# Patient Record
Sex: FEMALE | Race: WHITE | NOT HISPANIC OR LATINO | Employment: OTHER | URBAN - METROPOLITAN AREA
[De-identification: names, ages, dates, MRNs, and addresses within clinical notes are randomized per-mention and may not be internally consistent; named-entity substitution may affect disease eponyms.]

---

## 2017-01-05 ENCOUNTER — HOSPITAL ENCOUNTER (OUTPATIENT)
Dept: MRI IMAGING | Facility: HOSPITAL | Age: 57
Discharge: HOME | End: 2017-01-05
Attending: RADIOLOGY
Payer: COMMERCIAL

## 2017-01-05 DIAGNOSIS — C79.51 SECONDARY MALIGNANT NEOPLASM OF BONE (HCC): ICD-10-CM

## 2017-01-05 PROCEDURE — 72146 MRI CHEST SPINE W/O DYE: CPT

## 2017-01-05 PROCEDURE — 72148 MRI LUMBAR SPINE W/O DYE: CPT

## 2017-01-10 ENCOUNTER — ALLSCRIPTS OFFICE VISIT (OUTPATIENT)
Dept: OTHER | Facility: OTHER | Age: 57
End: 2017-01-10

## 2017-01-19 RX ORDER — SODIUM CHLORIDE 9 MG/ML
20 INJECTION, SOLUTION INTRAVENOUS CONTINUOUS
Status: DISCONTINUED | OUTPATIENT
Start: 2017-01-20 | End: 2017-01-23 | Stop reason: HOSPADM

## 2017-01-20 ENCOUNTER — HOSPITAL ENCOUNTER (OUTPATIENT)
Dept: INFUSION CENTER | Facility: CLINIC | Age: 57
Discharge: HOME | End: 2017-01-20
Payer: COMMERCIAL

## 2017-01-20 VITALS
RESPIRATION RATE: 20 BRPM | HEART RATE: 80 BPM | SYSTOLIC BLOOD PRESSURE: 144 MMHG | BODY MASS INDEX: 24.83 KG/M2 | TEMPERATURE: 98 F | DIASTOLIC BLOOD PRESSURE: 78 MMHG | WEIGHT: 149.2 LBS

## 2017-01-20 PROCEDURE — 96413 CHEMO IV INFUSION 1 HR: CPT

## 2017-01-20 PROCEDURE — 96417 CHEMO IV INFUS EACH ADDL SEQ: CPT

## 2017-01-20 RX ADMIN — PERTUZUMAB 420 MG: 30 INJECTION, SOLUTION, CONCENTRATE INTRAVENOUS at 14:24

## 2017-01-20 RX ADMIN — Medication 417 MG: at 14:57

## 2017-01-20 RX ADMIN — SODIUM CHLORIDE 20 ML/HR: 900 INJECTION, SOLUTION INTRAVENOUS at 13:41

## 2017-01-20 NOTE — PLAN OF CARE

## 2017-01-20 NOTE — PLAN OF CARE
Problem: Potential for Falls  Goal: Patient will remain free of falls  INTERVENTIONS:  - Assess patient frequently for physical needs  - Identify cognitive and physical deficits and behaviors that affect risk of falls    - [place] fall precautions as indicated by assessment   - Educate patient/family on patient safety including physical limitations  - Instruct patient to call for assistance with activity based on assessment  - Modify environment to reduce risk of injury  - Consider OT/PT consult to assist with strengthening/mobility   Outcome: Progressing

## 2017-01-25 ENCOUNTER — APPOINTMENT (OUTPATIENT)
Dept: LAB | Facility: MEDICAL CENTER | Age: 57
End: 2017-01-25
Payer: COMMERCIAL

## 2017-01-25 DIAGNOSIS — C50.919 MALIGNANT NEOPLASM OF FEMALE BREAST (HCC): ICD-10-CM

## 2017-01-25 LAB
ALBUMIN SERPL BCP-MCNC: 3.7 G/DL (ref 3.5–5)
ALP SERPL-CCNC: 105 U/L (ref 46–116)
ALT SERPL W P-5'-P-CCNC: 50 U/L (ref 12–78)
ANION GAP SERPL CALCULATED.3IONS-SCNC: 8 MMOL/L (ref 4–13)
AST SERPL W P-5'-P-CCNC: 24 U/L (ref 5–45)
BASOPHILS # BLD AUTO: 0.01 THOUSANDS/ΜL (ref 0–0.1)
BASOPHILS NFR BLD AUTO: 0 % (ref 0–1)
BILIRUB SERPL-MCNC: 0.32 MG/DL (ref 0.2–1)
BUN SERPL-MCNC: 14 MG/DL (ref 5–25)
CALCIUM SERPL-MCNC: 9.1 MG/DL (ref 8.3–10.1)
CHLORIDE SERPL-SCNC: 106 MMOL/L (ref 100–108)
CO2 SERPL-SCNC: 28 MMOL/L (ref 21–32)
CREAT SERPL-MCNC: 0.61 MG/DL (ref 0.6–1.3)
EOSINOPHIL # BLD AUTO: 0.01 THOUSAND/ΜL (ref 0–0.61)
EOSINOPHIL NFR BLD AUTO: 0 % (ref 0–6)
ERYTHROCYTE [DISTWIDTH] IN BLOOD BY AUTOMATED COUNT: 14.2 % (ref 11.6–15.1)
GFR SERPL CREATININE-BSD FRML MDRD: >60 ML/MIN/1.73SQ M
GLUCOSE SERPL-MCNC: 108 MG/DL (ref 65–140)
HCT VFR BLD AUTO: 38.1 % (ref 34.8–46.1)
HGB BLD-MCNC: 12.4 G/DL (ref 11.5–15.4)
LYMPHOCYTES # BLD AUTO: 1.22 THOUSANDS/ΜL (ref 0.6–4.47)
LYMPHOCYTES NFR BLD AUTO: 16 % (ref 14–44)
MCH RBC QN AUTO: 32.7 PG (ref 26.8–34.3)
MCHC RBC AUTO-ENTMCNC: 32.5 G/DL (ref 31.4–37.4)
MCV RBC AUTO: 101 FL (ref 82–98)
MONOCYTES # BLD AUTO: 0.74 THOUSAND/ΜL (ref 0.17–1.22)
MONOCYTES NFR BLD AUTO: 9 % (ref 4–12)
NEUTROPHILS # BLD AUTO: 5.82 THOUSANDS/ΜL (ref 1.85–7.62)
NEUTS SEG NFR BLD AUTO: 75 % (ref 43–75)
NRBC BLD AUTO-RTO: 0 /100 WBCS
PLATELET # BLD AUTO: 347 THOUSANDS/UL (ref 149–390)
PMV BLD AUTO: 9.7 FL (ref 8.9–12.7)
POTASSIUM SERPL-SCNC: 4 MMOL/L (ref 3.5–5.3)
PROT SERPL-MCNC: 7.6 G/DL (ref 6.4–8.2)
RBC # BLD AUTO: 3.79 MILLION/UL (ref 3.81–5.12)
SODIUM SERPL-SCNC: 142 MMOL/L (ref 136–145)
WBC # BLD AUTO: 7.84 THOUSAND/UL (ref 4.31–10.16)

## 2017-01-25 PROCEDURE — 80053 COMPREHEN METABOLIC PANEL: CPT

## 2017-01-25 PROCEDURE — 86300 IMMUNOASSAY TUMOR CA 15-3: CPT

## 2017-01-25 PROCEDURE — 85025 COMPLETE CBC W/AUTO DIFF WBC: CPT

## 2017-01-25 PROCEDURE — 36415 COLL VENOUS BLD VENIPUNCTURE: CPT

## 2017-01-26 LAB — CANCER AG27-29 SERPL-ACNC: 250 U/ML (ref 0–42.3)

## 2017-02-01 ENCOUNTER — ALLSCRIPTS OFFICE VISIT (OUTPATIENT)
Dept: OTHER | Facility: OTHER | Age: 57
End: 2017-02-01

## 2017-02-01 ENCOUNTER — TRANSCRIBE ORDERS (OUTPATIENT)
Dept: ADMINISTRATIVE | Facility: HOSPITAL | Age: 57
End: 2017-02-01

## 2017-02-01 DIAGNOSIS — C50.019 MALIGNANT NEOPLASM OF NIPPLE OF BREAST IN FEMALE, UNSPECIFIED LATERALITY: ICD-10-CM

## 2017-02-01 DIAGNOSIS — Z51.81 ENCOUNTER FOR THERAPEUTIC DRUG MONITORING: Primary | ICD-10-CM

## 2017-02-07 ENCOUNTER — HOSPITAL ENCOUNTER (OUTPATIENT)
Dept: NUCLEAR MEDICINE | Facility: HOSPITAL | Age: 57
Discharge: HOME | End: 2017-02-07
Payer: COMMERCIAL

## 2017-02-07 DIAGNOSIS — Z51.81 ENCOUNTER FOR THERAPEUTIC DRUG MONITORING: ICD-10-CM

## 2017-02-07 DIAGNOSIS — C50.019 MALIGNANT NEOPLASM OF NIPPLE OF BREAST IN FEMALE, UNSPECIFIED LATERALITY: ICD-10-CM

## 2017-02-07 PROCEDURE — 78472 GATED HEART PLANAR SINGLE: CPT

## 2017-02-07 PROCEDURE — A9560 TC99M LABELED RBC: HCPCS

## 2017-02-08 ENCOUNTER — APPOINTMENT (OUTPATIENT)
Dept: LAB | Facility: MEDICAL CENTER | Age: 57
End: 2017-02-08
Payer: COMMERCIAL

## 2017-02-08 DIAGNOSIS — G89.3 NEOPLASM RELATED PAIN: ICD-10-CM

## 2017-02-08 LAB
ALBUMIN SERPL BCP-MCNC: 3.3 G/DL (ref 3.5–5)
ALP SERPL-CCNC: 105 U/L (ref 46–116)
ALT SERPL W P-5'-P-CCNC: 31 U/L (ref 12–78)
ANION GAP SERPL CALCULATED.3IONS-SCNC: 7 MMOL/L (ref 4–13)
AST SERPL W P-5'-P-CCNC: 15 U/L (ref 5–45)
BASOPHILS # BLD AUTO: 0.02 THOUSANDS/ΜL (ref 0–0.1)
BASOPHILS NFR BLD AUTO: 0 % (ref 0–1)
BILIRUB SERPL-MCNC: 0.21 MG/DL (ref 0.2–1)
BUN SERPL-MCNC: 8 MG/DL (ref 5–25)
CALCIUM SERPL-MCNC: 9.4 MG/DL (ref 8.3–10.1)
CANCER AG27-29 SERPL-ACNC: 282.2 U/ML (ref 0–42.3)
CHLORIDE SERPL-SCNC: 103 MMOL/L (ref 100–108)
CO2 SERPL-SCNC: 29 MMOL/L (ref 21–32)
CREAT SERPL-MCNC: 0.56 MG/DL (ref 0.6–1.3)
EOSINOPHIL # BLD AUTO: 0.03 THOUSAND/ΜL (ref 0–0.61)
EOSINOPHIL NFR BLD AUTO: 0 % (ref 0–6)
ERYTHROCYTE [DISTWIDTH] IN BLOOD BY AUTOMATED COUNT: 13.9 % (ref 11.6–15.1)
GFR SERPL CREATININE-BSD FRML MDRD: >60 ML/MIN/1.73SQ M
GLUCOSE SERPL-MCNC: 97 MG/DL (ref 65–140)
HCT VFR BLD AUTO: 39.3 % (ref 34.8–46.1)
HGB BLD-MCNC: 12.8 G/DL (ref 11.5–15.4)
LYMPHOCYTES # BLD AUTO: 1.43 THOUSANDS/ΜL (ref 0.6–4.47)
LYMPHOCYTES NFR BLD AUTO: 20 % (ref 14–44)
MCH RBC QN AUTO: 32.1 PG (ref 26.8–34.3)
MCHC RBC AUTO-ENTMCNC: 32.6 G/DL (ref 31.4–37.4)
MCV RBC AUTO: 99 FL (ref 82–98)
MONOCYTES # BLD AUTO: 0.53 THOUSAND/ΜL (ref 0.17–1.22)
MONOCYTES NFR BLD AUTO: 7 % (ref 4–12)
NEUTROPHILS # BLD AUTO: 5.04 THOUSANDS/ΜL (ref 1.85–7.62)
NEUTS SEG NFR BLD AUTO: 73 % (ref 43–75)
NRBC BLD AUTO-RTO: 0 /100 WBCS
PLATELET # BLD AUTO: 462 THOUSANDS/UL (ref 149–390)
PMV BLD AUTO: 9.2 FL (ref 8.9–12.7)
POTASSIUM SERPL-SCNC: 4.1 MMOL/L (ref 3.5–5.3)
PROT SERPL-MCNC: 7.8 G/DL (ref 6.4–8.2)
RBC # BLD AUTO: 3.99 MILLION/UL (ref 3.81–5.12)
SODIUM SERPL-SCNC: 139 MMOL/L (ref 136–145)
WBC # BLD AUTO: 7.12 THOUSAND/UL (ref 4.31–10.16)

## 2017-02-08 PROCEDURE — 85025 COMPLETE CBC W/AUTO DIFF WBC: CPT

## 2017-02-08 PROCEDURE — 80053 COMPREHEN METABOLIC PANEL: CPT

## 2017-02-08 PROCEDURE — 36415 COLL VENOUS BLD VENIPUNCTURE: CPT

## 2017-02-08 PROCEDURE — 86300 IMMUNOASSAY TUMOR CA 15-3: CPT

## 2017-02-10 ENCOUNTER — HOSPITAL ENCOUNTER (OUTPATIENT)
Dept: INFUSION CENTER | Facility: CLINIC | Age: 57
Discharge: HOME | End: 2017-02-10
Payer: COMMERCIAL

## 2017-02-10 VITALS
TEMPERATURE: 98.2 F | HEIGHT: 65 IN | BODY MASS INDEX: 24.62 KG/M2 | SYSTOLIC BLOOD PRESSURE: 120 MMHG | RESPIRATION RATE: 20 BRPM | DIASTOLIC BLOOD PRESSURE: 70 MMHG | WEIGHT: 147.8 LBS | HEART RATE: 80 BPM

## 2017-02-10 PROCEDURE — 96367 TX/PROPH/DG ADDL SEQ IV INF: CPT

## 2017-02-10 PROCEDURE — 96377 APPLICATON ON-BODY INJECTOR: CPT

## 2017-02-10 PROCEDURE — 96413 CHEMO IV INFUSION 1 HR: CPT

## 2017-02-10 PROCEDURE — 96417 CHEMO IV INFUS EACH ADDL SEQ: CPT

## 2017-02-10 RX ORDER — HYDROMORPHONE HYDROCHLORIDE 8 MG/1
8 TABLET ORAL EVERY 8 HOURS PRN
COMMUNITY
End: 2018-02-06 | Stop reason: SDUPTHER

## 2017-02-10 RX ADMIN — PERTUZUMAB 420 MG: 30 INJECTION, SOLUTION, CONCENTRATE INTRAVENOUS at 15:16

## 2017-02-10 RX ADMIN — Medication 416 MG: at 15:51

## 2017-02-10 RX ADMIN — PEGFILGRASTIM 6 MG: KIT SUBCUTANEOUS at 16:25

## 2017-02-10 RX ADMIN — ONDANSETRON 16 MG: 2 INJECTION INTRAMUSCULAR; INTRAVENOUS at 13:51

## 2017-02-10 RX ADMIN — DOCETAXEL 132 MG: 20 INJECTION, SOLUTION, CONCENTRATE INTRAVENOUS at 14:11

## 2017-02-10 NOTE — PROGRESS NOTES
Tolerate treament today  Applied neulasta on pro to left arm, pt independent with monitoring and removing device   Has appointments set up, declines and AVS

## 2017-02-14 ENCOUNTER — ALLSCRIPTS OFFICE VISIT (OUTPATIENT)
Dept: OTHER | Facility: OTHER | Age: 57
End: 2017-02-14

## 2017-02-28 ENCOUNTER — ALLSCRIPTS OFFICE VISIT (OUTPATIENT)
Dept: OTHER | Facility: OTHER | Age: 57
End: 2017-02-28

## 2017-03-01 ENCOUNTER — ALLSCRIPTS OFFICE VISIT (OUTPATIENT)
Dept: OTHER | Facility: OTHER | Age: 57
End: 2017-03-01

## 2017-03-01 ENCOUNTER — TRANSCRIBE ORDERS (OUTPATIENT)
Dept: LAB | Facility: MEDICAL CENTER | Age: 57
End: 2017-03-01

## 2017-03-01 ENCOUNTER — APPOINTMENT (OUTPATIENT)
Dept: LAB | Facility: MEDICAL CENTER | Age: 57
End: 2017-03-01
Payer: COMMERCIAL

## 2017-03-01 ENCOUNTER — TRANSCRIBE ORDERS (OUTPATIENT)
Dept: ADMINISTRATIVE | Facility: HOSPITAL | Age: 57
End: 2017-03-01

## 2017-03-01 DIAGNOSIS — C50.011 MALIGNANT NEOPLASM INVOLVING BOTH NIPPLE AND AREOLA OF RIGHT BREAST IN FEMALE (HCC): Primary | ICD-10-CM

## 2017-03-01 DIAGNOSIS — C50.919 MALIGNANT NEOPLASM OF FEMALE BREAST (HCC): ICD-10-CM

## 2017-03-01 LAB
ALBUMIN SERPL BCP-MCNC: 3.3 G/DL (ref 3.5–5)
ALP SERPL-CCNC: 105 U/L (ref 46–116)
ALT SERPL W P-5'-P-CCNC: 48 U/L (ref 12–78)
ANION GAP SERPL CALCULATED.3IONS-SCNC: 7 MMOL/L (ref 4–13)
AST SERPL W P-5'-P-CCNC: 18 U/L (ref 5–45)
BASOPHILS # BLD AUTO: 0.01 THOUSANDS/ΜL (ref 0–0.1)
BASOPHILS NFR BLD AUTO: 0 % (ref 0–1)
BILIRUB SERPL-MCNC: 0.18 MG/DL (ref 0.2–1)
BUN SERPL-MCNC: 11 MG/DL (ref 5–25)
CALCIUM SERPL-MCNC: 8.7 MG/DL (ref 8.3–10.1)
CHLORIDE SERPL-SCNC: 105 MMOL/L (ref 100–108)
CO2 SERPL-SCNC: 29 MMOL/L (ref 21–32)
CREAT SERPL-MCNC: 0.58 MG/DL (ref 0.6–1.3)
EOSINOPHIL # BLD AUTO: 0.01 THOUSAND/ΜL (ref 0–0.61)
EOSINOPHIL NFR BLD AUTO: 0 % (ref 0–6)
ERYTHROCYTE [DISTWIDTH] IN BLOOD BY AUTOMATED COUNT: 16 % (ref 11.6–15.1)
GFR SERPL CREATININE-BSD FRML MDRD: >60 ML/MIN/1.73SQ M
GLUCOSE SERPL-MCNC: 115 MG/DL (ref 65–140)
HCT VFR BLD AUTO: 37.3 % (ref 34.8–46.1)
HGB BLD-MCNC: 12 G/DL (ref 11.5–15.4)
LYMPHOCYTES # BLD AUTO: 1.55 THOUSANDS/ΜL (ref 0.6–4.47)
LYMPHOCYTES NFR BLD AUTO: 14 % (ref 14–44)
MCH RBC QN AUTO: 32 PG (ref 26.8–34.3)
MCHC RBC AUTO-ENTMCNC: 32.2 G/DL (ref 31.4–37.4)
MCV RBC AUTO: 100 FL (ref 82–98)
MONOCYTES # BLD AUTO: 1.06 THOUSAND/ΜL (ref 0.17–1.22)
MONOCYTES NFR BLD AUTO: 10 % (ref 4–12)
NEUTROPHILS # BLD AUTO: 8.19 THOUSANDS/ΜL (ref 1.85–7.62)
NEUTS SEG NFR BLD AUTO: 76 % (ref 43–75)
NRBC BLD AUTO-RTO: 0 /100 WBCS
PLATELET # BLD AUTO: 483 THOUSANDS/UL (ref 149–390)
PMV BLD AUTO: 9.3 FL (ref 8.9–12.7)
POTASSIUM SERPL-SCNC: 3.9 MMOL/L (ref 3.5–5.3)
PROT SERPL-MCNC: 7.4 G/DL (ref 6.4–8.2)
RBC # BLD AUTO: 3.75 MILLION/UL (ref 3.81–5.12)
SODIUM SERPL-SCNC: 141 MMOL/L (ref 136–145)
WBC # BLD AUTO: 10.92 THOUSAND/UL (ref 4.31–10.16)

## 2017-03-01 PROCEDURE — 80053 COMPREHEN METABOLIC PANEL: CPT

## 2017-03-01 PROCEDURE — 85025 COMPLETE CBC W/AUTO DIFF WBC: CPT

## 2017-03-01 PROCEDURE — 86300 IMMUNOASSAY TUMOR CA 15-3: CPT

## 2017-03-01 PROCEDURE — 36415 COLL VENOUS BLD VENIPUNCTURE: CPT

## 2017-03-02 ENCOUNTER — GENERIC CONVERSION - ENCOUNTER (OUTPATIENT)
Dept: OTHER | Facility: OTHER | Age: 57
End: 2017-03-02

## 2017-03-02 LAB — CANCER AG27-29 SERPL-ACNC: 278.8 U/ML (ref 0–42.3)

## 2017-03-02 RX ORDER — SODIUM CHLORIDE 9 MG/ML
20 INJECTION, SOLUTION INTRAVENOUS CONTINUOUS
Status: DISCONTINUED | OUTPATIENT
Start: 2017-03-03 | End: 2017-03-06 | Stop reason: HOSPADM

## 2017-03-03 ENCOUNTER — HOSPITAL ENCOUNTER (OUTPATIENT)
Dept: INFUSION CENTER | Facility: CLINIC | Age: 57
Discharge: HOME | End: 2017-03-03
Payer: COMMERCIAL

## 2017-03-03 VITALS
TEMPERATURE: 98.6 F | HEART RATE: 106 BPM | RESPIRATION RATE: 18 BRPM | DIASTOLIC BLOOD PRESSURE: 76 MMHG | SYSTOLIC BLOOD PRESSURE: 140 MMHG | BODY MASS INDEX: 25.26 KG/M2 | WEIGHT: 151.6 LBS | HEIGHT: 65 IN

## 2017-03-03 PROCEDURE — 96377 APPLICATON ON-BODY INJECTOR: CPT

## 2017-03-03 PROCEDURE — 96367 TX/PROPH/DG ADDL SEQ IV INF: CPT

## 2017-03-03 PROCEDURE — 96401 CHEMO ANTI-NEOPL SQ/IM: CPT

## 2017-03-03 PROCEDURE — 96413 CHEMO IV INFUSION 1 HR: CPT

## 2017-03-03 PROCEDURE — 96417 CHEMO IV INFUS EACH ADDL SEQ: CPT

## 2017-03-03 RX ADMIN — PEGFILGRASTIM 6 MG: KIT SUBCUTANEOUS at 14:55

## 2017-03-03 RX ADMIN — DENOSUMAB 120 MG: 120 INJECTION SUBCUTANEOUS at 14:49

## 2017-03-03 RX ADMIN — ONDANSETRON 16 MG: 2 INJECTION INTRAMUSCULAR; INTRAVENOUS at 12:00

## 2017-03-03 RX ADMIN — TRASTUZUMAB 416 MG: KIT at 13:03

## 2017-03-03 RX ADMIN — PERTUZUMAB 420 MG: 30 INJECTION, SOLUTION, CONCENTRATE INTRAVENOUS at 12:26

## 2017-03-03 RX ADMIN — DOCETAXEL 132 MG: 20 INJECTION, SOLUTION, CONCENTRATE INTRAVENOUS at 13:44

## 2017-03-03 RX ADMIN — SODIUM CHLORIDE 20 ML/HR: 0.9 INJECTION, SOLUTION INTRAVENOUS at 11:45

## 2017-03-03 NOTE — PROGRESS NOTES
Treatment completed without adverse event, xgeva tolerated in left arm, neulasta on pro applied to left arm, instructions reviewed    Pt to return as scheduled

## 2017-03-17 ENCOUNTER — HOSPITAL ENCOUNTER (OUTPATIENT)
Dept: RADIOLOGY | Facility: MEDICAL CENTER | Age: 57
Discharge: HOME | End: 2017-03-17
Payer: COMMERCIAL

## 2017-03-17 DIAGNOSIS — C50.919 MALIGNANT NEOPLASM OF FEMALE BREAST (HCC): ICD-10-CM

## 2017-03-17 DIAGNOSIS — C79.51 SECONDARY MALIGNANT NEOPLASM OF BONE (HCC): ICD-10-CM

## 2017-03-17 PROCEDURE — 71260 CT THORAX DX C+: CPT

## 2017-03-17 PROCEDURE — 74177 CT ABD & PELVIS W/CONTRAST: CPT

## 2017-03-17 RX ADMIN — IOHEXOL 100 ML: 350 INJECTION, SOLUTION INTRAVENOUS at 11:28

## 2017-03-21 ENCOUNTER — APPOINTMENT (OUTPATIENT)
Dept: LAB | Facility: MEDICAL CENTER | Age: 57
End: 2017-03-21
Payer: COMMERCIAL

## 2017-03-21 ENCOUNTER — TRANSCRIBE ORDERS (OUTPATIENT)
Dept: ADMINISTRATIVE | Facility: HOSPITAL | Age: 57
End: 2017-03-21

## 2017-03-21 DIAGNOSIS — G89.3 NEOPLASM RELATED PAIN: Primary | ICD-10-CM

## 2017-03-21 DIAGNOSIS — G89.3 NEOPLASM RELATED PAIN: ICD-10-CM

## 2017-03-21 LAB
ALBUMIN SERPL BCP-MCNC: 3.1 G/DL (ref 3.5–5)
ALP SERPL-CCNC: 117 U/L (ref 46–116)
ALT SERPL W P-5'-P-CCNC: 23 U/L (ref 12–78)
ANION GAP SERPL CALCULATED.3IONS-SCNC: 7 MMOL/L (ref 4–13)
AST SERPL W P-5'-P-CCNC: 13 U/L (ref 5–45)
BASOPHILS # BLD AUTO: 0.02 THOUSANDS/ΜL (ref 0–0.1)
BASOPHILS NFR BLD AUTO: 0 % (ref 0–1)
BILIRUB SERPL-MCNC: 0.2 MG/DL (ref 0.2–1)
BUN SERPL-MCNC: 8 MG/DL (ref 5–25)
CALCIUM SERPL-MCNC: 9 MG/DL (ref 8.3–10.1)
CANCER AG27-29 SERPL-ACNC: 184.8 U/ML (ref 0–42.3)
CHLORIDE SERPL-SCNC: 108 MMOL/L (ref 100–108)
CO2 SERPL-SCNC: 29 MMOL/L (ref 21–32)
CREAT SERPL-MCNC: 0.55 MG/DL (ref 0.6–1.3)
EOSINOPHIL # BLD AUTO: 0 THOUSAND/ΜL (ref 0–0.61)
EOSINOPHIL NFR BLD AUTO: 0 % (ref 0–6)
ERYTHROCYTE [DISTWIDTH] IN BLOOD BY AUTOMATED COUNT: 17 % (ref 11.6–15.1)
GFR SERPL CREATININE-BSD FRML MDRD: >60 ML/MIN/1.73SQ M
GLUCOSE SERPL-MCNC: 99 MG/DL (ref 65–140)
HCT VFR BLD AUTO: 33.8 % (ref 34.8–46.1)
HGB BLD-MCNC: 10.6 G/DL (ref 11.5–15.4)
LYMPHOCYTES # BLD AUTO: 1.56 THOUSANDS/ΜL (ref 0.6–4.47)
LYMPHOCYTES NFR BLD AUTO: 16 % (ref 14–44)
MCH RBC QN AUTO: 31.5 PG (ref 26.8–34.3)
MCHC RBC AUTO-ENTMCNC: 31.4 G/DL (ref 31.4–37.4)
MCV RBC AUTO: 100 FL (ref 82–98)
MONOCYTES # BLD AUTO: 0.74 THOUSAND/ΜL (ref 0.17–1.22)
MONOCYTES NFR BLD AUTO: 8 % (ref 4–12)
NEUTROPHILS # BLD AUTO: 7.38 THOUSANDS/ΜL (ref 1.85–7.62)
NEUTS SEG NFR BLD AUTO: 76 % (ref 43–75)
NRBC BLD AUTO-RTO: 0 /100 WBCS
PLATELET # BLD AUTO: 575 THOUSANDS/UL (ref 149–390)
PMV BLD AUTO: 9.3 FL (ref 8.9–12.7)
POTASSIUM SERPL-SCNC: 4.1 MMOL/L (ref 3.5–5.3)
PROT SERPL-MCNC: 7.3 G/DL (ref 6.4–8.2)
RBC # BLD AUTO: 3.37 MILLION/UL (ref 3.81–5.12)
SODIUM SERPL-SCNC: 144 MMOL/L (ref 136–145)
WBC # BLD AUTO: 9.78 THOUSAND/UL (ref 4.31–10.16)

## 2017-03-21 PROCEDURE — 80053 COMPREHEN METABOLIC PANEL: CPT

## 2017-03-21 PROCEDURE — 36415 COLL VENOUS BLD VENIPUNCTURE: CPT

## 2017-03-21 PROCEDURE — 85025 COMPLETE CBC W/AUTO DIFF WBC: CPT

## 2017-03-21 PROCEDURE — 86300 IMMUNOASSAY TUMOR CA 15-3: CPT

## 2017-03-22 ENCOUNTER — ALLSCRIPTS OFFICE VISIT (OUTPATIENT)
Dept: OTHER | Facility: OTHER | Age: 57
End: 2017-03-22

## 2017-03-23 RX ORDER — SODIUM CHLORIDE 9 MG/ML
20 INJECTION, SOLUTION INTRAVENOUS CONTINUOUS
Status: DISCONTINUED | OUTPATIENT
Start: 2017-03-24 | End: 2017-03-27 | Stop reason: HOSPADM

## 2017-03-24 ENCOUNTER — HOSPITAL ENCOUNTER (OUTPATIENT)
Dept: INFUSION CENTER | Facility: CLINIC | Age: 57
Discharge: HOME | End: 2017-03-24
Payer: COMMERCIAL

## 2017-03-24 VITALS
SYSTOLIC BLOOD PRESSURE: 120 MMHG | BODY MASS INDEX: 24.69 KG/M2 | TEMPERATURE: 97.4 F | OXYGEN SATURATION: 97 % | HEIGHT: 65 IN | RESPIRATION RATE: 20 BRPM | HEART RATE: 80 BPM | WEIGHT: 148.2 LBS | DIASTOLIC BLOOD PRESSURE: 64 MMHG

## 2017-03-24 PROCEDURE — 96377 APPLICATON ON-BODY INJECTOR: CPT

## 2017-03-24 PROCEDURE — 96417 CHEMO IV INFUS EACH ADDL SEQ: CPT

## 2017-03-24 PROCEDURE — 96413 CHEMO IV INFUSION 1 HR: CPT

## 2017-03-24 PROCEDURE — 96367 TX/PROPH/DG ADDL SEQ IV INF: CPT

## 2017-03-24 RX ADMIN — DOCETAXEL 132 MG: 20 INJECTION, SOLUTION, CONCENTRATE INTRAVENOUS at 13:35

## 2017-03-24 RX ADMIN — SODIUM CHLORIDE 20 ML/HR: 0.9 INJECTION, SOLUTION INTRAVENOUS at 11:40

## 2017-03-24 RX ADMIN — ONDANSETRON 16 MG: 2 INJECTION INTRAMUSCULAR; INTRAVENOUS at 11:53

## 2017-03-24 RX ADMIN — PEGFILGRASTIM 6 MG: KIT SUBCUTANEOUS at 14:41

## 2017-03-24 RX ADMIN — PERTUZUMAB 420 MG: 30 INJECTION, SOLUTION, CONCENTRATE INTRAVENOUS at 12:24

## 2017-03-24 RX ADMIN — TRASTUZUMAB 414 MG: KIT at 13:00

## 2017-03-28 ENCOUNTER — HOSPITAL ENCOUNTER (OUTPATIENT)
Dept: MRI IMAGING | Facility: HOSPITAL | Age: 57
Discharge: HOME | End: 2017-03-28
Attending: RADIOLOGY
Payer: COMMERCIAL

## 2017-03-28 DIAGNOSIS — C79.51 SECONDARY MALIGNANT NEOPLASM OF BONE (HCC): ICD-10-CM

## 2017-03-28 PROCEDURE — 72148 MRI LUMBAR SPINE W/O DYE: CPT

## 2017-03-28 PROCEDURE — 72146 MRI CHEST SPINE W/O DYE: CPT

## 2017-04-03 ENCOUNTER — ALLSCRIPTS OFFICE VISIT (OUTPATIENT)
Dept: OTHER | Facility: OTHER | Age: 57
End: 2017-04-03

## 2017-04-04 ENCOUNTER — APPOINTMENT (OUTPATIENT)
Dept: RADIATION ONCOLOGY | Facility: HOSPITAL | Age: 57
End: 2017-04-04
Attending: RADIOLOGY
Payer: COMMERCIAL

## 2017-04-04 ENCOUNTER — GENERIC CONVERSION - ENCOUNTER (OUTPATIENT)
Dept: OTHER | Facility: OTHER | Age: 57
End: 2017-04-04

## 2017-04-04 ENCOUNTER — TRANSCRIBE ORDERS (OUTPATIENT)
Dept: ADMINISTRATIVE | Facility: HOSPITAL | Age: 57
End: 2017-04-04

## 2017-04-04 DIAGNOSIS — C79.52 SECONDARY MALIGNANT NEOPLASM OF BONE AND BONE MARROW (HCC): Primary | ICD-10-CM

## 2017-04-04 DIAGNOSIS — C79.51 SECONDARY MALIGNANT NEOPLASM OF BONE AND BONE MARROW (HCC): Primary | ICD-10-CM

## 2017-04-04 PROCEDURE — 99214 OFFICE O/P EST MOD 30 MIN: CPT | Performed by: RADIOLOGY

## 2017-04-11 ENCOUNTER — APPOINTMENT (OUTPATIENT)
Dept: LAB | Facility: MEDICAL CENTER | Age: 57
End: 2017-04-11
Payer: COMMERCIAL

## 2017-04-11 ENCOUNTER — TRANSCRIBE ORDERS (OUTPATIENT)
Dept: ADMINISTRATIVE | Facility: HOSPITAL | Age: 57
End: 2017-04-11

## 2017-04-11 DIAGNOSIS — G89.3 NEOPLASM RELATED PAIN: Primary | ICD-10-CM

## 2017-04-11 LAB
ALBUMIN SERPL BCP-MCNC: 3.1 G/DL (ref 3.5–5)
ALP SERPL-CCNC: 111 U/L (ref 46–116)
ALT SERPL W P-5'-P-CCNC: 33 U/L (ref 12–78)
ANION GAP SERPL CALCULATED.3IONS-SCNC: 7 MMOL/L (ref 4–13)
AST SERPL W P-5'-P-CCNC: 19 U/L (ref 5–45)
BASOPHILS # BLD AUTO: 0.04 THOUSANDS/ΜL (ref 0–0.1)
BASOPHILS NFR BLD AUTO: 1 % (ref 0–1)
BILIRUB SERPL-MCNC: 0.23 MG/DL (ref 0.2–1)
BUN SERPL-MCNC: 13 MG/DL (ref 5–25)
CALCIUM SERPL-MCNC: 8.7 MG/DL (ref 8.3–10.1)
CANCER AG27-29 SERPL-ACNC: 142 U/ML (ref 0–42.3)
CHLORIDE SERPL-SCNC: 106 MMOL/L (ref 100–108)
CO2 SERPL-SCNC: 28 MMOL/L (ref 21–32)
CREAT SERPL-MCNC: 0.64 MG/DL (ref 0.6–1.3)
EOSINOPHIL # BLD AUTO: 0.06 THOUSAND/ΜL (ref 0–0.61)
EOSINOPHIL NFR BLD AUTO: 1 % (ref 0–6)
ERYTHROCYTE [DISTWIDTH] IN BLOOD BY AUTOMATED COUNT: 18.5 % (ref 11.6–15.1)
GFR SERPL CREATININE-BSD FRML MDRD: >60 ML/MIN/1.73SQ M
GLUCOSE SERPL-MCNC: 83 MG/DL (ref 65–140)
HCT VFR BLD AUTO: 33.8 % (ref 34.8–46.1)
HGB BLD-MCNC: 10.8 G/DL (ref 11.5–15.4)
LYMPHOCYTES # BLD AUTO: 1.86 THOUSANDS/ΜL (ref 0.6–4.47)
LYMPHOCYTES NFR BLD AUTO: 22 % (ref 14–44)
MCH RBC QN AUTO: 32.3 PG (ref 26.8–34.3)
MCHC RBC AUTO-ENTMCNC: 32 G/DL (ref 31.4–37.4)
MCV RBC AUTO: 101 FL (ref 82–98)
MONOCYTES # BLD AUTO: 0.79 THOUSAND/ΜL (ref 0.17–1.22)
MONOCYTES NFR BLD AUTO: 9 % (ref 4–12)
NEUTROPHILS # BLD AUTO: 5.69 THOUSANDS/ΜL (ref 1.85–7.62)
NEUTS SEG NFR BLD AUTO: 67 % (ref 43–75)
NRBC BLD AUTO-RTO: 0 /100 WBCS
PLATELET # BLD AUTO: 482 THOUSANDS/UL (ref 149–390)
PMV BLD AUTO: 9.4 FL (ref 8.9–12.7)
POTASSIUM SERPL-SCNC: 3.9 MMOL/L (ref 3.5–5.3)
PROT SERPL-MCNC: 7 G/DL (ref 6.4–8.2)
RBC # BLD AUTO: 3.34 MILLION/UL (ref 3.81–5.12)
SODIUM SERPL-SCNC: 141 MMOL/L (ref 136–145)
WBC # BLD AUTO: 8.59 THOUSAND/UL (ref 4.31–10.16)

## 2017-04-11 PROCEDURE — 36415 COLL VENOUS BLD VENIPUNCTURE: CPT | Performed by: PHYSICIAN ASSISTANT

## 2017-04-11 PROCEDURE — 80053 COMPREHEN METABOLIC PANEL: CPT | Performed by: PHYSICIAN ASSISTANT

## 2017-04-11 PROCEDURE — 86300 IMMUNOASSAY TUMOR CA 15-3: CPT | Performed by: PHYSICIAN ASSISTANT

## 2017-04-11 PROCEDURE — 85025 COMPLETE CBC W/AUTO DIFF WBC: CPT | Performed by: PHYSICIAN ASSISTANT

## 2017-04-13 ENCOUNTER — GENERIC CONVERSION - ENCOUNTER (OUTPATIENT)
Dept: OTHER | Facility: OTHER | Age: 57
End: 2017-04-13

## 2017-04-14 ENCOUNTER — HOSPITAL ENCOUNTER (OUTPATIENT)
Dept: INFUSION CENTER | Facility: CLINIC | Age: 57
Discharge: HOME | End: 2017-04-14
Payer: COMMERCIAL

## 2017-04-14 VITALS
RESPIRATION RATE: 20 BRPM | HEART RATE: 93 BPM | BODY MASS INDEX: 24.26 KG/M2 | HEIGHT: 65 IN | DIASTOLIC BLOOD PRESSURE: 68 MMHG | OXYGEN SATURATION: 96 % | SYSTOLIC BLOOD PRESSURE: 112 MMHG | WEIGHT: 145.6 LBS | TEMPERATURE: 98.8 F

## 2017-04-14 PROCEDURE — 96401 CHEMO ANTI-NEOPL SQ/IM: CPT

## 2017-04-14 PROCEDURE — 96367 TX/PROPH/DG ADDL SEQ IV INF: CPT

## 2017-04-14 PROCEDURE — 96413 CHEMO IV INFUSION 1 HR: CPT

## 2017-04-14 PROCEDURE — 96417 CHEMO IV INFUS EACH ADDL SEQ: CPT

## 2017-04-14 PROCEDURE — 96377 APPLICATON ON-BODY INJECTOR: CPT

## 2017-04-14 RX ORDER — SODIUM CHLORIDE 9 MG/ML
20 INJECTION, SOLUTION INTRAVENOUS CONTINUOUS
Status: DISCONTINUED | OUTPATIENT
Start: 2017-04-14 | End: 2017-04-17 | Stop reason: HOSPADM

## 2017-04-14 RX ADMIN — SODIUM CHLORIDE 20 ML/HR: 0.9 INJECTION, SOLUTION INTRAVENOUS at 11:50

## 2017-04-14 RX ADMIN — ONDANSETRON 16 MG: 2 INJECTION INTRAMUSCULAR; INTRAVENOUS at 12:12

## 2017-04-14 RX ADMIN — TRASTUZUMAB 414 MG: KIT at 13:12

## 2017-04-14 RX ADMIN — PEGFILGRASTIM 6 MG: KIT SUBCUTANEOUS at 14:59

## 2017-04-14 RX ADMIN — DENOSUMAB 120 MG: 120 INJECTION SUBCUTANEOUS at 14:58

## 2017-04-14 RX ADMIN — DOCETAXEL 132 MG: 20 INJECTION, SOLUTION, CONCENTRATE INTRAVENOUS at 13:45

## 2017-04-14 RX ADMIN — PERTUZUMAB 420 MG: 30 INJECTION, SOLUTION, CONCENTRATE INTRAVENOUS at 12:35

## 2017-04-14 NOTE — PROGRESS NOTES
Pt offers no complaints, labs within parameters for Perjeta, Herceptin, Taxotere, and xgeva today  Pt missed MD visit last wk rescheduled to next wk per pt    Last MUGA 2/207 LVEF=71%

## 2017-04-14 NOTE — PROGRESS NOTES
Pt tolerated treatment without adverse event,xgeva tolerated in leftarm and neulasta on pro applied to left arm, insrtructions reviewed  pt to return as scheduled

## 2017-04-18 ENCOUNTER — ALLSCRIPTS OFFICE VISIT (OUTPATIENT)
Dept: OTHER | Facility: OTHER | Age: 57
End: 2017-04-18

## 2017-04-18 ENCOUNTER — HOSPITAL ENCOUNTER (OUTPATIENT)
Dept: MRI IMAGING | Facility: HOSPITAL | Age: 57
Discharge: HOME | End: 2017-04-18
Payer: COMMERCIAL

## 2017-04-18 ENCOUNTER — TRANSCRIBE ORDERS (OUTPATIENT)
Dept: ADMINISTRATIVE | Facility: HOSPITAL | Age: 57
End: 2017-04-18

## 2017-04-18 DIAGNOSIS — R51.9 NONINTRACTABLE HEADACHE, UNSPECIFIED CHRONICITY PATTERN, UNSPECIFIED HEADACHE TYPE: ICD-10-CM

## 2017-04-18 DIAGNOSIS — R42 DIZZINESS AND GIDDINESS: ICD-10-CM

## 2017-04-18 DIAGNOSIS — R51 HEADACHE(784.0): ICD-10-CM
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
## 2017-04-18 DIAGNOSIS — R42 DIZZINESS AND GIDDINESS: Primary | ICD-10-CM
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
## 2017-04-18 DIAGNOSIS — C50.919 MALIGNANT NEOPLASM OF FEMALE BREAST, UNSPECIFIED LATERALITY, UNSPECIFIED SITE OF BREAST: ICD-10-CM

## 2017-04-18 DIAGNOSIS — C79.31 SECONDARY MALIGNANT NEOPLASM OF BRAIN (HCC): ICD-10-CM

## 2017-04-18 DIAGNOSIS — C50.919 MALIGNANT NEOPLASM OF FEMALE BREAST (HCC): ICD-10-CM

## 2017-04-18 DIAGNOSIS — R14.0 ABDOMINAL DISTENSION (GASEOUS): ICD-10-CM

## 2017-04-18 PROCEDURE — 70553 MRI BRAIN STEM W/O & W/DYE: CPT

## 2017-04-18 PROCEDURE — A9585 GADOBUTROL INJECTION: HCPCS | Performed by: PHYSICIAN ASSISTANT

## 2017-04-18 RX ADMIN — GADOBUTROL 6 ML: 604.72 INJECTION INTRAVENOUS at 11:38

## 2017-04-19 ENCOUNTER — GENERIC CONVERSION - ENCOUNTER (OUTPATIENT)
Dept: OTHER | Facility: OTHER | Age: 57
End: 2017-04-19

## 2017-04-26 ENCOUNTER — APPOINTMENT (OUTPATIENT)
Dept: RADIATION ONCOLOGY | Facility: HOSPITAL | Age: 57
End: 2017-04-26
Attending: RADIOLOGY
Payer: COMMERCIAL

## 2017-04-26 ENCOUNTER — GENERIC CONVERSION - ENCOUNTER (OUTPATIENT)
Dept: OTHER | Facility: OTHER | Age: 57
End: 2017-04-26

## 2017-04-26 PROCEDURE — 99215 OFFICE O/P EST HI 40 MIN: CPT | Performed by: RADIOLOGY

## 2017-04-27 ENCOUNTER — APPOINTMENT (OUTPATIENT)
Dept: RADIATION ONCOLOGY | Facility: HOSPITAL | Age: 57
End: 2017-04-27
Attending: RADIOLOGY
Payer: COMMERCIAL

## 2017-04-27 PROCEDURE — 77334 RADIATION TREATMENT AID(S): CPT | Performed by: RADIOLOGY

## 2017-04-27 PROCEDURE — 77470 SPECIAL RADIATION TREATMENT: CPT | Performed by: RADIOLOGY

## 2017-04-27 PROCEDURE — 77370 RADIATION PHYSICS CONSULT: CPT | Performed by: RADIOLOGY

## 2017-05-01 ENCOUNTER — ALLSCRIPTS OFFICE VISIT (OUTPATIENT)
Dept: OTHER | Facility: OTHER | Age: 57
End: 2017-05-01

## 2017-05-01 ENCOUNTER — APPOINTMENT (OUTPATIENT)
Dept: RADIATION ONCOLOGY | Facility: HOSPITAL | Age: 57
End: 2017-05-01
Attending: RADIOLOGY
Payer: COMMERCIAL

## 2017-05-02 PROCEDURE — 77334 RADIATION TREATMENT AID(S): CPT | Performed by: RADIOLOGY

## 2017-05-02 PROCEDURE — 77370 RADIATION PHYSICS CONSULT: CPT | Performed by: RADIOLOGY

## 2017-05-02 PROCEDURE — 77295 3-D RADIOTHERAPY PLAN: CPT | Performed by: RADIOLOGY

## 2017-05-02 PROCEDURE — 77300 RADIATION THERAPY DOSE PLAN: CPT | Performed by: RADIOLOGY

## 2017-05-03 ENCOUNTER — TREATMENT (OUTPATIENT)
Dept: OTHER | Facility: HOSPITAL | Age: 57
End: 2017-05-03

## 2017-05-03 ENCOUNTER — GENERIC CONVERSION - ENCOUNTER (OUTPATIENT)
Dept: OTHER | Facility: OTHER | Age: 57
End: 2017-05-03

## 2017-05-03 PROCEDURE — 77336 RADIATION PHYSICS CONSULT: CPT | Performed by: RADIOLOGY

## 2017-05-03 PROCEDURE — 77372 SRS LINEAR BASED: CPT | Performed by: RADIOLOGY

## 2017-05-04 ENCOUNTER — HOSPITAL ENCOUNTER (OUTPATIENT)
Dept: ULTRASOUND IMAGING | Facility: HOSPITAL | Age: 57
Discharge: HOME | End: 2017-05-04
Attending: INTERNAL MEDICINE
Payer: COMMERCIAL

## 2017-05-04 DIAGNOSIS — R14.0 ABDOMINAL DISTENSION (GASEOUS): ICD-10-CM

## 2017-05-04 PROCEDURE — 76700 US EXAM ABDOM COMPLETE: CPT

## 2017-05-08 ENCOUNTER — LAB (OUTPATIENT)
Dept: LAB | Facility: MEDICAL CENTER | Age: 57
End: 2017-05-08
Payer: COMMERCIAL

## 2017-05-08 ENCOUNTER — TRANSCRIBE ORDERS (OUTPATIENT)
Dept: ADMINISTRATIVE | Facility: HOSPITAL | Age: 57
End: 2017-05-08

## 2017-05-08 DIAGNOSIS — G89.3 NEOPLASM RELATED PAIN: Primary | ICD-10-CM

## 2017-05-08 LAB
ALBUMIN SERPL BCP-MCNC: 3.2 G/DL (ref 3.5–5)
ALP SERPL-CCNC: 90 U/L (ref 46–116)
ALT SERPL W P-5'-P-CCNC: 31 U/L (ref 12–78)
ANION GAP SERPL CALCULATED.3IONS-SCNC: 7 MMOL/L (ref 4–13)
AST SERPL W P-5'-P-CCNC: 20 U/L (ref 5–45)
BASOPHILS # BLD AUTO: 0.03 THOUSANDS/ΜL (ref 0–0.1)
BASOPHILS NFR BLD AUTO: 0 % (ref 0–1)
BILIRUB SERPL-MCNC: 0.19 MG/DL (ref 0.2–1)
BUN SERPL-MCNC: 10 MG/DL (ref 5–25)
CALCIUM SERPL-MCNC: 8.9 MG/DL (ref 8.3–10.1)
CANCER AG27-29 SERPL-ACNC: 87.7 U/ML (ref 0–42.3)
CHLORIDE SERPL-SCNC: 108 MMOL/L (ref 100–108)
CO2 SERPL-SCNC: 27 MMOL/L (ref 21–32)
CREAT SERPL-MCNC: 0.46 MG/DL (ref 0.6–1.3)
EOSINOPHIL # BLD AUTO: 0.05 THOUSAND/ΜL (ref 0–0.61)
EOSINOPHIL NFR BLD AUTO: 0 % (ref 0–6)
ERYTHROCYTE [DISTWIDTH] IN BLOOD BY AUTOMATED COUNT: 18.3 % (ref 11.6–15.1)
GFR SERPL CREATININE-BSD FRML MDRD: >60 ML/MIN/1.73SQ M
GLUCOSE P FAST SERPL-MCNC: 99 MG/DL (ref 65–99)
HCT VFR BLD AUTO: 35 % (ref 34.8–46.1)
HGB BLD-MCNC: 10.7 G/DL (ref 11.5–15.4)
LYMPHOCYTES # BLD AUTO: 1.11 THOUSANDS/ΜL (ref 0.6–4.47)
LYMPHOCYTES NFR BLD AUTO: 9 % (ref 14–44)
MCH RBC QN AUTO: 31.8 PG (ref 26.8–34.3)
MCHC RBC AUTO-ENTMCNC: 30.6 G/DL (ref 31.4–37.4)
MCV RBC AUTO: 104 FL (ref 82–98)
MONOCYTES # BLD AUTO: 0.65 THOUSAND/ΜL (ref 0.17–1.22)
MONOCYTES NFR BLD AUTO: 5 % (ref 4–12)
NEUTROPHILS # BLD AUTO: 10.35 THOUSANDS/ΜL (ref 1.85–7.62)
NEUTS SEG NFR BLD AUTO: 86 % (ref 43–75)
NRBC BLD AUTO-RTO: 0 /100 WBCS
PLATELET # BLD AUTO: 520 THOUSANDS/UL (ref 149–390)
PMV BLD AUTO: 9.3 FL (ref 8.9–12.7)
POTASSIUM SERPL-SCNC: 4.2 MMOL/L (ref 3.5–5.3)
PROT SERPL-MCNC: 7.1 G/DL (ref 6.4–8.2)
RBC # BLD AUTO: 3.37 MILLION/UL (ref 3.81–5.12)
SODIUM SERPL-SCNC: 142 MMOL/L (ref 136–145)
WBC # BLD AUTO: 12.32 THOUSAND/UL (ref 4.31–10.16)

## 2017-05-08 PROCEDURE — 86300 IMMUNOASSAY TUMOR CA 15-3: CPT | Performed by: PHYSICIAN ASSISTANT

## 2017-05-08 PROCEDURE — 80053 COMPREHEN METABOLIC PANEL: CPT | Performed by: PHYSICIAN ASSISTANT

## 2017-05-08 PROCEDURE — 36415 COLL VENOUS BLD VENIPUNCTURE: CPT | Performed by: PHYSICIAN ASSISTANT

## 2017-05-08 PROCEDURE — 85025 COMPLETE CBC W/AUTO DIFF WBC: CPT | Performed by: PHYSICIAN ASSISTANT

## 2017-05-10 ENCOUNTER — ALLSCRIPTS OFFICE VISIT (OUTPATIENT)
Dept: OTHER | Facility: OTHER | Age: 57
End: 2017-05-10

## 2017-05-10 RX ORDER — SODIUM CHLORIDE 9 MG/ML
20 INJECTION, SOLUTION INTRAVENOUS CONTINUOUS
Status: DISCONTINUED | OUTPATIENT
Start: 2017-05-11 | End: 2017-05-14 | Stop reason: HOSPADM

## 2017-05-11 ENCOUNTER — HOSPITAL ENCOUNTER (OUTPATIENT)
Dept: INFUSION CENTER | Facility: CLINIC | Age: 57
Discharge: HOME | End: 2017-05-11
Payer: COMMERCIAL

## 2017-05-11 VITALS
RESPIRATION RATE: 20 BRPM | SYSTOLIC BLOOD PRESSURE: 128 MMHG | HEIGHT: 66 IN | DIASTOLIC BLOOD PRESSURE: 70 MMHG | HEART RATE: 100 BPM | BODY MASS INDEX: 24.61 KG/M2 | OXYGEN SATURATION: 98 % | TEMPERATURE: 98.2 F | WEIGHT: 153.13 LBS

## 2017-05-11 PROCEDURE — 96367 TX/PROPH/DG ADDL SEQ IV INF: CPT

## 2017-05-11 PROCEDURE — 96377 APPLICATON ON-BODY INJECTOR: CPT

## 2017-05-11 PROCEDURE — 96417 CHEMO IV INFUS EACH ADDL SEQ: CPT

## 2017-05-11 PROCEDURE — 96413 CHEMO IV INFUSION 1 HR: CPT

## 2017-05-11 RX ORDER — CEPHALEXIN 500 MG/1
500 CAPSULE ORAL ONCE
Status: COMPLETED | OUTPATIENT
Start: 2017-05-11 | End: 2017-05-11

## 2017-05-11 RX ADMIN — TRASTUZUMAB 414 MG: KIT at 10:42

## 2017-05-11 RX ADMIN — SODIUM CHLORIDE 20 ML/HR: 0.9 INJECTION, SOLUTION INTRAVENOUS at 08:55

## 2017-05-11 RX ADMIN — DOCETAXEL 132 MG: 20 INJECTION, SOLUTION, CONCENTRATE INTRAVENOUS at 11:23

## 2017-05-11 RX ADMIN — CEPHALEXIN 500 MG: 500 CAPSULE ORAL at 10:03

## 2017-05-11 RX ADMIN — ONDANSETRON 16 MG: 2 INJECTION INTRAMUSCULAR; INTRAVENOUS at 09:16

## 2017-05-11 RX ADMIN — PERTUZUMAB 420 MG: 30 INJECTION, SOLUTION, CONCENTRATE INTRAVENOUS at 10:00

## 2017-05-11 RX ADMIN — PEGFILGRASTIM 6 MG: KIT SUBCUTANEOUS at 12:40

## 2017-05-11 NOTE — PROGRESS NOTES
Received ok to treat per Dr Janeth Alberto RN  Pt ordered to receive 1 dose of PO keflex and then script is called in for further doses  Pt given dose in Infusion and she is aware to  script at her pharmacy

## 2017-05-11 NOTE — PROGRESS NOTES
Pt to clinic for herceptin perjeta taxotere  Seen and assessed yesterday per Dr Katiuska Mitchell  His note mentions redness of the right arm  Today, arm remains erythematous with +2 edema noted from wrist to above antecubital area  It is slightly warm to touch and the skin is noted to be taught and shiny  Pt reports history of lymphedema on that side post lymph node removal       Spoke with MINNA Byrne--she will discuss with Dr Katiuska Mitchell and let us know the plan moving forward

## 2017-05-15 ENCOUNTER — TRANSCRIBE ORDERS (OUTPATIENT)
Dept: ADMINISTRATIVE | Facility: HOSPITAL | Age: 57
End: 2017-05-15

## 2017-05-15 DIAGNOSIS — R07.9 CHEST PAIN, UNSPECIFIED: Primary | ICD-10-CM

## 2017-05-19 ENCOUNTER — HOSPITAL ENCOUNTER (OUTPATIENT)
Dept: RADIOLOGY | Facility: MEDICAL CENTER | Age: 57
Discharge: HOME | End: 2017-05-19
Payer: COMMERCIAL

## 2017-05-19 DIAGNOSIS — R07.9 CHEST PAIN, UNSPECIFIED: ICD-10-CM

## 2017-05-19 PROCEDURE — 74177 CT ABD & PELVIS W/CONTRAST: CPT

## 2017-05-19 RX ADMIN — IOHEXOL 100 ML: 350 INJECTION, SOLUTION INTRAVENOUS at 10:54

## 2017-05-30 ENCOUNTER — APPOINTMENT (OUTPATIENT)
Dept: LAB | Facility: MEDICAL CENTER | Age: 57
End: 2017-05-30
Payer: COMMERCIAL

## 2017-05-30 ENCOUNTER — TRANSCRIBE ORDERS (OUTPATIENT)
Dept: ADMINISTRATIVE | Facility: HOSPITAL | Age: 57
End: 2017-05-30

## 2017-05-30 DIAGNOSIS — G89.3 NEOPLASM RELATED PAIN: ICD-10-CM

## 2017-05-30 DIAGNOSIS — G89.3 NEOPLASM RELATED PAIN: Primary | ICD-10-CM

## 2017-05-30 LAB
ALBUMIN SERPL BCP-MCNC: 3.4 G/DL (ref 3.5–5)
ALP SERPL-CCNC: 95 U/L (ref 46–116)
ALT SERPL W P-5'-P-CCNC: 47 U/L (ref 12–78)
ANION GAP SERPL CALCULATED.3IONS-SCNC: 7 MMOL/L (ref 4–13)
ANISOCYTOSIS BLD QL SMEAR: PRESENT
AST SERPL W P-5'-P-CCNC: 13 U/L (ref 5–45)
BASOPHILS # BLD MANUAL: 0 THOUSAND/UL (ref 0–0.1)
BASOPHILS NFR MAR MANUAL: 0 % (ref 0–1)
BILIRUB SERPL-MCNC: 0.21 MG/DL (ref 0.2–1)
BUN SERPL-MCNC: 7 MG/DL (ref 5–25)
CALCIUM SERPL-MCNC: 9 MG/DL (ref 8.3–10.1)
CHLORIDE SERPL-SCNC: 110 MMOL/L (ref 100–108)
CO2 SERPL-SCNC: 26 MMOL/L (ref 21–32)
CREAT SERPL-MCNC: 0.64 MG/DL (ref 0.6–1.3)
EOSINOPHIL # BLD MANUAL: 0 THOUSAND/UL (ref 0–0.4)
EOSINOPHIL NFR BLD MANUAL: 0 % (ref 0–6)
ERYTHROCYTE [DISTWIDTH] IN BLOOD BY AUTOMATED COUNT: 17.8 % (ref 11.6–15.1)
GFR SERPL CREATININE-BSD FRML MDRD: >60 ML/MIN/1.73SQ M
GLUCOSE SERPL-MCNC: 164 MG/DL (ref 65–140)
HCT VFR BLD AUTO: 36.3 % (ref 34.8–46.1)
HGB BLD-MCNC: 11.2 G/DL (ref 11.5–15.4)
LYMPHOCYTES # BLD AUTO: 0.5 THOUSAND/UL (ref 0.6–4.47)
LYMPHOCYTES # BLD AUTO: 4 % (ref 14–44)
MACROCYTES BLD QL AUTO: PRESENT
MCH RBC QN AUTO: 31.9 PG (ref 26.8–34.3)
MCHC RBC AUTO-ENTMCNC: 30.9 G/DL (ref 31.4–37.4)
MCV RBC AUTO: 103 FL (ref 82–98)
METAMYELOCYTES NFR BLD MANUAL: 1 % (ref 0–1)
MONOCYTES # BLD AUTO: 0.62 THOUSAND/UL (ref 0–1.22)
MONOCYTES NFR BLD: 5 % (ref 4–12)
NEUTROPHILS # BLD MANUAL: 11.08 THOUSAND/UL (ref 1.85–7.62)
NEUTS BAND NFR BLD MANUAL: 1 % (ref 0–8)
NEUTS SEG NFR BLD AUTO: 88 % (ref 43–75)
NRBC BLD AUTO-RTO: 0 /100 WBCS
PLATELET # BLD AUTO: 461 THOUSANDS/UL (ref 149–390)
PLATELET BLD QL SMEAR: ABNORMAL
PMV BLD AUTO: 9.6 FL (ref 8.9–12.7)
POTASSIUM SERPL-SCNC: 4.1 MMOL/L (ref 3.5–5.3)
PROT SERPL-MCNC: 7.1 G/DL (ref 6.4–8.2)
RBC # BLD AUTO: 3.51 MILLION/UL (ref 3.81–5.12)
RBC MORPH BLD: PRESENT
SODIUM SERPL-SCNC: 143 MMOL/L (ref 136–145)
VARIANT LYMPHS # BLD AUTO: 1 %
WBC # BLD AUTO: 12.45 THOUSAND/UL (ref 4.31–10.16)

## 2017-05-30 PROCEDURE — 85007 BL SMEAR W/DIFF WBC COUNT: CPT

## 2017-05-30 PROCEDURE — 80053 COMPREHEN METABOLIC PANEL: CPT

## 2017-05-30 PROCEDURE — 36415 COLL VENOUS BLD VENIPUNCTURE: CPT

## 2017-05-30 PROCEDURE — 86300 IMMUNOASSAY TUMOR CA 15-3: CPT

## 2017-05-30 PROCEDURE — 85027 COMPLETE CBC AUTOMATED: CPT

## 2017-05-31 LAB — CANCER AG27-29 SERPL-ACNC: 81.9 U/ML (ref 0–42.3)

## 2017-06-01 ENCOUNTER — ALLSCRIPTS OFFICE VISIT (OUTPATIENT)
Dept: OTHER | Facility: OTHER | Age: 57
End: 2017-06-01

## 2017-06-01 RX ORDER — SODIUM CHLORIDE 9 MG/ML
20 INJECTION, SOLUTION INTRAVENOUS CONTINUOUS
Status: DISCONTINUED | OUTPATIENT
Start: 2017-06-02 | End: 2017-06-05 | Stop reason: HOSPADM

## 2017-06-02 ENCOUNTER — HOSPITAL ENCOUNTER (OUTPATIENT)
Dept: INFUSION CENTER | Facility: CLINIC | Age: 57
Discharge: HOME | End: 2017-06-02
Payer: COMMERCIAL

## 2017-06-02 VITALS
WEIGHT: 152 LBS | TEMPERATURE: 98.2 F | BODY MASS INDEX: 24.43 KG/M2 | OXYGEN SATURATION: 97 % | SYSTOLIC BLOOD PRESSURE: 138 MMHG | HEART RATE: 106 BPM | HEIGHT: 66 IN | DIASTOLIC BLOOD PRESSURE: 80 MMHG | RESPIRATION RATE: 20 BRPM

## 2017-06-02 PROCEDURE — 96367 TX/PROPH/DG ADDL SEQ IV INF: CPT

## 2017-06-02 PROCEDURE — 96401 CHEMO ANTI-NEOPL SQ/IM: CPT

## 2017-06-02 PROCEDURE — 96377 APPLICATON ON-BODY INJECTOR: CPT

## 2017-06-02 PROCEDURE — 96413 CHEMO IV INFUSION 1 HR: CPT

## 2017-06-02 PROCEDURE — 96417 CHEMO IV INFUS EACH ADDL SEQ: CPT

## 2017-06-02 RX ADMIN — TRASTUZUMAB 420 MG: KIT at 09:58

## 2017-06-02 RX ADMIN — DENOSUMAB 120 MG: 120 INJECTION SUBCUTANEOUS at 11:10

## 2017-06-02 RX ADMIN — ONDANSETRON 16 MG: 2 INJECTION INTRAMUSCULAR; INTRAVENOUS at 08:47

## 2017-06-02 RX ADMIN — DOCETAXEL 133 MG: 20 INJECTION, SOLUTION, CONCENTRATE INTRAVENOUS at 10:37

## 2017-06-02 RX ADMIN — SODIUM CHLORIDE 20 ML/HR: 0.9 INJECTION, SOLUTION INTRAVENOUS at 08:30

## 2017-06-02 RX ADMIN — PEGFILGRASTIM 6 MG: KIT SUBCUTANEOUS at 11:37

## 2017-06-02 RX ADMIN — PERTUZUMAB 420 MG: 30 INJECTION, SOLUTION, CONCENTRATE INTRAVENOUS at 09:21

## 2017-06-02 NOTE — PROGRESS NOTES
Pt  Tolerated chemotherapy without adverse event  Xgeva 120mg given in LEANDRO as ordered  Ca+ 9 0  Neulasta OnPro placed on LENADRO  Pt  Understands medication will be injected on 6/3 between the hours of 1340 and 1440  Pt  Understands how to monitor and remove device independently

## 2017-06-02 NOTE — PROGRESS NOTES
Pt  Denies new symptoms or concerns at this time  Labs reviewed and within normal parameters for chemotherapy treatment ordered today

## 2017-06-02 NOTE — PLAN OF CARE
Problem: PAIN - ADULT  Goal: Verbalizes/displays adequate comfort level or baseline comfort level  Interventions:  - Encourage patient to monitor pain and request assistance  - Assess pain using appropriate pain scale  - Administer analgesics based on type and severity of pain and evaluate response  - Implement non-pharmacological measures as appropriate and evaluate response  - Consider cultural and social influences on pain and pain management  - Notify physician/advanced practitioner if interventions unsuccessful or patient reports new pain  Outcome: Progressing    Problem: INFECTION - ADULT  Goal: Absence or prevention of progression during hospitalization  INTERVENTIONS:  - Assess and monitor for signs and symptoms of infection  - Monitor lab/diagnostic results  - Monitor all insertion sites, i e  indwelling lines, tubes, and drains  - Monitor endotracheal (as able) and nasal secretions for changes in amount and color  - [place] appropriate cooling/warming therapies per order  - Administer medications as ordered  - Instruct and encourage patient and family to use good hand hygiene technique  - Identify and instruct in appropriate isolation precautions for identified infection/condition  Outcome: Progressing  Goal: Absence of fever/infection during neutropenic period  INTERVENTIONS:  - Monitor WBC  - Implement neutropenic guidelines  Outcome: Progressing    Problem: Knowledge Deficit  Goal: Patient/family/caregiver demonstrates understanding of disease process, treatment plan, medications, and discharge instructions  Complete learning assessment and assess knowledge base    Interventions:  - Provide teaching at level of understanding  - Provide teaching via preferred learning methods  Outcome: Progressing

## 2017-06-07 ENCOUNTER — ALLSCRIPTS OFFICE VISIT (OUTPATIENT)
Dept: OTHER | Facility: OTHER | Age: 57
End: 2017-06-07

## 2017-06-14 ENCOUNTER — TRANSCRIBE ORDERS (OUTPATIENT)
Dept: ADMINISTRATIVE | Facility: HOSPITAL | Age: 57
End: 2017-06-14

## 2017-06-14 ENCOUNTER — APPOINTMENT (OUTPATIENT)
Dept: LAB | Facility: MEDICAL CENTER | Age: 57
End: 2017-06-14
Payer: COMMERCIAL

## 2017-06-14 DIAGNOSIS — G89.3 NEOPLASM RELATED PAIN: Primary | ICD-10-CM

## 2017-06-14 DIAGNOSIS — G89.3 NEOPLASM RELATED PAIN: ICD-10-CM

## 2017-06-14 LAB
ALBUMIN SERPL BCP-MCNC: 3.2 G/DL (ref 3.5–5)
ALP SERPL-CCNC: 235 U/L (ref 46–116)
ALT SERPL W P-5'-P-CCNC: 70 U/L (ref 12–78)
ANION GAP SERPL CALCULATED.3IONS-SCNC: 9 MMOL/L (ref 4–13)
AST SERPL W P-5'-P-CCNC: 37 U/L (ref 5–45)
BASOPHILS # BLD MANUAL: 0 THOUSAND/UL (ref 0–0.1)
BASOPHILS NFR MAR MANUAL: 0 % (ref 0–1)
BILIRUB SERPL-MCNC: 0.23 MG/DL (ref 0.2–1)
BUN SERPL-MCNC: 9 MG/DL (ref 5–25)
CALCIUM SERPL-MCNC: 8.6 MG/DL (ref 8.3–10.1)
CANCER AG27-29 SERPL-ACNC: 83.3 U/ML (ref 0–42.3)
CHLORIDE SERPL-SCNC: 111 MMOL/L (ref 100–108)
CO2 SERPL-SCNC: 23 MMOL/L (ref 21–32)
CREAT SERPL-MCNC: 0.65 MG/DL (ref 0.6–1.3)
EOSINOPHIL # BLD MANUAL: 0 THOUSAND/UL (ref 0–0.4)
EOSINOPHIL NFR BLD MANUAL: 0 % (ref 0–6)
ERYTHROCYTE [DISTWIDTH] IN BLOOD BY AUTOMATED COUNT: 17.7 % (ref 11.6–15.1)
GFR SERPL CREATININE-BSD FRML MDRD: >60 ML/MIN/1.73SQ M
GLUCOSE SERPL-MCNC: 146 MG/DL (ref 65–140)
HCT VFR BLD AUTO: 33.5 % (ref 34.8–46.1)
HGB BLD-MCNC: 10.7 G/DL (ref 11.5–15.4)
LYMPHOCYTES # BLD AUTO: 0.8 THOUSAND/UL (ref 0.6–4.47)
LYMPHOCYTES # BLD AUTO: 3 % (ref 14–44)
MACROCYTES BLD QL AUTO: PRESENT
MCH RBC QN AUTO: 32 PG (ref 26.8–34.3)
MCHC RBC AUTO-ENTMCNC: 31.9 G/DL (ref 31.4–37.4)
MCV RBC AUTO: 100 FL (ref 82–98)
MONOCYTES # BLD AUTO: 0 THOUSAND/UL (ref 0–1.22)
MONOCYTES NFR BLD: 0 % (ref 4–12)
NEUTROPHILS # BLD MANUAL: 25.77 THOUSAND/UL (ref 1.85–7.62)
NEUTS SEG NFR BLD AUTO: 97 % (ref 43–75)
NRBC BLD AUTO-RTO: 0 /100 WBCS
PLATELET # BLD AUTO: 288 THOUSANDS/UL (ref 149–390)
PLATELET BLD QL SMEAR: ADEQUATE
PMV BLD AUTO: 10.1 FL (ref 8.9–12.7)
POIKILOCYTOSIS BLD QL SMEAR: PRESENT
POTASSIUM SERPL-SCNC: 3.8 MMOL/L (ref 3.5–5.3)
PROT SERPL-MCNC: 7.4 G/DL (ref 6.4–8.2)
RBC # BLD AUTO: 3.34 MILLION/UL (ref 3.81–5.12)
RBC MORPH BLD: PRESENT
SODIUM SERPL-SCNC: 143 MMOL/L (ref 136–145)
WBC # BLD AUTO: 26.57 THOUSAND/UL (ref 4.31–10.16)

## 2017-06-14 PROCEDURE — 85007 BL SMEAR W/DIFF WBC COUNT: CPT

## 2017-06-14 PROCEDURE — 80053 COMPREHEN METABOLIC PANEL: CPT

## 2017-06-14 PROCEDURE — 36415 COLL VENOUS BLD VENIPUNCTURE: CPT

## 2017-06-14 PROCEDURE — 86300 IMMUNOASSAY TUMOR CA 15-3: CPT

## 2017-06-14 PROCEDURE — 85027 COMPLETE CBC AUTOMATED: CPT

## 2017-06-21 ENCOUNTER — APPOINTMENT (OUTPATIENT)
Dept: LAB | Facility: MEDICAL CENTER | Age: 57
End: 2017-06-21
Payer: COMMERCIAL

## 2017-06-21 ENCOUNTER — TRANSCRIBE ORDERS (OUTPATIENT)
Dept: ADMINISTRATIVE | Facility: HOSPITAL | Age: 57
End: 2017-06-21

## 2017-06-21 DIAGNOSIS — G89.3 NEOPLASM RELATED PAIN: Primary | ICD-10-CM

## 2017-06-21 LAB
ALBUMIN SERPL BCP-MCNC: 2.7 G/DL (ref 3.5–5)
ALP SERPL-CCNC: 144 U/L (ref 46–116)
ALT SERPL W P-5'-P-CCNC: 23 U/L (ref 12–78)
ANION GAP SERPL CALCULATED.3IONS-SCNC: 8 MMOL/L (ref 4–13)
ANISOCYTOSIS BLD QL SMEAR: PRESENT
AST SERPL W P-5'-P-CCNC: 11 U/L (ref 5–45)
BASOPHILS # BLD MANUAL: 0 THOUSAND/UL (ref 0–0.1)
BASOPHILS NFR MAR MANUAL: 0 % (ref 0–1)
BILIRUB SERPL-MCNC: 0.31 MG/DL (ref 0.2–1)
BUN SERPL-MCNC: 7 MG/DL (ref 5–25)
CALCIUM SERPL-MCNC: 8.9 MG/DL (ref 8.3–10.1)
CANCER AG27-29 SERPL-ACNC: 93.5 U/ML (ref 0–42.3)
CHLORIDE SERPL-SCNC: 105 MMOL/L (ref 100–108)
CO2 SERPL-SCNC: 25 MMOL/L (ref 21–32)
CREAT SERPL-MCNC: 0.56 MG/DL (ref 0.6–1.3)
EOSINOPHIL # BLD MANUAL: 0 THOUSAND/UL (ref 0–0.4)
EOSINOPHIL NFR BLD MANUAL: 0 % (ref 0–6)
ERYTHROCYTE [DISTWIDTH] IN BLOOD BY AUTOMATED COUNT: 16.9 % (ref 11.6–15.1)
GFR SERPL CREATININE-BSD FRML MDRD: >60 ML/MIN/1.73SQ M
GLUCOSE SERPL-MCNC: 169 MG/DL (ref 65–140)
HCT VFR BLD AUTO: 34.7 % (ref 34.8–46.1)
HGB BLD-MCNC: 10.9 G/DL (ref 11.5–15.4)
LYMPHOCYTES # BLD AUTO: 0.53 THOUSAND/UL (ref 0.6–4.47)
LYMPHOCYTES # BLD AUTO: 4 % (ref 14–44)
MACROCYTES BLD QL AUTO: PRESENT
MCH RBC QN AUTO: 31.4 PG (ref 26.8–34.3)
MCHC RBC AUTO-ENTMCNC: 31.4 G/DL (ref 31.4–37.4)
MCV RBC AUTO: 100 FL (ref 82–98)
MONOCYTES # BLD AUTO: 0.93 THOUSAND/UL (ref 0–1.22)
MONOCYTES NFR BLD: 7 % (ref 4–12)
NEUTROPHILS # BLD MANUAL: 11.79 THOUSAND/UL (ref 1.85–7.62)
NEUTS BAND NFR BLD MANUAL: 2 % (ref 0–8)
NEUTS SEG NFR BLD AUTO: 87 % (ref 43–75)
NRBC BLD AUTO-RTO: 0 /100 WBCS
PLATELET # BLD AUTO: 504 THOUSANDS/UL (ref 149–390)
PLATELET BLD QL SMEAR: ABNORMAL
PMV BLD AUTO: 9.7 FL (ref 8.9–12.7)
POIKILOCYTOSIS BLD QL SMEAR: PRESENT
POLYCHROMASIA BLD QL SMEAR: PRESENT
POTASSIUM SERPL-SCNC: 4.4 MMOL/L (ref 3.5–5.3)
PROT SERPL-MCNC: 7.7 G/DL (ref 6.4–8.2)
RBC # BLD AUTO: 3.47 MILLION/UL (ref 3.81–5.12)
RBC MORPH BLD: PRESENT
SODIUM SERPL-SCNC: 138 MMOL/L (ref 136–145)
WBC # BLD AUTO: 13.25 THOUSAND/UL (ref 4.31–10.16)

## 2017-06-21 PROCEDURE — 86300 IMMUNOASSAY TUMOR CA 15-3: CPT | Performed by: PHYSICIAN ASSISTANT

## 2017-06-21 PROCEDURE — 80053 COMPREHEN METABOLIC PANEL: CPT | Performed by: PHYSICIAN ASSISTANT

## 2017-06-21 PROCEDURE — 36415 COLL VENOUS BLD VENIPUNCTURE: CPT | Performed by: PHYSICIAN ASSISTANT

## 2017-06-21 PROCEDURE — 85007 BL SMEAR W/DIFF WBC COUNT: CPT | Performed by: PHYSICIAN ASSISTANT

## 2017-06-21 PROCEDURE — 85027 COMPLETE CBC AUTOMATED: CPT | Performed by: PHYSICIAN ASSISTANT

## 2017-06-22 RX ORDER — SODIUM CHLORIDE 9 MG/ML
20 INJECTION, SOLUTION INTRAVENOUS CONTINUOUS
Status: DISCONTINUED | OUTPATIENT
Start: 2017-06-23 | End: 2017-06-26 | Stop reason: HOSPADM

## 2017-06-23 ENCOUNTER — HOSPITAL ENCOUNTER (OUTPATIENT)
Dept: INFUSION CENTER | Facility: CLINIC | Age: 57
Discharge: HOME | End: 2017-06-23
Payer: COMMERCIAL

## 2017-06-23 VITALS
HEIGHT: 66 IN | HEART RATE: 84 BPM | WEIGHT: 149 LBS | SYSTOLIC BLOOD PRESSURE: 118 MMHG | OXYGEN SATURATION: 94 % | TEMPERATURE: 97.6 F | BODY MASS INDEX: 23.95 KG/M2 | DIASTOLIC BLOOD PRESSURE: 68 MMHG | RESPIRATION RATE: 20 BRPM

## 2017-06-23 PROCEDURE — 96367 TX/PROPH/DG ADDL SEQ IV INF: CPT

## 2017-06-23 PROCEDURE — 96377 APPLICATON ON-BODY INJECTOR: CPT

## 2017-06-23 PROCEDURE — 96417 CHEMO IV INFUS EACH ADDL SEQ: CPT

## 2017-06-23 PROCEDURE — 96413 CHEMO IV INFUSION 1 HR: CPT

## 2017-06-23 RX ORDER — DOXYCYCLINE HYCLATE 100 MG/1
100 TABLET, DELAYED RELEASE ORAL 2 TIMES DAILY
COMMUNITY
End: 2018-02-06

## 2017-06-23 RX ADMIN — ONDANSETRON 16 MG: 2 INJECTION INTRAMUSCULAR; INTRAVENOUS at 11:30

## 2017-06-23 RX ADMIN — TRASTUZUMAB 420 MG: KIT at 12:27

## 2017-06-23 RX ADMIN — SODIUM CHLORIDE 20 ML/HR: 900 INJECTION, SOLUTION INTRAVENOUS at 11:12

## 2017-06-23 RX ADMIN — PEGFILGRASTIM 6 MG: KIT SUBCUTANEOUS at 14:30

## 2017-06-23 RX ADMIN — DOCETAXEL 133 MG: 20 INJECTION, SOLUTION, CONCENTRATE INTRAVENOUS at 13:04

## 2017-06-23 RX ADMIN — PERTUZUMAB 420 MG: 30 INJECTION, SOLUTION, CONCENTRATE INTRAVENOUS at 11:53

## 2017-06-23 NOTE — PROGRESS NOTES
Patient tolerated all treatment without incident and discharged with neulasta onpro to [facility] as ordered for injection tomorrow 2914  Patient familiar with injection and removal of onpro  She offers no c/o at time of discharge

## 2017-06-23 NOTE — PLAN OF CARE
Problem: Potential for Falls  Goal: Patient will remain free of falls  INTERVENTIONS:  - Assess patient frequently for physical needs  -  Identify cognitive and physical deficits and behaviors that affect risk of falls    -  [place] fall precautions as indicated by assessment   - Educate patient/family on patient safety including physical limitations  - Instruct patient to call for assistance with activity based on assessment  - Modify environment to reduce risk of injury  - Consider OT/PT consult to assist with strengthening/mobility   Outcome: Progressing      Problem: PAIN - ADULT  Goal: Verbalizes/displays adequate comfort level or baseline comfort level  Interventions:  - Encourage patient to monitor pain and request assistance  - Assess pain using appropriate pain scale  - Administer analgesics based on type and severity of pain and evaluate response  - Implement non-pharmacological measures as appropriate and evaluate response  - Consider cultural and social influences on pain and pain management  - Notify physician/advanced practitioner if interventions unsuccessful or patient reports new pain   Outcome: Progressing      Problem: INFECTION - ADULT  Goal: Absence or prevention of progression during hospitalization  INTERVENTIONS:  - Assess and monitor for signs and symptoms of infection  - Monitor lab/diagnostic results  - Monitor all insertion sites, i e  indwelling lines, tubes, and drains  - Monitor endotracheal (as able) and nasal secretions for changes in amount and color  - [place] appropriate cooling/warming therapies per order  - Administer medications as ordered  - Instruct and encourage patient and family to use good hand hygiene technique  - Identify and instruct in appropriate isolation precautions for identified infection/condition   Outcome: Progressing    Goal: Absence of fever/infection during neutropenic period  INTERVENTIONS:  - Monitor WBC  - Implement neutropenic guidelines   Outcome: Progressing

## 2017-06-23 NOTE — PLAN OF CARE
Problem: Potential for Falls  Goal: Patient will remain free of falls  INTERVENTIONS:  - Assess patient frequently for physical needs  -  Identify cognitive and physical deficits and behaviors that affect risk of falls    -  [place] fall precautions as indicated by assessment   - Educate patient/family on patient safety including physical limitations  - Instruct patient to call for assistance with activity based on assessment  - Modify environment to reduce risk of injury  - Consider OT/PT consult to assist with strengthening/mobility   Outcome: Progressing      Problem: PAIN - ADULT  Goal: Verbalizes/displays adequate comfort level or baseline comfort level  Interventions:  - Encourage patient to monitor pain and request assistance  - Assess pain using appropriate pain scale  - Administer analgesics based on type and severity of pain and evaluate response  - Implement non-pharmacological measures as appropriate and evaluate response  - Consider cultural and social influences on pain and pain management  - Notify physician/advanced practitioner if interventions unsuccessful or patient reports new pain   Outcome: Progressing      Problem: INFECTION - ADULT  Goal: Absence or prevention of progression during hospitalization  INTERVENTIONS:  - Assess and monitor for signs and symptoms of infection  - Monitor lab/diagnostic results  - Monitor all insertion sites, i e  indwelling lines, tubes, and drains  - Monitor endotracheal (as able) and nasal secretions for changes in amount and color  - [place] appropriate cooling/warming therapies per order  - Administer medications as ordered  - Instruct and encourage patient and family to use good hand hygiene technique  - Identify and instruct in appropriate isolation precautions for identified infection/condition   Outcome: Progressing    Goal: Absence of fever/infection during neutropenic period  INTERVENTIONS:  - Monitor WBC  - Implement neutropenic guidelines   Outcome: Progressing

## 2017-06-28 ENCOUNTER — TRANSCRIBE ORDERS (OUTPATIENT)
Dept: ADMINISTRATIVE | Facility: HOSPITAL | Age: 57
End: 2017-06-28

## 2017-06-28 ENCOUNTER — ALLSCRIPTS OFFICE VISIT (OUTPATIENT)
Dept: OTHER | Facility: OTHER | Age: 57
End: 2017-06-28

## 2017-06-28 DIAGNOSIS — C79.49 SECONDARY MALIGNANT NEOPLASM OF BRAIN AND SPINAL CORD (HCC): Primary | ICD-10-CM

## 2017-06-28 DIAGNOSIS — Z79.899 ENCOUNTER FOR LONG-TERM (CURRENT) USE OF OTHER MEDICATIONS: ICD-10-CM

## 2017-06-28 DIAGNOSIS — Z51.81 ENCOUNTER FOR THERAPEUTIC DRUG MONITORING: ICD-10-CM

## 2017-06-28 DIAGNOSIS — C79.31 SECONDARY MALIGNANT NEOPLASM OF BRAIN AND SPINAL CORD (HCC): Primary | ICD-10-CM

## 2017-06-29 ENCOUNTER — ALLSCRIPTS OFFICE VISIT (OUTPATIENT)
Dept: OTHER | Facility: OTHER | Age: 57
End: 2017-06-29

## 2017-06-29 DIAGNOSIS — R29.898 OTHER SYMPTOMS AND SIGNS INVOLVING THE MUSCULOSKELETAL SYSTEM: ICD-10-CM

## 2017-06-29 DIAGNOSIS — C79.31 SECONDARY MALIGNANT NEOPLASM OF BRAIN (HCC): ICD-10-CM

## 2017-06-30 ENCOUNTER — HOSPITAL ENCOUNTER (OUTPATIENT)
Dept: MRI IMAGING | Facility: HOSPITAL | Age: 57
Discharge: HOME | End: 2017-06-30
Attending: RADIOLOGY
Payer: COMMERCIAL

## 2017-06-30 DIAGNOSIS — C79.31 SECONDARY MALIGNANT NEOPLASM OF BRAIN (HCC): ICD-10-CM

## 2017-06-30 PROCEDURE — 70553 MRI BRAIN STEM W/O & W/DYE: CPT

## 2017-06-30 PROCEDURE — A9585 GADOBUTROL INJECTION: HCPCS | Performed by: RADIOLOGY

## 2017-06-30 RX ADMIN — GADOBUTROL 6 ML: 604.72 INJECTION INTRAVENOUS at 11:03

## 2017-07-05 ENCOUNTER — ALLSCRIPTS OFFICE VISIT (OUTPATIENT)
Dept: OTHER | Facility: OTHER | Age: 57
End: 2017-07-05

## 2017-07-05 ENCOUNTER — TRANSCRIBE ORDERS (OUTPATIENT)
Dept: ADMINISTRATIVE | Facility: HOSPITAL | Age: 57
End: 2017-07-05

## 2017-07-05 ENCOUNTER — GENERIC CONVERSION - ENCOUNTER (OUTPATIENT)
Dept: OTHER | Facility: OTHER | Age: 57
End: 2017-07-05

## 2017-07-05 ENCOUNTER — APPOINTMENT (OUTPATIENT)
Dept: RADIATION ONCOLOGY | Facility: HOSPITAL | Age: 57
End: 2017-07-05
Attending: RADIOLOGY
Payer: COMMERCIAL

## 2017-07-05 DIAGNOSIS — C79.49 SECONDARY MALIGNANT NEOPLASM OF BRAIN AND SPINAL CORD (HCC): Primary | ICD-10-CM

## 2017-07-05 DIAGNOSIS — C79.31 SECONDARY MALIGNANT NEOPLASM OF BRAIN AND SPINAL CORD (HCC): Primary | ICD-10-CM

## 2017-07-05 PROCEDURE — 99214 OFFICE O/P EST MOD 30 MIN: CPT | Performed by: RADIOLOGY

## 2017-07-06 ENCOUNTER — GENERIC CONVERSION - ENCOUNTER (OUTPATIENT)
Dept: OTHER | Facility: OTHER | Age: 57
End: 2017-07-06

## 2017-07-06 ENCOUNTER — APPOINTMENT (OUTPATIENT)
Dept: PHYSICAL THERAPY | Facility: MEDICAL CENTER | Age: 57
End: 2017-07-06
Payer: COMMERCIAL

## 2017-07-06 DIAGNOSIS — R29.898 OTHER SYMPTOMS AND SIGNS INVOLVING THE MUSCULOSKELETAL SYSTEM: ICD-10-CM

## 2017-07-06 PROCEDURE — 97162 PT EVAL MOD COMPLEX 30 MIN: CPT

## 2017-07-06 PROCEDURE — 97110 THERAPEUTIC EXERCISES: CPT

## 2017-07-10 ENCOUNTER — APPOINTMENT (OUTPATIENT)
Dept: PHYSICAL THERAPY | Facility: MEDICAL CENTER | Age: 57
End: 2017-07-10
Payer: COMMERCIAL

## 2017-07-10 PROCEDURE — 97110 THERAPEUTIC EXERCISES: CPT

## 2017-07-10 PROCEDURE — 97112 NEUROMUSCULAR REEDUCATION: CPT

## 2017-07-11 ENCOUNTER — HOSPITAL ENCOUNTER (OUTPATIENT)
Dept: MRI IMAGING | Facility: HOSPITAL | Age: 57
Discharge: HOME | End: 2017-07-11
Attending: RADIOLOGY
Payer: COMMERCIAL

## 2017-07-11 ENCOUNTER — APPOINTMENT (OUTPATIENT)
Dept: LAB | Facility: CLINIC | Age: 57
End: 2017-07-11
Payer: COMMERCIAL

## 2017-07-11 DIAGNOSIS — G89.3 NEOPLASM RELATED PAIN: ICD-10-CM

## 2017-07-11 DIAGNOSIS — C79.51 SECONDARY MALIGNANT NEOPLASM OF BONE (HCC): ICD-10-CM

## 2017-07-11 LAB
ALBUMIN SERPL BCP-MCNC: 3.3 G/DL (ref 3.5–5)
ALP SERPL-CCNC: 100 U/L (ref 46–116)
ALT SERPL W P-5'-P-CCNC: 39 U/L (ref 12–78)
ANION GAP SERPL CALCULATED.3IONS-SCNC: 8 MMOL/L (ref 4–13)
AST SERPL W P-5'-P-CCNC: 10 U/L (ref 5–45)
BASOPHILS # BLD AUTO: 0.05 THOUSANDS/ΜL (ref 0–0.1)
BASOPHILS NFR BLD AUTO: 0 % (ref 0–1)
BILIRUB SERPL-MCNC: 0.2 MG/DL (ref 0.2–1)
BUN SERPL-MCNC: 10 MG/DL (ref 5–25)
CALCIUM SERPL-MCNC: 8.7 MG/DL (ref 8.3–10.1)
CANCER AG27-29 SERPL-ACNC: 69.1 U/ML (ref 0–42.3)
CHLORIDE SERPL-SCNC: 109 MMOL/L (ref 100–108)
CO2 SERPL-SCNC: 28 MMOL/L (ref 21–32)
CREAT SERPL-MCNC: 0.59 MG/DL (ref 0.6–1.3)
EOSINOPHIL # BLD AUTO: 0.01 THOUSAND/ΜL (ref 0–0.61)
EOSINOPHIL NFR BLD AUTO: 0 % (ref 0–6)
ERYTHROCYTE [DISTWIDTH] IN BLOOD BY AUTOMATED COUNT: 19.1 % (ref 11.6–15.1)
GFR SERPL CREATININE-BSD FRML MDRD: >60 ML/MIN/1.73SQ M
GLUCOSE P FAST SERPL-MCNC: 111 MG/DL (ref 65–99)
HCT VFR BLD AUTO: 33 % (ref 34.8–46.1)
HGB BLD-MCNC: 10.1 G/DL (ref 11.5–15.4)
LYMPHOCYTES # BLD AUTO: 1.15 THOUSANDS/ΜL (ref 0.6–4.47)
LYMPHOCYTES NFR BLD AUTO: 7 % (ref 14–44)
MCH RBC QN AUTO: 31.3 PG (ref 26.8–34.3)
MCHC RBC AUTO-ENTMCNC: 30.6 G/DL (ref 31.4–37.4)
MCV RBC AUTO: 102 FL (ref 82–98)
MONOCYTES # BLD AUTO: 1.38 THOUSAND/ΜL (ref 0.17–1.22)
MONOCYTES NFR BLD AUTO: 9 % (ref 4–12)
NEUTROPHILS # BLD AUTO: 12.92 THOUSANDS/ΜL (ref 1.85–7.62)
NEUTS SEG NFR BLD AUTO: 84 % (ref 43–75)
PLATELET # BLD AUTO: 399 THOUSANDS/UL (ref 149–390)
PMV BLD AUTO: 9.3 FL (ref 8.9–12.7)
POTASSIUM SERPL-SCNC: 4 MMOL/L (ref 3.5–5.3)
PROT SERPL-MCNC: 7.1 G/DL (ref 6.4–8.2)
RBC # BLD AUTO: 3.23 MILLION/UL (ref 3.81–5.12)
SODIUM SERPL-SCNC: 145 MMOL/L (ref 136–145)
WBC # BLD AUTO: 15.51 THOUSAND/UL (ref 4.31–10.16)

## 2017-07-11 PROCEDURE — 36415 COLL VENOUS BLD VENIPUNCTURE: CPT

## 2017-07-11 PROCEDURE — 85025 COMPLETE CBC W/AUTO DIFF WBC: CPT

## 2017-07-11 PROCEDURE — 72146 MRI CHEST SPINE W/O DYE: CPT

## 2017-07-11 PROCEDURE — 72148 MRI LUMBAR SPINE W/O DYE: CPT

## 2017-07-11 PROCEDURE — 86300 IMMUNOASSAY TUMOR CA 15-3: CPT

## 2017-07-11 PROCEDURE — 80053 COMPREHEN METABOLIC PANEL: CPT

## 2017-07-13 RX ORDER — SODIUM CHLORIDE 9 MG/ML
20 INJECTION, SOLUTION INTRAVENOUS CONTINUOUS
Status: DISCONTINUED | OUTPATIENT
Start: 2017-07-14 | End: 2017-07-17 | Stop reason: HOSPADM

## 2017-07-14 ENCOUNTER — HOSPITAL ENCOUNTER (OUTPATIENT)
Dept: INFUSION CENTER | Facility: CLINIC | Age: 57
Discharge: HOME | End: 2017-07-14
Payer: COMMERCIAL

## 2017-07-14 VITALS
HEIGHT: 65 IN | DIASTOLIC BLOOD PRESSURE: 70 MMHG | BODY MASS INDEX: 26.99 KG/M2 | WEIGHT: 162 LBS | TEMPERATURE: 98.8 F | RESPIRATION RATE: 18 BRPM | SYSTOLIC BLOOD PRESSURE: 124 MMHG | HEART RATE: 88 BPM

## 2017-07-14 PROCEDURE — 96367 TX/PROPH/DG ADDL SEQ IV INF: CPT

## 2017-07-14 PROCEDURE — 96401 CHEMO ANTI-NEOPL SQ/IM: CPT

## 2017-07-14 PROCEDURE — 96417 CHEMO IV INFUS EACH ADDL SEQ: CPT

## 2017-07-14 PROCEDURE — 96413 CHEMO IV INFUSION 1 HR: CPT

## 2017-07-14 PROCEDURE — 96377 APPLICATON ON-BODY INJECTOR: CPT

## 2017-07-14 RX ADMIN — DENOSUMAB 120 MG: 120 INJECTION SUBCUTANEOUS at 13:59

## 2017-07-14 RX ADMIN — TRASTUZUMAB 420 MG: KIT at 12:01

## 2017-07-14 RX ADMIN — DOCETAXEL 133 MG: 20 INJECTION, SOLUTION, CONCENTRATE INTRAVENOUS at 12:44

## 2017-07-14 RX ADMIN — SODIUM CHLORIDE 20 ML/HR: 0.9 INJECTION, SOLUTION INTRAVENOUS at 10:24

## 2017-07-14 RX ADMIN — PERTUZUMAB 420 MG: 30 INJECTION, SOLUTION, CONCENTRATE INTRAVENOUS at 11:22

## 2017-07-14 RX ADMIN — ONDANSETRON 16 MG: 2 INJECTION INTRAMUSCULAR; INTRAVENOUS at 10:53

## 2017-07-14 RX ADMIN — PEGFILGRASTIM 6 MG: KIT SUBCUTANEOUS at 14:10

## 2017-07-14 NOTE — PROGRESS NOTES
Spoke with Vicki RN regarding patient's 13b weight gain since last visit  Pt weighted 149lbs on 6/23 and today weighs 162 lbs  Pt has abdominal ascites--this is not a new symptom  She denies any new shortness of breath, cough, or feeling of fullness/fluid overload (in the setting of known abdominal ascites)  Caty aware, and will let Dr Will Hilliard know of the findings  Also of note is elevated WBC/ANC  Pt denies any s/s of infection  She has been receiving neulasta OBI  Discussed lab findings with Caty   She will review with Dr Will Hilliard to determine if neulasta OBI is indicated with today's treament

## 2017-07-14 NOTE — PROGRESS NOTES
Pt to clinic for taxotere, herceptin, prejeta, xgeva, and neulasta on body injection   Offers no complaints at this time, printed out labs at pt request, aware of next appointment, declines avs

## 2017-07-17 ENCOUNTER — APPOINTMENT (OUTPATIENT)
Dept: PHYSICAL THERAPY | Facility: MEDICAL CENTER | Age: 57
End: 2017-07-17
Payer: COMMERCIAL

## 2017-07-17 PROCEDURE — 97112 NEUROMUSCULAR REEDUCATION: CPT

## 2017-07-17 PROCEDURE — 97110 THERAPEUTIC EXERCISES: CPT

## 2017-07-19 ENCOUNTER — HOSPITAL ENCOUNTER (OUTPATIENT)
Dept: RADIOLOGY | Facility: MEDICAL CENTER | Age: 57
Discharge: HOME | End: 2017-07-19
Payer: COMMERCIAL

## 2017-07-19 ENCOUNTER — APPOINTMENT (OUTPATIENT)
Dept: PHYSICAL THERAPY | Facility: MEDICAL CENTER | Age: 57
End: 2017-07-19
Payer: COMMERCIAL

## 2017-07-19 DIAGNOSIS — C79.31 SECONDARY MALIGNANT NEOPLASM OF BRAIN (HCC): ICD-10-CM

## 2017-07-19 PROCEDURE — 97110 THERAPEUTIC EXERCISES: CPT

## 2017-07-19 PROCEDURE — 97112 NEUROMUSCULAR REEDUCATION: CPT

## 2017-07-19 PROCEDURE — 71260 CT THORAX DX C+: CPT

## 2017-07-19 PROCEDURE — 74177 CT ABD & PELVIS W/CONTRAST: CPT

## 2017-07-19 RX ADMIN — IOHEXOL 100 ML: 350 INJECTION, SOLUTION INTRAVENOUS at 10:41

## 2017-07-24 ENCOUNTER — APPOINTMENT (OUTPATIENT)
Dept: PHYSICAL THERAPY | Facility: MEDICAL CENTER | Age: 57
End: 2017-07-24
Payer: COMMERCIAL

## 2017-07-24 PROCEDURE — 97110 THERAPEUTIC EXERCISES: CPT

## 2017-07-26 ENCOUNTER — ALLSCRIPTS OFFICE VISIT (OUTPATIENT)
Dept: OTHER | Facility: OTHER | Age: 57
End: 2017-07-26

## 2017-07-31 ENCOUNTER — APPOINTMENT (OUTPATIENT)
Dept: PHYSICAL THERAPY | Facility: MEDICAL CENTER | Age: 57
End: 2017-07-31
Payer: COMMERCIAL

## 2017-08-02 ENCOUNTER — APPOINTMENT (OUTPATIENT)
Dept: LAB | Facility: MEDICAL CENTER | Age: 57
End: 2017-08-02
Payer: COMMERCIAL

## 2017-08-02 ENCOUNTER — TRANSCRIBE ORDERS (OUTPATIENT)
Dept: ADMINISTRATIVE | Facility: HOSPITAL | Age: 57
End: 2017-08-02

## 2017-08-02 ENCOUNTER — APPOINTMENT (OUTPATIENT)
Dept: PHYSICAL THERAPY | Facility: MEDICAL CENTER | Age: 57
End: 2017-08-02
Payer: COMMERCIAL

## 2017-08-02 DIAGNOSIS — R19.7 DIARRHEA: ICD-10-CM

## 2017-08-02 DIAGNOSIS — G89.3 NEOPLASM RELATED PAIN: Primary | ICD-10-CM

## 2017-08-02 DIAGNOSIS — Z51.81 ENCOUNTER FOR THERAPEUTIC DRUG LEVEL MONITORING: ICD-10-CM

## 2017-08-02 LAB
ALBUMIN SERPL BCP-MCNC: 3.5 G/DL (ref 3.5–5)
ALP SERPL-CCNC: 103 U/L (ref 46–116)
ALT SERPL W P-5'-P-CCNC: 36 U/L (ref 12–78)
ANION GAP SERPL CALCULATED.3IONS-SCNC: 8 MMOL/L (ref 4–13)
AST SERPL W P-5'-P-CCNC: 27 U/L (ref 5–45)
BASOPHILS # BLD AUTO: 0.02 THOUSANDS/ΜL (ref 0–0.1)
BASOPHILS NFR BLD AUTO: 0 % (ref 0–1)
BILIRUB SERPL-MCNC: 0.31 MG/DL (ref 0.2–1)
BUN SERPL-MCNC: 11 MG/DL (ref 5–25)
CALCIUM SERPL-MCNC: 9.1 MG/DL (ref 8.3–10.1)
CANCER AG27-29 SERPL-ACNC: 78.8 U/ML (ref 0–42.3)
CHLORIDE SERPL-SCNC: 108 MMOL/L (ref 100–108)
CO2 SERPL-SCNC: 24 MMOL/L (ref 21–32)
CREAT SERPL-MCNC: 0.64 MG/DL (ref 0.6–1.3)
EOSINOPHIL # BLD AUTO: 0.02 THOUSAND/ΜL (ref 0–0.61)
EOSINOPHIL NFR BLD AUTO: 0 % (ref 0–6)
ERYTHROCYTE [DISTWIDTH] IN BLOOD BY AUTOMATED COUNT: 18.9 % (ref 11.6–15.1)
GFR SERPL CREATININE-BSD FRML MDRD: 101 ML/MIN/1.73SQ M
GLUCOSE P FAST SERPL-MCNC: 91 MG/DL (ref 65–99)
HCT VFR BLD AUTO: 37 % (ref 34.8–46.1)
HGB BLD-MCNC: 11.5 G/DL (ref 11.5–15.4)
LYMPHOCYTES # BLD AUTO: 1.66 THOUSANDS/ΜL (ref 0.6–4.47)
LYMPHOCYTES NFR BLD AUTO: 19 % (ref 14–44)
MCH RBC QN AUTO: 31 PG (ref 26.8–34.3)
MCHC RBC AUTO-ENTMCNC: 31.1 G/DL (ref 31.4–37.4)
MCV RBC AUTO: 100 FL (ref 82–98)
MONOCYTES # BLD AUTO: 0.87 THOUSAND/ΜL (ref 0.17–1.22)
MONOCYTES NFR BLD AUTO: 10 % (ref 4–12)
NEUTROPHILS # BLD AUTO: 5.99 THOUSANDS/ΜL (ref 1.85–7.62)
NEUTS SEG NFR BLD AUTO: 71 % (ref 43–75)
NRBC BLD AUTO-RTO: 0 /100 WBCS
PLATELET # BLD AUTO: 491 THOUSANDS/UL (ref 149–390)
PMV BLD AUTO: 9.2 FL (ref 8.9–12.7)
POTASSIUM SERPL-SCNC: 3.9 MMOL/L (ref 3.5–5.3)
PROT SERPL-MCNC: 7.5 G/DL (ref 6.4–8.2)
RBC # BLD AUTO: 3.71 MILLION/UL (ref 3.81–5.12)
SODIUM SERPL-SCNC: 140 MMOL/L (ref 136–145)
WBC # BLD AUTO: 8.66 THOUSAND/UL (ref 4.31–10.16)

## 2017-08-02 PROCEDURE — 80053 COMPREHEN METABOLIC PANEL: CPT | Performed by: PHYSICIAN ASSISTANT

## 2017-08-02 PROCEDURE — 97112 NEUROMUSCULAR REEDUCATION: CPT

## 2017-08-02 PROCEDURE — 86300 IMMUNOASSAY TUMOR CA 15-3: CPT | Performed by: PHYSICIAN ASSISTANT

## 2017-08-02 PROCEDURE — 85025 COMPLETE CBC W/AUTO DIFF WBC: CPT | Performed by: PHYSICIAN ASSISTANT

## 2017-08-02 PROCEDURE — 36415 COLL VENOUS BLD VENIPUNCTURE: CPT | Performed by: PHYSICIAN ASSISTANT

## 2017-08-02 PROCEDURE — 97110 THERAPEUTIC EXERCISES: CPT

## 2017-08-03 ENCOUNTER — ALLSCRIPTS OFFICE VISIT (OUTPATIENT)
Dept: OTHER | Facility: OTHER | Age: 57
End: 2017-08-03

## 2017-08-03 RX ORDER — SODIUM CHLORIDE 9 MG/ML
20 INJECTION, SOLUTION INTRAVENOUS CONTINUOUS
Status: DISCONTINUED | OUTPATIENT
Start: 2017-08-04 | End: 2017-08-07 | Stop reason: HOSPADM

## 2017-08-04 ENCOUNTER — HOSPITAL ENCOUNTER (OUTPATIENT)
Dept: INFUSION CENTER | Facility: CLINIC | Age: 57
Discharge: HOME | End: 2017-08-04
Payer: COMMERCIAL

## 2017-08-04 VITALS
SYSTOLIC BLOOD PRESSURE: 132 MMHG | OXYGEN SATURATION: 97 % | RESPIRATION RATE: 16 BRPM | DIASTOLIC BLOOD PRESSURE: 72 MMHG | BODY MASS INDEX: 24.59 KG/M2 | WEIGHT: 153 LBS | HEIGHT: 66 IN | HEART RATE: 105 BPM

## 2017-08-04 PROCEDURE — 96417 CHEMO IV INFUS EACH ADDL SEQ: CPT

## 2017-08-04 PROCEDURE — 96367 TX/PROPH/DG ADDL SEQ IV INF: CPT

## 2017-08-04 PROCEDURE — 96377 APPLICATON ON-BODY INJECTOR: CPT

## 2017-08-04 PROCEDURE — 96413 CHEMO IV INFUSION 1 HR: CPT

## 2017-08-04 RX ADMIN — PEGFILGRASTIM 6 MG: KIT SUBCUTANEOUS at 11:50

## 2017-08-04 RX ADMIN — SODIUM CHLORIDE 20 ML/HR: 0.9 INJECTION, SOLUTION INTRAVENOUS at 08:30

## 2017-08-04 RX ADMIN — TRASTUZUMAB 437 MG: 150 INJECTION, POWDER, LYOPHILIZED, FOR SOLUTION INTRAVENOUS at 10:01

## 2017-08-04 RX ADMIN — DOCETAXEL 135 MG: 20 INJECTION, SOLUTION, CONCENTRATE INTRAVENOUS at 10:41

## 2017-08-04 RX ADMIN — ONDANSETRON 16 MG: 2 INJECTION INTRAMUSCULAR; INTRAVENOUS at 08:39

## 2017-08-04 RX ADMIN — PERTUZUMAB 420 MG: 30 INJECTION, SOLUTION, CONCENTRATE INTRAVENOUS at 09:15

## 2017-08-04 NOTE — PLAN OF CARE
Problem: Potential for Falls  Goal: Patient will remain free of falls  INTERVENTIONS:  - Assess patient frequently for physical needs  -  Identify cognitive and physical deficits and behaviors that affect risk of falls    -  [place] fall precautions as indicated by assessment   - Educate patient/family on patient safety including physical limitations  - Instruct patient to call for assistance with activity based on assessment  - Modify environment to reduce risk of injury  - Consider OT/PT consult to assist with strengthening/mobility   Outcome: Progressing

## 2017-08-04 NOTE — PROGRESS NOTES
Patient tolerated treatment well without any complications    Patient aware of next appt and declined AVS

## 2017-08-07 ENCOUNTER — APPOINTMENT (OUTPATIENT)
Dept: PHYSICAL THERAPY | Facility: MEDICAL CENTER | Age: 57
End: 2017-08-07
Payer: COMMERCIAL

## 2017-08-07 PROCEDURE — 97110 THERAPEUTIC EXERCISES: CPT

## 2017-08-09 ENCOUNTER — APPOINTMENT (OUTPATIENT)
Dept: PHYSICAL THERAPY | Facility: MEDICAL CENTER | Age: 57
End: 2017-08-09
Payer: COMMERCIAL

## 2017-08-10 ENCOUNTER — GENERIC CONVERSION - ENCOUNTER (OUTPATIENT)
Dept: OTHER | Facility: OTHER | Age: 57
End: 2017-08-10

## 2017-08-14 ENCOUNTER — APPOINTMENT (OUTPATIENT)
Dept: PHYSICAL THERAPY | Facility: MEDICAL CENTER | Age: 57
End: 2017-08-14
Payer: COMMERCIAL

## 2017-08-14 ENCOUNTER — HOSPITAL ENCOUNTER (OUTPATIENT)
Dept: NUCLEAR MEDICINE | Facility: HOSPITAL | Age: 57
Discharge: HOME | End: 2017-08-14
Attending: INTERNAL MEDICINE
Payer: COMMERCIAL

## 2017-08-14 DIAGNOSIS — Z51.81 ENCOUNTER FOR THERAPEUTIC DRUG LEVEL MONITORING: ICD-10-CM

## 2017-08-14 PROCEDURE — 78472 GATED HEART PLANAR SINGLE: CPT

## 2017-08-14 PROCEDURE — A9503 TC99M MEDRONATE: HCPCS

## 2017-08-16 ENCOUNTER — APPOINTMENT (OUTPATIENT)
Dept: PHYSICAL THERAPY | Facility: MEDICAL CENTER | Age: 57
End: 2017-08-16
Payer: COMMERCIAL

## 2017-08-18 ENCOUNTER — GENERIC CONVERSION - ENCOUNTER (OUTPATIENT)
Dept: OTHER | Facility: OTHER | Age: 57
End: 2017-08-18

## 2017-08-21 ENCOUNTER — LAB CONVERSION - ENCOUNTER (OUTPATIENT)
Dept: OTHER | Facility: OTHER | Age: 57
End: 2017-08-21

## 2017-08-21 ENCOUNTER — APPOINTMENT (OUTPATIENT)
Dept: LAB | Facility: MEDICAL CENTER | Age: 57
End: 2017-08-21
Payer: COMMERCIAL

## 2017-08-21 ENCOUNTER — TRANSCRIBE ORDERS (OUTPATIENT)
Dept: ADMINISTRATIVE | Facility: HOSPITAL | Age: 57
End: 2017-08-21

## 2017-08-21 ENCOUNTER — APPOINTMENT (OUTPATIENT)
Dept: PHYSICAL THERAPY | Facility: MEDICAL CENTER | Age: 57
End: 2017-08-21
Payer: COMMERCIAL

## 2017-08-21 DIAGNOSIS — G89.3 NEOPLASM RELATED PAIN: Primary | ICD-10-CM

## 2017-08-21 LAB
ALBUMIN SERPL BCP-MCNC: 2.6 G/DL (ref 3.5–5)
ALP SERPL-CCNC: 192 U/L (ref 46–116)
ALT SERPL W P-5'-P-CCNC: 26 U/L (ref 12–78)
ANION GAP SERPL CALCULATED.3IONS-SCNC: 8 MMOL/L (ref 4–13)
ANISOCYTOSIS BLD QL SMEAR: PRESENT
AST SERPL W P-5'-P-CCNC: 21 U/L (ref 5–45)
BASOPHILS # BLD MANUAL: 0 THOUSAND/UL (ref 0–0.1)
BASOPHILS NFR MAR MANUAL: 0 % (ref 0–1)
BILIRUB SERPL-MCNC: 0.25 MG/DL (ref 0.2–1)
BUN SERPL-MCNC: 5 MG/DL (ref 5–25)
CALCIUM SERPL-MCNC: 8.7 MG/DL (ref 8.3–10.1)
CANCER AG27-29 SERPL-ACNC: 54.5 U/ML (ref 0–42.3)
CHLORIDE SERPL-SCNC: 108 MMOL/L (ref 100–108)
CO2 SERPL-SCNC: 24 MMOL/L (ref 21–32)
CREAT SERPL-MCNC: 0.52 MG/DL (ref 0.6–1.3)
EOSINOPHIL # BLD MANUAL: 0 THOUSAND/UL (ref 0–0.4)
EOSINOPHIL NFR BLD MANUAL: 0 % (ref 0–6)
ERYTHROCYTE [DISTWIDTH] IN BLOOD BY AUTOMATED COUNT: 17.1 % (ref 11.6–15.1)
GFR SERPL CREATININE-BSD FRML MDRD: 108 ML/MIN/1.73SQ M
GLUCOSE P FAST SERPL-MCNC: 119 MG/DL (ref 65–99)
HCT VFR BLD AUTO: 30.3 % (ref 34.8–46.1)
HGB BLD-MCNC: 9.3 G/DL (ref 11.5–15.4)
LYMPHOCYTES # BLD AUTO: 0.51 THOUSAND/UL (ref 0.6–4.47)
LYMPHOCYTES # BLD AUTO: 4 % (ref 14–44)
MACROCYTES BLD QL AUTO: PRESENT
MCH RBC QN AUTO: 30.3 PG (ref 26.8–34.3)
MCHC RBC AUTO-ENTMCNC: 30.7 G/DL (ref 31.4–37.4)
MCV RBC AUTO: 99 FL (ref 82–98)
MONOCYTES # BLD AUTO: 0.25 THOUSAND/UL (ref 0–1.22)
MONOCYTES NFR BLD: 2 % (ref 4–12)
NEUTROPHILS # BLD MANUAL: 11.94 THOUSAND/UL (ref 1.85–7.62)
NEUTS SEG NFR BLD AUTO: 94 % (ref 43–75)
NRBC BLD AUTO-RTO: 0 /100 WBCS
PLATELET # BLD AUTO: 661 THOUSANDS/UL (ref 149–390)
PLATELET BLD QL SMEAR: ABNORMAL
PMV BLD AUTO: 9.6 FL (ref 8.9–12.7)
POTASSIUM SERPL-SCNC: 4.1 MMOL/L (ref 3.5–5.3)
PROT SERPL-MCNC: 7.8 G/DL (ref 6.4–8.2)
RBC # BLD AUTO: 3.07 MILLION/UL (ref 3.81–5.12)
RBC MORPH BLD: PRESENT
SODIUM SERPL-SCNC: 140 MMOL/L (ref 136–145)
WBC # BLD AUTO: 12.7 THOUSAND/UL (ref 4.31–10.16)

## 2017-08-21 PROCEDURE — 86300 IMMUNOASSAY TUMOR CA 15-3: CPT | Performed by: PHYSICIAN ASSISTANT

## 2017-08-21 PROCEDURE — 85027 COMPLETE CBC AUTOMATED: CPT | Performed by: PHYSICIAN ASSISTANT

## 2017-08-21 PROCEDURE — 36415 COLL VENOUS BLD VENIPUNCTURE: CPT | Performed by: PHYSICIAN ASSISTANT

## 2017-08-21 PROCEDURE — 85007 BL SMEAR W/DIFF WBC COUNT: CPT | Performed by: PHYSICIAN ASSISTANT

## 2017-08-21 PROCEDURE — 97110 THERAPEUTIC EXERCISES: CPT

## 2017-08-21 PROCEDURE — 80053 COMPREHEN METABOLIC PANEL: CPT | Performed by: PHYSICIAN ASSISTANT

## 2017-08-23 ENCOUNTER — APPOINTMENT (OUTPATIENT)
Dept: PHYSICAL THERAPY | Facility: MEDICAL CENTER | Age: 57
End: 2017-08-23
Payer: COMMERCIAL

## 2017-08-23 PROCEDURE — 97112 NEUROMUSCULAR REEDUCATION: CPT

## 2017-08-23 PROCEDURE — 97110 THERAPEUTIC EXERCISES: CPT

## 2017-08-24 RX ORDER — SODIUM CHLORIDE 9 MG/ML
20 INJECTION, SOLUTION INTRAVENOUS CONTINUOUS
Status: DISCONTINUED | OUTPATIENT
Start: 2017-08-25 | End: 2017-08-28 | Stop reason: HOSPADM

## 2017-08-25 ENCOUNTER — HOSPITAL ENCOUNTER (OUTPATIENT)
Dept: INFUSION CENTER | Facility: CLINIC | Age: 57
Discharge: HOME | End: 2017-08-25
Payer: COMMERCIAL

## 2017-08-25 VITALS
TEMPERATURE: 98.6 F | HEIGHT: 65 IN | BODY MASS INDEX: 25.41 KG/M2 | OXYGEN SATURATION: 96 % | DIASTOLIC BLOOD PRESSURE: 66 MMHG | HEART RATE: 96 BPM | RESPIRATION RATE: 20 BRPM | SYSTOLIC BLOOD PRESSURE: 112 MMHG | WEIGHT: 152.5 LBS

## 2017-08-25 PROCEDURE — 96377 APPLICATON ON-BODY INJECTOR: CPT

## 2017-08-25 PROCEDURE — 96367 TX/PROPH/DG ADDL SEQ IV INF: CPT

## 2017-08-25 PROCEDURE — 96417 CHEMO IV INFUS EACH ADDL SEQ: CPT

## 2017-08-25 PROCEDURE — 96401 CHEMO ANTI-NEOPL SQ/IM: CPT

## 2017-08-25 PROCEDURE — 96413 CHEMO IV INFUSION 1 HR: CPT

## 2017-08-25 RX ADMIN — DOCETAXEL 135 MG: 20 INJECTION, SOLUTION, CONCENTRATE INTRAVENOUS at 10:55

## 2017-08-25 RX ADMIN — PERTUZUMAB 420 MG: 30 INJECTION, SOLUTION, CONCENTRATE INTRAVENOUS at 09:32

## 2017-08-25 RX ADMIN — TRASTUZUMAB 437 MG: 150 INJECTION, POWDER, LYOPHILIZED, FOR SOLUTION INTRAVENOUS at 10:15

## 2017-08-25 RX ADMIN — PEGFILGRASTIM 6 MG: KIT SUBCUTANEOUS at 12:11

## 2017-08-25 RX ADMIN — SODIUM CHLORIDE 20 ML/HR: 0.9 INJECTION, SOLUTION INTRAVENOUS at 08:43

## 2017-08-25 RX ADMIN — DENOSUMAB 120 MG: 120 INJECTION SUBCUTANEOUS at 12:02

## 2017-08-25 RX ADMIN — ONDANSETRON 16 MG: 2 INJECTION INTRAMUSCULAR; INTRAVENOUS at 08:45

## 2017-08-25 NOTE — PROGRESS NOTES
Pt tolerated chemotherapy infusions well without any issues  neulasta onpro applied to left arm and patent  xgeva injection given in left arm  Pt denies any complaints on discharge

## 2017-08-28 ENCOUNTER — APPOINTMENT (OUTPATIENT)
Dept: PHYSICAL THERAPY | Facility: MEDICAL CENTER | Age: 57
End: 2017-08-28
Payer: COMMERCIAL

## 2017-08-30 ENCOUNTER — APPOINTMENT (OUTPATIENT)
Dept: PHYSICAL THERAPY | Facility: MEDICAL CENTER | Age: 57
End: 2017-08-30
Payer: COMMERCIAL

## 2017-08-31 ENCOUNTER — ALLSCRIPTS OFFICE VISIT (OUTPATIENT)
Dept: OTHER | Facility: OTHER | Age: 57
End: 2017-08-31

## 2017-09-12 ENCOUNTER — TRANSCRIBE ORDERS (OUTPATIENT)
Dept: ADMINISTRATIVE | Facility: HOSPITAL | Age: 57
End: 2017-09-12

## 2017-09-12 ENCOUNTER — APPOINTMENT (OUTPATIENT)
Dept: LAB | Facility: MEDICAL CENTER | Age: 57
End: 2017-09-12
Payer: COMMERCIAL

## 2017-09-12 DIAGNOSIS — G89.3 NEOPLASM RELATED PAIN: Primary | ICD-10-CM

## 2017-09-12 LAB
ALBUMIN SERPL BCP-MCNC: 2.8 G/DL (ref 3.5–5)
ALP SERPL-CCNC: 119 U/L (ref 46–116)
ALT SERPL W P-5'-P-CCNC: 15 U/L (ref 12–78)
ANION GAP SERPL CALCULATED.3IONS-SCNC: 6 MMOL/L (ref 4–13)
AST SERPL W P-5'-P-CCNC: 21 U/L (ref 5–45)
BASOPHILS # BLD AUTO: 0.03 THOUSANDS/ΜL (ref 0–0.1)
BASOPHILS NFR BLD AUTO: 0 % (ref 0–1)
BILIRUB SERPL-MCNC: 0.25 MG/DL (ref 0.2–1)
BUN SERPL-MCNC: 5 MG/DL (ref 5–25)
CALCIUM SERPL-MCNC: 9 MG/DL (ref 8.3–10.1)
CANCER AG27-29 SERPL-ACNC: 48.1 U/ML (ref 0–42.3)
CHLORIDE SERPL-SCNC: 107 MMOL/L (ref 100–108)
CO2 SERPL-SCNC: 25 MMOL/L (ref 21–32)
CREAT SERPL-MCNC: 0.5 MG/DL (ref 0.6–1.3)
EOSINOPHIL # BLD AUTO: 0.03 THOUSAND/ΜL (ref 0–0.61)
EOSINOPHIL NFR BLD AUTO: 0 % (ref 0–6)
ERYTHROCYTE [DISTWIDTH] IN BLOOD BY AUTOMATED COUNT: 17.6 % (ref 11.6–15.1)
GFR SERPL CREATININE-BSD FRML MDRD: 109 ML/MIN/1.73SQ M
GLUCOSE SERPL-MCNC: 138 MG/DL (ref 65–140)
HCT VFR BLD AUTO: 31.9 % (ref 34.8–46.1)
HGB BLD-MCNC: 9.7 G/DL (ref 11.5–15.4)
LYMPHOCYTES # BLD AUTO: 1.1 THOUSANDS/ΜL (ref 0.6–4.47)
LYMPHOCYTES NFR BLD AUTO: 15 % (ref 14–44)
MCH RBC QN AUTO: 29.9 PG (ref 26.8–34.3)
MCHC RBC AUTO-ENTMCNC: 30.4 G/DL (ref 31.4–37.4)
MCV RBC AUTO: 99 FL (ref 82–98)
MONOCYTES # BLD AUTO: 1.15 THOUSAND/ΜL (ref 0.17–1.22)
MONOCYTES NFR BLD AUTO: 15 % (ref 4–12)
NEUTROPHILS # BLD AUTO: 5.2 THOUSANDS/ΜL (ref 1.85–7.62)
NEUTS SEG NFR BLD AUTO: 70 % (ref 43–75)
NRBC BLD AUTO-RTO: 0 /100 WBCS
PLATELET # BLD AUTO: 443 THOUSANDS/UL (ref 149–390)
PMV BLD AUTO: 9.7 FL (ref 8.9–12.7)
POTASSIUM SERPL-SCNC: 4.1 MMOL/L (ref 3.5–5.3)
PROT SERPL-MCNC: 6.7 G/DL (ref 6.4–8.2)
RBC # BLD AUTO: 3.24 MILLION/UL (ref 3.81–5.12)
SODIUM SERPL-SCNC: 138 MMOL/L (ref 136–145)
WBC # BLD AUTO: 7.53 THOUSAND/UL (ref 4.31–10.16)

## 2017-09-12 PROCEDURE — 80053 COMPREHEN METABOLIC PANEL: CPT | Performed by: PHYSICIAN ASSISTANT

## 2017-09-12 PROCEDURE — 85025 COMPLETE CBC W/AUTO DIFF WBC: CPT | Performed by: PHYSICIAN ASSISTANT

## 2017-09-12 PROCEDURE — 36415 COLL VENOUS BLD VENIPUNCTURE: CPT | Performed by: PHYSICIAN ASSISTANT

## 2017-09-12 PROCEDURE — 86300 IMMUNOASSAY TUMOR CA 15-3: CPT | Performed by: PHYSICIAN ASSISTANT

## 2017-09-13 ENCOUNTER — ALLSCRIPTS OFFICE VISIT (OUTPATIENT)
Dept: OTHER | Facility: OTHER | Age: 57
End: 2017-09-13

## 2017-09-13 ENCOUNTER — TRANSCRIBE ORDERS (OUTPATIENT)
Dept: ADMINISTRATIVE | Facility: HOSPITAL | Age: 57
End: 2017-09-13

## 2017-09-13 DIAGNOSIS — C79.31 SECONDARY MALIGNANT NEOPLASM OF BRAIN AND SPINAL CORD (HCC): Primary | ICD-10-CM

## 2017-09-13 DIAGNOSIS — C79.49 SECONDARY MALIGNANT NEOPLASM OF BRAIN AND SPINAL CORD (HCC): Primary | ICD-10-CM

## 2017-09-14 RX ORDER — SODIUM CHLORIDE 9 MG/ML
20 INJECTION, SOLUTION INTRAVENOUS CONTINUOUS
Status: DISCONTINUED | OUTPATIENT
Start: 2017-09-15 | End: 2017-09-18 | Stop reason: HOSPADM

## 2017-09-15 ENCOUNTER — HOSPITAL ENCOUNTER (OUTPATIENT)
Dept: INFUSION CENTER | Facility: CLINIC | Age: 57
Discharge: HOME | End: 2017-09-15
Payer: COMMERCIAL

## 2017-09-15 VITALS
TEMPERATURE: 98.1 F | DIASTOLIC BLOOD PRESSURE: 78 MMHG | BODY MASS INDEX: 26.24 KG/M2 | OXYGEN SATURATION: 95 % | RESPIRATION RATE: 18 BRPM | WEIGHT: 157.5 LBS | HEART RATE: 115 BPM | HEIGHT: 65 IN | SYSTOLIC BLOOD PRESSURE: 122 MMHG

## 2017-09-15 PROCEDURE — 96413 CHEMO IV INFUSION 1 HR: CPT

## 2017-09-15 PROCEDURE — 96367 TX/PROPH/DG ADDL SEQ IV INF: CPT

## 2017-09-15 PROCEDURE — 96377 APPLICATON ON-BODY INJECTOR: CPT

## 2017-09-15 PROCEDURE — 96417 CHEMO IV INFUS EACH ADDL SEQ: CPT

## 2017-09-15 RX ADMIN — DOCETAXEL 134 MG: 20 INJECTION, SOLUTION, CONCENTRATE INTRAVENOUS at 10:53

## 2017-09-15 RX ADMIN — TRASTUZUMAB 426 MG: 150 INJECTION, POWDER, LYOPHILIZED, FOR SOLUTION INTRAVENOUS at 10:19

## 2017-09-15 RX ADMIN — PEGFILGRASTIM 6 MG: KIT SUBCUTANEOUS at 12:07

## 2017-09-15 RX ADMIN — PERTUZUMAB 420 MG: 30 INJECTION, SOLUTION, CONCENTRATE INTRAVENOUS at 09:40

## 2017-09-15 RX ADMIN — SODIUM CHLORIDE 20 ML/HR: 0.9 INJECTION, SOLUTION INTRAVENOUS at 08:45

## 2017-09-15 RX ADMIN — ONDANSETRON 16 MG: 2 INJECTION INTRAMUSCULAR; INTRAVENOUS at 09:05

## 2017-09-15 NOTE — PROGRESS NOTES
Pt offers no complaints, resting comfortably  Vitals stable  Labs reviewed, within parameters  MUGA scan completed-Ef: 57%  Call bell in reach, will continue to monitor

## 2017-09-15 NOTE — PLAN OF CARE
Problem: Potential for Falls  Goal: Patient will remain free of falls  INTERVENTIONS:  - Assess patient frequently for physical needs  -  Identify cognitive and physical deficits and behaviors that affect risk of falls    -  [place] fall precautions as indicated by assessment   - Educate patient/family on patient safety including physical limitations  - Instruct patient to call for assistance with activity based on assessment  - Modify environment to reduce risk of injury  - Consider OT/PT consult to assist with strengthening/mobility   Outcome: Progressing

## 2017-09-20 ENCOUNTER — GENERIC CONVERSION - ENCOUNTER (OUTPATIENT)
Dept: OTHER | Facility: OTHER | Age: 57
End: 2017-09-20

## 2017-09-28 ENCOUNTER — GENERIC CONVERSION - ENCOUNTER (OUTPATIENT)
Dept: OTHER | Facility: OTHER | Age: 57
End: 2017-09-28

## 2017-09-29 ENCOUNTER — HOSPITAL ENCOUNTER (OUTPATIENT)
Dept: MRI IMAGING | Facility: HOSPITAL | Age: 57
Discharge: HOME | End: 2017-09-29
Attending: RADIOLOGY
Payer: COMMERCIAL

## 2017-09-29 DIAGNOSIS — C79.31 SECONDARY MALIGNANT NEOPLASM OF BRAIN (HCC): ICD-10-CM

## 2017-09-29 PROCEDURE — A9585 GADOBUTROL INJECTION: HCPCS | Performed by: RADIOLOGY

## 2017-09-29 PROCEDURE — 70553 MRI BRAIN STEM W/O & W/DYE: CPT

## 2017-09-29 RX ADMIN — GADOBUTROL 6 ML: 604.72 INJECTION INTRAVENOUS at 07:48

## 2017-10-04 ENCOUNTER — GENERIC CONVERSION - ENCOUNTER (OUTPATIENT)
Dept: OTHER | Facility: OTHER | Age: 57
End: 2017-10-04

## 2017-10-04 ENCOUNTER — APPOINTMENT (OUTPATIENT)
Dept: LAB | Facility: CLINIC | Age: 57
End: 2017-10-04
Payer: COMMERCIAL

## 2017-10-04 DIAGNOSIS — I89.0 LYMPHEDEMA, NOT ELSEWHERE CLASSIFIED: ICD-10-CM

## 2017-10-04 DIAGNOSIS — C79.31 SECONDARY MALIGNANT NEOPLASM OF BRAIN (HCC): ICD-10-CM

## 2017-10-04 DIAGNOSIS — C50.919 MALIGNANT NEOPLASM OF FEMALE BREAST (HCC): ICD-10-CM

## 2017-10-04 DIAGNOSIS — C79.51 SECONDARY MALIGNANT NEOPLASM OF BONE (HCC): ICD-10-CM

## 2017-10-04 LAB
ALBUMIN SERPL BCP-MCNC: 2.9 G/DL (ref 3.5–5)
ALP SERPL-CCNC: 124 U/L (ref 46–116)
ALT SERPL W P-5'-P-CCNC: 34 U/L (ref 12–78)
ANION GAP SERPL CALCULATED.3IONS-SCNC: 5 MMOL/L (ref 4–13)
AST SERPL W P-5'-P-CCNC: 25 U/L (ref 5–45)
BASOPHILS # BLD AUTO: 0.03 THOUSANDS/ΜL (ref 0–0.1)
BASOPHILS NFR BLD AUTO: 0 % (ref 0–1)
BILIRUB SERPL-MCNC: 0.2 MG/DL (ref 0.2–1)
BUN SERPL-MCNC: 8 MG/DL (ref 5–25)
CALCIUM SERPL-MCNC: 8.5 MG/DL (ref 8.3–10.1)
CANCER AG27-29 SERPL-ACNC: 49 U/ML (ref 0–42.3)
CHLORIDE SERPL-SCNC: 106 MMOL/L (ref 100–108)
CO2 SERPL-SCNC: 29 MMOL/L (ref 21–32)
CREAT SERPL-MCNC: 0.56 MG/DL (ref 0.6–1.3)
EOSINOPHIL # BLD AUTO: 0.02 THOUSAND/ΜL (ref 0–0.61)
EOSINOPHIL NFR BLD AUTO: 0 % (ref 0–6)
ERYTHROCYTE [DISTWIDTH] IN BLOOD BY AUTOMATED COUNT: 18 % (ref 11.6–15.1)
GFR SERPL CREATININE-BSD FRML MDRD: 105 ML/MIN/1.73SQ M
GLUCOSE SERPL-MCNC: 129 MG/DL (ref 65–140)
HCT VFR BLD AUTO: 33.7 % (ref 34.8–46.1)
HGB BLD-MCNC: 10.2 G/DL (ref 11.5–15.4)
LYMPHOCYTES # BLD AUTO: 1.17 THOUSANDS/ΜL (ref 0.6–4.47)
LYMPHOCYTES NFR BLD AUTO: 13 % (ref 14–44)
MCH RBC QN AUTO: 29.5 PG (ref 26.8–34.3)
MCHC RBC AUTO-ENTMCNC: 30.3 G/DL (ref 31.4–37.4)
MCV RBC AUTO: 97 FL (ref 82–98)
MONOCYTES # BLD AUTO: 1.28 THOUSAND/ΜL (ref 0.17–1.22)
MONOCYTES NFR BLD AUTO: 14 % (ref 4–12)
NEUTROPHILS # BLD AUTO: 6.46 THOUSANDS/ΜL (ref 1.85–7.62)
NEUTS SEG NFR BLD AUTO: 73 % (ref 43–75)
PLATELET # BLD AUTO: 498 THOUSANDS/UL (ref 149–390)
PMV BLD AUTO: 9.4 FL (ref 8.9–12.7)
POTASSIUM SERPL-SCNC: 3.5 MMOL/L (ref 3.5–5.3)
PROT SERPL-MCNC: 6.7 G/DL (ref 6.4–8.2)
RBC # BLD AUTO: 3.46 MILLION/UL (ref 3.81–5.12)
SODIUM SERPL-SCNC: 140 MMOL/L (ref 136–145)
WBC # BLD AUTO: 8.96 THOUSAND/UL (ref 4.31–10.16)

## 2017-10-04 PROCEDURE — 85025 COMPLETE CBC W/AUTO DIFF WBC: CPT

## 2017-10-04 PROCEDURE — 80053 COMPREHEN METABOLIC PANEL: CPT

## 2017-10-04 PROCEDURE — 86300 IMMUNOASSAY TUMOR CA 15-3: CPT

## 2017-10-04 PROCEDURE — 36415 COLL VENOUS BLD VENIPUNCTURE: CPT

## 2017-10-05 RX ORDER — SODIUM CHLORIDE 9 MG/ML
20 INJECTION, SOLUTION INTRAVENOUS CONTINUOUS
Status: DISCONTINUED | OUTPATIENT
Start: 2017-10-06 | End: 2017-10-09 | Stop reason: HOSPADM

## 2017-10-06 ENCOUNTER — HOSPITAL ENCOUNTER (OUTPATIENT)
Dept: INFUSION CENTER | Facility: CLINIC | Age: 57
Discharge: HOME | End: 2017-10-06
Payer: COMMERCIAL

## 2017-10-06 VITALS
WEIGHT: 151.24 LBS | TEMPERATURE: 97.5 F | SYSTOLIC BLOOD PRESSURE: 132 MMHG | HEIGHT: 66 IN | RESPIRATION RATE: 18 BRPM | HEART RATE: 91 BPM | DIASTOLIC BLOOD PRESSURE: 67 MMHG | BODY MASS INDEX: 24.31 KG/M2

## 2017-10-06 PROCEDURE — 96413 CHEMO IV INFUSION 1 HR: CPT

## 2017-10-06 PROCEDURE — 96417 CHEMO IV INFUS EACH ADDL SEQ: CPT

## 2017-10-06 PROCEDURE — 96367 TX/PROPH/DG ADDL SEQ IV INF: CPT

## 2017-10-06 PROCEDURE — 96401 CHEMO ANTI-NEOPL SQ/IM: CPT

## 2017-10-06 PROCEDURE — 96377 APPLICATON ON-BODY INJECTOR: CPT

## 2017-10-06 RX ADMIN — PERTUZUMAB 420 MG: 30 INJECTION, SOLUTION, CONCENTRATE INTRAVENOUS at 09:47

## 2017-10-06 RX ADMIN — SODIUM CHLORIDE 20 ML/HR: 0.9 INJECTION, SOLUTION INTRAVENOUS at 09:17

## 2017-10-06 RX ADMIN — DENOSUMAB 120 MG: 120 INJECTION SUBCUTANEOUS at 09:33

## 2017-10-06 RX ADMIN — DOCETAXEL 134 MG: 20 INJECTION, SOLUTION, CONCENTRATE INTRAVENOUS at 11:12

## 2017-10-06 RX ADMIN — ONDANSETRON 16 MG: 2 INJECTION INTRAMUSCULAR; INTRAVENOUS at 09:19

## 2017-10-06 RX ADMIN — TRASTUZUMAB 426 MG: 150 INJECTION, POWDER, LYOPHILIZED, FOR SOLUTION INTRAVENOUS at 10:33

## 2017-10-06 RX ADMIN — PEGFILGRASTIM 6 MG: KIT SUBCUTANEOUS at 12:15

## 2017-10-06 NOTE — PROGRESS NOTES
Pt received treatment without complications  Xgeva administered in left arm  Neulasta onpro applied prior to discharge - cartridge full and blinking green  Pt stable upon discharge  Aware to remove neulasta onpro device when infusion is complete  Aware of next appointment   Refused AVS

## 2017-10-06 NOTE — PLAN OF CARE
Problem: Potential for Falls  Goal: Patient will remain free of falls  INTERVENTIONS:  - Assess patient frequently for physical needs  -  Identify cognitive and physical deficits and behaviors that affect risk of falls    -  [place] fall precautions as indicated by assessment   - Educate patient/family on patient safety including physical limitations  - Instruct patient to call for assistance with activity based on assessment  - Modify environment to reduce risk of injury  - Consider OT/PT consult to assist with strengthening/mobility   Outcome: Progressing

## 2017-10-09 ENCOUNTER — HOSPITAL ENCOUNTER (OUTPATIENT)
Dept: MRI IMAGING | Facility: HOSPITAL | Age: 57
Discharge: HOME | End: 2017-10-09
Attending: RADIOLOGY
Payer: COMMERCIAL

## 2017-10-24 ENCOUNTER — APPOINTMENT (OUTPATIENT)
Dept: LAB | Facility: MEDICAL CENTER | Age: 57
End: 2017-10-24
Payer: COMMERCIAL

## 2017-10-24 DIAGNOSIS — C79.51 SECONDARY MALIGNANT NEOPLASM OF BONE (HCC): ICD-10-CM

## 2017-10-24 DIAGNOSIS — C79.31 SECONDARY MALIGNANT NEOPLASM OF BRAIN (HCC): ICD-10-CM

## 2017-10-24 DIAGNOSIS — C50.919 MALIGNANT NEOPLASM OF FEMALE BREAST (HCC): ICD-10-CM

## 2017-10-24 LAB
BASOPHILS # BLD AUTO: 0.03 THOUSANDS/ΜL (ref 0–0.1)
BASOPHILS NFR BLD AUTO: 0 % (ref 0–1)
EOSINOPHIL # BLD AUTO: 0.01 THOUSAND/ΜL (ref 0–0.61)
EOSINOPHIL NFR BLD AUTO: 0 % (ref 0–6)
ERYTHROCYTE [DISTWIDTH] IN BLOOD BY AUTOMATED COUNT: 18.2 % (ref 11.6–15.1)
HCT VFR BLD AUTO: 31.9 % (ref 34.8–46.1)
HGB BLD-MCNC: 10 G/DL (ref 11.5–15.4)
LYMPHOCYTES # BLD AUTO: 0.66 THOUSANDS/ΜL (ref 0.6–4.47)
LYMPHOCYTES NFR BLD AUTO: 7 % (ref 14–44)
MCH RBC QN AUTO: 29.7 PG (ref 26.8–34.3)
MCHC RBC AUTO-ENTMCNC: 31.3 G/DL (ref 31.4–37.4)
MCV RBC AUTO: 95 FL (ref 82–98)
MONOCYTES # BLD AUTO: 0.46 THOUSAND/ΜL (ref 0.17–1.22)
MONOCYTES NFR BLD AUTO: 5 % (ref 4–12)
NEUTROPHILS # BLD AUTO: 8.38 THOUSANDS/ΜL (ref 1.85–7.62)
NEUTS SEG NFR BLD AUTO: 88 % (ref 43–75)
NRBC BLD AUTO-RTO: 0 /100 WBCS
PLATELET # BLD AUTO: 556 THOUSANDS/UL (ref 149–390)
PMV BLD AUTO: 9.5 FL (ref 8.9–12.7)
RBC # BLD AUTO: 3.37 MILLION/UL (ref 3.81–5.12)
WBC # BLD AUTO: 9.57 THOUSAND/UL (ref 4.31–10.16)

## 2017-10-24 PROCEDURE — 36415 COLL VENOUS BLD VENIPUNCTURE: CPT

## 2017-10-24 PROCEDURE — 80053 COMPREHEN METABOLIC PANEL: CPT

## 2017-10-24 PROCEDURE — 86300 IMMUNOASSAY TUMOR CA 15-3: CPT

## 2017-10-24 PROCEDURE — 85025 COMPLETE CBC W/AUTO DIFF WBC: CPT

## 2017-10-25 LAB
ALBUMIN SERPL BCP-MCNC: 2.9 G/DL (ref 3.5–5)
ALP SERPL-CCNC: 107 U/L (ref 46–116)
ALT SERPL W P-5'-P-CCNC: 20 U/L (ref 12–78)
ANION GAP SERPL CALCULATED.3IONS-SCNC: 4 MMOL/L (ref 4–13)
AST SERPL W P-5'-P-CCNC: 13 U/L (ref 5–45)
BILIRUB SERPL-MCNC: 0.18 MG/DL (ref 0.2–1)
BUN SERPL-MCNC: 5 MG/DL (ref 5–25)
CALCIUM SERPL-MCNC: 8.4 MG/DL (ref 8.3–10.1)
CANCER AG27-29 SERPL-ACNC: 49.5 U/ML (ref 0–42.3)
CHLORIDE SERPL-SCNC: 111 MMOL/L (ref 100–108)
CO2 SERPL-SCNC: 26 MMOL/L (ref 21–32)
CREAT SERPL-MCNC: 0.54 MG/DL (ref 0.6–1.3)
GFR SERPL CREATININE-BSD FRML MDRD: 107 ML/MIN/1.73SQ M
GLUCOSE SERPL-MCNC: 104 MG/DL (ref 65–140)
POTASSIUM SERPL-SCNC: 3.9 MMOL/L (ref 3.5–5.3)
PROT SERPL-MCNC: 6.9 G/DL (ref 6.4–8.2)
SODIUM SERPL-SCNC: 141 MMOL/L (ref 136–145)

## 2017-10-26 ENCOUNTER — GENERIC CONVERSION - ENCOUNTER (OUTPATIENT)
Dept: OTHER | Facility: OTHER | Age: 57
End: 2017-10-26

## 2017-10-26 RX ORDER — SODIUM CHLORIDE 9 MG/ML
20 INJECTION, SOLUTION INTRAVENOUS CONTINUOUS
Status: DISCONTINUED | OUTPATIENT
Start: 2017-10-27 | End: 2017-10-30 | Stop reason: HOSPADM

## 2017-10-27 ENCOUNTER — HOSPITAL ENCOUNTER (OUTPATIENT)
Dept: INFUSION CENTER | Facility: CLINIC | Age: 57
Discharge: HOME | End: 2017-10-27
Payer: COMMERCIAL

## 2017-10-27 VITALS
DIASTOLIC BLOOD PRESSURE: 66 MMHG | HEIGHT: 65 IN | RESPIRATION RATE: 18 BRPM | BODY MASS INDEX: 24.83 KG/M2 | HEART RATE: 92 BPM | TEMPERATURE: 98.2 F | SYSTOLIC BLOOD PRESSURE: 123 MMHG | WEIGHT: 149 LBS

## 2017-10-27 PROCEDURE — 96367 TX/PROPH/DG ADDL SEQ IV INF: CPT

## 2017-10-27 PROCEDURE — 96413 CHEMO IV INFUSION 1 HR: CPT

## 2017-10-27 PROCEDURE — 96417 CHEMO IV INFUS EACH ADDL SEQ: CPT

## 2017-10-27 PROCEDURE — 96377 APPLICATON ON-BODY INJECTOR: CPT

## 2017-10-27 RX ADMIN — TRASTUZUMAB 426 MG: 150 INJECTION, POWDER, LYOPHILIZED, FOR SOLUTION INTRAVENOUS at 10:00

## 2017-10-27 RX ADMIN — SODIUM CHLORIDE 20 ML/HR: 0.9 INJECTION, SOLUTION INTRAVENOUS at 08:25

## 2017-10-27 RX ADMIN — PERTUZUMAB 420 MG: 30 INJECTION, SOLUTION, CONCENTRATE INTRAVENOUS at 09:17

## 2017-10-27 RX ADMIN — DOCETAXEL 134 MG: 20 INJECTION INTRAVENOUS at 10:34

## 2017-10-27 RX ADMIN — PEGFILGRASTIM 6 MG: KIT SUBCUTANEOUS at 11:38

## 2017-10-27 RX ADMIN — ONDANSETRON 16 MG: 2 INJECTION INTRAMUSCULAR; INTRAVENOUS at 08:46

## 2017-10-27 NOTE — PROGRESS NOTES
Assessment  1  Brain metastases (198 3) (C79 31)   2  Bone metastases (198 5) (C79 51)   3  Breast cancer (174 9) (C50 919)   4  Nausea with vomiting (787 01) (R11 2)    Plan  Brain metastases, Nausea with vomiting    · * MRI BRAIN W WO CONTRAST; Status:Need Information - Financial Authorization; Requested HIP:[id number] 07:00AM;    Perform:[facility] [address] [place] [address] Radiology; Order Comments:[facility] 9/29 at 7am; Due:25Rss9492; Last Updated By:Jakub Cornell; 9/13/2017 2:52:19 PM;Ordered; For:Brain metastases, Nausea with vomiting; Ordered By:Alysa Madrid;  Nausea with vomiting    · Ondansetron HCl - 4 MG Oral Tablet; One tab PO q 4-6 hours PRN nausea   Rx By: Lynn Ward; Dispense: 0 Days ; #:30 Tablet; Refill: 2;For: Nausea with vomiting; MARKOS = N; Verified Transmission to [facility][id number]; Last Updated By: System, [employer]; 9/13/2017 2:29:57 PM   · Follow-up visit in 3 weeks Evaluation and Treatment  Follow-up  Status: Complete  Done:  08RUZ9681 02:00PM   Ordered; For: Nausea with vomiting; Ordered By: Lynn Ward Performed:  Due: 92NOP4864; Last Updated By: Ciera Gallegos; 9/13/2017 2:52:19 PM    Discussion/Summary  Discussion Summary:   In summary, this is a 56-year-old female history of advanced breast cancer as outlined  marker continues to decrease slightly, presently 48  1  scan was performed in Aug 2017 showed EF 57%  Prior study in February was 71%  previously has had fluid retention fluid retention, which now seems to be resolving  continues to have persistent nausea and dry heaves  She is currently taking promethazine and lorazepam  This is not aiding benefit  Advised to discontinue promethazine and take Zofran, Script sent  Call if this does not improve symptoms  due to continued daily nausea, increasing frequency of headaches, and feelings of unsteadiness, which all continue to worsen over the past few weeks, MRI brain to be performed   and daughter voiced understanding and agreement in Date & Time: 5/7/2018, 10:21 AM  Patient: Shankar Mcclellan  Encounter Provider(s):    Nelly Jha DO       To Whom It May Concern:    Padmini Rey was seen and treated in our department on 5/7/2018.  She can return to work Tuesday, May 8, discussion  They are aware to contact us with questions/symptoms in the interim  Counseling Documentation With Imm: The patient, patient's family was counseled regarding diagnostic results,-- instructions for management,-- patient and family education,-- impressions,-- importance of compliance with treatment  total time of encounter was 30 minutes-- and-- 20 minutes was spent counseling  Goals and Barriers: The patient has the current Goals: Prolongation of survival  The patent has the current Barriers: None  Patient's Capacity to Self-Care: Patient is able to Self-Care  Medication SE Review and Pt Understands Tx: Possible side effects of new medications were reviewed with the patient/guardian today  The treatment plan was reviewed with the patient/guardian  The patient/guardian understands and agrees with the treatment plan   Self Referrals:   Self Referrals: No      History of Present Illness  HPI: 2010- The patient had a left sided T1 B , N0 ER positive, FL and HER-2 negative invasive ductal carcinoma, right-sided DCIS, ER/FL positive, HER-2 negative  Bilateral mastectomy  2010- April 2011  Treated with tamoxifen 20 mg by mouth daily  Last menstrual cycle was March 2010  She was then on Arimidex 1 mg daily  Lost to followup after March 2012  presented to Dr Sandra Peck in December 2015 with Right arm swelling  She underwent venous Doppler R upper extremities 12/2/2015 which did not identify any acute or chronic DVT however there was a nonvascular structure in the right axilla measuring 2 2 x 1 3 cm CT Scan of the chest on 12/9/15, showed multiple bilateral pulmonary nodules measuring 3-7 mm  PET scan on 2/4/16, which revealed hypermetabolic hilar and mediastinal nodes  There is a pre carinal node with a SUV of 5 4 measuring 1 8 x 1 7 cm, and a subcarinal node measuring 1 9 x 1 3 cm with a SUV of 5 3  There is right hilar activity of 3 5 and left hilar activity of 2 8   The subcentimeter nodules are too small for PET evaluation  She also has uptake in her L3 and L5 vertebral bodies, both concerning for bony metastases  Dr Prateek Sigala performed flexible bronchoscopy and lymph node biopsy 2/16/16  Preliminary pathology identified Metastatic non-small cell carcinoma, favor adenocarcinoma  ER 90%, NH zero  Her 2 +2, positive by FISH1, 2016- started Taxotere/Perjeta/Herceptin   2016 patient had good response  Taxotere was discontinued and anastrozole 1 mg by mouth daily initiated  Herceptin and Perjeta continued  2017? progressive disease noted  Anastrozole discontinued  Taxotere restarted at 75 mg/m? every 3 weeks  Neulasta support  Herceptin and Perjeta continued  2017?brain MRI showed a 5 mm right parietal lesion, 3 mm right frontal lesion, 3 mm left parahippocampal lesion  2017- SRS to brain lesions  Current Therapy: February 2017? progressive disease noted  Anastrozole discontinued  Taxotere restarted at 75 mg/m? every 3 weeks  Neulasta support  Herceptin and Perjeta continued  Interval History: nausea every day  vomit/dry heave 1-2 times daily  is better the day after chemotherapy in abdomen is less is improving; has been happening once a day now; brown color, no mucus  every other day, has noticed for the past 1 month months increase in dizziness/unbalance  feels that imbalance/dizziness worse over the past few weeks  not getting better with therapy      Review of Systems  Complete-Female:   Constitutional: feeling tired, but-- not feeling poorly  Eyes: No complaints of eye pain, no red eyes, no eyesight problems, no discharge, no dry eyes, no itching of eyes  ENT: no complaints of earache, no loss of hearing, no nose bleeds, no nasal discharge, no sore throat, no hoarseness  Cardiovascular: No complaints of slow heart rate, no fast heart rate, no chest pain, no palpitations, no leg claudication, no lower extremity edema     Respiratory: shortness of breath during exertion, but-- no shortness of breath,-- no cough,-- no orthopnea,-- no wheezing-- and-- no PND  Gastrointestinal: as noted in HPI  Genitourinary: No complaints of dysuria, no incontinence, no pelvic pain, no dysmenorrhea, no vaginal discharge or bleeding  Musculoskeletal: chronic arm, back, leg pain  Integumentary: No complaints of skin rash or lesions, no itching, no skin wounds, no breast pain or lump  Neurological: as noted in HPI  Psychiatric: no anxiety-- and-- no depression  Endocrine: no muscle weakness  no feelings of weakness   Hematologic/Lymphatic: No complaints of swollen glands, no swollen glands in the neck, does not bleed easily, does not bruise easily  ROS Reviewed:   ROS reviewed  Active Problems  1  Abdominal distension (787 3) (R14 0)   2  Axillary adenopathy (785 6) (R59 0)   3  Bone metastases (198 5) (C79 51)   4  Brain metastases (198 3) (C79 31)   5  Breast cancer (174 9) (C50 919)   6  Cancer related pain (338 3) (G89 3)   7  Chemotherapy-induced nausea (787 02,E933 1) (R11 0,T45  1X5A)   8  Constipation (564 00) (K59 00)   9  Decreased appetite (783 0) (R63 0)   10  Diarrhea (787 91) (R19 7)   11  Difficulty sleeping (780 50) (G47 9)   12  Dizziness (780 4) (R42)   13  Dyspareunia, female (625 0) (N94 10)   14  Edema of upper extremity (782 3) (R60 0)   15  Encounter for monitoring cardiotoxic drug therapy (V58 83,V58 69) (Z51 81,Z79 899)   16  Headache (784 0) (R51)   17  Herniated lumbar intervertebral disc (722 10) (M51 26)   18  Leg weakness (729 89) (R29 898)   19  Lung nodules (793 19) (R91 8)   20  Lymphadenopathy (785 6) (R59 1)   21  Lymphedema (457 1) (I89 0)   22  Mediastinal lymphadenopathy (785 6) (R59 0)   23  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)   24  Vaginal pain (625 9) (R10 2)    Past Medical History  1  History of Dysuria (788 1) (R30 0)   2  History of candidiasis of mouth (V12 09) (Z86 19)   3  History of diarrhea (V12 79) (Z87 898)   4  History of fall (V15 88) (Z91 81)   5   History of fever (V13 89) (E12 841)   6  History of radiation therapy (V15 3) (Z92 3)   7  History of urinary tract infection (V13 02) (Z87 440)   8  History of Neoplasm of breast (239 3) (D49 3)   9  History of Urinary urgency (788 63) (R39 15)    Surgical History  1  History of Mastectomy Bilateral   2  History of Oophorectomy  Surgical History Reviewed: The surgical history was reviewed and updated today  Family History  Father    1  Family history of Diabetes (250 00) (E11 9)  Daughter    2  Family history of Diabetes (250 00) (E11 9)  Sister    3  Family history of Cancer (199 1) (C80 1)  Family History Reviewed: The family history was reviewed and updated today  Social History   · Alcohol use (V49 89) (Z78 9)   · Denied: Drug use (305 90) (F19 90)   · Occupation   · Smoker (305 1) (F17 200)  Social History Reviewed: The social history was reviewed and updated today  The social history was reviewed and is unchanged  Current Meds   1  Gabapentin 300 MG Oral Capsule; TAKE 1 CAPSULE 3 TIMES DAILY; Therapy: 06DVR3081 to (Evaluate:94Clp2308)  Requested for: 20PRX3336; Last   Rx:01Jun2017 Ordered   2  HYDROmorphone HCl - 8 MG Oral Tablet; TAKE 1 TABLET EVERY 4 HOURS AS   NEEDED FOR BREAKTHROUGH PAIN;   Therapy: 43LBK8912 to (Evaluate:63Nis0745); Last Rx:03Jzu2010 Ordered   3  Lactulose 20 GM/30ML Oral Solution; 30 mL by mouth three times daily; Therapy: 61HNS6933 to (Xuan Gomez)  Requested for: 60HSL0245; Last   Rx:58Hri6982 Ordered   4  LORazepam 0 5 MG Oral Tablet; TAKE 1 TABLET EVERY 6 HOURS AS NEEDED FOR   NAUSA or anxiety; Therapy: 55Ecu8639 to (Evaluate:44Swm4378); Last Rx:58Pva7415 Ordered   5  Nortriptyline HCl - 50 MG Oral Capsule; TAKE 1 CAPSULE AT BEDTIME; Therapy: 15Xph4853 to (Evaluate:63Fom5543)  Requested for: 62Cnw9390; Last   Rx:17Xcs0350 Ordered   6  OLANZapine 10 MG Oral Tablet; TAKE 1 TABLET AT BEDTIME;    Therapy: 02JXW3169 to (Evaluate:04Jle6996)  Requested for: 27VOP8415; Last Rx: 82DDY4808 Ordered   7  Promethazine HCl - 25 MG Oral Tablet; TAKE 1 TABLET EVERY 6 HOURS AS NEEDED   FOR NAUSEA; Therapy: 23YAM2372 to (Ragini Cortes)  Requested for: 33Ovt4209; Last   Rx:89Jkn8212 Ordered   8  Senna 8 6 MG Oral Tablet; 2 tab PO bid to prevent constipation; Therapy: 83Emi2725 to (Evaluate:82Occ9454)  Requested for: 05Vjq9569; Last   Rx:18Ocm2086 Ordered   9  Vagifem 10 MCG Vaginal Tablet; INSERT 1 TABLET INTRAVAGINALLY WEEKLY as   needed to prevent excessive vaginal dryness; Therapy: 90ZEC1366 to (Evaluate:17Jan2017)  Requested for: 44UXE8629; Last   Rx:72Mnp0425 Ordered  Medication List Reviewed: The medication list was reviewed and updated today  Allergies  1  No Known Drug Allergies    Vitals  Vital Signs    Recorded: 13Sep2017 01:43PM   Temperature 98 F   Heart Rate 119   Respiration 18   Systolic 041   Diastolic 60   Height 5 ft 6 in   Weight 157 lb 8 0 oz   BMI Calculated 25 42   BSA Calculated 1 81   O2 Saturation 95   Pain Scale 0     Physical Exam    Constitutional   General appearance: No acute distress, well appearing and well nourished  Eyes   Conjunctiva and lids: No swelling, erythema or discharge  Ears, Nose, Mouth, and Throat   External inspection of ears and nose: Normal     Oropharynx: Normal with no erythema, edema, exudate or lesions  Pulmonary   Respiratory effort: No increased work of breathing or signs of respiratory distress  Auscultation of lungs: Clear to auscultation  Cardiovascular   Auscultation of heart: Normal rate and rhythm, normal S1 and S2, without murmurs  Examination of extremities for edema and/or varicosities: Abnormal  -- lymphedema of the right upper extremity  Abdomen   Abdomen: Non-tender, no masses  Liver and spleen: No hepatomegaly or splenomegaly  Lymphatic   Palpation of lymph nodes in neck: No lymphadenopathy      Musculoskeletal   Gait and station: Normal     Skin   Skin and subcutaneous tissue: Normal without rashes or lesions  Neurologic   Cranial nerves: Cranial nerves 2-12 intact  Psychiatric   Orientation to person, place, and time: Normal     Mood and affect: Normal         ECOG 2       Results/Data  (1) CBC/PLT/DIFF [id number] 03:06PM Himanshu Herrera     Test Name Result Flag Reference   WBC COUNT 7 53 Thousand/uL  4 31-10 16   RBC COUNT 3 24 Million/uL L 3 81-5 12   HEMOGLOBIN 9 7 g/dL L 11 5-15 4   HEMATOCRIT 31 9 % L 34 8-46  1   MCV 99 fL H 82-98   MCH 29 9 pg  26 8-34 3   MCHC 30 4 g/dL L 31 4-37 4   RDW 17 6 % H 11 6-15 1   MPV 9 7 fL  8 9-12 7   PLATELET COUNT 069 Thousands/uL H 149-390   nRBC AUTOMATED 0 /100 WBCs     NEUTROPHILS RELATIVE PERCENT 70 %  43-75   LYMPHOCYTES RELATIVE PERCENT 15 %  14-44   MONOCYTES RELATIVE PERCENT 15 % H 4-12   EOSINOPHILS RELATIVE PERCENT 0 %  0-6   BASOPHILS RELATIVE PERCENT 0 %  0-1   NEUTROPHILS ABSOLUTE COUNT 5 20 Thousands/? ??L  1 85-7 62   LYMPHOCYTES ABSOLUTE COUNT 1 10 Thousands/? ??L  0 60-4 47   MONOCYTES ABSOLUTE COUNT 1 15 Thousand/? ??L  0 17-1 22   EOSINOPHILS ABSOLUTE COUNT 0 03 Thousand/? ??L  0 00-0 61   BASOPHILS ABSOLUTE COUNT 0 03 Thousands/? ??L  0 00-0 10   This is a patient instruction: This test is non-fasting  Please drink two glasses of water morning of bloodwork  (1) COMPREHENSIVE METABOLIC PANEL 90VJV2995 32:53IH Himanshu Herrera     Test Name Result Flag Reference   GLUCOSE,RANDM 138 mg/dL     If the patient is fasting, the ADA then defines impaired fasting glucose as > 100 mg/dL and diabetes as > or equal to 123 mg/dL  Specimen collection should occur prior to Sulfasalazine administration due to the potential for falsely depressed results  Specimen collection should occur prior to Sulfapyridine administration due to the potential for falsely elevated results     SODIUM 138 mmol/L  136-145   POTASSIUM 4 1 mmol/L  3 5-5 3   CHLORIDE 107 mmol/L  100-108   CARBON DIOXIDE 25 mmol/L  21-32   ANION GAP (CALC) 6 mmol/L  4-13 BLOOD UREA NITROGEN 5 mg/dL  5-25   CREATININE 0 50 mg/dL L 0 60-1 30   Standardized to IDMS reference method   CALCIUM 9 0 mg/dL  8 3-10 1   BILI, TOTAL 0 25 mg/dL  0 20-1 00   ALK PHOSPHATAS 119 U/L H    ALT (SGPT) 15 U/L  12-78   Specimen collection should occur prior to Sulfasalazine and/or Sulfapyridine administration due to the potential for falsely depressed results  AST(SGOT) 21 U/L  5-45   Specimen collection should occur prior to Sulfasalazine administration due to the potential for falsely depressed results  ALBUMIN 2 8 g/dL L 3 5-5 0   TOTAL PROTEIN 6 7 g/dL  6 4-8 2   eGFR 109 ml/min/1 73sq [facility] Disease Education Program recommendations are as follows:  GFR calculation is accurate only with a steady state creatinine  Chronic Kidney disease less than 60 ml/min/1 73 sq  meters  Kidney failure less than 15 ml/min/1 73 sq  meters  (1) CA 27 29 46Eao1463 03:06PM Fritz Hendersont     Test Name Result Flag Reference   CA 27 29(NEW) 48 1 U/mL H 0 0-42 3     Future Appointments    Date/Time Provider Specialty Site   09/28/2017 09:40 AM SILVIA Marcano  Palliative Care [facility]   10/04/2017 02:00 PM Bj Pritchett, [facility] Hematology Oncology [facility]     Signatures   Electronically signed by : Cosmo Bronson [facility]; Sep 24 2017 11:07PM EST                       (Author)    Electronically signed by : SILVIA Chaney  Sep 25 2017  4:20PM EST

## 2017-10-27 NOTE — PROGRESS NOTES
Pt to infusion center for taxotere, perjeta,herceptin,and neulasta on pro  Pt offers no complaints generalized pain continues pt on routine pain meds  PIV inserted without issue Pt tolerated well  Labs checked within parameters to treat

## 2017-10-31 ENCOUNTER — GENERIC CONVERSION - ENCOUNTER (OUTPATIENT)
Dept: OTHER | Facility: OTHER | Age: 57
End: 2017-10-31

## 2017-11-09 ENCOUNTER — GENERIC CONVERSION - ENCOUNTER (OUTPATIENT)
Dept: OTHER | Facility: OTHER | Age: 57
End: 2017-11-09

## 2017-11-09 ENCOUNTER — APPOINTMENT (OUTPATIENT)
Dept: PHYSICAL THERAPY | Facility: MEDICAL CENTER | Age: 57
End: 2017-11-09
Payer: COMMERCIAL

## 2017-11-09 DIAGNOSIS — C50.919 MALIGNANT NEOPLASM OF FEMALE BREAST (HCC): ICD-10-CM

## 2017-11-09 PROCEDURE — 97162 PT EVAL MOD COMPLEX 30 MIN: CPT

## 2017-11-09 PROCEDURE — G8990 OTHER PT/OT CURRENT STATUS: HCPCS

## 2017-11-09 PROCEDURE — G8991 OTHER PT/OT GOAL STATUS: HCPCS

## 2017-11-15 ENCOUNTER — APPOINTMENT (OUTPATIENT)
Dept: LAB | Facility: MEDICAL CENTER | Age: 57
End: 2017-11-15
Payer: COMMERCIAL

## 2017-11-15 DIAGNOSIS — C79.31 SECONDARY MALIGNANT NEOPLASM OF BRAIN (HCC): ICD-10-CM

## 2017-11-15 DIAGNOSIS — C50.919 MALIGNANT NEOPLASM OF FEMALE BREAST (HCC): ICD-10-CM

## 2017-11-15 DIAGNOSIS — C79.51 SECONDARY MALIGNANT NEOPLASM OF BONE (HCC): ICD-10-CM

## 2017-11-15 LAB
ALBUMIN SERPL BCP-MCNC: 2.6 G/DL (ref 3.5–5)
ALP SERPL-CCNC: 111 U/L (ref 46–116)
ALT SERPL W P-5'-P-CCNC: 9 U/L (ref 12–78)
ANION GAP SERPL CALCULATED.3IONS-SCNC: 7 MMOL/L (ref 4–13)
AST SERPL W P-5'-P-CCNC: 9 U/L (ref 5–45)
BASOPHILS # BLD AUTO: 0.02 THOUSANDS/ΜL (ref 0–0.1)
BASOPHILS NFR BLD AUTO: 0 % (ref 0–1)
BILIRUB SERPL-MCNC: 0.26 MG/DL (ref 0.2–1)
BUN SERPL-MCNC: 4 MG/DL (ref 5–25)
CALCIUM SERPL-MCNC: 8.9 MG/DL (ref 8.3–10.1)
CANCER AG27-29 SERPL-ACNC: 46.5 U/ML (ref 0–42.3)
CHLORIDE SERPL-SCNC: 106 MMOL/L (ref 100–108)
CO2 SERPL-SCNC: 26 MMOL/L (ref 21–32)
CREAT SERPL-MCNC: 0.44 MG/DL (ref 0.6–1.3)
EOSINOPHIL # BLD AUTO: 0.01 THOUSAND/ΜL (ref 0–0.61)
EOSINOPHIL NFR BLD AUTO: 0 % (ref 0–6)
ERYTHROCYTE [DISTWIDTH] IN BLOOD BY AUTOMATED COUNT: 18.4 % (ref 11.6–15.1)
GFR SERPL CREATININE-BSD FRML MDRD: 114 ML/MIN/1.73SQ M
GLUCOSE P FAST SERPL-MCNC: 116 MG/DL (ref 65–99)
HCT VFR BLD AUTO: 33.6 % (ref 34.8–46.1)
HGB BLD-MCNC: 10.7 G/DL (ref 11.5–15.4)
LYMPHOCYTES # BLD AUTO: 1.55 THOUSANDS/ΜL (ref 0.6–4.47)
LYMPHOCYTES NFR BLD AUTO: 18 % (ref 14–44)
MCH RBC QN AUTO: 28.8 PG (ref 26.8–34.3)
MCHC RBC AUTO-ENTMCNC: 31.8 G/DL (ref 31.4–37.4)
MCV RBC AUTO: 91 FL (ref 82–98)
MONOCYTES # BLD AUTO: 1.17 THOUSAND/ΜL (ref 0.17–1.22)
MONOCYTES NFR BLD AUTO: 13 % (ref 4–12)
NEUTROPHILS # BLD AUTO: 6.11 THOUSANDS/ΜL (ref 1.85–7.62)
NEUTS SEG NFR BLD AUTO: 69 % (ref 43–75)
NRBC BLD AUTO-RTO: 0 /100 WBCS
PLATELET # BLD AUTO: 605 THOUSANDS/UL (ref 149–390)
PMV BLD AUTO: 9.6 FL (ref 8.9–12.7)
POTASSIUM SERPL-SCNC: 3.8 MMOL/L (ref 3.5–5.3)
PROT SERPL-MCNC: 7.1 G/DL (ref 6.4–8.2)
RBC # BLD AUTO: 3.71 MILLION/UL (ref 3.81–5.12)
SODIUM SERPL-SCNC: 139 MMOL/L (ref 136–145)
WBC # BLD AUTO: 8.88 THOUSAND/UL (ref 4.31–10.16)

## 2017-11-15 PROCEDURE — 86300 IMMUNOASSAY TUMOR CA 15-3: CPT

## 2017-11-15 PROCEDURE — 36415 COLL VENOUS BLD VENIPUNCTURE: CPT

## 2017-11-15 PROCEDURE — 80053 COMPREHEN METABOLIC PANEL: CPT

## 2017-11-15 PROCEDURE — 85025 COMPLETE CBC W/AUTO DIFF WBC: CPT

## 2017-11-16 ENCOUNTER — APPOINTMENT (OUTPATIENT)
Dept: PHYSICAL THERAPY | Facility: MEDICAL CENTER | Age: 57
End: 2017-11-16
Payer: COMMERCIAL

## 2017-11-16 RX ORDER — SODIUM CHLORIDE 9 MG/ML
20 INJECTION, SOLUTION INTRAVENOUS CONTINUOUS
Status: DISCONTINUED | OUTPATIENT
Start: 2017-11-17 | End: 2017-11-20 | Stop reason: HOSPADM

## 2017-11-17 ENCOUNTER — HOSPITAL ENCOUNTER (OUTPATIENT)
Dept: INFUSION CENTER | Facility: CLINIC | Age: 57
Discharge: HOME | End: 2017-11-17
Payer: COMMERCIAL

## 2017-11-17 VITALS
DIASTOLIC BLOOD PRESSURE: 62 MMHG | HEIGHT: 66 IN | RESPIRATION RATE: 16 BRPM | TEMPERATURE: 97.9 F | BODY MASS INDEX: 22.53 KG/M2 | HEART RATE: 92 BPM | SYSTOLIC BLOOD PRESSURE: 102 MMHG | WEIGHT: 140.21 LBS

## 2017-11-17 PROCEDURE — 96367 TX/PROPH/DG ADDL SEQ IV INF: CPT

## 2017-11-17 PROCEDURE — 96413 CHEMO IV INFUSION 1 HR: CPT

## 2017-11-17 PROCEDURE — 96377 APPLICATON ON-BODY INJECTOR: CPT

## 2017-11-17 PROCEDURE — 96401 CHEMO ANTI-NEOPL SQ/IM: CPT

## 2017-11-17 PROCEDURE — 96417 CHEMO IV INFUS EACH ADDL SEQ: CPT

## 2017-11-17 RX ADMIN — SODIUM CHLORIDE 20 ML/HR: 0.9 INJECTION, SOLUTION INTRAVENOUS at 08:54

## 2017-11-17 RX ADMIN — DENOSUMAB 120 MG: 120 INJECTION SUBCUTANEOUS at 08:58

## 2017-11-17 RX ADMIN — PERTUZUMAB 420 MG: 30 INJECTION, SOLUTION, CONCENTRATE INTRAVENOUS at 09:53

## 2017-11-17 RX ADMIN — DOCETAXEL 130 MG: 20 INJECTION, SOLUTION, CONCENTRATE INTRAVENOUS at 11:12

## 2017-11-17 RX ADMIN — PEGFILGRASTIM 6 MG: KIT SUBCUTANEOUS at 11:46

## 2017-11-17 RX ADMIN — ONDANSETRON 16 MG: 2 INJECTION INTRAMUSCULAR; INTRAVENOUS at 08:54

## 2017-11-17 RX ADMIN — TRASTUZUMAB 400 MG: 150 INJECTION, POWDER, LYOPHILIZED, FOR SOLUTION INTRAVENOUS at 10:31

## 2017-11-21 ENCOUNTER — GENERIC CONVERSION - ENCOUNTER (OUTPATIENT)
Dept: OTHER | Facility: OTHER | Age: 57
End: 2017-11-21

## 2017-12-06 ENCOUNTER — APPOINTMENT (OUTPATIENT)
Dept: LAB | Facility: MEDICAL CENTER | Age: 57
End: 2017-12-06
Payer: COMMERCIAL

## 2017-12-06 DIAGNOSIS — C79.51 SECONDARY MALIGNANT NEOPLASM OF BONE (HCC): ICD-10-CM

## 2017-12-06 DIAGNOSIS — C79.31 SECONDARY MALIGNANT NEOPLASM OF BRAIN (HCC): ICD-10-CM

## 2017-12-06 DIAGNOSIS — C50.919 MALIGNANT NEOPLASM OF FEMALE BREAST (HCC): ICD-10-CM

## 2017-12-06 LAB
ALBUMIN SERPL BCP-MCNC: 3.2 G/DL (ref 3.5–5)
ALP SERPL-CCNC: 93 U/L (ref 46–116)
ALT SERPL W P-5'-P-CCNC: 19 U/L (ref 12–78)
ANION GAP SERPL CALCULATED.3IONS-SCNC: 7 MMOL/L (ref 4–13)
ANISOCYTOSIS BLD QL SMEAR: PRESENT
AST SERPL W P-5'-P-CCNC: 10 U/L (ref 5–45)
BASOPHILS # BLD MANUAL: 0.22 THOUSAND/UL (ref 0–0.1)
BASOPHILS NFR MAR MANUAL: 2 % (ref 0–1)
BILIRUB SERPL-MCNC: 0.26 MG/DL (ref 0.2–1)
BUN SERPL-MCNC: 8 MG/DL (ref 5–25)
CALCIUM SERPL-MCNC: 9.2 MG/DL (ref 8.3–10.1)
CANCER AG27-29 SERPL-ACNC: 45 U/ML (ref 0–42.3)
CHLORIDE SERPL-SCNC: 104 MMOL/L (ref 100–108)
CO2 SERPL-SCNC: 27 MMOL/L (ref 21–32)
CREAT SERPL-MCNC: 0.53 MG/DL (ref 0.6–1.3)
EOSINOPHIL # BLD MANUAL: 0 THOUSAND/UL (ref 0–0.4)
EOSINOPHIL NFR BLD MANUAL: 0 % (ref 0–6)
ERYTHROCYTE [DISTWIDTH] IN BLOOD BY AUTOMATED COUNT: 19.8 % (ref 11.6–15.1)
GFR SERPL CREATININE-BSD FRML MDRD: 107 ML/MIN/1.73SQ M
GLUCOSE SERPL-MCNC: 122 MG/DL (ref 65–140)
HCT VFR BLD AUTO: 35 % (ref 34.8–46.1)
HGB BLD-MCNC: 11 G/DL (ref 11.5–15.4)
LYMPHOCYTES # BLD AUTO: 0.33 THOUSAND/UL (ref 0.6–4.47)
LYMPHOCYTES # BLD AUTO: 3 % (ref 14–44)
MCH RBC QN AUTO: 28.7 PG (ref 26.8–34.3)
MCHC RBC AUTO-ENTMCNC: 31.4 G/DL (ref 31.4–37.4)
MCV RBC AUTO: 91 FL (ref 82–98)
MONOCYTES # BLD AUTO: 0.33 THOUSAND/UL (ref 0–1.22)
MONOCYTES NFR BLD: 3 % (ref 4–12)
NEUTROPHILS # BLD MANUAL: 10.02 THOUSAND/UL (ref 1.85–7.62)
NEUTS SEG NFR BLD AUTO: 92 % (ref 43–75)
NRBC BLD AUTO-RTO: 0 /100 WBCS
PLATELET # BLD AUTO: 598 THOUSANDS/UL (ref 149–390)
PLATELET BLD QL SMEAR: ABNORMAL
PMV BLD AUTO: 9.3 FL (ref 8.9–12.7)
POTASSIUM SERPL-SCNC: 4.6 MMOL/L (ref 3.5–5.3)
PROT SERPL-MCNC: 7.2 G/DL (ref 6.4–8.2)
RBC # BLD AUTO: 3.83 MILLION/UL (ref 3.81–5.12)
RBC MORPH BLD: PRESENT
SODIUM SERPL-SCNC: 138 MMOL/L (ref 136–145)
WBC # BLD AUTO: 10.89 THOUSAND/UL (ref 4.31–10.16)

## 2017-12-06 PROCEDURE — 80053 COMPREHEN METABOLIC PANEL: CPT

## 2017-12-06 PROCEDURE — 36415 COLL VENOUS BLD VENIPUNCTURE: CPT

## 2017-12-06 PROCEDURE — 85027 COMPLETE CBC AUTOMATED: CPT

## 2017-12-06 PROCEDURE — 85007 BL SMEAR W/DIFF WBC COUNT: CPT

## 2017-12-06 PROCEDURE — 86300 IMMUNOASSAY TUMOR CA 15-3: CPT

## 2017-12-07 RX ORDER — SODIUM CHLORIDE 9 MG/ML
20 INJECTION, SOLUTION INTRAVENOUS CONTINUOUS
Status: DISCONTINUED | OUTPATIENT
Start: 2017-12-08 | End: 2017-12-11 | Stop reason: HOSPADM

## 2017-12-08 ENCOUNTER — HOSPITAL ENCOUNTER (OUTPATIENT)
Dept: INFUSION CENTER | Facility: CLINIC | Age: 57
Discharge: HOME | End: 2017-12-08
Payer: COMMERCIAL

## 2017-12-08 VITALS
TEMPERATURE: 98.3 F | DIASTOLIC BLOOD PRESSURE: 82 MMHG | WEIGHT: 143 LBS | SYSTOLIC BLOOD PRESSURE: 122 MMHG | RESPIRATION RATE: 20 BRPM | BODY MASS INDEX: 23.82 KG/M2 | HEIGHT: 65 IN | HEART RATE: 72 BPM | OXYGEN SATURATION: 98 %

## 2017-12-08 PROCEDURE — 96413 CHEMO IV INFUSION 1 HR: CPT

## 2017-12-08 PROCEDURE — 96372 THER/PROPH/DIAG INJ SC/IM: CPT

## 2017-12-08 PROCEDURE — 96367 TX/PROPH/DG ADDL SEQ IV INF: CPT

## 2017-12-08 PROCEDURE — 96417 CHEMO IV INFUS EACH ADDL SEQ: CPT

## 2017-12-08 RX ADMIN — PERTUZUMAB 420 MG: 30 INJECTION, SOLUTION, CONCENTRATE INTRAVENOUS at 10:09

## 2017-12-08 RX ADMIN — ONDANSETRON 16 MG: 2 INJECTION INTRAMUSCULAR; INTRAVENOUS at 08:57

## 2017-12-08 RX ADMIN — SODIUM CHLORIDE 20 ML/HR: 0.9 INJECTION, SOLUTION INTRAVENOUS at 08:55

## 2017-12-08 RX ADMIN — SODIUM CHLORIDE 150 MG: 9 INJECTION, SOLUTION INTRAVENOUS at 09:21

## 2017-12-08 RX ADMIN — DOCETAXEL 130 MG: 20 INJECTION, SOLUTION, CONCENTRATE INTRAVENOUS at 11:34

## 2017-12-08 RX ADMIN — TRASTUZUMAB 400 MG: 150 INJECTION, POWDER, LYOPHILIZED, FOR SOLUTION INTRAVENOUS at 10:54

## 2017-12-08 RX ADMIN — PEGFILGRASTIM 6 MG: KIT SUBCUTANEOUS at 12:52

## 2017-12-08 NOTE — PROGRESS NOTES
Spoke to [employer] and okay to use MUGA from 8/14/17  Pt will be seen 12/20/17 at the office

## 2017-12-08 NOTE — PROGRESS NOTES
Pt tolerated infusion well  Offers no complaints  IV discontinued  No bleeding noted to site  Aware of future appointments    Declined AVS

## 2017-12-08 NOTE — PROGRESS NOTES
Pt here for infusion of perjeta, herceptin, taxotere and neulasta  Offers no complaints  Labs reviewed within parameters  IV access to LFA without any difficulty  MUGA scan noted from 8/14/17= 57 %  Call out to Candance Ghee to make aware that it's been over 3 months  Awaiting call back, spoke to unit clerk/KACEY Lopez and Caty not yet present in the office

## 2017-12-19 ENCOUNTER — GENERIC CONVERSION - ENCOUNTER (OUTPATIENT)
Dept: OTHER | Facility: OTHER | Age: 57
End: 2017-12-19

## 2017-12-27 ENCOUNTER — APPOINTMENT (OUTPATIENT)
Dept: LAB | Facility: MEDICAL CENTER | Age: 57
End: 2017-12-27
Payer: COMMERCIAL

## 2017-12-27 DIAGNOSIS — C50.919 MALIGNANT NEOPLASM OF FEMALE BREAST (HCC): ICD-10-CM

## 2017-12-27 DIAGNOSIS — C79.31 SECONDARY MALIGNANT NEOPLASM OF BRAIN (HCC): ICD-10-CM

## 2017-12-27 DIAGNOSIS — C79.51 SECONDARY MALIGNANT NEOPLASM OF BONE (HCC): ICD-10-CM

## 2017-12-27 LAB
ALBUMIN SERPL BCP-MCNC: 3.3 G/DL (ref 3.5–5)
ALP SERPL-CCNC: 101 U/L (ref 46–116)
ALT SERPL W P-5'-P-CCNC: 20 U/L (ref 12–78)
ANION GAP SERPL CALCULATED.3IONS-SCNC: 8 MMOL/L (ref 4–13)
ANISOCYTOSIS BLD QL SMEAR: PRESENT
AST SERPL W P-5'-P-CCNC: 13 U/L (ref 5–45)
BASOPHILS # BLD MANUAL: 0 THOUSAND/UL (ref 0–0.1)
BASOPHILS NFR MAR MANUAL: 0 % (ref 0–1)
BILIRUB SERPL-MCNC: 0.27 MG/DL (ref 0.2–1)
BUN SERPL-MCNC: 11 MG/DL (ref 5–25)
CALCIUM SERPL-MCNC: 8.8 MG/DL (ref 8.3–10.1)
CANCER AG27-29 SERPL-ACNC: 53.6 U/ML (ref 0–42.3)
CHLORIDE SERPL-SCNC: 107 MMOL/L (ref 100–108)
CO2 SERPL-SCNC: 27 MMOL/L (ref 21–32)
CREAT SERPL-MCNC: 0.62 MG/DL (ref 0.6–1.3)
EOSINOPHIL # BLD MANUAL: 0 THOUSAND/UL (ref 0–0.4)
EOSINOPHIL NFR BLD MANUAL: 0 % (ref 0–6)
ERYTHROCYTE [DISTWIDTH] IN BLOOD BY AUTOMATED COUNT: 20.4 % (ref 11.6–15.1)
GFR SERPL CREATININE-BSD FRML MDRD: 102 ML/MIN/1.73SQ M
GLUCOSE SERPL-MCNC: 175 MG/DL (ref 65–140)
HCT VFR BLD AUTO: 36.3 % (ref 34.8–46.1)
HGB BLD-MCNC: 11.5 G/DL (ref 11.5–15.4)
LYMPHOCYTES # BLD AUTO: 0.52 THOUSAND/UL (ref 0.6–4.47)
LYMPHOCYTES # BLD AUTO: 4 % (ref 14–44)
MCH RBC QN AUTO: 29.2 PG (ref 26.8–34.3)
MCHC RBC AUTO-ENTMCNC: 31.7 G/DL (ref 31.4–37.4)
MCV RBC AUTO: 92 FL (ref 82–98)
MONOCYTES # BLD AUTO: 0 THOUSAND/UL (ref 0–1.22)
MONOCYTES NFR BLD: 0 % (ref 4–12)
NEUTROPHILS # BLD MANUAL: 12.3 THOUSAND/UL (ref 1.85–7.62)
NEUTS SEG NFR BLD AUTO: 95 % (ref 43–75)
NRBC BLD AUTO-RTO: 0 /100 WBCS
PLATELET # BLD AUTO: 575 THOUSANDS/UL (ref 149–390)
PLATELET BLD QL SMEAR: ABNORMAL
PMV BLD AUTO: 9.1 FL (ref 8.9–12.7)
POTASSIUM SERPL-SCNC: 3.9 MMOL/L (ref 3.5–5.3)
PROT SERPL-MCNC: 7.3 G/DL (ref 6.4–8.2)
RBC # BLD AUTO: 3.94 MILLION/UL (ref 3.81–5.12)
RBC MORPH BLD: PRESENT
SODIUM SERPL-SCNC: 142 MMOL/L (ref 136–145)
VARIANT LYMPHS # BLD AUTO: 1 %
WBC # BLD AUTO: 12.95 THOUSAND/UL (ref 4.31–10.16)

## 2017-12-27 PROCEDURE — 80053 COMPREHEN METABOLIC PANEL: CPT

## 2017-12-27 PROCEDURE — 85007 BL SMEAR W/DIFF WBC COUNT: CPT

## 2017-12-27 PROCEDURE — 85027 COMPLETE CBC AUTOMATED: CPT

## 2017-12-27 PROCEDURE — 36415 COLL VENOUS BLD VENIPUNCTURE: CPT

## 2017-12-27 PROCEDURE — 86300 IMMUNOASSAY TUMOR CA 15-3: CPT

## 2017-12-28 RX ORDER — SODIUM CHLORIDE 9 MG/ML
20 INJECTION, SOLUTION INTRAVENOUS CONTINUOUS
Status: DISCONTINUED | OUTPATIENT
Start: 2017-12-29 | End: 2018-01-01 | Stop reason: HOSPADM

## 2017-12-29 ENCOUNTER — HOSPITAL ENCOUNTER (OUTPATIENT)
Dept: INFUSION CENTER | Facility: CLINIC | Age: 57
Discharge: HOME | End: 2017-12-31
Payer: COMMERCIAL

## 2017-12-29 VITALS
HEART RATE: 97 BPM | RESPIRATION RATE: 20 BRPM | BODY MASS INDEX: 23.38 KG/M2 | DIASTOLIC BLOOD PRESSURE: 62 MMHG | TEMPERATURE: 98.2 F | HEIGHT: 66 IN | OXYGEN SATURATION: 100 % | SYSTOLIC BLOOD PRESSURE: 110 MMHG | WEIGHT: 145.5 LBS

## 2017-12-29 PROCEDURE — 96367 TX/PROPH/DG ADDL SEQ IV INF: CPT

## 2017-12-29 PROCEDURE — 96401 CHEMO ANTI-NEOPL SQ/IM: CPT

## 2017-12-29 PROCEDURE — 96413 CHEMO IV INFUSION 1 HR: CPT

## 2017-12-29 PROCEDURE — 96417 CHEMO IV INFUS EACH ADDL SEQ: CPT

## 2017-12-29 PROCEDURE — 96377 APPLICATON ON-BODY INJECTOR: CPT

## 2017-12-29 RX ADMIN — PEGFILGRASTIM 6 MG: KIT SUBCUTANEOUS at 12:13

## 2017-12-29 RX ADMIN — DENOSUMAB 120 MG: 120 INJECTION SUBCUTANEOUS at 12:08

## 2017-12-29 RX ADMIN — SODIUM CHLORIDE 20 ML/HR: 0.9 INJECTION, SOLUTION INTRAVENOUS at 08:31

## 2017-12-29 RX ADMIN — TRASTUZUMAB 384 MG: 150 INJECTION, POWDER, LYOPHILIZED, FOR SOLUTION INTRAVENOUS at 10:14

## 2017-12-29 RX ADMIN — PERTUZUMAB 420 MG: 30 INJECTION, SOLUTION, CONCENTRATE INTRAVENOUS at 09:36

## 2017-12-29 RX ADMIN — SODIUM CHLORIDE 150 MG: 900 INJECTION, SOLUTION INTRAVENOUS at 08:58

## 2017-12-29 RX ADMIN — ONDANSETRON 16 MG: 2 INJECTION INTRAMUSCULAR; INTRAVENOUS at 08:32

## 2017-12-29 RX ADMIN — DOCETAXEL 128 MG: 20 INJECTION INTRAVENOUS at 10:54

## 2017-12-29 NOTE — PROGRESS NOTES
neulasta injection applied to left arm and patent, pt aware of disconnection time  verbalizes understanding and all questions answered

## 2018-01-02 ENCOUNTER — GENERIC CONVERSION - ENCOUNTER (OUTPATIENT)
Dept: OTHER | Facility: OTHER | Age: 58
End: 2018-01-02

## 2018-01-02 ENCOUNTER — TRANSCRIBE ORDERS (OUTPATIENT)
Dept: ADMINISTRATIVE | Facility: HOSPITAL | Age: 58
End: 2018-01-02

## 2018-01-02 DIAGNOSIS — C50.919 MALIGNANT NEOPLASM OF FEMALE BREAST, UNSPECIFIED ESTROGEN RECEPTOR STATUS, UNSPECIFIED LATERALITY, UNSPECIFIED SITE OF BREAST (HCC): Primary | ICD-10-CM

## 2018-01-02 DIAGNOSIS — C79.31 SECONDARY MALIGNANT NEOPLASM OF BRAIN AND SPINAL CORD (HCC): ICD-10-CM

## 2018-01-02 DIAGNOSIS — C79.49 SECONDARY MALIGNANT NEOPLASM OF BRAIN AND SPINAL CORD (HCC): ICD-10-CM

## 2018-01-09 NOTE — MISCELLANEOUS
Message   Recorded as Task   Date: 04/19/2017 03:54 PM, Created By: Bethany Maza   Task Name: Care Coordination   Assigned To: Veronica Vance   Regarding Patient: David Palacios, Status: Complete   Comment:    Veronica Vance - 19 Apr 2017 3:54 PM     TASK CREATED  Received call from Robert CHRISTINE from Dr Faiza Brennan office  Pt had been c/o dizziness, headaches and falls  Pt has met breast cancer to the spine  She ordered MRI of the Brain which was found to have 3 small mets  Discussed MRI results with Romana Webb and Cali Rodriguez  Agreed to [address] for all 3 lesions  No need for decadron since no edema and the lesions are small  Does not explain pt's symptoms  Capo discussed results and plan with the pt  Pt called by Steven Paige in RT- appt given for 4/26 at 9:30 to see Dr Alicia Verdin to discuss SRS  tx on 5/3  Veronica Vance - 19 Apr 2017 3:54 PM     TASK COMPLETED        Current Meds   1  Anastrozole 1 MG Oral Tablet; TAKE 1 TABLET DAILY; Therapy: 64RBQ2289 to (Evaluate:78Jcx4644)  Requested for: 15Fkk2094; Last   Rx:80Bfm2124 Ordered   2  Dexamethasone 2 MG Oral Tablet; TAKE 1 TABLET every morning with food; Therapy: 16AHO9758 to (Sandee Vera)  Requested for: 03Apr2017; Last   Rx:03Apr2017 Ordered   3  Fluconazole 100 MG Oral Tablet (Diflucan); TAKE 2 TABLETS ON DAY 1, THEN 1   TABLET DAILY until finished; Therapy: 28TWU7599 to (Evaluate:62Rob8576)  Requested for: 03Apr2017; Last   Rx:58Neu5593 Ordered   4  HYDROmorphone HCl - 8 MG Oral Tablet (Dilaudid); TAKE 1 TABLET EVERY 4 HOURS   AS NEEDED FOR BREAKTHROUGH PAIN;   Therapy: 13ELE6667 to (Evaluate:53Fyl1853); Last Rx:98Zxr1656 Ordered   5  Lactulose 20 GM/30ML Oral Solution; 30 mL by mouth three times daily; Therapy: 82WZQ1327 to (Bustamante Stiven)  Requested for: 90BVN2081; Last   Rx:46Aek1636 Ordered   6  LORazepam 0 5 MG Oral Tablet; TAKE 1 TABLET EVERY 6 HOURS AS NEEDED FOR   NAUSEA; Therapy: 18Apr2017 to (Evaluate:17Jun2017);  Last Rx:18Apr2017 Ordered   7  Methadone HCl - 5 MG Oral Tablet; TAKE 1 TABLET 3 times daily for pain; Therapy: 98XOQ6221 to (Evaluate:03May2017); Last Rx:03Apr2017 Ordered   8  Nortriptyline HCl - 50 MG Oral Capsule; TAKE 1 CAPSULE AT BEDTIME; Therapy: 64KCL5400 to (26 681878)  Requested for: 25PGC4811; Last   Rx:10Jan2017 Ordered   9  OLANZapine 5 MG Oral Tablet; TAKE 1 TABLET AT BEDTIME; Therapy: 13FVP4429 to (Evaluate:21May2017)  Requested for: 66KQQ1010; Last   Rx:22Mar2017 Ordered   10  Omeprazole 20 MG Oral Capsule Delayed Release Recorded   11  Promethazine HCl - 25 MG Oral Tablet; TAKE 1 TABLET EVERY 6 HOURS AS NEEDED    FOR NAUSEA; Therapy: 86POL5866 to (Evaluate:19Apr2017)  Requested for: 03Apr2017; Last    Rx:03Apr2017 Ordered   12  Vagifem 10 MCG Vaginal Tablet; INSERT 1 TABLET INTRAVAGINALLY WEEKLY as    needed to prevent excessive vaginal dryness; Therapy: 59EAA7468 to (Evaluate:17Jan2017)  Requested for: 30DYR3409; Last    Rx:19Oct2016 Ordered    Allergies    1   No Known Drug Allergies    Signatures   Electronically signed by : Silvia Torres, ; Apr 19 2017  3:54PM EST                       (Author)

## 2018-01-10 NOTE — MISCELLANEOUS
Message  Spoke with patient - she explained she is feeling good - ok for treatment tomorrow  She rescheduled her follow up appt for next week      Active Problems    1  Axillary adenopathy (785 6) (R59 0)   2  Bone metastases (198 5) (C79 51)   3  Breast cancer (174 9) (C50 919)   4  Cancer related pain (338 3) (G89 3)   5  Chemotherapy-induced nausea (787 02,E933 1) (R11 0,T45  1X5A)   6  Constipation (564 00) (K59 00)   7  Decreased appetite (783 0) (R63 0)   8  Diarrhea (787 91) (R19 7)   9  Difficulty sleeping (780 50) (G47 9)   10  Dyspareunia, female (625 0) (N94 10)   11  Edema of upper extremity (782 3) (R60 0)   12  Encounter for monitoring cardiotoxic drug therapy (V58 83,V58 69) (Z51 81,Z79 899)   13  Fever (780 60) (R50 9)   14  Herniated lumbar intervertebral disc (722 10) (M51 26)   15  Lung nodules (793 19) (R91 8)   16  Lymphadenopathy (785 6) (R59 1)   17  Lymphedema (457 1) (I89 0)   18  Mediastinal lymphadenopathy (785 6) (R59 0)   19  Oral thrush (112 0) (B37 0)   20  Vaginal pain (625 9) (R10 2)    Current Meds   1  Anastrozole 1 MG Oral Tablet; TAKE 1 TABLET DAILY; Therapy: 41LCL4602 to (Evaluate:44Fam2814)  Requested for: 04Nxy0797; Last   Rx:39Dou1844 Ordered   2  Dexamethasone 2 MG Oral Tablet; TAKE 1 TABLET every morning with food; Therapy: 56PKF4849 to ([place])  Requested for: 03Apr2017; Last   Rx:03Apr2017 Ordered   3  Fluconazole 100 MG Oral Tablet (Diflucan); TAKE 2 TABLETS ON DAY 1, THEN 1   TABLET DAILY until finished; Therapy: 58VAA2178 to (Evaluate:18Apr2017)  Requested for: 03Apr2017; Last   Rx:03Apr2017 Ordered   4  HYDROmorphone HCl - 8 MG Oral Tablet (Dilaudid); TAKE 1 TABLET EVERY 4 HOURS   AS NEEDED FOR BREAKTHROUGH PAIN;   Therapy: 34WJO7728 to (Evaluate:91Ubb9750); Last Rx:03Apr2017 Ordered   5  Lactulose 20 GM/30ML Oral Solution; 30 mL by mouth three times daily;    Therapy: 98HEQ1839 to (Karo Redd)  Requested for: 88THW5766; Last   Rx:71Hrz8965 Ordered   6  Methadone HCl - 5 MG Oral Tablet; TAKE 1 TABLET 3 times daily for pain; Therapy: 76YQI2202 to (Evaluate:03May2017); Last Rx:03Apr2017 Ordered   7  Nortriptyline HCl - 50 MG Oral Capsule; TAKE 1 CAPSULE AT BEDTIME; Therapy: 70CKW7477 to (Dennis Mitzi)  Requested for: 24OUQ6977; Last   Rx:10Jan2017 Ordered   8  OLANZapine 5 MG Oral Tablet; TAKE 1 TABLET AT BEDTIME; Therapy: 99JCZ7408 to (Evaluate:21May2017)  Requested for: 00IJV0877; Last   Rx:22Mar2017 Ordered   9  Omeprazole 20 MG Oral Capsule Delayed Release Recorded   10  Promethazine HCl - 25 MG Oral Tablet; TAKE 1 TABLET EVERY 6 HOURS AS NEEDED    FOR NAUSEA; Therapy: 40BOS9843 to (Evaluate:19Apr2017)  Requested for: 03Apr2017; Last    Rx:03Apr2017 Ordered   11  Vagifem 10 MCG Vaginal Tablet; INSERT 1 TABLET INTRAVAGINALLY WEEKLY as    needed to prevent excessive vaginal dryness; Therapy: 38XXQ6764 to (Evaluate:17Jan2017)  Requested for: 56TWI4929; Last    Rx:19Oct2016 Ordered    Allergies    1   No Known Drug Allergies    Signatures   Electronically signed by : Lucía Woodard, ; Apr 13 2017 11:31AM EST                       (Author)

## 2018-01-10 NOTE — MISCELLANEOUS
Future Appointments    Signatures   Electronically signed by : SILVIA Obregon DO; Jul 6 2016  3:40PM EST                       (Author)    Electronically signed by : SILVIA Obregon ,; Jul 6 2016  4:10PM EST                       (Author)

## 2018-01-10 NOTE — PROGRESS NOTES
Assessment    1  Breast cancer (174 9) (C50 919)   2  Bone metastases (198 5) (C79 51)   3  Urinary pain (788 1) (R30 9)    Plan  Breast cancer    · (1) CA 27 29; Status:Hold For - Exact Date,Retrospective By Protocol Authorization; Requested for:Approx [id number];    Perform:[facility]; Last Updated By:Sandra Rojas; 9/20/2016 3:01:46 PM;Ordered; For:Breast cancer; Ordered By:Emilie Vance;   · (1) CBC/PLT/DIFF; Status:Active - Retrospective By Protocol Authorization; Requested  BBJ:[id number];    Perform:[facility]; KWS:[id number]; Last Updated By:Sandra Rojas; 9/20/2016 3:02:48 PM;Ordered; For:Breast cancer; Ordered By:Emilie Vance;   · (1) COMPREHENSIVE METABOLIC PANEL; Status:Hold For - Exact Date,Retrospective  By Protocol Authorization; Requested for:Approx [id number];    Perform:[facility]; Last Updated By:Sandra Rojas; 9/20/2016 3:02:48 PM;Ordered; For:Breast cancer; Ordered By:Emilie Vance;   · * CT CHEST ABDOMEN PELVIS W CONTRAST; Status:Unauthorized - Requires  Signature; Requested for:Approx [id number];    Perform:[facility]; Last Updated By:Sandra Rojas; 9/20/2016 3:18:57 PM;Ordered; For:Breast cancer; Ordered By:Emilie Vance;  Urinary pain    · 1 - Shine MORALES, Venkatesh Fox  (Urology) Physician Referral  Consult  Status: Active -  Retrospective By Protocol Authorization  Requested for: [id number]   Ordered; For: Urinary pain; Ordered By: Vickie Motta Performed:  Due: 04YUI2437; Last Updated By: Rosalind Arzola; 9/20/2016 3:02:48 PM  Care Summary provided  : Yes    Discussion/Summary  Discussion Summary:   In summary, this is a 80-year-old female history of HER-2 positive, ER positive recurrent breast cancer as outlined  She has completed 6 cycles of Taxotere, Perjeta and Herceptin  Perjeta and herceptin continue  She has been on arimidex since end of July 2016 as well  She tolerates treatment well      Her CT imaging studies demonstrate stable to improved disease  A RUE doppler was performed for worsening RUE edema  The results were negative for thrombus  She was encouraged to wear her compression sleeve  SInce we last saw the patient (9/8/2016) she was also treated for a UTI with a 3 day course of antibiotics  However, she is still reporting hematuria and dysuria  We will refer her to a Urologist for further evaluation, she may require possible cystoscopy to fully evaluate the bladder  CT imaging studies did not reveal any renal or bladder abnormalities, however, this is a limited study as the inside of the bladder is not visualized  We will plan to continue the current treatment regimen every 3 weeks  We will see her back in 3 months with CT imaging studies  She will call in the interim if her nausea does not improve with antiemetics or if she had additional questions or concerns  Dr Nick Booth also saw the patient face-to-face today and discussed the above plan  Gabapentin is prescribed at low dose without much benefit  There is potential for dose increase  She is having increased edema and pain  This is likely attributable to her return to work, however, will obtain RUE venous doppler to evaluate  Radiation therapy is completed for the L-spine mid August  This did help with some of her pain but she still has residual discomfort  She was referred to palliative care  Her CA 27 29 has decreased to a normal value of 41 5 9/6/16 from 296 7 March 2016  Her CT scan in May showed a favorable response to treatment  With her new findings of abdominal pain associated with nausea, vomiiting, will evaluate with CT scan  If unrevealing as to potential cause of abdominal pain, GI evaluation to be considered  Will also check U/A  Follow up in 2 weeks  Medication SE Review and Pt Understands Tx: The treatment plan was reviewed with the patient/guardian   The patient/guardian understands and agrees with the treatment plan   Understands and agrees with treatment plan: The treatment plan was reviewed with the patient/guardian  The patient/guardian understands and agrees with the treatment plan   Counseling Documentation With Imm: The patient was counseled regarding diagnostic results, instructions for management, patient and family education, impressions  total time of encounter was 35 minutes  History of Present Illness  HPI: In 2010- The patient had a left sided T1 B , N0 ER positive, AL and HER-2 negative invasive ductal carcinoma, right-sided DCIS, ER/AL positive, HER-2 negative  Bilateral mastectomy  October 2010- April 2011  Treated with tamoxifen 20 mg by mouth daily  Last menstrual cycle was March 2010  She was then on Arimidex 1 mg daily  Lost to followup after March 2012  She presented to Dr Sharyle Amos in December 2015 with Right arm swelling  She underwent venous Doppler R upper extremities 12/2/2015 which did not identify any acute or chronic DVT however there was a nonvascular structure in the righ axilla measuring 2 2 x 1 3 cm CT Scan of the chest on 12/9/15, showed multiple bilateral pulmonary nodules measuring 3-7 mm  PET scan on 2/4/16, which revealed hypermetabolic hilar and mediastinal nodes  There is a pre carinal node with a SUV of 5 4 measuring 1 8 x 1 7 cm, and a subcarinal node measuring 1 9 x 1 3 cm with a SUV of 5 3  There is right hilar activity of 3 5 and left hilar activity of 2 8  The subcentimeter nodules are too small for PET evaluation  She also has uptake in her L3 and L5 vertebral bodies, both concerning for bony metastases  Dr Laurel Asif performed flexible bronchoscopy and lymph node biopsy 2/16/16  Preliminary pathology identified Metastatic non-small cell carcinoma, favor adenocarcinoma  ER 90%, AL zero  Her 2 +2, positive by [facility]  March 1, 2016- started Taxotere/Perjeta/Herceptin       Current Therapy: March 1, 2016- started Taxotere/Perjeta/Herceptin     July 2016- Taxotere discontinued  Arimidex initiated  Perjeta and Herceptin continued  Finished RT to [facility] 8/24/16  Anastrazole 7/28/16   Interval History: She continues with nausea and intermittent emesis  She was previously given PPI without much benefit  Back pain improved after RT but still present but controlled with oxycodone 10 mg which she takes twice daily  She is still experiencing hematuria and burning with urination  Denies fever, but had chills on several occasions  Appetite is fair, she remains fatigued  She continues to work full time since mid August  Rhe remainder of a 12 point ROS is negative  Review of Systems  Complete-Female:   Constitutional: chills and feeling tired, but as noted in HPI  Eyes: as noted in HPI    ENT: no sore throat  Cardiovascular: as noted in HPI, no chest pain, no palpitations and no lower extremity edema  Respiratory: as noted in HPI, no shortness of breath, no cough and no shortness of breath during exertion  Gastrointestinal: abdominal pain, nausea and vomiting, but as noted in HPI, no diarrhea and no blood in stools  Genitourinary: dysuria and hematuria, but as noted in HPI  Musculoskeletal: limb swelling and RUE, but as noted in HPI and no joint stiffness  Integumentary: as noted in HPI, no rashes, no breast pain, no skin lesions and no breast lump  Neurological: tingling, but No complaints of headache, no confusion, no convulsions, no numbness, no dizziness or fainting, no tingling, no limb weakness, no difficulty walking and as noted in HPI  Psychiatric: as noted in HPI  Endocrine: as noted in HPI  Hematologic/Lymphatic: as noted in HPI, no swollen glands, no tendency for easy bleeding, no tendency for easy bruising and no swollen glands in the neck  Other Symptoms: RUE edema  ROS Reviewed:   ROS reviewed  Active Problems    1  Axillary adenopathy (785 6) (R59 0)   2  Bone metastases (198 5) (C79 51)   3  Breast cancer (174 9) (C50 919)   4   Cancer related pain (338  3) (G89 3)   5  Chemotherapy-induced nausea (787 02,E933 1) (R11 0,T45  1X5A)   6  Diarrhea (787 91) (R19 7)   7  Edema of upper extremity (782 3) (R60 0)   8  Lung nodules (793 19) (R91 8)   9  Lymphadenopathy (785 6) (R59 1)   10  Lymphedema (457 1) (I89 0)   11  Mediastinal lymphadenopathy (785 6) (R59 0)   12  Urinary pain (788 1) (R30 9)   13  UTI (urinary tract infection) (599 0) (N39 0)    Past Medical History    1  History of Neoplasm of breast (239 3) (D49 3)    Surgical History    1  History of Mastectomy Bilateral   2  History of Oophorectomy  Surgical History Reviewed: The surgical history was reviewed and updated today  Family History  Father    1  Family history of Diabetes (250 00) (E11 9)  Daughter    2  Family history of Diabetes (250 00) (E11 9)  Sister    3  Family history of Cancer (199 1) (C80 1)  Family History Reviewed: The family history was reviewed and updated today  Social History    · Alcohol use (V49 89) (Z78 9)   · Denied: Drug use (305 90) (F19 90)   · Smoker (305 1) (F17 200)  Social History Reviewed: The social history was reviewed and updated today  Current Meds   1  Anastrozole 1 MG Oral Tablet; TAKE 1 TABLET DAILY; Therapy: 16GDO5906 to (Evaluate:23Jul2017)  Requested for: 06Ino9511; Last   Rx:86Fvj5119 Ordered   2  Compazine 10 MG Oral Tablet; TAKE 1 TABLET EVERY 6 HOURS AS NEEDED FOR   NAUSEA; Therapy: (Recorded:15Jun2016) to Recorded   3  Endocet 5-325 MG Oral Tablet; TAKE 1 TABLET EVERY 4 TO 6 HOURS AS NEEDED FOR   PAIN for ongoing pain control; Therapy: 44HJN1819 to (Last Rx:99Xxf2187) Ordered   4  Flexeril 5 MG TABS; TAKE 1 TABLET Every 8 hours; Therapy: (Recorded:15Jun2016) to Recorded   5  Gabapentin 100 MG Oral Capsule; TAKE 1 CAPSULE 3 TIMES DAILY; Therapy: 45FWM9620 to (Evaluate:24Jan2017)  Requested for: 82Ckd6822; Last   Rx:41Rtr9500 Ordered   6  Ondansetron HCl - 4 MG Oral Tablet; TAKE 1 TABLET Every 6 hours PRN;    Therapy: 07WHT2102 to (Last Rx:15Gci7870) Ordered   7  Sulfamethoxazole-Trimethoprim 800-160 MG Oral Tablet; 1 po twice daily x 3 days; Therapy: 09VST4173 to (Last Rx:81Zah0624)  Requested for: 68Aom7276; Status:   ACTIVE - Transmit to Pharmacy - Awaiting Verification Ordered  Medication List Reviewed: The medication list was reviewed and updated today  Allergies    1  No Known Drug Allergies    Vitals  Vital Signs    Recorded: 28XGW2248 17:11RR   Systolic 303   Diastolic 72   Heart Rate 87   Respiration 16   Temperature 98 2 F   Pain Scale 0   O2 Saturation 98   Height 5 ft 6 in   Weight 133 lb    BMI Calculated 21 47   BSA Calculated 1 68     Physical Exam    Constitutional   General appearance: No acute distress, well appearing and well nourished  Eyes   Conjunctiva and lids: No swelling, erythema or discharge  Ears, Nose, Mouth, and Throat   External inspection of ears and nose: Normal     Nasal mucosa, septum, and turbinates: Normal without edema or erythema  Oropharynx: Normal with no erythema, edema, exudate or lesions  Pulmonary   Respiratory effort: No increased work of breathing or signs of respiratory distress  Auscultation of lungs: Clear to auscultation  Cardiovascular   Palpation of heart: Normal PMI, no thrills  Auscultation of heart: Normal rate and rhythm, normal S1 and S2, without murmurs  Examination of extremities for edema and/or varicosities: Abnormal   right arm edema, shoulder to wrist    Abdomen RLQ abdominal ttp, no rebound or guarding  Liver and spleen: No hepatomegaly or splenomegaly  Lymphatic   Palpation of lymph nodes in neck: No lymphadenopathy  Musculoskeletal   Gait and station: Normal     Digits and nails: Normal without clubbing or cyanosis  Inspection/palpation of joints, bones, and muscles: Normal     Skin   Skin and subcutaneous tissue: Normal without rashes or lesions  Neurologic   Cranial nerves: Cranial nerves 2-12 intact    Cranial Nerves III, IV, and VI: the extraocular motions were intact  Cranial Nerve V: sensation to the face and masseter strength were intact  Cranial Nerve VII: facial strength was intact bilaterally  Cranial Nerve VIII: hearing was intact  Cranial Nerves IX and X: there was normal movement of the soft palate and normal gag  normal palate elevation, uvula was not deviated to the right and uvula was not deviated to the left  Cranial Nerve XI: shoulder shrug was intact bilaterally  Cranial Nerve XII: there was no tongue deviation with protrusion  Sensation: No sensory loss  Psychiatric   Orientation to person, place, and time: Normal         ECOG 1       Results/Data  * CT CHEST ABDOMEN PELVIS W CONTRAST 62Gaq2903 06:04PM Emilee Ahr     Test Name Result Flag Reference   CT CHEST ABDOMEN PELVIS W CONTRAST (Report)     CT CHEST, ABDOMEN AND PELVIS WITH IV CONTRAST     INDICATION: History of breast cancer, evaluate for metastasis     COMPARISON: CTs from 5/23/2016 and 12/9/2015 and PET/CT from 2/4/2016     TECHNIQUE: CT examination of the chest, abdomen and pelvis was performed  This examination, like all CT scans performed in the [facility], was performed utilizing techniques to minimize radiation dose exposure, including the use of    iterative reconstruction and automated exposure control  Axial, sagittal, and coronal reformatted images were submitted for interpretation  100 cc of intravenous Omnipaque 350 was administered for this examination  Enteric contrast was administered  FINDINGS:     CHEST     LUNGS: There is a 5 mm right upper lobe nodule on series 4, image 24, previously 4 mm  This is stable from 12/9/2015  There is a stable 2 mm nodule in the right upper lobe on image 22  There is a stable subpleural nodule in the left upper lobe on    image 33 measuring 5 mm  Anterior to this on the same image is a stable subpleural nodule measuring 3 mm    There is no tracheal or endobronchial lesion  PLEURA: Unremarkable  HEART/GREAT VESSELS: Unremarkable for patient's age  MEDIASTINUM AND CHEO: There is an enlarged low density subcarinal lymph node measuring 1 1 x 1 7 cm, previously 0 9 x 1 8 cm  Additional lymph nodes in the mediastinum, particularly the left paratracheal region, have decreased in size and are not    pathologically enlarged  There are multiple right hilar and mediastinal calcified lymph nodes consistent with prior granulomatous disease  CHEST WALL AND LOWER NECK: The patient is status post bilateral mastectomy with breast implants  There is soft tissue in the right axillary region, similar to the prior study  This corresponds to metastatic disease seen on a PET/CT from 2/4/2016 with    stable mild nodularity  This is improved compared to the prior chest CT from 12/9/2015  ABDOMEN     LIVER/BILIARY TREE: Unremarkable  GALLBLADDER: No calcified gallstones  No pericholecystic inflammatory change  SPLEEN: There are calcified granulomata in the spleen  PANCREAS: Unremarkable  ADRENAL GLANDS: Unremarkable  KIDNEYS/URETERS: Unremarkable  No hydronephrosis  STOMACH AND BOWEL: Unremarkable  APPENDIX: No findings to suggest appendicitis  ABDOMINOPELVIC CAVITY: No ascites or free intraperitoneal air  No lymphadenopathy  VESSELS: Unremarkable for patient's age  PELVIS     REPRODUCTIVE ORGANS: Unremarkable for patient's age  URINARY BLADDER: Unremarkable  ABDOMINAL WALL/INGUINAL REGIONS: Unremarkable  OSSEOUS STRUCTURES: Metastatic lesions are again seen at L3 and L5  Right inferior pubic ramus lytic lesion consistent with metastasis is also stable  IMPRESSION:     1  Stable small lung nodules, possibly metastasis  2  Slight interval enlargement of a low-density lymph node in the subcarinal region  This node was hypermetabolic on the previous PET/CT and consistent with metastasis   Additional lymph nodes in the mediastinum, particularly the left paratracheal    region, are decreased in size  3  Stable mild soft tissue in the right axillary region with slight nodularity corresponding to abnormal uptake on prior PET/CT and consistent with metastasis  Note that this is improved compared to the prior CT from 2015  4  No evidence of metastatic disease in the abdomen or pelvis  5  Stable osseous metastasis  Workstation performed: [id number]     Signed by:   Everton Miranda MD   9/16/16     VAS UPPER LIMB VENOUS DUPLEX SCAN, UNILATERAL/LIMITED 90Djq0825 06:03PM [place] Barrows Order Number: [id number]   Performing Comments: STAT   known RUE lymphedema  Increased swelling/ pain  - Patient Instructions: To schedule this appointment, please contact Central Scheduling at [phone]   Order Number: [id number]   Performing Comments: STAT   known RUE lymphedema  Increased swelling/ pain  - Patient Instructions: To schedule this appointment, please contact Central Scheduling at [phone]  Test Name Result Flag Reference   VAS UPPER LIMB VENOUS DUPLEX SCAN, UNILATERAL/LIMITED (Report)     [facility] REPORT   CLINICAL:   Indications: Right Localized edema [R60 0]  Patient with h/o RUE lymphedema s/p mastectomy complains if increasing pain,   swelling and firmness of the right forearm  Operative History:   Mastectomy   Risk Factors: The patient has history of malignancy  She has no history of   DVT  Clinical: Right Upper Limb   There is edema  CONCLUSION:      Impression   RIGHT UPPER LIMB:   No evidence of acute or chronic deep vein thrombosis  No evidence of superficial thrombophlebitis noted  Doppler evaluation shows a normal response to augmentation maneuvers  LEFT UPPER LIMB LIMITED:   Evaluation shows no evidence of thrombus in the internal jugular vein,   subclavian vein, and the axillary vein        SIGNATURE:   Electronically Signed by: SHANE COTTRELL MD,RVT on 2016-09-16 02:03:12 PM     (1) URINALYSIS w URINE C/S REFLEX (will reflex a microscopy if leukocytes, occult blood, or nitrites are not within normal limits) 15Sep2016 05:55PM Mari Moore Order Number: [id number]     Test Name Result Flag Reference   COLOR Yellow     CLARITY Clear     PH UA 6 5  4 5-8 0   LEUKOCYTE ESTERASE UA Moderate A Negative   NITRITE UA Negative  Negative   PROTEIN UA Negative mg/dl  Negative   GLUCOSE UA Negative mg/dl  Negative   KETONES UA Negative mg/dl  Negative   UROBILINOGEN UA 0 2 E U /dl  0 2, 1 0 E U /dl   BILIRUBIN UA Negative  Negative   BLOOD UA Trace-Intact A Negative   SPECIFIC GRAVITY UA <=1 005  1 003-1 030   BACTERIA Occasional /hpf  None Seen, Occasional   EPITHELIAL CELLS Occasional /hpf  None Seen, Occasional   OTHER OBSERVATIONS Glitter Cells     RBC UA 1-2 /hpf A None Seen   WBC UA 4-10 /hpf A None Seen     (1) URINALYSIS w URINE C/S REFLEX (will reflex a microscopy if leukocytes, occult blood, or nitrites are not within normal limits) 15Sep2016 05:55PM Mari Callahan Order Number: [id number]     Test Name Result Flag Reference   COLOR Yellow     CLARITY Clear     PH UA 6 5  4 5-8 0   LEUKOCYTE ESTERASE UA Moderate A Negative   NITRITE UA Negative  Negative   PROTEIN UA Negative mg/dl  Negative   GLUCOSE UA Negative mg/dl  Negative   KETONES UA Negative mg/dl  Negative   UROBILINOGEN UA 0 2 E U /dl  0 2, 1 0 E U /dl   BILIRUBIN UA Negative  Negative   BLOOD UA Trace-Intact A Negative   SPECIFIC GRAVITY UA <=1 005  1 003-1 030   BACTERIA Occasional /hpf  None Seen, Occasional   EPITHELIAL CELLS Occasional /hpf  None Seen, Occasional   OTHER OBSERVATIONS Glitter Cells     RBC UA 1-2 /hpf A None Seen   WBC UA 4-10 /hpf A None Seen   CLINICAL REPORT (Report)     Test:        Urine culture  Specimen Type:   Urine  Specimen Date:   9/15/2016 5:55 PM  Result Date:    9/16/2016 4:39 PM  Result Status:   Final result  Resulting Lab:    [address]            Tel: [phone]      CULTURE                                       ------------------                                   10,000-19,000 cfu/ml Mixed Contaminants X3     Future Appointments    Date/Time Provider Specialty Site   12/21/2016 03:45 PM SILVIA Renner , DO, [facility] Hematology Oncology [facility] [place]   09/29/2016 02:30 PM SILVIA Molina  Urology [facility] FOR UROLOGY [facility]shea     Signatures   Electronically signed by :  Zora Collins, [facility]; Sep 20 2016  5:17PM EST                       (Co-author)    Electronically signed by : SILVIA Xavier DOM D ,DO; Sep 28 2016  5:16PM EST

## 2018-01-11 NOTE — CONSULTS
Chief Complaint  follow up - breast cancer      History of Present Illness  57yoF with metastatic breast cancer  Today she complains of diarrhea  It is a nasty green color  She has the diarrhea for a few days and then does not go at all for a couple days  She is also getting headaches in the mornings  She gets nauseous and throws up in the morning  SHe was recently on antibiotics for an infection in her arm from bug bites  She thinks she has about 3 days left of the antibiotics  On the weekends she has been going camping  She is drinking bottled water  She is taking about 3 dilaudid tablets a day for pain and no longer takes methadone due to side effects, in particular confusion  medicaions were brought today for review  Review of Systems    Constitutional: feeling poorly and feeling tired  Eyes: No complaints of eye pain, no red eyes, no eyesight problems, no discharge, no dry eyes, no itching of eyes  Cardiovascular: No complaints of slow heart rate, no fast heart rate, no chest pain, no palpitations, no leg claudication, no lower extremity edema  Respiratory: No complaints of shortness of breath, no wheezing, no cough, no SOB on exertion, no orthopnea, no PND  Gastrointestinal: nausea and diarrhea  Genitourinary: No complaints of dysuria, no incontinence, no pelvic pain, no dysmenorrhea, no vaginal discharge or bleeding  Musculoskeletal: arthralgias  Neurological: headache, limb weakness and difficulty walking, but No complaints of headache, no confusion, no convulsions, no numbness, no dizziness or fainting, no tingling, no limb weakness, no difficulty walking  Psychiatric: Not suicidal, no sleep disturbance, no anxiety or depression, no change in personality, no emotional problems  Endocrine: No complaints of proptosis, no hot flashes, no muscle weakness, no deepening of the voice, no feelings of weakness     Hematologic/Lymphatic: No complaints of swollen glands, no swollen glands in the neck, does not bleed easily, does not bruise easily  Active Problems    1  Abdominal distension (787 3) (R14 0)   2  Axillary adenopathy (785 6) (R59 0)   3  Bone metastases (198 5) (C79 51)   4  Brain metastases (198 3) (C79 31)   5  Breast cancer (174 9) (C50 919)   6  Cancer related pain (338 3) (G89 3)   7  Chemotherapy-induced nausea (787 02,E933 1) (R11 0,T45  1X5A)   8  Constipation (564 00) (K59 00)   9  Decreased appetite (783 0) (R63 0)   10  Difficulty sleeping (780 50) (G47 9)   11  Dizziness (780 4) (R42)   12  Dyspareunia, female (625 0) (N94 10)   13  Edema of upper extremity (782 3) (R60 0)   14  Encounter for monitoring cardiotoxic drug therapy (V58 83,V58 69) (Z51 81,Z79 899)   15  Headache (784 0) (R51)   16  Herniated lumbar intervertebral disc (722 10) (M51 26)   17  Leg weakness (729 89) (R29 898)   18  Lung nodules (793 19) (R91 8)   19  Lymphadenopathy (785 6) (R59 1)   20  Lymphedema (457 1) (I89 0)   21  Mediastinal lymphadenopathy (785 6) (R59 0)   22  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)   23  Vaginal pain (625 9) (R10 2)    Past Medical History    · History of Dysuria (788 1) (R30 0)   · History of candidiasis of mouth (V12 09) (Z86 19)   · History of diarrhea (V12 79) (R45 887)   · History of fall (V15 88) (Z91 81)   · History of fever (V13 89) (V11 900)   · History of radiation therapy (V15 3) (Z92 3)   · History of urinary tract infection (V13 02) (Z87 440)   · History of Neoplasm of breast (239 3) (D49 3)   · History of Urinary urgency (788 63) (R39 15)    Surgical History    · History of Mastectomy Bilateral   · History of Oophorectomy    Family History    · Family history of Diabetes (250 00) (E11 9)    · Family history of Diabetes (250 00) (E11 9)    · Family history of Cancer (199 1) (C80 1)    Social History    · Alcohol use (V49 89) (Z78 9)   · Denied: Drug use (305 90) (F19 90)   · Occupation   · Smoker (305 1) (F17 200)  The social history was reviewed and updated today  Current Meds   1  Gabapentin 300 MG Oral Capsule; TAKE 1 CAPSULE 3 TIMES DAILY; Therapy: 05FTI9486 to (Evaluate:93Dej5178)  Requested for: 50KQM4904; Last   Rx:01Jun2017 Ordered   2  HYDROmorphone HCl - 8 MG Oral Tablet; TAKE 1 TABLET EVERY 4 HOURS AS   NEEDED FOR BREAKTHROUGH PAIN;   Therapy: 96QAY0679 to (Evaluate:51Zyy1171); Last Rx:75Ylb7626 Ordered   3  Lactulose 20 GM/30ML Oral Solution; 30 mL by mouth three times daily; Therapy: 80YAO8706 to (Madeline Kelley)  Requested for: 88BXO9420; Last   Rx:14Hrm9155 Ordered   4  LORazepam 0 5 MG Oral Tablet; TAKE 1 TABLET EVERY 6 HOURS AS NEEDED FOR   NAUSEA; Therapy: 85Nxh9046 to (Evaluate:17Jun2017); Last Rx:52Zch1268 Ordered   5  Methadone HCl - 10 MG Oral Tablet; 1 tab PO TID for chronic cancer related pain; Therapy: 82JER4047 to (Evaluate:53Tsq3582); Last Rx:03Aug2017 Ordered   6  MiraLax Oral Packet; 1 to 2 packets per day; Therapy: 82Yxz6254 to Recorded   7  OLANZapine 10 MG Oral Tablet; TAKE 1 TABLET AT BEDTIME; Therapy: 89IUC4741 to (Evaluate:02Yos1412)  Requested for: 69MKW5258; Last   Rx:29Jun2017 Ordered   8  Promethazine HCl - 25 MG Oral Tablet; TAKE 1 TABLET EVERY 6 HOURS AS NEEDED   FOR NAUSEA; Therapy: 39LTI5694 to (Evaluate:20Hmp7458)  Requested for: 43ARL1492; Last   LX:79XEN5898 Ordered   9  Relistor 12 MG/0 6ML Subcutaneous Solution; 12mg SC every other day; Therapy: 86Jzr2653 to ([facility])  Requested for: 84Wqb8836; Last   Rx:03Aug2017 Ordered   10  Senna 8 6 MG Oral Tablet; 2 tab PO bid to prevent constipation; Therapy: 26Kkw2109 to (Evaluate:74Opm3539)  Requested for: 11Usl8603; Last    Rx:41Cpm2980 Ordered   11  Vagifem 10 MCG Vaginal Tablet; INSERT 1 TABLET INTRAVAGINALLY WEEKLY as    needed to prevent excessive vaginal dryness; Therapy: 67OTI8759 to (Evaluate:17Jan2017)  Requested for: 20Oct2016; Last    Rx:19Oct2016 Ordered    The medication list was reviewed and updated today  Allergies    1   No Known Drug Allergies    Vitals   Recorded: 13Jso3227 08:57AM   Temperature 98 4 F   Heart Rate 116   Respiration 18   Systolic 661   Diastolic 68   Height 5 ft 5 5 in   Weight 149 lb    BMI Calculated 24 42   BSA Calculated 1 75   O2 Saturation 97   Pain Scale 6     Physical Exam    Constitutional   General appearance: Abnormal   grayish coloring  Eyes   Conjunctiva and lids: No swelling, erythema or discharge  Pupils and irises: Equal, round and reactive to light  Ears, Nose, Mouth, and Throat   External inspection of ears and nose: Normal     Oropharynx: Normal with no erythema, edema, exudate or lesions  Pulmonary   Respiratory effort: No increased work of breathing or signs of respiratory distress  Cardiovascular   Examination of extremities for edema and/or varicosities: Abnormal   left arm lymphedema with 3 pustules  Abdomen   Abdomen: Non-tender, no masses  Liver and spleen: No hepatomegaly or splenomegaly  Musculoskeletal   Gait and station: Abnormal   slow to rise  Digits and nails: Normal without clubbing or cyanosis  Neurologic   Cranial nerves: Cranial nerves 2-12 intact  Psychiatric   Orientation to person, place, and time: Normal     Mood and affect: Normal          Assessment    1  Diarrhea (787 91) (R19 7)   2  Breast cancer (174 9) (C50 919)   3  Brain metastases (198 3) (C79 31)   4  Headache (784 0) (R51)   5  Chemotherapy-induced nausea (787 02,E933 1) (R11 0,T45  1X5A)    Plan  Bone metastases, Breast cancer, Cancer related pain    · Methadone HCl - 10 MG Oral Tablet   Rx By: Yesica Matters; Dispense: 30 Days ; #:90 Tablet; Refill: 0; For: Bone metastases, Breast cancer, Cancer related pain; MARKOS = N; Print Rx  Bone metastases, Breast cancer, Cancer related pain, Lymphedema    · HYDROmorphone HCl - 8 MG Oral Tablet (Dilaudid); TAKE 1 TABLET EVERY 4  HOURS AS NEEDED FOR BREAKTHROUGH PAIN   Rx By: Yesica Matters; Dispense: 30 Days ; #:90 Tablet;  Refill: 0; For: Bone metastases, Breast cancer, Cancer related pain, Lymphedema; MARKOS = N; Print Rx  Cancer related pain, Difficulty sleeping    · Nortriptyline HCl - 50 MG Oral Capsule; TAKE 1 CAPSULE AT BEDTIME   Rx By: Yesica Matters; Dispense: 30 Days ; #:30 Capsule; Refill: 5; For: Cancer related pain, Difficulty sleeping; MARKOS = N; Verified Transmission to [facility] #164; Last Updated By: System, [employer]; 8/31/2017 9:22:42 AM  Chemotherapy-induced nausea    · LORazepam 0 5 MG Oral Tablet; TAKE 1 TABLET EVERY 6 HOURS AS NEEDED  FOR NAUSA or anxiety   Rx By: Yesica Matters; Dispense: 15 Days ; #:60 Tablet; Refill: 1; For: Chemotherapy-induced nausea; MARKOS = N; Print Rx   · Promethazine HCl - 25 MG Oral Tablet; TAKE 1 TABLET EVERY 6 HOURS AS  NEEDED FOR NAUSEA   Rx By: Yesica Matters; Dispense: 30 Days ; #:90 Tablet; Refill: 2; For: Chemotherapy-induced nausea; MARKOS = N; Verified Transmission to [facility] #164; Last Updated By: System, [employer]; 8/31/2017 9:22:42 AM  Constipation    · Relistor 12 MG/0 6ML Subcutaneous Solution   Rx By: Yesica Matters; Dispense: 30 Days ; #:15 ML; Refill: 2; For: Constipation; MARKOS = N; Sent To: [facility]; Msg to Pharmacy: Please dispense with needles  Diarrhea    · (1) STOOL CULTURE; Source:Rectum; Status:Active; Requested for:30Vfo0187;    Perform:[facility]; Due:75Vbu7371; Ordered; For:Diarrhea; Ordered By:Carla Shine;   · (Q) C DIFFICILE TOXIN, EIA AND CULTURE WITH REFLEX C DIFFICILE TOXIN A,B,EIA;  Status:Active; Requested for:07Qec8346;    Perform:[facility]; Due:47Ztt5696; Ordered; For:Diarrhea; Ordered By:Carla Shine;   · (Q) OVA AND PARASITES WITH GIARDIA ANTIGEN; Status:Active; Requested  for:81Vey8297;    Perform:[facility]; Due:37Gct6417; Ordered; Lisa Marrero; Ordered By:Carla Shine; Unlinked    · MiraLax Oral Packet (Polyethylene Glycol 3350)   Rx By: Yescia Matters; Dispense: 0 Days ; #: Sufficient Packet;  Refill: 0; MARKOS = N; Record    Discussion/Summary    Diarrhea - check stool culture, O&P, and C  Diff  Stop miralax and hold all other laxatives until stools are firmer  Nausea - on zyprexa  Has PRN promethazine and lorazepam    Hx of AM headaches and nausea is concerning with hx of brain mets  If persistent may need reimaging  However could be a side effect of antibiotics or a component of dehydration  Pain - PRN dilaudid  The patient has the current Goals: Feel better  The patent has the current Barriers: Cancer  Patient is able to Self-Care  Possible side effects of new medications were reviewed with the patient/guardian today  The treatment plan was reviewed with the patient/guardian  The patient/guardian understands and agrees with the treatment plan         Self Referrals: No      Signatures   Electronically signed by :  SILVIA Joy ; Aug 31 2017  3:55PM EST                       (Author)

## 2018-01-11 NOTE — PROCEDURES
Procedures by Randall Young MD at 5/3/2017  12:28 PM      Author:  Randall Young MD Service:  Neurosurgery Author Type:  Physician     Filed:  5/3/2017  1:07 PM Encounter Date:  5/3/2017 Status:  Signed     :  Randall Young MD (Physician)         Pre-procedure Diagnoses:       1  Brain metastases [C79 31]                Post-procedure Diagnoses:       1  Brain metastases [C79 31]                Procedures:       1  MN STEREOTACTIC RADIOSURGERY, CRANIAL,SIMPLE,SINGLE [87742 (CPT®)]       2  MN STEREOTACTIC RADIOSURGERY, CRANIAL,SIMPLE,EA ADD [26685 (CPT®)]       3  MN STEREOTACTIC RADIOSURGERY, CRANIAL,SIMPLE,EA ADD [37459 (CPT®)]                   PATIENT NAME: Lilia Franco  : 1960  MRN: [id number]  PROCEDURE DATE: 5 3 17    Stereotactic Radiosurgery [facility]) Operative Note    Preop Diagnosis: brain metastases, left temporal, right occipital, right frontal    Postop Diagnosis: same    Procedure Details: Frameless Stereotactic Radiosurgery for 3 simple brain metastases    Surgeon: Randall Young MD, PhD     Assistants: none    No qualified resident was available to assist with this case  Radiation Oncologist(s): Nathanael Ricardo    Medical Physicist(s): Cindy Peng    Estimated Blood Loss:  None           Specimens: None    Drains: None           Total IV Fluids: None              Findings: As above  Complications:  None    Anesthesia: None      INDICATIONS    Ms Jadine Riedel is a 49-year-old female with a history of metastatic breast cancer, with relatively stable systemic disease on Taxotere, Herceptin, and Perjeta, recently diagnosed with  3 small brain metastases found on MRI  Various options were discussed including whole brain radiation versus stereotactic radiosurgery, and the patient would like to proceed with single fraction radiosurgery  Her systemic therapy would be held for one  week   The associated risks and toxicities of radiosurgery were discussed with the patient including cerebral edema and radionecrosis  DETAILS OF PROCEDURE    The patient presented to the outpatient area of the Department of Radiation Oncology  where an open faced immobilization mask was created  The patient then underwent a stereotactic head CT while immobilized in the mask  The patient was then released from the Department  The patients stereotactic CT and previous stereotactic MRI scans were fused in the [address], which was used to develop  the radiosurgical plan  The radiosurgical prescription for the right frontal  lesion called for a dose of 22 Gray to be delivered to the PTV  The PTV was created by expanding the GTV, as contoured by myself and the Radiation  Oncologist, by 1 mm  The GTV measured 0 1 cc, and the PTV measured 0 3 cc  The radiosurgical plan utilized 3 dynamic arcs, with the mini-multileaf columnator on the TrueBeam machine at the [email or web address]  The radiosurgical prescription for the left temporal lesion called for a dose of 22 Gray to be delivered to the PTV  The PTV was created by expanding the GTV, as contoured by myself and the Radiation  Oncologist, by 1 mm  The GTV measured <0 1 cc, and the PTV measured 0 1 cc  The radiosurgical plan utilized 3 dynamic arcs  The radiosurgical prescription for the right occipital lesion called for a dose of 22 Gray to be delivered to the PTV  The PTV was created by expanding the GTV, as contoured by myself and the Radiation Oncologist, by 1 mm  The GTV measured 0 1 cc, and  the PTV measured 0 4 cc  The radiosurgical plan utilized 3 dynamic arcs  Both lesions were treated with the mini-multileaf columnator on the True[employer] machine at the [email or web address]  When the final treatment plan had been developed and approved by myself, the radiation oncologist and physicist, the patient returned to the Department for their frameless SR S treatment  The patient was positioned on the treatment couch   The Optic Surface Monitoring  System (OSMS) was used initially to align the patient  The patient was then immobilized in their open faced mask  kV and then cone beam CT imaging was used to further align the patient  Once the radiation oncologist, physicist and I agreed the patient was in correct position, the fields were treated sequentially without complications  The OSMS was used during the treatment to assure  continued correct positioning of the patient, and if it detected patient motion, interrupted the treatment beam until the patient was back in alignment  par  When the [facility] treatment had been delivered, the patient was recovered from the treatment room, and discharged from the department  There was no blood loss and no specimen     par  SIGNATURE: Ilya Alamo MD, PhD  DATE: 5/3/2017   TIME: 12:29 PM                                  Received for:Tarik Max MD PhD  May  3 2017  1:07PM Eastern Standard Time

## 2018-01-11 NOTE — MISCELLANEOUS
Message   Recorded as Task   Date: 03/01/2017 02:01 PM, Created By: Anu Vaughn   Task Name: Call Back   Assigned To: Caty Braden   Regarding Patient: Evelyn Elena, Status: In Progress   Comment:    Anum Horne - 01 Mar 2017 2:01 PM     TASK CREATED  Brittany Jay from [address] [place] called stating she needs some information on the arm sleeve that Dr Alvino Rae ordered for pt  She needs the length of time needed for the sleeve as well as a "face to face date " Brittany Jay stated a verbal is ok and you can leave a message on her answering machine with the information if she does not  [id number]  Caty Braden - 02 Mar 2017 8:49 AM     TASK IN PROGRESS   mesg left for Tati - length of need is lifetime - Dr Alvino Rae did see patient this week and she still need sleeve      Active Problems    1  Axillary adenopathy (785 6) (R59 0)   2  Bone metastases (198 5) (C79 51)   3  Breast cancer (174 9) (C50 919)   4  Cancer related pain (338 3) (G89 3)   5  Chemotherapy-induced nausea (787 02,E933 1) (R11 0,T45  1X5A)   6  Constipation (564 00) (K59 00)   7  Decreased appetite (783 0) (R63 0)   8  Diarrhea (787 91) (R19 7)   9  Difficulty sleeping (780 50) (G47 9)   10  Dyspareunia, female (625 0) (N94 10)   11  Edema of upper extremity (782 3) (R60 0)   12  Encounter for monitoring cardiotoxic drug therapy (V58 83,V58 69) (Z51 81,Z79 899)   13  Herniated lumbar intervertebral disc (722 10) (M51 26)   14  History of radiation therapy (V15 3) (Z92 3)   15  Lung nodules (793 19) (R91 8)   16  Lymphadenopathy (785 6) (R59 1)   17  Lymphedema (457 1) (I89 0)   18  Mediastinal lymphadenopathy (785 6) (R59 0)   19  Vaginal pain (625 9) (R10 2)    Current Meds   1  Anastrozole 1 MG Oral Tablet; TAKE 1 TABLET DAILY; Therapy: 45JJP0169 to (Evaluate:23Jul2017)  Requested for: 28Jul2016; Last   Rx:28Jul2016 Ordered   2  Dexamethasone 2 MG Oral Tablet; TAKE 1 TABLET TWICE DAILY;    Therapy: 86NAF4955 to (Evaluate:30Mar2017) Requested for: 36Aki4055; Last   Rx:88Ird6848 Ordered   3  Doxycycline Hyclate 100 MG Oral Tablet Recorded   4  HYDROmorphone HCl - 8 MG Oral Tablet (Dilaudid); Take 1 tablet every 4-6 hours as   needed; Therapy: 04TRB0630 to (Evaluate:30Mar2017); Last Rx:27Yjg5892 Ordered   5  Lactulose 20 GM/30ML Oral Solution; 30 mL by mouth three times daily; Therapy: 63AXZ3147 to (Pixie Jesse)  Requested for: 52ECE8323; Last   Rx:28Feb2017 Ordered   6  Methadone HCl - 5 MG Oral Tablet; TAKE 1 TABLET 3 TIMES DAILY; Therapy: 41DFG1672 to (Evaluate:30Mar2017); Last Rx:28Feb2017 Ordered   7  Nortriptyline HCl - 50 MG Oral Capsule; TAKE 1 CAPSULE AT BEDTIME; Therapy: 82NBA6391 to ([phone])  Requested for: 03FKS1406; Last   Rx:10Jan2017 Ordered   8  OLANZapine 10 MG Oral Tablet; TAKE 1 TABLET AT BEDTIME; Therapy: 72TKO8198 to (Evaluate:30Mar2017); Last Rx:53Ext2688 Ordered   9  Omeprazole 20 MG Oral Capsule Delayed Release Recorded   10  Vagifem 10 MCG Vaginal Tablet; INSERT 1 TABLET INTRAVAGINALLY WEEKLY as    needed to prevent excessive vaginal dryness; Therapy: 89STE0041 to (Evaluate:17Jan2017)  Requested for: 73LRB1978; Last    Rx:20Oxq5742 Ordered    Allergies    1   No Known Drug Allergies    Signatures   Electronically signed by : Mitchell Cooks, ; Mar  2 2017  8:52AM EST                       (Author)

## 2018-01-12 VITALS
BODY MASS INDEX: 22.72 KG/M2 | DIASTOLIC BLOOD PRESSURE: 62 MMHG | HEIGHT: 66 IN | TEMPERATURE: 98.2 F | RESPIRATION RATE: 20 BRPM | WEIGHT: 141.38 LBS | OXYGEN SATURATION: 97 % | SYSTOLIC BLOOD PRESSURE: 118 MMHG | HEART RATE: 111 BPM

## 2018-01-12 VITALS
HEIGHT: 66 IN | BODY MASS INDEX: 25.31 KG/M2 | HEART RATE: 119 BPM | DIASTOLIC BLOOD PRESSURE: 60 MMHG | RESPIRATION RATE: 18 BRPM | SYSTOLIC BLOOD PRESSURE: 102 MMHG | OXYGEN SATURATION: 95 % | TEMPERATURE: 98 F | WEIGHT: 157.5 LBS

## 2018-01-12 VITALS
WEIGHT: 155.38 LBS | BODY MASS INDEX: 24.97 KG/M2 | HEART RATE: 105 BPM | TEMPERATURE: 98.2 F | DIASTOLIC BLOOD PRESSURE: 74 MMHG | HEIGHT: 66 IN | SYSTOLIC BLOOD PRESSURE: 124 MMHG | RESPIRATION RATE: 18 BRPM | OXYGEN SATURATION: 94 %

## 2018-01-12 VITALS
WEIGHT: 150.13 LBS | DIASTOLIC BLOOD PRESSURE: 72 MMHG | HEIGHT: 66 IN | BODY MASS INDEX: 24.13 KG/M2 | SYSTOLIC BLOOD PRESSURE: 112 MMHG | RESPIRATION RATE: 18 BRPM | OXYGEN SATURATION: 96 % | HEART RATE: 110 BPM | TEMPERATURE: 98 F

## 2018-01-12 VITALS
BODY MASS INDEX: 23.14 KG/M2 | SYSTOLIC BLOOD PRESSURE: 112 MMHG | RESPIRATION RATE: 18 BRPM | WEIGHT: 144 LBS | TEMPERATURE: 98.3 F | HEIGHT: 66 IN | DIASTOLIC BLOOD PRESSURE: 68 MMHG | OXYGEN SATURATION: 97 % | HEART RATE: 99 BPM

## 2018-01-12 VITALS
HEART RATE: 87 BPM | WEIGHT: 148 LBS | SYSTOLIC BLOOD PRESSURE: 125 MMHG | RESPIRATION RATE: 18 BRPM | DIASTOLIC BLOOD PRESSURE: 82 MMHG | BODY MASS INDEX: 24.63 KG/M2 | OXYGEN SATURATION: 96 %

## 2018-01-12 NOTE — PROGRESS NOTES
Assessment    1  Breast cancer (174 9) (C50 919)   2  Mediastinal lymphadenopathy (785 6) (R59 0)    Plan  Breast cancer, Cancer related pain    · Endocet 5-325 MG Oral Tablet; TAKE 1 TABLET EVERY 4 TO 6 HOURS AS  NEEDED FOR PAIN for ongoing pain control   Rx By: Divine Ayon; Dispense: 0 Days ; #:60 Tablet; Refill: 0; For: Breast cancer, Cancer related pain; MARKOS = N; Print Rx  Mediastinal lymphadenopathy    · Drink plenty of fluids ; Status:Complete;   Done: 82QBX7932   Ordered; For:Mediastinal lymphadenopathy; Ordered By:Charley Vance;   · Follow-up visit in 3 weeks Evaluation and Treatment  Follow-up  [place] office  Status: Hold For - Scheduling  Requested for: 69SUN4742   Ordered; For: Mediastinal lymphadenopathy; Ordered By: Divine Ayon Performed:  Due: 63KXY0292    Discussion/Summary  Discussion Summary:   In summary, this is a 27-year-old female history of ER positive, HER-2 positive, GA negative, infiltrating ductal carcinoma with bilateral pulmonary metastases and bone lesions in the lumbar spine  Recent CAT scan shows stable to slight improvement in pulmonary nodules  Minimal changes are noted in the lumbar spine  No new disease is noted elsewhere  Recent CA-27-29 has decreased slightly  I reviewed the above findings with the patient and her daughter  I would plan on proceeding forward with Taxotere, Perjeta, Herceptin for this her fifth cycle and another one thereafter  I would recheck a tumor marker at that point  If this has decreased further, I would transition by omitting Taxotere and substituting an aromatase inhibitor  Perjeta and Herceptin will be continued on a maintenance basis  Further imaging could be carried out periodically (every 2-4 months  Serologic monitoring would continue every 1-2 months  Most of her toxicity at present is probably related to Taxotere, hopefully resolving after that is discontinued    The patient and her daughter voiced understanding of the above discussion and we will be proceeding as outlined  Understands and agrees with treatment plan: The treatment plan was reviewed with the patient/guardian  The patient/guardian understands and agrees with the treatment plan   Counseling Documentation With Imm: The patient was counseled regarding diagnostic results, instructions for management, patient and family education, impressions  total time of encounter was 40 minutes  History of Present Illness  HPI: In 2010- The patient had a left sided T1 B , N0 ER positive, MA and HER-2 negative invasive ductal carcinoma, right-sided DCIS, ER/MA positive, HER-2 negative  Bilateral mastectomy  October 2010- April 2011  Treated with tamoxifen 20 mg by mouth daily  Last menstrual cycle was March 2010  She was then on Arimidex 1 mg daily  Lost to followup after March 2012  She presented to Dr Lisandro Hummel in December 2015 with Right arm swelling  She underwent venous Doppler R upper extremities 12/2/2015 which did not identify any acute or chronic DVT however there was a nonvascular structure in the righ axilla measuring 2 2 x 1 3 cm CT Scan of the chest on 12/9/15, showed multiple bilateral pulmonary nodules measuring 3-7 mm  PET scan on 2/4/16, which revealed hypermetabolic hilar and mediastinal nodes  There is a pre carinal node with a SUV of 5 4 measuring 1 8 x 1 7 cm, and a subcarinal node measuring 1 9 x 1 3 cm with a SUV of 5 3  There is right hilar activity of 3 5 and left hilar activity of 2 8  The subcentimeter nodules are too small for PET evaluation  She also has uptake in her L3 and L5 vertebral bodies, both concerning for bony metastases  Dr Nano Blum performed flexible bronchoscopy and lymph node biopsy 2/16/16  Preliminary pathology identified Metastatic non-small cell carcinoma, favor adenocarcinoma  ER 90%, MA zero  Her 2 +2, positive by [facility]  March 1, 2016- started Taxotere/Perjeta/Herceptin       Interval History: Right axillary pain is somewhat better but still present  Has nausea after chemo and took Zofran without benefit  Lasts for 1 week then intermittent  +CHAVIS  Review of Systems  Complete-Female:   Constitutional: No fever, no chills, feels well, no tiredness, no recent weight gain or weight loss  Eyes: No complaints of eye pain, no red eyes, no eyesight problems, no discharge, no dry eyes, no itching of eyes  ENT: no complaints of earache, no loss of hearing, no nose bleeds, no nasal discharge, no sore throat, no hoarseness  Cardiovascular: No complaints of slow heart rate, no fast heart rate, no chest pain, no palpitations, no leg claudication, no lower extremity edema  Respiratory: No complaints of shortness of breath, no wheezing, no cough, no SOB on exertion, no orthopnea, no PND  Gastrointestinal: No complaints of abdominal pain, no constipation, no nausea or vomiting, no diarrhea, no bloody stools  Genitourinary: No complaints of dysuria, no incontinence, no pelvic pain, no dysmenorrhea, no vaginal discharge or bleeding  Musculoskeletal: No complaints of arthralgias, no myalgias, no joint swelling or stiffness, no limb pain or swelling  Integumentary: No complaints of skin rash or lesions, no itching, no skin wounds, no breast pain or lump  Neurological: No complaints of headache, no confusion, no convulsions, no numbness, no dizziness or fainting, no tingling, no limb weakness, no difficulty walking  Psychiatric: Not suicidal, no sleep disturbance, no anxiety or depression, no change in personality, no emotional problems  Endocrine: No complaints of proptosis, no hot flashes, no muscle weakness, no deepening of the voice, no feelings of weakness  Hematologic/Lymphatic: No complaints of swollen glands, no swollen glands in the neck, does not bleed easily, does not bruise easily  Other Symptoms: RUE edema  Active Problems    1  Axillary adenopathy (785 6) (R59 0)   2  Breast cancer (174 9) (C50 919)   3   Cancer related pain (338 3) (G89 3)   4  Chemotherapy-induced nausea (787 02,E933 1) (R11 0,T45  1X5A)   5  Diarrhea (787 91) (R19 7)   6  Lung nodules (793 19) (R91 8)   7  Lymphadenopathy (785 6) (R59 1)   8  Lymphedema (457 1) (I89 0)   9  Mediastinal lymphadenopathy (785 6) (R59 0)    Past Medical History    1  History of Neoplasm of breast (239 3) (D49 3)    Surgical History    1  History of Mastectomy Bilateral   2  History of Oophorectomy    Family History  Father    1  Family history of Diabetes (250 00) (E11 9)  Daughter    2  Family history of Diabetes (250 00) (E11 9)  Sister    3  Family history of Cancer (199 1) (C80 1)    Social History    · Alcohol use (V49 89) (Z78 9)   · Denied: Drug use (305 90) (F19 90)   · Smoker (305 1) (F17 200)    Current Meds   1  Dexamethasone 4 MG Oral Tablet; Take 2 tablets po bid x 5 doses  Start the day before   chemotherapy  Take with food; Therapy: 52GFA6209 to (Last Rx:33Lmr8510) Ordered   2  Endocet 5-325 MG Oral Tablet; TAKE 1 TABLET EVERY 4 TO 6 HOURS AS NEEDED FOR   PAIN for ongoing pain control; Therapy: 37EIN7732 to (Last Rx:51Xfk3420) Ordered   3  Ondansetron HCl - 4 MG Oral Tablet; TAKE 1 TABLET Every 6 hours PRN; Therapy: 53SZF1853 to (Last Rx:89Rvs8744) Ordered  Medication List Reviewed: The medication list was reviewed and updated today  Allergies    1  No Known Drug Allergies    Vitals  Vital Signs [Data Includes: Current Encounter]    Recorded: 63QYL0064 01:59PM   Temperature 98 6 F   Heart Rate 87   Respiration 16   Systolic 928   Diastolic 66   Height 5 ft 5 in   Weight 136 lb 4 oz   BMI Calculated 22 67   BSA Calculated 1 68   O2 Saturation 96   Pain Scale 7     Physical Exam    Constitutional   General appearance: No acute distress, well appearing and well nourished  Eyes   Conjunctiva and lids: No swelling, erythema or discharge      Ears, Nose, Mouth, and Throat   External inspection of ears and nose: Normal     Oropharynx: Normal with no erythema, edema, exudate or lesions  Pulmonary   Auscultation of lungs: Clear to auscultation  Cardiovascular   Auscultation of heart: Normal rate and rhythm, normal S1 and S2, without murmurs  Examination of extremities for edema and/or varicosities: Abnormal   right arm edema, shoulder to wrist    Abdomen   Abdomen: Non-tender, no masses  Liver and spleen: No hepatomegaly or splenomegaly  Lymphatic   Palpation of lymph nodes in neck: No lymphadenopathy  Musculoskeletal   Gait and station: Normal     Skin   Skin and subcutaneous tissue: Normal without rashes or lesions  Neurologic   Cranial nerves: Cranial nerves 2-12 intact  Psychiatric   Orientation to person, place, and time: Normal          Results/Data  Results   (1) CBC/PLT/DIFF 23MOX9870 09:37AM Bryan [facility]     Test Name Result Flag Reference   WBC COUNT 7 39 Thousand/uL  4 31-10 16   RBC COUNT 3 32 Million/uL L 3 81-5 12   HEMOGLOBIN 10 5 g/dL L 11 5-15 4   HEMATOCRIT 33 2 % L 34 8-46  1    fL H 82-98   MCH 31 6 pg  26 8-34 3   MCHC 31 6 g/dL  31 4-37 4   RDW 16 0 % H 11 6-15 1   MPV 9 9 fL  8 9-12 7   PLATELET COUNT 857 Thousands/uL H 149-390   nRBC AUTOMATED 0 /100 WBCs     NEUTROPHILS RELATIVE PERCENT 63 %  43-75   LYMPHOCYTES RELATIVE PERCENT 23 %  14-44   MONOCYTES RELATIVE PERCENT 13 % H 4-12   EOSINOPHILS RELATIVE PERCENT 0 %  0-6   BASOPHILS RELATIVE PERCENT 1 %  0-1   NEUTROPHILS ABSOLUTE COUNT 4 64 Thousands/?L  1 85-7 62   LYMPHOCYTES ABSOLUTE COUNT 1 71 Thousands/?L  0 60-4 47   MONOCYTES ABSOLUTE COUNT 0 94 Thousand/?L  0 17-1 22   EOSINOPHILS ABSOLUTE COUNT 0 03 Thousand/?L  0 00-0 61   BASOPHILS ABSOLUTE COUNT 0 05 Thousands/?L  0 00-0 10     (1) COMPREHENSIVE METABOLIC PANEL [id number] 03:31WT Rayetta Stabs     Test Name Result Flag Reference   GLUCOSE,RANDM 88 mg/dL     If the patient is fasting, the ADA then defines impaired fasting glucose as > 100 mg/dL and diabetes as > or equal to 123 mg/dL     SODIUM 139 mmol/L  136-145   POTASSIUM 4 5 mmol/L  3 5-5 3   CHLORIDE 108 mmol/L  100-108   CARBON DIOXIDE 28 mmol/L  21-32   ANION GAP (CALC) 3 mmol/L L 4-13   BLOOD UREA NITROGEN 8 mg/dL  5-25   CREATININE 0 48 mg/dL L 0 60-1 30   Standardized to IDMS reference method   CALCIUM 8 4 mg/dL  8 3-10 1   BILI, TOTAL 0 23 mg/dL  0 20-1 00   ALK PHOSPHATAS 107 U/L     ALT (SGPT) 34 U/L  12-78   AST(SGOT) 21 U/L  5-45   ALBUMIN 3 3 g/dL L 3 5-5 0   TOTAL PROTEIN 6 1 g/dL L 6 4-8 2   eGFR Non-African American      >60 0 ml/min/1 73sq m   [facility] Disease Education Program recommendations are as follows:  GFR calculation is accurate only with a steady state creatinine  Chronic Kidney disease less than 60 ml/min/1 73 sq  meters  Kidney failure less than 15 ml/min/1 73 sq  meters  * CT CHEST ABDOMEN PELVIS W CONTRAST [id number] 08:47AM Lina Peal     Test Name Result Flag Reference   CT CHEST ABDOMEN PELVIS W CONTRAST (Report)     CT CHEST, ABDOMEN AND PELVIS WITH IV CONTRAST     INDICATION: Breast carcinoma with lymph node and bony metastases  On chemotherapy currently  COMPARISON: 12/9/2015  TECHNIQUE: CT examination of the chest, abdomen and pelvis was performed  This examination, like all CT scans performed in the [facility], was performed utilizing techniques to minimize radiation dose exposure, including the use of    iterative reconstruction and automated exposure control  Axial, sagittal, and coronal reformatted images were submitted for interpretation  100 cc of intravenous Omnipaque 350 was administered for this examination  Enteric contrast was administered  FINDINGS:     CHEST     LUNGS: Right upper lobe 4 mm nodule on 3/22, previously measuring 5 mm suggesting positive chemotherapeutic response  Ill-defined lingular segment left upper lobe subpleural nodules on 3/32 were previously more pronounced best seen on prior study    series 3 image 36   Findings also raise possibility of positive chemotherapeutic response  5 mm nodule previously seen on 3/40 now faintly demonstrated on 3/36 consistent with positive chemotherapeutic response  PLEURA: Unremarkable  HEART/GREAT VESSELS: Small pericardial effusion noted  MEDIASTINUM AND CHEO: Small mediastinal lymph nodes redemonstrated, grossly stable in size  Small axillary lymph nodes after bilateral axillary lymph node dissection also noted  CHEST WALL AND LOWER NECK: Breast implants noted  ABDOMEN     LIVER/BILIARY TREE: Unremarkable  GALLBLADDER: No calcified gallstones  No pericholecystic inflammatory change  SPLEEN: Unremarkable  PANCREAS: Unremarkable  ADRENAL GLANDS: Unremarkable  KIDNEYS/URETERS: Unremarkable  No hydronephrosis  STOMACH AND BOWEL: Unremarkable  APPENDIX: Normal appendix  ABDOMINOPELVIC CAVITY: No ascites or free intraperitoneal air  No lymphadenopathy  VESSELS: Unremarkable for patient's age  PELVIS     REPRODUCTIVE ORGANS: Unremarkable for patient's age  URINARY BLADDER: Unremarkable  ABDOMINAL WALL/INGUINAL REGIONS: Unremarkable  OSSEOUS STRUCTURES: Lucent lesion right inferior pubic ramus measuring 8mm on 2/124 consistent with metastatic lesion  This was not clearly depicted on previous PET CT dated 2/4/2016  Lytic lesions identified involving the L5 and L3 vertebral bodies    consistent with previously characterized metastatic disease  IMPRESSION:   1  Suggestive findings of positive chemotherapeutic response related to pulmonary nodules as above  2  Small pericardial effusion, new from prior study  3  Grossly stable L3 and L5 vertebral body lytic metastatic lesions with a newly demonstrated lucent lesion right inferior pubic ramus measuring up to 8mm  Attention on follow-up  4  Small bilateral axillary and mediastinal lymph nodes previously characterized as suspicious for metastases and appearing size stable  ##sigslh##sigslh       Workstation performed: [id number]     Signed by:   Pearl Lu MD   5/24/16   100     Signatures   Electronically signed by : SILVIA Lopez ,DO; May 26 2016  2:34PM EST                       (Author)

## 2018-01-12 NOTE — PROGRESS NOTES
Assessment    1  Breast cancer (174 9) (C50 919)   2  Lymphadenopathy (785 6) (R59 1)   3  Bone metastases (198 5) (C79 51)    Plan  Bone metastases    · Drink plenty of fluids ; Status:Complete;   Done: 64PCN4823   Ordered; For:Bone metastases; Ordered By:Katelyn Vance;   · Follow-up visit in 3 weeks Evaluation and Treatment  Follow-up  Status: Hold For -  Scheduling  Requested for: 78GKD8785   Ordered; For: Bone metastases; Ordered By: Bryan Gomez Performed:  Due: 09BNE6481   · 2 - Rosalind Crandall MD, Lesa Mathur  Radiation Oncology Physician Referral  Consult  Breast cancer, bone mets  Lumbar  Status: Active  Requested for: 02TEO1565   Ordered; For: Bone metastases; Ordered By: Bryan Gomez Performed:  Due: 25UOT9795  Care Summary provided  : Yes   · * MRI CERVICAL SPINE W WO CONTRAST; Status:Need Information - Financial  Authorization; Requested for:[id number];    Perform:[facility]; TMB:59ATR5641;NMEADDJ; For:Bone metastases; Ordered By:Katelyn Vance;   · * MRI THORACIC SPINE W WO CONTRAST; Status:Need Information - Financial  Authorization; Requested for:[id number];    Perform:[facility]; JIN:95VDC6022;FIZRHVS; For:Bone metastases; Ordered By:Katelyn Vance;    Chief Complaint  Chief Complaint Free Text Note Form: Follow-up regarding breast cancer  History of Present Illness  HPI: In 2010- The patient had a left sided T1 B , N0 ER positive, AK and HER-2 negative invasive ductal carcinoma, right-sided DCIS, ER/AK positive, HER-2 negative  Bilateral mastectomy  October 2010- April 2011  Treated with tamoxifen 20 mg by mouth daily  Last menstrual cycle was March 2010  She was then on Arimidex 1 mg daily  Lost to followup after March 2012  She presented to Dr Genna Sullivan in December 2015 with Right arm swelling   She underwent venous Doppler R upper extremities 12/2/2015 which did not identify any acute or chronic DVT however there was a nonvascular structure in the righ axilla measuring 2 2 x 1 3 cm CT Scan of the chest on 12/9/15, showed multiple bilateral pulmonary nodules measuring 3-7 mm  PET scan on 2/4/16, which revealed hypermetabolic hilar and mediastinal nodes  There is a pre carinal node with a SUV of 5 4 measuring 1 8 x 1 7 cm, and a subcarinal node measuring 1 9 x 1 3 cm with a SUV of 5 3  There is right hilar activity of 3 5 and left hilar activity of 2 8  The subcentimeter nodules are too small for PET evaluation  She also has uptake in her L3 and L5 vertebral bodies, both concerning for bony metastases  Dr Abhishek Ji performed flexible bronchoscopy and lymph node biopsy 2/16/16  Preliminary pathology identified Metastatic non-small cell carcinoma, favor adenocarcinoma  ER 90%, NV zero  Her 2 +2, positive by [facility]  March 1, 2016- started Taxotere/Perjeta/Herceptin       Interval History: Having some pain in the lower back (few days), Right humerus (months), and both thighs  Uses Endocet 0-2 times per day with benefit  Nausea for few days after chemo  Review of Systems  Complete-Female:   Constitutional: No fever, no chills, feels well, no tiredness, no recent weight gain or weight loss  Eyes: No complaints of eye pain, no red eyes, no eyesight problems, no discharge, no dry eyes, no itching of eyes  ENT: no complaints of earache, no loss of hearing, no nose bleeds, no nasal discharge, no sore throat, no hoarseness  Cardiovascular: No complaints of slow heart rate, no fast heart rate, no chest pain, no palpitations, no leg claudication, no lower extremity edema  Respiratory: No complaints of shortness of breath, no wheezing, no cough, no SOB on exertion, no orthopnea, no PND  Gastrointestinal: No complaints of abdominal pain, no constipation, no nausea or vomiting, no diarrhea, no bloody stools  Genitourinary: No complaints of dysuria, no incontinence, no pelvic pain, no dysmenorrhea, no vaginal discharge or bleeding     Musculoskeletal: No complaints of arthralgias, no myalgias, no joint swelling or stiffness, no limb pain or swelling  Integumentary: No complaints of skin rash or lesions, no itching, no skin wounds, no breast pain or lump  Neurological: No complaints of headache, no confusion, no convulsions, no numbness, no dizziness or fainting, no tingling, no limb weakness, no difficulty walking  Psychiatric: Not suicidal, no sleep disturbance, no anxiety or depression, no change in personality, no emotional problems  Endocrine: No complaints of proptosis, no hot flashes, no muscle weakness, no deepening of the voice, no feelings of weakness  Hematologic/Lymphatic: No complaints of swollen glands, no swollen glands in the neck, does not bleed easily, does not bruise easily  Other Symptoms: RUE edema  Active Problems    1  Axillary adenopathy (785 6) (R59 0)   2  Breast cancer (174 9) (C50 919)   3  Cancer related pain (338 3) (G89 3)   4  Chemotherapy-induced nausea (787 02,E933 1) (R11 0,T45  1X5A)   5  Diarrhea (787 91) (R19 7)   6  Lung nodules (793 19) (R91 8)   7  Lymphadenopathy (785 6) (R59 1)   8  Lymphedema (457 1) (I89 0)   9  Mediastinal lymphadenopathy (785 6) (R59 0)    Past Medical History    1  History of Neoplasm of breast (239 3) (D49 3)    Surgical History    1  History of Mastectomy Bilateral   2  History of Oophorectomy    Family History  Father    1  Family history of Diabetes (250 00) (E11 9)  Daughter    2  Family history of Diabetes (250 00) (E11 9)  Sister    3  Family history of Cancer (199 1) (C80 1)    Social History    · Alcohol use (V49 89) (Z78 9)   · Denied: Drug use (305 90) (F19 90)   · Smoker (305 1) (F17 200)    Current Meds   1  Compazine 10 MG Oral Tablet; TAKE 1 TABLET EVERY 6 HOURS AS NEEDED FOR   NAUSEA; Therapy: (Recorded:15Jun2016) to Recorded   2  Dexamethasone 4 MG Oral Tablet; Take 2 tablets po bid x 5 doses  Start the day before   chemotherapy  Take with food; Therapy: 09TOV2254 to (Last Rx:10Rnj6410) Ordered   3   Endocet 5-325 MG Oral Tablet; TAKE 1 TABLET EVERY 4 TO 6 HOURS AS NEEDED FOR   PAIN for ongoing pain control; Therapy: 04LOQ2964 to (Last Rx:41Jlt2865) Ordered   4  Flexeril 5 MG TABS; TAKE 1 TABLET Every 8 hours; Therapy: (Recorded:12Xxp5641) to Recorded   5  Ondansetron HCl - 4 MG Oral Tablet; TAKE 1 TABLET Every 6 hours PRN; Therapy: 72XMR9087 to (Last Rx:53Pjp8337) Ordered  Medication List Reviewed: The medication list was reviewed and updated today  Allergies    1  No Known Drug Allergies    Vitals  Vital Signs [Data Includes: Current Encounter]    Recorded: 23NOL0895 02:42PM   Temperature 99 F   Heart Rate 107   Respiration 16   Systolic 514   Diastolic 78   Height 5 ft 6 in   Weight 137 lb    BMI Calculated 22 11   BSA Calculated 1 71   O2 Saturation 97   Pain Scale 6     Physical Exam    Constitutional   General appearance: No acute distress, well appearing and well nourished  Eyes   Conjunctiva and lids: No swelling, erythema or discharge  Ears, Nose, Mouth, and Throat   External inspection of ears and nose: Normal     Oropharynx: Normal with no erythema, edema, exudate or lesions  Pulmonary   Auscultation of lungs: Clear to auscultation  Cardiovascular   Auscultation of heart: Normal rate and rhythm, normal S1 and S2, without murmurs  Examination of extremities for edema and/or varicosities: Abnormal   right arm edema, shoulder to wrist    Abdomen   Abdomen: Non-tender, no masses  Liver and spleen: No hepatomegaly or splenomegaly  Lymphatic   Palpation of lymph nodes in neck: No lymphadenopathy  Musculoskeletal   Gait and station: Normal     Skin   Skin and subcutaneous tissue: Normal without rashes or lesions  Neurologic   Cranial nerves: Cranial nerves 2-12 intact      Psychiatric   Orientation to person, place, and time: Normal          Future Appointments    Date/Time Provider Specialty Site   07/25/2016 01:15 PM SILVIA Brown , DO, [facility]     Signatures   Electronically signed by : SILVIA Fay ,; Jul 6 2016  3:40PM EST                       (Author)    Electronically signed by : SILVIA Fay ,; Jul 6 2016  4:10PM EST                       (Author)

## 2018-01-13 VITALS
TEMPERATURE: 98 F | BODY MASS INDEX: 25.5 KG/M2 | SYSTOLIC BLOOD PRESSURE: 136 MMHG | HEART RATE: 95 BPM | DIASTOLIC BLOOD PRESSURE: 79 MMHG | OXYGEN SATURATION: 95 % | RESPIRATION RATE: 18 BRPM | WEIGHT: 153.25 LBS

## 2018-01-13 VITALS
HEIGHT: 66 IN | SYSTOLIC BLOOD PRESSURE: 128 MMHG | BODY MASS INDEX: 24.8 KG/M2 | WEIGHT: 154.31 LBS | HEART RATE: 100 BPM | DIASTOLIC BLOOD PRESSURE: 72 MMHG | RESPIRATION RATE: 18 BRPM | OXYGEN SATURATION: 97 % | TEMPERATURE: 98.2 F

## 2018-01-13 VITALS
HEART RATE: 101 BPM | TEMPERATURE: 97.9 F | DIASTOLIC BLOOD PRESSURE: 72 MMHG | WEIGHT: 154.25 LBS | SYSTOLIC BLOOD PRESSURE: 128 MMHG | HEIGHT: 66 IN | RESPIRATION RATE: 18 BRPM | OXYGEN SATURATION: 97 % | BODY MASS INDEX: 24.79 KG/M2

## 2018-01-13 VITALS
HEIGHT: 66 IN | DIASTOLIC BLOOD PRESSURE: 68 MMHG | HEART RATE: 112 BPM | BODY MASS INDEX: 24.59 KG/M2 | OXYGEN SATURATION: 88 % | WEIGHT: 153 LBS | TEMPERATURE: 101.4 F | SYSTOLIC BLOOD PRESSURE: 120 MMHG | RESPIRATION RATE: 18 BRPM

## 2018-01-13 VITALS
WEIGHT: 149 LBS | DIASTOLIC BLOOD PRESSURE: 68 MMHG | TEMPERATURE: 97.7 F | OXYGEN SATURATION: 97 % | HEIGHT: 66 IN | BODY MASS INDEX: 24.59 KG/M2 | HEART RATE: 93 BPM | SYSTOLIC BLOOD PRESSURE: 128 MMHG | BODY MASS INDEX: 23.95 KG/M2 | TEMPERATURE: 99 F | WEIGHT: 153 LBS | HEART RATE: 100 BPM | DIASTOLIC BLOOD PRESSURE: 74 MMHG | HEIGHT: 66 IN | RESPIRATION RATE: 18 BRPM | RESPIRATION RATE: 18 BRPM | SYSTOLIC BLOOD PRESSURE: 138 MMHG | OXYGEN SATURATION: 93 %

## 2018-01-13 VITALS
DIASTOLIC BLOOD PRESSURE: 80 MMHG | RESPIRATION RATE: 16 BRPM | HEIGHT: 66 IN | WEIGHT: 153 LBS | TEMPERATURE: 98.3 F | SYSTOLIC BLOOD PRESSURE: 132 MMHG | BODY MASS INDEX: 24.59 KG/M2 | HEART RATE: 84 BPM

## 2018-01-13 VITALS
HEART RATE: 110 BPM | TEMPERATURE: 99 F | BODY MASS INDEX: 24.11 KG/M2 | RESPIRATION RATE: 18 BRPM | HEIGHT: 66 IN | WEIGHT: 150 LBS | OXYGEN SATURATION: 95 % | DIASTOLIC BLOOD PRESSURE: 78 MMHG | SYSTOLIC BLOOD PRESSURE: 140 MMHG

## 2018-01-13 VITALS
DIASTOLIC BLOOD PRESSURE: 78 MMHG | TEMPERATURE: 96.3 F | SYSTOLIC BLOOD PRESSURE: 142 MMHG | HEART RATE: 97 BPM | BODY MASS INDEX: 24.43 KG/M2 | HEIGHT: 66 IN | OXYGEN SATURATION: 95 % | WEIGHT: 152 LBS | RESPIRATION RATE: 17 BRPM

## 2018-01-13 VITALS
SYSTOLIC BLOOD PRESSURE: 140 MMHG | DIASTOLIC BLOOD PRESSURE: 84 MMHG | OXYGEN SATURATION: 98 % | HEIGHT: 66 IN | HEART RATE: 92 BPM | BODY MASS INDEX: 25.79 KG/M2 | WEIGHT: 160.5 LBS | TEMPERATURE: 98 F | RESPIRATION RATE: 20 BRPM

## 2018-01-13 NOTE — MISCELLANEOUS
Message  Return to work or school:   Antonietta Craig is under my professional care  She was seen in my office on 07/06/2016   She is able to return to work on  08/15/2016      Patient may return to work under light duty  Kourtney Christensen        Signatures   Electronically signed by : SILVIA Lopez ,DO; Jul 8 2016  5:43PM EST

## 2018-01-14 VITALS
SYSTOLIC BLOOD PRESSURE: 110 MMHG | HEART RATE: 116 BPM | OXYGEN SATURATION: 97 % | RESPIRATION RATE: 18 BRPM | BODY MASS INDEX: 23.95 KG/M2 | TEMPERATURE: 98.4 F | WEIGHT: 149 LBS | HEIGHT: 66 IN | DIASTOLIC BLOOD PRESSURE: 68 MMHG

## 2018-01-14 VITALS
SYSTOLIC BLOOD PRESSURE: 116 MMHG | TEMPERATURE: 98.1 F | HEIGHT: 66 IN | HEART RATE: 76 BPM | OXYGEN SATURATION: 95 % | RESPIRATION RATE: 20 BRPM | WEIGHT: 153.13 LBS | DIASTOLIC BLOOD PRESSURE: 78 MMHG | BODY MASS INDEX: 24.61 KG/M2

## 2018-01-14 VITALS
SYSTOLIC BLOOD PRESSURE: 150 MMHG | DIASTOLIC BLOOD PRESSURE: 90 MMHG | WEIGHT: 152 LBS | HEART RATE: 88 BPM | TEMPERATURE: 98.3 F | BODY MASS INDEX: 24.43 KG/M2 | OXYGEN SATURATION: 97 % | HEIGHT: 66 IN | RESPIRATION RATE: 16 BRPM

## 2018-01-14 VITALS
OXYGEN SATURATION: 94 % | BODY MASS INDEX: 24 KG/M2 | TEMPERATURE: 98.2 F | WEIGHT: 149.31 LBS | HEART RATE: 69 BPM | SYSTOLIC BLOOD PRESSURE: 124 MMHG | DIASTOLIC BLOOD PRESSURE: 82 MMHG | HEIGHT: 66 IN | RESPIRATION RATE: 18 BRPM

## 2018-01-14 NOTE — MISCELLANEOUS
Message  Pts RT will not be finishing until 8/23/16 - moved chemo appt scheduled on 8/19/16 to 8/26/16 - left msg for patient      Active Problems    1  Axillary adenopathy (785 6) (R59 0)   2  Bone metastases (198 5) (C79 51)   3  Breast cancer (174 9) (C50 919)   4  Cancer related pain (338 3) (G89 3)   5  Chemotherapy-induced nausea (787 02,E933 1) (R11 0,T45  1X5A)   6  Diarrhea (787 91) (R19 7)   7  Lung nodules (793 19) (R91 8)   8  Lymphadenopathy (785 6) (R59 1)   9  Lymphedema (457 1) (I89 0)   10  Mediastinal lymphadenopathy (785 6) (R59 0)    Current Meds   1  Anastrozole 1 MG Oral Tablet; TAKE 1 TABLET DAILY; Therapy: 45KAV2091 to (Evaluate:23Jul2017)  Requested for: 28Jul2016; Last   Rx:28Jul2016 Ordered   2  Compazine 10 MG Oral Tablet (Prochlorperazine Maleate); TAKE 1 TABLET EVERY 6   HOURS AS NEEDED FOR NAUSEA; Therapy: (Recorded:15Jun2016) to Recorded   3  Endocet 5-325 MG Oral Tablet; TAKE 1 TABLET EVERY 4 TO 6 HOURS AS NEEDED   FOR PAIN for ongoing pain control; Therapy: 53FRK4376 to (Last Rx:[id number]) Ordered   4  Flexeril 5 MG TABS (Cyclobenzaprine HCl); TAKE 1 TABLET Every 8 hours; Therapy: (Recorded:15Jun2016) to Recorded   5  Gabapentin 100 MG Oral Capsule; TAKE 1 CAPSULE 3 TIMES DAILY; Therapy: 53HDA9859 to (Evaluate:24Jan2017)  Requested for: 84Kxb6132; Last   Rx:[id number] Ordered   6  Ondansetron HCl - 4 MG Oral Tablet (Zofran); TAKE 1 TABLET Every 6 hours PRN; Therapy: 06PRE9749 to (Last Rx:[id number]) Ordered    Allergies    1   No Known Drug Allergies    Signatures   Electronically signed by : Nahum Oliva, ; Aug  8 2016 12:21PM EST                       (Author)

## 2018-01-14 NOTE — MISCELLANEOUS
Message   Recorded as Task   Date: 09/16/2016 11:18 AM, Created By: Marga Estevez   Task Name: Follow Up   Assigned To: Tenisha Brambila   Regarding Patient: Riri Warner, Status: In Progress   Comment:    ShandavanAnabella - 16 Sep 2016 11:18 AM     TASK CREATED  Please call patient  U/A consistent with UTI  I sent bactrim RX to pharmacy  please let her know   Tenisha Brambila - 16 Sep 2016 11:46 AM     TASK EDITED  left message for pt to advise of above  Active Problems    1  Axillary adenopathy (785 6) (R59 0)   2  Bone metastases (198 5) (C79 51)   3  Breast cancer (174 9) (C50 919)   4  Cancer related pain (338 3) (G89 3)   5  Chemotherapy-induced nausea (787 02,E933 1) (R11 0,T45  1X5A)   6  Diarrhea (787 91) (R19 7)   7  Edema of upper extremity (782 3) (R60 0)   8  Lung nodules (793 19) (R91 8)   9  Lymphadenopathy (785 6) (R59 1)   10  Lymphedema (457 1) (I89 0)   11  Mediastinal lymphadenopathy (785 6) (R59 0)   12  Urinary pain (788 1) (R30 9)   13  UTI (urinary tract infection) (599 0) (N39 0)    Current Meds   1  Anastrozole 1 MG Oral Tablet; TAKE 1 TABLET DAILY; Therapy: 43FCU2068 to (Evaluate:39Kob8498)  Requested for: 93Oyz7560; Last   Rx:19Gpk7613 Ordered   2  Compazine 10 MG Oral Tablet (Prochlorperazine Maleate); TAKE 1 TABLET EVERY 6   HOURS AS NEEDED FOR NAUSEA; Therapy: (Recorded:15Jun2016) to Recorded   3  Endocet 5-325 MG Oral Tablet; TAKE 1 TABLET EVERY 4 TO 6 HOURS AS NEEDED   FOR PAIN for ongoing pain control; Therapy: 61NQT9009 to (Last Rx:21Vlf0708) Ordered   4  Flexeril 5 MG TABS (Cyclobenzaprine HCl); TAKE 1 TABLET Every 8 hours; Therapy: (Recorded:15Jun2016) to Recorded   5  Gabapentin 100 MG Oral Capsule; TAKE 1 CAPSULE 3 TIMES DAILY; Therapy: 69MKU8401 to (Evaluate:24Jan2017)  Requested for: 23Hzn5829; Last   Rx:46Vnt0039 Ordered   6  Ondansetron HCl - 4 MG Oral Tablet (Zofran); TAKE 1 TABLET Every 6 hours PRN;    Therapy: 41ANG8726 to (Last Rx: 92GFE8836) Ordered   7  Sulfamethoxazole-Trimethoprim 800-160 MG Oral Tablet; 1 po twice daily x 3 days; Therapy: 20MDU6810 to (Last Rx:03Asl9267)  Requested for: 09Aku4883; Status:   ACTIVE - Transmit to Pharmacy - Awaiting Verification Ordered    Allergies    1   No Known Drug Allergies    Signatures   Electronically signed by : Dennie Bart, RN; Sep 16 2016  2:26PM EST                       (Author)

## 2018-01-14 NOTE — MISCELLANEOUS
Message  Patient, spouse and daughter were in on friday 2/26/16 for a teach on treatment plan including taxotere, herceptin and perjenta with neulasta  We discussed schedule of infusions, labwork and office appts  Side effects of chemo to include low blood counts and the increase risk for infection if WBC are low, risk for bleeding if PLTs are low and that we will closely monitor these levels  n/v and diarrhea, fatigue/weakness, hair loss, fluid retention and to report any weight gain or puffiness in ankles/feet  We discussed starting dexamethasone the day prior to chemo, the day of and day after chemo  They also met with Emeterio Vargas to go over PET scan results  Consent was signed  Active Problems    1  Axillary adenopathy (785 6) (R59 0)   2  Breast cancer (174 9) (C50 919)   3  Cancer related pain (338 3) (G89 3)   4  Chemotherapy-induced nausea (787 02,E933 1) (R11 0,T45  1X5A)   5  Lung nodules (793 19) (R91 8)   6  Lymphadenopathy (785 6) (R59 1)   7  Lymphedema (457 1) (I89 0)   8  Mediastinal lymphadenopathy (785 6) (R59 0)    Current Meds   1  Dexamethasone 4 MG Oral Tablet; Take 2 tablets po bid x 5 doses  Start the day before   chemotherapy  Take with food; Therapy: 69UVZ4943 to (Last Rx:40Xqf7563) Ordered   2  Diclofenac Sodium 75 MG Oral Tablet Delayed Release Recorded   3  Endocet 5-325 MG Oral Tablet; TAKE 1 TABLET EVERY 4 TO 6 HOURS AS NEEDED   FOR PAIN for ongoing pain control; Therapy: 84YLM7633 to (Last Rx:04Zlp4627) Ordered   4  Ondansetron HCl - 4 MG Oral Tablet (Zofran); TAKE 1 TABLET Every 6 hours PRN; Therapy: 07MGW2284 to (Last Rx:16Hlp8238) Ordered    Allergies    1   No Known Drug Allergies    Signatures   Electronically signed by : Jerry Brooke, ; Mar  4 2016  1:37PM EST                       (Author)

## 2018-01-15 VITALS
HEIGHT: 66 IN | SYSTOLIC BLOOD PRESSURE: 120 MMHG | RESPIRATION RATE: 18 BRPM | TEMPERATURE: 98.2 F | BODY MASS INDEX: 23.46 KG/M2 | OXYGEN SATURATION: 97 % | DIASTOLIC BLOOD PRESSURE: 68 MMHG | HEART RATE: 88 BPM | WEIGHT: 146 LBS

## 2018-01-15 NOTE — MISCELLANEOUS
Message   Recorded as Task   Date: 04/26/2017 12:10 PM, Created By: Cathy Nicole   Task Name: Care Coordination   Assigned To: Veronica Vance   Regarding Patient: Mark Garcias, Status: Complete   Comment:    Veronica Vance - 26 Apr 2017 12:10 PM     TASK CREATED  Pt was seen with Dr Omero Fall today to discuss SRS to her brain mets  Reviewed procedure, Dr Harry Vasquez role in the procedure and potential complications of tx  Pt was agreeable to tx- signed consent for Dr Chantell Luo to do the planning  Pt scheduled for tx on 5/3  She was to get chemo on 5/5  Discussed with Dr Ada Parada- he wants tx moved to the next week  Discussed with Caty BUITRAGO who will reschedule her chemo and f/u appt with him (was for 5/8)  I sent her a task as well  Pt is currently on decadron 2mg daily for appetite  She did have a small open area on her left wrist and cut her knuckle on her right index finger  Dr Ada Parada assessed the areas- no need for antibiotics at this time  She was instructed to call the office with any s/s of infection  Her daughter was with her and heard the above instructions  Her last chemo cycle was 4/14: Trenda Room, Herceptin, Projeta and Taxotere  Veronica Vance - 26 Apr 2017 12:10 PM     TASK COMPLETED        Current Meds   1  Anastrozole 1 MG Oral Tablet; TAKE 1 TABLET DAILY; Therapy: 21DZM5270 to (Evaluate:80Iuh1984)  Requested for: 76Qii9442; Last   Rx:70Phk8522 Ordered   2  Dexamethasone 2 MG Oral Tablet; TAKE 1 TABLET every morning with food; Therapy: 28LAR2251 to (Adriana Hidalgo)  Requested for: 03Apr2017; Last   Rx:03Apr2017 Ordered   3  HYDROmorphone HCl - 8 MG Oral Tablet (Dilaudid); TAKE 1 TABLET EVERY 4 HOURS   AS NEEDED FOR BREAKTHROUGH PAIN;   Therapy: 22TKC6790 to (Evaluate:82Tlw4356); Last Rx:03Apr2017 Ordered   4  Lactulose 20 GM/30ML Oral Solution; 30 mL by mouth three times daily; Therapy: 33PGI0662 to (Brenda Franklin)  Requested for: 00XUN6627; Last   Rx:03Suf1973 Ordered   5   LORazepam 0 5 MG Oral Tablet; TAKE 1 TABLET EVERY 6 HOURS AS NEEDED FOR   NAUSEA; Therapy: 40Yvk0821 to (Evaluate:17Jun2017); Last Rx:18Apr2017 Ordered   6  Methadone HCl - 5 MG Oral Tablet; TAKE 1 TABLET 3 times daily for pain; Therapy: 93QXO2896 to (Evaluate:03May2017); Last Rx:03Apr2017 Ordered   7  Nortriptyline HCl - 50 MG Oral Capsule; TAKE 1 CAPSULE AT BEDTIME; Therapy: 95SOJ5598 to ([phone])  Requested for: 72IIU8234; Last   Rx:10Jan2017 Ordered   8  OLANZapine 5 MG Oral Tablet; TAKE 1 TABLET AT BEDTIME; Therapy: 33FOO4007 to (Evaluate:21May2017)  Requested for: 36SMP4375; Last   Rx:22Mar2017 Ordered   9  Omeprazole 20 MG Oral Capsule Delayed Release; TAKE 1 CAPSULE EVERY DAY; Therapy: (Recorded:26Apr2017) to Recorded   10  Promethazine HCl - 25 MG Oral Tablet; TAKE 1 TABLET EVERY 6 HOURS AS NEEDED    FOR NAUSEA; Therapy: 40UNW1905 to (Evaluate:19Apr2017)  Requested for: 03Apr2017; Last    Rx:03Apr2017 Ordered   11  Vagifem 10 MCG Vaginal Tablet; INSERT 1 TABLET INTRAVAGINALLY WEEKLY as    needed to prevent excessive vaginal dryness;     Therapy: 32TTJ5591 to (Evaluate:17Jan2017)  Requested for: 06KML5786; Last    Rx:19Oct2016 Ordered    Signatures   Electronically signed by : Nicanor De Oliveira, ; Apr 26 2017 12:10PM EST                       (Author)

## 2018-01-16 NOTE — MISCELLANEOUS
Message   Recorded as Task   Date: 08/18/2017 11:13 AM, Created By: Bethanie Guzman   Task Name: Call Back   Assigned To: Caty Braden   Regarding Patient: Amish Khanna, Status: Active   Comment:    Sofia Aquino - 18 Aug 2017 11:13 AM     TASK CREATED  Caller: Self; Results Inquiry; [phone] (Home)  PT HAD A MUGA ON MONDAY AT [address] [place] [address], [place]   Discussed results with Dr Xiomara Cain - no change in treatment needed at this time - patient aware      Active Problems    1  Abdominal distension (787 3) (R14 0)   2  Axillary adenopathy (785 6) (R59 0)   3  Bone metastases (198 5) (C79 51)   4  Brain metastases (198 3) (C79 31)   5  Breast cancer (174 9) (C50 919)   6  Cancer related pain (338 3) (G89 3)   7  Chemotherapy-induced nausea (787 02,E933 1) (R11 0,T45  1X5A)   8  Constipation (564 00) (K59 00)   9  Decreased appetite (783 0) (R63 0)   10  Diarrhea (787 91) (R19 7)   11  Difficulty sleeping (780 50) (G47 9)   12  Dizziness (780 4) (R42)   13  Dyspareunia, female (625 0) (N94 10)   14  Edema of upper extremity (782 3) (R60 0)   15  Encounter for monitoring cardiotoxic drug therapy (V58 83,V58 69) (Z51 81,Z79 899)   16  Headache (784 0) (R51)   17  Herniated lumbar intervertebral disc (722 10) (M51 26)   18  Leg weakness (729 89) (R29 898)   19  Lung nodules (793 19) (R91 8)   20  Lymphadenopathy (785 6) (R59 1)   21  Lymphedema (457 1) (I89 0)   22  Mediastinal lymphadenopathy (785 6) (R59 0)   23  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)   24  Vaginal pain (625 9) (R10 2)    Current Meds   1  Anastrozole 1 MG Oral Tablet; TAKE 1 TABLET DAILY; Therapy: 65ZOZ1673 to (Evaluate:43Hgm1845)  Requested for: 81Ulw9075; Last   Rx:94Uzm3393 Ordered   2  Gabapentin 300 MG Oral Capsule; TAKE 1 CAPSULE 3 TIMES DAILY; Therapy: 68MII7159 to (Evaluate:94Gws3748)  Requested for: 39NFK4504; Last   Rx:01Jun2017 Ordered   3   HYDROmorphone HCl - 8 MG Oral Tablet (Dilaudid); TAKE 1 TABLET EVERY 4 HOURS   AS NEEDED FOR BREAKTHROUGH PAIN;   Therapy: 85BTN6394 to (Evaluate:63Tws0189); Last Rx:40Dmp5944 Ordered   4  Lactulose 20 GM/30ML Oral Solution; 30 mL by mouth three times daily; Therapy: 06LRP8887 to (Elizabet Lorenzo)  Requested for: 10ZGN1749; Last   Rx:75Ksm8530 Ordered   5  LORazepam 0 5 MG Oral Tablet; TAKE 1 TABLET EVERY 6 HOURS AS NEEDED FOR   NAUSEA; Therapy: 84Jpw7505 to (Evaluate:17Jun2017); Last Rx:16Hwx1743 Ordered   6  Methadone HCl - 10 MG Oral Tablet; 1 tab PO TID for chronic cancer related pain; Therapy: 99QPI4350 to (Evaluate:87Hxf4089); Last Rx:27Uzq2770 Ordered   7  MiraLax Oral Packet (Polyethylene Glycol 3350); 1 to 2 packets per day; Therapy: 90Srz3361 to Recorded   8  OLANZapine 10 MG Oral Tablet; TAKE 1 TABLET AT BEDTIME; Therapy: 99SIE8875 to (Evaluate:81Dho6889)  Requested for: 42RCM8870; Last   Rx:29Jun2017 Ordered   9  Promethazine HCl - 25 MG Oral Tablet; TAKE 1 TABLET EVERY 6 HOURS AS NEEDED   FOR NAUSEA; Therapy: 46AVK1582 to (Evaluate:68Dev8060)  Requested for: 94ENP3879; Last   WD:56ERC8617 Ordered   10  Relistor 12 MG/0 6ML Subcutaneous Solution; 12mg SC every other day; Therapy: 66Dzv3624 to (Winnie Santiago)  Requested for: 00Zxd0551; Last    Rx:02Twn1202 Ordered   11  Senna 8 6 MG Oral Tablet; 2 tab PO bid to prevent constipation; Therapy: 13Rcn5481 to (Evaluate:28Lbp5926)  Requested for: 57Nln3978; Last    Rx:30Wuk4821 Ordered   12  Vagifem 10 MCG Vaginal Tablet (Estradiol); INSERT 1 TABLET INTRAVAGINALLY    WEEKLY as needed to prevent excessive vaginal dryness; Therapy: 87NME5705 to (Evaluate:17Jan2017)  Requested for: 44TDE6276; Last    Rx:87Osi2766 Ordered    Allergies    1   No Known Drug Allergies    Signatures   Electronically signed by : Dorie Schmitt, ; Aug 18 2017  1:55PM EST                       (Author)

## 2018-01-17 ENCOUNTER — APPOINTMENT (OUTPATIENT)
Dept: LAB | Facility: MEDICAL CENTER | Age: 58
End: 2018-01-17
Payer: COMMERCIAL

## 2018-01-17 DIAGNOSIS — C79.51 SECONDARY MALIGNANT NEOPLASM OF BONE (HCC): ICD-10-CM

## 2018-01-17 DIAGNOSIS — C79.31 SECONDARY MALIGNANT NEOPLASM OF BRAIN (HCC): ICD-10-CM

## 2018-01-17 DIAGNOSIS — C50.919 MALIGNANT NEOPLASM OF FEMALE BREAST (HCC): ICD-10-CM

## 2018-01-17 LAB
ALBUMIN SERPL BCP-MCNC: 3.4 G/DL (ref 3.5–5)
ALP SERPL-CCNC: 97 U/L (ref 46–116)
ALT SERPL W P-5'-P-CCNC: 20 U/L (ref 12–78)
ANION GAP SERPL CALCULATED.3IONS-SCNC: 7 MMOL/L (ref 4–13)
AST SERPL W P-5'-P-CCNC: 12 U/L (ref 5–45)
BASOPHILS # BLD AUTO: 0.01 THOUSANDS/ΜL (ref 0–0.1)
BASOPHILS NFR BLD AUTO: 0 % (ref 0–1)
BILIRUB SERPL-MCNC: 0.25 MG/DL (ref 0.2–1)
BUN SERPL-MCNC: 8 MG/DL (ref 5–25)
CALCIUM SERPL-MCNC: 9.2 MG/DL (ref 8.3–10.1)
CANCER AG27-29 SERPL-ACNC: 55.1 U/ML (ref 0–42.3)
CHLORIDE SERPL-SCNC: 108 MMOL/L (ref 100–108)
CO2 SERPL-SCNC: 24 MMOL/L (ref 21–32)
CREAT SERPL-MCNC: 0.54 MG/DL (ref 0.6–1.3)
EOSINOPHIL # BLD AUTO: 0 THOUSAND/ΜL (ref 0–0.61)
EOSINOPHIL NFR BLD AUTO: 0 % (ref 0–6)
ERYTHROCYTE [DISTWIDTH] IN BLOOD BY AUTOMATED COUNT: 20.1 % (ref 11.6–15.1)
GFR SERPL CREATININE-BSD FRML MDRD: 107 ML/MIN/1.73SQ M
GLUCOSE P FAST SERPL-MCNC: 108 MG/DL (ref 65–99)
HCT VFR BLD AUTO: 35.8 % (ref 34.8–46.1)
HGB BLD-MCNC: 11.3 G/DL (ref 11.5–15.4)
LYMPHOCYTES # BLD AUTO: 0.68 THOUSANDS/ΜL (ref 0.6–4.47)
LYMPHOCYTES NFR BLD AUTO: 11 % (ref 14–44)
MCH RBC QN AUTO: 29 PG (ref 26.8–34.3)
MCHC RBC AUTO-ENTMCNC: 31.6 G/DL (ref 31.4–37.4)
MCV RBC AUTO: 92 FL (ref 82–98)
MONOCYTES # BLD AUTO: 0.64 THOUSAND/ΜL (ref 0.17–1.22)
MONOCYTES NFR BLD AUTO: 11 % (ref 4–12)
NEUTROPHILS # BLD AUTO: 4.76 THOUSANDS/ΜL (ref 1.85–7.62)
NEUTS SEG NFR BLD AUTO: 78 % (ref 43–75)
NRBC BLD AUTO-RTO: 0 /100 WBCS
PLATELET # BLD AUTO: 728 THOUSANDS/UL (ref 149–390)
PMV BLD AUTO: 9 FL (ref 8.9–12.7)
POTASSIUM SERPL-SCNC: 4.4 MMOL/L (ref 3.5–5.3)
PROT SERPL-MCNC: 7.5 G/DL (ref 6.4–8.2)
RBC # BLD AUTO: 3.89 MILLION/UL (ref 3.81–5.12)
SODIUM SERPL-SCNC: 139 MMOL/L (ref 136–145)
WBC # BLD AUTO: 6.12 THOUSAND/UL (ref 4.31–10.16)

## 2018-01-17 PROCEDURE — 80053 COMPREHEN METABOLIC PANEL: CPT

## 2018-01-17 PROCEDURE — 86300 IMMUNOASSAY TUMOR CA 15-3: CPT

## 2018-01-17 PROCEDURE — 36415 COLL VENOUS BLD VENIPUNCTURE: CPT

## 2018-01-17 PROCEDURE — 85025 COMPLETE CBC W/AUTO DIFF WBC: CPT

## 2018-01-17 NOTE — MISCELLANEOUS
Message   Recorded as Task   Date: 08/09/2017 01:10 PM, Created By: Allen Oquendo   Task Name: Med Renewal Request   Assigned To: [facility]   Regarding Patient: Jeannine Murphy, Status: Active   CommentNorris Mccormick - 09 Aug 2017 1:10 PM     TASK CREATED  Caller: Jodi Alonso, Adult Child; [phone]  Pt's daughter states pt is having side effects to Hydromorphone, though it was helping with pain  Side effects include dizziness and confusion  Daughter advised pt to discontinue use until she speaks with you  Requesting Fentanyl patch rx  Carla Shine - 10 Aug 2017 8:15 AM     TASK REPLIED TO: Previously Assigned To Carla Shine  Called - no answer   No answer  Left message that fentanyl patches are long acting so can not be a substitute for dilaudid pills  Suggested cutting dilaudid pills in half to see if she could maintain symptom control but minimize side effects  Signatures   Electronically signed by :  SILVIA Mitchell ; Aug 10 2017  1:32PM EST                       (Author)

## 2018-01-17 NOTE — RESULT NOTES
Verified Results  * MRI LUMBAR SPINE WO CONTRAST 85Ssj6856 05:25PM Alyssa Pointer Order Number: [id number]   Performing Comments: Please include sacrum   - Patient Instructions: SCHEDULED AT [place] [address] ON 8/3/16 AT 5:30PM   To cancel or reschedule this appointment, please contact Central Scheduling at [phone]  Test Name Result Flag Reference   MRI LUMBAR SPINE [address] (Report)     MRI LUMBAR SPINE WITHOUT CONTRAST     INDICATION: 43-year-old female, breast cancer, back pain     COMPARISON: 2/4/2016 PET/CT; 5/23/2016 chest, abdomen and pelvis CT     TECHNIQUE: Sagittal T1, sagittal T2, sagittal inversion recovery, axial T1 and axial T2, coronal T2       IMAGE QUALITY: Diagnostic     FINDINGS:     ALIGNMENT: Normal alignment of the lumbar spine  No compression fracture  No spondylolysis or spondylolisthesis  No scoliosis  MARROW SIGNAL:    A faint region of diminished T1 weighted signal intensity and increased STIR signal intensity involves the left side of the L3 vertebral body measuring approximately 1 8 cm  An additional similar focus measuring approximately 1 6 cm involves the left    side of the L5 vertebral body at the base of the pedicle  These findings are suspicious for metastases,, consistent with prior PET/CT  Direct comparison of lesion size is difficult due to differing modalities  No additional metastatic foci are    identified within the lumbar segments  DISTAL CORD AND CONUS: Normal size and signal within the distal cord and conus  The conus ends at the L1 level  PARASPINAL SOFT TISSUES: Paraspinal soft tissues are unremarkable  SACRUM: Normal signal within the sacrum  No evidence of insufficiency or stress fracture  LOWER THORACIC DISC SPACES: Normal disc height and signal  No disc herniation, canal stenosis or foraminal narrowing       LUMBAR DISC SPACES:        L1-L2: Normal      L2-L3: Normal      L3-L4: Normal      L4-L5: Normal disc, mild degenerative facet hypertrophy, no stenosis     L5-S1: Mild degenerative disc disease, mild degenerative facet hypertrophy, small right central disc protrusion, possible right S1 nerve root encroachment         IMPRESSION:   Persistent evidence of osseous metastases involving the L3 and L5 vertebral bodies, corresponding to foci identified on prior PET/CT     Small right central disc protrusion L5-S1, possible right S1 nerve root encroachment       ##imslh##imslh       Workstation performed: [id number]     Signed by:   Radha Lagos MD   8/4/16

## 2018-01-18 RX ORDER — SODIUM CHLORIDE 9 MG/ML
20 INJECTION, SOLUTION INTRAVENOUS CONTINUOUS
Status: DISCONTINUED | OUTPATIENT
Start: 2018-01-19 | End: 2018-01-22 | Stop reason: HOSPADM

## 2018-01-18 NOTE — PROGRESS NOTES
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)    Plan  Brain metastases    · * CT CHEST ABDOMEN PELVIS W CONTRAST; Status:Active; Requested for:66Umm5066  10:30AM;    Perform:[facility]; IXI:[id number]; Last Updated By:Nasreen Pike; 6/30/2017 10:42:00 AM;Ordered; For:Brain metastases; Ordered By:Raissa Vance;    Discussion/Summary    49year old woman with metastatic brain lesions from her breast CT  Underwent SRS to right frontal, left temporal, right occipital lesions on 5/3/17  Patient seen in conjunction with Dr Freida Mar  Imaging shows complete or near complete response of these lesions  Patient does have persistent back pain  She has lesions at T11 (small), L3, L5, and underwent cEBRT L2-S1 with Dr Freida Mar 8/2016  This did not improve her back pain at that time  Dr Freida Mar had already ordered MRI studies of her spine, for which she is scheduled next week  Dr Freida Mar and I agreed the patient should have a follow-up MRI brain in 3 months, and he has ordered that study  Metrics: ECOG 2, KPS 70, EQ5D5L 16887 or 0 687, VAS 40  Chief Complaint  Patient presents s/p SRS with new brain MRI      Post-Op  Post-Op Crani-Brain:   Lev Rojas is status post stereotactic radiosurgery (Frameless Stereotactic Radiosurgery for 3 simple brain metastases)  performed on 5/3/17  History of Present Illness: The patient reports vertigo and upper extremity weakness, but no speech disturbances, no visual complaints, no neck stiffness, no fever, no facial weakness, no cognitive difficulties, no wound drainage, no ataxia, no lower extremity weakness and no photophobia    The patient presents with complaints of occasional episodes of dizziness, described as vertigo and loss of balance  Episodes started about 4 months ago  Symptoms are unchanged     The patient presents with complaints of headache (twice a week)   Physical Examination: Test Results:   Assessment:   Plan:     Brain Metastasis: The patient is being seen for a routine clinic follow-up of brain metastasis  Tumor is located in the right frontal lobe, right parietal lobe and left temporal lobe  The primary malignancy is presumed to be breast cancer  Past treatment has included chemotherapy and stereotactic radiosurgery  Tumors of the Spinal Column: The patient is being seen for a routine clinic follow-up of a tumor of the spinal column  The tumor is located at T 11, at L 3, 5  The tumor is presumed to be a metastasis from breast CA  Past treatment has included radiation therapy to the spinal site  Symptoms:  back pain  Review of Systems    Constitutional: recent 3 lb weight gain, but no fever, not feeling poorly and no chills    The patient presents with complaints of constant episodes of feeling tired  Eyes: No complaints of eye pain, no red eyes, no eyesight problems, no discharge, no dry eyes, no itching of eyes  ENT: hoarseness and difficulty swallowing -, but no earache, no nosebleeds, no sore throat and no nasal discharge    The patient presents with complaints of mild hearing loss  Cardiovascular: No complaints of slow heart rate, no fast heart rate, no chest pain, no palpitations, no leg claudication, no lower extremity edema  Respiratory: shortness of breath and wheezing, but no cough, no orthopnea, no shortness of breath during exertion and no PND  Gastrointestinal: abdominal pain    The patient presents with complaints of intermittent episodes of nausea  Episodes started about 1 month ago  The patient presents with complaints of occasional episodes of vomiting  Episodes started about 1 month ago  The patient presents with complaints of constipation (last time was Friday 6/30/17)   Genitourinary: No complaints of dysuria, no incontinence, no pelvic pain, no dysmenorrhea, no vaginal discharge or bleeding     Musculoskeletal: limb pain (right arm), pain in the upper arm (right) and lower back pain  Integumentary: possible bug bites in posterior right arm, but no breast pain and no breast lump  Neurological: numbness (both feet) and tingling (both feet), but no memory loss    The patient presents with complaints of occasional episodes of mild right frontal and right temporal headache (twice a week)  The patient presents with complaints of occasional episodes of dizziness, described as vertigo and loss of balance  Episodes started about 4 months ago  Symptoms are unchanged  feelings of weakness   Hematologic/Lymphatic: a tendency for easy bleeding and a tendency for easy bruising, but no swollen glands and no swollen glands in the neck  Active Problems    1  Abdominal distension (787 3) (R14 0)   2  Axillary adenopathy (785 6) (R59 0)   3  Bone metastases (198 5) (C79 51)   4  Brain metastases (198 3) (C79 31)   5  Breast cancer (174 9) (C50 919)   6  Cancer related pain (338 3) (G89 3)   7  Chemotherapy-induced nausea (787 02,E933 1) (R11 0,T45  1X5A)   8  Constipation (564 00) (K59 00)   9  Decreased appetite (783 0) (R63 0)   10  Diarrhea (787 91) (R19 7)   11  Difficulty sleeping (780 50) (G47 9)   12  Dizziness (780 4) (R42)   13  Dyspareunia, female (625 0) (N94 10)   14  Edema of upper extremity (782 3) (R60 0)   15  Encounter for monitoring cardiotoxic drug therapy (V58 83,V58 69) (Z51 81,Z79 899)   16  Headache (784 0) (R51)   17  Herniated lumbar intervertebral disc (722 10) (M51 26)   18  Leg weakness (729 89) (R29 898)   19  Lung nodules (793 19) (R91 8)   20  Lymphadenopathy (785 6) (R59 1)   21  Lymphedema (457 1) (I89 0)   22  Mediastinal lymphadenopathy (785 6) (R59 0)   23  Vaginal pain (625 9) (R10 2)    Social History    · Alcohol use (V49 89) (Z78 9)   · Weekends   · Denied: Drug use (305 90) (F19 90)   · Occupation   · [employer]   · Smoker (305 1) (F17 200)    Current Meds   1   Anastrozole 1 MG Oral Tablet; TAKE 1 TABLET DAILY; Therapy: 79BAX7465 to (Evaluate:77Kfx0287)  Requested for: 24Nrk0799; Last   Rx:28Jul2016 Ordered   2  Dexamethasone 1 MG Oral Tablet; TAKE 1 TABLET DAILY for 2 weeks then 1 every other   day until gone; Therapy: 59NFH7181 to ([phone])  Requested for: 30MWY7551; Last   Rx:29Jun2017 Ordered   3  Doxycycline Hyclate 100 MG Oral Capsule; TAKE 1 CAPSULE EVERY 12 HOURS DAILY; Therapy: (Ryder Hodge) to Recorded   4  Gabapentin 300 MG Oral Capsule; TAKE 1 CAPSULE 3 TIMES DAILY; Therapy: 68PEW8425 to (Evaluate:00Ryb8362)  Requested for: 34VUO9907; Last   Rx:01Jun2017 Ordered   5  HYDROmorphone HCl - 8 MG Oral Tablet; TAKE 1 TABLET EVERY 4 HOURS AS   NEEDED FOR BREAKTHROUGH PAIN;   Therapy: 71JVV0993 to (Evaluate:53Gca4140); Last Rx:29Jun2017 Ordered   6  Lactulose 20 GM/30ML Oral Solution; 30 mL by mouth three times daily; Therapy: 35DPV1770 to (Reyes Lace)  Requested for: 41CXA7732; Last   Rx:74Bsx0241 Ordered   7  LORazepam 0 5 MG Oral Tablet; TAKE 1 TABLET EVERY 6 HOURS AS NEEDED FOR   NAUSEA; Therapy: 07Tvz4703 to (Evaluate:17Jun2017); Last Rx:91Wmt2075 Ordered   8  Methadone HCl - 5 MG Oral Tablet; TAKE 1 TABLET 3 times daily for pain; Therapy: 62QHT2702 to (Evaluate:49Fla9622); Last Rx:29Jun2017 Ordered   9  OLANZapine 10 MG Oral Tablet; TAKE 1 TABLET AT BEDTIME; Therapy: 37RAD7874 to (Evaluate:75Wdv0901)  Requested for: 58UUS2574; Last   Rx:29Jun2017 Ordered   10  Promethazine HCl - 25 MG Oral Tablet; TAKE 1 TABLET EVERY 6 HOURS AS NEEDED    FOR NAUSEA; Therapy: 74STF0532 to (Evaluate:19Plj1184)  Requested for: 35STE0570; Last    NO:56NCV6332 Ordered   11  Relistor 150 MG Oral Tablet; Take 3 tabs by mouth each morning on empty stomach; Therapy: 99ZVE2385 to (Evaluate:84Cnf2701)  Requested for: 28TCQ6142; Last    Rx:07Jun2017 Ordered   12   Vagifem 10 MCG Vaginal Tablet; INSERT 1 TABLET INTRAVAGINALLY WEEKLY as    needed to prevent excessive vaginal dryness; Therapy: 22TKA6451 to (Evaluate:17Jan2017)  Requested for: 56SSH4422; Last    Rx:19Oct2016 Ordered    Allergies    1  No Known Drug Allergies    Vitals   Recorded: 08UZT1482 01:00PM   Temperature 98 1 F   Heart Rate 76   Respiration 20   Systolic 500   Diastolic 78   Height 5 ft 5 5 in   Weight 153 lb 2 oz   BMI Calculated 25 09   BSA Calculated 1 78   Pain Scale 6     Future Appointments    Date/Time Provider Specialty Site   07/26/2017 10:30 AM [facility] [place] [facility] [address] [place] [address] ONCOLOGY [place]   07/27/2017 09:40 AM SILVIA [place]   [address]     Signatures   Electronically signed by : Bret Oliva MD PhD; Jul 5 2017  1:47PM EST                       (Author)

## 2018-01-18 NOTE — MISCELLANEOUS
Message   Recorded as Task   Date: 04/26/2017 12:02 PM, Created By: Francesca Cho   Task Name: Care Coordination   Assigned To: Caty Braden   Regarding Patient: Baron Dang, Status: Active   Comment:    PinkyVeronica - 26 Apr 2017 12:02 PM     TASK CREATED  Pt will be getting SRS on 5/3- was to get chemo 5/5  Dr Tacho Silva wants chemo moved to the following week  Please call pt to give her the new schedule and she will need her f/u appt moved as well (was scheduled for 5/8)  Thanks!!!   Chemotherapy rescheduled to 5/11/17 - follow up in office moved to 5/17/17      Active Problems    1  Axillary adenopathy (785 6) (R59 0)   2  Bone metastases (198 5) (C79 51)   3  Brain metastases (198 3) (C79 31)   4  Breast cancer (174 9) (C50 919)   5  Cancer related pain (338 3) (G89 3)   6  Chemotherapy-induced nausea (787 02,E933 1) (R11 0,T45  1X5A)   7  Constipation (564 00) (K59 00)   8  Decreased appetite (783 0) (R63 0)   9  Diarrhea (787 91) (R19 7)   10  Difficulty sleeping (780 50) (G47 9)   11  Dizziness (780 4) (R42)   12  Dyspareunia, female (625 0) (N94 10)   13  Edema of upper extremity (782 3) (R60 0)   14  Encounter for monitoring cardiotoxic drug therapy (V58 83,V58 69) (Z51 81,Z79 899)   15  Fall (E888 9) (W19 XXXA)   16  Fever (780 60) (R50 9)   17  Headache (784 0) (R51)   18  Herniated lumbar intervertebral disc (722 10) (M51 26)   19  Lung nodules (793 19) (R91 8)   20  Lymphadenopathy (785 6) (R59 1)   21  Lymphedema (457 1) (I89 0)   22  Mediastinal lymphadenopathy (785 6) (R59 0)   23  Oral thrush (112 0) (B37 0)   24  Vaginal pain (625 9) (R10 2)    Current Meds   1  Anastrozole 1 MG Oral Tablet; TAKE 1 TABLET DAILY; Therapy: 37BEN8048 to (Evaluate:31Xcp7545)  Requested for: 28Jul2016; Last   Rx:28Jul2016 Ordered   2  Dexamethasone 2 MG Oral Tablet; TAKE 1 TABLET every morning with food; Therapy: 23KNZ6585 to (Dmitri Cordon)  Requested for: 03Apr2017; Last   Rx:03Apr2017 Ordered   3  HYDROmorphone HCl - 8 MG Oral Tablet (Dilaudid); TAKE 1 TABLET EVERY 4 HOURS   AS NEEDED FOR BREAKTHROUGH PAIN;   Therapy: 19ROI5289 to (Evaluate:03May2017); Last Rx:03Apr2017 Ordered   4  Lactulose 20 GM/30ML Oral Solution; 30 mL by mouth three times daily; Therapy: 71TPE3091 to (Gisella Valencia)  Requested for: 23EEA4689; Last   Rx:93Iyu2173 Ordered   5  LORazepam 0 5 MG Oral Tablet; TAKE 1 TABLET EVERY 6 HOURS AS NEEDED FOR   NAUSEA; Therapy: 47Svb8004 to (Evaluate:17Jun2017); Last Rx:18Apr2017 Ordered   6  Methadone HCl - 5 MG Oral Tablet; TAKE 1 TABLET 3 times daily for pain; Therapy: 56BEK1320 to (Evaluate:03May2017); Last Rx:03Apr2017 Ordered   7  Nortriptyline HCl - 50 MG Oral Capsule; TAKE 1 CAPSULE AT BEDTIME; Therapy: 65MQS8033 to ([id number])  Requested for: 35GHB2900; Last   Rx:10Jan2017 Ordered   8  OLANZapine 5 MG Oral Tablet; TAKE 1 TABLET AT BEDTIME; Therapy: 57SPX9945 to (Evaluate:21May2017)  Requested for: 13JLZ2769; Last   Rx:22Mar2017 Ordered   9  Omeprazole 20 MG Oral Capsule Delayed Release; TAKE 1 CAPSULE EVERY DAY; Therapy: (Recorded:26Apr2017) to Recorded   10  Promethazine HCl - 25 MG Oral Tablet; TAKE 1 TABLET EVERY 6 HOURS AS NEEDED    FOR NAUSEA; Therapy: 45ATW1625 to (Evaluate:19Apr2017)  Requested for: 03Apr2017; Last    Rx:03Apr2017 Ordered   11  Vagifem 10 MCG Vaginal Tablet; INSERT 1 TABLET INTRAVAGINALLY WEEKLY as    needed to prevent excessive vaginal dryness; Therapy: 00SFM8616 to (Evaluate:17Jan2017)  Requested for: 81TDS8050; Last    Rx:19Oct2016 Ordered    Allergies    1   No Known Drug Allergies    Signatures   Electronically signed by : Osorio Kwon, ; Apr 26 2017  1:53PM EST                       (Author)

## 2018-01-19 ENCOUNTER — HOSPITAL ENCOUNTER (OUTPATIENT)
Dept: INFUSION CENTER | Facility: CLINIC | Age: 58
Discharge: HOME | End: 2018-01-19
Payer: COMMERCIAL

## 2018-01-19 VITALS
HEIGHT: 65 IN | TEMPERATURE: 98.8 F | HEART RATE: 94 BPM | WEIGHT: 138 LBS | RESPIRATION RATE: 20 BRPM | BODY MASS INDEX: 22.99 KG/M2 | OXYGEN SATURATION: 98 % | SYSTOLIC BLOOD PRESSURE: 110 MMHG | DIASTOLIC BLOOD PRESSURE: 70 MMHG

## 2018-01-19 PROCEDURE — 96413 CHEMO IV INFUSION 1 HR: CPT

## 2018-01-19 PROCEDURE — 96377 APPLICATON ON-BODY INJECTOR: CPT

## 2018-01-19 PROCEDURE — 96417 CHEMO IV INFUS EACH ADDL SEQ: CPT

## 2018-01-19 PROCEDURE — 96415 CHEMO IV INFUSION ADDL HR: CPT

## 2018-01-19 PROCEDURE — 96368 THER/DIAG CONCURRENT INF: CPT

## 2018-01-19 RX ADMIN — ONDANSETRON 16 MG: 2 INJECTION INTRAMUSCULAR; INTRAVENOUS at 08:43

## 2018-01-19 RX ADMIN — SODIUM CHLORIDE 20 ML/HR: 0.9 INJECTION, SOLUTION INTRAVENOUS at 08:40

## 2018-01-19 RX ADMIN — DOCETAXEL 128 MG: 20 INJECTION, SOLUTION, CONCENTRATE INTRAVENOUS at 10:54

## 2018-01-19 RX ADMIN — SODIUM CHLORIDE 150 MG: 0.9 INJECTION, SOLUTION INTRAVENOUS at 09:10

## 2018-01-19 RX ADMIN — PERTUZUMAB 420 MG: 30 INJECTION, SOLUTION, CONCENTRATE INTRAVENOUS at 09:45

## 2018-01-19 RX ADMIN — TRASTUZUMAB 384 MG: 150 INJECTION, POWDER, LYOPHILIZED, FOR SOLUTION INTRAVENOUS at 10:22

## 2018-01-19 RX ADMIN — PEGFILGRASTIM 6 MG: KIT SUBCUTANEOUS at 12:07

## 2018-01-19 NOTE — PROGRESS NOTES
Pt to clinic for cycle 33 of prejeta, herceptin, taxotere, neulasta onbody, pt offers no complaints at this time, aware of next appointment, declines avs

## 2018-01-22 VITALS
TEMPERATURE: 98.1 F | BODY MASS INDEX: 24.61 KG/M2 | SYSTOLIC BLOOD PRESSURE: 116 MMHG | RESPIRATION RATE: 20 BRPM | WEIGHT: 153.13 LBS | DIASTOLIC BLOOD PRESSURE: 78 MMHG | HEART RATE: 76 BPM | HEIGHT: 66 IN

## 2018-01-22 VITALS
HEIGHT: 66 IN | HEART RATE: 103 BPM | OXYGEN SATURATION: 97 % | SYSTOLIC BLOOD PRESSURE: 142 MMHG | TEMPERATURE: 99.7 F | DIASTOLIC BLOOD PRESSURE: 80 MMHG | BODY MASS INDEX: 24.16 KG/M2 | WEIGHT: 150.31 LBS | RESPIRATION RATE: 16 BRPM

## 2018-01-22 VITALS
HEIGHT: 66 IN | RESPIRATION RATE: 18 BRPM | DIASTOLIC BLOOD PRESSURE: 70 MMHG | BODY MASS INDEX: 22.02 KG/M2 | SYSTOLIC BLOOD PRESSURE: 110 MMHG | HEART RATE: 116 BPM | TEMPERATURE: 98.7 F | WEIGHT: 137 LBS

## 2018-01-22 VITALS
TEMPERATURE: 99 F | BODY MASS INDEX: 23.63 KG/M2 | HEIGHT: 66 IN | DIASTOLIC BLOOD PRESSURE: 82 MMHG | RESPIRATION RATE: 18 BRPM | WEIGHT: 147 LBS | OXYGEN SATURATION: 97 % | SYSTOLIC BLOOD PRESSURE: 124 MMHG | HEART RATE: 107 BPM

## 2018-01-22 VITALS
HEIGHT: 66 IN | TEMPERATURE: 98 F | SYSTOLIC BLOOD PRESSURE: 104 MMHG | WEIGHT: 147.31 LBS | BODY MASS INDEX: 23.67 KG/M2 | RESPIRATION RATE: 18 BRPM | OXYGEN SATURATION: 97 % | HEART RATE: 117 BPM | DIASTOLIC BLOOD PRESSURE: 70 MMHG

## 2018-01-22 VITALS
WEIGHT: 152 LBS | BODY MASS INDEX: 24.43 KG/M2 | SYSTOLIC BLOOD PRESSURE: 120 MMHG | DIASTOLIC BLOOD PRESSURE: 76 MMHG | OXYGEN SATURATION: 95 % | HEIGHT: 66 IN | RESPIRATION RATE: 16 BRPM | HEART RATE: 110 BPM | TEMPERATURE: 99.3 F

## 2018-01-24 VITALS
HEIGHT: 66 IN | BODY MASS INDEX: 23 KG/M2 | HEART RATE: 117 BPM | TEMPERATURE: 99.2 F | DIASTOLIC BLOOD PRESSURE: 60 MMHG | RESPIRATION RATE: 16 BRPM | OXYGEN SATURATION: 96 % | SYSTOLIC BLOOD PRESSURE: 102 MMHG | WEIGHT: 143.13 LBS

## 2018-01-24 VITALS
OXYGEN SATURATION: 95 % | WEIGHT: 141 LBS | DIASTOLIC BLOOD PRESSURE: 60 MMHG | SYSTOLIC BLOOD PRESSURE: 90 MMHG | BODY MASS INDEX: 22.66 KG/M2 | HEIGHT: 66 IN | RESPIRATION RATE: 18 BRPM | HEART RATE: 110 BPM | TEMPERATURE: 99 F

## 2018-02-06 ENCOUNTER — APPOINTMENT (OUTPATIENT)
Dept: LAB | Facility: MEDICAL CENTER | Age: 58
End: 2018-02-06
Payer: COMMERCIAL

## 2018-02-06 ENCOUNTER — TRANSCRIBE ORDERS (OUTPATIENT)
Dept: ADMINISTRATIVE | Facility: HOSPITAL | Age: 58
End: 2018-02-06

## 2018-02-06 ENCOUNTER — OFFICE VISIT (OUTPATIENT)
Dept: PALLIATIVE MEDICINE | Facility: CLINIC | Age: 58
End: 2018-02-06
Payer: COMMERCIAL

## 2018-02-06 VITALS
WEIGHT: 155 LBS | RESPIRATION RATE: 20 BRPM | HEART RATE: 108 BPM | BODY MASS INDEX: 25.79 KG/M2 | TEMPERATURE: 98.7 F | SYSTOLIC BLOOD PRESSURE: 114 MMHG | DIASTOLIC BLOOD PRESSURE: 70 MMHG

## 2018-02-06 DIAGNOSIS — G89.3 CANCER RELATED PAIN: Primary | ICD-10-CM

## 2018-02-06 DIAGNOSIS — C79.52 SECONDARY MALIGNANT NEOPLASM OF BONE AND BONE MARROW (HCC): ICD-10-CM

## 2018-02-06 DIAGNOSIS — T45.1X5A CHEMOTHERAPY-INDUCED NAUSEA: ICD-10-CM

## 2018-02-06 DIAGNOSIS — C79.31 SECONDARY MALIGNANT NEOPLASM OF BRAIN AND SPINAL CORD (HCC): ICD-10-CM

## 2018-02-06 DIAGNOSIS — Z17.0 BILATERAL MALIGNANT NEOPLASM OF BREAST IN FEMALE, ESTROGEN RECEPTOR POSITIVE, UNSPECIFIED SITE OF BREAST (HCC): ICD-10-CM

## 2018-02-06 DIAGNOSIS — C50.919 MALIGNANT NEOPLASM OF FEMALE BREAST, UNSPECIFIED ESTROGEN RECEPTOR STATUS, UNSPECIFIED LATERALITY, UNSPECIFIED SITE OF BREAST (HCC): Primary | ICD-10-CM

## 2018-02-06 DIAGNOSIS — I89.0 LYMPHEDEMA OF RIGHT UPPER EXTREMITY: ICD-10-CM

## 2018-02-06 DIAGNOSIS — R42 DIZZINESS: ICD-10-CM

## 2018-02-06 DIAGNOSIS — C50.911 BILATERAL MALIGNANT NEOPLASM OF BREAST IN FEMALE, ESTROGEN RECEPTOR POSITIVE, UNSPECIFIED SITE OF BREAST (HCC): ICD-10-CM

## 2018-02-06 DIAGNOSIS — R53.83 OTHER FATIGUE: ICD-10-CM

## 2018-02-06 DIAGNOSIS — C79.49 SECONDARY MALIGNANT NEOPLASM OF BRAIN AND SPINAL CORD (HCC): ICD-10-CM

## 2018-02-06 DIAGNOSIS — C79.51 SECONDARY MALIGNANT NEOPLASM OF BONE AND BONE MARROW (HCC): ICD-10-CM

## 2018-02-06 DIAGNOSIS — L85.3 DRY SKIN: ICD-10-CM

## 2018-02-06 DIAGNOSIS — R73.9 HYPERGLYCEMIA: ICD-10-CM

## 2018-02-06 DIAGNOSIS — R11.0 CHEMOTHERAPY-INDUCED NAUSEA: ICD-10-CM

## 2018-02-06 DIAGNOSIS — G47.9 DIFFICULTY SLEEPING: ICD-10-CM

## 2018-02-06 DIAGNOSIS — C50.912 BILATERAL MALIGNANT NEOPLASM OF BREAST IN FEMALE, ESTROGEN RECEPTOR POSITIVE, UNSPECIFIED SITE OF BREAST (HCC): ICD-10-CM

## 2018-02-06 LAB
ALBUMIN SERPL BCP-MCNC: 3.1 G/DL (ref 3.5–5)
ALP SERPL-CCNC: 125 U/L (ref 46–116)
ALT SERPL W P-5'-P-CCNC: 43 U/L (ref 12–78)
ANION GAP SERPL CALCULATED.3IONS-SCNC: 7 MMOL/L (ref 4–13)
AST SERPL W P-5'-P-CCNC: 19 U/L (ref 5–45)
BASOPHILS # BLD AUTO: 0.03 THOUSANDS/ΜL (ref 0–0.1)
BASOPHILS NFR BLD AUTO: 0 % (ref 0–1)
BILIRUB SERPL-MCNC: 0.21 MG/DL (ref 0.2–1)
BUN SERPL-MCNC: 7 MG/DL (ref 5–25)
CALCIUM SERPL-MCNC: 8.4 MG/DL (ref 8.3–10.1)
CHLORIDE SERPL-SCNC: 108 MMOL/L (ref 100–108)
CO2 SERPL-SCNC: 27 MMOL/L (ref 21–32)
CREAT SERPL-MCNC: 0.5 MG/DL (ref 0.6–1.3)
EOSINOPHIL # BLD AUTO: 0.01 THOUSAND/ΜL (ref 0–0.61)
EOSINOPHIL NFR BLD AUTO: 0 % (ref 0–6)
ERYTHROCYTE [DISTWIDTH] IN BLOOD BY AUTOMATED COUNT: 21.5 % (ref 11.6–15.1)
GFR SERPL CREATININE-BSD FRML MDRD: 109 ML/MIN/1.73SQ M
GLUCOSE SERPL-MCNC: 115 MG/DL (ref 65–140)
HCT VFR BLD AUTO: 37 % (ref 34.8–46.1)
HGB BLD-MCNC: 11.6 G/DL (ref 11.5–15.4)
LYMPHOCYTES # BLD AUTO: 1.05 THOUSANDS/ΜL (ref 0.6–4.47)
LYMPHOCYTES NFR BLD AUTO: 9 % (ref 14–44)
MCH RBC QN AUTO: 30.3 PG (ref 26.8–34.3)
MCHC RBC AUTO-ENTMCNC: 31.4 G/DL (ref 31.4–37.4)
MCV RBC AUTO: 97 FL (ref 82–98)
MONOCYTES # BLD AUTO: 1.32 THOUSAND/ΜL (ref 0.17–1.22)
MONOCYTES NFR BLD AUTO: 12 % (ref 4–12)
NEUTROPHILS # BLD AUTO: 8.95 THOUSANDS/ΜL (ref 1.85–7.62)
NEUTS SEG NFR BLD AUTO: 79 % (ref 43–75)
NRBC BLD AUTO-RTO: 0 /100 WBCS
PLATELET # BLD AUTO: 587 THOUSANDS/UL (ref 149–390)
PMV BLD AUTO: 9.2 FL (ref 8.9–12.7)
POTASSIUM SERPL-SCNC: 4 MMOL/L (ref 3.5–5.3)
PROT SERPL-MCNC: 7 G/DL (ref 6.4–8.2)
RBC # BLD AUTO: 3.83 MILLION/UL (ref 3.81–5.12)
SODIUM SERPL-SCNC: 142 MMOL/L (ref 136–145)
WBC # BLD AUTO: 11.42 THOUSAND/UL (ref 4.31–10.16)

## 2018-02-06 PROCEDURE — 36415 COLL VENOUS BLD VENIPUNCTURE: CPT | Performed by: INTERNAL MEDICINE

## 2018-02-06 PROCEDURE — 86300 IMMUNOASSAY TUMOR CA 15-3: CPT | Performed by: INTERNAL MEDICINE

## 2018-02-06 PROCEDURE — 85025 COMPLETE CBC W/AUTO DIFF WBC: CPT | Performed by: INTERNAL MEDICINE

## 2018-02-06 PROCEDURE — 99214 OFFICE O/P EST MOD 30 MIN: CPT | Performed by: FAMILY MEDICINE

## 2018-02-06 PROCEDURE — 80053 COMPREHEN METABOLIC PANEL: CPT | Performed by: INTERNAL MEDICINE

## 2018-02-06 RX ORDER — DEXAMETHASONE 2 MG/1
2 TABLET ORAL
Qty: 30 TABLET | Refills: 2 | Status: SHIPPED | OUTPATIENT
Start: 2018-02-06 | End: 2018-03-26 | Stop reason: SDUPTHER

## 2018-02-06 RX ORDER — OLANZAPINE 10 MG/1
1 TABLET ORAL DAILY
COMMUNITY
Start: 2017-02-14 | End: 2018-02-06 | Stop reason: DRUGHIGH

## 2018-02-06 RX ORDER — HYDROMORPHONE HYDROCHLORIDE 8 MG/1
8 TABLET ORAL EVERY 8 HOURS PRN
Qty: 75 TABLET | Refills: 0 | Status: SHIPPED | OUTPATIENT
Start: 2018-02-06 | End: 2018-03-26 | Stop reason: SDUPTHER

## 2018-02-06 RX ORDER — NORTRIPTYLINE HYDROCHLORIDE 25 MG/1
25 CAPSULE ORAL
Qty: 30 CAPSULE | Refills: 2 | Status: SHIPPED | OUTPATIENT
Start: 2018-02-06 | End: 2018-03-26

## 2018-02-06 RX ORDER — OLANZAPINE 5 MG/1
5 TABLET ORAL
Qty: 30 TABLET | Refills: 2 | Status: SHIPPED | OUTPATIENT
Start: 2018-02-06 | End: 2018-06-18

## 2018-02-06 RX ORDER — PROMETHAZINE HYDROCHLORIDE 25 MG/1
25 TABLET ORAL EVERY 6 HOURS PRN
Qty: 60 TABLET | Refills: 2 | Status: SHIPPED | OUTPATIENT
Start: 2018-02-06 | End: 2018-08-23 | Stop reason: SDUPTHER

## 2018-02-06 RX ORDER — GABAPENTIN 300 MG/1
1 CAPSULE ORAL 3 TIMES DAILY
COMMUNITY
Start: 2017-05-01 | End: 2018-03-26 | Stop reason: SDUPTHER

## 2018-02-06 RX ORDER — PROMETHAZINE HYDROCHLORIDE 25 MG/1
1 TABLET ORAL EVERY 6 HOURS
COMMUNITY
Start: 2017-04-03 | End: 2018-02-06 | Stop reason: SDUPTHER

## 2018-02-06 RX ORDER — NORTRIPTYLINE HYDROCHLORIDE 50 MG/1
1 CAPSULE ORAL
COMMUNITY
Start: 2017-08-31 | End: 2018-02-06 | Stop reason: DRUGHIGH

## 2018-02-06 RX ORDER — AMMONIUM LACTATE 12 G/100G
CREAM TOPICAL AS NEEDED
Qty: 385 G | Refills: 2 | Status: SHIPPED | OUTPATIENT
Start: 2018-02-06 | End: 2018-03-26 | Stop reason: SDUPTHER

## 2018-02-06 RX ORDER — SENNOSIDES 8.6 MG
2 TABLET ORAL 2 TIMES DAILY PRN
COMMUNITY
Start: 2017-08-03 | End: 2020-06-15 | Stop reason: SDUPTHER

## 2018-02-06 RX ORDER — DIAZEPAM 5 MG/1
5 TABLET ORAL
Qty: 30 TABLET | Refills: 2 | Status: SHIPPED | OUTPATIENT
Start: 2018-02-06 | End: 2018-05-02 | Stop reason: SDUPTHER

## 2018-02-06 NOTE — PROGRESS NOTES
Palliative and Supportive Care   Evelina Gray 56 y o  female [phone]    Assessment/Plan:  1  Cancer related pain    2  Bilateral malignant neoplasm of breast in female, estrogen receptor positive, unspecified site of breast ([address] )    3  Hyperglycemia    4  Lymphedema of right upper extremity    5  Other fatigue    6  Difficulty sleeping    7  Chemotherapy-induced nausea    8  Dry skin    9  Dizziness         Problem List Items Addressed This Visit        Digestive    Chemotherapy-induced nausea    Relevant Medications    promethazine (PHENERGAN) 25 mg tablet    OLANZapine (ZyPREXA) 5 mg tablet    nortriptyline (PAMELOR) 25 mg capsule       Other    Lymphedema of right upper extremity    Relevant Orders    Ambulatory referral to PT/OT lymphedema therapy    Bilateral malignant neoplasm of breast in female, estrogen receptor positive (HCC)    Cancer related pain - Primary    Relevant Medications    dexamethasone (DECADRON) 2 mg tablet    HYDROmorphone (DILAUDID) 8 MG tablet    diazepam (VALIUM) 5 mg tablet    nortriptyline (PAMELOR) 25 mg capsule    Difficulty sleeping    Relevant Medications    diazepam (VALIUM) 5 mg tablet    nortriptyline (PAMELOR) 25 mg capsule    Hyperglycemia   - lower dose of dexamethasone to 2mg and    - decrease zyprexa to 5mg      - Follow up with PCP    Other fatigue    Relevant Medications    dexamethasone (DECADRON) 2 mg tablet    Dry skin    Relevant Medications    ammonium lactate (LAC-HYDRIN) 12 % cream    Dizziness   - lower doses of zyprexa and nortriptyline            I have reviewed the patient's controlled substance dispensing history in the Prescription Drug Monitoring Program in compliance with the [facility] regulations before prescribing any controlled substances       40 minutes were spent face to face with Evelina Gray and her daughter with greater than 50% of the time spent in counseling or coordination of care including discussions of treatment instructions and follow up requirements   All of the patient's questions were answered during this discussion  No Follow-up on file  Subjective:   Chief Complaint  Follow up visit for:  symptom management  HPI     Jc London is a 56 y o  female with metastatic breast cancer  She reports that her pain is still present  She has not been able to follow through on recommendations due to an insurance gap  It is now re-instated and she will be rescheduling several things including a trip to lymphedema clinic  She struggles with dry skin on her arms  She remains very tired and has a hard time sleeping  Nausea continues to be present  She also reports frequent dizziness and some falls  The following portions of the medical history were reviewed: past medical history, problem list, medication list, and social history  Review of Systems   Constitutional: Positive for fatigue  HENT: Negative  Eyes: Negative  Respiratory: Negative  Cardiovascular: Negative  Gastrointestinal: Positive for constipation and diarrhea  Endocrine: Negative  Genitourinary: Negative  Musculoskeletal: Negative  Skin: Positive for color change  Allergic/Immunologic: Negative  Neurological: Positive for dizziness  Hematological: Bruises/bleeds easily  Psychiatric/Behavioral: Positive for sleep disturbance  Objective:  Vital Signs  /70   Pulse (!) 108   Temp 98 7 °F (37 1 °C)   Resp 20   Wt 70 3 kg (155 lb)   LMP  (LMP Unknown)   BMI 25 79 kg/m²    Physical Exam   Constitutional: She is oriented to person, place, and time  No distress  Grayish skin tone   HENT:   Head: Normocephalic and atraumatic  Eyes: Conjunctivae are normal  Right eye exhibits no discharge  Left eye exhibits no discharge  No scleral icterus  Pulmonary/Chest: Effort normal  No stridor  Abdominal: Soft  Bowel sounds are normal  She exhibits distension  Musculoskeletal: She exhibits edema  Lymphedema of her right arm    Some bruising noted at the underneath upper part of her arm   Neurological: She is alert and oriented to person, place, and time  Skin: Skin is warm and dry  She is not diaphoretic  Psychiatric: She has a normal mood and affect

## 2018-02-07 LAB — CANCER AG27-29 SERPL-ACNC: 39.9 U/ML (ref 0–42.3)

## 2018-02-08 RX ORDER — SODIUM CHLORIDE 9 MG/ML
20 INJECTION, SOLUTION INTRAVENOUS CONTINUOUS
Status: DISCONTINUED | OUTPATIENT
Start: 2018-02-09 | End: 2018-02-12 | Stop reason: HOSPADM

## 2018-02-09 ENCOUNTER — HOSPITAL ENCOUNTER (OUTPATIENT)
Dept: INFUSION CENTER | Facility: CLINIC | Age: 58
Discharge: HOME | End: 2018-02-09
Payer: COMMERCIAL

## 2018-02-09 ENCOUNTER — TELEPHONE (OUTPATIENT)
Dept: HEMATOLOGY ONCOLOGY | Facility: CLINIC | Age: 58
End: 2018-02-09

## 2018-02-09 VITALS
OXYGEN SATURATION: 96 % | SYSTOLIC BLOOD PRESSURE: 102 MMHG | WEIGHT: 149 LBS | HEIGHT: 65 IN | RESPIRATION RATE: 18 BRPM | HEART RATE: 104 BPM | TEMPERATURE: 98.8 F | BODY MASS INDEX: 24.83 KG/M2 | DIASTOLIC BLOOD PRESSURE: 64 MMHG

## 2018-02-09 PROCEDURE — 96401 CHEMO ANTI-NEOPL SQ/IM: CPT

## 2018-02-09 PROCEDURE — 96417 CHEMO IV INFUS EACH ADDL SEQ: CPT

## 2018-02-09 PROCEDURE — 96377 APPLICATON ON-BODY INJECTOR: CPT

## 2018-02-09 PROCEDURE — 96413 CHEMO IV INFUSION 1 HR: CPT

## 2018-02-09 PROCEDURE — 96367 TX/PROPH/DG ADDL SEQ IV INF: CPT

## 2018-02-09 RX ADMIN — SODIUM CHLORIDE 20 ML/HR: 0.9 INJECTION, SOLUTION INTRAVENOUS at 08:21

## 2018-02-09 RX ADMIN — ONDANSETRON 16 MG: 2 INJECTION INTRAMUSCULAR; INTRAVENOUS at 08:45

## 2018-02-09 RX ADMIN — TRASTUZUMAB 422 MG: 150 INJECTION, POWDER, LYOPHILIZED, FOR SOLUTION INTRAVENOUS at 10:37

## 2018-02-09 RX ADMIN — DOCETAXEL 133 MG: 20 INJECTION, SOLUTION, CONCENTRATE INTRAVENOUS at 11:13

## 2018-02-09 RX ADMIN — SODIUM CHLORIDE 150 MG: 9 INJECTION, SOLUTION INTRAVENOUS at 09:04

## 2018-02-09 RX ADMIN — DENOSUMAB 120 MG: 120 INJECTION SUBCUTANEOUS at 12:28

## 2018-02-09 RX ADMIN — PERTUZUMAB 420 MG: 30 INJECTION, SOLUTION, CONCENTRATE INTRAVENOUS at 09:41

## 2018-02-09 RX ADMIN — PEGFILGRASTIM 6 MG: KIT SUBCUTANEOUS at 12:25

## 2018-02-09 NOTE — PROGRESS NOTES
Spoke with Elizabeth Alexander regarding patient not having MUGA since August, 2017  She states she will discuss with Dr Paulie Higgins  No orders received at this time

## 2018-02-09 NOTE — PROGRESS NOTES
Patient tolerated treatment well and discharged with neulasta onpro placed to [facility]AN[facility]O for injection tomorrow 1525  Patient familiar with injection and removal of onpro  She will RTO in 3 weeks for next cycle

## 2018-02-09 NOTE — TELEPHONE ENCOUNTER
Patient has a CT chast and MRI of Brain scheduled for 2/23 @ [address] [place] [address] [address] you please try and move this appointment up  Patient would like to get the test at [facility] - just needs to be done sooner per Dr Owen Jean-Baptiste    Please call patient with appointment    Thank you

## 2018-02-09 NOTE — TELEPHONE ENCOUNTER
Scheduled pt for 2 16 18 at 8pm at \[facility]\""  Pt is aware  I will call next week to see if there is anything at a different by for now she will take it  Nothing available sooner in R

## 2018-02-09 NOTE — PLAN OF CARE
Problem: Potential for Falls  Goal: Patient will remain free of falls  INTERVENTIONS:  - Assess patient frequently for physical needs  -  Identify cognitive and physical deficits and behaviors that affect risk of falls    -  [place] fall precautions as indicated by assessment   - Educate patient/family on patient safety including physical limitations  - Instruct patient to call for assistance with activity based on assessment  - Modify environment to reduce risk of injury  - Consider OT/PT consult to assist with strengthening/mobility   Outcome: Progressing      Problem: PAIN - ADULT  Goal: Verbalizes/displays adequate comfort level or baseline comfort level  Interventions:  - Encourage patient to monitor pain and request assistance  - Assess pain using appropriate pain scale  - Administer analgesics based on type and severity of pain and evaluate response  - Implement non-pharmacological measures as appropriate and evaluate response  - Consider cultural and social influences on pain and pain management  - Notify physician/advanced practitioner if interventions unsuccessful or patient reports new pain  Outcome: Progressing      Problem: INFECTION - ADULT  Goal: Absence or prevention of progression during hospitalization  INTERVENTIONS:  - Assess and monitor for signs and symptoms of infection  - Monitor lab/diagnostic results  - Monitor all insertion sites, i e  indwelling lines, tubes, and drains  - Monitor endotracheal (as able) and nasal secretions for changes in amount and color  - [place] appropriate cooling/warming therapies per order  - Administer medications as ordered  - Instruct and encourage patient and family to use good hand hygiene technique  - Identify and instruct in appropriate isolation precautions for identified infection/condition  Outcome: Progressing

## 2018-02-16 ENCOUNTER — HOSPITAL ENCOUNTER (OUTPATIENT)
Dept: RADIOLOGY | Facility: HOSPITAL | Age: 58
Discharge: HOME | End: 2018-02-16
Attending: INTERNAL MEDICINE
Payer: COMMERCIAL

## 2018-02-16 DIAGNOSIS — C79.49 SECONDARY MALIGNANT NEOPLASM OF BRAIN AND SPINAL CORD (HCC): ICD-10-CM

## 2018-02-16 DIAGNOSIS — C50.919 MALIGNANT NEOPLASM OF FEMALE BREAST, UNSPECIFIED ESTROGEN RECEPTOR STATUS, UNSPECIFIED LATERALITY, UNSPECIFIED SITE OF BREAST (HCC): ICD-10-CM

## 2018-02-16 DIAGNOSIS — C79.31 SECONDARY MALIGNANT NEOPLASM OF BRAIN AND SPINAL CORD (HCC): ICD-10-CM

## 2018-02-16 PROCEDURE — 71260 CT THORAX DX C+: CPT

## 2018-02-16 PROCEDURE — 70553 MRI BRAIN STEM W/O & W/DYE: CPT

## 2018-02-16 PROCEDURE — A9585 GADOBUTROL INJECTION: HCPCS | Performed by: RADIOLOGY

## 2018-02-16 PROCEDURE — 74177 CT ABD & PELVIS W/CONTRAST: CPT

## 2018-02-16 RX ADMIN — GADOBUTROL 6 ML: 604.72 INJECTION INTRAVENOUS at 21:10

## 2018-02-16 RX ADMIN — IOHEXOL 100 ML: 350 INJECTION, SOLUTION INTRAVENOUS at 20:03

## 2018-02-21 ENCOUNTER — EVALUATION (OUTPATIENT)
Dept: PHYSICAL THERAPY | Facility: CLINIC | Age: 58
End: 2018-02-21
Payer: COMMERCIAL

## 2018-02-21 DIAGNOSIS — I89.0 LYMPHEDEMA OF RIGHT UPPER EXTREMITY: ICD-10-CM

## 2018-02-21 PROCEDURE — G8985 CARRY GOAL STATUS: HCPCS | Performed by: PHYSICAL THERAPIST

## 2018-02-21 PROCEDURE — 97162 PT EVAL MOD COMPLEX 30 MIN: CPT | Performed by: PHYSICAL THERAPIST

## 2018-02-21 PROCEDURE — G8984 CARRY CURRENT STATUS: HCPCS | Performed by: PHYSICAL THERAPIST

## 2018-02-21 NOTE — PROGRESS NOTES
PT Evaluation     Today's date: 2018  Patient name: Marichuy Borrero  : 1960  MRN: [id number]  Referring provider: Todd Mayberry MD  Dx:   Encounter Diagnosis     ICD-10-CM    1  Lymphedema of right upper extremity I89 0 Ambulatory referral to PT/OT lymphedema therapy                  Assessment  Impairments: abnormal or restricted ROM, abnormal movement, impaired balance, impaired physical strength, pain with function and scapular dyskinesis  Other impairment: swelling    Assessment details: Marichuy Borrero is a 56 y o  female with diagnosis of lymphedema  She presents with edema, decreased ROM, strength and function  She will benefit from skilled physical therapy for Manual lymph drainage and complete decongestive therapy consisting of compression sleeve of 20-30 mmHG as well as possible use of compression pump, myofascial release, PROM and strengthening exercises for the lymphatic system  She has been given an home exercise program and is in agreement with the plan of care    Thank you for your referral     Barriers to therapy: metastases of cancer to the axillary lymph nodes which may be impeding flow of fluid out of UE  Understanding of Dx/Px/POC: excellent   Prognosis: fair    Goals  ST-4 weeks    Patient will be independent with donning and doffing compression garments  Patient will increase ROM by 25%  Patient will increase Strength by 25%  Patient will reduce pain by 25%  Patient will improve FOTO score by 25%  Patient will be able to sleep through the night  Patient is able to lift a bag of groceries  Patient able to perform all IADLS independently  Patient will reduce lymphedema by 2 cm    LT-8 weeks    Patient will abolish pain  Patient will reduce edema to wfl  Patient will have full ROM  Patient will have full strength  Patient will obtain full FOTO score  Patient will be able to reach to an overhead shelf    Plan  Planned modality interventions: biofeedback        Subjective Evaluation    History of Present Illness  Date of onset: 2010  Mechanism of injury: Patient was initially diagnosed with breast cancer on the R  She had bilateral mastectomy at that time with axillary dissection  Two years ago she had a recurrence in a lymph node on the R  Since the recurrence she has been having chemotherapy but also radiation therapy on her brain  She also notes that cancer has metastasized to the hip, brain and low back  She is currently in chemotherapy  Cc:  She complains of pain in her back, arm and headaches  She also has moderate edema in the back of her R UE and the R axilla  Function:  Patient has limitations in reaching to a top shelf in a cabinet, writing her name, bending over to pick something up  She does not drive because she gets dizzy  She reports that her UE hurts  She is able to do laundry and grocery shop but she takes her time and cannot lift anything  She had a ct scan and an MRI last week and is awaiting results  She has had treatment for lymphedema in the last year  She also obtained a new compression sleeve from her medical oncologist   She reports that she initially developed swelling in the UE most likely as a result of enlarged lymph nodes present in the axilla  She indicates that treatment was not effective        Recurrent probem  Quality of life: good    Pain  Current pain ratin  At best pain ratin  At worst pain ratin  Quality: dull ache  Relieving factors: change in position and medications  Aggravating factors: walking    Social Support  Steps to enter house: yes  Stairs in house: no   Lives in: [facility]  Lives with: significant other and adult children    Hand dominance: right      Diagnostic Tests  X-ray: abnormal  MRI studies: abnormal  CT scan: abnormal  Bone scan: abnormal        Objective     Active Range of Motion   Left Shoulder   Flexion: 138 degrees   Abduction: 145 degrees     Right Shoulder   Flexion: 112 degrees   Abduction: 103 degrees     Additional Active Range of Motion Details   Strength:  L 22 kg     R  10 kg    Swelling   Left shoulder swelling details: Lymphedema MMTs RIGHT LEFT                                                     Right                Left  palm                                         22                     19 7   wrist                               18 5                15 7  Wrist + 10 cm                               27                   18     Wrist + 16 cm                     32                   23 5  Elbow                                 32 5               24 5  Wrist + 32                                32 5               26 5   Wrist + 40                                       31 5              28 2            Flowsheet Rows    Flowsheet Row Most Recent Value   PT/OT G-Codes   Current Score  44   Assessment Type  Evaluation   G code set  Carrying, Moving & Handling Objects   Carrying, Moving and Handling Objects Current Status ()  CK   Carrying, Moving and Handling Objects Goal Status ()  CJ          Precautions: Breast CA metastasized to brain, hip and back       Daily Treatment Diary     Manual  2/21            MLD             PROM                                                        Exercise Diary  2/21            Beata             Wall Climb                                                                                                                                                                                                                                                           Modalities  2/21            Compression pump

## 2018-02-27 ENCOUNTER — APPOINTMENT (OUTPATIENT)
Dept: LAB | Facility: CLINIC | Age: 58
End: 2018-02-27
Payer: COMMERCIAL

## 2018-02-27 ENCOUNTER — OFFICE VISIT (OUTPATIENT)
Dept: PHYSICAL THERAPY | Facility: CLINIC | Age: 58
End: 2018-02-27
Payer: COMMERCIAL

## 2018-02-27 DIAGNOSIS — C79.51 SECONDARY MALIGNANT NEOPLASM OF BONE AND BONE MARROW (HCC): ICD-10-CM

## 2018-02-27 DIAGNOSIS — I89.0 LYMPHEDEMA: Primary | ICD-10-CM

## 2018-02-27 DIAGNOSIS — C79.52 SECONDARY MALIGNANT NEOPLASM OF BONE AND BONE MARROW (HCC): ICD-10-CM

## 2018-02-27 DIAGNOSIS — C79.31 SECONDARY MALIGNANT NEOPLASM OF BRAIN AND SPINAL CORD (HCC): ICD-10-CM

## 2018-02-27 DIAGNOSIS — C79.49 SECONDARY MALIGNANT NEOPLASM OF BRAIN AND SPINAL CORD (HCC): ICD-10-CM

## 2018-02-27 DIAGNOSIS — C50.919 MALIGNANT NEOPLASM OF FEMALE BREAST, UNSPECIFIED ESTROGEN RECEPTOR STATUS, UNSPECIFIED LATERALITY, UNSPECIFIED SITE OF BREAST (HCC): ICD-10-CM

## 2018-02-27 LAB
ALBUMIN SERPL BCP-MCNC: 2.7 G/DL (ref 3.5–5)
ALP SERPL-CCNC: 100 U/L (ref 46–116)
ALT SERPL W P-5'-P-CCNC: 19 U/L (ref 12–78)
ANION GAP SERPL CALCULATED.3IONS-SCNC: 7 MMOL/L (ref 4–13)
AST SERPL W P-5'-P-CCNC: 10 U/L (ref 5–45)
BASOPHILS # BLD AUTO: 0.03 THOUSANDS/ΜL (ref 0–0.1)
BASOPHILS NFR BLD AUTO: 0 % (ref 0–1)
BILIRUB SERPL-MCNC: 0.1 MG/DL (ref 0.2–1)
BUN SERPL-MCNC: 10 MG/DL (ref 5–25)
CALCIUM SERPL-MCNC: 8.5 MG/DL (ref 8.3–10.1)
CANCER AG27-29 SERPL-ACNC: 33.7 U/ML (ref 0–42.3)
CHLORIDE SERPL-SCNC: 108 MMOL/L (ref 100–108)
CO2 SERPL-SCNC: 28 MMOL/L (ref 21–32)
CREAT SERPL-MCNC: 0.45 MG/DL (ref 0.6–1.3)
EOSINOPHIL # BLD AUTO: 0.01 THOUSAND/ΜL (ref 0–0.61)
EOSINOPHIL NFR BLD AUTO: 0 % (ref 0–6)
ERYTHROCYTE [DISTWIDTH] IN BLOOD BY AUTOMATED COUNT: 19.6 % (ref 11.6–15.1)
GFR SERPL CREATININE-BSD FRML MDRD: 113 ML/MIN/1.73SQ M
GLUCOSE SERPL-MCNC: 84 MG/DL (ref 65–140)
HCT VFR BLD AUTO: 33.2 % (ref 34.8–46.1)
HGB BLD-MCNC: 10 G/DL (ref 11.5–15.4)
LYMPHOCYTES # BLD AUTO: 0.99 THOUSANDS/ΜL (ref 0.6–4.47)
LYMPHOCYTES NFR BLD AUTO: 8 % (ref 14–44)
MCH RBC QN AUTO: 29.1 PG (ref 26.8–34.3)
MCHC RBC AUTO-ENTMCNC: 30.1 G/DL (ref 31.4–37.4)
MCV RBC AUTO: 97 FL (ref 82–98)
MONOCYTES # BLD AUTO: 1.23 THOUSAND/ΜL (ref 0.17–1.22)
MONOCYTES NFR BLD AUTO: 9 % (ref 4–12)
NEUTROPHILS # BLD AUTO: 10.8 THOUSANDS/ΜL (ref 1.85–7.62)
NEUTS SEG NFR BLD AUTO: 83 % (ref 43–75)
PLATELET # BLD AUTO: 604 THOUSANDS/UL (ref 149–390)
PMV BLD AUTO: 9 FL (ref 8.9–12.7)
POTASSIUM SERPL-SCNC: 3.8 MMOL/L (ref 3.5–5.3)
PROT SERPL-MCNC: 6.5 G/DL (ref 6.4–8.2)
RBC # BLD AUTO: 3.44 MILLION/UL (ref 3.81–5.12)
SODIUM SERPL-SCNC: 143 MMOL/L (ref 136–145)
WBC # BLD AUTO: 13.06 THOUSAND/UL (ref 4.31–10.16)

## 2018-02-27 PROCEDURE — 97110 THERAPEUTIC EXERCISES: CPT

## 2018-02-27 PROCEDURE — 80053 COMPREHEN METABOLIC PANEL: CPT

## 2018-02-27 PROCEDURE — 85025 COMPLETE CBC W/AUTO DIFF WBC: CPT

## 2018-02-27 PROCEDURE — 36415 COLL VENOUS BLD VENIPUNCTURE: CPT

## 2018-02-27 PROCEDURE — 86300 IMMUNOASSAY TUMOR CA 15-3: CPT

## 2018-02-27 PROCEDURE — 97016 VASOPNEUMATIC DEVICE THERAPY: CPT | Performed by: PHYSICAL THERAPIST

## 2018-02-27 NOTE — PROGRESS NOTES
Daily Note     Today's date: 2018  Patient name: Luis Miguel Sendlencho  : 1960  MRN: [id number]  Referring provider: Donita Bauer MD  Dx:   Encounter Diagnosis     ICD-10-CM    1  Lymphedema I89 0                   Subjective: Patient reports that she has been wearing her compression garment daily  Objective: See treatment diary below  Manual             MLD             PROM                                                        Exercise Diary  27           [place]  5'           Wall Climb  5 x :10                                                                                                                                                                                                                                                         Modalities             Compression pump  30'                                                                                                                                                                                               Right                Left  palm                                         22                     19 7        21 5   20 8  wrist                               18 5                15 7                     18 5   18 2  Wrist + 10 cm                               27                   18                        30      22 5   Wrist + 16 cm                     32                   23 5                     34      32 3  Elbow                                 32 5               24 5                       31      32 4  Wrist + 32                                32 5               26 5                      35       34   Wrist + 40                                       31 5              28 2                      32       33 2        Assessment: Tolerated treatment well  Patient would benefit from continued PT  Patient watched instructional video on manual self massage    She appeared to understand the instructions reviewed with her  Plan: Progress note during next visit

## 2018-02-28 ENCOUNTER — OFFICE VISIT (OUTPATIENT)
Dept: HEMATOLOGY ONCOLOGY | Facility: CLINIC | Age: 58
End: 2018-02-28
Payer: COMMERCIAL

## 2018-02-28 VITALS
SYSTOLIC BLOOD PRESSURE: 118 MMHG | HEART RATE: 97 BPM | DIASTOLIC BLOOD PRESSURE: 68 MMHG | WEIGHT: 156 LBS | TEMPERATURE: 98 F | HEIGHT: 65 IN | RESPIRATION RATE: 16 BRPM | BODY MASS INDEX: 25.99 KG/M2

## 2018-02-28 DIAGNOSIS — C79.31 BRAIN METASTASES (HCC): Primary | ICD-10-CM

## 2018-02-28 DIAGNOSIS — C50.911 BILATERAL MALIGNANT NEOPLASM OF BREAST IN FEMALE, ESTROGEN RECEPTOR POSITIVE, UNSPECIFIED SITE OF BREAST (HCC): ICD-10-CM

## 2018-02-28 DIAGNOSIS — C50.912 BILATERAL MALIGNANT NEOPLASM OF BREAST IN FEMALE, ESTROGEN RECEPTOR POSITIVE, UNSPECIFIED SITE OF BREAST (HCC): ICD-10-CM

## 2018-02-28 DIAGNOSIS — Z17.0 BILATERAL MALIGNANT NEOPLASM OF BREAST IN FEMALE, ESTROGEN RECEPTOR POSITIVE, UNSPECIFIED SITE OF BREAST (HCC): ICD-10-CM

## 2018-02-28 DIAGNOSIS — C79.51 BONE METASTASES (HCC): ICD-10-CM

## 2018-02-28 PROCEDURE — 99215 OFFICE O/P EST HI 40 MIN: CPT | Performed by: INTERNAL MEDICINE

## 2018-02-28 RX ORDER — SODIUM CHLORIDE 9 MG/ML
20 INJECTION, SOLUTION INTRAVENOUS CONTINUOUS
Status: DISCONTINUED | OUTPATIENT
Start: 2018-03-01 | End: 2018-03-04 | Stop reason: HOSPADM

## 2018-02-28 NOTE — PROGRESS NOTES
Angy Garcia  1960  [id number] [facility]  [facility]  [address] [address]    Chief Complaint   Patient presents with    Follow-up          No history exists  History of Present Illness:  -Bob Araiza MD:  -Previous Therapy (if not listed in Oncology History):  -Current Therapy (if not listed in Oncology History):  -Disease Status:  -Interval History:  -Tumor Markers:    Review of Systems:  Review of Systems   Constitutional: Negative for appetite change, diaphoresis, fatigue and fever  HENT: Negative for sinus pain  Eyes: Negative for discharge  Respiratory: Negative for cough and shortness of breath  Cardiovascular: Negative for chest pain  Gastrointestinal: Negative for abdominal pain, constipation and diarrhea  Endocrine: Negative for cold intolerance  Genitourinary: Negative for difficulty urinating and hematuria  Musculoskeletal: Negative for joint swelling  Skin: Negative for rash  Allergic/Immunologic: Negative for environmental allergies  Neurological: Negative for dizziness and headaches  Hematological: Negative for adenopathy  Psychiatric/Behavioral: Negative for agitation         Patient Active Problem List   Diagnosis    H/O bilateral mastectomy    Lymphedema of right upper extremity    Pulmonary nodule    Bilateral malignant neoplasm of breast in female, estrogen receptor positive ([address] )    Bone metastases ([address] )    Brain metastases ([address] )    Cancer related pain    Chemotherapy-induced nausea    Constipation    Decreased appetite    Difficulty sleeping    Dyspareunia, female    Herniated lumbar intervertebral disc    Mediastinal lymphadenopathy    Hyperglycemia    Other fatigue    Dry skin    Dizziness    Secondary lymphedema     Past Medical History:   Diagnosis Date    H/O bilateral mastectomy 2/15/2016    Hypertension 2/15/2016    Lymphedema 2/15/2016    Malignant ne[facility] of right breast [facility] [address] ) 2/15/2016     Past Surgical History:   Procedure Laterality Date    ENDOBRONCHIAL ULTRASOUND (EBUS) N/A 2/16/2016    Procedure: EBUS;  Surgeon: Ruth Kapadia MD;  [facility];  Service:     MASTECTOMY Bilateral     MASTECTOMY Bilateral     right arm edema    OOPHORECTOMY      NC BRONCHOSCOPY NEEDLE BX TRACHEA MAIN STEM&/BRON N/A 2/16/2016    Procedure: Terranceelodia Leopold;  Surgeon: Ruth Kapadia MD;  [facility];  Service: Thoracic    NC STEREOTACTIC RADIOSURGERY, CRANIAL,SIMPLE,EA ADD  5/3/2017         NC STEREOTACTIC RADIOSURGERY, CRANIAL,SIMPLE,EA ADD  5/3/2017         NC STEREOTACTIC RADIOSURGERY, CRANIAL,SIMPLE,SINGLE  5/3/2017          No family history on file  Social History     Social History    Marital status: /Civil Union     Spouse name: N/A    Number of children: N/A    Years of education: N/A     Occupational History    Not on file       Social History Main Topics    Smoking status: Current Some Day Smoker     Packs/day: 0 50     Years: 20 00    Smokeless tobacco: Not on file      Comment: 18 pack years    Alcohol use Yes      Comment: social    Drug use: No    Sexual activity: Not on file     Other Topics Concern    Not on file     Social History Narrative    No narrative on file       Current Outpatient Prescriptions:     dexamethasone (DECADRON) 2 mg tablet, Take 1 tablet (2 mg total) by mouth daily with breakfast, Disp: 30 tablet, Rfl: 2    diazepam (VALIUM) 5 mg tablet, Take 1 tablet (5 mg total) by mouth daily at bedtime, Disp: 30 tablet, Rfl: 2    gabapentin (NEURONTIN) 300 mg capsule, Take 1 capsule by mouth 3 (three) times a day, Disp: , Rfl:     HYDROmorphone (DILAUDID) 8 MG tablet, Take 1 tablet (8 mg total) by mouth every 8 (eight) hours as needed for moderate pain Max Daily Amount: 24 mg, Disp: 75 tablet, Rfl: 0    nortriptyline (PAMELOR) 25 mg capsule, Take 1 capsule (25 mg total) by mouth daily at bedtime, Disp: 30 capsule, Rfl: 2    OLANZapine (ZyPREXA) 5 mg tablet, Take 1 tablet (5 mg total) by mouth daily at bedtime, Disp: 30 tablet, Rfl: 2    ondansetron (ZOFRAN) 4 mg tablet, Take 4 mg by mouth every 6 (six) hours as needed for nausea or vomiting , Disp: , Rfl:     promethazine (PHENERGAN) 25 mg tablet, Take 1 tablet (25 mg total) by mouth every 6 (six) hours as needed for nausea or vomiting, Disp: 60 tablet, Rfl: 2    senna (SENOKOT) 8 6 mg, Take 2 tablets by mouth 2 (two) times a day, Disp: , Rfl:     ammonium lactate (LAC-HYDRIN) 12 % cream, Apply topically as needed for dry skin, Disp: 385 g, Rfl: 2  No Known Allergies  Vitals:    02/28/18 0912   BP: 118/68   Pulse: 97   Resp: 16   Temp: 98 °F (36 7 °C)         Physical Exam   Constitutional: She is oriented to person, place, and time  She appears well-developed  HENT:   Head: Normocephalic  Eyes: Pupils are equal, round, and reactive to light  Neck: Neck supple  Cardiovascular: Normal rate  No murmur heard  Pulmonary/Chest: No respiratory distress  She has no wheezes  She has no rales  Abdominal: Soft  She exhibits no distension  There is no tenderness  There is no rebound  Musculoskeletal: She exhibits no edema  Lymphadenopathy:     She has no cervical adenopathy  Neurological: She is alert and oriented to person, place, and time  She displays normal reflexes  Skin: Skin is warm  No rash noted  Psychiatric: She has a normal mood and affect  Thought content normal            Performance Status: ECOG/Zubrod/WHO: 2 - Symptomatic, <50% confined to bed    Labs:  CBC, Coags, BMP, Mg, Phos     Imaging  Mri Brain W Wo Contrast    Result Date: 2/19/2018  Narrative: MRI BRAIN WITH AND WITHOUT CONTRAST INDICATION:  Metastatic breast cancer, lymphedema upper extremity COMPARISON:  MR 9/29/2017 TECHNIQUE: Sagittal T1, axial T2, axial FLAIR, axial T1, axial Gradient, axial diffusion   Sagittal, axial and coronal T1 postcontrast   Axial BRAVO post contrast   IV Contrast:  6 mL of gadobutrol injection (MULTI-DOSE)  IMAGE QUALITY:   Diagnostic  FINDINGS: BRAIN PARENCHYMA:  No acute disease  There is no acute ischemia, intracranial mass or mass effect  No pathologic hemosiderin deposition or pathologic enhancement  White matter changes in the supratentorial parenchyma have increased slightly, with a tiny new linear focus of high signal adjacent to the lateral margin of the right occipital horn, series 5 image 13  No enhancement to suggest this is related to neoplasm  VENTRICLES:  Normal  SELLA AND PITUITARY GLAND:  Normal  ORBITS:  Normal  PARANASAL SINUSES:  Scattered trace mucosal disease with small bilateral mastoid fluid collection VASCULATURE:  Evaluation of the major intracranial vasculature demonstrates appropriate flow voids  CALVARIUM AND SKULL BASE:  Normal  EXTRACRANIAL SOFT TISSUES:  Normal      Impression: No acute disease  No indication of metastatic disease  Minor increase white changes within the supratentorial parenchyma  Workstation performed: [id number]     Ct Chest Abdomen Pelvis W Contrast    Result Date: 2/21/2018  Narrative: CT CHEST, ABDOMEN AND PELVIS WITH IV CONTRAST INDICATION:  Breast neoplasm  COMPARISON: 7/19/2017 TECHNIQUE: CT examination of the chest, abdomen and pelvis was performed  Axial, sagittal, and coronal 2D reformatted images were created from the source data and submitted for interpretation  Radiation dose length product (DLP) for this visit:  594 38 mGy-cm   This examination, like all CT scans performed in the [facility], was performed utilizing techniques to minimize radiation dose exposure, including the use of iterative  reconstruction and automated exposure control  IV Contrast:  100 mL of iohexol (OMNIPAQUE) Enteric Contrast: Enteric contrast was administered   FINDINGS: CHEST LUNGS:  Left greater than right reticular changes involving the upper lobes with resolved underlying groundglass density suspicious for scarring/sequelae of prior infectious or inflammatory process  PLEURA:  Trace effusions identified  HEART/GREAT VESSELS:  Unremarkable for patient's age  MEDIASTINUM AND CHEO:  Right hilar calcified granulomas noted  Status post bilateral axillary lymph node dissection  CHEST WALL AND LOWER NECK:   Bilateral breast implants noted  ABDOMEN LIVER/BILIARY TREE:  Fatty liver noted  GALLBLADDER:  No calcified gallstones  No pericholecystic inflammatory change  SPLEEN:  Calcified splenic granulomas noted  PANCREAS:  Unremarkable  ADRENAL GLANDS:  Unremarkable  KIDNEYS/URETERS:  Unremarkable  No hydronephrosis  STOMACH AND BOWEL:  Unremarkable  APPENDIX:  No findings to suggest appendicitis  ABDOMINOPELVIC CAVITY:  No ascites or free intraperitoneal air  No lymphadenopathy  VESSELS:  Unremarkable for patient's age  PELVIS REPRODUCTIVE ORGANS:  Unremarkable for patient's age  URINARY BLADDER:  Unremarkable  ABDOMINAL WALL/INGUINAL REGIONS:  Unremarkable  OSSEOUS STRUCTURES:  T11 sclerotic lesion at the superior endplate appears stable  There is no T10 lesion identified nor was there on prior study  Impression: 1  Reticular pulmonary changes predominantly at the upper lung zones suspicious for sequelae of previous infectious or inflammatory process as above  Attention on interval follow-up  2   Stable osseous appearance without suspected metastatic disease  3   No suspected metastatic disease throughout the chest, abdomen or pelvis  Workstation performed: [id number]     I reviewed the above laboratory and imaging data  Discussion/Summary:  In summary, this is a 77-year-old female history of advanced breast cancer with bone metastases  Tumor marker is fluctuating about the upper end of the reference range  Recent CT shows previously established bone metastases without progressive disease in the bones or soft tissue    She has some cutaneous nodules measuring about 1 cm in the posterior aspect of the right upper arm  These are somewhat firm, skin intact, mild erythema  They have been present for few weeks  Suspicion for cutaneous metastases  She states that they are occasionally tender but not terribly bothersome  She is proceeding with lymphedema therapy which started only a week or so ago  I recommended continuation of current treatment and introduce the idea of a chemotherapy holiday for [place] to allow her to feel somewhat better, even briefly  She continues working with palliative Care regarding her arm pain and other related symptoms  I would continue her treatment moving forward  MUGA scan just prior to her next visit  The patient and her daughter voiced understanding above discussion

## 2018-03-01 ENCOUNTER — HOSPITAL ENCOUNTER (OUTPATIENT)
Dept: INFUSION CENTER | Facility: CLINIC | Age: 58
Discharge: HOME | End: 2018-03-01
Payer: COMMERCIAL

## 2018-03-01 VITALS
WEIGHT: 156.5 LBS | RESPIRATION RATE: 18 BRPM | SYSTOLIC BLOOD PRESSURE: 106 MMHG | HEART RATE: 96 BPM | DIASTOLIC BLOOD PRESSURE: 66 MMHG | BODY MASS INDEX: 26.08 KG/M2 | HEIGHT: 65 IN | TEMPERATURE: 98.1 F

## 2018-03-01 PROCEDURE — 96413 CHEMO IV INFUSION 1 HR: CPT

## 2018-03-01 PROCEDURE — 96367 TX/PROPH/DG ADDL SEQ IV INF: CPT

## 2018-03-01 PROCEDURE — 96377 APPLICATON ON-BODY INJECTOR: CPT

## 2018-03-01 PROCEDURE — 96417 CHEMO IV INFUS EACH ADDL SEQ: CPT

## 2018-03-01 RX ADMIN — PEGFILGRASTIM 6 MG: KIT SUBCUTANEOUS at 12:12

## 2018-03-01 RX ADMIN — SODIUM CHLORIDE 20 ML/HR: 9 INJECTION, SOLUTION INTRAVENOUS at 08:33

## 2018-03-01 RX ADMIN — ONDANSETRON 16 MG: 2 INJECTION INTRAMUSCULAR; INTRAVENOUS at 08:45

## 2018-03-01 RX ADMIN — PERTUZUMAB 420 MG: 30 INJECTION, SOLUTION, CONCENTRATE INTRAVENOUS at 09:54

## 2018-03-01 RX ADMIN — DOCETAXEL 133 MG: 20 INJECTION, SOLUTION, CONCENTRATE INTRAVENOUS at 11:08

## 2018-03-01 RX ADMIN — SODIUM CHLORIDE 150 MG: 9 INJECTION, SOLUTION INTRAVENOUS at 09:07

## 2018-03-01 RX ADMIN — TRASTUZUMAB 422 MG: 150 INJECTION, POWDER, LYOPHILIZED, FOR SOLUTION INTRAVENOUS at 10:31

## 2018-03-01 NOTE — PROGRESS NOTES
Pt tolerated treatment well without any adverse reactions  Neulasta OnPro placed on left arm, flashing green upon discharge  Pt aware of when to remove device  Aware of next appointments   Declines AVS

## 2018-03-06 ENCOUNTER — OFFICE VISIT (OUTPATIENT)
Dept: PHYSICAL THERAPY | Facility: CLINIC | Age: 58
End: 2018-03-06
Payer: COMMERCIAL

## 2018-03-06 DIAGNOSIS — I89.0 LYMPHEDEMA: Primary | ICD-10-CM

## 2018-03-06 PROCEDURE — 97110 THERAPEUTIC EXERCISES: CPT

## 2018-03-06 PROCEDURE — 97140 MANUAL THERAPY 1/> REGIONS: CPT

## 2018-03-06 PROCEDURE — 97016 VASOPNEUMATIC DEVICE THERAPY: CPT | Performed by: PHYSICAL THERAPIST

## 2018-03-06 NOTE — PROGRESS NOTES
Pt's daughter came in to cancel her mom's appt  She is not feeling well and will not be coming in for her visit  They will reschedule but she needs her dilaudid refills  They have been having issues with the pharmacy [facility]  [phone]  Both numbers are not working or are busy x multiple attempts  Will print a refill and they will try the pharmacy again

## 2018-03-06 NOTE — PROGRESS NOTES
Daily Note     Today's date: 3/6/2018  Patient name: Italo Henry  : 1960  MRN: [id number]  Referring provider: Agustin Richmond MD  Dx:   Encounter Diagnosis     ICD-10-CM    1  Lymphedema I89 0                   Subjective: Patient reports that she felt the swelling in the UE remained decreased post -rx for about a day    She notes soreness persists        Objective: See treatment diary below  Manual  2/21 2/27 3/6          MLD   20'          PROM                                                        Exercise Diary  2/21 27 3/6          Pulley  5' 5'          Wall Climb  5 x :10 5x:10                                                                                                                                                                                                                                                        Modalities  2/21 2/27 3/6          Compression pump  30' 30'                                                                                                                                                                                              Right                Left                              2/27             3/6  palm                                         22                     19 7        21 5   20 8            21     21   wrist                               18 5                15 7                     18 5   18 2           18     18  Wrist + 10 cm                               27                   18                        30     22 5           23     26   Wrist + 16 cm                     32                   23 5                     34     32 3         33 5    30  Elbow                                 32 5               24 5                       31     32 4        32        32  Wrist + 32                                32 5               26 5                      35      34           35 5     35   Wrist + 40                                       31 5 28 2                      32       33 2        33       33                     Assessment: Tolerated treatment well  Patient would benefit from continued PT      Plan: Progress treatment as tolerated

## 2018-03-08 ENCOUNTER — TELEPHONE (OUTPATIENT)
Dept: PALLIATIVE MEDICINE | Facility: CLINIC | Age: 58
End: 2018-03-08

## 2018-03-08 ENCOUNTER — APPOINTMENT (OUTPATIENT)
Dept: PHYSICAL THERAPY | Facility: CLINIC | Age: 58
End: 2018-03-08
Payer: COMMERCIAL

## 2018-03-12 ENCOUNTER — OFFICE VISIT (OUTPATIENT)
Dept: PHYSICAL THERAPY | Facility: CLINIC | Age: 58
End: 2018-03-12
Payer: COMMERCIAL

## 2018-03-12 DIAGNOSIS — I89.0 LYMPHEDEMA: Primary | ICD-10-CM

## 2018-03-12 PROCEDURE — 97110 THERAPEUTIC EXERCISES: CPT

## 2018-03-12 NOTE — PROGRESS NOTES
Daily Note     Today's date: 3/12/2018  Patient name: Luisana Toribio  : 1960  MRN: [id number]  Referring provider: Ion Calvert MD  Dx:   Encounter Diagnosis     ICD-10-CM    1  Lymphedema I89 0                   Subjective: Patient reports that she is not feeling well today due to chemo related nausea  She wishes to not have treatment other than receiving compression garment  Objective: See treatment diary below  Manual  2/21 2/27 3/6          MLD   20'          PROM                                                        Exercise Diary  2/21 27 3/6 3/12         Pulley  5' 5' 5'         Wall Climb  5 x :10 5x:10                                                                                                                                                                                                                                                        Modalities  2/21 2/27 3/6 3/12         Compression pump  30' 30'                                                                          Right                Left                   2/27             3/6            3/12  palm                                         22                     19 7      21 5   20 8            21     21   wrist                               18 5                15 7       18 5   18 2           18     18  Wrist + 10 cm                               27                   18          30     22 5           23     26   Wrist + 16 cm                     32                   23 5      34     32 3         33 5    30  Elbow                                 32 5               24 5        31     32 4        32        32  Wrist + 32                                32 5               26 5        35      34           35 5     35   Wrist + 40                                       31 5              28 2        32       33 2        33       33      Patient fitted for compression garment by rep from [facility]  The tension was set at 1 today  Assessment: Tolerated treatment well  Patient would benefit from continued PT      Plan: Progress treatment as tolerated

## 2018-03-13 ENCOUNTER — TELEPHONE (OUTPATIENT)
Dept: PALLIATIVE MEDICINE | Facility: HOSPITAL | Age: 58
End: 2018-03-13

## 2018-03-13 ENCOUNTER — OFFICE VISIT (OUTPATIENT)
Dept: PHYSICAL THERAPY | Facility: CLINIC | Age: 58
End: 2018-03-13
Payer: COMMERCIAL

## 2018-03-15 ENCOUNTER — OFFICE VISIT (OUTPATIENT)
Dept: PHYSICAL THERAPY | Facility: CLINIC | Age: 58
End: 2018-03-15
Payer: COMMERCIAL

## 2018-03-15 DIAGNOSIS — I89.0 LYMPHEDEMA: Primary | ICD-10-CM

## 2018-03-15 PROCEDURE — 97110 THERAPEUTIC EXERCISES: CPT

## 2018-03-15 PROCEDURE — 97140 MANUAL THERAPY 1/> REGIONS: CPT

## 2018-03-15 NOTE — PROGRESS NOTES
Daily Note     Today's date: 3/15/2018  Patient name: Kelley Pacheco  : 1960  MRN: [id number]  Referring provider: Artis Lowe MD  Dx:   Encounter Diagnosis     ICD-10-CM    1  Lymphedema I89 0                   Subjective: Patient reports tht she hasn't had any difficulty with using the compression garment  She also reports that she has pain in the tricep region          Objective: See treatment diary below  Manual  2/21 2/27 3/6 3/15         MLD   20'          PROM                                                        Exercise Diary  2/21 27 3/6 3/15         Pulley  5' 5' 5'         Wall Climb  5 x :10 5x:10 5x:10                                                                                                                                                                                                                                                       Modalities  2/21 2/27 3/6 3/15         Compression pump  30' 30' 30'                                                                                                                                                                                             Right                Left                              2/27             3/6                3/15  palm                                         22                     19 7        21 5   20 8            21     21         21  wrist                               18 5                15 7                     18 5   18 2           18     18         18 5  Wrist + 10 cm                               27                   18                        30     22 5           23     26          27 5   Wrist + 16 cm                     32                   23 5                     34     32 3         33 5    30         33  Elbow                                 32 5               24 5                       31     32 4        32        32         29 5  Wrist + 32                                32 5               26 5 35      34           35 5     35         33 6   Wrist + 40                                       31 5              28 2                      32       33 2        33       33         34              Assessment: Tolerated treatment well  Patient would benefit from continued PT  Patient with visible erythema and increased temp in 2 locations of the Ue  Patient recommended to contact primary care physician due to possible cellulitis  Patient's treatment was stopped when therapist recognized sx  Plan: Progress treatment as tolerated  If patient is cleared by MD and treated with antibiotics

## 2018-03-19 ENCOUNTER — TRANSCRIBE ORDERS (OUTPATIENT)
Dept: ADMINISTRATIVE | Facility: HOSPITAL | Age: 58
End: 2018-03-19

## 2018-03-19 ENCOUNTER — APPOINTMENT (OUTPATIENT)
Dept: LAB | Facility: MEDICAL CENTER | Age: 58
End: 2018-03-19
Payer: COMMERCIAL

## 2018-03-19 DIAGNOSIS — C79.51 SECONDARY MALIGNANT NEOPLASM OF BONE AND BONE MARROW (HCC): Primary | ICD-10-CM

## 2018-03-19 DIAGNOSIS — C79.52 SECONDARY MALIGNANT NEOPLASM OF BONE AND BONE MARROW (HCC): Primary | ICD-10-CM

## 2018-03-19 DIAGNOSIS — C79.49 SECONDARY MALIGNANT NEOPLASM OF BRAIN AND SPINAL CORD (HCC): ICD-10-CM

## 2018-03-19 DIAGNOSIS — C50.919 MALIGNANT NEOPLASM OF FEMALE BREAST, UNSPECIFIED ESTROGEN RECEPTOR STATUS, UNSPECIFIED LATERALITY, UNSPECIFIED SITE OF BREAST (HCC): ICD-10-CM

## 2018-03-19 DIAGNOSIS — C79.31 SECONDARY MALIGNANT NEOPLASM OF BRAIN AND SPINAL CORD (HCC): ICD-10-CM

## 2018-03-19 LAB
ALBUMIN SERPL BCP-MCNC: 2.9 G/DL (ref 3.5–5)
ALP SERPL-CCNC: 114 U/L (ref 46–116)
ALT SERPL W P-5'-P-CCNC: 18 U/L (ref 12–78)
ANION GAP SERPL CALCULATED.3IONS-SCNC: 8 MMOL/L (ref 4–13)
AST SERPL W P-5'-P-CCNC: 16 U/L (ref 5–45)
BASOPHILS # BLD AUTO: 0.02 THOUSANDS/ΜL (ref 0–0.1)
BASOPHILS NFR BLD AUTO: 0 % (ref 0–1)
BILIRUB SERPL-MCNC: 0.32 MG/DL (ref 0.2–1)
BUN SERPL-MCNC: 9 MG/DL (ref 5–25)
CALCIUM SERPL-MCNC: 8.6 MG/DL (ref 8.3–10.1)
CHLORIDE SERPL-SCNC: 108 MMOL/L (ref 100–108)
CO2 SERPL-SCNC: 24 MMOL/L (ref 21–32)
CREAT SERPL-MCNC: 0.54 MG/DL (ref 0.6–1.3)
EOSINOPHIL # BLD AUTO: 0 THOUSAND/ΜL (ref 0–0.61)
EOSINOPHIL NFR BLD AUTO: 0 % (ref 0–6)
ERYTHROCYTE [DISTWIDTH] IN BLOOD BY AUTOMATED COUNT: 18.7 % (ref 11.6–15.1)
GFR SERPL CREATININE-BSD FRML MDRD: 107 ML/MIN/1.73SQ M
GLUCOSE SERPL-MCNC: 138 MG/DL (ref 65–140)
HCT VFR BLD AUTO: 33.1 % (ref 34.8–46.1)
HGB BLD-MCNC: 10.5 G/DL (ref 11.5–15.4)
LYMPHOCYTES # BLD AUTO: 0.62 THOUSANDS/ΜL (ref 0.6–4.47)
LYMPHOCYTES NFR BLD AUTO: 6 % (ref 14–44)
MCH RBC QN AUTO: 30.1 PG (ref 26.8–34.3)
MCHC RBC AUTO-ENTMCNC: 31.7 G/DL (ref 31.4–37.4)
MCV RBC AUTO: 95 FL (ref 82–98)
MONOCYTES # BLD AUTO: 0.59 THOUSAND/ΜL (ref 0.17–1.22)
MONOCYTES NFR BLD AUTO: 5 % (ref 4–12)
NEUTROPHILS # BLD AUTO: 9.95 THOUSANDS/ΜL (ref 1.85–7.62)
NEUTS SEG NFR BLD AUTO: 89 % (ref 43–75)
NRBC BLD AUTO-RTO: 0 /100 WBCS
PLATELET # BLD AUTO: 532 THOUSANDS/UL (ref 149–390)
PMV BLD AUTO: 9.8 FL (ref 8.9–12.7)
POTASSIUM SERPL-SCNC: 4.1 MMOL/L (ref 3.5–5.3)
PROT SERPL-MCNC: 6.8 G/DL (ref 6.4–8.2)
RBC # BLD AUTO: 3.49 MILLION/UL (ref 3.81–5.12)
SODIUM SERPL-SCNC: 140 MMOL/L (ref 136–145)
WBC # BLD AUTO: 11.21 THOUSAND/UL (ref 4.31–10.16)

## 2018-03-19 PROCEDURE — 36415 COLL VENOUS BLD VENIPUNCTURE: CPT | Performed by: INTERNAL MEDICINE

## 2018-03-19 PROCEDURE — 85025 COMPLETE CBC W/AUTO DIFF WBC: CPT | Performed by: INTERNAL MEDICINE

## 2018-03-19 PROCEDURE — 80053 COMPREHEN METABOLIC PANEL: CPT | Performed by: INTERNAL MEDICINE

## 2018-03-20 ENCOUNTER — TELEPHONE (OUTPATIENT)
Dept: PALLIATIVE MEDICINE | Facility: HOSPITAL | Age: 58
End: 2018-03-20

## 2018-03-21 ENCOUNTER — APPOINTMENT (OUTPATIENT)
Dept: PHYSICAL THERAPY | Facility: CLINIC | Age: 58
End: 2018-03-21
Payer: COMMERCIAL

## 2018-03-21 RX ORDER — SODIUM CHLORIDE 9 MG/ML
20 INJECTION, SOLUTION INTRAVENOUS CONTINUOUS
Status: DISCONTINUED | OUTPATIENT
Start: 2018-03-22 | End: 2018-03-25 | Stop reason: HOSPADM

## 2018-03-22 ENCOUNTER — HOSPITAL ENCOUNTER (OUTPATIENT)
Dept: INFUSION CENTER | Facility: CLINIC | Age: 58
Discharge: HOME | End: 2018-03-22
Payer: COMMERCIAL

## 2018-03-22 VITALS
BODY MASS INDEX: 26.24 KG/M2 | TEMPERATURE: 98.2 F | RESPIRATION RATE: 18 BRPM | HEIGHT: 65 IN | SYSTOLIC BLOOD PRESSURE: 102 MMHG | OXYGEN SATURATION: 96 % | HEART RATE: 99 BPM | DIASTOLIC BLOOD PRESSURE: 58 MMHG | WEIGHT: 157.5 LBS

## 2018-03-22 PROCEDURE — 96417 CHEMO IV INFUS EACH ADDL SEQ: CPT

## 2018-03-22 PROCEDURE — 96402 CHEMO HORMON ANTINEOPL SQ/IM: CPT

## 2018-03-22 PROCEDURE — 96367 TX/PROPH/DG ADDL SEQ IV INF: CPT

## 2018-03-22 PROCEDURE — 96413 CHEMO IV INFUSION 1 HR: CPT

## 2018-03-22 PROCEDURE — 96377 APPLICATON ON-BODY INJECTOR: CPT

## 2018-03-22 RX ADMIN — PEGFILGRASTIM 6 MG: KIT SUBCUTANEOUS at 13:42

## 2018-03-22 RX ADMIN — DOCETAXEL 134 MG: 20 INJECTION, SOLUTION, CONCENTRATE INTRAVENOUS at 12:35

## 2018-03-22 RX ADMIN — SODIUM CHLORIDE 20 ML/HR: 0.9 INJECTION, SOLUTION INTRAVENOUS at 10:05

## 2018-03-22 RX ADMIN — TRASTUZUMAB 426 MG: 150 INJECTION, POWDER, LYOPHILIZED, FOR SOLUTION INTRAVENOUS at 11:59

## 2018-03-22 RX ADMIN — DENOSUMAB 120 MG: 120 INJECTION SUBCUTANEOUS at 10:38

## 2018-03-22 RX ADMIN — PERTUZUMAB 420 MG: 30 INJECTION, SOLUTION, CONCENTRATE INTRAVENOUS at 11:22

## 2018-03-22 RX ADMIN — SODIUM CHLORIDE 150 MG: 9 INJECTION, SOLUTION INTRAVENOUS at 10:38

## 2018-03-22 RX ADMIN — ONDANSETRON 16 MG: 2 INJECTION INTRAMUSCULAR; INTRAVENOUS at 10:13

## 2018-03-22 NOTE — PROGRESS NOTES
Pt  Tolerated treatment w/out adverse reaction  Pt  Tolerated Xgeva SC injection in LEANDRO w/out adverse reaction  Nurse applied neulasta onpro to pts  LEANDRO & pt  Instructed on what time to remove  Pt  Aware &  verbalized understanding  Confirmed pts  Next appt   Pt  Declined AVS

## 2018-03-22 NOTE — PLAN OF CARE
Problem: Potential for Falls  Goal: Patient will remain free of falls  INTERVENTIONS:  - Assess patient frequently for physical needs  -  Identify cognitive and physical deficits and behaviors that affect risk of falls    -  [place] fall precautions as indicated by assessment   - Educate patient/family on patient safety including physical limitations  - Instruct patient to call for assistance with activity based on assessment  - Modify environment to reduce risk of injury  - Consider OT/PT consult to assist with strengthening/mobility   Outcome: Progressing

## 2018-03-26 ENCOUNTER — OFFICE VISIT (OUTPATIENT)
Dept: PALLIATIVE MEDICINE | Facility: CLINIC | Age: 58
End: 2018-03-26
Payer: COMMERCIAL

## 2018-03-26 VITALS
OXYGEN SATURATION: 95 % | DIASTOLIC BLOOD PRESSURE: 50 MMHG | TEMPERATURE: 100 F | BODY MASS INDEX: 26.33 KG/M2 | HEIGHT: 65 IN | WEIGHT: 158 LBS | HEART RATE: 97 BPM | RESPIRATION RATE: 16 BRPM | SYSTOLIC BLOOD PRESSURE: 100 MMHG

## 2018-03-26 DIAGNOSIS — Z17.0 BILATERAL MALIGNANT NEOPLASM OF BREAST IN FEMALE, ESTROGEN RECEPTOR POSITIVE, UNSPECIFIED SITE OF BREAST (HCC): Primary | ICD-10-CM

## 2018-03-26 DIAGNOSIS — C50.911 BILATERAL MALIGNANT NEOPLASM OF BREAST IN FEMALE, ESTROGEN RECEPTOR POSITIVE, UNSPECIFIED SITE OF BREAST (HCC): Primary | ICD-10-CM

## 2018-03-26 DIAGNOSIS — R53.83 OTHER FATIGUE: ICD-10-CM

## 2018-03-26 DIAGNOSIS — L85.3 DRY SKIN: ICD-10-CM

## 2018-03-26 DIAGNOSIS — R42 DIZZINESS: ICD-10-CM

## 2018-03-26 DIAGNOSIS — Z71.89 COUNSELING AND COORDINATION OF CARE: ICD-10-CM

## 2018-03-26 DIAGNOSIS — C50.912 BILATERAL MALIGNANT NEOPLASM OF BREAST IN FEMALE, ESTROGEN RECEPTOR POSITIVE, UNSPECIFIED SITE OF BREAST (HCC): Primary | ICD-10-CM

## 2018-03-26 DIAGNOSIS — G89.3 CANCER RELATED PAIN: ICD-10-CM

## 2018-03-26 PROCEDURE — 99214 OFFICE O/P EST MOD 30 MIN: CPT | Performed by: FAMILY MEDICINE

## 2018-03-26 RX ORDER — GABAPENTIN 300 MG/1
CAPSULE ORAL
Refills: 0
Start: 2018-03-26 | End: 2018-04-26 | Stop reason: SDUPTHER

## 2018-03-26 RX ORDER — HYDROMORPHONE HYDROCHLORIDE 8 MG/1
8 TABLET ORAL EVERY 8 HOURS PRN
Qty: 75 TABLET | Refills: 0 | Status: SHIPPED | OUTPATIENT
Start: 2018-03-26 | End: 2018-05-14

## 2018-03-26 RX ORDER — AMMONIUM LACTATE 12 G/100G
CREAM TOPICAL AS NEEDED
Qty: 385 G | Refills: 0 | Status: SHIPPED | OUTPATIENT
Start: 2018-03-26 | End: 2018-05-16 | Stop reason: ALTCHOICE

## 2018-03-26 RX ORDER — DEXAMETHASONE 2 MG/1
TABLET ORAL
Qty: 7 TABLET | Refills: 0 | Status: SHIPPED | OUTPATIENT
Start: 2018-03-26 | End: 2018-05-14

## 2018-03-26 NOTE — PROGRESS NOTES
[facility] introduced self to pt  And her daughter  Pt  Is established with Dr Hakeem Larios  Angy and her  live in [place]  He apparently had a heart attack last week, but is home recovering  Their daughter is their only child and she stated she enjoys helping her parents out  She has a  and 12year old who are very supportive  Pt  Recently started lymphedema therapy at [facility]  Meds reviewed with Dr Hakeem Larios  Pt  Expressed excitement in being able to go to her camper and cabin when the weather is warmer  Provided contact information and offered support as needed  Pt  Thankful

## 2018-03-28 PROCEDURE — G8985 CARRY GOAL STATUS: HCPCS | Performed by: PHYSICAL THERAPIST

## 2018-03-28 PROCEDURE — G8984 CARRY CURRENT STATUS: HCPCS | Performed by: PHYSICAL THERAPIST

## 2018-04-09 ENCOUNTER — APPOINTMENT (OUTPATIENT)
Dept: LAB | Facility: MEDICAL CENTER | Age: 58
End: 2018-04-09
Payer: COMMERCIAL

## 2018-04-09 ENCOUNTER — TRANSCRIBE ORDERS (OUTPATIENT)
Dept: ADMINISTRATIVE | Facility: HOSPITAL | Age: 58
End: 2018-04-09

## 2018-04-09 DIAGNOSIS — C79.52 SECONDARY MALIGNANT NEOPLASM OF BONE AND BONE MARROW (HCC): Primary | ICD-10-CM

## 2018-04-09 DIAGNOSIS — C50.119 MALIGNANT NEOPLASM OF CENTRAL PORTION OF FEMALE BREAST, UNSPECIFIED ESTROGEN RECEPTOR STATUS, UNSPECIFIED LATERALITY (HCC): ICD-10-CM

## 2018-04-09 DIAGNOSIS — C79.49 SECONDARY MALIGNANT NEOPLASM OF BRAIN AND SPINAL CORD (HCC): ICD-10-CM

## 2018-04-09 DIAGNOSIS — C79.31 SECONDARY MALIGNANT NEOPLASM OF BRAIN AND SPINAL CORD (HCC): ICD-10-CM

## 2018-04-09 DIAGNOSIS — C79.51 SECONDARY MALIGNANT NEOPLASM OF BONE AND BONE MARROW (HCC): Primary | ICD-10-CM

## 2018-04-09 PROCEDURE — 85025 COMPLETE CBC W/AUTO DIFF WBC: CPT | Performed by: INTERNAL MEDICINE

## 2018-04-09 PROCEDURE — 86300 IMMUNOASSAY TUMOR CA 15-3: CPT | Performed by: INTERNAL MEDICINE

## 2018-04-09 PROCEDURE — 36415 COLL VENOUS BLD VENIPUNCTURE: CPT | Performed by: INTERNAL MEDICINE

## 2018-04-09 PROCEDURE — 80053 COMPREHEN METABOLIC PANEL: CPT | Performed by: INTERNAL MEDICINE

## 2018-04-10 LAB
ALBUMIN SERPL BCP-MCNC: 2.9 G/DL (ref 3.5–5)
ALP SERPL-CCNC: 124 U/L (ref 46–116)
ALT SERPL W P-5'-P-CCNC: 18 U/L (ref 12–78)
ANION GAP SERPL CALCULATED.3IONS-SCNC: 4 MMOL/L (ref 4–13)
AST SERPL W P-5'-P-CCNC: 20 U/L (ref 5–45)
BASOPHILS # BLD AUTO: 0.03 THOUSANDS/ΜL (ref 0–0.1)
BASOPHILS NFR BLD AUTO: 0 % (ref 0–1)
BILIRUB SERPL-MCNC: 0.31 MG/DL (ref 0.2–1)
BUN SERPL-MCNC: 8 MG/DL (ref 5–25)
CALCIUM SERPL-MCNC: 8.7 MG/DL
CANCER AG27-29 SERPL-ACNC: 24.9 U/ML (ref 0–42.3)
CHLORIDE SERPL-SCNC: 109 MMOL/L (ref 100–108)
CO2 SERPL-SCNC: 27 MMOL/L (ref 21–32)
CREAT SERPL-MCNC: 0.5 MG/DL (ref 0.6–1.3)
EOSINOPHIL # BLD AUTO: 0.01 THOUSAND/ΜL (ref 0–0.61)
EOSINOPHIL NFR BLD AUTO: 0 % (ref 0–6)
ERYTHROCYTE [DISTWIDTH] IN BLOOD BY AUTOMATED COUNT: 18.1 % (ref 11.6–15.1)
GFR SERPL CREATININE-BSD FRML MDRD: 109 ML/MIN/1.73SQ M
GLUCOSE SERPL-MCNC: 105 MG/DL (ref 65–140)
HCT VFR BLD AUTO: 33.4 % (ref 34.8–46.1)
HGB BLD-MCNC: 10.3 G/DL (ref 11.5–15.4)
LYMPHOCYTES # BLD AUTO: 1.01 THOUSANDS/ΜL (ref 0.6–4.47)
LYMPHOCYTES NFR BLD AUTO: 11 % (ref 14–44)
MCH RBC QN AUTO: 29.3 PG (ref 26.8–34.3)
MCHC RBC AUTO-ENTMCNC: 30.8 G/DL (ref 31.4–37.4)
MCV RBC AUTO: 95 FL (ref 82–98)
MONOCYTES # BLD AUTO: 1.02 THOUSAND/ΜL (ref 0.17–1.22)
MONOCYTES NFR BLD AUTO: 12 % (ref 4–12)
NEUTROPHILS # BLD AUTO: 6.79 THOUSANDS/ΜL (ref 1.85–7.62)
NEUTS SEG NFR BLD AUTO: 77 % (ref 43–75)
NRBC BLD AUTO-RTO: 0 /100 WBCS
PLATELET # BLD AUTO: 529 THOUSANDS/UL (ref 149–390)
PMV BLD AUTO: 9.5 FL (ref 8.9–12.7)
POTASSIUM SERPL-SCNC: 4.5 MMOL/L (ref 3.5–5.3)
PROT SERPL-MCNC: 6.8 G/DL (ref 6.4–8.2)
RBC # BLD AUTO: 3.52 MILLION/UL (ref 3.81–5.12)
SODIUM SERPL-SCNC: 140 MMOL/L (ref 136–145)
WBC # BLD AUTO: 8.87 THOUSAND/UL (ref 4.31–10.16)

## 2018-04-11 ENCOUNTER — APPOINTMENT (OUTPATIENT)
Dept: PHYSICAL THERAPY | Facility: CLINIC | Age: 58
End: 2018-04-11
Payer: COMMERCIAL

## 2018-04-11 ENCOUNTER — OFFICE VISIT (OUTPATIENT)
Dept: PHYSICAL THERAPY | Facility: CLINIC | Age: 58
End: 2018-04-11
Payer: COMMERCIAL

## 2018-04-11 DIAGNOSIS — I89.0 LYMPHEDEMA: Primary | ICD-10-CM

## 2018-04-11 PROCEDURE — 97016 VASOPNEUMATIC DEVICE THERAPY: CPT | Performed by: PHYSICAL THERAPIST

## 2018-04-11 RX ORDER — SODIUM CHLORIDE 9 MG/ML
20 INJECTION, SOLUTION INTRAVENOUS CONTINUOUS
Status: DISCONTINUED | OUTPATIENT
Start: 2018-04-12 | End: 2018-04-15 | Stop reason: HOSPADM

## 2018-04-11 NOTE — PROGRESS NOTES
Daily Note     Today's date: 2018  Patient name: Iraida Jamison  : 1960  MRN: [id number]  Referring provider: Monse Mcneill MD  Dx: No diagnosis found  Subjective: Patient reports that she has been having pain in the tricep region  She did have antibiotics for cellulitis also  Objective: See treatment diary below      Assessment: Tolerated treatment well  Patient would benefit from continued PT  Patient arrived at the wrong time for her appointment therefore the patient was treated only with compression pump  Plan: Progress treatment as tolerated

## 2018-04-12 ENCOUNTER — HOSPITAL ENCOUNTER (OUTPATIENT)
Dept: INFUSION CENTER | Facility: CLINIC | Age: 58
Discharge: HOME | End: 2018-04-12
Payer: COMMERCIAL

## 2018-04-12 VITALS
SYSTOLIC BLOOD PRESSURE: 104 MMHG | BODY MASS INDEX: 26.98 KG/M2 | RESPIRATION RATE: 18 BRPM | TEMPERATURE: 97.7 F | HEIGHT: 64 IN | HEART RATE: 99 BPM | OXYGEN SATURATION: 96 % | WEIGHT: 158 LBS | DIASTOLIC BLOOD PRESSURE: 60 MMHG

## 2018-04-12 PROCEDURE — 96372 THER/PROPH/DIAG INJ SC/IM: CPT

## 2018-04-12 PROCEDURE — 96367 TX/PROPH/DG ADDL SEQ IV INF: CPT

## 2018-04-12 PROCEDURE — 96417 CHEMO IV INFUS EACH ADDL SEQ: CPT

## 2018-04-12 PROCEDURE — 96413 CHEMO IV INFUSION 1 HR: CPT

## 2018-04-12 RX ADMIN — PERTUZUMAB 420 MG: 30 INJECTION, SOLUTION, CONCENTRATE INTRAVENOUS at 10:01

## 2018-04-12 RX ADMIN — ONDANSETRON 16 MG: 2 INJECTION INTRAMUSCULAR; INTRAVENOUS at 08:41

## 2018-04-12 RX ADMIN — DOCETAXEL 134 MG: 20 INJECTION, SOLUTION, CONCENTRATE INTRAVENOUS at 11:17

## 2018-04-12 RX ADMIN — SODIUM CHLORIDE 20 ML/HR: 0.9 INJECTION, SOLUTION INTRAVENOUS at 08:20

## 2018-04-12 RX ADMIN — PEGFILGRASTIM 6 MG: KIT SUBCUTANEOUS at 12:29

## 2018-04-12 RX ADMIN — SODIUM CHLORIDE 150 MG: 9 INJECTION, SOLUTION INTRAVENOUS at 08:59

## 2018-04-12 RX ADMIN — Medication 300 UNITS: at 12:25

## 2018-04-12 RX ADMIN — TRASTUZUMAB 426 MG: 150 INJECTION, POWDER, LYOPHILIZED, FOR SOLUTION INTRAVENOUS at 10:40

## 2018-04-12 NOTE — PROGRESS NOTES
Tolerated treatment today  Printed AVS and reviewed  Neulasta 6mg on pro device applied to left arm without issue  Patient understands how to monitor and remove device

## 2018-04-17 ENCOUNTER — APPOINTMENT (OUTPATIENT)
Dept: PHYSICAL THERAPY | Facility: CLINIC | Age: 58
End: 2018-04-17
Payer: COMMERCIAL

## 2018-04-19 ENCOUNTER — OFFICE VISIT (OUTPATIENT)
Dept: PHYSICAL THERAPY | Facility: CLINIC | Age: 58
End: 2018-04-19
Payer: COMMERCIAL

## 2018-04-19 DIAGNOSIS — I89.0 LYMPHEDEMA: Primary | ICD-10-CM

## 2018-04-19 PROCEDURE — 97140 MANUAL THERAPY 1/> REGIONS: CPT | Performed by: PHYSICAL THERAPIST

## 2018-04-19 PROCEDURE — 97016 VASOPNEUMATIC DEVICE THERAPY: CPT | Performed by: PHYSICAL THERAPIST

## 2018-04-19 PROCEDURE — G8985 CARRY GOAL STATUS: HCPCS | Performed by: PHYSICAL THERAPIST

## 2018-04-19 PROCEDURE — G8984 CARRY CURRENT STATUS: HCPCS | Performed by: PHYSICAL THERAPIST

## 2018-04-19 NOTE — PROGRESS NOTES
Daily Note     Today's date: 2018  Patient name: Oskar Carlson  : 1960  MRN: [id number]  Referring provider: Houston Castillo MD  Dx:   Encounter Diagnosis     ICD-10-CM    1  Lymphedema I89 0                   Subjective:  Patient reports that the tricep pain persists       Objective: See treatment diary below  Manual  2/21 2/27 3/6 3/15  4/19       MLD   20'   20'       PROM      5'                                                  Exercise Diary  2/21 27 3/6 3/15  4/19       Pulley  5' 5' 5'  5'       Wall Climb  5 x :10 5x:10 5x:10                                                                                                                                                                                                                                                       Modalities  2/21 2/27 3/6 3/15  4/19       Compression pump  30' 30' 30' 30' 30'                                                                                                                                                                                           Right                Left            2/27             3/6               3/15       4/19  palm                                         22                     19 5      21 5   20 8        21     21      21         21 5  wrist                               18 5                15 7       18 5   18 2       18     18       18 5      18  Wrist + 10 cm                               27                   18          30     22 5       23     26       27 5      25 8   Wrist + 16 cm                     32                   23 5       34    32 3       33 5    30      33        31  Elbow                                 32 5               24 5         31    32 4      32        32      29 5    29 5  Wrist + 32                                32 5               26 5         35    34         35 5     35      33 6    35 5   Wrist + 40                                       31 5 28 2         32     33 2     33       33       34       34      +42                34  +44                33 5                         Assessment: Tolerated treatment well  Patient would benefit from continued PT  Patient with good reduction in edema at the forearm and elbow  Plan: Continue per plan of care

## 2018-04-24 ENCOUNTER — APPOINTMENT (OUTPATIENT)
Dept: PHYSICAL THERAPY | Facility: CLINIC | Age: 58
End: 2018-04-24
Payer: COMMERCIAL

## 2018-04-26 ENCOUNTER — TELEPHONE (OUTPATIENT)
Dept: PALLIATIVE MEDICINE | Facility: HOSPITAL | Age: 58
End: 2018-04-26

## 2018-04-26 ENCOUNTER — APPOINTMENT (OUTPATIENT)
Dept: PHYSICAL THERAPY | Facility: CLINIC | Age: 58
End: 2018-04-26
Payer: COMMERCIAL

## 2018-04-26 DIAGNOSIS — G89.3 CANCER RELATED PAIN: ICD-10-CM

## 2018-04-26 DIAGNOSIS — M51.26 HERNIATED LUMBAR INTERVERTEBRAL DISC: Primary | ICD-10-CM

## 2018-04-26 RX ORDER — GABAPENTIN 300 MG/1
CAPSULE ORAL
Qty: 60 CAPSULE | Refills: 5 | Status: SHIPPED | OUTPATIENT
Start: 2018-04-26 | End: 2018-08-23 | Stop reason: SDUPTHER

## 2018-04-30 ENCOUNTER — HOSPITAL ENCOUNTER (OUTPATIENT)
Dept: NON INVASIVE DIAGNOSTICS | Facility: CLINIC | Age: 58
Discharge: HOME | End: 2018-04-30
Payer: COMMERCIAL

## 2018-04-30 ENCOUNTER — APPOINTMENT (OUTPATIENT)
Dept: LAB | Facility: CLINIC | Age: 58
End: 2018-04-30
Payer: COMMERCIAL

## 2018-04-30 DIAGNOSIS — C79.31 SECONDARY MALIGNANT NEOPLASM OF BRAIN AND SPINAL CORD (HCC): ICD-10-CM

## 2018-04-30 DIAGNOSIS — C79.51 SECONDARY MALIGNANT NEOPLASM OF BONE AND BONE MARROW (HCC): ICD-10-CM

## 2018-04-30 DIAGNOSIS — C79.52 SECONDARY MALIGNANT NEOPLASM OF BONE AND BONE MARROW (HCC): ICD-10-CM

## 2018-04-30 DIAGNOSIS — C50.911 BILATERAL MALIGNANT NEOPLASM OF BREAST IN FEMALE, ESTROGEN RECEPTOR POSITIVE, UNSPECIFIED SITE OF BREAST (HCC): ICD-10-CM

## 2018-04-30 DIAGNOSIS — C50.912 BILATERAL MALIGNANT NEOPLASM OF BREAST IN FEMALE, ESTROGEN RECEPTOR POSITIVE, UNSPECIFIED SITE OF BREAST (HCC): ICD-10-CM

## 2018-04-30 DIAGNOSIS — Z17.0 BILATERAL MALIGNANT NEOPLASM OF BREAST IN FEMALE, ESTROGEN RECEPTOR POSITIVE, UNSPECIFIED SITE OF BREAST (HCC): ICD-10-CM

## 2018-04-30 DIAGNOSIS — C50.119 MALIGNANT NEOPLASM OF CENTRAL PORTION OF FEMALE BREAST, UNSPECIFIED ESTROGEN RECEPTOR STATUS, UNSPECIFIED LATERALITY (HCC): ICD-10-CM

## 2018-04-30 DIAGNOSIS — C79.49 SECONDARY MALIGNANT NEOPLASM OF BRAIN AND SPINAL CORD (HCC): ICD-10-CM

## 2018-04-30 LAB
ALBUMIN SERPL BCP-MCNC: 3.2 G/DL (ref 3.5–5)
ALP SERPL-CCNC: 108 U/L (ref 46–116)
ALT SERPL W P-5'-P-CCNC: 23 U/L (ref 12–78)
ANION GAP SERPL CALCULATED.3IONS-SCNC: 4 MMOL/L (ref 4–13)
AST SERPL W P-5'-P-CCNC: 19 U/L (ref 5–45)
BASOPHILS # BLD AUTO: 0.02 THOUSANDS/ΜL (ref 0–0.1)
BASOPHILS NFR BLD AUTO: 0 % (ref 0–1)
BILIRUB SERPL-MCNC: 0.2 MG/DL (ref 0.2–1)
BUN SERPL-MCNC: 12 MG/DL (ref 5–25)
CALCIUM SERPL-MCNC: 9.2 MG/DL (ref 8.3–10.1)
CHLORIDE SERPL-SCNC: 106 MMOL/L (ref 100–108)
CO2 SERPL-SCNC: 30 MMOL/L (ref 21–32)
CREAT SERPL-MCNC: 0.52 MG/DL (ref 0.6–1.3)
EOSINOPHIL # BLD AUTO: 0 THOUSAND/ΜL (ref 0–0.61)
EOSINOPHIL NFR BLD AUTO: 0 % (ref 0–6)
ERYTHROCYTE [DISTWIDTH] IN BLOOD BY AUTOMATED COUNT: 17.9 % (ref 11.6–15.1)
GFR SERPL CREATININE-BSD FRML MDRD: 108 ML/MIN/1.73SQ M
GLUCOSE SERPL-MCNC: 96 MG/DL (ref 65–140)
HCT VFR BLD AUTO: 34.7 % (ref 34.8–46.1)
HGB BLD-MCNC: 10.7 G/DL (ref 11.5–15.4)
LYMPHOCYTES # BLD AUTO: 0.68 THOUSANDS/ΜL (ref 0.6–4.47)
LYMPHOCYTES NFR BLD AUTO: 5 % (ref 14–44)
MCH RBC QN AUTO: 28.7 PG (ref 26.8–34.3)
MCHC RBC AUTO-ENTMCNC: 30.8 G/DL (ref 31.4–37.4)
MCV RBC AUTO: 93 FL (ref 82–98)
MONOCYTES # BLD AUTO: 1.12 THOUSAND/ΜL (ref 0.17–1.22)
MONOCYTES NFR BLD AUTO: 9 % (ref 4–12)
NEUTROPHILS # BLD AUTO: 10.98 THOUSANDS/ΜL (ref 1.85–7.62)
NEUTS SEG NFR BLD AUTO: 86 % (ref 43–75)
PLATELET # BLD AUTO: 562 THOUSANDS/UL (ref 149–390)
PMV BLD AUTO: 9.1 FL (ref 8.9–12.7)
POTASSIUM SERPL-SCNC: 5.1 MMOL/L (ref 3.5–5.3)
PROT SERPL-MCNC: 6.2 G/DL (ref 6.4–8.2)
RBC # BLD AUTO: 3.73 MILLION/UL (ref 3.81–5.12)
SODIUM SERPL-SCNC: 140 MMOL/L (ref 136–145)
WBC # BLD AUTO: 12.8 THOUSAND/UL (ref 4.31–10.16)

## 2018-04-30 PROCEDURE — 80053 COMPREHEN METABOLIC PANEL: CPT

## 2018-04-30 PROCEDURE — 85025 COMPLETE CBC W/AUTO DIFF WBC: CPT

## 2018-04-30 PROCEDURE — A9560 TC99M LABELED RBC: HCPCS

## 2018-04-30 PROCEDURE — 36415 COLL VENOUS BLD VENIPUNCTURE: CPT

## 2018-04-30 PROCEDURE — 78472 GATED HEART PLANAR SINGLE: CPT

## 2018-05-02 ENCOUNTER — OFFICE VISIT (OUTPATIENT)
Dept: HEMATOLOGY ONCOLOGY | Facility: CLINIC | Age: 58
End: 2018-05-02
Payer: COMMERCIAL

## 2018-05-02 VITALS
SYSTOLIC BLOOD PRESSURE: 128 MMHG | BODY MASS INDEX: 25.86 KG/M2 | HEIGHT: 64 IN | DIASTOLIC BLOOD PRESSURE: 72 MMHG | TEMPERATURE: 99.1 F | RESPIRATION RATE: 16 BRPM | OXYGEN SATURATION: 97 % | WEIGHT: 151.5 LBS | HEART RATE: 84 BPM

## 2018-05-02 DIAGNOSIS — C50.912 BILATERAL MALIGNANT NEOPLASM OF BREAST IN FEMALE, ESTROGEN RECEPTOR POSITIVE, UNSPECIFIED SITE OF BREAST (HCC): Primary | ICD-10-CM

## 2018-05-02 DIAGNOSIS — C79.51 BONE METASTASES (HCC): ICD-10-CM

## 2018-05-02 DIAGNOSIS — C50.911 BILATERAL MALIGNANT NEOPLASM OF BREAST IN FEMALE, ESTROGEN RECEPTOR POSITIVE, UNSPECIFIED SITE OF BREAST (HCC): Primary | ICD-10-CM

## 2018-05-02 DIAGNOSIS — Z17.0 BILATERAL MALIGNANT NEOPLASM OF BREAST IN FEMALE, ESTROGEN RECEPTOR POSITIVE, UNSPECIFIED SITE OF BREAST (HCC): Primary | ICD-10-CM

## 2018-05-02 DIAGNOSIS — G89.3 CANCER RELATED PAIN: ICD-10-CM

## 2018-05-02 DIAGNOSIS — G47.9 DIFFICULTY SLEEPING: ICD-10-CM

## 2018-05-02 PROCEDURE — 99214 OFFICE O/P EST MOD 30 MIN: CPT | Performed by: INTERNAL MEDICINE

## 2018-05-02 RX ORDER — DIAZEPAM 5 MG/1
5 TABLET ORAL
Qty: 30 TABLET | Refills: 3 | Status: SHIPPED | OUTPATIENT
Start: 2018-05-02 | End: 2018-08-23 | Stop reason: SDUPTHER

## 2018-05-02 RX ORDER — SODIUM CHLORIDE 9 MG/ML
20 INJECTION, SOLUTION INTRAVENOUS CONTINUOUS
Status: DISCONTINUED | OUTPATIENT
Start: 2018-05-03 | End: 2018-05-06 | Stop reason: HOSPADM

## 2018-05-02 NOTE — PROGRESS NOTES
Elly Blocker  1960  [id number] [facility]  [facility] Manzanares [address] [place] [id number]    Chief Complaint   Patient presents with    Follow-up           Oncology History    2010- The patient had a left sided T1 B , N0 ER positive, IL and HER-2 negative invasive ductal carcinoma, right-sided DCIS, ER/IL positive, HER-2 negative  Bilateral mastectomy  2010- April 2011  Treated with tamoxifen 20 mg by mouth daily  Last menstrual cycle was March 2010  She was then on Arimidex 1 mg daily  Lost to followup after March 2012 December 2015 Patient presented with Right arm swelling  Doppler R upper extremities  which did not identify any acute or chronic DVT however there was a nonvascular structure in the right axilla measuring 2 2 x 1 3 cm CT Scan of the chest on 12/9/15, showed multiple bilateral pulmonary nodules measuring 3-7 mm  PET scan on 2/4/16, which revealed hypermetabolic hilar and mediastinal nodes  There is a pre carinal node with a SUV of 5 4 measuring 1 8 x 1 7 cm, and a subcarinal node measuring 1 9 x 1 3 cm with a SUV of 5 3  There is right hilar activity of 3 5 and left hilar activity of 2 8  The subcentimeter nodules are too small for PET evaluation  She also has uptake in her L3 and L5 vertebral bodies, both concerning for bony metastases  Dr Tony Diana performed flexible bronchoscopy and lymph node biopsy 2/16/16  Preliminary pathology identified Metastatic non-small cell carcinoma, favor adenocarcinoma  ER 90%, IL zero  Her 2 +2, positive by FISH   1, 2016- started Taxotere/Perjeta/Herceptin    2016 patient had good response  Taxotere was discontinued and anastrozole 1 mg by mouth daily initiated  Herceptin and Perjeta continued  2017 progressive disease noted  Anastrozole discontinued  Taxotere restarted at 75 mg/m² every 3 weeks  Neulasta support  Herceptin and Perjeta continued     2017 brain MRI showed a 5 mm right parietal lesion, 3 mm right frontal lesion, 3 mm left parahippocampal lesion  2017- SRS to brain lesions  Current Therapy:   February 2017 progressive disease noted  Anastrozole discontinued  Taxotere restarted at 75 mg/m² every 3 weeks  Neulasta support  Herceptin and Perjeta continued  Bilateral malignant neoplasm of breast in female, estrogen receptor positive ([address] )    2/15/2016 Initial Diagnosis     Bilateral malignant neoplasm of breast in female, estrogen receptor positive ([facility] [address] )          History of Present Illness:  -No ref  provider found:    -Interval History:  Stable bilateral foot neuropathy  Pain is under good control with gabapentin  Patient has dyspnea exertion but no shortness of breath at rest     Review of Systems:  Review of Systems   Constitutional: Negative for appetite change, diaphoresis, fatigue and fever  HENT: Negative for sinus pain  Eyes: Negative for discharge  Respiratory: Negative for cough and shortness of breath  Cardiovascular: Negative for chest pain  Gastrointestinal: Negative for abdominal pain, constipation and diarrhea  Endocrine: Negative for cold intolerance  Genitourinary: Negative for difficulty urinating and hematuria  Musculoskeletal: Negative for joint swelling  Skin: Negative for rash  Allergic/Immunologic: Negative for environmental allergies  Neurological: Negative for dizziness and headaches  Hematological: Negative for adenopathy  Psychiatric/Behavioral: Negative for agitation         Patient Active Problem List   Diagnosis    H/O bilateral mastectomy    Lymphedema of right upper extremity    Pulmonary nodule    Bilateral malignant neoplasm of breast in female, estrogen receptor positive ([address] )    Bone metastases ([facility] )    Brain metastases ([address] )    Cancer related pain    Chemotherapy-induced nausea    Constipation    Decreased appetite    Difficulty sleeping    Dyspareunia, female    Herniated lumbar intervertebral disc    Mediastinal lymphadenopathy    Hyperglycemia    Other fatigue    Dry skin    Dizziness     Past Medical History:   Diagnosis Date    H/O bilateral mastectomy 2/15/2016    Hypertension 2/15/2016    Lymphedema 2/15/2016    Malignant neoplasm of right breast ([facility] [address] ) 2/15/2016     Past Surgical History:   Procedure Laterality Date    ENDOBRONCHIAL ULTRASOUND (EBUS) N/A 2/16/2016    Procedure: EBUS;  Surgeon: Adriana Barrow MD;  [facility];  Service:     MASTECTOMY Bilateral     MASTECTOMY Bilateral     right arm edema    OOPHORECTOMY      [place] BRONCHOSCOPY NEEDLE BX TRACHEA MAIN STEM&/BRON N/A 2/16/2016    Procedure: Sangeeta Baker;  Surgeon: Adriana Barrow MD;  [facility];  Service: Thoracic    [place] STEREOTACTIC RADIOSURGERY, CRANIAL,SIMPLE,EA ADD  5/3/2017         [place] STEREOTACTIC RADIOSURGERY, CRANIAL,SIMPLE,EA ADD  5/3/2017         [place] STEREOTACTIC RADIOSURGERY, CRANIAL,SIMPLE,SINGLE  5/3/2017          No family history on file  Social History     Social History    Marital status: /Civil Union     Spouse name: N/A    Number of children: N/A    Years of education: N/A     Occupational History    Not on file       Social History Main Topics    Smoking status: Current Some Day Smoker     Packs/day: 0 50     Years: 20 00    Smokeless tobacco: Not on file      Comment: 18 pack years    Alcohol use Yes      Comment: social    Drug use: No    Sexual activity: Not on file     Other Topics Concern    Not on file     Social History Narrative    No narrative on file       Current Outpatient Prescriptions:     ammonium lactate (LAC-HYDRIN) 12 % cream, Apply topically as needed for dry skin, Disp: 385 g, Rfl: 0    dexamethasone (DECADRON) 2 mg tablet, 1/2 tab PO daily in the morning with breakfast for 2 weeks then stop, Disp: 7 tablet, Rfl: 0    diazepam (VALIUM) 5 mg tablet, Take 1 tablet (5 mg total) by mouth daily at bedtime, Disp: 30 tablet, Rfl: 3    gabapentin (NEURONTIN) 300 mg capsule, 2 capsules PO at bedtime, Disp: 60 capsule, Rfl: 5    HYDROmorphone (DILAUDID) 8 MG tablet, Take 1 tablet (8 mg total) by mouth every 8 (eight) hours as needed for moderate pain Max Daily Amount: 24 mg, Disp: 75 tablet, Rfl: 0    OLANZapine (ZyPREXA) 5 mg tablet, Take 1 tablet (5 mg total) by mouth daily at bedtime, Disp: 30 tablet, Rfl: 2    ondansetron (ZOFRAN) 4 mg tablet, Take 4 mg by mouth every 6 (six) hours as needed for nausea or vomiting , Disp: , Rfl:     promethazine (PHENERGAN) 25 mg tablet, Take 1 tablet (25 mg total) by mouth every 6 (six) hours as needed for nausea or vomiting, Disp: 60 tablet, Rfl: 2    senna (SENOKOT) 8 6 mg, Take 2 tablets by mouth 2 (two) times a day, Disp: , Rfl:   No current facility-administered medications for this visit       Facility-Administered Medications Ordered in Other Visits:     [START ON 5/3/2018] alteplase (CATHFLO) injection 2 mg, 2 mg, Intracatheter, PRN, Devon Joseph DO    [START ON 5/3/2018] denosumab (XGEVA) subcutaneous injection 120 mg, 120 mg, Subcutaneous, Once, Devon Joseph DO    [START ON 5/3/2018] DOCEtaxel (TAXOTERE) 134 mg in sodium chloride 0 9 % 250 mL chemo infusion, 134 mg, Intravenous, Once, Devon Joseph DO    [START ON 5/3/2018] fosaprepitant (EMEND) 150 mg in sodium chloride 0 9 % 250 mL IVPB, 150 mg, Intravenous, Once, Devon Joseph DO    [START ON 5/3/2018] heparin lock flush 100 units/mL injection 300 Units, 300 Units, Intracatheter, PRN, Devon Joseph DO    [START ON 5/3/2018] ondansetron (ZOFRAN) 16 mg in sodium chloride 0 9 % 50 mL IVPB, 16 mg, Intravenous, Once, Devon Joseph DO    [START ON 5/3/2018] pegfilgrastim (NEULASTA ONPRO) subcutaneous injection kit 6 mg, 6 mg, Subcutaneous, Once, Devon Joseph DO    [START ON 5/3/2018] pertuzumab (PERJETA) 420 mg in sodium chloride 0 9 % 250 mL IVPB, 420 mg, Intravenous, Once, Devon Joseph DO    [START ON 5/3/2018] sodium chloride 0 9 % infusion, 20 mL/hr, Intravenous, Continuous, Derick Mendieta DO    [START ON 5/3/2018] trastuzumab (HERCEPTIN) 450 mg in sodium chloride 0 9 % 250 mL chemo infusion, 450 mg, Intravenous, Once, Derick Mendieta DO  No Known Allergies  Vitals:    05/02/18 1117   BP: 128/72   Pulse: 84   Resp: 16   Temp: 99 1 °F (37 3 °C)   SpO2: 97%         Physical Exam   Constitutional: She is oriented to person, place, and time  She appears well-developed  HENT:   Head: Normocephalic  Eyes: Pupils are equal, round, and reactive to light  Neck: Neck supple  Cardiovascular: Normal rate  No murmur heard  Pulmonary/Chest: No respiratory distress  She has no wheezes  She has no rales  Abdominal: Soft  She exhibits no distension  There is no tenderness  There is no rebound  Musculoskeletal: She exhibits no edema  Lymphadenopathy:     She has no cervical adenopathy  Neurological: She is alert and oriented to person, place, and time  She displays normal reflexes  Skin: Skin is warm  No rash noted  Psychiatric: She has a normal mood and affect  Thought content normal            Performance Status: ECOG/Zubrod/WHO: 1 - Symptomatic but completely ambulatory    Labs:  CBC, Coags, BMP, Mg, Phos     Imaging  Nm Cardiac Blood Pool Muga (at Rest)    Result Date: 4/30/2018  Narrative: MUGA SCAN INDICATION: C50 911: Malignant neoplasm of unspecified site of right female breast Z17 0: Estrogen receptor positive status (ER+) C50 912: Malignant neoplasm of unspecified site of left female breast COMPARISON: MUGA scan from 8/14/2017 TECHNIQUE:   The study was performed following in vivo labeling utilizing 23 8  mCi Tc-99m pertechnetate IV  Gated images of the heart were acquired in the anterior, [place] and steep [place] projections  FINDINGS: Right ventricular motion is unremarkable  There is normal symmetrical contractility of the left ventricle  The overall resting left ventricular ejection fraction is 77% %, based on manual calculation  Previously the LVEF was 57%  Impression: The resting left ventricular ejection fraction is 77%  Workstation performed: [id number]     I reviewed the above laboratory and imaging data  Discussion/Summary:  In summary, this is a 29-year-old female history of advanced breast cancer  Repeat imaging is requested just prior to her next visit in 2 months  Herceptin and Perjeta continue  Tye Field is continued  Taxotere is on hold  Neulasta is on hold  Recent tumor markers in the normal range  Recent CBC shows mild anemia  Leukocytosis res reflective of previous Neulasta therapy  Recent MUGA scan shows an ejection fraction of 77%  I reviewed the above considerations at length with the patient and her daughter  They voiced understanding and agreement

## 2018-05-02 NOTE — LETTER
May 2, 2018     Chata Levy MD  [address] [facility]  [address]  [address] [place] [address] [facility]    Patient: Nadja Ochoa   YOB: 1960   Date of Visit: 5/2/2018       Dear Dr Latonya Dang: Thank you for referring Brian Sousa to me for evaluation  Below are my notes for this consultation  If you have questions, please do not hesitate to call me  I look forward to following your patient along with you  Sincerely,        Ellen Tompkins DO        CC: No Recipients  Ellen Tompkins DO  5/2/2018 11:35 AM  Sign at close encounter  Nadja Breath  1960  [address] [address]    Chief Complaint   Patient presents with    Follow-up            No history exists  History of Present Illness:  -No ref  provider found:  -Previous Therapy (if not listed in Oncology History):  -Current Therapy (if not listed in Oncology History):  -Disease Status:  -Interval History:  -Tumor Markers:    Review of Systems:  Review of Systems   Constitutional: Negative for appetite change, diaphoresis, fatigue and fever  HENT: Negative for sinus pain  Eyes: Negative for discharge  Respiratory: Negative for cough and shortness of breath  Cardiovascular: Negative for chest pain  Gastrointestinal: Negative for abdominal pain, constipation and diarrhea  Endocrine: Negative for cold intolerance  Genitourinary: Negative for difficulty urinating and hematuria  Musculoskeletal: Negative for joint swelling  Skin: Negative for rash  Allergic/Immunologic: Negative for environmental allergies  Neurological: Negative for dizziness and headaches  Hematological: Negative for adenopathy  Psychiatric/Behavioral: Negative for agitation         Patient Active Problem List   Diagnosis    H/O bilateral mastectomy    Lymphedema of right upper extremity    Pulmonary nodule    Bilateral malignant neoplasm of breast in female, estrogen receptor positive ([facility] )    Bone metastases ([facility] )    Brain metastases ([facility] )    Cancer related pain    Chemotherapy-induced nausea    Constipation    Decreased appetite    Difficulty sleeping    Dyspareunia, female    Herniated lumbar intervertebral disc    Mediastinal lymphadenopathy    Hyperglycemia    Other fatigue    Dry skin    Dizziness     Past Medical History:   Diagnosis Date    H/O bilateral mastectomy 2/15/2016    Hypertension 2/15/2016    Lymphedema 2/15/2016    Malignant neoplasm of right breast ([facility] [facility] ) 2/15/2016     Past Surgical History:   Procedure Laterality Date    ENDOBRONCHIAL ULTRASOUND (EBUS) N/A 2/16/2016    Procedure: EBUS;  Surgeon: Laury Cantor MD;  [facility];  Service:     MASTECTOMY Bilateral     MASTECTOMY Bilateral     right arm edema    OOPHORECTOMY      [place] BRONCHOSCOPY NEEDLE BX TRACHEA MAIN STEM&/BRON N/A 2/16/2016    Procedure: Bettie Beaulieu;  Surgeon: Laury Cantor MD;  [facility];  Service: Thoracic    [place] STEREOTACTIC RADIOSURGERY, CRANIAL,SIMPLE,EA ADD  5/3/2017         [place] STEREOTACTIC RADIOSURGERY, CRANIAL,SIMPLE,EA ADD  5/3/2017         GA STEREOTACTIC RADIOSURGERY, CRANIAL,SIMPLE,SINGLE  5/3/2017          No family history on file  Social History     Social History    Marital status: /Civil Union     Spouse name: N/A    Number of children: N/A    Years of education: N/A     Occupational History    Not on file       Social History Main Topics    Smoking status: Current Some Day Smoker     Packs/day: 0 50     Years: 20 00    Smokeless tobacco: Not on file      Comment: 18 pack years    Alcohol use Yes      Comment: social    Drug use: No    Sexual activity: Not on file     Other Topics Concern    Not on file     Social History Narrative    No narrative on file       Current Outpatient Prescriptions:     ammonium lactate (LAC-HYDRIN) 12 % cream, Apply topically as needed for dry skin, Disp: 385 g, Rfl: 0    dexamethasone (DECADRON) 2 mg tablet, 1/2 tab PO daily in the morning with breakfast for 2 weeks then stop, Disp: 7 tablet, Rfl: 0    diazepam (VALIUM) 5 mg tablet, Take 1 tablet (5 mg total) by mouth daily at bedtime, Disp: 30 tablet, Rfl: 2    gabapentin (NEURONTIN) 300 mg capsule, 2 capsules PO at bedtime, Disp: 60 capsule, Rfl: 5    HYDROmorphone (DILAUDID) 8 MG tablet, Take 1 tablet (8 mg total) by mouth every 8 (eight) hours as needed for moderate pain Max Daily Amount: 24 mg, Disp: 75 tablet, Rfl: 0    OLANZapine (ZyPREXA) 5 mg tablet, Take 1 tablet (5 mg total) by mouth daily at bedtime, Disp: 30 tablet, Rfl: 2    ondansetron (ZOFRAN) 4 mg tablet, Take 4 mg by mouth every 6 (six) hours as needed for nausea or vomiting , Disp: , Rfl:     promethazine (PHENERGAN) 25 mg tablet, Take 1 tablet (25 mg total) by mouth every 6 (six) hours as needed for nausea or vomiting, Disp: 60 tablet, Rfl: 2    senna (SENOKOT) 8 6 mg, Take 2 tablets by mouth 2 (two) times a day, Disp: , Rfl:   No current facility-administered medications for this visit       Facility-Administered Medications Ordered in Other Visits:     [START ON 5/3/2018] alteplase (CATHFLO) injection 2 mg, 2 mg, Intracatheter, PRN, Sharlon Bile, DO    [START ON 5/3/2018] DOCEtaxel (TAXOTERE) 134 mg in sodium chloride 0 9 % 250 mL chemo infusion, 134 mg, Intravenous, Once, Sharlon Bile, DO    [START ON 5/3/2018] fosaprepitant (EMEND) 150 mg in sodium chloride 0 9 % 250 mL IVPB, 150 mg, Intravenous, Once, Sharlon Bile, DO    [START ON 5/3/2018] heparin lock flush 100 units/mL injection 300 Units, 300 Units, Intracatheter, PRN, Sharlon Bile, DO    [START ON 5/3/2018] ondansetron (ZOFRAN) 16 mg in sodium chloride 0 9 % 50 mL IVPB, 16 mg, Intravenous, Once, Sharlon Bile, DO    [START ON 5/3/2018] pegfilgrastim (NEULASTA ONPRO) subcutaneous injection kit 6 mg, 6 mg, Subcutaneous, Once, Anup Ford DO    [START ON 5/3/2018] pertuzumab (PERJETA) 420 mg in sodium chloride 0 9 % 250 mL IVPB, 420 mg, Intravenous, Once, Arnoldo Cones, DO    [START ON 5/3/2018] sodium chloride 0 9 % infusion, 20 mL/hr, Intravenous, Continuous, Arnoldo Cones, DO    [START ON 5/3/2018] trastuzumab (HERCEPTIN) 450 mg in sodium chloride 0 9 % 250 mL chemo infusion, 450 mg, Intravenous, Once, Arnoldo Cones, DO  No Known Allergies  Vitals:    05/02/18 1117   BP: 128/72   Pulse: 84   Resp: 16   Temp: 99 1 °F (37 3 °C)   SpO2: 97%         Physical Exam   Constitutional: She is oriented to person, place, and time  She appears well-developed  HENT:   Head: Normocephalic  Eyes: Pupils are equal, round, and reactive to light  Neck: Neck supple  Cardiovascular: Normal rate  No murmur heard  Pulmonary/Chest: No respiratory distress  She has no wheezes  She has no rales  Abdominal: Soft  She exhibits no distension  There is no tenderness  There is no rebound  Musculoskeletal: She exhibits no edema  Lymphadenopathy:     She has no cervical adenopathy  Neurological: She is alert and oriented to person, place, and time  She displays normal reflexes  Skin: Skin is warm  No rash noted  Psychiatric: She has a normal mood and affect  Thought content normal            Performance Status: ECOG/Zubrod/WHO: 1 - Symptomatic but completely ambulatory    Labs:  CBC, Coags, BMP, Mg, Phos     Imaging  Nm Cardiac Blood Pool Muga (at Rest)    Result Date: 4/30/2018  Narrative: MUGA SCAN INDICATION: C50 911: Malignant neoplasm of unspecified site of right female breast Z17 0: Estrogen receptor positive status (ER+) C50 912: Malignant neoplasm of unspecified site of left female breast COMPARISON: MUGA scan from 8/14/2017 TECHNIQUE:   The study was performed following in vivo labeling utilizing 23 8  mCi Tc-99m pertechnetate IV  Gated images of the heart were acquired in the anterior, [place] and steep [place] projections  FINDINGS: Right ventricular motion is unremarkable   There is normal symmetrical contractility of the left ventricle  The overall resting left ventricular ejection fraction is 77% %, based on manual calculation  Previously the LVEF was 57%  Impression: The resting left ventricular ejection fraction is 77%  Workstation performed: [id number]     I reviewed the above laboratory and imaging data        Discussion/Summary:

## 2018-05-03 ENCOUNTER — HOSPITAL ENCOUNTER (OUTPATIENT)
Dept: INFUSION CENTER | Facility: CLINIC | Age: 58
Discharge: HOME | End: 2018-05-03
Payer: COMMERCIAL

## 2018-05-03 VITALS
TEMPERATURE: 98.5 F | DIASTOLIC BLOOD PRESSURE: 58 MMHG | HEIGHT: 67 IN | OXYGEN SATURATION: 93 % | BODY MASS INDEX: 23.62 KG/M2 | HEART RATE: 83 BPM | WEIGHT: 150.5 LBS | SYSTOLIC BLOOD PRESSURE: 110 MMHG | RESPIRATION RATE: 20 BRPM

## 2018-05-03 PROCEDURE — 96401 CHEMO ANTI-NEOPL SQ/IM: CPT

## 2018-05-03 PROCEDURE — 96413 CHEMO IV INFUSION 1 HR: CPT

## 2018-05-03 PROCEDURE — 96417 CHEMO IV INFUS EACH ADDL SEQ: CPT

## 2018-05-03 RX ADMIN — DENOSUMAB 120 MG: 120 INJECTION SUBCUTANEOUS at 10:08

## 2018-05-03 RX ADMIN — PERTUZUMAB 420 MG: 30 INJECTION, SOLUTION, CONCENTRATE INTRAVENOUS at 09:24

## 2018-05-03 RX ADMIN — SODIUM CHLORIDE 20 ML/HR: 0.9 INJECTION, SOLUTION INTRAVENOUS at 08:24

## 2018-05-03 RX ADMIN — TRASTUZUMAB 450 MG: 150 INJECTION, POWDER, LYOPHILIZED, FOR SOLUTION INTRAVENOUS at 10:03

## 2018-05-03 NOTE — PROGRESS NOTES
LATE NOTE:  Pt to clinic for herceptin/perjeta therapy and xgeva injection  Seen and assessed in clinic yesterday per Dr Toan Gregory and the decision was made to discontinue taxotere and continue with the Her-2 MABs only  Confirmed this with Caty BUITRAGO as well  Therapy given as per orders  Pt denies that she is taking PO calcium supplement  Encouraged her to get an OTC calcium/Vitamin D supplement while on xgeva therapy  She and her daughter state understanding and agreement

## 2018-05-07 ENCOUNTER — OFFICE VISIT (OUTPATIENT)
Dept: PHYSICAL THERAPY | Facility: CLINIC | Age: 58
End: 2018-05-07
Payer: COMMERCIAL

## 2018-05-07 DIAGNOSIS — I89.0 LYMPHEDEMA: Primary | ICD-10-CM

## 2018-05-07 PROCEDURE — 97016 VASOPNEUMATIC DEVICE THERAPY: CPT | Performed by: PHYSICAL THERAPIST

## 2018-05-07 PROCEDURE — 97140 MANUAL THERAPY 1/> REGIONS: CPT | Performed by: PHYSICAL THERAPIST

## 2018-05-07 NOTE — PROGRESS NOTES
Daily Note     Today's date: 2018  Patient name: Silvestre Encarnacion  : 1960  MRN: [id number]  Referring provider: Sharona Lowery MD  Dx:   Encounter Diagnosis     ICD-10-CM    1  Lymphedema I89 0                   Subjective:  Patient reports that she has bilateral numbness in the UEs due to unknown          Objective: See treatment diary below  Manual  2/21 2/27 3/6 3/15  4/19 5/7      MLD   20'   20' 20      PROM      5'                                                  Exercise Diary  2/21 27 3/6 3/15  4/19 5/7      Pulley  5' 5' 5'  5' 5      Wall Climb  5 x :10 5x:10 5x:10                                                                                                                                                                                                                                                       Modalities  2/21 2/27 3/6 3/15  4/19 5/7      Compression pump  30' 30' 30' 30' 30' 30'                                                                                                                                                                                          Right                Left            2/27             3/6               3/15       4/19  palm                                         22                     19 5      21 5   20 8        21     21      21         21 5  wrist                               18 5                15 7       18 5   18 2       18     18       18 5      18  Wrist + 10 cm                               27                   18          30     22 5       23     26       27 5      25 8   Wrist + 16 cm                     32                   23 5       34    32 3       33 5    30      33        31  Elbow                                 32 5               24 5         31    32 4      32        32      29 5    29 5  Wrist + 32                                32 5               26 5         35    34         35 5     35      33 6    35 5   Wrist + 40 31 5              28 2         32     33 2     33       33       34       34      +42                34  +44                33 5                             Assessment: Tolerated treatment well  Patient would benefit from continued PT      Plan: Continue per plan of care

## 2018-05-10 ENCOUNTER — HOSPITAL ENCOUNTER (OUTPATIENT)
Dept: RADIOLOGY | Facility: MEDICAL CENTER | Age: 58
Discharge: HOME | End: 2018-05-10
Payer: COMMERCIAL

## 2018-05-10 DIAGNOSIS — C50.911 BILATERAL MALIGNANT NEOPLASM OF BREAST IN FEMALE, ESTROGEN RECEPTOR POSITIVE, UNSPECIFIED SITE OF BREAST (HCC): ICD-10-CM

## 2018-05-10 DIAGNOSIS — Z17.0 BILATERAL MALIGNANT NEOPLASM OF BREAST IN FEMALE, ESTROGEN RECEPTOR POSITIVE, UNSPECIFIED SITE OF BREAST (HCC): ICD-10-CM

## 2018-05-10 DIAGNOSIS — C50.912 BILATERAL MALIGNANT NEOPLASM OF BREAST IN FEMALE, ESTROGEN RECEPTOR POSITIVE, UNSPECIFIED SITE OF BREAST (HCC): ICD-10-CM

## 2018-05-10 DIAGNOSIS — C79.51 BONE METASTASES (HCC): ICD-10-CM

## 2018-05-10 PROCEDURE — 74177 CT ABD & PELVIS W/CONTRAST: CPT

## 2018-05-10 PROCEDURE — 71260 CT THORAX DX C+: CPT

## 2018-05-10 RX ADMIN — IOHEXOL 100 ML: 350 INJECTION, SOLUTION INTRAVENOUS at 13:27

## 2018-05-14 ENCOUNTER — TELEPHONE (OUTPATIENT)
Dept: PALLIATIVE MEDICINE | Facility: CLINIC | Age: 58
End: 2018-05-14

## 2018-05-14 ENCOUNTER — OFFICE VISIT (OUTPATIENT)
Dept: PALLIATIVE MEDICINE | Facility: CLINIC | Age: 58
End: 2018-05-14
Payer: COMMERCIAL

## 2018-05-14 VITALS
WEIGHT: 143 LBS | SYSTOLIC BLOOD PRESSURE: 112 MMHG | HEIGHT: 65 IN | BODY MASS INDEX: 23.82 KG/M2 | RESPIRATION RATE: 20 BRPM | TEMPERATURE: 97.9 F | OXYGEN SATURATION: 97 % | HEART RATE: 90 BPM | DIASTOLIC BLOOD PRESSURE: 72 MMHG

## 2018-05-14 DIAGNOSIS — G89.3 CANCER RELATED PAIN: ICD-10-CM

## 2018-05-14 DIAGNOSIS — Z17.0 BILATERAL MALIGNANT NEOPLASM OF BREAST IN FEMALE, ESTROGEN RECEPTOR POSITIVE, UNSPECIFIED SITE OF BREAST (HCC): Primary | ICD-10-CM

## 2018-05-14 DIAGNOSIS — C79.51 BONE METASTASES (HCC): ICD-10-CM

## 2018-05-14 DIAGNOSIS — R63.0 DECREASED APPETITE: ICD-10-CM

## 2018-05-14 DIAGNOSIS — R11.0 CHEMOTHERAPY-INDUCED NAUSEA: ICD-10-CM

## 2018-05-14 DIAGNOSIS — T45.1X5A CHEMOTHERAPY-INDUCED NAUSEA: ICD-10-CM

## 2018-05-14 DIAGNOSIS — C50.912 BILATERAL MALIGNANT NEOPLASM OF BREAST IN FEMALE, ESTROGEN RECEPTOR POSITIVE, UNSPECIFIED SITE OF BREAST (HCC): Primary | ICD-10-CM

## 2018-05-14 DIAGNOSIS — C50.911 BILATERAL MALIGNANT NEOPLASM OF BREAST IN FEMALE, ESTROGEN RECEPTOR POSITIVE, UNSPECIFIED SITE OF BREAST (HCC): Primary | ICD-10-CM

## 2018-05-14 PROCEDURE — 99213 OFFICE O/P EST LOW 20 MIN: CPT | Performed by: FAMILY MEDICINE

## 2018-05-14 RX ORDER — DRONABINOL 5 MG/1
5 CAPSULE ORAL
Qty: 60 CAPSULE | Refills: 0 | Status: SHIPPED | OUTPATIENT
Start: 2018-05-14 | End: 2018-06-18

## 2018-05-14 RX ORDER — OXYCODONE HYDROCHLORIDE 20 MG/1
20 TABLET ORAL EVERY 6 HOURS PRN
Qty: 90 TABLET | Refills: 0 | Status: SHIPPED | OUTPATIENT
Start: 2018-05-14 | End: 2018-06-15 | Stop reason: SDUPTHER

## 2018-05-14 NOTE — TELEPHONE ENCOUNTER
Prior auth completed online at [employer] for Marinol and submitted  Awaiting determination Key [id number]  Also received prior auth request for Oxycodone    Started process and received message I would be receiving request for additional information on Oxycodone request

## 2018-05-14 NOTE — PROGRESS NOTES
Palliative and Supportive Care   Jana Nicole 56 y o  female [id number]    Assessment/Plan:  1  Bilateral malignant neoplasm of breast in female, estrogen receptor positive, unspecified site of breast ([facility] [address] )    2  Bone metastases ([facility] [address] )    3  Cancer related pain    4  Chemotherapy-induced nausea    5  Decreased appetite      Signed Prescriptions Disp Refills    oxyCODONE (ROXICODONE) 20 MG TABS 90 tablet 0     Sig: Take 1 tablet (20 mg total) by mouth every 6 (six) hours as needed for moderate pain Max Daily Amount: 80 mg    dronabinol (MARINOL) 5 MG capsule 60 capsule 0     Sig: Take 1 capsule (5 mg total) by mouth 2 (two) times a day before meals     Medications Discontinued During This Encounter   Medication Reason    dexamethasone (DECADRON) 2 mg tablet     HYDROmorphone (DILAUDID) 8 MG tablet      Will attempt to get marinol for patient as MMJ is cost prohibitive  There is no known cancer related reason for her lower abdominal pain per her last CT  Follow up with Dr Omaira Castillo next week  Representatives have queried the patient's controlled substance dispensing history in the Prescription Drug Monitoring Program in compliance with regulations before I have prescribed any controlled substances  The prescription history is consistent with prescribed therapy and our practice policies  20 minutes were spent face to face with Jana Nicole and her daughter with greater than 50% of the time spent in counseling or coordination of care including discussions of risks and benefits of treatment, treatment instructions and follow up requirements   All of the patient's questions were answered during this discussion  Return in about 1 month (around 6/14/2018)  Subjective:   Chief Complaint  Follow up visit for:  symptom management  HPI     Jana Nicole is a 56 y o  female with metastatic breast cancer  She presents today with her daughter  She continues to struggle with several symptoms    Since our last visit she has stopped taking her dexamethasone as planned  However she notes nausea and weight loss  She also continues to complain of right arm pain which causes her to have a difficult time finding a comfortable sleeping position  She notes pain in her lower abdomen but her bowels are moving well  Dilaudid is no longer helpful  The following portions of the medical history were reviewed: past medical history, problem list, medication list, and social history  Current Outpatient Prescriptions:     ammonium lactate (LAC-HYDRIN) 12 % cream, Apply topically as needed for dry skin, Disp: 385 g, Rfl: 0    diazepam (VALIUM) 5 mg tablet, Take 1 tablet (5 mg total) by mouth daily at bedtime, Disp: 30 tablet, Rfl: 3    dronabinol (MARINOL) 5 MG capsule, Take 1 capsule (5 mg total) by mouth 2 (two) times a day before meals, Disp: 60 capsule, Rfl: 0    gabapentin (NEURONTIN) 300 mg capsule, 2 capsules PO at bedtime, Disp: 60 capsule, Rfl: 5    OLANZapine (ZyPREXA) 5 mg tablet, Take 1 tablet (5 mg total) by mouth daily at bedtime, Disp: 30 tablet, Rfl: 2    ondansetron (ZOFRAN) 4 mg tablet, Take 4 mg by mouth every 6 (six) hours as needed for nausea or vomiting , Disp: , Rfl:     oxyCODONE (ROXICODONE) 20 MG TABS, Take 1 tablet (20 mg total) by mouth every 6 (six) hours as needed for moderate pain Max Daily Amount: 80 mg, Disp: 90 tablet, Rfl: 0    promethazine (PHENERGAN) 25 mg tablet, Take 1 tablet (25 mg total) by mouth every 6 (six) hours as needed for nausea or vomiting, Disp: 60 tablet, Rfl: 2    senna (SENOKOT) 8 6 mg, Take 2 tablets by mouth 2 (two) times a day, Disp: , Rfl:   Review of Systems   Constitutional: Positive for activity change, appetite change, fatigue and unexpected weight change  Gastrointestinal: Positive for nausea  Musculoskeletal: Positive for arthralgias  Neurological: Positive for weakness  Psychiatric/Behavioral: Positive for sleep disturbance  Objective:  Vital Signs  /72 (BP Location: Left arm, Patient Position: Sitting, Cuff Size: Standard)   Pulse 90   Temp 97 9 °F (36 6 °C) (Tympanic)   Resp 20   Ht 5' 5" (1 651 m)   Wt 64 9 kg (143 lb)   LMP  (LMP Unknown)   SpO2 97%   BMI 23 80 kg/m²    Physical Exam    Constitutional: Ill appearing but in No distress  Head: Normocephalic and atraumatic  Eyes: EOM are normal  Right eye exhibits no discharge  Left eye exhibits no discharge  No scleral icterus  Pulmonary/Chest: Effort normal  No stridor  No respiratory distress  Abdominal: No distension  Musculoskeletal: right arm lymphedema  Neurological: Alert, oriented and appropriately conversant  Skin: Skin is dry, not diaphoretic  Sallow coloring  Psychiatric: Displays a normal mood and affect  Behavior, judgement and thought content appear normal    Vitals reviewed

## 2018-05-16 ENCOUNTER — APPOINTMENT (OUTPATIENT)
Dept: PHYSICAL THERAPY | Facility: CLINIC | Age: 58
End: 2018-05-16
Payer: COMMERCIAL

## 2018-05-16 ENCOUNTER — OFFICE VISIT (OUTPATIENT)
Dept: HEMATOLOGY ONCOLOGY | Facility: CLINIC | Age: 58
End: 2018-05-16
Payer: COMMERCIAL

## 2018-05-16 VITALS
OXYGEN SATURATION: 97 % | BODY MASS INDEX: 23.57 KG/M2 | HEART RATE: 102 BPM | TEMPERATURE: 98.2 F | DIASTOLIC BLOOD PRESSURE: 64 MMHG | HEIGHT: 65 IN | RESPIRATION RATE: 17 BRPM | WEIGHT: 141.5 LBS | SYSTOLIC BLOOD PRESSURE: 118 MMHG

## 2018-05-16 DIAGNOSIS — C50.912 BILATERAL MALIGNANT NEOPLASM OF BREAST IN FEMALE, ESTROGEN RECEPTOR POSITIVE, UNSPECIFIED SITE OF BREAST (HCC): Primary | ICD-10-CM

## 2018-05-16 DIAGNOSIS — Z17.0 BILATERAL MALIGNANT NEOPLASM OF BREAST IN FEMALE, ESTROGEN RECEPTOR POSITIVE, UNSPECIFIED SITE OF BREAST (HCC): Primary | ICD-10-CM

## 2018-05-16 DIAGNOSIS — C50.911 BILATERAL MALIGNANT NEOPLASM OF BREAST IN FEMALE, ESTROGEN RECEPTOR POSITIVE, UNSPECIFIED SITE OF BREAST (HCC): Primary | ICD-10-CM

## 2018-05-16 PROCEDURE — 99215 OFFICE O/P EST HI 40 MIN: CPT | Performed by: INTERNAL MEDICINE

## 2018-05-16 NOTE — PROGRESS NOTES
Jesus Banda  1960  [address] [facility]  [facility] [address] [place] [address]    Chief Complaint   Patient presents with    Follow-up         Oncology History    2010- The patient had a left sided T1 B , N0 ER positive, TN and HER-2 negative invasive ductal carcinoma, right-sided DCIS, ER/TN positive, HER-2 negative  Bilateral mastectomy  2010- April 2011  Treated with tamoxifen 20 mg by mouth daily  Last menstrual cycle was March 2010  She was then on Arimidex 1 mg daily  Lost to followup after March 2012 December 2015 Patient presented with Right arm swelling  Doppler R upper extremities  which did not identify any acute or chronic DVT however there was a nonvascular structure in the right axilla measuring 2 2 x 1 3 cm CT Scan of the chest on 12/9/15, showed multiple bilateral pulmonary nodules measuring 3-7 mm  PET scan on 2/4/16, which revealed hypermetabolic hilar and mediastinal nodes  There is a pre carinal node with a SUV of 5 4 measuring 1 8 x 1 7 cm, and a subcarinal node measuring 1 9 x 1 3 cm with a SUV of 5 3  There is right hilar activity of 3 5 and left hilar activity of 2 8  The subcentimeter nodules are too small for PET evaluation  She also has uptake in her L3 and L5 vertebral bodies, both concerning for bony metastases  February 16, 2016 bronchoscopic biopsy showed Metastatic non-small cell carcinoma, favor adenocarcinoma  ER 90%, TN zero  Her 2 +2, positive by [facility]   March 1, 2016- started Taxotere/Perjeta/Herceptin    2016 patient had good response  Taxotere was discontinued and anastrozole 1 mg by mouth daily initiated  Herceptin and Perjeta continued  2017 progressive disease noted  Anastrozole discontinued  Taxotere restarted at 75 mg/m² every 3 weeks  Neulasta support  Herceptin and Perjeta continued     April 18, 2017 brain MRI showed a 5 mm right parietal lesion, 3 mm right frontal lesion, 3 mm left parahippocampal lesion  May 2017- SRS to brain lesions  Current Therapy:   February 2017 progressive disease noted  Anastrozole discontinued  Taxotere restarted at 75 mg/m² every 3 weeks  Neulasta support  Herceptin and Perjeta continued  Bilateral malignant neoplasm of breast in female, estrogen receptor positive ([facility] [address] )    2/15/2016 Initial Diagnosis     Bilateral malignant neoplasm of breast in female, estrogen receptor positive ([address] )          History of Present Illness:  -No ref  provider found:    -Interval History: For 1 week, patient has had diffuse crampy abdominal pain, nausea, decreased appetite  She has no fever  She has no diarrhea  She has occasional vomiting  Review of Systems:  Review of Systems   Constitutional: Negative for appetite change, diaphoresis, fatigue and fever  HENT: Negative for sinus pain  Eyes: Negative for discharge  Respiratory: Negative for cough and shortness of breath  Cardiovascular: Negative for chest pain  Gastrointestinal: Negative for abdominal pain, constipation and diarrhea  Endocrine: Negative for cold intolerance  Genitourinary: Negative for difficulty urinating and hematuria  Musculoskeletal: Negative for joint swelling  Skin: Negative for rash  Allergic/Immunologic: Negative for environmental allergies  Neurological: Negative for dizziness and headaches  Hematological: Negative for adenopathy  Psychiatric/Behavioral: Negative for agitation         Patient Active Problem List   Diagnosis    H/O bilateral mastectomy    Lymphedema of right upper extremity    Pulmonary nodule    Bilateral malignant neoplasm of breast in female, estrogen receptor positive ([address] )    Bone metastases ([facility] [address] )    Brain metastases ([facility] [address] )    Cancer related pain    Chemotherapy-induced nausea    Constipation    Decreased appetite    Difficulty sleeping    Dyspareunia, female    Herniated lumbar intervertebral disc    Mediastinal lymphadenopathy    Hyperglycemia    Other fatigue    Dry skin    Dizziness     Past Medical History:   Diagnosis Date    H/O bilateral mastectomy 2/15/2016    Hypertension 2/15/2016    Lymphedema 2/15/2016    Malignant neoplasm of right breast ([address] ) 2/15/2016     Past Surgical History:   Procedure Laterality Date    ENDOBRONCHIAL ULTRASOUND (EBUS) N/A 2/16/2016    Procedure: EBUS;  Surgeon: Rosalind Jauregui MD;  [facility]: [facility];  Service:     MASTECTOMY Bilateral     MASTECTOMY Bilateral     right arm edema    OOPHORECTOMY      [facility] BRONCHOSCOPY NEEDLE BX TRACHEA MAIN STEM&/BRON N/A 2/16/2016    Procedure: Crissie Bent;  Surgeon: Rosalind Juaregui MD;  [facility]: [facility];  Service: Thoracic    [place] STEREOTACTIC RADIOSURGERY, CRANIAL,SIMPLE,EA ADD  5/3/2017         [place] STEREOTACTIC RADIOSURGERY, CRANIAL,SIMPLE,EA ADD  5/3/2017         [place] STEREOTACTIC RADIOSURGERY, CRANIAL,SIMPLE,SINGLE  5/3/2017          No family history on file  Social History     Social History    Marital status: /Civil Union     Spouse name: N/A    Number of children: N/A    Years of education: N/A     Occupational History    Not on file       Social History Main Topics    Smoking status: Current Every Day Smoker     Packs/day: 0 50     Years: 20 00    Smokeless tobacco: Never Used      Comment: 18 pack years    Alcohol use Yes      Comment: social    Drug use: No    Sexual activity: Not on file     Other Topics Concern    Not on file     Social History Narrative    No narrative on file       Current Outpatient Prescriptions:     diazepam (VALIUM) 5 mg tablet, Take 1 tablet (5 mg total) by mouth daily at bedtime, Disp: 30 tablet, Rfl: 3    gabapentin (NEURONTIN) 300 mg capsule, 2 capsules PO at bedtime, Disp: 60 capsule, Rfl: 5    OLANZapine (ZyPREXA) 5 mg tablet, Take 1 tablet (5 mg total) by mouth daily at bedtime, Disp: 30 tablet, Rfl: 2    oxyCODONE (ROXICODONE) 20 MG TABS, Take 1 tablet (20 mg total) by mouth every 6 (six) hours as needed for moderate pain Max Daily Amount: 80 mg, Disp: 90 tablet, Rfl: 0    senna (SENOKOT) 8 6 mg, Take 2 tablets by mouth 2 (two) times a day, Disp: , Rfl:     dronabinol (MARINOL) 5 MG capsule, Take 1 capsule (5 mg total) by mouth 2 (two) times a day before meals, Disp: 60 capsule, Rfl: 0    ondansetron (ZOFRAN) 4 mg tablet, Take 4 mg by mouth every 6 (six) hours as needed for nausea or vomiting , Disp: , Rfl:     promethazine (PHENERGAN) 25 mg tablet, Take 1 tablet (25 mg total) by mouth every 6 (six) hours as needed for nausea or vomiting, Disp: 60 tablet, Rfl: 2  No Known Allergies  Vitals:    05/16/18 1347   BP: 118/64   Pulse: 102   Resp: 17   Temp: 98 2 °F (36 8 °C)   SpO2: 97%         Physical Exam   Constitutional: She is oriented to person, place, and time  She appears well-developed  HENT:   Head: Normocephalic  Eyes: Pupils are equal, round, and reactive to light  Neck: Neck supple  Cardiovascular: Normal rate  No murmur heard  Pulmonary/Chest: No respiratory distress  She has no wheezes  She has no rales  Abdominal: Soft  She exhibits no distension  There is no tenderness  There is no rebound  Musculoskeletal: She exhibits no edema  Lymphadenopathy:     She has no cervical adenopathy  Neurological: She is alert and oriented to person, place, and time  She displays normal reflexes  Skin: Skin is warm  No rash noted  Psychiatric: She has a normal mood and affect   Thought content normal            Performance Status: ECOG/Zubrod/WHO: 0 - Asymptomatic    Labs:  CBC, Coags, BMP, Mg, Phos     Imaging  Nm Cardiac Blood Pool M[place] (at Rest)    Result Date: 4/30/2018  Narrative: MUGA SCAN INDICATION: C50 911: Malignant neoplasm of unspecified site of right female breast Z17 0: Estrogen receptor positive status (ER+) C50 912: Malignant neoplasm of unspecified site of left female breast COMPARISON: MUGA scan from 8/14/2017 TECHNIQUE:   The study was performed following in vivo labeling utilizing 23 8  mCi Tc-99m pertechnetate IV  Gated images of the heart were acquired in the anterior, Irish and steep Irish projections  FINDINGS: Right ventricular motion is unremarkable  There is normal symmetrical contractility of the left ventricle  The overall resting left ventricular ejection fraction is 77% %, based on manual calculation  Previously the LVEF was 57%  Impression: The resting left ventricular ejection fraction is 77%  Workstation performed: [id number]     Ct Chest Abdomen Pelvis W Contrast    Result Date: 5/10/2018  Narrative: CT CHEST, ABDOMEN AND PELVIS WITH IV CONTRAST INDICATION:   C50 911: Malignant neoplasm of unspecified site of right female breast Z17 0: Estrogen receptor positive status (ER+) C50 912: Malignant neoplasm of unspecified site of left female breast C79 51: Secondary malignant neoplasm of bone  COMPARISON: CT exam from 2/16/2018 TECHNIQUE: CT examination of the chest, abdomen and pelvis was performed  Axial, sagittal, and coronal 2D reformatted images were created from the source data and submitted for interpretation  Radiation dose length product (DLP) for this visit:  399 mGy-cm   This examination, like all CT scans performed in the [facility], was performed utilizing techniques to minimize radiation dose exposure, including the use of iterative reconstruction and automated exposure control  IV Contrast:  100 mL of iohexol (OMNIPAQUE) Enteric Contrast: Enteric contrast was administered  FINDINGS: CHEST LUNGS:  Increased reticular interstitial changes again noted bilaterally prominently in an upper lobe distribution  Associated scattered patchy ground glass opacities  This appears to have progressed from the prior exam   Compressive atelectasis in bilateral lower lobes adjacent to the pleural effusions   PLEURA:  Moderate size bilateral pleural effusions, increased from the prior exam  HEART/GREAT VESSELS:  Thoracic aorta is normal caliber  Scattered coronary calcifications noted  Heart is normal in size  No significant pericardial effusion  MEDIASTINUM AND CHEO:  Calcified mediastinal and right perihilar lymph nodes noted  CHEST WALL AND LOWER NECK:   Bilateral breast prostheses identified  No significant axillary lymphadenopathy  Surgical clips noted in both axilla  ABDOMEN LIVER/BILIARY TREE:  Unremarkable  GALLBLADDER:  No calcified gallstones  No pericholecystic inflammatory change  SPLEEN:  Calcified granulomata are noted in the spleen  No suspicious splenic mass  PANCREAS:  Unremarkable  ADRENAL GLANDS:  Unremarkable  KIDNEYS/URETERS:  One or more sharply circumscribed subcentimeter renal hypodensities are noted  These lesions are too small to accurately characterize, but are statistically most likely to represent benign cortical renal cyst(s)  According to the guidelines published in the [facility] Paper of the ACR Incidental Findings Committee (Radiology 2010), no further workup of these lesions is recommended  STOMACH AND BOWEL:  There is colonic diverticulosis without acute diverticulitis  APPENDIX:  A normal appendix was visualized  ABDOMINOPELVIC CAVITY:  No ascites or free intraperitoneal air  No lymphadenopathy  VESSELS:  Unremarkable for patient's age  PELVIS REPRODUCTIVE ORGANS:  Patient is status post hysterectomy  URINARY BLADDER:  Underdistended with questionable wall thickening anteriorly but this appears unchanged  ABDOMINAL WALL/INGUINAL REGIONS:  Unremarkable  OSSEOUS STRUCTURES:  Stable small sclerotic lesion at the T11 superior endplate  Minimal sclerosis at the T12 level inferior endplate anteriorly, stable may be degenerative rather than an osseous lesion  No new suspicious osseous lesions  Impression: 1  Increased reticular interstitial changes again noted in the bilateral lung fields with associated scattered patchy groundglass opacities  The findings are nonspecific    While this could represent an infectious or inflammatory process, this has been progressing over a long period of time however  The possibility of lymphangitic spread of disease would be a consideration  This could also represent sequela of chemotherapeutic use  Correlate clinically  2  Moderate sized bilateral pleural effusions, increased from the prior exam  3  Stable sclerotic lesion at the T11 vertebral body  No new suspicious osseous lesions  4  No suspicious lymphadenopathy in the chest, abdomen or pelvis  Workstation performed: [id number]     I reviewed the above laboratory and imaging data  Discussion/Summary:  In summary, this is a 51-year-old female history of advanced breast cancer  Patient has recently been experiencing abdominal discomfort, nausea, vomiting  CT of the chest abdomen and pelvis shows no abdominal disease process to explain these symptoms  Her lungs show biapical interstitial infiltrates in moderate bilateral pleural effusions  Both of these have increased slightly and gradually over months  Pulmonary consult is requested to evaluate these findings  If malignant, a change in treatment could be considered  If infectious or inflammatory, other treatment modalities would be considered  It is noteworthy that she had not had radiation that would have affected the bilateral pulmonary apices  The patient had been taking Dilaudid fairly regularly  I believe she has not been taking this recently on oxycodone has been recommended  She will be picking up the prescription today  If her abdominal symptoms, nausea, etc improve it is possible that this represented narcotic withdrawal, in retrospect  I reviewed her case with Dr Aby Onofre today  I reviewed the above considerations with the patient and her daughter  We will continue Herceptin and Perjeta  Follow-up in 3 weeks

## 2018-05-16 NOTE — LETTER
May 16, 2018     Brooke Hairston MD  [address] [facility]  [address]  [address] [place] [address] [facility]    Patient: Lynn Bertrand   YOB: 1960   Date of Visit: 5/16/2018       Dear Dr Melissa Means: Thank you for referring Logan Craig to me for evaluation  Below are my notes for this consultation  If you have questions, please do not hesitate to call me  I look forward to following your patient along with you  Sincerely,        Sourav Pederson DO        CC: MD Sourav Mitchell DO  5/16/2018  2:14 PM  Sign at close encounter  Lynn Bertrand  1960  [address] [facility]  [address] [place] [address]    Chief Complaint   Patient presents with    Follow-up         Oncology History    2010- The patient had a left sided T1 B , N0 ER positive, [place] and HER-2 negative invasive ductal carcinoma, right-sided DCIS, ER/[place] positive, HER-2 negative  Bilateral mastectomy  2010- April 2011  Treated with tamoxifen 20 mg by mouth daily  Last menstrual cycle was March 2010  She was then on Arimidex 1 mg daily  Lost to followup after March 2012 December 2015 Patient presented with Right arm swelling  Doppler R upper extremities  which did not identify any acute or chronic DVT however there was a nonvascular structure in the right axilla measuring 2 2 x 1 3 cm CT Scan of the chest on 12/9/15, showed multiple bilateral pulmonary nodules measuring 3-7 mm  PET scan on 2/4/16, which revealed hypermetabolic hilar and mediastinal nodes  There is a pre carinal node with a SUV of 5 4 measuring 1 8 x 1 7 cm, and a subcarinal node measuring 1 9 x 1 3 cm with a SUV of 5 3  There is right hilar activity of 3 5 and left hilar activity of 2 8  The subcentimeter nodules are too small for PET evaluation  She also has uptake in her L3 and L5 vertebral bodies, both concerning for bony metastases     February 16, 2016 bronchoscopic biopsy showed Metastatic non-small cell carcinoma, favor adenocarcinoma  ER 90%, DC zero  Her 2 +2, positive by [facility]   March 1, 2016- started Taxotere/Perjeta/Herceptin    2016 patient had good response  Taxotere was discontinued and anastrozole 1 mg by mouth daily initiated  Herceptin and Perjeta continued  2017 progressive disease noted  Anastrozole discontinued  Taxotere restarted at 75 mg/m² every 3 weeks  Neulasta support  Herceptin and Perjeta continued  April 18, 2017 brain MRI showed a 5 mm right parietal lesion, 3 mm right frontal lesion, 3 mm left parahippocampal lesion  May 2017- SRS to brain lesions  Current Therapy:   February 2017 progressive disease noted  Anastrozole discontinued  Taxotere restarted at 75 mg/m² every 3 weeks  Neulasta support  Herceptin and Perjeta continued  Bilateral malignant neoplasm of breast in female, estrogen receptor positive ([facility] [address] )    2/15/2016 Initial Diagnosis     Bilateral malignant neoplasm of breast in female, estrogen receptor positive ([facility] [address] )          History of Present Illness:  -No ref  provider found:    -Interval History:      Review of Systems:  Review of Systems   Constitutional: Negative for appetite change, diaphoresis, fatigue and fever  HENT: Negative for sinus pain  Eyes: Negative for discharge  Respiratory: Negative for cough and shortness of breath  Cardiovascular: Negative for chest pain  Gastrointestinal: Negative for abdominal pain, constipation and diarrhea  Endocrine: Negative for cold intolerance  Genitourinary: Negative for difficulty urinating and hematuria  Musculoskeletal: Negative for joint swelling  Skin: Negative for rash  Allergic/Immunologic: Negative for environmental allergies  Neurological: Negative for dizziness and headaches  Hematological: Negative for adenopathy  Psychiatric/Behavioral: Negative for agitation         Patient Active Problem List   Diagnosis    H/O bilateral mastectomy    Lymphedema of right upper extremity    Pulmonary nodule    Bilateral malignant neoplasm of breast in female, estrogen receptor positive (HCC)    Bone metastases ([facility] [address] )    Brain metastases ([facility] [address] )    Cancer related pain    Chemotherapy-induced nausea    Constipation    Decreased appetite    Difficulty sleeping    Dyspareunia, female    Herniated lumbar intervertebral disc    Mediastinal lymphadenopathy    Hyperglycemia    Other fatigue    Dry skin    Dizziness     Past Medical History:   Diagnosis Date    H/O bilateral mastectomy 2/15/2016    Hypertension 2/15/2016    Lymphedema 2/15/2016    Malignant neoplasm of right breast ([facility] [address] ) 2/15/2016     Past Surgical History:   Procedure Laterality Date    ENDOBRONCHIAL ULTRASOUND (EBUS) N/A 2/16/2016    Procedure: EBUS;  Surgeon: Myrick Kawasaki, MD;  [facility];  Service:     MASTECTOMY Bilateral     MASTECTOMY Bilateral     right arm edema    OOPHORECTOMY      ME BRONCHOSCOPY NEEDLE BX TRACHEA MAIN STEM&/BRON N/A 2/16/2016    Procedure: Opal Mccormack;  Surgeon: Myrick Kawasaki, MD;  [facility];  Service: Thoracic    ME STEREOTACTIC RADIOSURGERY, CRANIAL,SIMPLE,EA ADD  5/3/2017         ME STEREOTACTIC RADIOSURGERY, CRANIAL,SIMPLE,EA ADD  5/3/2017         ME STEREOTACTIC RADIOSURGERY, CRANIAL,SIMPLE,SINGLE  5/3/2017          No family history on file  Social History     Social History    Marital status: /Civil Union     Spouse name: N/A    Number of children: N/A    Years of education: N/A     Occupational History    Not on file       Social History Main Topics    Smoking status: Current Every Day Smoker     Packs/day: 0 50     Years: 20 00    Smokeless tobacco: Never Used      Comment: 18 pack years    Alcohol use Yes      Comment: social    Drug use: No    Sexual activity: Not on file     Other Topics Concern    Not on file     Social History Narrative    No narrative on file       Current Outpatient Prescriptions:    diazepam (VALIUM) 5 mg tablet, Take 1 tablet (5 mg total) by mouth daily at bedtime, Disp: 30 tablet, Rfl: 3    gabapentin (NEURONTIN) 300 mg capsule, 2 capsules PO at bedtime, Disp: 60 capsule, Rfl: 5    OLANZapine (ZyPREXA) 5 mg tablet, Take 1 tablet (5 mg total) by mouth daily at bedtime, Disp: 30 tablet, Rfl: 2    oxyCODONE (ROXICODONE) 20 MG TABS, Take 1 tablet (20 mg total) by mouth every 6 (six) hours as needed for moderate pain Max Daily Amount: 80 mg, Disp: 90 tablet, Rfl: 0    senna (SENOKOT) 8 6 mg, Take 2 tablets by mouth 2 (two) times a day, Disp: , Rfl:     dronabinol (MARINOL) 5 MG capsule, Take 1 capsule (5 mg total) by mouth 2 (two) times a day before meals, Disp: 60 capsule, Rfl: 0    ondansetron (ZOFRAN) 4 mg tablet, Take 4 mg by mouth every 6 (six) hours as needed for nausea or vomiting , Disp: , Rfl:     promethazine (PHENERGAN) 25 mg tablet, Take 1 tablet (25 mg total) by mouth every 6 (six) hours as needed for nausea or vomiting, Disp: 60 tablet, Rfl: 2  No Known Allergies  Vitals:    05/16/18 1347   BP: 118/64   Pulse: 102   Resp: 17   Temp: 98 2 °F (36 8 °C)   SpO2: 97%         Physical Exam   Constitutional: She is oriented to person, place, and time  She appears well-developed  HENT:   Head: Normocephalic  Eyes: Pupils are equal, round, and reactive to light  Neck: Neck supple  Cardiovascular: Normal rate  No murmur heard  Pulmonary/Chest: No respiratory distress  She has no wheezes  She has no rales  Abdominal: Soft  She exhibits no distension  There is no tenderness  There is no rebound  Musculoskeletal: She exhibits no edema  Lymphadenopathy:     She has no cervical adenopathy  Neurological: She is alert and oriented to person, place, and time  She displays normal reflexes  Skin: Skin is warm  No rash noted  Psychiatric: She has a normal mood and affect   Thought content normal            Performance Status: ECOG/Zubrod/WHO: 0 - Asymptomatic    Labs:  CBC, Maximilian, MIR, Mg, Phos     Imaging  Nm Cardiac Blood Pool Muga (at Rest)    Result Date: 4/30/2018  Narrative: MUGA SCAN INDICATION: C50 911: Malignant neoplasm of unspecified site of right female breast Z17 0: Estrogen receptor positive status (ER+) C50 912: Malignant neoplasm of unspecified site of left female breast COMPARISON: MUGA scan from 8/14/2017 TECHNIQUE:   The study was performed following in vivo labeling utilizing 23 8  mCi Tc-99m pertechnetate IV  Gated images of the heart were acquired in the anterior, R[place]an and steep [place]an projections  FINDINGS: Right ventricular motion is unremarkable  There is normal symmetrical contractility of the left ventricle  The overall resting left ventricular ejection fraction is 77% %, based on manual calculation  Previously the LVEF was 57%  Impression: The resting left ventricular ejection fraction is 77%  Workstation performed: [id number]     Ct Chest Abdomen Pelvis W Contrast    Result Date: 5/10/2018  Narrative: CT CHEST, ABDOMEN AND PELVIS WITH IV CONTRAST INDICATION:   C50 911: Malignant neoplasm of unspecified site of right female breast Z17 0: Estrogen receptor positive status (ER+) C50 912: Malignant neoplasm of unspecified site of left female breast C79 51: Secondary malignant neoplasm of bone  COMPARISON: CT exam from 2/16/2018 TECHNIQUE: CT examination of the chest, abdomen and pelvis was performed  Axial, sagittal, and coronal 2D reformatted images were created from the source data and submitted for interpretation  Radiation dose length product (DLP) for this visit:  399 mGy-cm   This examination, like all CT scans performed in the [facility], was performed utilizing techniques to minimize radiation dose exposure, including the use of iterative reconstruction and automated exposure control  IV Contrast:  100 mL of iohexol (OMNIPAQUE) Enteric Contrast: Enteric contrast was administered   FINDINGS: CHEST LUNGS:  Increased reticular interstitial changes again noted bilaterally prominently in an upper lobe distribution  Associated scattered patchy ground glass opacities  This appears to have progressed from the prior exam   Compressive atelectasis in bilateral lower lobes adjacent to the pleural effusions  PLEURA:  Moderate size bilateral pleural effusions, increased from the prior exam  HEART/GREAT VESSELS:  Thoracic aorta is normal caliber  Scattered coronary calcifications noted  Heart is normal in size  No significant pericardial effusion  MEDIASTINUM AND CHEO:  Calcified mediastinal and right perihilar lymph nodes noted  CHEST WALL AND LOWER NECK:   Bilateral breast prostheses identified  No significant axillary lymphadenopathy  Surgical clips noted in both axilla  ABDOMEN LIVER/BILIARY TREE:  Unremarkable  GALLBLADDER:  No calcified gallstones  No pericholecystic inflammatory change  SPLEEN:  Calcified granulomata are noted in the spleen  No suspicious splenic mass  PANCREAS:  Unremarkable  ADRENAL GLANDS:  Unremarkable  KIDNEYS/URETERS:  One or more sharply circumscribed subcentimeter renal hypodensities are noted  These lesions are too small to accurately characterize, but are statistically most likely to represent benign cortical renal cyst(s)  According to the guidelines published in the [facility] Paper of the ACR Incidental Findings Committee (Radiology 2010), no further workup of these lesions is recommended  STOMACH AND BOWEL:  There is colonic diverticulosis without acute diverticulitis  APPENDIX:  A normal appendix was visualized  ABDOMINOPELVIC CAVITY:  No ascites or free intraperitoneal air  No lymphadenopathy  VESSELS:  Unremarkable for patient's age  PELVIS REPRODUCTIVE ORGANS:  Patient is status post hysterectomy  URINARY BLADDER:  Underdistended with questionable wall thickening anteriorly but this appears unchanged  ABDOMINAL WALL/INGUINAL REGIONS:  Unremarkable   OSSEOUS STRUCTURES:  Stable small sclerotic lesion at the T11 superior endplate  Minimal sclerosis at the T12 level inferior endplate anteriorly, stable may be degenerative rather than an osseous lesion  No new suspicious osseous lesions  Impression: 1  Increased reticular interstitial changes again noted in the bilateral lung fields with associated scattered patchy groundglass opacities  The findings are nonspecific  While this could represent an infectious or inflammatory process, this has been progressing over a long period of time however  The possibility of lymphangitic spread of disease would be a consideration  This could also represent sequela of chemotherapeutic use  Correlate clinically  2  Moderate sized bilateral pleural effusions, increased from the prior exam  3  Stable sclerotic lesion at the T11 vertebral body  No new suspicious osseous lesions  4  No suspicious lymphadenopathy in the chest, abdomen or pelvis  Workstation performed: [id number]     I reviewed the above laboratory and imaging data  Discussion/Summary:    Jimy Adkins DO  5/16/2018  2:11 PM  Incomplete  Angy Matta  1960  [id number] [facility]  [facility] [address] [address]    Chief Complaint   Patient presents with    Follow-up           Oncology History    2010- The patient had a left sided T1 B , N0 ER positive, [place] and HER-2 negative invasive ductal carcinoma, right-sided DCIS, ER/[place] positive, HER-2 negative  Bilateral mastectomy  2010- April 2011  Treated with tamoxifen 20 mg by mouth daily  Last menstrual cycle was March 2010  She was then on Arimidex 1 mg daily  Lost to followup after March 2012 December 2015 Patient presented with Right arm swelling    Doppler R upper extremities  which did not identify any acute or chronic DVT however there was a nonvascular structure in the right axilla measuring 2 2 x 1 3 cm CT Scan of the chest on 12/9/15, showed multiple bilateral pulmonary nodules measuring 3-7 mm  PET scan on 2/4/16, which revealed hypermetabolic hilar and mediastinal nodes  There is a pre carinal node with a SUV of 5 4 measuring 1 8 x 1 7 cm, and a subcarinal node measuring 1 9 x 1 3 cm with a SUV of 5 3  There is right hilar activity of 3 5 and left hilar activity of 2 8  The subcentimeter nodules are too small for PET evaluation  She also has uptake in her L3 and L5 vertebral bodies, both concerning for bony metastases  Dr Johnathon Santos performed flexible bronchoscopy and lymph node biopsy 2/16/16  Preliminary pathology identified Metastatic non-small cell carcinoma, favor adenocarcinoma  ER 90%, ND zero  Her 2 +2, positive by [facility]   March 1, 2016- started Taxotere/Perjeta/Herceptin    2016 patient had good response  Taxotere was discontinued and anastrozole 1 mg by mouth daily initiated  Herceptin and Perjeta continued  2017 progressive disease noted  Anastrozole discontinued  Taxotere restarted at 75 mg/m² every 3 weeks  Neulasta support  Herceptin and Perjeta continued  2017 brain MRI showed a 5 mm right parietal lesion, 3 mm right frontal lesion, 3 mm left parahippocampal lesion  2017- SRS to brain lesions  Current Therapy:   February 2017 progressive disease noted  Anastrozole discontinued  Taxotere restarted at 75 mg/m² every 3 weeks  Neulasta support  Herceptin and Perjeta continued  Bilateral malignant neoplasm of breast in female, estrogen receptor positive ([facility] [address] )    2/15/2016 Initial Diagnosis     Bilateral malignant neoplasm of breast in female, estrogen receptor positive ([facility] [address] )          History of Present Illness:  -No ref  provider found:    -Interval History:      Review of Systems:  Review of Systems   Constitutional: Negative for appetite change, diaphoresis, fatigue and fever  HENT: Negative for sinus pain  Eyes: Negative for discharge  Respiratory: Negative for cough and shortness of breath  Cardiovascular: Negative for chest pain  Gastrointestinal: Negative for abdominal pain, constipation and diarrhea  Endocrine: Negative for cold intolerance  Genitourinary: Negative for difficulty urinating and hematuria  Musculoskeletal: Negative for joint swelling  Skin: Negative for rash  Allergic/Immunologic: Negative for environmental allergies  Neurological: Negative for dizziness and headaches  Hematological: Negative for adenopathy  Psychiatric/Behavioral: Negative for agitation  Patient Active Problem List   Diagnosis    H/O bilateral mastectomy    Lymphedema of right upper extremity    Pulmonary nodule    Bilateral malignant neoplasm of breast in female, estrogen receptor positive ([facility] [address]    Bone metastases ([facility] [address] [facility]    Brain metastases ([address]    Cancer related pain    Chemotherapy-induced nausea    Constipation    Decreased appetite    Difficulty sleeping    Dyspareunia, female    Herniated lumbar intervertebral disc    Mediastinal lymphadenopathy    Hyperglycemia    Other fatigue    Dry skin    Dizziness     Past Medical History:   Diagnosis Date    H/O bilateral mastectomy 2/15/2016    Hypertension 2/15/2016    Lymphedema 2/15/2016    Malignant neoplasm of right breast ([facility] [address] ) 2/15/2016     Past Surgical History:   Procedure Laterality Date    ENDOBRONCHIAL ULTRASOUND (EBUS) N/A 2/16/2016    Procedure: EBUS;  Surgeon: Shane Bowles MD;  [facility];  Service:     MASTECTOMY Bilateral     MASTECTOMY Bilateral     right arm edema    OOPHORECTOMY      MD BRONCHOSCOPY NEEDLE BX TRACHEA MAIN STEM&/BRON N/A 2/16/2016    Procedure: Cam Squibb;  Surgeon: Shane Bowles MD;  [facility];  Service: Thoracic    MD STEREOTACTIC RADIOSURGERY, CRANIAL,SIMPLE,EA ADD  5/3/2017         MD STEREOTACTIC RADIOSURGERY, CRANIAL,SIMPLE,EA ADD  5/3/2017         MD STEREOTACTIC RADIOSURGERY, CRANIAL,SIMPLE,SINGLE  5/3/2017          No family history on file    Social History     Social History    Marital status: /Civil Union     Spouse name: N/A    Number of children: N/A    Years of education: N/A     Occupational History    Not on file  Social History Main Topics    Smoking status: Current Every Day Smoker     Packs/day: 0 50     Years: 20 00    Smokeless tobacco: Never Used      Comment: 18 pack years    Alcohol use Yes      Comment: social    Drug use: No    Sexual activity: Not on file     Other Topics Concern    Not on file     Social History Narrative    No narrative on file       Current Outpatient Prescriptions:     diazepam (VALIUM) 5 mg tablet, Take 1 tablet (5 mg total) by mouth daily at bedtime, Disp: 30 tablet, Rfl: 3    gabapentin (NEURONTIN) 300 mg capsule, 2 capsules PO at bedtime, Disp: 60 capsule, Rfl: 5    OLANZapine (ZyPREXA) 5 mg tablet, Take 1 tablet (5 mg total) by mouth daily at bedtime, Disp: 30 tablet, Rfl: 2    oxyCODONE (ROXICODONE) 20 MG TABS, Take 1 tablet (20 mg total) by mouth every 6 (six) hours as needed for moderate pain Max Daily Amount: 80 mg, Disp: 90 tablet, Rfl: 0    senna (SENOKOT) 8 6 mg, Take 2 tablets by mouth 2 (two) times a day, Disp: , Rfl:     dronabinol (MARINOL) 5 MG capsule, Take 1 capsule (5 mg total) by mouth 2 (two) times a day before meals, Disp: 60 capsule, Rfl: 0    ondansetron (ZOFRAN) 4 mg tablet, Take 4 mg by mouth every 6 (six) hours as needed for nausea or vomiting , Disp: , Rfl:     promethazine (PHENERGAN) 25 mg tablet, Take 1 tablet (25 mg total) by mouth every 6 (six) hours as needed for nausea or vomiting, Disp: 60 tablet, Rfl: 2  No Known Allergies  Vitals:    05/16/18 1347   BP: 118/64   Pulse: 102   Resp: 17   Temp: 98 2 °F (36 8 °C)   SpO2: 97%         Physical Exam   Constitutional: She is oriented to person, place, and time  She appears well-developed  HENT:   Head: Normocephalic  Eyes: Pupils are equal, round, and reactive to light  Neck: Neck supple  Cardiovascular: Normal rate      No murmur heard   Pulmonary/Chest: No respiratory distress  She has no wheezes  She has no rales  Abdominal: Soft  She exhibits no distension  There is no tenderness  There is no rebound  Musculoskeletal: She exhibits no edema  Lymphadenopathy:     She has no cervical adenopathy  Neurological: She is alert and oriented to person, place, and time  She displays normal reflexes  Skin: Skin is warm  No rash noted  Psychiatric: She has a normal mood and affect  Thought content normal            Performance Status: ECOG/Zubrod/WHO: 1 - Symptomatic but completely ambulatory    Labs:  CBC, Coags, BMP, Mg, Phos     Imaging  Nm Cardiac Blood Pool Muga (at Rest)    Result Date: 4/30/2018  Narrative: MUGA SCAN INDICATION: C50 911: Malignant neoplasm of unspecified site of right female breast Z17 0: Estrogen receptor positive status (ER+) C50 912: Malignant neoplasm of unspecified site of left female breast COMPARISON: MUGA scan from 8/14/2017 TECHNIQUE:   The study was performed following in vivo labeling utilizing 23 8  mCi Tc-99m pertechnetate IV  Gated images of the heart were acquired in the anterior, German and steep German projections  FINDINGS: Right ventricular motion is unremarkable  There is normal symmetrical contractility of the left ventricle  The overall resting left ventricular ejection fraction is 77% %, based on manual calculation  Previously the LVEF was 57%  Impression: The resting left ventricular ejection fraction is 77%  Workstation performed: [id number]     Ct Chest Abdomen Pelvis W Contrast    Result Date: 5/10/2018  Narrative: CT CHEST, ABDOMEN AND PELVIS WITH IV CONTRAST INDICATION:   C50 911: Malignant neoplasm of unspecified site of right female breast Z17 0: Estrogen receptor positive status (ER+) C50 912: Malignant neoplasm of unspecified site of left female breast C79 51: Secondary malignant neoplasm of bone   COMPARISON: CT exam from 2/16/2018 TECHNIQUE: CT examination of the chest, abdomen and pelvis was performed  Axial, sagittal, and coronal 2D reformatted images were created from the source data and submitted for interpretation  Radiation dose length product (DLP) for this visit:  399 mGy-cm   This examination, like all CT scans performed in the [facility], was performed utilizing techniques to minimize radiation dose exposure, including the use of iterative reconstruction and automated exposure control  IV Contrast:  100 mL of iohexol (OMNIPAQUE) Enteric Contrast: Enteric contrast was administered  FINDINGS: CHEST LUNGS:  Increased reticular interstitial changes again noted bilaterally prominently in an upper lobe distribution  Associated scattered patchy ground glass opacities  This appears to have progressed from the prior exam   Compressive atelectasis in bilateral lower lobes adjacent to the pleural effusions  PLEURA:  Moderate size bilateral pleural effusions, increased from the prior exam  HEART/GREAT VESSELS:  Thoracic aorta is normal caliber  Scattered coronary calcifications noted  Heart is normal in size  No significant pericardial effusion  MEDIASTINUM AND CHEO:  Calcified mediastinal and right perihilar lymph nodes noted  CHEST WALL AND LOWER NECK:   Bilateral breast prostheses identified  No significant axillary lymphadenopathy  Surgical clips noted in both axilla  ABDOMEN LIVER/BILIARY TREE:  Unremarkable  GALLBLADDER:  No calcified gallstones  No pericholecystic inflammatory change  SPLEEN:  Calcified granulomata are noted in the spleen  No suspicious splenic mass  PANCREAS:  Unremarkable  ADRENAL GLANDS:  Unremarkable  KIDNEYS/URETERS:  One or more sharply circumscribed subcentimeter renal hypodensities are noted  These lesions are too small to accurately characterize, but are statistically most likely to represent benign cortical renal cyst(s)    According to the guidelines published in the [facility] Paper of the ACR Incidental Findings Committee (Radiology 2010), no further workup of these lesions is recommended  STOMACH AND BOWEL:  There is colonic diverticulosis without acute diverticulitis  APPENDIX:  A normal appendix was visualized  ABDOMINOPELVIC CAVITY:  No ascites or free intraperitoneal air  No lymphadenopathy  VESSELS:  Unremarkable for patient's age  PELVIS REPRODUCTIVE ORGANS:  Patient is status post hysterectomy  URINARY BLADDER:  Underdistended with questionable wall thickening anteriorly but this appears unchanged  ABDOMINAL WALL/INGUINAL REGIONS:  Unremarkable  OSSEOUS STRUCTURES:  Stable small sclerotic lesion at the T11 superior endplate  Minimal sclerosis at the T12 level inferior endplate anteriorly, stable may be degenerative rather than an osseous lesion  No new suspicious osseous lesions  Impression: 1  Increased reticular interstitial changes again noted in the bilateral lung fields with associated scattered patchy groundglass opacities  The findings are nonspecific  While this could represent an infectious or inflammatory process, this has been progressing over a long period of time however  The possibility of lymphangitic spread of disease would be a consideration  This could also represent sequela of chemotherapeutic use  Correlate clinically  2  Moderate sized bilateral pleural effusions, increased from the prior exam  3  Stable sclerotic lesion at the T11 vertebral body  No new suspicious osseous lesions  4  No suspicious lymphadenopathy in the chest, abdomen or pelvis  Workstation performed: [id number]     I reviewed the above laboratory and imaging data        Discussion/Summary:

## 2018-05-17 ENCOUNTER — OFFICE VISIT (OUTPATIENT)
Dept: PHYSICAL THERAPY | Facility: CLINIC | Age: 58
End: 2018-05-17
Payer: COMMERCIAL

## 2018-05-17 DIAGNOSIS — I89.0 LYMPHEDEMA OF RIGHT UPPER EXTREMITY: ICD-10-CM

## 2018-05-17 PROCEDURE — G8984 CARRY CURRENT STATUS: HCPCS | Performed by: PHYSICAL THERAPIST

## 2018-05-17 PROCEDURE — G8985 CARRY GOAL STATUS: HCPCS | Performed by: PHYSICAL THERAPIST

## 2018-05-17 PROCEDURE — 97110 THERAPEUTIC EXERCISES: CPT | Performed by: PHYSICAL THERAPIST

## 2018-05-17 PROCEDURE — 97016 VASOPNEUMATIC DEVICE THERAPY: CPT | Performed by: PHYSICAL THERAPIST

## 2018-05-17 PROCEDURE — 97140 MANUAL THERAPY 1/> REGIONS: CPT | Performed by: PHYSICAL THERAPIST

## 2018-05-17 NOTE — PROGRESS NOTES
Daily Note     Today's date: 2018  Patient name: Jesus Banda  : 1960  MRN: [id number]  Referring provider: Merlin Tsai MD  Dx: No diagnosis found  Subjective: Patient reports that she has numbness in her R UE which she cannot relate to anything specific  She has less pain due to new pain medication        Objective: See treatment diary below  Manual  2/21 2/27 3/6 3/15  4/19 5/7      MLD   20'   20' 20      PROM      5'                                                  Exercise Diary  2/21 27 3/6 3/15  4/19 5/7 5/17     Pulley  5' 5' 5'  5' 5 10     Wall Climb  5 x :10 5x:10 5x:10         Shoulder blade sqeezes       10 10     Table slides - flex        10                                                                                                                                                                                                                         Modalities  2/21 2/27 3/6 3/15  4/19 5/7 5/17     Compression pump  30' 30' 30' 30' 30' 30' 30''                                                                                                                         Right                Left            2/27             3/6               3/15       4/19                                                     5/17  palm                                         22                     19 5      21 5   20 8        21     21      21         21 5       21 2  wrist                               18 5                15 7       18 5   18 2       18     18       18 5      18           18 5  Wrist + 10 cm                               27                   18          30     22 5       23     26       27 5      25 8         /  Wrist + 16 cm                     32                   23 5       34    32 3       33 5    30      33        31             30 3/29 8  Elbow                                 32 5               24 5         31    32 4      32        32      29 5    29 5 29 5  Wrist + 32                                32 5               26 5         35    34         35 5     35      33 6    35 5          34/33  Wrist + 40                                       31 5              28 2         32     33 2     33       33       34       34              32 5  +42                34             32  +44                33 5          32 5                          Assessment: Tolerated treatment well  Patient would benefit from continued PT  Patient had reduction of 4 cm following pumping at forearm  Plan: Continue per plan of care  Waiting for MD to order compression pump for home

## 2018-05-22 ENCOUNTER — APPOINTMENT (OUTPATIENT)
Dept: LAB | Facility: CLINIC | Age: 58
End: 2018-05-22
Payer: COMMERCIAL

## 2018-05-22 ENCOUNTER — OFFICE VISIT (OUTPATIENT)
Dept: PHYSICAL THERAPY | Facility: CLINIC | Age: 58
End: 2018-05-22
Payer: COMMERCIAL

## 2018-05-22 DIAGNOSIS — I89.0 LYMPHEDEMA OF RIGHT UPPER EXTREMITY: Primary | ICD-10-CM

## 2018-05-22 PROCEDURE — G8985 CARRY GOAL STATUS: HCPCS | Performed by: PHYSICAL THERAPIST

## 2018-05-22 PROCEDURE — G8986 CARRY D/C STATUS: HCPCS | Performed by: PHYSICAL THERAPIST

## 2018-05-22 PROCEDURE — G8984 CARRY CURRENT STATUS: HCPCS | Performed by: PHYSICAL THERAPIST

## 2018-05-22 PROCEDURE — 97110 THERAPEUTIC EXERCISES: CPT | Performed by: PHYSICAL THERAPIST

## 2018-05-22 PROCEDURE — 97140 MANUAL THERAPY 1/> REGIONS: CPT | Performed by: PHYSICAL THERAPIST

## 2018-05-22 PROCEDURE — 97016 VASOPNEUMATIC DEVICE THERAPY: CPT | Performed by: PHYSICAL THERAPIST

## 2018-05-22 NOTE — PROGRESS NOTES
PT Re-Evaluation  and PT Discharge    Today's date: 2018  Patient name: Wendie Villar  : 1960  MRN: [id number]  Referring provider: Tayler Hebert MD  Dx:   Encounter Diagnosis     ICD-10-CM    1  Lymphedema of right upper extremity I89 0                   Assessment  Impairments: abnormal or restricted ROM and pain with function  Other impairment: lymphedema  Patient presents with symptom irritability no  Assessment details: Wendie Villar is a 56 y o  female with diagnosis of lymphedema  She has good reduction in edema of the UE with compression wraps  She has obtained a compression pump and has discontinued physical therapy at this time  Thank you for your referral       Goals  Patient's goal of reducing edema has been achieved  Subjective Evaluation    History of Present Illness  Mechanism of injury: Patient reports pain and numbness in the axilla into the tricep region of the R UE            Objective     Swelling   Right shoulder swelling details: Girth Measurements                                   Right  Palm                         21  Wrist                         18  Wrist + 10                 25  Wrist + 16                 30  Elbow                        28 2       Wrist + 32                 34    Wrist +40                   33    Length:  49 cm    Active Range of Motion   Left Shoulder   Flexion: 138 degrees   Abduction: 145 degrees     Right Shoulder   Flexion: 112 degrees   Abduction: 103 degrees

## 2018-05-22 NOTE — PROGRESS NOTES
Daily Note     Today's date: 2018  Patient name: Evelia Lan  : 1960  MRN: [id number]  Referring provider: Missy Cannon MD  Dx:   Encounter Diagnosis     ICD-10-CM    1  Lymphedema of right upper extremity I89 0                   Subjective: Patient reports that she continues to have numbness and pain coming from the axilla          Objective: See treatment diary below  Manual  2/21 2/27 3/6 3/15  4/19 5/7 5/22     MLD   20'   20' 20      PROM      5'  10'                                                Exercise Diary  2/21 27 3/6 3/15  4/19 5/7 5/17 5/22    Pulley  5' 5' 5'  5' 5 10 5'    Wall Climb  5 x :10 5x:10 5x:10         Shoulder blade sqeezes       10 10 15    Table slides - flex        10 15                                                                                                                                                                                                                        Modalities  2/21 2/27 3/6 3/15  4/19 5/7 5/17 5/22    Compression pump  30' 30' 30' 30' 30' 30' 30'' 30'                                                                                                                        Right                Left            2/27             3/6               3/15       4/19                                                     5/17  palm                                         22                     19 5      21 5   20 8        21     21      21         21 5       21 2  wrist                               18 5                15 7       18 5   18 2       18     18       18 5      18           18 5  Wrist + 10 cm                               27                   18          30     22 5       23     26       27 5      25 8           Wrist + 16 cm                     32                   23 5       34    32 3       33 5    30      33        31             30 3/29 8  Elbow                                 32 5               24 5         31    32 4 32        32      29 5    29 5          29 5  Wrist + 32                                32 5               26 5         35    34         35 5     35      33 6    35 5          34/33  Wrist + 40                                       31 5              28 2         32     33 2     33       33       34       34              32 5  +42                34             32  +44                33 5          32 5                          Assessment: Tolerated treatment well  Patient would benefit from continued PT      Plan: Continue per plan of care

## 2018-05-23 RX ORDER — SODIUM CHLORIDE 9 MG/ML
20 INJECTION, SOLUTION INTRAVENOUS CONTINUOUS
Status: DISCONTINUED | OUTPATIENT
Start: 2018-05-24 | End: 2018-05-27 | Stop reason: HOSPADM

## 2018-05-24 ENCOUNTER — HOSPITAL ENCOUNTER (OUTPATIENT)
Dept: INFUSION CENTER | Facility: CLINIC | Age: 58
Discharge: HOME | End: 2018-05-24
Payer: COMMERCIAL

## 2018-05-24 VITALS
SYSTOLIC BLOOD PRESSURE: 118 MMHG | DIASTOLIC BLOOD PRESSURE: 70 MMHG | TEMPERATURE: 98.3 F | WEIGHT: 138.89 LBS | BODY MASS INDEX: 23.11 KG/M2 | RESPIRATION RATE: 18 BRPM | OXYGEN SATURATION: 92 % | HEART RATE: 97 BPM

## 2018-05-24 PROCEDURE — 96417 CHEMO IV INFUS EACH ADDL SEQ: CPT

## 2018-05-24 PROCEDURE — 96413 CHEMO IV INFUSION 1 HR: CPT

## 2018-05-24 RX ADMIN — PERTUZUMAB 420 MG: 30 INJECTION, SOLUTION, CONCENTRATE INTRAVENOUS at 09:27

## 2018-05-24 RX ADMIN — SODIUM CHLORIDE 20 ML/HR: 9 INJECTION, SOLUTION INTRAVENOUS at 08:39

## 2018-05-24 RX ADMIN — TRASTUZUMAB 378 MG: 150 INJECTION, POWDER, LYOPHILIZED, FOR SOLUTION INTRAVENOUS at 10:29

## 2018-05-24 NOTE — PROGRESS NOTES
Pt  offers no complaints  MUGA 4/30/18, 77%  Spoke with Yamini Fermin RN about dose as pt  has lost weight  New order to follow

## 2018-06-06 ENCOUNTER — TELEPHONE (OUTPATIENT)
Dept: HEMATOLOGY ONCOLOGY | Facility: CLINIC | Age: 58
End: 2018-06-06

## 2018-06-06 DIAGNOSIS — J90 PLEURAL EFFUSION: Primary | ICD-10-CM

## 2018-06-06 NOTE — TELEPHONE ENCOUNTER
Calling to find out if her appointment has been rescheduled  It was to be today, but she thought she was to see a lung doctor first--and she has not been given that appointment either

## 2018-06-07 ENCOUNTER — TELEPHONE (OUTPATIENT)
Dept: PULMONOLOGY | Facility: CLINIC | Age: 58
End: 2018-06-07

## 2018-06-07 ENCOUNTER — OFFICE VISIT (OUTPATIENT)
Dept: PULMONOLOGY | Facility: CLINIC | Age: 58
End: 2018-06-07
Payer: COMMERCIAL

## 2018-06-07 VITALS
HEIGHT: 65 IN | TEMPERATURE: 98.2 F | DIASTOLIC BLOOD PRESSURE: 60 MMHG | BODY MASS INDEX: 21.99 KG/M2 | WEIGHT: 132 LBS | SYSTOLIC BLOOD PRESSURE: 100 MMHG | OXYGEN SATURATION: 98 % | RESPIRATION RATE: 16 BRPM | HEART RATE: 106 BPM

## 2018-06-07 DIAGNOSIS — C50.912 BILATERAL MALIGNANT NEOPLASM OF BREAST IN FEMALE, ESTROGEN RECEPTOR POSITIVE, UNSPECIFIED SITE OF BREAST (HCC): ICD-10-CM

## 2018-06-07 DIAGNOSIS — Z17.0 BILATERAL MALIGNANT NEOPLASM OF BREAST IN FEMALE, ESTROGEN RECEPTOR POSITIVE, UNSPECIFIED SITE OF BREAST (HCC): ICD-10-CM

## 2018-06-07 DIAGNOSIS — J90 BILATERAL PLEURAL EFFUSION: Primary | ICD-10-CM

## 2018-06-07 DIAGNOSIS — R91.1 PULMONARY NODULE: ICD-10-CM

## 2018-06-07 DIAGNOSIS — R93.89 ABNORMAL CT OF THE CHEST: ICD-10-CM

## 2018-06-07 DIAGNOSIS — C50.911 BILATERAL MALIGNANT NEOPLASM OF BREAST IN FEMALE, ESTROGEN RECEPTOR POSITIVE, UNSPECIFIED SITE OF BREAST (HCC): ICD-10-CM

## 2018-06-07 PROCEDURE — 99205 OFFICE O/P NEW HI 60 MIN: CPT | Performed by: INTERNAL MEDICINE

## 2018-06-07 NOTE — PROGRESS NOTES
Assessment:    Bilateral pleural effusion  The patient has bilateral effusions that are worsening on CT scan  With known metastatic breast cancer including lung lesion from 2 years ago, I am obviously most concerned that these are malignant effusions  Will proceed with thoracentesis tomorrow and sent for cytology as well as routine analysis for infection  Differential diagnosis also includes trans days give effusions from volume overload/heart failure in setting of chemotherapy are malnutrition  If effusion is positive for breast cancer and there is reccurrence, I would recommend asept pleural catheter placement to control her symptoms of dyspnea  Chemotherapy regimen would likely be changed per oncology notes  Will check platelets and INR in a m  prior to procedure  The patient and her daughter seem to understand the risks and benefits of proceeding with thoracentesis  They are aware that the patient may also have trapped lung  Pulmonary nodule  Biopsy February 2016-positive for metastatic breast cancer    Abnormal CT of the chest  Increasing bilateral reticular interstitial changes/ground-glass opacities of unknown etiology  Less likely to be infectious  May be inflammatory secondary to chemotherapy, volume overload in setting of malnutrition and recent chemotherapy or lymphangitic spread of breast cancer  Will do follow-up imaging after thoracentesis evaluation  Plan:    Diagnoses and all orders for this visit:    Bilateral pleural effusion  -     Protime-INR; Future  -     Platelet count; Future    Bilateral malignant neoplasm of breast in female, estrogen receptor positive, unspecified site of breast [facility])    Pulmonary nodule    Abnormal CT of the chest        Follow-up:   Tomorrow for thoracentesisFollow-up:      HPI:  Patient is a 72-year-old female who was originally diagnosed with breast cancer in 2010  She had a bilateral mastectomy and has undergone multiple chemotherapy regimens  Since original diagnosis she has had metastatic disease to her chest as well as brain  No history of radiation treatments to chest, only to her metastatic brain lesions  She had a CT scan with Dr Daryl Rodriguez which showed enlarging bilateral pleural effusions and she is here for evaluation  She reports having some shortness of breath particularly with walking  She occasionally "gasps" for air  Her  states that she has a hard time at night  Over the past 6 months it started and has been worsening  She has  Chronic cough that is productive of phlegm - usually white  No hemoptysis  No history of lung disease  No inhalers  She smokes about 0 5 packs per day  She has been smoking almost 40 years  Review of Systems   Constitutional: Positive for activity change and unexpected weight change  Negative for diaphoresis, fatigue and fever  HENT: Negative for congestion, hearing loss, postnasal drip, sore throat and voice change  Eyes: Negative for visual disturbance  Respiratory: Positive for cough and shortness of breath  Negative for apnea, chest tightness and wheezing  Cardiovascular: Negative for chest pain, palpitations and leg swelling  Gastrointestinal: Positive for abdominal pain and nausea  Negative for abdominal distention, blood in stool, constipation, diarrhea and vomiting  Endocrine: Negative for polyuria  Genitourinary: Negative for dysuria  Musculoskeletal: Positive for gait problem  Negative for arthralgias  Skin: Negative for rash  Allergic/Immunologic: Negative for environmental allergies  Neurological: Positive for weakness  Negative for dizziness, tremors and numbness  Hematological: Does not bruise/bleed easily  Psychiatric/Behavioral: Negative for sleep disturbance  The patient is not nervous/anxious            Historical Information   Past Medical History:   Diagnosis Date    H/O bilateral mastectomy 2/15/2016    Hypertension 2/15/2016    Lymphedema 2/15/2016    Malignant neoplasm of right breast ([address] ) 2/15/2016     Past Surgical History:   Procedure Laterality Date    ENDOBRONCHIAL ULTRASOUND (EBUS) N/A 2/16/2016    Procedure: EBUS;  Surgeon: Markus Cason MD;  Location: [facility];  Service:     MASTECTOMY Bilateral     MASTECTOMY Bilateral     right arm edema    OOPHORECTOMY      IL BRONCHOSCOPY NEEDLE BX TRACHEA MAIN STEM&/BRON N/A 2/16/2016    Procedure: Wiley Harveybs;  Surgeon: Markus Cason MD;  Location: [facility];  Service: Thoracic    IL STEREOTACTIC RADIOSURGERY, CRANIAL,SIMPLE,EA ADD  5/3/2017         IL STEREOTACTIC RADIOSURGERY, CRANIAL,SIMPLE,EA ADD  5/3/2017         IL STEREOTACTIC RADIOSURGERY, CRANIAL,SIMPLE,SINGLE  5/3/2017          Social History   History   Alcohol Use    Yes     Comment: social     History   Smoking Status    Current Every Day Smoker    Packs/day: 0 50    Years: 37 00   Smokeless Tobacco    Never Used       Occupational history:  Disabled/not working    Family History:   Family History   Problem Relation Age of Onset   Veronika Geary Cancer Sister     Prostate cancer Brother        Medications: The patient's active and prehospital medications were reviewed  VITALS:  Vitals:    06/07/18 1503   BP: 100/60   Pulse: (!) 106   Resp: 16   Temp: 98 2 °F (36 8 °C)   SpO2: 98%   Weight: 59 9 kg (132 lb)   Height: 5' 5" (1 651 m)       Physical Exam   Constitutional: She is oriented to person, place, and time  She appears well-developed and well-nourished  No distress  chronically ill appearing    HENT:   Head: Normocephalic  Right Ear: External ear normal    Left Ear: External ear normal    Nose: Nose normal    Mouth/Throat: Oropharynx is clear and moist  No oropharyngeal exudate  Eyes: Conjunctivae are normal  Pupils are equal, round, and reactive to light  Right eye exhibits no discharge  Left eye exhibits no discharge  No scleral icterus  Neck: Normal range of motion  Neck supple  No JVD present   No tracheal deviation present  Cardiovascular: Normal rate, regular rhythm and normal heart sounds  Exam reveals no gallop and no friction rub  No murmur heard  Pulmonary/Chest: Effort normal  No respiratory distress  She has no wheezes  She has no rales  She exhibits no tenderness  Decreased BS bilateral bases, right greater than left   Abdominal: Soft  Bowel sounds are normal  She exhibits no distension  There is no tenderness  There is no rebound and no guarding  Musculoskeletal: Normal range of motion  She exhibits edema  She exhibits no deformity  Neurological: She is alert and oriented to person, place, and time  No cranial nerve deficit  Skin: Skin is warm and dry  No rash noted  She is not diaphoretic  No erythema  Psychiatric: She has a normal mood and affect  PFT results:  None available    Diagnostic Data:  CBC:   Lab Results   Component Value Date    WBC 5 60 05/22/2018    HGB 10 9 (L) 05/22/2018    HCT 35 3 05/22/2018    MCV 92 05/22/2018     (H) 05/22/2018         CMP:   Lab Results   Component Value Date     05/22/2018    K 3 9 05/22/2018     05/22/2018    CO2 28 05/22/2018    ANIONGAP 9 05/22/2018    BUN 5 05/22/2018    CREATININE 0 57 (L) 05/22/2018    GLUCOSE 96 04/30/2018    GLUF 107 (H) 05/22/2018    CALCIUM 9 1 05/22/2018    AST 59 (H) 05/22/2018    ALT 69 05/22/2018    ALKPHOS 194 (H) 05/22/2018    PROT 7 4 05/22/2018    BILITOT 0 40 05/22/2018    EGFR 105 05/22/2018       Imaging studies:  I have personally reviewed pertinent films in PACS  CT chest 05/10/2018: IMPRESSION:     1  Increased reticular interstitial changes again noted in the bilateral lung fields with associated scattered patchy groundglass opacities  The findings are nonspecific  While this could represent an infectious or inflammatory process, this has been   progressing over a long period of time however  The possibility of lymphangitic spread of disease would be a consideration  This could also represent sequela of chemotherapeutic use  Correlate clinically      2  Moderate sized bilateral pleural effusions, increased from the prior exam      3  Stable sclerotic lesion at the T11 vertebral body  No new suspicious osseous lesions      4  No suspicious lymphadenopathy in the chest, abdomen or pelvis          Pelon Lisa, DO

## 2018-06-07 NOTE — TELEPHONE ENCOUNTER
Dr Elmo Ayala office's called this morning requesting an appointment for patient  Her CT was reviewed and Dr Jairo Curling is willing to see her today 6/7/18 at 3:00pm in the [place] office  I called patient at the number listed and left a message for her to call the office back  I also tried calling her , with no answer as well

## 2018-06-08 ENCOUNTER — PROCEDURE VISIT (OUTPATIENT)
Dept: PULMONOLOGY | Facility: CLINIC | Age: 58
End: 2018-06-08
Payer: COMMERCIAL

## 2018-06-08 ENCOUNTER — HOSPITAL ENCOUNTER (OUTPATIENT)
Dept: RADIOLOGY | Facility: HOSPITAL | Age: 58
Discharge: HOME | End: 2018-06-08
Payer: COMMERCIAL

## 2018-06-08 ENCOUNTER — TRANSCRIBE ORDERS (OUTPATIENT)
Dept: LAB | Facility: CLINIC | Age: 58
End: 2018-06-08

## 2018-06-08 ENCOUNTER — APPOINTMENT (OUTPATIENT)
Dept: LAB | Facility: CLINIC | Age: 58
End: 2018-06-08
Payer: COMMERCIAL

## 2018-06-08 VITALS
WEIGHT: 131.6 LBS | TEMPERATURE: 98 F | OXYGEN SATURATION: 97 % | BODY MASS INDEX: 21.92 KG/M2 | SYSTOLIC BLOOD PRESSURE: 100 MMHG | HEART RATE: 98 BPM | DIASTOLIC BLOOD PRESSURE: 64 MMHG | HEIGHT: 65 IN | RESPIRATION RATE: 18 BRPM

## 2018-06-08 DIAGNOSIS — J90 BILATERAL PLEURAL EFFUSION: ICD-10-CM

## 2018-06-08 DIAGNOSIS — C50.912 BILATERAL MALIGNANT NEOPLASM OF BREAST IN FEMALE, ESTROGEN RECEPTOR POSITIVE, UNSPECIFIED SITE OF BREAST (HCC): ICD-10-CM

## 2018-06-08 DIAGNOSIS — Z17.0 BILATERAL MALIGNANT NEOPLASM OF BREAST IN FEMALE, ESTROGEN RECEPTOR POSITIVE, UNSPECIFIED SITE OF BREAST (HCC): ICD-10-CM

## 2018-06-08 DIAGNOSIS — C50.911 BILATERAL MALIGNANT NEOPLASM OF BREAST IN FEMALE, ESTROGEN RECEPTOR POSITIVE, UNSPECIFIED SITE OF BREAST (HCC): ICD-10-CM

## 2018-06-08 DIAGNOSIS — J90 BILATERAL PLEURAL EFFUSION: Primary | ICD-10-CM

## 2018-06-08 LAB
HISTIOCYTES NFR FLD: 2 %
INR PPP: 0.99 (ref 0.86–1.17)
LDH FLD L TO P-CCNC: 139 U/L
LYMPHOCYTES NFR BLD AUTO: 74 %
MONOCYTES NFR BLD AUTO: 24 %
PROT FLD-MCNC: 4.5 G/DL
PROTHROMBIN TIME: 12.8 SECONDS (ref 11.8–14.2)
TOTAL CELLS COUNTED SPEC: 100
WBC # FLD MANUAL: 1642 /UL

## 2018-06-08 PROCEDURE — 88342 IMHCHEM/IMCYTCHM 1ST ANTB: CPT | Performed by: PATHOLOGY

## 2018-06-08 PROCEDURE — 99214 OFFICE O/P EST MOD 30 MIN: CPT | Performed by: INTERNAL MEDICINE

## 2018-06-08 PROCEDURE — 88341 IMHCHEM/IMCYTCHM EA ADD ANTB: CPT | Performed by: PATHOLOGY

## 2018-06-08 PROCEDURE — 71046 X-RAY EXAM CHEST 2 VIEWS: CPT

## 2018-06-08 PROCEDURE — 88305 TISSUE EXAM BY PATHOLOGIST: CPT | Performed by: PATHOLOGY

## 2018-06-08 PROCEDURE — 84157 ASSAY OF PROTEIN OTHER: CPT | Performed by: NURSE PRACTITIONER

## 2018-06-08 PROCEDURE — 87070 CULTURE OTHR SPECIMN AEROBIC: CPT | Performed by: NURSE PRACTITIONER

## 2018-06-08 PROCEDURE — 89051 BODY FLUID CELL COUNT: CPT | Performed by: NURSE PRACTITIONER

## 2018-06-08 PROCEDURE — 88112 CYTOPATH CELL ENHANCE TECH: CPT | Performed by: PATHOLOGY

## 2018-06-08 PROCEDURE — 83615 LACTATE (LD) (LDH) ENZYME: CPT | Performed by: NURSE PRACTITIONER

## 2018-06-08 PROCEDURE — 85610 PROTHROMBIN TIME: CPT

## 2018-06-08 PROCEDURE — 32554 ASPIRATE PLEURA W/O IMAGING: CPT | Performed by: INTERNAL MEDICINE

## 2018-06-08 PROCEDURE — 87205 SMEAR GRAM STAIN: CPT | Performed by: NURSE PRACTITIONER

## 2018-06-08 NOTE — ASSESSMENT & PLAN NOTE
The patient has bilateral effusions that are worsening on CT scan  With known metastatic breast cancer including lung lesion from 2 years ago, I am obviously most concerned that these are malignant effusions  Will proceed with thoracentesis tomorrow and sent for cytology as well as routine analysis for infection  Differential diagnosis also includes trans days give effusions from volume overload/heart failure in setting of chemotherapy are malnutrition  If effusion is positive for breast cancer and there is reccurrence, I would recommend asept pleural catheter placement to control her symptoms of dyspnea  Chemotherapy regimen would likely be changed per oncology notes  Will check platelets and INR in a m  prior to procedure  The patient and her daughter seem to understand the risks and benefits of proceeding with thoracentesis  They are aware that the patient may also have trapped lung

## 2018-06-08 NOTE — ASSESSMENT & PLAN NOTE
Increasing bilateral reticular interstitial changes/ground-glass opacities of unknown etiology  Less likely to be infectious  May be inflammatory secondary to chemotherapy, volume overload in setting of malnutrition and recent chemotherapy or lymphangitic spread of breast cancer  Will do follow-up imaging after thoracentesis evaluation

## 2018-06-08 NOTE — PROGRESS NOTES
OFFICE THORACENTESIS - Pulmonary Follow Up Note   Leo Fong 56 y o  female MRN: [id number]  6/8/2018      Assessment:  1  Bilateral pleural effusions  · Post right thoracentesis for 1000cc today  · Fluid sent for analysis, further care plans based upon results  · She will follow up with Dr Shawn Rios with the results  · Consider left thoracentesis if symptoms/fluid worsens    2  Metastatic Breast Cancer - per oncology    Plan:    Diagnoses and all orders for this visit:    Bilateral pleural effusion  -     Body fluid white cell count with differential; Future  -     Non-gynecologic cytology; Future  -     Total Protein, Fluid; Future  -     LD,Blood; Future  -     LD (LDH), Body Fluid; Future  -     Body fluid culture and Gram stain; Future  -     XR chest pa & lateral; Future  -     Body fluid white cell count with differential    Bilateral malignant neoplasm of breast in female, estrogen receptor positive, unspecified site of breast ([facility] [address] )  -     Body fluid white cell count with differential; Future  -     Non-gynecologic cytology; Future  -     Total Protein, Fluid; Future  -     LD,Blood; Future  -     LD (LDH), Body Fluid; Future  -     Body fluid culture and Gram stain; Future  -     XR chest pa & lateral; Future  -     Body fluid white cell count with differential        Return if symptoms worsen or fail to improve  History of Present Illness   HPI:  Leo Fong is a 56 y o  female who presents for outpatient thoracentesis  She was seen by Dr Shawn Rios yesterday for bilateral pleural effusions  She has history of metastatic breast cancer as listed previously  She reports progressive dyspnea and intermittent back pain  She denied fevers or chills, no cough, no sputum production  Review of Systems   Constitutional: Positive for fever  Negative for chills  Respiratory: Positive for shortness of breath  Cardiovascular: Negative for chest pain     Psychiatric/Behavioral: Negative for sleep disturbance         Historical Information   Past Medical History:   Diagnosis Date    H/O bilateral mastectomy 2/15/2016    Hypertension 2/15/2016    Lymphedema 2/15/2016    Malignant neoplasm of right breast ([address] ) 2/15/2016     Past Surgical History:   Procedure Laterality Date    ENDOBRONCHIAL ULTRASOUND (EBUS) N/A 2/16/2016    Procedure: EBUS;  Surgeon: Ced Hoyos MD;  [facility];  Service:    [facility] MASTECTOMY Bilateral     MASTECTOMY Bilateral     right arm edema    OOPHORECTOMY      [place] BRONCHOSCOPY NEEDLE BX TRACHEA MAIN STEM&/BRON N/A 2/16/2016    Procedure: Mauricio Blackburn;  Surgeon: Ced Hoyos MD;  [facility];  Service: Thoracic    [place] STEREOTACTIC RADIOSURGERY, CRANIAL,SIMPLE,EA ADD  5/3/2017         [place] STEREOTACTIC RADIOSURGERY, CRANIAL,SIMPLE,EA ADD  5/3/2017         [place] STEREOTACTIC RADIOSURGERY, CRANIAL,SIMPLE,SINGLE  5/3/2017          Family History   Problem Relation Age of Onset    Cancer Sister     Prostate cancer Brother          Meds/Allergies     Current Outpatient Prescriptions:     diazepam (VALIUM) 5 mg tablet, Take 1 tablet (5 mg total) by mouth daily at bedtime, Disp: 30 tablet, Rfl: 3    dronabinol (MARINOL) 5 MG capsule, Take 1 capsule (5 mg total) by mouth 2 (two) times a day before meals, Disp: 60 capsule, Rfl: 0    gabapentin (NEURONTIN) 300 mg capsule, 2 capsules PO at bedtime, Disp: 60 capsule, Rfl: 5    OLANZapine (ZyPREXA) 5 mg tablet, Take 1 tablet (5 mg total) by mouth daily at bedtime, Disp: 30 tablet, Rfl: 2    ondansetron (ZOFRAN) 4 mg tablet, Take 4 mg by mouth every 6 (six) hours as needed for nausea or vomiting , Disp: , Rfl:     oxyCODONE (ROXICODONE) 20 MG TABS, Take 1 tablet (20 mg total) by mouth every 6 (six) hours as needed for moderate pain Max Daily Amount: 80 mg, Disp: 90 tablet, Rfl: 0    promethazine (PHENERGAN) 25 mg tablet, Take 1 tablet (25 mg total) by mouth every 6 (six) hours as needed for nausea or vomiting, Disp: 60 tablet, Rfl: 2    senna (SENOKOT) 8 6 mg, Take 2 tablets by mouth 2 (two) times a day, Disp: , Rfl:   No Known Allergies    Vitals: Blood pressure 100/64, pulse 98, temperature 98 °F (36 7 °C), temperature source Tympanic, resp  rate 18, height 5' 5" (1 651 m), weight 59 7 kg (131 lb 9 6 oz), SpO2 97 %, not currently breastfeeding  Body mass index is 21 9 kg/m²  Oxygen Therapy  SpO2: 97 % (ra)      Physical Exam  Physical Exam   Constitutional: She appears well-developed  No distress  Cardiovascular: Normal rate, regular rhythm and normal heart sounds  Exam reveals no friction rub  No murmur heard  Pulmonary/Chest: Effort normal  No respiratory distress  She has no wheezes  She has no rales  Diminished BS at bases R>L, no egophony, dullness to percussion greater on right   Vitals reviewed  Labs: I have personally reviewed pertinent lab results  Lab Results   Component Value Date    WBC 4 99 06/08/2018    HGB 11 2 (L) 06/08/2018    HCT 36 0 06/08/2018    MCV 89 06/08/2018     (H) 06/08/2018     Lab Results   Component Value Date    GLUCOSE 121 06/08/2018    CALCIUM 9 3 06/08/2018     06/08/2018    K 3 9 06/08/2018    CO2 27 06/08/2018     06/08/2018    BUN 6 06/08/2018    CREATININE 0 62 06/08/2018     No results found for: IGE  Lab Results   Component Value Date    ALT 21 06/08/2018    AST 17 06/08/2018    ALKPHOS 107 06/08/2018    BILITOT 0 20 06/08/2018       INR 0 99    Imaging and other studies: I have personally reviewed pertinent reports  and I have personally reviewed pertinent films in PACS  CT Chest - bilateral R>L pleural effusions with increased interstitial infiltrates, calcified mediastinal lymph nodes noted    In-office thoracic ultrasound - right greater than left pleural effusions demonstrated - see images    PROCEDURE NOTE - US Guided THORACENTESIS - RIGHT     Consent obtained  FTO performed  The patient was prepped and draped in sterile fashion    At the right 7th ICS along lateral scapular line - 3cc 2% lidocaine infiltrated  Using finder needle - advanced over rib into pleural space with "flash" of pleural fluid and needle removed  Skin incision made  Trochar/catheter device advanced over rib in similar tract and "flash" of pleural fluid obtained and catheter fully advanced into pleural space while trochar removed  Using one-way flow valve - pleural fluid was aspirated and send for analysis  Fluid color was yellow, clear, nonturbid    Total fluid removed 1000cc    Procedure terminated when fluid was fully aspirated  Catheter removed during exhalation and bandage applied  Repeat bedside US revealed no significant remaining pleural fluid and normal "pleural sliding"  CXR (images personally reviewed) immediately after procedure demonstrated resolution of right pleural effusion, no PTX, persistent bilateral interstitial reticular/nodular infiltrates, blunted left CP angle    Complications none    Blood Loss none    Fluid sent for cytology, culture, gram stain, WBC count/diff, TP, LDH, GLU, serum LDH added to today's labs                            Arash Lane DO, [facility]

## 2018-06-08 NOTE — LETTER
June 8, 2018     [facility], [facility] [address]    Patient: Iraida Jamison   YOB: 1960   Date of Visit: 6/8/2018       Dear Dr Fidelina Whitaker: Thank you for referring Juanpablo Lund to me for evaluation  Below are my notes for this consultation  If you have questions, please do not hesitate to call me  I look forward to following your patient along with you  Sincerely,        Judit Cárdenas DO        CC: No Recipients  Judit Cárdenas DO  6/8/2018  2:24 PM  Sign at close encounter  OFFICE THORACENTESIS - Pulmonary Follow Up Note   Iraida Jamison 56 y o  female MRN: [id number]  6/8/2018      Assessment:  1  Bilateral pleural effusions  · Post right thoracentesis for 1000cc today  · Fluid sent for analysis, further care plans based upon results  · She will follow up with Dr Fidelina Whitaker with the results  · Consider left thoracentesis if symptoms/fluid worsens    2  Metastatic Breast Cancer - per oncology    Plan:    Diagnoses and all orders for this visit:    Bilateral pleural effusion  -     Body fluid white cell count with differential; Future  -     Non-gynecologic cytology; Future  -     Total Protein, Fluid; Future  -     LD,Blood; Future  -     LD (LDH), Body Fluid; Future  -     Body fluid culture and Gram stain; Future  -     XR chest pa & lateral; Future  -     Body fluid white cell count with differential    Bilateral malignant neoplasm of breast in female, estrogen receptor positive, unspecified site of breast ([facility] [address] )  -     Body fluid white cell count with differential; Future  -     Non-gynecologic cytology; Future  -     Total Protein, Fluid; Future  -     LD,Blood; Future  -     LD (LDH), Body Fluid; Future  -     Body fluid culture and Gram stain; Future  -     XR chest pa & lateral; Future  -     Body fluid white cell count with differential        Return if symptoms worsen or fail to improve  History of Present Illness   HPI:  Iraida Jamison is a 56 y  o  female who presents for outpatient thoracentesis  She was seen by Dr Lui Reed yesterday for bilateral pleural effusions  She has history of metastatic breast cancer as listed previously  She reports progressive dyspnea and intermittent back pain  She denied fevers or chills, no cough, no sputum production  Review of Systems   Constitutional: Positive for fever  Negative for chills  Respiratory: Positive for shortness of breath  Cardiovascular: Negative for chest pain  Psychiatric/Behavioral: Negative for sleep disturbance         Historical Information   Past Medical History:   Diagnosis Date    H/O bilateral mastectomy 2/15/2016    Hypertension 2/15/2016    Lymphedema 2/15/2016    Malignant neoplasm of right breast ([facility] [address] ) 2/15/2016     Past Surgical History:   Procedure Laterality Date    ENDOBRONCHIAL ULTRASOUND (EBUS) N/A 2/16/2016    Procedure: EBUS;  Surgeon: Rosalind Jauregui MD;  [facility];  Service:    [facility] ([place], [place]) MASTECTOMY Bilateral     MASTECTOMY Bilateral     right arm edema    OOPHORECTOMY      [place] BRONCHOSCOPY NEEDLE BX TRACHEA MAIN STEM&/BRON N/A 2/16/2016    Procedure: Monse Rust;  Surgeon: Rosalind Jauregui MD;  [facility];  Service: Thoracic    [place] STEREOTACTIC RADIOSURGERY, CRANIAL,SIMPLE,EA ADD  5/3/2017         [place] STEREOTACTIC RADIOSURGERY, CRANIAL,SIMPLE,EA ADD  5/3/2017         [place] STEREOTACTIC RADIOSURGERY, CRANIAL,SIMPLE,SINGLE  5/3/2017          Family History   Problem Relation Age of Onset    Cancer Sister     Prostate cancer Brother          Meds/Allergies     Current Outpatient Prescriptions:     diazepam (VALIUM) 5 mg tablet, Take 1 tablet (5 mg total) by mouth daily at bedtime, Disp: 30 tablet, Rfl: 3    dronabinol (MARINOL) 5 MG capsule, Take 1 capsule (5 mg total) by mouth 2 (two) times a day before meals, Disp: 60 capsule, Rfl: 0    gabapentin (NEURONTIN) 300 mg capsule, 2 capsules PO at bedtime, Disp: 60 capsule, Rfl: 5    OLANZapine (ZyPREXA) 5 mg tablet, Take 1 tablet (5 mg total) by mouth daily at bedtime, Disp: 30 tablet, Rfl: 2    ondansetron (ZOFRAN) 4 mg tablet, Take 4 mg by mouth every 6 (six) hours as needed for nausea or vomiting , Disp: , Rfl:     oxyCODONE (ROXICODONE) 20 MG TABS, Take 1 tablet (20 mg total) by mouth every 6 (six) hours as needed for moderate pain Max Daily Amount: 80 mg, Disp: 90 tablet, Rfl: 0    promethazine (PHENERGAN) 25 mg tablet, Take 1 tablet (25 mg total) by mouth every 6 (six) hours as needed for nausea or vomiting, Disp: 60 tablet, Rfl: 2    senna (SENOKOT) 8 6 mg, Take 2 tablets by mouth 2 (two) times a day, Disp: , Rfl:   No Known Allergies    Vitals: Blood pressure 100/64, pulse 98, temperature 98 °F (36 7 °C), temperature source Tympanic, resp  rate 18, height 5' 5" (1 651 m), weight 59 7 kg (131 lb 9 6 oz), SpO2 97 %, not currently breastfeeding  Body mass index is 21 9 kg/m²  Oxygen Therapy  SpO2: 97 % (ra)      Physical Exam  Physical Exam   Constitutional: She appears well-developed  No distress  Cardiovascular: Normal rate, regular rhythm and normal heart sounds  Exam reveals no friction rub  No murmur heard  Pulmonary/Chest: Effort normal  No respiratory distress  She has no wheezes  She has no rales  Diminished BS at bases R>L, no egophony, dullness to percussion greater on right   Vitals reviewed  Labs: I have personally reviewed pertinent lab results    Lab Results   Component Value Date    WBC 4 99 06/08/2018    HGB 11 2 (L) 06/08/2018    HCT 36 0 06/08/2018    MCV 89 06/08/2018     (H) 06/08/2018     Lab Results   Component Value Date    GLUCOSE 121 06/08/2018    CALCIUM 9 3 06/08/2018     06/08/2018    K 3 9 06/08/2018    CO2 27 06/08/2018     06/08/2018    BUN 6 06/08/2018    CREATININE 0 62 06/08/2018     No results found for: IGE  Lab Results   Component Value Date    ALT 21 06/08/2018    AST 17 06/08/2018    ALKPHOS 107 06/08/2018    BILITOT 0 20 06/08/2018 INR 0 99    Imaging and other studies: I have personally reviewed pertinent reports  and I have personally reviewed pertinent films in PACS  CT Chest - bilateral R>L pleural effusions with increased interstitial infiltrates, calcified mediastinal lymph nodes noted    In-office thoracic ultrasound - right greater than left pleural effusions demonstrated - see images    PROCEDURE NOTE - US Guided THORACENTESIS - RIGHT     Consent obtained  FTO performed  The patient was prepped and draped in sterile fashion  At the right 7th ICS along lateral scapular line - 3cc 2% lidocaine infiltrated  Using finder needle - advanced over rib into pleural space with "flash" of pleural fluid and needle removed  Skin incision made  Trochar/catheter device advanced over rib in similar tract and "flash" of pleural fluid obtained and catheter fully advanced into pleural space while trochar removed  Using one-way flow valve - pleural fluid was aspirated and send for analysis  Fluid color was yellow, clear, nonturbid    Total fluid removed 1000cc    Procedure terminated when fluid was fully aspirated  Catheter removed during exhalation and bandage applied  Repeat bedside US revealed no significant remaining pleural fluid and normal "pleural sliding"  CXR (images personally reviewed) immediately after procedure demonstrated resolution of right pleural effusion, no PTX, persistent bilateral interstitial reticular/nodular infiltrates, blunted left CP angle    Complications none    Blood Loss none    Fluid sent for cytology, culture, gram stain, WBC count/diff, TP, LDH, GLU, serum LDH added to today's labs                            Arash Lane DO, [facility]

## 2018-06-08 NOTE — PATIENT INSTRUCTIONS
· Follow up with Dr Mili House as previously planned  · We will contact you with results of fluid analysis next week

## 2018-06-11 LAB
BACTERIA SPEC BFLD CULT: NO GROWTH
GRAM STN SPEC: NORMAL
GRAM STN SPEC: NORMAL

## 2018-06-13 RX ORDER — SODIUM CHLORIDE 9 MG/ML
20 INJECTION, SOLUTION INTRAVENOUS CONTINUOUS
Status: DISCONTINUED | OUTPATIENT
Start: 2018-06-14 | End: 2018-06-17 | Stop reason: HOSPADM

## 2018-06-14 ENCOUNTER — TELEPHONE (OUTPATIENT)
Dept: HEMATOLOGY ONCOLOGY | Facility: CLINIC | Age: 58
End: 2018-06-14

## 2018-06-14 ENCOUNTER — HOSPITAL ENCOUNTER (OUTPATIENT)
Dept: INFUSION CENTER | Facility: CLINIC | Age: 58
Discharge: HOME | End: 2018-06-14
Payer: COMMERCIAL

## 2018-06-14 VITALS
DIASTOLIC BLOOD PRESSURE: 62 MMHG | HEART RATE: 96 BPM | WEIGHT: 131.5 LBS | SYSTOLIC BLOOD PRESSURE: 100 MMHG | RESPIRATION RATE: 18 BRPM | BODY MASS INDEX: 21.91 KG/M2 | TEMPERATURE: 98.2 F | OXYGEN SATURATION: 96 % | HEIGHT: 65 IN

## 2018-06-14 PROCEDURE — 96417 CHEMO IV INFUS EACH ADDL SEQ: CPT

## 2018-06-14 PROCEDURE — 96401 CHEMO ANTI-NEOPL SQ/IM: CPT

## 2018-06-14 PROCEDURE — 96413 CHEMO IV INFUSION 1 HR: CPT

## 2018-06-14 RX ADMIN — SODIUM CHLORIDE 20 ML/HR: 0.9 INJECTION, SOLUTION INTRAVENOUS at 08:40

## 2018-06-14 RX ADMIN — PERTUZUMAB 420 MG: 30 INJECTION, SOLUTION, CONCENTRATE INTRAVENOUS at 09:39

## 2018-06-14 RX ADMIN — DENOSUMAB 120 MG: 120 INJECTION SUBCUTANEOUS at 09:50

## 2018-06-14 RX ADMIN — TRASTUZUMAB 378 MG: 150 INJECTION, POWDER, LYOPHILIZED, FOR SOLUTION INTRAVENOUS at 10:21

## 2018-06-14 NOTE — PROGRESS NOTES
Patient tolerated her chemo without any adverse reactions   Next appointment confirmed and she declined avs

## 2018-06-14 NOTE — PROGRESS NOTES
Patient ambulated to chair without difficulty  Offers no complaints at this time  Vitals stable  Labs within parameters for treatment  Call bell within reach  Plan of care gone over with patient  Will continue to monitor

## 2018-06-15 ENCOUNTER — OFFICE VISIT (OUTPATIENT)
Dept: HEMATOLOGY ONCOLOGY | Facility: CLINIC | Age: 58
End: 2018-06-15
Payer: COMMERCIAL

## 2018-06-15 VITALS
RESPIRATION RATE: 12 BRPM | TEMPERATURE: 99 F | OXYGEN SATURATION: 98 % | BODY MASS INDEX: 21.45 KG/M2 | SYSTOLIC BLOOD PRESSURE: 116 MMHG | DIASTOLIC BLOOD PRESSURE: 79 MMHG | HEIGHT: 66 IN | WEIGHT: 133.5 LBS | HEART RATE: 86 BPM

## 2018-06-15 DIAGNOSIS — C50.911 BILATERAL MALIGNANT NEOPLASM OF BREAST IN FEMALE, ESTROGEN RECEPTOR POSITIVE, UNSPECIFIED SITE OF BREAST (HCC): Primary | ICD-10-CM

## 2018-06-15 DIAGNOSIS — Z17.0 BILATERAL MALIGNANT NEOPLASM OF BREAST IN FEMALE, ESTROGEN RECEPTOR POSITIVE, UNSPECIFIED SITE OF BREAST (HCC): Primary | ICD-10-CM

## 2018-06-15 DIAGNOSIS — C50.912 BILATERAL MALIGNANT NEOPLASM OF BREAST IN FEMALE, ESTROGEN RECEPTOR POSITIVE, UNSPECIFIED SITE OF BREAST (HCC): Primary | ICD-10-CM

## 2018-06-15 DIAGNOSIS — G89.3 CANCER RELATED PAIN: ICD-10-CM

## 2018-06-15 PROCEDURE — 99215 OFFICE O/P EST HI 40 MIN: CPT | Performed by: PHYSICIAN ASSISTANT

## 2018-06-15 RX ORDER — ONDANSETRON 4 MG/1
TABLET, FILM COATED ORAL
Qty: 60 TABLET | Refills: 0 | Status: SHIPPED | OUTPATIENT
Start: 2018-06-15 | End: 2018-09-05

## 2018-06-15 RX ORDER — OXYCODONE HYDROCHLORIDE 20 MG/1
20 TABLET ORAL EVERY 6 HOURS PRN
Qty: 20 TABLET | Refills: 0 | Status: SHIPPED | OUTPATIENT
Start: 2018-06-15 | End: 2018-06-18 | Stop reason: SDUPTHER

## 2018-06-15 NOTE — LETTER
Nhung 15, 2018     [address]  [address] [address]    Patient: Nadja Ochoa   YOB: 1960   Date of Visit: 6/15/2018       Dear Dr Latonya Dang: Thank you for referring Brian Sousa to me for evaluation  Below are my notes for this consultation  If you have questions, please do not hesitate to call me  I look forward to following your patient along with you  Sincerely,        Cynthia Lilly PA-C        CC: No Recipients  Cynthia Lilly PA-C  6/15/2018 12:45 PM  Sign at close encounter  [facility] [address]  Progress Note  Nadja Ochoa, 1960, [id number]  6/15/2018    Assessment/Plan:  1  Recurrent and metastatic Breast Cancer, ER +/DC- , Her2+  Patient's Taxotere was held starting in the beginning of May 2018 secondary to desire for   treatment holiday  Unfortunately, the patient has been experiencing increased nausea with the present regiment  At the time Taxotere was discontinued, Emend and Neulasta were also discontinued  I believe the patient's nausea is likely secondary to discontinuation of Emend  I will discuss this with the patient's chemotherapy nurse, Crystal   We will add to the next regiment  Current Regimen:  Premedication: Emend 150 mg & zofran 16 mg  (to be added for subsequent cycles)  *Change  Perjeta 420 mg/m2  Herceptin 6 mg/m2  Cycle length= 21 days    2  Pulmonary Effusion, without specific etiology  Pulmonary effusion laboratory assessment was without specific etiology  Cytology did not demonstrate malignant cells  Unfortunately,  Thoracentesis did not correct shortness of breath or back pain  We discussed options  It could be possible that the patient is  experiencing shortness of breath  Secondary to pulmonary dysfunction with etiology being prolonged chemotherapy, other pulmonary disorder       I recommended that the patient follow up with a  Chest x-ray approximately 1 month to follow pulmonary effusion and to see if he recurs  Patient is aware that if recurrence does happen that additional thoracentesis would be advised to look for malignancy  In the meantime, the patient will follow up with Dr Alessia Mendez for additional evaluation regarding both pulmonary effusion and SOB  3   Cancer related pain  She has an appointment with Dr Carissa Adkins who typically prescribed her pain medication  However the patient has none remaining  I provided her with a prescription of 20 pills of her oxycodone 20 mg tablet  Patient knows that she should follow up with  Dr Carissa Adkins sooner so she does not run out of the medication  4   Nausea  Nausea has been present for the last 3 cycles,  Which corresponds to discontinuation of Emend, an antiemetic  In the meantime, I have advised the patient to take 2 of her 4 mg Zofran and every 6-8 hours per nausea  Patient has not vomited and has been able to keep fluids down  I do not believe the patient needs hydration at this time however if nausea persists and is affecting oral intake potential IV antiemetic will be prescribed  I have asked the patient to call the office on Monday June 18th if this continues to be an issue  Additionally, the patient is demonstrating weight gain, 4 lb with the last cycle secondary to administration of appetite stimulant through palliative care  5   Poor Venous access  Patient continues on IV chemotherapy agents  Patient has significant lymphedema of her right upper extremity and therefore this arm is not used for IV starts or blood draws  Patient has been experiencing increased pain with IV administration,  As she requires multiple sticks before IV is placed correctly  We discussed the use of Port-A-Cath     Patient understands that this would be placed by interventional radiologist   Patient understands that this is an outpatient procedure and that it can be Placed in use for chemotherapy in the same day  Patient would like to discuss this with her  however she is very interested  We will hold off on scheduling this until she discusses this with her family  I have placed a consult for IR to be used for this reason  Labs and imaging for follow up:  Orders Placed This Encounter   Procedures    IR consult     Please schedule at [facility] [place]  Standing Status:   Future     Standing Expiration Date:   6/15/2022     Order Specific Question:   Reason for Consult: Answer:   Port a cath placement     Order Specific Question:   Is the patient pregnant? Answer:   No    XR chest pa & lateral     Standing Status:   Future     Standing Expiration Date:   6/15/2022     Scheduling Instructions:      Bring along any outside films relating to this procedure  Order Specific Question:   Reason for Exam:     Answer:   f/u pulmonary effusion     Order Specific Question:   Is the patient pregnant? Answer:   No     The patient is scheduled for follow-up in approximately 3 weeks with Dr Omaira Castillo  Patient and her daughter voiced agreement and understanding to the above  Patient knows to call the Hematology/Oncology office with any questions and concerns regarding the above  Carefully review your medication list in verify the list is accurate and up-to-date  Please call the hematologic/Oncology office if there medications missing from the less, medications on the list that your not currently taking or if there is a dosage or instruction that is different from higher taking medication  Total time of being counter was 45 minutes  The patient's current treatment goals are prolonged survival   No significant barrier to care were identified      The patient is able to self care     -------------------------------------------------------------------------------------------------------    Chief complaint:   Chief Complaint   Patient presents with   Charlene Millan Follow-up     B/l Breast Cancer, metastatic/recurrent       History of present illness/Cancer History:      Bilateral malignant neoplasm of breast in female, estrogen receptor positive ([facility] [address] )    2010 Initial Diagnosis     The patient had a left sided T1 B , N0 ER positive, HI and HER-2 negative invasive ductal carcinoma, right-sided DCIS, ER/HI positive, HER-2 negative  Bilateral mastectomy  2010 - 4/2011 Hormone Therapy     Tamoxifen 20 mg daily  Last menstrual period was on 3/2010          4/2011 -  Hormone Therapy     Arimidex 1 mg daily  Unknown when medication was discontinued  Patient was lost to follow up          12/9/2015 Progression     · Right arm swelling in December 2015  U/S found a nonvascular structure in the right axilla measuring 2 2 x 1 3 cm  · Subsequent CT Scan of the chest on 12/9/15, showed multiple bilateral pulmonary nodules measuring 3-7 mm  · PET 2/4/16, which revealed hypermetabolic hilar and mediastinal nodes  There is a pre carinal node with a SUV of 5 4 measuring 1 8 x 1 7 cm, and a subcarinal node measuring 1 9 x 1 3 cm with a SUV of 5 3  There is right hilar activity of 3 5 and left hilar activity of 2 8  The subcentimeter nodules are too small for PET evaluation  She also has uptake in her L3 and L5 vertebral bodies, both concerning for bony metastases  2/16/2016 Biopsy     Bronchoscopic biopsy showed Metastatic non-small cell carcinoma, favor adenocarcinoma  ER 90%, HI 0%   Her 2 +2, positive by FISH            3/1/2016 - 6/2016 Chemotherapy     Taxotere 75 mg/m2, Day 1  Perjeta 420 mg, Day 1  Herceptin 6 mg/kg, Day 1  Cycle length= 21 days         3/1/2016 - 2/2017 Hormone Therapy     Anastrozole 1 mg daily         6/2016 - 2/2017 Chemotherapy     Perjeta 420 mg, Day 1  Herceptin 6 mg/kg, Day 1  Cycle length= 21 days         2/2017 Progression     Bony progression          2/10/2017 - 4/12/2018 Chemotherapy     Perjeta 420 mg, Day 1  Herceptin 6 mg/kg, Day 1  Taxotere 75 mg/m2  Cycle length= 21 days         2017 Progression      brain MRI showed a 5 mm right parietal lesion, 3 mm right frontal lesion, 3 mm left parahippocampal lesion  2017 - 2017 Radiation     SRS to brain lesions         5/3/2018 -  Chemotherapy     Perjeta 420 mg,  Day 1  Herceptin 6 mg/kg,  Day 1  Cycle length = 21 days  Cycle 3 was administered on 18    ** taxotere was  Temporarily discontinued secondary for desire of treatment holiday  ECO - Symptomatic, <50% confined to bed    Interval history:      Patient notes feeling increased nausea throughout the past 3 cycles  The patient and her daughter concerned that this is a sign of progression  Patient and her daughter also concerned regarding the follow-up studies from the pulmonary effusion  Patient   Notes that her shortness of breath started approximately 8 months ago  Patient denies improvement of shortness of breath with thoracentesis  Patient notes that her back pain also has not resolved  Review of Systems   Constitutional: Positive for activity change ( diminished activity over the past year so, legs feeling like rubber bands)  Negative for appetite change, chills, fatigue, fever and unexpected weight change  HENT: Negative for hearing loss, mouth sores, nosebleeds, sore throat, trouble swallowing and voice change  Eyes: Negative for visual disturbance  Respiratory: Positive for shortness of breath  Negative for cough  Cardiovascular: Negative for chest pain, palpitations and leg swelling  Gastrointestinal: Positive for nausea  Negative for abdominal pain, blood in stool, constipation, diarrhea and vomiting  Endocrine: Negative for cold intolerance  Genitourinary: Negative for decreased urine volume, dysuria and hematuria  Musculoskeletal: Positive for arthralgias and back pain  Negative for myalgias          Edema of right upper extremity,  Consistent with history of lymphadenopathy  Skin: Positive for color change ( darkening of skin secondaryTo chemotherapy administration)  Negative for rash  Neurological: Positive for dizziness ( doesNot drive secondary to this chronic issue)  Negative for weakness, numbness and headaches  Hematological: Negative for adenopathy  Does not bruise/bleed easily  Psychiatric/Behavioral: Negative for dysphoric mood  Current Outpatient Prescriptions:     diazepam (VALIUM) 5 mg tablet, Take 1 tablet (5 mg total) by mouth daily at bedtime, Disp: 30 tablet, Rfl: 3    dronabinol (MARINOL) 5 MG capsule, Take 1 capsule (5 mg total) by mouth 2 (two) times a day before meals, Disp: 60 capsule, Rfl: 0    gabapentin (NEURONTIN) 300 mg capsule, 2 capsules PO at bedtime, Disp: 60 capsule, Rfl: 5    OLANZapine (ZyPREXA) 5 mg tablet, Take 1 tablet (5 mg total) by mouth daily at bedtime, Disp: 30 tablet, Rfl: 2    ondansetron (ZOFRAN) 4 mg tablet, 1-2 tablets every 6 hours prn Nausea, Disp: 60 tablet, Rfl: 0    oxyCODONE (ROXICODONE) 20 MG TABS, Take 1 tablet (20 mg total) by mouth every 6 (six) hours as needed for moderate pain Max Daily Amount: 80 mg, Disp: 20 tablet, Rfl: 0    promethazine (PHENERGAN) 25 mg tablet, Take 1 tablet (25 mg total) by mouth every 6 (six) hours as needed for nausea or vomiting, Disp: 60 tablet, Rfl: 2    senna (SENOKOT) 8 6 mg, Take 2 tablets by mouth 2 (two) times a day, Disp: , Rfl:   No current facility-administered medications for this visit  Facility-Administered Medications Ordered in Other Visits:     alteplase (CATHFLO) injection 2 mg, 2 mg, Intracatheter, PRN, Princella Alise, DO    heparin lock flush 100 units/mL injection 300 Units, 300 Units, Intracatheter, PRN, Princella Alise, DO    sodium chloride 0 9 % infusion, 20 mL/hr, Intravenous, Continuous, Pauloella Alise, , Stopped at 06/14/18 1102      No current facility-administered medications for this visit      Allergies:  No Known Allergies    Objective:   /79 (BP Location: Left arm, Cuff Size: Adult)   Pulse 86   Temp 99 °F (37 2 °C)   Resp 12   Ht 5' 6" (1 676 m)   Wt 60 6 kg (133 lb 8 oz)   LMP  (LMP Unknown)   SpO2 98%   BMI 21 55 kg/m²      Physical Exam   Constitutional: She is oriented to person, place, and time  She appears well-developed and well-nourished  No distress  HENT:   Head: Normocephalic and atraumatic  Mouth/Throat: No oropharyngeal exudate  Eyes: EOM are normal  Pupils are equal, round, and reactive to light  No scleral icterus  Exopthalamus   Neck: Normal range of motion  Cardiovascular: Normal rate and regular rhythm  Pulmonary/Chest: She has wheezes ( end expiratory wheeze)  Abdominal: Soft  Bowel sounds are normal  She exhibits no distension  There is no tenderness  Musculoskeletal: She exhibits edema ( right upper extremity  )  Lymphadenopathy:     She has no cervical adenopathy  Neurological: She is alert and oriented to person, place, and time  Coordination normal    Skin: Skin is intact  No bruising, no ecchymosis and no rash noted  She is not diaphoretic  Well tanned skin       Pertinent Laboratory Results and Imaging Review:    Component      Latest Ref Rng & Units 2/6/2018 2/27/2018 4/9/2018 5/22/2018   CA 27 29      0 0 - 42 3 U/mL 39 9 33 7 24 9 19 5     Component      Latest Ref Rng & Units 6/8/2018   CA 27 29      0 0 - 42 3 U/mL 18 9     The following historical data was reviewed      Past Medical History:   Diagnosis Date    H/O bilateral mastectomy 2/15/2016    Hypertension 2/15/2016    Lymphedema 2/15/2016    Malignant neoplasm of right breast ([address] ) 2/15/2016       Past Surgical History:   Procedure Laterality Date    ENDOBRONCHIAL ULTRASOUND (EBUS) N/A 2/16/2016    Procedure: EBUS;  Surgeon: Myrick Kawasaki, MD;  Location:  MAIN OR;  Service:    [facility] MASTECTOMY Bilateral     MASTECTOMY Bilateral     right arm edema    OOPHORECTOMY      WI BRONCHOSCOPY NEEDLE BX TRACHEA MAIN STEM&/BRON N/A 2/16/2016    Procedure: Jose Angelo;  Surgeon: Vivian Mao MD;  Location: [facility] MAIN [facility];  Service: Thoracic    MO STEREOTACTIC RADIOSURGERY, CRANIAL,SIMPLE,EA ADD  5/3/2017         MO STEREOTACTIC RADIOSURGERY, CRANIAL,SIMPLE,EA ADD  5/3/2017         MO STEREOTACTIC RADIOSURGERY, CRANIAL,SIMPLE,SINGLE  5/3/2017            Social History     Social History    Marital status: /Civil Union     Spouse name: N/A    Number of children: N/A    Years of education: N/A     Social History Main Topics    Smoking status: Current Every Day Smoker     Packs/day: 0 50     Years: 40 00     Start date: 1978    Smokeless tobacco: Never Used    Alcohol use Yes      Comment: social    Drug use: No    Sexual activity: Not on file     Other Topics Concern    Not on file     Social History Narrative    No narrative on file       Family History   Problem Relation Age of Onset    Cancer Sister     Prostate cancer Brother        Please note: This report has been generated by a voice recognition software system  Therefore there may be syntax, spelling, and/or grammatical errors  Please call if you have any questions

## 2018-06-15 NOTE — PROGRESS NOTES
[facility]AT[facility] [address] [address]  Progress Note  Eliel Chahal, 1960, [id number]  6/15/2018    Assessment/Plan:  1  Recurrent and metastatic Breast Cancer, ER +/[place]- , Her2+  Patient's Taxotere was held starting in the beginning of May 2018 secondary to desire for   treatment holiday  Unfortunately, the patient has been experiencing increased nausea with the present regiment  At the time Taxotere was discontinued, Emend and Neulasta were also discontinued  I believe the patient's nausea is likely secondary to discontinuation of Emend  I will discuss this with the patient's chemotherapy nurse, Crystal   We will add to the next regiment  Current Regimen:  Premedication: Emend 150 mg & zofran 16 mg  (to be added for subsequent cycles)  *Change  Perjeta 420 mg/m2  Herceptin 6 mg/m2  Cycle length= 21 days    2  Pulmonary Effusion, without specific etiology  Pulmonary effusion laboratory assessment was without specific etiology  Cytology did not demonstrate malignant cells  Unfortunately,  Thoracentesis did not correct shortness of breath or back pain  We discussed options  It could be possible that the patient is  experiencing shortness of breath  Secondary to pulmonary dysfunction with etiology being prolonged chemotherapy, other pulmonary disorder  I recommended that the patient follow up with a  Chest x-ray approximately 1 month to follow pulmonary effusion and to see if he recurs  Patient is aware that if recurrence does happen that additional thoracentesis would be advised to look for malignancy  In the meantime, the patient will follow up with Dr Ankur Diehl for additional evaluation regarding both pulmonary effusion and SOB  3   Cancer related pain  She has an appointment with Dr Jordana Estevez who typically prescribed her pain medication  However the patient has none remaining    I provided her with a prescription of 20 pills of her oxycodone 20 mg tablet  Patient knows that she should follow up with  Dr Arevalo All sooner so she does not run out of the medication  4   Nausea  Nausea has been present for the last 3 cycles,  Which corresponds to discontinuation of Emend, an antiemetic  In the meantime, I have advised the patient to take 2 of her 4 mg Zofran and every 6-8 hours per nausea  Patient has not vomited and has been able to keep fluids down  I do not believe the patient needs hydration at this time however if nausea persists and is affecting oral intake potential IV antiemetic will be prescribed  I have asked the patient to call the office on Monday June 18th if this continues to be an issue  Additionally, the patient is demonstrating weight gain, 4 lb with the last cycle secondary to administration of appetite stimulant through palliative care  5   Poor Venous access  Patient continues on IV chemotherapy agents  Patient has significant lymphedema of her right upper extremity and therefore this arm is not used for IV starts or blood draws  Patient has been experiencing increased pain with IV administration,  As she requires multiple sticks before IV is placed correctly  We discussed the use of Port-A-Cath  Patient understands that this would be placed by interventional radiologist   Patient understands that this is an outpatient procedure and that it can be  Placed in use for chemotherapy in the same day  Patient would like to discuss this with her  however she is very interested  We will hold off on scheduling this until she discusses this with her family  I have placed a consult for IR to be used for this reason  Labs and imaging for follow up:  Orders Placed This Encounter   Procedures    IR consult     Please schedule at [facility]  Standing Status:   Future     Standing Expiration Date:   6/15/2022     Order Specific Question:   Reason for Consult:      Answer:   Marty duckworth placement     Order Specific Question:   Is the patient pregnant? Answer:   No    XR chest pa & lateral     Standing Status:   Future     Standing Expiration Date:   6/15/2022     Scheduling Instructions:      Bring along any outside films relating to this procedure  Order Specific Question:   Reason for Exam:     Answer:   f/u pulmonary effusion     Order Specific Question:   Is the patient pregnant? Answer:   No     The patient is scheduled for follow-up in approximately 3 weeks with Dr Shani Boogie  Patient and her daughter voiced agreement and understanding to the above  Patient knows to call the Hematology/Oncology office with any questions and concerns regarding the above  Carefully review your medication list in verify the list is accurate and up-to-date  Please call the hematologic/Oncology office if there medications missing from the less, medications on the list that your not currently taking or if there is a dosage or instruction that is different from higher taking medication  Total time of being counter was 45 minutes  The patient's current treatment goals are prolonged survival   No significant barrier to care were identified  The patient is able to self care     -------------------------------------------------------------------------------------------------------    Chief complaint:   Chief Complaint   Patient presents with    Follow-up     B/l Breast Cancer, metastatic/recurrent       History of present illness/Cancer History:      Bilateral malignant neoplasm of breast in female, estrogen receptor positive ([facility] [address] )    2010 Initial Diagnosis     The patient had a left sided T1 B , N0 ER positive, SC and HER-2 negative invasive ductal carcinoma, right-sided DCIS, ER/SC positive, HER-2 negative  Bilateral mastectomy  2010 - 4/2011 Hormone Therapy     Tamoxifen 20 mg daily    Last menstrual period was on 3/2010          4/2011 -  Hormone Therapy     Arimidex 1 mg daily   Unknown when medication was discontinued  Patient was lost to follow up          2015 Progression     · Right arm swelling in 2015  U/S found a nonvascular structure in the right axilla measuring 2 2 x 1 3 cm  · Subsequent CT Scan of the chest on 12/9/15, showed multiple bilateral pulmonary nodules measuring 3-7 mm  · PET 16, which revealed hypermetabolic hilar and mediastinal nodes  There is a pre carinal node with a SUV of 5 4 measuring 1 8 x 1 7 cm, and a subcarinal node measuring 1 9 x 1 3 cm with a SUV of 5 3  There is right hilar activity of 3 5 and left hilar activity of 2 8  The subcentimeter nodules are too small for PET evaluation  She also has uptake in her L3 and L5 vertebral bodies, both concerning for bony metastases  2016 Biopsy     Bronchoscopic biopsy showed Metastatic non-small cell carcinoma, favor adenocarcinoma  ER 90%, WI 0%  Her 2 +2, positive by FISH            3/1/2016 - 2016 Chemotherapy     Taxotere 75 mg/m2, Day 1  Perjeta 420 mg, Day 1  Herceptin 6 mg/kg, Day 1  Cycle length= 21 days         3/1/2016 - 2017 Hormone Therapy     Anastrozole 1 mg daily         2016 - 2017 Chemotherapy     Perjeta 420 mg, Day 1  Herceptin 6 mg/kg, Day 1  Cycle length= 21 days         2017 Progression     Bony progression          2/10/2017 - 2018 Chemotherapy     Perjeta 420 mg, Day 1  Herceptin 6 mg/kg, Day 1  Taxotere 75 mg/m2  Cycle length= 21 days         2017 Progression      brain MRI showed a 5 mm right parietal lesion, 3 mm right frontal lesion, 3 mm left parahippocampal lesion  2017 - 2017 Radiation     SRS to brain lesions         5/3/2018 -  Chemotherapy     Perjeta 420 mg,  Day 1  Herceptin 6 mg/kg,  Day 1  Cycle length = 21 days  Cycle 3 was administered on 18    ** taxotere was  Temporarily discontinued secondary for desire of treatment holiday             ECO - Symptomatic, <50% confined to bed    Interval history:      Patient notes feeling increased nausea throughout the past 3 cycles  The patient and her daughter concerned that this is a sign of progression  Patient and her daughter also concerned regarding the follow-up studies from the pulmonary effusion  Patient   Notes that her shortness of breath started approximately 8 months ago  Patient denies improvement of shortness of breath with thoracentesis  Patient notes that her back pain also has not resolved  Review of Systems   Constitutional: Positive for activity change ( diminished activity over the past year so, legs feeling like rubber bands)  Negative for appetite change, chills, fatigue, fever and unexpected weight change  HENT: Negative for hearing loss, mouth sores, nosebleeds, sore throat, trouble swallowing and voice change  Eyes: Negative for visual disturbance  Respiratory: Positive for shortness of breath  Negative for cough  Cardiovascular: Negative for chest pain, palpitations and leg swelling  Gastrointestinal: Positive for nausea  Negative for abdominal pain, blood in stool, constipation, diarrhea and vomiting  Endocrine: Negative for cold intolerance  Genitourinary: Negative for decreased urine volume, dysuria and hematuria  Musculoskeletal: Positive for arthralgias and back pain  Negative for myalgias  Edema of right upper extremity,  Consistent with history of lymphadenopathy  Skin: Positive for color change ( darkening of skin secondaryTo chemotherapy administration)  Negative for rash  Neurological: Positive for dizziness ( doesNot drive secondary to this chronic issue)  Negative for weakness, numbness and headaches  Hematological: Negative for adenopathy  Does not bruise/bleed easily  Psychiatric/Behavioral: Negative for dysphoric mood           Current Outpatient Prescriptions:     diazepam (VALIUM) 5 mg tablet, Take 1 tablet (5 mg total) by mouth daily at bedtime, Disp: 30 tablet, Rfl: 3   dronabinol (MARINOL) 5 MG capsule, Take 1 capsule (5 mg total) by mouth 2 (two) times a day before meals, Disp: 60 capsule, Rfl: 0    gabapentin (NEURONTIN) 300 mg capsule, 2 capsules PO at bedtime, Disp: 60 capsule, Rfl: 5    OLANZapine (ZyPREXA) 5 mg tablet, Take 1 tablet (5 mg total) by mouth daily at bedtime, Disp: 30 tablet, Rfl: 2    ondansetron (ZOFRAN) 4 mg tablet, 1-2 tablets every 6 hours prn Nausea, Disp: 60 tablet, Rfl: 0    oxyCODONE (ROXICODONE) 20 MG TABS, Take 1 tablet (20 mg total) by mouth every 6 (six) hours as needed for moderate pain Max Daily Amount: 80 mg, Disp: 20 tablet, Rfl: 0    promethazine (PHENERGAN) 25 mg tablet, Take 1 tablet (25 mg total) by mouth every 6 (six) hours as needed for nausea or vomiting, Disp: 60 tablet, Rfl: 2    senna (SENOKOT) 8 6 mg, Take 2 tablets by mouth 2 (two) times a day, Disp: , Rfl:   No current facility-administered medications for this visit  Facility-Administered Medications Ordered in Other Visits:     alteplase (CATHFLO) injection 2 mg, 2 mg, Intracatheter, PRN, Sharlon Bile, DO    heparin lock flush 100 units/mL injection 300 Units, 300 Units, Intracatheter, PRN, Sharlon Bile, DO    sodium chloride 0 9 % infusion, 20 mL/hr, Intravenous, Continuous, Sharlon Bile, DO, Stopped at 06/14/18 1102      No current facility-administered medications for this visit  Allergies:  No Known Allergies    Objective:   /79 (BP Location: Left arm, Cuff Size: Adult)   Pulse 86   Temp 99 °F (37 2 °C)   Resp 12   Ht 5' 6" (1 676 m)   Wt 60 6 kg (133 lb 8 oz)   LMP  (LMP Unknown)   SpO2 98%   BMI 21 55 kg/m²     Physical Exam   Constitutional: She is oriented to person, place, and time  She appears well-developed and well-nourished  No distress  HENT:   Head: Normocephalic and atraumatic  Mouth/Throat: No oropharyngeal exudate  Eyes: EOM are normal  Pupils are equal, round, and reactive to light  No scleral icterus     Exopthalamus   Neck: Normal range of motion  Cardiovascular: Normal rate and regular rhythm  Pulmonary/Chest: She has wheezes ( end expiratory wheeze)  Abdominal: Soft  Bowel sounds are normal  She exhibits no distension  There is no tenderness  Musculoskeletal: She exhibits edema ( right upper extremity  )  Lymphadenopathy:     She has no cervical adenopathy  Neurological: She is alert and oriented to person, place, and time  Coordination normal    Skin: Skin is intact  No bruising, no ecchymosis and no rash noted  She is not diaphoretic  Well tanned skin       Pertinent Laboratory Results and Imaging Review:    Component      Latest Ref Rng & Units 2/6/2018 2/27/2018 4/9/2018 5/22/2018   CA 27 29      0 0 - 42 3 U/mL 39 9 33 7 24 9 19 5     Component      Latest Ref Rng & Units 6/8/2018   CA 27 29      0 0 - 42 3 U/mL 18 9     The following historical data was reviewed      Past Medical History:   Diagnosis Date    H/O bilateral mastectomy 2/15/2016    Hypertension 2/15/2016    Lymphedema 2/15/2016    Malignant neoplasm of right breast ([facility] [address] ) 2/15/2016       Past Surgical History:   Procedure Laterality Date    ENDOBRONCHIAL ULTRASOUND (EBUS) N/A 2/16/2016    Procedure: EBUS;  Surgeon: Beckie Wheeler MD;  Location: [facility];  Service:     MASTECTOMY Bilateral     MASTECTOMY Bilateral     right arm edema    OOPHORECTOMY      ID BRONCHOSCOPY NEEDLE BX TRACHEA MAIN STEM&/BRON N/A 2/16/2016    Procedure: Nico Jarvis;  Surgeon: Beckie Wheeler MD;  Location: [facility];  Service: Thoracic    ID STEREOTACTIC RADIOSURGERY, CRANIAL,SIMPLE,EA ADD  5/3/2017         ID STEREOTACTIC RADIOSURGERY, CRANIAL,SIMPLE,EA ADD  5/3/2017         ID STEREOTACTIC RADIOSURGERY, CRANIAL,SIMPLE,SINGLE  5/3/2017            Social History     Social History    Marital status: /Civil Union     Spouse name: N/A    Number of children: N/A    Years of education: N/A     Social History Main Topics    Smoking status: Current Every Day Smoker     Packs/day: 0 50     Years: 40 00     Start date: 1978    Smokeless tobacco: Never Used    Alcohol use Yes      Comment: social    Drug use: No    Sexual activity: Not on file     Other Topics Concern    Not on file     Social History Narrative    No narrative on file       Family History   Problem Relation Age of Onset    Cancer Sister     Prostate cancer Brother        Please note: This report has been generated by a voice recognition software system  Therefore there may be syntax, spelling, and/or grammatical errors  Please call if you have any questions

## 2018-06-17 NOTE — PROGRESS NOTES
Palliative and Supportive Care   Jaimee Herrera 56 y o  female [id number]    Assessment/Plan:  1  Bilateral malignant neoplasm of breast in female, estrogen receptor positive, unspecified site of breast ([facility] [address] )    2  Lymphedema of right upper extremity    3  Cancer related pain    4  Chemotherapy-induced nausea    5  Decreased appetite    6  Other fatigue      Requested Prescriptions     Signed Prescriptions Disp Refills    oxyCODONE (ROXICODONE) 20 MG TABS 120 tablet 0     Sig: Take 1 tablet (20 mg total) by mouth every 6 (six) hours as needed for moderate pain Max Daily Amount: 80 mg    dexamethasone (DECADRON) 2 mg tablet 30 tablet 1     Sig: Take 1 tablet (2 mg total) by mouth daily with breakfast    cyclobenzaprine (FLEXERIL) 10 mg tablet 30 tablet 1     Sig: Take 1 tablet (10 mg total) by mouth daily at bedtime as needed for muscle spasms     Medications Discontinued During This Encounter   Medication Reason    dronabinol (MARINOL) 5 MG capsule     OLANZapine (ZyPREXA) 5 mg tablet      Patient is not getting the appetite stimulating effects that she desires from use of marinol  Will restart lower dose steroids and monitor for side effects as they may also help several of her other symptoms including pain, nausea, and fatigue  Representatives have queried the patient's controlled substance dispensing history in the Prescription Drug Monitoring Program in compliance with regulations before I have prescribed any controlled substances  The prescription history is consistent with prescribed therapy and our practice policies  25 minutes were spent face to face with Jaimee Herrera and her daughter with greater than 50% of the time spent in counseling or coordination of care including discussions of treatment instructions and follow up requirements   All of the patient's questions were answered during this discussion  Return in about 1 month (around 7/18/2018)      Subjective:   Chief Complaint  Follow up visit for:  symptom management  HPI     Marissa Crandall is a 56 y o  female with metastatic breast cancer originally diagnosed in 2010  She had a bilateral mastectomy and has undergone multiple chemotherapy regimens  She has chronic lymphedema in her right arm  She has brain mets for which she has received radiation in the past       This month she states that she is having more pain in her upper back  She is also very tired and has no appetite  Getting the marinol did not help last month  She continues to have problems sleeping  The following portions of the medical history were reviewed: past medical history, problem list, medication list, and social history  Current Outpatient Prescriptions:     diazepam (VALIUM) 5 mg tablet, Take 1 tablet (5 mg total) by mouth daily at bedtime, Disp: 30 tablet, Rfl: 3    gabapentin (NEURONTIN) 300 mg capsule, 2 capsules PO at bedtime, Disp: 60 capsule, Rfl: 5    ondansetron (ZOFRAN) 4 mg tablet, 1-2 tablets every 6 hours prn Nausea, Disp: 60 tablet, Rfl: 0    oxyCODONE (ROXICODONE) 20 MG TABS, Take 1 tablet (20 mg total) by mouth every 6 (six) hours as needed for moderate pain Max Daily Amount: 80 mg, Disp: 120 tablet, Rfl: 0    promethazine (PHENERGAN) 25 mg tablet, Take 1 tablet (25 mg total) by mouth every 6 (six) hours as needed for nausea or vomiting, Disp: 60 tablet, Rfl: 2    senna (SENOKOT) 8 6 mg, Take 2 tablets by mouth 2 (two) times a day as needed  , Disp: , Rfl:     cyclobenzaprine (FLEXERIL) 10 mg tablet, Take 1 tablet (10 mg total) by mouth daily at bedtime as needed for muscle spasms, Disp: 30 tablet, Rfl: 1    dexamethasone (DECADRON) 2 mg tablet, Take 1 tablet (2 mg total) by mouth daily with breakfast, Disp: 30 tablet, Rfl: 1  Review of Systems   Constitutional: Positive for activity change, appetite change, fatigue and unexpected weight change  Musculoskeletal: Positive for arthralgias, back pain and neck pain  Neurological: Positive for weakness  Psychiatric/Behavioral: Positive for sleep disturbance  All other systems reviewed and are negative  Objective:  Vital Signs  BP 90/58   Pulse 104   Temp 99 4 °F (37 4 °C) (Tympanic)   Resp 16   Ht 5' 5 98" (1 676 m)   Wt 59 kg (130 lb)   LMP  (LMP Unknown)   BMI 20 99 kg/m²    Physical Exam    Constitutional: Appears chronically ill  Chemo-related alopecia  Eyes: EOM are normal  Right eye exhibits no discharge  Left eye exhibits no discharge  No scleral icterus  Pulmonary/Chest: Effort normal  No stridor  No respiratory distress  Abdominal: Ab distension improved from previous exams   Musculoskeletal: RUE lymphedema, not currently wearing a sleeve but has secured a pump for home use  Neurological: Alert, oriented and appropriately conversant  Skin: sallow coloring  Psychiatric: Displays a normal mood and affect  Behavior, judgement and thought content appear normal    Vitals reviewed

## 2018-06-18 ENCOUNTER — OFFICE VISIT (OUTPATIENT)
Dept: PALLIATIVE MEDICINE | Facility: CLINIC | Age: 58
End: 2018-06-18
Payer: COMMERCIAL

## 2018-06-18 ENCOUNTER — TELEPHONE (OUTPATIENT)
Dept: PULMONOLOGY | Facility: CLINIC | Age: 58
End: 2018-06-18

## 2018-06-18 VITALS
RESPIRATION RATE: 16 BRPM | BODY MASS INDEX: 20.89 KG/M2 | SYSTOLIC BLOOD PRESSURE: 90 MMHG | HEART RATE: 104 BPM | DIASTOLIC BLOOD PRESSURE: 58 MMHG | WEIGHT: 130 LBS | HEIGHT: 66 IN | TEMPERATURE: 99.4 F

## 2018-06-18 DIAGNOSIS — C50.911 BILATERAL MALIGNANT NEOPLASM OF BREAST IN FEMALE, ESTROGEN RECEPTOR POSITIVE, UNSPECIFIED SITE OF BREAST (HCC): Primary | ICD-10-CM

## 2018-06-18 DIAGNOSIS — Z17.0 BILATERAL MALIGNANT NEOPLASM OF BREAST IN FEMALE, ESTROGEN RECEPTOR POSITIVE, UNSPECIFIED SITE OF BREAST (HCC): Primary | ICD-10-CM

## 2018-06-18 DIAGNOSIS — I89.0 LYMPHEDEMA OF RIGHT UPPER EXTREMITY: ICD-10-CM

## 2018-06-18 DIAGNOSIS — R63.0 DECREASED APPETITE: ICD-10-CM

## 2018-06-18 DIAGNOSIS — R53.83 OTHER FATIGUE: ICD-10-CM

## 2018-06-18 DIAGNOSIS — R11.0 CHEMOTHERAPY-INDUCED NAUSEA: ICD-10-CM

## 2018-06-18 DIAGNOSIS — C50.912 BILATERAL MALIGNANT NEOPLASM OF BREAST IN FEMALE, ESTROGEN RECEPTOR POSITIVE, UNSPECIFIED SITE OF BREAST (HCC): Primary | ICD-10-CM

## 2018-06-18 DIAGNOSIS — T45.1X5A CHEMOTHERAPY-INDUCED NAUSEA: ICD-10-CM

## 2018-06-18 DIAGNOSIS — G89.3 CANCER RELATED PAIN: ICD-10-CM

## 2018-06-18 PROCEDURE — 99214 OFFICE O/P EST MOD 30 MIN: CPT | Performed by: FAMILY MEDICINE

## 2018-06-18 RX ORDER — DEXAMETHASONE 2 MG/1
2 TABLET ORAL
Qty: 30 TABLET | Refills: 1 | Status: SHIPPED | OUTPATIENT
Start: 2018-06-18 | End: 2018-08-16 | Stop reason: SDUPTHER

## 2018-06-18 RX ORDER — CYCLOBENZAPRINE HCL 10 MG
10 TABLET ORAL
Qty: 30 TABLET | Refills: 1 | Status: SHIPPED | OUTPATIENT
Start: 2018-06-18 | End: 2018-08-23

## 2018-06-18 RX ORDER — OXYCODONE HYDROCHLORIDE 20 MG/1
20 TABLET ORAL EVERY 6 HOURS PRN
Qty: 120 TABLET | Refills: 0 | Status: SHIPPED | OUTPATIENT
Start: 2018-06-18 | End: 2018-07-17 | Stop reason: SDUPTHER

## 2018-06-19 ENCOUNTER — TELEPHONE (OUTPATIENT)
Dept: PULMONOLOGY | Facility: CLINIC | Age: 58
End: 2018-06-19

## 2018-07-03 RX ORDER — SODIUM CHLORIDE 9 MG/ML
20 INJECTION, SOLUTION INTRAVENOUS CONTINUOUS
Status: DISCONTINUED | OUTPATIENT
Start: 2018-07-05 | End: 2018-07-08 | Stop reason: HOSPADM

## 2018-07-05 ENCOUNTER — HOSPITAL ENCOUNTER (OUTPATIENT)
Dept: INFUSION CENTER | Facility: CLINIC | Age: 58
Discharge: HOME | End: 2018-07-05
Payer: COMMERCIAL

## 2018-07-05 VITALS
HEART RATE: 72 BPM | HEIGHT: 65 IN | SYSTOLIC BLOOD PRESSURE: 126 MMHG | RESPIRATION RATE: 18 BRPM | DIASTOLIC BLOOD PRESSURE: 68 MMHG | BODY MASS INDEX: 22.24 KG/M2 | WEIGHT: 133.5 LBS | TEMPERATURE: 97.2 F

## 2018-07-05 DIAGNOSIS — C50.119 MALIGNANT NEOPLASM OF CENTRAL PORTION OF FEMALE BREAST, UNSPECIFIED ESTROGEN RECEPTOR STATUS, UNSPECIFIED LATERALITY (HCC): ICD-10-CM

## 2018-07-05 DIAGNOSIS — C79.51 SECONDARY MALIGNANT NEOPLASM OF BONE AND BONE MARROW (HCC): ICD-10-CM

## 2018-07-05 DIAGNOSIS — C79.49 SECONDARY MALIGNANT NEOPLASM OF BRAIN AND SPINAL CORD (HCC): ICD-10-CM

## 2018-07-05 DIAGNOSIS — C79.31 SECONDARY MALIGNANT NEOPLASM OF BRAIN AND SPINAL CORD (HCC): ICD-10-CM

## 2018-07-05 DIAGNOSIS — C79.52 SECONDARY MALIGNANT NEOPLASM OF BONE AND BONE MARROW (HCC): ICD-10-CM

## 2018-07-05 LAB
ALBUMIN SERPL BCP-MCNC: 3.1 G/DL (ref 3.5–5)
ALP SERPL-CCNC: 81 U/L (ref 46–116)
ALT SERPL W P-5'-P-CCNC: 21 U/L (ref 12–78)
ANION GAP SERPL CALCULATED.3IONS-SCNC: 9 MMOL/L (ref 4–13)
AST SERPL W P-5'-P-CCNC: 14 U/L (ref 5–45)
BASOPHILS # BLD AUTO: 0.02 THOUSANDS/ΜL (ref 0–0.1)
BASOPHILS NFR BLD AUTO: 0 % (ref 0–1)
BILIRUB SERPL-MCNC: 0.2 MG/DL (ref 0.2–1)
BUN SERPL-MCNC: 12 MG/DL (ref 5–25)
CALCIUM SERPL-MCNC: 8.9 MG/DL (ref 8.3–10.1)
CANCER AG27-29 SERPL-ACNC: 33.8 U/ML (ref 0–42.3)
CHLORIDE SERPL-SCNC: 104 MMOL/L (ref 100–108)
CO2 SERPL-SCNC: 28 MMOL/L (ref 21–32)
CREAT SERPL-MCNC: 0.58 MG/DL (ref 0.6–1.3)
EOSINOPHIL # BLD AUTO: 0.11 THOUSAND/ΜL (ref 0–0.61)
EOSINOPHIL NFR BLD AUTO: 1 % (ref 0–6)
ERYTHROCYTE [DISTWIDTH] IN BLOOD BY AUTOMATED COUNT: 18 % (ref 11.6–15.1)
GFR SERPL CREATININE-BSD FRML MDRD: 104 ML/MIN/1.73SQ M
GLUCOSE SERPL-MCNC: 78 MG/DL (ref 65–140)
HCT VFR BLD AUTO: 34.7 % (ref 34.8–46.1)
HGB BLD-MCNC: 10.7 G/DL (ref 11.5–15.4)
LYMPHOCYTES # BLD AUTO: 1.07 THOUSANDS/ΜL (ref 0.6–4.47)
LYMPHOCYTES NFR BLD AUTO: 14 % (ref 14–44)
MCH RBC QN AUTO: 27.2 PG (ref 26.8–34.3)
MCHC RBC AUTO-ENTMCNC: 30.8 G/DL (ref 31.4–37.4)
MCV RBC AUTO: 88 FL (ref 82–98)
MONOCYTES # BLD AUTO: 0.78 THOUSAND/ΜL (ref 0.17–1.22)
MONOCYTES NFR BLD AUTO: 10 % (ref 4–12)
NEUTROPHILS # BLD AUTO: 5.73 THOUSANDS/ΜL (ref 1.85–7.62)
NEUTS SEG NFR BLD AUTO: 74 % (ref 43–75)
PLATELET # BLD AUTO: 487 THOUSANDS/UL (ref 149–390)
PMV BLD AUTO: 8.7 FL (ref 8.9–12.7)
POTASSIUM SERPL-SCNC: 3.5 MMOL/L (ref 3.5–5.3)
PROT SERPL-MCNC: 7.2 G/DL (ref 6.4–8.2)
RBC # BLD AUTO: 3.93 MILLION/UL (ref 3.81–5.12)
SODIUM SERPL-SCNC: 141 MMOL/L (ref 136–145)
WBC # BLD AUTO: 7.71 THOUSAND/UL (ref 4.31–10.16)

## 2018-07-05 PROCEDURE — 96413 CHEMO IV INFUSION 1 HR: CPT

## 2018-07-05 PROCEDURE — 96417 CHEMO IV INFUS EACH ADDL SEQ: CPT

## 2018-07-05 PROCEDURE — 86300 IMMUNOASSAY TUMOR CA 15-3: CPT | Performed by: INTERNAL MEDICINE

## 2018-07-05 PROCEDURE — 85025 COMPLETE CBC W/AUTO DIFF WBC: CPT | Performed by: INTERNAL MEDICINE

## 2018-07-05 PROCEDURE — 96367 TX/PROPH/DG ADDL SEQ IV INF: CPT

## 2018-07-05 PROCEDURE — 80053 COMPREHEN METABOLIC PANEL: CPT | Performed by: INTERNAL MEDICINE

## 2018-07-05 RX ADMIN — ONDANSETRON 16 MG: 2 INJECTION INTRAMUSCULAR; INTRAVENOUS at 09:09

## 2018-07-05 RX ADMIN — SODIUM CHLORIDE 20 ML/HR: 0.9 INJECTION, SOLUTION INTRAVENOUS at 08:30

## 2018-07-05 RX ADMIN — PERTUZUMAB 420 MG: 30 INJECTION, SOLUTION, CONCENTRATE INTRAVENOUS at 09:46

## 2018-07-05 RX ADMIN — TRASTUZUMAB 354 MG: 150 INJECTION, POWDER, LYOPHILIZED, FOR SOLUTION INTRAVENOUS at 10:38

## 2018-07-05 NOTE — PROGRESS NOTES
Emend not covered by [facility] for Herceptin Perjeta, d/w Jd Melgar RN, will give Zofran alone, d/c order pending for Emend

## 2018-07-05 NOTE — PROGRESS NOTES
Patient tolerated treatment today  Patient has agreed to port placement  Email sent to Dash in Dr Ambrocio Echevarria office so they can set up port placement

## 2018-07-05 NOTE — PROGRESS NOTES
Patient arrived for Perjeta/Herceptin therapy  Offers no complaints  Patient states she did not have time to get blood work drawn for this cycle  IV started in left antecubital vein without difficulty  Blood work drawn with IV insertion  Awaiting results to proceed

## 2018-07-06 ENCOUNTER — TELEPHONE (OUTPATIENT)
Dept: HEMATOLOGY ONCOLOGY | Facility: CLINIC | Age: 58
End: 2018-07-06

## 2018-07-06 DIAGNOSIS — C50.912 BILATERAL MALIGNANT NEOPLASM OF BREAST IN FEMALE, ESTROGEN RECEPTOR POSITIVE, UNSPECIFIED SITE OF BREAST (HCC): Primary | ICD-10-CM

## 2018-07-06 DIAGNOSIS — I87.8 POOR VENOUS ACCESS: ICD-10-CM

## 2018-07-06 DIAGNOSIS — C50.911 BILATERAL MALIGNANT NEOPLASM OF BREAST IN FEMALE, ESTROGEN RECEPTOR POSITIVE, UNSPECIFIED SITE OF BREAST (HCC): Primary | ICD-10-CM

## 2018-07-06 DIAGNOSIS — Z17.0 BILATERAL MALIGNANT NEOPLASM OF BREAST IN FEMALE, ESTROGEN RECEPTOR POSITIVE, UNSPECIFIED SITE OF BREAST (HCC): Primary | ICD-10-CM

## 2018-07-06 NOTE — TELEPHONE ENCOUNTER
Pt called  Has decided wants port placed  Consult to IR placed   Please schedule at [facility]

## 2018-07-10 ENCOUNTER — ANESTHESIA EVENT (OUTPATIENT)
Dept: PERIOP | Facility: AMBULARY SURGERY CENTER | Age: 58
End: 2018-07-10
Payer: COMMERCIAL

## 2018-07-16 NOTE — PROGRESS NOTES
Palliative and Supportive Care   Nadja Ochoa 56 y o  female [id number]    Assessment/Plan:  1  Bilateral malignant neoplasm of breast in female, estrogen receptor positive, unspecified site of breast ([facility] [address] )    2  Cancer related pain    3  Difficulty sleeping      Requested Prescriptions     Signed Prescriptions Disp Refills    oxyCODONE (ROXICODONE) 20 MG TABS 120 tablet 0     Sig: Take 1 tablet (20 mg total) by mouth every 6 (six) hours as needed for moderate pain Max Daily Amount: 80 mg    fentaNYL (DURAGESIC) 25 mcg/hr 10 patch 0     Sig: Place 1 patch on the skin every third day Max Daily Amount: 1 patch     Representatives have queried the patient's controlled substance dispensing history in the Prescription Drug Monitoring Program in compliance with regulations before I have prescribed any controlled substances  The prescription history is consistent with prescribed therapy and our practice policies  30 minutes were spent face to face with Nadja Ochoa and her daughter with greater than 50% of the time spent in counseling or coordination of care including discussions of treatment instructions and follow up requirements   All of the patient's questions were answered during this discussion  No Follow-up on file  Subjective:   Chief Complaint  Follow up visit for:  symptom management  HPI     Nadja Ochoa is a 56 y o  female with metastatic breast cancer originally diagnosed in 2010  She had a bilateral mastectomy and has undergone multiple chemotherapy regimens   She has chronic lymphedema in her right arm  She has brain mets for which she has received radiation in the past       Last month we restarted her dexamethasone mostly to help with her appetite but we were also hoping that it would help with her pain, energy level, and nausea  It has helped with her appetite and nausea  However he pain is persistent and worse    She states that her left arm with the lymphedema is hurting her and her wrist has sharp pains  Her back also hurts  The oxy gives her temporary and incomplete relief  The following portions of the medical history were reviewed: past medical history, problem list, medication list, and social history  Current Outpatient Prescriptions:     cyclobenzaprine (FLEXERIL) 10 mg tablet, Take 1 tablet (10 mg total) by mouth daily at bedtime as needed for muscle spasms, Disp: 30 tablet, Rfl: 1    dexamethasone (DECADRON) 2 mg tablet, Take 1 tablet (2 mg total) by mouth daily with breakfast, Disp: 30 tablet, Rfl: 1    diazepam (VALIUM) 5 mg tablet, Take 1 tablet (5 mg total) by mouth daily at bedtime, Disp: 30 tablet, Rfl: 3    gabapentin (NEURONTIN) 300 mg capsule, 2 capsules PO at bedtime, Disp: 60 capsule, Rfl: 5    ondansetron (ZOFRAN) 4 mg tablet, 1-2 tablets every 6 hours prn Nausea, Disp: 60 tablet, Rfl: 0    oxyCODONE (ROXICODONE) 20 MG TABS, Take 1 tablet (20 mg total) by mouth every 6 (six) hours as needed for moderate pain Max Daily Amount: 80 mg, Disp: 120 tablet, Rfl: 0    promethazine (PHENERGAN) 25 mg tablet, Take 1 tablet (25 mg total) by mouth every 6 (six) hours as needed for nausea or vomiting, Disp: 60 tablet, Rfl: 2    senna (SENOKOT) 8 6 mg, Take 2 tablets by mouth 2 (two) times a day as needed  , Disp: , Rfl:     fentaNYL (DURAGESIC) 25 mcg/hr, Place 1 patch on the skin every third day Max Daily Amount: 1 patch, Disp: 10 patch, Rfl: 0  Review of Systems   Constitutional: Positive for appetite change and fatigue  Musculoskeletal: Positive for arthralgias and neck stiffness  Neurological: Positive for weakness  Psychiatric/Behavioral: Positive for sleep disturbance  All other systems reviewed and are negative          Objective:  Vital Signs  /60 (BP Location: Left arm, Patient Position: Sitting, Cuff Size: Standard)   Pulse 100   Temp 99 1 °F (37 3 °C) (Tympanic)   Resp 20   Ht 5' 5" (1 651 m)   Wt 63 kg (139 lb)   LMP  (LMP Unknown) SpO2 97%   BMI 23 13 kg/m²    Physical Exam    Constitutional: Chronically ill appearing  In no acute distress  Head: Normocephalic and atraumatic  Eyes: EOM are normal  Right eye exhibits no discharge  Left eye exhibits no discharge  No scleral icterus  Pulmonary/Chest: Effort normal  No stridor  No respiratory distress  Abdominal: No distension  Musculoskeletal: lymphedema right arm  Neurological: Alert, oriented and appropriately conversant  Skin: Sallow coloring  Psychiatric: Displays a normal mood and affect  Behavior, judgement and thought content appear normal    Vitals reviewed

## 2018-07-17 ENCOUNTER — OFFICE VISIT (OUTPATIENT)
Dept: PALLIATIVE MEDICINE | Facility: CLINIC | Age: 58
End: 2018-07-17
Payer: COMMERCIAL

## 2018-07-17 VITALS
WEIGHT: 139 LBS | RESPIRATION RATE: 20 BRPM | BODY MASS INDEX: 23.16 KG/M2 | OXYGEN SATURATION: 97 % | HEART RATE: 100 BPM | SYSTOLIC BLOOD PRESSURE: 100 MMHG | TEMPERATURE: 99.1 F | HEIGHT: 65 IN | DIASTOLIC BLOOD PRESSURE: 60 MMHG

## 2018-07-17 DIAGNOSIS — Z17.0 BILATERAL MALIGNANT NEOPLASM OF BREAST IN FEMALE, ESTROGEN RECEPTOR POSITIVE, UNSPECIFIED SITE OF BREAST (HCC): Primary | ICD-10-CM

## 2018-07-17 DIAGNOSIS — C50.911 BILATERAL MALIGNANT NEOPLASM OF BREAST IN FEMALE, ESTROGEN RECEPTOR POSITIVE, UNSPECIFIED SITE OF BREAST (HCC): Primary | ICD-10-CM

## 2018-07-17 DIAGNOSIS — G47.9 DIFFICULTY SLEEPING: ICD-10-CM

## 2018-07-17 DIAGNOSIS — G89.3 CANCER RELATED PAIN: ICD-10-CM

## 2018-07-17 DIAGNOSIS — C50.912 BILATERAL MALIGNANT NEOPLASM OF BREAST IN FEMALE, ESTROGEN RECEPTOR POSITIVE, UNSPECIFIED SITE OF BREAST (HCC): Primary | ICD-10-CM

## 2018-07-17 PROCEDURE — 99214 OFFICE O/P EST MOD 30 MIN: CPT | Performed by: FAMILY MEDICINE

## 2018-07-17 RX ORDER — OXYCODONE HYDROCHLORIDE 20 MG/1
20 TABLET ORAL EVERY 6 HOURS PRN
Qty: 120 TABLET | Refills: 0 | Status: SHIPPED | OUTPATIENT
Start: 2018-07-17 | End: 2018-08-23

## 2018-07-17 RX ORDER — FENTANYL 25 UG/H
1 PATCH TRANSDERMAL
Qty: 10 PATCH | Refills: 0 | Status: SHIPPED | OUTPATIENT
Start: 2018-07-17 | End: 2018-08-23

## 2018-07-18 ENCOUNTER — TELEPHONE (OUTPATIENT)
Dept: PALLIATIVE MEDICINE | Facility: CLINIC | Age: 58
End: 2018-07-18

## 2018-07-18 ENCOUNTER — OFFICE VISIT (OUTPATIENT)
Dept: HEMATOLOGY ONCOLOGY | Facility: CLINIC | Age: 58
End: 2018-07-18
Payer: COMMERCIAL

## 2018-07-18 VITALS
BODY MASS INDEX: 22.99 KG/M2 | RESPIRATION RATE: 16 BRPM | DIASTOLIC BLOOD PRESSURE: 80 MMHG | SYSTOLIC BLOOD PRESSURE: 130 MMHG | WEIGHT: 138 LBS | HEIGHT: 65 IN | OXYGEN SATURATION: 96 % | HEART RATE: 85 BPM | TEMPERATURE: 99.6 F

## 2018-07-18 DIAGNOSIS — J90 PLEURAL EFFUSION: ICD-10-CM

## 2018-07-18 DIAGNOSIS — I87.8 POOR VENOUS ACCESS: ICD-10-CM

## 2018-07-18 DIAGNOSIS — I89.0 LYMPHEDEMA OF RIGHT UPPER EXTREMITY: ICD-10-CM

## 2018-07-18 DIAGNOSIS — T45.1X5A CHEMOTHERAPY-INDUCED NAUSEA: ICD-10-CM

## 2018-07-18 DIAGNOSIS — Z17.0 BILATERAL MALIGNANT NEOPLASM OF BREAST IN FEMALE, ESTROGEN RECEPTOR POSITIVE, UNSPECIFIED SITE OF BREAST (HCC): Primary | ICD-10-CM

## 2018-07-18 DIAGNOSIS — C79.51 BONE METASTASES (HCC): ICD-10-CM

## 2018-07-18 DIAGNOSIS — R11.0 CHEMOTHERAPY-INDUCED NAUSEA: ICD-10-CM

## 2018-07-18 DIAGNOSIS — C50.912 BILATERAL MALIGNANT NEOPLASM OF BREAST IN FEMALE, ESTROGEN RECEPTOR POSITIVE, UNSPECIFIED SITE OF BREAST (HCC): Primary | ICD-10-CM

## 2018-07-18 DIAGNOSIS — C50.911 BILATERAL MALIGNANT NEOPLASM OF BREAST IN FEMALE, ESTROGEN RECEPTOR POSITIVE, UNSPECIFIED SITE OF BREAST (HCC): Primary | ICD-10-CM

## 2018-07-18 PROCEDURE — 99214 OFFICE O/P EST MOD 30 MIN: CPT | Performed by: PHYSICIAN ASSISTANT

## 2018-07-18 NOTE — PROGRESS NOTES
[address] [facility] [address]  Progress Note  Vanessa Valencia, 1960, [id number]  7/18/2018    Assessment/Plan:  1  Bilateral malignant neoplasm of breast in female, estrogen receptor positive, unspecified site of breast ([facility] [address] )  Component      Latest Ref Rng & Units 6/8/2018 7/5/2018   CA 27 29      0 0 - 42 3 U/mL 18 9 33 8     Patient is due for interval imaging in early August 2018  Patient notes she is feeling well and her last chemotherapy cycle was without significant complication  The patient will continue on the regimen outlined below  Patient is scheduled for next cycle on 7/26/18  We discussed tumor marker progression  However at the time of the last evaluation patient was not feeling well  We discussed other factors that could affect this number  One point in time does not allow us to assume a trend  Patient will follow up with additional testing in 1 month  Patient will need subsequent mother testing in the next 1-2 months  Regimen:  Perjeta 420 mg, day 1  Herceptin 6 mg/kg, day 1  Cycle length = 21 days    - CT chest abdomen pelvis w contrast; Future    2  Bone metastases (HCC)  Regimen:  Xgeva 120mg subq, day 1  Cycle length = 3 months  3  Chemotherapy-induced nausea  Patient developed significant nausea associated with chemotherapy regiment  Patient was not given any antinausea medication as a premedication  This was added to cycle 41  Patient felt improved with the addition of Zofran alone,  not requiring additional antiemetic therapy when at home  Unfortunately, Emend was added to the cycle however, insurance did not cover this  Orders have been prepped and Zofran 16 mg IV, day 1 will be added as a premedication here forward  4  Pleural effusion  Since the recent thoracentesis, patient notes that she has been breathing easier    Patient was advised previously to go for chest x-ray however this was not completed  Patient will complete this in the next few days  5  Poor venous access  This had recently been a problem  Patient had not had Port-A-Cath access previously  This was discussed at the patient's follow-up appointment  After discussion with the patient's family they called and had the procedure scheduled  Patient is due for IR Port-A-Cath placement on 7/24/18  6  Lymphedema of right upper extremity  This is stable  Lymphedema sleeve is recommended  Labs and imaging for follow up:  Orders Placed This Encounter   Procedures    CT chest abdomen pelvis w contrast     Standing Status:   Future     Standing Expiration Date:   7/18/2022     Scheduling Instructions: The patient will need to drink barium for this test   Barium needs to be picked up in the registration area at least one day prior to your study  AM Appointment: Drink one bottle of barium before bed time the evening before your scheduled test   Drink 1/      2 of the second bottle one hour prior to your test   Please bring other 1/2 bottle with you to drink at the time of your study  PM Appointment:  Drink one bottle of barium before 9:00am on the day of your test   Drink 1/2 of the second bottle one hour      prior to your test   Please bring other 1/2 bottle with you to drink at the time of your study  Nothing to eat 3 hours prior to your test   In addition to the barium, clear liquids are also permitted up until the time of the scan  Clear liquids      includes water, black coffee or tea, apple juice or clear broth  If possible wear clothing without any metal in the abdomen area  Sweat suit, sports, sports bra or bra without underwire may eliminate the need to change  Please bring your insurance cards, a form of photo ID and a list of your medications with you  Arrive 15 minutes prior to your appointment time in order to register   On the day of your test, please bring any prior CT or MRI studies of this area with you that were not      performed at a [facility]  To schedule this appointment, please contact Central Scheduling at [phone]  Order Specific Question:   Is the patient pregnant? Answer:   No     Order Specific Question:   What is the patient's sedation requirement? Answer:   No Sedation       The patient is scheduled for follow-up in approximately 3 weeks with Dr Sia Rivera  Patient voiced agreement and understanding to the above  Patient knows to call the Hematology/Oncology office with any questions and concerns regarding the above  Carefully review your medication list in verify the list is accurate and up-to-date  Please call the hematologic/Oncology office if there medications missing from the less, medications on the list that your not currently taking or if there is a dosage or instruction that is different from higher taking medication  I have spent 30 minutes with Patient and family today in which greater than 50% of this time was spent in counseling/coordination of care regarding Diagnostic results, Prognosis, Intructions for management, Patient and family education and Impressions  The patient's current treatment goals are prolongation of life  Barrier(s) to care identified:  Patient cannot drive secondary to dizziness  The patient is able to self care     -------------------------------------------------------------------------------------------------------    Chief complaint:   Chief Complaint   Patient presents with    Follow-up     1 month follow up with labs in [address] [place] [address]  History of present illness/Cancer History:      Bilateral malignant neoplasm of breast in female, estrogen receptor positive ([facility] [address] )    2010 Initial Diagnosis     The patient had a left sided T1 B , N0 ER positive, VA and HER-2 negative invasive ductal carcinoma, right-sided DCIS, ER/VA positive, HER-2 negative  Bilateral mastectomy           2010 - 4/2011 Hormone Therapy Tamoxifen 20 mg daily  Last menstrual period was on 3/2010          4/2011 -  Hormone Therapy     Arimidex 1 mg daily  Unknown when medication was discontinued  Patient was lost to follow up          12/9/2015 Progression     · Right arm swelling in December 2015  U/S found a nonvascular structure in the right axilla measuring 2 2 x 1 3 cm  · Subsequent CT Scan of the chest on 12/9/15, showed multiple bilateral pulmonary nodules measuring 3-7 mm  · PET 2/4/16, which revealed hypermetabolic hilar and mediastinal nodes  There is a pre carinal node with a SUV of 5 4 measuring 1 8 x 1 7 cm, and a subcarinal node measuring 1 9 x 1 3 cm with a SUV of 5 3  There is right hilar activity of 3 5 and left hilar activity of 2 8  The subcentimeter nodules are too small for PET evaluation  She also has uptake in her L3 and L5 vertebral bodies, both concerning for bony metastases  2/16/2016 Biopsy     Bronchoscopic biopsy showed Metastatic non-small cell carcinoma, favor adenocarcinoma  ER 90%, RI 0%  Her 2 +2, positive by FISH            3/1/2016 - 6/2016 Chemotherapy     Taxotere 75 mg/m2, Day 1  Perjeta 420 mg, Day 1  Herceptin 6 mg/kg, Day 1  Cycle length= 21 days         3/1/2016 - 2/2017 Hormone Therapy     Anastrozole 1 mg daily         6/2016 - 2/2017 Chemotherapy     Perjeta 420 mg, Day 1  Herceptin 6 mg/kg, Day 1  Cycle length= 21 days         2/2017 Progression     Bony progression          2/10/2017 - 4/12/2018 Chemotherapy     Perjeta 420 mg, Day 1  Herceptin 6 mg/kg, Day 1  Taxotere 75 mg/m2  Cycle length= 21 days         4/18/2017 Progression      brain MRI showed a 5 mm right parietal lesion, 3 mm right frontal lesion, 3 mm left parahippocampal lesion            5/2017 - 5/2017 Radiation     SRS to brain lesions         5/3/2018 -  Chemotherapy     Perjeta 420 mg,  Day 1  Herceptin 6 mg/kg,  Day 1  Cycle length = 21 days  Cycle 3 was administered on 6/14/18    ** taxotere was  Temporarily discontinued secondary for desire of treatment holiday  Cancer Staging  Stage IV metastatic breast cancer to bones, brain    ECO - Symptomatic, <50% confined to bed    Interval history:  Patient notes last chemotherapy cycle went well  Patient states nausea was limited  Patient did not require additional antinausea medication after treatment  Patient notes Port-A-Cath has been scheduled, and did not have enough time to go for chest x-ray  Patient denies breathing difficulties  Patient notes occasional dizziness  Review of Systems   Constitutional: Negative for appetite change, fatigue, fever and unexpected weight change  HENT: Negative for nosebleeds  Respiratory: Negative for cough, choking and shortness of breath  Negative hemoptysis  Cardiovascular: Negative for chest pain, palpitations and leg swelling  Gastrointestinal: Negative  Negative for abdominal distention, abdominal pain, anal bleeding, blood in stool, constipation, diarrhea, nausea and vomiting  Endocrine: Negative  Negative for cold intolerance  Genitourinary: Negative  Negative for hematuria, menstrual problem, vaginal bleeding, vaginal discharge and vaginal pain  Musculoskeletal: Negative  Negative for arthralgias, myalgias, neck pain and neck stiffness  Skin: Negative  Negative for color change, pallor and rash  Allergic/Immunologic: Negative  Negative for immunocompromised state  Neurological: Negative  Negative for weakness and headaches  Hematological: Negative for adenopathy  Does not bruise/bleed easily  All other systems reviewed and are negative          Current Outpatient Prescriptions:     cyclobenzaprine (FLEXERIL) 10 mg tablet, Take 1 tablet (10 mg total) by mouth daily at bedtime as needed for muscle spasms, Disp: 30 tablet, Rfl: 1    dexamethasone (DECADRON) 2 mg tablet, Take 1 tablet (2 mg total) by mouth daily with breakfast, Disp: 30 tablet, Rfl: 1    diazepam (VALIUM) 5 mg tablet, Take 1 tablet (5 mg total) by mouth daily at bedtime, Disp: 30 tablet, Rfl: 3    fentaNYL (DURAGESIC) 25 mcg/hr, Place 1 patch on the skin every third day Max Daily Amount: 1 patch, Disp: 10 patch, Rfl: 0    gabapentin (NEURONTIN) 300 mg capsule, 2 capsules PO at bedtime, Disp: 60 capsule, Rfl: 5    ondansetron (ZOFRAN) 4 mg tablet, 1-2 tablets every 6 hours prn Nausea, Disp: 60 tablet, Rfl: 0    oxyCODONE (ROXICODONE) 20 MG TABS, Take 1 tablet (20 mg total) by mouth every 6 (six) hours as needed for moderate pain Max Daily Amount: 80 mg, Disp: 120 tablet, Rfl: 0    promethazine (PHENERGAN) 25 mg tablet, Take 1 tablet (25 mg total) by mouth every 6 (six) hours as needed for nausea or vomiting, Disp: 60 tablet, Rfl: 2    senna (SENOKOT) 8 6 mg, Take 2 tablets by mouth 2 (two) times a day as needed  , Disp: , Rfl:       No current facility-administered medications for this visit  No Known Allergies    Objective:   /80 (BP Location: Left arm, Cuff Size: Standard)   Pulse 85   Temp 99 6 °F (37 6 °C) (Tympanic)   Resp 16   Ht 5' 5" (1 651 m)   Wt 62 6 kg (138 lb)   LMP  (LMP Unknown)   SpO2 96%   BMI 22 96 kg/m²   Wt Readings from Last 3 Encounters:   07/18/18 62 6 kg (138 lb)   07/17/18 63 kg (139 lb)   07/05/18 60 6 kg (133 lb 8 oz)     Physical Exam   Constitutional: She is oriented to person, place, and time  She appears well-developed and well-nourished  No distress  HENT:   Head: Normocephalic and atraumatic  Mouth/Throat: Oropharynx is clear and moist  No oropharyngeal exudate  Alopecia   Eyes: EOM are normal  Pupils are equal, round, and reactive to light  No scleral icterus  Neck: Normal range of motion  Cardiovascular: Normal rate and regular rhythm  No murmur heard  Pulmonary/Chest: Effort normal and breath sounds normal  No respiratory distress  Abdominal: Soft  Bowel sounds are normal  She exhibits no distension  There is no tenderness  Musculoskeletal: Normal range of motion  She exhibits no edema  Lymphadenopathy:     She has no cervical adenopathy  Neurological: She is alert and oriented to person, place, and time  No cranial nerve deficit  Skin: Skin is warm  No rash noted  No pallor  Psychiatric: She has a normal mood and affect   Thought content normal        Pertinent Laboratory Results and Imaging Review:  Hospital Outpatient Visit on 07/05/2018   Component Date Value Ref Range Status    CA 27 29 07/05/2018 33 8  0 0 - 42 3 U/mL Final    WBC 07/05/2018 7 71  4 31 - 10 16 Thousand/uL Final    RBC 07/05/2018 3 93  3 81 - 5 12 Million/uL Final    Hemoglobin 07/05/2018 10 7* 11 5 - 15 4 g/dL Final    Hematocrit 07/05/2018 34 7* 34 8 - 46 1 % Final    MCV 07/05/2018 88  82 - 98 fL Final    MCH 07/05/2018 27 2  26 8 - 34 3 pg Final    MCHC 07/05/2018 30 8* 31 4 - 37 4 g/dL Final    RDW 07/05/2018 18 0* 11 6 - 15 1 % Final    MPV 07/05/2018 8 7* 8 9 - 12 7 fL Final    Platelets 15/03/1847 487* 149 - 390 Thousands/uL Final    Neutrophils Relative 07/05/2018 74  43 - 75 % Final    Lymphocytes Relative 07/05/2018 14  14 - 44 % Final    Monocytes Relative 07/05/2018 10  4 - 12 % Final    Eosinophils Relative 07/05/2018 1  0 - 6 % Final    Basophils Relative 07/05/2018 0  0 - 1 % Final    Neutrophils Absolute 07/05/2018 5 73  1 85 - 7 62 Thousands/µL Final    Lymphocytes Absolute 07/05/2018 1 07  0 60 - 4 47 Thousands/µL Final    Monocytes Absolute 07/05/2018 0 78  0 17 - 1 22 Thousand/µL Final    Eosinophils Absolute 07/05/2018 0 11  0 00 - 0 61 Thousand/µL Final    Basophils Absolute 07/05/2018 0 02  0 00 - 0 10 Thousands/µL Final    Sodium 07/05/2018 141  136 - 145 mmol/L Final    Potassium 07/05/2018 3 5  3 5 - 5 3 mmol/L Final    Chloride 07/05/2018 104  100 - 108 mmol/L Final    CO2 07/05/2018 28  21 - 32 mmol/L Final    Anion Gap 07/05/2018 9  4 - 13 mmol/L Final    BUN 07/05/2018 12  5 - 25 mg/dL Final    Creatinine 07/05/2018 0 58* 0 60 - 1 30 mg/dL Final    Glucose 07/05/2018 78  65 - 140 mg/dL Final    Calcium 07/05/2018 8 9  8 3 - 10 1 mg/dL Final    AST 07/05/2018 14  5 - 45 U/L Final    ALT 07/05/2018 21  12 - 78 U/L Final    Alkaline Phosphatase 07/05/2018 81  46 - 116 U/L Final    Total Protein 07/05/2018 7 2  6 4 - 8 2 g/dL Final    Albumin 07/05/2018 3 1* 3 5 - 5 0 g/dL Final    Total Bilirubin 07/05/2018 0 20  0 20 - 1 00 mg/dL Final    eGFR 07/05/2018 104  ml/min/1 73sq m Final       The following historical data was reviewed      Past Medical History:   Diagnosis Date    H/O bilateral mastectomy 2/15/2016    Hypertension 2/15/2016    Lymphedema 2/15/2016    Malignant neoplasm of right breast ([address] ) 2/15/2016       Past Surgical History:   Procedure Laterality Date    ENDOBRONCHIAL ULTRASOUND (EBUS) N/A 2/16/2016    Procedure: EBUS;  Surgeon: Jaky Napoles MD;  Location: [facility];  Service:     MASTECTOMY Bilateral     MASTECTOMY Bilateral     right arm edema    OOPHORECTOMY      TX BRONCHOSCOPY NEEDLE BX TRACHEA MAIN STEM&/BRON N/A 2/16/2016    Procedure: [place];  Surgeon: Jaky Napoles MD;  Location: [facility];  Service: Thoracic    TX STEREOTACTIC RADIOSURGERY, CRANIAL,SIMPLE,EA ADD  5/3/2017         TX STEREOTACTIC RADIOSURGERY, CRANIAL,SIMPLE,EA ADD  5/3/2017         TX STEREOTACTIC RADIOSURGERY, CRANIAL,SIMPLE,SINGLE  5/3/2017            Social History     Social History    Marital status: /Civil Union     Spouse name: N/A    Number of children: N/A    Years of education: N/A     Social History Main Topics    Smoking status: Current Every Day Smoker     Packs/day: 0 50     Years: 40 00     Start date: 1978    Smokeless tobacco: Never Used    Alcohol use Yes      Comment: social    Drug use: No    Sexual activity: Not Asked     Other Topics Concern    None     Social History Narrative    None       Family History   Problem Relation Age of Onset    Cancer Sister     Prostate cancer Brother        Please note: This report has been generated by a voice recognition software system  Therefore there may be syntax, spelling, and/or grammatical errors  Please call if you have any questions

## 2018-07-18 NOTE — TELEPHONE ENCOUNTER
Received request on [employer] for prior auth of patient's Fentanyl 25mcg/hr patches  Submitted completed form and most recent visit note online  Key [id number]  Received confirmation letter that medication was approved through 7/18/19  Pt notified

## 2018-07-19 ENCOUNTER — TRANSCRIBE ORDERS (OUTPATIENT)
Dept: LAB | Facility: CLINIC | Age: 58
End: 2018-07-19

## 2018-07-23 ENCOUNTER — APPOINTMENT (OUTPATIENT)
Dept: LAB | Facility: MEDICAL CENTER | Age: 58
End: 2018-07-23
Payer: COMMERCIAL

## 2018-07-23 NOTE — ANESTHESIA PREPROCEDURE EVALUATION
Review of Systems/Medical History  Patient summary reviewed  Chart reviewed      Cardiovascular  Hypertension ,    Pulmonary  Pneumothorax: history of and bilateral  Comment: History of pulmonary metastasis      GI/Hepatic  Negative GI/hepatic ROS          Negative  ROS        Endo/Other     GYN    Breast cancer Right mastectomy, axillary node dissection and right lumpectomy  Comment: RUE Lymphadema     Hematology  Anemia anemia of chronic disease,     Musculoskeletal  Negative musculoskeletal ROS        Neurology    CNS neoplasm , intracranial mass,    Psychology     Chronic opioid dependence            Physical Exam    Airway    Mallampati score: II  TM Distance: >3 FB  Neck ROM: limited     Dental   No notable dental hx     Cardiovascular  Rhythm: regular, Rate: normal,     Pulmonary  Breath sounds clear to auscultation,     Other Findings        Anesthesia Plan  ASA Score- 4     Anesthesia Type- IV sedation with anesthesia with ASA Monitors  Additional Monitors:   Airway Plan:         Plan Factors-    Induction- intravenous  Postoperative Plan-     Informed Consent- Anesthetic plan and risks discussed with patient  I personally reviewed this patient with the CRNA  Discussed and agreed on the Anesthesia Plan with the CRNA              Lab Results   Component Value Date    WBC 7 71 07/05/2018    HGB 10 7 (L) 07/05/2018    HCT 34 7 (L) 07/05/2018    MCV 88 07/05/2018     (H) 07/05/2018     Lab Results   Component Value Date    GLUCOSE 78 07/05/2018    CALCIUM 8 9 07/05/2018     07/05/2018    K 3 5 07/05/2018    CO2 28 07/05/2018     07/05/2018    BUN 12 07/05/2018    CREATININE 0 58 (L) 07/05/2018     Lab Results   Component Value Date    INR 0 99 06/08/2018    INR 0 96 02/11/2016    PROTIME 12 8 06/08/2018    PROTIME 12 2 02/11/2016     Lab Results   Component Value Date    PTT 30 02/11/2016       Type and Screen:  AB

## 2018-07-24 ENCOUNTER — APPOINTMENT (OUTPATIENT)
Dept: RADIOLOGY | Facility: AMBULARY SURGERY CENTER | Age: 58
End: 2018-07-24
Payer: COMMERCIAL

## 2018-07-24 ENCOUNTER — HOSPITAL ENCOUNTER (OUTPATIENT)
Facility: AMBULARY SURGERY CENTER | Age: 58
Setting detail: OUTPATIENT SURGERY
Discharge: HOME | End: 2018-07-24
Attending: RADIOLOGY | Admitting: RADIOLOGY
Payer: COMMERCIAL

## 2018-07-24 ENCOUNTER — ANESTHESIA (OUTPATIENT)
Dept: PERIOP | Facility: AMBULARY SURGERY CENTER | Age: 58
End: 2018-07-24
Payer: COMMERCIAL

## 2018-07-24 VITALS
DIASTOLIC BLOOD PRESSURE: 63 MMHG | HEIGHT: 65 IN | WEIGHT: 140 LBS | TEMPERATURE: 97.1 F | HEART RATE: 81 BPM | SYSTOLIC BLOOD PRESSURE: 137 MMHG | RESPIRATION RATE: 16 BRPM | BODY MASS INDEX: 23.32 KG/M2 | OXYGEN SATURATION: 97 %

## 2018-07-24 PROCEDURE — 77001 FLUOROGUIDE FOR VEIN DEVICE: CPT | Performed by: RADIOLOGY

## 2018-07-24 PROCEDURE — 76937 US GUIDE VASCULAR ACCESS: CPT | Performed by: RADIOLOGY

## 2018-07-24 PROCEDURE — 36561 INSERT TUNNELED CV CATH: CPT | Performed by: RADIOLOGY

## 2018-07-24 PROCEDURE — 77001 FLUOROGUIDE FOR VEIN DEVICE: CPT

## 2018-07-24 PROCEDURE — C1788 PORT, INDWELLING, IMP: HCPCS | Performed by: RADIOLOGY

## 2018-07-24 DEVICE — PORT DIGINTY 8FR MICRO-STICK KIT HEMODIAL MID SIZE: Type: IMPLANTABLE DEVICE | Site: CHEST  WALL | Status: FUNCTIONAL

## 2018-07-24 RX ORDER — PROPOFOL 10 MG/ML
INJECTION, EMULSION INTRAVENOUS AS NEEDED
Status: DISCONTINUED | OUTPATIENT
Start: 2018-07-24 | End: 2018-07-24 | Stop reason: SURG

## 2018-07-24 RX ORDER — LIDOCAINE HYDROCHLORIDE AND EPINEPHRINE 10; 10 MG/ML; UG/ML
INJECTION, SOLUTION INFILTRATION; PERINEURAL AS NEEDED
Status: DISCONTINUED | OUTPATIENT
Start: 2018-07-24 | End: 2018-07-24 | Stop reason: HOSPADM

## 2018-07-24 RX ORDER — FENTANYL CITRATE 50 UG/ML
INJECTION, SOLUTION INTRAMUSCULAR; INTRAVENOUS AS NEEDED
Status: DISCONTINUED | OUTPATIENT
Start: 2018-07-24 | End: 2018-07-24 | Stop reason: SURG

## 2018-07-24 RX ORDER — SODIUM CHLORIDE, SODIUM LACTATE, POTASSIUM CHLORIDE, CALCIUM CHLORIDE 600; 310; 30; 20 MG/100ML; MG/100ML; MG/100ML; MG/100ML
75 INJECTION, SOLUTION INTRAVENOUS CONTINUOUS
Status: DISCONTINUED | OUTPATIENT
Start: 2018-07-24 | End: 2018-07-24 | Stop reason: HOSPADM

## 2018-07-24 RX ORDER — MIDAZOLAM HYDROCHLORIDE 1 MG/ML
INJECTION INTRAMUSCULAR; INTRAVENOUS AS NEEDED
Status: DISCONTINUED | OUTPATIENT
Start: 2018-07-24 | End: 2018-07-24 | Stop reason: SURG

## 2018-07-24 RX ORDER — SODIUM CHLORIDE 9 MG/ML
INJECTION, SOLUTION INTRAVENOUS CONTINUOUS PRN
Status: DISCONTINUED | OUTPATIENT
Start: 2018-07-24 | End: 2018-07-24 | Stop reason: SURG

## 2018-07-24 RX ADMIN — PROPOFOL 10 MG: 10 INJECTION, EMULSION INTRAVENOUS at 08:03

## 2018-07-24 RX ADMIN — MIDAZOLAM HYDROCHLORIDE 2 MG: 1 INJECTION, SOLUTION INTRAMUSCULAR; INTRAVENOUS at 07:56

## 2018-07-24 RX ADMIN — PROPOFOL 20 MG: 10 INJECTION, EMULSION INTRAVENOUS at 08:15

## 2018-07-24 RX ADMIN — PROPOFOL 10 MG: 10 INJECTION, EMULSION INTRAVENOUS at 08:01

## 2018-07-24 RX ADMIN — PROPOFOL 20 MG: 10 INJECTION, EMULSION INTRAVENOUS at 08:05

## 2018-07-24 RX ADMIN — PROPOFOL 20 MG: 10 INJECTION, EMULSION INTRAVENOUS at 08:13

## 2018-07-24 RX ADMIN — PROPOFOL 10 MG: 10 INJECTION, EMULSION INTRAVENOUS at 07:59

## 2018-07-24 RX ADMIN — PROPOFOL 30 MG: 10 INJECTION, EMULSION INTRAVENOUS at 08:07

## 2018-07-24 RX ADMIN — SODIUM CHLORIDE: 0.9 INJECTION, SOLUTION INTRAVENOUS at 07:50

## 2018-07-24 RX ADMIN — MIDAZOLAM HYDROCHLORIDE 2 MG: 1 INJECTION, SOLUTION INTRAMUSCULAR; INTRAVENOUS at 07:54

## 2018-07-24 RX ADMIN — FENTANYL CITRATE 100 MCG: 50 INJECTION, SOLUTION INTRAMUSCULAR; INTRAVENOUS at 07:54

## 2018-07-24 RX ADMIN — PROPOFOL 20 MG: 10 INJECTION, EMULSION INTRAVENOUS at 08:10

## 2018-07-24 NOTE — INTERVAL H&P NOTE
H & P reviewed after examining the patient and I find no changes in the patient condition since the H & P has been written  Patient re-evaluated   Accept as history and physical     Ange Juares, /July 24, 2018/7:34 AM

## 2018-07-24 NOTE — PROGRESS NOTES
Patient arrived to [facility] for port placement    The procedure and risks were discussed with the patient  All questions were answered  Informed consent was obtained

## 2018-07-24 NOTE — H&P (VIEW-ONLY)
[address] [facility] [address]  Progress Note  Jeff Hernandez, 1960, [id number]  7/18/2018    Assessment/Plan:  1  Bilateral malignant neoplasm of breast in female, estrogen receptor positive, unspecified site of breast ([facility] [address] )  Component      Latest Ref Rng & Units 6/8/2018 7/5/2018   CA 27 29      0 0 - 42 3 U/mL 18 9 33 8     Patient is due for interval imaging in early August 2018  Patient notes she is feeling well and her last chemotherapy cycle was without significant complication  The patient will continue on the regimen outlined below  Patient is scheduled for next cycle on 7/26/18  We discussed tumor marker progression  However at the time of the last evaluation patient was not feeling well  We discussed other factors that could affect this number  One point in time does not allow us to assume a trend  Patient will follow up with additional testing in 1 month  Patient will need subsequent mother testing in the next 1-2 months  Regimen:  Perjeta 420 mg, day 1  Herceptin 6 mg/kg, day 1  Cycle length = 21 days    - CT chest abdomen pelvis w contrast; Future    2  Bone metastases (HCC)  Regimen:  Xgeva 120mg subq, day 1  Cycle length = 3 months  3  Chemotherapy-induced nausea  Patient developed significant nausea associated with chemotherapy regiment  Patient was not given any antinausea medication as a premedication  This was added to cycle 41  Patient felt improved with the addition of Zofran alone,  not requiring additional antiemetic therapy when at home  Unfortunately, Emend was added to the cycle however, insurance did not cover this  Orders have been prepped and Zofran 16 mg IV, day 1 will be added as a premedication here forward  4  Pleural effusion  Since the recent thoracentesis, patient notes that she has been breathing easier    Patient was advised previously to go for chest x-ray however this was not completed  Patient will complete this in the next few days  5  Poor venous access  This had recently been a problem  Patient had not had Port-A-Cath access previously  This was discussed at the patient's follow-up appointment  After discussion with the patient's family they called and had the procedure scheduled  Patient is due for IR Port-A-Cath placement on 7/24/18  6  Lymphedema of right upper extremity  This is stable  Lymphedema sleeve is recommended  Labs and imaging for follow up:  Orders Placed This Encounter   Procedures    CT chest abdomen pelvis w contrast     Standing Status:   Future     Standing Expiration Date:   7/18/2022     Scheduling Instructions: The patient will need to drink barium for this test   Barium needs to be picked up in the registration area at least one day prior to your study  AM Appointment: Drink one bottle of barium before bed time the evening before your scheduled test   Drink 1/      2 of the second bottle one hour prior to your test   Please bring other 1/2 bottle with you to drink at the time of your study  PM Appointment:  Drink one bottle of barium before 9:00am on the day of your test   Drink 1/2 of the second bottle one hour      prior to your test   Please bring other 1/2 bottle with you to drink at the time of your study  Nothing to eat 3 hours prior to your test   In addition to the barium, clear liquids are also permitted up until the time of the scan  Clear liquids      includes water, black coffee or tea, apple juice or clear broth  If possible wear clothing without any metal in the abdomen area  Sweat suit, sports, sports bra or bra without underwire may eliminate the need to change  Please bring your insurance cards, a form of photo ID and a list of your medications with you  Arrive 15 minutes prior to your appointment time in order to register   On the day of your test, please bring any prior CT or MRI studies of this area with you that were not      performed at a [facility]  To schedule this appointment, please contact Central Scheduling at [phone]  Order Specific Question:   Is the patient pregnant? Answer:   No     Order Specific Question:   What is the patient's sedation requirement? Answer:   No Sedation       The patient is scheduled for follow-up in approximately 3 weeks with Dr Candace Duke  Patient voiced agreement and understanding to the above  Patient knows to call the Hematology/Oncology office with any questions and concerns regarding the above  Carefully review your medication list in verify the list is accurate and up-to-date  Please call the hematologic/Oncology office if there medications missing from the less, medications on the list that your not currently taking or if there is a dosage or instruction that is different from higher taking medication  I have spent 30 minutes with Patient and family today in which greater than 50% of this time was spent in counseling/coordination of care regarding Diagnostic results, Prognosis, Intructions for management, Patient and family education and Impressions  The patient's current treatment goals are prolongation of life  Barrier(s) to care identified:  Patient cannot drive secondary to dizziness  The patient is able to self care     -------------------------------------------------------------------------------------------------------    Chief complaint:   Chief Complaint   Patient presents with    Follow-up     1 month follow up with labs in [address] [place] [address]  History of present illness/Cancer History:      Bilateral malignant neoplasm of breast in female, estrogen receptor positive ([facility] [address] )    2010 Initial Diagnosis     The patient had a left sided T1 B , N0 ER positive, SC and HER-2 negative invasive ductal carcinoma, right-sided DCIS, ER/SC positive, HER-2 negative  Bilateral mastectomy           2010 - 4/2011 Hormone Therapy Tamoxifen 20 mg daily  Last menstrual period was on 3/2010          4/2011 -  Hormone Therapy     Arimidex 1 mg daily  Unknown when medication was discontinued  Patient was lost to follow up          12/9/2015 Progression     · Right arm swelling in December 2015  U/S found a nonvascular structure in the right axilla measuring 2 2 x 1 3 cm  · Subsequent CT Scan of the chest on 12/9/15, showed multiple bilateral pulmonary nodules measuring 3-7 mm  · PET 2/4/16, which revealed hypermetabolic hilar and mediastinal nodes  There is a pre carinal node with a SUV of 5 4 measuring 1 8 x 1 7 cm, and a subcarinal node measuring 1 9 x 1 3 cm with a SUV of 5 3  There is right hilar activity of 3 5 and left hilar activity of 2 8  The subcentimeter nodules are too small for PET evaluation  She also has uptake in her L3 and L5 vertebral bodies, both concerning for bony metastases  2/16/2016 Biopsy     Bronchoscopic biopsy showed Metastatic non-small cell carcinoma, favor adenocarcinoma  ER 90%, DE 0%  Her 2 +2, positive by FISH            3/1/2016 - 6/2016 Chemotherapy     Taxotere 75 mg/m2, Day 1  Perjeta 420 mg, Day 1  Herceptin 6 mg/kg, Day 1  Cycle length= 21 days         3/1/2016 - 2/2017 Hormone Therapy     Anastrozole 1 mg daily         6/2016 - 2/2017 Chemotherapy     Perjeta 420 mg, Day 1  Herceptin 6 mg/kg, Day 1  Cycle length= 21 days         2/2017 Progression     Bony progression          2/10/2017 - 4/12/2018 Chemotherapy     Perjeta 420 mg, Day 1  Herceptin 6 mg/kg, Day 1  Taxotere 75 mg/m2  Cycle length= 21 days         4/18/2017 Progression      brain MRI showed a 5 mm right parietal lesion, 3 mm right frontal lesion, 3 mm left parahippocampal lesion            5/2017 - 5/2017 Radiation     SRS to brain lesions         5/3/2018 -  Chemotherapy     Perjeta 420 mg,  Day 1  Herceptin 6 mg/kg,  Day 1  Cycle length = 21 days  Cycle 3 was administered on 6/14/18    ** taxotere was  Temporarily discontinued secondary for desire of treatment holiday  Cancer Staging  Stage IV metastatic breast cancer to bones, brain    ECO - Symptomatic, <50% confined to bed    Interval history:  Patient notes last chemotherapy cycle went well  Patient states nausea was limited  Patient did not require additional antinausea medication after treatment  Patient notes Port-A-Cath has been scheduled, and did not have enough time to go for chest x-ray  Patient denies breathing difficulties  Patient notes occasional dizziness  Review of Systems   Constitutional: Negative for appetite change, fatigue, fever and unexpected weight change  HENT: Negative for nosebleeds  Respiratory: Negative for cough, choking and shortness of breath  Negative hemoptysis  Cardiovascular: Negative for chest pain, palpitations and leg swelling  Gastrointestinal: Negative  Negative for abdominal distention, abdominal pain, anal bleeding, blood in stool, constipation, diarrhea, nausea and vomiting  Endocrine: Negative  Negative for cold intolerance  Genitourinary: Negative  Negative for hematuria, menstrual problem, vaginal bleeding, vaginal discharge and vaginal pain  Musculoskeletal: Negative  Negative for arthralgias, myalgias, neck pain and neck stiffness  Skin: Negative  Negative for color change, pallor and rash  Allergic/Immunologic: Negative  Negative for immunocompromised state  Neurological: Negative  Negative for weakness and headaches  Hematological: Negative for adenopathy  Does not bruise/bleed easily  All other systems reviewed and are negative          Current Outpatient Prescriptions:     cyclobenzaprine (FLEXERIL) 10 mg tablet, Take 1 tablet (10 mg total) by mouth daily at bedtime as needed for muscle spasms, Disp: 30 tablet, Rfl: 1    dexamethasone (DECADRON) 2 mg tablet, Take 1 tablet (2 mg total) by mouth daily with breakfast, Disp: 30 tablet, Rfl: 1    diazepam (VALIUM) 5 mg tablet, Take 1 tablet (5 mg total) by mouth daily at bedtime, Disp: 30 tablet, Rfl: 3    fentaNYL (DURAGESIC) 25 mcg/hr, Place 1 patch on the skin every third day Max Daily Amount: 1 patch, Disp: 10 patch, Rfl: 0    gabapentin (NEURONTIN) 300 mg capsule, 2 capsules PO at bedtime, Disp: 60 capsule, Rfl: 5    ondansetron (ZOFRAN) 4 mg tablet, 1-2 tablets every 6 hours prn Nausea, Disp: 60 tablet, Rfl: 0    oxyCODONE (ROXICODONE) 20 MG TABS, Take 1 tablet (20 mg total) by mouth every 6 (six) hours as needed for moderate pain Max Daily Amount: 80 mg, Disp: 120 tablet, Rfl: 0    promethazine (PHENERGAN) 25 mg tablet, Take 1 tablet (25 mg total) by mouth every 6 (six) hours as needed for nausea or vomiting, Disp: 60 tablet, Rfl: 2    senna (SENOKOT) 8 6 mg, Take 2 tablets by mouth 2 (two) times a day as needed  , Disp: , Rfl:       No current facility-administered medications for this visit  No Known Allergies    Objective:   /80 (BP Location: Left arm, Cuff Size: Standard)   Pulse 85   Temp 99 6 °F (37 6 °C) (Tympanic)   Resp 16   Ht 5' 5" (1 651 m)   Wt 62 6 kg (138 lb)   LMP  (LMP Unknown)   SpO2 96%   BMI 22 96 kg/m²   Wt Readings from Last 3 Encounters:   07/18/18 62 6 kg (138 lb)   07/17/18 63 kg (139 lb)   07/05/18 60 6 kg (133 lb 8 oz)     Physical Exam   Constitutional: She is oriented to person, place, and time  She appears well-developed and well-nourished  No distress  HENT:   Head: Normocephalic and atraumatic  Mouth/Throat: Oropharynx is clear and moist  No oropharyngeal exudate  Alopecia   Eyes: EOM are normal  Pupils are equal, round, and reactive to light  No scleral icterus  Neck: Normal range of motion  Cardiovascular: Normal rate and regular rhythm  No murmur heard  Pulmonary/Chest: Effort normal and breath sounds normal  No respiratory distress  Abdominal: Soft  Bowel sounds are normal  She exhibits no distension  There is no tenderness  Musculoskeletal: Normal range of motion  She exhibits no edema  Lymphadenopathy:     She has no cervical adenopathy  Neurological: She is alert and oriented to person, place, and time  No cranial nerve deficit  Skin: Skin is warm  No rash noted  No pallor  Psychiatric: She has a normal mood and affect   Thought content normal        Pertinent Laboratory Results and Imaging Review:  Hospital Outpatient Visit on 07/05/2018   Component Date Value Ref Range Status    CA 27 29 07/05/2018 33 8  0 0 - 42 3 U/mL Final    WBC 07/05/2018 7 71  4 31 - 10 16 Thousand/uL Final    RBC 07/05/2018 3 93  3 81 - 5 12 Million/uL Final    Hemoglobin 07/05/2018 10 7* 11 5 - 15 4 g/dL Final    Hematocrit 07/05/2018 34 7* 34 8 - 46 1 % Final    MCV 07/05/2018 88  82 - 98 fL Final    MCH 07/05/2018 27 2  26 8 - 34 3 pg Final    MCHC 07/05/2018 30 8* 31 4 - 37 4 g/dL Final    RDW 07/05/2018 18 0* 11 6 - 15 1 % Final    MPV 07/05/2018 8 7* 8 9 - 12 7 fL Final    Platelets 50/15/4613 487* 149 - 390 Thousands/uL Final    Neutrophils Relative 07/05/2018 74  43 - 75 % Final    Lymphocytes Relative 07/05/2018 14  14 - 44 % Final    Monocytes Relative 07/05/2018 10  4 - 12 % Final    Eosinophils Relative 07/05/2018 1  0 - 6 % Final    Basophils Relative 07/05/2018 0  0 - 1 % Final    Neutrophils Absolute 07/05/2018 5 73  1 85 - 7 62 Thousands/µL Final    Lymphocytes Absolute 07/05/2018 1 07  0 60 - 4 47 Thousands/µL Final    Monocytes Absolute 07/05/2018 0 78  0 17 - 1 22 Thousand/µL Final    Eosinophils Absolute 07/05/2018 0 11  0 00 - 0 61 Thousand/µL Final    Basophils Absolute 07/05/2018 0 02  0 00 - 0 10 Thousands/µL Final    Sodium 07/05/2018 141  136 - 145 mmol/L Final    Potassium 07/05/2018 3 5  3 5 - 5 3 mmol/L Final    Chloride 07/05/2018 104  100 - 108 mmol/L Final    CO2 07/05/2018 28  21 - 32 mmol/L Final    Anion Gap 07/05/2018 9  4 - 13 mmol/L Final    BUN 07/05/2018 12  5 - 25 mg/dL Final    Creatinine 07/05/2018 0 58* 0 60 - 1 30 mg/dL Final    Glucose 07/05/2018 78  65 - 140 mg/dL Final    Calcium 07/05/2018 8 9  8 3 - 10 1 mg/dL Final    AST 07/05/2018 14  5 - 45 U/L Final    ALT 07/05/2018 21  12 - 78 U/L Final    Alkaline Phosphatase 07/05/2018 81  46 - 116 U/L Final    Total Protein 07/05/2018 7 2  6 4 - 8 2 g/dL Final    Albumin 07/05/2018 3 1* 3 5 - 5 0 g/dL Final    Total Bilirubin 07/05/2018 0 20  0 20 - 1 00 mg/dL Final    eGFR 07/05/2018 104  ml/min/1 73sq m Final       The following historical data was reviewed      Past Medical History:   Diagnosis Date    H/O bilateral mastectomy 2/15/2016    Hypertension 2/15/2016    Lymphedema 2/15/2016    Malignant neoplasm of right breast ([address] ) 2/15/2016       Past Surgical History:   Procedure Laterality Date    ENDOBRONCHIAL ULTRASOUND (EBUS) N/A 2/16/2016    Procedure: EBUS;  Surgeon: Kendrick Estes MD;  Location: [facility];  Service:     MASTECTOMY Bilateral     MASTECTOMY Bilateral     right arm edema    OOPHORECTOMY      MA BRONCHOSCOPY NEEDLE BX TRACHEA MAIN STEM&/BRON N/A 2/16/2016    Procedure: Ailyn Mcdonald;  Surgeon: Kendrick Estes MD;  Location: [facility];  Service: Thoracic    MA STEREOTACTIC RADIOSURGERY, CRANIAL,SIMPLE,EA ADD  5/3/2017         MA STEREOTACTIC RADIOSURGERY, CRANIAL,SIMPLE,EA ADD  5/3/2017         MA STEREOTACTIC RADIOSURGERY, CRANIAL,SIMPLE,SINGLE  5/3/2017            Social History     Social History    Marital status: /Civil Union     Spouse name: N/A    Number of children: N/A    Years of education: N/A     Social History Main Topics    Smoking status: Current Every Day Smoker     Packs/day: 0 50     Years: 40 00     Start date: 1978    Smokeless tobacco: Never Used    Alcohol use Yes      Comment: social    Drug use: No    Sexual activity: Not Asked     Other Topics Concern    None     Social History Narrative    None       Family History   Problem Relation Age of Onset    Cancer Sister     Prostate cancer Brother        Please note: This report has been generated by a voice recognition software system  Therefore there may be syntax, spelling, and/or grammatical errors  Please call if you have any questions

## 2018-07-24 NOTE — NURSING NOTE
Patient rec'd Alert and oriented x 3  Denies pain  Right chest dressing in place without drainage/hematoma  Daughter requests to speak with Dr Lyudmila Coreas Sit at bedside and spoke with patient and family  Patient and daughter in good spirits   Daughter states she has "no further questions "

## 2018-07-24 NOTE — OP NOTE
Chest port placement 7/24/2018      History:      Breast carcinoma     Procedure:     The patient was identified verbally and by wristband  Timeout was performed  Informed consent was obtained       All elements of maximal sterile barrier technique were followed (cap, mask, sterile gown, sterile gloves, large sterile sheet, hand hygiene and 2% chlorhexidine for cutaneous antisepsis)      Following obtaining informed consent, the patient was prepped and draped in the usual sterile fashion  Using ultrasound guidance, access was gained to the patient's right  internal jugular vein using a micropuncture system  The micropuncture wire was removed and a  035 wire was advanced to the level of the inferior vena cava using fluoroscopic guidance      Using 1% lidocaine, the region of the right anterior chest was was anesthetized  A small incision was made using a 15 blade scalpel  The port pocket was created using blunt dissection      The catheter tubing was tunneled from the incision to the venotomy  The micropuncture dilator was exchanged for a peel-away sheath, using fluoroscopic guidance  The catheter was place through the peel-away sheath  The catheter was measured and cut to size  The catheter was attached to the port  The port was flushed with saline to ensure patency without evidence of leakage  The port was placed in the port pocket  The port was sutured in the pocket using 3-0 Vicryl      The incisions were approximated with 3-0 Vicryl and Histoacryl  The patient tolerated the procedure well without apparent immediate complications  The patient left the IR department in unchanged condition      Dr Inga Alegria performed and directly supervised the entire procedure      Findings:      Using ultrasound guidance, the right internal jugular vein was cannulated using Seldinger technique  The right internal jugular vein was evaluated as a potential access site  The right internal jugular vein was patent, and free of thrombus  Static images of the vessel was obtained  Visualization of real time needle entry into the vessel was obtained      Fluoroscopic spot image demonstrates a newly placed single lumen chest port via the right internal jugular vein with the most central aspect at the SVC/RA junction   The catheter tubing is smooth in contour         IMPRESSION:     Successful placement of a single lumen chest port via the right internal jugular vein

## 2018-07-25 RX ORDER — SODIUM CHLORIDE 9 MG/ML
20 INJECTION, SOLUTION INTRAVENOUS CONTINUOUS
Status: DISCONTINUED | OUTPATIENT
Start: 2018-07-26 | End: 2018-07-29 | Stop reason: HOSPADM

## 2018-07-26 ENCOUNTER — HOSPITAL ENCOUNTER (OUTPATIENT)
Dept: INFUSION CENTER | Facility: CLINIC | Age: 58
Discharge: HOME | End: 2018-07-26
Payer: COMMERCIAL

## 2018-07-26 VITALS
RESPIRATION RATE: 18 BRPM | SYSTOLIC BLOOD PRESSURE: 117 MMHG | OXYGEN SATURATION: 98 % | HEIGHT: 66 IN | TEMPERATURE: 98.4 F | WEIGHT: 143.5 LBS | DIASTOLIC BLOOD PRESSURE: 71 MMHG | HEART RATE: 84 BPM | BODY MASS INDEX: 23.06 KG/M2

## 2018-07-26 PROCEDURE — 96413 CHEMO IV INFUSION 1 HR: CPT

## 2018-07-26 PROCEDURE — 96417 CHEMO IV INFUS EACH ADDL SEQ: CPT

## 2018-07-26 PROCEDURE — 96367 TX/PROPH/DG ADDL SEQ IV INF: CPT

## 2018-07-26 RX ADMIN — TRASTUZUMAB 391 MG: 150 INJECTION, POWDER, LYOPHILIZED, FOR SOLUTION INTRAVENOUS at 10:07

## 2018-07-26 RX ADMIN — ONDANSETRON 16 MG: 2 INJECTION INTRAMUSCULAR; INTRAVENOUS at 08:43

## 2018-07-26 RX ADMIN — PERTUZUMAB 420 MG: 30 INJECTION, SOLUTION, CONCENTRATE INTRAVENOUS at 09:18

## 2018-07-26 RX ADMIN — HEPARIN 300 UNITS: 100 SYRINGE at 10:46

## 2018-07-26 RX ADMIN — SODIUM CHLORIDE 20 ML/HR: 0.9 INJECTION, SOLUTION INTRAVENOUS at 08:30

## 2018-07-26 NOTE — PROGRESS NOTES
Pt is here for chemotherapy infusion, herceptin dose out of range > 10% Crystal made aware new orders to follow reflecting increase weight

## 2018-08-08 ENCOUNTER — HOSPITAL ENCOUNTER (OUTPATIENT)
Dept: CT IMAGING | Facility: HOSPITAL | Age: 58
Discharge: HOME | End: 2018-08-08
Payer: COMMERCIAL

## 2018-08-08 DIAGNOSIS — C50.912 BILATERAL MALIGNANT NEOPLASM OF BREAST IN FEMALE, ESTROGEN RECEPTOR POSITIVE, UNSPECIFIED SITE OF BREAST (HCC): ICD-10-CM

## 2018-08-08 DIAGNOSIS — C50.911 BILATERAL MALIGNANT NEOPLASM OF BREAST IN FEMALE, ESTROGEN RECEPTOR POSITIVE, UNSPECIFIED SITE OF BREAST (HCC): ICD-10-CM

## 2018-08-08 DIAGNOSIS — Z17.0 BILATERAL MALIGNANT NEOPLASM OF BREAST IN FEMALE, ESTROGEN RECEPTOR POSITIVE, UNSPECIFIED SITE OF BREAST (HCC): ICD-10-CM

## 2018-08-08 PROCEDURE — 71260 CT THORAX DX C+: CPT

## 2018-08-08 PROCEDURE — 74177 CT ABD & PELVIS W/CONTRAST: CPT

## 2018-08-08 RX ORDER — HEPARIN SODIUM (PORCINE) LOCK FLUSH IV SOLN 100 UNIT/ML 100 UNIT/ML
300 SOLUTION INTRAVENOUS ONCE
Status: COMPLETED | OUTPATIENT
Start: 2018-08-08 | End: 2018-08-08

## 2018-08-08 RX ADMIN — IOHEXOL 100 ML: 350 INJECTION, SOLUTION INTRAVENOUS at 11:56

## 2018-08-08 RX ADMIN — HEPARIN SODIUM (PORCINE) LOCK FLUSH IV SOLN 100 UNIT/ML 300 UNITS: 100 SOLUTION at 11:58

## 2018-08-14 ENCOUNTER — HOSPITAL ENCOUNTER (OUTPATIENT)
Dept: INFUSION CENTER | Facility: CLINIC | Age: 58
Discharge: HOME | End: 2018-08-14
Payer: COMMERCIAL

## 2018-08-14 DIAGNOSIS — C79.52 SECONDARY MALIGNANT NEOPLASM OF BONE AND BONE MARROW (HCC): ICD-10-CM

## 2018-08-14 DIAGNOSIS — C79.51 SECONDARY MALIGNANT NEOPLASM OF BONE AND BONE MARROW (HCC): ICD-10-CM

## 2018-08-14 DIAGNOSIS — C79.49 SECONDARY MALIGNANT NEOPLASM OF BRAIN AND SPINAL CORD (HCC): ICD-10-CM

## 2018-08-14 DIAGNOSIS — C50.119 MALIGNANT NEOPLASM OF CENTRAL PORTION OF FEMALE BREAST, UNSPECIFIED ESTROGEN RECEPTOR STATUS, UNSPECIFIED LATERALITY (HCC): ICD-10-CM

## 2018-08-14 DIAGNOSIS — C79.31 SECONDARY MALIGNANT NEOPLASM OF BRAIN AND SPINAL CORD (HCC): ICD-10-CM

## 2018-08-14 LAB
ALBUMIN SERPL BCP-MCNC: 3.1 G/DL (ref 3.5–5)
ALP SERPL-CCNC: 94 U/L (ref 46–116)
ALT SERPL W P-5'-P-CCNC: 22 U/L (ref 12–78)
ANION GAP SERPL CALCULATED.3IONS-SCNC: 7 MMOL/L (ref 4–13)
AST SERPL W P-5'-P-CCNC: 16 U/L (ref 5–45)
BASOPHILS # BLD AUTO: 0.03 THOUSANDS/ΜL (ref 0–0.1)
BASOPHILS NFR BLD AUTO: 1 % (ref 0–1)
BILIRUB SERPL-MCNC: 0.2 MG/DL (ref 0.2–1)
BUN SERPL-MCNC: 7 MG/DL (ref 5–25)
CALCIUM SERPL-MCNC: 8.8 MG/DL (ref 8.3–10.1)
CANCER AG27-29 SERPL-ACNC: 59 U/ML (ref 0–42.3)
CHLORIDE SERPL-SCNC: 110 MMOL/L (ref 100–108)
CO2 SERPL-SCNC: 26 MMOL/L (ref 21–32)
CREAT SERPL-MCNC: 0.52 MG/DL (ref 0.6–1.3)
EOSINOPHIL # BLD AUTO: 0.11 THOUSAND/ΜL (ref 0–0.61)
EOSINOPHIL NFR BLD AUTO: 2 % (ref 0–6)
ERYTHROCYTE [DISTWIDTH] IN BLOOD BY AUTOMATED COUNT: 20.5 % (ref 11.6–15.1)
GFR SERPL CREATININE-BSD FRML MDRD: 107 ML/MIN/1.73SQ M
GLUCOSE SERPL-MCNC: 92 MG/DL (ref 65–140)
HCT VFR BLD AUTO: 35.7 % (ref 34.8–46.1)
HGB BLD-MCNC: 11.2 G/DL (ref 11.5–15.4)
IMM GRANULOCYTES # BLD AUTO: 0.03 THOUSAND/UL (ref 0–0.2)
IMM GRANULOCYTES NFR BLD AUTO: 1 % (ref 0–2)
LYMPHOCYTES # BLD AUTO: 1.35 THOUSANDS/ΜL (ref 0.6–4.47)
LYMPHOCYTES NFR BLD AUTO: 22 % (ref 14–44)
MCH RBC QN AUTO: 28.5 PG (ref 26.8–34.3)
MCHC RBC AUTO-ENTMCNC: 31.4 G/DL (ref 31.4–37.4)
MCV RBC AUTO: 91 FL (ref 82–98)
MONOCYTES # BLD AUTO: 0.57 THOUSAND/ΜL (ref 0.17–1.22)
MONOCYTES NFR BLD AUTO: 9 % (ref 4–12)
NEUTROPHILS # BLD AUTO: 4.05 THOUSANDS/ΜL (ref 1.85–7.62)
NEUTS SEG NFR BLD AUTO: 65 % (ref 43–75)
NRBC BLD AUTO-RTO: 0 /100 WBCS
PLATELET # BLD AUTO: 335 THOUSANDS/UL (ref 149–390)
PMV BLD AUTO: 8.9 FL (ref 8.9–12.7)
POTASSIUM SERPL-SCNC: 4 MMOL/L (ref 3.5–5.3)
PROT SERPL-MCNC: 7.2 G/DL (ref 6.4–8.2)
RBC # BLD AUTO: 3.93 MILLION/UL (ref 3.81–5.12)
SODIUM SERPL-SCNC: 143 MMOL/L (ref 136–145)
WBC # BLD AUTO: 6.14 THOUSAND/UL (ref 4.31–10.16)

## 2018-08-14 PROCEDURE — 85025 COMPLETE CBC W/AUTO DIFF WBC: CPT

## 2018-08-14 PROCEDURE — 86300 IMMUNOASSAY TUMOR CA 15-3: CPT

## 2018-08-14 PROCEDURE — 80053 COMPREHEN METABOLIC PANEL: CPT

## 2018-08-14 RX ADMIN — Medication 300 UNITS: at 09:15

## 2018-08-14 NOTE — PROGRESS NOTES
Patient to [facility] for Lab testing / Catheter maintenance: Offers no complaints at present time: Right PAC accessed without difficulty: Good blood return noted: Labs drawn per MD order

## 2018-08-15 ENCOUNTER — OFFICE VISIT (OUTPATIENT)
Dept: HEMATOLOGY ONCOLOGY | Facility: CLINIC | Age: 58
End: 2018-08-15
Payer: COMMERCIAL

## 2018-08-15 VITALS
SYSTOLIC BLOOD PRESSURE: 128 MMHG | HEIGHT: 65 IN | WEIGHT: 134 LBS | DIASTOLIC BLOOD PRESSURE: 70 MMHG | RESPIRATION RATE: 17 BRPM | TEMPERATURE: 98.4 F | BODY MASS INDEX: 22.33 KG/M2 | OXYGEN SATURATION: 98 % | HEART RATE: 93 BPM

## 2018-08-15 DIAGNOSIS — C50.911 BILATERAL MALIGNANT NEOPLASM OF BREAST IN FEMALE, ESTROGEN RECEPTOR POSITIVE, UNSPECIFIED SITE OF BREAST (HCC): Primary | ICD-10-CM

## 2018-08-15 DIAGNOSIS — C50.912 BILATERAL MALIGNANT NEOPLASM OF BREAST IN FEMALE, ESTROGEN RECEPTOR POSITIVE, UNSPECIFIED SITE OF BREAST (HCC): Primary | ICD-10-CM

## 2018-08-15 DIAGNOSIS — Z17.0 BILATERAL MALIGNANT NEOPLASM OF BREAST IN FEMALE, ESTROGEN RECEPTOR POSITIVE, UNSPECIFIED SITE OF BREAST (HCC): Primary | ICD-10-CM

## 2018-08-15 PROCEDURE — 99215 OFFICE O/P EST HI 40 MIN: CPT | Performed by: INTERNAL MEDICINE

## 2018-08-15 NOTE — PROGRESS NOTES
[facility] [place] АННА [address] [address]  [phone]    Ho Mao,1960, [id number]  08/15/18    Discussion:   In summary, this is a 61-year-old female history of advanced breast cancer as outlined  She has diffuse pain  She continues to work with palliative care regarding pain medication adjustment  Medical marijuana is financially prohibitive for her  Recent CT chest abdomen pelvis shows stable bone disease  Some increase in soft tissue nodularity is noted in the right axilla  No disease is palpable or visible in that location currently, however  Tumor marker has risen twice serially  I reviewed with the patient and her daughter that her disease is progressive at this time, probably primarily in the right axilla  Radiation therapy could be considered but I think the toxicities would be substantial given that she already has lymphedema in the right arm  An alternative would be either re-incorporation of previous Taxotere or change to another treatment plan such as different cytotoxic drug or Kadcyla, perhaps  I reviewed the above considerations with the patient and her daughter  She was agreeable to re-and cooperate Taxotere, 50 mg/m2 Q 3 weeks  in an effort to try to achieve disease control with minimal additional toxicity  Reassessment by way of tumor marker after 2 cycles  I discussed the above with the patient  The patient and her daughter voiced understanding and agreement   ______________________________________________________________________    Chief Complaint   Patient presents with    Follow-up       HPI:     Bilateral malignant neoplasm of breast in female, estrogen receptor positive ([facility] [address] )    2010 Initial Diagnosis     The patient had a left sided T1 B , N0 ER positive, ID and HER-2 negative invasive ductal carcinoma, right-sided DCIS, ER/ID positive, HER-2 negative  Bilateral mastectomy           2010 - 4/2011 Hormone Therapy     Tamoxifen 20 mg daily  Last menstrual period was on 3/2010          4/2011 -  Hormone Therapy     Arimidex 1 mg daily  Unknown when medication was discontinued  Patient was lost to follow up          12/9/2015 Progression     · Right arm swelling in December 2015  U/S found a nonvascular structure in the right axilla measuring 2 2 x 1 3 cm  · Subsequent CT Scan of the chest on 12/9/15, showed multiple bilateral pulmonary nodules measuring 3-7 mm  · PET 2/4/16, which revealed hypermetabolic hilar and mediastinal nodes  There is a pre carinal node with a SUV of 5 4 measuring 1 8 x 1 7 cm, and a subcarinal node measuring 1 9 x 1 3 cm with a SUV of 5 3  There is right hilar activity of 3 5 and left hilar activity of 2 8  The subcentimeter nodules are too small for PET evaluation  She also has uptake in her L3 and L5 vertebral bodies, both concerning for bony metastases  2/16/2016 Biopsy     Bronchoscopic biopsy showed Metastatic non-small cell carcinoma, favor adenocarcinoma  ER 90%, MO 0%  Her 2 +2, positive by FISH            3/1/2016 - 6/2016 Chemotherapy     Taxotere 75 mg/m2, Day 1  Perjeta 420 mg, Day 1  Herceptin 6 mg/kg, Day 1  Cycle length= 21 days         3/1/2016 - 2/2017 Hormone Therapy     Anastrozole 1 mg daily         6/2016 - 2/2017 Chemotherapy     Perjeta 420 mg, Day 1  Herceptin 6 mg/kg, Day 1  Cycle length= 21 days         2/2017 Progression     Bony progression          2/10/2017 - 4/12/2018 Chemotherapy     Perjeta 420 mg, Day 1  Herceptin 6 mg/kg, Day 1  Taxotere 75 mg/m2  Cycle length= 21 days         4/18/2017 Progression      brain MRI showed a 5 mm right parietal lesion, 3 mm right frontal lesion, 3 mm left parahippocampal lesion            5/2017 - 5/2017 Radiation     SRS to brain lesions         5/3/2018 -  Chemotherapy     Perjeta 420 mg,  Day 1  Herceptin 6 mg/kg,  Day 1  Cycle length = 21 days  Cycle 3 was administered on 6/14/18    ** taxotere was Temporarily discontinued secondary for desire of treatment holiday  Interval History:  Clinically stable  Diffuse pain  2 - Symptomatic, <50% confined to bed    Review of Systems   Constitutional: Negative for appetite change, diaphoresis, fatigue and fever  HENT: Negative for sinus pain  Eyes: Negative for discharge  Respiratory: Negative for cough and shortness of breath  Cardiovascular: Negative for chest pain  Gastrointestinal: Negative for abdominal pain, constipation and diarrhea  Endocrine: Negative for cold intolerance  Genitourinary: Negative for difficulty urinating and hematuria  Musculoskeletal: Negative for joint swelling  Skin: Negative for rash  Allergic/Immunologic: Negative for environmental allergies  Neurological: Negative for dizziness and headaches  Hematological: Negative for adenopathy  Psychiatric/Behavioral: Negative for agitation         Past Medical History:   Diagnosis Date    H/O bilateral mastectomy 2/15/2016    Hypertension 2/15/2016    Lymphedema 2/15/2016    Malignant neoplasm of right breast ([facility] [address] ) 2/15/2016     Patient Active Problem List   Diagnosis    H/O bilateral mastectomy    Lymphedema of right upper extremity    Pulmonary nodule    Bilateral malignant neoplasm of breast in female, estrogen receptor positive ([facility] [address] )    Bone metastases ([facility] [address] )    Brain metastases ([facility] [address] )    Cancer related pain    Chemotherapy-induced nausea    Constipation    Decreased appetite    Difficulty sleeping    Dyspareunia, female    Herniated lumbar intervertebral disc    Mediastinal lymphadenopathy    Hyperglycemia    Other fatigue    Dry skin    Dizziness    Bilateral pleural effusion    Abnormal CT of the chest       Current Outpatient Prescriptions:     cyclobenzaprine (FLEXERIL) 10 mg tablet, Take 1 tablet (10 mg total) by mouth daily at bedtime as needed for muscle spasms, Disp: 30 tablet, Rfl: 1    dexamethasone (DECADRON) 2 mg tablet, Take 1 tablet (2 mg total) by mouth daily with breakfast, Disp: 30 tablet, Rfl: 1    diazepam (VALIUM) 5 mg tablet, Take 1 tablet (5 mg total) by mouth daily at bedtime, Disp: 30 tablet, Rfl: 3    fentaNYL (DURAGESIC) 25 mcg/hr, Place 1 patch on the skin every third day Max Daily Amount: 1 patch, Disp: 10 patch, Rfl: 0    gabapentin (NEURONTIN) 300 mg capsule, 2 capsules PO at bedtime, Disp: 60 capsule, Rfl: 5    ondansetron (ZOFRAN) 4 mg tablet, 1-2 tablets every 6 hours prn Nausea, Disp: 60 tablet, Rfl: 0    oxyCODONE (ROXICODONE) 20 MG TABS, Take 1 tablet (20 mg total) by mouth every 6 (six) hours as needed for moderate pain Max Daily Amount: 80 mg, Disp: 120 tablet, Rfl: 0    promethazine (PHENERGAN) 25 mg tablet, Take 1 tablet (25 mg total) by mouth every 6 (six) hours as needed for nausea or vomiting, Disp: 60 tablet, Rfl: 2    senna (SENOKOT) 8 6 mg, Take 2 tablets by mouth 2 (two) times a day as needed  , Disp: , Rfl:   No current facility-administered medications for this visit       Facility-Administered Medications Ordered in Other Visits:     alteplase (CATHFLO) injection 2 mg, 2 mg, Intracatheter, PRN, Alysa Madrid PA-C    heparin lock flush 100 units/mL injection 300 Units, 300 Units, Intracatheter, PRN, Xin De La Fuente PA-C, 300 Units at 08/14/18 0915  No Known Allergies  Past Surgical History:   Procedure Laterality Date    ENDOBRONCHIAL ULTRASOUND (EBUS) N/A 2/16/2016    Procedure: EBUS;  Surgeon: Markus Cason MD;  [facility]: [facility];  Service:     MASTECTOMY Bilateral     MASTECTOMY Bilateral     right arm edema    OOPHORECTOMY      OR BRONCHOSCOPY NEEDLE BX TRACHEA MAIN STEM&/BRON N/A 2/16/2016    Procedure: Wiley Herrera;  Surgeon: Markus Cason MD;  [facility];  Service: Thoracic    OR INSJ TUNNELED CTR VAD W/SUBQ PORT AGE 5 YR/> N/A 7/24/2018    Procedure: INSERTION VENOUS PORT ( PORT-A-CATH) IR;  Surgeon: Caridad Marshall DO;  [facility]; Service: Interventional Radiology    MO STEREOTACTIC RADIOSURGERY, CRANIAL,SIMPLE,EA ADD  5/3/2017         MO STEREOTACTIC RADIOSURGERY, CRANIAL,SIMPLE,EA ADD  5/3/2017         MO STEREOTACTIC RADIOSURGERY, CRANIAL,SIMPLE,SINGLE  5/3/2017          Social History     Objective:  Vitals:    08/15/18 1341   BP: 128/70   BP Location: Left arm   Patient Position: Sitting   Pulse: 93   Resp: 17   Temp: 98 4 °F (36 9 °C)   TempSrc: Tympanic   SpO2: 98%   Weight: 60 8 kg (134 lb)   Height: 5' 5" (1 651 m)     Physical Exam   Constitutional: She is oriented to person, place, and time  She appears well-developed  HENT:   Head: Normocephalic  Eyes: Pupils are equal, round, and reactive to light  Neck: Neck supple  Cardiovascular: Normal rate  No murmur heard  Pulmonary/Chest: No respiratory distress  She has no wheezes  She has no rales  Abdominal: Soft  She exhibits no distension  There is no tenderness  There is no rebound  Musculoskeletal: She exhibits no edema  Lymphadenopathy:     She has no cervical adenopathy  Neurological: She is alert and oriented to person, place, and time  She displays normal reflexes  Skin: Skin is warm  No rash noted  Psychiatric: She has a normal mood and affect  Thought content normal          Labs: I personally reviewed the labs and imaging pertinent to this patient care

## 2018-08-16 ENCOUNTER — TELEPHONE (OUTPATIENT)
Dept: HEMATOLOGY ONCOLOGY | Facility: CLINIC | Age: 58
End: 2018-08-16

## 2018-08-16 ENCOUNTER — HOSPITAL ENCOUNTER (OUTPATIENT)
Dept: INFUSION CENTER | Facility: CLINIC | Age: 58
Discharge: HOME | End: 2018-08-16
Payer: COMMERCIAL

## 2018-08-16 VITALS
DIASTOLIC BLOOD PRESSURE: 74 MMHG | WEIGHT: 134.5 LBS | HEART RATE: 78 BPM | TEMPERATURE: 98.5 F | RESPIRATION RATE: 18 BRPM | SYSTOLIC BLOOD PRESSURE: 122 MMHG | HEIGHT: 65 IN | BODY MASS INDEX: 22.41 KG/M2

## 2018-08-16 DIAGNOSIS — R63.0 DECREASED APPETITE: ICD-10-CM

## 2018-08-16 DIAGNOSIS — R11.0 CHEMOTHERAPY-INDUCED NAUSEA: ICD-10-CM

## 2018-08-16 DIAGNOSIS — R53.83 OTHER FATIGUE: ICD-10-CM

## 2018-08-16 DIAGNOSIS — T45.1X5A CHEMOTHERAPY-INDUCED NAUSEA: ICD-10-CM

## 2018-08-16 DIAGNOSIS — G89.3 CANCER RELATED PAIN: ICD-10-CM

## 2018-08-16 PROCEDURE — 96367 TX/PROPH/DG ADDL SEQ IV INF: CPT

## 2018-08-16 PROCEDURE — 96417 CHEMO IV INFUS EACH ADDL SEQ: CPT

## 2018-08-16 PROCEDURE — 96413 CHEMO IV INFUSION 1 HR: CPT

## 2018-08-16 RX ORDER — SODIUM CHLORIDE 9 MG/ML
20 INJECTION, SOLUTION INTRAVENOUS CONTINUOUS
Status: DISCONTINUED | OUTPATIENT
Start: 2018-08-16 | End: 2018-08-19 | Stop reason: HOSPADM

## 2018-08-16 RX ORDER — LIDOCAINE AND PRILOCAINE 25; 25 MG/G; MG/G
CREAM TOPICAL AS NEEDED
Qty: 30 G | Refills: 0 | Status: SHIPPED | OUTPATIENT
Start: 2018-08-16 | End: 2020-02-12

## 2018-08-16 RX ORDER — DEXAMETHASONE 2 MG/1
2 TABLET ORAL
Qty: 30 TABLET | Refills: 1 | Status: SHIPPED | OUTPATIENT
Start: 2018-08-16 | End: 2018-10-25 | Stop reason: SDUPTHER

## 2018-08-16 RX ADMIN — PERTUZUMAB 420 MG: 30 INJECTION, SOLUTION, CONCENTRATE INTRAVENOUS at 09:22

## 2018-08-16 RX ADMIN — DOCETAXEL 86 MG: 20 INJECTION, SOLUTION, CONCENTRATE INTRAVENOUS at 10:48

## 2018-08-16 RX ADMIN — HEPARIN 300 UNITS: 100 SYRINGE at 11:51

## 2018-08-16 RX ADMIN — TRASTUZUMAB 391 MG: 150 INJECTION, POWDER, LYOPHILIZED, FOR SOLUTION INTRAVENOUS at 10:08

## 2018-08-16 RX ADMIN — DEXAMETHASONE SODIUM PHOSPHATE: 10 INJECTION, SOLUTION INTRAMUSCULAR; INTRAVENOUS at 08:37

## 2018-08-16 RX ADMIN — SODIUM CHLORIDE 20 ML/HR: 0.9 INJECTION, SOLUTION INTRAVENOUS at 08:23

## 2018-08-16 NOTE — PROGRESS NOTES
Pt  Had questioned about not getting neulasta today  Rand Kraus RN was made aware and stated that patient does not need it at this time

## 2018-08-16 NOTE — TELEPHONE ENCOUNTER
Decadron and EMLA cream sent to Dr Umang Dumont for signature    He will be speaking with Dr Liv Veronica today regarding patients pain

## 2018-08-16 NOTE — TELEPHONE ENCOUNTER
Daughter stopped into the [facility] office today, patient was seen yesterday  Dr Dee Retana said he was going to send in a cream for her port to numb it, [facility] [place]  Also he said he was going to talk to Dr Robyn Chowdhury about a pain medication for patient  Also she needs her steroid Dexamethasone 2mg - one tablet daily with breakfast also to the [address] in [place]  They will be in the [facility] location until about 11am if she needs to  any of these scripts  Please call daughter Shaye Samuel about this  Or you may call patients cell phone [phone]

## 2018-08-20 ENCOUNTER — APPOINTMENT (OUTPATIENT)
Dept: RADIOLOGY | Facility: MEDICAL CENTER | Age: 58
End: 2018-08-20
Payer: COMMERCIAL

## 2018-08-20 DIAGNOSIS — G89.3 CANCER RELATED PAIN: ICD-10-CM

## 2018-08-20 PROCEDURE — 71046 X-RAY EXAM CHEST 2 VIEWS: CPT

## 2018-08-20 NOTE — TELEPHONE ENCOUNTER
Wanted to follow up with Dr Hanks Courser regarding his conversation with Dr Sunil Herrmann a call back

## 2018-08-21 ENCOUNTER — TELEPHONE (OUTPATIENT)
Dept: HEMATOLOGY ONCOLOGY | Facility: CLINIC | Age: 58
End: 2018-08-21

## 2018-08-21 NOTE — TELEPHONE ENCOUNTER
Called Nancie BAKER for her to return my call  Let her know Dr Ada Parada did speak with Dr Carissa Adkins and pt is to take 2mg of decadron daily to help with pain  Dr Ada Parada did send that to her pharmacy on 8/16  Asked daughter to call back to let me know if she started taking that and if so how she is feeling on that to see if we need to do a dose adjustment

## 2018-08-21 NOTE — TELEPHONE ENCOUNTER
Pt returned my call  She has been taking 1 tablet of the 2mg decadron since 8/16 and it is not helping her  I spoke with Dr Richa Whitehead he he would like her to take 1 tablet BID and let us know how she feels on 8/24

## 2018-08-23 ENCOUNTER — TELEPHONE (OUTPATIENT)
Dept: PALLIATIVE MEDICINE | Facility: CLINIC | Age: 58
End: 2018-08-23

## 2018-08-23 ENCOUNTER — OFFICE VISIT (OUTPATIENT)
Dept: PALLIATIVE MEDICINE | Facility: CLINIC | Age: 58
End: 2018-08-23
Payer: COMMERCIAL

## 2018-08-23 VITALS
BODY MASS INDEX: 20.89 KG/M2 | WEIGHT: 130 LBS | RESPIRATION RATE: 16 BRPM | HEART RATE: 95 BPM | SYSTOLIC BLOOD PRESSURE: 110 MMHG | DIASTOLIC BLOOD PRESSURE: 70 MMHG | HEIGHT: 66 IN | TEMPERATURE: 97.8 F | OXYGEN SATURATION: 98 %

## 2018-08-23 DIAGNOSIS — Z17.0 BILATERAL MALIGNANT NEOPLASM OF BREAST IN FEMALE, ESTROGEN RECEPTOR POSITIVE, UNSPECIFIED SITE OF BREAST (HCC): Primary | ICD-10-CM

## 2018-08-23 DIAGNOSIS — G89.3 CANCER RELATED PAIN: ICD-10-CM

## 2018-08-23 DIAGNOSIS — G47.9 DIFFICULTY SLEEPING: ICD-10-CM

## 2018-08-23 DIAGNOSIS — C50.911 BILATERAL MALIGNANT NEOPLASM OF BREAST IN FEMALE, ESTROGEN RECEPTOR POSITIVE, UNSPECIFIED SITE OF BREAST (HCC): Primary | ICD-10-CM

## 2018-08-23 DIAGNOSIS — R11.0 CHEMOTHERAPY-INDUCED NAUSEA: ICD-10-CM

## 2018-08-23 DIAGNOSIS — T45.1X5A CHEMOTHERAPY-INDUCED NAUSEA: ICD-10-CM

## 2018-08-23 DIAGNOSIS — C50.912 BILATERAL MALIGNANT NEOPLASM OF BREAST IN FEMALE, ESTROGEN RECEPTOR POSITIVE, UNSPECIFIED SITE OF BREAST (HCC): Primary | ICD-10-CM

## 2018-08-23 DIAGNOSIS — M51.26 HERNIATED LUMBAR INTERVERTEBRAL DISC: ICD-10-CM

## 2018-08-23 PROCEDURE — 99214 OFFICE O/P EST MOD 30 MIN: CPT | Performed by: FAMILY MEDICINE

## 2018-08-23 RX ORDER — HYDROMORPHONE HYDROCHLORIDE 4 MG/1
4 TABLET ORAL EVERY 4 HOURS PRN
Qty: 120 TABLET | Refills: 0 | Status: SHIPPED | OUTPATIENT
Start: 2018-09-28 | End: 2018-10-03 | Stop reason: SDUPTHER

## 2018-08-23 RX ORDER — GABAPENTIN 300 MG/1
CAPSULE ORAL
Qty: 120 CAPSULE | Refills: 0 | Status: SHIPPED | OUTPATIENT
Start: 2018-08-23 | End: 2018-10-25 | Stop reason: SDUPTHER

## 2018-08-23 RX ORDER — DIAZEPAM 5 MG/1
5 TABLET ORAL
Qty: 30 TABLET | Refills: 0 | Status: SHIPPED | OUTPATIENT
Start: 2018-08-23 | End: 2018-10-03 | Stop reason: SDUPTHER

## 2018-08-23 RX ORDER — PROMETHAZINE HYDROCHLORIDE 25 MG/1
25 TABLET ORAL EVERY 6 HOURS PRN
Qty: 60 TABLET | Refills: 0 | Status: SHIPPED | OUTPATIENT
Start: 2018-08-23 | End: 2019-02-26 | Stop reason: ALTCHOICE

## 2018-08-23 RX ORDER — HYDROMORPHONE HYDROCHLORIDE 4 MG/1
4 TABLET ORAL EVERY 4 HOURS PRN
Qty: 120 TABLET | Refills: 0 | Status: SHIPPED | OUTPATIENT
Start: 2018-08-23 | End: 2018-08-23 | Stop reason: SDUPTHER

## 2018-08-23 RX ORDER — FENTANYL 50 UG/H
1 PATCH TRANSDERMAL
Qty: 10 PATCH | Refills: 0 | Status: SHIPPED | OUTPATIENT
Start: 2018-08-23 | End: 2018-09-22

## 2018-08-23 NOTE — PROGRESS NOTES
Palliative and Supportive Care   Nikolay Rand 66 y o  female [id number]    Assessment/Plan:  1  Bilateral malignant neoplasm of breast in female, estrogen receptor positive, unspecified site of breast ([facility] [address] )    2  Cancer related pain    3  Chemotherapy-induced nausea    4  Herniated lumbar intervertebral disc    5  Difficulty sleeping      Requested Prescriptions     Signed Prescriptions Disp Refills    gabapentin (NEURONTIN) 300 mg capsule 120 capsule 0     Si cap PO bid and 2 cap at bedtime    fentaNYL (DURAGESIC) 50 mcg/hr 10 patch 0     Sig: Place 1 patch on the skin every third day for 30 days Max Daily Amount: 1 patch    promethazine (PHENERGAN) 25 mg tablet 60 tablet 0     Sig: Take 1 tablet (25 mg total) by mouth every 6 (six) hours as needed for nausea or vomiting    HYDROmorphone (DILAUDID) 4 mg tablet 120 tablet 0     Sig: Take 1 tablet (4 mg total) by mouth every 4 (four) hours as needed (breakthrough pain) Max Daily Amount: 24 mg    diazepam (VALIUM) 5 mg tablet 30 tablet 0     Sig: Take 1 tablet (5 mg total) by mouth daily at bedtime     Despite use of several classes of medications and different medications within the same class we have yet to achieve effective symptom management for her pain  Not all of her pain is cancer related  In the past with opioid titration she has developed side effects  We did discuss applying for an MMJ card  Her daughter was given an informational brochure  This month we will try to titrate her fentanyl patch and gabapentin  Representatives have queried the patient's controlled substance dispensing history in the Prescription Drug Monitoring Program in compliance with regulations before I have prescribed any controlled substances  The prescription history is consistent with prescribed therapy and our practice policies        30 minutes were spent face to face with Nikolay Rand and her daughter with greater than 50% of the time spent in counseling or coordination of care including discussions of treatment instructions and follow up requirements   All of the patient's questions were answered during this discussion  Return in about 1 month (around 9/23/2018)  Subjective:   Chief Complaint  Follow up visit for:  symptom management  HPI     Iraida Jamison is a 66 y o  female with metastatic breast cancer originally diagnosed in 2010   She had a bilateral mastectomy and has undergone multiple chemotherapy regimens   She has chronic lymphedema in her right arm   She has brain mets for which she has received radiation in the past        She is on dexamethasone to help with a spectrum of symptoms including appetite, pain, energy level, and nausea  However he pain is persistent and worse  She states that her left arm with the lymphedema is hurting her and her wrist has sharp pains  Her back also hurts  The oxy gives her temporary and incomplete relief and she has been out of them for at least a week  She continues to wear a fentanyl 25 mcg patch  She thinks valium sometimes helps at bedside but doesn't get much relief from flexeril  The following portions of the medical history were reviewed: past medical history, problem list, medication list, and social history      Current Outpatient Prescriptions:     dexamethasone (DECADRON) 2 mg tablet, Take 1 tablet (2 mg total) by mouth daily with breakfast, Disp: 30 tablet, Rfl: 1    diazepam (VALIUM) 5 mg tablet, Take 1 tablet (5 mg total) by mouth daily at bedtime, Disp: 30 tablet, Rfl: 0    gabapentin (NEURONTIN) 300 mg capsule, 1 cap PO bid and 2 cap at bedtime, Disp: 120 capsule, Rfl: 0    ondansetron (ZOFRAN) 4 mg tablet, 1-2 tablets every 6 hours prn Nausea, Disp: 60 tablet, Rfl: 0    promethazine (PHENERGAN) 25 mg tablet, Take 1 tablet (25 mg total) by mouth every 6 (six) hours as needed for nausea or vomiting, Disp: 60 tablet, Rfl: 0    senna (SENOKOT) 8 6 mg, Take 2 tablets by mouth 2 (two) times a day as needed  , Disp: , Rfl:     fentaNYL (DURAGESIC) 50 mcg/hr, Place 1 patch on the skin every third day for 30 days Max Daily Amount: 1 patch, Disp: 10 patch, Rfl: 0    HYDROmorphone (DILAUDID) 4 mg tablet, Take 1 tablet (4 mg total) by mouth every 4 (four) hours as needed (breakthrough pain) Max Daily Amount: 24 mg, Disp: 120 tablet, Rfl: 0    lidocaine-prilocaine (EMLA) cream, Apply topically as needed for mild pain, Disp: 30 g, Rfl: 0  Review of Systems   Constitutional: Positive for activity change and fatigue  Musculoskeletal: Positive for arthralgias, back pain, myalgias and neck pain  Neurological: Positive for weakness  Psychiatric/Behavioral: Positive for sleep disturbance  All other systems negative    Objective:  Vital Signs  /70 (BP Location: Left arm, Patient Position: Sitting, Cuff Size: Standard)   Pulse 95   Temp 97 8 °F (36 6 °C) (Tympanic)   Resp 16   Ht 5' 5 5" (1 664 m)   Wt 59 kg (130 lb)   LMP  (LMP Unknown)   SpO2 98%   BMI 21 30 kg/m²    Physical Exam    Constitutional: Appears chronically ill  In no acute distress  Head: Normocephalic and atraumatic  Eyes: EOM are normal  Right eye exhibits no discharge  Left eye exhibits no discharge  No scleral icterus  Pulmonary/Chest: Effort normal  No stridor  No respiratory distress  Abdominal: mild distention  Musculoskeletal: left upper extremity lymphedema   Neurological: Alert, oriented and appropriately conversant  Skin: Sallow coloring  Psychiatric: Displays a normal mood and affect  Behavior, judgement and thought content appear normal    Vitals reviewed

## 2018-08-23 NOTE — TELEPHONE ENCOUNTER
PA needed for fentanyl patches and dilaudid  Was able to receive the pa for the fentanyl patches and dilaudid  Patient did however only  42 tablets of her script written for 120 tablets of dilaudid as she did not want to wait for the prior auth    She will need a script written for the extra 78 tablets which can be picked up after 8/28/18  This can be sent to the [facility] in [place]  Thank you   Oscar Bowser

## 2018-09-04 ENCOUNTER — TELEPHONE (OUTPATIENT)
Dept: HEMATOLOGY ONCOLOGY | Facility: CLINIC | Age: 58
End: 2018-09-04

## 2018-09-04 NOTE — TELEPHONE ENCOUNTER
Called the patient to remind her about the lab work   She said her lab is scheduled for tomorrow at 11:30 AM

## 2018-09-05 ENCOUNTER — TELEPHONE (OUTPATIENT)
Dept: HEMATOLOGY ONCOLOGY | Facility: CLINIC | Age: 58
End: 2018-09-05

## 2018-09-05 ENCOUNTER — OFFICE VISIT (OUTPATIENT)
Dept: HEMATOLOGY ONCOLOGY | Facility: CLINIC | Age: 58
End: 2018-09-05
Payer: COMMERCIAL

## 2018-09-05 ENCOUNTER — HOSPITAL ENCOUNTER (OUTPATIENT)
Dept: INFUSION CENTER | Facility: CLINIC | Age: 58
Discharge: HOME | End: 2018-09-05
Payer: COMMERCIAL

## 2018-09-05 VITALS
SYSTOLIC BLOOD PRESSURE: 100 MMHG | HEART RATE: 88 BPM | DIASTOLIC BLOOD PRESSURE: 62 MMHG | HEIGHT: 66 IN | RESPIRATION RATE: 18 BRPM | TEMPERATURE: 98.5 F | BODY MASS INDEX: 21.62 KG/M2 | WEIGHT: 134.5 LBS | OXYGEN SATURATION: 96 %

## 2018-09-05 DIAGNOSIS — C50.912 BILATERAL MALIGNANT NEOPLASM OF BREAST IN FEMALE, ESTROGEN RECEPTOR POSITIVE, UNSPECIFIED SITE OF BREAST (HCC): Primary | ICD-10-CM

## 2018-09-05 DIAGNOSIS — Z17.0 BILATERAL MALIGNANT NEOPLASM OF BREAST IN FEMALE, ESTROGEN RECEPTOR POSITIVE, UNSPECIFIED SITE OF BREAST (HCC): Primary | ICD-10-CM

## 2018-09-05 DIAGNOSIS — C79.31 SECONDARY MALIGNANT NEOPLASM OF BRAIN AND SPINAL CORD (HCC): ICD-10-CM

## 2018-09-05 DIAGNOSIS — C50.119 MALIGNANT NEOPLASM OF CENTRAL PORTION OF FEMALE BREAST, UNSPECIFIED ESTROGEN RECEPTOR STATUS, UNSPECIFIED LATERALITY (HCC): ICD-10-CM

## 2018-09-05 DIAGNOSIS — C50.911 BILATERAL MALIGNANT NEOPLASM OF BREAST IN FEMALE, ESTROGEN RECEPTOR POSITIVE, UNSPECIFIED SITE OF BREAST (HCC): Primary | ICD-10-CM

## 2018-09-05 DIAGNOSIS — I89.0 LYMPHEDEMA OF RIGHT UPPER EXTREMITY: ICD-10-CM

## 2018-09-05 DIAGNOSIS — R11.2 CHEMOTHERAPY INDUCED NAUSEA AND VOMITING: ICD-10-CM

## 2018-09-05 DIAGNOSIS — C79.51 SECONDARY MALIGNANT NEOPLASM OF BONE AND BONE MARROW (HCC): ICD-10-CM

## 2018-09-05 DIAGNOSIS — C79.52 SECONDARY MALIGNANT NEOPLASM OF BONE AND BONE MARROW (HCC): ICD-10-CM

## 2018-09-05 DIAGNOSIS — T45.1X5A CHEMOTHERAPY INDUCED NAUSEA AND VOMITING: ICD-10-CM

## 2018-09-05 DIAGNOSIS — T45.1X5A CHEMOTHERAPY-INDUCED CARDIOMYOPATHY (HCC): ICD-10-CM

## 2018-09-05 DIAGNOSIS — I42.7 CHEMOTHERAPY-INDUCED CARDIOMYOPATHY (HCC): ICD-10-CM

## 2018-09-05 DIAGNOSIS — C79.49 SECONDARY MALIGNANT NEOPLASM OF BRAIN AND SPINAL CORD (HCC): ICD-10-CM

## 2018-09-05 LAB
ALBUMIN SERPL BCP-MCNC: 2.8 G/DL (ref 3.5–5)
ALP SERPL-CCNC: 146 U/L (ref 46–116)
ALT SERPL W P-5'-P-CCNC: 57 U/L (ref 12–78)
ANION GAP SERPL CALCULATED.3IONS-SCNC: 5 MMOL/L (ref 4–13)
AST SERPL W P-5'-P-CCNC: 22 U/L (ref 5–45)
BASOPHILS # BLD MANUAL: 0 THOUSAND/UL (ref 0–0.1)
BASOPHILS NFR MAR MANUAL: 0 % (ref 0–1)
BILIRUB SERPL-MCNC: 0.2 MG/DL (ref 0.2–1)
BUN SERPL-MCNC: 6 MG/DL (ref 5–25)
CALCIUM SERPL-MCNC: 8.6 MG/DL (ref 8.3–10.1)
CANCER AG27-29 SERPL-ACNC: 84.3 U/ML (ref 0–42.3)
CHLORIDE SERPL-SCNC: 108 MMOL/L (ref 100–108)
CO2 SERPL-SCNC: 27 MMOL/L (ref 21–32)
CREAT SERPL-MCNC: 0.51 MG/DL (ref 0.6–1.3)
EOSINOPHIL # BLD MANUAL: 0 THOUSAND/UL (ref 0–0.4)
EOSINOPHIL NFR BLD MANUAL: 0 % (ref 0–6)
ERYTHROCYTE [DISTWIDTH] IN BLOOD BY AUTOMATED COUNT: 19.3 % (ref 11.6–15.1)
GFR SERPL CREATININE-BSD FRML MDRD: 108 ML/MIN/1.73SQ M
GLUCOSE SERPL-MCNC: 83 MG/DL (ref 65–140)
HCT VFR BLD AUTO: 32.9 % (ref 34.8–46.1)
HGB BLD-MCNC: 10.3 G/DL (ref 11.5–15.4)
LYMPHOCYTES # BLD AUTO: 1.03 THOUSAND/UL (ref 0.6–4.47)
LYMPHOCYTES # BLD AUTO: 17 % (ref 14–44)
MCH RBC QN AUTO: 29.1 PG (ref 26.8–34.3)
MCHC RBC AUTO-ENTMCNC: 31.3 G/DL (ref 31.4–37.4)
MCV RBC AUTO: 93 FL (ref 82–98)
MONOCYTES # BLD AUTO: 0.61 THOUSAND/UL (ref 0–1.22)
MONOCYTES NFR BLD: 10 % (ref 4–12)
NEUTROPHILS # BLD MANUAL: 4.31 THOUSAND/UL (ref 1.85–7.62)
NEUTS SEG NFR BLD AUTO: 71 % (ref 43–75)
NRBC BLD AUTO-RTO: 0 /100 WBCS
PLATELET # BLD AUTO: 375 THOUSANDS/UL (ref 149–390)
PLATELET BLD QL SMEAR: ABNORMAL
PMV BLD AUTO: 8.9 FL (ref 8.9–12.7)
POTASSIUM SERPL-SCNC: 3.6 MMOL/L (ref 3.5–5.3)
PROT SERPL-MCNC: 7.1 G/DL (ref 6.4–8.2)
RBC # BLD AUTO: 3.54 MILLION/UL (ref 3.81–5.12)
SODIUM SERPL-SCNC: 140 MMOL/L (ref 136–145)
TOTAL CELLS COUNTED SPEC: 100
VARIANT LYMPHS # BLD AUTO: 2 %
WBC # BLD AUTO: 6.07 THOUSAND/UL (ref 4.31–10.16)

## 2018-09-05 PROCEDURE — 86300 IMMUNOASSAY TUMOR CA 15-3: CPT

## 2018-09-05 PROCEDURE — 99215 OFFICE O/P EST HI 40 MIN: CPT | Performed by: PHYSICIAN ASSISTANT

## 2018-09-05 PROCEDURE — 80053 COMPREHEN METABOLIC PANEL: CPT

## 2018-09-05 PROCEDURE — 85027 COMPLETE CBC AUTOMATED: CPT

## 2018-09-05 PROCEDURE — 85007 BL SMEAR W/DIFF WBC COUNT: CPT

## 2018-09-05 RX ORDER — ONDANSETRON HYDROCHLORIDE 8 MG/1
8 TABLET, FILM COATED ORAL EVERY 8 HOURS PRN
Qty: 30 TABLET | Refills: 1 | Status: SHIPPED | OUTPATIENT
Start: 2018-09-05 | End: 2019-05-07

## 2018-09-05 RX ADMIN — HEPARIN 300 UNITS: 100 SYRINGE at 12:45

## 2018-09-05 NOTE — PROGRESS NOTES
[id number] [facility]  HEMATOLOGY ONCOLOGY SPECIALISTS [facility] [address]  Progress Note  Liza Lester, 1960, [id number]  9/5/2018    Assessment/Plan:  1  Bilateral malignant neoplasm of breast in female, estrogen receptor positive, unspecified site of breast ([facility] [address] )  This is a stage IV ER positive,VA negative, her 2 positive breast cancer patient who has been on multiple lines of therapy  Patient had requested a treatment holiday from Taxotere back in May 2018 and continued on dual therapy with Perjeta and Herceptin up until August 2018 when scans demonstrated disease progression  At that time Taxotere was added back in  This was approximately 3 weeks ago  Unfortunately, patient has had significant side effects including fatigue affecting her activity of daily living lasting well beyond chemotherapy week  Additionally patient also has nausea that is lasting well beyond chemotherapy week and is resistant to home medications requiring medication increased  In addition to side effects becoming more problematic, the patient has noted cutaneous growth in the right axillary region  This was not present at patient's previous appointment  I discussed clinical options with the patient and her daughter as well as covering physician  The options are discussed below  Patient could either continue on the triplet therapy as previously recommended by Dr Diamond Minor and follow-up after cycle 2 with tumor marker or discontinue treatment as the patient is noted to have clinical progression on physical exam and significant complications with present regiment and trial single agent Kadcyla  Ultimately a treatment change was discussed and decided upon  Side effects of Kadcyla were reviewed  This includes but is not limited to increased liver enzymes, decreased platelets, red blood cells and white blood cells, nausea, fatigue, joint and muscle pain and decreased potassium    Patient understands that she will need to have blood work completed prior to each treatment cycle  Patient and patient's daughter spent time with nursing staff reviewing protocol and chemotherapy medication  Regimen:  Kadcyla 3 6 mg/kg  Cycle length = 21 days  Blood work needed prior to each cycle = CBC, CMP     - Cancer antigen 27 29; Future  - CBC and differential; Future  - Comprehensive metabolic panel; Future  - NM cardiac blood pool muga (at rest); Future    2  Chemotherapy-induced cardiomyopathy ([facility] [address] )  Patient's last Monday scan was completed in April 2018  We will have the patient complete an additional scan to continue observation of ejection fraction  - NM cardiac blood pool muga (at rest); Future    3  Chemotherapy induced nausea and vomiting  Patient continues to have nausea  This was more prevalent with the addition of Taxotere to last regimen  Considering that the patient is no longer going to be undergoing this triplet therapy, Zofran 8 mg was prescribed this will be able to be used with subsequent treatment plans as listed  Because the patient's propensity for nausea, I have asked that Zofran 16 mg be added to premeds for above treatment  - ondansetron (ZOFRAN) 8 mg tablet; Take 1 tablet (8 mg total) by mouth every 8 (eight) hours as needed for nausea or vomiting  Dispense: 30 tablet; Refill: 1    4  Lymphedema, right upper extremity  Patient requests a refill of compression sleeve  I am unsure of the patient's last pressure for compression sleeve  Additionally I am not sure with the patient had this refilled  Phone calls in place to the patient's home to follow up with us regarding this issue  Once information is obtained then prescription will be sent out to the pharmacy      Labs and imaging for follow up:  Orders Placed This Encounter   Procedures    NM cardiac blood pool muga (at rest)     Standing Status:   Future     Standing Expiration Date:   9/5/2022     Scheduling Instructions: Please bring your insurance cards, a form of photo ID and a list of your medications with you  Arrive 15 minutes prior to your appointment time in order to register  To schedule this appointment, please contact Central Scheduling at [phone]  Order Specific Question:   Is the patient pregnant? Answer:   No    Cancer antigen 27 29     Standing Status:   Future     Standing Expiration Date:   9/5/2019    CBC and differential     This is a patient instruction: This test is non-fasting  Please drink two glasses of water morning of bloodwork  Standing Status:   Future     Standing Expiration Date:   9/5/2019    Comprehensive metabolic panel     This is a patient instruction: Non-fasting     Standing Status:   Future     Standing Expiration Date:   9/5/2019     The patient is scheduled for follow-up in approximately 3 weeks with Dr Rogerio Bae  Patient voiced agreement and understanding to the above  Patient knows to call the Hematology/Oncology office with any questions and concerns regarding the above  Carefully review your medication list in verify the list is accurate and up-to-date  Please call the hematologic/Oncology office if there medications missing from the less, medications on the list that your not currently taking or if there is a dosage or instruction that is different from higher taking medication  I have spent 45 minutes with Patient and family today in which greater than 50% of this time was spent in counseling/coordination of care regarding Diagnostic results, Risks and benefits of tx options, Intructions for management, Patient and family education and Impressions  The patient's current treatment goals are prolongation of life  Barrier(s) to care identified    None  The patient is able to self care     -------------------------------------------------------------------------------------------------------    Chief complaint:   Chief Complaint   Patient presents with    Follow-up     3 weeks follow up  History of present illness/Cancer History:      Bilateral malignant neoplasm of breast in female, estrogen receptor positive ([facility] [address] )    2010 Initial Diagnosis     The patient had a left sided T1 B , N0 ER positive, OR and HER-2 negative invasive ductal carcinoma, right-sided DCIS, ER/OR positive, HER-2 negative  Bilateral mastectomy  2010 - 4/2011 Hormone Therapy     Tamoxifen 20 mg daily  Last menstrual period was on 3/2010          4/2011 -  Hormone Therapy     Arimidex 1 mg daily  Unknown when medication was discontinued  Patient was lost to follow up          12/9/2015 Progression     · Right arm swelling in December 2015  U/S found a nonvascular structure in the right axilla measuring 2 2 x 1 3 cm  · Subsequent CT Scan of the chest on 12/9/15, showed multiple bilateral pulmonary nodules measuring 3-7 mm  · PET 2/4/16, which revealed hypermetabolic hilar and mediastinal nodes  There is a pre carinal node with a SUV of 5 4 measuring 1 8 x 1 7 cm, and a subcarinal node measuring 1 9 x 1 3 cm with a SUV of 5 3  There is right hilar activity of 3 5 and left hilar activity of 2 8  The subcentimeter nodules are too small for PET evaluation  She also has uptake in her L3 and L5 vertebral bodies, both concerning for bony metastases  2/16/2016 Biopsy     Bronchoscopic biopsy showed Metastatic non-small cell carcinoma, favor adenocarcinoma  ER 90%, OR 0%   Her 2 +2, positive by FISH            3/1/2016 - 6/2016 Chemotherapy     Taxotere 75 mg/m2, Day 1  Perjeta 420 mg, Day 1  Herceptin 6 mg/kg, Day 1  Cycle length= 21 days         3/1/2016 - 2/2017 Hormone Therapy     Anastrozole 1 mg daily         6/2016 - 2/2017 Chemotherapy     Perjeta 420 mg, Day 1  Herceptin 6 mg/kg, Day 1  Cycle length= 21 days         2/2017 Progression     Bony progression          2/10/2017 - 4/12/2018 Chemotherapy     Perjeta 420 mg, Day 1  Herceptin 6 mg/kg, Day 1  Taxotere 75 mg/m2  Cycle length= 21 days         2017 Progression      brain MRI showed a 5 mm right parietal lesion, 3 mm right frontal lesion, 3 mm left parahippocampal lesion  2017 - 2017 Radiation     SRS to brain lesions         5/3/2018 -  Chemotherapy     Perjeta 420 mg,  Day 1  Herceptin 6 mg/kg,  Day 1  Cycle length = 21 days  Cycle 3 was administered on 18    ** taxotere was  Temporarily discontinued secondary for desire of treatment holiday  ECO - Symptomatic, <50% confined to bed    Interval history:      Patient states feeling very tired and fatigued  Patient notes that this is worse than last cycle  Patient attributes this to Taxotere administration  Additionally the patient and her daughter concern because her right axilla demonstrates subcutaneous nodules that have enlarged compared to her last examination  This area was explored secondary to radiographic findings stating that nodules were appearing  At that time they were not palpable per chart  Review of Systems   Constitutional: Positive for activity change and fatigue  Negative for appetite change, chills, fever and unexpected weight change  HENT: Negative for hearing loss, mouth sores, nosebleeds, sore throat, trouble swallowing and voice change  Eyes: Negative for visual disturbance  Respiratory: Negative for cough and shortness of breath  Cardiovascular: Negative for chest pain, palpitations and leg swelling  Gastrointestinal: Positive for nausea and vomiting  Negative for abdominal pain, blood in stool, constipation and diarrhea  Endocrine: Negative for cold intolerance  Genitourinary: Negative for decreased urine volume, dysuria and hematuria  Musculoskeletal: Positive for arthralgias and myalgias  Skin: Negative for rash  Neurological: Negative for dizziness, weakness, numbness and headaches  Hematological: Negative for adenopathy  Does not bruise/bleed easily  Psychiatric/Behavioral: Negative for dysphoric mood  Current Outpatient Prescriptions:     dexamethasone (DECADRON) 2 mg tablet, Take 1 tablet (2 mg total) by mouth daily with breakfast, Disp: 30 tablet, Rfl: 1    diazepam (VALIUM) 5 mg tablet, Take 1 tablet (5 mg total) by mouth daily at bedtime, Disp: 30 tablet, Rfl: 0    fentaNYL (DURAGESIC) 50 mcg/hr, Place 1 patch on the skin every third day for 30 days Max Daily Amount: 1 patch, Disp: 10 patch, Rfl: 0    gabapentin (NEURONTIN) 300 mg capsule, 1 cap PO bid and 2 cap at bedtime, Disp: 120 capsule, Rfl: 0    [START ON 9/28/2018] HYDROmorphone (DILAUDID) 4 mg tablet, Take 1 tablet (4 mg total) by mouth every 4 (four) hours as needed (breakthrough pain) Max Daily Amount: 24 mg, Disp: 120 tablet, Rfl: 0    lidocaine-prilocaine (EMLA) cream, Apply topically as needed for mild pain, Disp: 30 g, Rfl: 0    promethazine (PHENERGAN) 25 mg tablet, Take 1 tablet (25 mg total) by mouth every 6 (six) hours as needed for nausea or vomiting, Disp: 60 tablet, Rfl: 0    senna (SENOKOT) 8 6 mg, Take 2 tablets by mouth 2 (two) times a day as needed  , Disp: , Rfl:     ondansetron (ZOFRAN) 8 mg tablet, Take 1 tablet (8 mg total) by mouth every 8 (eight) hours as needed for nausea or vomiting, Disp: 30 tablet, Rfl: 1  No current facility-administered medications for this visit  Facility-Administered Medications Ordered in Other Visits:     alteplase (CATHFLO) injection 2 mg, 2 mg, Intracatheter, PRN, Alysa Madrid PA-C    heparin lock flush 100 units/mL injection 300 Units, 300 Units, Intracatheter, PRN, Alysa Madrid, PA-C, 300 Units at 09/05/18 1245      No current facility-administered medications for this visit        No Known Allergies    Objective:   /62 (BP Location: Left arm, Cuff Size: Standard)   Pulse 88   Temp 98 5 °F (36 9 °C) (Tympanic)   Resp 18   Ht 5' 5 5" (1 664 m)   Wt 61 kg (134 lb 8 oz)   LMP  (LMP Unknown)   SpO2 96% BMI 22 04 kg/m²   Wt Readings from Last 3 Encounters:   09/05/18 61 kg (134 lb 8 oz)   08/23/18 59 kg (130 lb)   08/16/18 61 kg (134 lb 8 oz)     Physical Exam   Constitutional: She is oriented to person, place, and time  HENT:   Head: Normocephalic and atraumatic  Eyes: EOM are normal  Pupils are equal, round, and reactive to light  No scleral icterus  Neck: Normal range of motion  Cardiovascular: Normal rate and regular rhythm  Pulmonary/Chest: Effort normal and breath sounds normal  No respiratory distress  Abdominal: Soft  Bowel sounds are normal  She exhibits no distension  Musculoskeletal: She exhibits edema  Plus one upper extremity lymphedema   Lymphadenopathy:     She has no cervical adenopathy  Neurological: She is alert and oriented to person, place, and time  No cranial nerve deficit  Skin:   Skin coloration change, bronze appearance  Pertinent Laboratory Results and Imaging Review: The following historical data was reviewed      Past Medical History:   Diagnosis Date    H/O bilateral mastectomy 2/15/2016    Hypertension 2/15/2016    Lymphedema 2/15/2016    Malignant neoplasm of right breast ([facility] [address] ) 2/15/2016       Past Surgical History:   Procedure Laterality Date    ENDOBRONCHIAL ULTRASOUND (EBUS) N/A 2/16/2016    Procedure: EBUS;  Surgeon: Oni Hernandez MD;  [facility];  Service:     MASTECTOMY Bilateral     MASTECTOMY Bilateral     right arm edema    OOPHORECTOMY      GA BRONCHOSCOPY NEEDLE BX TRACHEA MAIN STEM&/BRON N/A 2/16/2016    Procedure: Vick Duran;  Surgeon: Oni Hernandez MD;  [facility];  Service: Thoracic    GA INSJ TUNNELED CTR VAD W/SUBQ PORT AGE 5 YR/> N/A 7/24/2018    Procedure: INSERTION VENOUS PORT ( PORT-A-CATH) IR;  Surgeon: Ana Shrestha DO;  [facility];  Service: Interventional Radiology    GA STEREOTACTIC RADIOSURGERY, CRANIAL,SIMPLE,EA ADD  5/3/2017         GA STEREOTACTIC RADIOSURGERY, CRANIAL,SIMPLE,EA ADD  5/3/2017         AR STEREOTACTIC RADIOSURGERY, CRANIAL,SIMPLE,SINGLE  5/3/2017            Social History     Social History    Marital status: /Civil Union     Spouse name: N/A    Number of children: N/A    Years of education: N/A     Social History Main Topics    Smoking status: Current Every Day Smoker     Packs/day: 0 50     Years: 40 00     Start date: 1978    Smokeless tobacco: Never Used    Alcohol use Yes      Comment: social    Drug use: No    Sexual activity: Not Asked     Other Topics Concern    None     Social History Narrative    None       Family History   Problem Relation Age of Onset    Cancer Sister     Prostate cancer Brother        Please note: This report has been generated by a voice recognition software system  Therefore there may be syntax, spelling, and/or grammatical errors  Please call if you have any questions

## 2018-09-05 NOTE — TELEPHONE ENCOUNTER
I called the patient to ask her what pressure is her compression sleeve and/or where she get it  I left voicemail

## 2018-09-05 NOTE — PLAN OF CARE
Problem: Potential for Falls  Goal: Patient will remain free of falls  INTERVENTIONS:  - Assess patient frequently for physical needs  -  Identify cognitive and physical deficits and behaviors that affect risk of falls    -  [place] fall precautions as indicated by assessment   - Educate patient/family on patient safety including physical limitations  - Instruct patient to call for assistance with activity based on assessment  - Modify environment to reduce risk of injury  - Consider OT/PT consult to assist with strengthening/mobility   Outcome: Progressing

## 2018-09-06 ENCOUNTER — HOSPITAL ENCOUNTER (OUTPATIENT)
Dept: INFUSION CENTER | Facility: CLINIC | Age: 58
End: 2018-09-06

## 2018-09-11 ENCOUNTER — HOSPITAL ENCOUNTER (OUTPATIENT)
Dept: NUCLEAR MEDICINE | Facility: HOSPITAL | Age: 58
Discharge: HOME | End: 2018-09-11
Payer: COMMERCIAL

## 2018-09-11 ENCOUNTER — TELEPHONE (OUTPATIENT)
Dept: HEMATOLOGY ONCOLOGY | Facility: CLINIC | Age: 58
End: 2018-09-11

## 2018-09-11 DIAGNOSIS — C50.911 BILATERAL MALIGNANT NEOPLASM OF BREAST IN FEMALE, ESTROGEN RECEPTOR POSITIVE, UNSPECIFIED SITE OF BREAST (HCC): ICD-10-CM

## 2018-09-11 DIAGNOSIS — Z17.0 BILATERAL MALIGNANT NEOPLASM OF BREAST IN FEMALE, ESTROGEN RECEPTOR POSITIVE, UNSPECIFIED SITE OF BREAST (HCC): ICD-10-CM

## 2018-09-11 DIAGNOSIS — T45.1X5A CHEMOTHERAPY-INDUCED CARDIOMYOPATHY (HCC): ICD-10-CM

## 2018-09-11 DIAGNOSIS — I42.7 CHEMOTHERAPY-INDUCED CARDIOMYOPATHY (HCC): ICD-10-CM

## 2018-09-11 DIAGNOSIS — C50.912 BILATERAL MALIGNANT NEOPLASM OF BREAST IN FEMALE, ESTROGEN RECEPTOR POSITIVE, UNSPECIFIED SITE OF BREAST (HCC): ICD-10-CM

## 2018-09-11 PROCEDURE — A9560 TC99M LABELED RBC: HCPCS

## 2018-09-11 PROCEDURE — 78472 GATED HEART PLANAR SINGLE: CPT

## 2018-09-11 NOTE — TELEPHONE ENCOUNTER
Returned call to patient - explained pre certification department was notified of treatment change - I have not heard of any issues with coverage of new medication    If there is a problem - patient will be notified

## 2018-09-12 RX ORDER — SODIUM CHLORIDE 9 MG/ML
20 INJECTION, SOLUTION INTRAVENOUS CONTINUOUS
Status: DISCONTINUED | OUTPATIENT
Start: 2018-09-13 | End: 2018-09-16 | Stop reason: HOSPADM

## 2018-09-13 ENCOUNTER — HOSPITAL ENCOUNTER (OUTPATIENT)
Dept: INFUSION CENTER | Facility: CLINIC | Age: 58
Discharge: HOME | End: 2018-09-13
Payer: COMMERCIAL

## 2018-09-13 VITALS
DIASTOLIC BLOOD PRESSURE: 60 MMHG | RESPIRATION RATE: 18 BRPM | OXYGEN SATURATION: 97 % | TEMPERATURE: 98.2 F | HEART RATE: 83 BPM | SYSTOLIC BLOOD PRESSURE: 116 MMHG | WEIGHT: 135 LBS | HEIGHT: 65 IN | BODY MASS INDEX: 22.49 KG/M2

## 2018-09-13 PROCEDURE — 96413 CHEMO IV INFUSION 1 HR: CPT

## 2018-09-13 PROCEDURE — 96367 TX/PROPH/DG ADDL SEQ IV INF: CPT

## 2018-09-13 PROCEDURE — 96401 CHEMO ANTI-NEOPL SQ/IM: CPT

## 2018-09-13 RX ADMIN — SODIUM CHLORIDE 20 ML/HR: 0.9 INJECTION, SOLUTION INTRAVENOUS at 13:51

## 2018-09-13 RX ADMIN — ADO-TRASTUZUMAB EMTANSINE 219.6 MG: 20 INJECTION, POWDER, LYOPHILIZED, FOR SOLUTION INTRAVENOUS at 14:43

## 2018-09-13 RX ADMIN — DENOSUMAB 120 MG: 120 INJECTION SUBCUTANEOUS at 15:51

## 2018-09-13 RX ADMIN — DEXAMETHASONE SODIUM PHOSPHATE: 10 INJECTION, SOLUTION INTRAMUSCULAR; INTRAVENOUS at 14:11

## 2018-09-13 RX ADMIN — HEPARIN 300 UNITS: 100 SYRINGE at 16:22

## 2018-09-13 NOTE — PROGRESS NOTES
Patient tolerated her chemo without any adverse reactions  Next appointment confirmed and she declined avs  xgeva given in left arm and she tolerated it well   Calcium of 8 6 meets parameters

## 2018-09-13 NOTE — PROGRESS NOTES
Patient is here for chemo  She offers no complaints at this time  She hasnt had labs drawn since 9/5/18  Notified Carlos Prasad and she stated they were ok to use for todays treatment  She was also notified of her ALK Phos of 146  She stated she is still ok for chemo today

## 2018-09-13 NOTE — PLAN OF CARE
Problem: Potential for Falls  Goal: Patient will remain free of falls  INTERVENTIONS:  - Assess patient frequently for physical needs  -  Identify cognitive and physical deficits and behaviors that affect risk of falls    -  [place] fall precautions as indicated by assessment   - Educate patient/family on patient safety including physical limitations  - Instruct patient to call for assistance with activity based on assessment  - Modify environment to reduce risk of injury  - Consider OT/PT consult to assist with strengthening/mobility   Outcome: Progressing

## 2018-09-24 ENCOUNTER — OFFICE VISIT (OUTPATIENT)
Dept: PULMONOLOGY | Facility: CLINIC | Age: 58
End: 2018-09-24
Payer: COMMERCIAL

## 2018-09-24 ENCOUNTER — TELEPHONE (OUTPATIENT)
Dept: HEMATOLOGY ONCOLOGY | Facility: CLINIC | Age: 58
End: 2018-09-24

## 2018-09-24 VITALS
BODY MASS INDEX: 22.76 KG/M2 | HEART RATE: 92 BPM | DIASTOLIC BLOOD PRESSURE: 64 MMHG | OXYGEN SATURATION: 98 % | TEMPERATURE: 98 F | SYSTOLIC BLOOD PRESSURE: 116 MMHG | HEIGHT: 65 IN | WEIGHT: 136.6 LBS

## 2018-09-24 DIAGNOSIS — R93.89 ABNORMAL CT OF THE CHEST: ICD-10-CM

## 2018-09-24 DIAGNOSIS — J90 BILATERAL PLEURAL EFFUSION: Primary | ICD-10-CM

## 2018-09-24 DIAGNOSIS — Z72.0 TOBACCO ABUSE: ICD-10-CM

## 2018-09-24 DIAGNOSIS — R06.02 SOB (SHORTNESS OF BREATH): ICD-10-CM

## 2018-09-24 PROCEDURE — 99214 OFFICE O/P EST MOD 30 MIN: CPT | Performed by: NURSE PRACTITIONER

## 2018-09-24 NOTE — ASSESSMENT & PLAN NOTE
Alivia Fiore continues to smoke half a pack of cigarettes per day  She has contemplated quitting and has attempted many times, but has not been successful  She is not interested in discussing complete cessation today

## 2018-09-24 NOTE — TELEPHONE ENCOUNTER
Pt called  Has appt with Dr Idania Steiner on 9/26/18 but won't have labs done until 10/2/18 for chemo on 10/4/18  Asking to change appt  Rescheduled to 10/3/18

## 2018-09-24 NOTE — ASSESSMENT & PLAN NOTE
Currently on chemotherapy and following with Oncology  Appointment with Dr Dominguez White on September 26

## 2018-09-24 NOTE — ASSESSMENT & PLAN NOTE
Status post thoracentesis on June 8th for fluid cytology showing atypical cells but no conclusive malignancy  She had a CT of the chest in the beginning of August and a chest x-ray on August 20th  Pleural effusions are resolved on x-ray  She has no further symptoms, but is aware that if she has increased shortness of breath, we can repeat a chest x-ray to evaluate for reaccumulation of fluid

## 2018-09-24 NOTE — ASSESSMENT & PLAN NOTE
Bilateral reticular interstitial changes/ground-glass opacities on CT chest have improved as of the beginning of August    Differential diagnosis includes infectious versus inflammatory versus volume overload versus lymphangitic spread of cancer  She continues to be without any signs or symptoms of infection  Chemotherapy was recently changed to a new agent  Overall, these changes have improved and she will have repeat imaging in three months per Oncology per her report

## 2018-09-24 NOTE — ASSESSMENT & PLAN NOTE
Angy has intermittent shortness of breath, which quickly resolves with rest   We discussed the improvement in her CT chest abnormalities  We also discussed given her tobacco abuse the possibility of smoking induced lung disease  She is overwhelmed with her current medical care and was not interested in getting complete PFTs  I offered her in office spirometry today to determine if inhalers may be beneficial for her breathing  She declined this as well and will follow up in six months  If her symptoms of shortness of breath worsen, she will follow up sooner

## 2018-09-24 NOTE — PROGRESS NOTES
Pulmonary Follow-Up Note   Wendie Villar 66 y o  female MRN: [id number]  9/24/2018      Assessment/Plan:    Problem List Items Addressed This Visit        Respiratory    Bilateral pleural effusion - Primary     Status post thoracentesis on June 8th for fluid cytology showing atypical cells but no conclusive malignancy  She had a CT of the chest in the beginning of August and a chest x-ray on August 20th  Pleural effusions are resolved on x-ray  She has no further symptoms, but is aware that if she has increased shortness of breath, we can repeat a chest x-ray to evaluate for reaccumulation of fluid  Other    Abnormal CT of the chest     Bilateral reticular interstitial changes/ground-glass opacities on CT chest have improved as of the beginning of August    Differential diagnosis includes infectious versus inflammatory versus volume overload versus lymphangitic spread of cancer  She continues to be without any signs or symptoms of infection  Chemotherapy was recently changed to a new agent  Overall, these changes have improved and she will have repeat imaging in three months per Oncology per her report  SOB (shortness of breath)     Angy has intermittent shortness of breath, which quickly resolves with rest   We discussed the improvement in her CT chest abnormalities  We also discussed given her tobacco abuse the possibility of smoking induced lung disease  She is overwhelmed with her current medical care and was not interested in getting complete PFTs  I offered her in office spirometry today to determine if inhalers may be beneficial for her breathing  She declined this as well and will follow up in six months  If her symptoms of shortness of breath worsen, she will follow up sooner  Tobacco abuse     Angy continues to smoke half a pack of cigarettes per day  She has contemplated quitting and has attempted many times, but has not been successful    She is not interested in discussing complete cessation today  Return in about 6 months (around 3/24/2019), or if symptoms worsen or fail to improve  All of Angy's questions were answered prior to leaving the office today  She will follow-up with Dr Ankur Diehl in six months or sooner should the need arise  She is aware to call our office with any further questions or concerns  Her daughter was present today and is agreeable with plans  Case discussed with Dr Ankur Diehl who agrees with plans as outlined  History of Present Illness   Reason for Visit:   Follow-up  Chief Complaint:   I feel okay    HPI: Eliel Chahal is a 66 y o  female who presents to the office today for follow-up of pleural effusions  Stephanie Barcenas reports that overall she has been doing well since she was seen by Dr Ankur Diehl and had thoracentesis by Dr Paul Leo on June 8th  Her shortness of breath is improved and is now intermittent  It resolved quickly with rest   She has an occasional cough productive of clear mucus, but this is not new for her  She denies any fevers, chills, or night sweats  She denies any chest pains or tightness  Her weight has been fairly stable  She was recently switched to a new chemotherapy and will be following up with Oncology in two days  She follows with palliative care and her symptoms seem to be fairly well controlled per her report  She does not use any inhaled therapies or oxygen  She continues to smoke half a pack of cigarettes per day  Review of Systems   All other systems reviewed and are negative  A full 12-point review of systems was completed and is negative except for those outlined in the HPI      Historical Information   Past Medical History:   Diagnosis Date    H/O bilateral mastectomy 2/15/2016    Hypertension 2/15/2016    Lymphedema 2/15/2016    Malignant neoplasm of right breast ([address] ) 2/15/2016     Past Surgical History:   Procedure Laterality Date    ENDOBRONCHIAL ULTRASOUND (EBUS) N/A 2/16/2016    Procedure: EBUS;  Surgeon: Adia Linder MD;  [facility];  Service:     MASTECTOMY Bilateral     MASTECTOMY Bilateral     right arm edema    OOPHORECTOMY      NE BRONCHOSCOPY NEEDLE BX TRACHEA MAIN STEM&/BRON N/A 2/16/2016    Procedure: Valentino Sravani;  Surgeon: Adia Linder MD;  [facility];  Service: Thoracic    NE INSJ TUNNELED CTR VAD W/SUBQ PORT AGE 5 YR/> N/A 7/24/2018    Procedure: INSERTION VENOUS PORT ( PORT-A-CATH) IR;  Surgeon: Alfredo Barnhart DO;  [facility];  Service: Interventional Radiology    NE STEREOTACTIC RADIOSURGERY, CRANIAL,SIMPLE,EA ADD  5/3/2017         NE STEREOTACTIC RADIOSURGERY, CRANIAL,SIMPLE,EA ADD  5/3/2017         NE STEREOTACTIC RADIOSURGERY, CRANIAL,SIMPLE,SINGLE  5/3/2017          Family History   Problem Relation Age of Onset    Cancer Sister     Prostate cancer Brother      Social History   History   Alcohol Use    Yes     Comment: social     History   Drug Use No     History   Smoking Status    Current Every Day Smoker    Packs/day: 0 50    Years: 40 00    Types: Cigarettes    Start date: 1978   Smokeless Tobacco    Never Used       Meds/Allergies     Current Outpatient Prescriptions:     dexamethasone (DECADRON) 2 mg tablet, Take 1 tablet (2 mg total) by mouth daily with breakfast, Disp: 30 tablet, Rfl: 1    diazepam (VALIUM) 5 mg tablet, Take 1 tablet (5 mg total) by mouth daily at bedtime, Disp: 30 tablet, Rfl: 0    gabapentin (NEURONTIN) 300 mg capsule, 1 cap PO bid and 2 cap at bedtime, Disp: 120 capsule, Rfl: 0    [START ON 9/28/2018] HYDROmorphone (DILAUDID) 4 mg tablet, Take 1 tablet (4 mg total) by mouth every 4 (four) hours as needed (breakthrough pain) Max Daily Amount: 24 mg, Disp: 120 tablet, Rfl: 0    lidocaine-prilocaine (EMLA) cream, Apply topically as needed for mild pain, Disp: 30 g, Rfl: 0    ondansetron (ZOFRAN) 8 mg tablet, Take 1 tablet (8 mg total) by mouth every 8 (eight) hours as needed for nausea or vomiting, Disp: 30 tablet, Rfl: 1    promethazine (PHENERGAN) 25 mg tablet, Take 1 tablet (25 mg total) by mouth every 6 (six) hours as needed for nausea or vomiting, Disp: 60 tablet, Rfl: 0    senna (SENOKOT) 8 6 mg, Take 2 tablets by mouth 2 (two) times a day as needed  , Disp: , Rfl:   No Known Allergies    Vitals: Blood pressure 116/64, pulse 92, temperature 98 °F (36 7 °C), temperature source Tympanic, height 5' 5" (1 651 m), weight 62 kg (136 lb 9 6 oz), SpO2 98 %, not currently breastfeeding  Body mass index is 22 73 kg/m²  Oxygen Therapy  SpO2: 98 %  Oxygen Therapy: None (Room air)    Physical Exam:  Physical Exam   Constitutional: She is oriented to person, place, and time  She appears well-developed  She is cooperative  Non-toxic appearance  No distress  HENT:   Head: Normocephalic and atraumatic  Mouth/Throat: No oropharyngeal exudate  Eyes: EOM are normal  No scleral icterus  Neck: Neck supple  No JVD present  No tracheal deviation present  Cardiovascular: Normal rate, regular rhythm, S1 normal and S2 normal   Exam reveals no gallop and no friction rub  No murmur heard  Pulmonary/Chest: Effort normal and breath sounds normal  No accessory muscle usage or stridor  No tachypnea  No respiratory distress  She has no decreased breath sounds  She has no wheezes  She has no rhonchi  She has no rales  She exhibits no tenderness  Abdominal: Soft  Bowel sounds are normal  She exhibits distension  There is no tenderness  There is no rebound and no guarding  Musculoskeletal: She exhibits no edema  Neurological: She is alert and oriented to person, place, and time  She has normal strength  No sensory deficit  GCS eye subscore is 4  GCS verbal subscore is 5  GCS motor subscore is 6  Skin: Skin is warm and dry  No abrasion, no ecchymosis, no lesion and no rash noted  She is not diaphoretic  No cyanosis or erythema  Psychiatric: She has a normal mood and affect   Her speech is normal and behavior is normal    Vitals reviewed  Labs:   Pleural fluid cytology from June 8th shows atypical cells, but no conclusive malignancy    Imaging and other studies: I have personally reviewed pertinent reports   , I have personally reviewed pertinent films in PACS and CT of the chest shows improved bilateral pleural effusions and improved reticular/interstitial changes  Chest x-ray on August 20th shows complete resolution of pleural effusions  Pulmonary Results (PFTs, PSG): None    FAUZIA Lozano  [facility]        Portions of the record may have been created with voice recognition software  Occasional wrong word or "sound a like" substitutions may have occurred due to the inherent limitations of voice recognition software  Read the chart carefully and recognize, using context, where substitutions have occurred or contact the dictating provider

## 2018-10-02 ENCOUNTER — HOSPITAL ENCOUNTER (OUTPATIENT)
Dept: INFUSION CENTER | Facility: CLINIC | Age: 58
Discharge: HOME | End: 2018-10-02
Payer: COMMERCIAL

## 2018-10-02 DIAGNOSIS — C79.49 SECONDARY MALIGNANT NEOPLASM OF BRAIN AND SPINAL CORD (HCC): ICD-10-CM

## 2018-10-02 DIAGNOSIS — C50.119 MALIGNANT NEOPLASM OF CENTRAL PORTION OF FEMALE BREAST, UNSPECIFIED ESTROGEN RECEPTOR STATUS, UNSPECIFIED LATERALITY (HCC): ICD-10-CM

## 2018-10-02 DIAGNOSIS — C79.51 SECONDARY MALIGNANT NEOPLASM OF BONE AND BONE MARROW (HCC): ICD-10-CM

## 2018-10-02 DIAGNOSIS — C79.52 SECONDARY MALIGNANT NEOPLASM OF BONE AND BONE MARROW (HCC): ICD-10-CM

## 2018-10-02 DIAGNOSIS — C79.31 SECONDARY MALIGNANT NEOPLASM OF BRAIN AND SPINAL CORD (HCC): ICD-10-CM

## 2018-10-02 LAB
ALBUMIN SERPL BCP-MCNC: 3.6 G/DL (ref 3.5–5)
ALP SERPL-CCNC: 90 U/L (ref 46–116)
ALT SERPL W P-5'-P-CCNC: 33 U/L (ref 12–78)
ANION GAP SERPL CALCULATED.3IONS-SCNC: 11 MMOL/L (ref 4–13)
AST SERPL W P-5'-P-CCNC: 21 U/L (ref 5–45)
BASOPHILS # BLD AUTO: 0.05 THOUSANDS/ΜL (ref 0–0.1)
BASOPHILS NFR BLD AUTO: 0 % (ref 0–1)
BILIRUB SERPL-MCNC: 0.2 MG/DL (ref 0.2–1)
BUN SERPL-MCNC: 11 MG/DL (ref 5–25)
CALCIUM SERPL-MCNC: 9.1 MG/DL (ref 8.3–10.1)
CANCER AG27-29 SERPL-ACNC: 105.7 U/ML (ref 0–42.3)
CHLORIDE SERPL-SCNC: 105 MMOL/L (ref 100–108)
CO2 SERPL-SCNC: 25 MMOL/L (ref 21–32)
CREAT SERPL-MCNC: 0.69 MG/DL (ref 0.6–1.3)
EOSINOPHIL # BLD AUTO: 0.02 THOUSAND/ΜL (ref 0–0.61)
EOSINOPHIL NFR BLD AUTO: 0 % (ref 0–6)
ERYTHROCYTE [DISTWIDTH] IN BLOOD BY AUTOMATED COUNT: 17.6 % (ref 11.6–15.1)
GFR SERPL CREATININE-BSD FRML MDRD: 98 ML/MIN/1.73SQ M
GLUCOSE SERPL-MCNC: 143 MG/DL (ref 65–140)
HCT VFR BLD AUTO: 37.5 % (ref 34.8–46.1)
HGB BLD-MCNC: 11.6 G/DL (ref 11.5–15.4)
IMM GRANULOCYTES # BLD AUTO: 0.05 THOUSAND/UL (ref 0–0.2)
IMM GRANULOCYTES NFR BLD AUTO: 0 % (ref 0–2)
LYMPHOCYTES # BLD AUTO: 1 THOUSANDS/ΜL (ref 0.6–4.47)
LYMPHOCYTES NFR BLD AUTO: 8 % (ref 14–44)
MCH RBC QN AUTO: 28.4 PG (ref 26.8–34.3)
MCHC RBC AUTO-ENTMCNC: 30.9 G/DL (ref 31.4–37.4)
MCV RBC AUTO: 92 FL (ref 82–98)
MONOCYTES # BLD AUTO: 0.44 THOUSAND/ΜL (ref 0.17–1.22)
MONOCYTES NFR BLD AUTO: 4 % (ref 4–12)
NEUTROPHILS # BLD AUTO: 11.15 THOUSANDS/ΜL (ref 1.85–7.62)
NEUTS SEG NFR BLD AUTO: 88 % (ref 43–75)
NRBC BLD AUTO-RTO: 0 /100 WBCS
PLATELET # BLD AUTO: 478 THOUSANDS/UL (ref 149–390)
PMV BLD AUTO: 8.9 FL (ref 8.9–12.7)
POTASSIUM SERPL-SCNC: 4 MMOL/L (ref 3.5–5.3)
PROT SERPL-MCNC: 7.8 G/DL (ref 6.4–8.2)
RBC # BLD AUTO: 4.09 MILLION/UL (ref 3.81–5.12)
SODIUM SERPL-SCNC: 141 MMOL/L (ref 136–145)
WBC # BLD AUTO: 12.71 THOUSAND/UL (ref 4.31–10.16)

## 2018-10-02 PROCEDURE — 86300 IMMUNOASSAY TUMOR CA 15-3: CPT

## 2018-10-02 PROCEDURE — 80053 COMPREHEN METABOLIC PANEL: CPT

## 2018-10-02 PROCEDURE — 85025 COMPLETE CBC W/AUTO DIFF WBC: CPT

## 2018-10-02 RX ADMIN — HEPARIN 300 UNITS: 100 SYRINGE at 11:51

## 2018-10-03 ENCOUNTER — OFFICE VISIT (OUTPATIENT)
Dept: HEMATOLOGY ONCOLOGY | Facility: CLINIC | Age: 58
End: 2018-10-03
Payer: COMMERCIAL

## 2018-10-03 VITALS
RESPIRATION RATE: 17 BRPM | BODY MASS INDEX: 20.81 KG/M2 | SYSTOLIC BLOOD PRESSURE: 122 MMHG | WEIGHT: 129.5 LBS | HEIGHT: 66 IN | HEART RATE: 87 BPM | OXYGEN SATURATION: 97 % | TEMPERATURE: 98.3 F | DIASTOLIC BLOOD PRESSURE: 68 MMHG

## 2018-10-03 DIAGNOSIS — C50.912 BILATERAL MALIGNANT NEOPLASM OF BREAST IN FEMALE, ESTROGEN RECEPTOR POSITIVE, UNSPECIFIED SITE OF BREAST (HCC): Primary | ICD-10-CM

## 2018-10-03 DIAGNOSIS — Z17.0 BILATERAL MALIGNANT NEOPLASM OF BREAST IN FEMALE, ESTROGEN RECEPTOR POSITIVE, UNSPECIFIED SITE OF BREAST (HCC): Primary | ICD-10-CM

## 2018-10-03 DIAGNOSIS — C50.911 BILATERAL MALIGNANT NEOPLASM OF BREAST IN FEMALE, ESTROGEN RECEPTOR POSITIVE, UNSPECIFIED SITE OF BREAST (HCC): Primary | ICD-10-CM

## 2018-10-03 DIAGNOSIS — G89.3 CANCER RELATED PAIN: ICD-10-CM

## 2018-10-03 DIAGNOSIS — G47.9 DIFFICULTY SLEEPING: ICD-10-CM

## 2018-10-03 DIAGNOSIS — M51.26 HERNIATED LUMBAR INTERVERTEBRAL DISC: ICD-10-CM

## 2018-10-03 PROCEDURE — 99215 OFFICE O/P EST HI 40 MIN: CPT | Performed by: INTERNAL MEDICINE

## 2018-10-03 RX ORDER — DIAZEPAM 5 MG/1
5 TABLET ORAL
Qty: 30 TABLET | Refills: 0 | Status: SHIPPED | OUTPATIENT
Start: 2018-10-03 | End: 2018-10-25 | Stop reason: SDUPTHER

## 2018-10-03 RX ORDER — PALONOSETRON 0.05 MG/ML
0.25 INJECTION, SOLUTION INTRAVENOUS ONCE
Status: COMPLETED | OUTPATIENT
Start: 2018-10-04 | End: 2018-10-04

## 2018-10-03 RX ORDER — HYDROMORPHONE HYDROCHLORIDE 4 MG/1
4 TABLET ORAL EVERY 4 HOURS PRN
Qty: 120 TABLET | Refills: 0 | Status: SHIPPED | OUTPATIENT
Start: 2018-10-03 | End: 2018-12-07 | Stop reason: SDUPTHER

## 2018-10-03 RX ORDER — SODIUM CHLORIDE 9 MG/ML
20 INJECTION, SOLUTION INTRAVENOUS CONTINUOUS
Status: DISCONTINUED | OUTPATIENT
Start: 2018-10-04 | End: 2018-10-07 | Stop reason: HOSPADM

## 2018-10-03 NOTE — PROGRESS NOTES
[id number] [facility] [address] [address]  [phone]    [address], [phone]  10/03/18    Discussion:   In summary, this is a 53-year-old female history advanced her 2 positive breast cancer  Clinically she is doing well  She did have some nausea lasting for 3 days or so after her 1st treatment of Kadcyla  This occurred despite taking Zofran on a p r n  basis  I would discontinue Zofran, substitute Aloxi and Emend  She seems to have tolerated her treatment fairly well otherwise  Recent CBC shows mild leukocytosis and thrombocytosis  Hemoglobin normal  Chemistry normal   Tumor marker has increased slightly over the past month, currently 105  This is interesting given that the visual appearance of her right axillary cutaneous lesions has actually improved with decreased size, erythema, etc   I would proceed with 2 more treatments, Q 3 weeks and repeat tumor marker prior to her next visit in 6 weeks  I made arrangements for renewal of her pain medications which are working fairly well at this time  I discussed the above with the patient  The patient and her daughter voiced understanding and agreement   ______________________________________________________________________    Chief Complaint   Patient presents with    Follow-up       HPI:     Bilateral malignant neoplasm of breast in female, estrogen receptor positive ([facility] [address] )    2010 Initial Diagnosis     The patient had a left sided T1 B , N0 ER positive, VA and HER-2 negative invasive ductal carcinoma, right-sided DCIS, ER/VA positive, HER-2 negative  Bilateral mastectomy  2010 - 4/2011 Hormone Therapy     Tamoxifen 20 mg daily  Last menstrual period was on 3/2010          4/2011 -  Hormone Therapy     Arimidex 1 mg daily  Unknown when medication was discontinued  Patient was lost to follow up          12/9/2015 Progression     · Right arm swelling in December 2015  U/S found a nonvascular structure in the right axilla measuring 2 2 x 1 3 cm  · Subsequent CT Scan of the chest on 12/9/15, showed multiple bilateral pulmonary nodules measuring 3-7 mm  · PET 2/4/16, which revealed hypermetabolic hilar and mediastinal nodes  There is a pre carinal node with a SUV of 5 4 measuring 1 8 x 1 7 cm, and a subcarinal node measuring 1 9 x 1 3 cm with a SUV of 5 3  There is right hilar activity of 3 5 and left hilar activity of 2 8  The subcentimeter nodules are too small for PET evaluation  She also has uptake in her L3 and L5 vertebral bodies, both concerning for bony metastases  2/16/2016 Biopsy     Bronchoscopic biopsy showed Metastatic non-small cell carcinoma, favor adenocarcinoma  ER 90%, CO 0%  Her 2 +2, positive by FISH            3/1/2016 - 6/2016 Chemotherapy     Taxotere 75 mg/m2, Day 1  Perjeta 420 mg, Day 1  Herceptin 6 mg/kg, Day 1  Cycle length= 21 days         3/1/2016 - 2/2017 Hormone Therapy     Anastrozole 1 mg daily         6/2016 - 2/2017 Chemotherapy     Perjeta 420 mg, Day 1  Herceptin 6 mg/kg, Day 1  Cycle length= 21 days         2/2017 Progression     Bony progression          2/10/2017 - 4/12/2018 Chemotherapy     Perjeta 420 mg, Day 1  Herceptin 6 mg/kg, Day 1  Taxotere 75 mg/m2  Cycle length= 21 days         4/18/2017 Progression      brain MRI showed a 5 mm right parietal lesion, 3 mm right frontal lesion, 3 mm left parahippocampal lesion  5/2017 - 5/2017 Radiation     SRS to brain lesions         5/3/2018 - 8/2018 Chemotherapy     Perjeta 420 mg,  Day 1  Herceptin 6 mg/kg,  Day 1  Cycle length = 21 days  Cycle 3 was administered on 6/14/18    ** taxotere was temporarily discontinued secondary for desire of treatment holiday  Taxotere was readded into the treatment regimen during last cycle  9/5/2018 Progression     Cutaneous disease progression  Interval History:  Clinically stable    1 - Symptomatic but completely ambulatory    Review of Systems   Constitutional: Negative for appetite change, diaphoresis, fatigue and fever  HENT: Negative for sinus pain  Eyes: Negative for discharge  Respiratory: Negative for cough and shortness of breath  Cardiovascular: Negative for chest pain  Gastrointestinal: Negative for abdominal pain, constipation and diarrhea  Endocrine: Negative for cold intolerance  Genitourinary: Negative for difficulty urinating and hematuria  Musculoskeletal: Negative for joint swelling  Skin: Negative for rash  Allergic/Immunologic: Negative for environmental allergies  Neurological: Negative for dizziness and headaches  Hematological: Negative for adenopathy  Psychiatric/Behavioral: Negative for agitation         Past Medical History:   Diagnosis Date    H/O bilateral mastectomy 2/15/2016    Hypertension 2/15/2016    Lymphedema 2/15/2016    Malignant neoplasm of right breast ([facility] [address] ) 2/15/2016     Patient Active Problem List   Diagnosis    H/O bilateral mastectomy    Lymphedema of right upper extremity    Pulmonary nodule    Bilateral malignant neoplasm of breast in female, estrogen receptor positive ([facility] [address] )    Bone metastases ([facility] [address] )    Brain metastases ([facility] [address] )    Cancer related pain    Chemotherapy-induced nausea    Constipation    Decreased appetite    Difficulty sleeping    Dyspareunia, female    Herniated lumbar intervertebral disc    Mediastinal lymphadenopathy    Hyperglycemia    Other fatigue    Dry skin    Dizziness    Bilateral pleural effusion    Abnormal CT of the chest    SOB (shortness of breath)    Tobacco abuse       Current Outpatient Prescriptions:     dexamethasone (DECADRON) 2 mg tablet, Take 1 tablet (2 mg total) by mouth daily with breakfast, Disp: 30 tablet, Rfl: 1    diazepam (VALIUM) 5 mg tablet, Take 1 tablet (5 mg total) by mouth daily at bedtime, Disp: 30 tablet, Rfl: 0    gabapentin (NEURONTIN) 300 mg capsule, 1 cap PO bid and 2 cap at bedtime, Disp: 120 capsule, Rfl: 0    HYDROmorphone (DILAUDID) 4 mg tablet, Take 1 tablet (4 mg total) by mouth every 4 (four) hours as needed (breakthrough pain) Max Daily Amount: 24 mg, Disp: 120 tablet, Rfl: 0    lidocaine-prilocaine (EMLA) cream, Apply topically as needed for mild pain, Disp: 30 g, Rfl: 0    ondansetron (ZOFRAN) 8 mg tablet, Take 1 tablet (8 mg total) by mouth every 8 (eight) hours as needed for nausea or vomiting, Disp: 30 tablet, Rfl: 1    promethazine (PHENERGAN) 25 mg tablet, Take 1 tablet (25 mg total) by mouth every 6 (six) hours as needed for nausea or vomiting, Disp: 60 tablet, Rfl: 0    senna (SENOKOT) 8 6 mg, Take 2 tablets by mouth 2 (two) times a day as needed  , Disp: , Rfl:   No current facility-administered medications for this visit       Facility-Administered Medications Ordered in Other Visits:     alteplase (CATHFLO) injection 2 mg, 2 mg, Intracatheter, PRN, Alysa Madrid PA-C    heparin lock flush 100 units/mL injection 300 Units, 300 Units, Intracatheter, PRN, Danielle Alicia PA-C, 300 Units at 10/02/18 1151  No Known Allergies  Past Surgical History:   Procedure Laterality Date    ENDOBRONCHIAL ULTRASOUND (EBUS) N/A 2/16/2016    Procedure: EBUS;  Surgeon: Cecil Abreu MD;  [facility];  Service:     MASTECTOMY Bilateral     MASTECTOMY Bilateral     right arm edema    OOPHORECTOMY      ND BRONCHOSCOPY NEEDLE BX TRACHEA MAIN STEM&/BRON N/A 2/16/2016    Procedure: Russell Nye;  Surgeon: Cecil Abreu MD;  [facility];  Service: Thoracic    ND INSJ TUNNELED CTR VAD W/SUBQ PORT AGE 5 YR/> N/A 7/24/2018    Procedure: INSERTION VENOUS PORT ( PORT-A-CATH) IR;  Surgeon: Norm Raymond DO;  [facility];  Service: Interventional Radiology    ND STEREOTACTIC RADIOSURGERY, CRANIAL,SIMPLE,EA ADD  5/3/2017         ND STEREOTACTIC RADIOSURGERY, CRANIAL,SIMPLE,EA ADD  5/3/2017         ND STEREOTACTIC RADIOSURGERY, CRANIAL,SIMPLE,SINGLE  5/3/2017          Social History     Objective:  Vitals:    10/03/18 1350   BP: 122/68   BP Location: Left arm   Patient Position: Sitting   Pulse: 87   Resp: 17   Temp: 98 3 °F (36 8 °C)   TempSrc: Tympanic   SpO2: 97%   Weight: 58 7 kg (129 lb 8 oz)   Height: 5' 5 6" (1 666 m)     Physical Exam   Constitutional: She is oriented to person, place, and time  She appears well-developed  HENT:   Head: Normocephalic  Eyes: Pupils are equal, round, and reactive to light  Neck: Neck supple  Cardiovascular: Normal rate  No murmur heard  Pulmonary/Chest: No respiratory distress  She has no wheezes  She has no rales  Abdominal: Soft  She exhibits no distension  There is no tenderness  There is no rebound  Musculoskeletal: She exhibits no edema  Lymphadenopathy:     She has no cervical adenopathy  Neurological: She is alert and oriented to person, place, and time  She displays normal reflexes  Skin: Skin is warm  No rash noted  Psychiatric: She has a normal mood and affect  Thought content normal          Labs: I personally reviewed the labs and imaging pertinent to this patient care

## 2018-10-04 ENCOUNTER — HOSPITAL ENCOUNTER (OUTPATIENT)
Dept: INFUSION CENTER | Facility: CLINIC | Age: 58
Discharge: HOME | End: 2018-10-04
Payer: COMMERCIAL

## 2018-10-04 VITALS
DIASTOLIC BLOOD PRESSURE: 62 MMHG | SYSTOLIC BLOOD PRESSURE: 118 MMHG | TEMPERATURE: 98.8 F | OXYGEN SATURATION: 98 % | WEIGHT: 131 LBS | HEART RATE: 75 BPM | RESPIRATION RATE: 18 BRPM | BODY MASS INDEX: 21.4 KG/M2

## 2018-10-04 PROCEDURE — 96367 TX/PROPH/DG ADDL SEQ IV INF: CPT

## 2018-10-04 PROCEDURE — 96413 CHEMO IV INFUSION 1 HR: CPT

## 2018-10-04 PROCEDURE — 96375 TX/PRO/DX INJ NEW DRUG ADDON: CPT

## 2018-10-04 RX ADMIN — SODIUM CHLORIDE 20 ML/HR: 0.9 INJECTION, SOLUTION INTRAVENOUS at 09:15

## 2018-10-04 RX ADMIN — ADO-TRASTUZUMAB EMTANSINE 219.6 MG: 20 INJECTION, POWDER, LYOPHILIZED, FOR SOLUTION INTRAVENOUS at 10:32

## 2018-10-04 RX ADMIN — DEXAMETHASONE SODIUM PHOSPHATE 10 MG: 10 INJECTION, SOLUTION INTRAMUSCULAR; INTRAVENOUS at 09:22

## 2018-10-04 RX ADMIN — HEPARIN 300 UNITS: 100 SYRINGE at 11:17

## 2018-10-04 RX ADMIN — PALONOSETRON 0.25 MG: 0.05 INJECTION, SOLUTION INTRAVENOUS at 09:22

## 2018-10-04 RX ADMIN — SODIUM CHLORIDE 150 MG: 0.9 INJECTION, SOLUTION INTRAVENOUS at 09:53

## 2018-10-04 NOTE — PLAN OF CARE
Problem: Potential for Falls  Goal: Patient will remain free of falls  INTERVENTIONS:  - Assess patient frequently for physical needs  -  Identify cognitive and physical deficits and behaviors that affect risk of falls  -  [place] fall precautions as indicated by assessment   - Educate patient/family on patient safety including physical limitations  - Instruct patient to call for assistance with activity based on assessment  - Modify environment to reduce risk of injury  - Consider OT/PT consult to assist with strengthening/mobility   Outcome: Progressing      Problem: SAFETY ADULT  Goal: Patient will remain free of falls  INTERVENTIONS:  - Assess patient frequently for physical needs  -  Identify cognitive and physical deficits and behaviors that affect risk of falls  -  [place] fall precautions as indicated by assessment   - Educate patient/family on patient safety including physical limitations  - Instruct patient to call for assistance with activity based on assessment  - Modify environment to reduce risk of injury  - Consider OT/PT consult to assist with strengthening/mobility   Outcome: Progressing      Problem: Knowledge Deficit  Goal: Patient/family/caregiver demonstrates understanding of disease process, treatment plan, medications, and discharge instructions  Complete learning assessment and assess knowledge base    Interventions:  - Provide teaching at level of understanding  - Provide teaching via preferred learning methods  Outcome: Progressing

## 2018-10-04 NOTE — PROGRESS NOTES
Patient tolerated treatment today without issue  This was patients second dose of Kadcyla and tolerated the increased rate over 30 minutes  Verified patients upcoming appointments   Declined AVS

## 2018-10-08 ENCOUNTER — TELEPHONE (OUTPATIENT)
Dept: HEMATOLOGY ONCOLOGY | Facility: CLINIC | Age: 58
End: 2018-10-08

## 2018-10-08 NOTE — TELEPHONE ENCOUNTER
Spoke with patient today - she reported minimal nausea after most recent chemo    She will call with any additional questions or concerns

## 2018-10-23 ENCOUNTER — HOSPITAL ENCOUNTER (OUTPATIENT)
Dept: INFUSION CENTER | Facility: CLINIC | Age: 58
Discharge: HOME | End: 2018-10-23
Payer: COMMERCIAL

## 2018-10-23 DIAGNOSIS — C50.912 BILATERAL MALIGNANT NEOPLASM OF BREAST IN FEMALE, ESTROGEN RECEPTOR POSITIVE, UNSPECIFIED SITE OF BREAST (HCC): ICD-10-CM

## 2018-10-23 DIAGNOSIS — Z17.0 BILATERAL MALIGNANT NEOPLASM OF BREAST IN FEMALE, ESTROGEN RECEPTOR POSITIVE, UNSPECIFIED SITE OF BREAST (HCC): ICD-10-CM

## 2018-10-23 DIAGNOSIS — C50.911 BILATERAL MALIGNANT NEOPLASM OF BREAST IN FEMALE, ESTROGEN RECEPTOR POSITIVE, UNSPECIFIED SITE OF BREAST (HCC): ICD-10-CM

## 2018-10-23 LAB
ALBUMIN SERPL BCP-MCNC: 3 G/DL (ref 3.5–5)
ALP SERPL-CCNC: 125 U/L (ref 46–116)
ALT SERPL W P-5'-P-CCNC: 39 U/L (ref 12–78)
ANION GAP SERPL CALCULATED.3IONS-SCNC: 8 MMOL/L (ref 4–13)
AST SERPL W P-5'-P-CCNC: 29 U/L (ref 5–45)
BASOPHILS # BLD AUTO: 0.02 THOUSANDS/ΜL (ref 0–0.1)
BASOPHILS NFR BLD AUTO: 0 % (ref 0–1)
BILIRUB SERPL-MCNC: 0.2 MG/DL (ref 0.2–1)
BUN SERPL-MCNC: 6 MG/DL (ref 5–25)
CALCIUM SERPL-MCNC: 9 MG/DL (ref 8.3–10.1)
CHLORIDE SERPL-SCNC: 106 MMOL/L (ref 100–108)
CO2 SERPL-SCNC: 28 MMOL/L (ref 21–32)
CREAT SERPL-MCNC: 0.49 MG/DL (ref 0.6–1.3)
EOSINOPHIL # BLD AUTO: 0.12 THOUSAND/ΜL (ref 0–0.61)
EOSINOPHIL NFR BLD AUTO: 2 % (ref 0–6)
ERYTHROCYTE [DISTWIDTH] IN BLOOD BY AUTOMATED COUNT: 16.5 % (ref 11.6–15.1)
GFR SERPL CREATININE-BSD FRML MDRD: 109 ML/MIN/1.73SQ M
GLUCOSE SERPL-MCNC: 102 MG/DL (ref 65–140)
HCT VFR BLD AUTO: 35.4 % (ref 34.8–46.1)
HGB BLD-MCNC: 11 G/DL (ref 11.5–15.4)
IMM GRANULOCYTES # BLD AUTO: 0.01 THOUSAND/UL (ref 0–0.2)
IMM GRANULOCYTES NFR BLD AUTO: 0 % (ref 0–2)
LYMPHOCYTES # BLD AUTO: 1.28 THOUSANDS/ΜL (ref 0.6–4.47)
LYMPHOCYTES NFR BLD AUTO: 26 % (ref 14–44)
MCH RBC QN AUTO: 29.2 PG (ref 26.8–34.3)
MCHC RBC AUTO-ENTMCNC: 31.1 G/DL (ref 31.4–37.4)
MCV RBC AUTO: 94 FL (ref 82–98)
MONOCYTES # BLD AUTO: 0.58 THOUSAND/ΜL (ref 0.17–1.22)
MONOCYTES NFR BLD AUTO: 12 % (ref 4–12)
NEUTROPHILS # BLD AUTO: 3.01 THOUSANDS/ΜL (ref 1.85–7.62)
NEUTS SEG NFR BLD AUTO: 60 % (ref 43–75)
NRBC BLD AUTO-RTO: 0 /100 WBCS
PLATELET # BLD AUTO: 417 THOUSANDS/UL (ref 149–390)
PMV BLD AUTO: 9 FL (ref 8.9–12.7)
POTASSIUM SERPL-SCNC: 3.6 MMOL/L (ref 3.5–5.3)
PROT SERPL-MCNC: 7.5 G/DL (ref 6.4–8.2)
RBC # BLD AUTO: 3.77 MILLION/UL (ref 3.81–5.12)
SODIUM SERPL-SCNC: 142 MMOL/L (ref 136–145)
WBC # BLD AUTO: 5.02 THOUSAND/UL (ref 4.31–10.16)

## 2018-10-23 PROCEDURE — 80053 COMPREHEN METABOLIC PANEL: CPT

## 2018-10-23 PROCEDURE — 85025 COMPLETE CBC W/AUTO DIFF WBC: CPT

## 2018-10-23 RX ADMIN — HEPARIN 300 UNITS: 100 SYRINGE at 12:24

## 2018-10-23 NOTE — PLAN OF CARE
Problem: Potential for Falls  Goal: Patient will remain free of falls  INTERVENTIONS:  - Assess patient frequently for physical needs  -  Identify cognitive and physical deficits and behaviors that affect risk of falls    -  [place] fall precautions as indicated by assessment   - Educate patient/family on patient safety including physical limitations  - Instruct patient to call for assistance with activity based on assessment  - Modify environment to reduce risk of injury  - Consider OT/PT consult to assist with strengthening/mobility    Outcome: Progressing

## 2018-10-23 NOTE — PROGRESS NOTES
To clinic for port a cath lab draw in prep for treatment scheduled for 10/25  Pt states that she has been feeling very well over the last few days  She offers no complaints  Labs drawn as per orders

## 2018-10-24 RX ORDER — PALONOSETRON 0.05 MG/ML
0.25 INJECTION, SOLUTION INTRAVENOUS ONCE
Status: COMPLETED | OUTPATIENT
Start: 2018-10-25 | End: 2018-10-25

## 2018-10-24 RX ORDER — SODIUM CHLORIDE 9 MG/ML
20 INJECTION, SOLUTION INTRAVENOUS CONTINUOUS
Status: DISCONTINUED | OUTPATIENT
Start: 2018-10-25 | End: 2018-10-28 | Stop reason: HOSPADM

## 2018-10-25 ENCOUNTER — HOSPITAL ENCOUNTER (OUTPATIENT)
Dept: INFUSION CENTER | Facility: CLINIC | Age: 58
Discharge: HOME | End: 2018-10-25
Payer: COMMERCIAL

## 2018-10-25 VITALS
WEIGHT: 142.31 LBS | SYSTOLIC BLOOD PRESSURE: 117 MMHG | RESPIRATION RATE: 20 BRPM | TEMPERATURE: 97.8 F | HEART RATE: 84 BPM | BODY MASS INDEX: 23.25 KG/M2 | DIASTOLIC BLOOD PRESSURE: 55 MMHG

## 2018-10-25 DIAGNOSIS — R63.0 DECREASED APPETITE: ICD-10-CM

## 2018-10-25 DIAGNOSIS — R53.83 OTHER FATIGUE: ICD-10-CM

## 2018-10-25 DIAGNOSIS — R11.0 CHEMOTHERAPY-INDUCED NAUSEA: ICD-10-CM

## 2018-10-25 DIAGNOSIS — T45.1X5A CHEMOTHERAPY-INDUCED NAUSEA: ICD-10-CM

## 2018-10-25 DIAGNOSIS — G89.3 CANCER RELATED PAIN: ICD-10-CM

## 2018-10-25 DIAGNOSIS — M51.26 HERNIATED LUMBAR INTERVERTEBRAL DISC: ICD-10-CM

## 2018-10-25 DIAGNOSIS — G47.9 DIFFICULTY SLEEPING: ICD-10-CM

## 2018-10-25 PROCEDURE — 96367 TX/PROPH/DG ADDL SEQ IV INF: CPT

## 2018-10-25 PROCEDURE — 96413 CHEMO IV INFUSION 1 HR: CPT

## 2018-10-25 PROCEDURE — 96375 TX/PRO/DX INJ NEW DRUG ADDON: CPT

## 2018-10-25 RX ORDER — DEXAMETHASONE 2 MG/1
2 TABLET ORAL
Qty: 30 TABLET | Refills: 2 | Status: SHIPPED | OUTPATIENT
Start: 2018-10-25 | End: 2019-02-07 | Stop reason: SDUPTHER

## 2018-10-25 RX ORDER — GABAPENTIN 300 MG/1
CAPSULE ORAL
Qty: 120 CAPSULE | Refills: 5 | Status: SHIPPED | OUTPATIENT
Start: 2018-10-25 | End: 2019-02-20 | Stop reason: SDUPTHER

## 2018-10-25 RX ORDER — DIAZEPAM 5 MG/1
5 TABLET ORAL
Qty: 30 TABLET | Refills: 2 | Status: SHIPPED | OUTPATIENT
Start: 2018-10-25 | End: 2019-02-07 | Stop reason: SDUPTHER

## 2018-10-25 RX ADMIN — HEPARIN 300 UNITS: 100 SYRINGE at 10:43

## 2018-10-25 RX ADMIN — ADO-TRASTUZUMAB EMTANSINE 213.8 MG: 20 INJECTION, POWDER, LYOPHILIZED, FOR SOLUTION INTRAVENOUS at 10:06

## 2018-10-25 RX ADMIN — SODIUM CHLORIDE 150 MG: 0.9 INJECTION, SOLUTION INTRAVENOUS at 09:32

## 2018-10-25 RX ADMIN — PALONOSETRON HYDROCHLORIDE 0.25 MG: 0.25 INJECTION, SOLUTION INTRAVENOUS at 09:26

## 2018-10-25 RX ADMIN — DEXAMETHASONE SODIUM PHOSPHATE 10 MG: 10 INJECTION, SOLUTION INTRAMUSCULAR; INTRAVENOUS at 09:07

## 2018-10-25 RX ADMIN — SODIUM CHLORIDE 20 ML/HR: 0.9 INJECTION, SOLUTION INTRAVENOUS at 08:40

## 2018-10-25 NOTE — PROGRESS NOTES
Patient tolerated treatment well and was discharged post   She offers no c/o and will RTO 11/15/18 for next cycle

## 2018-11-13 ENCOUNTER — HOSPITAL ENCOUNTER (OUTPATIENT)
Dept: INFUSION CENTER | Facility: CLINIC | Age: 58
Discharge: HOME | End: 2018-11-13
Payer: COMMERCIAL

## 2018-11-13 DIAGNOSIS — Z17.0 BILATERAL MALIGNANT NEOPLASM OF BREAST IN FEMALE, ESTROGEN RECEPTOR POSITIVE, UNSPECIFIED SITE OF BREAST (HCC): ICD-10-CM

## 2018-11-13 DIAGNOSIS — C50.911 BILATERAL MALIGNANT NEOPLASM OF BREAST IN FEMALE, ESTROGEN RECEPTOR POSITIVE, UNSPECIFIED SITE OF BREAST (HCC): ICD-10-CM

## 2018-11-13 DIAGNOSIS — C50.912 BILATERAL MALIGNANT NEOPLASM OF BREAST IN FEMALE, ESTROGEN RECEPTOR POSITIVE, UNSPECIFIED SITE OF BREAST (HCC): ICD-10-CM

## 2018-11-13 LAB
ALBUMIN SERPL BCP-MCNC: 3.2 G/DL (ref 3.5–5)
ALP SERPL-CCNC: 78 U/L (ref 46–116)
ALT SERPL W P-5'-P-CCNC: 27 U/L (ref 12–78)
ANION GAP SERPL CALCULATED.3IONS-SCNC: 9 MMOL/L (ref 4–13)
AST SERPL W P-5'-P-CCNC: 17 U/L (ref 5–45)
BASOPHILS # BLD AUTO: 0.02 THOUSANDS/ΜL (ref 0–0.1)
BASOPHILS NFR BLD AUTO: 0 % (ref 0–1)
BILIRUB SERPL-MCNC: 0.2 MG/DL (ref 0.2–1)
BUN SERPL-MCNC: 9 MG/DL (ref 5–25)
CALCIUM SERPL-MCNC: 8.9 MG/DL (ref 8.3–10.1)
CANCER AG27-29 SERPL-ACNC: 70.2 U/ML (ref 0–42.3)
CHLORIDE SERPL-SCNC: 107 MMOL/L (ref 100–108)
CO2 SERPL-SCNC: 27 MMOL/L (ref 21–32)
CREAT SERPL-MCNC: 0.6 MG/DL (ref 0.6–1.3)
EOSINOPHIL # BLD AUTO: 0.17 THOUSAND/ΜL (ref 0–0.61)
EOSINOPHIL NFR BLD AUTO: 2 % (ref 0–6)
ERYTHROCYTE [DISTWIDTH] IN BLOOD BY AUTOMATED COUNT: 17.2 % (ref 11.6–15.1)
GFR SERPL CREATININE-BSD FRML MDRD: 102 ML/MIN/1.73SQ M
GLUCOSE SERPL-MCNC: 92 MG/DL (ref 65–140)
HCT VFR BLD AUTO: 36 % (ref 34.8–46.1)
HGB BLD-MCNC: 11.3 G/DL (ref 11.5–15.4)
IMM GRANULOCYTES # BLD AUTO: 0.04 THOUSAND/UL (ref 0–0.2)
IMM GRANULOCYTES NFR BLD AUTO: 0 % (ref 0–2)
LYMPHOCYTES # BLD AUTO: 1.55 THOUSANDS/ΜL (ref 0.6–4.47)
LYMPHOCYTES NFR BLD AUTO: 16 % (ref 14–44)
MCH RBC QN AUTO: 29.4 PG (ref 26.8–34.3)
MCHC RBC AUTO-ENTMCNC: 31.4 G/DL (ref 31.4–37.4)
MCV RBC AUTO: 94 FL (ref 82–98)
MONOCYTES # BLD AUTO: 0.67 THOUSAND/ΜL (ref 0.17–1.22)
MONOCYTES NFR BLD AUTO: 7 % (ref 4–12)
NEUTROPHILS # BLD AUTO: 7.25 THOUSANDS/ΜL (ref 1.85–7.62)
NEUTS SEG NFR BLD AUTO: 75 % (ref 43–75)
NRBC BLD AUTO-RTO: 0 /100 WBCS
PLATELET # BLD AUTO: 415 THOUSANDS/UL (ref 149–390)
PMV BLD AUTO: 8.9 FL (ref 8.9–12.7)
POTASSIUM SERPL-SCNC: 3.5 MMOL/L (ref 3.5–5.3)
PROT SERPL-MCNC: 7.3 G/DL (ref 6.4–8.2)
RBC # BLD AUTO: 3.84 MILLION/UL (ref 3.81–5.12)
SODIUM SERPL-SCNC: 143 MMOL/L (ref 136–145)
WBC # BLD AUTO: 9.7 THOUSAND/UL (ref 4.31–10.16)

## 2018-11-13 PROCEDURE — 80053 COMPREHEN METABOLIC PANEL: CPT

## 2018-11-13 PROCEDURE — 85025 COMPLETE CBC W/AUTO DIFF WBC: CPT

## 2018-11-13 PROCEDURE — 86300 IMMUNOASSAY TUMOR CA 15-3: CPT

## 2018-11-13 RX ADMIN — HEPARIN 300 UNITS: 100 SYRINGE at 12:17

## 2018-11-13 NOTE — PLAN OF CARE
Problem: Potential for Falls  Goal: Patient will remain free of falls  INTERVENTIONS:  - Assess patient frequently for physical needs  -  Identify cognitive and physical deficits and behaviors that affect risk of falls    -  [place] fall precautions as indicated by assessment   - Educate patient/family on patient safety including physical limitations  - Instruct patient to call for assistance with activity based on assessment  - Modify environment to reduce risk of injury  - Consider OT/PT consult to assist with strengthening/mobility   Outcome: Progressing

## 2018-11-14 ENCOUNTER — OFFICE VISIT (OUTPATIENT)
Dept: HEMATOLOGY ONCOLOGY | Facility: CLINIC | Age: 58
End: 2018-11-14
Payer: COMMERCIAL

## 2018-11-14 VITALS
OXYGEN SATURATION: 97 % | WEIGHT: 142 LBS | HEIGHT: 66 IN | TEMPERATURE: 99.8 F | RESPIRATION RATE: 18 BRPM | SYSTOLIC BLOOD PRESSURE: 118 MMHG | DIASTOLIC BLOOD PRESSURE: 76 MMHG | BODY MASS INDEX: 22.82 KG/M2 | HEART RATE: 94 BPM

## 2018-11-14 DIAGNOSIS — C50.911 BILATERAL MALIGNANT NEOPLASM OF BREAST IN FEMALE, ESTROGEN RECEPTOR POSITIVE, UNSPECIFIED SITE OF BREAST (HCC): Primary | ICD-10-CM

## 2018-11-14 DIAGNOSIS — C50.912 BILATERAL MALIGNANT NEOPLASM OF BREAST IN FEMALE, ESTROGEN RECEPTOR POSITIVE, UNSPECIFIED SITE OF BREAST (HCC): Primary | ICD-10-CM

## 2018-11-14 DIAGNOSIS — Z17.0 BILATERAL MALIGNANT NEOPLASM OF BREAST IN FEMALE, ESTROGEN RECEPTOR POSITIVE, UNSPECIFIED SITE OF BREAST (HCC): Primary | ICD-10-CM

## 2018-11-14 PROCEDURE — 99214 OFFICE O/P EST MOD 30 MIN: CPT | Performed by: INTERNAL MEDICINE

## 2018-11-14 NOTE — PROGRESS NOTES
[facility] АНН[facility] [address] [place] [address]  [phone]    Gricelda Mao,1960, [id number]  11/14/18    Discussion:   In summary, this is a 82-year-old female history of advanced breast cancer  She started Kadcyla about 2 months ago  Tumor marker has decreased, previously 105, currently 70 0  Additionally she has had some improvement in her back pain  She still requires narcotic analgesics, however  Energy level is slightly improved  Cutaneous lesions in the right triceps region have flattened appreciably although they are still present  Altogether, we would have to say that she is improving and this is attributable to change in her medical therapy  Continuation of Kadcyla is recommended at her current dose and schedule  I discussed the above with the patient  The patient and her daughter voiced understanding and agreement   ______________________________________________________________________    Chief Complaint   Patient presents with    Follow-up       HPI:     Bilateral malignant neoplasm of breast in female, estrogen receptor positive ([facility] [address] )    2010 Initial Diagnosis     The patient had a left sided T1 B , N0 ER positive, DE and HER-2 negative invasive ductal carcinoma, right-sided DCIS, ER/DE positive, HER-2 negative  Bilateral mastectomy  2010 - 4/2011 Hormone Therapy     Tamoxifen 20 mg daily  Last menstrual period was on 3/2010          4/2011 -  Hormone Therapy     Arimidex 1 mg daily  Unknown when medication was discontinued  Patient was lost to follow up          12/9/2015 Progression     · Right arm swelling in December 2015  U/S found a nonvascular structure in the right axilla measuring 2 2 x 1 3 cm  · Subsequent CT Scan of the chest on 12/9/15, showed multiple bilateral pulmonary nodules measuring 3-7 mm  · PET 2/4/16, which revealed hypermetabolic hilar and mediastinal nodes   There is a pre carinal node with a SUV of 5 4 measuring 1 8 x 1 7 cm, and a subcarinal node measuring 1 9 x 1 3 cm with a SUV of 5 3  There is right hilar activity of 3 5 and left hilar activity of 2 8  The subcentimeter nodules are too small for PET evaluation  She also has uptake in her L3 and L5 vertebral bodies, both concerning for bony metastases  2/16/2016 Biopsy     Bronchoscopic biopsy showed Metastatic non-small cell carcinoma, favor adenocarcinoma  ER 90%, OK 0%  Her 2 +2, positive by FISH            3/1/2016 - 6/2016 Chemotherapy     Taxotere 75 mg/m2, Day 1  Perjeta 420 mg, Day 1  Herceptin 6 mg/kg, Day 1  Cycle length= 21 days         3/1/2016 - 2/2017 Hormone Therapy     Anastrozole 1 mg daily         6/2016 - 2/2017 Chemotherapy     Perjeta 420 mg, Day 1  Herceptin 6 mg/kg, Day 1  Cycle length= 21 days         2/2017 Progression     Bony progression          2/10/2017 - 4/12/2018 Chemotherapy     Perjeta 420 mg, Day 1  Herceptin 6 mg/kg, Day 1  Taxotere 75 mg/m2  Cycle length= 21 days         4/18/2017 Progression      brain MRI showed a 5 mm right parietal lesion, 3 mm right frontal lesion, 3 mm left parahippocampal lesion  5/2017 - 5/2017 Radiation     SRS to brain lesions         5/3/2018 - 8/2018 Chemotherapy     Perjeta 420 mg,  Day 1  Herceptin 6 mg/kg,  Day 1  Cycle length = 21 days  Cycle 3 was administered on 6/14/18    ** taxotere was temporarily discontinued secondary for desire of treatment holiday  Taxotere was readded into the treatment regimen during last cycle  9/5/2018 Progression     Cutaneous disease progression  Interval History:  Clinically improving  1 - Symptomatic but completely ambulatory    Review of Systems   Constitutional: Negative for appetite change, diaphoresis, fatigue and fever  HENT: Negative for sinus pain  Eyes: Negative for discharge  Respiratory: Negative for cough and shortness of breath  Cardiovascular: Negative for chest pain  Gastrointestinal: Negative for abdominal pain, constipation and diarrhea  Endocrine: Negative for cold intolerance  Genitourinary: Negative for difficulty urinating and hematuria  Musculoskeletal: Negative for joint swelling  Skin: Negative for rash  Allergic/Immunologic: Negative for environmental allergies  Neurological: Negative for dizziness and headaches  Hematological: Negative for adenopathy  Psychiatric/Behavioral: Negative for agitation         Past Medical History:   Diagnosis Date    H/O bilateral mastectomy 2/15/2016    Hypertension 2/15/2016    Lymphedema 2/15/2016    Malignant neoplasm of right breast ([facility] [address] ) 2/15/2016     Patient Active Problem List   Diagnosis    H/O bilateral mastectomy    Lymphedema of right upper extremity    Pulmonary nodule    Bilateral malignant neoplasm of breast in female, estrogen receptor positive ([facility] [address] )    Bone metastases ([facility] [address] )    Brain metastases ([facility] [address] )    Cancer related pain    Chemotherapy-induced nausea    Constipation    Decreased appetite    Difficulty sleeping    Dyspareunia, female    Herniated lumbar intervertebral disc    Mediastinal lymphadenopathy    Hyperglycemia    Other fatigue    Dry skin    Dizziness    Bilateral pleural effusion    Abnormal CT of the chest    SOB (shortness of breath)    Tobacco abuse       Current Outpatient Prescriptions:     dexamethasone (DECADRON) 2 mg tablet, Take 1 tablet (2 mg total) by mouth daily with breakfast, Disp: 30 tablet, Rfl: 2    diazepam (VALIUM) 5 mg tablet, Take 1 tablet (5 mg total) by mouth daily at bedtime, Disp: 30 tablet, Rfl: 2    gabapentin (NEURONTIN) 300 mg capsule, 1 cap PO bid and 2 cap at bedtime, Disp: 120 capsule, Rfl: 5    HYDROmorphone (DILAUDID) 4 mg tablet, Take 1 tablet (4 mg total) by mouth every 4 (four) hours as needed (breakthrough pain) Max Daily Amount: 24 mg, Disp: 120 tablet, Rfl: 0    lidocaine-prilocaine (EMLA) cream, Apply topically as needed for mild pain, Disp: 30 g, Rfl: 0    ondansetron (ZOFRAN) 8 mg tablet, Take 1 tablet (8 mg total) by mouth every 8 (eight) hours as needed for nausea or vomiting, Disp: 30 tablet, Rfl: 1    promethazine (PHENERGAN) 25 mg tablet, Take 1 tablet (25 mg total) by mouth every 6 (six) hours as needed for nausea or vomiting, Disp: 60 tablet, Rfl: 0    senna (SENOKOT) 8 6 mg, Take 2 tablets by mouth 2 (two) times a day as needed  , Disp: , Rfl:   No current facility-administered medications for this visit       Facility-Administered Medications Ordered in Other Visits:     alteplase (CATHFLO) injection 2 mg, 2 mg, Intracatheter, PRN, Shari Rodriguez,     heparin lock flush 100 units/mL injection 300 Units, 300 Units, Intracatheter, PRN, Shariamanda Rodriguez, DO, 300 Units at 11/13/18 1217  No Known Allergies  Past Surgical History:   Procedure Laterality Date    ENDOBRONCHIAL ULTRASOUND (EBUS) N/A 2/16/2016    Procedure: EBUS;  Surgeon: Mary Beth Sorto MD;  [facility]: [facility];  Service:     MASTECTOMY Bilateral     MASTECTOMY Bilateral     right arm edema    OOPHORECTOMY      ME BRONCHOSCOPY NEEDLE BX TRACHEA MAIN STEM&/BRON N/A 2/16/2016    Procedure: Cammie Craig;  Surgeon: Mary Beth Sorto MD;  [facility];  Service: Thoracic    ME INSJ TUNNELED CTR VAD W/SUBQ PORT AGE 5 YR/> N/A 7/24/2018    Procedure: INSERTION VENOUS PORT ( PORT-A-CATH) IR;  Surgeon: Alondra Campbell DO;  [facility];  Service: Interventional Radiology    ME STEREOTACTIC RADIOSURGERY, CRANIAL,SIMPLE,EA ADD  5/3/2017         ME STEREOTACTIC RADIOSURGERY, CRANIAL,SIMPLE,EA ADD  5/3/2017         ME STEREOTACTIC RADIOSURGERY, CRANIAL,SIMPLE,SINGLE  5/3/2017          Social History     Objective:  Vitals:    11/14/18 1000   BP: 118/76   BP Location: Left arm   Cuff Size: Standard   Pulse: 94   Resp: 18   Temp: 99 8 °F (37 7 °C)   TempSrc: Tympanic   SpO2: 97%   Weight: 64 4 kg (142 lb)   Height: 5' 5 6" (1 666 m)     Physical Exam Constitutional: She is oriented to person, place, and time  She appears well-developed  HENT:   Head: Normocephalic  Eyes: Pupils are equal, round, and reactive to light  Neck: Neck supple  Cardiovascular: Normal rate  No murmur heard  Pulmonary/Chest: No respiratory distress  She has no wheezes  She has no rales  Abdominal: Soft  She exhibits no distension  There is no tenderness  There is no rebound  Musculoskeletal: She exhibits no edema  Lymphadenopathy:     She has no cervical adenopathy  Neurological: She is alert and oriented to person, place, and time  She displays normal reflexes  Skin: Skin is warm  No rash noted  Psychiatric: She has a normal mood and affect  Thought content normal          Labs: I personally reviewed the labs and imaging pertinent to this patient care

## 2018-11-15 ENCOUNTER — HOSPITAL ENCOUNTER (OUTPATIENT)
Dept: INFUSION CENTER | Facility: CLINIC | Age: 58
Discharge: HOME | End: 2018-11-15
Payer: COMMERCIAL

## 2018-11-15 VITALS
HEART RATE: 87 BPM | WEIGHT: 144 LBS | OXYGEN SATURATION: 99 % | SYSTOLIC BLOOD PRESSURE: 117 MMHG | HEIGHT: 67 IN | BODY MASS INDEX: 22.6 KG/M2 | DIASTOLIC BLOOD PRESSURE: 58 MMHG | TEMPERATURE: 98.2 F | RESPIRATION RATE: 18 BRPM

## 2018-11-15 PROCEDURE — 96375 TX/PRO/DX INJ NEW DRUG ADDON: CPT

## 2018-11-15 PROCEDURE — 96367 TX/PROPH/DG ADDL SEQ IV INF: CPT

## 2018-11-15 PROCEDURE — 96413 CHEMO IV INFUSION 1 HR: CPT

## 2018-11-15 RX ORDER — SODIUM CHLORIDE 9 MG/ML
20 INJECTION, SOLUTION INTRAVENOUS CONTINUOUS
Status: DISCONTINUED | OUTPATIENT
Start: 2018-11-15 | End: 2018-11-18 | Stop reason: HOSPADM

## 2018-11-15 RX ORDER — PALONOSETRON 0.05 MG/ML
0.25 INJECTION, SOLUTION INTRAVENOUS ONCE
Status: COMPLETED | OUTPATIENT
Start: 2018-11-15 | End: 2018-11-15

## 2018-11-15 RX ADMIN — DEXAMETHASONE SODIUM PHOSPHATE 10 MG: 10 INJECTION, SOLUTION INTRAMUSCULAR; INTRAVENOUS at 09:24

## 2018-11-15 RX ADMIN — SODIUM CHLORIDE 150 MG: 0.9 INJECTION, SOLUTION INTRAVENOUS at 09:58

## 2018-11-15 RX ADMIN — PALONOSETRON HYDROCHLORIDE 0.25 MG: 0.25 INJECTION INTRAVENOUS at 09:29

## 2018-11-15 RX ADMIN — HEPARIN 300 UNITS: 100 SYRINGE at 11:24

## 2018-11-15 RX ADMIN — SODIUM CHLORIDE 20 ML/HR: 0.9 INJECTION, SOLUTION INTRAVENOUS at 09:10

## 2018-11-15 RX ADMIN — ADO-TRASTUZUMAB EMTANSINE 213.8 MG: 20 INJECTION, POWDER, LYOPHILIZED, FOR SOLUTION INTRAVENOUS at 10:47

## 2018-11-15 NOTE — PROGRESS NOTES
Pt here with no c/o - she has some pain and states this " is ongoing" pt does have her daughter with her who states they have pain meds at home for pt to use      Labs are within parameters to proceed  I did call Caty BUITRAGO at Dr Ambrocio Echevarria [facility] re pts weight today and the parameters / dose  Dr David Alfredo is ok to treat    Pt was seen yesterday and cleared for todays dose

## 2018-11-15 NOTE — PLAN OF CARE
Problem: Potential for Falls  Goal: Patient will remain free of falls  INTERVENTIONS:  - Assess patient frequently for physical needs  -  Identify cognitive and physical deficits and behaviors that affect risk of falls    -  [place] fall precautions as indicated by assessment   - Educate patient/family on patient safety including physical limitations  - Instruct patient to call for assistance with activity based on assessment  - Modify environment to reduce risk of injury  - Consider OT/PT consult to assist with strengthening/mobility   Outcome: Progressing

## 2018-11-15 NOTE — PLAN OF CARE
Problem: Potential for Falls  Goal: Patient will remain free of falls  INTERVENTIONS:  - Assess patient frequently for physical needs  -  Identify cognitive and physical deficits and behaviors that affect risk of falls    -  [place] fall precautions as indicated by assessment   - Educate patient/family on patient safety including physical limitations  - Instruct patient to call for assistance with activity based on assessment  - Modify environment to reduce risk of injury  - Consider OT/PT consult to assist with strengthening/mobility   Outcome: Progressing

## 2018-12-04 ENCOUNTER — HOSPITAL ENCOUNTER (OUTPATIENT)
Dept: INFUSION CENTER | Facility: CLINIC | Age: 58
Discharge: HOME | End: 2018-12-04
Payer: COMMERCIAL

## 2018-12-04 DIAGNOSIS — Z17.0 BILATERAL MALIGNANT NEOPLASM OF BREAST IN FEMALE, ESTROGEN RECEPTOR POSITIVE, UNSPECIFIED SITE OF BREAST (HCC): ICD-10-CM

## 2018-12-04 DIAGNOSIS — C50.912 BILATERAL MALIGNANT NEOPLASM OF BREAST IN FEMALE, ESTROGEN RECEPTOR POSITIVE, UNSPECIFIED SITE OF BREAST (HCC): ICD-10-CM

## 2018-12-04 DIAGNOSIS — C50.911 BILATERAL MALIGNANT NEOPLASM OF BREAST IN FEMALE, ESTROGEN RECEPTOR POSITIVE, UNSPECIFIED SITE OF BREAST (HCC): ICD-10-CM

## 2018-12-04 LAB
ALBUMIN SERPL BCP-MCNC: 3.5 G/DL (ref 3.5–5)
ALP SERPL-CCNC: 83 U/L (ref 46–116)
ALT SERPL W P-5'-P-CCNC: 32 U/L (ref 12–78)
ANION GAP SERPL CALCULATED.3IONS-SCNC: 11 MMOL/L (ref 4–13)
AST SERPL W P-5'-P-CCNC: 19 U/L (ref 5–45)
BASOPHILS # BLD AUTO: 0.02 THOUSANDS/ΜL (ref 0–0.1)
BASOPHILS NFR BLD AUTO: 0 % (ref 0–1)
BILIRUB SERPL-MCNC: 0.2 MG/DL (ref 0.2–1)
BUN SERPL-MCNC: 9 MG/DL (ref 5–25)
CALCIUM SERPL-MCNC: 9.7 MG/DL (ref 8.3–10.1)
CHLORIDE SERPL-SCNC: 104 MMOL/L (ref 100–108)
CO2 SERPL-SCNC: 25 MMOL/L (ref 21–32)
CREAT SERPL-MCNC: 0.66 MG/DL (ref 0.6–1.3)
EOSINOPHIL # BLD AUTO: 0.02 THOUSAND/ΜL (ref 0–0.61)
EOSINOPHIL NFR BLD AUTO: 0 % (ref 0–6)
ERYTHROCYTE [DISTWIDTH] IN BLOOD BY AUTOMATED COUNT: 18 % (ref 11.6–15.1)
GFR SERPL CREATININE-BSD FRML MDRD: 99 ML/MIN/1.73SQ M
GLUCOSE SERPL-MCNC: 126 MG/DL (ref 65–140)
HCT VFR BLD AUTO: 38.3 % (ref 34.8–46.1)
HGB BLD-MCNC: 12.2 G/DL (ref 11.5–15.4)
IMM GRANULOCYTES # BLD AUTO: 0.02 THOUSAND/UL (ref 0–0.2)
IMM GRANULOCYTES NFR BLD AUTO: 0 % (ref 0–2)
LYMPHOCYTES # BLD AUTO: 1.24 THOUSANDS/ΜL (ref 0.6–4.47)
LYMPHOCYTES NFR BLD AUTO: 16 % (ref 14–44)
MCH RBC QN AUTO: 29.8 PG (ref 26.8–34.3)
MCHC RBC AUTO-ENTMCNC: 31.9 G/DL (ref 31.4–37.4)
MCV RBC AUTO: 94 FL (ref 82–98)
MONOCYTES # BLD AUTO: 0.39 THOUSAND/ΜL (ref 0.17–1.22)
MONOCYTES NFR BLD AUTO: 5 % (ref 4–12)
NEUTROPHILS # BLD AUTO: 5.96 THOUSANDS/ΜL (ref 1.85–7.62)
NEUTS SEG NFR BLD AUTO: 79 % (ref 43–75)
NRBC BLD AUTO-RTO: 0 /100 WBCS
PLATELET # BLD AUTO: 453 THOUSANDS/UL (ref 149–390)
PMV BLD AUTO: 8.9 FL (ref 8.9–12.7)
POTASSIUM SERPL-SCNC: 3.7 MMOL/L (ref 3.5–5.3)
PROT SERPL-MCNC: 8.2 G/DL (ref 6.4–8.2)
RBC # BLD AUTO: 4.09 MILLION/UL (ref 3.81–5.12)
SODIUM SERPL-SCNC: 140 MMOL/L (ref 136–145)
WBC # BLD AUTO: 7.65 THOUSAND/UL (ref 4.31–10.16)

## 2018-12-04 PROCEDURE — 85025 COMPLETE CBC W/AUTO DIFF WBC: CPT

## 2018-12-04 PROCEDURE — 80053 COMPREHEN METABOLIC PANEL: CPT

## 2018-12-04 RX ADMIN — HEPARIN 300 UNITS: 100 SYRINGE at 15:13

## 2018-12-04 NOTE — PLAN OF CARE
Problem: Potential for Falls  Goal: Patient will remain free of falls  INTERVENTIONS:  - Assess patient frequently for physical needs  -  Identify cognitive and physical deficits and behaviors that affect risk of falls    -  [place] fall precautions as indicated by assessment   - Educate patient/family on patient safety including physical limitations  - Instruct patient to call for assistance with activity based on assessment  - Modify environment to reduce risk of injury  - Consider OT/PT consult to assist with strengthening/mobility   Outcome: Progressing

## 2018-12-05 RX ORDER — SODIUM CHLORIDE 9 MG/ML
20 INJECTION, SOLUTION INTRAVENOUS CONTINUOUS
Status: DISCONTINUED | OUTPATIENT
Start: 2018-12-06 | End: 2018-12-09 | Stop reason: HOSPADM

## 2018-12-05 RX ORDER — PALONOSETRON 0.05 MG/ML
0.25 INJECTION, SOLUTION INTRAVENOUS ONCE
Status: COMPLETED | OUTPATIENT
Start: 2018-12-06 | End: 2018-12-06

## 2018-12-06 ENCOUNTER — HOSPITAL ENCOUNTER (OUTPATIENT)
Dept: INFUSION CENTER | Facility: CLINIC | Age: 58
Discharge: HOME | End: 2018-12-06
Payer: COMMERCIAL

## 2018-12-06 VITALS
DIASTOLIC BLOOD PRESSURE: 57 MMHG | OXYGEN SATURATION: 99 % | SYSTOLIC BLOOD PRESSURE: 113 MMHG | HEIGHT: 66 IN | TEMPERATURE: 98.1 F | WEIGHT: 142 LBS | HEART RATE: 80 BPM | RESPIRATION RATE: 18 BRPM | BODY MASS INDEX: 22.82 KG/M2

## 2018-12-06 PROCEDURE — 96413 CHEMO IV INFUSION 1 HR: CPT

## 2018-12-06 PROCEDURE — 96375 TX/PRO/DX INJ NEW DRUG ADDON: CPT

## 2018-12-06 PROCEDURE — 96401 CHEMO ANTI-NEOPL SQ/IM: CPT

## 2018-12-06 PROCEDURE — 96367 TX/PROPH/DG ADDL SEQ IV INF: CPT

## 2018-12-06 RX ADMIN — ADO-TRASTUZUMAB EMTANSINE 234 MG: 20 INJECTION, POWDER, LYOPHILIZED, FOR SOLUTION INTRAVENOUS at 10:18

## 2018-12-06 RX ADMIN — DENOSUMAB 120 MG: 120 INJECTION SUBCUTANEOUS at 09:36

## 2018-12-06 RX ADMIN — SODIUM CHLORIDE 150 MG: 0.9 INJECTION, SOLUTION INTRAVENOUS at 09:36

## 2018-12-06 RX ADMIN — SODIUM CHLORIDE 20 ML/HR: 0.9 INJECTION, SOLUTION INTRAVENOUS at 08:57

## 2018-12-06 RX ADMIN — PALONOSETRON HYDROCHLORIDE 0.25 MG: 0.25 INJECTION, SOLUTION INTRAVENOUS at 10:12

## 2018-12-06 RX ADMIN — HEPARIN 300 UNITS: 100 SYRINGE at 11:00

## 2018-12-06 RX ADMIN — DEXAMETHASONE SODIUM PHOSPHATE 10 MG: 10 INJECTION, SOLUTION INTRAMUSCULAR; INTRAVENOUS at 09:19

## 2018-12-06 NOTE — PROGRESS NOTES
Pt received chemo infusion w/out any adverse effects, labs w/in parameters  Xgeva SQ inj given in [place]AN[place]   Tolerated well, offers no complaints

## 2018-12-07 ENCOUNTER — TELEPHONE (OUTPATIENT)
Dept: HEMATOLOGY ONCOLOGY | Facility: CLINIC | Age: 58
End: 2018-12-07

## 2018-12-07 ENCOUNTER — TELEPHONE (OUTPATIENT)
Dept: PALLIATIVE MEDICINE | Facility: CLINIC | Age: 58
End: 2018-12-07

## 2018-12-07 DIAGNOSIS — M51.26 HERNIATED LUMBAR INTERVERTEBRAL DISC: ICD-10-CM

## 2018-12-07 DIAGNOSIS — G89.3 CANCER RELATED PAIN: ICD-10-CM

## 2018-12-07 RX ORDER — HYDROMORPHONE HYDROCHLORIDE 4 MG/1
4 TABLET ORAL EVERY 4 HOURS PRN
Qty: 120 TABLET | Refills: 0 | Status: SHIPPED | OUTPATIENT
Start: 2018-12-07 | End: 2019-02-07 | Stop reason: SDUPTHER

## 2018-12-07 NOTE — TELEPHONE ENCOUNTER
Spoke with patient - she is requesting a refill for her Dilaudid - medication is working well for her    Script sent to pharmacy

## 2018-12-07 NOTE — TELEPHONE ENCOUNTER
Linda Beth called stating she would want stronger medication other than the HYDROmorphone (DILAUDID) 4 mg tablet that were prescribed by Dr Nick Booth last time  She was told to call our office to ask him for a script  Please review with Dr Nick Booth

## 2018-12-07 NOTE — TELEPHONE ENCOUNTER
Received phone call from patient asking for refill to pain medication, dilaudid  Stated she was at Dr Veronica Li office this am and md was to call Dr Abhishek Hidalgo asking for refill to med  This nurse checked with MA staff and it was originally ordered from Dr Stefan Luu and Dr Veronica Li was aware and to call Dr Shakira Levnie office today to discuss refill of pain medication  Called patient and made a follow up appointment for her with Dr Abhishek Hidalgo for 12/11/18 at 2:40appt  Let her know that the medication will need to be refilled by Dr Stefan Luu office at this time   Aayush Hoffman

## 2018-12-11 ENCOUNTER — OFFICE VISIT (OUTPATIENT)
Dept: PALLIATIVE MEDICINE | Facility: CLINIC | Age: 58
End: 2018-12-11
Payer: COMMERCIAL

## 2018-12-11 VITALS
HEART RATE: 88 BPM | HEIGHT: 66 IN | OXYGEN SATURATION: 97 % | DIASTOLIC BLOOD PRESSURE: 70 MMHG | BODY MASS INDEX: 22.66 KG/M2 | TEMPERATURE: 98.3 F | SYSTOLIC BLOOD PRESSURE: 112 MMHG | RESPIRATION RATE: 12 BRPM | WEIGHT: 141 LBS

## 2018-12-11 DIAGNOSIS — C50.911 BILATERAL MALIGNANT NEOPLASM OF BREAST IN FEMALE, ESTROGEN RECEPTOR POSITIVE, UNSPECIFIED SITE OF BREAST (HCC): Primary | ICD-10-CM

## 2018-12-11 DIAGNOSIS — C50.912 BILATERAL MALIGNANT NEOPLASM OF BREAST IN FEMALE, ESTROGEN RECEPTOR POSITIVE, UNSPECIFIED SITE OF BREAST (HCC): Primary | ICD-10-CM

## 2018-12-11 DIAGNOSIS — Z59.9 FINANCIAL DIFFICULTIES: ICD-10-CM

## 2018-12-11 DIAGNOSIS — R11.0 CHEMOTHERAPY-INDUCED NAUSEA: ICD-10-CM

## 2018-12-11 DIAGNOSIS — G89.3 CANCER RELATED PAIN: ICD-10-CM

## 2018-12-11 DIAGNOSIS — M51.26 HERNIATED LUMBAR INTERVERTEBRAL DISC: ICD-10-CM

## 2018-12-11 DIAGNOSIS — K59.00 CONSTIPATION, UNSPECIFIED CONSTIPATION TYPE: ICD-10-CM

## 2018-12-11 DIAGNOSIS — Z17.0 BILATERAL MALIGNANT NEOPLASM OF BREAST IN FEMALE, ESTROGEN RECEPTOR POSITIVE, UNSPECIFIED SITE OF BREAST (HCC): Primary | ICD-10-CM

## 2018-12-11 DIAGNOSIS — T45.1X5A CHEMOTHERAPY-INDUCED NAUSEA: ICD-10-CM

## 2018-12-11 PROCEDURE — 99213 OFFICE O/P EST LOW 20 MIN: CPT | Performed by: FAMILY MEDICINE

## 2018-12-11 RX ORDER — MORPHINE SULFATE 15 MG/1
15 TABLET, FILM COATED, EXTENDED RELEASE ORAL 3 TIMES DAILY
Qty: 90 TABLET | Refills: 0 | Status: SHIPPED | OUTPATIENT
Start: 2018-12-11 | End: 2019-02-07 | Stop reason: SDUPTHER

## 2018-12-11 RX ORDER — METOCLOPRAMIDE 10 MG/1
10 TABLET ORAL 3 TIMES DAILY PRN
Qty: 30 TABLET | Refills: 1 | Status: SHIPPED | OUTPATIENT
Start: 2018-12-11 | End: 2019-04-23 | Stop reason: ALTCHOICE

## 2018-12-11 SDOH — ECONOMIC STABILITY - INCOME SECURITY: PROBLEM RELATED TO HOUSING AND ECONOMIC CIRCUMSTANCES, UNSPECIFIED: Z59.9

## 2018-12-11 NOTE — PROGRESS NOTES
Palliative and Supportive Care   Alie Garcia 66 y o  female [id number]    Assessment/Plan:  1  Bilateral malignant neoplasm of breast in female, estrogen receptor positive, unspecified site of breast ([address] )    2  Herniated lumbar intervertebral disc    3  Cancer related pain    4  Constipation, unspecified constipation type    5  Chemotherapy-induced nausea    6  Financial difficulties      Requested Prescriptions     Signed Prescriptions Disp Refills    morphine (MS CONTIN) 15 mg 12 hr tablet 90 tablet 0     Sig: Take 1 tablet (15 mg total) by mouth 3 (three) times a day Max Daily Amount: 45 mg    metoclopramide (REGLAN) 10 mg tablet 30 tablet 1     Sig: Take 1 tablet (10 mg total) by mouth 3 (three) times a day as needed (nausea)     There are no discontinued medications  Patient continues to complain of uncontrolled pain  Will try to add back some long acting opioids in the form of MS Contin to see if that offers relief  In order to reduce side effect burden she will have reglan available for nausea and/or constipation  I will send her information to our supportive services to discuss her financial and insurance concerns  Representatives have queried the patient's controlled substance dispensing history in the Prescription Drug Monitoring Program in compliance with regulations before I have prescribed any controlled substances  The prescription history is consistent with prescribed therapy and our practice policies  25 minutes were spent face to face with Alie Garcia and her daughter with greater than 50% of the time spent in counseling or coordination of care including discussions of treatment instructions and follow up requirements   All of the patient's questions were answered during this discussion  Return in about 1 month (around 1/11/2019)      Subjective:   Chief Complaint  Follow up visit for:  symptom management  HPI     Alie Garcia is a 66 y o  female with metastatic breast cancer originally diagnosed in 2010   She had a bilateral mastectomy and has undergone multiple chemotherapy regimens   She has chronic lymphedema in her right arm   She has brain mets for which she has received radiation in the past        She is on dexamethasone to help with a spectrum of symptoms including appetite, pain, energy level, and nausea   However he pain is persistent and worse   She states that her left arm with the lymphedema is hurting her and her wrist has sharp pains   Her back also hurts   The dilaudid gives her temporary and incomplete relief  She and her daughter are asking to try more long acting medications again  In the past long acting opioids have resulted in side effects including increased sedation and constipation  She has some concerns related to her insurance  She will be on Cobra for a few months into the new year but then does not know what will happen  She is already having a difficult time with co-payments  The following portions of the medical history were reviewed: past medical history, problem list, medication list, and social history      Current Outpatient Prescriptions:     dexamethasone (DECADRON) 2 mg tablet, Take 1 tablet (2 mg total) by mouth daily with breakfast, Disp: 30 tablet, Rfl: 2    diazepam (VALIUM) 5 mg tablet, Take 1 tablet (5 mg total) by mouth daily at bedtime, Disp: 30 tablet, Rfl: 2    gabapentin (NEURONTIN) 300 mg capsule, 1 cap PO bid and 2 cap at bedtime, Disp: 120 capsule, Rfl: 5    HYDROmorphone (DILAUDID) 4 mg tablet, Take 1 tablet (4 mg total) by mouth every 4 (four) hours as needed (breakthrough pain) Max Daily Amount: 24 mg, Disp: 120 tablet, Rfl: 0    lidocaine-prilocaine (EMLA) cream, Apply topically as needed for mild pain, Disp: 30 g, Rfl: 0    ondansetron (ZOFRAN) 8 mg tablet, Take 1 tablet (8 mg total) by mouth every 8 (eight) hours as needed for nausea or vomiting, Disp: 30 tablet, Rfl: 1    promethazine (PHENERGAN) 25 mg tablet, Take 1 tablet (25 mg total) by mouth every 6 (six) hours as needed for nausea or vomiting, Disp: 60 tablet, Rfl: 0    senna (SENOKOT) 8 6 mg, Take 2 tablets by mouth 2 (two) times a day as needed  , Disp: , Rfl:     metoclopramide (REGLAN) 10 mg tablet, Take 1 tablet (10 mg total) by mouth 3 (three) times a day as needed (nausea), Disp: 30 tablet, Rfl: 1    morphine (MS CONTIN) 15 mg 12 hr tablet, Take 1 tablet (15 mg total) by mouth 3 (three) times a day Max Daily Amount: 45 mg, Disp: 90 tablet, Rfl: 0  Review of Systems   Constitutional: Positive for activity change and fatigue  Musculoskeletal: Positive for arthralgias, back pain and neck pain  Right arm swelling   Psychiatric/Behavioral: Positive for sleep disturbance  All other systems negative    Objective:  Vital Signs  /70 (BP Location: Left arm, Patient Position: Sitting, Cuff Size: Standard)   Pulse 88   Temp 98 3 °F (36 8 °C) (Tympanic)   Resp 12   Ht 5' 6" (1 676 m)   Wt 64 kg (141 lb)   LMP  (LMP Unknown)   SpO2 97%   BMI 22 76 kg/m²    Physical Exam    Constitutional: Chronically ill appearing  In no acute distress  Head: Normocephalic and atraumatic  Eyes: EOM are normal  Right eye exhibits no discharge  Left eye exhibits no discharge  No scleral icterus  Pulmonary/Chest: Effort normal  No stridor  No respiratory distress  Abdominal: No distension  Musculoskeletal: Right upper extremity lymphedema  Neurological: Alert, oriented and appropriately conversant     Skin: Sallow skin coloring  Psychiatric: Depressive/flat affect

## 2018-12-11 NOTE — Clinical Note
Kerry Pizarro and/or Nino Pompa can you reach out to her to offer some support and get her to the financial counselors in regard to her pending insurance issues  She would quality for disability but will need help navigating the system

## 2018-12-12 PROBLEM — Z59.9 FINANCIAL DIFFICULTIES: Status: ACTIVE | Noted: 2018-12-12

## 2018-12-19 ENCOUNTER — DOCUMENTATION (OUTPATIENT)
Dept: PALLIATIVE MEDICINE | Facility: HOSPITAL | Age: 58
End: 2018-12-19

## 2018-12-19 NOTE — PROGRESS NOTES
Palliative LSW received request to assist pt  With financial concerns/questions  Pt  Explained she has bills piling up and she cannot pay for them  She stated she didn't want to ask for help in the past, but it's getting "out of hand now "  LSW offered to email her referral to [facility] [facility] for assistance  She agreed to same  Explained someone should be contacting her in the next 24-48 hours  Angy appreciative of assistance

## 2018-12-20 ENCOUNTER — TELEPHONE (OUTPATIENT)
Dept: PALLIATIVE MEDICINE | Facility: CLINIC | Age: 58
End: 2018-12-20

## 2018-12-22 ENCOUNTER — APPOINTMENT (EMERGENCY)
Dept: RADIOLOGY | Facility: HOSPITAL | Age: 58
DRG: 871 | End: 2018-12-22
Payer: COMMERCIAL

## 2018-12-22 ENCOUNTER — HOSPITAL ENCOUNTER (INPATIENT)
Facility: HOSPITAL | Age: 58
LOS: 3 days | Discharge: HOME | DRG: 871 | End: 2018-12-25
Attending: EMERGENCY MEDICINE | Admitting: HOSPITALIST
Payer: COMMERCIAL

## 2018-12-22 DIAGNOSIS — R50.9 FEVER: ICD-10-CM

## 2018-12-22 DIAGNOSIS — G89.3 CANCER RELATED PAIN: ICD-10-CM

## 2018-12-22 DIAGNOSIS — J11.1 INFLUENZA: ICD-10-CM

## 2018-12-22 DIAGNOSIS — A41.9 SEPSIS (HCC): Primary | ICD-10-CM

## 2018-12-22 DIAGNOSIS — J18.9 PNEUMONIA: ICD-10-CM

## 2018-12-22 PROBLEM — J06.9 URI (UPPER RESPIRATORY INFECTION): Status: ACTIVE | Noted: 2018-12-22

## 2018-12-22 LAB
ALBUMIN SERPL BCP-MCNC: 3.1 G/DL (ref 3.5–5)
ALP SERPL-CCNC: 107 U/L (ref 46–116)
ALT SERPL W P-5'-P-CCNC: 68 U/L (ref 12–78)
ANION GAP SERPL CALCULATED.3IONS-SCNC: 11 MMOL/L (ref 4–13)
APTT PPP: 35 SECONDS (ref 26–38)
AST SERPL W P-5'-P-CCNC: 74 U/L (ref 5–45)
BASOPHILS # BLD AUTO: 0.03 THOUSANDS/ΜL (ref 0–0.1)
BASOPHILS NFR BLD AUTO: 0 % (ref 0–1)
BILIRUB SERPL-MCNC: 0.2 MG/DL (ref 0.2–1)
BILIRUB UR QL STRIP: NEGATIVE
BUN SERPL-MCNC: 11 MG/DL (ref 5–25)
CALCIUM SERPL-MCNC: 8.9 MG/DL (ref 8.3–10.1)
CHLORIDE SERPL-SCNC: 100 MMOL/L (ref 100–108)
CLARITY UR: CLEAR
CO2 SERPL-SCNC: 28 MMOL/L (ref 21–32)
COLOR UR: YELLOW
CREAT SERPL-MCNC: 0.76 MG/DL (ref 0.6–1.3)
EOSINOPHIL # BLD AUTO: 0.01 THOUSAND/ΜL (ref 0–0.61)
EOSINOPHIL NFR BLD AUTO: 0 % (ref 0–6)
ERYTHROCYTE [DISTWIDTH] IN BLOOD BY AUTOMATED COUNT: 18.2 % (ref 11.6–15.1)
GFR SERPL CREATININE-BSD FRML MDRD: 88 ML/MIN/1.73SQ M
GLUCOSE SERPL-MCNC: 103 MG/DL (ref 65–140)
GLUCOSE UR STRIP-MCNC: NEGATIVE MG/DL
HCT VFR BLD AUTO: 35 % (ref 34.8–46.1)
HGB BLD-MCNC: 11.1 G/DL (ref 11.5–15.4)
HGB UR QL STRIP.AUTO: NEGATIVE
IMM GRANULOCYTES # BLD AUTO: 0.05 THOUSAND/UL (ref 0–0.2)
IMM GRANULOCYTES NFR BLD AUTO: 1 % (ref 0–2)
INR PPP: 0.96 (ref 0.86–1.17)
KETONES UR STRIP-MCNC: NEGATIVE MG/DL
LACTATE SERPL-SCNC: 0.5 MMOL/L (ref 0.5–2)
LEUKOCYTE ESTERASE UR QL STRIP: NEGATIVE
LYMPHOCYTES # BLD AUTO: 1.08 THOUSANDS/ΜL (ref 0.6–4.47)
LYMPHOCYTES NFR BLD AUTO: 12 % (ref 14–44)
MCH RBC QN AUTO: 29.6 PG (ref 26.8–34.3)
MCHC RBC AUTO-ENTMCNC: 31.7 G/DL (ref 31.4–37.4)
MCV RBC AUTO: 93 FL (ref 82–98)
MONOCYTES # BLD AUTO: 0.79 THOUSAND/ΜL (ref 0.17–1.22)
MONOCYTES NFR BLD AUTO: 9 % (ref 4–12)
NEUTROPHILS # BLD AUTO: 7.29 THOUSANDS/ΜL (ref 1.85–7.62)
NEUTS SEG NFR BLD AUTO: 78 % (ref 43–75)
NITRITE UR QL STRIP: NEGATIVE
NRBC BLD AUTO-RTO: 0 /100 WBCS
PH UR STRIP.AUTO: 7 [PH] (ref 4.5–8)
PLATELET # BLD AUTO: 266 THOUSANDS/UL (ref 149–390)
PLATELET # BLD AUTO: 292 THOUSANDS/UL (ref 149–390)
PMV BLD AUTO: 9.1 FL (ref 8.9–12.7)
PMV BLD AUTO: 9.1 FL (ref 8.9–12.7)
POTASSIUM SERPL-SCNC: 3.6 MMOL/L (ref 3.5–5.3)
PROCALCITONIN SERPL-MCNC: 0.23 NG/ML
PROCALCITONIN SERPL-MCNC: 0.23 NG/ML
PROT SERPL-MCNC: 7.9 G/DL (ref 6.4–8.2)
PROT UR STRIP-MCNC: NEGATIVE MG/DL
PROTHROMBIN TIME: 12.5 SECONDS (ref 11.8–14.2)
RBC # BLD AUTO: 3.75 MILLION/UL (ref 3.81–5.12)
SODIUM SERPL-SCNC: 139 MMOL/L (ref 136–145)
SP GR UR STRIP.AUTO: 1.02 (ref 1–1.03)
UROBILINOGEN UR QL STRIP.AUTO: 0.2 E.U./DL
WBC # BLD AUTO: 9.25 THOUSAND/UL (ref 4.31–10.16)

## 2018-12-22 PROCEDURE — 87205 SMEAR GRAM STAIN: CPT | Performed by: EMERGENCY MEDICINE

## 2018-12-22 PROCEDURE — 80053 COMPREHEN METABOLIC PANEL: CPT | Performed by: EMERGENCY MEDICINE

## 2018-12-22 PROCEDURE — 85610 PROTHROMBIN TIME: CPT | Performed by: EMERGENCY MEDICINE

## 2018-12-22 PROCEDURE — 93005 ELECTROCARDIOGRAM TRACING: CPT

## 2018-12-22 PROCEDURE — 85730 THROMBOPLASTIN TIME PARTIAL: CPT | Performed by: EMERGENCY MEDICINE

## 2018-12-22 PROCEDURE — 36415 COLL VENOUS BLD VENIPUNCTURE: CPT | Performed by: EMERGENCY MEDICINE

## 2018-12-22 PROCEDURE — 83605 ASSAY OF LACTIC ACID: CPT | Performed by: EMERGENCY MEDICINE

## 2018-12-22 PROCEDURE — 85049 AUTOMATED PLATELET COUNT: CPT | Performed by: HOSPITALIST

## 2018-12-22 PROCEDURE — 96365 THER/PROPH/DIAG IV INF INIT: CPT

## 2018-12-22 PROCEDURE — 81003 URINALYSIS AUTO W/O SCOPE: CPT | Performed by: EMERGENCY MEDICINE

## 2018-12-22 PROCEDURE — 85025 COMPLETE CBC W/AUTO DIFF WBC: CPT | Performed by: EMERGENCY MEDICINE

## 2018-12-22 PROCEDURE — 99223 1ST HOSP IP/OBS HIGH 75: CPT | Performed by: HOSPITALIST

## 2018-12-22 PROCEDURE — 71046 X-RAY EXAM CHEST 2 VIEWS: CPT

## 2018-12-22 PROCEDURE — 87631 RESP VIRUS 3-5 TARGETS: CPT | Performed by: EMERGENCY MEDICINE

## 2018-12-22 PROCEDURE — 84145 PROCALCITONIN (PCT): CPT | Performed by: EMERGENCY MEDICINE

## 2018-12-22 PROCEDURE — 99285 EMERGENCY DEPT VISIT HI MDM: CPT

## 2018-12-22 PROCEDURE — 84145 PROCALCITONIN (PCT): CPT | Performed by: HOSPITALIST

## 2018-12-22 PROCEDURE — 87040 BLOOD CULTURE FOR BACTERIA: CPT | Performed by: EMERGENCY MEDICINE

## 2018-12-22 RX ORDER — ACETAMINOPHEN 325 MG/1
650 TABLET ORAL EVERY 6 HOURS PRN
Status: DISCONTINUED | OUTPATIENT
Start: 2018-12-22 | End: 2018-12-25 | Stop reason: HOSPADM

## 2018-12-22 RX ORDER — IBUPROFEN 600 MG/1
600 TABLET ORAL ONCE
Status: COMPLETED | OUTPATIENT
Start: 2018-12-22 | End: 2018-12-22

## 2018-12-22 RX ORDER — BENZONATATE 100 MG/1
100 CAPSULE ORAL 3 TIMES DAILY PRN
Status: DISCONTINUED | OUTPATIENT
Start: 2018-12-22 | End: 2018-12-23

## 2018-12-22 RX ORDER — GUAIFENESIN 600 MG
600 TABLET, EXTENDED RELEASE 12 HR ORAL EVERY 12 HOURS SCHEDULED
Status: DISCONTINUED | OUTPATIENT
Start: 2018-12-22 | End: 2018-12-25 | Stop reason: HOSPADM

## 2018-12-22 RX ORDER — HYDROMORPHONE HCL/PF 1 MG/ML
1 SYRINGE (ML) INJECTION ONCE
Status: COMPLETED | OUTPATIENT
Start: 2018-12-22 | End: 2018-12-22

## 2018-12-22 RX ORDER — MORPHINE SULFATE 15 MG/1
15 TABLET, FILM COATED, EXTENDED RELEASE ORAL 3 TIMES DAILY
Status: DISCONTINUED | OUTPATIENT
Start: 2018-12-22 | End: 2018-12-25 | Stop reason: HOSPADM

## 2018-12-22 RX ORDER — GABAPENTIN 300 MG/1
300 CAPSULE ORAL 2 TIMES DAILY
Status: DISCONTINUED | OUTPATIENT
Start: 2018-12-22 | End: 2018-12-25 | Stop reason: HOSPADM

## 2018-12-22 RX ORDER — HYDROMORPHONE HYDROCHLORIDE 2 MG/1
4 TABLET ORAL EVERY 4 HOURS PRN
Status: DISCONTINUED | OUTPATIENT
Start: 2018-12-22 | End: 2018-12-25 | Stop reason: HOSPADM

## 2018-12-22 RX ORDER — METOCLOPRAMIDE 10 MG/1
10 TABLET ORAL 3 TIMES DAILY PRN
Status: DISCONTINUED | OUTPATIENT
Start: 2018-12-22 | End: 2018-12-25 | Stop reason: HOSPADM

## 2018-12-22 RX ORDER — DIAZEPAM 5 MG/1
5 TABLET ORAL
Status: DISCONTINUED | OUTPATIENT
Start: 2018-12-22 | End: 2018-12-25 | Stop reason: HOSPADM

## 2018-12-22 RX ORDER — ONDANSETRON 4 MG/1
8 TABLET, ORALLY DISINTEGRATING ORAL EVERY 8 HOURS PRN
Status: DISCONTINUED | OUTPATIENT
Start: 2018-12-22 | End: 2018-12-25 | Stop reason: HOSPADM

## 2018-12-22 RX ORDER — SENNOSIDES 8.6 MG
2 TABLET ORAL 2 TIMES DAILY PRN
Status: DISCONTINUED | OUTPATIENT
Start: 2018-12-22 | End: 2018-12-25 | Stop reason: HOSPADM

## 2018-12-22 RX ORDER — DEXAMETHASONE 2 MG/1
2 TABLET ORAL
Status: DISCONTINUED | OUTPATIENT
Start: 2018-12-23 | End: 2018-12-25 | Stop reason: HOSPADM

## 2018-12-22 RX ORDER — NICOTINE 21 MG/24HR
1 PATCH, TRANSDERMAL 24 HOURS TRANSDERMAL DAILY
Status: DISCONTINUED | OUTPATIENT
Start: 2018-12-23 | End: 2018-12-25 | Stop reason: HOSPADM

## 2018-12-22 RX ORDER — SODIUM CHLORIDE 9 MG/ML
125 INJECTION, SOLUTION INTRAVENOUS CONTINUOUS
Status: DISCONTINUED | OUTPATIENT
Start: 2018-12-22 | End: 2018-12-24

## 2018-12-22 RX ORDER — OSELTAMIVIR PHOSPHATE 75 MG/1
75 CAPSULE ORAL ONCE
Status: COMPLETED | OUTPATIENT
Start: 2018-12-22 | End: 2018-12-22

## 2018-12-22 RX ORDER — ALBUTEROL SULFATE 2.5 MG/3ML
2.5 SOLUTION RESPIRATORY (INHALATION) EVERY 6 HOURS PRN
Status: DISCONTINUED | OUTPATIENT
Start: 2018-12-22 | End: 2018-12-23

## 2018-12-22 RX ORDER — ACETAMINOPHEN 325 MG/1
975 TABLET ORAL ONCE
Status: COMPLETED | OUTPATIENT
Start: 2018-12-22 | End: 2018-12-22

## 2018-12-22 RX ADMIN — SODIUM CHLORIDE 125 ML/HR: 0.9 INJECTION, SOLUTION INTRAVENOUS at 18:04

## 2018-12-22 RX ADMIN — BENZONATATE 100 MG: 100 CAPSULE ORAL at 18:03

## 2018-12-22 RX ADMIN — IBUPROFEN 600 MG: 600 TABLET ORAL at 15:29

## 2018-12-22 RX ADMIN — ACETAMINOPHEN 975 MG: 325 TABLET, FILM COATED ORAL at 15:29

## 2018-12-22 RX ADMIN — SODIUM CHLORIDE 1000 ML: 0.9 INJECTION, SOLUTION INTRAVENOUS at 15:30

## 2018-12-22 RX ADMIN — GABAPENTIN 300 MG: 300 CAPSULE ORAL at 18:03

## 2018-12-22 RX ADMIN — MORPHINE SULFATE 15 MG: 15 TABLET, EXTENDED RELEASE ORAL at 21:40

## 2018-12-22 RX ADMIN — GUAIFENESIN 600 MG: 600 TABLET, EXTENDED RELEASE ORAL at 21:41

## 2018-12-22 RX ADMIN — DIAZEPAM 5 MG: 5 TABLET ORAL at 21:40

## 2018-12-22 RX ADMIN — HYDROMORPHONE HYDROCHLORIDE 1 MG: 1 INJECTION, SOLUTION INTRAMUSCULAR; INTRAVENOUS; SUBCUTANEOUS at 16:13

## 2018-12-22 RX ADMIN — SODIUM CHLORIDE 1000 ML: 0.9 INJECTION, SOLUTION INTRAVENOUS at 16:00

## 2018-12-22 RX ADMIN — OSELTAMIVIR PHOSPHATE 75 MG: 75 CAPSULE ORAL at 16:13

## 2018-12-22 RX ADMIN — CEFEPIME HYDROCHLORIDE 2000 MG: 2 INJECTION, POWDER, FOR SOLUTION INTRAVENOUS at 15:41

## 2018-12-22 NOTE — ASSESSMENT & PLAN NOTE
POA with fever, tachycardia   Suspect URI as source; no obvious infiltrate on XR   Possibly influenza   Will treat empirically with cefepime +tamiflu given symptoms started within [id number]   F/u influenza/RSV  Legionella and strep ags   Sputum culture  Blood cultures: pending

## 2018-12-22 NOTE — PLAN OF CARE
Problem: Potential for Falls  Goal: Patient will remain free of falls  INTERVENTIONS:  - Assess patient frequently for physical needs  -  Identify cognitive and physical deficits and behaviors that affect risk of falls    -  [place] fall precautions as indicated by assessment   - Educate patient/family on patient safety including physical limitations  - Instruct patient to call for assistance with activity based on assessment  - Modify environment to reduce risk of injury  - Consider OT/PT consult to assist with strengthening/mobility   Outcome: Progressing      Problem: PAIN - ADULT  Goal: Verbalizes/displays adequate comfort level or baseline comfort level  Interventions:  - Encourage patient to monitor pain and request assistance  - Assess pain using appropriate pain scale  - Administer analgesics based on type and severity of pain and evaluate response  - Implement non-pharmacological measures as appropriate and evaluate response  - Consider cultural and social influences on pain and pain management  - Notify physician/advanced practitioner if interventions unsuccessful or patient reports new pain  Outcome: Progressing      Problem: INFECTION - ADULT  Goal: Absence or prevention of progression during hospitalization  INTERVENTIONS:  - Assess and monitor for signs and symptoms of infection  - Monitor lab/diagnostic results  - Monitor all insertion sites, i e  indwelling lines, tubes, and drains  - Monitor endotracheal (as able) and nasal secretions for changes in amount and color  - [place] appropriate cooling/warming therapies per order  - Administer medications as ordered  - Instruct and encourage patient and family to use good hand hygiene technique  - Identify and instruct in appropriate isolation precautions for identified infection/condition  Outcome: Progressing    Goal: Absence of fever/infection during neutropenic period  INTERVENTIONS:  - Monitor WBC  - Implement neutropenic guidelines  Outcome: Progressing      Problem: SAFETY ADULT  Goal: Patient will remain free of falls  INTERVENTIONS:  - Assess patient frequently for physical needs  -  Identify cognitive and physical deficits and behaviors that affect risk of falls    -  [place] fall precautions as indicated by assessment   - Educate patient/family on patient safety including physical limitations  - Instruct patient to call for assistance with activity based on assessment  - Modify environment to reduce risk of injury  - Consider OT/PT consult to assist with strengthening/mobility   Outcome: Progressing

## 2018-12-22 NOTE — ASSESSMENT & PLAN NOTE
Unclear etiology; no obvious infiltrate on XR  Possible influenza   Will treat empirically with cefepime +tamiflu given symptoms started within [id number]   F/u influenza  /RSV  Legionella and strep ags   Sputum culture  Blood cultures

## 2018-12-22 NOTE — ED PROVIDER NOTES
History  Chief Complaint   Patient presents with    Fever - 9 weeks to 74 years     Pt  with complaints of fever and generalized body aches since last night, Pt  currently receives chemo for breast ca with mets to back, brain, and other sites  82-year-old female currently receiving chemo for metastatic breast cancer presents today with a fever and generalized weakness  T-max at home was 103  Last chemo was 2 weeks ago  Did not take anything for her fever  Is not currently on antibiotics  Did not alert her oncologist that she had a fever  Also complains of generalized pain  Of note patient has multiple areas of bony metastasis  History provided by:  Patient  Fever   Severity:  Moderate  Onset quality:  Sudden  Duration:  1 day  Timing:  Constant  Progression:  Unchanged  Chronicity:  New  Context:  See HPI  Relieved by:  Nothing tried  Worsened by:  Nothing tried  Ineffective treatments:  Nothing  Associated symptoms: fatigue, fever, myalgias and shortness of breath    Associated symptoms: no abdominal pain, no congestion, no loss of consciousness, no nausea, no rash, no rhinorrhea and no wheezing    Risk factors: On chemotherapy for metastatic breast cancer  Prior to Admission Medications   Prescriptions Last Dose Informant Patient Reported? Taking?    HYDROmorphone (DILAUDID) 4 mg tablet 2018 at Unknown time  No Yes   Sig: Take 1 tablet (4 mg total) by mouth every 4 (four) hours as needed (breakthrough pain) Max Daily Amount: 24 mg   dexamethasone (DECADRON) 2 mg tablet 2018 at Unknown time  No Yes   Sig: Take 1 tablet (2 mg total) by mouth daily with breakfast   diazepam (VALIUM) 5 mg tablet 2018 at Unknown time  No Yes   Sig: Take 1 tablet (5 mg total) by mouth daily at bedtime   gabapentin (NEURONTIN) 300 mg capsule 2018 at Unknown time  No Yes   Si cap PO bid and 2 cap at bedtime   lidocaine-prilocaine (EMLA) cream 2018 at Unknown time Self No Yes Sig: Apply topically as needed for mild pain   metoclopramide (REGLAN) 10 mg tablet 12/21/2018 at Unknown time  No Yes   Sig: Take 1 tablet (10 mg total) by mouth 3 (three) times a day as needed (nausea)   morphine (MS CONTIN) 15 mg 12 hr tablet 12/22/2018 at Unknown time  No Yes   Sig: Take 1 tablet (15 mg total) by mouth 3 (three) times a day Max Daily Amount: 45 mg   ondansetron (ZOFRAN) 8 mg tablet  Self No No   Sig: Take 1 tablet (8 mg total) by mouth every 8 (eight) hours as needed for nausea or vomiting   promethazine (PHENERGAN) 25 mg tablet  Self No No   Sig: Take 1 tablet (25 mg total) by mouth every 6 (six) hours as needed for nausea or vomiting   senna (SENOKOT) 8 6 mg  Self Yes No   Sig: Take 2 tablets by mouth 2 (two) times a day as needed        Facility-Administered Medications: None       Past Medical History:   Diagnosis Date    H/O bilateral mastectomy 2/15/2016    Hypertension 2/15/2016    Lymphedema 2/15/2016    Malignant neoplasm of right breast ([facility] [address] ) 2/15/2016       Past Surgical History:   Procedure Laterality Date    ENDOBRONCHIAL ULTRASOUND (EBUS) N/A 2/16/2016    Procedure: EBUS;  Surgeon: Jones Caputo MD;  [facility];  Service:     MASTECTOMY Bilateral     MASTECTOMY Bilateral     right arm edema    OOPHORECTOMY      UT BRONCHOSCOPY NEEDLE BX TRACHEA MAIN STEM&/BRON N/A 2/16/2016    Procedure: Van Moreno;  Surgeon: Jones Caputo MD;  [facility]: [facility];  Service: Thoracic    UT INSJ TUNNELED CTR VAD W/SUBQ PORT AGE 5 YR/> N/A 7/24/2018    Procedure: INSERTION VENOUS PORT ( PORT-A-CATH) IR;  Surgeon: Homero Bonilla DO;  [facility];  Service: Interventional Radiology    UT STEREOTACTIC RADIOSURGERY, CRANIAL,SIMPLE,EA ADD  5/3/2017         UT STEREOTACTIC RADIOSURGERY, CRANIAL,SIMPLE,EA ADD  5/3/2017         UT STEREOTACTIC RADIOSURGERY, CRANIAL,SIMPLE,SINGLE  5/3/2017            Family History   Problem Relation Age of Onset    Cancer Sister  Prostate cancer Brother      I have reviewed and agree with the history as documented  Social History   Substance Use Topics    Smoking status: Current Every Day Smoker     Packs/day: 0 50     Years: 40 00     Types: Cigarettes     Start date: 1    Smokeless tobacco: Never Used    Alcohol use Yes      Comment: social        Review of Systems   Constitutional: Positive for chills, fatigue and fever  HENT: Negative for congestion and rhinorrhea  Eyes: Negative for visual disturbance  Respiratory: Positive for shortness of breath  Negative for chest tightness and wheezing  Gastrointestinal: Negative for abdominal pain and nausea  Genitourinary: Positive for difficulty urinating (Decreased urine output)  Musculoskeletal: Positive for arthralgias and myalgias  Skin: Negative for pallor, rash and wound  Allergic/Immunologic: Positive for immunocompromised state  Neurological: Negative for dizziness and loss of consciousness  Psychiatric/Behavioral: Negative for confusion  Physical Exam  Physical Exam   Constitutional: She is oriented to person, place, and time  She appears well-developed and well-nourished  Appears ill   HENT:   Head: Normocephalic and atraumatic  Mucous membranes dry   Eyes: Pupils are equal, round, and reactive to light  EOM are normal    Neck: Normal range of motion  Cardiovascular: Normal rate and regular rhythm  Pulmonary/Chest: Tachypnea (mild) noted  She has decreased breath sounds (diffusely)  She has rhonchi (mild, bilateral bases)  Abdominal: Soft  Bowel sounds are normal  There is no tenderness  Musculoskeletal: Normal range of motion  She exhibits no edema  Neurological: She is alert and oriented to person, place, and time  No neurologic deficits  Alert and oriented to person, place, and time  GCS 15  CN II-XII intact  Speech is clear and not slurred  No word finding issues  Sensation grossly intact   Strength 5/5 in bilateral UE and LE  Finger to nose intact bilaterally  Strength equal upper extremities b/l  Strength equal in lower extremities b/l  Able to hold extremities against gravity x 10 seconds without drift x 4  No pronator drift  Skin: Skin is warm and dry  Capillary refill takes less than 2 seconds  Psychiatric: She has a normal mood and affect         Vital Signs  ED Triage Vitals   Temperature Pulse Respirations Blood Pressure SpO2   12/22/18 1453 12/22/18 1449 12/22/18 1449 12/22/18 1449 12/22/18 1449   (!) 102 8 °F (39 3 °C) 101 20 132/63 90 %      Temp Source Heart Rate Source Patient Position - Orthostatic VS BP Location FiO2 (%)   12/22/18 1449 12/22/18 1449 12/22/18 1449 12/22/18 1449 --   Oral Monitor Lying Left arm       Pain Score       12/22/18 1613       Worst Possible Pain           Vitals:    12/22/18 1449 12/22/18 1545   BP: 132/63 128/61   Pulse: 101 94   Patient Position - Orthostatic VS: Lying Lying       Visual Acuity      ED Medications  Medications   sodium chloride 0 9 % bolus 1,000 mL (0 mL Intravenous Stopped 12/22/18 1559)     Followed by   sodium chloride 0 9 % bolus 1,000 mL (1,000 mL Intravenous New Bag 12/22/18 1600)   cefepime (MAXIPIME) 2 g/50 mL dextrose IVPB (2,000 mg Intravenous New Bag 12/22/18 1541)   acetaminophen (TYLENOL) tablet 975 mg (975 mg Oral Given 12/22/18 1529)   ibuprofen (MOTRIN) tablet 600 mg (600 mg Oral Given 12/22/18 1529)   HYDROmorphone (DILAUDID) injection 1 mg (1 mg Intravenous Given 12/22/18 1613)   oseltamivir (TAMIFLU) capsule 75 mg (75 mg Oral Given 12/22/18 1613)       Diagnostic Studies  Results Reviewed     Procedure Component Value Units Date/Time    UA w Reflex to Microscopic w Reflex to Culture [[id number]] Collected:  12/22/18 1551    Lab Status:  Final result Specimen:  Urine from Urine, Clean Catch Updated:  12/22/18 1624     Color, UA Yellow     Clarity, UA Clear     Specific [place], UA 1 020     pH, UA 7 0     Leukocytes, UA Negative     Nitrite, UA Negative     Protein, UA Negative mg/dl      Glucose, UA Negative mg/dl      Ketones, UA Negative mg/dl      Urobilinogen, UA 0 2 E U /dl      Bilirubin, UA Negative     Blood, UA Negative    Comprehensive metabolic panel [[id number]]  (Abnormal) Collected:  12/22/18 1511    Lab Status:  Final result Specimen:  Blood from Arm, Left Updated:  12/22/18 1607     Sodium 139 mmol/L      Potassium 3 6 mmol/L      Chloride 100 mmol/L      CO2 28 mmol/L      ANION GAP 11 mmol/L      BUN 11 mg/dL      Creatinine 0 76 mg/dL      Glucose 103 mg/dL      Calcium 8 9 mg/dL      AST 74 (H) U/L      ALT 68 U/L      Alkaline Phosphatase 107 U/L      Total Protein 7 9 g/dL      Albumin 3 1 (L) g/dL      Total Bilirubin 0 20 mg/dL      eGFR 88 ml/min/1 73sq m     Narrative:         National Kidney Disease Education Program recommendations are as follows:  GFR calculation is accurate only with a steady state creatinine  Chronic Kidney disease less than 60 ml/min/1 73 sq  meters  Kidney failure less than 15 ml/min/1 73 sq  meters  Sputum culture and Gram stain [[id number]] Collected:  12/22/18 1601    Lab Status: In process Specimen:  Sputum from Expectorated Sputum Updated:  12/22/18 1607    Lactic acid x2 [[id number]]  (Normal) Collected:  12/22/18 1511    Lab Status:  Final result Specimen:  Blood from Arm, Left Updated:  12/22/18 1553     LACTIC ACID 0 5 mmol/L     Narrative:         Result may be elevated if tourniquet was used during collection  Protime-INR [[id number]]  (Normal) Collected:  12/22/18 1511    Lab Status:  Final result Specimen:  Blood from Arm, Left Updated:  12/22/18 1552     Protime 12 5 seconds      INR 0 96    APTT [[id number]]  (Normal) Collected:  12/22/18 1511    Lab Status:  Final result Specimen:  Blood from Arm, Left Updated:  12/22/18 1552     PTT 35 seconds     Blood culture #1 [[id number]] Collected:  12/22/18 1528    Lab Status:   In process Specimen:  Blood from Hand, Left Updated:  12/22/18 1543 Influenza A/B and RSV by PCR [[id number]] Collected:  12/22/18 1522    Lab Status: In process Specimen:  Nasopharyngeal from Nasopharyngeal Swab Updated:  12/22/18 1543    CBC and differential [[id number]]  (Abnormal) Collected:  12/22/18 1511    Lab Status:  Final result Specimen:  Blood from Arm, Left Updated:  12/22/18 1527     WBC 9 25 Thousand/uL      RBC 3 75 (L) Million/uL      Hemoglobin 11 1 (L) g/dL      Hematocrit 35 0 %      MCV 93 fL      MCH 29 6 pg      MCHC 31 7 g/dL      RDW 18 2 (H) %      MPV 9 1 fL      Platelets 030 Thousands/uL      nRBC 0 /100 WBCs      Neutrophils Relative 78 (H) %      Immat GRANS % 1 %      Lymphocytes Relative 12 (L) %      Monocytes Relative 9 %      Eosinophils Relative 0 %      Basophils Relative 0 %      Neutrophils Absolute 7 29 Thousands/µL      Immature Grans Absolute 0 05 Thousand/uL      Lymphocytes Absolute 1 08 Thousands/µL      Monocytes Absolute 0 79 Thousand/µL      Eosinophils Absolute 0 01 Thousand/µL      Basophils Absolute 0 03 Thousands/µL     Blood culture #2 [[id number]] Collected:  12/22/18 1511    Lab Status: In process Specimen:  Blood from Arm, Left Updated:  12/22/18 1521    Procalcitonin [[id number]] Collected:  12/22/18 1511    Lab Status: In process Specimen:  Blood from Arm, Left Updated:  12/22/18 1521    Lactic acid x2 [[id number]]     Lab Status:  No result Specimen:  Blood                  XR chest pa & lateral    (Results Pending)              Procedures  Procedures       Phone Contacts  ED Phone Contact    ED Course                               MDM  Number of Diagnoses or Management Options  Cancer related pain: new and requires workup  Fever: new and requires workup  Pneumonia: new and requires workup  Sepsis [facility]): new and requires workup  Diagnosis management comments: 4:12 PM  MDM: Patient presents to the Emergency Department and was diagnosed with acute fever with multilobar pneumonia and sepsis    This is a new problem that will require additional planned work-up in a hospitalized setting  Started cefepime and Tamiflu  Clinical laboratory testing, radiology imaging, and medical testing (EKG) were ordered  I independently reviewed the radiologic imaging, EKG, and laboratory studies  This case is considered high risk secondary to the above listed disease process that poses a threat to bodily function that requires further diagnostic testing and management which may include the administration of parenteral controlled substances  Discussed with VAISHNAVI  We had a detailed discussion of the patient's condition and case,  including need for admission  Accepts to his/her service  Bed request/bridging orders placed             Amount and/or Complexity of Data Reviewed  Clinical lab tests: ordered and reviewed  Tests in the radiology section of CPT®: ordered and reviewed  Tests in the medicine section of CPT®: reviewed and ordered  Decide to obtain previous medical records or to obtain history from someone other than the patient: yes  Obtain history from someone other than the patient: yes  Review and summarize past medical records: yes  Discuss the patient with other providers: yes  Independent visualization of images, tracings, or specimens: yes    Risk of Complications, Morbidity, and/or Mortality  Presenting problems: high  Diagnostic procedures: high  Management options: high    Patient Progress  Patient progress: stable    CritCare Time    Disposition  Final diagnoses:   Sepsis ([facility] [address] )   Pneumonia   Fever   Cancer related pain     Time reflects when diagnosis was documented in both MDM as applicable and the Disposition within this note     Time User Action Codes Description Comment    12/22/2018  4:03 PM Kathryn Co [A41 9] Sepsis ([facility] [address] )     12/22/2018  4:03 PM Lorie Hamilton Add [J18 9] Pneumonia     12/22/2018  4:03 PM Gisel Medina Add [R50 9] Fever     12/22/2018  4:03 PM Gisel Medina Add [G89 3] Cancer related pain       ED Disposition ED Disposition Condition Comment    Admit  Case was discussed with VAISHNAVI and the patient's admission status was agreed to be Admission Status: inpatient status to the service of Dr Mian Vargas   Follow-up Information    None         Current Discharge Medication List      CONTINUE these medications which have NOT CHANGED    Details   dexamethasone (DECADRON) 2 mg tablet Take 1 tablet (2 mg total) by mouth daily with breakfast  Qty: 30 tablet, Refills: 2    Associated Diagnoses: Cancer related pain; Chemotherapy-induced nausea; Decreased appetite; Other fatigue      diazepam (VALIUM) 5 mg tablet Take 1 tablet (5 mg total) by mouth daily at bedtime  Qty: 30 tablet, Refills: 2    Associated Diagnoses: Cancer related pain; Difficulty sleeping      gabapentin (NEURONTIN) 300 mg capsule 1 cap PO bid and 2 cap at bedtime  Qty: 120 capsule, Refills: 5    Associated Diagnoses: Cancer related pain; Herniated lumbar intervertebral disc      HYDROmorphone (DILAUDID) 4 mg tablet Take 1 tablet (4 mg total) by mouth every 4 (four) hours as needed (breakthrough pain) Max Daily Amount: 24 mg  Qty: 120 tablet, Refills: 0    Comments: May fill after 8/28 due to original partial fill - thank you  Associated Diagnoses: Cancer related pain; Herniated lumbar intervertebral disc      lidocaine-prilocaine (EMLA) cream Apply topically as needed for mild pain  Qty: 30 g, Refills: 0    Associated Diagnoses: Cancer related pain      metoclopramide (REGLAN) 10 mg tablet Take 1 tablet (10 mg total) by mouth 3 (three) times a day as needed (nausea)  Qty: 30 tablet, Refills: 1    Associated Diagnoses: Constipation, unspecified constipation type;  Chemotherapy-induced nausea      morphine (MS CONTIN) 15 mg 12 hr tablet Take 1 tablet (15 mg total) by mouth 3 (three) times a day Max Daily Amount: 45 mg  Qty: 90 tablet, Refills: 0    Associated Diagnoses: Cancer related pain      ondansetron (ZOFRAN) 8 mg tablet Take 1 tablet (8 mg total) by mouth every 8 (eight) hours as needed for nausea or vomiting  Qty: 30 tablet, Refills: 1    Associated Diagnoses: Chemotherapy induced nausea and vomiting      promethazine (PHENERGAN) 25 mg tablet Take 1 tablet (25 mg total) by mouth every 6 (six) hours as needed for nausea or vomiting  Qty: 60 tablet, Refills: 0    Associated Diagnoses: Chemotherapy-induced nausea      senna (SENOKOT) 8 6 mg Take 2 tablets by mouth 2 (two) times a day as needed             No discharge procedures on file      ED Provider  Electronically Signed by           Brenda Rudd DO  12/22/18 4292

## 2018-12-22 NOTE — H&P
H&P- Ana Maria Coronado 1960, 66 y o  female MRN: [id number]    Unit/Bed#: -01 Encounter: [id number]    Primary Care Provider: Ariana Beverly MD   Date and time admitted to hospital: 12/22/2018  2:44 PM    * Sepsis [facility])   Assessment & Plan    POA with fever, tachycardia   Suspect URI as source; no obvious infiltrate on XR  Possibly influenza   Will treat empirically with cefepime +tamiflu given symptoms started within [id number]   F/u influenza/RSV  Legionella and strep ags   Sputum culture  Blood cultures: pending        URI (upper respiratory infection)   Assessment & Plan    Supportive care  Supplemental O2   Cough suppressant, mucinex  Albuterol nebs        Cancer related pain   Assessment & Plan    Will resume home regimen per palliative care notes      Tobacco abuse   Assessment & Plan    Offer NRT      Lymphedema of right upper extremity   Assessment & Plan    Appearance stable per patient        VTE Prophylaxis: Enoxaparin (Lovenox)  / sequential compression device   Code Status: FULL   POLST: POLST form is not discussed and not completed at this time  Anticipated Length of Stay:  Patient will be admitted on an Inpatient basis with an anticipated length of stay of  > 2 midnights  Justification for Hospital Stay:  Sepsis due to URI     Total Time for Visit, including Counseling / Coordination of Care: 1 hour  Greater than 50% of this total time spent on direct patient counseling and coordination of care  Chief Complaint:   Not feeling well  History of Present Illness:    Ana Maria Coronado is a 66 y o  female h/o metastatic breast cancer who presents with increasing cough, SOB and fevers at home  Pt's symptoms began yesterday  Feels "achey "  Cough is currently nonproductive  +wheeze  No sick contacts  Denies nausea or vomiting different from usual  No loose stool or diarrhea  No dysuria  Appetite is poor at baseline  Has not eaten or had anything to drink today  Still smoking      Review of Systems:    Review of Systems   Constitutional: Positive for chills, fatigue and fever  Respiratory: Positive for shortness of breath and wheezing  Cardiovascular: Negative for chest pain, palpitations and leg swelling  Gastrointestinal: Positive for abdominal distention  Negative for abdominal pain  Neurological: Negative for weakness and light-headedness  All other systems reviewed and are negative  Past Medical and Surgical History:     Past Medical History:   Diagnosis Date    H/O bilateral mastectomy 2/15/2016    Hypertension 2/15/2016    Lymphedema 2/15/2016    Malignant neoplasm of right breast ([facility] [address] ) 2/15/2016       Past Surgical History:   Procedure Laterality Date    ENDOBRONCHIAL ULTRASOUND (EBUS) N/A 2/16/2016    Procedure: EBUS;  Surgeon: Ruth Kapadia MD;  [facility];  Service:     MASTECTOMY Bilateral     MASTECTOMY Bilateral     right arm edema    OOPHORECTOMY      UT BRONCHOSCOPY NEEDLE BX TRACHEA MAIN STEM&/BRON N/A 2/16/2016    Procedure: Srini Damico;  Surgeon: Ruth Kapadia MD;  [facility]: [facility];  Service: Thoracic    UT INSJ TUNNELED CTR VAD W/SUBQ PORT AGE 5 YR/> N/A 7/24/2018    Procedure: INSERTION VENOUS PORT ( PORT-A-CATH) IR;  Surgeon: Rashel Goldberg DO;  [facility];  Service: Interventional Radiology    UT STEREOTACTIC RADIOSURGERY, CRANIAL,SIMPLE,EA ADD  5/3/2017         UT STEREOTACTIC RADIOSURGERY, CRANIAL,SIMPLE,EA ADD  5/3/2017         UT STEREOTACTIC RADIOSURGERY, CRANIAL,SIMPLE,SINGLE  5/3/2017            Meds/Allergies:    Prior to Admission medications    Medication Sig Start Date End Date Taking?  Authorizing Provider   dexamethasone (DECADRON) 2 mg tablet Take 1 tablet (2 mg total) by mouth daily with breakfast 10/25/18  Yes Merrick Jones MD   diazepam (VALIUM) 5 mg tablet Take 1 tablet (5 mg total) by mouth daily at bedtime 10/25/18  Yes Merrick Jones MD   gabapentin (NEURONTIN) 300 mg capsule 1 cap PO bid and 2 cap at bedtime 10/25/18  Yes Miguelito Alberto MD   HYDROmorphone (DILAUDID) 4 mg tablet Take 1 tablet (4 mg total) by mouth every 4 (four) hours as needed (breakthrough pain) Max Daily Amount: 24 mg 12/7/18  Yes Palak East DO   lidocaine-prilocaine (EMLA) cream Apply topically as needed for mild pain 8/16/18  Yes Palak East DO   metoclopramide (REGLAN) 10 mg tablet Take 1 tablet (10 mg total) by mouth 3 (three) times a day as needed (nausea) 12/11/18  Yes Miguelito Alberto MD   morphine (MS CONTIN) 15 mg 12 hr tablet Take 1 tablet (15 mg total) by mouth 3 (three) times a day Max Daily Amount: 45 mg 12/11/18  Yes Miguelito Alberto MD   ondansetron (ZOFRAN) 8 mg tablet Take 1 tablet (8 mg total) by mouth every 8 (eight) hours as needed for nausea or vomiting 9/5/18   Rylan Carmen PA-C   promethazine (PHENERGAN) 25 mg tablet Take 1 tablet (25 mg total) by mouth every 6 (six) hours as needed for nausea or vomiting 8/23/18   Miguelito Alberto MD   senna (SENOKOT) 8 6 mg Take 2 tablets by mouth 2 (two) times a day as needed   8/3/17   Historical Provider, MD     I have reviewed home medications using allscripts      Allergies: No Known Allergies    Social History:     Marital Status: /Civil Union   Occupation:   Patient Pre-hospital Living Situation: home with    Patient Pre-hospital Level of Mobility: with cane   Patient Pre-hospital Diet Restrictions:   Substance Use History:   History   Alcohol Use    Yes     Comment: social     History   Smoking Status    Current Every Day Smoker    Packs/day: 0 50    Years: 40 00    Types: Cigarettes    Start date: 1   Smokeless Tobacco    Never Used     History   Drug Use No       Family History:    Family History   Problem Relation Age of Onset    Cancer Sister     Prostate cancer Brother        Physical Exam:     Vitals:   Blood Pressure: 107/56 (12/22/18 1654)  Pulse: 87 (12/22/18 1654)  Temperature: 98 8 °F (37 1 °C) (12/22/18 1654)  Temp Source: Oral (12/22/18 1654)  Respirations: 20 (12/22/18 1654)  Height: 5' 5" (165 1 cm) (12/22/18 1654)  Weight - Scale: 62 6 kg (138 lb) (12/22/18 1654)  SpO2: 94 % (12/22/18 1654)    Physical Exam   Constitutional: She is oriented to person, place, and time  No distress  HENT:   Head: Normocephalic and atraumatic  Cardiovascular: Normal rate and regular rhythm  No murmur heard  Pulmonary/Chest: Effort normal and breath sounds normal  No respiratory distress  She has no wheezes  Abdominal: Soft  Bowel sounds are normal  She exhibits distension  There is no tenderness  There is no rebound  Musculoskeletal: She exhibits edema (RUE )  Neurological: She is alert and oriented to person, place, and time  Skin: Skin is warm and dry  No rash noted  She is not diaphoretic  No erythema  Psychiatric: She has a normal mood and affect  Her behavior is normal    Nursing note and vitals reviewed  Additional Data:     Lab Results: I have personally reviewed pertinent reports  Results from last 7 days  Lab Units 12/22/18  1511   WBC Thousand/uL 9 25   HEMOGLOBIN g/dL 11 1*   HEMATOCRIT % 35 0   PLATELETS Thousands/uL 292   NEUTROS PCT % 78*   LYMPHS PCT % 12*   MONOS PCT % 9   EOS PCT % 0       Results from last 7 days  Lab Units 12/22/18  1511   POTASSIUM mmol/L 3 6   CHLORIDE mmol/L 100   CO2 mmol/L 28   BUN mg/dL 11   CREATININE mg/dL 0 76   CALCIUM mg/dL 8 9   ALK PHOS U/L 107   ALT U/L 68   AST U/L 74*       Results from last 7 days  Lab Units 12/22/18  1511   INR  0 96       Imaging: I have personally reviewed pertinent reports  No results found  EKG, Pathology, and Other Studies Reviewed on Admission:   · Reviewed previous CXR (8/18) showing hyperinflated lungs; Allscripts / Epic Records Reviewed: Yes     ** Please Note: This note has been constructed using a voice recognition system   **,io

## 2018-12-23 LAB
ALBUMIN SERPL BCP-MCNC: 2.3 G/DL (ref 3.5–5)
ALP SERPL-CCNC: 88 U/L (ref 46–116)
ALT SERPL W P-5'-P-CCNC: 52 U/L (ref 12–78)
ANION GAP SERPL CALCULATED.3IONS-SCNC: 7 MMOL/L (ref 4–13)
AST SERPL W P-5'-P-CCNC: 55 U/L (ref 5–45)
BACTERIA SPT RESP CULT: NORMAL
BASOPHILS # BLD AUTO: 0.03 THOUSANDS/ΜL (ref 0–0.1)
BASOPHILS NFR BLD AUTO: 0 % (ref 0–1)
BILIRUB SERPL-MCNC: 0.2 MG/DL (ref 0.2–1)
BUN SERPL-MCNC: 8 MG/DL (ref 5–25)
CALCIUM SERPL-MCNC: 7.6 MG/DL (ref 8.3–10.1)
CHLORIDE SERPL-SCNC: 107 MMOL/L (ref 100–108)
CO2 SERPL-SCNC: 26 MMOL/L (ref 21–32)
CREAT SERPL-MCNC: 0.56 MG/DL (ref 0.6–1.3)
EOSINOPHIL # BLD AUTO: 0.02 THOUSAND/ΜL (ref 0–0.61)
EOSINOPHIL NFR BLD AUTO: 0 % (ref 0–6)
ERYTHROCYTE [DISTWIDTH] IN BLOOD BY AUTOMATED COUNT: 18.5 % (ref 11.6–15.1)
FLUAV AG SPEC QL: DETECTED
FLUBV AG SPEC QL: ABNORMAL
GFR SERPL CREATININE-BSD FRML MDRD: 105 ML/MIN/1.73SQ M
GLUCOSE SERPL-MCNC: 78 MG/DL (ref 65–140)
GRAM STN SPEC: NORMAL
HCT VFR BLD AUTO: 34.1 % (ref 34.8–46.1)
HGB BLD-MCNC: 10.2 G/DL (ref 11.5–15.4)
IMM GRANULOCYTES # BLD AUTO: 0.03 THOUSAND/UL (ref 0–0.2)
IMM GRANULOCYTES NFR BLD AUTO: 0 % (ref 0–2)
L PNEUMO1 AG UR QL IA.RAPID: NEGATIVE
LYMPHOCYTES # BLD AUTO: 0.66 THOUSANDS/ΜL (ref 0.6–4.47)
LYMPHOCYTES NFR BLD AUTO: 9 % (ref 14–44)
MAGNESIUM SERPL-MCNC: 2.1 MG/DL (ref 1.6–2.6)
MCH RBC QN AUTO: 28.6 PG (ref 26.8–34.3)
MCHC RBC AUTO-ENTMCNC: 29.9 G/DL (ref 31.4–37.4)
MCV RBC AUTO: 96 FL (ref 82–98)
MONOCYTES # BLD AUTO: 0.38 THOUSAND/ΜL (ref 0.17–1.22)
MONOCYTES NFR BLD AUTO: 5 % (ref 4–12)
NEUTROPHILS # BLD AUTO: 6.24 THOUSANDS/ΜL (ref 1.85–7.62)
NEUTS SEG NFR BLD AUTO: 86 % (ref 43–75)
NRBC BLD AUTO-RTO: 0 /100 WBCS
PLATELET # BLD AUTO: 258 THOUSANDS/UL (ref 149–390)
PMV BLD AUTO: 9.4 FL (ref 8.9–12.7)
POTASSIUM SERPL-SCNC: 3.7 MMOL/L (ref 3.5–5.3)
PROT SERPL-MCNC: 6.1 G/DL (ref 6.4–8.2)
RBC # BLD AUTO: 3.57 MILLION/UL (ref 3.81–5.12)
RSV B RNA SPEC QL NAA+PROBE: ABNORMAL
S PNEUM AG UR QL: NEGATIVE
SODIUM SERPL-SCNC: 140 MMOL/L (ref 136–145)
WBC # BLD AUTO: 7.36 THOUSAND/UL (ref 4.31–10.16)

## 2018-12-23 PROCEDURE — 99232 SBSQ HOSP IP/OBS MODERATE 35: CPT | Performed by: HOSPITALIST

## 2018-12-23 PROCEDURE — 94760 N-INVAS EAR/PLS OXIMETRY 1: CPT

## 2018-12-23 PROCEDURE — 83735 ASSAY OF MAGNESIUM: CPT | Performed by: HOSPITALIST

## 2018-12-23 PROCEDURE — 80053 COMPREHEN METABOLIC PANEL: CPT | Performed by: HOSPITALIST

## 2018-12-23 PROCEDURE — 94664 DEMO&/EVAL PT USE INHALER: CPT

## 2018-12-23 PROCEDURE — 94640 AIRWAY INHALATION TREATMENT: CPT

## 2018-12-23 PROCEDURE — 85025 COMPLETE CBC W/AUTO DIFF WBC: CPT | Performed by: HOSPITALIST

## 2018-12-23 PROCEDURE — 87449 NOS EACH ORGANISM AG IA: CPT | Performed by: HOSPITALIST

## 2018-12-23 RX ORDER — LEVALBUTEROL 1.25 MG/.5ML
1.25 SOLUTION, CONCENTRATE RESPIRATORY (INHALATION)
Status: DISCONTINUED | OUTPATIENT
Start: 2018-12-23 | End: 2018-12-25 | Stop reason: HOSPADM

## 2018-12-23 RX ORDER — KETOROLAC TROMETHAMINE 30 MG/ML
15 INJECTION, SOLUTION INTRAMUSCULAR; INTRAVENOUS EVERY 6 HOURS PRN
Status: DISPENSED | OUTPATIENT
Start: 2018-12-23 | End: 2018-12-25

## 2018-12-23 RX ORDER — SODIUM CHLORIDE FOR INHALATION 0.9 %
3 VIAL, NEBULIZER (ML) INHALATION
Status: DISCONTINUED | OUTPATIENT
Start: 2018-12-23 | End: 2018-12-25 | Stop reason: HOSPADM

## 2018-12-23 RX ORDER — BENZONATATE 100 MG/1
100 CAPSULE ORAL 3 TIMES DAILY
Status: DISCONTINUED | OUTPATIENT
Start: 2018-12-23 | End: 2018-12-25 | Stop reason: HOSPADM

## 2018-12-23 RX ORDER — ALBUTEROL SULFATE 2.5 MG/3ML
2.5 SOLUTION RESPIRATORY (INHALATION)
Status: DISCONTINUED | OUTPATIENT
Start: 2018-12-23 | End: 2018-12-23

## 2018-12-23 RX ORDER — OSELTAMIVIR PHOSPHATE 75 MG/1
75 CAPSULE ORAL EVERY 12 HOURS SCHEDULED
Status: DISCONTINUED | OUTPATIENT
Start: 2018-12-23 | End: 2018-12-25 | Stop reason: HOSPADM

## 2018-12-23 RX ADMIN — LEVALBUTEROL HYDROCHLORIDE 1.25 MG: 1.25 SOLUTION, CONCENTRATE RESPIRATORY (INHALATION) at 19:40

## 2018-12-23 RX ADMIN — SODIUM CHLORIDE 125 ML/HR: 0.9 INJECTION, SOLUTION INTRAVENOUS at 18:43

## 2018-12-23 RX ADMIN — GUAIFENESIN 600 MG: 600 TABLET, EXTENDED RELEASE ORAL at 22:15

## 2018-12-23 RX ADMIN — KETOROLAC TROMETHAMINE 15 MG: 30 INJECTION, SOLUTION INTRAMUSCULAR at 01:43

## 2018-12-23 RX ADMIN — MORPHINE SULFATE 15 MG: 15 TABLET, EXTENDED RELEASE ORAL at 09:13

## 2018-12-23 RX ADMIN — OSELTAMIVIR PHOSPHATE 75 MG: 75 CAPSULE ORAL at 09:13

## 2018-12-23 RX ADMIN — GABAPENTIN 300 MG: 300 CAPSULE ORAL at 09:13

## 2018-12-23 RX ADMIN — BENZONATATE 100 MG: 100 CAPSULE ORAL at 09:13

## 2018-12-23 RX ADMIN — CEFEPIME HYDROCHLORIDE 2000 MG: 2 INJECTION, POWDER, FOR SOLUTION INTRAVENOUS at 15:48

## 2018-12-23 RX ADMIN — DIAZEPAM 5 MG: 5 TABLET ORAL at 22:15

## 2018-12-23 RX ADMIN — GUAIFENESIN 600 MG: 600 TABLET, EXTENDED RELEASE ORAL at 09:13

## 2018-12-23 RX ADMIN — ACETAMINOPHEN 650 MG: 325 TABLET, FILM COATED ORAL at 10:51

## 2018-12-23 RX ADMIN — MORPHINE SULFATE 15 MG: 15 TABLET, EXTENDED RELEASE ORAL at 15:48

## 2018-12-23 RX ADMIN — SODIUM CHLORIDE 125 ML/HR: 0.9 INJECTION, SOLUTION INTRAVENOUS at 02:20

## 2018-12-23 RX ADMIN — KETOROLAC TROMETHAMINE 15 MG: 30 INJECTION, SOLUTION INTRAMUSCULAR at 12:58

## 2018-12-23 RX ADMIN — BENZONATATE 100 MG: 100 CAPSULE ORAL at 22:13

## 2018-12-23 RX ADMIN — ISODIUM CHLORIDE 3 ML: 0.03 SOLUTION RESPIRATORY (INHALATION) at 13:27

## 2018-12-23 RX ADMIN — DEXAMETHASONE 2 MG: 2 TABLET ORAL at 07:11

## 2018-12-23 RX ADMIN — GABAPENTIN 300 MG: 300 CAPSULE ORAL at 17:28

## 2018-12-23 RX ADMIN — MORPHINE SULFATE 15 MG: 15 TABLET, EXTENDED RELEASE ORAL at 22:13

## 2018-12-23 RX ADMIN — SODIUM CHLORIDE 125 ML/HR: 0.9 INJECTION, SOLUTION INTRAVENOUS at 10:48

## 2018-12-23 RX ADMIN — ENOXAPARIN SODIUM 40 MG: 40 INJECTION SUBCUTANEOUS at 09:13

## 2018-12-23 RX ADMIN — ISODIUM CHLORIDE 3 ML: 0.03 SOLUTION RESPIRATORY (INHALATION) at 19:40

## 2018-12-23 RX ADMIN — OSELTAMIVIR PHOSPHATE 75 MG: 75 CAPSULE ORAL at 22:14

## 2018-12-23 RX ADMIN — BENZONATATE 100 MG: 100 CAPSULE ORAL at 15:48

## 2018-12-23 RX ADMIN — NICOTINE 1 PATCH: 14 PATCH TRANSDERMAL at 09:13

## 2018-12-23 RX ADMIN — CEFEPIME HYDROCHLORIDE 2000 MG: 2 INJECTION, POWDER, FOR SOLUTION INTRAVENOUS at 03:35

## 2018-12-23 RX ADMIN — LEVALBUTEROL HYDROCHLORIDE 1.25 MG: 1.25 SOLUTION, CONCENTRATE RESPIRATORY (INHALATION) at 13:26

## 2018-12-23 NOTE — RESPIRATORY THERAPY NOTE
RT Protocol Note  Mayra Maldonado 66 y o  female MRN: [id number]  Unit/Bed#: -01 Encounter: [id number]    Assessment    Principal Problem:    Sepsis ([address] )  Active Problems:    Lymphedema of right upper extremity    Cancer related pain    Tobacco abuse    URI (upper respiratory infection)      Home Pulmonary Medications:  NONE       Past Medical History:   Diagnosis Date    H/O bilateral mastectomy 2/15/2016    Hypertension 2/15/2016    Lymphedema 2/15/2016    Malignant neoplasm of right breast ([facility] ) 2/15/2016     Social History     Social History    Marital status: /Civil Union     Spouse name: N/A    Number of children: N/A    Years of education: N/A     Social History Main Topics    Smoking status: Current Every Day Smoker     Packs/day: 0 50     Years: 40 00     Types: Cigarettes     Start date: 1978    Smokeless tobacco: Never Used    Alcohol use Yes      Comment: social    Drug use: No    Sexual activity: Not Asked     Other Topics Concern    None     Social History Narrative    None       Subjective         Objective    Physical Exam:        Vitals:  Blood pressure 108/58, pulse 83, temperature 98 8 °F (37 1 °C), temperature source Oral, resp  rate 18, height 5' 5" (1 651 m), weight 62 6 kg (138 lb), SpO2 98 %, not currently breastfeeding  Imaging and other studies:REVIEWED           Plan    PT has a positive smoking Hx and a pulmonary nodule  She is not wheezing  Her BS are normal other than a LLL crackles coarse rhonci  I started PT on Xopenex 1 25mg TID as MD would like PT to have bronchodilator therapy

## 2018-12-23 NOTE — UTILIZATION REVIEW
[address]  Utilization Review Department  Phone: [phone]; Fax [phone]  [email or web address]  ATTENTION: Please call with any questions or concerns to [phone]  and carefully listen to the prompts so that you are directed to the right person  Send all requests for admission clinical reviews, approved or denied determinations and any other requests to fax [phone]  All voicemails are confidential     ======================================================================    Initial Clinical Review    Admission: Date/Time/Statement: 12/22/18 @ 1610     Orders Placed This Encounter   Procedures    Inpatient Admission (expected length of stay for this patient is greater than two midnights)     Standing Status:   Standing     Number of Occurrences:   1     Order Specific Question:   Admitting Physician     Answer:   Kady Ricardo [[id number]]     Order Specific Question:   Level of Care     Answer:   Med Surg [16]     Order Specific Question:   Estimated length of stay     Answer:   More than 2 Midnights     Order Specific Question:   Certification     Answer:   I certify that inpatient services are medically necessary for this patient for a duration of greater than two midnights  See H&P and MD Progress Notes for additional information about the patient's course of treatment  ED: Date/Time/Mode of Arrival:   ED Arrival Information     Expected Arrival Acuity Means of Arrival Escorted By Service Admission Type    - 12/22/2018 14:36 Emergent Wheelchair Family Member General Medicine Emergency    Arrival Complaint    Fever / Flu like symptoms           Chief Complaint:   Chief Complaint   Patient presents with    Fever - 9 weeks to 74 years     Pt  with complaints of fever and generalized body aches since last night, Pt  currently receives chemo for breast ca with mets to back, brain, and other sites           History of Illness:   Nell De Souza is a 66 y o  female h/o metastatic breast cancer who presents with increasing cough, SOB and fevers at home  Pt's symptoms began yesterday  Feels "achey "  Cough is currently nonproductive  +wheeze    ED Vital Signs:   ED Triage Vitals   Temperature Pulse Respirations Blood Pressure SpO2   12/22/18 1453 12/22/18 1449 12/22/18 1449 12/22/18 1449 12/22/18 1449   (!) 102 8 °F (39 3 °C) 101 20 132/63 90 %      Temp Source Heart Rate Source Patient Position - Orthostatic VS BP Location FiO2 (%)   12/22/18 1449 12/22/18 1449 12/22/18 1449 12/22/18 1449 --   Oral Monitor Lying Left arm       Pain Score       12/22/18 1613       Worst Possible Pain        Wt Readings from Last 1 Encounters:   12/22/18 62 6 kg (138 lb)     Abnormal Labs/Diagnostic Test Results:   WBC Thousand/uL 9 25   HEMOGLOBIN g/dL 11 1*   HEMATOCRIT % 35 0   PLATELETS Thousands/uL 292   Sodium  139  POTASSIUM mmol/L 3 6   CHLORIDE mmol/L 100   CO2 mmol/L 28   BUN mg/dL 11   CREATININE mg/dL 0 76   CALCIUM mg/dL 8 9   ALK PHOS U/L 107   ALT U/L 68   AST U/L 74*     Color, UA Yellow     Clarity, UA Clear    Specific Gravity, UA 1 020 1 003 - 1 030     pH, UA 7 0 4 5 - 8 0     Leukocytes, UA Negative Negative     Nitrite, UA Negative Negative     Protein, UA Negative Negative mg/dl     Glucose, UA Negative Negative mg/dl     Ketones, UA Negative Negative mg/dl     Urobilinogen, UA 0 2 0 2, 1 0 E U /dl E U /dl     Bilirubin, UA Negative Negative     Blood, UA Negative Negative        Chest X:  Bilateral groundglass infiltrates, most compatible with multifocal pneumonia      ED Treatment:   Medication Administration from 12/22/2018 1436 to 12/22/2018 1639       Date/Time Order Dose Route Action Action by Comments     12/22/2018 1559 sodium chloride 0 9 % bolus 1,000 mL 0 mL Intravenous Stopped Refugio Suarez RN      12/22/2018 1530 sodium chloride 0 9 % bolus 1,000 mL 1,000 mL Intravenous New Bag Refugio Suarez RN      12/22/2018 1600 sodium chloride 0 9 % bolus 1,000 mL 1,000 mL Intravenous New Bag Angela Melissa [facility]      12/22/2018 1541 cefepime (MAXIPIME) 2 g/50 mL dextrose IVPB 2,000 mg Intravenous [facility]      12/22/2018 1529 acetaminophen (TYLENOL) tablet 975 mg 975 mg Oral Given Yesy Torres RN      12/22/2018 1529 ibuprofen (MOTRIN) tablet 600 mg 600 mg Oral Given Yesy Torres RN      12/22/2018 1613 HYDROmorphone (DILAUDID) injection 1 mg 1 mg Intravenous Given Yesy Torres RN      12/22/2018 1613 oseltamivir (TAMIFLU) capsule 75 mg 75 mg Oral Given Yesy Torres RN           Past Medical/Surgical History:    Active Ambulatory Problems     Diagnosis Date Noted    H/O bilateral mastectomy 02/15/2016    Lymphedema of right upper extremity 02/15/2016    Pulmonary nodule 02/15/2016    Bilateral malignant neoplasm of breast in female, estrogen receptor positive ([facility] ) 02/15/2016    Bone metastases ([facility] ) 07/06/2016    Brain metastases ([place] [address] ) 04/26/2017    Cancer related pain 02/19/2016    Chemotherapy-induced nausea 02/25/2016    Constipation 10/07/2016    Decreased appetite 10/06/2016    Difficulty sleeping 10/06/2016    Dyspareunia, female 10/20/2016    Herniated lumbar intervertebral disc 10/06/2016    Mediastinal lymphadenopathy 02/10/2016    Hyperglycemia 02/06/2018    Other fatigue 02/06/2018    Dry skin 02/06/2018    Dizziness 02/06/2018    Bilateral pleural effusion 06/07/2018    Abnormal CT of the chest 06/07/2018    SOB (shortness of breath) 09/24/2018    Tobacco abuse 09/24/2018    Financial difficulties 12/12/2018     Past Medical History:   Diagnosis Date    H/O bilateral mastectomy 2/15/2016    Hypertension 2/15/2016    Lymphedema 2/15/2016    [facility] 2/15/2016       Admitting Diagnosis: Pneumonia [J18 9]  Fever [R50 9]  Cancer related pain [G89 3]  Sepsis ([facility] ) [A41 9]    Age/Sex: 66 y o  female    Assessment/Plan:   * Sepsis ([address] )   Assessment & Plan     POA with fever, tachycardia   Suspect URI as source; no obvious infiltrate on XR  Possibly influenza   Will treat empirically with cefepime +tamiflu given symptoms started within [id number]   F/u influenza/RSV  Legionella and strep ags   Sputum culture  Blood cultures: pending          URI (upper respiratory infection)   Assessment & Plan     Supportive care  Supplemental O2   Cough suppressant, mucinex  Albuterol nebs          Cancer related pain   Assessment & Plan     Will resume home regimen per palliative care notes       Tobacco abuse   Assessment & Plan     Offer NRT       Lymphedema of right upper extremity   Assessment & Plan     Appearance stable per patient          VTE Prophylaxis: Enoxaparin (Lovenox)  / sequential compression device   Anticipated Length of Stay:  Patient will be admitted on an Inpatient basis with an anticipated length of stay of  > 2 midnights     Justification for Hospital Stay:  Sepsis due to URI     Admission Orders:  Scheduled Meds:   Current Facility-Administered Medications:  acetaminophen 650 mg Oral Q6H PRN Morgan Emerson MD    benzonatate 100 mg Oral TID Morgan Emerson MD    cefepime 2,000 mg Intravenous Q12H Morgan Emerson MD Last Rate: 2,000 mg (12/23/18 0335)   dexamethasone 2 mg Oral Daily With Breakfast Morgan Emerson MD    diazepam 5 mg Oral HS Morgan Emerson MD    enoxaparin 40 mg Subcutaneous Daily Morgan Emerson MD    gabapentin 300 mg Oral BID Morgan Emerson MD    guaiFENesin 600 mg Oral Q12H Raymundo Mckeon MD    HYDROmorphone 4 mg Oral Q4H PRN Morgan Emerson MD    influenza vaccine 0 5 mL Intramuscular Prior to discharge Morgan Emerson MD    ketorolac 15 mg Intravenous Q6H PRN Nereyda Mejia PA-C    levalbuterol 1 25 mg Nebulization TID Morgan Emerson MD    And        sodium chloride 3 mL Nebulization TID Morgan Emerson MD    metoclopramide 10 mg Oral TID PRN Morgan Emerson MD    morphine 15 mg Oral TID Morgan Emerson MD    nicotine 1 patch Transdermal Daily Morgan Emerson MD    ondansetron 8 mg Oral Q8H PRN Kandace Matute MD    oseltamivir 75 mg Oral Q12H Yenny Maza MD    senna 2 tablet Oral BID PRN Kandace Matute MD    sodium chloride 125 mL/hr Intravenous Continuous Kandace Matute MD Last Rate: 125 mL/hr (12/23/18 1048)     Continuous Infusions:   sodium chloride 125 mL/hr Last Rate: 125 mL/hr (12/23/18 1048)     Regular diet  Aspiration precautions  Up and OOB as tolerated  Elevate HOB 30 degree or greater  O2 @ 2L via NC  Aurelio SCDs

## 2018-12-23 NOTE — PROGRESS NOTES
Progress Note - Italo Henry 1960, 66 y o  female MRN: [id number]    Unit/Bed#: -01 Encounter: [id number]    Primary Care Provider: Ashley Lynne MD   Date and time admitted to hospital: 2018  2:44 PM    * Sepsis [facility])   Assessment & Plan    POA with fever, tachycardia   Suspect URI as source; no obvious infiltrate on XR  Possibly influenza   Will treat empirically with cefepime +tamiflu given symptoms started within [id number]   Influenza/RSV: pending   Legionella and strep ag: negative   Sputum culture  Blood cultures: pending        URI (upper respiratory infection)   Assessment & Plan    Supportive care  Supplemental O2   Cough suppressant, mucinex  Albuterol nebs        Cancer related pain   Assessment & Plan    Will resume home regimen per palliative care notes      Tobacco abuse   Assessment & Plan    Offer NRT      Lymphedema of right upper extremity   Assessment & Plan    Appearance stable per patient          VTE Pharmacologic Prophylaxis:   Pharmacologic: Enoxaparin (Lovenox)  Mechanical VTE Prophylaxis in Place: Yes    Patient Centered Rounds: I have performed bedside rounds with nursing staff today  Discussions with Specialists or Other Care Team Provider:     Education and Discussions with Family / Patient: patient and family     Time Spent for Care: 30 minutes  More than 50% of total time spent on counseling and coordination of care as described above  Current Length of Stay: 1 day(s)    Current Patient Status: Inpatient   Certification Statement: The patient will continue to require additional inpatient hospital stay due to sepsis on IV antibiotics     Discharge Plan / Estimated Discharge Date: TBD based on clinical course    Code Status: Level 1 - Full Code    Subjective:   Pt is feeling about the same  No better  Dry cough  Pain is at her baseline       Objective:     Vitals:   Temp (24hrs), Av 7 °F (37 6 °C), Min:98 2 °F (36 8 °C), Max:102 8 °F (39 3 °C)    Temp: [98 2 °F (36 8 °C)-102 8 °F (39 3 °C)] 98 8 °F (37 1 °C)  HR:  [] 83  Resp:  [18-22] 18  BP: (107-132)/(56-63) 108/58  SpO2:  [90 %-99 %] 95 %  Body mass index is 22 96 kg/m²  Input and Output Summary (last 24 hours): Intake/Output Summary (Last 24 hours) at 12/23/18 0847  Last data filed at 12/23/18 0220   Gross per 24 hour   Intake             1970 ml   Output              800 ml   Net             1170 ml       Physical Exam:     Physical Exam   Constitutional: She is oriented to person, place, and time  No distress  HENT:   Head: Normocephalic and atraumatic  Cardiovascular: Normal rate and regular rhythm  No murmur heard  Pulmonary/Chest: Effort normal  No respiratory distress  She has wheezes  She has no rales  Abdominal: Soft  Bowel sounds are normal  She exhibits no distension  There is no tenderness  Musculoskeletal: Normal range of motion  She exhibits edema (RUE (stable))  Neurological: She is alert and oriented to person, place, and time  Skin: Skin is warm and dry  No rash noted  She is not diaphoretic  No erythema  Psychiatric: She has a normal mood and affect  Her behavior is normal    Nursing note and vitals reviewed  Additional Data:     Labs:      Results from last 7 days  Lab Units 12/23/18  0445   WBC Thousand/uL 7 36   HEMOGLOBIN g/dL 10 2*   HEMATOCRIT % 34 1*   PLATELETS Thousands/uL 258   NEUTROS PCT % 86*   LYMPHS PCT % 9*   MONOS PCT % 5   EOS PCT % 0       Results from last 7 days  Lab Units 12/23/18  0447   POTASSIUM mmol/L 3 7   CHLORIDE mmol/L 107   CO2 mmol/L 26   BUN mg/dL 8   CREATININE mg/dL 0 56*   CALCIUM mg/dL 7 6*   ALK PHOS U/L 88   ALT U/L 52   AST U/L 55*       Results from last 7 days  Lab Units 12/22/18  1511   INR  0 96       * I Have Reviewed All Lab Data Listed Above  * Additional Pertinent Lab Tests Reviewed:  All Labs Within Last 24 Hours Reviewed    Imaging:    Imaging Reports Reviewed Today Include:   Imaging Personally Reviewed by Myself Includes:      Recent Cultures (last 7 days):       Results from last 7 days  Lab Units 12/23/18  0001   LEGIONELLA URINARY ANTIGEN  Negative       Last 24 Hours Medication List:     Current Facility-Administered Medications:  acetaminophen 650 mg Oral Q6H PRN John Potter MD    albuterol 2 5 mg Nebulization TID John Potter MD    benzonatate 100 mg Oral TID John Potter MD    cefepime 2,000 mg Intravenous Q12H John Potter MD Last Rate: 2,000 mg (12/23/18 0335)   dexamethasone 2 mg Oral Daily With Breakfast John Potter MD    diazepam 5 mg Oral HS John Potter MD    enoxaparin 40 mg Subcutaneous Daily John Potter MD    gabapentin 300 mg Oral BID John Potter MD    guaiFENesin 600 mg Oral Q12H Ashley Barron MD    HYDROmorphone 4 mg Oral Q4H PRN John Potter MD    influenza vaccine 0 5 mL Intramuscular Prior to discharge John Potter MD    ketorolac 15 mg Intravenous Q6H PRN Braxton Martini PA-C    metoclopramide 10 mg Oral TID PRN John Potter MD    morphine 15 mg Oral TID John Potter MD    nicotine 1 patch Transdermal Daily John Potter MD    ondansetron 8 mg Oral Q8H PRN John Potter MD    oseltamivir 75 mg Oral Q12H Ashley Barron MD    senna 2 tablet Oral BID PRN John Potter MD    sodium chloride 125 mL/hr Intravenous Continuous John Potter MD Last Rate: 125 mL/hr (12/23/18 0220)        Today, Patient Was Seen By: John Potter MD    ** Please Note: This note has been constructed using a voice recognition system   **

## 2018-12-23 NOTE — ASSESSMENT & PLAN NOTE
POA with fever, tachycardia   Suspect URI as source; no obvious infiltrate on XR   Possibly influenza   Will treat empirically with cefepime +tamiflu given symptoms started within [id number]   Influenza/RSV: pending   Legionella and strep ag: negative   Sputum culture  Blood cultures: pending

## 2018-12-24 PROBLEM — J10.1 INFLUENZA A: Status: ACTIVE | Noted: 2018-12-22

## 2018-12-24 PROBLEM — J18.9 PNEUMONIA: Status: ACTIVE | Noted: 2018-12-24

## 2018-12-24 LAB
ANION GAP SERPL CALCULATED.3IONS-SCNC: 8 MMOL/L (ref 4–13)
ATRIAL RATE: 90 BPM
BUN SERPL-MCNC: 6 MG/DL (ref 5–25)
CALCIUM SERPL-MCNC: 7.7 MG/DL (ref 8.3–10.1)
CHLORIDE SERPL-SCNC: 112 MMOL/L (ref 100–108)
CO2 SERPL-SCNC: 25 MMOL/L (ref 21–32)
CREAT SERPL-MCNC: 0.54 MG/DL (ref 0.6–1.3)
GFR SERPL CREATININE-BSD FRML MDRD: 106 ML/MIN/1.73SQ M
GLUCOSE SERPL-MCNC: 110 MG/DL (ref 65–140)
P AXIS: 63 DEGREES
POTASSIUM SERPL-SCNC: 4 MMOL/L (ref 3.5–5.3)
PR INTERVAL: 118 MS
QRS AXIS: 92 DEGREES
QRSD INTERVAL: 84 MS
QT INTERVAL: 358 MS
QTC INTERVAL: 437 MS
SODIUM SERPL-SCNC: 145 MMOL/L (ref 136–145)
T WAVE AXIS: 64 DEGREES
VENTRICULAR RATE: 90 BPM

## 2018-12-24 PROCEDURE — 93010 ELECTROCARDIOGRAM REPORT: CPT | Performed by: INTERNAL MEDICINE

## 2018-12-24 PROCEDURE — 94640 AIRWAY INHALATION TREATMENT: CPT

## 2018-12-24 PROCEDURE — 94760 N-INVAS EAR/PLS OXIMETRY 1: CPT

## 2018-12-24 PROCEDURE — 80048 BASIC METABOLIC PNL TOTAL CA: CPT | Performed by: HOSPITALIST

## 2018-12-24 PROCEDURE — 99232 SBSQ HOSP IP/OBS MODERATE 35: CPT | Performed by: HOSPITALIST

## 2018-12-24 RX ADMIN — CEFEPIME HYDROCHLORIDE 2000 MG: 2 INJECTION, POWDER, FOR SOLUTION INTRAVENOUS at 03:42

## 2018-12-24 RX ADMIN — ISODIUM CHLORIDE 3 ML: 0.03 SOLUTION RESPIRATORY (INHALATION) at 08:06

## 2018-12-24 RX ADMIN — ISODIUM CHLORIDE 3 ML: 0.03 SOLUTION RESPIRATORY (INHALATION) at 13:09

## 2018-12-24 RX ADMIN — MORPHINE SULFATE 15 MG: 15 TABLET, EXTENDED RELEASE ORAL at 21:54

## 2018-12-24 RX ADMIN — GABAPENTIN 300 MG: 300 CAPSULE ORAL at 18:43

## 2018-12-24 RX ADMIN — KETOROLAC TROMETHAMINE 15 MG: 30 INJECTION, SOLUTION INTRAMUSCULAR at 02:35

## 2018-12-24 RX ADMIN — LEVALBUTEROL HYDROCHLORIDE 1.25 MG: 1.25 SOLUTION, CONCENTRATE RESPIRATORY (INHALATION) at 13:09

## 2018-12-24 RX ADMIN — CEFEPIME HYDROCHLORIDE 2000 MG: 2 INJECTION, POWDER, FOR SOLUTION INTRAVENOUS at 17:00

## 2018-12-24 RX ADMIN — BENZONATATE 100 MG: 100 CAPSULE ORAL at 09:19

## 2018-12-24 RX ADMIN — MORPHINE SULFATE 15 MG: 15 TABLET, EXTENDED RELEASE ORAL at 09:19

## 2018-12-24 RX ADMIN — OSELTAMIVIR PHOSPHATE 75 MG: 75 CAPSULE ORAL at 21:54

## 2018-12-24 RX ADMIN — ENOXAPARIN SODIUM 40 MG: 40 INJECTION SUBCUTANEOUS at 09:19

## 2018-12-24 RX ADMIN — GUAIFENESIN 600 MG: 600 TABLET, EXTENDED RELEASE ORAL at 21:54

## 2018-12-24 RX ADMIN — LEVALBUTEROL HYDROCHLORIDE 1.25 MG: 1.25 SOLUTION, CONCENTRATE RESPIRATORY (INHALATION) at 20:06

## 2018-12-24 RX ADMIN — DEXAMETHASONE 2 MG: 2 TABLET ORAL at 09:19

## 2018-12-24 RX ADMIN — MORPHINE SULFATE 15 MG: 15 TABLET, EXTENDED RELEASE ORAL at 16:59

## 2018-12-24 RX ADMIN — OSELTAMIVIR PHOSPHATE 75 MG: 75 CAPSULE ORAL at 09:19

## 2018-12-24 RX ADMIN — NICOTINE 1 PATCH: 14 PATCH TRANSDERMAL at 09:19

## 2018-12-24 RX ADMIN — BENZONATATE 100 MG: 100 CAPSULE ORAL at 16:59

## 2018-12-24 RX ADMIN — LEVALBUTEROL HYDROCHLORIDE 1.25 MG: 1.25 SOLUTION, CONCENTRATE RESPIRATORY (INHALATION) at 08:06

## 2018-12-24 RX ADMIN — BENZONATATE 100 MG: 100 CAPSULE ORAL at 21:54

## 2018-12-24 RX ADMIN — GABAPENTIN 300 MG: 300 CAPSULE ORAL at 09:19

## 2018-12-24 RX ADMIN — GUAIFENESIN 600 MG: 600 TABLET, EXTENDED RELEASE ORAL at 09:19

## 2018-12-24 RX ADMIN — DIAZEPAM 5 MG: 5 TABLET ORAL at 21:54

## 2018-12-24 RX ADMIN — ISODIUM CHLORIDE 3 ML: 0.03 SOLUTION RESPIRATORY (INHALATION) at 20:06

## 2018-12-24 NOTE — ASSESSMENT & PLAN NOTE
CXR with b/l infiltrates  Pt is flu positive as well  Strep/legionella ags negative   Will continue antibiotics at this time

## 2018-12-24 NOTE — ASSESSMENT & PLAN NOTE
POA with fever, tachycardia   Suspect URI as source; no obvious infiltrate on XR   Possibly influenza   Will treat empirically with cefepime +tamiflu given symptoms started within [id number]   Influenza/RSV: flu A positive  Legionella and strep ag: negative   Blood cultures: NGTD

## 2018-12-24 NOTE — PROGRESS NOTES
Progress Note - Marichuy Borrero 1960, 66 y o  female MRN: [id number]    Unit/Bed#: -01 Encounter: [id number]    Primary Care Provider: Dave Gonsalez MD   Date and time admitted to hospital: 2018  2:44 PM    * Sepsis [facility])   Assessment & Plan    POA with fever, tachycardia   Suspect URI as source; no obvious infiltrate on XR  Possibly influenza   Will treat empirically with cefepime +tamiflu given symptoms started within [id number]   Influenza/RSV: flu A positive  Legionella and strep ag: negative   Blood cultures: NGTD       Pneumonia   Assessment & Plan    CXR with b/l infiltrates  Pt is flu positive as well  Strep/legionella ags negative   Will continue antibiotics at this time      Tobacco abuse   Assessment & Plan    Offer NRT      Lymphedema of right upper extremity   Assessment & Plan    Appearance stable per patient          VTE Pharmacologic Prophylaxis:   Pharmacologic: Enoxaparin (Lovenox)  Mechanical VTE Prophylaxis in Place: No    Patient Centered Rounds: I have performed bedside rounds with nursing staff today  Discussions with Specialists or Other Care Team Provider: none     Education and Discussions with Family / Patient: patient     Time Spent for Care: 30 minutes  More than 50% of total time spent on counseling and coordination of care as described above  Current Length of Stay: 2 day(s)    Current Patient Status: Inpatient   Certification Statement: The patient will continue to require additional inpatient hospital stay due to influenza and pneumonia     Discharge Plan / Estimated Discharge Date: TBD based on clinical course; hopeful dc in 24 hours     Code Status: Level 1 - Full Code    Subjective:   Pt is feeling about the same  Still has dry unproductive cough  No fevers  R arm is tender near the axilla       Objective:     Vitals:   Temp (24hrs), Av 3 °F (36 8 °C), Min:98 2 °F (36 8 °C), Max:98 3 °F (36 8 °C)    Temp:  [98 2 °F (36 8 °C)-98 3 °F (36 8 °C)] 98 2 °F (36 8 °C)  HR:  [74-79] 75  Resp:  [18] 18  BP: (102-118)/(58-70) 118/70  SpO2:  [97 %-98 %] 98 %  Body mass index is 22 96 kg/m²  Input and Output Summary (last 24 hours): Intake/Output Summary (Last 24 hours) at 12/24/18 8215  Last data filed at 12/23/18 2218   Gross per 24 hour   Intake          2399 58 ml   Output             2300 ml   Net            99 58 ml       Physical Exam:     Physical Exam   Constitutional: She is oriented to person, place, and time  No distress  Cardiovascular: Normal rate and regular rhythm  No murmur heard  Pulmonary/Chest: Effort normal  No respiratory distress  She has no wheezes  She has no rales  Mild fine crackles    Abdominal: Soft  Bowel sounds are normal  She exhibits no distension  There is no tenderness  There is no rebound  Musculoskeletal: She exhibits edema (RUE ) and tenderness (R tricep with small bruise (appears old) )  Neurological: She is alert and oriented to person, place, and time  Skin: Skin is warm and dry  No rash noted  She is not diaphoretic  No erythema  Psychiatric: She has a normal mood and affect  Her behavior is normal    Nursing note and vitals reviewed  Additional Data:     Labs:      Results from last 7 days  Lab Units 12/23/18  0445   WBC Thousand/uL 7 36   HEMOGLOBIN g/dL 10 2*   HEMATOCRIT % 34 1*   PLATELETS Thousands/uL 258   NEUTROS PCT % 86*   LYMPHS PCT % 9*   MONOS PCT % 5   EOS PCT % 0       Results from last 7 days  Lab Units 12/24/18  0529 12/23/18  0447   POTASSIUM mmol/L 4 0 3 7   CHLORIDE mmol/L 112* 107   CO2 mmol/L 25 26   BUN mg/dL 6 8   CREATININE mg/dL 0 54* 0 56*   CALCIUM mg/dL 7 7* 7 6*   ALK PHOS U/L  --  88   ALT U/L  --  52   AST U/L  --  55*       Results from last 7 days  Lab Units 12/22/18  1511   INR  0 96       * I Have Reviewed All Lab Data Listed Above  * Additional Pertinent Lab Tests Reviewed:  All Labs Within Last 24 Hours Reviewed    Imaging:    Imaging Reports Reviewed Today Include: Imaging Personally Reviewed by Myself Includes:      Recent Cultures (last 7 days):       Results from last 7 days  Lab Units 12/23/18  0001 12/22/18  1601 12/22/18  1528 12/22/18  1522 12/22/18  1511   BLOOD CULTURE   --   --  No Growth at 24 hrs   --  No Growth at 24 hrs  SPUTUM CULTURE   --  Test not performed  Suggest repeat specimen  --   --   --    GRAM STAIN RESULT   --  >10 squamous epithelial cells/lpf, indicating orophayngeal contamination    No Polys  3+ Gram positive cocci in pairs  1+ Gram positive rods  1+ Yeast  Rare Gram negative rods  --   --   --    INFLUENZA B PCR   --   --   --  None Detected  --    RSV PCR   --   --   --  None Detected  --    LEGIONELLA URINARY ANTIGEN  Negative  --   --   --   --        Last 24 Hours Medication List:     Current Facility-Administered Medications:  acetaminophen 650 mg Oral Q6H PRN Aisha Hernandez MD    benzonatate 100 mg Oral TID Aisha Hernandez MD    cefepime 2,000 mg Intravenous Q12H Aisha Hernandez MD Last Rate: 2,000 mg (12/24/18 0342)   dexamethasone 2 mg Oral Daily With Breakfast Aisha Hernandez MD    diazepam 5 mg Oral HS Aisha Hernandez MD    enoxaparin 40 mg Subcutaneous Daily Aisha Hernandez MD    gabapentin 300 mg Oral BID Aisha Hernandez MD    guaiFENesin 600 mg Oral Q12H Jairon Cole MD    HYDROmorphone 4 mg Oral Q4H PRN Aisha Hernandez MD    influenza vaccine 0 5 mL Intramuscular Prior to discharge Aisha Hernandez MD    ketorolac 15 mg Intravenous Q6H PRN Odalys Bryant PA-C    levalbuterol 1 25 mg Nebulization TID Aisha Hernandez MD    And        sodium chloride 3 mL Nebulization TID Aisha Hernandez MD    metoclopramide 10 mg Oral TID PRN Aisha Hernandez MD    morphine 15 mg Oral TID Aisha Hernandez MD    nicotine 1 patch Transdermal Daily Aisha Hernandez MD    ondansetron 8 mg Oral Q8H PRN Aisha Hernandez MD    oseltamivir 75 mg Oral Q12H Jairon Cole MD    senna 2 tablet Oral BID PRN Aisha Hernandez MD         Today, Patient Was Seen By: Aisha Hernandez, MD    ** Please Note: This note has been constructed using a voice recognition system   **

## 2018-12-25 VITALS
HEIGHT: 65 IN | DIASTOLIC BLOOD PRESSURE: 78 MMHG | HEART RATE: 67 BPM | RESPIRATION RATE: 16 BRPM | WEIGHT: 138 LBS | OXYGEN SATURATION: 94 % | SYSTOLIC BLOOD PRESSURE: 140 MMHG | TEMPERATURE: 98.6 F | BODY MASS INDEX: 22.99 KG/M2

## 2018-12-25 LAB
ANION GAP SERPL CALCULATED.3IONS-SCNC: 11 MMOL/L (ref 4–13)
BUN SERPL-MCNC: 5 MG/DL (ref 5–25)
CALCIUM SERPL-MCNC: 8.4 MG/DL (ref 8.3–10.1)
CHLORIDE SERPL-SCNC: 112 MMOL/L (ref 100–108)
CO2 SERPL-SCNC: 22 MMOL/L (ref 21–32)
CREAT SERPL-MCNC: 0.46 MG/DL (ref 0.6–1.3)
GFR SERPL CREATININE-BSD FRML MDRD: 112 ML/MIN/1.73SQ M
GLUCOSE SERPL-MCNC: 99 MG/DL (ref 65–140)
POTASSIUM SERPL-SCNC: 3.7 MMOL/L (ref 3.5–5.3)
SODIUM SERPL-SCNC: 145 MMOL/L (ref 136–145)

## 2018-12-25 PROCEDURE — 80048 BASIC METABOLIC PNL TOTAL CA: CPT | Performed by: HOSPITALIST

## 2018-12-25 PROCEDURE — 94760 N-INVAS EAR/PLS OXIMETRY 1: CPT

## 2018-12-25 PROCEDURE — 90682 RIV4 VACC RECOMBINANT DNA IM: CPT | Performed by: HOSPITALIST

## 2018-12-25 PROCEDURE — 99239 HOSP IP/OBS DSCHRG MGMT >30: CPT | Performed by: INTERNAL MEDICINE

## 2018-12-25 PROCEDURE — 94640 AIRWAY INHALATION TREATMENT: CPT

## 2018-12-25 RX ORDER — GUAIFENESIN 600 MG
600 TABLET, EXTENDED RELEASE 12 HR ORAL EVERY 12 HOURS SCHEDULED
Qty: 20 TABLET | Refills: 0 | Status: SHIPPED | OUTPATIENT
Start: 2018-12-25 | End: 2019-02-20 | Stop reason: ALTCHOICE

## 2018-12-25 RX ORDER — BENZONATATE 100 MG/1
100 CAPSULE ORAL 3 TIMES DAILY
Qty: 20 CAPSULE | Refills: 0 | Status: SHIPPED | OUTPATIENT
Start: 2018-12-25 | End: 2019-02-26 | Stop reason: ALTCHOICE

## 2018-12-25 RX ORDER — OSELTAMIVIR PHOSPHATE 75 MG/1
75 CAPSULE ORAL EVERY 12 HOURS SCHEDULED
Qty: 4 CAPSULE | Refills: 0 | Status: SHIPPED | OUTPATIENT
Start: 2018-12-25 | End: 2018-12-27

## 2018-12-25 RX ORDER — LEVOFLOXACIN 750 MG/1
750 TABLET ORAL EVERY 24 HOURS
Qty: 4 TABLET | Refills: 0 | Status: SHIPPED | OUTPATIENT
Start: 2018-12-26 | End: 2018-12-30

## 2018-12-25 RX ADMIN — MORPHINE SULFATE 15 MG: 15 TABLET, EXTENDED RELEASE ORAL at 08:21

## 2018-12-25 RX ADMIN — LEVALBUTEROL HYDROCHLORIDE 1.25 MG: 1.25 SOLUTION, CONCENTRATE RESPIRATORY (INHALATION) at 07:38

## 2018-12-25 RX ADMIN — GUAIFENESIN 600 MG: 600 TABLET, EXTENDED RELEASE ORAL at 08:20

## 2018-12-25 RX ADMIN — LEVOFLOXACIN 750 MG: 500 TABLET, FILM COATED ORAL at 11:41

## 2018-12-25 RX ADMIN — NICOTINE 1 PATCH: 14 PATCH TRANSDERMAL at 08:26

## 2018-12-25 RX ADMIN — ISODIUM CHLORIDE 3 ML: 0.03 SOLUTION RESPIRATORY (INHALATION) at 07:38

## 2018-12-25 RX ADMIN — BENZONATATE 100 MG: 100 CAPSULE ORAL at 08:20

## 2018-12-25 RX ADMIN — DEXAMETHASONE 2 MG: 2 TABLET ORAL at 08:21

## 2018-12-25 RX ADMIN — INFLUENZA A VIRUS A/MICHIGAN/45/2015 (H1N1) RECOMBINANT HEMAGGLUTININ ANTIGEN, INFLUENZA A VIRUS A/SINGAPORE/INFIMH-16-0019/2016 (H3N2) RECOMBINANT HEMAGGLUTININ ANTIGEN, INFLUENZA B VIRUS B/MARYLAND/15/2016 RECOMBINANT HEMAGGLUTININ ANTIGEN, AND INFLUENZA B VIRUS B/PHUKET/3073/2013 RECOMBINANT HEMAGGLUTININ ANTIGEN 0.5 ML: 45; 45; 45; 45 INJECTION INTRAMUSCULAR at 11:45

## 2018-12-25 RX ADMIN — ENOXAPARIN SODIUM 40 MG: 40 INJECTION SUBCUTANEOUS at 08:21

## 2018-12-25 RX ADMIN — OSELTAMIVIR PHOSPHATE 75 MG: 75 CAPSULE ORAL at 08:21

## 2018-12-25 RX ADMIN — GABAPENTIN 300 MG: 300 CAPSULE ORAL at 08:20

## 2018-12-25 RX ADMIN — CEFEPIME HYDROCHLORIDE 2000 MG: 2 INJECTION, POWDER, FOR SOLUTION INTRAVENOUS at 08:21

## 2018-12-25 NOTE — PLAN OF CARE
DISCHARGE PLANNING     Discharge to home or other facility with appropriate resources Adequate for Discharge        INFECTION - ADULT     Absence or prevention of progression during hospitalization Adequate for Discharge     Absence of fever/infection during neutropenic period Adequate for Discharge        Knowledge Deficit     Patient/family/caregiver demonstrates understanding of disease process, treatment plan, medications, and discharge instructions Adequate for Discharge        PAIN - ADULT     Verbalizes/displays adequate comfort level or baseline comfort level Adequate for Discharge        Potential for Falls     Patient will remain free of falls Adequate for Discharge        SAFETY ADULT     Maintain or return to baseline ADL function Adequate for Discharge     Maintain or return mobility status to optimal level Adequate for Discharge     Patient will remain free of falls Adequate for Discharge

## 2018-12-25 NOTE — DISCHARGE SUMMARY
Discharge Summary - [facility] Medicine    Patient Information: Mare Palacios 66 y o  female MRN: [id number]  Unit/Bed#: -01 Encounter: [id number]    Discharging Physician / Practitioner: Antonio Hargrove MD  PCP: Daisy Leavitt MD  Admission Date: 12/22/2018  Discharge Date: 12/25/18    Disposition:     Home    Reason for Admission:  Not feeling well    Discharge Diagnoses:     Principal Problem:    Sepsis ([address] )  Active Problems:    Lymphedema of right upper extremity    Cancer related pain    Tobacco abuse    Influenza A    Pneumonia  Resolved Problems:    * No resolved hospital problems  *      Consultations During Hospital Stay:  · None    Procedures Performed:     Chest x-ray: IMPRESSION:Bilateral groundglass infiltrates, most compatible with multifocal pneumonia  Significant Findings / Test Results:     · As above    Incidental Findings:   · As above    Test Results Pending at Discharge (will require follow up): · None     Outpatient Tests Requested:  · None    Complications:  None    Hospital Course:     Mare Palacios is a 66 y o  female patient with history of breast cancer, tobacco abuse and lymphedema right upper extremity who originally presented to the hospital on 12/22/2018 due to not feeling well  Patient reported general tiredness, body aches and fatigue  He reported cough which is nonproductive  Also reported wheezing  Patient was subsequently evaluated in the emergency department she was suspected to have upper respiratory tract infection  Subsequent chest x-ray showed bilateral pneumonia  She was tested positive for influenza infection  She was treated with Tamiflu and antibiotics for pneumonia  She improved and continues to get better  She is being discharged home on p o  Antibiotics and Tamiflu  Condition at Discharge: good     Discharge Day Visit / Exam:     Subjective:  Anxious to go home today    Stable on room air  Vitals: Blood Pressure: 140/78 (12/25/18 0700)  Pulse: 67 (12/25/18 0700)  Temperature: 98 6 °F (37 °C) (12/25/18 0700)  Temp Source: Oral (12/25/18 0700)  Respirations: 16 (12/25/18 0700)  Height: 5' 5" (165 1 cm) (12/22/18 1654)  Weight - Scale: 62 6 kg (138 lb) (12/22/18 1654)  SpO2: 94 % (12/25/18 0738)  Exam:   Physical Exam     Gen -Patient comfortable at rest  Neck- Supple  No thyromegaly or lymphadenopathy  Lungs-rhonchi bilaterally  Heart S1-S2, regular rate and rhythm, no murmurs  Abdomen-soft nontender, no organomegaly  Bowel sounds present  Extremities-no cyanosi,  clubbing or edema  Skin- no rash  Neuro-nonfocal       Discussion with Family:  Discussed with     Discharge instructions/Information to patient and family:   See after visit summary for information provided to patient and family  Provisions for Follow-Up Care:  See after visit summary for information related to follow-up care and any pertinent home health orders  Planned Readmission:  No     Discharge Statement:  I spent 35 minutes discharging the patient  This time was spent on the day of discharge  I had direct contact with the patient on the day of discharge  Greater than 50% of the total time was spent examining patient, answering all patient questions, arranging and discussing plan of care with patient as well as directly providing post-discharge instructions  Additional time then spent on discharge activities  Discharge Medications:  See after visit summary for reconciled discharge medications provided to patient and family        ** Please Note: This note has been constructed using a voice recognition system **

## 2018-12-25 NOTE — PROGRESS NOTES
Dc instructions and prescriptions reviewed with patient and her   Per triston, patient may start new medications tomorrow 12/26/18

## 2018-12-26 ENCOUNTER — HOSPITAL ENCOUNTER (OUTPATIENT)
Dept: INFUSION CENTER | Facility: CLINIC | Age: 58
Discharge: HOME | End: 2018-12-26

## 2018-12-26 ENCOUNTER — TELEPHONE (OUTPATIENT)
Dept: HEMATOLOGY ONCOLOGY | Facility: CLINIC | Age: 58
End: 2018-12-26

## 2018-12-26 NOTE — TELEPHONE ENCOUNTER
Stated that she just got out of the hospital yesterday with the flu  She wants to know if she should still get her chemo tomorrow 12 27 18  She is set up to get bw today at infusion center at 11am and wants to know if she needs to get that done as well  Notes from ER visit are in EPIC  Req a call back

## 2018-12-26 NOTE — TELEPHONE ENCOUNTER
Returned call to patient - She was discharged to home yesterday  Pt reports she is still tired and weak  Chemo due Thursday  Will delay x 1 week - patient scheduled to see Cara Garcia on 12/18/18 - she will keep this appointment  No additional lab work needed at this time  Appropriate lab work was drawn while she was in patient

## 2018-12-27 ENCOUNTER — HOSPITAL ENCOUNTER (OUTPATIENT)
Dept: INFUSION CENTER | Facility: CLINIC | Age: 58
End: 2018-12-27

## 2018-12-28 ENCOUNTER — OFFICE VISIT (OUTPATIENT)
Dept: HEMATOLOGY ONCOLOGY | Facility: CLINIC | Age: 58
End: 2018-12-28
Payer: COMMERCIAL

## 2018-12-28 ENCOUNTER — TELEPHONE (OUTPATIENT)
Dept: HEMATOLOGY ONCOLOGY | Facility: CLINIC | Age: 58
End: 2018-12-28

## 2018-12-28 VITALS
TEMPERATURE: 98.1 F | SYSTOLIC BLOOD PRESSURE: 108 MMHG | OXYGEN SATURATION: 94 % | DIASTOLIC BLOOD PRESSURE: 66 MMHG | BODY MASS INDEX: 23.14 KG/M2 | HEIGHT: 66 IN | HEART RATE: 86 BPM | RESPIRATION RATE: 18 BRPM | WEIGHT: 144 LBS

## 2018-12-28 DIAGNOSIS — C50.919 MALIGNANT NEOPLASM OF FEMALE BREAST, UNSPECIFIED ESTROGEN RECEPTOR STATUS, UNSPECIFIED LATERALITY, UNSPECIFIED SITE OF BREAST (HCC): Primary | ICD-10-CM

## 2018-12-28 DIAGNOSIS — S16.1XXA STRAIN OF NECK MUSCLE, INITIAL ENCOUNTER: Primary | ICD-10-CM

## 2018-12-28 DIAGNOSIS — C50.912 BILATERAL MALIGNANT NEOPLASM OF BREAST IN FEMALE, ESTROGEN RECEPTOR POSITIVE, UNSPECIFIED SITE OF BREAST (HCC): ICD-10-CM

## 2018-12-28 DIAGNOSIS — C50.911 BILATERAL MALIGNANT NEOPLASM OF BREAST IN FEMALE, ESTROGEN RECEPTOR POSITIVE, UNSPECIFIED SITE OF BREAST (HCC): ICD-10-CM

## 2018-12-28 DIAGNOSIS — Z17.0 BILATERAL MALIGNANT NEOPLASM OF BREAST IN FEMALE, ESTROGEN RECEPTOR POSITIVE, UNSPECIFIED SITE OF BREAST (HCC): ICD-10-CM

## 2018-12-28 LAB
BACTERIA BLD CULT: NORMAL
BACTERIA BLD CULT: NORMAL

## 2018-12-28 PROCEDURE — 99215 OFFICE O/P EST HI 40 MIN: CPT | Performed by: PHYSICIAN ASSISTANT

## 2018-12-28 RX ORDER — CYCLOBENZAPRINE HCL 10 MG
10 TABLET ORAL 3 TIMES DAILY PRN
Qty: 30 TABLET | Refills: 0 | Status: SHIPPED | OUTPATIENT
Start: 2018-12-28 | End: 2019-05-07

## 2018-12-28 NOTE — TELEPHONE ENCOUNTER
Spoke to [employer], [employer] at [facility]  Per Danielle Hummel pt to have CA 27 29 drawn on 1/22/19 in combination with her CBC and CMP  [facility] made aware of this, and note placed in her chart to have lab drawn

## 2018-12-28 NOTE — PROGRESS NOTES
[id number] [facility]  HEMATOLOGY ONCOLOGY SPECIALISTS [facility] [id number]  Progress Note  Jc London, 1960, [id number]  12/28/2018    Assessment/Plan:  1  Bilateral malignant neoplasm of breast in female, estrogen receptor positive, unspecified site of breast [facility])  Patient was due for chemotherapy today however secondary to hospitalization from multifocal pneumonia and flu, patient's treatment was delayed by a week  Patient notes improvement from discharge  Patient had follow-up with PCP recently  Patient is generally moving in the right direction  Patient has noted some increase in cutaneous disease under her right arm  Patient has not had a tumor marker drawn since November  However given that these are sometimes affected by inflammatory processes, Ig influenza infection, I will delay blood draw all in till her next cycle on 1/22/18  The patient and her daughter understand the rationale behind waiting for subsequent blood draw  Nursing staff is placing an order for tumor markers for the above date  Regimen:  Kadcyla 3 8 mg/kg dose IV Day 1  Cycle length =  21 days  Cycles planned = until progression    Premedications secondary to Chemotherapy induced nausea:  Aloxi 0 25 mg IV, Day 1  Emend 150 mg IV, Day 1   Dex  10 mg IV, Day 1    Bone Disease regimen:  Xgeva 120 mg IV, Day 1  Cycle length =  84 days    2  Strain of neck muscle, initial encounter  Patient developed right-sided neck pain after hospitalization  On exam today this was felt to be muscular in origin  Patient notes that there is improvement when she takes Valium at night in the pain however other pain medications that she is on does not seem to help it  I have asked that the patient hold taking Valium at this time and use Flexeril 3 times a day as needed for the muscle spasm    If this medication is not helping that the patient can return back to Valium at night time and discussed additional medication with Dr Beau Cr     - cyclobenzaprine (FLEXERIL) 10 mg tablet; Take 1 tablet (10 mg total) by mouth 3 (three) times a day as needed for muscle spasms  Dispense: 30 tablet; Refill: 0      Labs and imaging for follow up:  No orders of the defined types were placed in this encounter  The patient is scheduled for follow-up in approximately 6 weeks  Patient voiced agreement and understanding to the above  Patient knows to call the Hematology/Oncology office with any questions and concerns regarding the above  Carefully review your medication list in verify the list is accurate and up-to-date  Please call the hematologic/Oncology office if there medications missing from the less, medications on the list that your not currently taking or if there is a dosage or instruction that is different from higher taking medication  I have spent 30 minutes with Patient  today in which greater than 50% of this time was spent in counseling/coordination of care regarding Diagnostic results, Prognosis, Intructions for management and Impressions  The patient's current treatment goals are palliative, prolongation of life  Barrier(s) to care identified  None  The patient is able to self care     -------------------------------------------------------------------------------------------------------    Chief complaint:   Chief Complaint   Patient presents with    Follow-up     History of present illness/Cancer History:      Bilateral malignant neoplasm of breast in female, estrogen receptor positive ([facility] [address] )    2010 Initial Diagnosis     The patient had a left sided T1 B , N0 ER positive, ME and HER-2 negative invasive ductal carcinoma, right-sided DCIS, ER/ME positive, HER-2 negative  Bilateral mastectomy  2010 - 4/2011 Hormone Therapy     Tamoxifen 20 mg daily  Last menstrual period was on 3/2010          4/2011 -  Hormone Therapy     Arimidex 1 mg daily    Unknown when medication was discontinued  Patient was lost to follow up          12/9/2015 Progression     · Right arm swelling in December 2015  U/S found a nonvascular structure in the right axilla measuring 2 2 x 1 3 cm  · Subsequent CT Scan of the chest on 12/9/15, showed multiple bilateral pulmonary nodules measuring 3-7 mm  · PET 2/4/16, which revealed hypermetabolic hilar and mediastinal nodes  There is a pre carinal node with a SUV of 5 4 measuring 1 8 x 1 7 cm, and a subcarinal node measuring 1 9 x 1 3 cm with a SUV of 5 3  There is right hilar activity of 3 5 and left hilar activity of 2 8  The subcentimeter nodules are too small for PET evaluation  She also has uptake in her L3 and L5 vertebral bodies, both concerning for bony metastases  2/16/2016 Biopsy     Bronchoscopic biopsy showed Metastatic non-small cell carcinoma, favor adenocarcinoma  ER 90%, OK 0%  Her 2 +2, positive by FISH            3/1/2016 - 6/2016 Chemotherapy     Taxotere 75 mg/m2, Day 1  Perjeta 420 mg, Day 1  Herceptin 6 mg/kg, Day 1  Cycle length= 21 days         3/1/2016 - 2/2017 Hormone Therapy     Anastrozole 1 mg daily         6/2016 - 2/2017 Chemotherapy     Perjeta 420 mg, Day 1  Herceptin 6 mg/kg, Day 1  Cycle length= 21 days         2/2017 Progression     Bony progression          2/10/2017 - 4/12/2018 Chemotherapy     Perjeta 420 mg, Day 1  Herceptin 6 mg/kg, Day 1  Taxotere 75 mg/m2  Cycle length= 21 days         4/18/2017 Progression      brain MRI showed a 5 mm right parietal lesion, 3 mm right frontal lesion, 3 mm left parahippocampal lesion  5/2017 - 5/2017 Radiation     SRS to brain lesions         5/3/2018 - 8/2018 Chemotherapy     Perjeta 420 mg,  Day 1  Herceptin 6 mg/kg,  Day 1  Cycle length = 21 days  Cycle 3 was administered on 6/14/18    ** taxotere was temporarily discontinued secondary for desire of treatment holiday  Taxotere was readded into the treatment regimen during last cycle             9/5/2018 Progression     Cutaneous disease progression  Cancer Staging  No matching staging information was found for the patient  ECO - Symptomatic but completely ambulatory    Interval history:     Patient notes that she is feeling better  Patient states she still has a cough  She was given an inhaler previously  Review of Systems   Constitutional: Positive for fatigue  Negative for activity change, appetite change, chills, fever (No additional fevers since hospitalization) and unexpected weight change  HENT: Positive for rhinorrhea and sore throat  Negative for hearing loss, mouth sores, trouble swallowing and voice change  Eyes: Negative for visual disturbance  Respiratory: Positive for cough  Negative for shortness of breath  Cardiovascular: Negative for chest pain, palpitations and leg swelling  Gastrointestinal: Negative for abdominal pain, blood in stool, constipation, diarrhea, nausea and vomiting  Endocrine: Negative for cold intolerance  Genitourinary: Negative for decreased urine volume, dysuria and hematuria  Musculoskeletal: Positive for neck pain  Negative for arthralgias and myalgias  Positive right upper extremity swelling, consistent with prior baseline history of lymphedema  Skin: Negative for rash  Neurological: Positive for weakness ( in bilateral lower extremities)  Negative for dizziness, numbness and headaches  Hematological: Negative for adenopathy  Does not bruise/bleed easily  Psychiatric/Behavioral: Negative for dysphoric mood         Current Outpatient Prescriptions:     benzonatate (TESSALON PERLES) 100 mg capsule, Take 1 capsule (100 mg total) by mouth 3 (three) times a day, Disp: 20 capsule, Rfl: 0    dexamethasone (DECADRON) 2 mg tablet, Take 1 tablet (2 mg total) by mouth daily with breakfast, Disp: 30 tablet, Rfl: 2    diazepam (VALIUM) 5 mg tablet, Take 1 tablet (5 mg total) by mouth daily at bedtime, Disp: 30 tablet, Rfl: 2   gabapentin (NEURONTIN) 300 mg capsule, 1 cap PO bid and 2 cap at bedtime, Disp: 120 capsule, Rfl: 5    guaiFENesin (MUCINEX) 600 mg 12 hr tablet, Take 1 tablet (600 mg total) by mouth every 12 (twelve) hours, Disp: 20 tablet, Rfl: 0    HYDROmorphone (DILAUDID) 4 mg tablet, Take 1 tablet (4 mg total) by mouth every 4 (four) hours as needed (breakthrough pain) Max Daily Amount: 24 mg, Disp: 120 tablet, Rfl: 0    levofloxacin (LEVAQUIN) 750 mg tablet, Take 1 tablet (750 mg total) by mouth every 24 hours for 4 days, Disp: 4 tablet, Rfl: 0    lidocaine-prilocaine (EMLA) cream, Apply topically as needed for mild pain, Disp: 30 g, Rfl: 0    metoclopramide (REGLAN) 10 mg tablet, Take 1 tablet (10 mg total) by mouth 3 (three) times a day as needed (nausea), Disp: 30 tablet, Rfl: 1    morphine (MS CONTIN) 15 mg 12 hr tablet, Take 1 tablet (15 mg total) by mouth 3 (three) times a day Max Daily Amount: 45 mg, Disp: 90 tablet, Rfl: 0    ondansetron (ZOFRAN) 8 mg tablet, Take 1 tablet (8 mg total) by mouth every 8 (eight) hours as needed for nausea or vomiting, Disp: 30 tablet, Rfl: 1    promethazine (PHENERGAN) 25 mg tablet, Take 1 tablet (25 mg total) by mouth every 6 (six) hours as needed for nausea or vomiting, Disp: 60 tablet, Rfl: 0    senna (SENOKOT) 8 6 mg, Take 2 tablets by mouth 2 (two) times a day as needed  , Disp: , Rfl:     cyclobenzaprine (FLEXERIL) 10 mg tablet, Take 1 tablet (10 mg total) by mouth 3 (three) times a day as needed for muscle spasms, Disp: 30 tablet, Rfl: 0  No current facility-administered medications for this visit  Facility-Administered Medications Ordered in Other Visits:     alteplase (CATHFLO) injection 2 mg, 2 mg, Intracatheter, PRN, Alysa Madrid PA-C    heparin lock flush 100 units/mL injection 300 Units, 300 Units, Intracatheter, PRN, Alysa Madrid PA-C      No current facility-administered medications for this visit        No Known Allergies    Objective:   BP 108/66   Pulse 86   Temp 98 1 °F (36 7 °C) (Oral)   Resp 18   Ht 5' 6" (1 676 m)   Wt 65 3 kg (144 lb)   LMP  (LMP Unknown)   SpO2 94%   BMI 23 24 kg/m²   Wt Readings from Last 3 Encounters:   12/28/18 65 3 kg (144 lb)   12/22/18 62 6 kg (138 lb)   12/11/18 64 kg (141 lb)     Physical Exam   Constitutional: She is oriented to person, place, and time  She appears well-developed and well-nourished  No distress  HENT:   Head: Normocephalic and atraumatic  Mouth/Throat: Oropharynx is clear and moist  No oropharyngeal exudate  Eyes: Pupils are equal, round, and reactive to light  EOM are normal  No scleral icterus  Neck: Normal range of motion  Cardiovascular: Normal rate and regular rhythm  No murmur heard  Pulmonary/Chest: Effort normal  No respiratory distress  She has wheezes  She has rhonchi  Abdominal: Soft  Bowel sounds are normal  She exhibits no distension  There is no tenderness  Musculoskeletal: Normal range of motion  She exhibits no edema  Lymphadenopathy:     She has no cervical adenopathy  Neurological: She is alert and oriented to person, place, and time  No cranial nerve deficit  Skin: Skin is warm  No rash noted  No pallor  Psychiatric: She has a normal mood and affect   Thought content normal      Pertinent Laboratory Results and Imaging Review:  Admission on 12/22/2018, Discharged on 12/25/2018   Component Date Value Ref Range Status    WBC 12/22/2018 9 25  4 31 - 10 16 Thousand/uL Final    RBC 12/22/2018 3 75* 3 81 - 5 12 Million/uL Final    Hemoglobin 12/22/2018 11 1* 11 5 - 15 4 g/dL Final    Hematocrit 12/22/2018 35 0  34 8 - 46 1 % Final    MCV 12/22/2018 93  82 - 98 fL Final    MCH 12/22/2018 29 6  26 8 - 34 3 pg Final    MCHC 12/22/2018 31 7  31 4 - 37 4 g/dL Final    RDW 12/22/2018 18 2* 11 6 - 15 1 % Final    MPV 12/22/2018 9 1  8 9 - 12 7 fL Final    Platelets 68/18/4371 292  149 - 390 Thousands/uL Final    nRBC 12/22/2018 0  /100 WBCs Final    Neutrophils Relative 12/22/2018 78* 43 - 75 % Final    Immat GRANS % 12/22/2018 1  0 - 2 % Final    Lymphocytes Relative 12/22/2018 12* 14 - 44 % Final    Monocytes Relative 12/22/2018 9  4 - 12 % Final    Eosinophils Relative 12/22/2018 0  0 - 6 % Final    Basophils Relative 12/22/2018 0  0 - 1 % Final    Neutrophils Absolute 12/22/2018 7 29  1 85 - 7 62 Thousands/µL Final    Immature Grans Absolute 12/22/2018 0 05  0 00 - 0 20 Thousand/uL Final    Lymphocytes Absolute 12/22/2018 1 08  0 60 - 4 47 Thousands/µL Final    Monocytes Absolute 12/22/2018 0 79  0 17 - 1 22 Thousand/µL Final    Eosinophils Absolute 12/22/2018 0 01  0 00 - 0 61 Thousand/µL Final    Basophils Absolute 12/22/2018 0 03  0 00 - 0 10 Thousands/µL Final    Sodium 12/22/2018 139  136 - 145 mmol/L Final    Potassium 12/22/2018 3 6  3 5 - 5 3 mmol/L Final    Chloride 12/22/2018 100  100 - 108 mmol/L Final    CO2 12/22/2018 28  21 - 32 mmol/L Final    ANION GAP 12/22/2018 11  4 - 13 mmol/L Final    BUN 12/22/2018 11  5 - 25 mg/dL Final    Creatinine 12/22/2018 0 76  0 60 - 1 30 mg/dL Final    Standardized to IDMS reference method    Glucose 12/22/2018 103  65 - 140 mg/dL Final      If the patient is fasting, the ADA then defines impaired fasting glucose as > 100 mg/dL and diabetes as > or equal to 123 mg/dL  Specimen collection should occur prior to Sulfasalazine administration due to the potential for falsely depressed results  Specimen collection should occur prior to Sulfapyridine administration due to the potential for falsely elevated results   Calcium 12/22/2018 8 9  8 3 - 10 1 mg/dL Final    AST 12/22/2018 74* 5 - 45 U/L Final      Specimen collection should occur prior to Sulfasalazine administration due to the potential for falsely depressed results   ALT 12/22/2018 68  12 - 78 U/L Final      Specimen collection should occur prior to Sulfasalazine administration due to the potential for falsely depressed results   Alkaline Phosphatase 12/22/2018 107  46 - 116 U/L Final    Total Protein 12/22/2018 7 9  6 4 - 8 2 g/dL Final    Albumin 12/22/2018 3 1* 3 5 - 5 0 g/dL Final    Total Bilirubin 12/22/2018 0 20  0 20 - 1 00 mg/dL Final    eGFR 12/22/2018 88  ml/min/1 73sq m Final    LACTIC ACID 12/22/2018 0 5  0 5 - 2 0 mmol/L Final    Procalcitonin 12/22/2018 0 23  <=0 25 ng/ml Final    Comment: Suspected Lower Respiratory Tract Infection (LRTI):  - LESS than or EQUAL to 0 25 ng/mL:   low likelihood for bacterial LRTI; antibiotics DISCOURAGED   - GREATER than 0 25 ng/mL:   increased likelihood for bacterial LRTI; antibiotics ENCOURAGED  Suspected Sepsis:  - Strongly consider initiating antibiotics in ALL UNSTABLE patients  - LESS than or EQUAL to 0 5 ng/mL:   low likelihood for bacterial sepsis; antibiotics DISCOURAGED   - GREATER than 0 5 ng/mL:   increased likelihood for bacterial sepsis; antibiotics ENCOURAGED   - GREATER than 2 ng/mL:   high risk for severe sepsis / septic shock; antibiotics strongly ENCOURAGED  Decisions on antibiotic use should not be based solely on Procalcitonin (PCT) levels  If PCT is low but uncertainty exists with stopping antibiotics, repeat PCT in 6-24 hours to confirm the low level  If antibiotics are administered (regardless if initial PCT was high or low), repeat PCT every 1-2 days to consider early antibiotic cessation (when GREATER                            than 80% decrease from the peak OR when PCT drops below designated cutoffs, whichever comes first), so long as the infection is NOT one that typically requires prolonged treatment durations (e g , bone/joint infections, endocarditis, Staph  aureus bacteremia)      Situations of FALSE-POSITIVE Procalcitonin values:  1) Newborns < 67 hours old  2) Massive stress from severe trauma / burns, major surgery, acute pancreatitis, cardiogenic / hemorrhagic shock, sickle cell crisis, or other organ perfusion abnormalities  3) Malaria and some Candidal infections  4) Treatment with agents that stimulate cytokines (e g , OKT3, anti-lymphocyte globulins, alemtuzumab, IL-2, granulocyte transfusion [NOT GCSFs])  5) Chronic renal disease causes elevated baseline levels (consider GREATER than 0 75 ng/mL as an abnormal cut-off); initiating HD/CRRT may cause transient decreases  6) Paraneoplastic syndromes from medullary thyroid or SCLC, some forms of vasculitis, and acute [id number]                            disease    Situations of FALSE-NEGATIVE Procalcitonin values:  1) Too early in clinical course for PCT to have reached its peak (may repeat in 6-24 hours to confirm low level)  2) Localized infection WITHOUT systemic (SIRS / sepsis) response (e g , an abscess, osteomyelitis, cystitis)  3) Mycobacteria (e g , Tuberculosis, MAC)  4) Cystic fibrosis exacerbations      Protime 12/22/2018 12 5  11 8 - 14 2 seconds Final    INR 12/22/2018 0 96  0 86 - 1 17 Final    PTT 12/22/2018 35  26 - 38 seconds Final    Therapeutic Heparin Range =  60-90 seconds    Color, UA 12/22/2018 Yellow   Final    Clarity, UA 12/22/2018 Clear   Final    Specific Gravity, UA 12/22/2018 1 020  1 003 - 1 030 Final    pH, UA 12/22/2018 7 0  4 5 - 8 0 Final    Leukocytes, UA 12/22/2018 Negative  Negative Final    Nitrite, UA 12/22/2018 Negative  Negative Final    Protein, UA 12/22/2018 Negative  Negative mg/dl Final    Glucose, UA 12/22/2018 Negative  Negative mg/dl Final    Ketones, UA 12/22/2018 Negative  Negative mg/dl Final    Urobilinogen, UA 12/22/2018 0 2  0 2, 1 0 E U /dl E U /dl Final    Bilirubin, UA 12/22/2018 Negative  Negative Final    Blood, UA 12/22/2018 Negative  Negative Final    Blood Culture 12/22/2018 No Growth After 5 Days  Final    Blood Culture 12/22/2018 No Growth After 5 Days  Final    Sputum Culture 12/22/2018 Test not performed  Suggest repeat specimen     Final    Gram Stain Result 12/22/2018 >10 squamous epithelial cells/lpf, indicating orophayngeal contamination  Final    Gram Stain Result 12/22/2018 No Polys   Final    Gram Stain Result 12/22/2018 3+ Gram positive cocci in pairs   Final    Gram Stain Result 12/22/2018 1+ Gram positive rods   Final    Gram Stain Result 12/22/2018 1+ Yeast   Final    Gram Stain Result 12/22/2018 Rare Gram negative rods   Final    INFLU A PCR 12/22/2018 Detected* None Detected Final    INFLU B PCR 12/22/2018 None Detected  None Detected Final    RSV PCR 12/22/2018 None Detected  None Detected Final    Procalcitonin 12/22/2018 0 23  <=0 25 ng/ml Final    Comment: Suspected Lower Respiratory Tract Infection (LRTI):  - LESS than or EQUAL to 0 25 ng/mL:   low likelihood for bacterial LRTI; antibiotics DISCOURAGED   - GREATER than 0 25 ng/mL:   increased likelihood for bacterial LRTI; antibiotics ENCOURAGED  Suspected Sepsis:  - Strongly consider initiating antibiotics in ALL UNSTABLE patients  - LESS than or EQUAL to 0 5 ng/mL:   low likelihood for bacterial sepsis; antibiotics DISCOURAGED   - GREATER than 0 5 ng/mL:   increased likelihood for bacterial sepsis; antibiotics ENCOURAGED   - GREATER than 2 ng/mL:   high risk for severe sepsis / septic shock; antibiotics strongly ENCOURAGED  Decisions on antibiotic use should not be based solely on Procalcitonin (PCT) levels  If PCT is low but uncertainty exists with stopping antibiotics, repeat PCT in 6-24 hours to confirm the low level  If antibiotics are administered (regardless if initial PCT was high or low), repeat PCT every 1-2 days to consider early antibiotic cessation (when GREATER                            than 80% decrease from the peak OR when PCT drops below designated cutoffs, whichever comes first), so long as the infection is NOT one that typically requires prolonged treatment durations (e g , bone/joint infections, endocarditis, Staph  aureus bacteremia)      Situations of FALSE-POSITIVE Procalcitonin values:  1) Newborns < 72 hours old  2) Massive stress from severe trauma / burns, major surgery, acute pancreatitis, cardiogenic / hemorrhagic shock, sickle cell crisis, or other organ perfusion abnormalities  3) Malaria and some Candidal infections  4) Treatment with agents that stimulate cytokines (e g , OKT3, anti-lymphocyte globulins, alemtuzumab, IL-2, granulocyte transfusion [NOT GCSFs])  5) Chronic renal disease causes elevated baseline levels (consider GREATER than 0 75 ng/mL as an abnormal cut-off); initiating HD/CRRT may cause transient decreases  6) Paraneoplastic syndromes from medullary thyroid or SCLC, some forms of vasculitis, and acute [id number]                            disease    Situations of FALSE-NEGATIVE Procalcitonin values:  1) Too early in clinical course for PCT to have reached its peak (may repeat in 6-24 hours to confirm low level)  2) Localized infection WITHOUT systemic (SIRS / sepsis) response (e g , an abscess, osteomyelitis, cystitis)  3) Mycobacteria (e g , Tuberculosis, MAC)  4) Cystic fibrosis exacerbations      Strep pneumoniae antigen, urine 12/23/2018 Negative  Negative Final    Legionella Urinary Antigen 12/23/2018 Negative  Negative Final    Platelets 78/58/6457 266  149 - 390 Thousands/uL Final    MPV 12/22/2018 9 1  8 9 - 12 7 fL Final    Sodium 12/23/2018 140  136 - 145 mmol/L Final    Potassium 12/23/2018 3 7  3 5 - 5 3 mmol/L Final    Chloride 12/23/2018 107  100 - 108 mmol/L Final    CO2 12/23/2018 26  21 - 32 mmol/L Final    ANION GAP 12/23/2018 7  4 - 13 mmol/L Final    BUN 12/23/2018 8  5 - 25 mg/dL Final    Creatinine 12/23/2018 0 56* 0 60 - 1 30 mg/dL Final    Standardized to IDMS reference method    Glucose 12/23/2018 78  65 - 140 mg/dL Final      If the patient is fasting, the ADA then defines impaired fasting glucose as > 100 mg/dL and diabetes as > or equal to 123 mg/dL    Specimen collection should occur prior to Sulfasalazine administration due to the potential for falsely depressed results  Specimen collection should occur prior to Sulfapyridine administration due to the potential for falsely elevated results   Calcium 12/23/2018 7 6* 8 3 - 10 1 mg/dL Final    Verified by Repeat Analysis    AST 12/23/2018 55* 5 - 45 U/L Final      Specimen collection should occur prior to Sulfasalazine administration due to the potential for falsely depressed results   ALT 12/23/2018 52  12 - 78 U/L Final      Specimen collection should occur prior to Sulfasalazine administration due to the potential for falsely depressed results       Alkaline Phosphatase 12/23/2018 88  46 - 116 U/L Final    Total Protein 12/23/2018 6 1* 6 4 - 8 2 g/dL Final    Albumin 12/23/2018 2 3* 3 5 - 5 0 g/dL Final    Total Bilirubin 12/23/2018 0 20  0 20 - 1 00 mg/dL Final    eGFR 12/23/2018 105  ml/min/1 73sq m Final    Magnesium 12/23/2018 2 1  1 6 - 2 6 mg/dL Final    WBC 12/23/2018 7 36  4 31 - 10 16 Thousand/uL Final    RBC 12/23/2018 3 57* 3 81 - 5 12 Million/uL Final    Hemoglobin 12/23/2018 10 2* 11 5 - 15 4 g/dL Final    Hematocrit 12/23/2018 34 1* 34 8 - 46 1 % Final    MCV 12/23/2018 96  82 - 98 fL Final    MCH 12/23/2018 28 6  26 8 - 34 3 pg Final    MCHC 12/23/2018 29 9* 31 4 - 37 4 g/dL Final    RDW 12/23/2018 18 5* 11 6 - 15 1 % Final    MPV 12/23/2018 9 4  8 9 - 12 7 fL Final    Platelets 55/16/8389 258  149 - 390 Thousands/uL Final    nRBC 12/23/2018 0  /100 WBCs Final    Neutrophils Relative 12/23/2018 86* 43 - 75 % Final    Immat GRANS % 12/23/2018 0  0 - 2 % Final    Lymphocytes Relative 12/23/2018 9* 14 - 44 % Final    Monocytes Relative 12/23/2018 5  4 - 12 % Final    Eosinophils Relative 12/23/2018 0  0 - 6 % Final    Basophils Relative 12/23/2018 0  0 - 1 % Final    Neutrophils Absolute 12/23/2018 6 24  1 85 - 7 62 Thousands/µL Final    Immature Grans Absolute 12/23/2018 0 03  0 00 - 0 20 Thousand/uL Final    Lymphocytes Absolute 12/23/2018 0 66  0 60 - 4 47 Thousands/µL Final    Monocytes Absolute 12/23/2018 0 38  0 17 - 1 22 Thousand/µL Final    Eosinophils Absolute 12/23/2018 0 02  0 00 - 0 61 Thousand/µL Final    Basophils Absolute 12/23/2018 0 03  0 00 - 0 10 Thousands/µL Final    Sodium 12/24/2018 145  136 - 145 mmol/L Final    Potassium 12/24/2018 4 0  3 5 - 5 3 mmol/L Final    Chloride 12/24/2018 112* 100 - 108 mmol/L Final    CO2 12/24/2018 25  21 - 32 mmol/L Final    ANION GAP 12/24/2018 8  4 - 13 mmol/L Final    BUN 12/24/2018 6  5 - 25 mg/dL Final    Creatinine 12/24/2018 0 54* 0 60 - 1 30 mg/dL Final    Standardized to IDMS reference method    Glucose 12/24/2018 110  65 - 140 mg/dL Final      If the patient is fasting, the ADA then defines impaired fasting glucose as > 100 mg/dL and diabetes as > or equal to 123 mg/dL  Specimen collection should occur prior to Sulfasalazine administration due to the potential for falsely depressed results  Specimen collection should occur prior to Sulfapyridine administration due to the potential for falsely elevated results      Calcium 12/24/2018 7 7* 8 3 - 10 1 mg/dL Final    eGFR 12/24/2018 106  ml/min/1 73sq m Final    Ventricular Rate 12/22/2018 90  BPM Final    Atrial Rate 12/22/2018 90  BPM Final    RI Interval 12/22/2018 118  ms Final    QRSD Interval 12/22/2018 84  ms Final    QT Interval 12/22/2018 358  ms Final    QTC Interval 12/22/2018 437  ms Final    P Axis 12/22/2018 63  degrees Final    QRS Axis 12/22/2018 92  degrees Final    T Wave Axis 12/22/2018 64  degrees Final    Sodium 12/25/2018 145  136 - 145 mmol/L Final    Potassium 12/25/2018 3 7  3 5 - 5 3 mmol/L Final    Chloride 12/25/2018 112* 100 - 108 mmol/L Final    CO2 12/25/2018 22  21 - 32 mmol/L Final    ANION GAP 12/25/2018 11  4 - 13 mmol/L Final    BUN 12/25/2018 5  5 - 25 mg/dL Final    Creatinine 12/25/2018 0 46* 0 60 - 1 30 mg/dL Final    Standardized to IDMS reference method    Glucose 12/25/2018 99  65 - 140 mg/dL Final      If the patient is fasting, the ADA then defines impaired fasting glucose as > 100 mg/dL and diabetes as > or equal to 123 mg/dL  Specimen collection should occur prior to Sulfasalazine administration due to the potential for falsely depressed results  Specimen collection should occur prior to Sulfapyridine administration due to the potential for falsely elevated results   Calcium 12/25/2018 8 4  8 3 - 10 1 mg/dL Final    eGFR 12/25/2018 112  ml/min/1 73sq m Final         The following historical data was reviewed      Past Medical History:   Diagnosis Date    H/O bilateral mastectomy 2/15/2016    Hypertension 2/15/2016    Lymphedema 2/15/2016    Malignant neoplasm of right breast ([facility] [address] ) 2/15/2016       Past Surgical History:   Procedure Laterality Date    ENDOBRONCHIAL ULTRASOUND (EBUS) N/A 2/16/2016    Procedure: EBUS;  Surgeon: Jarrod Vaughan MD;  Location: [facility];  Service:     MASTECTOMY Bilateral     MASTECTOMY Bilateral     right arm edema    OOPHORECTOMY      LA BRONCHOSCOPY NEEDLE BX TRACHEA MAIN STEM&/BRON N/A 2/16/2016    Procedure: Ced Kevin;  Surgeon: Jarrod Vaughan MD;  Location: [facility];  Service: Thoracic    LA INSJ TUNNELED CTR VAD W/SUBQ PORT AGE 5 YR/> N/A 7/24/2018    Procedure: INSERTION VENOUS PORT ( PORT-A-CATH) IR;  Surgeon: Marcus Pereyra DO;  [facility];  Service: Interventional Radiology    LA STEREOTACTIC RADIOSURGERY, CRANIAL,SIMPLE,EA ADD  5/3/2017         LA STEREOTACTIC RADIOSURGERY, CRANIAL,SIMPLE,EA ADD  5/3/2017         LA STEREOTACTIC RADIOSURGERY, CRANIAL,SIMPLE,SINGLE  5/3/2017            Social History     Social History    Marital status: /Civil Union     Spouse name: N/A    Number of children: N/A    Years of education: N/A     Social History Main Topics    Smoking status: Current Every Day Smoker     Packs/day: 0 50     Years: 40 00     Types: Cigarettes     Start date: 1978    Smokeless tobacco: Never Used    Alcohol use Yes      Comment: social    Drug use: No    Sexual activity: Not on file     Other Topics Concern    Not on file     Social History Narrative    No narrative on file       Family History   Problem Relation Age of Onset    Cancer Sister     Prostate cancer Brother        Please note: This report has been generated by a voice recognition software system  Therefore there may be syntax, spelling, and/or grammatical errors  Please call if you have any questions

## 2019-01-02 RX ORDER — PALONOSETRON 0.05 MG/ML
0.25 INJECTION, SOLUTION INTRAVENOUS ONCE
Status: COMPLETED | OUTPATIENT
Start: 2019-01-04 | End: 2019-01-04

## 2019-01-02 RX ORDER — SODIUM CHLORIDE 9 MG/ML
20 INJECTION, SOLUTION INTRAVENOUS CONTINUOUS
Status: DISCONTINUED | OUTPATIENT
Start: 2019-01-04 | End: 2019-01-07 | Stop reason: HOSPADM

## 2019-01-03 ENCOUNTER — HOSPITAL ENCOUNTER (OUTPATIENT)
Dept: INFUSION CENTER | Facility: CLINIC | Age: 59
Discharge: HOME | End: 2019-01-03
Payer: COMMERCIAL

## 2019-01-03 DIAGNOSIS — C50.919 MALIGNANT NEOPLASM OF FEMALE BREAST, UNSPECIFIED ESTROGEN RECEPTOR STATUS, UNSPECIFIED LATERALITY, UNSPECIFIED SITE OF BREAST (HCC): ICD-10-CM

## 2019-01-03 LAB
ALBUMIN SERPL BCP-MCNC: 3.3 G/DL (ref 3.5–5)
ALP SERPL-CCNC: 85 U/L (ref 46–116)
ALT SERPL W P-5'-P-CCNC: 38 U/L (ref 12–78)
ANION GAP SERPL CALCULATED.3IONS-SCNC: 10 MMOL/L (ref 4–13)
AST SERPL W P-5'-P-CCNC: 20 U/L (ref 5–45)
BASOPHILS # BLD AUTO: 0.03 THOUSANDS/ΜL (ref 0–0.1)
BASOPHILS NFR BLD AUTO: 0 % (ref 0–1)
BILIRUB SERPL-MCNC: 0.3 MG/DL (ref 0.2–1)
BUN SERPL-MCNC: 9 MG/DL (ref 5–25)
CALCIUM SERPL-MCNC: 9 MG/DL (ref 8.3–10.1)
CANCER AG27-29 SERPL-ACNC: 54.4 U/ML (ref 0–42.3)
CHLORIDE SERPL-SCNC: 104 MMOL/L (ref 100–108)
CO2 SERPL-SCNC: 27 MMOL/L (ref 21–32)
CREAT SERPL-MCNC: 0.68 MG/DL (ref 0.6–1.3)
EOSINOPHIL # BLD AUTO: 0.05 THOUSAND/ΜL (ref 0–0.61)
EOSINOPHIL NFR BLD AUTO: 1 % (ref 0–6)
ERYTHROCYTE [DISTWIDTH] IN BLOOD BY AUTOMATED COUNT: 17.4 % (ref 11.6–15.1)
GFR SERPL CREATININE-BSD FRML MDRD: 98 ML/MIN/1.73SQ M
GLUCOSE SERPL-MCNC: 101 MG/DL (ref 65–140)
HCT VFR BLD AUTO: 36 % (ref 34.8–46.1)
HGB BLD-MCNC: 11.4 G/DL (ref 11.5–15.4)
IMM GRANULOCYTES # BLD AUTO: 0.04 THOUSAND/UL (ref 0–0.2)
IMM GRANULOCYTES NFR BLD AUTO: 1 % (ref 0–2)
LYMPHOCYTES # BLD AUTO: 1.81 THOUSANDS/ΜL (ref 0.6–4.47)
LYMPHOCYTES NFR BLD AUTO: 25 % (ref 14–44)
MCH RBC QN AUTO: 29.2 PG (ref 26.8–34.3)
MCHC RBC AUTO-ENTMCNC: 31.7 G/DL (ref 31.4–37.4)
MCV RBC AUTO: 92 FL (ref 82–98)
MONOCYTES # BLD AUTO: 0.7 THOUSAND/ΜL (ref 0.17–1.22)
MONOCYTES NFR BLD AUTO: 10 % (ref 4–12)
NEUTROPHILS # BLD AUTO: 4.63 THOUSANDS/ΜL (ref 1.85–7.62)
NEUTS SEG NFR BLD AUTO: 63 % (ref 43–75)
NRBC BLD AUTO-RTO: 0 /100 WBCS
PLATELET # BLD AUTO: 475 THOUSANDS/UL (ref 149–390)
PMV BLD AUTO: 8.9 FL (ref 8.9–12.7)
POTASSIUM SERPL-SCNC: 3.5 MMOL/L (ref 3.5–5.3)
PROT SERPL-MCNC: 7.7 G/DL (ref 6.4–8.2)
RBC # BLD AUTO: 3.9 MILLION/UL (ref 3.81–5.12)
SODIUM SERPL-SCNC: 141 MMOL/L (ref 136–145)
WBC # BLD AUTO: 7.26 THOUSAND/UL (ref 4.31–10.16)

## 2019-01-03 PROCEDURE — 80053 COMPREHEN METABOLIC PANEL: CPT

## 2019-01-03 PROCEDURE — 86300 IMMUNOASSAY TUMOR CA 15-3: CPT

## 2019-01-03 PROCEDURE — 85025 COMPLETE CBC W/AUTO DIFF WBC: CPT

## 2019-01-04 ENCOUNTER — HOSPITAL ENCOUNTER (OUTPATIENT)
Dept: INFUSION CENTER | Facility: CLINIC | Age: 59
Discharge: HOME | End: 2019-01-04
Payer: COMMERCIAL

## 2019-01-04 VITALS
SYSTOLIC BLOOD PRESSURE: 118 MMHG | WEIGHT: 142 LBS | OXYGEN SATURATION: 97 % | HEIGHT: 66 IN | DIASTOLIC BLOOD PRESSURE: 78 MMHG | BODY MASS INDEX: 22.82 KG/M2 | HEART RATE: 87 BPM | TEMPERATURE: 97.8 F | RESPIRATION RATE: 20 BRPM

## 2019-01-04 PROCEDURE — 96367 TX/PROPH/DG ADDL SEQ IV INF: CPT

## 2019-01-04 PROCEDURE — 96413 CHEMO IV INFUSION 1 HR: CPT

## 2019-01-04 PROCEDURE — 96375 TX/PRO/DX INJ NEW DRUG ADDON: CPT

## 2019-01-04 RX ADMIN — SODIUM CHLORIDE 150 MG: 0.9 INJECTION, SOLUTION INTRAVENOUS at 13:13

## 2019-01-04 RX ADMIN — DEXAMETHASONE SODIUM PHOSPHATE 10 MG: 10 INJECTION, SOLUTION INTRAMUSCULAR; INTRAVENOUS at 12:56

## 2019-01-04 RX ADMIN — PALONOSETRON HYDROCHLORIDE 0.25 MG: 0.25 INJECTION, SOLUTION INTRAVENOUS at 13:12

## 2019-01-04 RX ADMIN — ADO-TRASTUZUMAB EMTANSINE 230.4 MG: 20 INJECTION, POWDER, LYOPHILIZED, FOR SOLUTION INTRAVENOUS at 13:54

## 2019-01-04 RX ADMIN — SODIUM CHLORIDE 20 ML/HR: 0.9 INJECTION, SOLUTION INTRAVENOUS at 12:40

## 2019-01-04 NOTE — PROGRESS NOTES
Pt tolerated infusion well  Offers no complaints  Port flushed and then de accessed  No bleeding noted to site  Aware of future appointments    Declined AVS

## 2019-01-04 NOTE — PROGRESS NOTES
Pt here for chemo  Offers no complaints  Labs checked, within parameters  Port accessed without complication  Brisk blood return, flushes well

## 2019-01-22 ENCOUNTER — HOSPITAL ENCOUNTER (OUTPATIENT)
Dept: INFUSION CENTER | Facility: CLINIC | Age: 59
Discharge: HOME | End: 2019-01-22
Payer: COMMERCIAL

## 2019-01-22 DIAGNOSIS — C50.912 BILATERAL MALIGNANT NEOPLASM OF BREAST IN FEMALE, ESTROGEN RECEPTOR POSITIVE, UNSPECIFIED SITE OF BREAST (HCC): ICD-10-CM

## 2019-01-22 DIAGNOSIS — C50.911 BILATERAL MALIGNANT NEOPLASM OF BREAST IN FEMALE, ESTROGEN RECEPTOR POSITIVE, UNSPECIFIED SITE OF BREAST (HCC): ICD-10-CM

## 2019-01-22 DIAGNOSIS — Z17.0 BILATERAL MALIGNANT NEOPLASM OF BREAST IN FEMALE, ESTROGEN RECEPTOR POSITIVE, UNSPECIFIED SITE OF BREAST (HCC): ICD-10-CM

## 2019-01-22 LAB
ALBUMIN SERPL BCP-MCNC: 3 G/DL (ref 3.5–5)
ALP SERPL-CCNC: 84 U/L (ref 46–116)
ALT SERPL W P-5'-P-CCNC: 36 U/L (ref 12–78)
ANION GAP SERPL CALCULATED.3IONS-SCNC: 10 MMOL/L (ref 4–13)
AST SERPL W P-5'-P-CCNC: 23 U/L (ref 5–45)
BASOPHILS # BLD AUTO: 0.05 THOUSANDS/ΜL (ref 0–0.1)
BASOPHILS NFR BLD AUTO: 1 % (ref 0–1)
BILIRUB SERPL-MCNC: 0.2 MG/DL (ref 0.2–1)
BUN SERPL-MCNC: 11 MG/DL (ref 5–25)
CALCIUM SERPL-MCNC: 8.6 MG/DL (ref 8.3–10.1)
CANCER AG27-29 SERPL-ACNC: 47.1 U/ML (ref 0–42.3)
CHLORIDE SERPL-SCNC: 105 MMOL/L (ref 100–108)
CO2 SERPL-SCNC: 26 MMOL/L (ref 21–32)
CREAT SERPL-MCNC: 0.67 MG/DL (ref 0.6–1.3)
EOSINOPHIL # BLD AUTO: 0.09 THOUSAND/ΜL (ref 0–0.61)
EOSINOPHIL NFR BLD AUTO: 1 % (ref 0–6)
ERYTHROCYTE [DISTWIDTH] IN BLOOD BY AUTOMATED COUNT: 16.7 % (ref 11.6–15.1)
GFR SERPL CREATININE-BSD FRML MDRD: 99 ML/MIN/1.73SQ M
GLUCOSE SERPL-MCNC: 126 MG/DL (ref 65–140)
HCT VFR BLD AUTO: 37.1 % (ref 34.8–46.1)
HGB BLD-MCNC: 11.8 G/DL (ref 11.5–15.4)
IMM GRANULOCYTES # BLD AUTO: 0.07 THOUSAND/UL (ref 0–0.2)
IMM GRANULOCYTES NFR BLD AUTO: 1 % (ref 0–2)
LYMPHOCYTES # BLD AUTO: 2 THOUSANDS/ΜL (ref 0.6–4.47)
LYMPHOCYTES NFR BLD AUTO: 23 % (ref 14–44)
MCH RBC QN AUTO: 29.9 PG (ref 26.8–34.3)
MCHC RBC AUTO-ENTMCNC: 31.8 G/DL (ref 31.4–37.4)
MCV RBC AUTO: 94 FL (ref 82–98)
MONOCYTES # BLD AUTO: 0.89 THOUSAND/ΜL (ref 0.17–1.22)
MONOCYTES NFR BLD AUTO: 10 % (ref 4–12)
NEUTROPHILS # BLD AUTO: 5.74 THOUSANDS/ΜL (ref 1.85–7.62)
NEUTS SEG NFR BLD AUTO: 64 % (ref 43–75)
NRBC BLD AUTO-RTO: 0 /100 WBCS
PLATELET # BLD AUTO: 419 THOUSANDS/UL (ref 149–390)
PMV BLD AUTO: 9 FL (ref 8.9–12.7)
POTASSIUM SERPL-SCNC: 3.3 MMOL/L (ref 3.5–5.3)
PROT SERPL-MCNC: 7.4 G/DL (ref 6.4–8.2)
RBC # BLD AUTO: 3.95 MILLION/UL (ref 3.81–5.12)
SODIUM SERPL-SCNC: 141 MMOL/L (ref 136–145)
WBC # BLD AUTO: 8.84 THOUSAND/UL (ref 4.31–10.16)

## 2019-01-22 PROCEDURE — 86300 IMMUNOASSAY TUMOR CA 15-3: CPT

## 2019-01-22 PROCEDURE — 80053 COMPREHEN METABOLIC PANEL: CPT | Performed by: INTERNAL MEDICINE

## 2019-01-22 PROCEDURE — 85025 COMPLETE CBC W/AUTO DIFF WBC: CPT | Performed by: INTERNAL MEDICINE

## 2019-01-22 NOTE — PROGRESS NOTES
Patient to [facility] for Lab testing / Port maintenance: Offers no complaints at present time: Right PAC accessed without difficulty: Good blood return: Labs drawn per MD order

## 2019-01-23 RX ORDER — PALONOSETRON 0.05 MG/ML
0.25 INJECTION, SOLUTION INTRAVENOUS ONCE
Status: COMPLETED | OUTPATIENT
Start: 2019-01-24 | End: 2019-01-24

## 2019-01-23 RX ORDER — SODIUM CHLORIDE 9 MG/ML
20 INJECTION, SOLUTION INTRAVENOUS CONTINUOUS
Status: DISCONTINUED | OUTPATIENT
Start: 2019-01-24 | End: 2019-01-27 | Stop reason: HOSPADM

## 2019-01-24 ENCOUNTER — HOSPITAL ENCOUNTER (OUTPATIENT)
Dept: INFUSION CENTER | Facility: CLINIC | Age: 59
Discharge: HOME | End: 2019-01-24
Payer: COMMERCIAL

## 2019-01-24 VITALS
HEART RATE: 94 BPM | DIASTOLIC BLOOD PRESSURE: 72 MMHG | BODY MASS INDEX: 23.07 KG/M2 | HEIGHT: 67 IN | RESPIRATION RATE: 20 BRPM | SYSTOLIC BLOOD PRESSURE: 132 MMHG | OXYGEN SATURATION: 96 % | WEIGHT: 147 LBS | TEMPERATURE: 98.6 F

## 2019-01-24 PROCEDURE — 96367 TX/PROPH/DG ADDL SEQ IV INF: CPT

## 2019-01-24 PROCEDURE — 96375 TX/PRO/DX INJ NEW DRUG ADDON: CPT

## 2019-01-24 PROCEDURE — 96372 THER/PROPH/DIAG INJ SC/IM: CPT

## 2019-01-24 PROCEDURE — 96413 CHEMO IV INFUSION 1 HR: CPT

## 2019-01-24 RX ADMIN — DEXAMETHASONE SODIUM PHOSPHATE 10 MG: 10 INJECTION, SOLUTION INTRAMUSCULAR; INTRAVENOUS at 09:12

## 2019-01-24 RX ADMIN — SODIUM CHLORIDE 150 MG: 0.9 INJECTION, SOLUTION INTRAVENOUS at 09:35

## 2019-01-24 RX ADMIN — ADO-TRASTUZUMAB EMTANSINE 234 MG: 20 INJECTION, POWDER, LYOPHILIZED, FOR SOLUTION INTRAVENOUS at 10:11

## 2019-01-24 RX ADMIN — SODIUM CHLORIDE 20 ML/HR: 0.9 INJECTION, SOLUTION INTRAVENOUS at 08:55

## 2019-01-24 RX ADMIN — PALONOSETRON 0.25 MG: 0.05 INJECTION, SOLUTION INTRAVENOUS at 09:11

## 2019-01-24 NOTE — PROGRESS NOTES
To clinic for single agent kadcyla  Pt is doing very well  She offers no complaints  Her nausea is well controlled with the current antiemetic regimen  She does report some constipation post treatment  Encouraged her to use a stool softener for a few days after treatment  When questioned she reports some dyspnea with exertion  This is not new, and it has not worsened over the past few months  She continues with lymphedema of her right arm  This actually looks improved since this RN has last assessed it  Pt reports that it is "doing good"  Pt does not have any lower extremity edema

## 2019-02-06 ENCOUNTER — OFFICE VISIT (OUTPATIENT)
Dept: HEMATOLOGY ONCOLOGY | Facility: CLINIC | Age: 59
End: 2019-02-06
Payer: COMMERCIAL

## 2019-02-06 VITALS
WEIGHT: 146 LBS | HEART RATE: 100 BPM | DIASTOLIC BLOOD PRESSURE: 80 MMHG | OXYGEN SATURATION: 97 % | SYSTOLIC BLOOD PRESSURE: 120 MMHG | TEMPERATURE: 97.8 F | BODY MASS INDEX: 22.91 KG/M2 | RESPIRATION RATE: 20 BRPM | HEIGHT: 67 IN

## 2019-02-06 DIAGNOSIS — C50.912 BILATERAL MALIGNANT NEOPLASM OF BREAST IN FEMALE, ESTROGEN RECEPTOR POSITIVE, UNSPECIFIED SITE OF BREAST (HCC): Primary | ICD-10-CM

## 2019-02-06 DIAGNOSIS — R51.9 NONINTRACTABLE HEADACHE, UNSPECIFIED CHRONICITY PATTERN, UNSPECIFIED HEADACHE TYPE: ICD-10-CM

## 2019-02-06 DIAGNOSIS — Z17.0 BILATERAL MALIGNANT NEOPLASM OF BREAST IN FEMALE, ESTROGEN RECEPTOR POSITIVE, UNSPECIFIED SITE OF BREAST (HCC): Primary | ICD-10-CM

## 2019-02-06 DIAGNOSIS — M79.10 GENERALIZED MUSCLE ACHE: ICD-10-CM

## 2019-02-06 DIAGNOSIS — C50.911 BILATERAL MALIGNANT NEOPLASM OF BREAST IN FEMALE, ESTROGEN RECEPTOR POSITIVE, UNSPECIFIED SITE OF BREAST (HCC): Primary | ICD-10-CM

## 2019-02-06 DIAGNOSIS — H53.8 BLURRY VISION, BILATERAL: ICD-10-CM

## 2019-02-06 DIAGNOSIS — C79.31 BRAIN METASTASES (HCC): ICD-10-CM

## 2019-02-06 PROCEDURE — 99215 OFFICE O/P EST HI 40 MIN: CPT | Performed by: PHYSICIAN ASSISTANT

## 2019-02-06 RX ORDER — POTASSIUM CHLORIDE 20 MEQ/1
20 TABLET, EXTENDED RELEASE ORAL 2 TIMES DAILY
Qty: 14 TABLET | Refills: 0 | Status: SHIPPED | OUTPATIENT
Start: 2019-02-06 | End: 2019-02-20 | Stop reason: ALTCHOICE

## 2019-02-06 NOTE — PROGRESS NOTES
[address] [facility] [address]  Progress Note  Marychuy Lopez, 1960, [id number]  2/6/2019    Assessment/Plan:  1  Bilateral malignant neoplasm of breast in female, estrogen receptor positive, unspecified site of breast ([facility] [address] )  While patient's tumor marker has shown a decreased compared to last month, patient is not feeling the best   Patient has body aches generally noted  Additionally, the patient has had more persistent headaches  Since the patient overall is not feeling well I have offered to delay additional treatment by a or to and implement additional staging which will include a CT of the chest abdomen and pelvis as well as an MRI of the brain  Patient is also due for my August scan last completed in September 2018  At this time, patient was found to have low potassium, I have asked that she replete  Patient has not having diarrhea or vomiting  Patient will also be retested regarding potassium in approximately 1 week to regularly scheduled blood work appointment within the [facility]  I will delay the patient's treatment by a week to 2 weeks depending on availability in the [facility]  I have asked the patient to follow up with me before reinstituting therapy  Patient's general not feeling well may actually be attributed to the cats eyelid  Patient will likely benefit from a dose reductions secondary to peripheral neuropathy as well as fatigue, headache, body aches  Dose reduction will likely be 3 milligrams/kilogram   I will follow up with the patient with this at her next appointment  Patient is in agreement with my plan above  - CT chest abdomen pelvis w contrast; Future  - MRI brain w wo contrast; Future  - potassium chloride (K-DUR,KLOR-CON) 20 mEq tablet; Take 1 tablet (20 mEq total) by mouth 2 (two) times a day  Dispense: 14 tablet; Refill: 0  - Magnesium;  Future  - NM cardiac blood pool muga (at rest); Future    2  Brain metastases [facility])  Patient has new onset of headache as well as blurry vision  I have asked the patient to follow up with an MRI of brain as soon as possible  - MRI brain w wo contrast; Future    3  Generalized muscle ache  Patient has generalized muscle ache however the potassium was noted to be reduced on her last blood work  I have asked the patient to start with supplementation  I will also check a magnesium during her next blood testing to rule out magnesium deficiency causing potassium deficiency  - potassium chloride (K-DUR,KLOR-CON) 20 mEq tablet; Take 1 tablet (20 mEq total) by mouth 2 (two) times a day  Dispense: 14 tablet; Refill: 0  - Magnesium; Future      The patient is scheduled for follow-up in approximately 10 days  Patient voiced agreement and understanding to the above  Patient knows to call the Hematology/Oncology office with any questions and concerns regarding the above  Carefully review your medication list in verify the list is accurate and up-to-date  Please call the hematologic/Oncology office if there medications missing from the less, medications on the list that your not currently taking or if there is a dosage or instruction that is different from higher taking medication  I have spent 30 minutes with Patient  today in which greater than 50% of this time was spent in counseling/coordination of care regarding Diagnostic results, Intructions for management and Impressions  Goals and Barriers:    Current Goal:   Prolong Survival from Cancer  Barriers: None        Patient's Capacity to Self Care:  Patient  able to self care   -------------------------------------------------------------------------------------------------------    Chief Complaint   Patient presents with    Follow-up       History of present illness/Cancer History:      Bilateral malignant neoplasm of breast in female, estrogen receptor positive [facility] )    2010 Initial Diagnosis     The patient had a left sided T1 B , N0 ER positive, NE and HER-2 negative invasive ductal carcinoma, right-sided DCIS, ER/NE positive, HER-2 negative  Bilateral mastectomy  2010 - 4/2011 Hormone Therapy     Tamoxifen 20 mg daily  Last menstrual period was on 3/2010          4/2011 -  Hormone Therapy     Arimidex 1 mg daily  Unknown when medication was discontinued  Patient was lost to follow up          12/9/2015 Progression     · Right arm swelling in December 2015  U/S found a nonvascular structure in the right axilla measuring 2 2 x 1 3 cm  · Subsequent CT Scan of the chest on 12/9/15, showed multiple bilateral pulmonary nodules measuring 3-7 mm  · PET 2/4/16, which revealed hypermetabolic hilar and mediastinal nodes  There is a pre carinal node with a SUV of 5 4 measuring 1 8 x 1 7 cm, and a subcarinal node measuring 1 9 x 1 3 cm with a SUV of 5 3  There is right hilar activity of 3 5 and left hilar activity of 2 8  The subcentimeter nodules are too small for PET evaluation  She also has uptake in her L3 and L5 vertebral bodies, both concerning for bony metastases  2/16/2016 Biopsy     Bronchoscopic biopsy showed Metastatic non-small cell carcinoma, favor adenocarcinoma  ER 90%, NE 0%   Her 2 +2, positive by FISH            3/1/2016 - 6/2016 Chemotherapy     Taxotere 75 mg/m2, Day 1  Perjeta 420 mg, Day 1  Herceptin 6 mg/kg, Day 1  Cycle length= 21 days         3/1/2016 - 2/2017 Hormone Therapy     Anastrozole 1 mg daily         6/2016 - 2/2017 Chemotherapy     Perjeta 420 mg, Day 1  Herceptin 6 mg/kg, Day 1  Cycle length= 21 days         2/2017 Progression     Bony progression          2/10/2017 - 4/12/2018 Chemotherapy     Perjeta 420 mg, Day 1  Herceptin 6 mg/kg, Day 1  Taxotere 75 mg/m2  Cycle length= 21 days         3/3/2017 -  Chemotherapy     Xgeva 120 mg IV, Day 1  Cycle length =  84 days         4/18/2017 Progression      brain MRI showed a 5 mm right parietal lesion, 3 mm right frontal lesion, 3 mm left parahippocampal lesion  5/2017 - 5/2017 Radiation     SRS to brain lesions         5/3/2018 - 8/2018 Chemotherapy     Perjeta 420 mg,  Day 1  Herceptin 6 mg/kg,  Day 1  Cycle length = 21 days  Cycle 3 was administered on 6/14/18    ** taxotere was temporarily discontinued secondary for desire of treatment holiday  Taxotere was readded into the treatment regimen during last cycle  9/5/2018 Progression     Cutaneous disease progression  9/13/2018 -  Chemotherapy     Kadcyla 3 8 mg/kg dose IV Day 1  Cycle length =  21 days  Cycles planned = until progression               ECOG: 3 - Symptomatic, >50% confined to bed    Interval history:  Patient notes that more recently she has been experiencing increase headaches, body aches  Patient also notes that she has had mild changes to her vision including some blurry vision  Patient states that in general today she feels very bad  Patient's tumor markers demonstrate improvement however this may not truly reflect total disease burden  Patient also notes that she is having increased peripheral neuropathy of her hands and her feet  Patient continues to have nausea however there is no vomiting  Meanwhile, patient notes that her right upper extremity has continued pins and needles but more so in the upper arm  This is close to cutaneous disease progression  Review of Systems   Constitutional: Positive for fatigue  Negative for appetite change (deminished), fever and unexpected weight change  HENT: Negative for nosebleeds  Eyes: Positive for visual disturbance  Respiratory: Negative for cough, choking and shortness of breath  Negative hemoptysis  Cardiovascular: Negative for chest pain, palpitations and leg swelling  Gastrointestinal: Positive for nausea   Negative for abdominal distention, abdominal pain, anal bleeding, blood in stool, constipation, diarrhea and vomiting  Endocrine: Positive for cold intolerance  Genitourinary: Negative  Negative for hematuria, menstrual problem, vaginal bleeding, vaginal discharge and vaginal pain  Musculoskeletal: Positive for arthralgias and myalgias  Negative for neck pain and neck stiffness  Skin: Negative  Negative for color change, pallor and rash  Allergic/Immunologic: Negative  Negative for immunocompromised state  Neurological: Positive for weakness and headaches  Hematological: Negative for adenopathy  Does not bruise/bleed easily  All other systems reviewed and are negative          Current Outpatient Prescriptions:     benzonatate (TESSALON PERLES) 100 mg capsule, Take 1 capsule (100 mg total) by mouth 3 (three) times a day, Disp: 20 capsule, Rfl: 0    cyclobenzaprine (FLEXERIL) 10 mg tablet, Take 1 tablet (10 mg total) by mouth 3 (three) times a day as needed for muscle spasms, Disp: 30 tablet, Rfl: 0    dexamethasone (DECADRON) 2 mg tablet, Take 1 tablet (2 mg total) by mouth daily with breakfast, Disp: 30 tablet, Rfl: 2    diazepam (VALIUM) 5 mg tablet, Take 1 tablet (5 mg total) by mouth daily at bedtime, Disp: 30 tablet, Rfl: 2    gabapentin (NEURONTIN) 300 mg capsule, 1 cap PO bid and 2 cap at bedtime, Disp: 120 capsule, Rfl: 5    guaiFENesin (MUCINEX) 600 mg 12 hr tablet, Take 1 tablet (600 mg total) by mouth every 12 (twelve) hours, Disp: 20 tablet, Rfl: 0    HYDROmorphone (DILAUDID) 4 mg tablet, Take 1 tablet (4 mg total) by mouth every 4 (four) hours as needed (breakthrough pain) Max Daily Amount: 24 mg, Disp: 120 tablet, Rfl: 0    lidocaine-prilocaine (EMLA) cream, Apply topically as needed for mild pain, Disp: 30 g, Rfl: 0    metoclopramide (REGLAN) 10 mg tablet, Take 1 tablet (10 mg total) by mouth 3 (three) times a day as needed (nausea), Disp: 30 tablet, Rfl: 1    morphine (MS CONTIN) 15 mg 12 hr tablet, Take 1 tablet (15 mg total) by mouth 3 (three) times a day Max Daily Amount: 45 mg, Disp: 90 tablet, Rfl: 0    ondansetron (ZOFRAN) 8 mg tablet, Take 1 tablet (8 mg total) by mouth every 8 (eight) hours as needed for nausea or vomiting, Disp: 30 tablet, Rfl: 1    promethazine (PHENERGAN) 25 mg tablet, Take 1 tablet (25 mg total) by mouth every 6 (six) hours as needed for nausea or vomiting, Disp: 60 tablet, Rfl: 0    senna (SENOKOT) 8 6 mg, Take 2 tablets by mouth 2 (two) times a day as needed  , Disp: , Rfl:     potassium chloride (K-DUR,KLOR-CON) 20 mEq tablet, Take 1 tablet (20 mEq total) by mouth 2 (two) times a day, Disp: 14 tablet, Rfl: 0    No Known Allergies    Code Status: [unfilled]  Advance Directive and Living Will:      Power of :    POLST:      Objective:   /80 (BP Location: Left arm, Cuff Size: Standard)   Pulse 100   Temp 97 8 °F (36 6 °C) (Oral)   Resp 20   Ht 5' 7" (1 702 m)   Wt 66 2 kg (146 lb)   LMP  (LMP Unknown)   SpO2 97%   BMI 22 87 kg/m²   Wt Readings from Last 6 Encounters:   02/06/19 66 2 kg (146 lb)   01/24/19 66 7 kg (147 lb)   01/04/19 64 4 kg (142 lb)   12/28/18 65 3 kg (144 lb)   12/22/18 62 6 kg (138 lb)   12/11/18 64 kg (141 lb)     Physical Exam   Constitutional: She is oriented to person, place, and time  She appears well-developed  She appears ill  No distress  HENT:   Head: Normocephalic and atraumatic  Mouth/Throat: Oropharynx is clear and moist  No oropharyngeal exudate  Eyes: Pupils are equal, round, and reactive to light  EOM are normal  No scleral icterus  Neck: Normal range of motion  Cardiovascular: Normal rate and regular rhythm  No murmur heard  Pulmonary/Chest: Effort normal and breath sounds normal  No respiratory distress  Abdominal: Soft  Bowel sounds are normal  She exhibits no distension  There is no tenderness  Musculoskeletal: Normal range of motion  She exhibits edema  Right upper extremity with lymphedema    Posterior aspect of her right upper extremity demonstrates 2 areas of disease progression  Lymphadenopathy:     She has no cervical adenopathy  Neurological: She is alert and oriented to person, place, and time  No cranial nerve deficit  Skin: Skin is warm  No rash noted  No pallor  Generalized darkening of the skin  Psychiatric: She has a normal mood and affect  Thought content normal        Pertinent Laboratory Results and Imaging Review:  No visits with results within 2 Week(s) from this visit     Latest known visit with results is:   Hospital Outpatient Visit on 01/22/2019   Component Date Value Ref Range Status    CA 27 29 01/22/2019 47 1* 0 0 - 42 3 U/mL Final    WBC 01/22/2019 8 84  4 31 - 10 16 Thousand/uL Final    RBC 01/22/2019 3 95  3 81 - 5 12 Million/uL Final    Hemoglobin 01/22/2019 11 8  11 5 - 15 4 g/dL Final    Hematocrit 01/22/2019 37 1  34 8 - 46 1 % Final    MCV 01/22/2019 94  82 - 98 fL Final    MCH 01/22/2019 29 9  26 8 - 34 3 pg Final    MCHC 01/22/2019 31 8  31 4 - 37 4 g/dL Final    RDW 01/22/2019 16 7* 11 6 - 15 1 % Final    MPV 01/22/2019 9 0  8 9 - 12 7 fL Final    Platelets 94/43/2738 419* 149 - 390 Thousands/uL Final    nRBC 01/22/2019 0  /100 WBCs Final    Neutrophils Relative 01/22/2019 64  43 - 75 % Final    Immat GRANS % 01/22/2019 1  0 - 2 % Final    Lymphocytes Relative 01/22/2019 23  14 - 44 % Final    Monocytes Relative 01/22/2019 10  4 - 12 % Final    Eosinophils Relative 01/22/2019 1  0 - 6 % Final    Basophils Relative 01/22/2019 1  0 - 1 % Final    Neutrophils Absolute 01/22/2019 5 74  1 85 - 7 62 Thousands/µL Final    Immature Grans Absolute 01/22/2019 0 07  0 00 - 0 20 Thousand/uL Final    Lymphocytes Absolute 01/22/2019 2 00  0 60 - 4 47 Thousands/µL Final    Monocytes Absolute 01/22/2019 0 89  0 17 - 1 22 Thousand/µL Final    Eosinophils Absolute 01/22/2019 0 09  0 00 - 0 61 Thousand/µL Final    Basophils Absolute 01/22/2019 0 05  0 00 - 0 10 Thousands/µL Final    Sodium 01/22/2019 141  136 - 145 mmol/L Final    Potassium 01/22/2019 3 3* 3 5 - 5 3 mmol/L Final    Chloride 01/22/2019 105  100 - 108 mmol/L Final    CO2 01/22/2019 26  21 - 32 mmol/L Final    ANION GAP 01/22/2019 10  4 - 13 mmol/L Final    BUN 01/22/2019 11  5 - 25 mg/dL Final    Creatinine 01/22/2019 0 67  0 60 - 1 30 mg/dL Final    Standardized to IDMS reference method    Glucose 01/22/2019 126  65 - 140 mg/dL Final    Specimen Lipemic; Results may be affected  If the patient is fasting, the ADA then defines impaired fasting glucose as > 100 mg/dL and diabetes as > or equal to 123 mg/dL  Specimen collection should occur prior to Sulfasalazine administration due to the potential for falsely depressed results  Specimen collection should occur prior to Sulfapyridine administration due to the potential for falsely elevated results   Calcium 01/22/2019 8 6  8 3 - 10 1 mg/dL Final    AST 01/22/2019 23  5 - 45 U/L Final      Specimen collection should occur prior to Sulfasalazine administration due to the potential for falsely depressed results   ALT 01/22/2019 36  12 - 78 U/L Final      Specimen collection should occur prior to Sulfasalazine administration due to the potential for falsely depressed results   Alkaline Phosphatase 01/22/2019 84  46 - 116 U/L Final    Total Protein 01/22/2019 7 4  6 4 - 8 2 g/dL Final    Albumin 01/22/2019 3 0* 3 5 - 5 0 g/dL Final    Total Bilirubin 01/22/2019 0 20  0 20 - 1 00 mg/dL Final    eGFR 01/22/2019 99  ml/min/1 73sq m Final         The following historical data was reviewed      Past Medical History:   Diagnosis Date    H/O bilateral mastectomy 2/15/2016    Hypertension 2/15/2016    Lymphedema 2/15/2016    Malignant neoplasm of right breast ([facility] [address] ) 2/15/2016       Past Surgical History:   Procedure Laterality Date    ENDOBRONCHIAL ULTRASOUND (EBUS) N/A 2/16/2016    Procedure: EBUS;  Surgeon: Keagan Baxter MD;  Location: [facility];  Service:    [facility] MASTECTOMY Bilateral     MASTECTOMY Bilateral right arm edema    OOPHORECTOMY      TN BRONCHOSCOPY NEEDLE BX TRACHEA MAIN STEM&/BRON N/A 2/16/2016    Procedure: Chloe Goodpasture;  Surgeon: Marilyn Kingston MD;  Location: [facility];  Service: Thoracic    TN INSJ TUNNELED CTR VAD W/SUBQ PORT AGE 5 YR/> N/A 7/24/2018    Procedure: INSERTION VENOUS PORT ( PORT-A-CATH) IR;  Surgeon: Urmila Gaviria DO;  [facility];  Service: Interventional Radiology    TN STEREOTACTIC RADIOSURGERY, CRANIAL,SIMPLE,EA ADD  5/3/2017         TN STEREOTACTIC RADIOSURGERY, CRANIAL,SIMPLE,EA ADD  5/3/2017         TN STEREOTACTIC RADIOSURGERY, CRANIAL,SIMPLE,SINGLE  5/3/2017            Social History     Social History    Marital status: /Civil Union     Spouse name: N/A    Number of children: N/A    Years of education: N/A     Social History Main Topics    Smoking status: Current Every Day Smoker     Packs/day: 0 50     Years: 40 00     Types: Cigarettes     Start date: 1978    Smokeless tobacco: Never Used    Alcohol use Yes      Comment: social    Drug use: No    Sexual activity: Not on file     Other Topics Concern    Not on file     Social History Narrative    No narrative on file       Family History   Problem Relation Age of Onset    Cancer Sister     Prostate cancer Brother        Please note: This report has been generated by a voice recognition software system  Therefore there may be syntax, spelling, and/or grammatical errors  Please call if you have any questions

## 2019-02-07 ENCOUNTER — TELEPHONE (OUTPATIENT)
Dept: HEMATOLOGY ONCOLOGY | Facility: CLINIC | Age: 59
End: 2019-02-07

## 2019-02-07 DIAGNOSIS — M51.26 HERNIATED LUMBAR INTERVERTEBRAL DISC: ICD-10-CM

## 2019-02-07 DIAGNOSIS — G89.3 CANCER RELATED PAIN: ICD-10-CM

## 2019-02-07 DIAGNOSIS — R11.0 CHEMOTHERAPY-INDUCED NAUSEA: ICD-10-CM

## 2019-02-07 DIAGNOSIS — T45.1X5A CHEMOTHERAPY-INDUCED NAUSEA: ICD-10-CM

## 2019-02-07 DIAGNOSIS — R53.83 OTHER FATIGUE: ICD-10-CM

## 2019-02-07 DIAGNOSIS — G47.9 DIFFICULTY SLEEPING: ICD-10-CM

## 2019-02-07 DIAGNOSIS — R63.0 DECREASED APPETITE: ICD-10-CM

## 2019-02-07 RX ORDER — DEXAMETHASONE 2 MG/1
2 TABLET ORAL
Qty: 30 TABLET | Refills: 2 | Status: SHIPPED | OUTPATIENT
Start: 2019-02-07 | End: 2019-02-19

## 2019-02-07 RX ORDER — HYDROMORPHONE HYDROCHLORIDE 4 MG/1
4 TABLET ORAL EVERY 4 HOURS PRN
Qty: 120 TABLET | Refills: 0 | Status: SHIPPED | OUTPATIENT
Start: 2019-02-07 | End: 2019-03-19 | Stop reason: SDUPTHER

## 2019-02-07 RX ORDER — MORPHINE SULFATE 15 MG/1
15 TABLET, FILM COATED, EXTENDED RELEASE ORAL 3 TIMES DAILY
Qty: 90 TABLET | Refills: 0 | Status: SHIPPED | OUTPATIENT
Start: 2019-02-07 | End: 2019-04-23 | Stop reason: ALTCHOICE

## 2019-02-07 RX ORDER — DIAZEPAM 5 MG/1
5 TABLET ORAL
Qty: 30 TABLET | Refills: 2 | Status: SHIPPED | OUTPATIENT
Start: 2019-02-07 | End: 2019-02-20

## 2019-02-07 NOTE — TELEPHONE ENCOUNTER
Patient called in and says she needs refills on her medications called into [facility] in [place]    -Morphine 15mg   -Valium-5mg   -Hydromorphine-4mg   -Dexamethasone-2mg     Patient can be reached at [phone]

## 2019-02-12 ENCOUNTER — HOSPITAL ENCOUNTER (OUTPATIENT)
Dept: INFUSION CENTER | Facility: CLINIC | Age: 59
Discharge: HOME | End: 2019-02-12

## 2019-02-14 ENCOUNTER — HOSPITAL ENCOUNTER (OUTPATIENT)
Dept: INFUSION CENTER | Facility: CLINIC | Age: 59
Discharge: HOME | End: 2019-02-14
Payer: COMMERCIAL

## 2019-02-14 DIAGNOSIS — Z17.0 BILATERAL MALIGNANT NEOPLASM OF BREAST IN FEMALE, ESTROGEN RECEPTOR POSITIVE, UNSPECIFIED SITE OF BREAST (HCC): ICD-10-CM

## 2019-02-14 DIAGNOSIS — M79.10 GENERALIZED MUSCLE ACHE: ICD-10-CM

## 2019-02-14 DIAGNOSIS — C50.911 BILATERAL MALIGNANT NEOPLASM OF BREAST IN FEMALE, ESTROGEN RECEPTOR POSITIVE, UNSPECIFIED SITE OF BREAST (HCC): ICD-10-CM

## 2019-02-14 DIAGNOSIS — T45.1X5A CHEMOTHERAPY-INDUCED NAUSEA: Primary | ICD-10-CM

## 2019-02-14 DIAGNOSIS — C50.912 BILATERAL MALIGNANT NEOPLASM OF BREAST IN FEMALE, ESTROGEN RECEPTOR POSITIVE, UNSPECIFIED SITE OF BREAST (HCC): ICD-10-CM

## 2019-02-14 DIAGNOSIS — R11.0 CHEMOTHERAPY-INDUCED NAUSEA: Primary | ICD-10-CM

## 2019-02-14 LAB
ALBUMIN SERPL BCP-MCNC: 3.1 G/DL (ref 3.5–5)
ALP SERPL-CCNC: 99 U/L (ref 46–116)
ALT SERPL W P-5'-P-CCNC: 36 U/L (ref 12–78)
ANION GAP SERPL CALCULATED.3IONS-SCNC: 13 MMOL/L (ref 4–13)
AST SERPL W P-5'-P-CCNC: 24 U/L (ref 5–45)
BASOPHILS # BLD AUTO: 0.04 THOUSANDS/ΜL (ref 0–0.1)
BASOPHILS NFR BLD AUTO: 1 % (ref 0–1)
BILIRUB SERPL-MCNC: 0.2 MG/DL (ref 0.2–1)
BUN SERPL-MCNC: 7 MG/DL (ref 5–25)
CALCIUM SERPL-MCNC: 9 MG/DL (ref 8.3–10.1)
CHLORIDE SERPL-SCNC: 103 MMOL/L (ref 100–108)
CO2 SERPL-SCNC: 25 MMOL/L (ref 21–32)
CREAT SERPL-MCNC: 0.83 MG/DL (ref 0.6–1.3)
EOSINOPHIL # BLD AUTO: 0.03 THOUSAND/ΜL (ref 0–0.61)
EOSINOPHIL NFR BLD AUTO: 0 % (ref 0–6)
ERYTHROCYTE [DISTWIDTH] IN BLOOD BY AUTOMATED COUNT: 17.2 % (ref 11.6–15.1)
GFR SERPL CREATININE-BSD FRML MDRD: 79 ML/MIN/1.73SQ M
GLUCOSE SERPL-MCNC: 284 MG/DL (ref 65–140)
HCT VFR BLD AUTO: 35.1 % (ref 34.8–46.1)
HGB BLD-MCNC: 11 G/DL (ref 11.5–15.4)
IMM GRANULOCYTES # BLD AUTO: 0.04 THOUSAND/UL (ref 0–0.2)
IMM GRANULOCYTES NFR BLD AUTO: 1 % (ref 0–2)
LYMPHOCYTES # BLD AUTO: 1.22 THOUSANDS/ΜL (ref 0.6–4.47)
LYMPHOCYTES NFR BLD AUTO: 17 % (ref 14–44)
MAGNESIUM SERPL-MCNC: 2 MG/DL (ref 1.6–2.6)
MCH RBC QN AUTO: 29.9 PG (ref 26.8–34.3)
MCHC RBC AUTO-ENTMCNC: 31.3 G/DL (ref 31.4–37.4)
MCV RBC AUTO: 95 FL (ref 82–98)
MONOCYTES # BLD AUTO: 0.31 THOUSAND/ΜL (ref 0.17–1.22)
MONOCYTES NFR BLD AUTO: 4 % (ref 4–12)
NEUTROPHILS # BLD AUTO: 5.56 THOUSANDS/ΜL (ref 1.85–7.62)
NEUTS SEG NFR BLD AUTO: 77 % (ref 43–75)
NRBC BLD AUTO-RTO: 0 /100 WBCS
PLATELET # BLD AUTO: 396 THOUSANDS/UL (ref 149–390)
PMV BLD AUTO: 9.3 FL (ref 8.9–12.7)
POTASSIUM SERPL-SCNC: 3.5 MMOL/L (ref 3.5–5.3)
PROT SERPL-MCNC: 7.4 G/DL (ref 6.4–8.2)
RBC # BLD AUTO: 3.68 MILLION/UL (ref 3.81–5.12)
SODIUM SERPL-SCNC: 141 MMOL/L (ref 136–145)
WBC # BLD AUTO: 7.2 THOUSAND/UL (ref 4.31–10.16)

## 2019-02-14 PROCEDURE — 80053 COMPREHEN METABOLIC PANEL: CPT | Performed by: PHYSICIAN ASSISTANT

## 2019-02-14 PROCEDURE — 83735 ASSAY OF MAGNESIUM: CPT

## 2019-02-14 PROCEDURE — 85025 COMPLETE CBC W/AUTO DIFF WBC: CPT | Performed by: PHYSICIAN ASSISTANT

## 2019-02-17 ENCOUNTER — HOSPITAL ENCOUNTER (OUTPATIENT)
Dept: NUCLEAR MEDICINE | Facility: HOSPITAL | Age: 59
Discharge: HOME | End: 2019-02-17
Payer: COMMERCIAL

## 2019-02-17 DIAGNOSIS — C50.912 BILATERAL MALIGNANT NEOPLASM OF BREAST IN FEMALE, ESTROGEN RECEPTOR POSITIVE, UNSPECIFIED SITE OF BREAST (HCC): ICD-10-CM

## 2019-02-17 DIAGNOSIS — Z17.0 BILATERAL MALIGNANT NEOPLASM OF BREAST IN FEMALE, ESTROGEN RECEPTOR POSITIVE, UNSPECIFIED SITE OF BREAST (HCC): ICD-10-CM

## 2019-02-17 DIAGNOSIS — C50.911 BILATERAL MALIGNANT NEOPLASM OF BREAST IN FEMALE, ESTROGEN RECEPTOR POSITIVE, UNSPECIFIED SITE OF BREAST (HCC): ICD-10-CM

## 2019-02-17 PROCEDURE — A9560 TC99M LABELED RBC: HCPCS

## 2019-02-17 PROCEDURE — 78472 GATED HEART PLANAR SINGLE: CPT

## 2019-02-18 ENCOUNTER — HOSPITAL ENCOUNTER (OUTPATIENT)
Dept: RADIOLOGY | Facility: HOSPITAL | Age: 59
Discharge: HOME | End: 2019-02-18
Payer: COMMERCIAL

## 2019-02-18 DIAGNOSIS — Z17.0 BILATERAL MALIGNANT NEOPLASM OF BREAST IN FEMALE, ESTROGEN RECEPTOR POSITIVE, UNSPECIFIED SITE OF BREAST (HCC): ICD-10-CM

## 2019-02-18 DIAGNOSIS — C79.31 BRAIN METASTASES (HCC): ICD-10-CM

## 2019-02-18 DIAGNOSIS — C50.912 BILATERAL MALIGNANT NEOPLASM OF BREAST IN FEMALE, ESTROGEN RECEPTOR POSITIVE, UNSPECIFIED SITE OF BREAST (HCC): ICD-10-CM

## 2019-02-18 DIAGNOSIS — C50.911 BILATERAL MALIGNANT NEOPLASM OF BREAST IN FEMALE, ESTROGEN RECEPTOR POSITIVE, UNSPECIFIED SITE OF BREAST (HCC): ICD-10-CM

## 2019-02-18 DIAGNOSIS — H53.8 BLURRY VISION, BILATERAL: ICD-10-CM

## 2019-02-18 DIAGNOSIS — R51.9 NONINTRACTABLE HEADACHE, UNSPECIFIED CHRONICITY PATTERN, UNSPECIFIED HEADACHE TYPE: ICD-10-CM

## 2019-02-18 PROCEDURE — 71260 CT THORAX DX C+: CPT

## 2019-02-18 PROCEDURE — 74177 CT ABD & PELVIS W/CONTRAST: CPT

## 2019-02-18 PROCEDURE — A9585 GADOBUTROL INJECTION: HCPCS | Performed by: PHYSICIAN ASSISTANT

## 2019-02-18 PROCEDURE — 70553 MRI BRAIN STEM W/O & W/DYE: CPT

## 2019-02-18 RX ADMIN — GADOBUTROL 6 ML: 604.72 INJECTION INTRAVENOUS at 16:40

## 2019-02-18 RX ADMIN — IOHEXOL 100 ML: 350 INJECTION, SOLUTION INTRAVENOUS at 14:43

## 2019-02-19 ENCOUNTER — TELEPHONE (OUTPATIENT)
Dept: HEMATOLOGY ONCOLOGY | Facility: CLINIC | Age: 59
End: 2019-02-19

## 2019-02-19 DIAGNOSIS — C50.912 BILATERAL MALIGNANT NEOPLASM OF BREAST IN FEMALE, ESTROGEN RECEPTOR POSITIVE, UNSPECIFIED SITE OF BREAST (HCC): ICD-10-CM

## 2019-02-19 DIAGNOSIS — C79.31 BRAIN METASTASES (HCC): Primary | ICD-10-CM

## 2019-02-19 DIAGNOSIS — Z17.0 BILATERAL MALIGNANT NEOPLASM OF BREAST IN FEMALE, ESTROGEN RECEPTOR POSITIVE, UNSPECIFIED SITE OF BREAST (HCC): ICD-10-CM

## 2019-02-19 DIAGNOSIS — C50.911 BILATERAL MALIGNANT NEOPLASM OF BREAST IN FEMALE, ESTROGEN RECEPTOR POSITIVE, UNSPECIFIED SITE OF BREAST (HCC): ICD-10-CM

## 2019-02-19 RX ORDER — DEXAMETHASONE 4 MG/1
4 TABLET ORAL 2 TIMES DAILY WITH MEALS
Qty: 60 TABLET | Refills: 1 | Status: SHIPPED | OUTPATIENT
Start: 2019-02-19 | End: 2019-02-20 | Stop reason: SDUPTHER

## 2019-02-19 NOTE — TELEPHONE ENCOUNTER
Telephone Note  [facility] HEMATOLOG[facility] Sammy Osorio  [address]  [facility] [address] [place] [id number] [phone]    Reagan Franco EJEPDG,[birth year],[id number]  02/19/19      CC: MRI Brain Results    Subjective:  Patient was seen approximately 2 weeks ago on complained of PERRLA revision  Patient underwent MRI of the brain that demonstrated multiple areas of metastatic disease  Patient previously had SRS to 3 brain lesions in 2017  Patient now demonstrates more progression  Some of the areas demonstrate vasogenic edema  Patient is taking 4 milligrams of dexamethasone daily to help stimulate appetite  A/P:  1  Brain metastases [facility])  Patient continues to have blurry vision however it has not worsened from her last appointment  Patient takes 4 milligrams of death a methadone in the morning  I asked her to take it twice a day  Patient will have an appointment with me tomorrow  In the meantime, I will ask that referral for Radiation Oncology be placed and evaluation the plan for the patient as soon as possible  - Ambulatory referral to Radiation Oncology; Future  - dexamethasone (DECADRON) 4 mg tablet; Take 1 tablet (4 mg total) by mouth 2 (two) times a day with meals  Dispense: 60 tablet; Refill: 1    2  Bilateral malignant neoplasm of breast in female, estrogen receptor positive, unspecified site of breast [facility])  Patient demonstrates disease progression on MRI of the brain  CT of the chest abdomen pelvis is pending  Patient has follow-up appointment with me tomorrow  - Ambulatory referral to Radiation Oncology;  Future

## 2019-02-20 ENCOUNTER — TELEPHONE (OUTPATIENT)
Dept: HEMATOLOGY ONCOLOGY | Facility: HOSPITAL | Age: 59
End: 2019-02-20

## 2019-02-20 ENCOUNTER — OFFICE VISIT (OUTPATIENT)
Dept: HEMATOLOGY ONCOLOGY | Facility: CLINIC | Age: 59
End: 2019-02-20
Payer: COMMERCIAL

## 2019-02-20 VITALS
WEIGHT: 154 LBS | TEMPERATURE: 97.9 F | OXYGEN SATURATION: 98 % | BODY MASS INDEX: 24.17 KG/M2 | HEIGHT: 67 IN | HEART RATE: 83 BPM

## 2019-02-20 DIAGNOSIS — C50.912 BILATERAL MALIGNANT NEOPLASM OF BREAST IN FEMALE, ESTROGEN RECEPTOR POSITIVE, UNSPECIFIED SITE OF BREAST (HCC): Primary | ICD-10-CM

## 2019-02-20 DIAGNOSIS — G89.3 CANCER RELATED PAIN: ICD-10-CM

## 2019-02-20 DIAGNOSIS — G47.01 INSOMNIA DUE TO MEDICAL CONDITION: ICD-10-CM

## 2019-02-20 DIAGNOSIS — M51.26 HERNIATED LUMBAR INTERVERTEBRAL DISC: ICD-10-CM

## 2019-02-20 DIAGNOSIS — Z17.0 BILATERAL MALIGNANT NEOPLASM OF BREAST IN FEMALE, ESTROGEN RECEPTOR POSITIVE, UNSPECIFIED SITE OF BREAST (HCC): Primary | ICD-10-CM

## 2019-02-20 DIAGNOSIS — C50.911 BILATERAL MALIGNANT NEOPLASM OF BREAST IN FEMALE, ESTROGEN RECEPTOR POSITIVE, UNSPECIFIED SITE OF BREAST (HCC): Primary | ICD-10-CM

## 2019-02-20 DIAGNOSIS — C79.31 BRAIN METASTASES (HCC): ICD-10-CM

## 2019-02-20 DIAGNOSIS — R73.9 ELEVATED SERUM GLUCOSE: ICD-10-CM

## 2019-02-20 PROCEDURE — 99215 OFFICE O/P EST HI 40 MIN: CPT | Performed by: PHYSICIAN ASSISTANT

## 2019-02-20 RX ORDER — ZOLPIDEM TARTRATE 10 MG/1
10 TABLET ORAL
Qty: 30 TABLET | Refills: 0 | Status: SHIPPED | OUTPATIENT
Start: 2019-02-20 | End: 2019-04-17 | Stop reason: SDUPTHER

## 2019-02-20 RX ORDER — GABAPENTIN 300 MG/1
CAPSULE ORAL
Qty: 120 CAPSULE | Refills: 5 | Status: SHIPPED | OUTPATIENT
Start: 2019-02-20 | End: 2019-05-07 | Stop reason: SDUPTHER

## 2019-02-20 RX ORDER — DEXAMETHASONE 4 MG/1
4 TABLET ORAL 2 TIMES DAILY WITH MEALS
Qty: 60 TABLET | Refills: 1 | Status: SHIPPED | OUTPATIENT
Start: 2019-02-20 | End: 2019-03-12 | Stop reason: SDUPTHER

## 2019-02-20 NOTE — PROGRESS NOTES
[id number] [facility]  [facility]OLOGY [facility]  [place] [address]  Progress Note  Maryhcuy Search, 1960, [id number]  2/20/2019    Assessment/Plan:    1  Brain metastases [facility])  Patient was found to have recurrent brain metastases on MRI, which was ordered after the patient complained blurry vision  Previously the patient had been treated with SRS to 3 lesions  Patient was taking dexamethasone once a day 4 milligram tablet  I asked the patient to take that twice a day  This are patient is tolerating steroid with some insomnia  However the patient had insomnia prior to starting the steroid therapy  Patient was advised to continue on the doses listed below  A refill request was accommodated  Radiation oncology follow-up will be needed check out today  Patient is to get in within 1 week  - dexamethasone (DECADRON) 4 mg tablet; Take 1 tablet (4 mg total) by mouth 2 (two) times a day with meals  Dispense: 60 tablet; Refill: 1    2  Bilateral malignant neoplasm of breast in female, estrogen receptor positive, unspecified site of breast [facility])    Patient is on Kadcyla, and recently underwent echocardiogram   Ejection fraction is stable  CT scan demonstrated disease stability to some improvement  CT findings were consistent with tumor marker evaluation approximately a month ago  At this time I believe the patient should continue with counts I will as it demonstrates good disease control on the chest abdomen and pelvis scan  Patient will follow      Regimen:  Kadcyla 3 6 mg/kg Day 1   Cycle length= 21 days    3  Insomnia due to medical condition  Patient has ongoing insomnia  Insomnia may be related to dexamethasone administration but may also be related to mental fatigue   - zolpidem (AMBIEN) 10 mg tablet; Take 1 tablet (10 mg total) by mouth daily at bedtime as needed for sleep  Dispense: 30 tablet; Refill: 0    4   Cancer related pain  Patient's pain seems to be well controlled on the present regimen that she is on  Patient is taking Neurontin along with opioid pain medications  This office along with palliative care has been helping the patient with pain control  Secondary to financial concerns, patient asked that medical oncology help with these medications  This has been accommodated  At this time no additional refills of opioid medications are necessary     - gabapentin (NEURONTIN) 300 mg capsule; 1 cap PO bid and 2 cap at bedtime  Dispense: 120 capsule; Refill: 5    5  Continued fatigue  Patient's fatigue is likely multifactorial related to medication, chemotherapy, brain metastases progression  Patient's other disease in her abdomen pelvis and chest have all been considered stable  Patient's labs were reviewed and demonstrates several values hyperglycemia  For this reason I have requested a hemoglobin A1c to be drawn with her regular blood work prior to chemotherapy administration  I also concerned that along the fatigue that there might be some underlying depression  And that is an antral E the patient struggles  Patient's support system at home include her daughter as well as her   At this time, I believe that she would benefit from conversation with Sarthak Sellers,  for medical oncology  Colletta Morgans was available to talk with the patient and his daughter today, and was also present with the discussion of CT results  The patient is scheduled for follow-up in approximately 3 weeks  Patient voiced agreement and understanding to the above  Patient knows to call the Hematology/Oncology office with any questions and concerns regarding the above  Carefully review your medication list in verify the list is accurate and up-to-date    Please call the hematologic/Oncology office if there medications missing from the less, medications on the list that your not currently taking or if there is a dosage or instruction that is different from higher taking medication  I have spent 40 minutes with Patient and family today in which greater than 50% of this time was spent in counseling/coordination of care regarding Diagnostic results, Prognosis, Risks and benefits of tx options, Intructions for management, Patient and family education and Impressions  Goals and Barriers:    Current Goal: Prolong Survival from Cancer  Barriers: None  Patient's Capacity to Self Care:  Patient able to self care   -------------------------------------------------------------------------------------------------------    Chief Complaint   Patient presents with    Follow-up     History of present illness/Cancer History:      Bilateral malignant neoplasm of breast in female, estrogen receptor positive ([facility] [address] )    2010 Initial Diagnosis     The patient had a left sided T1 B , N0 ER positive, CT and HER-2 negative invasive ductal carcinoma, right-sided DCIS, ER/CT positive, HER-2 negative  Bilateral mastectomy  2010 - 4/2011 Hormone Therapy     Tamoxifen 20 mg daily  Last menstrual period was on 3/2010          4/2011 -  Hormone Therapy     Arimidex 1 mg daily  Unknown when medication was discontinued  Patient was lost to follow up          12/9/2015 Progression     · Right arm swelling in December 2015  U/S found a nonvascular structure in the right axilla measuring 2 2 x 1 3 cm  · Subsequent CT Scan of the chest on 12/9/15, showed multiple bilateral pulmonary nodules measuring 3-7 mm  · PET 2/4/16, which revealed hypermetabolic hilar and mediastinal nodes  There is a pre carinal node with a SUV of 5 4 measuring 1 8 x 1 7 cm, and a subcarinal node measuring 1 9 x 1 3 cm with a SUV of 5 3  There is right hilar activity of 3 5 and left hilar activity of 2 8  The subcentimeter nodules are too small for PET evaluation  She also has uptake in her L3 and L5 vertebral bodies, both concerning for bony metastases            2/16/2016 Biopsy Bronchoscopic biopsy showed Metastatic non-small cell carcinoma, favor adenocarcinoma  ER 90%, CO 0%  Her 2 +2, positive by FISH            3/1/2016 - 2016 Chemotherapy     Taxotere 75 mg/m2, Day 1  Perjeta 420 mg, Day 1  Herceptin 6 mg/kg, Day 1  Cycle length= 21 days         3/1/2016 - 2017 Hormone Therapy     Anastrozole 1 mg daily         2016 - 2017 Chemotherapy     Perjeta 420 mg, Day 1  Herceptin 6 mg/kg, Day 1  Cycle length= 21 days         2017 Progression     Bony progression          2/10/2017 - 2018 Chemotherapy     Perjeta 420 mg, Day 1  Herceptin 6 mg/kg, Day 1  Taxotere 75 mg/m2  Cycle length= 21 days         3/3/2017 -  Chemotherapy     Xgeva 120 mg IV, Day 1  Cycle length =  84 days         2017 Progression      brain MRI showed a 5 mm right parietal lesion, 3 mm right frontal lesion, 3 mm left parahippocampal lesion  2017 - 2017 Radiation     SRS to brain lesions         5/3/2018 - 2018 Chemotherapy     Perjeta 420 mg,  Day 1  Herceptin 6 mg/kg,  Day 1  Cycle length = 21 days  Cycle 3 was administered on 18    ** taxotere was temporarily discontinued secondary for desire of treatment holiday  Taxotere was readded into the treatment regimen during last cycle  2018 Progression     Cutaneous disease progression  2018 -  Chemotherapy     Kadcyla 3 8 mg/kg dose IV Day 1  Cycle length =  21 days  Cycles planned = until progression                 ECO - Symptomatic, <50% confined to bed    Interval history:  Patient notes that overall she is feeling okay  Patient notes continuation of insomnia despite the use of lorazepam   Patient states that her insomnia was not any worse last night with the increased dosage of dexamethasone  Otherwise, patient states that she is still feeling fatigued  Patient has questions regarding MRI results       Review of Systems   Constitutional: Negative for appetite change, fatigue, fever and unexpected weight change  HENT: Negative for nosebleeds  Respiratory: Negative for cough, choking and shortness of breath  Negative hemoptysis  Cardiovascular: Negative for chest pain, palpitations and leg swelling  Gastrointestinal: Negative  Negative for abdominal distention, abdominal pain, anal bleeding, blood in stool, constipation, diarrhea, nausea and vomiting  Endocrine: Negative  Negative for cold intolerance  Genitourinary: Negative  Negative for hematuria, menstrual problem, vaginal bleeding, vaginal discharge and vaginal pain  Musculoskeletal: Negative  Negative for arthralgias, myalgias, neck pain and neck stiffness  Skin: Negative  Negative for color change, pallor and rash  Allergic/Immunologic: Negative  Negative for immunocompromised state  Neurological: Negative  Negative for weakness and headaches  Hematological: Negative for adenopathy  Does not bruise/bleed easily  All other systems reviewed and are negative        Current Outpatient Medications:     benzonatate (TESSALON PERLES) 100 mg capsule, Take 1 capsule (100 mg total) by mouth 3 (three) times a day, Disp: 20 capsule, Rfl: 0    cyclobenzaprine (FLEXERIL) 10 mg tablet, Take 1 tablet (10 mg total) by mouth 3 (three) times a day as needed for muscle spasms, Disp: 30 tablet, Rfl: 0    dexamethasone (DECADRON) 4 mg tablet, Take 1 tablet (4 mg total) by mouth 2 (two) times a day with meals, Disp: 60 tablet, Rfl: 1    gabapentin (NEURONTIN) 300 mg capsule, 1 cap PO bid and 2 cap at bedtime, Disp: 120 capsule, Rfl: 5    HYDROmorphone (DILAUDID) 4 mg tablet, Take 1 tablet (4 mg total) by mouth every 4 (four) hours as needed (breakthrough pain) Max Daily Amount: 24 mg, Disp: 120 tablet, Rfl: 0    lidocaine-prilocaine (EMLA) cream, Apply topically as needed for mild pain, Disp: 30 g, Rfl: 0    metoclopramide (REGLAN) 10 mg tablet, Take 1 tablet (10 mg total) by mouth 3 (three) times a day as needed (nausea), Disp: 30 tablet, Rfl: 1    morphine (MS CONTIN) 15 mg 12 hr tablet, Take 1 tablet (15 mg total) by mouth 3 (three) times a day Max Daily Amount: 45 mg, Disp: 90 tablet, Rfl: 0    ondansetron (ZOFRAN) 8 mg tablet, Take 1 tablet (8 mg total) by mouth every 8 (eight) hours as needed for nausea or vomiting, Disp: 30 tablet, Rfl: 1    promethazine (PHENERGAN) 25 mg tablet, Take 1 tablet (25 mg total) by mouth every 6 (six) hours as needed for nausea or vomiting, Disp: 60 tablet, Rfl: 0    senna (SENOKOT) 8 6 mg, Take 2 tablets by mouth 2 (two) times a day as needed  , Disp: , Rfl:     zolpidem (AMBIEN) 10 mg tablet, Take 1 tablet (10 mg total) by mouth daily at bedtime as needed for sleep, Disp: 30 tablet, Rfl: 0    No Known Allergies    Objective:   Pulse 83   Temp 97 9 °F (36 6 °C) (Tympanic)   Ht 5' 7" (1 702 m)   Wt 69 9 kg (154 lb)   LMP  (LMP Unknown)   SpO2 98%   BMI 24 12 kg/m²   Wt Readings from Last 6 Encounters:   02/20/19 69 9 kg (154 lb)   02/06/19 66 2 kg (146 lb)   01/24/19 66 7 kg (147 lb)   01/04/19 64 4 kg (142 lb)   12/28/18 65 3 kg (144 lb)   12/22/18 62 6 kg (138 lb)     Physical Exam   Constitutional: She is oriented to person, place, and time  She appears well-developed and well-nourished  No distress  HENT:   Head: Normocephalic and atraumatic  Eyes: Pupils are equal, round, and reactive to light  No scleral icterus  Cardiovascular: Normal rate and regular rhythm  No murmur heard  Pulmonary/Chest: Effort normal  No respiratory distress  Musculoskeletal: She exhibits no edema  Neurological: She is alert and oriented to person, place, and time  Skin: Skin is warm  No rash noted  No pallor  Psychiatric: She has a normal mood and affect   Thought content normal        Pertinent Laboratory Results and Imaging Review:  Hospital Outpatient Visit on 02/14/2019   Component Date Value Ref Range Status    Magnesium 02/14/2019 2 0  1 6 - 2 6 mg/dL Final    WBC 02/14/2019 7  20  4  31 - 10 16 Thousand/uL Final    RBC 02/14/2019 3 68* 3 81 - 5 12 Million/uL Final    Hemoglobin 02/14/2019 11 0* 11 5 - 15 4 g/dL Final    Hematocrit 02/14/2019 35 1  34 8 - 46 1 % Final    MCV 02/14/2019 95  82 - 98 fL Final    MCH 02/14/2019 29 9  26 8 - 34 3 pg Final    MCHC 02/14/2019 31 3* 31 4 - 37 4 g/dL Final    RDW 02/14/2019 17 2* 11 6 - 15 1 % Final    MPV 02/14/2019 9 3  8 9 - 12 7 fL Final    Platelets 16/05/8629 396* 149 - 390 Thousands/uL Final    nRBC 02/14/2019 0  /100 WBCs Final    Neutrophils Relative 02/14/2019 77* 43 - 75 % Final    Immat GRANS % 02/14/2019 1  0 - 2 % Final    Lymphocytes Relative 02/14/2019 17  14 - 44 % Final    Monocytes Relative 02/14/2019 4  4 - 12 % Final    Eosinophils Relative 02/14/2019 0  0 - 6 % Final    Basophils Relative 02/14/2019 1  0 - 1 % Final    Neutrophils Absolute 02/14/2019 5 56  1 85 - 7 62 Thousands/µL Final    Immature Grans Absolute 02/14/2019 0 04  0 00 - 0 20 Thousand/uL Final    Lymphocytes Absolute 02/14/2019 1 22  0 60 - 4 47 Thousands/µL Final    Monocytes Absolute 02/14/2019 0 31  0 17 - 1 22 Thousand/µL Final    Eosinophils Absolute 02/14/2019 0 03  0 00 - 0 61 Thousand/µL Final    Basophils Absolute 02/14/2019 0 04  0 00 - 0 10 Thousands/µL Final    Sodium 02/14/2019 141  136 - 145 mmol/L Final    Potassium 02/14/2019 3 5  3 5 - 5 3 mmol/L Final    Chloride 02/14/2019 103  100 - 108 mmol/L Final    CO2 02/14/2019 25  21 - 32 mmol/L Final    ANION GAP 02/14/2019 13  4 - 13 mmol/L Final    BUN 02/14/2019 7  5 - 25 mg/dL Final    Creatinine 02/14/2019 0 83  0 60 - 1 30 mg/dL Final    Standardized to IDMS reference method    Glucose 02/14/2019 284* 65 - 140 mg/dL Final      If the patient is fasting, the ADA then defines impaired fasting glucose as > 100 mg/dL and diabetes as > or equal to 123 mg/dL    Specimen collection should occur prior to Sulfasalazine administration due to the potential for falsely depressed results  Specimen collection should occur prior to Sulfapyridine administration due to the potential for falsely elevated results   Calcium 02/14/2019 9 0  8 3 - 10 1 mg/dL Final    AST 02/14/2019 24  5 - 45 U/L Final      Specimen collection should occur prior to Sulfasalazine administration due to the potential for falsely depressed results   ALT 02/14/2019 36  12 - 78 U/L Final      Specimen collection should occur prior to Sulfasalazine administration due to the potential for falsely depressed results   Alkaline Phosphatase 02/14/2019 99  46 - 116 U/L Final    Total Protein 02/14/2019 7 4  6 4 - 8 2 g/dL Final    Albumin 02/14/2019 3 1* 3 5 - 5 0 g/dL Final    Total Bilirubin 02/14/2019 0 20  0 20 - 1 00 mg/dL Final    eGFR 02/14/2019 79  ml/min/1 73sq m Final         The following historical data was reviewed      Past Medical History:   Diagnosis Date    H/O bilateral mastectomy 2/15/2016    Hypertension 2/15/2016    Lymphedema 2/15/2016    Malignant neoplasm of right breast ([facility] [address] ) 2/15/2016       Past Surgical History:   Procedure Laterality Date    ENDOBRONCHIAL ULTRASOUND (EBUS) N/A 2/16/2016    Procedure: EBUS;  Surgeon: Javier Ronquillo MD;  [facility];  Service:     MASTECTOMY Bilateral     MASTECTOMY Bilateral     right arm edema    OOPHORECTOMY      OH BRONCHOSCOPY NEEDLE BX TRACHEA MAIN STEM&/BRON N/A 2/16/2016    Procedure: Jaxon Maloney;  Surgeon: Javier Ronquillo MD;  [facility];  Service: Thoracic    OH INSJ TUNNELED CTR VAD W/SUBQ PORT AGE 5 YR/> N/A 7/24/2018    Procedure: INSERTION VENOUS PORT ( PORT-A-CATH) IR;  Surgeon: Venancio Mcclellan DO;  [facility];  Service: Interventional Radiology    OH STEREOTACTIC RADIOSURGERY, CRANIAL,SIMPLE,EA ADD  5/3/2017         OH STEREOTACTIC RADIOSURGERY, CRANIAL,SIMPLE,EA ADD  5/3/2017         OH STEREOTACTIC RADIOSURGERY, CRANIAL,SIMPLE,SINGLE  5/3/2017            Social History Socioeconomic History    Marital status: /Civil Union     Spouse name: Not on file    Number of children: Not on file    Years of education: Not on file    Highest education level: Not on file   Occupational History    Not on file   Social Needs    Financial resource strain: Not on file    Food insecurity:     Worry: Not on file     Inability: Not on file    Transportation needs:     Medical: Not on file     Non-medical: Not on file   Tobacco Use    Smoking status: Current Every Day Smoker     Packs/day: 0 50     Years: 40 00     Pack years: 20 00     Types: Cigarettes     Start date: 1978    Smokeless tobacco: Never Used   Substance and Sexual Activity    Alcohol use: Yes     Comment: social    Drug use: No    Sexual activity: Not on file   Lifestyle    Physical activity:     Days per week: Not on file     Minutes per session: Not on file    Stress: Not on file   Relationships    Social connections:     Talks on phone: Not on file     Gets together: Not on file     Attends [employer] service: Not on file     Active member of club or organization: Not on file     Attends meetings of clubs or organizations: Not on file     Relationship status: Not on file    Intimate partner violence:     Fear of current or ex partner: Not on file     Emotionally abused: Not on file     Physically abused: Not on file     Forced sexual activity: Not on file   Other Topics Concern    Not on file   Social History Narrative    Not on file       Family History   Problem Relation Age of Onset    Cancer Sister     Prostate cancer Brother        Please note: This report has been generated by a voice recognition software system  Therefore there may be syntax, spelling, and/or grammatical errors  Please call if you have any questions

## 2019-02-25 ENCOUNTER — HOSPITAL ENCOUNTER (OUTPATIENT)
Dept: INFUSION CENTER | Facility: CLINIC | Age: 59
Discharge: HOME | End: 2019-02-25

## 2019-02-26 ENCOUNTER — TELEPHONE (OUTPATIENT)
Dept: HEMATOLOGY ONCOLOGY | Facility: CLINIC | Age: 59
End: 2019-02-26

## 2019-02-26 ENCOUNTER — DOCUMENTATION (OUTPATIENT)
Dept: HEMATOLOGY ONCOLOGY | Facility: CLINIC | Age: 59
End: 2019-02-26

## 2019-02-26 ENCOUNTER — HOSPITAL ENCOUNTER (OUTPATIENT)
Dept: INFUSION CENTER | Facility: CLINIC | Age: 59
Discharge: HOME | End: 2019-02-26
Payer: COMMERCIAL

## 2019-02-26 ENCOUNTER — CLINICAL SUPPORT (OUTPATIENT)
Dept: RADIATION ONCOLOGY | Facility: HOSPITAL | Age: 59
End: 2019-02-26
Attending: RADIOLOGY

## 2019-02-26 ENCOUNTER — APPOINTMENT (OUTPATIENT)
Dept: RADIATION ONCOLOGY | Facility: HOSPITAL | Age: 59
End: 2019-02-26
Attending: RADIOLOGY
Payer: COMMERCIAL

## 2019-02-26 ENCOUNTER — RADIATION ONCOLOGY CONSULT (OUTPATIENT)
Dept: RADIATION ONCOLOGY | Facility: HOSPITAL | Age: 59
End: 2019-02-26
Attending: RADIOLOGY

## 2019-02-26 VITALS
DIASTOLIC BLOOD PRESSURE: 60 MMHG | WEIGHT: 155 LBS | SYSTOLIC BLOOD PRESSURE: 118 MMHG | TEMPERATURE: 98 F | RESPIRATION RATE: 16 BRPM | BODY MASS INDEX: 24.33 KG/M2 | HEIGHT: 67 IN | HEART RATE: 70 BPM

## 2019-02-26 DIAGNOSIS — C50.911 BILATERAL MALIGNANT NEOPLASM OF BREAST IN FEMALE, ESTROGEN RECEPTOR POSITIVE, UNSPECIFIED SITE OF BREAST (HCC): ICD-10-CM

## 2019-02-26 DIAGNOSIS — C79.31 BRAIN METASTASES (HCC): Primary | ICD-10-CM

## 2019-02-26 DIAGNOSIS — Z17.0 BILATERAL MALIGNANT NEOPLASM OF BREAST IN FEMALE, ESTROGEN RECEPTOR POSITIVE, UNSPECIFIED SITE OF BREAST (HCC): ICD-10-CM

## 2019-02-26 DIAGNOSIS — T45.1X5A CHEMOTHERAPY-INDUCED NAUSEA: ICD-10-CM

## 2019-02-26 DIAGNOSIS — R73.9 ELEVATED SERUM GLUCOSE: ICD-10-CM

## 2019-02-26 DIAGNOSIS — C50.912 BILATERAL MALIGNANT NEOPLASM OF BREAST IN FEMALE, ESTROGEN RECEPTOR POSITIVE, UNSPECIFIED SITE OF BREAST (HCC): ICD-10-CM

## 2019-02-26 DIAGNOSIS — R11.0 CHEMOTHERAPY-INDUCED NAUSEA: ICD-10-CM

## 2019-02-26 LAB
ALBUMIN SERPL BCP-MCNC: 3.2 G/DL (ref 3.5–5)
ALP SERPL-CCNC: 80 U/L (ref 46–116)
ALT SERPL W P-5'-P-CCNC: 50 U/L (ref 12–78)
ANION GAP SERPL CALCULATED.3IONS-SCNC: 9 MMOL/L (ref 4–13)
AST SERPL W P-5'-P-CCNC: 21 U/L (ref 5–45)
BASOPHILS # BLD AUTO: 0.01 THOUSANDS/ΜL (ref 0–0.1)
BASOPHILS NFR BLD AUTO: 0 % (ref 0–1)
BILIRUB SERPL-MCNC: 0.2 MG/DL (ref 0.2–1)
BUN SERPL-MCNC: 12 MG/DL (ref 5–25)
CALCIUM SERPL-MCNC: 8.9 MG/DL (ref 8.3–10.1)
CHLORIDE SERPL-SCNC: 106 MMOL/L (ref 100–108)
CO2 SERPL-SCNC: 27 MMOL/L (ref 21–32)
CREAT SERPL-MCNC: 0.69 MG/DL (ref 0.6–1.3)
EOSINOPHIL # BLD AUTO: 0 THOUSAND/ΜL (ref 0–0.61)
EOSINOPHIL NFR BLD AUTO: 0 % (ref 0–6)
ERYTHROCYTE [DISTWIDTH] IN BLOOD BY AUTOMATED COUNT: 17.9 % (ref 11.6–15.1)
EST. AVERAGE GLUCOSE BLD GHB EST-MCNC: 148 MG/DL
GFR SERPL CREATININE-BSD FRML MDRD: 98 ML/MIN/1.73SQ M
GLUCOSE SERPL-MCNC: 153 MG/DL (ref 65–140)
HBA1C MFR BLD: 6.8 % (ref 4.2–6.3)
HCT VFR BLD AUTO: 37.5 % (ref 34.8–46.1)
HGB BLD-MCNC: 11.7 G/DL (ref 11.5–15.4)
IMM GRANULOCYTES # BLD AUTO: 0.21 THOUSAND/UL (ref 0–0.2)
IMM GRANULOCYTES NFR BLD AUTO: 2 % (ref 0–2)
LYMPHOCYTES # BLD AUTO: 0.86 THOUSANDS/ΜL (ref 0.6–4.47)
LYMPHOCYTES NFR BLD AUTO: 7 % (ref 14–44)
MCH RBC QN AUTO: 29.6 PG (ref 26.8–34.3)
MCHC RBC AUTO-ENTMCNC: 31.2 G/DL (ref 31.4–37.4)
MCV RBC AUTO: 95 FL (ref 82–98)
MONOCYTES # BLD AUTO: 0.38 THOUSAND/ΜL (ref 0.17–1.22)
MONOCYTES NFR BLD AUTO: 3 % (ref 4–12)
NEUTROPHILS # BLD AUTO: 11.26 THOUSANDS/ΜL (ref 1.85–7.62)
NEUTS SEG NFR BLD AUTO: 88 % (ref 43–75)
NRBC BLD AUTO-RTO: 0 /100 WBCS
PLATELET # BLD AUTO: 440 THOUSANDS/UL (ref 149–390)
PMV BLD AUTO: 9.2 FL (ref 8.9–12.7)
POTASSIUM SERPL-SCNC: 3.9 MMOL/L (ref 3.5–5.3)
PROT SERPL-MCNC: 7.5 G/DL (ref 6.4–8.2)
RBC # BLD AUTO: 3.95 MILLION/UL (ref 3.81–5.12)
SODIUM SERPL-SCNC: 142 MMOL/L (ref 136–145)
WBC # BLD AUTO: 12.72 THOUSAND/UL (ref 4.31–10.16)

## 2019-02-26 PROCEDURE — 83036 HEMOGLOBIN GLYCOSYLATED A1C: CPT

## 2019-02-26 PROCEDURE — 77295 3-D RADIOTHERAPY PLAN: CPT | Performed by: RADIOLOGY

## 2019-02-26 PROCEDURE — 85025 COMPLETE CBC W/AUTO DIFF WBC: CPT

## 2019-02-26 PROCEDURE — 80053 COMPREHEN METABOLIC PANEL: CPT

## 2019-02-26 PROCEDURE — 77334 RADIATION TREATMENT AID(S): CPT | Performed by: RADIOLOGY

## 2019-02-26 PROCEDURE — 77300 RADIATION THERAPY DOSE PLAN: CPT | Performed by: RADIOLOGY

## 2019-02-26 PROCEDURE — 99215 OFFICE O/P EST HI 40 MIN: CPT | Performed by: RADIOLOGY

## 2019-02-26 NOTE — PROGRESS NOTES
[facility]  1960  Ms Kimani Ford is a 66 y o  female    Chief Complaint   Patient presents with    Consult       Cancer Staging  No matching staging information was found for the patient  Oncology History    2010- The patient had a left sided T1B, N0, ER positive, MT and HER-2 negative invasive ductal carcinoma; right-sided DCIS, ER/MT positive, HER-2 negative  Bilateral mastectomy performed  No adjuvant treatment  October 2010 - April 2011  Treated with tamoxifen 20 mg daily  Last menstrual cycle was March 2010 was then placed on Arimidex 1 mg daily  Lost to followup after March 2012  She subsequently presented with right arm swelling and was found to have distant metastatic disease  PET scan on 2/4/16 revealed hypermetabolic hilar and mediastinal nodes  She also had uptake in her L3 and L5 vertebral bodies, both concerning for bony metastases  Dr Ting Smith performed flexible bronchoscopy and lymph node biopsy 2/16/16  Pathology identified metastatic breast carcinoma of no special type, ER 90%, MT negative  Her 2 +2, positive by FISH  March 1, 2016- started Taxotere/Perjeta/Herceptin x 6 cycles  Perjeta and Herceptin continue q3 weeks on a maintenance basis  8/4/16 She was seen in consult in Radiation Oncology for persistent low back pain  A lumbar spine MRI was performed again showing stable disease in the L3 and L5 vertebral bodies  She then received palliative radiation to L2 through S1  End of treat was 8/24/16 2/28/17 Seen by Pain managementMS ER stopped; methadone 5 mg TID started and hydromorphone 8 mg renewed  4/17/17 Patient had a fall, bilateral leg weakness, and felt off balance Med Onc ordered Brain MRI  4/18/17 MRI Brain- 3 intracranial foci of enhancement in keeping with metastatic disease  There is a 5 4 mm enhancing lesion in the posterior right periatrial region   There is a 3 mm focus in the medial right orbital frontal gyrus as well as a 3 mm focus in the left parahippocampal region  No substantial mass effect or surrounding vasogenic edema  4/26/17 Dr Chely Murillo- recently diagnosed with 3 small brain metastases found on MRI  Various options were discussed including whole brain radiation versus stereotactic radiosurgery, and the patient would like to proceed with single fraction radiosurgery - completed 5/3/17   9/2018 - Cutaneous disease progression; started Kadcyla  2/18/19  MRI Brain:  Multiple scattered supratentorial and infratentorial enhancing lesions measuring up to 11 mm, most compatible with metastatic disease  Several of these lesions demonstrate mild surrounding vasogenic edema with no significant mass effect or midline shift  No acute intracranial hemorrhage or ischemia  2/18/19  CT C/A/P:  Stable reticular nodular interstitial changes noted within the upper lung fields  Possibility of lymphangitic spread of tumor should be considered  No discrete pulmonary nodule or consolidation noted  Trace pleural effusions, decreasing in size compared to the prior examination  Stable subtle lucent and sclerotic lesions within the lower thoracic and lumbar vertebral bodies suggestive of osseous metastasis, unchanged  The previously seen matted soft tissue changes in the right axillary region are markedly improved  2/2/19  Sheridan Mccray, PA:  46-year-old female with metastatic breast cancer  Patient was recently found to have brain metastases and is status post SRS  Patient is on steroids  Pain is controlled  Patient is also being treated with Kadcyla - tolerating this  Social service input has been requested regarding the patient's home situation; additional workup/treatment may be needed for depression  1/24/19 Kadcyla infusion  On hold till discussion with Radiation Oncology  2/26/19  Consult Dr Kendra Burt for brain metastasis          Bilateral malignant neoplasm of breast in female, estrogen receptor positive ([facility] [address] )    2010 Initial Diagnosis     The patient had a left sided T1 B , N0 ER positive, HI and HER-2 negative invasive ductal carcinoma, right-sided DCIS, ER/HI positive, HER-2 negative  Bilateral mastectomy  2010 - 4/2011 Hormone Therapy     Tamoxifen 20 mg daily  Last menstrual period was on 3/2010          4/2011 -  Hormone Therapy     Arimidex 1 mg daily  Unknown when medication was discontinued  Patient was lost to follow up          12/9/2015 Progression     · Right arm swelling in December 2015  U/S found a nonvascular structure in the right axilla measuring 2 2 x 1 3 cm  · Subsequent CT Scan of the chest on 12/9/15, showed multiple bilateral pulmonary nodules measuring 3-7 mm  · PET 2/4/16, which revealed hypermetabolic hilar and mediastinal nodes  There is a pre carinal node with a SUV of 5 4 measuring 1 8 x 1 7 cm, and a subcarinal node measuring 1 9 x 1 3 cm with a SUV of 5 3  There is right hilar activity of 3 5 and left hilar activity of 2 8  The subcentimeter nodules are too small for PET evaluation  She also has uptake in her L3 and L5 vertebral bodies, both concerning for bony metastases  2/16/2016 Biopsy     Bronchoscopic biopsy showed Metastatic non-small cell carcinoma, favor adenocarcinoma  ER 90%, HI 0%   Her 2 +2, positive by FISH            3/1/2016 - 6/2016 Chemotherapy     Taxotere 75 mg/m2, Day 1  Perjeta 420 mg, Day 1  Herceptin 6 mg/kg, Day 1  Cycle length= 21 days         3/1/2016 - 2/2017 Hormone Therapy     Anastrozole 1 mg daily         6/2016 - 2/2017 Chemotherapy     Perjeta 420 mg, Day 1  Herceptin 6 mg/kg, Day 1  Cycle length= 21 days         2/2017 Progression     Bony progression          2/10/2017 - 4/12/2018 Chemotherapy     Perjeta 420 mg, Day 1  Herceptin 6 mg/kg, Day 1  Taxotere 75 mg/m2  Cycle length= 21 days         3/3/2017 -  Chemotherapy     Xgeva 120 mg IV, Day 1  Cycle length =  84 days         4/18/2017 Progression      brain MRI showed a 5 mm right parietal lesion, 3 mm right frontal lesion, 3 mm left parahippocampal lesion  5/2017 - 5/2017 Radiation     SRS to brain lesions         5/3/2018 - 8/2018 Chemotherapy     Perjeta 420 mg,  Day 1  Herceptin 6 mg/kg,  Day 1  Cycle length = 21 days  Cycle 3 was administered on 6/14/18    ** taxotere was temporarily discontinued secondary for desire of treatment holiday  Taxotere was readded into the treatment regimen during last cycle  9/5/2018 Progression     Cutaneous disease progression  9/13/2018 -  Chemotherapy     Kadcyla 3 8 mg/kg dose IV Day 1  Cycle length =  21 days  Cycles planned = until progression             2/18/2019 Progression     MRI brain demonstrated disease progression; CT of the chest abdomen pelvis was stable along with the patient's tumor markers  Clinical Trial: no    Screening  Tobacco  Current tobacco user: yes  If yes, brief counseling provided: Yes    Hypertension  Hypertension screening performed: yes  Normotensive:  yes  If no, referred to PCP: n/a    Depression Screening  Screened for depression using PHQ-2: yes    Screened for depression using PHQ-9:  no  Screening positive or negative:  negative  If score >4, was any of the following actions taken?    Additional evaluation for depression, suicide risk assesment, referral to PCP or psychiatry, medication started:  [id number] [place] [address] for Patients >65 years  Advanced Care Planning Discussed:  n/a  Patient named surrogate decision maker or care plan in chart: n/a      Health Maintenance   Topic Date Due    Hepatitis C Screening  1960    CRC Screening: Colonoscopy  1960    Pneumococcal PPSV23 Highest Risk Adult (1 of 3 - PCV13) 08/11/1981    DTaP,Tdap,and Td Vaccines (1 - Tdap) 08/11/1983    Depression Screening PHQ  02/21/2019    BMI: Adult  02/20/2020    INFLUENZA VACCINE  Completed    HEPATITIS B VACCINES  Aged Out       Patient Active Problem List   Diagnosis    H/O bilateral mastectomy    Lymphedema of right upper extremity    Pulmonary nodule    Bilateral malignant neoplasm of breast in female, estrogen receptor positive (HCC)    [facility] metastases ([facility] [address]    Brain metastases ([facility] [address] )    Cancer related pain    Chemotherapy-induced nausea    Constipation    Decreased appetite    Difficulty sleeping    Dyspareunia, female    Herniated lumbar intervertebral disc    Mediastinal lymphadenopathy    Hyperglycemia    Other fatigue    Dry skin    Dizziness    Bilateral pleural effusion    Abnormal CT of the chest    SOB (shortness of breath)    Tobacco abuse    Financial difficulties    Influenza A    Sepsis ([facility] [address] )    Pneumonia     Past Medical History:   Diagnosis Date    H/O bilateral mastectomy 2/15/2016    Hypertension 2/15/2016    Lymphedema 2/15/2016    Malignant neoplasm of right breast ([facility] [address] ) 2/15/2016     Past Surgical History:   Procedure Laterality Date    ENDOBRONCHIAL ULTRASOUND (EBUS) N/A 2/16/2016    Procedure: EBUS;  Surgeon: Olinda Vieyra MD;  [facility];  Service:     MASTECTOMY Bilateral     MASTECTOMY Bilateral     right arm edema    OOPHORECTOMY      DE BRONCHOSCOPY NEEDLE BX TRACHEA MAIN STEM&/BRON N/A 2/16/2016    Procedure: Basil Rao;  Surgeon: Olinda Vieyra MD;  [facility];  Service: Thoracic    DE INSJ TUNNELED CTR VAD W/SUBQ PORT AGE 5 YR/> N/A 7/24/2018    Procedure: INSERTION VENOUS PORT ( PORT-A-CATH) IR;  Surgeon: Eron Flores DO;  [facility];  Service: Interventional Radiology    DE STEREOTACTIC RADIOSURGERY, CRANIAL,SIMPLE,EA ADD  5/3/2017         DE STEREOTACTIC RADIOSURGERY, CRANIAL,SIMPLE,EA ADD  5/3/2017         DE STEREOTACTIC RADIOSURGERY, CRANIAL,SIMPLE,SINGLE  5/3/2017          Family History   Problem Relation Age of Onset    Cancer Sister     Prostate cancer Brother      Social History     Socioeconomic History    Marital status: /Civil Union     Spouse name: Not on file    Number of children: Not on file    Years of education: Not on file    Highest education level: Not on file   Occupational History    Not on file   Social Needs    Financial resource strain: Not on file    Food insecurity:     Worry: Not on file     Inability: Not on file    Transportation needs:     Medical: Not on file     Non-medical: Not on file   Tobacco Use    Smoking status: Current Every Day Smoker     Packs/day: 0 50     Years: 40 00     Pack years: 20 00     Types: Cigarettes     Start date: 1978    Smokeless tobacco: Never Used   Substance and Sexual Activity    Alcohol use: Yes     Frequency: 2-4 times a month     Drinks per session: 3 or 4     Binge frequency: Less than monthly     Comment: social    Drug use: No    Sexual activity: Not on file   Lifestyle    Physical activity:     Days per week: Not on file     Minutes per session: Not on file    Stress: Not on file   Relationships    Social connections:     Talks on phone: Not on file     Gets together: Not on file     Attends Baptism service: Not on file     Active member of club or organization: Not on file     Attends meetings of clubs or organizations: Not on file     Relationship status: Not on file    Intimate partner violence:     Fear of current or ex partner: Not on file     Emotionally abused: Not on file     Physically abused: Not on file     Forced sexual activity: Not on file   Other Topics Concern    Not on file   Social History Narrative    Not on file       Current Outpatient Medications:     cyclobenzaprine (FLEXERIL) 10 mg tablet, Take 1 tablet (10 mg total) by mouth 3 (three) times a day as needed for muscle spasms, Disp: 30 tablet, Rfl: 0    dexamethasone (DECADRON) 4 mg tablet, Take 1 tablet (4 mg total) by mouth 2 (two) times a day with meals, Disp: 60 tablet, Rfl: 1    gabapentin (NEURONTIN) 300 mg capsule, 1 cap PO bid and 2 cap at bedtime, Disp: 120 capsule, Rfl: 5    HYDROmorphone (DILAUDID) 4 mg tablet, Take 1 tablet (4 mg total) by mouth every 4 (four) hours as needed (breakthrough pain) Max Daily Amount: 24 mg, Disp: 120 tablet, Rfl: 0    lidocaine-prilocaine (EMLA) cream, Apply topically as needed for mild pain, Disp: 30 g, Rfl: 0    metoclopramide (REGLAN) 10 mg tablet, Take 1 tablet (10 mg total) by mouth 3 (three) times a day as needed (nausea), Disp: 30 tablet, Rfl: 1    morphine (MS CONTIN) 15 mg 12 hr tablet, Take 1 tablet (15 mg total) by mouth 3 (three) times a day Max Daily Amount: 45 mg, Disp: 90 tablet, Rfl: 0    ondansetron (ZOFRAN) 8 mg tablet, Take 1 tablet (8 mg total) by mouth every 8 (eight) hours as needed for nausea or vomiting, Disp: 30 tablet, Rfl: 1    senna (SENOKOT) 8 6 mg, Take 2 tablets by mouth 2 (two) times a day as needed  , Disp: , Rfl:     zolpidem (AMBIEN) 10 mg tablet, Take 1 tablet (10 mg total) by mouth daily at bedtime as needed for sleep, Disp: 30 tablet, Rfl: 0  No current facility-administered medications for this visit  Facility-Administered Medications Ordered in Other Visits:     alteplase (CATHFLO) injection 2 mg, 2 mg, Intracatheter, PRN, Marionette Baker, DO    alteplase (CATHFLO) injection 2 mg, 2 mg, Intracatheter, PRN, Marionette Baker, DO    No Known Allergies    Review of Systems:  Review of Systems   Constitutional: Positive for chills, fatigue (8/10 ) and fever (Last week temp 100 9  Did not notify any one )  HENT: Negative  Eyes:        Blurry vision- worsened over the last 1 5 months  Respiratory: Positive for cough (Occasional nonproductive cough ) and shortness of breath  Cardiovascular:        Right arm lymphedema  Reports that she needs another sleeve ordered  Will give her Lymphedema Clinic booklet  Gastrointestinal: Positive for nausea  Endocrine: Negative  Genitourinary: Negative  Musculoskeletal: Positive for back pain (Lower back pain 7/10 ) and gait problem  Right arm pain 7/10  Reports worsening in balance  Uses a single point cane  Skin: Negative  Allergic/Immunologic: Negative  Neurological: Positive for dizziness, weakness (Arms and legs ), light-headedness, numbness (Right arm, hands, and feet ) and headaches (Daily frontal and temporal headache  Relieved with ASA )  Hematological: Negative  Psychiatric/Behavioral: Positive for sleep disturbance  Vitals:    02/26/19 1022   BP: 118/60   BP Location: Left arm   Patient Position: Sitting   Cuff Size: Standard   Pulse: 70   Resp: 16   Temp: 98 °F (36 7 °C)   TempSrc: Temporal   Weight: 70 3 kg (155 lb)   Height: 5' 7" (1 702 m)       Pain Score:   7    Imaging:Mri Brain W Wo Contrast    Result Date: 2/18/2019  Narrative: MRI BRAIN WITH AND WITHOUT CONTRAST INDICATION: C50 911: Malignant neoplasm of unspecified site of right female breast Z17 0: Estrogen receptor positive status (ER+) C50 912: Malignant neoplasm of unspecified site of left female breast C79 31: Secondary malignant neoplasm of brain R51: Headache H53 8: Other visual disturbances  COMPARISON:  MRI brain dated February 16, 2018  TECHNIQUE: Sagittal T1, axial T2, axial FLAIR, axial T1, axial [place], axial diffusion  Sagittal, axial T1 postcontrast   Axial bravo postcontrast with coronal reconstructions  IV Contrast:  6 mL of gadobutrol injection (MULTI-DOSE)  IMAGE QUALITY:   Diagnostic  FINDINGS: BRAIN PARENCHYMA: There was several scattered ring-enhancing lesions, including: An 11 mm lesion at the right periventricular occipital lobe adjacent to the posterior horn of the right lateral ventricle (series 11, image 56); a 3 mm enhancing lesion within the right corona radiata (series 11, image 81); a 3 mm enhancing lesion within the left centrum semiovale (series 11, image 85):  A 3 mm cortical enhancing lesion at the left frontal lobe (series 11, image 102); a 5 mm cortical ring-enhancing lesion within the right frontal lobe (series 11, image 104); another adjacent ring enhancing lesion within the posterior right frontal vertex measuring 5 mm (series 11, image 9); a 3 mm left frontal lobe lesion (series 11, image 83); a 4 mm ring-enhancing lesion at the left occipital lobe (series 11, image 45); a 7 mm enhancing lesion at the left superior cerebellar hemisphere (series 9, image 8); a 4 mm enhancing lesion at the inferior right frontal lobe (series 11, image 63); an 8 mm enhancing lesion at the inferior left temporal lobe (series 11, image 42); a few other scattered punctate foci of abnormal enhancement compatible with metastatic disease are also present  Several of these lesions demonstrate mild surrounding vasogenic edema with no significant mass effect or midline shift  There is no diffusion restriction to suggest acute ischemia  No acute intracranial hemorrhage  Small scattered hyperintensities on T2/FLAIR imaging are noted in the periventricular and subcortical white matter demonstrating an appearance that is statistically most likely to represent mild microangiopathic change  VENTRICLES:  No hydrocephalus  SELLA AND PITUITARY GLAND:  Normal  ORBITS:  Normal  PARANASAL SINUSES:  Normal  VASCULATURE:  Evaluation of the major intracranial vasculature demonstrates appropriate flow voids  CALVARIUM AND SKULL BASE:  Normal  EXTRACRANIAL SOFT TISSUES:  Normal      Impression: Multiple scattered supratentorial and infratentorial enhancing lesions measuring up to 11 mm as described above, most compatible with metastatic disease  Several of these lesions demonstrate mild surrounding vasogenic edema with no significant mass effect or midline shift  No acute intracranial hemorrhage or ischemia   Workstation performed: [id number]     Nm Cardiac Blood Pool Muga (at Rest)    Result Date: 2/18/2019  Narrative: MUGA SCAN INDICATION: C50 911: Malignant neoplasm of unspecified site of right female breast Z17 0: Estrogen receptor positive status (ER+) C50 912: Malignant neoplasm of unspecified site of left female breast postchemotherapy evaluation COMPARISON: 9/11/2018 TECHNIQUE:   The study was performed following in vivo labeling utilizing 25 4  mCi Tc-99m pertechnetate IV  Gated images of the heart were acquired in the anterior, Ukrainian and steep Ukrainian projections  FINDINGS: Right ventricular motion is unremarkable  There is normal symmetrical contractility of the left ventricle  The overall resting left ventricular ejection fraction is 73 %, based on automatic calculation  Previously calculated LVEF was 70%  Impression: Stable resting left ventricular ejection fraction, 73%  Workstation performed: [id number]     Ct Chest Abdomen Pelvis W Contrast    Result Date: 2/20/2019  Narrative: CT CHEST, ABDOMEN AND PELVIS WITH IV CONTRAST INDICATION:   C50 911: Malignant neoplasm of unspecified site of right female breast Z17 0: Estrogen receptor positive status (ER+) C50 912: Malignant neoplasm of unspecified site of left female breast  COMPARISON:  8/8/2018 TECHNIQUE: CT examination of the chest, abdomen and pelvis was performed  Axial, sagittal, and coronal 2D reformatted images were created from the source data and submitted for interpretation  Radiation dose length product (DLP) for this visit:  324 mGy-cm   This examination, like all CT scans performed in the [facility], was performed utilizing techniques to minimize radiation dose exposure, including the use of iterative reconstruction and automated exposure control  IV Contrast:  100 mL of iohexol (OMNIPAQUE) Enteric Contrast: Enteric contrast was administered  FINDINGS: CHEST LUNGS:  Reticular interstitial thickening identified within the lung fields, especially within the upper lobes  No focal consolidation or pulmonary nodule  PLEURA:  There are small posterior layering pleural effusions are noted, decreasing in size compared to the prior exam  HEART/GREAT VESSELS:  Unremarkable for patient's age  MEDIASTINUM AND CHEO:  Small calcified right hilar nodes are again identified  No enlarged or pathologic adenopathy  CHEST WALL AND LOWER NECK:   Bilateral breast implants or expanders are noted within the chest wall  Surrounding soft tissues are unremarkable  Surgical clips in the axilla suggest no dissection  No enlarged or pathologic adenopathy noted  The previously identified matted soft tissue changes in the right axillary region, extending superiorly deep to the lateral aspect of the pectoralis muscle are markedly improved  ABDOMEN LIVER/BILIARY TREE:  Unremarkable  GALLBLADDER:  No calcified gallstones  No pericholecystic inflammatory change  SPLEEN:  Unremarkable  PANCREAS:  Unremarkable  ADRENAL GLANDS:  Unremarkable  KIDNEYS/URETERS:  Unremarkable  No hydronephrosis  STOMACH AND BOWEL:  Moderate diffuse fecal stasis throughout the large bowel extending from the cecum to the rectum  No signs of impaction or obstruction  No signs of bowel inflammation  APPENDIX:  A normal appendix was visualized  ABDOMINOPELVIC CAVITY:  No ascites or free intraperitoneal air  No lymphadenopathy  VESSELS:  Mild atherosclerotic change of the aorta and its branches  PELVIS REPRODUCTIVE ORGANS:  Patient is status post hysterectomy  URINARY BLADDER:  Unremarkable  ABDOMINAL WALL/INGUINAL REGIONS:  Unremarkable  OSSEOUS STRUCTURES:  There is a subtle sclerotic lesion identified within the anterior inferior endplate of J68 which is unchanged from the prior examination  Subtle lucent lesions identified within lumbar vertebral bodies  No change  Impression: Stable reticular nodular interstitial changes noted within the upper lung fields  Possibility of lymphangitic spread of tumor should be considered  No discrete pulmonary nodule or consolidation noted  Trace pleural effusions, decreasing in size compared to the prior examination  Stable subtle lucent and sclerotic lesions within the lower thoracic and lumbar vertebral bodies suggestive of osseous metastasis, unchanged   The previously seen matted soft tissue changes in the right axillary region are markedly improved   Workstation performed: [id number]       Teaching: Brain

## 2019-02-26 NOTE — PROGRESS NOTES
Consultation - Radiation Oncology     RUU:[id number] : 1960  Encounter: [id number]  Patient Information: [address]  Chief Complaint   Patient presents with    Consult     Cancer Staging  Stage IV metastatic breast carcinoma         History of Present Illness   Norman Eagle is a 66y o  year old female who presents with stage IV metastatic breast carcinoma with a history of brain metastasis treated with SRS in May 2017 now with the multiple recurrent brain metastasis  - The patient had a left sided T1B, N0, ER positive, GA and HER-2 negative invasive ductal carcinoma; right-sided DCIS, ER/GA positive, HER-2 negative  Bilateral mastectomy performed  No adjuvant treatment  2010 - 2011  Treated with tamoxifen 20 mg daily  Last menstrual cycle was 2010 was then placed on Arimidex 1 mg daily  Lost to followup after 2012  She subsequently presented with right arm swelling and was found to have distant metastatic disease  PET scan on 16 revealed hypermetabolic hilar and mediastinal nodes  She also had uptake in her L3 and L5 vertebral bodies, both concerning for bony metastases  Dr Cornelio Villagomez performed flexible bronchoscopy and lymph node biopsy 16  Pathology identified metastatic breast carcinoma of no special type, ER 90%, GA negative  Her 2 +2, positive by FISH  2016- started Taxotere/Perjeta/Herceptin x 6 cycles  Perjeta and Herceptin continue q3 weeks on a maintenance basis  16 She was seen in consult in Radiation Oncology for persistent low back pain  A lumbar spine MRI was performed again showing stable disease in the L3 and L5 vertebral bodies  She then received palliative radiation to L2 through S1  End of treat was 16 Seen by Pain managementMS ER stopped; methadone 5 mg TID started and hydromorphone 8 mg renewed  17 Patient had a fall, bilateral leg weakness, and felt off balance Med Onc ordered Brain MRI  4/18/17 MRI Brain- 3 intracranial foci of enhancement in keeping with metastatic disease  There is a 5 4 mm enhancing lesion in the posterior right periatrial region  There is a 3 mm focus in the medial right orbital frontal gyrus as well as a 3 mm focus in the left parahippocampal region  No substantial mass effect or surrounding vasogenic edema  4/26/17 Dr Amber Espinoza- recently diagnosed with 3 small brain metastases found on MRI  Various options were discussed including whole brain radiation versus stereotactic radiosurgery, and the patient would like to proceed with single fraction radiosurgery - completed 5/3/17       9/2018 - Cutaneous disease progression in right arm/axilla; started Kadcyla       2/18/19  MRI Brain:  Multiple scattered supratentorial and infratentorial enhancing lesions measuring up to 11 mm, most compatible with metastatic disease  Several of these lesions demonstrate mild surrounding vasogenic edema with no significant mass effect or midline shift  No acute intracranial hemorrhage or ischemia      2/18/19  CT C/A/P:  Stable reticular nodular interstitial changes noted within the upper lung fields   Possibility of lymphangitic spread of tumor should be considered   No discrete pulmonary nodule or consolidation noted  Trace pleural effusions, decreasing in size compared to the prior examination  Stable subtle lucent and sclerotic lesions within the lower thoracic and lumbar vertebral bodies suggestive of osseous metastasis, unchanged    The previously seen matted soft tissue changes in the right axillary region are markedly improved      2/20/19  EMMETT Bender:  72-year-old female with metastatic breast cancer  Ezio Churchill was recently found to have brain metastases and is status post [place] is on steroids  Louis Kraus is controlled  Ezio Churchill is also being treated with Kadcyla - tolerating this   Social service input has been requested regarding the patient's home situation; additional workup/treatment may be needed for depression  Kadcyla infusion on hold till discussion with Radiation Oncology      19  Consult Dr Lorie Kessler for brain metastasis  Patient seen today with her daughter  Patient has not been driving over 1 year and her daughter usually brings her to appointments  She has been having worsening headaches and blurry vision for over 1 month which prompted MRI of the brain to be ordered  She has been on a low dose of dexamethasone 2 mg a day prescribed by palliative care  This is now been increased to 4 mg twice a day last week but this did not help with her headaches or blurry vision  She has had right arm lymphedema for several years and wears a compressive sleeve  She also has intermittent pain in the right arm and axillary area  Her mother did not have any history of breast cancer and  in her 80s from natural causes  His sister is being treated now for which she thinks is a lung cancer  She does not have breast cancer  Her brother  of metastatic prostate carcinoma  She has 4 other sisters that are healthy  She has 1 daughter who is also healthy  Historical Information   Oncology History    - The patient had a left sided T1B, N0, ER positive, OR and HER-2 negative invasive ductal carcinoma; right-sided DCIS, ER/OR positive, HER-2 negative  Bilateral mastectomy performed  No adjuvant treatment  2010 - 2011  Treated with tamoxifen 20 mg daily  Last menstrual cycle was 2010 was then placed on Arimidex 1 mg daily  Lost to followup after 2012  She subsequently presented with right arm swelling and was found to have distant metastatic disease  PET scan on 16 revealed hypermetabolic hilar and mediastinal nodes  She also had uptake in her L3 and L5 vertebral bodies, both concerning for bony metastases  Dr Maxim Felder performed flexible bronchoscopy and lymph node biopsy 16   Pathology identified metastatic breast carcinoma of no special type, ER 90%, IL negative  Her 2 +2, positive by FISH  March 1, 2016- started Taxotere/Perjeta/Herceptin x 6 cycles  Perjeta and Herceptin continue q3 weeks on a maintenance basis  8/4/16 She was seen in consult in Radiation Oncology for persistent low back pain  A lumbar spine MRI was performed again showing stable disease in the L3 and L5 vertebral bodies  She then received palliative radiation to L2 through S1  End of treat was 8/24/16 2/28/17 Seen by Pain managementMS ER stopped; methadone 5 mg TID started and hydromorphone 8 mg renewed  4/17/17 Patient had a fall, bilateral leg weakness, and felt off balance Med Onc ordered Brain MRI  4/18/17 MRI Brain- 3 intracranial foci of enhancement in keeping with metastatic disease  There is a 5 4 mm enhancing lesion in the posterior right periatrial region  There is a 3 mm focus in the medial right orbital frontal gyrus as well as a 3 mm focus in the left parahippocampal region  No substantial mass effect or surrounding vasogenic edema  4/26/17 Dr Breana Whitney- recently diagnosed with 3 small brain metastases found on MRI  Various options were discussed including whole brain radiation versus stereotactic radiosurgery, and the patient would like to proceed with single fraction radiosurgery - completed 5/3/17   9/2018 - Cutaneous disease progression; started Kadcyla  2/18/19  MRI Brain:  Multiple scattered supratentorial and infratentorial enhancing lesions measuring up to 11 mm, most compatible with metastatic disease  Several of these lesions demonstrate mild surrounding vasogenic edema with no significant mass effect or midline shift  No acute intracranial hemorrhage or ischemia  2/18/19  CT C/A/P:  Stable reticular nodular interstitial changes noted within the upper lung fields  Possibility of lymphangitic spread of tumor should be considered  No discrete pulmonary nodule or consolidation noted    Trace pleural effusions, decreasing in size compared to the prior examination  Stable subtle lucent and sclerotic lesions within the lower thoracic and lumbar vertebral bodies suggestive of osseous metastasis, unchanged  The previously seen matted soft tissue changes in the right axillary region are markedly improved  2/2/19  EMMETT Hess:  44-year-old female with metastatic breast cancer  Patient was recently found to have brain metastases and is status post SRS  Patient is on steroids  Pain is controlled  Patient is also being treated with Kadcyla - tolerating this  Social service input has been requested regarding the patient's home situation; additional workup/treatment may be needed for depression  1/24/19 Kadcyla infusion  On hold till discussion with Radiation Oncology  2/26/19  Consult Dr Jayesh Stevens for brain metastasis  Bilateral malignant neoplasm of breast in female, estrogen receptor positive ([facility] [address] )    2010 Initial Diagnosis     The patient had a left sided T1 B , N0 ER positive, KS and HER-2 negative invasive ductal carcinoma, right-sided DCIS, ER/KS positive, HER-2 negative  Bilateral mastectomy  2010 - 4/2011 Hormone Therapy     Tamoxifen 20 mg daily  Last menstrual period was on 3/2010          4/2011 -  Hormone Therapy     Arimidex 1 mg daily  Unknown when medication was discontinued  Patient was lost to follow up          12/9/2015 Progression     · Right arm swelling in December 2015  U/S found a nonvascular structure in the right axilla measuring 2 2 x 1 3 cm  · Subsequent CT Scan of the chest on 12/9/15, showed multiple bilateral pulmonary nodules measuring 3-7 mm  · PET 2/4/16, which revealed hypermetabolic hilar and mediastinal nodes  There is a pre carinal node with a SUV of 5 4 measuring 1 8 x 1 7 cm, and a subcarinal node measuring 1 9 x 1 3 cm with a SUV of 5 3  There is right hilar activity of 3 5 and left hilar activity of 2 8  The subcentimeter nodules are too small for PET evaluation   She also has uptake in her L3 and L5 vertebral bodies, both concerning for bony metastases  2/16/2016 Biopsy     Bronchoscopic biopsy showed Metastatic non-small cell carcinoma, favor adenocarcinoma  ER 90%, WI 0%  Her 2 +2, positive by FISH            3/1/2016 - 6/2016 Chemotherapy     Taxotere 75 mg/m2, Day 1  Perjeta 420 mg, Day 1  Herceptin 6 mg/kg, Day 1  Cycle length= 21 days         3/1/2016 - 2/2017 Hormone Therapy     Anastrozole 1 mg daily         6/2016 - 2/2017 Chemotherapy     Perjeta 420 mg, Day 1  Herceptin 6 mg/kg, Day 1  Cycle length= 21 days         2/2017 Progression     Bony progression          2/10/2017 - 4/12/2018 Chemotherapy     Perjeta 420 mg, Day 1  Herceptin 6 mg/kg, Day 1  Taxotere 75 mg/m2  Cycle length= 21 days         3/3/2017 -  Chemotherapy     Xgeva 120 mg IV, Day 1  Cycle length =  84 days         4/18/2017 Progression      brain MRI showed a 5 mm right parietal lesion, 3 mm right frontal lesion, 3 mm left parahippocampal lesion  5/2017 - 5/2017 Radiation     SRS to brain lesions         5/3/2018 - 8/2018 Chemotherapy     Perjeta 420 mg,  Day 1  Herceptin 6 mg/kg,  Day 1  Cycle length = 21 days  Cycle 3 was administered on 6/14/18    ** taxotere was temporarily discontinued secondary for desire of treatment holiday  Taxotere was readded into the treatment regimen during last cycle  9/5/2018 Progression     Cutaneous disease progression  9/13/2018 -  Chemotherapy     Kadcyla 3 8 mg/kg dose IV Day 1  Cycle length =  21 days  Cycles planned = until progression             2/18/2019 Progression     MRI brain demonstrated disease progression; CT of the chest abdomen pelvis was stable along with the patient's tumor markers  Clinical Trial: no     Screening  Tobacco  Current tobacco user: yes  If yes, brief counseling provided:  Yes     Hypertension  Hypertension screening performed: yes  Normotensive:  yes  If no, referred to PCP: n/a     Depression Screening  Screened for depression using PHQ-2: yes     Screened for depression using PHQ-9:  no  Screening positive or negative:  negative  If score >4, was any of the following actions taken?    Additional evaluation for depression, suicide risk assesment, referral to PCP or psychiatry, medication started:  n/a     Advanced Care Planning for Patients >65 years  Advanced Care Planning Discussed:  n/a  Patient named surrogate decision maker or care plan in chart: n/a    Past Medical History:   Diagnosis Date    H/O bilateral mastectomy 2/15/2016    Hypertension 2/15/2016    Lymphedema 2/15/2016    Malignant neoplasm of right breast ([facility] [address] ) 2/15/2016     Past Surgical History:   Procedure Laterality Date    ENDOBRONCHIAL ULTRASOUND (EBUS) N/A 2/16/2016    Procedure: EBUS;  Surgeon: Xiomara Mcfarlane MD;  [facility];  Service:     MASTECTOMY Bilateral     MASTECTOMY Bilateral     right arm edema    OOPHORECTOMY      NE BRONCHOSCOPY NEEDLE BX TRACHEA MAIN STEM&/BRON N/A 2/16/2016    Procedure: Areta Nail;  Surgeon: Xiomara Mcfarlane MD;  [facility];  Service: Thoracic    NE INSJ TUNNELED CTR VAD W/SUBQ PORT AGE 5 YR/> N/A 7/24/2018    Procedure: INSERTION VENOUS PORT ( PORT-A-CATH) IR;  Surgeon: Conner Navarrete DO;  [facility];  Service: Interventional Radiology    NE STEREOTACTIC RADIOSURGERY, CRANIAL,SIMPLE,EA ADD  5/3/2017         NE STEREOTACTIC RADIOSURGERY, CRANIAL,SIMPLE,EA ADD  5/3/2017         NE STEREOTACTIC RADIOSURGERY, CRANIAL,SIMPLE,SINGLE  5/3/2017            Family History   Problem Relation Age of Onset    Cancer Sister     Prostate cancer Brother        Social History   Social History     Substance and Sexual Activity   Alcohol Use Yes    Frequency: 2-4 times a month    Drinks per session: 3 or 4    Binge frequency: Less than monthly    Comment: social     Social History     Substance and Sexual Activity   Drug Use No     Social History Tobacco Use   Smoking Status Current Every Day Smoker    Packs/day: 0 50    Years: 40 00    Pack years: 20 00    Types: Cigarettes    Start date: 1978   Smokeless Tobacco Never Used     Meds/Allergies     Current Outpatient Medications:     cyclobenzaprine (FLEXERIL) 10 mg tablet, Take 1 tablet (10 mg total) by mouth 3 (three) times a day as needed for muscle spasms, Disp: 30 tablet, Rfl: 0    dexamethasone (DECADRON) 4 mg tablet, Take 1 tablet (4 mg total) by mouth 2 (two) times a day with meals, Disp: 60 tablet, Rfl: 1    gabapentin (NEURONTIN) 300 mg capsule, 1 cap PO bid and 2 cap at bedtime, Disp: 120 capsule, Rfl: 5    HYDROmorphone (DILAUDID) 4 mg tablet, Take 1 tablet (4 mg total) by mouth every 4 (four) hours as needed (breakthrough pain) Max Daily Amount: 24 mg, Disp: 120 tablet, Rfl: 0    lidocaine-prilocaine (EMLA) cream, Apply topically as needed for mild pain, Disp: 30 g, Rfl: 0    metoclopramide (REGLAN) 10 mg tablet, Take 1 tablet (10 mg total) by mouth 3 (three) times a day as needed (nausea), Disp: 30 tablet, Rfl: 1    morphine (MS CONTIN) 15 mg 12 hr tablet, Take 1 tablet (15 mg total) by mouth 3 (three) times a day Max Daily Amount: 45 mg, Disp: 90 tablet, Rfl: 0    ondansetron (ZOFRAN) 8 mg tablet, Take 1 tablet (8 mg total) by mouth every 8 (eight) hours as needed for nausea or vomiting, Disp: 30 tablet, Rfl: 1    senna (SENOKOT) 8 6 mg, Take 2 tablets by mouth 2 (two) times a day as needed  , Disp: , Rfl:     zolpidem (AMBIEN) 10 mg tablet, Take 1 tablet (10 mg total) by mouth daily at bedtime as needed for sleep, Disp: 30 tablet, Rfl: 0  No current facility-administered medications for this visit       Facility-Administered Medications Ordered in Other Visits:     alteplase (CATHFLO) injection 2 mg, 2 mg, Intracatheter, PRN, Adalgisa Colace, DO    alteplase (CATHFLO) injection 2 mg, 2 mg, Intracatheter, PRN, Adalgisa Colace, DO  No Known Allergies    Review of Systems  Constitutional: Positive for chills, fatigue (8/10 ) and fever (Last week temp 100 9  Did not notify any one )  HENT: Negative  Eyes:        Blurry vision- worsened over the last 1 5 months  Respiratory: Positive for cough (Occasional nonproductive cough ) and shortness of breath  Cardiovascular:        Right arm lymphedema  Reports that she needs another sleeve ordered  Will give her Lymphedema Clinic booklet  Gastrointestinal: Positive for nausea  Endocrine: Negative  Genitourinary: Negative  Musculoskeletal: Positive for back pain (Lower back pain 7/10 ) and gait problem  Right arm pain 7/10  Reports worsening in balance  Uses a single point cane  Skin: Negative  Allergic/Immunologic: Negative  Neurological: Positive for dizziness, weakness (Arms and legs ), light-headedness, numbness (Right arm, hands, and feet ) and headaches (Daily frontal and temporal headache  Relieved with ASA )  Hematological: Negative  Psychiatric/Behavioral: Positive for sleep disturbance  OBJECTIVE:   /60 (BP Location: Left arm, Patient Position: Sitting, Cuff Size: Standard)   Pulse 70   Temp 98 °F (36 7 °C) (Temporal)   Resp 16   Ht 5' 7" (1 702 m)   Wt 70 3 kg (155 lb)   LMP  (LMP Unknown)   BMI 24 28 kg/m²   Pain Assessment:  7  Performance Status: ECOG/Zubrod/WHO: 1 - Symptomatic but completely ambulatory    Physical Exam   Constitutional: She is oriented to person, place, and time  She appears well-developed and well-nourished  No distress  HENT:   Head: Normocephalic and atraumatic  Mouth/Throat: No oropharyngeal exudate  Eyes: Pupils are equal, round, and reactive to light  Conjunctivae and EOM are normal  No scleral icterus  Neck: Normal range of motion  Neck supple  No tracheal deviation present  No thyromegaly present  Cardiovascular: Normal rate, regular rhythm and normal heart sounds     Pulmonary/Chest: Effort normal and breath sounds normal  No respiratory distress  She has no wheezes  She has no rales  She exhibits no mass, no tenderness and no deformity  There is evidence of bilateral mastectomies without any nodules or masses  Abdominal: Soft  Bowel sounds are normal  She exhibits no distension and no mass  There is no tenderness  Musculoskeletal: Normal range of motion  She exhibits no edema or tenderness  She has right upper extremity lymphedema  Lymphadenopathy:     She has no cervical adenopathy  She has no axillary adenopathy  Right: No supraclavicular adenopathy present  Left: No supraclavicular adenopathy present  Neurological: She is alert and oriented to person, place, and time  No cranial nerve deficit  Coordination normal    Skin: Skin is warm and dry  No rash noted  She is not diaphoretic  No erythema  No pallor  Psychiatric: She has a normal mood and affect  Her behavior is normal  Judgment and thought content normal    Nursing note and vitals reviewed  RESULTS  Lab Results    Chemistry        Component Value Date/Time    K 3 5 02/14/2019 1448     02/14/2019 1448    CO2 25 02/14/2019 1448    BUN 7 02/14/2019 1448    CREATININE 0 83 02/14/2019 1448        Component Value Date/Time    CALCIUM 9 0 02/14/2019 1448    ALKPHOS 99 02/14/2019 1448    AST 24 02/14/2019 1448    ALT 36 02/14/2019 1448            Lab Results   Component Value Date    WBC 7 20 02/14/2019    HGB 11 0 (L) 02/14/2019    HCT 35 1 02/14/2019    MCV 95 02/14/2019     (H) 02/14/2019         Imaging Studies  Mri Brain W Wo Contrast    Result Date: 2/18/2019  Narrative: MRI BRAIN WITH AND WITHOUT CONTRAST INDICATION: C50 911: Malignant neoplasm of unspecified site of right female breast Z17 0: Estrogen receptor positive status (ER+) C50 912: Malignant neoplasm of unspecified site of left female breast C79 31: Secondary malignant neoplasm of brain R51: Headache H53 8: Other visual disturbances   COMPARISON:  MRI brain dated February 16, 2018  TECHNIQUE: Sagittal T1, axial T2, axial FLAIR, axial T1, axial [place], axial diffusion  Sagittal, axial T1 postcontrast   Axial bravo postcontrast with coronal reconstructions  IV Contrast:  6 mL of gadobutrol injection (MULTI-DOSE)  IMAGE QUALITY:   Diagnostic  FINDINGS: BRAIN PARENCHYMA: There was several scattered ring-enhancing lesions, including: An 11 mm lesion at the right periventricular occipital lobe adjacent to the posterior horn of the right lateral ventricle (series 11, image 56); a 3 mm enhancing lesion within the right corona radiata (series 11, image 81); a 3 mm enhancing lesion within the left centrum semiovale (series 11, image 85): A 3 mm cortical enhancing lesion at the left frontal lobe (series 11, image 102); a 5 mm cortical ring-enhancing lesion within the right frontal lobe (series 11, image 104); another adjacent ring enhancing lesion within the posterior right frontal vertex measuring 5 mm (series 11, image 9); a 3 mm left frontal lobe lesion (series 11, image 83); a 4 mm ring-enhancing lesion at the left occipital lobe (series 11, image 45); a 7 mm enhancing lesion at the left superior cerebellar hemisphere (series 9, image 8); a 4 mm enhancing lesion at the inferior right frontal lobe (series 11, image 63); an 8 mm enhancing lesion at the inferior left temporal lobe (series 11, image 42); a few other scattered punctate foci of abnormal enhancement compatible with metastatic disease are also present  Several of these lesions demonstrate mild surrounding vasogenic edema with no significant mass effect or midline shift  There is no diffusion restriction to suggest acute ischemia  No acute intracranial hemorrhage  Small scattered hyperintensities on T2/FLAIR imaging are noted in the periventricular and subcortical white matter demonstrating an appearance that is statistically most likely to represent mild microangiopathic change  VENTRICLES:  No hydrocephalus   SELLA AND PITUITARY GLAND:  Normal  ORBITS:  Normal  PARANASAL SINUSES:  Normal  VASCULATURE:  Evaluation of the major intracranial vasculature demonstrates appropriate flow voids  CALVARIUM AND SKULL BASE:  Normal  EXTRACRANIAL SOFT TISSUES:  Normal      Impression: Multiple scattered supratentorial and infratentorial enhancing lesions measuring up to 11 mm as described above, most compatible with metastatic disease  Several of these lesions demonstrate mild surrounding vasogenic edema with no significant mass effect or midline shift  No acute intracranial hemorrhage or ischemia  Workstation performed: [id number]     Nm Cardiac Blood Pool [place] (at Rest)    Result Date: 2/18/2019  Narrative: MUGA SCAN INDICATION: C50 911: Malignant neoplasm of unspecified site of right female breast Z17 0: Estrogen receptor positive status (ER+) C50 912: Malignant neoplasm of unspecified site of left female breast postchemotherapy evaluation COMPARISON: 9/11/2018 TECHNIQUE:   The study was performed following in vivo labeling utilizing 25 4  mCi Tc-99m pertechnetate IV  Gated images of the heart were acquired in the anterior, [place] and steep [place] projections  FINDINGS: Right ventricular motion is unremarkable  There is normal symmetrical contractility of the left ventricle  The overall resting left ventricular ejection fraction is 73 %, based on automatic calculation  Previously calculated LVEF was 70%  Impression: Stable resting left ventricular ejection fraction, 73%  Workstation performed: [id number]     Ct Chest Abdomen Pelvis W Contrast    Result Date: 2/20/2019  Narrative: CT CHEST, ABDOMEN AND PELVIS WITH IV CONTRAST INDICATION:   C50 911: Malignant neoplasm of unspecified site of right female breast Z17 0: Estrogen receptor positive status (ER+) C50 912: Malignant neoplasm of unspecified site of left female breast  COMPARISON:  8/8/2018 TECHNIQUE: CT examination of the chest, abdomen and pelvis was performed   Axial, sagittal, and coronal 2D reformatted images were created from the source data and submitted for interpretation  Radiation dose length product (DLP) for this visit:  324 mGy-cm   This examination, like all CT scans performed in the [facility], was performed utilizing techniques to minimize radiation dose exposure, including the use of iterative reconstruction and automated exposure control  IV Contrast:  100 mL of iohexol (OMNIPAQUE) Enteric Contrast: Enteric contrast was administered  FINDINGS: CHEST LUNGS:  Reticular interstitial thickening identified within the lung fields, especially within the upper lobes  No focal consolidation or pulmonary nodule  PLEURA:  There are small posterior layering pleural effusions are noted, decreasing in size compared to the prior exam  HEART/GREAT VESSELS:  Unremarkable for patient's age  MEDIASTINUM AND CHEO:  Small calcified right hilar nodes are again identified  No enlarged or pathologic adenopathy  CHEST WALL AND LOWER NECK:   Bilateral breast implants or expanders are noted within the chest wall  Surrounding soft tissues are unremarkable  Surgical clips in the axilla suggest no dissection  No enlarged or pathologic adenopathy noted  The previously identified matted soft tissue changes in the right axillary region, extending superiorly deep to the lateral aspect of the pectoralis muscle are markedly improved  ABDOMEN LIVER/BILIARY TREE:  Unremarkable  GALLBLADDER:  No calcified gallstones  No pericholecystic inflammatory change  SPLEEN:  Unremarkable  PANCREAS:  Unremarkable  ADRENAL GLANDS:  Unremarkable  KIDNEYS/URETERS:  Unremarkable  No hydronephrosis  STOMACH AND BOWEL:  Moderate diffuse fecal stasis throughout the large bowel extending from the cecum to the rectum  No signs of impaction or obstruction  No signs of bowel inflammation  APPENDIX:  A normal appendix was visualized  ABDOMINOPELVIC CAVITY:  No ascites or free intraperitoneal air   No lymphadenopathy  VESSELS:  Mild atherosclerotic change of the aorta and its branches  PELVIS REPRODUCTIVE ORGANS:  Patient is status post hysterectomy  URINARY BLADDER:  Unremarkable  ABDOMINAL WALL/INGUINAL REGIONS:  Unremarkable  OSSEOUS STRUCTURES:  There is a subtle sclerotic lesion identified within the anterior inferior endplate of I10 which is unchanged from the prior examination  Subtle lucent lesions identified within lumbar vertebral bodies  No change  Impression: Stable reticular nodular interstitial changes noted within the upper lung fields  Possibility of lymphangitic spread of tumor should be considered  No discrete pulmonary nodule or consolidation noted  Trace pleural effusions, decreasing in size compared to the prior examination  Stable subtle lucent and sclerotic lesions within the lower thoracic and lumbar vertebral bodies suggestive of osseous metastasis, unchanged  The previously seen matted soft tissue changes in the right axillary region are markedly improved  Workstation performed: [id number]     Pathology: See Above    ASSESSMENT  1  Brain metastases [facility])  Radiation Simulation Treatment   2  Bilateral malignant neoplasm of breast in female, estrogen receptor positive, unspecified site of breast [facility])  Radiation Simulation Treatment     Cancer Staging  Stage IV metastatic breast carcinoma      PLAN/DISCUSSION  Orders Placed This Encounter   Procedures   Tamra Coleman is a 66y o  year old female who presents with stage IV metastatic breast carcinoma with a history of brain metastasis treated with SRS in May 2017 to 3 small metastasis  She has developed symptoms of headaches and blurry vision and had MRI of the brain performed February 18, 2019  This revealed multiple scattered supratentorial and infratentorial enhancing lesions measuring in size from 3 mm up to 11 mm all consistent with metastatic disease    There were 11 lesions visible with the a few additional scattered punctate foci of abnormal enhancement compatible with additional metastatic disease  Several of the lesions demonstrate mild surrounding vasogenic edema  She was started on dexamethasone 4 mg twice a day but is still having symptoms so will increase this to 4 mg 3 times a day with food  Given she has multiple widespread diffuse brain metastasis, we would not recommend additional stereotactic radiosurgery  We would recommend treatment to the whole brain  We discussed the rationale for treatment including the acute side effects and the potential chronic complications with the patient and her daughter  She agrees to proceed with whole-brain radiation therapy  She is scheduled for [facility] tomorrow but this will be held per Dr Mara Kwan  She will have simulation for treatment planning purposes performed today and we plan to start treatment tomorrow  We recommended total of 3000 cGy in 10 fractions  Shruthi Godwin MD  2/26/2019,11:04 AM      Portions of the record may have been created with voice recognition software   Occasional wrong word or "sound a like" substitutions may have occurred due to the inherent limitations of voice recognition software   Read the chart carefully and recognize, using context, where substitutions have occurred

## 2019-02-26 NOTE — PROGRESS NOTES
WBRT to start tomorrow 2/27/19  Medical oncology will hold [facility] tomorrow  We will resume therapy at her next scheduled infusion

## 2019-02-26 NOTE — PROGRESS NOTES
Pt to clinic for lab draw from port a cath, pt offers no complaints at this time, printed avs and confirmed future appointments

## 2019-02-26 NOTE — PLAN OF CARE
Problem: Potential for Falls  Goal: Patient will remain free of falls  Description  INTERVENTIONS:  - Assess patient frequently for physical needs  -  Identify cognitive and physical deficits and behaviors that affect risk of falls  -  [place] fall precautions as indicated by assessment   - Educate patient/family on patient safety including physical limitations  - Instruct patient to call for assistance with activity based on assessment  - Modify environment to reduce risk of injury  - Consider OT/PT consult to assist with strengthening/mobility  Outcome: Progressing     Problem: Knowledge Deficit  Goal: Patient/family/caregiver demonstrates understanding of disease process, treatment plan, medications, and discharge instructions  Description  Complete learning assessment and assess knowledge base    Interventions:  - Provide teaching at level of understanding  - Provide teaching via preferred learning methods  Outcome: Progressing

## 2019-02-26 NOTE — PROGRESS NOTES
*TIME OUT*    Spoke with Chelsey CHRISTINE, cancelling treatment for 2/27/2019, continue with lab appointments, next treatment to be on schedule in three weeks

## 2019-02-27 ENCOUNTER — APPOINTMENT (OUTPATIENT)
Dept: RADIATION ONCOLOGY | Facility: HOSPITAL | Age: 59
End: 2019-02-27
Attending: RADIOLOGY
Payer: COMMERCIAL

## 2019-02-27 ENCOUNTER — HOSPITAL ENCOUNTER (OUTPATIENT)
Dept: INFUSION CENTER | Facility: CLINIC | Age: 59
Discharge: HOME | End: 2019-02-27

## 2019-02-27 PROCEDURE — 77412 RADIATION TX DELIVERY LVL 3: CPT | Performed by: RADIOLOGY

## 2019-02-27 PROCEDURE — 77280 THER RAD SIMULAJ FIELD SMPL: CPT | Performed by: RADIOLOGY

## 2019-02-28 PROCEDURE — 77412 RADIATION TX DELIVERY LVL 3: CPT | Performed by: RADIOLOGY

## 2019-03-01 ENCOUNTER — APPOINTMENT (OUTPATIENT)
Dept: RADIATION ONCOLOGY | Facility: HOSPITAL | Age: 59
End: 2019-03-01
Attending: RADIOLOGY
Payer: COMMERCIAL

## 2019-03-01 PROCEDURE — 77412 RADIATION TX DELIVERY LVL 3: CPT | Performed by: RADIOLOGY

## 2019-03-04 ENCOUNTER — APPOINTMENT (OUTPATIENT)
Dept: RADIATION ONCOLOGY | Facility: HOSPITAL | Age: 59
End: 2019-03-04
Attending: RADIOLOGY
Payer: COMMERCIAL

## 2019-03-04 PROCEDURE — 77412 RADIATION TX DELIVERY LVL 3: CPT | Performed by: RADIOLOGY

## 2019-03-05 ENCOUNTER — APPOINTMENT (OUTPATIENT)
Dept: RADIATION ONCOLOGY | Facility: HOSPITAL | Age: 59
End: 2019-03-05
Attending: RADIOLOGY
Payer: COMMERCIAL

## 2019-03-05 PROCEDURE — 77336 RADIATION PHYSICS CONSULT: CPT | Performed by: RADIOLOGY

## 2019-03-05 PROCEDURE — 77412 RADIATION TX DELIVERY LVL 3: CPT | Performed by: RADIOLOGY

## 2019-03-06 ENCOUNTER — APPOINTMENT (OUTPATIENT)
Dept: RADIATION ONCOLOGY | Facility: HOSPITAL | Age: 59
End: 2019-03-06
Attending: RADIOLOGY
Payer: COMMERCIAL

## 2019-03-06 PROCEDURE — 77417 THER RADIOLOGY PORT IMAGE(S): CPT | Performed by: RADIOLOGY

## 2019-03-06 PROCEDURE — 77412 RADIATION TX DELIVERY LVL 3: CPT | Performed by: RADIOLOGY

## 2019-03-07 ENCOUNTER — APPOINTMENT (OUTPATIENT)
Dept: RADIATION ONCOLOGY | Facility: HOSPITAL | Age: 59
End: 2019-03-07
Attending: RADIOLOGY
Payer: COMMERCIAL

## 2019-03-07 PROCEDURE — 77412 RADIATION TX DELIVERY LVL 3: CPT | Performed by: RADIOLOGY

## 2019-03-08 ENCOUNTER — APPOINTMENT (OUTPATIENT)
Dept: RADIATION ONCOLOGY | Facility: HOSPITAL | Age: 59
End: 2019-03-08
Attending: RADIOLOGY
Payer: COMMERCIAL

## 2019-03-08 PROCEDURE — 77412 RADIATION TX DELIVERY LVL 3: CPT | Performed by: RADIOLOGY

## 2019-03-11 ENCOUNTER — APPOINTMENT (OUTPATIENT)
Dept: RADIATION ONCOLOGY | Facility: HOSPITAL | Age: 59
End: 2019-03-11
Attending: RADIOLOGY
Payer: COMMERCIAL

## 2019-03-11 PROCEDURE — 77412 RADIATION TX DELIVERY LVL 3: CPT | Performed by: RADIOLOGY

## 2019-03-12 ENCOUNTER — APPOINTMENT (OUTPATIENT)
Dept: RADIATION ONCOLOGY | Facility: HOSPITAL | Age: 59
End: 2019-03-12
Attending: RADIOLOGY
Payer: COMMERCIAL

## 2019-03-12 DIAGNOSIS — C79.31 BRAIN METASTASES (HCC): ICD-10-CM

## 2019-03-12 PROCEDURE — 77336 RADIATION PHYSICS CONSULT: CPT | Performed by: RADIOLOGY

## 2019-03-12 PROCEDURE — 77412 RADIATION TX DELIVERY LVL 3: CPT | Performed by: RADIOLOGY

## 2019-03-12 RX ORDER — DEXAMETHASONE 4 MG/1
4 TABLET ORAL
Qty: 60 TABLET | Refills: 1 | Status: SHIPPED | OUTPATIENT
Start: 2019-03-12 | End: 2019-06-26

## 2019-03-13 ENCOUNTER — OFFICE VISIT (OUTPATIENT)
Dept: HEMATOLOGY ONCOLOGY | Facility: CLINIC | Age: 59
End: 2019-03-13
Payer: COMMERCIAL

## 2019-03-13 VITALS
OXYGEN SATURATION: 97 % | HEIGHT: 67 IN | WEIGHT: 152.5 LBS | RESPIRATION RATE: 18 BRPM | HEART RATE: 91 BPM | DIASTOLIC BLOOD PRESSURE: 84 MMHG | SYSTOLIC BLOOD PRESSURE: 142 MMHG | BODY MASS INDEX: 23.93 KG/M2 | TEMPERATURE: 98.1 F

## 2019-03-13 DIAGNOSIS — H53.8 BLURRY VISION: ICD-10-CM

## 2019-03-13 DIAGNOSIS — C50.911 MALIGNANT NEOPLASM OF RIGHT FEMALE BREAST, UNSPECIFIED ESTROGEN RECEPTOR STATUS, UNSPECIFIED SITE OF BREAST (HCC): ICD-10-CM

## 2019-03-13 DIAGNOSIS — G44.209 TENSION-TYPE HEADACHE, NOT INTRACTABLE, UNSPECIFIED CHRONICITY PATTERN: ICD-10-CM

## 2019-03-13 DIAGNOSIS — R60.0 LOCALIZED EDEMA: Primary | ICD-10-CM

## 2019-03-13 DIAGNOSIS — Z17.0 BILATERAL MALIGNANT NEOPLASM OF BREAST IN FEMALE, ESTROGEN RECEPTOR POSITIVE, UNSPECIFIED SITE OF BREAST (HCC): Primary | ICD-10-CM

## 2019-03-13 DIAGNOSIS — T45.1X5A CHEMOTHERAPY-INDUCED NAUSEA: ICD-10-CM

## 2019-03-13 DIAGNOSIS — C50.912 BILATERAL MALIGNANT NEOPLASM OF BREAST IN FEMALE, ESTROGEN RECEPTOR POSITIVE, UNSPECIFIED SITE OF BREAST (HCC): Primary | ICD-10-CM

## 2019-03-13 DIAGNOSIS — R11.0 CHEMOTHERAPY-INDUCED NAUSEA: ICD-10-CM

## 2019-03-13 DIAGNOSIS — C50.911 BILATERAL MALIGNANT NEOPLASM OF BREAST IN FEMALE, ESTROGEN RECEPTOR POSITIVE, UNSPECIFIED SITE OF BREAST (HCC): Primary | ICD-10-CM

## 2019-03-13 DIAGNOSIS — I89.0 LYMPHEDEMA OF RIGHT UPPER EXTREMITY: ICD-10-CM

## 2019-03-13 PROCEDURE — 99215 OFFICE O/P EST HI 40 MIN: CPT | Performed by: PHYSICIAN ASSISTANT

## 2019-03-13 RX ORDER — PROCHLORPERAZINE MALEATE 10 MG
10 TABLET ORAL EVERY 6 HOURS PRN
Qty: 60 TABLET | Refills: 1 | Status: SHIPPED | OUTPATIENT
Start: 2019-03-13 | End: 2019-04-12 | Stop reason: SDUPTHER

## 2019-03-13 RX ORDER — SODIUM CHLORIDE 9 MG/ML
20 INJECTION, SOLUTION INTRAVENOUS CONTINUOUS
Status: DISCONTINUED | OUTPATIENT
Start: 2019-03-14 | End: 2019-03-17 | Stop reason: HOSPADM

## 2019-03-13 RX ORDER — PALONOSETRON 0.05 MG/ML
0.25 INJECTION, SOLUTION INTRAVENOUS ONCE
Status: COMPLETED | OUTPATIENT
Start: 2019-03-14 | End: 2019-03-14

## 2019-03-13 RX ORDER — PANTOPRAZOLE SODIUM 40 MG/1
40 TABLET, DELAYED RELEASE ORAL DAILY
Qty: 30 TABLET | Refills: 3 | Status: SHIPPED | OUTPATIENT
Start: 2019-03-13 | End: 2019-06-03 | Stop reason: SDUPTHER

## 2019-03-13 NOTE — PROGRESS NOTES
[id number] [facility]  HEMATOLOGY ONCOLOGY SPECIALISTS [place] [address]  Progress Note  Mare Palacios, 1960, [id number]  3/13/2019    Assessment/Plan:    1  Bilateral malignant neoplasm of breast in female, estrogen receptor positive, unspecified site of breast [facility])  This is a 16-year-old female who has been suffering from recurrent metastatic breast cancer over the past 4 years  Most recently, patient was found to have brain mets and has completed radiation therapy  Due to potential increase in toxicities associated with chemotherapy plus radiation, chemotherapy was held with last dose received at the end of January  Patient notes that in general, she feels about the same to slightly worse  Patient's most recent CT scans in Febuary 2019 demonstrated disease improvement  I have advised the patient to restart on Kadcyla 3 6mg/kg  Patient's appointment was scheduled for another week and a half out  I will have this moved up secondary to increased edema of the patient's right arm typically indication of progression previously  Patient was advised to follow up with CBC and CMP as well as a CA 27-29 today  Patient will be scheduled for chemotherapy this week  Regimen:  Kadcyla 3 6 mg/kg Iv Day 1  Cycle length = 21 days    Premeds:  Dexamethasone  Emend  Aloxi    2  Chemotherapy-induced nausea  Patient notes that she has had some increased nauseousness  Previously, patient had significant nausea secondary to chemotherapy  Presently, patient has not undergone chemotherapy in approximately 6 weeks  However I believe her nausea might be related to over production of acid secondary to stress  Patient will trial Compazine to help with chemotherapy-induced nausea as well as the use of Protonix on a regular basis at once a day  I did discuss with the patient that we might need to increase this to twice a day if necessary down the road    Patient will be re-evaluated in 3 weeks and will make a decision at that time  Patient understands to call the [facility] emergency medical attention if she experiences intractable nausea  - pantoprazole (PROTONIX) 40 mg tablet; Take 1 tablet (40 mg total) by mouth daily  Dispense: 30 tablet; Refill: 3  - prochlorperazine (COMPAZINE) 10 mg tablet; Take 1 tablet (10 mg total) by mouth every 6 (six) hours as needed for nausea or vomiting  Dispense: 60 tablet; Refill: 1    3  Blurry vision andTension-type headache, not intractable, unspecified chronicity pattern  Previously blurry vision and tension style headache was felt to be the result of brain metastases  However after radiation therapy, patient has not seen any improvement or change in her vision  Patient is 66  She has not had a an examination by an eye doctor in several years  I have asked that the patient follow up with an ophthalmologist for further assessment  - Ambulatory referral to Ophthalmology; Future    4  Lymphedema of the right upper extremity  This is secondary to previous history of mastectomy and sentinel lymph node biopsy  Patient requires additional compression sleeve  I had admittedly problems and during this order into epic  This will be phoned in to [facility]  The patient is scheduled for follow-up in approximately 3 weeks with Dr Josafat Motta  Patient voiced agreement and understanding to the above  Patient knows to call the Hematology/Oncology office with any questions and concerns regarding the above  Carefully review your medication list in verify the list is accurate and up-to-date  Please call the hematologic/Oncology office if there medications missing from the less, medications on the list that your not currently taking or if there is a dosage or instruction that is different from higher taking medication  Goals and Barriers:    Current Goal: Prolong Survival from Cancer  Barriers: None  Patient's Capacity to Self Care:  Patient able to self care   -------------------------------------------------------------------------------------------------------    Chief Complaint   Patient presents with    Follow-up       History of present illness/Cancer History:   Oncology History    2010- The patient had a left sided T1B, N0, ER positive, CA and HER-2 negative invasive ductal carcinoma; right-sided DCIS, ER/CA positive, HER-2 negative  Bilateral mastectomy performed  No adjuvant treatment  October 2010 - April 2011  Treated with tamoxifen 20 mg daily  Last menstrual cycle was March 2010 was then placed on Arimidex 1 mg daily  Lost to followup after March 2012  She subsequently presented with right arm swelling and was found to have distant metastatic disease  PET scan on 2/4/16 revealed hypermetabolic hilar and mediastinal nodes  She also had uptake in her L3 and L5 vertebral bodies, both concerning for bony metastases  Dr Marvell Gaucher performed flexible bronchoscopy and lymph node biopsy 2/16/16  Pathology identified metastatic breast carcinoma of no special type, ER 90%, CA negative  Her 2 +2, positive by FISH  March 1, 2016- started Taxotere/Perjeta/Herceptin x 6 cycles  Perjeta and Herceptin continue q3 weeks on a maintenance basis  8/4/16 She was seen in consult in Radiation Oncology for persistent low back pain  A lumbar spine MRI was performed again showing stable disease in the L3 and L5 vertebral bodies  She then received palliative radiation to L2 through S1  End of treat was 8/24/16 2/28/17 Seen by Pain managementMS ER stopped; methadone 5 mg TID started and hydromorphone 8 mg renewed  4/17/17 Patient had a fall, bilateral leg weakness, and felt off balance Med Onc ordered Brain MRI  4/18/17 MRI Brain- 3 intracranial foci of enhancement in keeping with metastatic disease  There is a 5 4 mm enhancing lesion in the posterior right periatrial region   There is a 3 mm focus in the medial right orbital frontal gyrus as well as a 3 mm focus in the left parahippocampal region  No substantial mass effect or surrounding vasogenic edema  4/26/17 Dr Amber Espinoza- recently diagnosed with 3 small brain metastases found on MRI  Various options were discussed including whole brain radiation versus stereotactic radiosurgery, and the patient would like to proceed with single fraction radiosurgery - completed 5/3/17   9/2018 - Cutaneous disease progression; started Kadcyla  2/18/19  MRI Brain:  Multiple scattered supratentorial and infratentorial enhancing lesions measuring up to 11 mm, most compatible with metastatic disease  Several of these lesions demonstrate mild surrounding vasogenic edema with no significant mass effect or midline shift  No acute intracranial hemorrhage or ischemia  2/18/19  CT C/A/P:  Stable reticular nodular interstitial changes noted within the upper lung fields  Possibility of lymphangitic spread of tumor should be considered  No discrete pulmonary nodule or consolidation noted  Trace pleural effusions, decreasing in size compared to the prior examination  Stable subtle lucent and sclerotic lesions within the lower thoracic and lumbar vertebral bodies suggestive of osseous metastasis, unchanged  The previously seen matted soft tissue changes in the right axillary region are markedly improved  2/2/19  EMMETT Bender:  28-year-old female with metastatic breast cancer  Patient was recently found to have brain metastases and is status post SRS  Patient is on steroids  Pain is controlled  Patient is also being treated with Kadcyla - tolerating this  Social service input has been requested regarding the patient's home situation; additional workup/treatment may be needed for depression  1/24/19 Kadcyla infusion  On hold till discussion with Radiation Oncology  2/26/19  Consult Dr James Mejia for brain metastasis          Bilateral malignant neoplasm of breast in female, estrogen receptor positive ([facility] [address] )    2010 Initial Diagnosis     The patient had a left sided T1 B , N0 ER positive, KS and HER-2 negative invasive ductal carcinoma, right-sided DCIS, ER/KS positive, HER-2 negative  Bilateral mastectomy  2010 - 4/2011 Hormone Therapy     Tamoxifen 20 mg daily  Last menstrual period was on 3/2010          4/2011 -  Hormone Therapy     Arimidex 1 mg daily  Unknown when medication was discontinued  Patient was lost to follow up          12/9/2015 Progression     · Right arm swelling in December 2015  U/S found a nonvascular structure in the right axilla measuring 2 2 x 1 3 cm  · Subsequent CT Scan of the chest on 12/9/15, showed multiple bilateral pulmonary nodules measuring 3-7 mm  · PET 2/4/16, which revealed hypermetabolic hilar and mediastinal nodes  There is a pre carinal node with a SUV of 5 4 measuring 1 8 x 1 7 cm, and a subcarinal node measuring 1 9 x 1 3 cm with a SUV of 5 3  There is right hilar activity of 3 5 and left hilar activity of 2 8  The subcentimeter nodules are too small for PET evaluation  She also has uptake in her L3 and L5 vertebral bodies, both concerning for bony metastases  2/16/2016 Biopsy     Bronchoscopic biopsy showed Metastatic non-small cell carcinoma, favor adenocarcinoma  ER 90%, KS 0%   Her 2 +2, positive by FISH            3/1/2016 - 6/2016 Chemotherapy     Taxotere 75 mg/m2, Day 1  Perjeta 420 mg, Day 1  Herceptin 6 mg/kg, Day 1  Cycle length= 21 days         3/1/2016 - 2/2017 Hormone Therapy     Anastrozole 1 mg daily         6/2016 - 2/2017 Chemotherapy     Perjeta 420 mg, Day 1  Herceptin 6 mg/kg, Day 1  Cycle length= 21 days         2/2017 Progression     Bony progression          2/10/2017 - 4/12/2018 Chemotherapy     Perjeta 420 mg, Day 1  Herceptin 6 mg/kg, Day 1  Taxotere 75 mg/m2  Cycle length= 21 days         3/3/2017 -  Chemotherapy     Xgeva 120 mg IV, Day 1  Cycle length =  84 days         4/18/2017 Progression brain MRI showed a 5 mm right parietal lesion, 3 mm right frontal lesion, 3 mm left parahippocampal lesion  2017 - 2017 Radiation     SRS to brain lesions         5/3/2018 - 2018 Chemotherapy     Perjeta 420 mg,  Day 1  Herceptin 6 mg/kg,  Day 1  Cycle length = 21 days  Cycle 3 was administered on 18    ** taxotere was temporarily discontinued secondary for desire of treatment holiday  Taxotere was readded into the treatment regimen during last cycle  2018 Progression     Cutaneous disease progression  2018 -  Chemotherapy     Kadcyla 3 6 mg/kg dose IV Day 1  Cycle length =  21 days  Cycles planned = until progression    Interrupted at the end of January secondary to progressive fatigue and interval development of brain mets requiring radiation  Restarted on 3/14/19              2019 Progression     MRI brain demonstrated disease progression; CT of the chest abdomen pelvis was stable along with the patient's tumor markers  2019 - 3/12/2019 Radiation     WBRT  X 10 sessions  Bone metastases ([address] )    2016 Initial Diagnosis     Bone metastases ([address] )         8/10/2016 - 2016 Radiation     Treatments:  Course: C1    Plan ID Energy Fractions Dose per Fraction (cGy) Total Dose Delivered (cGy) Elapsed Days   L2-S1 Spine 10X 10 / 10 300 3,000 14      Treatment Dates:  8/10/2016 - 2016  ECO - Symptomatic but completely ambulatory    Interval history:  Patient notes that she has completed radiation of the brain  Patient overall feels the same  Patient has not had a benefit from radiation as far as improvement of her blurry vision or headaches  Patient continues on dexamethasone in a taper style fashion as arranged by radiation therapy  Review of Systems   Constitutional: Negative for appetite change, fatigue, fever and unexpected weight change  HENT: Negative for nosebleeds      Respiratory: Negative for cough, choking and shortness of breath  Negative hemoptysis  Cardiovascular: Negative for chest pain, palpitations and leg swelling  Gastrointestinal: Negative  Negative for abdominal distention, abdominal pain, anal bleeding, blood in stool, constipation, diarrhea, nausea and vomiting  Endocrine: Negative  Negative for cold intolerance  Genitourinary: Negative  Negative for hematuria, menstrual problem, vaginal bleeding, vaginal discharge and vaginal pain  Musculoskeletal: Negative  Negative for arthralgias, myalgias, neck pain and neck stiffness  Skin: Negative  Negative for color change, pallor and rash  Allergic/Immunologic: Negative  Negative for immunocompromised state  Neurological: Negative  Negative for weakness and headaches  Hematological: Negative for adenopathy  Does not bruise/bleed easily  All other systems reviewed and are negative        Current Outpatient Medications:     cyclobenzaprine (FLEXERIL) 10 mg tablet, Take 1 tablet (10 mg total) by mouth 3 (three) times a day as needed for muscle spasms, Disp: 30 tablet, Rfl: 0    dexamethasone (DECADRON) 4 mg tablet, Take 1 tablet (4 mg total) by mouth 3 (three) times a day with meals, Disp: 60 tablet, Rfl: 1    gabapentin (NEURONTIN) 300 mg capsule, 1 cap PO bid and 2 cap at bedtime, Disp: 120 capsule, Rfl: 5    HYDROmorphone (DILAUDID) 4 mg tablet, Take 1 tablet (4 mg total) by mouth every 4 (four) hours as needed (breakthrough pain) Max Daily Amount: 24 mg, Disp: 120 tablet, Rfl: 0    lidocaine-prilocaine (EMLA) cream, Apply topically as needed for mild pain, Disp: 30 g, Rfl: 0    metoclopramide (REGLAN) 10 mg tablet, Take 1 tablet (10 mg total) by mouth 3 (three) times a day as needed (nausea), Disp: 30 tablet, Rfl: 1    morphine (MS CONTIN) 15 mg 12 hr tablet, Take 1 tablet (15 mg total) by mouth 3 (three) times a day Max Daily Amount: 45 mg, Disp: 90 tablet, Rfl: 0    ondansetron (ZOFRAN) 8 mg tablet, Take 1 tablet (8 mg total) by mouth every 8 (eight) hours as needed for nausea or vomiting, Disp: 30 tablet, Rfl: 1    senna (SENOKOT) 8 6 mg, Take 2 tablets by mouth 2 (two) times a day as needed  , Disp: , Rfl:     zolpidem (AMBIEN) 10 mg tablet, Take 1 tablet (10 mg total) by mouth daily at bedtime as needed for sleep, Disp: 30 tablet, Rfl: 0    pantoprazole (PROTONIX) 40 mg tablet, Take 1 tablet (40 mg total) by mouth daily, Disp: 30 tablet, Rfl: 3    prochlorperazine (COMPAZINE) 10 mg tablet, Take 1 tablet (10 mg total) by mouth every 6 (six) hours as needed for nausea or vomiting, Disp: 60 tablet, Rfl: 1  No current facility-administered medications for this visit       Facility-Administered Medications Ordered in Other Visits:     [START ON 3/14/2019] ado-trastuzumab emtansine (KADCYLA) 234 mg in sodium chloride 0 9 % 250 mL IVPB, 234 mg, Intravenous, Once, Emmett Srivastava DO    [START ON 3/14/2019] alteplase (CATHFLO) injection 2 mg, 2 mg, Intracatheter, PRN, Emmett Srivastava DO    [START ON 3/14/2019] dexamethasone (DECADRON) 10 mg in sodium chloride 0 9 % 51 mL IVPB, 10 mg, Intravenous, Once, Emmett Srivastava DO    [START ON 3/14/2019] fosaprepitant (EMEND) 150 mg in sodium chloride 0 9 % 250 mL IVPB, 150 mg, Intravenous, Once, Emmett Srivastava DO    [START ON 3/14/2019] palonosetron (ALOXI) injection 0 25 mg, 0 25 mg, Intravenous, Once, Emmett Blakeia DO    [START ON 3/14/2019] sodium chloride 0 9 % infusion, 20 mL/hr, Intravenous, Continuous, Emmett Srivastava DO    No Known Allergies    Objective:   /84   Pulse 91   Temp 98 1 °F (36 7 °C) (Oral)   Resp 18   Ht 5' 7" (1 702 m)   Wt 69 2 kg (152 lb 8 oz)   LMP  (LMP Unknown)   SpO2 97%   BMI 23 88 kg/m²   Wt Readings from Last 6 Encounters:   03/13/19 69 2 kg (152 lb 8 oz)   02/26/19 70 3 kg (155 lb)   02/20/19 69 9 kg (154 lb)   02/06/19 66 2 kg (146 lb)   01/24/19 66 7 kg (147 lb)   01/04/19 64 4 kg (142 lb)     Physical Exam   Constitutional: She is oriented to person, place, and time  She appears well-developed and well-nourished  No distress  HENT:   Head: Normocephalic and atraumatic  Mouth/Throat: Oropharynx is clear and moist  No oropharyngeal exudate  Eyes: Pupils are equal, round, and reactive to light  EOM are normal  No scleral icterus  Neck: Normal range of motion  Cardiovascular: Normal rate and regular rhythm  No murmur heard  Pulmonary/Chest: Effort normal and breath sounds normal  No respiratory distress  Abdominal: Soft  Bowel sounds are normal  She exhibits no distension  There is no tenderness  Musculoskeletal: Normal range of motion  She exhibits no edema  Lymphadenopathy:     She has no cervical adenopathy  Neurological: She is alert and oriented to person, place, and time  No cranial nerve deficit  Skin: Skin is warm  No rash noted  No pallor  Psychiatric: She has a normal mood and affect  Thought content normal        Pertinent Laboratory Results and Imaging Review:  Hospital Outpatient Visit on 02/26/2019   Component Date Value Ref Range Status    Sodium 02/26/2019 142  136 - 145 mmol/L Final    Potassium 02/26/2019 3 9  3 5 - 5 3 mmol/L Final    Chloride 02/26/2019 106  100 - 108 mmol/L Final    CO2 02/26/2019 27  21 - 32 mmol/L Final    ANION GAP 02/26/2019 9  4 - 13 mmol/L Final    BUN 02/26/2019 12  5 - 25 mg/dL Final    Creatinine 02/26/2019 0 69  0 60 - 1 30 mg/dL Final    Standardized to IDMS reference method    Glucose 02/26/2019 153* 65 - 140 mg/dL Final      If the patient is fasting, the ADA then defines impaired fasting glucose as > 100 mg/dL and diabetes as > or equal to 123 mg/dL  Specimen collection should occur prior to Sulfasalazine administration due to the potential for falsely depressed results  Specimen collection should occur prior to Sulfapyridine administration due to the potential for falsely elevated results      Calcium 02/26/2019 8 9  8 3 - 10 1 mg/dL Final    AST 02/26/2019 21 5 - 45 U/L Final      Specimen collection should occur prior to Sulfasalazine administration due to the potential for falsely depressed results   ALT 02/26/2019 50  12 - 78 U/L Final      Specimen collection should occur prior to Sulfasalazine administration due to the potential for falsely depressed results       Alkaline Phosphatase 02/26/2019 80  46 - 116 U/L Final    Total Protein 02/26/2019 7 5  6 4 - 8 2 g/dL Final    Albumin 02/26/2019 3 2* 3 5 - 5 0 g/dL Final    Total Bilirubin 02/26/2019 0 20  0 20 - 1 00 mg/dL Final    eGFR 02/26/2019 98  ml/min/1 73sq m Final    Hemoglobin A1C 02/26/2019 6 8* 4 2 - 6 3 % Final    EAG 02/26/2019 148  mg/dl Final    WBC 02/26/2019 12 72* 4 31 - 10 16 Thousand/uL Final    RBC 02/26/2019 3 95  3 81 - 5 12 Million/uL Final    Hemoglobin 02/26/2019 11 7  11 5 - 15 4 g/dL Final    Hematocrit 02/26/2019 37 5  34 8 - 46 1 % Final    MCV 02/26/2019 95  82 - 98 fL Final    MCH 02/26/2019 29 6  26 8 - 34 3 pg Final    MCHC 02/26/2019 31 2* 31 4 - 37 4 g/dL Final    RDW 02/26/2019 17 9* 11 6 - 15 1 % Final    MPV 02/26/2019 9 2  8 9 - 12 7 fL Final    Platelets 22/00/4976 440* 149 - 390 Thousands/uL Final    nRBC 02/26/2019 0  /100 WBCs Final    Neutrophils Relative 02/26/2019 88* 43 - 75 % Final    Immat GRANS % 02/26/2019 2  0 - 2 % Final    Lymphocytes Relative 02/26/2019 7* 14 - 44 % Final    Monocytes Relative 02/26/2019 3* 4 - 12 % Final    Eosinophils Relative 02/26/2019 0  0 - 6 % Final    Basophils Relative 02/26/2019 0  0 - 1 % Final    Neutrophils Absolute 02/26/2019 11 26* 1 85 - 7 62 Thousands/µL Final    Immature Grans Absolute 02/26/2019 0 21* 0 00 - 0 20 Thousand/uL Final    Lymphocytes Absolute 02/26/2019 0 86  0 60 - 4 47 Thousands/µL Final    Monocytes Absolute 02/26/2019 0 38  0 17 - 1 22 Thousand/µL Final    Eosinophils Absolute 02/26/2019 0 00  0 00 - 0 61 Thousand/µL Final    Basophils Absolute 02/26/2019 0 01  0 00 - 0 10 Thousands/µL Final       The following historical data was reviewed      Past Medical History:   Diagnosis Date    H/O bilateral mastectomy 2/15/2016    Hypertension 2/15/2016    Lymphedema 2/15/2016    Malignant neoplasm of right breast ([facility] [address] ) 2/15/2016       Past Surgical History:   Procedure Laterality Date    ENDOBRONCHIAL ULTRASOUND (EBUS) N/A 2/16/2016    Procedure: EBUS;  Surgeon: Ida Perla MD;  [facility];  Service:     MASTECTOMY Bilateral     MASTECTOMY Bilateral     right arm edema    OOPHORECTOMY      NM BRONCHOSCOPY NEEDLE BX TRACHEA MAIN STEM&/BRON N/A 2/16/2016    Procedure: Kajal Staley;  Surgeon: Ida Perla MD;  [facility];  Service: Thoracic    NM INSJ TUNNELED CTR VAD W/SUBQ PORT AGE 5 YR/> N/A 7/24/2018    Procedure: INSERTION VENOUS PORT ( PORT-A-CATH) IR;  Surgeon: Kavin Arthur DO;  [facility];  Service: Interventional Radiology    NM STEREOTACTIC RADIOSURGERY, CRANIAL,SIMPLE,EA ADD  5/3/2017         NM STEREOTACTIC RADIOSURGERY, CRANIAL,SIMPLE,EA ADD  5/3/2017         NM STEREOTACTIC RADIOSURGERY, CRANIAL,SIMPLE,SINGLE  5/3/2017            Social History     Socioeconomic History    Marital status: /Civil Union     Spouse name: None    Number of children: None    Years of education: None    Highest education level: None   Occupational History    None   Social Needs    Financial resource strain: None    Food insecurity:     Worry: None     Inability: None    Transportation needs:     Medical: None     Non-medical: None   Tobacco Use    Smoking status: Current Every Day Smoker     Packs/day: 0 50     Years: 40 00     Pack years: 20 00     Types: Cigarettes     Start date: 1978    Smokeless tobacco: Never Used   Substance and Sexual Activity    Alcohol use: Yes     Frequency: 2-4 times a month     Drinks per session: 3 or 4     Binge frequency: Less than monthly     Comment: social    Drug use: No    Sexual activity: None   Lifestyle    Physical activity:     Days per week: None     Minutes per session: None    Stress: None   Relationships    Social connections:     Talks on phone: None     Gets together: None     Attends Cheondo[employer] service: None     Active member of club or organization: None     Attends meetings of clubs or organizations: None     Relationship status: None    Intimate partner violence:     Fear of current or ex partner: None     Emotionally abused: None     Physically abused: None     Forced sexual activity: None   Other Topics Concern    None   Social History Narrative    None       Family History   Problem Relation Age of Onset    Cancer Sister     Prostate cancer Brother        Please note: This report has been generated by a voice recognition software system  Therefore there may be syntax, spelling, and/or grammatical errors  Please call if you have any questions

## 2019-03-14 ENCOUNTER — HOSPITAL ENCOUNTER (OUTPATIENT)
Dept: INFUSION CENTER | Facility: CLINIC | Age: 59
Discharge: HOME | End: 2019-03-14
Payer: COMMERCIAL

## 2019-03-14 VITALS
DIASTOLIC BLOOD PRESSURE: 75 MMHG | WEIGHT: 152 LBS | SYSTOLIC BLOOD PRESSURE: 133 MMHG | TEMPERATURE: 97.8 F | HEART RATE: 77 BPM | BODY MASS INDEX: 23.81 KG/M2 | RESPIRATION RATE: 20 BRPM

## 2019-03-14 DIAGNOSIS — Z17.0 BILATERAL MALIGNANT NEOPLASM OF BREAST IN FEMALE, ESTROGEN RECEPTOR POSITIVE, UNSPECIFIED SITE OF BREAST (HCC): ICD-10-CM

## 2019-03-14 DIAGNOSIS — C50.912 BILATERAL MALIGNANT NEOPLASM OF BREAST IN FEMALE, ESTROGEN RECEPTOR POSITIVE, UNSPECIFIED SITE OF BREAST (HCC): ICD-10-CM

## 2019-03-14 DIAGNOSIS — C50.911 BILATERAL MALIGNANT NEOPLASM OF BREAST IN FEMALE, ESTROGEN RECEPTOR POSITIVE, UNSPECIFIED SITE OF BREAST (HCC): ICD-10-CM

## 2019-03-14 LAB
ALBUMIN SERPL BCP-MCNC: 3.1 G/DL (ref 3.5–5)
ALP SERPL-CCNC: 68 U/L (ref 46–116)
ALT SERPL W P-5'-P-CCNC: 54 U/L (ref 12–78)
ANION GAP SERPL CALCULATED.3IONS-SCNC: 12 MMOL/L (ref 4–13)
AST SERPL W P-5'-P-CCNC: 31 U/L (ref 5–45)
BASOPHILS # BLD AUTO: 0.01 THOUSANDS/ΜL (ref 0–0.1)
BASOPHILS NFR BLD AUTO: 0 % (ref 0–1)
BILIRUB SERPL-MCNC: 0.2 MG/DL (ref 0.2–1)
BUN SERPL-MCNC: 12 MG/DL (ref 5–25)
CALCIUM SERPL-MCNC: 8.6 MG/DL (ref 8.3–10.1)
CANCER AG27-29 SERPL-ACNC: 50.9 U/ML (ref 0–42.3)
CHLORIDE SERPL-SCNC: 108 MMOL/L (ref 100–108)
CO2 SERPL-SCNC: 23 MMOL/L (ref 21–32)
CREAT SERPL-MCNC: 0.52 MG/DL (ref 0.6–1.3)
EOSINOPHIL # BLD AUTO: 0.01 THOUSAND/ΜL (ref 0–0.61)
EOSINOPHIL NFR BLD AUTO: 0 % (ref 0–6)
ERYTHROCYTE [DISTWIDTH] IN BLOOD BY AUTOMATED COUNT: 19.6 % (ref 11.6–15.1)
GFR SERPL CREATININE-BSD FRML MDRD: 107 ML/MIN/1.73SQ M
GLUCOSE SERPL-MCNC: 77 MG/DL (ref 65–140)
HCT VFR BLD AUTO: 36.3 % (ref 34.8–46.1)
HGB BLD-MCNC: 11.3 G/DL (ref 11.5–15.4)
IMM GRANULOCYTES # BLD AUTO: 0.08 THOUSAND/UL (ref 0–0.2)
IMM GRANULOCYTES NFR BLD AUTO: 1 % (ref 0–2)
LYMPHOCYTES # BLD AUTO: 0.71 THOUSANDS/ΜL (ref 0.6–4.47)
LYMPHOCYTES NFR BLD AUTO: 7 % (ref 14–44)
MCH RBC QN AUTO: 29.7 PG (ref 26.8–34.3)
MCHC RBC AUTO-ENTMCNC: 31.1 G/DL (ref 31.4–37.4)
MCV RBC AUTO: 95 FL (ref 82–98)
MONOCYTES # BLD AUTO: 0.63 THOUSAND/ΜL (ref 0.17–1.22)
MONOCYTES NFR BLD AUTO: 7 % (ref 4–12)
NEUTROPHILS # BLD AUTO: 8.19 THOUSANDS/ΜL (ref 1.85–7.62)
NEUTS SEG NFR BLD AUTO: 85 % (ref 43–75)
NRBC BLD AUTO-RTO: 0 /100 WBCS
PLATELET # BLD AUTO: 260 THOUSANDS/UL (ref 149–390)
PMV BLD AUTO: 9.1 FL (ref 8.9–12.7)
POTASSIUM SERPL-SCNC: 3.9 MMOL/L (ref 3.5–5.3)
PROT SERPL-MCNC: 7.4 G/DL (ref 6.4–8.2)
RBC # BLD AUTO: 3.81 MILLION/UL (ref 3.81–5.12)
SODIUM SERPL-SCNC: 143 MMOL/L (ref 136–145)
WBC # BLD AUTO: 9.63 THOUSAND/UL (ref 4.31–10.16)

## 2019-03-14 PROCEDURE — 80053 COMPREHEN METABOLIC PANEL: CPT | Performed by: INTERNAL MEDICINE

## 2019-03-14 PROCEDURE — 96367 TX/PROPH/DG ADDL SEQ IV INF: CPT

## 2019-03-14 PROCEDURE — 85025 COMPLETE CBC W/AUTO DIFF WBC: CPT | Performed by: INTERNAL MEDICINE

## 2019-03-14 PROCEDURE — 96375 TX/PRO/DX INJ NEW DRUG ADDON: CPT

## 2019-03-14 PROCEDURE — 86300 IMMUNOASSAY TUMOR CA 15-3: CPT

## 2019-03-14 PROCEDURE — 96413 CHEMO IV INFUSION 1 HR: CPT

## 2019-03-14 RX ADMIN — PALONOSETRON HYDROCHLORIDE 0.25 MG: 0.25 INJECTION, SOLUTION INTRAVENOUS at 09:15

## 2019-03-14 RX ADMIN — SODIUM CHLORIDE 150 MG: 0.9 INJECTION, SOLUTION INTRAVENOUS at 09:20

## 2019-03-14 RX ADMIN — DEXAMETHASONE SODIUM PHOSPHATE 10 MG: 10 INJECTION, SOLUTION INTRAMUSCULAR; INTRAVENOUS at 08:44

## 2019-03-14 RX ADMIN — ADO-TRASTUZUMAB EMTANSINE 234 MG: 20 INJECTION, POWDER, LYOPHILIZED, FOR SOLUTION INTRAVENOUS at 10:10

## 2019-03-14 RX ADMIN — SODIUM CHLORIDE 20 ML/HR: 0.9 INJECTION, SOLUTION INTRAVENOUS at 08:30

## 2019-03-14 NOTE — RESULT ENCOUNTER NOTE
Spoke with patient and advised per Jeri Hurst, her Cancer antigen went up slightly  Patient verbalized understanding and was thankful for the information

## 2019-03-14 NOTE — PROGRESS NOTES
Patient tolerated all treatment without incident and was discharged post   She offers no c/o   CA27 29 drawn as ordered prior to discharge  Patient reports relief of nausea  She will RTO at physician's discretion for same

## 2019-03-14 NOTE — PROGRESS NOTES
Patient here for kadcyla, she reports fatigue and nausea  Was started on protonix and compazine yesterday  Will reevaluate after she receives her decadron, aloxi and emend here today  Labs drawn in prep for treatment today via port  Patient tolerated well, results pending

## 2019-03-19 ENCOUNTER — TELEPHONE (OUTPATIENT)
Dept: HEMATOLOGY ONCOLOGY | Facility: CLINIC | Age: 59
End: 2019-03-19

## 2019-03-19 DIAGNOSIS — M51.26 HERNIATED LUMBAR INTERVERTEBRAL DISC: ICD-10-CM

## 2019-03-19 DIAGNOSIS — G89.3 CANCER RELATED PAIN: ICD-10-CM

## 2019-03-19 RX ORDER — HYDROMORPHONE HYDROCHLORIDE 4 MG/1
4 TABLET ORAL EVERY 4 HOURS PRN
Qty: 120 TABLET | Refills: 0 | Status: SHIPPED | OUTPATIENT
Start: 2019-03-19 | End: 2019-04-16 | Stop reason: SDUPTHER

## 2019-03-19 RX ORDER — HYDROMORPHONE HYDROCHLORIDE 4 MG/1
4 TABLET ORAL EVERY 4 HOURS PRN
Qty: 120 TABLET | Refills: 0 | Status: SHIPPED | OUTPATIENT
Start: 2019-03-19 | End: 2019-03-19 | Stop reason: SDUPTHER

## 2019-03-19 NOTE — TELEPHONE ENCOUNTER
Dewayne Law from [address] [place] [address] called   Needs new script sent for Hydromorphone has may fill date as August 2019  She will destroy the current one

## 2019-04-04 ENCOUNTER — OFFICE VISIT (OUTPATIENT)
Dept: HEMATOLOGY ONCOLOGY | Facility: CLINIC | Age: 59
End: 2019-04-04
Payer: COMMERCIAL

## 2019-04-04 ENCOUNTER — HOSPITAL ENCOUNTER (OUTPATIENT)
Dept: INFUSION CENTER | Facility: CLINIC | Age: 59
Discharge: HOME | End: 2019-04-04
Payer: COMMERCIAL

## 2019-04-04 VITALS
SYSTOLIC BLOOD PRESSURE: 150 MMHG | HEIGHT: 67 IN | OXYGEN SATURATION: 99 % | DIASTOLIC BLOOD PRESSURE: 88 MMHG | WEIGHT: 155 LBS | TEMPERATURE: 98 F | RESPIRATION RATE: 20 BRPM | HEART RATE: 93 BPM | BODY MASS INDEX: 24.33 KG/M2

## 2019-04-04 DIAGNOSIS — R59.0 MEDIASTINAL LYMPHADENOPATHY: Primary | ICD-10-CM

## 2019-04-04 DIAGNOSIS — C50.912 BILATERAL MALIGNANT NEOPLASM OF BREAST IN FEMALE, ESTROGEN RECEPTOR POSITIVE, UNSPECIFIED SITE OF BREAST (HCC): ICD-10-CM

## 2019-04-04 DIAGNOSIS — C50.911 BILATERAL MALIGNANT NEOPLASM OF BREAST IN FEMALE, ESTROGEN RECEPTOR POSITIVE, UNSPECIFIED SITE OF BREAST (HCC): ICD-10-CM

## 2019-04-04 DIAGNOSIS — C79.51 BONE METASTASES (HCC): ICD-10-CM

## 2019-04-04 DIAGNOSIS — Z17.0 BILATERAL MALIGNANT NEOPLASM OF BREAST IN FEMALE, ESTROGEN RECEPTOR POSITIVE, UNSPECIFIED SITE OF BREAST (HCC): ICD-10-CM

## 2019-04-04 DIAGNOSIS — C79.31 BRAIN METASTASES (HCC): ICD-10-CM

## 2019-04-04 LAB
ALBUMIN SERPL BCP-MCNC: 2.8 G/DL (ref 3.5–5)
ALP SERPL-CCNC: 103 U/L (ref 46–116)
ALT SERPL W P-5'-P-CCNC: 43 U/L (ref 12–78)
ANION GAP SERPL CALCULATED.3IONS-SCNC: 9 MMOL/L (ref 4–13)
AST SERPL W P-5'-P-CCNC: 32 U/L (ref 5–45)
BASOPHILS # BLD AUTO: 0.01 THOUSANDS/ΜL (ref 0–0.1)
BASOPHILS NFR BLD AUTO: 0 % (ref 0–1)
BILIRUB SERPL-MCNC: 0.2 MG/DL (ref 0.2–1)
BUN SERPL-MCNC: 6 MG/DL (ref 5–25)
CALCIUM SERPL-MCNC: 9.1 MG/DL (ref 8.3–10.1)
CANCER AG27-29 SERPL-ACNC: 63.9 U/ML (ref 0–42.3)
CHLORIDE SERPL-SCNC: 108 MMOL/L (ref 100–108)
CO2 SERPL-SCNC: 26 MMOL/L (ref 21–32)
CREAT SERPL-MCNC: 0.64 MG/DL (ref 0.6–1.3)
EOSINOPHIL # BLD AUTO: 0 THOUSAND/ΜL (ref 0–0.61)
EOSINOPHIL NFR BLD AUTO: 0 % (ref 0–6)
ERYTHROCYTE [DISTWIDTH] IN BLOOD BY AUTOMATED COUNT: 18.5 % (ref 11.6–15.1)
GFR SERPL CREATININE-BSD FRML MDRD: 100 ML/MIN/1.73SQ M
GLUCOSE SERPL-MCNC: 120 MG/DL (ref 65–140)
HCT VFR BLD AUTO: 36.3 % (ref 34.8–46.1)
HGB BLD-MCNC: 11.2 G/DL (ref 11.5–15.4)
IMM GRANULOCYTES # BLD AUTO: 0.11 THOUSAND/UL (ref 0–0.2)
IMM GRANULOCYTES NFR BLD AUTO: 2 % (ref 0–2)
LYMPHOCYTES # BLD AUTO: 0.55 THOUSANDS/ΜL (ref 0.6–4.47)
LYMPHOCYTES NFR BLD AUTO: 8 % (ref 14–44)
MCH RBC QN AUTO: 29.9 PG (ref 26.8–34.3)
MCHC RBC AUTO-ENTMCNC: 30.9 G/DL (ref 31.4–37.4)
MCV RBC AUTO: 97 FL (ref 82–98)
MONOCYTES # BLD AUTO: 0.38 THOUSAND/ΜL (ref 0.17–1.22)
MONOCYTES NFR BLD AUTO: 5 % (ref 4–12)
NEUTROPHILS # BLD AUTO: 6.25 THOUSANDS/ΜL (ref 1.85–7.62)
NEUTS SEG NFR BLD AUTO: 85 % (ref 43–75)
NRBC BLD AUTO-RTO: 0 /100 WBCS
PLATELET # BLD AUTO: 383 THOUSANDS/UL (ref 149–390)
PMV BLD AUTO: 9 FL (ref 8.9–12.7)
POTASSIUM SERPL-SCNC: 4.1 MMOL/L (ref 3.5–5.3)
PROT SERPL-MCNC: 7.7 G/DL (ref 6.4–8.2)
RBC # BLD AUTO: 3.75 MILLION/UL (ref 3.81–5.12)
SODIUM SERPL-SCNC: 143 MMOL/L (ref 136–145)
WBC # BLD AUTO: 7.3 THOUSAND/UL (ref 4.31–10.16)

## 2019-04-04 PROCEDURE — 80053 COMPREHEN METABOLIC PANEL: CPT

## 2019-04-04 PROCEDURE — 86300 IMMUNOASSAY TUMOR CA 15-3: CPT

## 2019-04-04 PROCEDURE — 99215 OFFICE O/P EST HI 40 MIN: CPT | Performed by: SPECIALIST

## 2019-04-04 PROCEDURE — 85025 COMPLETE CBC W/AUTO DIFF WBC: CPT

## 2019-04-04 RX ORDER — LORAZEPAM 1 MG/1
1 TABLET ORAL EVERY 6 HOURS PRN
Qty: 60 TABLET | Refills: 1 | Status: SHIPPED | OUTPATIENT
Start: 2019-04-04 | End: 2019-10-23

## 2019-04-04 NOTE — PROGRESS NOTES
Pt resting with no complaints  Port accessed, labs drawn, port saline-locked and deaccessed without issue  Declined AVS  Pt aware of next appt

## 2019-04-05 RX ORDER — PALONOSETRON 0.05 MG/ML
0.25 INJECTION, SOLUTION INTRAVENOUS ONCE
Status: COMPLETED | OUTPATIENT
Start: 2019-04-08 | End: 2019-04-08

## 2019-04-05 RX ORDER — SODIUM CHLORIDE 9 MG/ML
20 INJECTION, SOLUTION INTRAVENOUS CONTINUOUS
Status: DISCONTINUED | OUTPATIENT
Start: 2019-04-08 | End: 2019-04-11 | Stop reason: HOSPADM

## 2019-04-08 ENCOUNTER — HOSPITAL ENCOUNTER (OUTPATIENT)
Dept: INFUSION CENTER | Facility: CLINIC | Age: 59
Discharge: HOME | End: 2019-04-08
Payer: COMMERCIAL

## 2019-04-08 VITALS
DIASTOLIC BLOOD PRESSURE: 70 MMHG | HEIGHT: 67 IN | BODY MASS INDEX: 24.64 KG/M2 | SYSTOLIC BLOOD PRESSURE: 122 MMHG | HEART RATE: 84 BPM | TEMPERATURE: 98.5 F | WEIGHT: 157 LBS | RESPIRATION RATE: 20 BRPM | OXYGEN SATURATION: 97 %

## 2019-04-08 PROCEDURE — 96413 CHEMO IV INFUSION 1 HR: CPT

## 2019-04-08 PROCEDURE — 96375 TX/PRO/DX INJ NEW DRUG ADDON: CPT

## 2019-04-08 PROCEDURE — 96367 TX/PROPH/DG ADDL SEQ IV INF: CPT

## 2019-04-08 RX ADMIN — SODIUM CHLORIDE 20 ML/HR: 0.9 INJECTION, SOLUTION INTRAVENOUS at 14:25

## 2019-04-08 RX ADMIN — PALONOSETRON HYDROCHLORIDE 0.25 MG: 0.25 INJECTION, SOLUTION INTRAVENOUS at 14:32

## 2019-04-08 RX ADMIN — DEXAMETHASONE SODIUM PHOSPHATE 10 MG: 10 INJECTION, SOLUTION INTRAMUSCULAR; INTRAVENOUS at 14:49

## 2019-04-08 RX ADMIN — SODIUM CHLORIDE 150 MG: 0.9 INJECTION, SOLUTION INTRAVENOUS at 15:06

## 2019-04-08 RX ADMIN — ADO-TRASTUZUMAB EMTANSINE 234 MG: 20 INJECTION, POWDER, LYOPHILIZED, FOR SOLUTION INTRAVENOUS at 15:43

## 2019-04-08 NOTE — PROGRESS NOTES
To [facility] for single agent kadcyla for metastatic breast cancer  Pt has not been feeling well for the past week or so  She states "I just always feel icky"  She reports fatigue, a poor appetite and constant, unrelenting nausea without vomiting  Pt given ordered steroids and antiemetics along with chemo today and states "I feel a little better" at the completion of therapy  Of note, she denies any headache or other neurological symptoms, but she is scheduled for repeat brain MRI  Post brain RT        Therapy completed w/o incident,  Pt declines AVS

## 2019-04-12 ENCOUNTER — HOSPITAL ENCOUNTER (OUTPATIENT)
Dept: MRI IMAGING | Facility: HOSPITAL | Age: 59
Discharge: HOME | End: 2019-04-12
Attending: RADIOLOGY
Payer: COMMERCIAL

## 2019-04-12 DIAGNOSIS — C79.31 BRAIN METASTASES (HCC): ICD-10-CM

## 2019-04-12 DIAGNOSIS — T45.1X5A CHEMOTHERAPY-INDUCED NAUSEA: ICD-10-CM

## 2019-04-12 DIAGNOSIS — R11.0 CHEMOTHERAPY-INDUCED NAUSEA: ICD-10-CM

## 2019-04-12 PROCEDURE — A9585 GADOBUTROL INJECTION: HCPCS | Performed by: RADIOLOGY

## 2019-04-12 PROCEDURE — 70553 MRI BRAIN STEM W/O & W/DYE: CPT

## 2019-04-12 RX ADMIN — GADOBUTROL 7 ML: 604.72 INJECTION INTRAVENOUS at 13:16

## 2019-04-15 RX ORDER — PROCHLORPERAZINE MALEATE 10 MG
TABLET ORAL
Qty: 60 TABLET | Refills: 1 | Status: SHIPPED | OUTPATIENT
Start: 2019-04-15 | End: 2019-05-07

## 2019-04-16 ENCOUNTER — TELEPHONE (OUTPATIENT)
Dept: HEMATOLOGY ONCOLOGY | Facility: CLINIC | Age: 59
End: 2019-04-16

## 2019-04-16 DIAGNOSIS — M51.26 HERNIATED LUMBAR INTERVERTEBRAL DISC: ICD-10-CM

## 2019-04-16 DIAGNOSIS — G89.3 CANCER RELATED PAIN: ICD-10-CM

## 2019-04-16 RX ORDER — HYDROMORPHONE HYDROCHLORIDE 4 MG/1
4 TABLET ORAL EVERY 4 HOURS PRN
Qty: 120 TABLET | Refills: 0 | Status: SHIPPED | OUTPATIENT
Start: 2019-04-16 | End: 2019-05-07 | Stop reason: ALTCHOICE

## 2019-04-17 ENCOUNTER — TELEPHONE (OUTPATIENT)
Dept: HEMATOLOGY ONCOLOGY | Facility: CLINIC | Age: 59
End: 2019-04-17

## 2019-04-17 DIAGNOSIS — G47.01 INSOMNIA DUE TO MEDICAL CONDITION: ICD-10-CM

## 2019-04-17 RX ORDER — ZOLPIDEM TARTRATE 10 MG/1
10 TABLET ORAL
Qty: 30 TABLET | Refills: 0 | Status: SHIPPED | OUTPATIENT
Start: 2019-04-17 | End: 2019-08-21

## 2019-04-23 ENCOUNTER — RADIATION ONCOLOGY FOLLOW-UP (OUTPATIENT)
Dept: RADIATION ONCOLOGY | Facility: HOSPITAL | Age: 59
End: 2019-04-23
Attending: RADIOLOGY
Payer: COMMERCIAL

## 2019-04-23 VITALS
SYSTOLIC BLOOD PRESSURE: 120 MMHG | DIASTOLIC BLOOD PRESSURE: 70 MMHG | WEIGHT: 159 LBS | HEIGHT: 67 IN | TEMPERATURE: 98 F | RESPIRATION RATE: 20 BRPM | BODY MASS INDEX: 24.96 KG/M2 | HEART RATE: 108 BPM | OXYGEN SATURATION: 95 %

## 2019-04-23 DIAGNOSIS — C50.912 BILATERAL MALIGNANT NEOPLASM OF BREAST IN FEMALE, ESTROGEN RECEPTOR POSITIVE, UNSPECIFIED SITE OF BREAST (HCC): ICD-10-CM

## 2019-04-23 DIAGNOSIS — C50.911 BILATERAL MALIGNANT NEOPLASM OF BREAST IN FEMALE, ESTROGEN RECEPTOR POSITIVE, UNSPECIFIED SITE OF BREAST (HCC): ICD-10-CM

## 2019-04-23 DIAGNOSIS — C79.31 BRAIN METASTASES (HCC): Primary | ICD-10-CM

## 2019-04-23 DIAGNOSIS — C79.51 BONE METASTASES (HCC): ICD-10-CM

## 2019-04-23 DIAGNOSIS — Z17.0 BILATERAL MALIGNANT NEOPLASM OF BREAST IN FEMALE, ESTROGEN RECEPTOR POSITIVE, UNSPECIFIED SITE OF BREAST (HCC): ICD-10-CM

## 2019-04-23 PROCEDURE — 99215 OFFICE O/P EST HI 40 MIN: CPT | Performed by: RADIOLOGY

## 2019-04-23 RX ORDER — SODIUM CHLORIDE 9 MG/ML
20 INJECTION, SOLUTION INTRAVENOUS ONCE
Status: CANCELLED | OUTPATIENT
Start: 2019-05-23

## 2019-04-23 RX ORDER — PALONOSETRON 0.05 MG/ML
0.25 INJECTION, SOLUTION INTRAVENOUS ONCE
Status: CANCELLED | OUTPATIENT
Start: 2019-05-23

## 2019-04-24 ENCOUNTER — OFFICE VISIT (OUTPATIENT)
Dept: HEMATOLOGY ONCOLOGY | Facility: CLINIC | Age: 59
End: 2019-04-24
Payer: COMMERCIAL

## 2019-04-24 ENCOUNTER — TELEPHONE (OUTPATIENT)
Dept: HEMATOLOGY ONCOLOGY | Facility: HOSPITAL | Age: 59
End: 2019-04-24

## 2019-04-24 VITALS
DIASTOLIC BLOOD PRESSURE: 80 MMHG | HEART RATE: 104 BPM | HEIGHT: 66 IN | RESPIRATION RATE: 18 BRPM | WEIGHT: 161 LBS | SYSTOLIC BLOOD PRESSURE: 154 MMHG | OXYGEN SATURATION: 94 % | TEMPERATURE: 98.7 F | BODY MASS INDEX: 25.88 KG/M2

## 2019-04-24 DIAGNOSIS — C50.912 BILATERAL MALIGNANT NEOPLASM OF BREAST IN FEMALE, ESTROGEN RECEPTOR POSITIVE, UNSPECIFIED SITE OF BREAST (HCC): Primary | ICD-10-CM

## 2019-04-24 DIAGNOSIS — Z17.0 BILATERAL MALIGNANT NEOPLASM OF BREAST IN FEMALE, ESTROGEN RECEPTOR POSITIVE, UNSPECIFIED SITE OF BREAST (HCC): Primary | ICD-10-CM

## 2019-04-24 DIAGNOSIS — C50.911 BILATERAL MALIGNANT NEOPLASM OF BREAST IN FEMALE, ESTROGEN RECEPTOR POSITIVE, UNSPECIFIED SITE OF BREAST (HCC): Primary | ICD-10-CM

## 2019-04-24 PROCEDURE — 99215 OFFICE O/P EST HI 40 MIN: CPT | Performed by: PHYSICIAN ASSISTANT

## 2019-04-25 DIAGNOSIS — Z17.0 BILATERAL MALIGNANT NEOPLASM OF BREAST IN FEMALE, ESTROGEN RECEPTOR POSITIVE, UNSPECIFIED SITE OF BREAST (HCC): ICD-10-CM

## 2019-04-25 DIAGNOSIS — C50.912 BILATERAL MALIGNANT NEOPLASM OF BREAST IN FEMALE, ESTROGEN RECEPTOR POSITIVE, UNSPECIFIED SITE OF BREAST (HCC): ICD-10-CM

## 2019-04-25 DIAGNOSIS — C50.911 BILATERAL MALIGNANT NEOPLASM OF BREAST IN FEMALE, ESTROGEN RECEPTOR POSITIVE, UNSPECIFIED SITE OF BREAST (HCC): ICD-10-CM

## 2019-04-30 ENCOUNTER — HOSPITAL ENCOUNTER (OUTPATIENT)
Dept: INFUSION CENTER | Facility: CLINIC | Age: 59
Discharge: HOME | End: 2019-04-30
Payer: COMMERCIAL

## 2019-04-30 DIAGNOSIS — Z17.0 BILATERAL MALIGNANT NEOPLASM OF BREAST IN FEMALE, ESTROGEN RECEPTOR POSITIVE, UNSPECIFIED SITE OF BREAST (HCC): Primary | ICD-10-CM

## 2019-04-30 DIAGNOSIS — C50.912 BILATERAL MALIGNANT NEOPLASM OF BREAST IN FEMALE, ESTROGEN RECEPTOR POSITIVE, UNSPECIFIED SITE OF BREAST (HCC): Primary | ICD-10-CM

## 2019-04-30 DIAGNOSIS — C50.911 BILATERAL MALIGNANT NEOPLASM OF BREAST IN FEMALE, ESTROGEN RECEPTOR POSITIVE, UNSPECIFIED SITE OF BREAST (HCC): ICD-10-CM

## 2019-04-30 DIAGNOSIS — Z17.0 BILATERAL MALIGNANT NEOPLASM OF BREAST IN FEMALE, ESTROGEN RECEPTOR POSITIVE, UNSPECIFIED SITE OF BREAST (HCC): ICD-10-CM

## 2019-04-30 DIAGNOSIS — C50.912 BILATERAL MALIGNANT NEOPLASM OF BREAST IN FEMALE, ESTROGEN RECEPTOR POSITIVE, UNSPECIFIED SITE OF BREAST (HCC): ICD-10-CM

## 2019-04-30 DIAGNOSIS — C50.911 BILATERAL MALIGNANT NEOPLASM OF BREAST IN FEMALE, ESTROGEN RECEPTOR POSITIVE, UNSPECIFIED SITE OF BREAST (HCC): Primary | ICD-10-CM

## 2019-04-30 LAB
ALBUMIN SERPL BCP-MCNC: 3.1 G/DL (ref 3.5–5)
ALP SERPL-CCNC: 78 U/L (ref 46–116)
ALT SERPL W P-5'-P-CCNC: 38 U/L (ref 12–78)
ANION GAP SERPL CALCULATED.3IONS-SCNC: 10 MMOL/L (ref 4–13)
AST SERPL W P-5'-P-CCNC: 24 U/L (ref 5–45)
BASOPHILS # BLD AUTO: 0.03 THOUSANDS/ΜL (ref 0–0.1)
BASOPHILS NFR BLD AUTO: 0 % (ref 0–1)
BILIRUB SERPL-MCNC: 0.3 MG/DL (ref 0.2–1)
BUN SERPL-MCNC: 7 MG/DL (ref 5–25)
CALCIUM SERPL-MCNC: 9.3 MG/DL (ref 8.3–10.1)
CANCER AG27-29 SERPL-ACNC: 63.4 U/ML (ref 0–42.3)
CHLORIDE SERPL-SCNC: 103 MMOL/L (ref 100–108)
CO2 SERPL-SCNC: 27 MMOL/L (ref 21–32)
CREAT SERPL-MCNC: 0.72 MG/DL (ref 0.6–1.3)
EOSINOPHIL # BLD AUTO: 0.15 THOUSAND/ΜL (ref 0–0.61)
EOSINOPHIL NFR BLD AUTO: 2 % (ref 0–6)
ERYTHROCYTE [DISTWIDTH] IN BLOOD BY AUTOMATED COUNT: 17.9 % (ref 11.6–15.1)
GFR SERPL CREATININE-BSD FRML MDRD: 94 ML/MIN/1.73SQ M
GLUCOSE SERPL-MCNC: 89 MG/DL (ref 65–140)
HCT VFR BLD AUTO: 38.1 % (ref 34.8–46.1)
HGB BLD-MCNC: 12.1 G/DL (ref 11.5–15.4)
IMM GRANULOCYTES # BLD AUTO: 0.14 THOUSAND/UL (ref 0–0.2)
IMM GRANULOCYTES NFR BLD AUTO: 2 % (ref 0–2)
LYMPHOCYTES # BLD AUTO: 1.12 THOUSANDS/ΜL (ref 0.6–4.47)
LYMPHOCYTES NFR BLD AUTO: 13 % (ref 14–44)
MCH RBC QN AUTO: 30.3 PG (ref 26.8–34.3)
MCHC RBC AUTO-ENTMCNC: 31.8 G/DL (ref 31.4–37.4)
MCV RBC AUTO: 96 FL (ref 82–98)
MONOCYTES # BLD AUTO: 0.65 THOUSAND/ΜL (ref 0.17–1.22)
MONOCYTES NFR BLD AUTO: 8 % (ref 4–12)
NEUTROPHILS # BLD AUTO: 6.37 THOUSANDS/ΜL (ref 1.85–7.62)
NEUTS SEG NFR BLD AUTO: 75 % (ref 43–75)
NRBC BLD AUTO-RTO: 0 /100 WBCS
PLATELET # BLD AUTO: 312 THOUSANDS/UL (ref 149–390)
PMV BLD AUTO: 9 FL (ref 8.9–12.7)
POTASSIUM SERPL-SCNC: 3.6 MMOL/L (ref 3.5–5.3)
PROT SERPL-MCNC: 7.3 G/DL (ref 6.4–8.2)
RBC # BLD AUTO: 3.99 MILLION/UL (ref 3.81–5.12)
SODIUM SERPL-SCNC: 140 MMOL/L (ref 136–145)
WBC # BLD AUTO: 8.46 THOUSAND/UL (ref 4.31–10.16)

## 2019-04-30 PROCEDURE — 85025 COMPLETE CBC W/AUTO DIFF WBC: CPT | Performed by: INTERNAL MEDICINE

## 2019-04-30 PROCEDURE — 86300 IMMUNOASSAY TUMOR CA 15-3: CPT

## 2019-04-30 PROCEDURE — 80053 COMPREHEN METABOLIC PANEL: CPT | Performed by: INTERNAL MEDICINE

## 2019-04-30 NOTE — PROGRESS NOTES
Patient presents today for lab draw and port flush  Port accessed without incident with excellent blood return noted  Labs drawn as per order  Port flushed and de-accessed as per protocol  Patient aware of date and time of next appointment  AVS declined

## 2019-05-01 ENCOUNTER — HOSPITAL ENCOUNTER (OUTPATIENT)
Dept: RADIOLOGY | Facility: MEDICAL CENTER | Age: 59
Discharge: HOME | End: 2019-05-01

## 2019-05-01 DIAGNOSIS — C50.911 BILATERAL MALIGNANT NEOPLASM OF BREAST IN FEMALE, ESTROGEN RECEPTOR POSITIVE, UNSPECIFIED SITE OF BREAST (HCC): ICD-10-CM

## 2019-05-01 DIAGNOSIS — C50.912 BILATERAL MALIGNANT NEOPLASM OF BREAST IN FEMALE, ESTROGEN RECEPTOR POSITIVE, UNSPECIFIED SITE OF BREAST (HCC): ICD-10-CM

## 2019-05-01 DIAGNOSIS — Z17.0 BILATERAL MALIGNANT NEOPLASM OF BREAST IN FEMALE, ESTROGEN RECEPTOR POSITIVE, UNSPECIFIED SITE OF BREAST (HCC): ICD-10-CM

## 2019-05-01 RX ORDER — SODIUM CHLORIDE 9 MG/ML
20 INJECTION, SOLUTION INTRAVENOUS CONTINUOUS
Status: DISCONTINUED | OUTPATIENT
Start: 2019-05-02 | End: 2019-05-03 | Stop reason: HOSPADM

## 2019-05-01 RX ORDER — PALONOSETRON 0.05 MG/ML
0.25 INJECTION, SOLUTION INTRAVENOUS ONCE
Status: COMPLETED | OUTPATIENT
Start: 2019-05-02 | End: 2019-05-02

## 2019-05-02 ENCOUNTER — HOSPITAL ENCOUNTER (OUTPATIENT)
Dept: CT IMAGING | Facility: HOSPITAL | Age: 59
Discharge: HOME | End: 2019-05-02
Payer: COMMERCIAL

## 2019-05-02 ENCOUNTER — HOSPITAL ENCOUNTER (OUTPATIENT)
Dept: INFUSION CENTER | Facility: CLINIC | Age: 59
Discharge: HOME | End: 2019-05-02
Payer: COMMERCIAL

## 2019-05-02 VITALS
TEMPERATURE: 98.1 F | SYSTOLIC BLOOD PRESSURE: 102 MMHG | DIASTOLIC BLOOD PRESSURE: 62 MMHG | BODY MASS INDEX: 24.91 KG/M2 | HEIGHT: 66 IN | OXYGEN SATURATION: 96 % | WEIGHT: 155 LBS | HEART RATE: 72 BPM | RESPIRATION RATE: 22 BRPM

## 2019-05-02 PROCEDURE — 74177 CT ABD & PELVIS W/CONTRAST: CPT

## 2019-05-02 PROCEDURE — 96367 TX/PROPH/DG ADDL SEQ IV INF: CPT

## 2019-05-02 PROCEDURE — 96375 TX/PRO/DX INJ NEW DRUG ADDON: CPT

## 2019-05-02 PROCEDURE — 96401 CHEMO ANTI-NEOPL SQ/IM: CPT

## 2019-05-02 PROCEDURE — 96413 CHEMO IV INFUSION 1 HR: CPT

## 2019-05-02 PROCEDURE — 71260 CT THORAX DX C+: CPT

## 2019-05-02 RX ADMIN — ADO-TRASTUZUMAB EMTANSINE 260 MG: 20 INJECTION, POWDER, LYOPHILIZED, FOR SOLUTION INTRAVENOUS at 11:35

## 2019-05-02 RX ADMIN — PALONOSETRON 0.25 MG: 0.25 INJECTION, SOLUTION INTRAVENOUS at 10:11

## 2019-05-02 RX ADMIN — DEXAMETHASONE SODIUM PHOSPHATE 10 MG: 10 INJECTION, SOLUTION INTRAMUSCULAR; INTRAVENOUS at 10:09

## 2019-05-02 RX ADMIN — IOHEXOL 100 ML: 350 INJECTION, SOLUTION INTRAVENOUS at 08:58

## 2019-05-02 RX ADMIN — SODIUM CHLORIDE 20 ML/HR: 0.9 INJECTION, SOLUTION INTRAVENOUS at 09:50

## 2019-05-02 RX ADMIN — DENOSUMAB 120 MG: 120 INJECTION SUBCUTANEOUS at 11:49

## 2019-05-02 RX ADMIN — SODIUM CHLORIDE 150 MG: 0.9 INJECTION, SOLUTION INTRAVENOUS at 10:57

## 2019-05-07 ENCOUNTER — OFFICE VISIT (OUTPATIENT)
Dept: PALLIATIVE MEDICINE | Facility: CLINIC | Age: 59
End: 2019-05-07
Payer: COMMERCIAL

## 2019-05-07 ENCOUNTER — TELEPHONE (OUTPATIENT)
Dept: PALLIATIVE MEDICINE | Facility: CLINIC | Age: 59
End: 2019-05-07

## 2019-05-07 VITALS
HEIGHT: 66 IN | WEIGHT: 152 LBS | HEART RATE: 95 BPM | DIASTOLIC BLOOD PRESSURE: 72 MMHG | RESPIRATION RATE: 22 BRPM | OXYGEN SATURATION: 97 % | TEMPERATURE: 99.1 F | BODY MASS INDEX: 24.43 KG/M2 | SYSTOLIC BLOOD PRESSURE: 118 MMHG

## 2019-05-07 DIAGNOSIS — C50.911 BILATERAL MALIGNANT NEOPLASM OF BREAST IN FEMALE, ESTROGEN RECEPTOR POSITIVE, UNSPECIFIED SITE OF BREAST (HCC): Primary | ICD-10-CM

## 2019-05-07 DIAGNOSIS — R11.0 CHEMOTHERAPY-INDUCED NAUSEA: ICD-10-CM

## 2019-05-07 DIAGNOSIS — Z17.0 BILATERAL MALIGNANT NEOPLASM OF BREAST IN FEMALE, ESTROGEN RECEPTOR POSITIVE, UNSPECIFIED SITE OF BREAST (HCC): Primary | ICD-10-CM

## 2019-05-07 DIAGNOSIS — M51.26 HERNIATED LUMBAR INTERVERTEBRAL DISC: ICD-10-CM

## 2019-05-07 DIAGNOSIS — R63.0 DECREASED APPETITE: ICD-10-CM

## 2019-05-07 DIAGNOSIS — G89.3 CANCER RELATED PAIN: ICD-10-CM

## 2019-05-07 DIAGNOSIS — T45.1X5A CHEMOTHERAPY-INDUCED NAUSEA: ICD-10-CM

## 2019-05-07 DIAGNOSIS — G47.9 DIFFICULTY SLEEPING: ICD-10-CM

## 2019-05-07 DIAGNOSIS — C50.912 BILATERAL MALIGNANT NEOPLASM OF BREAST IN FEMALE, ESTROGEN RECEPTOR POSITIVE, UNSPECIFIED SITE OF BREAST (HCC): Primary | ICD-10-CM

## 2019-05-07 PROCEDURE — 99214 OFFICE O/P EST MOD 30 MIN: CPT | Performed by: FAMILY MEDICINE

## 2019-05-07 RX ORDER — HYDROMORPHONE HYDROCHLORIDE 4 MG/1
TABLET ORAL
Qty: 150 TABLET | Refills: 0 | Status: SHIPPED | OUTPATIENT
Start: 2019-05-07 | End: 2019-06-17 | Stop reason: SDUPTHER

## 2019-05-07 RX ORDER — GABAPENTIN 300 MG/1
CAPSULE ORAL
Qty: 120 CAPSULE | Refills: 5 | Status: SHIPPED | OUTPATIENT
Start: 2019-05-07 | End: 2019-08-21 | Stop reason: SDUPTHER

## 2019-05-07 RX ORDER — METHADONE HYDROCHLORIDE 10 MG/1
10 TABLET ORAL 2 TIMES DAILY
Qty: 60 TABLET | Refills: 0 | Status: SHIPPED | OUTPATIENT
Start: 2019-05-07 | End: 2019-05-07 | Stop reason: SDUPTHER

## 2019-05-07 RX ORDER — METHADONE HYDROCHLORIDE 10 MG/1
10 TABLET ORAL 2 TIMES DAILY
Qty: 60 TABLET | Refills: 0 | Status: SHIPPED | OUTPATIENT
Start: 2019-05-07 | End: 2019-06-17 | Stop reason: SDUPTHER

## 2019-05-07 RX ORDER — PROMETHAZINE HYDROCHLORIDE 25 MG/1
25 TABLET ORAL EVERY 6 HOURS PRN
Qty: 30 TABLET | Refills: 1 | Status: SHIPPED | OUTPATIENT
Start: 2019-05-07 | End: 2019-09-18 | Stop reason: SDUPTHER

## 2019-05-07 RX ORDER — OLANZAPINE 10 MG/1
10 TABLET ORAL
Qty: 30 TABLET | Refills: 1 | Status: SHIPPED | OUTPATIENT
Start: 2019-05-07 | End: 2019-05-29 | Stop reason: SDUPTHER

## 2019-05-08 ENCOUNTER — TELEPHONE (OUTPATIENT)
Dept: PALLIATIVE MEDICINE | Facility: CLINIC | Age: 59
End: 2019-05-08

## 2019-05-15 ENCOUNTER — OFFICE VISIT (OUTPATIENT)
Dept: HEMATOLOGY ONCOLOGY | Facility: CLINIC | Age: 59
End: 2019-05-15
Payer: COMMERCIAL

## 2019-05-15 VITALS
DIASTOLIC BLOOD PRESSURE: 74 MMHG | BODY MASS INDEX: 24.75 KG/M2 | HEART RATE: 83 BPM | WEIGHT: 154 LBS | RESPIRATION RATE: 17 BRPM | SYSTOLIC BLOOD PRESSURE: 134 MMHG | OXYGEN SATURATION: 97 % | HEIGHT: 66 IN | TEMPERATURE: 97.6 F

## 2019-05-15 DIAGNOSIS — C79.51 BONE METASTASES (HCC): Primary | ICD-10-CM

## 2019-05-15 DIAGNOSIS — J43.9 PULMONARY EMPHYSEMA, UNSPECIFIED EMPHYSEMA TYPE (HCC): ICD-10-CM

## 2019-05-15 DIAGNOSIS — C50.912 BILATERAL MALIGNANT NEOPLASM OF BREAST IN FEMALE, ESTROGEN RECEPTOR POSITIVE, UNSPECIFIED SITE OF BREAST (HCC): ICD-10-CM

## 2019-05-15 DIAGNOSIS — C50.911 BILATERAL MALIGNANT NEOPLASM OF BREAST IN FEMALE, ESTROGEN RECEPTOR POSITIVE, UNSPECIFIED SITE OF BREAST (HCC): ICD-10-CM

## 2019-05-15 DIAGNOSIS — Z17.0 BILATERAL MALIGNANT NEOPLASM OF BREAST IN FEMALE, ESTROGEN RECEPTOR POSITIVE, UNSPECIFIED SITE OF BREAST (HCC): ICD-10-CM

## 2019-05-15 DIAGNOSIS — K59.00 CONSTIPATION, UNSPECIFIED CONSTIPATION TYPE: ICD-10-CM

## 2019-05-15 PROBLEM — A41.9 SEPSIS (HCC): Status: RESOLVED | Noted: 2018-12-22 | Resolved: 2019-05-15

## 2019-05-15 PROCEDURE — 99215 OFFICE O/P EST HI 40 MIN: CPT | Performed by: INTERNAL MEDICINE

## 2019-05-15 RX ORDER — LACTULOSE 20 G/30ML
10 SOLUTION ORAL EVERY 6 HOURS PRN
Qty: 135 ML | Refills: 5 | Status: SHIPPED | OUTPATIENT
Start: 2019-05-15 | End: 2019-10-23 | Stop reason: SDUPTHER

## 2019-05-15 RX ORDER — ALBUTEROL SULFATE 90 UG/1
2 AEROSOL, METERED RESPIRATORY (INHALATION) EVERY 6 HOURS PRN
Qty: 18 G | Refills: 2 | Status: SHIPPED | OUTPATIENT
Start: 2019-05-15 | End: 2019-09-04 | Stop reason: SDUPTHER

## 2019-05-16 ENCOUNTER — HOSPITAL ENCOUNTER (OUTPATIENT)
Dept: NON INVASIVE DIAGNOSTICS | Facility: MEDICAL CENTER | Age: 59
Discharge: HOME | End: 2019-05-16
Payer: COMMERCIAL

## 2019-05-16 DIAGNOSIS — C50.912 BILATERAL MALIGNANT NEOPLASM OF BREAST IN FEMALE, ESTROGEN RECEPTOR POSITIVE, UNSPECIFIED SITE OF BREAST (HCC): ICD-10-CM

## 2019-05-16 DIAGNOSIS — Z17.0 BILATERAL MALIGNANT NEOPLASM OF BREAST IN FEMALE, ESTROGEN RECEPTOR POSITIVE, UNSPECIFIED SITE OF BREAST (HCC): ICD-10-CM

## 2019-05-16 DIAGNOSIS — C50.911 BILATERAL MALIGNANT NEOPLASM OF BREAST IN FEMALE, ESTROGEN RECEPTOR POSITIVE, UNSPECIFIED SITE OF BREAST (HCC): ICD-10-CM

## 2019-05-16 PROCEDURE — 93306 TTE W/DOPPLER COMPLETE: CPT | Performed by: INTERNAL MEDICINE

## 2019-05-16 PROCEDURE — 93306 TTE W/DOPPLER COMPLETE: CPT

## 2019-05-20 DIAGNOSIS — Z17.0 BILATERAL MALIGNANT NEOPLASM OF BREAST IN FEMALE, ESTROGEN RECEPTOR POSITIVE, UNSPECIFIED SITE OF BREAST (HCC): ICD-10-CM

## 2019-05-20 DIAGNOSIS — C50.912 BILATERAL MALIGNANT NEOPLASM OF BREAST IN FEMALE, ESTROGEN RECEPTOR POSITIVE, UNSPECIFIED SITE OF BREAST (HCC): ICD-10-CM

## 2019-05-20 DIAGNOSIS — C50.911 BILATERAL MALIGNANT NEOPLASM OF BREAST IN FEMALE, ESTROGEN RECEPTOR POSITIVE, UNSPECIFIED SITE OF BREAST (HCC): ICD-10-CM

## 2019-05-21 ENCOUNTER — HOSPITAL ENCOUNTER (OUTPATIENT)
Dept: INFUSION CENTER | Facility: CLINIC | Age: 59
Discharge: HOME | End: 2019-05-21
Payer: COMMERCIAL

## 2019-05-21 DIAGNOSIS — C79.51 BONE METASTASES (HCC): ICD-10-CM

## 2019-05-21 DIAGNOSIS — C50.911 MALIGNANT NEOPLASM OF RIGHT FEMALE BREAST, UNSPECIFIED ESTROGEN RECEPTOR STATUS, UNSPECIFIED SITE OF BREAST (HCC): Primary | ICD-10-CM

## 2019-05-21 DIAGNOSIS — C50.912 BILATERAL MALIGNANT NEOPLASM OF BREAST IN FEMALE, ESTROGEN RECEPTOR POSITIVE, UNSPECIFIED SITE OF BREAST (HCC): Primary | ICD-10-CM

## 2019-05-21 DIAGNOSIS — Z17.0 BILATERAL MALIGNANT NEOPLASM OF BREAST IN FEMALE, ESTROGEN RECEPTOR POSITIVE, UNSPECIFIED SITE OF BREAST (HCC): Primary | ICD-10-CM

## 2019-05-21 DIAGNOSIS — C50.911 MALIGNANT NEOPLASM OF RIGHT FEMALE BREAST, UNSPECIFIED ESTROGEN RECEPTOR STATUS, UNSPECIFIED SITE OF BREAST (HCC): ICD-10-CM

## 2019-05-21 DIAGNOSIS — C50.911 BILATERAL MALIGNANT NEOPLASM OF BREAST IN FEMALE, ESTROGEN RECEPTOR POSITIVE, UNSPECIFIED SITE OF BREAST (HCC): Primary | ICD-10-CM

## 2019-05-21 LAB
ALBUMIN SERPL BCP-MCNC: 3 G/DL (ref 3.5–5)
ALP SERPL-CCNC: 93 U/L (ref 46–116)
ALT SERPL W P-5'-P-CCNC: 57 U/L (ref 12–78)
ANION GAP SERPL CALCULATED.3IONS-SCNC: 12 MMOL/L (ref 4–13)
AST SERPL W P-5'-P-CCNC: 36 U/L (ref 5–45)
BASOPHILS # BLD MANUAL: 0 THOUSAND/UL (ref 0–0.1)
BASOPHILS NFR MAR MANUAL: 0 % (ref 0–1)
BILIRUB SERPL-MCNC: 0.2 MG/DL (ref 0.2–1)
BUN SERPL-MCNC: 8 MG/DL (ref 5–25)
CALCIUM SERPL-MCNC: 9.3 MG/DL (ref 8.3–10.1)
CHLORIDE SERPL-SCNC: 106 MMOL/L (ref 100–108)
CO2 SERPL-SCNC: 24 MMOL/L (ref 21–32)
CREAT SERPL-MCNC: 0.69 MG/DL (ref 0.6–1.3)
EOSINOPHIL # BLD MANUAL: 0 THOUSAND/UL (ref 0–0.4)
EOSINOPHIL NFR BLD MANUAL: 0 % (ref 0–6)
ERYTHROCYTE [DISTWIDTH] IN BLOOD BY AUTOMATED COUNT: 17.2 % (ref 11.6–15.1)
GFR SERPL CREATININE-BSD FRML MDRD: 98 ML/MIN/1.73SQ M
GLUCOSE SERPL-MCNC: 122 MG/DL (ref 65–140)
HCT VFR BLD AUTO: 37.5 % (ref 34.8–46.1)
HGB BLD-MCNC: 11.6 G/DL (ref 11.5–15.4)
LYMPHOCYTES # BLD AUTO: 0.3 THOUSAND/UL (ref 0.6–4.47)
LYMPHOCYTES # BLD AUTO: 2 % (ref 14–44)
MCH RBC QN AUTO: 29.8 PG (ref 26.8–34.3)
MCHC RBC AUTO-ENTMCNC: 30.9 G/DL (ref 31.4–37.4)
MCV RBC AUTO: 96 FL (ref 82–98)
MONOCYTES # BLD AUTO: 0.15 THOUSAND/UL (ref 0–1.22)
MONOCYTES NFR BLD: 1 % (ref 4–12)
NEUTROPHILS # BLD MANUAL: 14.54 THOUSAND/UL (ref 1.85–7.62)
NEUTS BAND NFR BLD MANUAL: 3 % (ref 0–8)
NEUTS SEG NFR BLD AUTO: 94 % (ref 43–75)
NRBC BLD AUTO-RTO: 0 /100 WBCS
PLATELET # BLD AUTO: 403 THOUSANDS/UL (ref 149–390)
PLATELET BLD QL SMEAR: ADEQUATE
PMV BLD AUTO: 9.1 FL (ref 8.9–12.7)
POTASSIUM SERPL-SCNC: 3.6 MMOL/L (ref 3.5–5.3)
PROT SERPL-MCNC: 7.6 G/DL (ref 6.4–8.2)
RBC # BLD AUTO: 3.89 MILLION/UL (ref 3.81–5.12)
SODIUM SERPL-SCNC: 142 MMOL/L (ref 136–145)
TOTAL CELLS COUNTED SPEC: 100
WBC # BLD AUTO: 14.99 THOUSAND/UL (ref 4.31–10.16)

## 2019-05-21 PROCEDURE — 85027 COMPLETE CBC AUTOMATED: CPT | Performed by: INTERNAL MEDICINE

## 2019-05-21 PROCEDURE — 85007 BL SMEAR W/DIFF WBC COUNT: CPT | Performed by: INTERNAL MEDICINE

## 2019-05-21 PROCEDURE — 80053 COMPREHEN METABOLIC PANEL: CPT | Performed by: PHYSICIAN ASSISTANT

## 2019-05-23 ENCOUNTER — TELEPHONE (OUTPATIENT)
Dept: HEMATOLOGY ONCOLOGY | Facility: CLINIC | Age: 59
End: 2019-05-23

## 2019-05-23 ENCOUNTER — HOSPITAL ENCOUNTER (OUTPATIENT)
Dept: INFUSION CENTER | Facility: CLINIC | Age: 59
Discharge: HOME | End: 2019-05-23
Payer: COMMERCIAL

## 2019-05-23 VITALS
TEMPERATURE: 98.2 F | OXYGEN SATURATION: 95 % | HEIGHT: 66 IN | WEIGHT: 152 LBS | RESPIRATION RATE: 18 BRPM | DIASTOLIC BLOOD PRESSURE: 83 MMHG | BODY MASS INDEX: 24.43 KG/M2 | HEART RATE: 86 BPM | SYSTOLIC BLOOD PRESSURE: 133 MMHG

## 2019-05-23 DIAGNOSIS — C50.911 BILATERAL MALIGNANT NEOPLASM OF BREAST IN FEMALE, ESTROGEN RECEPTOR POSITIVE, UNSPECIFIED SITE OF BREAST (HCC): Primary | ICD-10-CM

## 2019-05-23 DIAGNOSIS — C50.912 BILATERAL MALIGNANT NEOPLASM OF BREAST IN FEMALE, ESTROGEN RECEPTOR POSITIVE, UNSPECIFIED SITE OF BREAST (HCC): Primary | ICD-10-CM

## 2019-05-23 DIAGNOSIS — C79.51 BONE METASTASES (HCC): ICD-10-CM

## 2019-05-23 DIAGNOSIS — Z17.0 BILATERAL MALIGNANT NEOPLASM OF BREAST IN FEMALE, ESTROGEN RECEPTOR POSITIVE, UNSPECIFIED SITE OF BREAST (HCC): Primary | ICD-10-CM

## 2019-05-23 PROCEDURE — 96372 THER/PROPH/DIAG INJ SC/IM: CPT

## 2019-05-23 PROCEDURE — 96367 TX/PROPH/DG ADDL SEQ IV INF: CPT

## 2019-05-23 PROCEDURE — 96413 CHEMO IV INFUSION 1 HR: CPT

## 2019-05-23 RX ORDER — PALONOSETRON 0.05 MG/ML
0.25 INJECTION, SOLUTION INTRAVENOUS ONCE
Status: COMPLETED | OUTPATIENT
Start: 2019-05-23 | End: 2019-05-23

## 2019-05-23 RX ORDER — SODIUM CHLORIDE 9 MG/ML
20 INJECTION, SOLUTION INTRAVENOUS ONCE
Status: COMPLETED | OUTPATIENT
Start: 2019-05-23 | End: 2019-05-23

## 2019-05-23 RX ADMIN — SODIUM CHLORIDE 150 MG: 0.9 INJECTION, SOLUTION INTRAVENOUS at 10:55

## 2019-05-23 RX ADMIN — ADO-TRASTUZUMAB EMTANSINE 256 MG: 20 INJECTION, POWDER, LYOPHILIZED, FOR SOLUTION INTRAVENOUS at 11:48

## 2019-05-23 RX ADMIN — DEXAMETHASONE SODIUM PHOSPHATE 10 MG: 10 INJECTION, SOLUTION INTRAMUSCULAR; INTRAVENOUS at 10:33

## 2019-05-23 RX ADMIN — SODIUM CHLORIDE 20 ML/HR: 0.9 INJECTION, SOLUTION INTRAVENOUS at 10:08

## 2019-05-23 RX ADMIN — PALONOSETRON 0.25 MG: 0.05 INJECTION, SOLUTION INTRAVENOUS at 10:30

## 2019-05-23 NOTE — PROGRESS NOTES
Patient tolerated her chemo without any adverse reactions  Orders say for patient to be monitored for 30 minutes but patient states she never has and refused  Observation time was not on old orders   Next appointment confirmed and she declined avs

## 2019-05-23 NOTE — PROGRESS NOTES
Patient is here for chemo  She complains of leg and arm pain but this is not new  Her WBC's are 14 99  She denies any signs and symptoms of infection and she is on decadron  Jennifer Acre was notified and no new orders given   The rest of her labs meet parameters

## 2019-05-29 DIAGNOSIS — R63.0 DECREASED APPETITE: ICD-10-CM

## 2019-05-29 DIAGNOSIS — T45.1X5A CHEMOTHERAPY-INDUCED NAUSEA: ICD-10-CM

## 2019-05-29 DIAGNOSIS — R11.0 CHEMOTHERAPY-INDUCED NAUSEA: ICD-10-CM

## 2019-05-29 DIAGNOSIS — G47.9 DIFFICULTY SLEEPING: ICD-10-CM

## 2019-05-29 RX ORDER — OLANZAPINE 10 MG/1
TABLET ORAL
Qty: 30 TABLET | Refills: 5 | Status: SHIPPED | OUTPATIENT
Start: 2019-05-29 | End: 2019-09-18 | Stop reason: SDUPTHER

## 2019-05-31 ENCOUNTER — HOSPITAL ENCOUNTER (EMERGENCY)
Facility: HOSPITAL | Age: 59
Discharge: HOME | End: 2019-05-31
Attending: EMERGENCY MEDICINE
Payer: COMMERCIAL

## 2019-05-31 VITALS
TEMPERATURE: 98.2 F | WEIGHT: 150.79 LBS | OXYGEN SATURATION: 94 % | DIASTOLIC BLOOD PRESSURE: 73 MMHG | BODY MASS INDEX: 24.24 KG/M2 | RESPIRATION RATE: 18 BRPM | HEART RATE: 105 BPM | SYSTOLIC BLOOD PRESSURE: 131 MMHG

## 2019-05-31 DIAGNOSIS — R11.10 VOMITING: ICD-10-CM

## 2019-05-31 DIAGNOSIS — R11.0 NAUSEA: Primary | ICD-10-CM

## 2019-05-31 LAB
ANION GAP SERPL CALCULATED.3IONS-SCNC: 10 MMOL/L (ref 4–13)
BASOPHILS # BLD AUTO: 0.03 THOUSANDS/ΜL (ref 0–0.1)
BASOPHILS NFR BLD AUTO: 0 % (ref 0–1)
BUN SERPL-MCNC: 3 MG/DL (ref 5–25)
CALCIUM SERPL-MCNC: 9.3 MG/DL (ref 8.3–10.1)
CHLORIDE SERPL-SCNC: 101 MMOL/L (ref 100–108)
CO2 SERPL-SCNC: 27 MMOL/L (ref 21–32)
CREAT SERPL-MCNC: 0.5 MG/DL (ref 0.6–1.3)
EOSINOPHIL # BLD AUTO: 0.07 THOUSAND/ΜL (ref 0–0.61)
EOSINOPHIL NFR BLD AUTO: 1 % (ref 0–6)
ERYTHROCYTE [DISTWIDTH] IN BLOOD BY AUTOMATED COUNT: 16.9 % (ref 11.6–15.1)
GFR SERPL CREATININE-BSD FRML MDRD: 109 ML/MIN/1.73SQ M
GLUCOSE SERPL-MCNC: 115 MG/DL (ref 65–140)
HCT VFR BLD AUTO: 35.6 % (ref 34.8–46.1)
HGB BLD-MCNC: 11.5 G/DL (ref 11.5–15.4)
IMM GRANULOCYTES # BLD AUTO: 0.03 THOUSAND/UL (ref 0–0.2)
IMM GRANULOCYTES NFR BLD AUTO: 0 % (ref 0–2)
LYMPHOCYTES # BLD AUTO: 0.93 THOUSANDS/ΜL (ref 0.6–4.47)
LYMPHOCYTES NFR BLD AUTO: 13 % (ref 14–44)
MCH RBC QN AUTO: 30.2 PG (ref 26.8–34.3)
MCHC RBC AUTO-ENTMCNC: 32.3 G/DL (ref 31.4–37.4)
MCV RBC AUTO: 93 FL (ref 82–98)
MONOCYTES # BLD AUTO: 1.07 THOUSAND/ΜL (ref 0.17–1.22)
MONOCYTES NFR BLD AUTO: 15 % (ref 4–12)
NEUTROPHILS # BLD AUTO: 5.16 THOUSANDS/ΜL (ref 1.85–7.62)
NEUTS SEG NFR BLD AUTO: 71 % (ref 43–75)
NRBC BLD AUTO-RTO: 0 /100 WBCS
PLATELET # BLD AUTO: 191 THOUSANDS/UL (ref 149–390)
PMV BLD AUTO: 10.1 FL (ref 8.9–12.7)
POTASSIUM SERPL-SCNC: 3.7 MMOL/L (ref 3.5–5.3)
RBC # BLD AUTO: 3.81 MILLION/UL (ref 3.81–5.12)
SODIUM SERPL-SCNC: 138 MMOL/L (ref 136–145)
WBC # BLD AUTO: 7.29 THOUSAND/UL (ref 4.31–10.16)

## 2019-05-31 PROCEDURE — 96376 TX/PRO/DX INJ SAME DRUG ADON: CPT

## 2019-05-31 PROCEDURE — 99284 EMERGENCY DEPT VISIT MOD MDM: CPT | Performed by: EMERGENCY MEDICINE

## 2019-05-31 PROCEDURE — 85025 COMPLETE CBC W/AUTO DIFF WBC: CPT | Performed by: EMERGENCY MEDICINE

## 2019-05-31 PROCEDURE — 99283 EMERGENCY DEPT VISIT LOW MDM: CPT

## 2019-05-31 PROCEDURE — 80048 BASIC METABOLIC PNL TOTAL CA: CPT | Performed by: EMERGENCY MEDICINE

## 2019-05-31 PROCEDURE — 36415 COLL VENOUS BLD VENIPUNCTURE: CPT | Performed by: EMERGENCY MEDICINE

## 2019-05-31 PROCEDURE — 96361 HYDRATE IV INFUSION ADD-ON: CPT

## 2019-05-31 PROCEDURE — 96374 THER/PROPH/DIAG INJ IV PUSH: CPT

## 2019-05-31 RX ORDER — ONDANSETRON 2 MG/ML
4 INJECTION INTRAMUSCULAR; INTRAVENOUS ONCE
Status: COMPLETED | OUTPATIENT
Start: 2019-05-31 | End: 2019-05-31

## 2019-05-31 RX ADMIN — ONDANSETRON 4 MG: 2 INJECTION INTRAMUSCULAR; INTRAVENOUS at 13:50

## 2019-05-31 RX ADMIN — SODIUM CHLORIDE 1000 ML: 0.9 INJECTION, SOLUTION INTRAVENOUS at 13:43

## 2019-05-31 RX ADMIN — ONDANSETRON 4 MG: 2 INJECTION INTRAMUSCULAR; INTRAVENOUS at 14:53

## 2019-06-03 DIAGNOSIS — R11.0 CHEMOTHERAPY-INDUCED NAUSEA: ICD-10-CM

## 2019-06-03 DIAGNOSIS — T45.1X5A CHEMOTHERAPY-INDUCED NAUSEA: ICD-10-CM

## 2019-06-03 RX ORDER — PANTOPRAZOLE SODIUM 40 MG/1
TABLET, DELAYED RELEASE ORAL
Qty: 30 TABLET | Refills: 2 | Status: SHIPPED | OUTPATIENT
Start: 2019-06-03 | End: 2019-08-21 | Stop reason: SDUPTHER

## 2019-06-07 PROBLEM — J10.1 INFLUENZA A: Status: RESOLVED | Noted: 2018-12-22 | Resolved: 2019-06-07

## 2019-06-07 PROBLEM — J18.9 PNEUMONIA: Status: RESOLVED | Noted: 2018-12-24 | Resolved: 2019-06-07

## 2019-06-10 RX ORDER — SODIUM CHLORIDE 9 MG/ML
20 INJECTION, SOLUTION INTRAVENOUS ONCE
Status: CANCELLED | OUTPATIENT
Start: 2019-06-20

## 2019-06-10 RX ORDER — PALONOSETRON 0.05 MG/ML
0.25 INJECTION, SOLUTION INTRAVENOUS ONCE
Status: CANCELLED | OUTPATIENT
Start: 2019-06-20

## 2019-06-11 ENCOUNTER — HOSPITAL ENCOUNTER (OUTPATIENT)
Dept: INFUSION CENTER | Facility: CLINIC | Age: 59
Discharge: HOME | End: 2019-06-11
Payer: COMMERCIAL

## 2019-06-11 ENCOUNTER — TELEPHONE (OUTPATIENT)
Dept: HEMATOLOGY ONCOLOGY | Facility: HOSPITAL | Age: 59
End: 2019-06-11

## 2019-06-11 DIAGNOSIS — E86.0 DEHYDRATION: Primary | ICD-10-CM

## 2019-06-11 DIAGNOSIS — C50.912 BILATERAL MALIGNANT NEOPLASM OF BREAST IN FEMALE, ESTROGEN RECEPTOR POSITIVE, UNSPECIFIED SITE OF BREAST (HCC): Primary | ICD-10-CM

## 2019-06-11 DIAGNOSIS — C50.911 BILATERAL MALIGNANT NEOPLASM OF BREAST IN FEMALE, ESTROGEN RECEPTOR POSITIVE, UNSPECIFIED SITE OF BREAST (HCC): ICD-10-CM

## 2019-06-11 DIAGNOSIS — C79.31 BRAIN METASTASES (HCC): Primary | ICD-10-CM

## 2019-06-11 DIAGNOSIS — C50.912 BILATERAL MALIGNANT NEOPLASM OF BREAST IN FEMALE, ESTROGEN RECEPTOR POSITIVE, UNSPECIFIED SITE OF BREAST (HCC): ICD-10-CM

## 2019-06-11 DIAGNOSIS — Z17.0 BILATERAL MALIGNANT NEOPLASM OF BREAST IN FEMALE, ESTROGEN RECEPTOR POSITIVE, UNSPECIFIED SITE OF BREAST (HCC): ICD-10-CM

## 2019-06-11 DIAGNOSIS — C50.911 BILATERAL MALIGNANT NEOPLASM OF BREAST IN FEMALE, ESTROGEN RECEPTOR POSITIVE, UNSPECIFIED SITE OF BREAST (HCC): Primary | ICD-10-CM

## 2019-06-11 DIAGNOSIS — C50.911 MALIGNANT NEOPLASM OF RIGHT FEMALE BREAST, UNSPECIFIED ESTROGEN RECEPTOR STATUS, UNSPECIFIED SITE OF BREAST (HCC): Primary | ICD-10-CM

## 2019-06-11 DIAGNOSIS — Z17.0 BILATERAL MALIGNANT NEOPLASM OF BREAST IN FEMALE, ESTROGEN RECEPTOR POSITIVE, UNSPECIFIED SITE OF BREAST (HCC): Primary | ICD-10-CM

## 2019-06-11 DIAGNOSIS — E86.0 DEHYDRATION: ICD-10-CM

## 2019-06-11 LAB
ALBUMIN SERPL BCP-MCNC: 2.9 G/DL (ref 3.5–5)
ALP SERPL-CCNC: 117 U/L (ref 46–116)
ALT SERPL W P-5'-P-CCNC: 33 U/L (ref 12–78)
ANION GAP SERPL CALCULATED.3IONS-SCNC: 12 MMOL/L (ref 4–13)
AST SERPL W P-5'-P-CCNC: 30 U/L (ref 5–45)
BASOPHILS # BLD AUTO: 0.04 THOUSANDS/ΜL (ref 0–0.1)
BASOPHILS NFR BLD AUTO: 1 % (ref 0–1)
BILIRUB SERPL-MCNC: 0.5 MG/DL (ref 0.2–1)
BUN SERPL-MCNC: 5 MG/DL (ref 5–25)
CALCIUM SERPL-MCNC: 8.9 MG/DL (ref 8.3–10.1)
CHLORIDE SERPL-SCNC: 105 MMOL/L (ref 100–108)
CO2 SERPL-SCNC: 24 MMOL/L (ref 21–32)
CREAT SERPL-MCNC: 0.61 MG/DL (ref 0.6–1.3)
EOSINOPHIL # BLD AUTO: 0.07 THOUSAND/ΜL (ref 0–0.61)
EOSINOPHIL NFR BLD AUTO: 1 % (ref 0–6)
ERYTHROCYTE [DISTWIDTH] IN BLOOD BY AUTOMATED COUNT: 16.4 % (ref 11.6–15.1)
GFR SERPL CREATININE-BSD FRML MDRD: 102 ML/MIN/1.73SQ M
GLUCOSE SERPL-MCNC: 99 MG/DL (ref 65–140)
HCT VFR BLD AUTO: 41.1 % (ref 34.8–46.1)
HGB BLD-MCNC: 12.7 G/DL (ref 11.5–15.4)
IMM GRANULOCYTES # BLD AUTO: 0.03 THOUSAND/UL (ref 0–0.2)
IMM GRANULOCYTES NFR BLD AUTO: 0 % (ref 0–2)
LYMPHOCYTES # BLD AUTO: 1.15 THOUSANDS/ΜL (ref 0.6–4.47)
LYMPHOCYTES NFR BLD AUTO: 13 % (ref 14–44)
MAGNESIUM SERPL-MCNC: 2.2 MG/DL (ref 1.6–2.6)
MCH RBC QN AUTO: 29.5 PG (ref 26.8–34.3)
MCHC RBC AUTO-ENTMCNC: 30.9 G/DL (ref 31.4–37.4)
MCV RBC AUTO: 96 FL (ref 82–98)
MONOCYTES # BLD AUTO: 0.94 THOUSAND/ΜL (ref 0.17–1.22)
MONOCYTES NFR BLD AUTO: 11 % (ref 4–12)
NEUTROPHILS # BLD AUTO: 6.65 THOUSANDS/ΜL (ref 1.85–7.62)
NEUTS SEG NFR BLD AUTO: 74 % (ref 43–75)
NRBC BLD AUTO-RTO: 0 /100 WBCS
PLATELET # BLD AUTO: 431 THOUSANDS/UL (ref 149–390)
PMV BLD AUTO: 9.2 FL (ref 8.9–12.7)
POTASSIUM SERPL-SCNC: 3.4 MMOL/L (ref 3.5–5.3)
PROT SERPL-MCNC: 7.8 G/DL (ref 6.4–8.2)
RBC # BLD AUTO: 4.3 MILLION/UL (ref 3.81–5.12)
SODIUM SERPL-SCNC: 141 MMOL/L (ref 136–145)
WBC # BLD AUTO: 8.88 THOUSAND/UL (ref 4.31–10.16)

## 2019-06-11 PROCEDURE — 83735 ASSAY OF MAGNESIUM: CPT | Performed by: INTERNAL MEDICINE

## 2019-06-11 PROCEDURE — 80053 COMPREHEN METABOLIC PANEL: CPT | Performed by: INTERNAL MEDICINE

## 2019-06-11 PROCEDURE — 96365 THER/PROPH/DIAG IV INF INIT: CPT

## 2019-06-11 PROCEDURE — 85025 COMPLETE CBC W/AUTO DIFF WBC: CPT | Performed by: INTERNAL MEDICINE

## 2019-06-11 PROCEDURE — 96361 HYDRATE IV INFUSION ADD-ON: CPT

## 2019-06-11 RX ORDER — DEXAMETHASONE 4 MG/1
4 TABLET ORAL 2 TIMES DAILY WITH MEALS
Qty: 30 TABLET | Refills: 0 | Status: SHIPPED | OUTPATIENT
Start: 2019-06-11 | End: 2019-07-12 | Stop reason: SDUPTHER

## 2019-06-11 RX ORDER — DEXAMETHASONE 4 MG/1
4 TABLET ORAL 2 TIMES DAILY WITH MEALS
Qty: 30 TABLET | Refills: 0 | Status: CANCELLED | OUTPATIENT
Start: 2019-06-11

## 2019-06-11 RX ADMIN — SODIUM CHLORIDE 1000 ML: 0.9 INJECTION, SOLUTION INTRAVENOUS at 12:05

## 2019-06-11 RX ADMIN — DEXAMETHASONE SODIUM PHOSPHATE: 10 INJECTION, SOLUTION INTRAMUSCULAR; INTRAVENOUS at 11:40

## 2019-06-12 NOTE — PROGRESS NOTES
Tolerated procedure without incident: No adverse reactions noted: Right PAC to remains accessed until further orders received

## 2019-06-12 NOTE — PROGRESS NOTES
Patient to [facility] for Lab testing / Port maintenance: Complaining of nausea / some vomiting: Dizziness: No solid food for a week and a half: Only sips of water: Denies and diarrhea / fever: Weight loss of 10 pounds in 2 weeks: Pale: Fatigue: Dr Ozuna Bad office notified: Spoke to youmag RN: Labs to be drawn STAT: Will wait for further orders: Right PAC accessed without difficulty: Good blood return noted: Labs drawn per MD order

## 2019-06-17 ENCOUNTER — HOSPITAL ENCOUNTER (OUTPATIENT)
Dept: MRI IMAGING | Facility: HOSPITAL | Age: 59
Discharge: HOME | End: 2019-06-17
Attending: INTERNAL MEDICINE
Payer: COMMERCIAL

## 2019-06-17 ENCOUNTER — TELEPHONE (OUTPATIENT)
Dept: PALLIATIVE MEDICINE | Facility: CLINIC | Age: 59
End: 2019-06-17

## 2019-06-17 DIAGNOSIS — M51.26 HERNIATED LUMBAR INTERVERTEBRAL DISC: ICD-10-CM

## 2019-06-17 DIAGNOSIS — C50.911 MALIGNANT NEOPLASM OF RIGHT FEMALE BREAST, UNSPECIFIED ESTROGEN RECEPTOR STATUS, UNSPECIFIED SITE OF BREAST (HCC): ICD-10-CM

## 2019-06-17 DIAGNOSIS — G89.3 CANCER RELATED PAIN: ICD-10-CM

## 2019-06-17 PROCEDURE — 70553 MRI BRAIN STEM W/O & W/DYE: CPT

## 2019-06-17 PROCEDURE — A9585 GADOBUTROL INJECTION: HCPCS | Performed by: INTERNAL MEDICINE

## 2019-06-17 RX ORDER — HYDROMORPHONE HYDROCHLORIDE 4 MG/1
TABLET ORAL
Qty: 150 TABLET | Refills: 0 | Status: SHIPPED | OUTPATIENT
Start: 2019-06-17 | End: 2019-07-29 | Stop reason: SDUPTHER

## 2019-06-17 RX ORDER — METHADONE HYDROCHLORIDE 10 MG/1
10 TABLET ORAL 2 TIMES DAILY
Qty: 60 TABLET | Refills: 0 | Status: SHIPPED | OUTPATIENT
Start: 2019-06-17 | End: 2019-06-17 | Stop reason: SDUPTHER

## 2019-06-17 RX ORDER — METHADONE HYDROCHLORIDE 10 MG/1
10 TABLET ORAL 2 TIMES DAILY
Qty: 60 TABLET | Refills: 0 | Status: SHIPPED | OUTPATIENT
Start: 2019-06-17 | End: 2019-07-29 | Stop reason: SDUPTHER

## 2019-06-17 RX ADMIN — GADOBUTROL 7 ML: 604.72 INJECTION INTRAVENOUS at 15:45

## 2019-06-18 ENCOUNTER — HOSPITAL ENCOUNTER (OUTPATIENT)
Dept: INFUSION CENTER | Facility: CLINIC | Age: 59
Discharge: HOME | End: 2019-06-18
Payer: COMMERCIAL

## 2019-06-18 DIAGNOSIS — C50.919 MALIGNANT NEOPLASM OF FEMALE BREAST, UNSPECIFIED ESTROGEN RECEPTOR STATUS, UNSPECIFIED LATERALITY, UNSPECIFIED SITE OF BREAST (HCC): Primary | ICD-10-CM

## 2019-06-18 DIAGNOSIS — E86.0 DEHYDRATION: Primary | ICD-10-CM

## 2019-06-18 DIAGNOSIS — Z17.0 BILATERAL MALIGNANT NEOPLASM OF BREAST IN FEMALE, ESTROGEN RECEPTOR POSITIVE, UNSPECIFIED SITE OF BREAST (HCC): ICD-10-CM

## 2019-06-18 DIAGNOSIS — C50.912 BILATERAL MALIGNANT NEOPLASM OF BREAST IN FEMALE, ESTROGEN RECEPTOR POSITIVE, UNSPECIFIED SITE OF BREAST (HCC): ICD-10-CM

## 2019-06-18 DIAGNOSIS — C50.919 MALIGNANT NEOPLASM OF FEMALE BREAST, UNSPECIFIED ESTROGEN RECEPTOR STATUS, UNSPECIFIED LATERALITY, UNSPECIFIED SITE OF BREAST (HCC): ICD-10-CM

## 2019-06-18 DIAGNOSIS — C50.911 BILATERAL MALIGNANT NEOPLASM OF BREAST IN FEMALE, ESTROGEN RECEPTOR POSITIVE, UNSPECIFIED SITE OF BREAST (HCC): ICD-10-CM

## 2019-06-18 LAB
ALBUMIN SERPL BCP-MCNC: 2.8 G/DL (ref 3.5–5)
ALP SERPL-CCNC: 124 U/L (ref 46–116)
ALT SERPL W P-5'-P-CCNC: 112 U/L (ref 12–78)
ANION GAP SERPL CALCULATED.3IONS-SCNC: 7 MMOL/L (ref 4–13)
AST SERPL W P-5'-P-CCNC: 124 U/L (ref 5–45)
BASOPHILS # BLD MANUAL: 0 THOUSAND/UL (ref 0–0.1)
BASOPHILS NFR MAR MANUAL: 0 % (ref 0–1)
BILIRUB SERPL-MCNC: 0.2 MG/DL (ref 0.2–1)
BUN SERPL-MCNC: 9 MG/DL (ref 5–25)
CALCIUM SERPL-MCNC: 9 MG/DL (ref 8.3–10.1)
CHLORIDE SERPL-SCNC: 106 MMOL/L (ref 100–108)
CO2 SERPL-SCNC: 29 MMOL/L (ref 21–32)
CREAT SERPL-MCNC: 0.61 MG/DL (ref 0.6–1.3)
EOSINOPHIL # BLD MANUAL: 0 THOUSAND/UL (ref 0–0.4)
EOSINOPHIL NFR BLD MANUAL: 0 % (ref 0–6)
ERYTHROCYTE [DISTWIDTH] IN BLOOD BY AUTOMATED COUNT: 16.5 % (ref 11.6–15.1)
GFR SERPL CREATININE-BSD FRML MDRD: 102 ML/MIN/1.73SQ M
GLUCOSE SERPL-MCNC: 128 MG/DL (ref 65–140)
HCT VFR BLD AUTO: 38.6 % (ref 34.8–46.1)
HGB BLD-MCNC: 11.6 G/DL (ref 11.5–15.4)
LYMPHOCYTES # BLD AUTO: 0.66 THOUSAND/UL (ref 0.6–4.47)
LYMPHOCYTES # BLD AUTO: 7 % (ref 14–44)
MCH RBC QN AUTO: 28.9 PG (ref 26.8–34.3)
MCHC RBC AUTO-ENTMCNC: 30.1 G/DL (ref 31.4–37.4)
MCV RBC AUTO: 96 FL (ref 82–98)
METAMYELOCYTES NFR BLD MANUAL: 1 % (ref 0–1)
MONOCYTES # BLD AUTO: 0.47 THOUSAND/UL (ref 0–1.22)
MONOCYTES NFR BLD: 5 % (ref 4–12)
NEUTROPHILS # BLD MANUAL: 8.19 THOUSAND/UL (ref 1.85–7.62)
NEUTS SEG NFR BLD AUTO: 87 % (ref 43–75)
NRBC BLD AUTO-RTO: 0 /100 WBCS
PLATELET # BLD AUTO: 389 THOUSANDS/UL (ref 149–390)
PLATELET BLD QL SMEAR: ADEQUATE
PMV BLD AUTO: 10 FL (ref 8.9–12.7)
POTASSIUM SERPL-SCNC: 4 MMOL/L (ref 3.5–5.3)
PROT SERPL-MCNC: 7.5 G/DL (ref 6.4–8.2)
RBC # BLD AUTO: 4.02 MILLION/UL (ref 3.81–5.12)
SODIUM SERPL-SCNC: 142 MMOL/L (ref 136–145)
TOTAL CELLS COUNTED SPEC: 100
WBC # BLD AUTO: 9.41 THOUSAND/UL (ref 4.31–10.16)

## 2019-06-18 PROCEDURE — 80053 COMPREHEN METABOLIC PANEL: CPT | Performed by: INTERNAL MEDICINE

## 2019-06-18 PROCEDURE — 85007 BL SMEAR W/DIFF WBC COUNT: CPT | Performed by: INTERNAL MEDICINE

## 2019-06-18 PROCEDURE — 85027 COMPLETE CBC AUTOMATED: CPT | Performed by: INTERNAL MEDICINE

## 2019-06-18 NOTE — PROGRESS NOTES
Nurse spoke with Kvng Menon RN & informed her pt  Is here for labs & order needed to be entered into computer for labs needed  Crystal stated she would enter cbcd/cmp for pt  Port accessed & labs drawn  Confirmed pts  Next appt   Pt  Declined AVS

## 2019-06-20 ENCOUNTER — HOSPITAL ENCOUNTER (OUTPATIENT)
Dept: INFUSION CENTER | Facility: CLINIC | Age: 59
Discharge: HOME | End: 2019-06-20
Payer: COMMERCIAL

## 2019-06-20 VITALS
HEIGHT: 66 IN | SYSTOLIC BLOOD PRESSURE: 142 MMHG | HEART RATE: 62 BPM | TEMPERATURE: 98.1 F | WEIGHT: 144 LBS | BODY MASS INDEX: 23.14 KG/M2 | RESPIRATION RATE: 18 BRPM | DIASTOLIC BLOOD PRESSURE: 88 MMHG | OXYGEN SATURATION: 96 %

## 2019-06-20 DIAGNOSIS — C50.912 BILATERAL MALIGNANT NEOPLASM OF BREAST IN FEMALE, ESTROGEN RECEPTOR POSITIVE, UNSPECIFIED SITE OF BREAST (HCC): Primary | ICD-10-CM

## 2019-06-20 DIAGNOSIS — Z17.0 BILATERAL MALIGNANT NEOPLASM OF BREAST IN FEMALE, ESTROGEN RECEPTOR POSITIVE, UNSPECIFIED SITE OF BREAST (HCC): Primary | ICD-10-CM

## 2019-06-20 DIAGNOSIS — C50.911 BILATERAL MALIGNANT NEOPLASM OF BREAST IN FEMALE, ESTROGEN RECEPTOR POSITIVE, UNSPECIFIED SITE OF BREAST (HCC): Primary | ICD-10-CM

## 2019-06-20 PROCEDURE — 96375 TX/PRO/DX INJ NEW DRUG ADDON: CPT

## 2019-06-20 PROCEDURE — 96367 TX/PROPH/DG ADDL SEQ IV INF: CPT

## 2019-06-20 PROCEDURE — 96413 CHEMO IV INFUSION 1 HR: CPT

## 2019-06-20 RX ORDER — PALONOSETRON 0.05 MG/ML
0.25 INJECTION, SOLUTION INTRAVENOUS ONCE
Status: COMPLETED | OUTPATIENT
Start: 2019-06-20 | End: 2019-06-20

## 2019-06-20 RX ORDER — SODIUM CHLORIDE 9 MG/ML
20 INJECTION, SOLUTION INTRAVENOUS ONCE
Status: COMPLETED | OUTPATIENT
Start: 2019-06-20 | End: 2019-06-20

## 2019-06-20 RX ADMIN — ADO-TRASTUZUMAB EMTANSINE 256 MG: 20 INJECTION, POWDER, LYOPHILIZED, FOR SOLUTION INTRAVENOUS at 11:24

## 2019-06-20 RX ADMIN — SODIUM CHLORIDE 20 ML/HR: 0.9 INJECTION, SOLUTION INTRAVENOUS at 10:00

## 2019-06-20 RX ADMIN — PALONOSETRON 0.25 MG: 0.05 INJECTION, SOLUTION INTRAVENOUS at 10:43

## 2019-06-20 RX ADMIN — DEXAMETHASONE SODIUM PHOSPHATE 10 MG: 10 INJECTION, SOLUTION INTRAMUSCULAR; INTRAVENOUS at 10:21

## 2019-06-20 RX ADMIN — SODIUM CHLORIDE 150 MG: 0.9 INJECTION, SOLUTION INTRAVENOUS at 10:43

## 2019-06-20 NOTE — PROGRESS NOTES
Patient tolerated treatment today without issues  Patient refused 30 minute observation   Patient verified upcoming appointment and declined AVS

## 2019-06-20 NOTE — PROGRESS NOTES
Patient arrived for [place] treatment  States she feels well  Spoke with [place] Kelsie in Dr Julio C Carson office regarding elevated liver function  Per Nini Daily PA-C, patient ok for treatment today

## 2019-06-20 NOTE — PLAN OF CARE
Problem: Potential for Falls  Goal: Patient will remain free of falls  Description  INTERVENTIONS:  - Assess patient frequently for physical needs  -  Identify cognitive and physical deficits and behaviors that affect risk of falls    -  [place] fall precautions as indicated by assessment   - Educate patient/family on patient safety including physical limitations  - Instruct patient to call for assistance with activity based on assessment  - Modify environment to reduce risk of injury  - Consider OT/PT consult to assist with strengthening/mobility  Outcome: Progressing

## 2019-06-20 NOTE — PATIENT INSTRUCTIONS
June 2019 Sunday Monday Tuesday Wednesday Thursday Friday Saturday                                 1                2     3     4     5     6     7     8                9     10    FOLLOW UP PG  10:45 AM   (20 min )   Solo Iglesias MD   [address] [facility] 11    INF BLOOD SPECIMENS-CENTRAL  10:00 AM   (60 min )   AN INF QUICK [address]    INF HYDRATION/ANTIEMETICS 2 HR  12:30 PM   (210 min )   AN INF CHAIR [address] 12     13     14     15           Add'l treatments       16     17    MRI BRAIN W WO CON   2:45 PM   (45 min )    MRI [address] [facility] [address] [facility] Radiology 18    INF BLOOD SPECIMENS-CENTRAL  12:00 PM   (60 min )   AN INF QUICK [address] 19     20    INF ONCOLOGY TX-TREATMENT PLAN   9:30 AM   (205 min )   AN INF CHAIR [address] 21     22           Cycle 13, Day 1     23     24     25     26    FOLLOW UP PG  10:45 AM   (30 min )   Jean Landon PA-C   [facility] [place] 53     89     35                54                                                   Treatment Details       6/11/2019 - Additional treatments      Supportive Care: ONDANSETRON WITH DEXAMETHASONE IVPB    6/20/2019 - Cycle 13, Day 1      Chemotherapy: ONCBCN PROVIDER COMMUNICATION2, ADO-TRASTUZUMAB EMTANSINE IVPB        July 2019 Sunday Monday Tuesday Wednesday Thursday Friday Saturday        1     2     3     4     5     6                7     8     9    INF BLOOD SPECIMENS-CENTRAL  10:00 AM   (60 min )   AN INF QUICK CHAIR [address] [facility] [address] [facility] 10     11    INF ONCOLOGY TX-TREATMENT PLAN   8:30 AM   (205 min )   AN INF CHAIR [address] 12    MRI BRAIN W WO CON  11:30 AM   (45 min )   AM MRI [address] MRI 13 Cycle 14, Day 1     14     15    FOLLOW UP PG   9:05 AM   (20 min )   Nafisa Kimball MD   [address] [address]    [facility] B[facility] ONLY FOLLOW UP  11:00 AM   (30 min )   Valeria Bolaños MD   [address] [facility] Oncology 17     00     74     44                29     87     26     57     68     65     48                51     94     32    INF BLOOD SPECIMENS-CENTRAL  10:00 AM   (60 min )   AN INF [address] 31                                    Treatment Details       7/11/2019 - Cycle 14, Day 1      Chemotherapy: ONCBCN PROVIDER COMMUNICATION2, ADO-TRASTUZUMAB EMTANSINE IVPB

## 2019-06-26 ENCOUNTER — HOSPITAL ENCOUNTER (OUTPATIENT)
Dept: RADIOLOGY | Facility: HOSPITAL | Age: 59
Discharge: HOME | End: 2019-06-26
Payer: COMMERCIAL

## 2019-06-26 ENCOUNTER — OFFICE VISIT (OUTPATIENT)
Dept: HEMATOLOGY ONCOLOGY | Facility: CLINIC | Age: 59
End: 2019-06-26
Payer: COMMERCIAL

## 2019-06-26 VITALS
HEART RATE: 103 BPM | SYSTOLIC BLOOD PRESSURE: 110 MMHG | DIASTOLIC BLOOD PRESSURE: 70 MMHG | HEIGHT: 66 IN | OXYGEN SATURATION: 94 % | WEIGHT: 144 LBS | TEMPERATURE: 99.3 F | RESPIRATION RATE: 16 BRPM | BODY MASS INDEX: 23.14 KG/M2

## 2019-06-26 DIAGNOSIS — C50.919 MALIGNANT NEOPLASM OF FEMALE BREAST, UNSPECIFIED ESTROGEN RECEPTOR STATUS, UNSPECIFIED LATERALITY, UNSPECIFIED SITE OF BREAST (HCC): ICD-10-CM

## 2019-06-26 DIAGNOSIS — K59.00 CONSTIPATION, UNSPECIFIED CONSTIPATION TYPE: ICD-10-CM

## 2019-06-26 DIAGNOSIS — C50.911 MALIGNANT NEOPLASM OF RIGHT FEMALE BREAST, UNSPECIFIED ESTROGEN RECEPTOR STATUS, UNSPECIFIED SITE OF BREAST (HCC): ICD-10-CM

## 2019-06-26 DIAGNOSIS — K59.00 CONSTIPATION, UNSPECIFIED CONSTIPATION TYPE: Primary | ICD-10-CM

## 2019-06-26 DIAGNOSIS — C79.51 BONE METASTASES (HCC): ICD-10-CM

## 2019-06-26 PROCEDURE — 74018 RADEX ABDOMEN 1 VIEW: CPT

## 2019-06-26 PROCEDURE — 99215 OFFICE O/P EST HI 40 MIN: CPT | Performed by: PHYSICIAN ASSISTANT

## 2019-07-02 ENCOUNTER — TELEPHONE (OUTPATIENT)
Dept: HEMATOLOGY ONCOLOGY | Facility: CLINIC | Age: 59
End: 2019-07-02

## 2019-07-02 ENCOUNTER — APPOINTMENT (EMERGENCY)
Dept: CT IMAGING | Facility: HOSPITAL | Age: 59
End: 2019-07-02
Payer: COMMERCIAL

## 2019-07-02 ENCOUNTER — HOSPITAL ENCOUNTER (EMERGENCY)
Facility: HOSPITAL | Age: 59
Discharge: HOME | End: 2019-07-02
Attending: EMERGENCY MEDICINE | Admitting: EMERGENCY MEDICINE
Payer: COMMERCIAL

## 2019-07-02 ENCOUNTER — APPOINTMENT (EMERGENCY)
Dept: RADIOLOGY | Facility: HOSPITAL | Age: 59
End: 2019-07-02
Payer: COMMERCIAL

## 2019-07-02 VITALS
SYSTOLIC BLOOD PRESSURE: 125 MMHG | TEMPERATURE: 98.8 F | HEART RATE: 110 BPM | WEIGHT: 144.84 LBS | OXYGEN SATURATION: 95 % | RESPIRATION RATE: 20 BRPM | BODY MASS INDEX: 23.38 KG/M2 | DIASTOLIC BLOOD PRESSURE: 80 MMHG

## 2019-07-02 DIAGNOSIS — K59.00 CONSTIPATION, UNSPECIFIED CONSTIPATION TYPE: Primary | ICD-10-CM

## 2019-07-02 LAB
ALBUMIN SERPL BCP-MCNC: 2.1 G/DL (ref 3.5–5)
ALP SERPL-CCNC: 156 U/L (ref 46–116)
ALT SERPL W P-5'-P-CCNC: 30 U/L (ref 12–78)
ANION GAP SERPL CALCULATED.3IONS-SCNC: 13 MMOL/L (ref 4–13)
AST SERPL W P-5'-P-CCNC: 26 U/L (ref 5–45)
BASOPHILS # BLD AUTO: 0.02 THOUSANDS/ΜL (ref 0–0.1)
BASOPHILS NFR BLD AUTO: 0 % (ref 0–1)
BILIRUB SERPL-MCNC: 0.5 MG/DL (ref 0.2–1)
BUN SERPL-MCNC: 5 MG/DL (ref 5–25)
CALCIUM SERPL-MCNC: 8.3 MG/DL (ref 8.3–10.1)
CHLORIDE SERPL-SCNC: 104 MMOL/L (ref 100–108)
CO2 SERPL-SCNC: 27 MMOL/L (ref 21–32)
CREAT SERPL-MCNC: 0.51 MG/DL (ref 0.6–1.3)
EOSINOPHIL # BLD AUTO: 0.03 THOUSAND/ΜL (ref 0–0.61)
EOSINOPHIL NFR BLD AUTO: 0 % (ref 0–6)
ERYTHROCYTE [DISTWIDTH] IN BLOOD BY AUTOMATED COUNT: 16.9 % (ref 11.6–15.1)
GFR SERPL CREATININE-BSD FRML MDRD: 108 ML/MIN/1.73SQ M
GLUCOSE SERPL-MCNC: 108 MG/DL (ref 65–140)
HCT VFR BLD AUTO: 33.1 % (ref 34.8–46.1)
HGB BLD-MCNC: 10.4 G/DL (ref 11.5–15.4)
IMM GRANULOCYTES # BLD AUTO: 0.02 THOUSAND/UL (ref 0–0.2)
IMM GRANULOCYTES NFR BLD AUTO: 0 % (ref 0–2)
LYMPHOCYTES # BLD AUTO: 0.83 THOUSANDS/ΜL (ref 0.6–4.47)
LYMPHOCYTES NFR BLD AUTO: 12 % (ref 14–44)
MCH RBC QN AUTO: 29.1 PG (ref 26.8–34.3)
MCHC RBC AUTO-ENTMCNC: 31.4 G/DL (ref 31.4–37.4)
MCV RBC AUTO: 93 FL (ref 82–98)
MONOCYTES # BLD AUTO: 0.74 THOUSAND/ΜL (ref 0.17–1.22)
MONOCYTES NFR BLD AUTO: 11 % (ref 4–12)
NEUTROPHILS # BLD AUTO: 5.1 THOUSANDS/ΜL (ref 1.85–7.62)
NEUTS SEG NFR BLD AUTO: 77 % (ref 43–75)
NRBC BLD AUTO-RTO: 0 /100 WBCS
PLATELET # BLD AUTO: 325 THOUSANDS/UL (ref 149–390)
PMV BLD AUTO: 10.2 FL (ref 8.9–12.7)
POTASSIUM SERPL-SCNC: 3.1 MMOL/L (ref 3.5–5.3)
PROT SERPL-MCNC: 7.3 G/DL (ref 6.4–8.2)
RBC # BLD AUTO: 3.57 MILLION/UL (ref 3.81–5.12)
SODIUM SERPL-SCNC: 144 MMOL/L (ref 136–145)
WBC # BLD AUTO: 6.74 THOUSAND/UL (ref 4.31–10.16)

## 2019-07-02 PROCEDURE — 99284 EMERGENCY DEPT VISIT MOD MDM: CPT | Performed by: EMERGENCY MEDICINE

## 2019-07-02 PROCEDURE — 74177 CT ABD & PELVIS W/CONTRAST: CPT

## 2019-07-02 PROCEDURE — 80053 COMPREHEN METABOLIC PANEL: CPT | Performed by: EMERGENCY MEDICINE

## 2019-07-02 PROCEDURE — 74022 RADEX COMPL AQT ABD SERIES: CPT

## 2019-07-02 PROCEDURE — 96361 HYDRATE IV INFUSION ADD-ON: CPT

## 2019-07-02 PROCEDURE — 36415 COLL VENOUS BLD VENIPUNCTURE: CPT | Performed by: EMERGENCY MEDICINE

## 2019-07-02 PROCEDURE — 99284 EMERGENCY DEPT VISIT MOD MDM: CPT

## 2019-07-02 PROCEDURE — 96360 HYDRATION IV INFUSION INIT: CPT

## 2019-07-02 PROCEDURE — 85025 COMPLETE CBC W/AUTO DIFF WBC: CPT | Performed by: EMERGENCY MEDICINE

## 2019-07-02 RX ORDER — POLYETHYLENE GLYCOL 3350 17 G/17G
17 POWDER, FOR SOLUTION ORAL DAILY PRN
Qty: 10 EACH | Refills: 0 | Status: SHIPPED | OUTPATIENT
Start: 2019-07-02 | End: 2019-10-23 | Stop reason: ALTCHOICE

## 2019-07-02 RX ORDER — POTASSIUM CHLORIDE 20 MEQ/1
40 TABLET, EXTENDED RELEASE ORAL ONCE
Status: COMPLETED | OUTPATIENT
Start: 2019-07-02 | End: 2019-07-02

## 2019-07-02 RX ADMIN — POTASSIUM CHLORIDE 40 MEQ: 1500 TABLET, EXTENDED RELEASE ORAL at 17:45

## 2019-07-02 RX ADMIN — IOHEXOL 100 ML: 350 INJECTION, SOLUTION INTRAVENOUS at 17:44

## 2019-07-02 RX ADMIN — SODIUM CHLORIDE 1000 ML: 0.9 INJECTION, SOLUTION INTRAVENOUS at 16:30

## 2019-07-02 NOTE — TELEPHONE ENCOUNTER
Spoke with patient today - she has not tried magnesium citrate for constipation  She has still not had a bowel movement - when I asked patient how long she stated "3 weeks"  Reviewed with her that recent abdominal x-ray did show there was stool in her bowels  She denies abdominal pain  She will try magnesium citrate today - I will follow up with her tomorrow regarding if she had a bowel movement- will have patient go to the ER if relief not offered  Discussed with Sarah

## 2019-07-02 NOTE — TELEPHONE ENCOUNTER
Returned call to patient - she drank the entire bottle of Magnesium Citrate and has still not had a bowel movement  I instructed her to go to the ER for evaluation    Patient was agreeable and will be going as soon as possible

## 2019-07-02 NOTE — ED PROVIDER NOTES
History  Chief Complaint   Patient presents with    Constipation     Pt states "I haven't moved my bowels in 3 weeks " Pt states she called her PCP who told her to take mag citrate  Pt took the medicine but still no BM  Pt states she is still passing gas but rarely  Hx of the same  This is a 49 y/o female that complains of constipation in almost 3 weeks  Occasional small amounts  Feels distended  No fevers or chills  Tried mag citrate at home with no relief  No vomiting  Decreased appetite  Patient does take pain medication and has limited mobility  No aggravating or alleviating factors  Symptoms are moderate in severity  No radiation of symptoms  DDx: bowel obstruction, dehydration, constipation  Prior to Admission Medications   Prescriptions Last Dose Informant Patient Reported? Taking?    HYDROmorphone (DILAUDID) 4 mg tablet  Self No Yes   Si-2 tabs every 4 hours as needed for breakthrough pain   LORazepam (ATIVAN) 1 mg tablet  Self No Yes   Sig: Take 1 tablet (1 mg total) by mouth every 6 (six) hours as needed for anxiety or sleep (nausea)   OLANZapine (ZyPREXA) 10 mg tablet  Self No Yes   Sig: TAKE 1 TABLET BY MOUTH EVERY DAY   albuterol (VENTOLIN HFA) 90 mcg/act inhaler  Self No Yes   Sig: Inhale 2 puffs every 6 (six) hours as needed for wheezing   gabapentin (NEURONTIN) 300 mg capsule  Self No Yes   Si cap PO bid and 2 cap at bedtime   lactulose 20 g/30 mL  Self No Yes   Sig: Take 15 mL (10 g total) by mouth every 6 (six) hours as needed (Constipation)   lidocaine-prilocaine (EMLA) cream  Self No Yes   Sig: Apply topically as needed for mild pain   methadone (DOLOPHINE) 10 mg tablet  Self No Yes   Sig: Take 1 tablet by mouth 2 (two) times a day For chronic pain,   nystatin (MYCOSTATIN) 100,000 units/mL suspension  Self No Yes   Sig: Use 10 mL  Swish and spit 4 times daily for 10 days   pantoprazole (PROTONIX) 40 mg tablet  Self No No   Sig: TAKE 1 TABLET BY MOUTH EVERY DAY   promethazine (PHENERGAN) 25 mg tablet  Self No Yes   Sig: Take 1 tablet (25 mg total) by mouth every 6 (six) hours as needed for nausea or vomiting   senna (SENOKOT) 8 6 mg  Self Yes Yes   Sig: Take 2 tablets by mouth 2 (two) times a day as needed     zolpidem (AMBIEN) 10 mg tablet  Self No Yes   Sig: Take 1 tablet (10 mg total) by mouth daily at bedtime as needed for sleep      Facility-Administered Medications: None       Past Medical History:   Diagnosis Date    H/O bilateral mastectomy 2/15/2016    Hypertension 2/15/2016    Lymphedema 2/15/2016    Malignant neoplasm of right breast ([address] ) 2/15/2016    Metastatic breast cancer [facility])        Past Surgical History:   Procedure Laterality Date    ENDOBRONCHIAL ULTRASOUND (EBUS) N/A 2/16/2016    Procedure: EBUS;  Surgeon: Lorenzo Valle MD;  [facility]: [facility];  Service:     MASTECTOMY Bilateral     MASTECTOMY Bilateral     right arm edema    OOPHORECTOMY      OH BRONCHOSCOPY NEEDLE BX TRACHEA MAIN STEM&/BRON N/A 2/16/2016    Procedure: Julio Mccarthy;  Surgeon: Lorenzo Valle MD;  [facility];  Service: Thoracic    OH INSJ TUNNELED CTR VAD W/SUBQ PORT AGE 5 YR/> N/A 7/24/2018    Procedure: INSERTION VENOUS PORT ( PORT-A-CATH) IR;  Surgeon: Patricia Timmons DO;  [facility];  Service: Interventional Radiology    OH STEREOTACTIC RADIOSURGERY, CRANIAL,SIMPLE,EA ADD  5/3/2017         OH STEREOTACTIC RADIOSURGERY, CRANIAL,SIMPLE,EA ADD  5/3/2017         OH STEREOTACTIC RADIOSURGERY, CRANIAL,SIMPLE,SINGLE  5/3/2017            Family History   Problem Relation Age of Onset    Cancer Sister     Prostate cancer Brother      I have reviewed and agree with the history as documented      Social History     Tobacco Use    Smoking status: Current Every Day Smoker     Packs/day: 0 50     Years: 40 00     Pack years: 20 00     Types: Cigarettes     Start date: 1    Smokeless tobacco: Never Used   Substance Use Topics    Alcohol use: Yes     Frequency: 2-4 times a month     Drinks per session: 3 or 4     Binge frequency: Less than monthly     Comment: social    Drug use: Yes     Types: Marijuana        Review of Systems   Constitutional: Negative for activity change, appetite change and fever  HENT: Negative for congestion, rhinorrhea and sore throat  Eyes: Negative for pain and redness  Respiratory: Negative for cough and shortness of breath  Cardiovascular: Negative for chest pain and palpitations  Gastrointestinal: Positive for abdominal distention and constipation  Negative for abdominal pain, anal bleeding, blood in stool, diarrhea, nausea, rectal pain and vomiting  Endocrine: Negative for polyuria  Genitourinary: Negative for difficulty urinating, dysuria, frequency and urgency  Musculoskeletal: Negative for arthralgias and myalgias  Skin: Negative for color change and rash  Neurological: Negative for dizziness, syncope and light-headedness  Hematological: Does not bruise/bleed easily  Psychiatric/Behavioral: Negative for confusion  All other systems reviewed and are negative  Physical Exam  Physical Exam   Constitutional: She is oriented to person, place, and time  She appears well-developed  No distress  HENT:   Head: Normocephalic and atraumatic  Nose: Nose normal    Eyes: Conjunctivae are normal  No scleral icterus  Neck: Normal range of motion  Neck supple  Cardiovascular: Normal rate, regular rhythm, normal heart sounds and intact distal pulses  Pulmonary/Chest: Effort normal and breath sounds normal  No stridor  No respiratory distress  She has no wheezes  Abdominal: Soft  She exhibits distension  There is no tenderness  There is no rebound and no guarding  Genitourinary:   Genitourinary Comments: Firm stool in vault but no palpable impaction  Musculoskeletal: She exhibits no edema or deformity  Neurological: She is alert and oriented to person, place, and time  Skin: Skin is warm and dry   No rash noted    Psychiatric: She has a normal mood and affect  Thought content normal    Nursing note and vitals reviewed        Vital Signs  ED Triage Vitals [07/02/19 1528]   Temperature Pulse Respirations Blood Pressure SpO2   98 8 °F (37 1 °C) (!) 106 20 138/77 96 %      Temp Source Heart Rate Source Patient Position - Orthostatic VS BP Location FiO2 (%)   Oral Monitor Sitting Right arm --      Pain Score       8           Vitals:    07/02/19 1528 07/02/19 1630 07/02/19 1846   BP: 138/77 111/69 125/80   Pulse: (!) 106 91 (!) 110   Patient Position - Orthostatic VS: Sitting Lying Sitting         Visual Acuity      ED Medications  Medications   sodium chloride 0 9 % bolus 1,000 mL (0 mL Intravenous Stopped 7/2/19 1842)   potassium chloride (K-DUR,KLOR-CON) CR tablet 40 mEq (40 mEq Oral Given 7/2/19 1745)   iohexol (OMNIPAQUE) 350 MG/ML injection (MULTI-DOSE) 100 mL (100 mL Intravenous Given 7/2/19 1744)       Diagnostic Studies  Results Reviewed     Procedure Component Value Units Date/Time    Comprehensive metabolic panel [[id number]]  (Abnormal) Collected:  07/02/19 1626    Lab Status:  Final result Specimen:  Blood from Line Updated:  07/02/19 1718     Sodium 144 mmol/L      Potassium 3 1 mmol/L      Chloride 104 mmol/L      CO2 27 mmol/L      ANION GAP 13 mmol/L      BUN 5 mg/dL      Creatinine 0 51 mg/dL      Glucose 108 mg/dL      Calcium 8 3 mg/dL      AST 26 U/L      ALT 30 U/L      Alkaline Phosphatase 156 U/L      Total Protein 7 3 g/dL      Albumin 2 1 g/dL      Total Bilirubin 0 50 mg/dL      eGFR 108 ml/min/1 73sq m     Narrative:       [place] guidelines for Chronic Kidney Disease (CKD):     Stage 1 with normal or high GFR (GFR > 90 mL/min/1 73 square meters)    Stage 2 Mild CKD (GFR = 60-89 mL/min/1 73 square meters)    Stage 3A Moderate CKD (GFR = 45-59 mL/min/1 73 square meters)    Stage 3B Moderate CKD (GFR = 30-44 mL/min/1 73 square meters)    Stage 4 Severe CKD (GFR = 15-29 mL/min/1 73 square meters)    Stage 5 End Stage CKD (GFR <15 mL/min/1 73 square meters)  Note: GFR calculation is accurate only with a steady state creatinine    CBC and differential [[id number]]  (Abnormal) Collected:  07/02/19 1626    Lab Status:  Final result Specimen:  Blood from Line Updated:  07/02/19 1701     WBC 6 74 Thousand/uL      RBC 3 57 Million/uL      Hemoglobin 10 4 g/dL      Hematocrit 33 1 %      MCV 93 fL      MCH 29 1 pg      MCHC 31 4 g/dL      RDW 16 9 %      MPV 10 2 fL      Platelets 317 Thousands/uL      nRBC 0 /100 WBCs      Neutrophils Relative 77 %      Immat GRANS % 0 %      Lymphocytes Relative 12 %      Monocytes Relative 11 %      Eosinophils Relative 0 %      Basophils Relative 0 %      Neutrophils Absolute 5 10 Thousands/µL      Immature Grans Absolute 0 02 Thousand/uL      Lymphocytes Absolute 0 83 Thousands/µL      Monocytes Absolute 0 74 Thousand/µL      Eosinophils Absolute 0 03 Thousand/µL      Basophils Absolute 0 02 Thousands/µL                  CT abdomen pelvis with contrast   Final Result by Taniya Reyes MD (07/02 1810)      1  Large amount of feces throughout a distended, somewhat redundant colon, consistent with the history of chronic constipation  No evidence of volvulus or other mechanical obstruction  2   No other significant abnormality in the abdomen or pelvis  3   Small right pleural effusion  Workstation performed: [id number]         XR abdomen obstruction series   ED Interpretation by Anders Buenrostro MD (07/02 1618)   Several AF levels  Final Result by Darrel Kaplan DO (07/02 1645)      Fecal stasis within the right colon and left colon  Gaseous distention of the transverse colon  No signs of obstruction           Workstation performed: [id number]                    Procedures  Procedures       ED Course  ED Course as of Jul 17 2057   Tue Jul 02, 2019   [id number] Patient had BM post soap suds enema MDM    Disposition  Final diagnoses:   Constipation, unspecified constipation type     Time reflects when diagnosis was documented in both MDM as applicable and the Disposition within this note     Time User Action Codes Description Comment    7/2/2019  6:53 PM Lisa Flor Add [K59 00] Constipation, unspecified constipation type       ED Disposition     ED Disposition Condition Date/Time Comment    Discharge Stable Tue Jul 2, 2019  6:53 PM Thaddeus Pathak discharge to home/self care  Follow-up Information     Follow up With Specialties Details Why Contact Info    Velia Ramirez MD Internal Medicine In 5 days  [address] [facility]  [address]  [address] [place] [address]  [phone]            Discharge Medication List as of 7/2/2019  6:55 PM      START taking these medications    Details   polyethylene glycol (MIRALAX) 17 g packet Take 17 g by mouth daily as needed (constipation) for up to 10 doses, Starting Tue 7/2/2019, Print         CONTINUE these medications which have NOT CHANGED    Details   albuterol (VENTOLIN HFA) 90 mcg/act inhaler Inhale 2 puffs every 6 (six) hours as needed for wheezing, Starting Wed 5/15/2019, Normal      gabapentin (NEURONTIN) 300 mg capsule 1 cap PO bid and 2 cap at bedtime, Normal      HYDROmorphone (DILAUDID) 4 mg tablet 1-2 tabs every 4 hours as needed for breakthrough pain, Normal      lactulose 20 g/30 mL Take 15 mL (10 g total) by mouth every 6 (six) hours as needed (Constipation), Starting Wed 5/15/2019, Normal      lidocaine-prilocaine (EMLA) cream Apply topically as needed for mild pain, Starting Thu 8/16/2018, Normal      LORazepam (ATIVAN) 1 mg tablet Take 1 tablet (1 mg total) by mouth every 6 (six) hours as needed for anxiety or sleep (nausea), Starting Thu 4/4/2019, Normal      methadone (DOLOPHINE) 10 mg tablet Take 1 tablet by mouth 2 (two) times a day For chronic pain,, Starting Mon 6/17/2019, Normal nystatin (MYCOSTATIN) 100,000 units/mL suspension Use 10 mL  Swish and spit 4 times daily for 10 days, Normal      OLANZapine (ZyPREXA) 10 mg tablet TAKE 1 TABLET BY MOUTH EVERY DAY, Normal      promethazine (PHENERGAN) 25 mg tablet Take 1 tablet (25 mg total) by mouth every 6 (six) hours as needed for nausea or vomiting, Starting Tue 5/7/2019, Normal      senna (SENOKOT) 8 6 mg Take 2 tablets by mouth 2 (two) times a day as needed  , Starting Thu 8/3/2017, Historical Med      zolpidem (AMBIEN) 10 mg tablet Take 1 tablet (10 mg total) by mouth daily at bedtime as needed for sleep, Starting Wed 4/17/2019, Normal      dexamethasone (DECADRON) 4 mg tablet Take 1 tablet (4 mg total) by mouth 2 (two) times a day with meals, Starting Tue 6/11/2019, Normal      pantoprazole (PROTONIX) 40 mg tablet TAKE 1 TABLET BY MOUTH EVERY DAY, Normal           No discharge procedures on file      ED Provider  Electronically Signed by           Ronan Toledo MD  07/17/19 1618

## 2019-07-02 NOTE — TELEPHONE ENCOUNTER
Took the magnesium citrate at 10 am-concerned-still no bowel movement  Please call her at [phone]

## 2019-07-02 NOTE — TELEPHONE ENCOUNTER
----- Message from [place] PA-C sent at 7/1/2019  4:01 PM EDT -----  Call and see if mag citrate worked

## 2019-07-08 ENCOUNTER — TELEPHONE (OUTPATIENT)
Dept: HEMATOLOGY ONCOLOGY | Facility: CLINIC | Age: 59
End: 2019-07-08

## 2019-07-08 NOTE — TELEPHONE ENCOUNTER
Chemo will be canceled for 7/11/19  Pt is going for labs on 7/9/19  Pt will f/u with kiera on 7/16/19 in [facility] office

## 2019-07-08 NOTE — TELEPHONE ENCOUNTER
Patient is requesting to stop her chemo for awhile  She would like to ask Nghia Araujo if this is a problem

## 2019-07-09 ENCOUNTER — HOSPITAL ENCOUNTER (OUTPATIENT)
Dept: INFUSION CENTER | Facility: CLINIC | Age: 59
Discharge: HOME | End: 2019-07-09
Payer: COMMERCIAL

## 2019-07-09 DIAGNOSIS — C50.911 BILATERAL MALIGNANT NEOPLASM OF BREAST IN FEMALE, ESTROGEN RECEPTOR POSITIVE, UNSPECIFIED SITE OF BREAST (HCC): Primary | ICD-10-CM

## 2019-07-09 DIAGNOSIS — Z17.0 BILATERAL MALIGNANT NEOPLASM OF BREAST IN FEMALE, ESTROGEN RECEPTOR POSITIVE, UNSPECIFIED SITE OF BREAST (HCC): Primary | ICD-10-CM

## 2019-07-09 DIAGNOSIS — C50.912 BILATERAL MALIGNANT NEOPLASM OF BREAST IN FEMALE, ESTROGEN RECEPTOR POSITIVE, UNSPECIFIED SITE OF BREAST (HCC): Primary | ICD-10-CM

## 2019-07-09 LAB
ALBUMIN SERPL BCP-MCNC: 2.3 G/DL (ref 3.5–5)
ALP SERPL-CCNC: 157 U/L (ref 46–116)
ALT SERPL W P-5'-P-CCNC: 20 U/L (ref 12–78)
ANION GAP SERPL CALCULATED.3IONS-SCNC: 9 MMOL/L (ref 4–13)
AST SERPL W P-5'-P-CCNC: 24 U/L (ref 5–45)
BASOPHILS # BLD AUTO: 0.04 THOUSANDS/ΜL (ref 0–0.1)
BASOPHILS NFR BLD AUTO: 1 % (ref 0–1)
BILIRUB SERPL-MCNC: 0.3 MG/DL (ref 0.2–1)
BUN SERPL-MCNC: 5 MG/DL (ref 5–25)
CALCIUM SERPL-MCNC: 9 MG/DL (ref 8.3–10.1)
CHLORIDE SERPL-SCNC: 107 MMOL/L (ref 100–108)
CO2 SERPL-SCNC: 25 MMOL/L (ref 21–32)
CREAT SERPL-MCNC: 0.61 MG/DL (ref 0.6–1.3)
EOSINOPHIL # BLD AUTO: 0.19 THOUSAND/ΜL (ref 0–0.61)
EOSINOPHIL NFR BLD AUTO: 3 % (ref 0–6)
ERYTHROCYTE [DISTWIDTH] IN BLOOD BY AUTOMATED COUNT: 17.3 % (ref 11.6–15.1)
GFR SERPL CREATININE-BSD FRML MDRD: 102 ML/MIN/1.73SQ M
GLUCOSE SERPL-MCNC: 114 MG/DL (ref 65–140)
HCT VFR BLD AUTO: 36.5 % (ref 34.8–46.1)
HGB BLD-MCNC: 11 G/DL (ref 11.5–15.4)
IMM GRANULOCYTES # BLD AUTO: 0.06 THOUSAND/UL (ref 0–0.2)
IMM GRANULOCYTES NFR BLD AUTO: 1 % (ref 0–2)
LYMPHOCYTES # BLD AUTO: 1.18 THOUSANDS/ΜL (ref 0.6–4.47)
LYMPHOCYTES NFR BLD AUTO: 17 % (ref 14–44)
MCH RBC QN AUTO: 28.6 PG (ref 26.8–34.3)
MCHC RBC AUTO-ENTMCNC: 30.1 G/DL (ref 31.4–37.4)
MCV RBC AUTO: 95 FL (ref 82–98)
MONOCYTES # BLD AUTO: 0.8 THOUSAND/ΜL (ref 0.17–1.22)
MONOCYTES NFR BLD AUTO: 11 % (ref 4–12)
NEUTROPHILS # BLD AUTO: 4.76 THOUSANDS/ΜL (ref 1.85–7.62)
NEUTS SEG NFR BLD AUTO: 67 % (ref 43–75)
NRBC BLD AUTO-RTO: 0 /100 WBCS
PLATELET # BLD AUTO: 538 THOUSANDS/UL (ref 149–390)
PMV BLD AUTO: 9 FL (ref 8.9–12.7)
POTASSIUM SERPL-SCNC: 3.7 MMOL/L (ref 3.5–5.3)
PROT SERPL-MCNC: 7.4 G/DL (ref 6.4–8.2)
RBC # BLD AUTO: 3.85 MILLION/UL (ref 3.81–5.12)
SODIUM SERPL-SCNC: 141 MMOL/L (ref 136–145)
WBC # BLD AUTO: 7.03 THOUSAND/UL (ref 4.31–10.16)

## 2019-07-09 PROCEDURE — 96523 IRRIG DRUG DELIVERY DEVICE: CPT

## 2019-07-09 PROCEDURE — 85025 COMPLETE CBC W/AUTO DIFF WBC: CPT | Performed by: INTERNAL MEDICINE

## 2019-07-09 PROCEDURE — 80053 COMPREHEN METABOLIC PANEL: CPT | Performed by: INTERNAL MEDICINE

## 2019-07-11 ENCOUNTER — TELEPHONE (OUTPATIENT)
Dept: HEMATOLOGY ONCOLOGY | Facility: CLINIC | Age: 59
End: 2019-07-11

## 2019-07-12 DIAGNOSIS — C79.31 BRAIN METASTASES (HCC): ICD-10-CM

## 2019-07-12 RX ORDER — DEXAMETHASONE 4 MG/1
4 TABLET ORAL
Qty: 30 TABLET | Refills: 1 | Status: SHIPPED | OUTPATIENT
Start: 2019-07-12 | End: 2019-08-28 | Stop reason: SDUPTHER

## 2019-07-12 NOTE — TELEPHONE ENCOUNTER
Confirmed with patient that she is taking 1 4mg tablet each morning  This is helping with her appetite    New script will be sent to the pharmacy

## 2019-07-25 RX ORDER — SODIUM CHLORIDE 9 MG/ML
20 INJECTION, SOLUTION INTRAVENOUS ONCE
Status: CANCELLED | OUTPATIENT
Start: 2019-08-01

## 2019-07-25 RX ORDER — PALONOSETRON 0.05 MG/ML
0.25 INJECTION, SOLUTION INTRAVENOUS ONCE
Status: CANCELLED | OUTPATIENT
Start: 2019-08-01

## 2019-07-26 RX ORDER — PALONOSETRON 0.05 MG/ML
0.25 INJECTION, SOLUTION INTRAVENOUS ONCE
Status: CANCELLED | OUTPATIENT
Start: 2019-08-22

## 2019-07-26 RX ORDER — SODIUM CHLORIDE 9 MG/ML
20 INJECTION, SOLUTION INTRAVENOUS ONCE
Status: CANCELLED | OUTPATIENT
Start: 2019-08-22

## 2019-07-29 ENCOUNTER — HOSPITAL ENCOUNTER (OUTPATIENT)
Dept: RADIOLOGY | Facility: MEDICAL CENTER | Age: 59
Discharge: HOME | End: 2019-07-29

## 2019-07-29 DIAGNOSIS — C50.919 MALIGNANT NEOPLASM OF FEMALE BREAST, UNSPECIFIED ESTROGEN RECEPTOR STATUS, UNSPECIFIED LATERALITY, UNSPECIFIED SITE OF BREAST (HCC): ICD-10-CM

## 2019-07-29 DIAGNOSIS — G89.3 CANCER RELATED PAIN: ICD-10-CM

## 2019-07-29 DIAGNOSIS — M51.26 HERNIATED LUMBAR INTERVERTEBRAL DISC: ICD-10-CM

## 2019-07-29 DIAGNOSIS — C79.51 BONE METASTASES (HCC): ICD-10-CM

## 2019-07-29 RX ORDER — HYDROMORPHONE HYDROCHLORIDE 4 MG/1
TABLET ORAL
Qty: 150 TABLET | Refills: 0 | Status: SHIPPED | OUTPATIENT
Start: 2019-07-29 | End: 2019-08-21 | Stop reason: SDUPTHER

## 2019-07-29 RX ORDER — METHADONE HYDROCHLORIDE 10 MG/1
10 TABLET ORAL 2 TIMES DAILY
Qty: 60 TABLET | Refills: 0 | Status: SHIPPED | OUTPATIENT
Start: 2019-07-29 | End: 2019-07-29 | Stop reason: SDUPTHER

## 2019-07-29 RX ORDER — METHADONE HYDROCHLORIDE 10 MG/1
10 TABLET ORAL 2 TIMES DAILY
Qty: 60 TABLET | Refills: 0 | Status: SHIPPED | OUTPATIENT
Start: 2019-07-29 | End: 2019-08-21

## 2019-07-29 NOTE — TELEPHONE ENCOUNTER
I called and cx methadone because they dont carry it at this pharmacy per patient send to home brian kaur

## 2019-07-30 ENCOUNTER — HOSPITAL ENCOUNTER (OUTPATIENT)
Dept: INFUSION CENTER | Facility: CLINIC | Age: 59
Discharge: HOME | End: 2019-07-30
Payer: COMMERCIAL

## 2019-07-30 DIAGNOSIS — C50.911 BILATERAL MALIGNANT NEOPLASM OF BREAST IN FEMALE, ESTROGEN RECEPTOR POSITIVE, UNSPECIFIED SITE OF BREAST (HCC): ICD-10-CM

## 2019-07-30 DIAGNOSIS — E86.0 DEHYDRATION: ICD-10-CM

## 2019-07-30 DIAGNOSIS — C50.912 BILATERAL MALIGNANT NEOPLASM OF BREAST IN FEMALE, ESTROGEN RECEPTOR POSITIVE, UNSPECIFIED SITE OF BREAST (HCC): ICD-10-CM

## 2019-07-30 DIAGNOSIS — C50.919 MALIGNANT NEOPLASM OF FEMALE BREAST, UNSPECIFIED ESTROGEN RECEPTOR STATUS, UNSPECIFIED LATERALITY, UNSPECIFIED SITE OF BREAST (HCC): Primary | ICD-10-CM

## 2019-07-30 DIAGNOSIS — Z17.0 BILATERAL MALIGNANT NEOPLASM OF BREAST IN FEMALE, ESTROGEN RECEPTOR POSITIVE, UNSPECIFIED SITE OF BREAST (HCC): ICD-10-CM

## 2019-07-30 DIAGNOSIS — C79.51 BONE METASTASES (HCC): ICD-10-CM

## 2019-07-30 LAB
ALBUMIN SERPL BCP-MCNC: 2.8 G/DL (ref 3.5–5)
ALP SERPL-CCNC: 128 U/L (ref 46–116)
ALT SERPL W P-5'-P-CCNC: 63 U/L (ref 12–78)
ANION GAP SERPL CALCULATED.3IONS-SCNC: 13 MMOL/L (ref 4–13)
AST SERPL W P-5'-P-CCNC: 51 U/L (ref 5–45)
BASOPHILS # BLD AUTO: 0.05 THOUSANDS/ΜL (ref 0–0.1)
BASOPHILS NFR BLD AUTO: 0 % (ref 0–1)
BILIRUB SERPL-MCNC: 0.3 MG/DL (ref 0.2–1)
BUN SERPL-MCNC: 8 MG/DL (ref 5–25)
CALCIUM SERPL-MCNC: 8.8 MG/DL (ref 8.3–10.1)
CANCER AG27-29 SERPL-ACNC: 58.5 U/ML (ref 0–42.3)
CHLORIDE SERPL-SCNC: 104 MMOL/L (ref 100–108)
CO2 SERPL-SCNC: 23 MMOL/L (ref 21–32)
CREAT SERPL-MCNC: 0.66 MG/DL (ref 0.6–1.3)
EOSINOPHIL # BLD AUTO: 0.1 THOUSAND/ΜL (ref 0–0.61)
EOSINOPHIL NFR BLD AUTO: 1 % (ref 0–6)
ERYTHROCYTE [DISTWIDTH] IN BLOOD BY AUTOMATED COUNT: 18.7 % (ref 11.6–15.1)
GFR SERPL CREATININE-BSD FRML MDRD: 99 ML/MIN/1.73SQ M
GLUCOSE SERPL-MCNC: 134 MG/DL (ref 65–140)
HCT VFR BLD AUTO: 37.3 % (ref 34.8–46.1)
HGB BLD-MCNC: 11.3 G/DL (ref 11.5–15.4)
IMM GRANULOCYTES # BLD AUTO: 0.17 THOUSAND/UL (ref 0–0.2)
IMM GRANULOCYTES NFR BLD AUTO: 1 % (ref 0–2)
LYMPHOCYTES # BLD AUTO: 0.77 THOUSANDS/ΜL (ref 0.6–4.47)
LYMPHOCYTES NFR BLD AUTO: 6 % (ref 14–44)
MCH RBC QN AUTO: 28.8 PG (ref 26.8–34.3)
MCHC RBC AUTO-ENTMCNC: 30.3 G/DL (ref 31.4–37.4)
MCV RBC AUTO: 95 FL (ref 82–98)
MONOCYTES # BLD AUTO: 0.53 THOUSAND/ΜL (ref 0.17–1.22)
MONOCYTES NFR BLD AUTO: 4 % (ref 4–12)
NEUTROPHILS # BLD AUTO: 11.55 THOUSANDS/ΜL (ref 1.85–7.62)
NEUTS SEG NFR BLD AUTO: 88 % (ref 43–75)
NRBC BLD AUTO-RTO: 0 /100 WBCS
PLATELET # BLD AUTO: 354 THOUSANDS/UL (ref 149–390)
PMV BLD AUTO: 9.3 FL (ref 8.9–12.7)
POTASSIUM SERPL-SCNC: 4.2 MMOL/L (ref 3.5–5.3)
PROT SERPL-MCNC: 7.5 G/DL (ref 6.4–8.2)
RBC # BLD AUTO: 3.93 MILLION/UL (ref 3.81–5.12)
SODIUM SERPL-SCNC: 140 MMOL/L (ref 136–145)
WBC # BLD AUTO: 13.17 THOUSAND/UL (ref 4.31–10.16)

## 2019-07-30 PROCEDURE — 85025 COMPLETE CBC W/AUTO DIFF WBC: CPT | Performed by: INTERNAL MEDICINE

## 2019-07-30 PROCEDURE — 80053 COMPREHEN METABOLIC PANEL: CPT | Performed by: INTERNAL MEDICINE

## 2019-07-30 PROCEDURE — 86300 IMMUNOASSAY TUMOR CA 15-3: CPT

## 2019-07-30 NOTE — PLAN OF CARE
Problem: Potential for Falls  Goal: Patient will remain free of falls  Description  INTERVENTIONS:  - Assess patient frequently for physical needs  -  Identify cognitive and physical deficits and behaviors that affect risk of falls    -  [place] fall precautions as indicated by assessment   - Educate patient/family on patient safety including physical limitations  - Instruct patient to call for assistance with activity based on assessment  - Modify environment to reduce risk of injury  - Consider OT/PT consult to assist with strengthening/mobility  Outcome: Progressing

## 2019-07-30 NOTE — PROGRESS NOTES
Pt here for central labs  Port flushed per protocol and labs drawn per order  Patient rescheduled CT scan appt and port deaccessed  Confirmed appt back on Thursday 8-1-19 and AVS declined

## 2019-08-01 ENCOUNTER — HOSPITAL ENCOUNTER (OUTPATIENT)
Dept: CT IMAGING | Facility: HOSPITAL | Age: 59
Discharge: HOME | End: 2019-08-01
Payer: COMMERCIAL

## 2019-08-01 ENCOUNTER — HOSPITAL ENCOUNTER (OUTPATIENT)
Dept: INFUSION CENTER | Facility: CLINIC | Age: 59
Discharge: HOME | End: 2019-08-01
Payer: COMMERCIAL

## 2019-08-01 ENCOUNTER — TELEPHONE (OUTPATIENT)
Dept: HEMATOLOGY ONCOLOGY | Facility: CLINIC | Age: 59
End: 2019-08-01

## 2019-08-01 VITALS
HEIGHT: 66 IN | WEIGHT: 142.28 LBS | DIASTOLIC BLOOD PRESSURE: 74 MMHG | RESPIRATION RATE: 16 BRPM | BODY MASS INDEX: 22.87 KG/M2 | OXYGEN SATURATION: 97 % | HEART RATE: 84 BPM | TEMPERATURE: 97.8 F | SYSTOLIC BLOOD PRESSURE: 120 MMHG

## 2019-08-01 DIAGNOSIS — C50.912 BILATERAL MALIGNANT NEOPLASM OF BREAST IN FEMALE, ESTROGEN RECEPTOR POSITIVE, UNSPECIFIED SITE OF BREAST (HCC): Primary | ICD-10-CM

## 2019-08-01 DIAGNOSIS — Z17.0 BILATERAL MALIGNANT NEOPLASM OF BREAST IN FEMALE, ESTROGEN RECEPTOR POSITIVE, UNSPECIFIED SITE OF BREAST (HCC): Primary | ICD-10-CM

## 2019-08-01 DIAGNOSIS — C79.51 BONE METASTASES (HCC): ICD-10-CM

## 2019-08-01 DIAGNOSIS — C50.911 BILATERAL MALIGNANT NEOPLASM OF BREAST IN FEMALE, ESTROGEN RECEPTOR POSITIVE, UNSPECIFIED SITE OF BREAST (HCC): Primary | ICD-10-CM

## 2019-08-01 PROCEDURE — 71260 CT THORAX DX C+: CPT

## 2019-08-01 PROCEDURE — 96367 TX/PROPH/DG ADDL SEQ IV INF: CPT

## 2019-08-01 PROCEDURE — 96413 CHEMO IV INFUSION 1 HR: CPT

## 2019-08-01 PROCEDURE — 74177 CT ABD & PELVIS W/CONTRAST: CPT

## 2019-08-01 PROCEDURE — 96401 CHEMO ANTI-NEOPL SQ/IM: CPT

## 2019-08-01 RX ORDER — PALONOSETRON 0.05 MG/ML
0.25 INJECTION, SOLUTION INTRAVENOUS ONCE
Status: COMPLETED | OUTPATIENT
Start: 2019-08-01 | End: 2019-08-01

## 2019-08-01 RX ORDER — SODIUM CHLORIDE 9 MG/ML
20 INJECTION, SOLUTION INTRAVENOUS ONCE
Status: COMPLETED | OUTPATIENT
Start: 2019-08-01 | End: 2019-08-01

## 2019-08-01 RX ADMIN — DEXAMETHASONE SODIUM PHOSPHATE 10 MG: 10 INJECTION, SOLUTION INTRAMUSCULAR; INTRAVENOUS at 10:32

## 2019-08-01 RX ADMIN — SODIUM CHLORIDE 20 ML/HR: 0.9 INJECTION, SOLUTION INTRAVENOUS at 10:00

## 2019-08-01 RX ADMIN — ADO-TRASTUZUMAB EMTANSINE 256 MG: 20 INJECTION, POWDER, LYOPHILIZED, FOR SOLUTION INTRAVENOUS at 11:35

## 2019-08-01 RX ADMIN — DENOSUMAB 120 MG: 120 INJECTION SUBCUTANEOUS at 12:04

## 2019-08-01 RX ADMIN — SODIUM CHLORIDE 150 MG: 0.9 INJECTION, SOLUTION INTRAVENOUS at 10:55

## 2019-08-01 RX ADMIN — IOHEXOL 100 ML: 350 INJECTION, SOLUTION INTRAVENOUS at 13:28

## 2019-08-01 RX ADMIN — PALONOSETRON 0.25 MG: 0.05 INJECTION, SOLUTION INTRAVENOUS at 10:35

## 2019-08-01 NOTE — TELEPHONE ENCOUNTER
Read Janet from [facility] radiology called with stat results in ct chest abdomen pelvis w c contrast ordered by Lissette Teague  Results are in epic

## 2019-08-01 NOTE — PROGRESS NOTES
Pt here with c/o of " I feel so tired  [place] despite sleeping last night" - pt given review of effects of dexamethasone which she takes po    labs reviewed and are within parametersfor chenotherapy  Calcium noted for pts Xgeva due today  Pt also states after her [facility] port accessed that she will be going to CT scan from here and she " didn't get the stuff to drink from the office"  I did call CT scan dept and spoke with tech who states they will have someone run up the " water soluble prep" that pt is to be getting today

## 2019-08-01 NOTE — PROGRESS NOTES
Pts RCW port needle deaccessed upon completion of her chemotherapy  Pt did complete the prep she was required to drink for her CT scan today  Per D/W  CT scan tech from the department who brought the prep to pt chairside,  - pt to have a power port needle  I did re access her with a power port needle 20 g 3/4 inch  I did talk to them about having their department nurse deaccess pt upon completion of the Ct scan procedure and they verb agreement ( spoke with Korina Dukes) If any issues or concerns they can call me      Pt dcd stable and ambulatory  She has her AVS  She is aware as well as her sister that she has a f/u appt today at [facility] also

## 2019-08-01 NOTE — PROGRESS NOTES
Verified with Chito Jean RN - Dr Price [facility] pt to have Sabrina Fail today, was due and dates changed to reflect and cover todays administration in the orders       Pt tolerated Xgeva LEANDRO SQ x1 as ordered

## 2019-08-07 ENCOUNTER — OFFICE VISIT (OUTPATIENT)
Dept: HEMATOLOGY ONCOLOGY | Facility: CLINIC | Age: 59
End: 2019-08-07
Payer: COMMERCIAL

## 2019-08-07 VITALS
OXYGEN SATURATION: 94 % | BODY MASS INDEX: 22.82 KG/M2 | HEART RATE: 91 BPM | DIASTOLIC BLOOD PRESSURE: 80 MMHG | RESPIRATION RATE: 16 BRPM | TEMPERATURE: 99 F | SYSTOLIC BLOOD PRESSURE: 122 MMHG | WEIGHT: 142 LBS | HEIGHT: 66 IN

## 2019-08-07 DIAGNOSIS — C50.911 BILATERAL MALIGNANT NEOPLASM OF BREAST IN FEMALE, ESTROGEN RECEPTOR POSITIVE, UNSPECIFIED SITE OF BREAST (HCC): ICD-10-CM

## 2019-08-07 DIAGNOSIS — Z17.0 BILATERAL MALIGNANT NEOPLASM OF BREAST IN FEMALE, ESTROGEN RECEPTOR POSITIVE, UNSPECIFIED SITE OF BREAST (HCC): ICD-10-CM

## 2019-08-07 DIAGNOSIS — C50.919 MALIGNANT NEOPLASM OF FEMALE BREAST, UNSPECIFIED ESTROGEN RECEPTOR STATUS, UNSPECIFIED LATERALITY, UNSPECIFIED SITE OF BREAST (HCC): ICD-10-CM

## 2019-08-07 DIAGNOSIS — C50.912 BILATERAL MALIGNANT NEOPLASM OF BREAST IN FEMALE, ESTROGEN RECEPTOR POSITIVE, UNSPECIFIED SITE OF BREAST (HCC): ICD-10-CM

## 2019-08-07 DIAGNOSIS — C79.31 BRAIN METASTASES (HCC): Primary | ICD-10-CM

## 2019-08-07 PROCEDURE — 99215 OFFICE O/P EST HI 40 MIN: CPT | Performed by: PHYSICIAN ASSISTANT

## 2019-08-07 NOTE — PROGRESS NOTES
Gavin Bentley  [facility] [address]  Progress Note  Yovana Kimble, 1960, [id number]  8/7/2019    Assessment/Plan:  1  Malignant neoplasm of female breast, unspecified estrogen receptor status, unspecified laterality, unspecified site of breast ([address] )  This is a heavily pretreated 51-year-old female with history ER+, HER2/Gifty + metastatic breast cancer  Patient's most recent scans demonstrated disease stability in her chest abdomen and pelvis  However the patient is symptomatic with dizziness  Patient also has some proximal weakness  Previously, patient was diagnosed brain metastases and received whole brain radiation  Presently with proximal weakness and dizziness, re-evaluation with MRI of the brain is necessary to rule out new metastatic lesion to the brain  Tumor markers are decreasing  Patient's quality of life is significantly diminished  We discussed the role of chemotherapy at this point is palliative in purposes to extend life  However if the patient desires treatment vacation, this be accommodated  Patient understands that with holding medications could progressive disease  Patient would be interested in this as an option  I discussed this would only be practical if brain lesions are stable  Additionally, I hope by discontinuing chemotherapy/treatment holiday, patient would be able to discontinue steroid on medication which she believes contributing to her weakness  I will discuss case with Dr Dee Retana directly, for his thoughts  Regimen:  Kadcyla 3 6 mg/KG IV Day 1, Q 3 weeks      2  Proximal weakness  Likely secondary from continued steroid use  Patient will discontinue chemotherapy, this could be decreased further  Specific titration will be provided patient chooses to withhold therapy      Previously had suggested the patient undergo physical therapy however secondary to financial concerns, the patient is not want to pursue this  - MRI brain w wo contrast; Future    3  Brain metastases ([address] )  See above  - MRI brain w wo contrast; Future    The patient is scheduled for follow-up in approximately 3 weeks with Dr Gregory Cushing  Patient voiced agreement and understanding to the above  Patient knows to call the Hematology/Oncology office with any questions and concerns regarding the above  Carefully review your medication list in verify the list is accurate and up-to-date  Please call the hematologic/Oncology office if there medications missing from the less, medications on the list that your not currently taking or if there is a dosage or instruction that is different from higher taking medication  I have spent 30 minutes with Patient and family today in which greater than 50% of this time was spent in counseling/coordination of care regarding Diagnostic results, Prognosis, Risks and benefits of tx options and Impressions  Goals and Barriers:    Current Goal:   Prolong Survival from Cancer  Barriers: None  Patient's Capacity to Self Care:  Patient able to self care   -------------------------------------------------------------------------------------------------------    Chief Complaint   Patient presents with    Follow-up       History of present illness/Cancer History:   Oncology History    Marissa Crandall is a 66y o  year old female who presents with stage IV metastatic breast carcinoma with a history of brain metastasis treated with [facility] May 2017 now with the multiple recurrent brain metastasis  She completed whole-brain radiation therapy March 12, 2019,with previous SRS to brain in May of 2017 and L2-S1 completed on 8/24/16  She was last seen 4/23/19       She had f/u scans done 5/1/19  No CT evidence of new soft tissue tumor mass in the chest, abdomen or pelvis  No significant interval change in subtle lucent and sclerotic lesions within the lower thoracic and lumbar vertebral bodies suggestive of osseous metastatic disease  Pt continues to see Palliative care  Continues to c/o pain while  on Decadron, Gabapentin, Flexeril, Dilaudid prn for   Pain  control  Has persistent nausea, and constipation  6/17/19 MRI Brain showed multiple supratentorial and infratentorial metastatic lesions, as described above and overall improved since prior examination  No definite new nodular enhancing lesions identified on the current examination  Had med onc PA f/u in June  Pt has chronic constipation, and was seen x 2 in recent past in the ER for this  Pt had palliative care f/u, today  She had requested a break for chemo, and her next chemo is being held  t had c/o weakness, and had refused to do PT, she has been taking steroids for   awhile  CT scan scheduled for 7/29/19  Bilateral malignant neoplasm of breast in female, estrogen receptor positive ([facility] [address] )    2010 Initial Diagnosis     The patient had a left sided T1 B , N0 ER positive, CA and HER-2 negative invasive ductal carcinoma, right-sided DCIS, ER/CA positive, HER-2 negative  Bilateral mastectomy  2010 - 4/2011 Hormone Therapy     Tamoxifen 20 mg daily  Last menstrual period was on 3/2010       4/2011 -  Hormone Therapy     Arimidex 1 mg daily  Unknown when medication was discontinued  Patient was lost to follow up       12/9/2015 Progression     · Right arm swelling in December 2015  U/S found a nonvascular structure in the right axilla measuring 2 2 x 1 3 cm  · Subsequent CT Scan of the chest on 12/9/15, showed multiple bilateral pulmonary nodules measuring 3-7 mm  · PET 2/4/16, which revealed hypermetabolic hilar and mediastinal nodes  There is a pre carinal node with a SUV of 5 4 measuring 1 8 x 1 7 cm, and a subcarinal node measuring 1 9 x 1 3 cm with a SUV of 5 3  There is right hilar activity of 3 5 and left hilar activity of 2 8  The subcentimeter nodules are too small for PET evaluation   She also has uptake in her L3 and L5 vertebral bodies, both concerning for bony metastases  2/16/2016 Biopsy     Bronchoscopic biopsy showed Metastatic non-small cell carcinoma, favor adenocarcinoma  ER 90%, ND 0%  Her 2 +2, positive by FISH         3/1/2016 - 6/2016 Chemotherapy     Taxotere 75 mg/m2, Day 1  Perjeta 420 mg, Day 1  Herceptin 6 mg/kg, Day 1  Cycle length= 21 days      3/1/2016 - 2/2017 Hormone Therapy     Anastrozole 1 mg daily      6/2016 - 2/2017 Chemotherapy     Perjeta 420 mg, Day 1  Herceptin 6 mg/kg, Day 1  Cycle length= 21 days      2/2017 Progression     Bony progression       2/10/2017 - 4/12/2018 Chemotherapy     Perjeta 420 mg, Day 1  Herceptin 6 mg/kg, Day 1  Taxotere 75 mg/m2  Cycle length= 21 days      3/3/2017 -  Chemotherapy     Xgeva 120 mg IV, Day 1  Cycle length =  84 days      4/18/2017 Progression      brain MRI showed a 5 mm right parietal lesion, 3 mm right frontal lesion, 3 mm left parahippocampal lesion  5/2017 - 5/2017 Radiation     SRS to brain lesions      5/3/2018 - 8/2018 Chemotherapy     Perjeta 420 mg,  Day 1  Herceptin 6 mg/kg,  Day 1  Cycle length = 21 days  Cycle 3 was administered on 6/14/18    ** taxotere was temporarily discontinued secondary for desire of treatment holiday  Taxotere was readded into the treatment regimen during last cycle  9/5/2018 Progression     Cutaneous disease progression  9/12/2018 -  Chemotherapy     ado-trastuzumab emtansine (KADCYLA) 256 mg in sodium chloride 0 9 % 250 mL IVPB, 3 6 mg/kg = 256 mg, Intravenous, Once, 14 of 18 cycles  Administration: 256 mg (5/23/2019), 256 mg (6/20/2019), 256 mg (8/1/2019)      9/13/2018 -  Chemotherapy     Kadcyla 3 6 mg/kg dose IV Day 1  Cycle length =  21 days  Cycles planned = until progression    Interrupted at the end of January secondary to progressive fatigue and interval development of brain mets requiring radiation      Restarted on 3/14/19           2/18/2019 Progression MRI brain demonstrated disease progression; CT of the chest abdomen pelvis was stable along with the patient's tumor markers  2/27/2019 - 3/12/2019 Radiation     WBRT  X 10 sessions  4/24/2019 -  Cancer Staged     Staging form: Breast, AJCC 7th Edition  - Clinical: Stage IV (TX, NX, M1) - Signed by Domenico Lamb PA-C on 4/24/2019  Method of lymph node assessment: Other  Source of metastatic specimen: Distant Lymph Nodes  Estrogen receptor status: Positive  Progesterone receptor status: Negative  HER2 status: Negative          Bone metastases ([facility] [address] )    7/6/2016 Initial Diagnosis     Bone metastases ([address] )      8/10/2016 - 8/24/2016 Radiation     Treatments:  Course: C1    Plan ID Energy Fractions Dose per Fraction (cGy) Total Dose Delivered (cGy) Elapsed Days   L2-S1 Spine 10X 10 / 10 300 3,000 14      Treatment Dates:  8/10/2016 - 8/24/2016  Cancer Staging  Bilateral malignant neoplasm of breast in female, estrogen receptor positive ([facility] [address] )  Staging form: Breast, AJCC 7th Edition  - Clinical: Stage IV (TX, NX, M1) - Signed by Domenico Lamb PA-C on 4/24/2019     ECOG: 3 - Symptomatic, >50% confined to bed    Interval history:  Patient is proximal weakness in continued dizziness  Patient has issues variance today in her feet  Patient does note that dizziness and somewhat improved with missing 1 cycle of chemotherapy  CT scan and lab work were discussed  Review of Systems   Constitutional: Positive for activity change and fatigue  Negative for appetite change, fever and unexpected weight change  HENT: Negative for nosebleeds  Respiratory: Negative for cough, choking and shortness of breath  Negative hemoptysis  Cardiovascular: Negative for chest pain, palpitations and leg swelling  Gastrointestinal: Negative  Negative for abdominal distention, abdominal pain, anal bleeding, blood in stool, constipation, diarrhea, nausea and vomiting  Endocrine: Negative    Negative for cold intolerance  Genitourinary: Negative  Negative for hematuria, menstrual problem, vaginal bleeding, vaginal discharge and vaginal pain  Musculoskeletal: Negative  Negative for arthralgias, myalgias, neck pain and neck stiffness  Skin: Negative  Negative for color change, pallor and rash  Allergic/Immunologic: Negative  Negative for immunocompromised state  Neurological: Positive for dizziness and weakness  Negative for headaches  Hematological: Negative for adenopathy  Does not bruise/bleed easily  All other systems reviewed and are negative          Current Outpatient Medications:     albuterol (VENTOLIN HFA) 90 mcg/act inhaler, Inhale 2 puffs every 6 (six) hours as needed for wheezing, Disp: 18 g, Rfl: 2    dexamethasone (DECADRON) 4 mg tablet, Take 1 tablet (4 mg total) by mouth daily with breakfast, Disp: 30 tablet, Rfl: 1    gabapentin (NEURONTIN) 300 mg capsule, 1 cap PO bid and 2 cap at bedtime, Disp: 120 capsule, Rfl: 5    HYDROmorphone (DILAUDID) 4 mg tablet, 1-2 tabs every 4 hours as needed for breakthrough pain, Disp: 150 tablet, Rfl: 0    lactulose 20 g/30 mL, Take 15 mL (10 g total) by mouth every 6 (six) hours as needed (Constipation), Disp: 135 mL, Rfl: 5    lidocaine-prilocaine (EMLA) cream, Apply topically as needed for mild pain, Disp: 30 g, Rfl: 0    LORazepam (ATIVAN) 1 mg tablet, Take 1 tablet (1 mg total) by mouth every 6 (six) hours as needed for anxiety or sleep (nausea), Disp: 60 tablet, Rfl: 1    methadone (DOLOPHINE) 10 mg tablet, Take 1 tablet by mouth 2 (two) times a day For chronic pain,, Disp: 60 tablet, Rfl: 0    OLANZapine (ZyPREXA) 10 mg tablet, TAKE 1 TABLET BY MOUTH EVERY DAY, Disp: 30 tablet, Rfl: 5    pantoprazole (PROTONIX) 40 mg tablet, TAKE 1 TABLET BY MOUTH EVERY DAY, Disp: 30 tablet, Rfl: 2    polyethylene glycol (MIRALAX) 17 g packet, Take 17 g by mouth daily as needed (constipation) for up to 10 doses, Disp: 10 each, Rfl: 0   promethazine (PHENERGAN) 25 mg tablet, Take 1 tablet (25 mg total) by mouth every 6 (six) hours as needed for nausea or vomiting, Disp: 30 tablet, Rfl: 1    senna (SENOKOT) 8 6 mg, Take 2 tablets by mouth 2 (two) times a day as needed  , Disp: , Rfl:     zolpidem (AMBIEN) 10 mg tablet, Take 1 tablet (10 mg total) by mouth daily at bedtime as needed for sleep, Disp: 30 tablet, Rfl: 0    No Known Allergies    Code Status: [unfilled]  Advance Directive and Living Will:      Power of :    POLST:      Objective:   /80   Pulse 91   Temp 99 °F (37 2 °C) (Oral)   Resp 16   Ht 5' 6" (1 676 m)   Wt 64 4 kg (142 lb)   LMP  (LMP Unknown)   SpO2 94%   BMI 22 92 kg/m²   Wt Readings from Last 6 Encounters:   08/07/19 64 4 kg (142 lb)   08/01/19 64 5 kg (142 lb 4 6 oz)   07/02/19 65 7 kg (144 lb 13 5 oz)   06/26/19 65 3 kg (144 lb)   06/20/19 65 3 kg (144 lb)   05/31/19 68 4 kg (150 lb 12 7 oz)       Physical Exam   Constitutional: She is oriented to person, place, and time  She appears well-developed and well-nourished  No distress  HENT:   Head: Normocephalic and atraumatic  Mouth/Throat: Oropharynx is clear and moist  No oropharyngeal exudate  Eyes: Pupils are equal, round, and reactive to light  EOM are normal  No scleral icterus  Neck: Normal range of motion  Cardiovascular: Normal rate and regular rhythm  No murmur heard  Pulmonary/Chest: Effort normal and breath sounds normal  No respiratory distress  Abdominal: Soft  Bowel sounds are normal  She exhibits no distension  There is no tenderness  Musculoskeletal: Normal range of motion  She exhibits no edema  Lymphadenopathy:     She has no cervical adenopathy  Neurological: She is alert and oriented to person, place, and time  No cranial nerve deficit  Skin: Skin is warm  No rash noted  No pallor  Psychiatric: She has a normal mood and affect   Thought content normal        Pertinent Laboratory Results and Imaging Review:  CT chest abdomen pelvis w contrast  Narrative: CT CHEST, ABDOMEN AND PELVIS WITH IV CONTRAST    INDICATION:   C50 919: Malignant neoplasm of unspecified site of unspecified female breast  C79 51: Secondary malignant neoplasm of bone  COMPARISON:  CT abdomen/pelvis 7/2/2019 and CT chest/abdomen/pelvis 5/2/2019  TECHNIQUE: CT examination of the chest, abdomen and pelvis was performed  Axial, sagittal, and coronal 2D reformatted images were created from the source data and submitted for interpretation  Radiation dose length product (DLP) for this visit:  691 mGy-cm   This examination, like all CT scans performed in the [facility], was performed utilizing techniques to minimize radiation dose exposure, including the use of iterative   reconstruction and automated exposure control  IV Contrast:  100 mL of iohexol (OMNIPAQUE)  Enteric Contrast: Enteric contrast was administered  FINDINGS:     CHEST    LUNGS:  Background centrilobular emphysematous changes are reidentified  Worsening interstitial prominence and groundglass density in mid and lower lung zones  No suspicious developing pulmonary mass is seen  No tracheal or endobronchial lesion is   evident  PLEURA:  Stable thickening or trace effusion noted in the posterior right mid chest   No pneumothorax  No pleural-based mass  HEART/GREAT VESSELS:  Heart is mildly enlarged but stable  No thoracic aortic aneurysm  No pericardial effusion  MEDIASTINUM AND CHEO:  Small calcified right hilar nodes are again again identified  No enlarged or pathologic adenopathy  Stable hiatal hernia  CHEST WALL AND LOWER NECK:   Stable breast implants  Surrounding soft tissues are unremarkable  Surgical clips in the axilla noted bilaterally  No enlarged or pathologic adenopathy noted  Right chest port is reidentified  ABDOMEN    LIVER/BILIARY TREE:  Unremarkable  GALLBLADDER:  No calcified gallstones   No pericholecystic inflammatory change  SPLEEN:  Unremarkable  PANCREAS:  Unremarkable  ADRENAL GLANDS:  Unremarkable  KIDNEYS/URETERS:  Unremarkable  No hydronephrosis  STOMACH AND BOWEL:  Large volume of stool throughout the colon  No bowel obstruction  APPENDIX:  No inflammatory changes to suggest appendicitis  Normal appendix visualized  ABDOMINOPELVIC CAVITY:  No ascites or free intraperitoneal air  No lymphadenopathy  VESSELS:  Mild atherosclerotic change of the aorta and its branches  PELVIS    REPRODUCTIVE ORGANS:  Patient is status post hysterectomy  URINARY BLADDER:  Unremarkable  ABDOMINAL WALL/INGUINAL REGIONS:  Stable tiny small fat-containing umbilical hernia  OSSEOUS STRUCTURES:  Stable sclerotic lesion identified within the T11 vertebral body  Stable mixed sclerotic and lucent changes at L3 and L5  No new or suspicious destructive osseous lesion identified  Impression: No CT evidence of metastatic disease in the chest, abdomen or pelvis  Stable emphysema slightly worsened interstitial/groundglass opacities in the mid to lower lung zones  Nonspecific findings have been present on multiple prior studies and are likely reflective of either noncardiogenic pulmonary edema versus chronic   infection  Stable osseous lesions previously attributed to metastatic disease  Nothing new      Workstation performed: [id number]    Hospital Outpatient Visit on 07/30/2019   Component Date Value Ref Range Status    CA 27 29 07/30/2019 58 5* 0 0 - 42 3 U/mL Final    WBC 07/30/2019 13 17* 4 31 - 10 16 Thousand/uL Final    RBC 07/30/2019 3 93  3 81 - 5 12 Million/uL Final    Hemoglobin 07/30/2019 11 3* 11 5 - 15 4 g/dL Final    Hematocrit 07/30/2019 37 3  34 8 - 46 1 % Final    MCV 07/30/2019 95  82 - 98 fL Final    MCH 07/30/2019 28 8  26 8 - 34 3 pg Final    MCHC 07/30/2019 30 3* 31 4 - 37 4 g/dL Final    RDW 07/30/2019 18 7* 11 6 - 15 1 % Final    MPV 07/30/2019 9 3  8 9 - 12 7 fL Final    Platelets 26/03/3382 354  149 - 390 Thousands/uL Final    nRBC 07/30/2019 0  /100 WBCs Final    Neutrophils Relative 07/30/2019 88* 43 - 75 % Final    Immat GRANS % 07/30/2019 1  0 - 2 % Final    Lymphocytes Relative 07/30/2019 6* 14 - 44 % Final    Monocytes Relative 07/30/2019 4  4 - 12 % Final    Eosinophils Relative 07/30/2019 1  0 - 6 % Final    Basophils Relative 07/30/2019 0  0 - 1 % Final    Neutrophils Absolute 07/30/2019 11 55* 1 85 - 7 62 Thousands/µL Final    Immature Grans Absolute 07/30/2019 0 17  0 00 - 0 20 Thousand/uL Final    Lymphocytes Absolute 07/30/2019 0 77  0 60 - 4 47 Thousands/µL Final    Monocytes Absolute 07/30/2019 0 53  0 17 - 1 22 Thousand/µL Final    Eosinophils Absolute 07/30/2019 0 10  0 00 - 0 61 Thousand/µL Final    Basophils Absolute 07/30/2019 0 05  0 00 - 0 10 Thousands/µL Final    Sodium 07/30/2019 140  136 - 145 mmol/L Final    Potassium 07/30/2019 4 2  3 5 - 5 3 mmol/L Final    Chloride 07/30/2019 104  100 - 108 mmol/L Final    CO2 07/30/2019 23  21 - 32 mmol/L Final    ANION GAP 07/30/2019 13  4 - 13 mmol/L Final    BUN 07/30/2019 8  5 - 25 mg/dL Final    Creatinine 07/30/2019 0 66  0 60 - 1 30 mg/dL Final    Standardized to IDMS reference method    Glucose 07/30/2019 134  65 - 140 mg/dL Final      If the patient is fasting, the ADA then defines impaired fasting glucose as > 100 mg/dL and diabetes as > or equal to 123 mg/dL  Specimen collection should occur prior to Sulfasalazine administration due to the potential for falsely depressed results  Specimen collection should occur prior to Sulfapyridine administration due to the potential for falsely elevated results   Calcium 07/30/2019 8 8  8 3 - 10 1 mg/dL Final    AST 07/30/2019 51* 5 - 45 U/L Final      Specimen collection should occur prior to Sulfasalazine administration due to the potential for falsely depressed results       ALT 07/30/2019 63  12 - 78 U/L Final      Specimen collection should occur prior to Sulfasalazine administration due to the potential for falsely depressed results   Alkaline Phosphatase 07/30/2019 128* 46 - 116 U/L Final    Total Protein 07/30/2019 7 5  6 4 - 8 2 g/dL Final    Albumin 07/30/2019 2 8* 3 5 - 5 0 g/dL Final    Total Bilirubin 07/30/2019 0 30  0 20 - 1 00 mg/dL Final    eGFR 07/30/2019 99  ml/min/1 73sq m Final         The following historical data was reviewed      Past Medical History:   Diagnosis Date    H/O bilateral mastectomy 2/15/2016    Hypertension 2/15/2016    Lymphedema 2/15/2016    Malignant neoplasm of right breast ([facility] [address] ) 2/15/2016    Metastatic breast cancer [facility])        Past Surgical History:   Procedure Laterality Date    ENDOBRONCHIAL ULTRASOUND (EBUS) N/A 2/16/2016    Procedure: EBUS;  Surgeon: Jeff Muro MD;  [facility]: [facility];  Service:     MASTECTOMY Bilateral     MASTECTOMY Bilateral     right arm edema    OOPHORECTOMY      CA BRONCHOSCOPY NEEDLE BX TRACHEA MAIN STEM&/BRON N/A 2/16/2016    Procedure: Mendel Coon;  Surgeon: Jeff Muro MD;  [facility];  Service: Thoracic    CA INSJ TUNNELED CTR VAD W/SUBQ PORT AGE 5 YR/> N/A 7/24/2018    Procedure: INSERTION VENOUS PORT ( PORT-A-CATH) IR;  Surgeon: Shaheed Colunga DO;  [facility];  Service: Interventional Radiology    CA STEREOTACTIC RADIOSURGERY, CRANIAL,SIMPLE,EA ADD  5/3/2017         CA STEREOTACTIC RADIOSURGERY, CRANIAL,SIMPLE,EA ADD  5/3/2017         CA STEREOTACTIC RADIOSURGERY, CRANIAL,SIMPLE,SINGLE  5/3/2017            Social History     Socioeconomic History    Marital status: /Civil Union     Spouse name: Not on file    Number of children: 1    Years of education: Not on file    Highest education level: Not on file   Occupational History    Not on file   Social Needs    Financial resource strain: Not on file    Food insecurity:     Worry: Not on file     Inability: Not on file    Transportation needs: Medical: Not on file     Non-medical: Not on file   Tobacco Use    Smoking status: Current Every Day Smoker     Packs/day: 0 50     Years: 40 00     Pack years: 20 00     Types: Cigarettes     Start date: 1    Smokeless tobacco: Never Used   Substance and Sexual Activity    Alcohol use: Yes     Frequency: 2-4 times a month     Drinks per session: 3 or 4     Binge frequency: Less than monthly     Comment: social    Drug use: Yes     Types: Marijuana    Sexual activity: Not on file   Lifestyle    Physical activity:     Days per week: Not on file     Minutes per session: Not on file    Stress: Not on file   Relationships    Social connections:     Talks on phone: Not on file     Gets together: Not on file     Attends Druze service: Not on file     Active member of club or organization: Not on file     Attends meetings of clubs or organizations: Not on file     Relationship status: Not on file    Intimate partner violence:     Fear of current or ex partner: Not on file     Emotionally abused: Not on file     Physically abused: Not on file     Forced sexual activity: Not on file   Other Topics Concern    Not on file   Social History Narrative    Not on file       Family History   Problem Relation Age of Onset    Cancer Sister     Prostate cancer Brother        Please note: This report has been generated by a voice recognition software system  Therefore there may be syntax, spelling, and/or grammatical errors  Please call if you have any questions

## 2019-08-20 ENCOUNTER — HOSPITAL ENCOUNTER (OUTPATIENT)
Dept: INFUSION CENTER | Facility: CLINIC | Age: 59
Discharge: HOME | End: 2019-08-20
Payer: COMMERCIAL

## 2019-08-20 DIAGNOSIS — Z17.0 BILATERAL MALIGNANT NEOPLASM OF BREAST IN FEMALE, ESTROGEN RECEPTOR POSITIVE, UNSPECIFIED SITE OF BREAST (HCC): Primary | ICD-10-CM

## 2019-08-20 DIAGNOSIS — C50.911 BILATERAL MALIGNANT NEOPLASM OF BREAST IN FEMALE, ESTROGEN RECEPTOR POSITIVE, UNSPECIFIED SITE OF BREAST (HCC): Primary | ICD-10-CM

## 2019-08-20 DIAGNOSIS — C50.912 BILATERAL MALIGNANT NEOPLASM OF BREAST IN FEMALE, ESTROGEN RECEPTOR POSITIVE, UNSPECIFIED SITE OF BREAST (HCC): Primary | ICD-10-CM

## 2019-08-20 LAB
ALBUMIN SERPL BCP-MCNC: 3.1 G/DL (ref 3.5–5)
ALP SERPL-CCNC: 105 U/L (ref 46–116)
ALT SERPL W P-5'-P-CCNC: 54 U/L (ref 12–78)
ANION GAP SERPL CALCULATED.3IONS-SCNC: 10 MMOL/L (ref 4–13)
AST SERPL W P-5'-P-CCNC: 33 U/L (ref 5–45)
BASOPHILS # BLD AUTO: 0.04 THOUSANDS/ΜL (ref 0–0.1)
BASOPHILS NFR BLD AUTO: 0 % (ref 0–1)
BILIRUB SERPL-MCNC: 0.2 MG/DL (ref 0.2–1)
BUN SERPL-MCNC: 10 MG/DL (ref 5–25)
CALCIUM SERPL-MCNC: 9.5 MG/DL (ref 8.3–10.1)
CHLORIDE SERPL-SCNC: 106 MMOL/L (ref 100–108)
CO2 SERPL-SCNC: 26 MMOL/L (ref 21–32)
CREAT SERPL-MCNC: 0.56 MG/DL (ref 0.6–1.3)
EOSINOPHIL # BLD AUTO: 0.01 THOUSAND/ΜL (ref 0–0.61)
EOSINOPHIL NFR BLD AUTO: 0 % (ref 0–6)
ERYTHROCYTE [DISTWIDTH] IN BLOOD BY AUTOMATED COUNT: 21 % (ref 11.6–15.1)
GFR SERPL CREATININE-BSD FRML MDRD: 104 ML/MIN/1.73SQ M
GLUCOSE SERPL-MCNC: 112 MG/DL (ref 65–140)
HCT VFR BLD AUTO: 37.6 % (ref 34.8–46.1)
HGB BLD-MCNC: 11.6 G/DL (ref 11.5–15.4)
IMM GRANULOCYTES # BLD AUTO: 0.18 THOUSAND/UL (ref 0–0.2)
IMM GRANULOCYTES NFR BLD AUTO: 2 % (ref 0–2)
LYMPHOCYTES # BLD AUTO: 0.77 THOUSANDS/ΜL (ref 0.6–4.47)
LYMPHOCYTES NFR BLD AUTO: 7 % (ref 14–44)
MCH RBC QN AUTO: 29 PG (ref 26.8–34.3)
MCHC RBC AUTO-ENTMCNC: 30.9 G/DL (ref 31.4–37.4)
MCV RBC AUTO: 94 FL (ref 82–98)
MONOCYTES # BLD AUTO: 0.36 THOUSAND/ΜL (ref 0.17–1.22)
MONOCYTES NFR BLD AUTO: 3 % (ref 4–12)
NEUTROPHILS # BLD AUTO: 9.53 THOUSANDS/ΜL (ref 1.85–7.62)
NEUTS SEG NFR BLD AUTO: 88 % (ref 43–75)
NRBC BLD AUTO-RTO: 0 /100 WBCS
PLATELET # BLD AUTO: 383 THOUSANDS/UL (ref 149–390)
PMV BLD AUTO: 9.2 FL (ref 8.9–12.7)
POTASSIUM SERPL-SCNC: 4.1 MMOL/L (ref 3.5–5.3)
PROT SERPL-MCNC: 7.8 G/DL (ref 6.4–8.2)
RBC # BLD AUTO: 4 MILLION/UL (ref 3.81–5.12)
SODIUM SERPL-SCNC: 142 MMOL/L (ref 136–145)
WBC # BLD AUTO: 10.89 THOUSAND/UL (ref 4.31–10.16)

## 2019-08-20 PROCEDURE — 80053 COMPREHEN METABOLIC PANEL: CPT | Performed by: INTERNAL MEDICINE

## 2019-08-20 PROCEDURE — 85025 COMPLETE CBC W/AUTO DIFF WBC: CPT | Performed by: INTERNAL MEDICINE

## 2019-08-21 ENCOUNTER — SOCIAL WORK (OUTPATIENT)
Dept: PALLIATIVE MEDICINE | Facility: CLINIC | Age: 59
End: 2019-08-21
Payer: COMMERCIAL

## 2019-08-21 ENCOUNTER — HOSPITAL ENCOUNTER (OUTPATIENT)
Dept: MRI IMAGING | Facility: HOSPITAL | Age: 59
Discharge: HOME | End: 2019-08-21
Payer: COMMERCIAL

## 2019-08-21 ENCOUNTER — OFFICE VISIT (OUTPATIENT)
Dept: PALLIATIVE MEDICINE | Facility: CLINIC | Age: 59
End: 2019-08-21
Payer: COMMERCIAL

## 2019-08-21 VITALS
HEART RATE: 105 BPM | SYSTOLIC BLOOD PRESSURE: 150 MMHG | RESPIRATION RATE: 18 BRPM | DIASTOLIC BLOOD PRESSURE: 82 MMHG | OXYGEN SATURATION: 98 % | BODY MASS INDEX: 23.46 KG/M2 | TEMPERATURE: 99.2 F | HEIGHT: 66 IN | WEIGHT: 146 LBS

## 2019-08-21 DIAGNOSIS — C50.911 BILATERAL MALIGNANT NEOPLASM OF BREAST IN FEMALE, ESTROGEN RECEPTOR POSITIVE, UNSPECIFIED SITE OF BREAST (HCC): Primary | ICD-10-CM

## 2019-08-21 DIAGNOSIS — C50.912 BILATERAL MALIGNANT NEOPLASM OF BREAST IN FEMALE, ESTROGEN RECEPTOR POSITIVE, UNSPECIFIED SITE OF BREAST (HCC): Primary | ICD-10-CM

## 2019-08-21 DIAGNOSIS — C50.919 MALIGNANT NEOPLASM OF FEMALE BREAST, UNSPECIFIED ESTROGEN RECEPTOR STATUS, UNSPECIFIED LATERALITY, UNSPECIFIED SITE OF BREAST (HCC): ICD-10-CM

## 2019-08-21 DIAGNOSIS — R11.0 CHEMOTHERAPY-INDUCED NAUSEA: ICD-10-CM

## 2019-08-21 DIAGNOSIS — Z17.0 BILATERAL MALIGNANT NEOPLASM OF BREAST IN FEMALE, ESTROGEN RECEPTOR POSITIVE, UNSPECIFIED SITE OF BREAST (HCC): Primary | ICD-10-CM

## 2019-08-21 DIAGNOSIS — M51.26 HERNIATED LUMBAR INTERVERTEBRAL DISC: ICD-10-CM

## 2019-08-21 DIAGNOSIS — G47.9 DIFFICULTY SLEEPING: ICD-10-CM

## 2019-08-21 DIAGNOSIS — C79.31 BRAIN METASTASES (HCC): ICD-10-CM

## 2019-08-21 DIAGNOSIS — R63.0 DECREASED APPETITE: ICD-10-CM

## 2019-08-21 DIAGNOSIS — Z71.89 COUNSELING AND COORDINATION OF CARE: Primary | ICD-10-CM

## 2019-08-21 DIAGNOSIS — T45.1X5A CHEMOTHERAPY-INDUCED NAUSEA: ICD-10-CM

## 2019-08-21 DIAGNOSIS — G89.3 CANCER RELATED PAIN: ICD-10-CM

## 2019-08-21 DIAGNOSIS — K59.00 CONSTIPATION, UNSPECIFIED CONSTIPATION TYPE: ICD-10-CM

## 2019-08-21 DIAGNOSIS — R42 DIZZINESS: ICD-10-CM

## 2019-08-21 PROCEDURE — A9585 GADOBUTROL INJECTION: HCPCS | Performed by: PHYSICIAN ASSISTANT

## 2019-08-21 PROCEDURE — 99214 OFFICE O/P EST MOD 30 MIN: CPT | Performed by: FAMILY MEDICINE

## 2019-08-21 PROCEDURE — 70553 MRI BRAIN STEM W/O & W/DYE: CPT

## 2019-08-21 PROCEDURE — NC001 PR NO CHARGE

## 2019-08-21 RX ORDER — PANTOPRAZOLE SODIUM 40 MG/1
40 TABLET, DELAYED RELEASE ORAL DAILY
Qty: 30 TABLET | Refills: 5 | Status: SHIPPED | OUTPATIENT
Start: 2019-08-21 | End: 2020-02-12

## 2019-08-21 RX ORDER — HYDROMORPHONE HYDROCHLORIDE 4 MG/1
TABLET ORAL
Qty: 120 TABLET | Refills: 0 | Status: SHIPPED | OUTPATIENT
Start: 2019-08-21 | End: 2019-08-23 | Stop reason: SDUPTHER

## 2019-08-21 RX ORDER — GABAPENTIN 300 MG/1
CAPSULE ORAL
Qty: 120 CAPSULE | Refills: 5 | Status: SHIPPED | OUTPATIENT
Start: 2019-08-21 | End: 2019-10-23

## 2019-08-21 RX ADMIN — GADOBUTROL 6 ML: 604.72 INJECTION INTRAVENOUS at 15:32

## 2019-08-21 NOTE — PROGRESS NOTES
Palliative and Supportive Care   Vanessa Valencia 64 y o  female [id number]    Assessment/Plan:  1  Bilateral malignant neoplasm of breast in female, estrogen receptor positive, unspecified site of breast ([facility] [address] )    2  Cancer related pain    3  Chemotherapy-induced nausea    4  Constipation, unspecified constipation type    5  Decreased appetite    6  Difficulty sleeping    7  Dizziness    8  Herniated lumbar intervertebral disc      Requested Prescriptions     Signed Prescriptions Disp Refills    HYDROmorphone (DILAUDID) 4 mg tablet 120 tablet 0     Si-2 tabs every 4 hours as needed for breakthrough pain    pantoprazole (PROTONIX) 40 mg tablet 30 tablet 5     Sig: Take 1 tablet (40 mg total) by mouth daily    gabapentin (NEURONTIN) 300 mg capsule 120 capsule 5     Si cap PO bid and 2 cap at bedtime     Medications Discontinued During This Encounter   Medication Reason    methadone (DOLOPHINE) 10 mg tablet     zolpidem (AMBIEN) 10 mg tablet      Despite my best efforts to manage her symptoms with various classes of medications she continuously fails to report improvements in her symptom control at follow up visits  I will ask that the palliative care home nurse go out and assist with medication management to ensure compliance  In the meantime, I feel it is most judicious to keep her medication regime simple to avoid polypharmacy and its side effects  MRI today  If not change or increase in brain mets, would agree with steroid taper  Representatives have queried the patient's controlled substance dispensing history in the Prescription Drug Monitoring Program in compliance with regulations before I have prescribed any controlled substances  The prescription history is consistent with prescribed therapy and our practice policies        25 minutes were spent face to face with Vanessa Valencia and her sister with greater than 50% of the time spent in counseling or coordination of care including discussions of treatment instructions and follow up requirements   All of the patient's questions were answered during this discussion  Return in about 1 month (around 9/21/2019)  Subjective:   Chief Complaint  Follow up visit for:  symptom management  HPI     Mellie Koyanagi is a 64 y o  female with metastatic breast cancer originally diagnosed in 2010   She is under the care of Dr Drea Stone in medical oncology and Dr Kamala eDwitt in radiation oncology  She had a bilateral mastectomy and has undergone multiple chemotherapy regimens   She has chronic lymphedema in her right arm   She has brain mets for which she recived [facility] May 2017  In March 2019 she completed whole brain radiation for multiple recurrent brain metastasis   Her current treatment regime is Cadcyla with premeds of Dexamethasone, Emend, Aloxi      She is on dexamethasone to help with a spectrum of symptoms including appetite, pain, energy level, and nausea   Her dexamethasone dosing is currently high at 4mg three times per day due to her brain mets  She has a follow up brain MRI today  If there is not change or progression of metastatic disease, there are plans for a steroid taper as she has not reported symptom improvements and there concerns they may be contributing to her weakness        Her pain has been difficult to control  She has experienced consistent side effects from long acting opioids leading to sedation and confusion  She is maintained on methadone 10mg twice a day, gabapentin 300, 300, 600mg, Flexeril 10mg TID PRN, and Dilaudid 4mg PRN  However she continues to state that nothing really works for her pain  She feels the same on or off of methadone        She has consistent nausea  She has not gotten relief with dexamethasone, Aloxi and Emend pre - treatment  She has not found much relief with compazine or zofran  She takes zyprexa and has PRN phenergan    However she continues to report that she is always nauseous      She is dealing with constipation and taking OTC laxatives  The following portions of the medical history were reviewed: past medical history, problem list, medication list, and social history  Current Outpatient Medications:     albuterol (VENTOLIN HFA) 90 mcg/act inhaler, Inhale 2 puffs every 6 (six) hours as needed for wheezing, Disp: 18 g, Rfl: 2    dexamethasone (DECADRON) 4 mg tablet, Take 1 tablet (4 mg total) by mouth daily with breakfast, Disp: 30 tablet, Rfl: 1    gabapentin (NEURONTIN) 300 mg capsule, 1 cap PO bid and 2 cap at bedtime, Disp: 120 capsule, Rfl: 5    HYDROmorphone (DILAUDID) 4 mg tablet, 1-2 tabs every 4 hours as needed for breakthrough pain, Disp: 120 tablet, Rfl: 0    lactulose 20 g/30 mL, Take 15 mL (10 g total) by mouth every 6 (six) hours as needed (Constipation), Disp: 135 mL, Rfl: 5    lidocaine-prilocaine (EMLA) cream, Apply topically as needed for mild pain, Disp: 30 g, Rfl: 0    LORazepam (ATIVAN) 1 mg tablet, Take 1 tablet (1 mg total) by mouth every 6 (six) hours as needed for anxiety or sleep (nausea), Disp: 60 tablet, Rfl: 1    OLANZapine (ZyPREXA) 10 mg tablet, TAKE 1 TABLET BY MOUTH EVERY DAY, Disp: 30 tablet, Rfl: 5    pantoprazole (PROTONIX) 40 mg tablet, Take 1 tablet (40 mg total) by mouth daily, Disp: 30 tablet, Rfl: 5    polyethylene glycol (MIRALAX) 17 g packet, Take 17 g by mouth daily as needed (constipation) for up to 10 doses, Disp: 10 each, Rfl: 0    promethazine (PHENERGAN) 25 mg tablet, Take 1 tablet (25 mg total) by mouth every 6 (six) hours as needed for nausea or vomiting, Disp: 30 tablet, Rfl: 1    senna (SENOKOT) 8 6 mg, Take 2 tablets by mouth 2 (two) times a day as needed  , Disp: , Rfl:   No current facility-administered medications for this visit  Review of Systems   Constitutional: Positive for activity change and fatigue  Gastrointestinal: Positive for nausea  Musculoskeletal: Positive for arthralgias, back pain and gait problem  Neurological: Positive for weakness  Psychiatric/Behavioral: Positive for sleep disturbance  All other systems negative    Objective:  Vital Signs  /82 (BP Location: Left arm, Patient Position: Sitting, Cuff Size: Standard)   Pulse 105   Temp 99 2 °F (37 3 °C) (Tympanic)   Resp 18   Ht 5' 6" (1 676 m)   Wt 66 2 kg (146 lb)   LMP  (LMP Unknown)   SpO2 98%   BMI 23 57 kg/m²    Physical Exam    Constitutional: Appears well-developed and well-nourished  In no acute physical or emotional distress  Head: Normocephalic and atraumatic  Eyes: EOM are normal  No ocular discharge  No scleral icterus  Neck: No visible adenopathy or masses  Respiratory: Effort normal  No stridor  No respiratory distress  Gastrointestinal: No abdominal distension  Musculoskeletal: No edema  Neurological: Alert, oriented and appropriately conversant  Skin: No diaphoresis, no rashes seen on exposed areas of skin  Psychiatric: Displays a normal mood and affect   Behavior, judgement and thought content appear normal

## 2019-08-21 NOTE — PROGRESS NOTES
Baldo Covington presents for f/u today  Her sister accompanied her to visit  Pt continues to endorse nausea, pain and poor relief of symptoms  Medications adjusted per Dr Dashawn Fleming, however there was concern that pt may not be taking medications as prescribed (healthcare illiteracy? Functional decline?)  She was very unsteady on her feet, with swaying noted  She used a SPC, however does own a RW and wheelchair  Encouraged use of these devices over cane  Her sister agreed, stating "I tell her all the time to use them "  Pt admitted to having falls at home  Sister has now moved in with pt and spouse to provide more supervision while spouse is at work  Pt was expected at MRI therefore office visit ended  Transport chair brought in for pt and was taken to test   Message sent to [facility] home nursing  (Eamon Barnhart ) for possible home visit  Will follow and continue to assist as needed

## 2019-08-22 ENCOUNTER — TELEPHONE (OUTPATIENT)
Dept: PALLIATIVE MEDICINE | Facility: CLINIC | Age: 59
End: 2019-08-22

## 2019-08-22 ENCOUNTER — HOSPITAL ENCOUNTER (OUTPATIENT)
Dept: INFUSION CENTER | Facility: CLINIC | Age: 59
Discharge: HOME | End: 2019-08-22
Payer: COMMERCIAL

## 2019-08-22 VITALS
RESPIRATION RATE: 16 BRPM | OXYGEN SATURATION: 98 % | HEART RATE: 88 BPM | DIASTOLIC BLOOD PRESSURE: 74 MMHG | BODY MASS INDEX: 23.3 KG/M2 | SYSTOLIC BLOOD PRESSURE: 116 MMHG | WEIGHT: 145 LBS | HEIGHT: 66 IN | TEMPERATURE: 98.1 F

## 2019-08-22 DIAGNOSIS — C50.912 BILATERAL MALIGNANT NEOPLASM OF BREAST IN FEMALE, ESTROGEN RECEPTOR POSITIVE, UNSPECIFIED SITE OF BREAST (HCC): Primary | ICD-10-CM

## 2019-08-22 DIAGNOSIS — G89.3 CANCER RELATED PAIN: ICD-10-CM

## 2019-08-22 DIAGNOSIS — Z17.0 BILATERAL MALIGNANT NEOPLASM OF BREAST IN FEMALE, ESTROGEN RECEPTOR POSITIVE, UNSPECIFIED SITE OF BREAST (HCC): Primary | ICD-10-CM

## 2019-08-22 DIAGNOSIS — C50.911 BILATERAL MALIGNANT NEOPLASM OF BREAST IN FEMALE, ESTROGEN RECEPTOR POSITIVE, UNSPECIFIED SITE OF BREAST (HCC): Primary | ICD-10-CM

## 2019-08-22 DIAGNOSIS — M51.26 HERNIATED LUMBAR INTERVERTEBRAL DISC: ICD-10-CM

## 2019-08-22 DIAGNOSIS — G47.9 DIFFICULTY SLEEPING: Primary | ICD-10-CM

## 2019-08-22 DIAGNOSIS — C79.51 BONE METASTASES (HCC): ICD-10-CM

## 2019-08-22 PROCEDURE — 96375 TX/PRO/DX INJ NEW DRUG ADDON: CPT

## 2019-08-22 PROCEDURE — 96367 TX/PROPH/DG ADDL SEQ IV INF: CPT

## 2019-08-22 PROCEDURE — 96413 CHEMO IV INFUSION 1 HR: CPT

## 2019-08-22 RX ORDER — SODIUM CHLORIDE 9 MG/ML
20 INJECTION, SOLUTION INTRAVENOUS ONCE
Status: COMPLETED | OUTPATIENT
Start: 2019-08-22 | End: 2019-08-22

## 2019-08-22 RX ORDER — PALONOSETRON 0.05 MG/ML
0.25 INJECTION, SOLUTION INTRAVENOUS ONCE
Status: COMPLETED | OUTPATIENT
Start: 2019-08-22 | End: 2019-08-22

## 2019-08-22 RX ADMIN — PALONOSETRON 0.25 MG: 0.05 INJECTION, SOLUTION INTRAVENOUS at 11:04

## 2019-08-22 RX ADMIN — DEXAMETHASONE SODIUM PHOSPHATE 10 MG: 10 INJECTION, SOLUTION INTRAMUSCULAR; INTRAVENOUS at 11:06

## 2019-08-22 RX ADMIN — SODIUM CHLORIDE 20 ML/HR: 0.9 INJECTION, SOLUTION INTRAVENOUS at 10:17

## 2019-08-22 RX ADMIN — ADO-TRASTUZUMAB EMTANSINE 256 MG: 20 INJECTION, POWDER, LYOPHILIZED, FOR SOLUTION INTRAVENOUS at 12:19

## 2019-08-22 RX ADMIN — SODIUM CHLORIDE 150 MG: 0.9 INJECTION, SOLUTION INTRAVENOUS at 11:29

## 2019-08-22 NOTE — TELEPHONE ENCOUNTER
Pt is in need of prior auth for Hydromorphone 4mg tabs  Called insurance for paperwork      #[id number]

## 2019-08-22 NOTE — PATIENT INSTRUCTIONS
August 2019 Sunday Monday Tuesday Wednesday Thursday Friday Saturday                       1    INF ONCOLOGY TX-TREATMENT PLAN   9:30 AM   (205 min )   AN INF CHAIR [address] [facility]    CT CHEST ABDOMEN PELVIS W CON   1:00 PM   (30 min )   AN CT 1   St  [address] [facility] CAT Scan 2     3           Cycle 14, Day 1  + Add'l treatments     4     5     6     7    FOLLOW UP PG  10:15 AM   (30 min )   [facility] [place] 8     9     10                11  Happy Birthday!     12     13     14     15     16     17                18     19     20    INF BLOOD SPECIMENS-CENTRAL  10:00 AM   (60 min )   AN INF QUICK CHAIR   St  [address] [facility]    [facility] B[facility] ONLY NURSE VISIT   3:00 PM   (30 min )   AN  [address]    [facility] BILL ONLY FOLLOW UP   3:30 PM   (30 min )   Kristina Lanier MD   [address] [facility] Oncology 21     PG   1:15 PM   (30 min )   [facility] [place]    FOLLOW UP PG   1:25 PM   (20 min )   Shantell Ortega MD   [address] [facility]    MRI BRAIN W WO CON   2:30 PM   (45 min )   AM MRI 1   [address] MRI 22    INF ONCOLOGY TX-TREATMENT PLAN   9:30 AM   (205 min )   AN INF BED [address] [facility] 23     24           Cycle 15, Day 1  + Add'l treatments     25     26     27     28    HOME VISIT PG  12:45 PM   (40 min )   Pascal Gowers, RN LILLIAN M  [facility] 34     27     31                     Treatment Details       8/1/2019 - Cycle 14, Day 1 + Additional treatments      Chemotherapy: [facility] PROVIDER COMMUNICATION2, ADO-TRASTUZUMAB EMTANSINE IVPB      Supportive Care: [facility] PROVIDER COMMUNICATION, denosumab (Lord Kang)    8/22/2019 - Cycle 15, Day 1 + Additional treatments      Chemotherapy: [facility] PROVIDER COMMUNICATION2, ADO-TRASTUZUMAB EMTANSINE IVPB      Supportive Care: [facility] PROVIDER COMMUNICATION, denosumab [facility] )        September 2019 Sunday Monday Tuesday Wednesday Thursday Friday Saturday   1     2     3     4     5     6     7                8     9     10     11     12     13     14           Cycle 16, Day 1     15     16     17     18    FOLLOW UP PG  11:15 AM   (30 min )   [facility] [place] 19     21     21                22     19     19     25     31     32     28                29     30                                              Treatment Details       9/12/2019 - Cycle 16, Day 1      Chemotherapy: ONCBCN PROVIDER COMMUNICATION2, ADO-TRASTUZUMAB EMTANSINE IVPB

## 2019-08-22 NOTE — PROGRESS NOTES
To [facility] for single agent kadcycla for breast cancer  Pt continues with the same complaints of fatigue, weakness, pain, nausea without vomiting  She was offered a chemo holiday--I questioned her about this today and she stated "I didn't take one before, I'm not going to take one now"  Support provided to both patient and her daughter  Therapy given as per orders without incident  PT will follow up with Med Onc on 9/18/19 to review her recent MRI results and future treatment plans will be made at that time

## 2019-08-23 RX ORDER — HYDROMORPHONE HYDROCHLORIDE 4 MG/1
TABLET ORAL
Qty: 105 TABLET | Refills: 0 | Status: SHIPPED | OUTPATIENT
Start: 2019-08-23 | End: 2019-09-09 | Stop reason: SDUPTHER

## 2019-08-23 RX ORDER — ZOLPIDEM TARTRATE 10 MG/1
10 TABLET ORAL
Qty: 30 TABLET | Refills: 2 | Status: SHIPPED | OUTPATIENT
Start: 2019-08-23 | End: 2019-11-15 | Stop reason: SDUPTHER

## 2019-08-23 NOTE — TELEPHONE ENCOUNTER
PA request for Hydromorphone 4 mg completed over phone  Spoke with [facility] [employer] Representative Anum  Approved for 24 months   08/23/19 to 08/23/21    [facility] pharmacy notified will run full 120 tablets for 10 days  Pt notified

## 2019-08-23 NOTE — TELEPHONE ENCOUNTER
I followed up with pharmacy - they can per ins dispense 15 pills for this patient to give us some time to get a PA  I will reach out to patient and ask Dr Jordana Estevez to escribe the remaining 105 to this pharmacy  Patient earlier stated she took her last pills yesturday  I will try to reach patient  Dr Jordana Estevez- patient is also asking for Ambien to be sent to her pharmacy as discussed at last [facility]

## 2019-08-26 ENCOUNTER — TELEPHONE (OUTPATIENT)
Dept: HEMATOLOGY ONCOLOGY | Facility: CLINIC | Age: 59
End: 2019-08-26

## 2019-08-26 NOTE — TELEPHONE ENCOUNTER
Return patient's phone call  She voiced understanding that her MRI returned stable, no new lesions  Patient wishes to proceed with chemotherapy

## 2019-08-26 NOTE — TELEPHONE ENCOUNTER
Patient called stating that she was told to call within 3 days if she did not hear from [facility] [place]  Patient stated she was calling regarding her imaging that was done on 8/21  Stated to patient that I would relay rhe message beings that Hardy Lopez was in the room with a patient  Patient would like a call back when possible

## 2019-08-28 ENCOUNTER — IN HOME VISIT (OUTPATIENT)
Dept: PALLIATIVE MEDICINE | Facility: CLINIC | Age: 59
End: 2019-08-28
Payer: COMMERCIAL

## 2019-08-28 VITALS
WEIGHT: 142 LBS | RESPIRATION RATE: 16 BRPM | SYSTOLIC BLOOD PRESSURE: 148 MMHG | DIASTOLIC BLOOD PRESSURE: 78 MMHG | HEART RATE: 104 BPM | BODY MASS INDEX: 22.92 KG/M2 | OXYGEN SATURATION: 96 %

## 2019-08-28 DIAGNOSIS — Z71.89 COUNSELING AND COORDINATION OF CARE: ICD-10-CM

## 2019-08-28 DIAGNOSIS — G89.3 CANCER RELATED PAIN: Primary | ICD-10-CM

## 2019-08-28 DIAGNOSIS — G47.9 DIFFICULTY SLEEPING: ICD-10-CM

## 2019-08-28 DIAGNOSIS — C79.31 BRAIN METASTASES (HCC): ICD-10-CM

## 2019-08-28 PROCEDURE — 99349 HOME/RES VST EST MOD MDM 40: CPT

## 2019-08-28 NOTE — PROGRESS NOTES
Visit made to patients home as part of the Palliative Care at Home program through [facility]  Greater than 1/2 hour was spent in direct patient care  Chronic Care Management facilitated by this visit  Emotional and social issues addressed  This nurse reviewed all medications and instructed on their use and dosing  Used the teach back method to faciilitate learning of proper medication administration  Present during the visit were the patient and friend  This nurse made a home visit to assess patient's medication regime and to ensure she is understanding of the reason for all her medications  I reviewed her medication bottles and put specific directions on them as well as what they do  She did not want them prepoured in pill boxes because of the various times of day she takes the medication and felt using the medication bottles works the best for her  She has enough medication at this time and I left a business card with her with our office number she can call with questions  Plan to call patient every other week and ensure she is taking medications appropriately  Patient is in agreement of this plan   Oscar Bowser

## 2019-08-29 RX ORDER — DEXAMETHASONE 4 MG/1
4 TABLET ORAL
Qty: 30 TABLET | Refills: 1 | Status: SHIPPED | OUTPATIENT
Start: 2019-08-29 | End: 2019-10-10 | Stop reason: SDUPTHER

## 2019-09-04 DIAGNOSIS — J43.9 PULMONARY EMPHYSEMA, UNSPECIFIED EMPHYSEMA TYPE (HCC): ICD-10-CM

## 2019-09-04 RX ORDER — ALBUTEROL SULFATE 90 UG/1
AEROSOL, METERED RESPIRATORY (INHALATION)
Qty: 18 INHALER | Refills: 2 | Status: SHIPPED | OUTPATIENT
Start: 2019-09-04

## 2019-09-09 ENCOUNTER — TELEPHONE (OUTPATIENT)
Dept: PALLIATIVE MEDICINE | Facility: CLINIC | Age: 59
End: 2019-09-09

## 2019-09-09 DIAGNOSIS — M51.26 HERNIATED LUMBAR INTERVERTEBRAL DISC: ICD-10-CM

## 2019-09-09 DIAGNOSIS — G89.3 CANCER RELATED PAIN: ICD-10-CM

## 2019-09-09 RX ORDER — HYDROMORPHONE HYDROCHLORIDE 4 MG/1
TABLET ORAL
Qty: 60 TABLET | Refills: 0 | Status: SHIPPED | OUTPATIENT
Start: 2019-09-09 | End: 2019-09-23 | Stop reason: SDUPTHER

## 2019-09-09 NOTE — TELEPHONE ENCOUNTER
Pt called to request refill on following meds  Zolpidem 10 mg 1 every hs last filled 08/23/19 30/30  Hydromorphone 4 mg  1 to 2 every 4 hours as needed   Filled 08/23 120/10     Pt reports she only has 5 of the zolpidem left and does not know why as she insists she only takes 1 tablet daily  Pt reports she only has 5 hydromorphone left BUT she states she takes maybe 2 to 3 tablets per day no more than 5 tablets per day  If pt taking at this quantity should have more than a few tablets left  Pt could not give exact amt  PDMP reviewed and also verified with pharmacist    Pt recently seen in home by Roberto Solo RN for medication management  Will confer with this nurse  Dorsal Nasal Flap Text: The defect edges were debeveled with a #15c scalpel blade.  Given the location of the defect and the proximity to free margins a dorsal nasal flap was deemed most appropriate.  Using a sterile surgical marker, an appropriate dorsal nasal flap was drawn around the defect.    The area thus outlined was incised deep to adipose tissue with a #15 scalpel blade.  The skin margins were undermined to an appropriate distance in all directions utilizing iris scissors.

## 2019-09-09 NOTE — TELEPHONE ENCOUNTER
Bennie please see below  I called patient who is unable to do Friday Home visits  She has other days available and I thought you could look at your schedule and see if you can visit her maybe Thursday morning this week or sometime early next week   Charles Carranza

## 2019-09-09 NOTE — TELEPHONE ENCOUNTER
I sent 60 tabs to her pharmacy  She can indeed take melatonin  I agree with close follow up as soon as able       Christine Tam

## 2019-09-09 NOTE — TELEPHONE ENCOUNTER
This nurse called patient to assess that she indeed has been taking medication as prescribed  She verbalized she is taking 1 to 2 pills every 3 to 4 hours for pain and has run out of Dilaudid  She also only takes 1 ambien per night but is out of that as well about 12 days early for each script  Please see my home visit note where I tried to organize her medications for her as Dr Jose Stoll believed she was having difficulty with her med regime  Dr Roseanna Vargas, Please let me know if she can receive bridge script for Dilaudid and can she try Melatonin for sleep for a few weeks? We can make appt for either a [facility] visit for follow up or with Dr Jose Stoll  She resides in [facility]

## 2019-09-11 ENCOUNTER — TELEPHONE (OUTPATIENT)
Dept: PALLIATIVE MEDICINE | Facility: CLINIC | Age: 59
End: 2019-09-11

## 2019-09-11 NOTE — TELEPHONE ENCOUNTER
Phone made to schedule a home visit   Spoke w patient  Refused visit tomorrow morning as  sleeps due to working third shift and Friday she is going to [place] to [facility]     Pt reports that she has pain medication and usually takes 1 tablet 3 x a day or occasoinally has 2 tabs at a time  She states she has many left     Reports nausea     Agreed to this nurse calling again on Monday to schedule a home visit for next week

## 2019-09-17 ENCOUNTER — TELEPHONE (OUTPATIENT)
Dept: PALLIATIVE MEDICINE | Facility: CLINIC | Age: 59
End: 2019-09-17

## 2019-09-18 ENCOUNTER — OFFICE VISIT (OUTPATIENT)
Dept: HEMATOLOGY ONCOLOGY | Facility: CLINIC | Age: 59
End: 2019-09-18
Payer: COMMERCIAL

## 2019-09-18 VITALS
WEIGHT: 145 LBS | OXYGEN SATURATION: 96 % | SYSTOLIC BLOOD PRESSURE: 132 MMHG | BODY MASS INDEX: 23.3 KG/M2 | DIASTOLIC BLOOD PRESSURE: 80 MMHG | TEMPERATURE: 98.9 F | HEART RATE: 76 BPM | HEIGHT: 66 IN | RESPIRATION RATE: 16 BRPM

## 2019-09-18 DIAGNOSIS — C50.911 BILATERAL MALIGNANT NEOPLASM OF BREAST IN FEMALE, ESTROGEN RECEPTOR POSITIVE, UNSPECIFIED SITE OF BREAST (HCC): Primary | ICD-10-CM

## 2019-09-18 DIAGNOSIS — R63.0 DECREASED APPETITE: ICD-10-CM

## 2019-09-18 DIAGNOSIS — C50.912 BILATERAL MALIGNANT NEOPLASM OF BREAST IN FEMALE, ESTROGEN RECEPTOR POSITIVE, UNSPECIFIED SITE OF BREAST (HCC): Primary | ICD-10-CM

## 2019-09-18 DIAGNOSIS — R11.0 CHEMOTHERAPY-INDUCED NAUSEA: ICD-10-CM

## 2019-09-18 DIAGNOSIS — C50.919 MALIGNANT NEOPLASM OF FEMALE BREAST, UNSPECIFIED ESTROGEN RECEPTOR STATUS, UNSPECIFIED LATERALITY, UNSPECIFIED SITE OF BREAST (HCC): ICD-10-CM

## 2019-09-18 DIAGNOSIS — R26.9 NEUROLOGIC GAIT DYSFUNCTION: ICD-10-CM

## 2019-09-18 DIAGNOSIS — Z17.0 BILATERAL MALIGNANT NEOPLASM OF BREAST IN FEMALE, ESTROGEN RECEPTOR POSITIVE, UNSPECIFIED SITE OF BREAST (HCC): Primary | ICD-10-CM

## 2019-09-18 DIAGNOSIS — G47.9 DIFFICULTY SLEEPING: ICD-10-CM

## 2019-09-18 DIAGNOSIS — C79.51 BONE METASTASES (HCC): ICD-10-CM

## 2019-09-18 DIAGNOSIS — T45.1X5A CHEMOTHERAPY-INDUCED NAUSEA: ICD-10-CM

## 2019-09-18 DIAGNOSIS — C79.31 BRAIN METASTASES (HCC): ICD-10-CM

## 2019-09-18 DIAGNOSIS — I42.7 CARDIOMYOPATHY SECONDARY TO DRUG (HCC): ICD-10-CM

## 2019-09-18 PROCEDURE — 99215 OFFICE O/P EST HI 40 MIN: CPT | Performed by: PHYSICIAN ASSISTANT

## 2019-09-18 RX ORDER — PROMETHAZINE HYDROCHLORIDE 25 MG/1
25 TABLET ORAL EVERY 6 HOURS PRN
Qty: 30 TABLET | Refills: 1 | Status: SHIPPED | OUTPATIENT
Start: 2019-09-18 | End: 2019-11-15 | Stop reason: SDUPTHER

## 2019-09-18 RX ORDER — OLANZAPINE 10 MG/1
TABLET ORAL
Qty: 90 TABLET | Refills: 1 | Status: SHIPPED | OUTPATIENT
Start: 2019-09-18 | End: 2019-11-27 | Stop reason: SDUPTHER

## 2019-09-18 NOTE — PROGRESS NOTES
Gavin CHRISTINE [id number]  Progress Note  Nazanin Bell, 1960, [id number]  9/18/2019    Assessment/Plan:  1  Malignant neoplasm of female breast, unspecified estrogen receptor status, unspecified laterality, unspecified site of breast ([facility] )  ER+, HER2/Gifty + Metastatic Breast Cancer -     This is a 72-year-old female with history of the above  Patient's recent scans completed in August 2019 demonstrated disease stability  Patient's tumor marker also was improved  Patient and I had previously discussed possible drug holiday  Patient is not exactly comfortable with this however she would be comfortable with extending her cycle length  Patient understands that any adjustments made to chemotherapy can result in disease progression, however her need for better quality of life drums her concern regarding progression at this time  Patient's fatigue did improve, as did functionality at home especially over the last week (week 4 post treatment )    Regimen:  Kadcyla 3 6 mg/KG IV Day 1  Cycle length= 4 weeks    Echo will need to be completed, to follow up on medication  This will be completed at the patient's earliest availability in the next few weeks  - MRI brain w wo contrast; Future  - CT chest abdomen pelvis w contrast; Future  - NM bone scan whole body; Future  - Cancer antigen 27 29; Future    · Brain metastases [facility])  Last MRI demonstrated disease stability  Patient will be reimaged in November  This will be arranged today  - MRI brain w wo contrast; Future  - Cane    · Bone metastases ([facility] )  Patient's disease is primarily in her bones  Patient is noting some occasional issues with her left knee  As patient only has CT scans of the chest abdomen and pelvis, appendicular skeleton has not been imaged  Will order with next staging      - NM bone scan whole body; Future    2    Chemotherapy-induced nausea  Controlled on medication  Follow up with palliative  - promethazine (PHENERGAN) 25 mg tablet; Take 1 tablet (25 mg total) by mouth every 6 (six) hours as needed for nausea or vomiting  Dispense: 30 tablet; Refill: 1    3  Cardiomyopathy secondary to drug [facility])  Patient has been followed with echocardiogram secondary to cardiotoxic chemotherapy medications  Patient will have an echo completed in the next few weeks  - Echo complete with contrast if indicated; Future    4  Neurologic gait dysfunction  Presently, the patient is using a 1 point cane  However, I believe that a quad cane would be better for the patient as far as her stability  Patient when she 1st stands is a little wobbly when she starts walking things improve however this stability that if 4 pronged cane would add would be paramount to patient's safety  We discussed this  [facility] will [email or web address] to see if this is covered  Betty     The patient is scheduled for follow-up in approximately 7-8 weeks with Dr Rosie Montanez  Patient voiced agreement and understanding to the above  Patient knows to call the Hematology/Oncology office with any questions and concerns regarding the above  Carefully review your medication list in verify the list is accurate and up-to-date  Please call the hematologic/Oncology office if there medications missing from the less, medications on the list that your not currently taking or if there is a dosage or instruction that is different from higher taking medication  I have spent 35 minutes with Patient and family today in which greater than 50% of this time was spent in counseling/coordination of care regarding Diagnostic results, Prognosis, Risks and benefits of tx options, Intructions for management, Importance of tx compliance and Impressions  Goals and Barriers:    Current Goal:  Prolong Survival from Cancer/palliative  Barriers: None        Patient's Capacity to Self Care: Patient able to self care   -------------------------------------------------------------------------------------------------------    Chief Complaint   Patient presents with    Follow-up     brain metastases       History of present illness/Cancer History:   Oncology History    Vanessa Valencia is a 66y o  year old female who presents with stage IV metastatic breast carcinoma with a history of brain metastasis treated with SRS in May 2017 now with the multiple recurrent brain metastasis  She completed whole-brain radiation therapy March 12, 2019,with previous SRS to brain in May of 2017 and L2-S1 completed on 8/24/16  She was last seen 4/23/19       She had f/u scans done 5/1/19  No CT evidence of new soft tissue tumor mass in the chest, abdomen or pelvis  No significant interval change in subtle lucent and sclerotic lesions within the lower thoracic and lumbar vertebral bodies suggestive of osseous metastatic disease  Pt continues to see Palliative care  Continues to c/o pain while  on Decadron, Gabapentin, Flexeril, Dilaudid prn for   Pain  control  Has persistent nausea, and constipation  6/17/19 MRI Brain showed multiple supratentorial and infratentorial metastatic lesions, as described above and overall improved since prior examination  No definite new nodular enhancing lesions identified on the current examination  Had med onc PA f/u in June  Pt has chronic constipation, and was seen x 2 in recent past in the ER for this  Pt had palliative care f/u, today  She had requested a break for chemo, and her next chemo is being held  t had c/o weakness, and had refused to do PT, she has been taking steroids for   awhile  CT scan scheduled for 7/29/19 8/7/19 F/U with Claudio Musa PA-C:  8/1/19 CT scans demonstrated disease stability in her chest abdomen and pelvis  However, patient is symptomatic with dizziness and proximal weakness    Re-evaluation with MRI of the brain is necessary to rule out new metastatic lesion to the brain  Regimen:  Kadcyla 3 6 mg/KG IV Day 1, Q 3 weeks      8/21/19 MRI of brain        Bilateral malignant neoplasm of breast in female, estrogen receptor positive ([facility] )    2010 Initial Diagnosis     The patient had a left sided T1 B , N0 ER positive, IN and HER-2 negative invasive ductal carcinoma, right-sided DCIS, ER/IN positive, HER-2 negative  Bilateral mastectomy  2010 - 4/2011 Hormone Therapy     Tamoxifen 20 mg daily  Last menstrual period was on 3/2010       4/2011 -  Hormone Therapy     Arimidex 1 mg daily  Unknown when medication was discontinued  Patient was lost to follow up       12/9/2015 Progression     · Right arm swelling in December 2015  U/S found a nonvascular structure in the right axilla measuring 2 2 x 1 3 cm  · Subsequent CT Scan of the chest on 12/9/15, showed multiple bilateral pulmonary nodules measuring 3-7 mm  · PET 2/4/16, which revealed hypermetabolic hilar and mediastinal nodes  There is a pre carinal node with a SUV of 5 4 measuring 1 8 x 1 7 cm, and a subcarinal node measuring 1 9 x 1 3 cm with a SUV of 5 3  There is right hilar activity of 3 5 and left hilar activity of 2 8  The subcentimeter nodules are too small for PET evaluation  She also has uptake in her L3 and L5 vertebral bodies, both concerning for bony metastases  2/16/2016 Biopsy     Bronchoscopic biopsy showed Metastatic non-small cell carcinoma, favor adenocarcinoma  ER 90%, IN 0%   Her 2 +2, positive by FISH         3/1/2016 - 6/2016 Chemotherapy     Taxotere 75 mg/m2, Day 1  Perjeta 420 mg, Day 1  Herceptin 6 mg/kg, Day 1  Cycle length= 21 days      3/1/2016 - 2/2017 Hormone Therapy     Anastrozole 1 mg daily      6/2016 - 2/2017 Chemotherapy     Perjeta 420 mg, Day 1  Herceptin 6 mg/kg, Day 1  Cycle length= 21 days      2/2017 Progression     Bony progression       2/10/2017 - 4/12/2018 Chemotherapy     Perjeta 420 mg, Day 1  Herceptin 6 mg/kg, Day 1  Taxotere 75 mg/m2  Cycle length= 21 days      3/3/2017 -  Chemotherapy     Xgeva 120 mg IV, Day 1  Cycle length =  84 days      4/18/2017 Progression      brain MRI showed a 5 mm right parietal lesion, 3 mm right frontal lesion, 3 mm left parahippocampal lesion  5/2017 - 5/2017 Radiation     SRS to brain lesions      5/3/2018 - 8/2018 Chemotherapy     Perjeta 420 mg,  Day 1  Herceptin 6 mg/kg,  Day 1  Cycle length = 21 days  Cycle 3 was administered on 6/14/18    ** taxotere was temporarily discontinued secondary for desire of treatment holiday  Taxotere was readded into the treatment regimen during last cycle  9/5/2018 Progression     Cutaneous disease progression  9/13/2018 - 9/18/2019 Chemotherapy     Kadcyla 3 6 mg/kg dose IV Day 1  Cycle length =  21 days  Cycles planned = until progression    Interrupted at the end of January secondary to progressive fatigue and interval development of brain mets requiring radiation  Restarted on 3/14/19           2/18/2019 Progression     MRI brain demonstrated disease progression; CT of the chest abdomen pelvis was stable along with the patient's tumor markers  2/27/2019 - 3/12/2019 Radiation     WBRT  X 10 sessions  9/18/2019 -  Chemotherapy     Kadcyla 3 6 mg/KG IV Day 1  Cycle length= 4 weeks    Cycle enlongation after scans demonstrated stablity in August   Quality of life reasons- cycle was elongated  Bone metastases ([address] )    7/6/2016 Initial Diagnosis     Bone metastases ([address] )      8/10/2016 - 8/24/2016 Radiation     Treatments:  Course: C1    Plan ID Energy Fractions Dose per Fraction (cGy) Total Dose Delivered (cGy) Elapsed Days   L2-S1 Spine 10X 10 / 10 300 3,000 14      Treatment Dates:  8/10/2016 - 8/24/2016           Brain metastases ([address] )    4/26/2017 Initial Diagnosis     Brain metastases ([facility])        Cancer Staging  Bilateral malignant neoplasm of breast in female, estrogen receptor positive ([address] )  Staging form: Breast, AJCC 7th Edition  - Clinical: Stage IV (TX, NX, M1) - Signed by Genoveva Ragsdale PA-C on 2019     ECO - Symptomatic but completely ambulatory    Interval history:  Overall doing better  Patient is 1 week overdue for treatment  Patient notes that the past week she has been feeling much better  I rediscussed with the patient treatment holiday versus cycle elongation  Patient would prefer to continue to do "something for her cancer" and favors elongation of cycle  Patient continues to follow-up with palliative care  They are coming to her home  Palliative care is addressing the patient's insomnia, pain and nausea related to chemotherapy  Review of Systems   Constitutional: Positive for fatigue  Negative for appetite change, fever and unexpected weight change  HENT: Negative for nosebleeds  Respiratory: Negative for cough, choking and shortness of breath  Negative hemoptysis  Cardiovascular: Positive for leg swelling  Negative for chest pain and palpitations  Gastrointestinal: Negative  Negative for abdominal distention, abdominal pain, anal bleeding, blood in stool, constipation, diarrhea, nausea and vomiting  Endocrine: Negative  Negative for cold intolerance  Genitourinary: Negative  Negative for hematuria, menstrual problem, vaginal bleeding, vaginal discharge and vaginal pain  Musculoskeletal: Negative  Negative for arthralgias, myalgias, neck pain and neck stiffness  Skin: Negative  Negative for color change, pallor and rash  Allergic/Immunologic: Negative  Negative for immunocompromised state  Neurological: Negative  Negative for weakness and headaches  Hematological: Negative for adenopathy  Does not bruise/bleed easily  All other systems reviewed and are negative        Current Outpatient Medications:     albuterol (PROVENTIL HFA,VENTOLIN HFA) 90 mcg/act inhaler, TAKE 2 PUFFS BY MOUTH EVERY 6 HOURS AS NEEDED FOR WHEEZING, Disp: 18 Inhaler, Rfl: 2   dexamethasone (DECADRON) 4 mg tablet, TAKE 1 TABLET (4 MG TOTAL) BY MOUTH DAILY WITH BREAKFAST, Disp: 30 tablet, Rfl: 1    gabapentin (NEURONTIN) 300 mg capsule, 1 cap PO bid and 2 cap at bedtime, Disp: 120 capsule, Rfl: 5    HYDROmorphone (DILAUDID) 4 mg tablet, 1-2 tabs every 4 hours as needed for breakthrough pain, Disp: 60 tablet, Rfl: 0    lactulose 20 g/30 mL, Take 15 mL (10 g total) by mouth every 6 (six) hours as needed (Constipation), Disp: 135 mL, Rfl: 5    lidocaine-prilocaine (EMLA) cream, Apply topically as needed for mild pain, Disp: 30 g, Rfl: 0    LORazepam (ATIVAN) 1 mg tablet, Take 1 tablet (1 mg total) by mouth every 6 (six) hours as needed for anxiety or sleep (nausea), Disp: 60 tablet, Rfl: 1    pantoprazole (PROTONIX) 40 mg tablet, Take 1 tablet (40 mg total) by mouth daily, Disp: 30 tablet, Rfl: 5    polyethylene glycol (MIRALAX) 17 g packet, Take 17 g by mouth daily as needed (constipation) for up to 10 doses, Disp: 10 each, Rfl: 0    promethazine (PHENERGAN) 25 mg tablet, Take 1 tablet (25 mg total) by mouth every 6 (six) hours as needed for nausea or vomiting, Disp: 30 tablet, Rfl: 1    senna (SENOKOT) 8 6 mg, Take 2 tablets by mouth 2 (two) times a day as needed  , Disp: , Rfl:     zolpidem (AMBIEN) 10 mg tablet, Take 1 tablet (10 mg total) by mouth daily at bedtime as needed for sleep, Disp: 30 tablet, Rfl: 2    OLANZapine (ZyPREXA) 10 mg tablet, TAKE 1 TABLET BY MOUTH EVERY DAY, Disp: 90 tablet, Rfl: 1    No Known Allergies    Objective:   /80   Pulse 76   Temp 98 9 °F (37 2 °C) (Oral)   Resp 16   Ht 5' 6" (1 676 m)   Wt 65 8 kg (145 lb)   LMP  (LMP Unknown)   SpO2 96%   BMI 23 40 kg/m²   Wt Readings from Last 6 Encounters:   09/18/19 65 8 kg (145 lb)   08/28/19 64 4 kg (142 lb)   08/22/19 65 8 kg (145 lb)   08/21/19 66 2 kg (146 lb)   08/07/19 64 4 kg (142 lb)   08/01/19 64 5 kg (142 lb 4 6 oz)     Physical Exam   Constitutional: She is oriented to person, place, and time  She appears well-developed and well-nourished  No distress  HENT:   Head: Normocephalic and atraumatic  Mouth/Throat: Oropharynx is clear and moist  No oropharyngeal exudate  Eyes: Pupils are equal, round, and reactive to light  EOM are normal  No scleral icterus  Neck: Normal range of motion  Cardiovascular: Normal rate and regular rhythm  No murmur heard  Right chest wall Port-A-Cath without significant bruising, erythema  Pulmonary/Chest: Effort normal and breath sounds normal  No respiratory distress  Abdominal: Soft  Bowel sounds are normal  She exhibits no distension  There is no tenderness  Musculoskeletal: She exhibits no edema  Patient has notable shakiness the legs when standing initially  Patient's leg stabilized and then she is able to walk with a 1 point cane  Patient does have some instability with gait  Lymphadenopathy:     She has no cervical adenopathy  Neurological: She is alert and oriented to person, place, and time  No cranial nerve deficit  Skin: Skin is warm  No rash noted  No pallor  Skin darkening as present on previous examination   Psychiatric: She has a normal mood and affect  Thought content normal      Pertinent Laboratory Results and Imaging Review:  No visits with results within 3 Week(s) from this visit     Latest known visit with results is:   Hospital Outpatient Visit on 08/20/2019   Component Date Value Ref Range Status    WBC 08/20/2019 10 89* 4 31 - 10 16 Thousand/uL Final    RBC 08/20/2019 4 00  3 81 - 5 12 Million/uL Final    Hemoglobin 08/20/2019 11 6  11 5 - 15 4 g/dL Final    Hematocrit 08/20/2019 37 6  34 8 - 46 1 % Final    MCV 08/20/2019 94  82 - 98 fL Final    MCH 08/20/2019 29 0  26 8 - 34 3 pg Final    MCHC 08/20/2019 30 9* 31 4 - 37 4 g/dL Final    RDW 08/20/2019 21 0* 11 6 - 15 1 % Final    MPV 08/20/2019 9 2  8 9 - 12 7 fL Final    Platelets 34/14/1216 383  149 - 390 Thousands/uL Final    nRBC 08/20/2019 0 /100 WBCs Final    Neutrophils Relative 08/20/2019 88* 43 - 75 % Final    Immat GRANS % 08/20/2019 2  0 - 2 % Final    Lymphocytes Relative 08/20/2019 7* 14 - 44 % Final    Monocytes Relative 08/20/2019 3* 4 - 12 % Final    Eosinophils Relative 08/20/2019 0  0 - 6 % Final    Basophils Relative 08/20/2019 0  0 - 1 % Final    Neutrophils Absolute 08/20/2019 9 53* 1 85 - 7 62 Thousands/µL Final    Immature Grans Absolute 08/20/2019 0 18  0 00 - 0 20 Thousand/uL Final    Lymphocytes Absolute 08/20/2019 0 77  0 60 - 4 47 Thousands/µL Final    Monocytes Absolute 08/20/2019 0 36  0 17 - 1 22 Thousand/µL Final    Eosinophils Absolute 08/20/2019 0 01  0 00 - 0 61 Thousand/µL Final    Basophils Absolute 08/20/2019 0 04  0 00 - 0 10 Thousands/µL Final    Sodium 08/20/2019 142  136 - 145 mmol/L Final    Potassium 08/20/2019 4 1  3 5 - 5 3 mmol/L Final    Chloride 08/20/2019 106  100 - 108 mmol/L Final    CO2 08/20/2019 26  21 - 32 mmol/L Final    ANION GAP 08/20/2019 10  4 - 13 mmol/L Final    BUN 08/20/2019 10  5 - 25 mg/dL Final    Creatinine 08/20/2019 0 56* 0 60 - 1 30 mg/dL Final    Standardized to IDMS reference method    Glucose 08/20/2019 112  65 - 140 mg/dL Final      If the patient is fasting, the ADA then defines impaired fasting glucose as > 100 mg/dL and diabetes as > or equal to 123 mg/dL  Specimen collection should occur prior to Sulfasalazine administration due to the potential for falsely depressed results  Specimen collection should occur prior to Sulfapyridine administration due to the potential for falsely elevated results   Calcium 08/20/2019 9 5  8 3 - 10 1 mg/dL Final    AST 08/20/2019 33  5 - 45 U/L Final      Specimen collection should occur prior to Sulfasalazine administration due to the potential for falsely depressed results       ALT 08/20/2019 54  12 - 78 U/L Final      Specimen collection should occur prior to Sulfasalazine administration due to the potential for falsely depressed results   Alkaline Phosphatase 08/20/2019 105  46 - 116 U/L Final    Total Protein 08/20/2019 7 8  6 4 - 8 2 g/dL Final    Albumin 08/20/2019 3 1* 3 5 - 5 0 g/dL Final    Total Bilirubin 08/20/2019 0 20  0 20 - 1 00 mg/dL Final    eGFR 08/20/2019 104  ml/min/1 73sq m Final     The following historical data was reviewed      Past Medical History:   Diagnosis Date    H/O bilateral mastectomy 2/15/2016    Hypertension 2/15/2016    Lymphedema 2/15/2016    Malignant neoplasm of right breast ([facility] ) 2/15/2016    Metastatic breast cancer [facility])        Past Surgical History:   Procedure Laterality Date    ENDOBRONCHIAL ULTRASOUND (EBUS) N/A 2/16/2016    Procedure: EBUS;  Surgeon: Temo Mackay MD;  [facility];  Service:     MASTECTOMY Bilateral     MASTECTOMY Bilateral     right arm edema    OOPHORECTOMY      GA BRONCHOSCOPY NEEDLE BX TRACHEA MAIN STEM&/BRON N/A 2/16/2016    Procedure: Debbie Patel;  Surgeon: Temo Mackay MD;  [facility];  Service: Thoracic    GA INSJ TUNNELED CTR VAD W/SUBQ PORT AGE 5 YR/> N/A 7/24/2018    Procedure: INSERTION VENOUS PORT ( PORT-A-CATH) IR;  Surgeon: Ren Herrera DO;  [facility];  Service: Interventional Radiology    GA STEREOTACTIC RADIOSURGERY, CRANIAL,SIMPLE,EA ADD  5/3/2017         GA STEREOTACTIC RADIOSURGERY, CRANIAL,SIMPLE,EA ADD  5/3/2017         GA STEREOTACTIC RADIOSURGERY, CRANIAL,SIMPLE,SINGLE  5/3/2017            Social History     Socioeconomic History    Marital status: /Civil Union     Spouse name: Not on file    Number of children: 1    Years of education: Not on file    Highest education level: Not on file   Occupational History    Not on file   Social Needs    Financial resource strain: Not on file    Food insecurity:     Worry: Not on file     Inability: Not on file    Transportation needs:     Medical: Not on file     Non-medical: Not on file   Tobacco Use    Smoking status: Current Every Day Smoker     Packs/day: 0 50     Years: 40 00     Pack years: 20 00     Types: Cigarettes     Start date: Oval Harada    Smokeless tobacco: Never Used   Substance and Sexual Activity    Alcohol use: Yes     Frequency: 2-4 times a month     Drinks per session: 3 or 4     Binge frequency: Less than monthly     Comment: social    Drug use: Yes     Types: Marijuana    Sexual activity: Not on file   Lifestyle    Physical activity:     Days per week: Not on file     Minutes per session: Not on file    Stress: Not on file   Relationships    Social connections:     Talks on phone: Not on file     Gets together: Not on file     Attends Taoism service: Not on file     Active member of club or organization: Not on file     Attends meetings of clubs or organizations: Not on file     Relationship status: Not on file    Intimate partner violence:     Fear of current or ex partner: Not on file     Emotionally abused: Not on file     Physically abused: Not on file     Forced sexual activity: Not on file   Other Topics Concern    Not on file   Social History Narrative    Not on file       Family History   Problem Relation Age of Onset    Cancer Sister     Prostate cancer Brother        Please note: This report has been generated by a voice recognition software system  Therefore there may be syntax, spelling, and/or grammatical errors  Please call if you have any questions

## 2019-09-20 ENCOUNTER — IN HOME VISIT (OUTPATIENT)
Dept: PALLIATIVE MEDICINE | Facility: CLINIC | Age: 59
End: 2019-09-20
Payer: COMMERCIAL

## 2019-09-20 DIAGNOSIS — K59.03 DRUG-INDUCED CONSTIPATION: Primary | ICD-10-CM

## 2019-09-20 DIAGNOSIS — M51.26 HERNIATED LUMBAR INTERVERTEBRAL DISC: ICD-10-CM

## 2019-09-20 DIAGNOSIS — G89.3 CANCER RELATED PAIN: ICD-10-CM

## 2019-09-20 PROCEDURE — 99349 HOME/RES VST EST MOD MDM 40: CPT

## 2019-09-22 VITALS
SYSTOLIC BLOOD PRESSURE: 138 MMHG | DIASTOLIC BLOOD PRESSURE: 80 MMHG | TEMPERATURE: 97.7 F | HEART RATE: 100 BPM | RESPIRATION RATE: 20 BRPM

## 2019-09-22 NOTE — PROGRESS NOTES
Visit made to patients home as part of the Palliative Care at Home program through [facility]  1 hour was spent in direct patient care and participating in goals of care conversation  Chronic Care Management facilitated by this visit  Emotional and social issues addressed  This nurse reviewed all medications and instructed on their use and dosing  Used the teach back method to faciilitate learning of proper medication Patient and her sister were present for this visit Patient receives assistance with medications from her sisterSneha Patient and sister verbalized name action purpose and dosing with 75 percent accuracy patient was not taking her Gabapentin correctly   She was only taking 1 capsule twice a day and not taking 2 capsules at bedtime  She will start doing this She is not taking Olazapine daily did not know purpose of medication instruction provided with verbalized understanding Patient taking about five pills a day so far and feels this is inadequate but did not want to run out of medication   Pt states her pain is rated as an 8 located in her low back or everywhere some days She also does not have any lorazepam  Sister Lavinia Hobbs thought this med is not needed as she is with her daily and can decrease anxiety periods easily   A written medication schedule was written out for patient and her sister Lavinia Hobbs Both were receptive to the home visit   Assessment/Plan:   Call in one week to assess medication adherence and response to correct Gabapentin dosing Assess pain Call md for refill of hydromorphone 60 pills filled Sept 9th using about  5  pills per day   Call oncology office for right arm sleeve for edema and a quad cane   Call the pharmacy to inquire if Mirilax can be covered by insurance      There are no diagnoses linked to this encounter  Lian Mendoza is a 64year old female       Review of Systems   Constitutional: Negative  HENT: Negative  Eyes: Negative  Respiratory: Negative  Gastrointestinal: Positive for constipation and nausea  Endocrine: Negative  Musculoskeletal: Positive for back pain and gait problem  Allergic/Immunologic: Negative  Neurological: Positive for weakness  Psychiatric/Behavioral: Positive for sleep disturbance  The patient is nervous/anxious  Objective:     Physical Exam   Constitutional: She is oriented to person, place, and time  She appears well-developed and well-nourished  Cardiovascular: Normal rate  Pulmonary/Chest: Breath sounds normal    Abdominal: Soft  Bowel sounds are normal    Musculoskeletal: She exhibits edema  Neurological: She is alert and oriented to person, place, and time  Skin: Skin is warm and dry  Psychiatric: She has a normal mood and affect   Her behavior is normal  Thought content normal

## 2019-09-23 ENCOUNTER — TELEPHONE (OUTPATIENT)
Dept: PALLIATIVE MEDICINE | Facility: CLINIC | Age: 59
End: 2019-09-23

## 2019-09-23 ENCOUNTER — HOSPITAL ENCOUNTER (OUTPATIENT)
Dept: INFUSION CENTER | Facility: CLINIC | Age: 59
Discharge: HOME | End: 2019-09-23
Payer: COMMERCIAL

## 2019-09-23 DIAGNOSIS — E86.0 DEHYDRATION: ICD-10-CM

## 2019-09-23 DIAGNOSIS — C50.911 BILATERAL MALIGNANT NEOPLASM OF BREAST IN FEMALE, ESTROGEN RECEPTOR POSITIVE, UNSPECIFIED SITE OF BREAST (HCC): Primary | ICD-10-CM

## 2019-09-23 DIAGNOSIS — Z17.0 BILATERAL MALIGNANT NEOPLASM OF BREAST IN FEMALE, ESTROGEN RECEPTOR POSITIVE, UNSPECIFIED SITE OF BREAST (HCC): Primary | ICD-10-CM

## 2019-09-23 DIAGNOSIS — C50.912 BILATERAL MALIGNANT NEOPLASM OF BREAST IN FEMALE, ESTROGEN RECEPTOR POSITIVE, UNSPECIFIED SITE OF BREAST (HCC): Primary | ICD-10-CM

## 2019-09-23 LAB
ALBUMIN SERPL BCP-MCNC: 3 G/DL (ref 3.5–5)
ALP SERPL-CCNC: 109 U/L (ref 46–116)
ALT SERPL W P-5'-P-CCNC: 85 U/L (ref 12–78)
ANION GAP SERPL CALCULATED.3IONS-SCNC: 8 MMOL/L (ref 4–13)
AST SERPL W P-5'-P-CCNC: 46 U/L (ref 5–45)
BASOPHILS # BLD AUTO: 0.05 THOUSANDS/ΜL (ref 0–0.1)
BASOPHILS NFR BLD AUTO: 0 % (ref 0–1)
BILIRUB SERPL-MCNC: 0.2 MG/DL (ref 0.2–1)
BUN SERPL-MCNC: 11 MG/DL (ref 5–25)
CALCIUM SERPL-MCNC: 9.2 MG/DL (ref 8.3–10.1)
CHLORIDE SERPL-SCNC: 107 MMOL/L (ref 100–108)
CO2 SERPL-SCNC: 29 MMOL/L (ref 21–32)
CREAT SERPL-MCNC: 0.68 MG/DL (ref 0.6–1.3)
EOSINOPHIL # BLD AUTO: 0.03 THOUSAND/ΜL (ref 0–0.61)
EOSINOPHIL NFR BLD AUTO: 0 % (ref 0–6)
ERYTHROCYTE [DISTWIDTH] IN BLOOD BY AUTOMATED COUNT: 21.2 % (ref 11.6–15.1)
GFR SERPL CREATININE-BSD FRML MDRD: 97 ML/MIN/1.73SQ M
GLUCOSE SERPL-MCNC: 172 MG/DL (ref 65–140)
HCT VFR BLD AUTO: 36.4 % (ref 34.8–46.1)
HGB BLD-MCNC: 11 G/DL (ref 11.5–15.4)
IMM GRANULOCYTES # BLD AUTO: 0.25 THOUSAND/UL (ref 0–0.2)
IMM GRANULOCYTES NFR BLD AUTO: 2 % (ref 0–2)
LYMPHOCYTES # BLD AUTO: 0.79 THOUSANDS/ΜL (ref 0.6–4.47)
LYMPHOCYTES NFR BLD AUTO: 6 % (ref 14–44)
MCH RBC QN AUTO: 28.9 PG (ref 26.8–34.3)
MCHC RBC AUTO-ENTMCNC: 30.2 G/DL (ref 31.4–37.4)
MCV RBC AUTO: 96 FL (ref 82–98)
MONOCYTES # BLD AUTO: 0.6 THOUSAND/ΜL (ref 0.17–1.22)
MONOCYTES NFR BLD AUTO: 5 % (ref 4–12)
NEUTROPHILS # BLD AUTO: 10.55 THOUSANDS/ΜL (ref 1.85–7.62)
NEUTS SEG NFR BLD AUTO: 87 % (ref 43–75)
NRBC BLD AUTO-RTO: 0 /100 WBCS
PLATELET # BLD AUTO: 349 THOUSANDS/UL (ref 149–390)
PMV BLD AUTO: 9.1 FL (ref 8.9–12.7)
POTASSIUM SERPL-SCNC: 4 MMOL/L (ref 3.5–5.3)
PROT SERPL-MCNC: 7.3 G/DL (ref 6.4–8.2)
RBC # BLD AUTO: 3.81 MILLION/UL (ref 3.81–5.12)
SODIUM SERPL-SCNC: 144 MMOL/L (ref 136–145)
WBC # BLD AUTO: 12.27 THOUSAND/UL (ref 4.31–10.16)

## 2019-09-23 PROCEDURE — 80053 COMPREHEN METABOLIC PANEL: CPT | Performed by: INTERNAL MEDICINE

## 2019-09-23 PROCEDURE — 85025 COMPLETE CBC W/AUTO DIFF WBC: CPT | Performed by: INTERNAL MEDICINE

## 2019-09-23 RX ORDER — HYDROMORPHONE HYDROCHLORIDE 4 MG/1
TABLET ORAL
Qty: 150 TABLET | Refills: 0 | Status: SHIPPED | OUTPATIENT
Start: 2019-09-23 | End: 2019-10-23 | Stop reason: DRUGHIGH

## 2019-09-23 NOTE — PATIENT INSTRUCTIONS
September 2019 Sunday Monday Tuesday Wednesday Thursday Friday Saturday   1     2     3     4     5     6     7                8     9     10     11     12     13     14                15     16     17     18    FOLLOW UP PG  11:15 AM   (30 min )   Lynda Keller PA-C   [facility] [place] 19     20    HOME VISIT PG  10:25 AM   (40 min )   Adria Mejias, MINNA VEGA  [facility] 21                22     23    INF BLOOD SPECIMENS-CENTRAL   9:30 AM   (60 min )   AN INF [address] 24     25    INF ONCOLOGY TX-TREATMENT PLAN  12:00 PM   (205 min )   AN INF  [address] 26     27     28           Cycle 16, Day 1     29     30                                              Treatment Details       9/26/2019 - Cycle 16, Day 1      Chemotherapy: ONCBCN PROVIDER COMMUNICATION2, ADO-TRASTUZUMAB EMTANSINE IVPB        October 2019 Sunday Monday Tuesday Wednesday Thursday Friday Saturday             1     2     3     4     5                6     7     8     9     10     11     12                13     14     15     16     17     18     19                20     21    INF BLOOD SPECIMENS-CENTRAL   2:00 PM   (60 min )   AN INF QUICK CHAIR   [address] 22     23    INF ONCOLOGY TX-TREATMENT PLAN  12:00 PM   (205 min )   AN INF BED 1   St  [address] [place] [address] Road 24     25     26           Cycle 17, Day 1     27     28     29     30     31                               Treatment Details       10/24/2019 - Cycle 17, Day 1      Chemotherapy: ONCBCN PROVIDER COMMUNICATION2, ADO-TRASTUZUMAB EMTANSINE IVPB

## 2019-09-23 NOTE — PROGRESS NOTES
Pt here for central  and lab draw  Pt denies any new issues or concerns  Labs drawn/ port flush without incidence  Confirmed pt's next appointment and pt declines AVS  D/C to home care of daughter

## 2019-09-24 ENCOUNTER — TELEPHONE (OUTPATIENT)
Dept: PALLIATIVE MEDICINE | Facility: CLINIC | Age: 59
End: 2019-09-24

## 2019-09-24 RX ORDER — SODIUM CHLORIDE 9 MG/ML
20 INJECTION, SOLUTION INTRAVENOUS ONCE
Status: CANCELLED | OUTPATIENT
Start: 2019-09-25

## 2019-09-24 RX ORDER — PALONOSETRON 0.05 MG/ML
0.25 INJECTION, SOLUTION INTRAVENOUS ONCE
Status: CANCELLED | OUTPATIENT
Start: 2019-09-25

## 2019-09-24 NOTE — TELEPHONE ENCOUNTER
Called [facility] [place] regarding a Quad cane and lypmhedema sleeve     Called oncology office and left message for Diane Esteban to send order to [place] [facility] to give update and also to schedule a home visit for this Friday

## 2019-09-25 ENCOUNTER — HOSPITAL ENCOUNTER (OUTPATIENT)
Dept: INFUSION CENTER | Facility: CLINIC | Age: 59
Discharge: HOME | End: 2019-09-25
Payer: COMMERCIAL

## 2019-09-25 VITALS
BODY MASS INDEX: 25.63 KG/M2 | SYSTOLIC BLOOD PRESSURE: 138 MMHG | HEIGHT: 66 IN | HEART RATE: 98 BPM | RESPIRATION RATE: 20 BRPM | WEIGHT: 159.5 LBS | OXYGEN SATURATION: 97 % | DIASTOLIC BLOOD PRESSURE: 78 MMHG | TEMPERATURE: 97.3 F

## 2019-09-25 DIAGNOSIS — C50.911 BILATERAL MALIGNANT NEOPLASM OF BREAST IN FEMALE, ESTROGEN RECEPTOR POSITIVE, UNSPECIFIED SITE OF BREAST (HCC): Primary | ICD-10-CM

## 2019-09-25 DIAGNOSIS — Z17.0 BILATERAL MALIGNANT NEOPLASM OF BREAST IN FEMALE, ESTROGEN RECEPTOR POSITIVE, UNSPECIFIED SITE OF BREAST (HCC): Primary | ICD-10-CM

## 2019-09-25 DIAGNOSIS — C50.912 BILATERAL MALIGNANT NEOPLASM OF BREAST IN FEMALE, ESTROGEN RECEPTOR POSITIVE, UNSPECIFIED SITE OF BREAST (HCC): Primary | ICD-10-CM

## 2019-09-25 PROCEDURE — 96367 TX/PROPH/DG ADDL SEQ IV INF: CPT

## 2019-09-25 PROCEDURE — 96413 CHEMO IV INFUSION 1 HR: CPT

## 2019-09-25 PROCEDURE — 96375 TX/PRO/DX INJ NEW DRUG ADDON: CPT

## 2019-09-25 RX ORDER — PALONOSETRON 0.05 MG/ML
0.25 INJECTION, SOLUTION INTRAVENOUS ONCE
Status: COMPLETED | OUTPATIENT
Start: 2019-09-25 | End: 2019-09-25

## 2019-09-25 RX ORDER — SODIUM CHLORIDE 9 MG/ML
20 INJECTION, SOLUTION INTRAVENOUS ONCE
Status: COMPLETED | OUTPATIENT
Start: 2019-09-25 | End: 2019-09-25

## 2019-09-25 RX ADMIN — SODIUM CHLORIDE 20 ML/HR: 0.9 INJECTION, SOLUTION INTRAVENOUS at 12:40

## 2019-09-25 RX ADMIN — SODIUM CHLORIDE 150 MG: 0.9 INJECTION, SOLUTION INTRAVENOUS at 13:24

## 2019-09-25 RX ADMIN — PALONOSETRON HYDROCHLORIDE 0.25 MG: 0.25 INJECTION, SOLUTION INTRAVENOUS at 12:43

## 2019-09-25 RX ADMIN — DEXAMETHASONE SODIUM PHOSPHATE 10 MG: 10 INJECTION, SOLUTION INTRAMUSCULAR; INTRAVENOUS at 12:47

## 2019-09-25 RX ADMIN — ADO-TRASTUZUMAB EMTANSINE 256 MG: 20 INJECTION, POWDER, LYOPHILIZED, FOR SOLUTION INTRAVENOUS at 13:58

## 2019-09-25 NOTE — PROGRESS NOTES
Pt presents for Kadcyla for treatment of metastatic breast cancer  Vitals stable, labs within parameters for treatment  Call bell within reach, will continue to monitor

## 2019-09-27 ENCOUNTER — IN HOME VISIT (OUTPATIENT)
Dept: PALLIATIVE MEDICINE | Facility: CLINIC | Age: 59
End: 2019-09-27

## 2019-09-27 DIAGNOSIS — G89.3 CANCER RELATED PAIN: Primary | ICD-10-CM

## 2019-09-27 DIAGNOSIS — R11.0 NAUSEA: ICD-10-CM

## 2019-09-27 DIAGNOSIS — F51.01 PRIMARY INSOMNIA: ICD-10-CM

## 2019-09-27 PROCEDURE — NC001 PR NO CHARGE

## 2019-09-27 NOTE — PROGRESS NOTES
Called to remind of nurses visit time  No answer could not leave message     Called later regarding visit  Spoke with patient who stated she wanted to reschedule for next week     Patient rescheduled to Friday  Oct 4

## 2019-10-04 ENCOUNTER — IN HOME VISIT (OUTPATIENT)
Dept: PALLIATIVE MEDICINE | Facility: CLINIC | Age: 59
End: 2019-10-04
Payer: MEDICARE

## 2019-10-04 VITALS
DIASTOLIC BLOOD PRESSURE: 84 MMHG | HEART RATE: 90 BPM | RESPIRATION RATE: 20 BRPM | TEMPERATURE: 98.2 F | SYSTOLIC BLOOD PRESSURE: 160 MMHG

## 2019-10-04 DIAGNOSIS — G89.29 OTHER CHRONIC BACK PAIN: ICD-10-CM

## 2019-10-04 DIAGNOSIS — G89.3 CANCER RELATED PAIN: Primary | ICD-10-CM

## 2019-10-04 DIAGNOSIS — F32.A DEPRESSIVE DISORDER: ICD-10-CM

## 2019-10-04 DIAGNOSIS — M54.9 OTHER CHRONIC BACK PAIN: ICD-10-CM

## 2019-10-04 DIAGNOSIS — G89.4 CHRONIC PAIN SYNDROME: ICD-10-CM

## 2019-10-04 DIAGNOSIS — Z72.0 TOBACCO ABUSE: ICD-10-CM

## 2019-10-04 DIAGNOSIS — R53.0 NEOPLASTIC MALIGNANT RELATED FATIGUE: ICD-10-CM

## 2019-10-04 PROCEDURE — 99497 ADVNCD CARE PLAN 30 MIN: CPT

## 2019-10-04 NOTE — PROGRESS NOTES
Assessment/Plan: Visit made to patients home as part of the Palliative Care at Home program through [facility]  Greater than 1 hour was spent in direct patient care and participating in goals of care conversation  Chronic Care Management facilitated by this visit  Emotional and social issues addressed  This nurse reviewed all medications and instructed on their use and dosing  Used the teach back method to faciilitate learning of proper medication administration   Patient verbalized correct purpose and dosing of medication with 50 percent accuracy Medication bottles were reconciled    Discrepencies noted with pill count of Zolpidem fill date on bottle 9 19 but only one pill left in bottle States she only takes one at night    Hydromorphone filled 9 23  Pills remaining  66 which calculates to aproximately 8 pills taken per day   Gabapentin patient received a 90 day supply however only 4 capsules left and fill date was 8 20 She thinks she left a bottle at [address] patient states she only takes 2 capsules at night which provides relief  Alert and oriented States low back pain is a 7 out of 10 which increases with movement      Present during the visit were the patient and her sister González Cisneros Pt taking from bottles and not using pill boxes   Sister and patient agreeable to pill box set up for at least the hydromorphone each day allowed amount  Max of 6 only take if needed  Instructed other methods to decrease pain such as ice or heat for 20 minutes at a time and rest frequently after activity      Plan see in one week and notify Dr for any medication questions or increase in nausea or pain   Possible appointment soon   Use medication list and pill box set up on a regular basis   Call office with any questions   This nurse will call oncology again regarding a sleeve for arm edema and order for quad cane   [facility] staff member informed of change in patients insurance as of October 1 to medicare A and B   Dr Sari Bautista given a phone update of visit     There are no diagnoses linked to this encounter  Subjective:     Patient ID: Sobeida Reyna is a 64 y o  female  Review of Systems   Constitutional: Positive for fatigue  HENT: Negative  Eyes: Negative  Respiratory: Positive for wheezing  Smoker half pack cigarettesper day  Uses inhaler as directed  Lungs clear to auscultation at visit     Gastrointestinal: Positive for nausea  Endocrine: Negative  Genitourinary: Negative  Allergic/Immunologic: Positive for immunocompromised state  Neurological: Positive for weakness  Tingling in hands and feet    Hematological: Negative  Psychiatric/Behavioral: Positive for dysphoric mood  Objective:     Physical Exam   Constitutional: She is oriented to person, place, and time  She appears well-developed  Cardiovascular: Normal rate and normal heart sounds  Pulmonary/Chest: She has wheezes  Abdominal: Bowel sounds are normal    Neurological: She is oriented to person, place, and time  Skin: Skin is warm and dry

## 2019-10-08 ENCOUNTER — TELEPHONE (OUTPATIENT)
Dept: PALLIATIVE MEDICINE | Facility: CLINIC | Age: 59
End: 2019-10-08

## 2019-10-08 ENCOUNTER — TELEPHONE (OUTPATIENT)
Dept: HEMATOLOGY ONCOLOGY | Facility: CLINIC | Age: 59
End: 2019-10-08

## 2019-10-08 DIAGNOSIS — I89.0 LYMPHEDEMA OF RIGHT UPPER EXTREMITY: Primary | ICD-10-CM

## 2019-10-08 NOTE — TELEPHONE ENCOUNTER
[facility] for a quadcane and lymphedema sleeve  And send to [facility] supply  Number provided [phone]   Also spoke with Ramin Gutiérrez at [facility] who states cane will be covered by [de-identified] percent w medicare a and b

## 2019-10-10 ENCOUNTER — IN HOME VISIT (OUTPATIENT)
Dept: PALLIATIVE MEDICINE | Facility: CLINIC | Age: 59
End: 2019-10-10
Payer: MEDICARE

## 2019-10-10 VITALS
HEART RATE: 90 BPM | SYSTOLIC BLOOD PRESSURE: 122 MMHG | RESPIRATION RATE: 20 BRPM | TEMPERATURE: 97.8 F | DIASTOLIC BLOOD PRESSURE: 60 MMHG | OXYGEN SATURATION: 97 %

## 2019-10-10 DIAGNOSIS — G89.3 CANCER RELATED PAIN: Primary | ICD-10-CM

## 2019-10-10 DIAGNOSIS — F32.A DEPRESSIVE DISORDER: ICD-10-CM

## 2019-10-10 DIAGNOSIS — R53.83 FATIGUE DUE TO DEPRESSION: ICD-10-CM

## 2019-10-10 DIAGNOSIS — F06.4 ANXIETY DISORDER DUE TO KNOWN PHYSIOLOGICAL CONDITION: ICD-10-CM

## 2019-10-10 DIAGNOSIS — F32.A FATIGUE DUE TO DEPRESSION: ICD-10-CM

## 2019-10-10 DIAGNOSIS — C79.31 BRAIN METASTASES (HCC): ICD-10-CM

## 2019-10-10 PROCEDURE — 99349 HOME/RES VST EST MOD MDM 40: CPT

## 2019-10-10 PROCEDURE — 99490 CHRNC CARE MGMT STAFF 1ST 20: CPT

## 2019-10-10 RX ORDER — DEXAMETHASONE 4 MG/1
4 TABLET ORAL
Qty: 30 TABLET | Refills: 1 | Status: SHIPPED | OUTPATIENT
Start: 2019-10-10 | End: 2019-11-25 | Stop reason: DRUGHIGH

## 2019-10-10 NOTE — PROGRESS NOTES
Visit made to patients home as part of the Palliative Care at Home program through [facility]  Greater than 1 hour was spent in direct patient care and participating in goals of care conversation  Chronic Care Management facilitated by this visit  Emotional and social issues addressed  This nurse reviewed all medications and instructed on their use and dosing  Used the teach back method to faciilitate learning of proper medication administration  Present during the visit were the patient and her sister Brain Band pill box set up with pain med and labeled for use Instructed patient to only use hydromorphone as needed for breakthrough pain if rest ice and or heat does not help   Assessment/Plan:    [facility] pharmacy called Refills on gabapentin and promethazine obtained  Need refill for Dexamethasone pharmacy to call Dr Mary Ruiz for refill order   Zolpidem to be refilled 10 14   Call Palliative care [facility] to schedule follow up appointment   Quad cane and lymphedema sleeve have been ordered by oncology   Instructed to call md if diarrhea worsens        There are no diagnoses linked to this encounter  Subjective: Patient complains of chronic back pain at rest is a 5 out of 10 and with activity is a 9  She is taking about 3 tabs of Hydromorphone since last home visit six days ago Pill count is 45 pills left   Patient is out out of  dexamethasone Zolpididem and Promethazine      Patient ID: Shira Ch is a 64 y o  female  with a history of metastatic breast cancer She will be resuming her chemotherapy in a few weeks     HPI    Review of Systems   Constitutional: Positive for activity change and appetite change  HENT: Positive for facial swelling  Eyes: Negative  Respiratory: Positive for wheezing  Cardiovascular: Negative  Gastrointestinal: Positive for diarrhea and nausea  Endocrine: Negative  Genitourinary: Negative  Musculoskeletal: Positive for back pain and gait problem  Allergic/Immunologic: Negative  Neurological: Positive for weakness  Hematological: Negative  Psychiatric/Behavioral: Positive for dysphoric mood  Objective:     Physical Exam   Constitutional: She is oriented to person, place, and time  Cardiovascular: Normal rate  Pulmonary/Chest: She has wheezes  Abdominal: Soft  Bowel sounds are normal    Neurological: She is alert and oriented to person, place, and time  Skin: Skin is warm and dry

## 2019-10-14 ENCOUNTER — HOSPITAL ENCOUNTER (OUTPATIENT)
Dept: INFUSION CENTER | Facility: CLINIC | Age: 59
Discharge: HOME | End: 2019-10-14

## 2019-10-17 ENCOUNTER — TELEPHONE (OUTPATIENT)
Dept: PALLIATIVE MEDICINE | Facility: CLINIC | Age: 59
End: 2019-10-17

## 2019-10-17 NOTE — TELEPHONE ENCOUNTER
Phone call made to patient to check on status   States she received all refills needed Gabapentin Dexamethasone and her inhaler   She states she is using her pre set up med box for dilaudid and has medication left Is also using aspirin for back pain but informed her contraindicated with steroid use so tylenol would be a better choice   States back pain is a 5 and she is resting more     Reminded her of her upcoming appointments next week Monday 21st for lab then Wednesday 23rd for Dr Jai Sosa at [phone] then over to chemo at noon She wrote this down     Patient unavailable for home visit this Friday will follow up in one week

## 2019-10-21 ENCOUNTER — HOSPITAL ENCOUNTER (OUTPATIENT)
Dept: INFUSION CENTER | Facility: CLINIC | Age: 59
Discharge: HOME | End: 2019-10-21
Payer: MEDICARE

## 2019-10-21 DIAGNOSIS — Z17.0 BILATERAL MALIGNANT NEOPLASM OF BREAST IN FEMALE, ESTROGEN RECEPTOR POSITIVE, UNSPECIFIED SITE OF BREAST (HCC): Primary | ICD-10-CM

## 2019-10-21 DIAGNOSIS — C50.912 BILATERAL MALIGNANT NEOPLASM OF BREAST IN FEMALE, ESTROGEN RECEPTOR POSITIVE, UNSPECIFIED SITE OF BREAST (HCC): Primary | ICD-10-CM

## 2019-10-21 DIAGNOSIS — C50.911 BILATERAL MALIGNANT NEOPLASM OF BREAST IN FEMALE, ESTROGEN RECEPTOR POSITIVE, UNSPECIFIED SITE OF BREAST (HCC): Primary | ICD-10-CM

## 2019-10-21 LAB
ALBUMIN SERPL BCP-MCNC: 2.9 G/DL (ref 3.5–5)
ALP SERPL-CCNC: 127 U/L (ref 46–116)
ALT SERPL W P-5'-P-CCNC: 45 U/L (ref 12–78)
ANION GAP SERPL CALCULATED.3IONS-SCNC: 13 MMOL/L (ref 4–13)
AST SERPL W P-5'-P-CCNC: 33 U/L (ref 5–45)
BASOPHILS # BLD MANUAL: 0 THOUSAND/UL (ref 0–0.1)
BASOPHILS NFR MAR MANUAL: 0 % (ref 0–1)
BILIRUB SERPL-MCNC: 0.2 MG/DL (ref 0.2–1)
BUN SERPL-MCNC: 10 MG/DL (ref 5–25)
CALCIUM SERPL-MCNC: 9.4 MG/DL (ref 8.3–10.1)
CHLORIDE SERPL-SCNC: 106 MMOL/L (ref 100–108)
CO2 SERPL-SCNC: 24 MMOL/L (ref 21–32)
CREAT SERPL-MCNC: 0.73 MG/DL (ref 0.6–1.3)
EOSINOPHIL # BLD MANUAL: 0 THOUSAND/UL (ref 0–0.4)
EOSINOPHIL NFR BLD MANUAL: 0 % (ref 0–6)
ERYTHROCYTE [DISTWIDTH] IN BLOOD BY AUTOMATED COUNT: 17.9 % (ref 11.6–15.1)
GFR SERPL CREATININE-BSD FRML MDRD: 92 ML/MIN/1.73SQ M
GLUCOSE SERPL-MCNC: 150 MG/DL (ref 65–140)
HCT VFR BLD AUTO: 36.7 % (ref 34.8–46.1)
HGB BLD-MCNC: 11.1 G/DL (ref 11.5–15.4)
LG PLATELETS BLD QL SMEAR: PRESENT
LYMPHOCYTES # BLD AUTO: 0.68 THOUSAND/UL (ref 0.6–4.47)
LYMPHOCYTES # BLD AUTO: 5 % (ref 14–44)
MCH RBC QN AUTO: 28.5 PG (ref 26.8–34.3)
MCHC RBC AUTO-ENTMCNC: 30.2 G/DL (ref 31.4–37.4)
MCV RBC AUTO: 94 FL (ref 82–98)
METAMYELOCYTES NFR BLD MANUAL: 1 % (ref 0–1)
MONOCYTES # BLD AUTO: 0.68 THOUSAND/UL (ref 0–1.22)
MONOCYTES NFR BLD: 5 % (ref 4–12)
NEUTROPHILS # BLD MANUAL: 12.01 THOUSAND/UL (ref 1.85–7.62)
NEUTS SEG NFR BLD AUTO: 88 % (ref 43–75)
NRBC BLD AUTO-RTO: 0 /100 WBCS
PLATELET # BLD AUTO: 493 THOUSANDS/UL (ref 149–390)
PLATELET BLD QL SMEAR: ABNORMAL
PMV BLD AUTO: 8.9 FL (ref 8.9–12.7)
POTASSIUM SERPL-SCNC: 3.9 MMOL/L (ref 3.5–5.3)
PROT SERPL-MCNC: 7.5 G/DL (ref 6.4–8.2)
RBC # BLD AUTO: 3.89 MILLION/UL (ref 3.81–5.12)
RBC MORPH BLD: NORMAL
SODIUM SERPL-SCNC: 143 MMOL/L (ref 136–145)
TOTAL CELLS COUNTED SPEC: 100
VARIANT LYMPHS # BLD AUTO: 1 %
WBC # BLD AUTO: 13.65 THOUSAND/UL (ref 4.31–10.16)

## 2019-10-21 PROCEDURE — 85007 BL SMEAR W/DIFF WBC COUNT: CPT | Performed by: INTERNAL MEDICINE

## 2019-10-21 PROCEDURE — 85027 COMPLETE CBC AUTOMATED: CPT | Performed by: INTERNAL MEDICINE

## 2019-10-21 PROCEDURE — 80053 COMPREHEN METABOLIC PANEL: CPT | Performed by: INTERNAL MEDICINE

## 2019-10-21 RX ORDER — SODIUM CHLORIDE 9 MG/ML
20 INJECTION, SOLUTION INTRAVENOUS ONCE
Status: CANCELLED | OUTPATIENT
Start: 2019-10-24

## 2019-10-21 RX ORDER — PALONOSETRON 0.05 MG/ML
0.25 INJECTION, SOLUTION INTRAVENOUS ONCE
Status: CANCELLED | OUTPATIENT
Start: 2019-10-23

## 2019-10-21 NOTE — PROGRESS NOTES
Pt here early for labs as she thought she had an MD visit this morning  MD visit is scheduled for Wed prior to chemo  Labs drawn via pac  Pt c/o constant nausea, denies vomiting and states that she is able to eat bland diet  Aloxi and emend ordered with chemotherapy  Message sent to MD office nurse regarding complaints of constant nausea  Pt will follow up with MD as scheduled Wed    Declined AVS

## 2019-10-22 NOTE — PROGRESS NOTES
Palliative and Supportive Care   Brian Kramer 64 y o  female [id number]    Assessment/Plan:  1  Bilateral malignant neoplasm of breast in female, estrogen receptor positive, unspecified site of breast ([address] )    2  Cancer related pain    3  Chemotherapy-induced nausea    4  Drug-induced constipation    5  Other fatigue    6  Edema, unspecified type    7  Decreased appetite    8  Constipation, unspecified constipation type      Requested Prescriptions     Signed Prescriptions Disp Refills    gabapentin (NEURONTIN) 600 MG tablet 90 tablet 2     Sig: Take 1 tablet (600 mg total) by mouth 3 (three) times a day    HYDROmorphone (DILAUDID) 8 MG tablet 110 tablet 0     Sig: Take 1 tablet (8 mg total) by mouth 4 (four) times a day as needed (breakthrough pain)Max Daily Amount: 32 mg    furosemide (LASIX) 20 mg tablet 10 tablet 2     Sig: Take 1 tablet (20 mg total) by mouth daily as needed (swelling)    lactulose 20 g/30 mL 946 mL 5     SimL PO daily  May take an extra dose up to 3x per day as needed for constipation     Medications Discontinued During This Encounter   Medication Reason    gabapentin (NEURONTIN) 300 mg capsule Dose Adjustment    HYDROmorphone (DILAUDID) 4 mg tablet Dose adjustment    LORazepam (ATIVAN) 1 mg tablet Therapy complete    polyethylene glycol (MIRALAX) 17 g packet Alternate therapy due to it being ineffective    lactulose 20 g/30 mL Reorder     Will continue to try to make adjustments to her palliative symptom management regime  Will try to send out PALS RN to help with compliance  The intractable nature of her symptoms makes me suspect that compliance is an issue  Representatives have queried the patient's controlled substance dispensing history in the Prescription Drug Monitoring Program in compliance with regulations before I have prescribed any controlled substances  The prescription history is consistent with prescribed therapy and our practice policies        25 minutes were spent face to face with José Miguel Weiss with greater than 50% of the time spent in counseling or coordination of care including discussions of treatment instructions and follow up requirements   All of the patient's questions were answered during this discussion  Return in about 4 weeks (around 11/20/2019) for symptom assessment and management  Subjective:   Chief Complaint  Follow up visit for:  symptom management  HPI     José Miguel Weiss is a 64 y o  female with metastatic breast cancer originally diagnosed in 2010   She is under the care of Dr Sybil Jennings in medical oncology and Dr Júnior Schmid in radiation oncology   She had a bilateral mastectomy and has undergone multiple chemotherapy regimens  Carmen Thao has chronic lymphedema in her right arm   She has brain mets for which she recived SRS in May 2017   In March 2019 she completed whole brain radiation for multiple recurrent brain metastasis   Her current treatment regime is Cadcyla with premeds of Dexamethasone, Emend, Aloxi      She is on dexamethasone to help with a spectrum of symptoms including appetite, pain, energy level, and nausea   Her dexamethasone dosing is 4mg daily   Despite multiple adjustments to this dose over time, she has failed to consistently report any symptom benefits  Despite her consistent nausea, she is eating well and maintaining her weight      Her pain has been difficult to control   She has experienced consistent side effects from long acting opioids leading to sedation and confusion   Long acting opioids have also never provided her pain relief - forms tried have included methadone, MS Contin, Fentanyl patches  She is maintained gabapentin 300, 300, 600mg, Flexeril 10mg TID PRN, and Dilaudid 4mg PRN  Carmen Thao takes the dilaudid 3-4 per day but mostly she says 4x per day  She is wondering if there can be adjustments to her gabapentin or dilaudid dosing        She has consistent nausea    She has not gotten relief with dexamethasone, Aloxi and Emend pre - treatment   She has not found much relief with compazine or zofran   She takes zyprexa and has PRN phenergan  However she continues to report that she is always nauseous      She is dealing with constipation and taking miralax but it is not working  She feels bloated and wants a water pill       The following portions of the medical history were reviewed: past medical history, problem list, medication list, and social history  Current Outpatient Medications:     albuterol (PROVENTIL HFA,VENTOLIN HFA) 90 mcg/act inhaler, TAKE 2 PUFFS BY MOUTH EVERY 6 HOURS AS NEEDED FOR WHEEZING, Disp: 18 Inhaler, Rfl: 2    dexamethasone (DECADRON) 4 mg tablet, TAKE 1 TABLET (4 MG TOTAL) BY MOUTH DAILY WITH BREAKFAST, Disp: 30 tablet, Rfl: 1    lactulose 20 g/30 mL, 30mL PO daily    May take an extra dose up to 3x per day as needed for constipation, Disp: 946 mL, Rfl: 5    lidocaine-prilocaine (EMLA) cream, Apply topically as needed for mild pain, Disp: 30 g, Rfl: 0    OLANZapine (ZyPREXA) 10 mg tablet, TAKE 1 TABLET BY MOUTH EVERY DAY, Disp: 90 tablet, Rfl: 1    pantoprazole (PROTONIX) 40 mg tablet, Take 1 tablet (40 mg total) by mouth daily, Disp: 30 tablet, Rfl: 5    promethazine (PHENERGAN) 25 mg tablet, Take 1 tablet (25 mg total) by mouth every 6 (six) hours as needed for nausea or vomiting, Disp: 30 tablet, Rfl: 1    senna (SENOKOT) 8 6 mg, Take 2 tablets by mouth 2 (two) times a day as needed  , Disp: , Rfl:     zolpidem (AMBIEN) 10 mg tablet, Take 1 tablet (10 mg total) by mouth daily at bedtime as needed for sleep, Disp: 30 tablet, Rfl: 2    furosemide (LASIX) 20 mg tablet, Take 1 tablet (20 mg total) by mouth daily as needed (swelling), Disp: 10 tablet, Rfl: 2    gabapentin (NEURONTIN) 600 MG tablet, Take 1 tablet (600 mg total) by mouth 3 (three) times a day, Disp: 90 tablet, Rfl: 2    HYDROmorphone (DILAUDID) 8 MG tablet, Take 1 tablet (8 mg total) by mouth 4 (four) times a day as needed (breakthrough pain)Max Daily Amount: 32 mg, Disp: 110 tablet, Rfl: 0  No current facility-administered medications for this visit  Facility-Administered Medications Ordered in Other Visits:     ado-trastuzumab emtansine (KADCYLA) 256 mg in sodium chloride 0 9 % 250 mL IVPB, 3 6 mg/kg (Treatment Plan Recorded), Intravenous, Once, Valdene Comas, DO    dexamethasone (DECADRON) 10 mg in sodium chloride 0 9 % 51 mL IVPB, 10 mg, Intravenous, Once, Valdene Comas, DO, Last Rate: 153 mL/hr at 10/23/19 1338, 10 mg at 10/23/19 1338    fosaprepitant (EMEND) 150 mg in sodium chloride 0 9 % 255 mL IVPB, 150 mg, Intravenous, Once, Valdene Comas, DO  Review of Systems   Gastrointestinal: Positive for abdominal distention, constipation and nausea  Musculoskeletal: Positive for arthralgias and back pain  All other systems negative    Objective:  Vital Signs  /78 (BP Location: Left arm, Cuff Size: Standard)   Pulse 102   Temp 99 3 °F (37 4 °C) (Tympanic)   Resp 22   Ht 5' 6" (1 676 m)   Wt 72 6 kg (160 lb)   LMP  (LMP Unknown)   SpO2 97%   BMI 25 82 kg/m²    Physical Exam    Constitutional: Sickly appearance  In no acute physical or emotional distress  Head: Normocephalic and atraumatic  Eyes: EOM are normal  No ocular discharge  No scleral icterus  Neck: No visible adenopathy or masses  Respiratory: Effort normal  No stridor  No respiratory distress  Gastrointestinal: abdominal distension  Musculoskeletal: No edema  Neurological: Alert, oriented and appropriately conversant  Skin: No diaphoresis, no rashes seen on exposed areas of skin  Psychiatric: Displays a normal mood and affect   Behavior, judgement and thought content appear normal

## 2019-10-23 ENCOUNTER — HOSPITAL ENCOUNTER (OUTPATIENT)
Dept: INFUSION CENTER | Facility: CLINIC | Age: 59
Discharge: HOME | End: 2019-10-23
Payer: MEDICARE

## 2019-10-23 ENCOUNTER — SOCIAL WORK (OUTPATIENT)
Dept: PALLIATIVE MEDICINE | Facility: CLINIC | Age: 59
End: 2019-10-23
Payer: MEDICARE

## 2019-10-23 ENCOUNTER — OFFICE VISIT (OUTPATIENT)
Dept: PALLIATIVE MEDICINE | Facility: CLINIC | Age: 59
End: 2019-10-23
Payer: MEDICARE

## 2019-10-23 VITALS
SYSTOLIC BLOOD PRESSURE: 132 MMHG | RESPIRATION RATE: 22 BRPM | HEART RATE: 102 BPM | HEIGHT: 66 IN | TEMPERATURE: 99.3 F | BODY MASS INDEX: 25.71 KG/M2 | OXYGEN SATURATION: 97 % | WEIGHT: 160 LBS | DIASTOLIC BLOOD PRESSURE: 78 MMHG

## 2019-10-23 VITALS
SYSTOLIC BLOOD PRESSURE: 136 MMHG | HEIGHT: 66 IN | HEART RATE: 98 BPM | DIASTOLIC BLOOD PRESSURE: 80 MMHG | TEMPERATURE: 98.1 F | WEIGHT: 159 LBS | OXYGEN SATURATION: 98 % | RESPIRATION RATE: 18 BRPM | BODY MASS INDEX: 25.55 KG/M2

## 2019-10-23 DIAGNOSIS — G89.3 CANCER RELATED PAIN: ICD-10-CM

## 2019-10-23 DIAGNOSIS — C50.911 BILATERAL MALIGNANT NEOPLASM OF BREAST IN FEMALE, ESTROGEN RECEPTOR POSITIVE, UNSPECIFIED SITE OF BREAST (HCC): Primary | ICD-10-CM

## 2019-10-23 DIAGNOSIS — C50.912 BILATERAL MALIGNANT NEOPLASM OF BREAST IN FEMALE, ESTROGEN RECEPTOR POSITIVE, UNSPECIFIED SITE OF BREAST (HCC): Primary | ICD-10-CM

## 2019-10-23 DIAGNOSIS — Z71.89 COUNSELING AND COORDINATION OF CARE: Primary | ICD-10-CM

## 2019-10-23 DIAGNOSIS — Z17.0 BILATERAL MALIGNANT NEOPLASM OF BREAST IN FEMALE, ESTROGEN RECEPTOR POSITIVE, UNSPECIFIED SITE OF BREAST (HCC): Primary | ICD-10-CM

## 2019-10-23 DIAGNOSIS — K59.00 CONSTIPATION, UNSPECIFIED CONSTIPATION TYPE: ICD-10-CM

## 2019-10-23 DIAGNOSIS — T45.1X5A CHEMOTHERAPY-INDUCED NAUSEA: ICD-10-CM

## 2019-10-23 DIAGNOSIS — R63.0 DECREASED APPETITE: ICD-10-CM

## 2019-10-23 DIAGNOSIS — R60.9 EDEMA, UNSPECIFIED TYPE: ICD-10-CM

## 2019-10-23 DIAGNOSIS — K59.03 DRUG-INDUCED CONSTIPATION: ICD-10-CM

## 2019-10-23 DIAGNOSIS — R53.83 OTHER FATIGUE: ICD-10-CM

## 2019-10-23 DIAGNOSIS — R11.0 CHEMOTHERAPY-INDUCED NAUSEA: ICD-10-CM

## 2019-10-23 PROCEDURE — 96375 TX/PRO/DX INJ NEW DRUG ADDON: CPT

## 2019-10-23 PROCEDURE — 96367 TX/PROPH/DG ADDL SEQ IV INF: CPT

## 2019-10-23 PROCEDURE — 99214 OFFICE O/P EST MOD 30 MIN: CPT | Performed by: FAMILY MEDICINE

## 2019-10-23 PROCEDURE — 96413 CHEMO IV INFUSION 1 HR: CPT

## 2019-10-23 PROCEDURE — NC001 PR NO CHARGE

## 2019-10-23 RX ORDER — FUROSEMIDE 20 MG/1
20 TABLET ORAL DAILY PRN
Qty: 10 TABLET | Refills: 2 | Status: SHIPPED | OUTPATIENT
Start: 2019-10-23 | End: 2020-03-14 | Stop reason: HOSPADM

## 2019-10-23 RX ORDER — GABAPENTIN 600 MG/1
600 TABLET ORAL 3 TIMES DAILY
Qty: 90 TABLET | Refills: 2 | Status: SHIPPED | OUTPATIENT
Start: 2019-10-23 | End: 2019-11-27 | Stop reason: SDUPTHER

## 2019-10-23 RX ORDER — LACTULOSE 20 G/30ML
SOLUTION ORAL
Qty: 946 ML | Refills: 5 | Status: SHIPPED | OUTPATIENT
Start: 2019-10-23 | End: 2020-08-12 | Stop reason: SDUPTHER

## 2019-10-23 RX ORDER — PALONOSETRON 0.05 MG/ML
0.25 INJECTION, SOLUTION INTRAVENOUS ONCE
Status: COMPLETED | OUTPATIENT
Start: 2019-10-23 | End: 2019-10-23

## 2019-10-23 RX ORDER — SODIUM CHLORIDE 9 MG/ML
20 INJECTION, SOLUTION INTRAVENOUS ONCE
Status: COMPLETED | OUTPATIENT
Start: 2019-10-23 | End: 2019-10-23

## 2019-10-23 RX ORDER — HYDROMORPHONE HYDROCHLORIDE 8 MG/1
8 TABLET ORAL 4 TIMES DAILY PRN
Qty: 110 TABLET | Refills: 0 | Status: SHIPPED | OUTPATIENT
Start: 2019-10-23 | End: 2019-11-19 | Stop reason: SDUPTHER

## 2019-10-23 RX ADMIN — SODIUM CHLORIDE 150 MG: 0.9 INJECTION, SOLUTION INTRAVENOUS at 14:00

## 2019-10-23 RX ADMIN — PALONOSETRON 0.25 MG: 0.05 INJECTION, SOLUTION INTRAVENOUS at 13:38

## 2019-10-23 RX ADMIN — ADO-TRASTUZUMAB EMTANSINE 256 MG: 20 INJECTION, POWDER, LYOPHILIZED, FOR SOLUTION INTRAVENOUS at 14:37

## 2019-10-23 RX ADMIN — SODIUM CHLORIDE 20 ML/HR: 0.9 INJECTION, SOLUTION INTRAVENOUS at 13:27

## 2019-10-23 RX ADMIN — DEXAMETHASONE SODIUM PHOSPHATE 10 MG: 10 INJECTION, SOLUTION INTRAMUSCULAR; INTRAVENOUS at 13:38

## 2019-10-23 NOTE — PROGRESS NOTES
Chet Suarez presents for follow up today  Her sister accompanied her and continues to be a support  [facility] RN Levi Flor ) has made several home visits of which pt is appreciative  She continues to endorse uncontrolled pain and neuropathic symptoms  Dr Alanis  able to adjust medications accordingly  She continues to be treatment focused and receives chemotherapy  LSW reviewed home visit notes that pt was ordered a quad cane and lymphedema sleeve  Awaiting final insurance coverage  Will continue to follow and assist with support

## 2019-10-23 NOTE — PLAN OF CARE
Problem: Potential for Falls  Goal: Patient will remain free of falls  Description  INTERVENTIONS:  - Assess patient frequently for physical needs  -  Identify cognitive and physical deficits and behaviors that affect risk of falls    -  [place] fall precautions as indicated by assessment   - Educate patient/family on patient safety including physical limitations  - Instruct patient to call for assistance with activity based on assessment  - Modify environment to reduce risk of injury  - Consider OT/PT consult to assist with strengthening/mobility  Outcome: Progressing

## 2019-10-25 ENCOUNTER — TELEPHONE (OUTPATIENT)
Dept: HEMATOLOGY ONCOLOGY | Facility: CLINIC | Age: 59
End: 2019-10-25

## 2019-10-25 NOTE — TELEPHONE ENCOUNTER
Patient called in asking if she is able to have a flu shot after just finishing CHEMO  Please call and advise   [phone]

## 2019-10-31 ENCOUNTER — TELEPHONE (OUTPATIENT)
Dept: PALLIATIVE MEDICINE | Facility: CLINIC | Age: 59
End: 2019-10-31

## 2019-10-31 NOTE — TELEPHONE ENCOUNTER
Called patient to schedule home visit for Friday Nov 1 but she is  unavailable that day  Agreed to a visit Tuesday Nov 5 at 2 pm  States still using medication boxes

## 2019-11-01 ENCOUNTER — HOSPITAL ENCOUNTER (OUTPATIENT)
Dept: CT IMAGING | Facility: HOSPITAL | Age: 59
Discharge: HOME | End: 2019-11-01

## 2019-11-06 ENCOUNTER — TELEPHONE (OUTPATIENT)
Dept: HEMATOLOGY ONCOLOGY | Facility: CLINIC | Age: 59
End: 2019-11-06

## 2019-11-06 ENCOUNTER — IN HOME VISIT (OUTPATIENT)
Dept: PALLIATIVE MEDICINE | Facility: CLINIC | Age: 59
End: 2019-11-06
Payer: MEDICARE

## 2019-11-06 VITALS — HEART RATE: 78 BPM | TEMPERATURE: 98 F | RESPIRATION RATE: 20 BRPM

## 2019-11-06 DIAGNOSIS — F32.A DEPRESSIVE DISORDER: ICD-10-CM

## 2019-11-06 DIAGNOSIS — Z71.6 ENCOUNTER FOR SMOKING CESSATION COUNSELING: ICD-10-CM

## 2019-11-06 DIAGNOSIS — Z71.89 COUNSELING AND COORDINATION OF CARE: Primary | ICD-10-CM

## 2019-11-06 DIAGNOSIS — G89.3 CANCER RELATED PAIN: ICD-10-CM

## 2019-11-06 DIAGNOSIS — R53.83 OTHER FATIGUE: ICD-10-CM

## 2019-11-06 DIAGNOSIS — F11.90 CHRONIC, CONTINUOUS USE OF OPIOIDS: ICD-10-CM

## 2019-11-06 PROCEDURE — 99350 HOME/RES VST EST HIGH MDM 60: CPT

## 2019-11-06 NOTE — TELEPHONE ENCOUNTER
Patient's palliative nurse Robert Carrillo called stating about cancellation bloodwork and infusion treatment due to transportation on 11-11-19 & 11-13-19  Is It possible to set patient up for [facility]  transportation for the above dates so patient does not have to cancel  Patient call back # [phone]

## 2019-11-06 NOTE — PROGRESS NOTES
Visit made to patients home as part of the Pall[facility]  Greater than 1 hour was spent in direct patient care and participating in goals of care conversation  Chronic Care Management facilitated by this visit  Emotional and social issues addressed  This nurse reviewed all medications and instructed on their use and dosing  Patient reports her sister Bjorn Jefferson sets up her medication in med boxes for her Med boxes and medication bottles were checked by nurse this visit Dilaudid 8 mg tabs were in med box set aside for this med only  There were 9 pills left with the prescription bottle empty  According to the directions  4 tabs a day meaning 41 pills are unaccounted for  [facility] pharmacy was called spoke with pharmacist Ace to verify 110 total pills dispensed on 10 24 Patient states that she only takes 4 pills a day of Dilaudid  Her sister is unavailable to endorse this and check if remaining  Dilaudid pills  are elsewhere in the home    Used the teach back method to faciilitate learning of proper medication administration  Present during the visit were the patient   and Bjorn Jefferson are at work   Patient states her pain is a 5/10 in the low back  Denies nausea eating well no constipation with the use of Mirilax  sleeps well has fatigue during the day   Has not received quad cane or lymphedema sleeve  [facility] called  According to Oral Ruvalcaba at [facility] they called the patient 9 27 to inform of 4 97 balance on the quad cane  Order for a lymphedema sleeve was present but not yet processed Informed Josee of change to Medicare A and B coverage as of 10 1  Assessment/Plan:    This nurse to call the home and speak with Chin Borrego regarding pill count   Dr Idalmis Colindres informed   Home visit by Palliative nurse in one week to assess medication compliance   [facility] to contact patient with assistance for upcoming treatment and lab draw next week    Follow up on equipment from [address] [place] [address] supply        There are no diagnoses linked to this encounter  Subjective:     Patient ID: José Miguel Weiss is a 64 y o  female  wiyh a history of breast cancer with metastasis to brain and bone Chemotherapy treatments have been resumed     HPI    Review of Systems   Constitutional: Positive for activity change  Eyes: Negative  Gastrointestinal: Positive for constipation  Endocrine: Negative  Genitourinary: Negative  Musculoskeletal: Positive for back pain  Neurological: Positive for weakness and numbness  Psychiatric/Behavioral: Positive for dysphoric mood  Objective:     Physical Exam   Constitutional: She is oriented to person, place, and time  She appears well-developed  Cardiovascular: Normal rate  Pulmonary/Chest: She has wheezes  Using as needed inhaler  Still smoking cigarettes   Abdominal: Soft  She exhibits distension  Musculoskeletal: She exhibits edema  Rt arm lymphedema    Neurological: She is alert and oriented to person, place, and time     Psychiatric: Her behavior is normal

## 2019-11-14 ENCOUNTER — TELEPHONE (OUTPATIENT)
Dept: PALLIATIVE MEDICINE | Facility: CLINIC | Age: 59
End: 2019-11-14

## 2019-11-14 NOTE — TELEPHONE ENCOUNTER
Phone call made to patient regarding status  States she is feeling well  Has some nausea  Taking tylenol and gabapentin for pain     Offered home visit for Friday but has infusion  so will do home visit on Tuesday at 2 pm

## 2019-11-15 ENCOUNTER — HOSPITAL ENCOUNTER (OUTPATIENT)
Dept: INFUSION CENTER | Facility: CLINIC | Age: 59
End: 2019-11-15

## 2019-11-15 ENCOUNTER — TELEPHONE (OUTPATIENT)
Dept: HEMATOLOGY ONCOLOGY | Facility: HOSPITAL | Age: 59
End: 2019-11-15

## 2019-11-15 DIAGNOSIS — T45.1X5A CHEMOTHERAPY-INDUCED NAUSEA: ICD-10-CM

## 2019-11-15 DIAGNOSIS — R11.0 CHEMOTHERAPY-INDUCED NAUSEA: ICD-10-CM

## 2019-11-15 DIAGNOSIS — G89.3 CANCER RELATED PAIN: ICD-10-CM

## 2019-11-15 DIAGNOSIS — G47.9 DIFFICULTY SLEEPING: ICD-10-CM

## 2019-11-15 NOTE — TELEPHONE ENCOUNTER
Spoke with patient today - she was unable to make her appointments at the infusion center this week due to transportation  Appointment was rescheduled for Thursday 11/21/19 @ 9am   Start transportation arranged  Patient aware of appointment date and time

## 2019-11-16 RX ORDER — ZOLPIDEM TARTRATE 10 MG/1
10 TABLET ORAL
Qty: 30 TABLET | Refills: 2 | Status: SHIPPED | OUTPATIENT
Start: 2019-11-16 | End: 2020-02-12

## 2019-11-16 RX ORDER — HYDROMORPHONE HYDROCHLORIDE 8 MG/1
8 TABLET ORAL 4 TIMES DAILY PRN
Qty: 110 TABLET | Refills: 0 | Status: CANCELLED | OUTPATIENT
Start: 2019-11-16

## 2019-11-16 RX ORDER — PROMETHAZINE HYDROCHLORIDE 25 MG/1
25 TABLET ORAL EVERY 6 HOURS PRN
Qty: 30 TABLET | Refills: 1 | Status: SHIPPED | OUTPATIENT
Start: 2019-11-16 | End: 2019-12-20 | Stop reason: SDUPTHER

## 2019-11-18 ENCOUNTER — HOSPITAL ENCOUNTER (OUTPATIENT)
Dept: INFUSION CENTER | Facility: CLINIC | Age: 59
End: 2019-11-18

## 2019-11-19 ENCOUNTER — TELEPHONE (OUTPATIENT)
Dept: PALLIATIVE MEDICINE | Facility: CLINIC | Age: 59
End: 2019-11-19

## 2019-11-19 ENCOUNTER — IN HOME VISIT (OUTPATIENT)
Dept: PALLIATIVE MEDICINE | Facility: CLINIC | Age: 59
End: 2019-11-19
Payer: MEDICARE

## 2019-11-19 VITALS
TEMPERATURE: 98.7 F | SYSTOLIC BLOOD PRESSURE: 122 MMHG | RESPIRATION RATE: 22 BRPM | DIASTOLIC BLOOD PRESSURE: 80 MMHG | HEART RATE: 88 BPM | OXYGEN SATURATION: 98 %

## 2019-11-19 DIAGNOSIS — G89.29 CHRONIC LOW BACK PAIN WITH SCIATICA, SCIATICA LATERALITY UNSPECIFIED, UNSPECIFIED BACK PAIN LATERALITY: ICD-10-CM

## 2019-11-19 DIAGNOSIS — M54.40 CHRONIC LOW BACK PAIN WITH SCIATICA, SCIATICA LATERALITY UNSPECIFIED, UNSPECIFIED BACK PAIN LATERALITY: ICD-10-CM

## 2019-11-19 DIAGNOSIS — R53.83 OTHER FATIGUE: ICD-10-CM

## 2019-11-19 DIAGNOSIS — G89.3 CANCER RELATED PAIN: ICD-10-CM

## 2019-11-19 DIAGNOSIS — K59.00 CONSTIPATION, UNSPECIFIED CONSTIPATION TYPE: ICD-10-CM

## 2019-11-19 DIAGNOSIS — G47.9 DIFFICULTY SLEEPING: ICD-10-CM

## 2019-11-19 DIAGNOSIS — G62.9 NEUROPATHY: ICD-10-CM

## 2019-11-19 DIAGNOSIS — C50.911 BILATERAL MALIGNANT NEOPLASM OF BREAST IN FEMALE, ESTROGEN RECEPTOR POSITIVE, UNSPECIFIED SITE OF BREAST (HCC): ICD-10-CM

## 2019-11-19 DIAGNOSIS — C50.912 BILATERAL MALIGNANT NEOPLASM OF BREAST IN FEMALE, ESTROGEN RECEPTOR POSITIVE, UNSPECIFIED SITE OF BREAST (HCC): ICD-10-CM

## 2019-11-19 DIAGNOSIS — Z17.0 BILATERAL MALIGNANT NEOPLASM OF BREAST IN FEMALE, ESTROGEN RECEPTOR POSITIVE, UNSPECIFIED SITE OF BREAST (HCC): ICD-10-CM

## 2019-11-19 DIAGNOSIS — F32.A DEPRESSIVE DISORDER: ICD-10-CM

## 2019-11-19 DIAGNOSIS — Z71.89 ADVANCED CARE PLANNING/COUNSELING DISCUSSION: Primary | ICD-10-CM

## 2019-11-19 DIAGNOSIS — G47.00 INSOMNIA, UNSPECIFIED TYPE: ICD-10-CM

## 2019-11-19 PROCEDURE — 99497 ADVNCD CARE PLAN 30 MIN: CPT

## 2019-11-19 RX ORDER — HYDROMORPHONE HYDROCHLORIDE 8 MG/1
TABLET ORAL
Qty: 120 TABLET | Refills: 0 | Status: SHIPPED | OUTPATIENT
Start: 2019-11-19 | End: 2019-11-20 | Stop reason: SDUPTHER

## 2019-11-19 RX ORDER — PALONOSETRON 0.05 MG/ML
0.25 INJECTION, SOLUTION INTRAVENOUS ONCE
Status: CANCELLED | OUTPATIENT
Start: 2019-11-22

## 2019-11-19 RX ORDER — SODIUM CHLORIDE 9 MG/ML
20 INJECTION, SOLUTION INTRAVENOUS ONCE
Status: CANCELLED | OUTPATIENT
Start: 2019-11-22

## 2019-11-19 NOTE — PROGRESS NOTES
Visit made to patients home as part of the Palliative Care at Home program through [facility]  Greater than 1 hour was spent in direct patient care and participating in goals of care conversation  Patients current goal is to alleviate persistent nausea and get better pain relief of her chronic back pain Also get better sleep and have less constipation   Denies sweating or  tremors She will resume chemotherapy on 11 21   Chronic Care Management facilitated by this visit  Emotional and social issues addressed  This nurse reviewed all medications and instructed on their use and dosing  Used the teach back method to faciilitate learning of proper medication administration  Monna Gaucher states she has much nausea but only takes Promethazine twice a day  Instructed   to take it every 6 hrs as needed and will try this to see if it is more effective She denies vomiting and states she drinks plenty of water and eats bland foods  Instructed foods to try that help decrease nausea  Also has constipation despite use of Mirilax   Not taking senna as on medication list Instructed her to purchase this and take  2 tabs 2 times a day   Taking Zolpidem for sleep but states not effective    Reports back pain as a 7 with use of tylenol and Gabapentin   Med boxes set up by her sister Valentino Fong  Set up correctly  Present during the visit patient only   and sister are working   Assessment/Plan:   Phone call to assess if any improvement with nausea after taking promethazine as directed  If obtained senna and is using as directed   If lasix refill picked up from pharmacy   Schedule follow up appointment with Dr Anmol Arevalo with [facility]   Check status of equipment   At [employer] ie quad cane and lymphedema sleeve          There are no diagnoses linked to this encounter  Subjective:     Patient ID: Mervat Spencer is a 64 y o  female      Monna Gaucher is a 64year old female  wiith metastatic breast cancer She will resume chemo therapy 11 21       Review of Systems   Constitutional: Positive for activity change and fatigue  Eyes: Negative  Respiratory: Positive for cough and shortness of breath  Cardiovascular: Negative  Gastrointestinal: Positive for abdominal distention, constipation and nausea  Endocrine: Negative  Musculoskeletal: Positive for back pain  Neurological: Positive for weakness           Objective:

## 2019-11-20 ENCOUNTER — HOSPITAL ENCOUNTER (OUTPATIENT)
Dept: INFUSION CENTER | Facility: CLINIC | Age: 59
End: 2019-11-20

## 2019-11-20 DIAGNOSIS — C50.911 BILATERAL MALIGNANT NEOPLASM OF BREAST IN FEMALE, ESTROGEN RECEPTOR POSITIVE, UNSPECIFIED SITE OF BREAST (HCC): ICD-10-CM

## 2019-11-20 DIAGNOSIS — Z17.0 BILATERAL MALIGNANT NEOPLASM OF BREAST IN FEMALE, ESTROGEN RECEPTOR POSITIVE, UNSPECIFIED SITE OF BREAST (HCC): ICD-10-CM

## 2019-11-20 DIAGNOSIS — C50.912 BILATERAL MALIGNANT NEOPLASM OF BREAST IN FEMALE, ESTROGEN RECEPTOR POSITIVE, UNSPECIFIED SITE OF BREAST (HCC): ICD-10-CM

## 2019-11-20 RX ORDER — HYDROMORPHONE HYDROCHLORIDE 8 MG/1
TABLET ORAL
Qty: 120 TABLET | Refills: 0 | Status: SHIPPED | OUTPATIENT
Start: 2019-11-20 | End: 2019-11-22 | Stop reason: SDUPTHER

## 2019-11-20 NOTE — TELEPHONE ENCOUNTER
Please resend Hydromorphone rx using [address] [place] [address]   [facility] under [facility]

## 2019-11-21 ENCOUNTER — HOSPITAL ENCOUNTER (OUTPATIENT)
Dept: NUCLEAR MEDICINE | Facility: HOSPITAL | Age: 59
Discharge: HOME | End: 2019-11-21
Payer: MEDICARE

## 2019-11-21 ENCOUNTER — HOSPITAL ENCOUNTER (OUTPATIENT)
Dept: INFUSION CENTER | Facility: CLINIC | Age: 59
Discharge: HOME | End: 2019-11-21
Payer: MEDICARE

## 2019-11-21 VITALS — WEIGHT: 159 LBS | BODY MASS INDEX: 25.55 KG/M2 | HEIGHT: 66 IN

## 2019-11-21 DIAGNOSIS — C79.51 BONE METASTASES (HCC): ICD-10-CM

## 2019-11-21 DIAGNOSIS — C50.912 BILATERAL MALIGNANT NEOPLASM OF BREAST IN FEMALE, ESTROGEN RECEPTOR POSITIVE, UNSPECIFIED SITE OF BREAST (HCC): Primary | ICD-10-CM

## 2019-11-21 DIAGNOSIS — C50.919 MALIGNANT NEOPLASM OF FEMALE BREAST, UNSPECIFIED ESTROGEN RECEPTOR STATUS, UNSPECIFIED LATERALITY, UNSPECIFIED SITE OF BREAST (HCC): ICD-10-CM

## 2019-11-21 DIAGNOSIS — C50.911 BILATERAL MALIGNANT NEOPLASM OF BREAST IN FEMALE, ESTROGEN RECEPTOR POSITIVE, UNSPECIFIED SITE OF BREAST (HCC): Primary | ICD-10-CM

## 2019-11-21 DIAGNOSIS — Z17.0 BILATERAL MALIGNANT NEOPLASM OF BREAST IN FEMALE, ESTROGEN RECEPTOR POSITIVE, UNSPECIFIED SITE OF BREAST (HCC): Primary | ICD-10-CM

## 2019-11-21 LAB
ALBUMIN SERPL BCP-MCNC: 3 G/DL (ref 3.5–5)
ALP SERPL-CCNC: 129 U/L (ref 46–116)
ALT SERPL W P-5'-P-CCNC: 97 U/L (ref 12–78)
ANION GAP SERPL CALCULATED.3IONS-SCNC: 11 MMOL/L (ref 4–13)
AST SERPL W P-5'-P-CCNC: 60 U/L (ref 5–45)
BASOPHILS # BLD AUTO: 0.07 THOUSANDS/ΜL (ref 0–0.1)
BASOPHILS NFR BLD AUTO: 0 % (ref 0–1)
BILIRUB SERPL-MCNC: 0.2 MG/DL (ref 0.2–1)
BUN SERPL-MCNC: 14 MG/DL (ref 5–25)
CALCIUM SERPL-MCNC: 9.3 MG/DL (ref 8.3–10.1)
CHLORIDE SERPL-SCNC: 103 MMOL/L (ref 100–108)
CO2 SERPL-SCNC: 26 MMOL/L (ref 21–32)
CREAT SERPL-MCNC: 0.78 MG/DL (ref 0.6–1.3)
EOSINOPHIL # BLD AUTO: 0.02 THOUSAND/ΜL (ref 0–0.61)
EOSINOPHIL NFR BLD AUTO: 0 % (ref 0–6)
ERYTHROCYTE [DISTWIDTH] IN BLOOD BY AUTOMATED COUNT: 17.5 % (ref 11.6–15.1)
GFR SERPL CREATININE-BSD FRML MDRD: 85 ML/MIN/1.73SQ M
GLUCOSE SERPL-MCNC: 155 MG/DL (ref 65–140)
HCT VFR BLD AUTO: 35.9 % (ref 34.8–46.1)
HGB BLD-MCNC: 11.1 G/DL (ref 11.5–15.4)
IMM GRANULOCYTES # BLD AUTO: 0.21 THOUSAND/UL (ref 0–0.2)
IMM GRANULOCYTES NFR BLD AUTO: 1 % (ref 0–2)
LYMPHOCYTES # BLD AUTO: 1.29 THOUSANDS/ΜL (ref 0.6–4.47)
LYMPHOCYTES NFR BLD AUTO: 8 % (ref 14–44)
MCH RBC QN AUTO: 28.7 PG (ref 26.8–34.3)
MCHC RBC AUTO-ENTMCNC: 30.9 G/DL (ref 31.4–37.4)
MCV RBC AUTO: 93 FL (ref 82–98)
MONOCYTES # BLD AUTO: 0.8 THOUSAND/ΜL (ref 0.17–1.22)
MONOCYTES NFR BLD AUTO: 5 % (ref 4–12)
NEUTROPHILS # BLD AUTO: 13.51 THOUSANDS/ΜL (ref 1.85–7.62)
NEUTS SEG NFR BLD AUTO: 86 % (ref 43–75)
NRBC BLD AUTO-RTO: 0 /100 WBCS
PLATELET # BLD AUTO: 541 THOUSANDS/UL (ref 149–390)
PMV BLD AUTO: 9.5 FL (ref 8.9–12.7)
POTASSIUM SERPL-SCNC: 4.1 MMOL/L (ref 3.5–5.3)
PROT SERPL-MCNC: 7.6 G/DL (ref 6.4–8.2)
RBC # BLD AUTO: 3.87 MILLION/UL (ref 3.81–5.12)
SODIUM SERPL-SCNC: 140 MMOL/L (ref 136–145)
WBC # BLD AUTO: 15.9 THOUSAND/UL (ref 4.31–10.16)

## 2019-11-21 PROCEDURE — 96401 CHEMO ANTI-NEOPL SQ/IM: CPT

## 2019-11-21 PROCEDURE — A9503 TC99M MEDRONATE: HCPCS

## 2019-11-21 PROCEDURE — 78306 BONE IMAGING WHOLE BODY: CPT

## 2019-11-21 PROCEDURE — 80053 COMPREHEN METABOLIC PANEL: CPT | Performed by: INTERNAL MEDICINE

## 2019-11-21 PROCEDURE — 85025 COMPLETE CBC W/AUTO DIFF WBC: CPT | Performed by: INTERNAL MEDICINE

## 2019-11-21 RX ADMIN — DENOSUMAB 120 MG: 120 INJECTION SUBCUTANEOUS at 09:15

## 2019-11-21 NOTE — PROGRESS NOTES
Patient is here for labs from her port and xgeva  Labs drawn per dr's orders  Patient's last labs were 10/21/19  Calcium 9 4 and her creatinine clearance meet parameters for xgeva  She denies any past or future dental work  xgeva given in left arm and she tolerated it well  Patient complains of nausea but no vomiting  she states she is nauseous from the time she gets up to the time she goes to bed  She states nothing is helping  She is taking the phenergan as ordered at home  She is able to eat some and is drinking  Notified Aurora Garcia Rn  She stated patient has been nauseous for a long time and they have given her many things and nothing is working for her  she stated patient should continue to do what she is doing for now  Patient has treatment tomorrow and will get Donis Cordoba so that may help  Patient informed of this and told to try and take notice if she feels better after all the anti nausea meds tomorrow with her treatment  She verbalized understanding   Next appointment confirmed and avs given

## 2019-11-22 ENCOUNTER — HOSPITAL ENCOUNTER (OUTPATIENT)
Dept: INFUSION CENTER | Facility: CLINIC | Age: 59
Discharge: HOME | End: 2019-11-22
Payer: MEDICARE

## 2019-11-22 VITALS
OXYGEN SATURATION: 96 % | TEMPERATURE: 98.7 F | WEIGHT: 159 LBS | SYSTOLIC BLOOD PRESSURE: 128 MMHG | DIASTOLIC BLOOD PRESSURE: 70 MMHG | HEART RATE: 96 BPM | RESPIRATION RATE: 18 BRPM | HEIGHT: 65 IN | BODY MASS INDEX: 26.49 KG/M2

## 2019-11-22 DIAGNOSIS — C50.911 BILATERAL MALIGNANT NEOPLASM OF BREAST IN FEMALE, ESTROGEN RECEPTOR POSITIVE, UNSPECIFIED SITE OF BREAST (HCC): Primary | ICD-10-CM

## 2019-11-22 DIAGNOSIS — G89.3 CANCER RELATED PAIN: ICD-10-CM

## 2019-11-22 DIAGNOSIS — Z17.0 BILATERAL MALIGNANT NEOPLASM OF BREAST IN FEMALE, ESTROGEN RECEPTOR POSITIVE, UNSPECIFIED SITE OF BREAST (HCC): Primary | ICD-10-CM

## 2019-11-22 DIAGNOSIS — C50.912 BILATERAL MALIGNANT NEOPLASM OF BREAST IN FEMALE, ESTROGEN RECEPTOR POSITIVE, UNSPECIFIED SITE OF BREAST (HCC): Primary | ICD-10-CM

## 2019-11-22 PROCEDURE — 96413 CHEMO IV INFUSION 1 HR: CPT

## 2019-11-22 PROCEDURE — 96367 TX/PROPH/DG ADDL SEQ IV INF: CPT

## 2019-11-22 RX ORDER — PALONOSETRON 0.05 MG/ML
0.25 INJECTION, SOLUTION INTRAVENOUS ONCE
Status: COMPLETED | OUTPATIENT
Start: 2019-11-22 | End: 2019-11-22

## 2019-11-22 RX ORDER — SODIUM CHLORIDE 9 MG/ML
20 INJECTION, SOLUTION INTRAVENOUS ONCE
Status: COMPLETED | OUTPATIENT
Start: 2019-11-22 | End: 2019-11-22

## 2019-11-22 RX ORDER — HYDROMORPHONE HYDROCHLORIDE 8 MG/1
TABLET ORAL
Qty: 120 TABLET | Refills: 0 | Status: SHIPPED | OUTPATIENT
Start: 2019-11-22 | End: 2020-01-22 | Stop reason: SDUPTHER

## 2019-11-22 RX ADMIN — ADO-TRASTUZUMAB EMTANSINE 256 MG: 20 INJECTION, POWDER, LYOPHILIZED, FOR SOLUTION INTRAVENOUS at 14:30

## 2019-11-22 RX ADMIN — PALONOSETRON 0.25 MG: 0.05 INJECTION, SOLUTION INTRAVENOUS at 13:29

## 2019-11-22 RX ADMIN — SODIUM CHLORIDE 150 MG: 0.9 INJECTION, SOLUTION INTRAVENOUS at 13:51

## 2019-11-22 RX ADMIN — SODIUM CHLORIDE 20 ML/HR: 0.9 INJECTION, SOLUTION INTRAVENOUS at 12:52

## 2019-11-22 RX ADMIN — DEXAMETHASONE SODIUM PHOSPHATE 10 MG: 10 INJECTION, SOLUTION INTRAMUSCULAR; INTRAVENOUS at 13:31

## 2019-11-22 NOTE — PROGRESS NOTES
Nurse spoke with Lauren Whitehead RN & informed her of pts  AST of  60 & ALT of 97/wbc's of 15 90 & nurse noted pt  Is on Decadron daily at home  Crystal stated per Dr Saleem Veliz pt  Is ok to treat today with elevated AST&ALT  Nurse informed the pt  Of the same, pt  Is agreeable & verbalized understanding

## 2019-11-25 ENCOUNTER — OFFICE VISIT (OUTPATIENT)
Dept: PALLIATIVE MEDICINE | Facility: CLINIC | Age: 59
End: 2019-11-25
Payer: MEDICARE

## 2019-11-25 VITALS
OXYGEN SATURATION: 96 % | BODY MASS INDEX: 27.24 KG/M2 | WEIGHT: 163.5 LBS | DIASTOLIC BLOOD PRESSURE: 70 MMHG | SYSTOLIC BLOOD PRESSURE: 118 MMHG | HEIGHT: 65 IN | TEMPERATURE: 98.2 F | HEART RATE: 99 BPM | RESPIRATION RATE: 16 BRPM

## 2019-11-25 DIAGNOSIS — C50.912 BILATERAL MALIGNANT NEOPLASM OF BREAST IN FEMALE, ESTROGEN RECEPTOR POSITIVE, UNSPECIFIED SITE OF BREAST (HCC): Primary | ICD-10-CM

## 2019-11-25 DIAGNOSIS — Z17.0 BILATERAL MALIGNANT NEOPLASM OF BREAST IN FEMALE, ESTROGEN RECEPTOR POSITIVE, UNSPECIFIED SITE OF BREAST (HCC): Primary | ICD-10-CM

## 2019-11-25 DIAGNOSIS — C50.911 BILATERAL MALIGNANT NEOPLASM OF BREAST IN FEMALE, ESTROGEN RECEPTOR POSITIVE, UNSPECIFIED SITE OF BREAST (HCC): Primary | ICD-10-CM

## 2019-11-25 DIAGNOSIS — T45.1X5A CHEMOTHERAPY-INDUCED NAUSEA: ICD-10-CM

## 2019-11-25 DIAGNOSIS — R11.0 CHEMOTHERAPY-INDUCED NAUSEA: ICD-10-CM

## 2019-11-25 DIAGNOSIS — C79.31 BRAIN METASTASES (HCC): ICD-10-CM

## 2019-11-25 DIAGNOSIS — G89.3 CANCER RELATED PAIN: ICD-10-CM

## 2019-11-25 DIAGNOSIS — G47.9 DIFFICULTY SLEEPING: ICD-10-CM

## 2019-11-25 PROCEDURE — 99214 OFFICE O/P EST MOD 30 MIN: CPT | Performed by: FAMILY MEDICINE

## 2019-11-25 RX ORDER — DEXAMETHASONE 2 MG/1
2 TABLET ORAL
Qty: 30 TABLET | Refills: 0 | Status: SHIPPED | OUTPATIENT
Start: 2019-11-25 | End: 2019-12-29 | Stop reason: SDUPTHER

## 2019-11-25 NOTE — PROGRESS NOTES
Palliative and Supportive Care   Sobeida Reyna 64 y o  female [id number]    Assessment/Plan:  1  Bilateral malignant neoplasm of breast in female, estrogen receptor positive, unspecified site of breast ([facility] [address] )    2  Cancer related pain    3  Chemotherapy-induced nausea    4  Difficulty sleeping    5  Brain metastases (HCC)      Requested Prescriptions     Signed Prescriptions Disp Refills    dexamethasone (DECADRON) 2 mg tablet 30 tablet 0     Sig: Take 1 tablet (2 mg total) by mouth daily with breakfast     Medications Discontinued During This Encounter   Medication Reason    dexamethasone (DECADRON) 4 mg tablet Dose adjustment     Pt has not demonstrated or reported significant palliative symptom improvement with use of systemic steroids  To avoid unwanted consequences of long term use, will attempt taper  No other refills needed at this visit as dilaudid was recently sent to the pharmacy  Lock box recommended  If pill counts continue to be off, will consider shorter term supplies  Representatives have queried the patient's controlled substance dispensing history in the Prescription Drug Monitoring Program in compliance with regulations before I have prescribed any controlled substances  The prescription history is consistent with prescribed therapy and our practice policies  25 minutes were spent face to face with Sobeida Reyna with greater than 50% of the time spent in counseling or coordination of care including discussions of treatment instructions and follow up requirements   All of the patient's questions were answered during this discussion  Return in about 3 months (around 2/25/2020) for symptom assessment and management      Subjective:   Chief Complaint  Follow up visit for:  symptom management  HPI     Sobeida Reyna is a 64 y o  female with metastatic breast cancer originally diagnosed in 2010   She is under the care of Dr Remy Angel in medical oncology and Dr Praveena Knowles in radiation oncology   She had a bilateral mastectomy and has undergone multiple chemotherapy regimens   She has chronic lymphedema in her right arm   She has brain mets for which she recived SRS in May 2017   In March 2019 she completed whole brain radiation for multiple recurrent brain metastasis   Her current treatment regime is Cadcyla with premeds of Dexamethasone, Emend, Aloxi      She is on dexamethasone to help with a spectrum of symptoms including appetite, pain, energy level, and nausea   Her dexamethasone dosing is 4mg daily   Despite multiple adjustments to this dose over time, she has failed to consistently report any symptom benefits  Despite her consistent nausea, she is eating well and maintaining her weight      Her pain has been difficult to control   She has experienced consistent side effects from long acting opioids leading to sedation and confusion   Long acting opioids have also never provided her pain relief - forms tried have included methadone, MS Contin, Fentanyl patches  She is maintained gabapentin 600mg TID, Flexeril 10mg TID PRN, and Dilaudid 8mg PRN    She takes the dilaudid 3-4 per day but mostly she says 4x per day, however pill counts via our home visit nurse have been off         She has consistent nausea   She has not gotten relief with dexamethasone, Aloxi and Emend pre - treatment   She has not found much relief with compazine or zofran   She takes zyprexa and has PRN phenergan   However she continues to report that she is always has some nausea and is a bit dizzy  The following portions of the medical history were reviewed: past medical history, problem list, medication list, and social history      Current Outpatient Medications:     albuterol (PROVENTIL HFA,VENTOLIN HFA) 90 mcg/act inhaler, TAKE 2 PUFFS BY MOUTH EVERY 6 HOURS AS NEEDED FOR WHEEZING, Disp: 18 Inhaler, Rfl: 2    dexamethasone (DECADRON) 2 mg tablet, Take 1 tablet (2 mg total) by mouth daily with breakfast, Disp: 30 tablet, Rfl: 0    furosemide (LASIX) 20 mg tablet, Take 1 tablet (20 mg total) by mouth daily as needed (swelling), Disp: 10 tablet, Rfl: 2    gabapentin (NEURONTIN) 600 MG tablet, Take 1 tablet (600 mg total) by mouth 3 (three) times a day, Disp: 90 tablet, Rfl: 2    HYDROmorphone (DILAUDID) 8 MG tablet, 1 tab PO every 6 hours as needed  Max use of 4 tablets in 24 hours  , Disp: 120 tablet, Rfl: 0    lactulose 20 g/30 mL, 30mL PO daily  May take an extra dose up to 3x per day as needed for constipation, Disp: 946 mL, Rfl: 5    lidocaine-prilocaine (EMLA) cream, Apply topically as needed for mild pain, Disp: 30 g, Rfl: 0    OLANZapine (ZyPREXA) 10 mg tablet, TAKE 1 TABLET BY MOUTH EVERY DAY, Disp: 90 tablet, Rfl: 1    pantoprazole (PROTONIX) 40 mg tablet, Take 1 tablet (40 mg total) by mouth daily, Disp: 30 tablet, Rfl: 5    promethazine (PHENERGAN) 25 mg tablet, Take 1 tablet (25 mg total) by mouth every 6 (six) hours as needed for nausea or vomiting, Disp: 30 tablet, Rfl: 1    senna (SENOKOT) 8 6 mg, Take 2 tablets by mouth 2 (two) times a day as needed  , Disp: , Rfl:     zolpidem (AMBIEN) 10 mg tablet, Take 1 tablet (10 mg total) by mouth daily at bedtime as needed for sleep, Disp: 30 tablet, Rfl: 2  Review of Systems   Constitutional: Positive for activity change and fatigue  Gastrointestinal: Positive for abdominal pain  Musculoskeletal: Positive for arthralgias and back pain  Neurological: Positive for dizziness, weakness and headaches  Psychiatric/Behavioral: Positive for sleep disturbance  All other systems negative    Objective:  Vital Signs  /70 (BP Location: Left arm, Patient Position: Sitting, Cuff Size: Standard)   Pulse 99   Temp 98 2 °F (36 8 °C) (Tympanic)   Resp 16   Ht 5' 5" (1 651 m)   Wt 74 2 kg (163 lb 8 oz)   LMP  (LMP Unknown)   SpO2 96%   BMI 27 21 kg/m²    Physical Exam    Constitutional: Appears older than stated age   In no acute physical or emotional distress  Head: Cushingoid features   Eyes: EOM are normal  No ocular discharge  No scleral icterus  Neck: No visible adenopathy or masses  Respiratory: Effort normal  No stridor  No respiratory distress  Gastrointestinal: No abdominal distension  Musculoskeletal: In wheelchair  Neurological: Alert, oriented and appropriately conversant  Skin: sallow skin coloring  Psychiatric: Displays a normal mood and affect   Behavior, judgement and thought content appear normal

## 2019-11-26 ENCOUNTER — IN HOME VISIT (OUTPATIENT)
Dept: PALLIATIVE MEDICINE | Facility: CLINIC | Age: 59
End: 2019-11-26
Payer: MEDICARE

## 2019-11-26 VITALS
DIASTOLIC BLOOD PRESSURE: 80 MMHG | OXYGEN SATURATION: 97 % | SYSTOLIC BLOOD PRESSURE: 130 MMHG | TEMPERATURE: 98.1 F | HEART RATE: 100 BPM | RESPIRATION RATE: 20 BRPM

## 2019-11-26 DIAGNOSIS — R53.0 NEOPLASTIC MALIGNANT RELATED FATIGUE: ICD-10-CM

## 2019-11-26 DIAGNOSIS — G89.3 CANCER RELATED PAIN: ICD-10-CM

## 2019-11-26 DIAGNOSIS — R11.0 CHEMOTHERAPY-INDUCED NAUSEA: ICD-10-CM

## 2019-11-26 DIAGNOSIS — C50.912 BILATERAL MALIGNANT NEOPLASM OF BREAST IN FEMALE, ESTROGEN RECEPTOR POSITIVE, UNSPECIFIED SITE OF BREAST (HCC): ICD-10-CM

## 2019-11-26 DIAGNOSIS — T45.1X5A CHEMOTHERAPY-INDUCED NAUSEA: ICD-10-CM

## 2019-11-26 DIAGNOSIS — M54.40 BILATERAL LOW BACK PAIN WITH SCIATICA, SCIATICA LATERALITY UNSPECIFIED, UNSPECIFIED CHRONICITY: Primary | ICD-10-CM

## 2019-11-26 DIAGNOSIS — R63.0 DECREASED APPETITE: ICD-10-CM

## 2019-11-26 DIAGNOSIS — Z17.0 BILATERAL MALIGNANT NEOPLASM OF BREAST IN FEMALE, ESTROGEN RECEPTOR POSITIVE, UNSPECIFIED SITE OF BREAST (HCC): ICD-10-CM

## 2019-11-26 DIAGNOSIS — C50.911 BILATERAL MALIGNANT NEOPLASM OF BREAST IN FEMALE, ESTROGEN RECEPTOR POSITIVE, UNSPECIFIED SITE OF BREAST (HCC): ICD-10-CM

## 2019-11-26 DIAGNOSIS — G47.9 DIFFICULTY SLEEPING: ICD-10-CM

## 2019-11-26 PROCEDURE — 99349 HOME/RES VST EST MOD MDM 40: CPT

## 2019-11-27 ENCOUNTER — DOCUMENTATION (OUTPATIENT)
Dept: PALLIATIVE MEDICINE | Facility: HOSPITAL | Age: 59
End: 2019-11-27

## 2019-11-27 RX ORDER — OLANZAPINE 10 MG/1
10 TABLET ORAL
Qty: 30 TABLET | Refills: 2 | Status: SHIPPED | OUTPATIENT
Start: 2019-11-27 | End: 2020-02-26 | Stop reason: SDUPTHER

## 2019-11-27 RX ORDER — GABAPENTIN 600 MG/1
600 TABLET ORAL 3 TIMES DAILY
Qty: 90 TABLET | Refills: 2 | Status: SHIPPED | OUTPATIENT
Start: 2019-11-27 | End: 2019-12-31 | Stop reason: SDUPTHER

## 2019-11-27 NOTE — PROGRESS NOTES
Visit made to patients home as part of the Palliative Care at Home program through [facility]  Greater than 1 hour was spent in direct patient care and participating in goals of care conversation  Chronic Care Management facilitated by this visit  Emotional and social issues addressed  This nurse reviewed all medications and instructed on their use and dosing  Used the teach back method to faciilitate learning of proper medication administration   Patient had 75 percent accuracy  Present during the visit were the patient only  Her  and sister González Cisneros were at work  Patient states González Cisneros assists with her medications  Pill boxes were partially filled   Medication bottles were present but not all of them  The following discrepancies were noted :  + Gabapentin 600 mg  Only 2 left   Short 37  González Cisneros was called later in the day and stated that patient is taking 2 at bedtime  Making it 4 a day instead of 3 as directed   + Zolpidem 10mg  missing 6   +Hydromorphone 8 mg  Missing 21  +No  Olanzapine could be located     Pharmacy was called and refill will be available for Promethazine  Decadron 2 mg also being filled   Has Albuterol Lasix and Protonix  Was not taking Protonix    The patient states the Promethazine helps her nausea she felt no nausea after her chemo last week for a few days but feels nausous again  Denies vomiting   Drank 2 bottles of water during the visit   She states Mirilax helps with constipation   Patient  also stated Zolpidem helps her to sleep and takes one pill every night  She is 6 pills short    Complained of headache  and low back pain rated as a 7 /10      Assessment/Plan:  Patient agreed to place Hydromorphone and Zolpidem in a more secure location  Agreed to try a locked box   Verbalized understanding of only using these medications as needed  for insomnia and pain  Maximum daily  dose of hydromorphone instructed   Agreed to weekly home visits to continue medication management assistance      There are no diagnoses linked to this encounter  Subjective:     Patient ID: Nura Martinez is a 64 y o  female  with metastatic breast cancer in treatment since 2010 She is S/P bilateral mastectomy and brain radiation     HPI    Review of Systems   Constitutional: Positive for activity change and fatigue  Eyes: Negative  Respiratory: Negative  Current cigarrette smoker 1/2 ppd   Cardiovascular: Negative  Gastrointestinal: Positive for constipation and nausea  Endocrine: Negative  Musculoskeletal: Positive for back pain and gait problem  Skin:        Ashen skin color   Neurological: Positive for dizziness, tremors and headaches  Hematological: Negative  Psychiatric/Behavioral: Positive for sleep disturbance  Insomnia         Objective:     Physical Exam   Constitutional: She is oriented to person, place, and time  Cardiovascular: Normal rate  Pulmonary/Chest: Effort normal    Abdominal: Soft  Musculoskeletal: She exhibits edema  Neurological: She is alert and oriented to person, place, and time  Skin: Skin is warm  There is pallor  Psychiatric: She has a normal mood and affect  Her behavior is normal  Thought content normal    Vitals reviewed

## 2019-11-27 NOTE — PROGRESS NOTES
Palliative LSW received notification that Katrin Cho continues to have inconsistent controlled medication pill counts  Home Palliative nurses concerned for possible diversion of her medications  LSW spoke with [id number] and offered call into [facility] for formal investigation  LSW called intake and referral ([phone]) and spoke with Tima Escobar  Provided information  Referral will be sent to [facility] as pt is under the age of 63  Updated [facility] team of above

## 2019-11-29 ENCOUNTER — TELEPHONE (OUTPATIENT)
Dept: PALLIATIVE MEDICINE | Facility: CLINIC | Age: 59
End: 2019-11-29

## 2019-11-29 NOTE — TELEPHONE ENCOUNTER
Received call from Prisca at [place] [id number]  She will look into the call made by Caitlin GARZA on 11/27/19 regarding the missing medication of Gabapentin, Hydromorphone and Zolfot  Described the missing medication concern for 2 months now and that there is a question whether it may be diverted  Explained our go forward plan of getting a lock box in place for the meds  Prisca agrees with this plan and will further look into the home situation    Nakul Duncan

## 2019-12-02 ENCOUNTER — TELEPHONE (OUTPATIENT)
Dept: PALLIATIVE MEDICINE | Facility: CLINIC | Age: 59
End: 2019-12-02

## 2019-12-02 NOTE — TELEPHONE ENCOUNTER
Phone call made to check on status with medication management  Hydromorphone placed in a more secure location last week and patient states that this is still the case   States stomach still nauseous no vomiting  No constipation   She has an appointment with Dr Alejandro Gaspar  Wednesday 12 4 Camila vazquez arranged     Refused home visit on Thursday or Friday this week as her  is off but agreed to home visit tomorrow afternoon     Unable to reach patient on usual route due to road closure due to ice        Called patient to inform and tell her visit time will be delayed as alternate route is longer  Patient refused this visit time as she needs to go to the bank at 4 pm  States she will  refills at pharmacy as they have called her did not know what meds were refilled  She verified that meds were still in secure location and that she is doing well with this arrangement  Locked box to be ordered in case it may be needed Home visit recheduled for Monday December 9 at 10 am Explained to patient the importance of mediation adherence as continued discrepencies may jepardize  her ability to get refills

## 2019-12-04 ENCOUNTER — TELEPHONE (OUTPATIENT)
Dept: HEMATOLOGY ONCOLOGY | Facility: CLINIC | Age: 59
End: 2019-12-04

## 2019-12-04 NOTE — TELEPHONE ENCOUNTER
Pt called and cancelled her appt today at the [facility] location with Dr Bob Forrest  Pt stated she will call back to reschedule

## 2019-12-09 ENCOUNTER — IN HOME VISIT (OUTPATIENT)
Dept: PALLIATIVE MEDICINE | Facility: CLINIC | Age: 59
End: 2019-12-09
Payer: MEDICARE

## 2019-12-09 VITALS
OXYGEN SATURATION: 99 % | DIASTOLIC BLOOD PRESSURE: 80 MMHG | SYSTOLIC BLOOD PRESSURE: 122 MMHG | RESPIRATION RATE: 20 BRPM | TEMPERATURE: 98.4 F | HEART RATE: 90 BPM

## 2019-12-09 DIAGNOSIS — G47.9 DIFFICULTY SLEEPING: ICD-10-CM

## 2019-12-09 DIAGNOSIS — F06.4 ANXIETY DISORDER DUE TO KNOWN PHYSIOLOGICAL CONDITION: Primary | ICD-10-CM

## 2019-12-09 DIAGNOSIS — G62.9 NEUROPATHY: ICD-10-CM

## 2019-12-09 DIAGNOSIS — G89.4 CHRONIC PAIN SYNDROME: ICD-10-CM

## 2019-12-09 DIAGNOSIS — K59.03 DRUG-INDUCED CONSTIPATION: ICD-10-CM

## 2019-12-09 DIAGNOSIS — G47.00 INSOMNIA, UNSPECIFIED TYPE: ICD-10-CM

## 2019-12-09 DIAGNOSIS — G89.3 CANCER RELATED PAIN: ICD-10-CM

## 2019-12-09 PROCEDURE — 99349 HOME/RES VST EST MOD MDM 40: CPT

## 2019-12-10 NOTE — PROGRESS NOTES
Visit made to patients home as part of the Palliative Care at Home program through [facility]  Greater than 1 hour was spent in direct patient care and participating in goals of care conversation  Chronic Care Management facilitated by this visit  Emotional and social issues addressed  This nurse reviewed all medications and instructed on their use and dosing  For the past two weeks the patient  kept her Hydromorphone and Zolpidem in a secure location Pill count of Hydromorphone  revealed a  much smaller discrepancy of only 5 pills However there was no Zolpidem which is  6 pills short  Patient states she only takes   one pill each night     Other medication issues are     Not taking decadon every day    Gabapentin taken only 2 x a day instead of 3 times a day   Not taking pantoprazole     Used the teach back method to faciilitate learning of proper medication administration A written med list was written out for patient to guide her  [facility] pharmacy was called for promethazine and olanzapine refills Patient prefers taking meds from bottles instead of med boxes Her sister no longer assists with her medications  Present during the visit were the patient only Her  works and does not get home until around 6pm Her  sister Navya Thompson lives with them and is also at work  Patient has CT scan on Friday the 13th  She states she has some nausea and her legs are shaky This nurse instructed her to use her walker instead of a cane and brought the walker into her room for her Instructed patient to call office with any medication questions and to  notify Md if symptoms worsen     Assessment/Plan:   See in one week to continue medication instruction and assessment of adherence      There are no diagnoses linked to this encounter  Subjective:     Patient ID: Brian Kramer is a 64 y o  female  with a history of breast cancer with metastasis to brain and bone undergoing chemotherapy     HPI    Review of Systems   Constitutional: Positive for activity change  Eyes: Negative  Respiratory:        Current every day smoker  States has cut down on cigarrettes   Gastrointestinal: Positive for constipation  Negative for abdominal distention  Endocrine: Negative  Genitourinary: Negative  Musculoskeletal: Positive for back pain and gait problem  Allergic/Immunologic: Positive for immunocompromised state  Neurological: Positive for dizziness, tremors and weakness  Objective:     Physical Exam   Constitutional: She appears well-nourished  Neck: Normal range of motion  Cardiovascular: Normal rate  Pulmonary/Chest:   Smoker has inhaler for as needed use    Abdominal: Bowel sounds are normal  She exhibits distension  Skin: Skin is warm and dry     Psychiatric: Her behavior is normal  Thought content normal

## 2019-12-11 ENCOUNTER — HOSPITAL ENCOUNTER (OUTPATIENT)
Dept: INFUSION CENTER | Facility: CLINIC | Age: 59
Discharge: HOME | End: 2019-12-11

## 2019-12-12 ENCOUNTER — TELEPHONE (OUTPATIENT)
Dept: HEMATOLOGY ONCOLOGY | Facility: CLINIC | Age: 59
End: 2019-12-12

## 2019-12-12 DIAGNOSIS — C79.31 BRAIN METASTASES (HCC): ICD-10-CM

## 2019-12-12 DIAGNOSIS — R26.9 ABNORMAL GAIT: ICD-10-CM

## 2019-12-12 DIAGNOSIS — C79.31 BRAIN METASTASES (HCC): Primary | ICD-10-CM

## 2019-12-12 DIAGNOSIS — R26.81 UNSTEADY GAIT: Primary | ICD-10-CM

## 2019-12-12 DIAGNOSIS — Z17.0 BILATERAL MALIGNANT NEOPLASM OF BREAST IN FEMALE, ESTROGEN RECEPTOR POSITIVE, UNSPECIFIED SITE OF BREAST (HCC): ICD-10-CM

## 2019-12-12 DIAGNOSIS — C50.912 BILATERAL MALIGNANT NEOPLASM OF BREAST IN FEMALE, ESTROGEN RECEPTOR POSITIVE, UNSPECIFIED SITE OF BREAST (HCC): ICD-10-CM

## 2019-12-12 DIAGNOSIS — C50.911 BILATERAL MALIGNANT NEOPLASM OF BREAST IN FEMALE, ESTROGEN RECEPTOR POSITIVE, UNSPECIFIED SITE OF BREAST (HCC): ICD-10-CM

## 2019-12-12 NOTE — TELEPHONE ENCOUNTER
Patient called in stating that she is having balance issues and would like a script for a cane  Please call her    Thank you

## 2019-12-16 ENCOUNTER — HOSPITAL ENCOUNTER (OUTPATIENT)
Dept: INFUSION CENTER | Facility: CLINIC | Age: 59
Discharge: HOME | End: 2019-12-16
Payer: MEDICARE

## 2019-12-16 DIAGNOSIS — Z17.0 BILATERAL MALIGNANT NEOPLASM OF BREAST IN FEMALE, ESTROGEN RECEPTOR POSITIVE, UNSPECIFIED SITE OF BREAST (HCC): Primary | ICD-10-CM

## 2019-12-16 DIAGNOSIS — C50.912 BILATERAL MALIGNANT NEOPLASM OF BREAST IN FEMALE, ESTROGEN RECEPTOR POSITIVE, UNSPECIFIED SITE OF BREAST (HCC): Primary | ICD-10-CM

## 2019-12-16 DIAGNOSIS — C50.911 BILATERAL MALIGNANT NEOPLASM OF BREAST IN FEMALE, ESTROGEN RECEPTOR POSITIVE, UNSPECIFIED SITE OF BREAST (HCC): Primary | ICD-10-CM

## 2019-12-16 LAB
ALBUMIN SERPL BCP-MCNC: 3 G/DL (ref 3.5–5)
ALP SERPL-CCNC: 147 U/L (ref 46–116)
ALT SERPL W P-5'-P-CCNC: 76 U/L (ref 12–78)
ANION GAP SERPL CALCULATED.3IONS-SCNC: 12 MMOL/L (ref 4–13)
AST SERPL W P-5'-P-CCNC: 60 U/L (ref 5–45)
BASOPHILS # BLD AUTO: 0.03 THOUSANDS/ΜL (ref 0–0.1)
BASOPHILS NFR BLD AUTO: 0 % (ref 0–1)
BILIRUB SERPL-MCNC: 0.16 MG/DL (ref 0.2–1)
BUN SERPL-MCNC: 8 MG/DL (ref 5–25)
CALCIUM SERPL-MCNC: 9.4 MG/DL (ref 8.3–10.1)
CHLORIDE SERPL-SCNC: 107 MMOL/L (ref 100–108)
CO2 SERPL-SCNC: 26 MMOL/L (ref 21–32)
CREAT SERPL-MCNC: 0.71 MG/DL (ref 0.6–1.3)
EOSINOPHIL # BLD AUTO: 0 THOUSAND/ΜL (ref 0–0.61)
EOSINOPHIL NFR BLD AUTO: 0 % (ref 0–6)
ERYTHROCYTE [DISTWIDTH] IN BLOOD BY AUTOMATED COUNT: 18.5 % (ref 11.6–15.1)
GFR SERPL CREATININE-BSD FRML MDRD: 95 ML/MIN/1.73SQ M
GLUCOSE SERPL-MCNC: 125 MG/DL (ref 65–140)
HCT VFR BLD AUTO: 35.6 % (ref 34.8–46.1)
HGB BLD-MCNC: 10.8 G/DL (ref 11.5–15.4)
IMM GRANULOCYTES # BLD AUTO: 0.12 THOUSAND/UL (ref 0–0.2)
IMM GRANULOCYTES NFR BLD AUTO: 1 % (ref 0–2)
LYMPHOCYTES # BLD AUTO: 0.98 THOUSANDS/ΜL (ref 0.6–4.47)
LYMPHOCYTES NFR BLD AUTO: 9 % (ref 14–44)
MCH RBC QN AUTO: 27.7 PG (ref 26.8–34.3)
MCHC RBC AUTO-ENTMCNC: 30.3 G/DL (ref 31.4–37.4)
MCV RBC AUTO: 91 FL (ref 82–98)
MONOCYTES # BLD AUTO: 0.51 THOUSAND/ΜL (ref 0.17–1.22)
MONOCYTES NFR BLD AUTO: 5 % (ref 4–12)
NEUTROPHILS # BLD AUTO: 9.77 THOUSANDS/ΜL (ref 1.85–7.62)
NEUTS SEG NFR BLD AUTO: 85 % (ref 43–75)
NRBC BLD AUTO-RTO: 0 /100 WBCS
PLATELET # BLD AUTO: 440 THOUSANDS/UL (ref 149–390)
PMV BLD AUTO: 9.5 FL (ref 8.9–12.7)
POTASSIUM SERPL-SCNC: 4.3 MMOL/L (ref 3.5–5.3)
PROT SERPL-MCNC: 7.4 G/DL (ref 6.4–8.2)
RBC # BLD AUTO: 3.9 MILLION/UL (ref 3.81–5.12)
SODIUM SERPL-SCNC: 145 MMOL/L (ref 136–145)
WBC # BLD AUTO: 11.41 THOUSAND/UL (ref 4.31–10.16)

## 2019-12-16 PROCEDURE — 80053 COMPREHEN METABOLIC PANEL: CPT | Performed by: INTERNAL MEDICINE

## 2019-12-16 PROCEDURE — 85025 COMPLETE CBC W/AUTO DIFF WBC: CPT | Performed by: INTERNAL MEDICINE

## 2019-12-16 NOTE — PLAN OF CARE
Problem: Potential for Falls  Goal: Patient will remain free of falls  Description  INTERVENTIONS:  - Assess patient frequently for physical needs  -  Identify cognitive and physical deficits and behaviors that affect risk of falls  -  [place] fall precautions as indicated by assessment   - Educate patient/family on patient safety including physical limitations  - Instruct patient to call for assistance with activity based on assessment  - Modify environment to reduce risk of injury  - Consider OT/PT consult to assist with strengthening/mobility  Outcome: Progressing     Problem: Knowledge Deficit  Goal: Patient/family/caregiver demonstrates understanding of disease process, treatment plan, medications, and discharge instructions  Description  Complete learning assessment and assess knowledge base    Interventions:  - Provide teaching at level of understanding  - Provide teaching via preferred learning methods  Outcome: Progressing

## 2019-12-17 DIAGNOSIS — C50.912 BILATERAL MALIGNANT NEOPLASM OF BREAST IN FEMALE, ESTROGEN RECEPTOR POSITIVE, UNSPECIFIED SITE OF BREAST (HCC): ICD-10-CM

## 2019-12-17 DIAGNOSIS — C50.911 BILATERAL MALIGNANT NEOPLASM OF BREAST IN FEMALE, ESTROGEN RECEPTOR POSITIVE, UNSPECIFIED SITE OF BREAST (HCC): ICD-10-CM

## 2019-12-17 DIAGNOSIS — Z17.0 BILATERAL MALIGNANT NEOPLASM OF BREAST IN FEMALE, ESTROGEN RECEPTOR POSITIVE, UNSPECIFIED SITE OF BREAST (HCC): ICD-10-CM

## 2019-12-17 RX ORDER — SODIUM CHLORIDE 9 MG/ML
20 INJECTION, SOLUTION INTRAVENOUS ONCE
Status: CANCELLED | OUTPATIENT
Start: 2019-12-20

## 2019-12-17 RX ORDER — PALONOSETRON 0.05 MG/ML
0.25 INJECTION, SOLUTION INTRAVENOUS ONCE
Status: CANCELLED | OUTPATIENT
Start: 2019-12-20

## 2019-12-19 ENCOUNTER — TELEPHONE (OUTPATIENT)
Dept: PALLIATIVE MEDICINE | Facility: CLINIC | Age: 59
End: 2019-12-19

## 2019-12-19 NOTE — TELEPHONE ENCOUNTER
Called to check status and schedule next home visit   Patient states doing well waiting for a new cane that was ordered by Oncology group   This nurse advised use of walker which she has in her home   States doing well with all medications   Informed her that a locked box is available for her but she declined   Agreed to a home visit next week

## 2019-12-20 ENCOUNTER — HOSPITAL ENCOUNTER (OUTPATIENT)
Dept: INFUSION CENTER | Facility: CLINIC | Age: 59
Discharge: HOME | End: 2019-12-20
Payer: MEDICARE

## 2019-12-20 VITALS
HEART RATE: 95 BPM | OXYGEN SATURATION: 98 % | WEIGHT: 160.94 LBS | SYSTOLIC BLOOD PRESSURE: 126 MMHG | RESPIRATION RATE: 18 BRPM | BODY MASS INDEX: 26.78 KG/M2 | DIASTOLIC BLOOD PRESSURE: 72 MMHG | TEMPERATURE: 98.2 F

## 2019-12-20 DIAGNOSIS — C50.912 BILATERAL MALIGNANT NEOPLASM OF BREAST IN FEMALE, ESTROGEN RECEPTOR POSITIVE, UNSPECIFIED SITE OF BREAST (HCC): Primary | ICD-10-CM

## 2019-12-20 DIAGNOSIS — C50.911 BILATERAL MALIGNANT NEOPLASM OF BREAST IN FEMALE, ESTROGEN RECEPTOR POSITIVE, UNSPECIFIED SITE OF BREAST (HCC): Primary | ICD-10-CM

## 2019-12-20 DIAGNOSIS — Z17.0 BILATERAL MALIGNANT NEOPLASM OF BREAST IN FEMALE, ESTROGEN RECEPTOR POSITIVE, UNSPECIFIED SITE OF BREAST (HCC): Primary | ICD-10-CM

## 2019-12-20 DIAGNOSIS — T45.1X5A CHEMOTHERAPY-INDUCED NAUSEA: ICD-10-CM

## 2019-12-20 DIAGNOSIS — R11.0 CHEMOTHERAPY-INDUCED NAUSEA: ICD-10-CM

## 2019-12-20 PROCEDURE — 96413 CHEMO IV INFUSION 1 HR: CPT

## 2019-12-20 PROCEDURE — 96375 TX/PRO/DX INJ NEW DRUG ADDON: CPT

## 2019-12-20 PROCEDURE — 96367 TX/PROPH/DG ADDL SEQ IV INF: CPT

## 2019-12-20 RX ORDER — SODIUM CHLORIDE 9 MG/ML
20 INJECTION, SOLUTION INTRAVENOUS ONCE
Status: COMPLETED | OUTPATIENT
Start: 2019-12-20 | End: 2019-12-20

## 2019-12-20 RX ORDER — PALONOSETRON 0.05 MG/ML
0.25 INJECTION, SOLUTION INTRAVENOUS ONCE
Status: COMPLETED | OUTPATIENT
Start: 2019-12-20 | End: 2019-12-20

## 2019-12-20 RX ORDER — PROMETHAZINE HYDROCHLORIDE 25 MG/1
25 TABLET ORAL EVERY 6 HOURS PRN
Qty: 30 TABLET | Refills: 1 | Status: SHIPPED | OUTPATIENT
Start: 2019-12-20 | End: 2020-01-22 | Stop reason: SDUPTHER

## 2019-12-20 RX ADMIN — PALONOSETRON 0.25 MG: 0.05 INJECTION, SOLUTION INTRAVENOUS at 13:58

## 2019-12-20 RX ADMIN — SODIUM CHLORIDE 20 ML/HR: 0.9 INJECTION, SOLUTION INTRAVENOUS at 12:42

## 2019-12-20 RX ADMIN — ADO-TRASTUZUMAB EMTANSINE 256 MG: 20 INJECTION, POWDER, LYOPHILIZED, FOR SOLUTION INTRAVENOUS at 14:03

## 2019-12-20 RX ADMIN — SODIUM CHLORIDE 150 MG: 0.9 INJECTION, SOLUTION INTRAVENOUS at 13:19

## 2019-12-20 RX ADMIN — DEXAMETHASONE SODIUM PHOSPHATE 10 MG: 10 INJECTION, SOLUTION INTRAMUSCULAR; INTRAVENOUS at 12:56

## 2019-12-20 NOTE — TELEPHONE ENCOUNTER
Patient called to check status of refill need  States she does not need Dilaudid but does need Phenergan

## 2019-12-23 ENCOUNTER — TELEPHONE (OUTPATIENT)
Dept: PALLIATIVE MEDICINE | Facility: CLINIC | Age: 59
End: 2019-12-23

## 2019-12-23 NOTE — TELEPHONE ENCOUNTER
Phone call made to schedule home visit to check med adherence  Patient states she is feeling better with much less nausea and also states she is taking pain meds as directed and is not in need of refill yet    Declined Friday 12 27  visit  Reminded of CT MRI  on 12 31  Agreed to home visit Monday 12 30 at 10 am

## 2019-12-29 DIAGNOSIS — G89.3 CANCER RELATED PAIN: ICD-10-CM

## 2019-12-29 DIAGNOSIS — C79.31 BRAIN METASTASES (HCC): ICD-10-CM

## 2019-12-29 DIAGNOSIS — T45.1X5A CHEMOTHERAPY-INDUCED NAUSEA: ICD-10-CM

## 2019-12-29 DIAGNOSIS — R11.0 CHEMOTHERAPY-INDUCED NAUSEA: ICD-10-CM

## 2019-12-30 RX ORDER — DEXAMETHASONE 2 MG/1
TABLET ORAL
Qty: 30 TABLET | Refills: 0 | Status: SHIPPED | OUTPATIENT
Start: 2019-12-30 | End: 2020-02-12 | Stop reason: SDUPTHER

## 2019-12-31 DIAGNOSIS — G89.3 CANCER RELATED PAIN: ICD-10-CM

## 2019-12-31 RX ORDER — GABAPENTIN 600 MG/1
600 TABLET ORAL 4 TIMES DAILY
Qty: 120 TABLET | Refills: 2 | Status: SHIPPED | OUTPATIENT
Start: 2019-12-31 | End: 2020-03-27 | Stop reason: SDUPTHER

## 2020-01-09 ENCOUNTER — HOSPITAL ENCOUNTER (OUTPATIENT)
Dept: NON INVASIVE DIAGNOSTICS | Facility: MEDICAL CENTER | Age: 60
Discharge: HOME | End: 2020-01-09
Payer: MEDICARE

## 2020-01-09 DIAGNOSIS — I42.7 CARDIOMYOPATHY SECONDARY TO DRUG (HCC): ICD-10-CM

## 2020-01-09 PROCEDURE — 93306 TTE W/DOPPLER COMPLETE: CPT

## 2020-01-09 PROCEDURE — 93306 TTE W/DOPPLER COMPLETE: CPT | Performed by: INTERNAL MEDICINE

## 2020-01-13 ENCOUNTER — HOSPITAL ENCOUNTER (OUTPATIENT)
Dept: INFUSION CENTER | Facility: CLINIC | Age: 60
Discharge: HOME | End: 2020-01-13
Payer: MEDICARE

## 2020-01-13 DIAGNOSIS — Z17.0 BILATERAL MALIGNANT NEOPLASM OF BREAST IN FEMALE, ESTROGEN RECEPTOR POSITIVE, UNSPECIFIED SITE OF BREAST (HCC): Primary | ICD-10-CM

## 2020-01-13 DIAGNOSIS — E86.0 DEHYDRATION: ICD-10-CM

## 2020-01-13 DIAGNOSIS — C50.911 BILATERAL MALIGNANT NEOPLASM OF BREAST IN FEMALE, ESTROGEN RECEPTOR POSITIVE, UNSPECIFIED SITE OF BREAST (HCC): Primary | ICD-10-CM

## 2020-01-13 DIAGNOSIS — C50.912 BILATERAL MALIGNANT NEOPLASM OF BREAST IN FEMALE, ESTROGEN RECEPTOR POSITIVE, UNSPECIFIED SITE OF BREAST (HCC): Primary | ICD-10-CM

## 2020-01-13 LAB
ALBUMIN SERPL BCP-MCNC: 3 G/DL (ref 3.5–5)
ALP SERPL-CCNC: 109 U/L (ref 46–116)
ALT SERPL W P-5'-P-CCNC: 40 U/L (ref 12–78)
ANION GAP SERPL CALCULATED.3IONS-SCNC: 8 MMOL/L (ref 4–13)
AST SERPL W P-5'-P-CCNC: 22 U/L (ref 5–45)
BASOPHILS # BLD AUTO: 0.03 THOUSANDS/ΜL (ref 0–0.1)
BASOPHILS NFR BLD AUTO: 0 % (ref 0–1)
BILIRUB SERPL-MCNC: 0.17 MG/DL (ref 0.2–1)
BUN SERPL-MCNC: 10 MG/DL (ref 5–25)
CALCIUM SERPL-MCNC: 9.1 MG/DL (ref 8.3–10.1)
CHLORIDE SERPL-SCNC: 106 MMOL/L (ref 100–108)
CO2 SERPL-SCNC: 26 MMOL/L (ref 21–32)
CREAT SERPL-MCNC: 0.67 MG/DL (ref 0.6–1.3)
EOSINOPHIL # BLD AUTO: 0.01 THOUSAND/ΜL (ref 0–0.61)
EOSINOPHIL NFR BLD AUTO: 0 % (ref 0–6)
ERYTHROCYTE [DISTWIDTH] IN BLOOD BY AUTOMATED COUNT: 20.3 % (ref 11.6–15.1)
GFR SERPL CREATININE-BSD FRML MDRD: 98 ML/MIN/1.73SQ M
GLUCOSE SERPL-MCNC: 87 MG/DL (ref 65–140)
HCT VFR BLD AUTO: 34.2 % (ref 34.8–46.1)
HGB BLD-MCNC: 10.4 G/DL (ref 11.5–15.4)
IMM GRANULOCYTES # BLD AUTO: 0.07 THOUSAND/UL (ref 0–0.2)
IMM GRANULOCYTES NFR BLD AUTO: 1 % (ref 0–2)
LYMPHOCYTES # BLD AUTO: 0.95 THOUSANDS/ΜL (ref 0.6–4.47)
LYMPHOCYTES NFR BLD AUTO: 10 % (ref 14–44)
MCH RBC QN AUTO: 27.6 PG (ref 26.8–34.3)
MCHC RBC AUTO-ENTMCNC: 30.4 G/DL (ref 31.4–37.4)
MCV RBC AUTO: 91 FL (ref 82–98)
MONOCYTES # BLD AUTO: 0.52 THOUSAND/ΜL (ref 0.17–1.22)
MONOCYTES NFR BLD AUTO: 5 % (ref 4–12)
NEUTROPHILS # BLD AUTO: 8.36 THOUSANDS/ΜL (ref 1.85–7.62)
NEUTS SEG NFR BLD AUTO: 84 % (ref 43–75)
NRBC BLD AUTO-RTO: 0 /100 WBCS
PLATELET # BLD AUTO: 425 THOUSANDS/UL (ref 149–390)
PMV BLD AUTO: 9.3 FL (ref 8.9–12.7)
POTASSIUM SERPL-SCNC: 3.8 MMOL/L (ref 3.5–5.3)
PROT SERPL-MCNC: 7.4 G/DL (ref 6.4–8.2)
RBC # BLD AUTO: 3.77 MILLION/UL (ref 3.81–5.12)
SODIUM SERPL-SCNC: 140 MMOL/L (ref 136–145)
WBC # BLD AUTO: 9.94 THOUSAND/UL (ref 4.31–10.16)

## 2020-01-13 PROCEDURE — 80053 COMPREHEN METABOLIC PANEL: CPT | Performed by: INTERNAL MEDICINE

## 2020-01-13 PROCEDURE — 85025 COMPLETE CBC W/AUTO DIFF WBC: CPT | Performed by: INTERNAL MEDICINE

## 2020-01-13 NOTE — RESULT ENCOUNTER NOTE
I emailed Torri Rasmussen and Juan Nguyen to open up 1/22/2020 at 2:40pm on Dr Kun Kelly schedule at the [facility] location  The pt is aware of this appt date and time

## 2020-01-13 NOTE — PROGRESS NOTES
Patient is here for labs from her port  She offers no complaints at this time  She states her nausea is much better because she is taking her medication properly now  Labs drawn per dr's orders   Next appointment confirmed and she declined avs

## 2020-01-14 DIAGNOSIS — Z17.0 BILATERAL MALIGNANT NEOPLASM OF BREAST IN FEMALE, ESTROGEN RECEPTOR POSITIVE, UNSPECIFIED SITE OF BREAST (HCC): ICD-10-CM

## 2020-01-14 DIAGNOSIS — C50.911 BILATERAL MALIGNANT NEOPLASM OF BREAST IN FEMALE, ESTROGEN RECEPTOR POSITIVE, UNSPECIFIED SITE OF BREAST (HCC): ICD-10-CM

## 2020-01-14 DIAGNOSIS — C50.912 BILATERAL MALIGNANT NEOPLASM OF BREAST IN FEMALE, ESTROGEN RECEPTOR POSITIVE, UNSPECIFIED SITE OF BREAST (HCC): ICD-10-CM

## 2020-01-14 RX ORDER — PALONOSETRON 0.05 MG/ML
0.25 INJECTION, SOLUTION INTRAVENOUS ONCE
Status: CANCELLED | OUTPATIENT
Start: 2020-01-16

## 2020-01-14 RX ORDER — SODIUM CHLORIDE 9 MG/ML
20 INJECTION, SOLUTION INTRAVENOUS ONCE
Status: CANCELLED | OUTPATIENT
Start: 2020-01-16

## 2020-01-16 ENCOUNTER — HOSPITAL ENCOUNTER (OUTPATIENT)
Dept: INFUSION CENTER | Facility: CLINIC | Age: 60
Discharge: HOME | End: 2020-01-16
Payer: MEDICARE

## 2020-01-16 VITALS
TEMPERATURE: 98 F | OXYGEN SATURATION: 93 % | WEIGHT: 169.5 LBS | HEIGHT: 65 IN | DIASTOLIC BLOOD PRESSURE: 74 MMHG | SYSTOLIC BLOOD PRESSURE: 132 MMHG | HEART RATE: 92 BPM | RESPIRATION RATE: 18 BRPM | BODY MASS INDEX: 28.24 KG/M2

## 2020-01-16 DIAGNOSIS — Z17.0 BILATERAL MALIGNANT NEOPLASM OF BREAST IN FEMALE, ESTROGEN RECEPTOR POSITIVE, UNSPECIFIED SITE OF BREAST (HCC): Primary | ICD-10-CM

## 2020-01-16 DIAGNOSIS — C50.912 BILATERAL MALIGNANT NEOPLASM OF BREAST IN FEMALE, ESTROGEN RECEPTOR POSITIVE, UNSPECIFIED SITE OF BREAST (HCC): Primary | ICD-10-CM

## 2020-01-16 DIAGNOSIS — C50.911 BILATERAL MALIGNANT NEOPLASM OF BREAST IN FEMALE, ESTROGEN RECEPTOR POSITIVE, UNSPECIFIED SITE OF BREAST (HCC): Primary | ICD-10-CM

## 2020-01-16 PROCEDURE — 96375 TX/PRO/DX INJ NEW DRUG ADDON: CPT

## 2020-01-16 PROCEDURE — 96367 TX/PROPH/DG ADDL SEQ IV INF: CPT

## 2020-01-16 PROCEDURE — 96413 CHEMO IV INFUSION 1 HR: CPT

## 2020-01-16 RX ORDER — PALONOSETRON 0.05 MG/ML
0.25 INJECTION, SOLUTION INTRAVENOUS ONCE
Status: COMPLETED | OUTPATIENT
Start: 2020-01-16 | End: 2020-01-16

## 2020-01-16 RX ORDER — SODIUM CHLORIDE 9 MG/ML
20 INJECTION, SOLUTION INTRAVENOUS ONCE
Status: COMPLETED | OUTPATIENT
Start: 2020-01-16 | End: 2020-01-16

## 2020-01-16 RX ADMIN — SODIUM CHLORIDE 20 ML/HR: 0.9 INJECTION, SOLUTION INTRAVENOUS at 11:23

## 2020-01-16 RX ADMIN — DEXAMETHASONE SODIUM PHOSPHATE 10 MG: 10 INJECTION, SOLUTION INTRAMUSCULAR; INTRAVENOUS at 11:24

## 2020-01-16 RX ADMIN — FOSAPREPITANT DIMEGLUMINE 150 MG: 150 INJECTION, POWDER, LYOPHILIZED, FOR SOLUTION INTRAVENOUS at 11:58

## 2020-01-16 RX ADMIN — ADO-TRASTUZUMAB EMTANSINE 256 MG: 20 INJECTION, POWDER, LYOPHILIZED, FOR SOLUTION INTRAVENOUS at 12:33

## 2020-01-16 RX ADMIN — PALONOSETRON 0.25 MG: 0.05 INJECTION, SOLUTION INTRAVENOUS at 11:22

## 2020-01-16 NOTE — PROGRESS NOTES
Pt here for chemotherapy, offers no complaints, resting comfortably  Vitals stable  Labs reviewed from 1/13/220, within parameters for treatment  EF from 1/9 reviewed, [place] order states ok to proceed with treatment with EF of 44%  Call bell in reach, will continue to monitor

## 2020-01-21 ENCOUNTER — TELEPHONE (OUTPATIENT)
Dept: HEMATOLOGY ONCOLOGY | Facility: MEDICAL CENTER | Age: 60
End: 2020-01-21

## 2020-01-21 NOTE — TELEPHONE ENCOUNTER
Patient called in to reschedule Wednesday appointment , I reschedule patient for 02/12/2020 I confirmed date, time and location

## 2020-01-22 ENCOUNTER — TELEPHONE (OUTPATIENT)
Dept: HEMATOLOGY ONCOLOGY | Facility: CLINIC | Age: 60
End: 2020-01-22

## 2020-01-22 DIAGNOSIS — G89.3 CANCER RELATED PAIN: ICD-10-CM

## 2020-01-22 DIAGNOSIS — R11.0 CHEMOTHERAPY-INDUCED NAUSEA: ICD-10-CM

## 2020-01-22 DIAGNOSIS — T45.1X5A CHEMOTHERAPY-INDUCED NAUSEA: ICD-10-CM

## 2020-01-22 RX ORDER — PROMETHAZINE HYDROCHLORIDE 25 MG/1
25 TABLET ORAL EVERY 6 HOURS PRN
Qty: 30 TABLET | Refills: 1 | Status: SHIPPED | OUTPATIENT
Start: 2020-01-22 | End: 2020-02-26

## 2020-01-22 RX ORDER — HYDROMORPHONE HYDROCHLORIDE 8 MG/1
TABLET ORAL
Qty: 120 TABLET | Refills: 0 | Status: ON HOLD | OUTPATIENT
Start: 2020-01-22 | End: 2020-03-14 | Stop reason: SDUPTHER

## 2020-01-22 NOTE — TELEPHONE ENCOUNTER
Phone call made to patient   Doing well except for occasional nausea    Taking gabapentin 4 x a day Has no hydromorphone   Last filled 11 22 per PDMP     Follow up appointment to be scheduled in February

## 2020-01-22 NOTE — TELEPHONE ENCOUNTER
Palliative care prescribes patient's HYDROmorphone  Call placed to Palliative care spoke with Jamel Juares  Task sent to her to have Dr Fred Ambriz fill prescription

## 2020-01-22 NOTE — TELEPHONE ENCOUNTER
Patient called requesting a refill on:HYDROmorphone         Patient has    pills left   0  Preferred pharmacy:[facility] [place]              Patient's call back #[phone]

## 2020-01-23 ENCOUNTER — TELEPHONE (OUTPATIENT)
Dept: HEMATOLOGY ONCOLOGY | Facility: CLINIC | Age: 60
End: 2020-01-23

## 2020-01-28 ENCOUNTER — IN HOME VISIT (OUTPATIENT)
Dept: PALLIATIVE MEDICINE | Facility: CLINIC | Age: 60
End: 2020-01-28

## 2020-01-28 DIAGNOSIS — G89.29 CHRONIC MIDLINE LOW BACK PAIN WITH SCIATICA, SCIATICA LATERALITY UNSPECIFIED: ICD-10-CM

## 2020-01-28 DIAGNOSIS — K59.00 CONSTIPATION, UNSPECIFIED CONSTIPATION TYPE: ICD-10-CM

## 2020-01-28 DIAGNOSIS — T45.1X5A CHEMOTHERAPY-INDUCED NAUSEA: ICD-10-CM

## 2020-01-28 DIAGNOSIS — M54.40 CHRONIC MIDLINE LOW BACK PAIN WITH SCIATICA, SCIATICA LATERALITY UNSPECIFIED: ICD-10-CM

## 2020-01-28 DIAGNOSIS — R11.0 CHEMOTHERAPY-INDUCED NAUSEA: ICD-10-CM

## 2020-01-28 DIAGNOSIS — G47.00 INSOMNIA, UNSPECIFIED TYPE: ICD-10-CM

## 2020-01-28 DIAGNOSIS — G89.3 CANCER RELATED PAIN: Primary | ICD-10-CM

## 2020-01-28 PROCEDURE — NC001 PR NO CHARGE

## 2020-01-28 NOTE — PROGRESS NOTES
Visit made to patients home as part of the Palliative Care at Home program through [facility]  Greater than 1 hour was spent in direct patient care and participating in goals of care conversation  Chronic Care Management facilitated by this visit  Emotional and social issues addressed  This nurse reviewed all medications and instructed on their use and dosing  Used the teach back method to facilitate learning of proper medication administration  Patient verbalized 90 percent accuracy today   She continues to follow a written list to guide her with daily medication routine  She recently obtained refills on most of her medications and has ample supply  She states Gabapentin is helping with her back pain and only takes hydromorphone when pain is not relieved by gabapentin  Hydromorphone  Pill count reveals in 6 days she has used 9 tablets As a side note only 100 tablets were dispensed as that was the amount available in the pharmacy and patient did not want to wait for pharmacy to order 120 tablets  This was verified  with [id number] Patient was carrying this medication in her purse and this nurse reminded her to keep this medication in a secure location and not carry it in her purse  Her daughter Tito Franco was present during this home visit  nd stated that she would look on line for a locked box that Katrin Cho could use if she wants to try that again   She now has a quad cane and the search is still ongoing o find a new lymphedema sleeve [facility] do not carry it but according to Jay hilliard nurse at Dr Onur Nolan one can be ordered through [employer] or [address] [place] [address]           patient and Assessment/Plan:   Continue to follow medication list use as a guide   Remember to keep all as needed medications in a secure location at all times   Palliative nurse to pursue order of lymphedema sleeve from available suppliers   Patient to keep follow up appointment with Dr Myriam Sagastume in February   Patient instructed to call refill requests to the Palliative care phone numbers again provided to her when she has a few left and not wait until none are left   Daughter was given a card as well      There are no diagnoses linked to this encounter  Subjective:     Patient ID: Brian Kramer is a 64 y o  female     with a history of bilateral malignant neoplasm of the  Breast with mastectomy  She is currently receiving chemotherapy and has in the past received radiation therapy treatment to the brain and bone          HPI    Review of Systems   Constitutional: Positive for activity change and fatigue  HENT: Positive for facial swelling  Cushingoid   Eyes: Negative  Respiratory: Positive for wheezing  Smoker    Cardiovascular: Negative  Gastrointestinal: Positive for abdominal distention, constipation and nausea  Musculoskeletal: Positive for back pain  Allergic/Immunologic: Negative  Neurological: Positive for tremors and weakness  Psychiatric/Behavioral: Negative  Objective:     Physical Exam   Constitutional: She is oriented to person, place, and time  She appears well-nourished  Cardiovascular: Normal rate  Pulmonary/Chest: Effort normal  She has wheezes  Abdominal: Soft  She exhibits distension  Musculoskeletal: She exhibits edema  Rt arm lymphedema    Neurological: She is alert and oriented to person, place, and time  Skin: Skin is warm  Psychiatric: She has a normal mood and affect   Thought content normal

## 2020-02-08 ENCOUNTER — HOSPITAL ENCOUNTER (OUTPATIENT)
Dept: MRI IMAGING | Facility: HOSPITAL | Age: 60
Discharge: HOME | End: 2020-02-08
Payer: MEDICARE

## 2020-02-08 ENCOUNTER — HOSPITAL ENCOUNTER (OUTPATIENT)
Dept: CT IMAGING | Facility: HOSPITAL | Age: 60
Discharge: HOME | End: 2020-02-08
Payer: MEDICARE

## 2020-02-08 DIAGNOSIS — C50.919 MALIGNANT NEOPLASM OF FEMALE BREAST, UNSPECIFIED ESTROGEN RECEPTOR STATUS, UNSPECIFIED LATERALITY, UNSPECIFIED SITE OF BREAST (HCC): ICD-10-CM

## 2020-02-08 DIAGNOSIS — C79.31 BRAIN METASTASES (HCC): ICD-10-CM

## 2020-02-08 PROCEDURE — A9585 GADOBUTROL INJECTION: HCPCS | Performed by: PHYSICIAN ASSISTANT

## 2020-02-08 PROCEDURE — 74177 CT ABD & PELVIS W/CONTRAST: CPT

## 2020-02-08 PROCEDURE — 70553 MRI BRAIN STEM W/O & W/DYE: CPT

## 2020-02-08 PROCEDURE — 71260 CT THORAX DX C+: CPT

## 2020-02-08 RX ADMIN — IOHEXOL 100 ML: 350 INJECTION, SOLUTION INTRAVENOUS at 15:14

## 2020-02-08 RX ADMIN — GADOBUTROL 7 ML: 604.72 INJECTION INTRAVENOUS at 15:58

## 2020-02-09 DIAGNOSIS — G47.9 DIFFICULTY SLEEPING: ICD-10-CM

## 2020-02-10 ENCOUNTER — HOSPITAL ENCOUNTER (OUTPATIENT)
Dept: INFUSION CENTER | Facility: CLINIC | Age: 60
Discharge: HOME | End: 2020-02-10
Payer: MEDICARE

## 2020-02-10 ENCOUNTER — TELEPHONE (OUTPATIENT)
Dept: HEMATOLOGY ONCOLOGY | Facility: CLINIC | Age: 60
End: 2020-02-10

## 2020-02-10 DIAGNOSIS — C50.919 MALIGNANT NEOPLASM OF FEMALE BREAST, UNSPECIFIED ESTROGEN RECEPTOR STATUS, UNSPECIFIED LATERALITY, UNSPECIFIED SITE OF BREAST (HCC): ICD-10-CM

## 2020-02-10 DIAGNOSIS — Z17.0 BILATERAL MALIGNANT NEOPLASM OF BREAST IN FEMALE, ESTROGEN RECEPTOR POSITIVE, UNSPECIFIED SITE OF BREAST (HCC): Primary | ICD-10-CM

## 2020-02-10 DIAGNOSIS — C79.51 BONE METASTASES (HCC): ICD-10-CM

## 2020-02-10 DIAGNOSIS — C50.911 BILATERAL MALIGNANT NEOPLASM OF BREAST IN FEMALE, ESTROGEN RECEPTOR POSITIVE, UNSPECIFIED SITE OF BREAST (HCC): Primary | ICD-10-CM

## 2020-02-10 DIAGNOSIS — E86.0 DEHYDRATION: ICD-10-CM

## 2020-02-10 DIAGNOSIS — C50.912 BILATERAL MALIGNANT NEOPLASM OF BREAST IN FEMALE, ESTROGEN RECEPTOR POSITIVE, UNSPECIFIED SITE OF BREAST (HCC): Primary | ICD-10-CM

## 2020-02-10 PROBLEM — Z51.5 PALLIATIVE CARE PATIENT: Status: ACTIVE | Noted: 2020-02-10

## 2020-02-10 LAB
ALBUMIN SERPL BCP-MCNC: 2.9 G/DL (ref 3.5–5)
ALP SERPL-CCNC: 125 U/L (ref 46–116)
ALT SERPL W P-5'-P-CCNC: 55 U/L (ref 12–78)
ANION GAP SERPL CALCULATED.3IONS-SCNC: 11 MMOL/L (ref 4–13)
AST SERPL W P-5'-P-CCNC: 26 U/L (ref 5–45)
BASOPHILS # BLD AUTO: 0.02 THOUSANDS/ΜL (ref 0–0.1)
BASOPHILS NFR BLD AUTO: 0 % (ref 0–1)
BILIRUB SERPL-MCNC: 0.13 MG/DL (ref 0.2–1)
BUN SERPL-MCNC: 9 MG/DL (ref 5–25)
CALCIUM SERPL-MCNC: 8.8 MG/DL (ref 8.3–10.1)
CHLORIDE SERPL-SCNC: 104 MMOL/L (ref 100–108)
CO2 SERPL-SCNC: 26 MMOL/L (ref 21–32)
CREAT SERPL-MCNC: 0.73 MG/DL (ref 0.6–1.3)
EOSINOPHIL # BLD AUTO: 0.01 THOUSAND/ΜL (ref 0–0.61)
EOSINOPHIL NFR BLD AUTO: 0 % (ref 0–6)
ERYTHROCYTE [DISTWIDTH] IN BLOOD BY AUTOMATED COUNT: 19.6 % (ref 11.6–15.1)
GFR SERPL CREATININE-BSD FRML MDRD: 92 ML/MIN/1.73SQ M
GLUCOSE SERPL-MCNC: 133 MG/DL (ref 65–140)
HCT VFR BLD AUTO: 34.5 % (ref 34.8–46.1)
HGB BLD-MCNC: 10.7 G/DL (ref 11.5–15.4)
IMM GRANULOCYTES # BLD AUTO: 0.06 THOUSAND/UL (ref 0–0.2)
IMM GRANULOCYTES NFR BLD AUTO: 1 % (ref 0–2)
LYMPHOCYTES # BLD AUTO: 0.83 THOUSANDS/ΜL (ref 0.6–4.47)
LYMPHOCYTES NFR BLD AUTO: 10 % (ref 14–44)
MCH RBC QN AUTO: 28.2 PG (ref 26.8–34.3)
MCHC RBC AUTO-ENTMCNC: 31 G/DL (ref 31.4–37.4)
MCV RBC AUTO: 91 FL (ref 82–98)
MONOCYTES # BLD AUTO: 0.48 THOUSAND/ΜL (ref 0.17–1.22)
MONOCYTES NFR BLD AUTO: 6 % (ref 4–12)
NEUTROPHILS # BLD AUTO: 7.01 THOUSANDS/ΜL (ref 1.85–7.62)
NEUTS SEG NFR BLD AUTO: 83 % (ref 43–75)
NRBC BLD AUTO-RTO: 0 /100 WBCS
PLATELET # BLD AUTO: 366 THOUSANDS/UL (ref 149–390)
PMV BLD AUTO: 9 FL (ref 8.9–12.7)
POTASSIUM SERPL-SCNC: 4 MMOL/L (ref 3.5–5.3)
PROT SERPL-MCNC: 7.4 G/DL (ref 6.4–8.2)
RBC # BLD AUTO: 3.79 MILLION/UL (ref 3.81–5.12)
SODIUM SERPL-SCNC: 141 MMOL/L (ref 136–145)
WBC # BLD AUTO: 8.41 THOUSAND/UL (ref 4.31–10.16)

## 2020-02-10 PROCEDURE — 85025 COMPLETE CBC W/AUTO DIFF WBC: CPT | Performed by: INTERNAL MEDICINE

## 2020-02-10 PROCEDURE — 86300 IMMUNOASSAY TUMOR CA 15-3: CPT

## 2020-02-10 PROCEDURE — 80053 COMPREHEN METABOLIC PANEL: CPT | Performed by: INTERNAL MEDICINE

## 2020-02-10 NOTE — PROGRESS NOTES
Patient here for labs in prep for treatment on 2/14/20  She states she is doing well  She did have some difficulty with ambulation on the way back to the center so was put in a wheelchair for safety  She denies pain and did take her neurontin today for the symptoms she was having  She tolerated lab draw via port without incident and was discharged post via w/c accompanied by her daughter  She will RTO on 2/14/20 for chemotherapy and xgeva as previously scheduled

## 2020-02-10 NOTE — TELEPHONE ENCOUNTER
Call transferred from [address] [place] [address], [address] [facility] from patient stating she is going for lab work today and asking if orders are in the system  I advised her that there is active orders in place  She will go to the labs to get her labs drawn  She was asking about results, so I instructed her to call back and we can provide her with the results after she gets blood work  She declined the 1/13 labs  Instructed to call back with any questions or concerns  She verbalized understanding of same

## 2020-02-10 NOTE — PROGRESS NOTES
Palliative and Supportive Care   José Miguel Weiss 64 y o  female [id number]    Assessment/Plan:  1  Bilateral malignant neoplasm of breast in female, estrogen receptor positive, unspecified site of breast ([facility] [address] )    2  Brain metastases ([facility] )    3  Cancer related pain    4  Chemotherapy-induced nausea    5  Constipation, unspecified constipation type    6  Decreased appetite    7  Difficulty sleeping      Requested Prescriptions     Signed Prescriptions Disp Refills    dexamethasone (DECADRON) 2 mg tablet  0     Sig: Take 0 5 tablets (1 mg total) by mouth daily with breakfast for 7 days    diclofenac (VOLTAREN) 75 mg EC tablet 14 tablet 0     Sig: Take 1 tablet (75 mg total) by mouth daily as needed (pain)     Medications Discontinued During This Encounter   Medication Reason    lidocaine-prilocaine (EMLA) cream     dexamethasone (DECADRON) 2 mg tablet Reorder     Will wean dexamethasone trying to avoid longer term side effects  Will offer a short course of NSAID for her arm pain  Representatives have queried the patient's controlled substance dispensing history in the Prescription Drug Monitoring Program in compliance with regulations before I have prescribed any controlled substances  The prescription history is consistent with prescribed therapy and our practice policies  Return in about 3 months (around 5/12/2020)  Subjective:   In attendance at this visit: José Miguel Weiss  Chief Complaint  Follow up visit for:  symptom management  HPI     José Miguel Weiss is a 64 y o  female with metastatic breast cancer originally diagnosed in 2010   She is under the care of Dr Sybil Jennings in medical oncology and Dr Júnior Schmid in radiation oncology   She had a bilateral mastectomy and has undergone multiple chemotherapy regimens  Carmen Thao has chronic lymphedema in her right arm   She has brain mets for which she recived SRS in May 2017   In March 2019 she completed whole brain radiation for multiple recurrent brain metastasis   Her current treatment regime is Cadcyla with premeds of Dexamethasone, Emend, Aloxi  Her reported multiple reported symptoms have been difficult to control  To help with medication compliance and understanding, she is seen by the palliative care nurse Ibeth Ambrose on home visits monthly in between our appointments        She is on dexamethasone to help with a spectrum of symptoms including appetite, pain, energy level, and nausea   Her dexamethasone dosing is 2mg daily   Despite multiple adjustments to this dose over time, she has failed to consistently report any symptom benefits  She is gaining weight and feels bloated      Her pain has been difficult to control   She has experienced consistent side effects from long acting opioids leading to sedation and confusion   Long acting opioids have also never provided her pain relief - forms tried have included methadone, MS Contin, Fentanyl patches   She is maintained gabapentin 600mg QID, and Dilaudid 8mg PRN    She takes the dilaudid 3-4 per day  She has a new pain in her left arm by her shoulder      She has consistent nausea   She gets dexamethasone, Aloxi and Emend pre - treatment   She has not found much relief with compazine or zofran   She takes zyprexa 10mg qhs and has PRN phenergan   She reports this symptom is better than it has been in the past       She reports that she is sleeping well  The following portions of the medical history were reviewed: past medical history, problem list, medication list, and social history      Current Outpatient Medications:     albuterol (PROVENTIL HFA,VENTOLIN HFA) 90 mcg/act inhaler, TAKE 2 PUFFS BY MOUTH EVERY 6 HOURS AS NEEDED FOR WHEEZING, Disp: 18 Inhaler, Rfl: 2    dexamethasone (DECADRON) 2 mg tablet, Take 0 5 tablets (1 mg total) by mouth daily with breakfast for 7 days, Disp: , Rfl: 0    furosemide (LASIX) 20 mg tablet, Take 1 tablet (20 mg total) by mouth daily as needed (swelling), Disp: 10 tablet, Rfl: 2    gabapentin (NEURONTIN) 600 MG tablet, Take 1 tablet (600 mg total) by mouth 4 (four) times a day, Disp: 120 tablet, Rfl: 2    HYDROmorphone (DILAUDID) 8 MG tablet, 1 tab PO every 6 hours as needed  Max use of 4 tablets in 24 hours  , Disp: 120 tablet, Rfl: 0    lactulose 20 g/30 mL, 30mL PO daily  May take an extra dose up to 3x per day as needed for constipation, Disp: 946 mL, Rfl: 5    Misc  Devices (QUAD CANE) MISC, by Does not apply route daily, Disp: 1 each, Rfl: 0    OLANZapine (ZyPREXA) 10 mg tablet, Take 1 tablet (10 mg total) by mouth daily at bedtime, Disp: 30 tablet, Rfl: 2    promethazine (PHENERGAN) 25 mg tablet, Take 1 tablet (25 mg total) by mouth every 6 (six) hours as needed for nausea or vomiting, Disp: 30 tablet, Rfl: 1    senna (SENOKOT) 8 6 mg, Take 2 tablets by mouth 2 (two) times a day as needed  , Disp: , Rfl:     diclofenac (VOLTAREN) 75 mg EC tablet, Take 1 tablet (75 mg total) by mouth daily as needed (pain), Disp: 14 tablet, Rfl: 0    pantoprazole (PROTONIX) 40 mg tablet, TAKE 1 TABLET BY MOUTH EVERY DAY, Disp: 90 tablet, Rfl: 1    zolpidem (AMBIEN) 10 mg tablet, TAKE 1 TABLET BY MOUTH DAILY AT BEDTIME AS NEEDED FOR SLEEP, Disp: 30 tablet, Rfl: 2  Review of Systems   Gastrointestinal: Positive for nausea  Musculoskeletal: Positive for arthralgias and back pain  Neurological: Positive for dizziness  All other systems negative    Objective:  Vital Signs  /72 (BP Location: Left arm, Patient Position: Sitting)   Pulse 87   Temp 98 2 °F (36 8 °C) (Tympanic)   Resp 20   Ht 5' 5" (1 651 m)   Wt 81 6 kg (180 lb)   LMP  (LMP Unknown)   SpO2 97%   BMI 29 95 kg/m²    Physical Exam    Constitutional: Chronically ill appearing  In no acute physical or emotional distress  Head: Moon facies  Eyes: EOM are normal  No ocular discharge  No scleral icterus  Neck: No visible adenopathy or masses  Respiratory: Effort normal  No stridor   No respiratory distress  Gastrointestinal: abdominal distension  Musculoskeletal: right upper ext lymphedema  Neurological: Alert, oriented and appropriately conversant  Skin: Sallow coloring  Psychiatric: Displays a normal mood and affect   Behavior, judgement and thought content appear normal

## 2020-02-10 NOTE — PATIENT INSTRUCTIONS
February 2020 Sunday Monday Tuesday Wednesday Thursday Friday Saturday                                 1                2     3     4     5     6     7     8    CT CHEST ABDOMEN PELVIS W CON   3:00 PM   (30 min )   AN CT 1   [facility] CAT Scan    MRI BRAIN W WO CON   3:45 PM   (45 min )   AM MRI [address] MRI            9     10    INF ONCOLOGY TX-TREATMENT PLAN   1:00 PM   (60 min )   AN INF QUICK CHAIR [address] 11     12    FOLLOW UP PG   9:05 AM   (20 min )   Porfirio Abbasi MD   [address] [facility] [address] UP PG  10:05 AM   (20 min )   Lexie Da Silva, DO   [facility] [place] 13     14    INF ONCOLOGY TX-TREATMENT PLAN  12:30 PM   (205 min )   AN INF CHAIR [address] 15            Cycle 21, Day 1    16     17    FOLLOW UP PG  10:45 AM   (20 min )   Porfirio Abbasi MD   [address] [facility] 52     03     49     10     47                69     96     61     45     80     26     64                     Treatment Details       2/14/2020 - Cycle 21, Day 1      Chemotherapy: ONCBCN PROVIDER COMMUNICATION2, ADO-TRASTUZUMAB EMTANSINE IVPB        March 2020 Sunday Monday Tuesday Wednesday Thursday Friday Saturday   1     2     3     4     5     6     7                8     9    INF ONCOLOGY TX-TREATMENT PLAN   1:00 PM   (60 min )   AN INF QUICK CHAIR [address] 10     11     12     13     14                15     16     17     18     19     20     21                22     23     24     25     26     27     28                29     30     31

## 2020-02-11 DIAGNOSIS — R11.0 CHEMOTHERAPY-INDUCED NAUSEA: ICD-10-CM

## 2020-02-11 DIAGNOSIS — T45.1X5A CHEMOTHERAPY-INDUCED NAUSEA: ICD-10-CM

## 2020-02-11 LAB — CANCER AG27-29 SERPL-ACNC: 57 U/ML (ref 0–42.3)

## 2020-02-12 ENCOUNTER — OFFICE VISIT (OUTPATIENT)
Dept: PALLIATIVE MEDICINE | Facility: CLINIC | Age: 60
End: 2020-02-12
Payer: MEDICARE

## 2020-02-12 ENCOUNTER — OFFICE VISIT (OUTPATIENT)
Dept: HEMATOLOGY ONCOLOGY | Facility: CLINIC | Age: 60
End: 2020-02-12
Payer: MEDICARE

## 2020-02-12 VITALS
OXYGEN SATURATION: 97 % | BODY MASS INDEX: 29.99 KG/M2 | SYSTOLIC BLOOD PRESSURE: 122 MMHG | DIASTOLIC BLOOD PRESSURE: 72 MMHG | HEIGHT: 65 IN | TEMPERATURE: 98.2 F | WEIGHT: 180 LBS | HEART RATE: 87 BPM | RESPIRATION RATE: 20 BRPM

## 2020-02-12 VITALS
RESPIRATION RATE: 20 BRPM | HEART RATE: 87 BPM | TEMPERATURE: 98.2 F | HEIGHT: 65 IN | BODY MASS INDEX: 29.82 KG/M2 | OXYGEN SATURATION: 97 % | DIASTOLIC BLOOD PRESSURE: 76 MMHG | WEIGHT: 179 LBS | SYSTOLIC BLOOD PRESSURE: 126 MMHG

## 2020-02-12 DIAGNOSIS — T45.1X5A CHEMOTHERAPY-INDUCED NAUSEA: ICD-10-CM

## 2020-02-12 DIAGNOSIS — C50.912 BILATERAL MALIGNANT NEOPLASM OF BREAST IN FEMALE, ESTROGEN RECEPTOR POSITIVE, UNSPECIFIED SITE OF BREAST (HCC): ICD-10-CM

## 2020-02-12 DIAGNOSIS — C50.911 BILATERAL MALIGNANT NEOPLASM OF BREAST IN FEMALE, ESTROGEN RECEPTOR POSITIVE, UNSPECIFIED SITE OF BREAST (HCC): Primary | ICD-10-CM

## 2020-02-12 DIAGNOSIS — Z17.0 BILATERAL MALIGNANT NEOPLASM OF BREAST IN FEMALE, ESTROGEN RECEPTOR POSITIVE, UNSPECIFIED SITE OF BREAST (HCC): Primary | ICD-10-CM

## 2020-02-12 DIAGNOSIS — C50.912 BILATERAL MALIGNANT NEOPLASM OF BREAST IN FEMALE, ESTROGEN RECEPTOR POSITIVE, UNSPECIFIED SITE OF BREAST (HCC): Primary | ICD-10-CM

## 2020-02-12 DIAGNOSIS — C50.911 BILATERAL MALIGNANT NEOPLASM OF BREAST IN FEMALE, ESTROGEN RECEPTOR POSITIVE, UNSPECIFIED SITE OF BREAST (HCC): ICD-10-CM

## 2020-02-12 DIAGNOSIS — C79.31 BRAIN METASTASES (HCC): ICD-10-CM

## 2020-02-12 DIAGNOSIS — G89.3 CANCER RELATED PAIN: ICD-10-CM

## 2020-02-12 DIAGNOSIS — G47.9 DIFFICULTY SLEEPING: ICD-10-CM

## 2020-02-12 DIAGNOSIS — R11.0 CHEMOTHERAPY-INDUCED NAUSEA: ICD-10-CM

## 2020-02-12 DIAGNOSIS — K59.00 CONSTIPATION, UNSPECIFIED CONSTIPATION TYPE: ICD-10-CM

## 2020-02-12 DIAGNOSIS — R63.0 DECREASED APPETITE: ICD-10-CM

## 2020-02-12 DIAGNOSIS — Z17.0 BILATERAL MALIGNANT NEOPLASM OF BREAST IN FEMALE, ESTROGEN RECEPTOR POSITIVE, UNSPECIFIED SITE OF BREAST (HCC): ICD-10-CM

## 2020-02-12 PROCEDURE — 99214 OFFICE O/P EST MOD 30 MIN: CPT | Performed by: INTERNAL MEDICINE

## 2020-02-12 PROCEDURE — 99214 OFFICE O/P EST MOD 30 MIN: CPT | Performed by: FAMILY MEDICINE

## 2020-02-12 RX ORDER — SODIUM CHLORIDE 9 MG/ML
20 INJECTION, SOLUTION INTRAVENOUS ONCE
Status: CANCELLED | OUTPATIENT
Start: 2020-02-14

## 2020-02-12 RX ORDER — ZOLPIDEM TARTRATE 10 MG/1
TABLET ORAL
Qty: 30 TABLET | Refills: 2 | Status: SHIPPED | OUTPATIENT
Start: 2020-02-12 | End: 2020-03-14 | Stop reason: HOSPADM

## 2020-02-12 RX ORDER — DEXAMETHASONE 2 MG/1
1 TABLET ORAL
Refills: 0
Start: 2020-02-12 | End: 2020-02-19

## 2020-02-12 RX ORDER — PALONOSETRON 0.05 MG/ML
0.25 INJECTION, SOLUTION INTRAVENOUS ONCE
Status: CANCELLED | OUTPATIENT
Start: 2020-02-14

## 2020-02-12 RX ORDER — DICLOFENAC SODIUM 75 MG/1
75 TABLET, DELAYED RELEASE ORAL DAILY PRN
Qty: 14 TABLET | Refills: 0 | Status: SHIPPED | OUTPATIENT
Start: 2020-02-12 | End: 2020-09-03

## 2020-02-12 RX ORDER — PANTOPRAZOLE SODIUM 40 MG/1
TABLET, DELAYED RELEASE ORAL
Qty: 90 TABLET | Refills: 1 | Status: SHIPPED | OUTPATIENT
Start: 2020-02-12 | End: 2020-06-15 | Stop reason: SDUPTHER

## 2020-02-12 NOTE — Clinical Note
Anticipate that you will see her monthly x2 and I will see her every 3rd month  Trying to wean off steriods

## 2020-02-12 NOTE — PROGRESS NOTES
[facility] HEMATOLOGY ONCOLOGY SPECIALISTS АННА  [address] [facility]  [address] [facility] [address] [phone]    [address], [id number]  02/12/20    Discussion:   In summary, this is a 80-year-old female history of stage IV HER2 positive breast cancer as outlined  Recent CT scan chest abdomen pelvis shows stable bony disease  No new soft tissue disease is noted  Brain MRI shows stability or improvement in many of  Her brain lesions  The largest have increased, only minimally, however  We will review her case in Neuro-Oncology Working group  I expect the recommendation will either be stereotactic radiation therapy or short interval follow-up scan  Her quality of life is certainly affected by her disease and treatment  She has fatigue, gait difficulties, pain in the left axillary region  She will be starting new medication for the left axillary pain in the immediate future provided by palliative care  She has mild anemia attributable to medical therapy  Asymptomatic  Observation favored  Chemistry shows hypoalbuminemia  Dietary suggestions were provided  If all is well we will see her back in 3 months for review with repeat imaging just prior to that visit  I discussed the above with the patient  The patient   And her daughter voiced understanding and agreement   ______________________________________________________________________    Chief Complaint   Patient presents with    Follow-up       HPI:  Oncology History    Nat Clark is a 66y o  year old female who presents with stage IV metastatic breast carcinoma with a history of brain metastasis treated with SRS in May 2017 now with the multiple recurrent brain metastasis  She completed whole-brain radiation therapy March 12, 2019,with previous SRS to brain in May of 2017 and L2-S1 completed on 8/24/16  She was last seen 4/23/19       She had f/u scans done 5/1/19   No CT evidence of new soft tissue tumor mass in the chest, abdomen or pelvis  No significant interval change in subtle lucent and sclerotic lesions within the lower thoracic and lumbar vertebral bodies suggestive of osseous metastatic disease  Pt continues to see Palliative care  Continues to c/o pain while  on Decadron, Gabapentin, Flexeril, Dilaudid prn for   Pain  control  Has persistent nausea, and constipation  6/17/19 MRI Brain showed multiple supratentorial and infratentorial metastatic lesions, as described above and overall improved since prior examination  No definite new nodular enhancing lesions identified on the current examination  Had med onc PA f/u in June  Pt has chronic constipation, and was seen x 2 in recent past in the ER for this  Pt had palliative care f/u, today  She had requested a break for chemo, and her next chemo is being held  t had c/o weakness, and had refused to do PT, she has been taking steroids for   awhile  CT scan scheduled for 7/29/19 8/7/19 F/U with Marla Cooney PA-C:  8/1/19 CT scans demonstrated disease stability in her chest abdomen and pelvis  However, patient is symptomatic with dizziness and proximal weakness  Re-evaluation with MRI of the brain is necessary to rule out new metastatic lesion to the brain  Regimen:  Kadcyla 3 6 mg/KG IV Day 1, Q 3 weeks      8/21/19 MRI of brain        Bilateral malignant neoplasm of breast in female, estrogen receptor positive ([address] )    2010 Initial Diagnosis     The patient had a left sided T1 B , N0 ER positive, ND and HER-2 negative invasive ductal carcinoma, right-sided DCIS, ER/ND positive, HER-2 negative  Bilateral mastectomy  2010 - 4/2011 Hormone Therapy     Tamoxifen 20 mg daily  Last menstrual period was on 3/2010       4/2011 -  Hormone Therapy     Arimidex 1 mg daily  Unknown when medication was discontinued  Patient was lost to follow up       12/9/2015 Progression     · Right arm swelling in December 2015    U/S found a nonvascular structure in the right axilla measuring 2 2 x 1 3 cm  · Subsequent CT Scan of the chest on 12/9/15, showed multiple bilateral pulmonary nodules measuring 3-7 mm  · PET 2/4/16, which revealed hypermetabolic hilar and mediastinal nodes  There is a pre carinal node with a SUV of 5 4 measuring 1 8 x 1 7 cm, and a subcarinal node measuring 1 9 x 1 3 cm with a SUV of 5 3  There is right hilar activity of 3 5 and left hilar activity of 2 8  The subcentimeter nodules are too small for PET evaluation  She also has uptake in her L3 and L5 vertebral bodies, both concerning for bony metastases  2/16/2016 Biopsy     Bronchoscopic biopsy showed Metastatic non-small cell carcinoma, favor adenocarcinoma  ER 90%, MI 0%  Her 2 +2, positive by FISH         3/1/2016 - 6/2016 Chemotherapy     Taxotere 75 mg/m2, Day 1  Perjeta 420 mg, Day 1  Herceptin 6 mg/kg, Day 1  Cycle length= 21 days      3/1/2016 - 2/2017 Hormone Therapy     Anastrozole 1 mg daily      6/2016 - 2/2017 Chemotherapy     Perjeta 420 mg, Day 1  Herceptin 6 mg/kg, Day 1  Cycle length= 21 days      2/2017 Progression     Bony progression       2/10/2017 - 4/12/2018 Chemotherapy     Perjeta 420 mg, Day 1  Herceptin 6 mg/kg, Day 1  Taxotere 75 mg/m2  Cycle length= 21 days      3/3/2017 -  Chemotherapy     Xgeva 120 mg IV, Day 1  Cycle length =  84 days      4/18/2017 Progression      brain MRI showed a 5 mm right parietal lesion, 3 mm right frontal lesion, 3 mm left parahippocampal lesion  5/2017 - 5/2017 Radiation     SRS to brain lesions      5/3/2018 - 8/2018 Chemotherapy     Perjeta 420 mg,  Day 1  Herceptin 6 mg/kg,  Day 1  Cycle length = 21 days  Cycle 3 was administered on 6/14/18    ** taxotere was temporarily discontinued secondary for desire of treatment holiday  Taxotere was readded into the treatment regimen during last cycle  9/5/2018 Progression     Cutaneous disease progression         9/13/2018 - 9/18/2019 Chemotherapy Kadcyla 3 6 mg/kg dose IV Day 1  Cycle length =  21 days  Cycles planned = until progression    Interrupted at the end of January secondary to progressive fatigue and interval development of brain mets requiring radiation  Restarted on 3/14/19           2/18/2019 Progression     MRI brain demonstrated disease progression; CT of the chest abdomen pelvis was stable along with the patient's tumor markers  2/27/2019 - 3/12/2019 Radiation     WBRT  X 10 sessions  9/18/2019 -  Chemotherapy     Kadcyla 3 6 mg/KG IV Day 1  Cycle length= 4 weeks    Cycle enlongation after scans demonstrated stablity in August   Quality of life reasons- cycle was elongated  Bone metastases ([address] )    7/6/2016 Initial Diagnosis     Bone metastases ([address] )      8/10/2016 - 8/24/2016 Radiation     Treatments:  Course: C1    Plan ID Energy Fractions Dose per Fraction (cGy) Total Dose Delivered (cGy) Elapsed Days   L2-S1 Spine 10X 10 / 10 300 3,000 14      Treatment Dates:  8/10/2016 - 8/24/2016  Brain metastases ([address] )    4/26/2017 Initial Diagnosis     Brain metastases (HCC)         Interval History:   Clinically stable  ECOG-  3 - Symptomatic, >50% confined to bed    Review of Systems   Constitutional: Positive for fatigue  Negative for appetite change, diaphoresis and fever  HENT: Negative for sinus pain  Eyes: Negative for discharge  Respiratory: Negative for cough and shortness of breath  Cardiovascular: Negative for chest pain  Gastrointestinal: Negative for abdominal pain, constipation and diarrhea  Endocrine: Negative for cold intolerance  Genitourinary: Negative for difficulty urinating and hematuria  Musculoskeletal: Negative for joint swelling  Skin: Negative for rash  Allergic/Immunologic: Negative for environmental allergies  Neurological: Positive for weakness  Negative for dizziness and headaches  Hematological: Negative for adenopathy     Psychiatric/Behavioral: Negative for agitation  Past Medical History:   Diagnosis Date    H/O bilateral mastectomy 2/15/2016    Hypertension 2/15/2016    Lymphedema 2/15/2016    Malignant neoplasm of right breast ([facility] [address] ) 2/15/2016    Metastatic breast cancer [facility])      Patient Active Problem List   Diagnosis    H/O bilateral mastectomy    Lymphedema of right upper extremity    Pulmonary nodule    Bilateral malignant neoplasm of breast in female, estrogen receptor positive ([facility] [address] )    Bone metastases ([facility] [address] )    Brain metastases ([facility] [address] )    Cancer related pain    Chemotherapy-induced nausea    Constipation    Decreased appetite    Difficulty sleeping    Dyspareunia, female    Herniated lumbar intervertebral disc    Mediastinal lymphadenopathy    Hyperglycemia    Other fatigue    Dry skin    Dizziness    Bilateral pleural effusion    Abnormal CT of the chest    SOB (shortness of breath)    Tobacco abuse    Financial difficulties    Dehydration    Edema    Palliative care patient       Current Outpatient Medications:     albuterol (PROVENTIL HFA,VENTOLIN HFA) 90 mcg/act inhaler, TAKE 2 PUFFS BY MOUTH EVERY 6 HOURS AS NEEDED FOR WHEEZING, Disp: 18 Inhaler, Rfl: 2    dexamethasone (DECADRON) 2 mg tablet, Take 0 5 tablets (1 mg total) by mouth daily with breakfast for 7 days, Disp: , Rfl: 0    diclofenac (VOLTAREN) 75 mg EC tablet, Take 1 tablet (75 mg total) by mouth daily as needed (pain), Disp: 14 tablet, Rfl: 0    furosemide (LASIX) 20 mg tablet, Take 1 tablet (20 mg total) by mouth daily as needed (swelling), Disp: 10 tablet, Rfl: 2    gabapentin (NEURONTIN) 600 MG tablet, Take 1 tablet (600 mg total) by mouth 4 (four) times a day, Disp: 120 tablet, Rfl: 2    HYDROmorphone (DILAUDID) 8 MG tablet, 1 tab PO every 6 hours as needed  Max use of 4 tablets in 24 hours  , Disp: 120 tablet, Rfl: 0    lactulose 20 g/30 mL, 30mL PO daily    May take an extra dose up to 3x per day as needed for constipation, Disp: 946 mL, Rfl: 5   Misc  Devices (QUAD CANE) MISC, by Does not apply route daily, Disp: 1 each, Rfl: 0    OLANZapine (ZyPREXA) 10 mg tablet, Take 1 tablet (10 mg total) by mouth daily at bedtime, Disp: 30 tablet, Rfl: 2    pantoprazole (PROTONIX) 40 mg tablet, TAKE 1 TABLET BY MOUTH EVERY DAY, Disp: 90 tablet, Rfl: 1    promethazine (PHENERGAN) 25 mg tablet, Take 1 tablet (25 mg total) by mouth every 6 (six) hours as needed for nausea or vomiting, Disp: 30 tablet, Rfl: 1    senna (SENOKOT) 8 6 mg, Take 2 tablets by mouth 2 (two) times a day as needed  , Disp: , Rfl:     zolpidem (AMBIEN) 10 mg tablet, TAKE 1 TABLET BY MOUTH DAILY AT BEDTIME AS NEEDED FOR SLEEP, Disp: 30 tablet, Rfl: 2  No Known Allergies  Past Surgical History:   Procedure Laterality Date    ENDOBRONCHIAL ULTRASOUND (EBUS) N/A 2/16/2016    Procedure: EBUS;  Surgeon: Nat Glasgow MD;  Location: [facility];  Service:     MASTECTOMY Bilateral     MASTECTOMY Bilateral     right arm edema    OOPHORECTOMY      IL BRONCHOSCOPY NEEDLE BX TRACHEA MAIN STEM&/BRON N/A 2/16/2016    Procedure: Layman Netters;  Surgeon: Nat Glasgow MD;  [facility];  Service: Thoracic    IL INSJ TUNNELED CTR VAD W/SUBQ PORT AGE 5 YR/> N/A 7/24/2018    Procedure: INSERTION VENOUS PORT ( PORT-A-CATH) IR;  Surgeon: Ada Musa DO;  [facility];  Service: Interventional Radiology    IL STEREOTACTIC RADIOSURGERY, CRANIAL,SIMPLE,EA ADD  5/3/2017         IL STEREOTACTIC RADIOSURGERY, CRANIAL,SIMPLE,EA ADD  5/3/2017         IL STEREOTACTIC RADIOSURGERY, CRANIAL,SIMPLE,SINGLE  5/3/2017          Social History     Objective:  Vitals:    02/12/20 1103   BP: 126/76   BP Location: Left arm   Patient Position: Sitting   Pulse: 87   Resp: 20   Temp: 98 2 °F (36 8 °C)   TempSrc: Tympanic   SpO2: 97%   Weight: 81 2 kg (179 lb)   Height: 5' 5" (1 651 m)     Physical Exam   Constitutional: She is oriented to person, place, and time  She appears well-developed  Chronically ill-appearing  HENT:   Head: Normocephalic  Eyes: Pupils are equal, round, and reactive to light  Neck: Neck supple  Cardiovascular: Normal rate  No murmur heard  Pulmonary/Chest: No respiratory distress  She has no wheezes  She has no rales  Abdominal: Soft  She exhibits no distension  There is no tenderness  There is no rebound  Musculoskeletal: She exhibits no edema  Lymphadenopathy:     She has no cervical adenopathy  Neurological: She is alert and oriented to person, place, and time  She displays normal reflexes  Skin: Skin is warm  No rash noted  Psychiatric: She has a normal mood and affect  Thought content normal          Labs: I personally reviewed the labs and imaging pertinent to this patient care

## 2020-02-14 ENCOUNTER — HOSPITAL ENCOUNTER (OUTPATIENT)
Dept: INFUSION CENTER | Facility: CLINIC | Age: 60
Discharge: HOME | End: 2020-02-14
Payer: MEDICARE

## 2020-02-14 VITALS
SYSTOLIC BLOOD PRESSURE: 128 MMHG | WEIGHT: 178.13 LBS | BODY MASS INDEX: 29.64 KG/M2 | HEART RATE: 76 BPM | OXYGEN SATURATION: 95 % | DIASTOLIC BLOOD PRESSURE: 80 MMHG | RESPIRATION RATE: 18 BRPM | TEMPERATURE: 97.6 F

## 2020-02-14 DIAGNOSIS — C79.51 BONE METASTASES (HCC): Primary | ICD-10-CM

## 2020-02-14 DIAGNOSIS — Z17.0 BILATERAL MALIGNANT NEOPLASM OF BREAST IN FEMALE, ESTROGEN RECEPTOR POSITIVE, UNSPECIFIED SITE OF BREAST (HCC): ICD-10-CM

## 2020-02-14 DIAGNOSIS — C50.912 BILATERAL MALIGNANT NEOPLASM OF BREAST IN FEMALE, ESTROGEN RECEPTOR POSITIVE, UNSPECIFIED SITE OF BREAST (HCC): ICD-10-CM

## 2020-02-14 DIAGNOSIS — C50.911 BILATERAL MALIGNANT NEOPLASM OF BREAST IN FEMALE, ESTROGEN RECEPTOR POSITIVE, UNSPECIFIED SITE OF BREAST (HCC): ICD-10-CM

## 2020-02-14 PROCEDURE — 96367 TX/PROPH/DG ADDL SEQ IV INF: CPT

## 2020-02-14 PROCEDURE — 96401 CHEMO ANTI-NEOPL SQ/IM: CPT

## 2020-02-14 PROCEDURE — 96413 CHEMO IV INFUSION 1 HR: CPT

## 2020-02-14 PROCEDURE — 96375 TX/PRO/DX INJ NEW DRUG ADDON: CPT

## 2020-02-14 RX ORDER — PALONOSETRON 0.05 MG/ML
0.25 INJECTION, SOLUTION INTRAVENOUS ONCE
Status: COMPLETED | OUTPATIENT
Start: 2020-02-14 | End: 2020-02-14

## 2020-02-14 RX ORDER — SODIUM CHLORIDE 9 MG/ML
20 INJECTION, SOLUTION INTRAVENOUS ONCE
Status: COMPLETED | OUTPATIENT
Start: 2020-02-14 | End: 2020-02-14

## 2020-02-14 RX ADMIN — FOSAPREPITANT DIMEGLUMINE 150 MG: 150 INJECTION, POWDER, LYOPHILIZED, FOR SOLUTION INTRAVENOUS at 13:15

## 2020-02-14 RX ADMIN — PALONOSETRON 0.25 MG: 0.05 INJECTION, SOLUTION INTRAVENOUS at 13:10

## 2020-02-14 RX ADMIN — SODIUM CHLORIDE 20 ML/HR: 0.9 INJECTION, SOLUTION INTRAVENOUS at 12:40

## 2020-02-14 RX ADMIN — ADO-TRASTUZUMAB EMTANSINE 300 MG: 20 INJECTION, POWDER, LYOPHILIZED, FOR SOLUTION INTRAVENOUS at 13:56

## 2020-02-14 RX ADMIN — DEXAMETHASONE SODIUM PHOSPHATE 10 MG: 10 INJECTION, SOLUTION INTRAMUSCULAR; INTRAVENOUS at 12:48

## 2020-02-14 RX ADMIN — DENOSUMAB 120 MG: 120 INJECTION SUBCUTANEOUS at 14:00

## 2020-02-14 NOTE — PROGRESS NOTES
Patient was here for kadcyla and xgeva and is feeling well today  She tolerated treatment without incident and was discharged post   Maia Munoz admin LEANDRO, bandaid applied post and CDI  She is to return 3/9/20 for her next cycle  Called and spoke with Marlyn Beauchamp RN regarding clarification of patient cycle length as Dr Milner Second note states every 21 days and every 4 weeks  The plan in [place] is every 28 days  Her last dosing was 4 weeks ago  Next cycle to start 3/13/20 but patient scheduled for 3/9/20  Waiting on return call regarding this clarification  Advised patient to keep her schedule the same unless she hears otherwise  AVS given to patient and her daughter

## 2020-02-14 NOTE — PATIENT INSTRUCTIONS
February 2020 Sunday Monday Tuesday Wednesday Thursday Friday Saturday                                 1                2     3     4     5     6     7     8    CT CHEST ABDOMEN PELVIS W CON   3:00 PM   (30 min )   AN CT 1   [facility] CAT Scan    MRI BRAIN W WO CON   3:45 PM   (45 min )   AM MRI [address] MRI            9     10    INF ONCOLOGY TX-TREATMENT PLAN   1:00 PM   (60 min )   AN INF QUICK CHAIR [address] [facility] 11     12    FOLLOW UP PG   9:05 AM   (20 min )   Jen Cuellar MD   [address] UP PG  10:05 AM   (20 min )   Lamonte Reyna DO   [facility] [place] 13     14    INF ONCOLOGY TX-TREATMENT PLAN  12:30 PM   (205 min )   AN INF CHAIR [address] [facility] 15            Cycle 21, Day 1  + Add'l treatments    16     17     18     19     20     21     22                23     24     25     26     27     28     29                     Treatment Details       2/14/2020 - Cycle 21, Day 1 + Additional treatments      Chemotherapy: ONCBCN PROVIDER COMMUNICATION2, ADO-TRASTUZUMAB EMTANSINE IVPB      Supportive Care: [facility] PROVIDER COMMUNICATION, denosumab Ant [place])        March 2020 Sunday Monday Tuesday Wednesday Thursday Friday Saturday   1     2     3     4     5     6     7                8     9    INF ONCOLOGY TX-TREATMENT PLAN   1:00 PM   (60 min )   AN INF QUICK CHAIR [address] [facility] 10     11     12     13     14            Cycle 22, Day 1    15     16     17     18     19     20     21                22     23     24     25     26     27     28                29     30     31                                         Treatment Details       3/13/2020 - Cycle 22, Day 1      Chemotherapy: ONCBCN PROVIDER COMMUNICATION2, ADO-TRASTUZUMAB EMTANSINE IVPB

## 2020-02-18 ENCOUNTER — TELEPHONE (OUTPATIENT)
Dept: PALLIATIVE MEDICINE | Facility: CLINIC | Age: 60
End: 2020-02-18

## 2020-02-18 NOTE — TELEPHONE ENCOUNTER
Received a call from patients daughter Connor Stafford stating that her mother was experiencing severe back pain today     Phone call placed to Latrice who states low back pain is worse today especially with standing She verbalizes that she has taken her Dilaudid about 90 minutes ago along with Voltaren Gabapentin and Decadron   This nurse instructed her to go to the emergency room if she feels no relief soon  or it becomes worse She verbalized understanding

## 2020-02-24 DIAGNOSIS — G89.3 CANCER RELATED PAIN: ICD-10-CM

## 2020-02-24 RX ORDER — DICLOFENAC SODIUM 75 MG/1
75 TABLET, DELAYED RELEASE ORAL DAILY PRN
Qty: 14 TABLET | Refills: 0 | OUTPATIENT
Start: 2020-02-24

## 2020-02-25 ENCOUNTER — TELEPHONE (OUTPATIENT)
Dept: PALLIATIVE MEDICINE | Facility: CLINIC | Age: 60
End: 2020-02-25

## 2020-02-25 NOTE — TELEPHONE ENCOUNTER
Left a message on phone regarding setting up a home visit day and time  If no response this time will reach out to patients daughter   2 26   I called daughters phone number and left a message on her phone as well     I usually would see her Monday Tuesday or Wednesday after [facility] clinic as she refused Friday visits because her  had the day off

## 2020-02-26 ENCOUNTER — TELEPHONE (OUTPATIENT)
Dept: HEMATOLOGY ONCOLOGY | Facility: MEDICAL CENTER | Age: 60
End: 2020-02-26

## 2020-02-26 ENCOUNTER — TELEPHONE (OUTPATIENT)
Dept: PALLIATIVE MEDICINE | Facility: CLINIC | Age: 60
End: 2020-02-26

## 2020-02-26 DIAGNOSIS — R11.0 CHEMOTHERAPY-INDUCED NAUSEA: ICD-10-CM

## 2020-02-26 DIAGNOSIS — T45.1X5A CHEMOTHERAPY-INDUCED NAUSEA: ICD-10-CM

## 2020-02-26 DIAGNOSIS — G47.9 DIFFICULTY SLEEPING: ICD-10-CM

## 2020-02-26 DIAGNOSIS — R63.0 DECREASED APPETITE: ICD-10-CM

## 2020-02-26 RX ORDER — OLANZAPINE 10 MG/1
10 TABLET ORAL
Qty: 30 TABLET | Refills: 2 | Status: SHIPPED | OUTPATIENT
Start: 2020-02-26 | End: 2020-03-14 | Stop reason: HOSPADM

## 2020-02-26 RX ORDER — PROMETHAZINE HYDROCHLORIDE 25 MG/1
25 TABLET ORAL EVERY 6 HOURS PRN
Qty: 30 TABLET | Refills: 1 | Status: SHIPPED | OUTPATIENT
Start: 2020-02-26 | End: 2020-03-14 | Stop reason: HOSPADM

## 2020-02-26 NOTE — TELEPHONE ENCOUNTER
Dr Edmar Barnett generated order for both Promethazine and Olanzapine   Pt notified of new orders  No other complaints offered

## 2020-02-26 NOTE — TELEPHONE ENCOUNTER
Task received and reviewed  meds are filled by [facility]  Called [phone],[id number] and left message for Delbert Jara RN (Pallative)  to refill Zyprexa 10 mg and Phenergan 25 mg to be sent to [facility] on file  Call placed back to patient to advise of same  Instructed patient that Dr Cathy Pacheco fills this medications  She verbalized understanding of same

## 2020-02-26 NOTE — TELEPHONE ENCOUNTER
Milton Zamudio is there a date and time you have in mind in case she calls Philippe or I can try her as well

## 2020-02-26 NOTE — TELEPHONE ENCOUNTER
Patient called requesting a refill on: promethazine (PHENERGAN) 25 mg    OLANZapine (ZyPREXA) 10 mg tablet   Patient has    pills left      Preferred pharmacy:  [facility]      Patient's call back # [phone]

## 2020-03-03 ENCOUNTER — IN HOME VISIT (OUTPATIENT)
Dept: PALLIATIVE MEDICINE | Facility: CLINIC | Age: 60
End: 2020-03-03

## 2020-03-03 VITALS
DIASTOLIC BLOOD PRESSURE: 80 MMHG | RESPIRATION RATE: 20 BRPM | OXYGEN SATURATION: 94 % | HEART RATE: 100 BPM | SYSTOLIC BLOOD PRESSURE: 130 MMHG | TEMPERATURE: 98.8 F

## 2020-03-03 DIAGNOSIS — R63.0 DECREASED APPETITE: ICD-10-CM

## 2020-03-03 DIAGNOSIS — G89.29 CHRONIC LOW BACK PAIN WITH SCIATICA, SCIATICA LATERALITY UNSPECIFIED, UNSPECIFIED BACK PAIN LATERALITY: ICD-10-CM

## 2020-03-03 DIAGNOSIS — R63.0 ANOREXIA: Primary | ICD-10-CM

## 2020-03-03 DIAGNOSIS — C79.31 BRAIN METASTASES (HCC): ICD-10-CM

## 2020-03-03 DIAGNOSIS — G62.9 NEUROPATHY: ICD-10-CM

## 2020-03-03 DIAGNOSIS — F11.90 CHRONIC, CONTINUOUS USE OF OPIOIDS: ICD-10-CM

## 2020-03-03 DIAGNOSIS — G89.3 CANCER RELATED PAIN: ICD-10-CM

## 2020-03-03 DIAGNOSIS — K59.00 CONSTIPATION, UNSPECIFIED CONSTIPATION TYPE: ICD-10-CM

## 2020-03-03 DIAGNOSIS — M54.40 CHRONIC LOW BACK PAIN WITH SCIATICA, SCIATICA LATERALITY UNSPECIFIED, UNSPECIFIED BACK PAIN LATERALITY: ICD-10-CM

## 2020-03-03 DIAGNOSIS — F51.01 PRIMARY INSOMNIA: ICD-10-CM

## 2020-03-03 PROCEDURE — NC001 PR NO CHARGE

## 2020-03-03 NOTE — PROGRESS NOTES
Visit made to patients home as part of the Palliative Care at Home program through [facility]  1 hour was spent in direct patient care and participating in goals of care conversation  Chronic Care Management facilitated by this visit  Emotional and social issues addressed  This nurse reviewed all medications and instructed on their use and dosing  Used the teach back method to faciilitate learning of proper medication administration  Patient is doing well with all of her medications by labeling with a marker on each bottle when to take Could not find bottle of Diclofenac and patient unsure if it helped with shoulder pain but would like a refill  According to pharmacy a 14 day supply was filled 2 12 with no refills     Patient still has 42 Dilaudid tablets remaining and states she only takes it when her back and leg  pain is severe  Dilaudid is  kept  in the kitchen with all other medications  This nurse questioned if that was a safe option and she stated that her sisiter is never in the home alone and her   is watchful when her sister is present   Taking Gabapentin Zyprexa Pantaprazole and Zolpidem as directed   Takes promethazine 4 x a day       Primary problem today is poor appetite Patient feels it is because of not being on steroids  She reports adequate fluid intake She drinks one Ensure daily  and she takes Mirilax for constipation  Right shoulder pain rated as 6/10  Low back and leg pain 7/10 Smoking about half pack cigarettes a day   Patient requests a new lymphedema sleeve as her old one is stretched out  [facility] in [place] has in stock but will need to measure her arm in the morning    Present during the visit were the patient   and sister at work  Her daughter is taking her to the store later      Assessment/Plan:   Report poor appetite to MD  Provided Ensure coupons  For dietary supplement  Obtain order for lymphedema sleeve from MD and fax required notes to [facility]   Phone call follow upin 1 week  to assess nutritional status   Re visit in one month or sooner if needed   Continue pain medication monitoring      There are no diagnoses linked to this encounter  Subjective:     Patient ID: Joan Rascon is a 64 y o  female  With a history of breast cancer bilateral  mastectomy and metastasis to bone and brain  She is currently receiving chemotherapy     HPI    Review of Systems   Constitutional: Positive for activity change and appetite change  HENT: Positive for facial swelling (cushingoid)  Eyes: Negative  Respiratory: Positive for cough (occasoinal non productive  current  smoker )  Gastrointestinal: Positive for constipation and nausea  Endocrine: Negative  Musculoskeletal: Positive for arthralgias, back pain and gait problem  Skin: Positive for color change  Allergic/Immunologic: Negative  Neurological: Positive for tremors, weakness and numbness (neuropathy feet )  Psychiatric/Behavioral: Negative  Objective:     Physical Exam   Constitutional: She is oriented to person, place, and time  She appears well-developed  Eyes: EOM are normal    Cardiovascular: Normal rate  Pulmonary/Chest: Effort normal  She has wheezes  Abdominal: Soft  Bowel sounds are normal  She exhibits distension  Neurological: She is alert and oriented to person, place, and time  Skin: Skin is dry  Psychiatric: She has a normal mood and affect   Her behavior is normal  Judgment and thought content normal

## 2020-03-04 ENCOUNTER — TELEPHONE (OUTPATIENT)
Dept: PALLIATIVE MEDICINE | Facility: CLINIC | Age: 60
End: 2020-03-04

## 2020-03-04 DIAGNOSIS — C50.912 BILATERAL MALIGNANT NEOPLASM OF BREAST IN FEMALE, ESTROGEN RECEPTOR POSITIVE, UNSPECIFIED SITE OF BREAST (HCC): ICD-10-CM

## 2020-03-04 DIAGNOSIS — Z17.0 BILATERAL MALIGNANT NEOPLASM OF BREAST IN FEMALE, ESTROGEN RECEPTOR POSITIVE, UNSPECIFIED SITE OF BREAST (HCC): ICD-10-CM

## 2020-03-04 DIAGNOSIS — C50.911 BILATERAL MALIGNANT NEOPLASM OF BREAST IN FEMALE, ESTROGEN RECEPTOR POSITIVE, UNSPECIFIED SITE OF BREAST (HCC): ICD-10-CM

## 2020-03-04 DIAGNOSIS — I89.0 LYMPHEDEMA OF RIGHT UPPER EXTREMITY: Primary | ICD-10-CM

## 2020-03-04 NOTE — TELEPHONE ENCOUNTER
----- Message from Kevin Leon RN sent at 3/4/2020  8:04 AM EST -----  Regarding: script for new lymphedema sleeve   Dr Tessa Hooker could you write a script for a new lymphedema sleeve for Angy and fax it to me  ?  I will send it along with other documents required to [address] [place] [address]   Thank you     My fax number is

## 2020-03-04 NOTE — TELEPHONE ENCOUNTER
Order for lymphedema sleeve with supporting documents faxed to [facility]

## 2020-03-08 ENCOUNTER — DOCUMENTATION (OUTPATIENT)
Dept: RADIATION ONCOLOGY | Facility: HOSPITAL | Age: 60
End: 2020-03-08

## 2020-03-09 ENCOUNTER — TELEPHONE (OUTPATIENT)
Dept: HEMATOLOGY ONCOLOGY | Facility: CLINIC | Age: 60
End: 2020-03-09

## 2020-03-09 ENCOUNTER — HOSPITAL ENCOUNTER (OUTPATIENT)
Dept: INFUSION CENTER | Facility: CLINIC | Age: 60
End: 2020-03-09

## 2020-03-09 ENCOUNTER — TELEPHONE (OUTPATIENT)
Dept: PALLIATIVE MEDICINE | Facility: CLINIC | Age: 60
End: 2020-03-09

## 2020-03-09 NOTE — TELEPHONE ENCOUNTER
Patient can take an additional Dilaudid tablet now  Please arrange follow up to review pain medications as it appears that she has been tried on multiple medications with little benefit  Thank you

## 2020-03-09 NOTE — TELEPHONE ENCOUNTER
Patient indicates that she unable to walk and is having persistent leg pain   Please return her call at [phone]

## 2020-03-09 NOTE — TELEPHONE ENCOUNTER
Telephone Triage-Symptom Call      Catalina Sanon  1960  [phone]    Caller:Self  Chief Complaint:Bilateral leg pain  Current Treatment patient:  Yes  Name of treatment:Kadcyla  Date of last treatment:   Recent illness or Surgery:      Description of symptoms per ONS Guidelines review (charting by exception): Patient called to report that on 3/7/20 she started with Bilateral upper leg pain, radiating down her legs  Rates pain 8/10  Patient stated that she left a message for Dr Luc Kennedy and is awaiting a nurse to call back  Patient is using Dilaudid and Gabapentin but reports it is not effective  Patient stated she is able to move her legs but reports weakness  Patient reports that she has had leg weakness but it has worsened with leg pain  Guidelines followed: Yes    Disposition: Advised for patient to call Dr Velvet Degroot office back for pain medication  Call warm transferred to Dr Velvet Degroot office  NEXT STEPS REQUIRED: Review above with Dr Will Hilliard  Call patient if Dr Will Hilliard has any other recommendations       Patient verbalizes understanding and is in agreement with plan: YES    Verified that guidelines were completed and documented: YES

## 2020-03-10 ENCOUNTER — APPOINTMENT (EMERGENCY)
Dept: RADIOLOGY | Facility: HOSPITAL | Age: 60
DRG: 641 | End: 2020-03-10
Payer: MEDICARE

## 2020-03-10 ENCOUNTER — HOSPITAL ENCOUNTER (INPATIENT)
Facility: HOSPITAL | Age: 60
LOS: 4 days | Discharge: NURSING, REHAB OR LONG-TERM CARE | DRG: 641 | End: 2020-03-14
Attending: EMERGENCY MEDICINE | Admitting: FAMILY MEDICINE
Payer: MEDICARE

## 2020-03-10 DIAGNOSIS — G89.3 CANCER RELATED PAIN: ICD-10-CM

## 2020-03-10 DIAGNOSIS — C50.911 BILATERAL MALIGNANT NEOPLASM OF BREAST IN FEMALE, ESTROGEN RECEPTOR POSITIVE, UNSPECIFIED SITE OF BREAST (HCC): ICD-10-CM

## 2020-03-10 DIAGNOSIS — R26.2 AMBULATORY DYSFUNCTION: ICD-10-CM

## 2020-03-10 DIAGNOSIS — Z17.0 BILATERAL MALIGNANT NEOPLASM OF BREAST IN FEMALE, ESTROGEN RECEPTOR POSITIVE, UNSPECIFIED SITE OF BREAST (HCC): ICD-10-CM

## 2020-03-10 DIAGNOSIS — C50.912 BILATERAL MALIGNANT NEOPLASM OF BREAST IN FEMALE, ESTROGEN RECEPTOR POSITIVE, UNSPECIFIED SITE OF BREAST (HCC): ICD-10-CM

## 2020-03-10 DIAGNOSIS — R53.1 WEAKNESS: Primary | ICD-10-CM

## 2020-03-10 PROBLEM — E87.6 HYPOKALEMIA: Status: ACTIVE | Noted: 2020-03-10

## 2020-03-10 LAB
ALBUMIN SERPL BCP-MCNC: 2.6 G/DL (ref 3.5–5)
ALP SERPL-CCNC: 170 U/L (ref 46–116)
ALT SERPL W P-5'-P-CCNC: 54 U/L (ref 12–78)
ANION GAP SERPL CALCULATED.3IONS-SCNC: 13 MMOL/L (ref 4–13)
AST SERPL W P-5'-P-CCNC: 141 U/L (ref 5–45)
BASOPHILS # BLD AUTO: 0.04 THOUSANDS/ΜL (ref 0–0.1)
BASOPHILS NFR BLD AUTO: 0 % (ref 0–1)
BILIRUB SERPL-MCNC: 0.41 MG/DL (ref 0.2–1)
BILIRUB UR QL STRIP: NEGATIVE
BUN SERPL-MCNC: 5 MG/DL (ref 5–25)
CALCIUM SERPL-MCNC: 8.6 MG/DL (ref 8.3–10.1)
CHLORIDE SERPL-SCNC: 102 MMOL/L (ref 100–108)
CLARITY UR: CLEAR
CO2 SERPL-SCNC: 24 MMOL/L (ref 21–32)
COLOR UR: YELLOW
CREAT SERPL-MCNC: 0.85 MG/DL (ref 0.6–1.3)
EOSINOPHIL # BLD AUTO: 0.04 THOUSAND/ΜL (ref 0–0.61)
EOSINOPHIL NFR BLD AUTO: 0 % (ref 0–6)
ERYTHROCYTE [DISTWIDTH] IN BLOOD BY AUTOMATED COUNT: 17.7 % (ref 11.6–15.1)
GFR SERPL CREATININE-BSD FRML MDRD: 76 ML/MIN/1.73SQ M
GLUCOSE SERPL-MCNC: 111 MG/DL (ref 65–140)
GLUCOSE UR STRIP-MCNC: NEGATIVE MG/DL
HCT VFR BLD AUTO: 34.6 % (ref 34.8–46.1)
HGB BLD-MCNC: 11 G/DL (ref 11.5–15.4)
HGB UR QL STRIP.AUTO: NEGATIVE
IMM GRANULOCYTES # BLD AUTO: 0.05 THOUSAND/UL (ref 0–0.2)
IMM GRANULOCYTES NFR BLD AUTO: 1 % (ref 0–2)
KETONES UR STRIP-MCNC: NEGATIVE MG/DL
LEUKOCYTE ESTERASE UR QL STRIP: NEGATIVE
LYMPHOCYTES # BLD AUTO: 0.99 THOUSANDS/ΜL (ref 0.6–4.47)
LYMPHOCYTES NFR BLD AUTO: 11 % (ref 14–44)
MAGNESIUM SERPL-MCNC: 1.8 MG/DL (ref 1.6–2.6)
MCH RBC QN AUTO: 28.4 PG (ref 26.8–34.3)
MCHC RBC AUTO-ENTMCNC: 31.8 G/DL (ref 31.4–37.4)
MCV RBC AUTO: 89 FL (ref 82–98)
MONOCYTES # BLD AUTO: 1.35 THOUSAND/ΜL (ref 0.17–1.22)
MONOCYTES NFR BLD AUTO: 15 % (ref 4–12)
NEUTROPHILS # BLD AUTO: 6.83 THOUSANDS/ΜL (ref 1.85–7.62)
NEUTS SEG NFR BLD AUTO: 73 % (ref 43–75)
NITRITE UR QL STRIP: NEGATIVE
NRBC BLD AUTO-RTO: 0 /100 WBCS
PH UR STRIP.AUTO: 6.5 [PH]
PLATELET # BLD AUTO: 433 THOUSANDS/UL (ref 149–390)
PMV BLD AUTO: 8.9 FL (ref 8.9–12.7)
POTASSIUM SERPL-SCNC: 3 MMOL/L (ref 3.5–5.3)
PROT SERPL-MCNC: 7.4 G/DL (ref 6.4–8.2)
PROT UR STRIP-MCNC: NEGATIVE MG/DL
RBC # BLD AUTO: 3.88 MILLION/UL (ref 3.81–5.12)
SODIUM SERPL-SCNC: 139 MMOL/L (ref 136–145)
SP GR UR STRIP.AUTO: <=1.005 (ref 1–1.03)
TROPONIN I SERPL-MCNC: 0.03 NG/ML
UROBILINOGEN UR QL STRIP.AUTO: 1 E.U./DL
WBC # BLD AUTO: 9.3 THOUSAND/UL (ref 4.31–10.16)

## 2020-03-10 PROCEDURE — 80053 COMPREHEN METABOLIC PANEL: CPT | Performed by: EMERGENCY MEDICINE

## 2020-03-10 PROCEDURE — 36415 COLL VENOUS BLD VENIPUNCTURE: CPT | Performed by: EMERGENCY MEDICINE

## 2020-03-10 PROCEDURE — 99285 EMERGENCY DEPT VISIT HI MDM: CPT | Performed by: EMERGENCY MEDICINE

## 2020-03-10 PROCEDURE — 99223 1ST HOSP IP/OBS HIGH 75: CPT | Performed by: NURSE PRACTITIONER

## 2020-03-10 PROCEDURE — 85025 COMPLETE CBC W/AUTO DIFF WBC: CPT | Performed by: EMERGENCY MEDICINE

## 2020-03-10 PROCEDURE — 84484 ASSAY OF TROPONIN QUANT: CPT | Performed by: EMERGENCY MEDICINE

## 2020-03-10 PROCEDURE — 96360 HYDRATION IV INFUSION INIT: CPT

## 2020-03-10 PROCEDURE — 71046 X-RAY EXAM CHEST 2 VIEWS: CPT

## 2020-03-10 PROCEDURE — 99285 EMERGENCY DEPT VISIT HI MDM: CPT

## 2020-03-10 PROCEDURE — 83735 ASSAY OF MAGNESIUM: CPT | Performed by: EMERGENCY MEDICINE

## 2020-03-10 PROCEDURE — 81003 URINALYSIS AUTO W/O SCOPE: CPT | Performed by: EMERGENCY MEDICINE

## 2020-03-10 PROCEDURE — 93005 ELECTROCARDIOGRAM TRACING: CPT

## 2020-03-10 RX ORDER — PANTOPRAZOLE SODIUM 40 MG/1
40 TABLET, DELAYED RELEASE ORAL
Status: DISCONTINUED | OUTPATIENT
Start: 2020-03-11 | End: 2020-03-14 | Stop reason: HOSPADM

## 2020-03-10 RX ORDER — ALBUTEROL SULFATE 90 UG/1
2 AEROSOL, METERED RESPIRATORY (INHALATION) EVERY 6 HOURS PRN
Status: DISCONTINUED | OUTPATIENT
Start: 2020-03-10 | End: 2020-03-14 | Stop reason: HOSPADM

## 2020-03-10 RX ORDER — OLANZAPINE 10 MG/1
10 TABLET ORAL
Status: DISCONTINUED | OUTPATIENT
Start: 2020-03-10 | End: 2020-03-14 | Stop reason: HOSPADM

## 2020-03-10 RX ORDER — POTASSIUM CHLORIDE 20 MEQ/1
40 TABLET, EXTENDED RELEASE ORAL ONCE
Status: COMPLETED | OUTPATIENT
Start: 2020-03-10 | End: 2020-03-10

## 2020-03-10 RX ORDER — GABAPENTIN 300 MG/1
600 CAPSULE ORAL 4 TIMES DAILY
Status: DISCONTINUED | OUTPATIENT
Start: 2020-03-10 | End: 2020-03-11

## 2020-03-10 RX ORDER — ZOLPIDEM TARTRATE 5 MG/1
10 TABLET ORAL
Status: DISCONTINUED | OUTPATIENT
Start: 2020-03-10 | End: 2020-03-14 | Stop reason: HOSPADM

## 2020-03-10 RX ORDER — SENNOSIDES 8.6 MG
2 TABLET ORAL
Status: DISCONTINUED | OUTPATIENT
Start: 2020-03-10 | End: 2020-03-14 | Stop reason: HOSPADM

## 2020-03-10 RX ORDER — FUROSEMIDE 20 MG/1
20 TABLET ORAL DAILY PRN
Status: DISCONTINUED | OUTPATIENT
Start: 2020-03-10 | End: 2020-03-14 | Stop reason: HOSPADM

## 2020-03-10 RX ORDER — IBUPROFEN 600 MG/1
600 TABLET ORAL EVERY 12 HOURS PRN
Status: DISCONTINUED | OUTPATIENT
Start: 2020-03-10 | End: 2020-03-14 | Stop reason: HOSPADM

## 2020-03-10 RX ORDER — HYDROMORPHONE HYDROCHLORIDE 2 MG/1
8 TABLET ORAL EVERY 6 HOURS PRN
Status: DISCONTINUED | OUTPATIENT
Start: 2020-03-10 | End: 2020-03-14 | Stop reason: HOSPADM

## 2020-03-10 RX ORDER — PROMETHAZINE HYDROCHLORIDE 25 MG/1
25 TABLET ORAL EVERY 6 HOURS PRN
Status: DISCONTINUED | OUTPATIENT
Start: 2020-03-10 | End: 2020-03-14 | Stop reason: HOSPADM

## 2020-03-10 RX ORDER — LACTULOSE 20 G/30ML
20 SOLUTION ORAL 2 TIMES DAILY PRN
Status: DISCONTINUED | OUTPATIENT
Start: 2020-03-10 | End: 2020-03-14 | Stop reason: HOSPADM

## 2020-03-10 RX ADMIN — SODIUM CHLORIDE 1000 ML: 0.9 INJECTION, SOLUTION INTRAVENOUS at 21:08

## 2020-03-10 RX ADMIN — POTASSIUM CHLORIDE 40 MEQ: 1500 TABLET, EXTENDED RELEASE ORAL at 22:20

## 2020-03-10 NOTE — TELEPHONE ENCOUNTER
Called patient to follow up on pain medication  Left messages on phone numbers to call  number if she needs assistance

## 2020-03-11 ENCOUNTER — APPOINTMENT (INPATIENT)
Dept: MRI IMAGING | Facility: HOSPITAL | Age: 60
DRG: 641 | End: 2020-03-11
Payer: MEDICARE

## 2020-03-11 ENCOUNTER — APPOINTMENT (INPATIENT)
Dept: CT IMAGING | Facility: HOSPITAL | Age: 60
DRG: 641 | End: 2020-03-11
Payer: MEDICARE

## 2020-03-11 LAB
ANION GAP SERPL CALCULATED.3IONS-SCNC: 11 MMOL/L (ref 4–13)
ATRIAL RATE: 99 BPM
BASOPHILS # BLD AUTO: 0.04 THOUSANDS/ΜL (ref 0–0.1)
BASOPHILS NFR BLD AUTO: 1 % (ref 0–1)
BUN SERPL-MCNC: 3 MG/DL (ref 5–25)
CALCIUM SERPL-MCNC: 8.2 MG/DL (ref 8.3–10.1)
CHLORIDE SERPL-SCNC: 109 MMOL/L (ref 100–108)
CO2 SERPL-SCNC: 23 MMOL/L (ref 21–32)
CREAT SERPL-MCNC: 0.62 MG/DL (ref 0.6–1.3)
EOSINOPHIL # BLD AUTO: 0.07 THOUSAND/ΜL (ref 0–0.61)
EOSINOPHIL NFR BLD AUTO: 1 % (ref 0–6)
ERYTHROCYTE [DISTWIDTH] IN BLOOD BY AUTOMATED COUNT: 17.9 % (ref 11.6–15.1)
GFR SERPL CREATININE-BSD FRML MDRD: 100 ML/MIN/1.73SQ M
GLUCOSE SERPL-MCNC: 84 MG/DL (ref 65–140)
HCT VFR BLD AUTO: 33.4 % (ref 34.8–46.1)
HGB BLD-MCNC: 10.1 G/DL (ref 11.5–15.4)
IMM GRANULOCYTES # BLD AUTO: 0.03 THOUSAND/UL (ref 0–0.2)
IMM GRANULOCYTES NFR BLD AUTO: 1 % (ref 0–2)
LYMPHOCYTES # BLD AUTO: 0.98 THOUSANDS/ΜL (ref 0.6–4.47)
LYMPHOCYTES NFR BLD AUTO: 18 % (ref 14–44)
MCH RBC QN AUTO: 28.1 PG (ref 26.8–34.3)
MCHC RBC AUTO-ENTMCNC: 30.2 G/DL (ref 31.4–37.4)
MCV RBC AUTO: 93 FL (ref 82–98)
MONOCYTES # BLD AUTO: 0.88 THOUSAND/ΜL (ref 0.17–1.22)
MONOCYTES NFR BLD AUTO: 16 % (ref 4–12)
NEUTROPHILS # BLD AUTO: 3.58 THOUSANDS/ΜL (ref 1.85–7.62)
NEUTS SEG NFR BLD AUTO: 63 % (ref 43–75)
NRBC BLD AUTO-RTO: 0 /100 WBCS
P AXIS: 41 DEGREES
PLATELET # BLD AUTO: 409 THOUSANDS/UL (ref 149–390)
PMV BLD AUTO: 9.6 FL (ref 8.9–12.7)
POTASSIUM SERPL-SCNC: 3.1 MMOL/L (ref 3.5–5.3)
PR INTERVAL: 128 MS
QRS AXIS: 64 DEGREES
QRSD INTERVAL: 80 MS
QT INTERVAL: 320 MS
QTC INTERVAL: 403 MS
RBC # BLD AUTO: 3.59 MILLION/UL (ref 3.81–5.12)
SODIUM SERPL-SCNC: 143 MMOL/L (ref 136–145)
T WAVE AXIS: 62 DEGREES
VENTRICULAR RATE: 95 BPM
WBC # BLD AUTO: 5.58 THOUSAND/UL (ref 4.31–10.16)

## 2020-03-11 PROCEDURE — 93010 ELECTROCARDIOGRAM REPORT: CPT | Performed by: INTERNAL MEDICINE

## 2020-03-11 PROCEDURE — 85025 COMPLETE CBC W/AUTO DIFF WBC: CPT | Performed by: NURSE PRACTITIONER

## 2020-03-11 PROCEDURE — 99223 1ST HOSP IP/OBS HIGH 75: CPT | Performed by: NURSE PRACTITIONER

## 2020-03-11 PROCEDURE — 97110 THERAPEUTIC EXERCISES: CPT

## 2020-03-11 PROCEDURE — 99232 SBSQ HOSP IP/OBS MODERATE 35: CPT | Performed by: INTERNAL MEDICINE

## 2020-03-11 PROCEDURE — 97163 PT EVAL HIGH COMPLEX 45 MIN: CPT

## 2020-03-11 PROCEDURE — 72148 MRI LUMBAR SPINE W/O DYE: CPT

## 2020-03-11 PROCEDURE — 70450 CT HEAD/BRAIN W/O DYE: CPT

## 2020-03-11 PROCEDURE — 72141 MRI NECK SPINE W/O DYE: CPT

## 2020-03-11 PROCEDURE — 80048 BASIC METABOLIC PNL TOTAL CA: CPT | Performed by: NURSE PRACTITIONER

## 2020-03-11 PROCEDURE — 90682 RIV4 VACC RECOMBINANT DNA IM: CPT | Performed by: FAMILY MEDICINE

## 2020-03-11 PROCEDURE — G0008 ADMIN INFLUENZA VIRUS VAC: HCPCS | Performed by: FAMILY MEDICINE

## 2020-03-11 RX ORDER — POTASSIUM CHLORIDE 14.9 MG/ML
20 INJECTION INTRAVENOUS ONCE
Status: COMPLETED | OUTPATIENT
Start: 2020-03-11 | End: 2020-03-11

## 2020-03-11 RX ORDER — POTASSIUM CHLORIDE 20 MEQ/1
20 TABLET, EXTENDED RELEASE ORAL ONCE
Status: COMPLETED | OUTPATIENT
Start: 2020-03-11 | End: 2020-03-11

## 2020-03-11 RX ORDER — GABAPENTIN 400 MG/1
800 CAPSULE ORAL 4 TIMES DAILY
Status: DISCONTINUED | OUTPATIENT
Start: 2020-03-11 | End: 2020-03-14 | Stop reason: HOSPADM

## 2020-03-11 RX ADMIN — GABAPENTIN 800 MG: 400 CAPSULE ORAL at 17:33

## 2020-03-11 RX ADMIN — PANTOPRAZOLE SODIUM 40 MG: 40 TABLET, DELAYED RELEASE ORAL at 05:44

## 2020-03-11 RX ADMIN — GABAPENTIN 600 MG: 300 CAPSULE ORAL at 08:38

## 2020-03-11 RX ADMIN — OLANZAPINE 10 MG: 10 TABLET, FILM COATED ORAL at 00:07

## 2020-03-11 RX ADMIN — IBUPROFEN 600 MG: 600 TABLET ORAL at 15:37

## 2020-03-11 RX ADMIN — GABAPENTIN 600 MG: 300 CAPSULE ORAL at 12:02

## 2020-03-11 RX ADMIN — POTASSIUM CHLORIDE 20 MEQ: 1500 TABLET, EXTENDED RELEASE ORAL at 09:03

## 2020-03-11 RX ADMIN — GABAPENTIN 800 MG: 400 CAPSULE ORAL at 21:25

## 2020-03-11 RX ADMIN — OLANZAPINE 10 MG: 10 TABLET, FILM COATED ORAL at 21:25

## 2020-03-11 RX ADMIN — GABAPENTIN 600 MG: 300 CAPSULE ORAL at 00:07

## 2020-03-11 RX ADMIN — ENOXAPARIN SODIUM 40 MG: 40 INJECTION SUBCUTANEOUS at 08:38

## 2020-03-11 RX ADMIN — POTASSIUM CHLORIDE 20 MEQ: 14.9 INJECTION, SOLUTION INTRAVENOUS at 09:04

## 2020-03-11 RX ADMIN — INFLUENZA A VIRUS A/BRISBANE/02/2018 (H1N1) RECOMBINANT HEMAGGLUTININ ANTIGEN, INFLUENZA A VIRUS A/KANSAS/14/2017 (H3N2) RECOMBINANT HEMAGGLUTININ ANTIGEN, INFLUENZA B VIRUS B/PHUKET/3073/2013 RECOMBINANT HEMAGGLUTININ ANTIGEN, AND INFLUENZA B VIRUS B/MARYLAND/15/2016 RECOMBINANT HEMAGGLUTININ ANTIGEN 0.5 ML: 45; 45; 45; 45 INJECTION INTRAMUSCULAR at 00:07

## 2020-03-11 NOTE — SOCIAL WORK
LOS 1  Patient is not a bundle or a readmission  CM spoke with patient at the bedside  CM introduced self and role  Patient lives in [place] with her  and sister in a trailer  There are 5 EKATERINA home  Patient needs assistance with ADLS  Patient uses a roller walker at home  She also has a cane, wheelchair and showerchair at home  Patient may be interested in a bedside commode  Patient's  and sister provide help at home  Patient stated her  works Sunday-Wednesday from [address]  Patient has a SN that comes to the home to assist with medications  Patient denies history of inpatient rehab  Patient uses [facility] in [place] for prescriptions  Patient has prescription insurance and is able to afford her medications  Patient does not have an advance directive/living will  Patient's emergency contact is her  Dallas Pacheco, [phone]  Patient's sister can also be contacted, [phone]  Patient is on disability  Patient does not drive  Patient's PCP is Dr Tarah Jackson  Patient's daughter was providing transport to appointments  Patient also uses [facility] to get to her lab work and chemotherapy treatments  CM discussed with patient at the bedside re:DCP  CM discussed PT/OT evaluation at bedside  CM discussed inpatient rehab and Home PT/OT with patient  Patient educated on [place] [facility]  Patient is open to [address] at discharge for inpatient rehab  CM will f/u with patient after PT/OT evaluation to discuss discharge plan of care  No other questions/concerns at this time  CM reviewed discharge planning process including the following: identifying caregivers at home, preference for d/c planning needs,   availability of [facility] program, availability of treatment team to discuss questions or concerns patient and/or family may have regarding diagnosis, plan of care, old or new medications and discharge planning   CM will continue to follow for care coordination and update assessment as appropriate

## 2020-03-11 NOTE — PLAN OF CARE
Problem: Potential for Falls  Goal: Patient will remain free of falls  Description  INTERVENTIONS:  - Assess patient frequently for physical needs  -  Identify cognitive and physical deficits and behaviors that affect risk of falls  -  [place] fall precautions as indicated by assessment   - Educate patient/family on patient safety including physical limitations  - Instruct patient to call for assistance with activity based on assessment  - Modify environment to reduce risk of injury  - Consider OT/PT consult to assist with strengthening/mobility  Outcome: Progressing     Problem: Nutrition/Hydration-ADULT  Goal: Nutrient/Hydration intake appropriate for improving, restoring or maintaining nutritional needs  Description  Monitor and assess patient's nutrition/hydration status for malnutrition  Collaborate with interdisciplinary team and initiate plan and interventions as ordered  Monitor patient's weight and dietary intake as ordered or per policy  Utilize nutrition screening tool and intervene as necessary  Determine patient's food preferences and provide high-protein, high-caloric foods as appropriate       INTERVENTIONS:  - Monitor oral intake, urinary output, labs, and treatment plans  - Assess nutrition and hydration status and recommend course of action  - Evaluate amount of meals eaten  - Assist patient with eating if necessary   - Allow adequate time for meals  - Recommend/ encourage appropriate diets, oral nutritional supplements, and vitamin/mineral supplements  - Order, calculate, and assess calorie counts as needed  - Recommend, monitor, and adjust tube feedings and TPN/PPN based on assessed needs  - Assess need for intravenous fluids  - Provide specific nutrition/hydration education as appropriate  - Include patient/family/caregiver in decisions related to nutrition  Outcome: Progressing     Problem: Prexisting or High Potential for Compromised Skin Integrity  Goal: Skin integrity is maintained or improved  Description  INTERVENTIONS:  - Identify patients at risk for skin breakdown  - Assess and monitor skin integrity  - Assess and monitor nutrition and hydration status  - Monitor labs   - Assess for incontinence   - Turn and reposition patient  - Assist with mobility/ambulation  - Relieve pressure over bony prominences  - Avoid friction and shearing  - Provide appropriate hygiene as needed including keeping skin clean and dry  - Evaluate need for skin moisturizer/barrier cream  - Collaborate with interdisciplinary team   - Patient/family teaching  - Consider wound care consult   Outcome: Progressing     Problem: MUSCULOSKELETAL - ADULT  Goal: Maintain or return mobility to safest level of function  Description  INTERVENTIONS:  - Assess patient's ability to carry out ADLs; assess patient's baseline for ADL function and identify physical deficits which impact ability to perform ADLs (bathing, care of mouth/teeth, toileting, grooming, dressing, etc )  - Assess/evaluate cause of self-care deficits   - Assess range of motion  - Assess patient's mobility  - Assess patient's need for assistive devices and provide as appropriate  - Encourage maximum independence but intervene and supervise when necessary  - Involve family in performance of ADLs  - Assess for home care needs following discharge   - Consider OT consult to assist with ADL evaluation and planning for discharge  - Provide patient education as appropriate  Outcome: Progressing

## 2020-03-11 NOTE — PLAN OF CARE
Problem: PHYSICAL THERAPY ADULT  Goal: Performs mobility at highest level of function for planned discharge setting  See evaluation for individualized goals  Description  Treatment/Interventions: Functional transfer training, LE strengthening/ROM, Therapeutic exercise, Endurance training, Cognitive reorientation, Patient/family training, Equipment eval/education, Gait training, Bed mobility, Spoke to nursing  Equipment Recommended: Mat Prader, Wheelchair       See flowsheet documentation for full assessment, interventions and recommendations  Note:   Prognosis: Fair  Problem List: Decreased strength, Decreased range of motion, Decreased endurance, Impaired balance, Decreased mobility, Decreased coordination, Decreased cognition, Impaired judgement, Decreased safety awareness, Impaired vision, Obesity, Decreased skin integrity, Pain(gait deviations)  Assessment: Pt is a 64 y o  female seen for PT evaluation s/p admit to [facility] on [facility]/10/2020 w/ Bilateral malignant neoplasm of breast in female, estrogen receptor positive ([facility] [address] )  Order placed for PT  Upon evaluation: Pt requires mod assistance for bed mobility, transfers, and ambulation with out device within room  Pt's clinical presentation is currently unstable/unpredictable given the functional mobility deficits above, especially (but not limited to) weakness, decreased endurance, gait deviations and pain, coupled with fall risks including hx of falls, impaired balance, impaired coordination, altered vision and limited sensation/neuropathy, and combined with medical complications of abnormal H&H, abnormal potassium values and fear/retreat  Pt to benefit from continued skilled PT tx while in hospital and upon DC to address deficits as defined above and maximize level of functional mobility  Recommend trial with walker next 1-2 sessions, trial with WC next 1-2 sessions, ther ex next 1-2 sessions, OT consult and case management consult      Barriers to Discharge: Decreased caregiver support, Inaccessible home environment     Recommendation: Post acute IP rehab          See flowsheet documentation for full assessment

## 2020-03-11 NOTE — PLAN OF CARE
Problem: Potential for Falls  Goal: Patient will remain free of falls  Description  INTERVENTIONS:  - Assess patient frequently for physical needs  -  Identify cognitive and physical deficits and behaviors that affect risk of falls  -  [place] fall precautions as indicated by assessment   - Educate patient/family on patient safety including physical limitations  - Instruct patient to call for assistance with activity based on assessment  - Modify environment to reduce risk of injury  - Consider OT/PT consult to assist with strengthening/mobility  Outcome: Progressing     Problem: Nutrition/Hydration-ADULT  Goal: Nutrient/Hydration intake appropriate for improving, restoring or maintaining nutritional needs  Description  Monitor and assess patient's nutrition/hydration status for malnutrition  Collaborate with interdisciplinary team and initiate plan and interventions as ordered  Monitor patient's weight and dietary intake as ordered or per policy  Utilize nutrition screening tool and intervene as necessary  Determine patient's food preferences and provide high-protein, high-caloric foods as appropriate       INTERVENTIONS:  - Monitor oral intake, urinary output, labs, and treatment plans  - Assess nutrition and hydration status and recommend course of action  - Evaluate amount of meals eaten  - Assist patient with eating if necessary   - Allow adequate time for meals  - Recommend/ encourage appropriate diets, oral nutritional supplements, and vitamin/mineral supplements  - Order, calculate, and assess calorie counts as needed  - Recommend, monitor, and adjust tube feedings and TPN/PPN based on assessed needs  - Assess need for intravenous fluids  - Provide specific nutrition/hydration education as appropriate  - Include patient/family/caregiver in decisions related to nutrition  Outcome: Progressing     Problem: Prexisting or High Potential for Compromised Skin Integrity  Goal: Skin integrity is maintained or improved  Description  INTERVENTIONS:  - Identify patients at risk for skin breakdown  - Assess and monitor skin integrity  - Assess and monitor nutrition and hydration status  - Monitor labs   - Assess for incontinence   - Turn and reposition patient  - Assist with mobility/ambulation  - Relieve pressure over bony prominences  - Avoid friction and shearing  - Provide appropriate hygiene as needed including keeping skin clean and dry  - Evaluate need for skin moisturizer/barrier cream  - Collaborate with interdisciplinary team   - Patient/family teaching  - Consider wound care consult   Outcome: Progressing     Problem: MUSCULOSKELETAL - ADULT  Goal: Maintain or return mobility to safest level of function  Description  INTERVENTIONS:  - Assess patient's ability to carry out ADLs; assess patient's baseline for ADL function and identify physical deficits which impact ability to perform ADLs (bathing, care of mouth/teeth, toileting, grooming, dressing, etc )  - Assess/evaluate cause of self-care deficits   - Assess range of motion  - Assess patient's mobility  - Assess patient's need for assistive devices and provide as appropriate  - Encourage maximum independence but intervene and supervise when necessary  - Involve family in performance of ADLs  - Assess for home care needs following discharge   - Consider OT consult to assist with ADL evaluation and planning for discharge  - Provide patient education as appropriate  Outcome: Progressing

## 2020-03-11 NOTE — PLAN OF CARE
Problem: PHYSICAL THERAPY ADULT  Goal: Performs mobility at highest level of function for planned discharge setting  See evaluation for individualized goals  Description  Treatment/Interventions: Functional transfer training, LE strengthening/ROM, Therapeutic exercise, Endurance training, Cognitive reorientation, Patient/family training, Equipment eval/education, Gait training, Bed mobility, Spoke to nursing  Equipment Recommended: Amaya Garcia, Wheelchair       See flowsheet documentation for full assessment, interventions and recommendations  3/11/2020 2925 by Ann Morris PT  Outcome: Progressing  Note:   Prognosis: Fair  Problem List: Decreased strength, Decreased range of motion, Decreased endurance, Impaired balance, Decreased mobility, Decreased coordination, Decreased cognition, Impaired judgement, Decreased safety awareness, Impaired vision, Obesity, Decreased skin integrity, Pain(gait deviations)  A:  Pt requires similar physical assistance to perform mobility, and <50% A during ther exercises  Barriers to Discharge: Decreased caregiver support, Inaccessible home environment     Recommendation: Post acute IP rehab          See flowsheet documentation for full assessment

## 2020-03-11 NOTE — ASSESSMENT & PLAN NOTE
· Patient has stage IV breast cancer with Mets to lungs, bone, brain  Initially diagnosed in 2010   S/p bilateral mastectomy and multiple rounds of chemotherapy/radiation  · Current regimen: Cadcyla with premeds of Dexamethasone, Emend, Aloxi   · Next due on March 19th  · Receives every 3-4 weeks  · Follows with Dr Kimi Orosco and Dr Mary Ruiz  · Consult palliative care  · Consult PT OT

## 2020-03-11 NOTE — ED PROVIDER NOTES
History  Chief Complaint   Patient presents with    Weakness - Generalized     pt who has stage 4 breast CA with mets to lung bone and brain persents with increased generalized weakness but also reports increaed weaknessin bialteral legs states she cannot put pressure ont hem     82-year-old female comes in for generalized weakness  Patient has stage IV breast cancer with Mets to lung and bone and brain  States that over the last couple days she has become more weak and has been unable to get out of bed  Patient states that she is so weak she does not feel like she can't move her arms or legs  Patient denies any fever chills chest pain or shortness of breath no nausea or vomiting  History provided by:  Patient and medical records   used: No    Malaise - 7 years or greater   Severity:  Severe  Onset quality:  Gradual  Duration:  3 days  Timing:  Constant  Progression:  Worsening  Chronicity:  New  Context: stress    Context: not alcohol use and not increased activity    Ineffective treatments:  None tried  Associated symptoms: no abdominal pain, no anorexia, no arthralgias, no chest pain, no cough, no diarrhea, no drooling, no fever, no foul-smelling urine, no headaches, no shortness of breath and no vision change    Risk factors: recent stressors    Risk factors: no anemia, no neurologic disease and no new medications        Prior to Admission Medications   Prescriptions Last Dose Informant Patient Reported? Taking? HYDROmorphone (DILAUDID) 8 MG tablet Past Week at Unknown time Self No Yes   Si tab PO every 6 hours as needed  Max use of 4 tablets in 24 hours  Misc   Devices (QUAD CANE) MISC 3/10/2020 at Unknown time Self No Yes   Sig: by Does not apply route daily   OLANZapine (ZyPREXA) 10 mg tablet 3/9/2020 at Unknown time  No Yes   Sig: Take 1 tablet (10 mg total) by mouth daily at bedtime   albuterol (PROVENTIL HFA,VENTOLIN HFA) 90 mcg/act inhaler More than a month at Unknown time Self No No   Sig: TAKE 2 PUFFS BY MOUTH EVERY 6 HOURS AS NEEDED FOR WHEEZING   diclofenac (VOLTAREN) 75 mg EC tablet 3/9/2020 at Unknown time Self No Yes   Sig: Take 1 tablet (75 mg total) by mouth daily as needed (pain)   furosemide (LASIX) 20 mg tablet More than a month at Unknown time Self No No   Sig: Take 1 tablet (20 mg total) by mouth daily as needed (swelling)   gabapentin (NEURONTIN) 600 MG tablet 3/10/2020 at Unknown time Self No Yes   Sig: Take 1 tablet (600 mg total) by mouth 4 (four) times a day   lactulose 20 g/30 mL 3/9/2020 at Unknown time Self No Yes   SimL PO daily    May take an extra dose up to 3x per day as needed for constipation   pantoprazole (PROTONIX) 40 mg tablet 3/9/2020 at Unknown time Self No Yes   Sig: TAKE 1 TABLET BY MOUTH EVERY DAY   promethazine (PHENERGAN) 25 mg tablet 3/9/2020 at Unknown time  No Yes   Sig: TAKE 1 TABLET (25 MG TOTAL) BY MOUTH EVERY 6 (SIX) HOURS AS NEEDED FOR NAUSEA OR VOMITING   senna (SENOKOT) 8 6 mg Past Week at Unknown time Self Yes Yes   Sig: Take 2 tablets by mouth 2 (two) times a day as needed     zolpidem (AMBIEN) 10 mg tablet 3/9/2020 at Unknown time Self No Yes   Sig: TAKE 1 TABLET BY MOUTH DAILY AT BEDTIME AS NEEDED FOR SLEEP      Facility-Administered Medications: None       Past Medical History:   Diagnosis Date    H/O bilateral mastectomy 2/15/2016    Hypertension 2/15/2016    Lymphedema 2/15/2016    Malignant neoplasm of right breast ([address] ) 2/15/2016    Metastatic breast cancer ([address] )        Past Surgical History:   Procedure Laterality Date    ENDOBRONCHIAL ULTRASOUND (EBUS) N/A 2016    Procedure: EBUS;  Surgeon: Kaila Zafar MD;  [facility];  Service:     MASTECTOMY Bilateral     MASTECTOMY Bilateral     right arm edema    OOPHORECTOMY      TN BRONCHOSCOPY NEEDLE BX TRACHEA MAIN STEM&/BRON N/A 2016    Procedure: [facility];  Surgeon: Kaila Zafar MD;  [facility];  Service: Thoracic    TN INSJ TUNNELED CTR VAD W/SUBQ PORT AGE 5 YR/> N/A 7/24/2018    Procedure: INSERTION VENOUS PORT ( PORT-A-CATH) IR;  Surgeon: Ryland Kramer DO;  [facility];  Service: Interventional Radiology    NM STEREOTACTIC RADIOSURGERY, CRANIAL,SIMPLE,EA ADD  5/3/2017         NM STEREOTACTIC RADIOSURGERY, CRANIAL,SIMPLE,EA ADD  5/3/2017         NM STEREOTACTIC RADIOSURGERY, CRANIAL,SIMPLE,SINGLE  5/3/2017            Family History   Problem Relation Age of Onset    Cancer Sister     Prostate cancer Brother      I have reviewed and agree with the history as documented  E-Cigarette/Vaping     E-Cigarette/Vaping Substances     Social History     Tobacco Use    Smoking status: Current Every Day Smoker     Packs/day: 0 50     Years: 40 00     Pack years: 20 00     Types: Cigarettes     Start date: 1    Smokeless tobacco: Never Used   Substance Use Topics    Alcohol use: Yes     Frequency: 2-4 times a month     Drinks per session: 3 or 4     Binge frequency: Less than monthly     Comment: social    Drug use: Yes     Types: Marijuana       Review of Systems   Constitutional: Positive for activity change and fatigue  Negative for fever  HENT: Negative for congestion, drooling and ear pain  Eyes: Negative for discharge and redness  Respiratory: Negative for apnea, cough, shortness of breath and wheezing  Cardiovascular: Negative for chest pain  Gastrointestinal: Negative for abdominal pain, anorexia and diarrhea  Endocrine: Negative for cold intolerance and polydipsia  Genitourinary: Negative for difficulty urinating and hematuria  Musculoskeletal: Positive for gait problem  Negative for arthralgias and back pain  Skin: Negative for color change and rash  Allergic/Immunologic: Negative for environmental allergies and immunocompromised state  Neurological: Positive for weakness  Negative for numbness and headaches  Hematological: Negative for adenopathy  Does not bruise/bleed easily  Psychiatric/Behavioral: Negative for agitation and behavioral problems  Physical Exam  Physical Exam   Constitutional: She is oriented to person, place, and time  Vital signs are normal  She appears well-developed and well-nourished  Non-toxic appearance  HENT:   Head: Normocephalic and atraumatic  Right Ear: Tympanic membrane and external ear normal    Left Ear: Tympanic membrane and external ear normal    Nose: Nose normal  No rhinorrhea, sinus tenderness or nasal deformity  Mouth/Throat: Uvula is midline and oropharynx is clear and moist  Mucous membranes are dry  Normal dentition  Eyes: Pupils are equal, round, and reactive to light  Conjunctivae, EOM and lids are normal  Right eye exhibits no discharge  Left eye exhibits no discharge  Neck: Trachea normal and normal range of motion  Neck supple  No JVD present  Carotid bruit is not present  Cardiovascular: Normal rate, regular rhythm, intact distal pulses and normal pulses  No extrasystoles are present  PMI is not displaced  Pulmonary/Chest: Effort normal and breath sounds normal  No accessory muscle usage  No respiratory distress  She has no wheezes  She has no rhonchi  She has no rales  Abdominal: Soft  Normal appearance and bowel sounds are normal  She exhibits no mass  There is no tenderness  There is no rigidity, no rebound and no guarding  Musculoskeletal:        Right shoulder: She exhibits normal range of motion, no bony tenderness, no swelling and no deformity  Cervical back: Normal  She exhibits normal range of motion, no tenderness, no bony tenderness and no deformity  Lymphadenopathy:     She has no cervical adenopathy  She has no axillary adenopathy  Neurological: She is alert and oriented to person, place, and time  She has normal strength and normal reflexes  No cranial nerve deficit or sensory deficit  GCS eye subscore is 4  GCS verbal subscore is 5  GCS motor subscore is 6     Skin: Skin is warm and dry  No rash noted  Psychiatric: She has a normal mood and affect  Her speech is normal and behavior is normal    Nursing note and vitals reviewed        Vital Signs  ED Triage Vitals   Temperature Pulse Respirations Blood Pressure SpO2   03/10/20 1950 03/10/20 1949 03/10/20 1949 03/10/20 1949 03/10/20 1949   98 9 °F (37 2 °C) 103 18 139/71 93 %      Temp Source Heart Rate Source Patient Position - Orthostatic VS BP Location FiO2 (%)   03/10/20 1950 03/10/20 1949 03/10/20 1949 03/10/20 1949 --   Oral Monitor Sitting Right arm       Pain Score       03/10/20 1949       Worst Possible Pain           Vitals:    03/10/20 1949 03/10/20 2145   BP: 139/71 125/70   Pulse: 103 93   Patient Position - Orthostatic VS: Sitting          Visual Acuity      ED Medications  Medications   sodium chloride 0 9 % bolus 1,000 mL (1,000 mL Intravenous New Bag 3/10/20 2108)   potassium chloride (K-DUR,KLOR-CON) CR tablet 40 mEq (has no administration in time range)       Diagnostic Studies  Results Reviewed     Procedure Component Value Units Date/Time    Magnesium [[id number]]  (Normal) Collected:  03/10/20 2006    Lab Status:  Final result Specimen:  Blood from Arm, Left Updated:  03/10/20 2117     Magnesium 1 8 mg/dL     UA w Reflex to Microscopic w Reflex to Culture [[id number]]     Lab Status:  No result Specimen:  Urine     Troponin I [[id number]]  (Normal) Collected:  03/10/20 2006    Lab Status:  Final result Specimen:  Blood from Arm, Left Updated:  03/10/20 2031     Troponin I 0 03 ng/mL     Comprehensive metabolic panel [[id number]]  (Abnormal) Collected:  03/10/20 2006    Lab Status:  Final result Specimen:  Blood from Arm, Left Updated:  03/10/20 2028     Sodium 139 mmol/L      Potassium 3 0 mmol/L      Chloride 102 mmol/L      CO2 24 mmol/L      ANION GAP 13 mmol/L      BUN 5 mg/dL      Creatinine 0 85 mg/dL      Glucose 111 mg/dL      Calcium 8 6 mg/dL       U/L      ALT 54 U/L      Alkaline Phosphatase 170 U/L Total Protein 7 4 g/dL      Albumin 2 6 g/dL      Total Bilirubin 0 41 mg/dL      eGFR 76 ml/min/1 73sq m     Narrative:       National Kid[employer] Disease Foundation guidelines for Chronic Kidney Disease (CKD):     Stage 1 with normal or high GFR (GFR > 90 mL/min/1 73 square meters)    Stage 2 Mild CKD (GFR = 60-89 mL/min/1 73 square meters)    Stage 3A Moderate CKD (GFR = 45-59 mL/min/1 73 square meters)    Stage 3B Moderate CKD (GFR = 30-44 mL/min/1 73 square meters)    Stage 4 Severe CKD (GFR = 15-29 mL/min/1 73 square meters)    Stage 5 End Stage CKD (GFR <15 mL/min/1 73 square meters)  Note: GFR calculation is accurate only with a steady state creatinine    CBC and differential [[id number]]  (Abnormal) Collected:  03/10/20 2006    Lab Status:  Final result Specimen:  Blood from Arm, Left Updated:  03/10/20 2017     WBC 9 30 Thousand/uL      RBC 3 88 Million/uL      Hemoglobin 11 0 g/dL      Hematocrit 34 6 %      MCV 89 fL      MCH 28 4 pg      MCHC 31 8 g/dL      RDW 17 7 %      MPV 8 9 fL      Platelets 056 Thousands/uL      nRBC 0 /100 WBCs      Neutrophils Relative 73 %      Immat GRANS % 1 %      Lymphocytes Relative 11 %      Monocytes Relative 15 %      Eosinophils Relative 0 %      Basophils Relative 0 %      Neutrophils Absolute 6 83 Thousands/µL      Immature Grans Absolute 0 05 Thousand/uL      Lymphocytes Absolute 0 99 Thousands/µL      Monocytes Absolute 1 35 Thousand/µL      Eosinophils Absolute 0 04 Thousand/µL      Basophils Absolute 0 04 Thousands/µL                  XR chest 2 views    (Results Pending)              Procedures  Procedures         ED Course                               MDM  Number of Diagnoses or Management Options  Ambulatory dysfunction: new and requires workup  Weakness: new and requires workup     Amount and/or Complexity of Data Reviewed  Clinical lab tests: ordered and reviewed  Tests in the radiology section of CPT®: ordered and reviewed  Tests in the medicine section of CPT®: ordered and reviewed  Independent visualization of images, tracings, or specimens: yes    Risk of Complications, Morbidity, and/or Mortality  General comments: Patient unable to even transfer to a bedside commode while here in the emergency department patient lives at home alone the time would be unwise to send her home even with normal blood work  I think she needs to see seen by PT and OT to evaluate what level of care she needs    Patient Progress  Patient progress: stable        Disposition  Final diagnoses:   Weakness   Ambulatory dysfunction     Time reflects when diagnosis was documented in both MDM as applicable and the Disposition within this note     Time User Action Codes Description Comment    3/10/2020  9:55 PM Elly ORELLANA Add [R53 1] Weakness     3/10/2020  9:55 PM Alisha Soler Add [R26 2] Ambulatory dysfunction       ED Disposition     ED Disposition Condition Date/Time Comment    Admit Stable Tue Mar 10, 2020  9:54 PM Case was discussed with dr Jamison Srinivasan and the patient's admission status was agreed to be Admission Status: inpatient status to the service of Dr Jamison Srinivasan   Follow-up Information    None         Patient's Medications   Discharge Prescriptions    No medications on file     No discharge procedures on file      PDMP Review       Value Time User    PDMP Reviewed  Yes 2/12/2020 12:46 PM Clare Hernandez MD          ED Provider  Electronically Signed by           Farzad Rizzo DO  03/10/20 4545

## 2020-03-11 NOTE — ASSESSMENT & PLAN NOTE
Last seen in office on February 12, 2020  Patient agreeable to inpatient consult   Attempted to discuss [address] - patient wishes to "think about it"  For now to remain Full Code

## 2020-03-11 NOTE — PALLIATIVE CARE CONFERENCE
Azucena Malik is known to [facility] practice from outpatient and home visits  Please see [facility] consultation for full details  Azucena Malik lives with her  and sister in [place]  She recently started using a RW secondary to balance issues, neuropathy and recently had 2 falls at home  Their daughter, Diana Pearce lives a few houses away and is supportive  Pt is home alone about 12 hours/day as spouse and sister work  Spouse appeared concerned for pt's functional decline and asked if pt will be able to recover  Discussed STR as possible way to build up strength  He inquired about pt's weight gain, and it may be causing "stress on her legs "  [place] Dance provided education about this  Discussed STR options and [facility] would be closest to pt's family  Explained she has choices to other facilities as well  Updated RN Case Manager of same  [facility] team will continue to follow and offer emotional support and symptom management

## 2020-03-11 NOTE — ASSESSMENT & PLAN NOTE
· Ongoing and worsening ambulatory dysfunction and weakness  Reports muscle cramps to bilateral thighs  · Presents to emergency department with worsening weakness  · Currently lives alone at home  She reports the inability to care for ADLs    · PT OT consulted  · Potassium replaced

## 2020-03-11 NOTE — CONSULTS
Consultation - Palliative and Supportive Care   Fer Puga 64 y o  female [id number]    Assessment:  Metastatic bilateral stage IVmalignant neoplasm of breast  Metastasis to brain  Hypokalemia  Ambulatory dysfunction  Cancer related pain  Chemo induced nausea and vomititng  Difficulty sleeping  Decreased appetite  Weakness  LE pain  Goals of care counseling    Goals:  Level 1 code status  · Agreeable to STR  Would like to try [employer]  CM will investigate bed  · Disease focused care  Will continue discussions regarding [address] as patient's clinical presentation evolves  · Will follow Palliative Medicine on an outpatient basis after discharge for continued symptom management  Plan:  Symptom management:  Cancer related pain   · Increase Gabapentin to 800mg PO QID  · Hydromorphone 8mg PO q6h PRN    Chemo induced nausea and vomiting  · Olanzapine 10mg once daily @hs    Difficulty sleeping  · Zolpidem 10mg PO once daily @hs     Lower extremity weakness  · PT/OT consult placed  · STR    Onychauxis  · Podiatry consult placed    Social support:   Time spent providing supportive listening   Patient is finding comfort in being supported by her family   Looking forward to spring so she can ride her husbands Jane Bailey with him  I have reviewed the patient's controlled substance dispensing history in the Prescription Drug Monitoring Program in compliance with the [facility] regulations before prescribing any controlled substances  Decisional apparatus:  Patient is competent on my exam today  If competence is lost, patient's substitute decision maker would default to spouse Selena Mullins by [id number] [facility]  We appreciate the invitation to be involved in this patient's care  We will continue to follow  Please do not hesitate to reach our on call provider through our clinic answering service at  should you have acute symptom control concerns      IDENTIFICATION:  Inpatient consult to Palliative Care  Consult performed by: FAUZIA Gaytan  Consult ordered by: Meche Rojo, [facility]       Physician Requesting Consult: Paradise Kitchen MD   Patient seen in coordination with Palliative Medicine LSW, Cassie Saldivar  Reason for Consult / Principal Problem: GOC counseling and SM secondary to metastatic bilateral stage IVmalignant neoplasm of breast with brain mets  History of Present Illness:  Mervat Spencer is a 64 y o  female who presents with a palliative diagnosis of metastatic bilateral stage IVmalignant neoplasm of breast with brain mets  She is followed by Dr Katiuska Mitchell in medical oncology and Dr Johnnie Hurtado in radiation oncology   She had a bilateral mastectomy and has undergone multiple chemotherapy regimens  Toya East has chronic lymphedema in her right arm   She has brain mets for which she recived SRS in May 2017   In March 2019 she completed whole brain radiation for multiple recurrent brain metastasis   Her current treatment regime is Cadcyla with premeds of Dexamethasone, Emend, Aloxi  She was [facility]20 secondary to acute on chronic generalized weakness and lower extremity pain  Palliative Medicine has been consulted to assist with symptom management and goals of care counseling during this admission  Today patient is seen with her  Diana Everett present  She is awake, alert and pleasant  She reports pins and needles in both her feet and weakness in her legs  She is home independently for 12 hours per day  She had 2 unwittnessed falls in the past few days  She was unable to get herself up off the floor and had to crawl to the couch  She denies injury  She uses gabapentin 600mg TID and PO dilaudid  She tolerates the dilaudid well  She reports weak, shaky legs  Has never been to STR but has some PT at home through [facility]  Spouse is worried about her weight  Feels this is contributing to her pain and weakness    Discussed healthy eating however explained why we would not want her actively dieting at home  Discuss the option of STR with patient and her  and they both agree that is necessary for some overall strengthen  She would like CM to investigate [facility] STR  CM aware and will investigate a bed      ROS  All other systems are negative    Past Medical History:   Diagnosis Date    H/O bilateral mastectomy 2/15/2016    Hypertension 2/15/2016    Lymphedema 2/15/2016    Malignant neoplasm of right breast ([address] ) 2/15/2016    [facility]astatic [facility])      Past Surgical History:   Procedure Laterality Date    ENDOBRONCHIAL ULTRASOUND (EBUS) N/A 2/16/2016    Procedure: EBUS;  Surgeon: Kaila Zafar MD;  [facility];  Service:     MASTECTOMY Bilateral     MASTECTOMY Bilateral     right arm edema    OOPHORECTOMY      MI BRONCHOSCOPY NEEDLE BX TRACHEA MAIN STEM&/BRON N/A 2/16/2016    Procedure: Stone Robertson;  Surgeon: Kaila Zafar MD;  [facility];  Service: Thoracic    MI INSJ TUNNELED CTR VAD W/SUBQ PORT AGE 5 YR/> N/A 7/24/2018    Procedure: INSERTION VENOUS PORT ( PORT-A-CATH) IR;  Surgeon: Chas Jackson DO;  [facility];  Service: Interventional Radiology    MI STEREOTACTIC RADIOSURGERY, CRANIAL,SIMPLE,EA ADD  5/3/2017         MI STEREOTACTIC RADIOSURGERY, CRANIAL,SIMPLE,EA ADD  5/3/2017         MI STEREOTACTIC RADIOSURGERY, CRANIAL,SIMPLE,SINGLE  5/3/2017          Social History     Socioeconomic History    Marital status: /Civil Union     Spouse name: Not on file    Number of children: 1    Years of education: Not on file    Highest education level: Not on file   Occupational History    Not on file   Social Needs    Financial resource strain: Not on file    Food insecurity:     Worry: Not on file     Inability: Not on file    Transportation needs:     Medical: Not on file     Non-medical: Not on file   Tobacco Use    Smoking status: Current Every Day Smoker     Packs/day: 0 50     Years: 40 00     Pack years: 20 00     Types: Cigarettes     Start date: 1    Smokeless tobacco: Never Used   Substance and Sexual Activity    Alcohol use: Yes     Frequency: 2-4 times a month     Drinks per session: 3 or 4     Binge frequency: Less than monthly     Comment: social    Drug use: Yes     Types: Marijuana    Sexual activity: Not on file   Lifestyle    Physical activity:     Days per week: Not on file     Minutes per session: Not on file    Stress: Not on file   Relationships    Social connections:     Talks on phone: Not on file     Gets together: Not on file     Attends Hindu service: Not on file     Active member of club or organization: Not on file     Attends meetings of clubs or organizations: Not on file     Relationship status: Not on file    Intimate partner violence:     Fear of current or ex partner: Not on file     Emotionally abused: Not on file     Physically abused: Not on file     Forced sexual activity: Not on file   Other Topics Concern    Not on file   Social History Narrative    Not on file     Family History   Problem Relation Age of Onset    Cancer Sister     Prostate cancer Brother      Medications:  all current active meds have been reviewed and current meds:   Current Facility-Administered Medications   Medication Dose Route Frequency    albuterol (PROVENTIL HFA,VENTOLIN HFA) inhaler 2 puff  2 puff Inhalation Q6H PRN    enoxaparin (LOVENOX) subcutaneous injection 40 mg  40 mg Subcutaneous Daily    furosemide (LASIX) tablet 20 mg  20 mg Oral Daily PRN    gabapentin (NEURONTIN) capsule 600 mg  600 mg Oral 4x Daily    HYDROmorphone (DILAUDID) tablet 8 mg  8 mg Oral Q6H PRN    ibuprofen (MOTRIN) tablet 600 mg  600 mg Oral Q12H PRN    lactulose 20 g/30 mL oral solution 20 g  20 g Oral BID PRN    OLANZapine (ZyPREXA) tablet 10 mg  10 mg Oral HS    pantoprazole (PROTONIX) EC tablet 40 mg  40 mg Oral Early Morning    potassium chloride 20 mEq IVPB (premix)  20 mEq Intravenous Once    promethazine (PHENERGAN) tablet 25 mg  25 mg Oral Q6H PRN    senna (SENOKOT) tablet 17 2 mg  2 tablet Oral HS PRN    zolpidem (AMBIEN) tablet 10 mg  10 mg Oral HS PRN       No Known Allergies    Objective:  /61 (BP Location: Left arm)   Pulse 95   Temp 98 3 °F (36 8 °C) (Oral)   Resp 18   Wt 78 kg (171 lb 15 3 oz)   LMP  (LMP Unknown)   SpO2 95%   BMI 28 62 kg/m²   Physical Exam:  Constitutional: Appears chronically ill  In no acute distress  Head: Normocephalic and atraumatic  Eyes: EOM are normal  No ocular discharge  No scleral icterus  Neck: no visible adenopathy or masses  Respiratory: Effort normal  No stridor  No respiratory distress  Gastrointestinal: No abdominal distension  Musculoskeletal: No edema  Neurological: Alert, oriented and appropriately conversant  Skin: Dry, no diaphoresis  Psychiatric: Flat affect  Behavior, judgement and thought content appear normal      Lab Results:   I have personally reviewed pertinent labs  , CBC:   Lab Results   Component Value Date    WBC 5 58 03/11/2020    HGB 10 1 (L) 03/11/2020    HCT 33 4 (L) 03/11/2020    MCV 93 03/11/2020     (H) 03/11/2020    MCH 28 1 03/11/2020    MCHC 30 2 (L) 03/11/2020    RDW 17 9 (H) 03/11/2020    MPV 9 6 03/11/2020    NRBC 0 03/11/2020   , CMP:   Lab Results   Component Value Date    SODIUM 143 03/11/2020    K 3 1 (L) 03/11/2020     (H) 03/11/2020    CO2 23 03/11/2020    BUN 3 (L) 03/11/2020    CREATININE 0 62 03/11/2020    CALCIUM 8 2 (L) 03/11/2020     (H) 03/10/2020    ALT 54 03/10/2020    ALKPHOS 170 (H) 03/10/2020    EGFR 100 03/11/2020     Counseling / Coordination of Care  Total floor / unit time spent today 70 minutes  Greater than 50% of total time was spent with the patient and / or family counseling and / or coordination of care  A description of the counseling / coordination of care: Introduced role of Palliative Medicine  Reviewed expected disease trajectory and STR  Spent time supportive listening regarding frustrations of diagnosis and treatment options available at this time      Russel De Guzman, [facility]

## 2020-03-11 NOTE — ASSESSMENT & PLAN NOTE
Patient has stage IV breast cancer with Mets to lungs, bone, brain  Initially diagnosed in 2010  S/p bilateral mastectomy and multiple rounds of chemotherapy/radiation  Current regimen: Cadcyla with premeds of Dexamethasone, Emend, Aloxi   Next due on March 19th  Receives every 3-4 weeks  Follows with Dr Zack Hernandez and Dr Angel Fontana PT OT    Given bilateral lower extremity pain and weakness will need MRI spine as well

## 2020-03-11 NOTE — PROGRESS NOTES
Progress Note - Deborah Gallego 1960, 64 y o  female MRN: [id number]    Unit/Bed#: S -22 Encounter: [id number]    Primary Care Provider: Dandy Rapp MD   Date and time admitted to hospital: 3/10/2020  7:43 PM        Palliative care patient  [address] seen in office on February 12, 2020  Patient agreeable to inpatient consult   Attempted to discuss [address] - patient wishes to "think about it"  For now to remain Full Code  * Bilateral malignant neoplasm of breast in female, estrogen receptor positive ([facility] [address] )  Assessment & Plan  Patient has stage IV breast cancer with Mets to lungs, bone, brain  Initially diagnosed in 2010  S/p bilateral mastectomy and multiple rounds of chemotherapy/radiation  Current regimen: Cadcyla with premeds of Dexamethasone, Emend, Aloxi   Next due on March 19th  Receives every 3-4 weeks  Follows with Dr Hermilo Baxter and Dr Nazanin Bone PT OT    Given bilateral lower extremity pain and weakness will need MRI spine as well  VTE Pharmacologic Prophylaxis:   Pharmacologic: Heparin  Mechanical VTE Prophylaxis in Place: Yes    Patient Centered Rounds: I have performed bedside rounds with nursing staff today  Discussions with Specialists or Other Care Team Provider: Discussed with patient and with nursing  Education and Discussions with Family / Patient: discussed with patient  Unable to reach family  Tried spouse  Time Spent for Care: 30 minutes  More than 50% of total time spent on counseling and coordination of care as described above  Current Length of Stay: 1 day(s)    Current Patient Status: Inpatient   Certification Statement: The patient will continue to require additional inpatient hospital stay due to pain control and work up of weakness  Discharge Plan: Not medically stable for DC  Code Status: Level 1 - Full Code      Subjective:   Patient seen and examined    Feeling "the same"  Denies bowel or bladder dysfunction  Objective:     Vitals:   Temp (24hrs), Av 4 °F (36 9 °C), Min:98 2 °F (36 8 °C), Max:98 9 °F (37 2 °C)    Temp:  [98 2 °F (36 8 °C)-98 9 °F (37 2 °C)] 98 3 °F (36 8 °C)  HR:  [] 93  Resp:  [16-18] 18  BP: (102-139)/(55-71) 111/55  SpO2:  [92 %-95 %] 92 %  Body mass index is 28 62 kg/m²  Input and Output Summary (last 24 hours): Intake/Output Summary (Last 24 hours) at 3/11/2020 1644  Last data filed at 3/11/2020 1459  Gross per 24 hour   Intake 1300 ml   Output 1525 ml   Net -225 ml       Physical Exam:     Physical Exam   Constitutional: She is oriented to person, place, and time  She appears well-developed  No distress  HENT:   Head: Normocephalic  Mouth/Throat: No oropharyngeal exudate  Eyes: Pupils are equal, round, and reactive to light  Right eye exhibits no discharge  No scleral icterus  Neck: No JVD present  No tracheal deviation present  No thyromegaly present  Cardiovascular: Normal rate  Exam reveals no gallop and no friction rub  No murmur heard  Pulmonary/Chest: Effort normal  No stridor  No respiratory distress  She has no wheezes  She exhibits no tenderness  Abdominal: She exhibits no distension and no mass  There is no tenderness  There is no rebound  Musculoskeletal: She exhibits no edema, tenderness or deformity  Lymphadenopathy:     She has no cervical adenopathy  Neurological: She is alert and oriented to person, place, and time  She displays abnormal reflex  No cranial nerve deficit or sensory deficit  She exhibits normal muscle tone  Coordination normal    Weakness both LE - 4/5  Skin: Capillary refill takes less than 2 seconds  No rash noted  She is not diaphoretic  No erythema  There is pallor  Psychiatric: She has a normal mood and affect           Additional Data:     Labs:    Results from last 7 days   Lab Units 20  0544   WBC Thousand/uL 5 58   HEMOGLOBIN g/dL 10 1*   HEMATOCRIT % 33 4*   PLATELETS Thousands/uL 409* NEUTROS PCT % 63   LYMPHS PCT % 18   MONOS PCT % 16*   EOS PCT % 1     Results from last 7 days   Lab Units 03/11/20  0544 03/10/20  2006   SODIUM mmol/L 143 139   POTASSIUM mmol/L 3 1* 3 0*   CHLORIDE mmol/L 109* 102   CO2 mmol/L 23 24   BUN mg/dL 3* 5   CREATININE mg/dL 0 62 0 85   ANION GAP mmol/L 11 13   CALCIUM mg/dL 8 2* 8 6   ALBUMIN g/dL  --  2 6*   TOTAL BILIRUBIN mg/dL  --  0 41   ALK PHOS U/L  --  170*   ALT U/L  --  54   AST U/L  --  141*   GLUCOSE RANDOM mg/dL 84 111                           * I Have Reviewed All Lab Data Listed Above  * Additional Pertinent Lab Tests Reviewed: All Labs For Current Hospital Admission Reviewed    Imaging:    Imaging Reports Reviewed Today Include:   CXR -   "Borderline cardiomegaly   Slight vascular congestion  The study was marked in EPIC for significant notification   "  Imaging Personally Reviewed by Myself Includes:  As above  Recent Cultures (last 7 days):           Last 24 Hours Medication List:     Current Facility-Administered Medications:  albuterol 2 puff Inhalation Q6H PRN Ozella Dace, CRNP   enoxaparin 40 mg Subcutaneous Daily Ozella Dace, CRNP   furosemide 20 mg Oral Daily PRN Ozella Dace, CRNP   gabapentin 800 mg Oral 4x Daily Radha Regulus, CRNP   HYDROmorphone 8 mg Oral Q6H PRN Ozella Dace, CRNP   ibuprofen 600 mg Oral Q12H PRN Ozella Dace, CRNP   lactulose 20 g Oral BID PRN Ozella Dace, CRNP   OLANZapine 10 mg Oral HS Ozella Dace, CRNP   pantoprazole 40 mg Oral Early Morning Ozella Dace, CRNP   promethazine 25 mg Oral Q6H PRN Ozella Dace, CRNP   senna 2 tablet Oral HS PRN Ozella Dace, CRNP   zolpidem 10 mg Oral HS PRN Ozella Dace, CRNP        Today, Patient Was Seen By: Lois Ramos MD    ** Please Note: Dictation voice to text software may have been used in the creation of this document   **

## 2020-03-11 NOTE — H&P
H&P- [facility] 1960, 64 y o  female MRN: [id number]    Unit/Bed#: S -55 Encounter: [id number]    Primary Care Provider: Ronnie Estrada MD   Date and time admitted to hospital: 3/10/2020  7:43 PM        * Bilateral malignant neoplasm of breast in female, estrogen receptor positive ([facility] [address] )  Assessment & Plan  · Patient has stage IV breast cancer with Mets to lungs, bone, brain  Initially diagnosed in 2010  S/p bilateral mastectomy and multiple rounds of chemotherapy/radiation  · Current regimen: Cadcyla with premeds of Dexamethasone, Emend, Aloxi   · Next due on March 19th  · Receives every 3-4 weeks  · Follows with Dr Faheem Aguero and Dr Tanesha Dubon  · Consult palliative care  · Consult PT OT    Hypokalemia  Assessment & Plan  · Potassium 3 on arrival to emergency department  Does complain of muscle cramping to bilateral thighs  · Receive 40 mEq use in ED  · Received one liter in ED   · Recheck in a m  Ambulatory dysfunction  Assessment & Plan  · Ongoing and worsening ambulatory dysfunction and weakness  Reports muscle cramps to bilateral thighs  · Presents to emergency department with worsening weakness  · Currently lives alone at home  She reports the inability to care for ADLs  · PT OT consulted  · Potassium replaced    Palliative care patient  Assessment & Plan  · Last seen in office on February 12, 2020  · Patient agreeable to inpatient consult     VTE Prophylaxis: Enoxaparin (Lovenox)  / reason for no mechanical VTE prophylaxis low risk   Code Status: Level 1  POLST: POLST is not applicable to this patient  Discussion with family: patient     Anticipated Length of Stay:  Patient will be admitted on an Inpatient basis with an anticipated length of stay of  Greater than  2 midnights  Justification for Hospital Stay: hypokalemia with resulting muscle cramping, PT OT consult pending for dc planning     Total Time for Visit, including Counseling / Coordination of Care: 45 minutes    Greater than 50% of this total time spent on direct patient counseling and coordination of care  Chief Complaint:   Worsening weakness     History of Present Illness:    Nat Clark is a 64 y o  female who presents with acute on chronic generalized weakness and lower extremity pain  Patient has stage IV breast cancer with Mets to lung, bones, brain  She reports bilateral thigh cramping, and the inability to put weight on her legs  No recent illnesses  No fevers, chills  No recent injuries  No joint swelling or rashes  Denies chest pain, shortness breath, cough  She lives home with her , but reports he was unable to help her ambulate       Past medical history significant for stage IV breast cancer, lymphadenopathy of right upper extremity  On arrival to emergency department, patient is hemodynamically stable  She is in no acute distress  With the exception of hypokalemia, labs appear relatively stable  Patient will be admitted as an inpatient  A consult PT OT is pending  Patient is requesting a consult to palliative care  Review of Systems:    Review of Systems   Constitutional: Positive for activity change and appetite change  Negative for chills, diaphoresis, fatigue, fever and unexpected weight change  HENT: Negative  Eyes: Negative  Respiratory: Negative  Cardiovascular: Negative  Gastrointestinal: Negative  Genitourinary: Negative  Musculoskeletal: Positive for gait problem and myalgias  Neurological: Positive for weakness  Hematological: Negative  Psychiatric/Behavioral: Negative           Past Medical and Surgical History:     Past Medical History:   Diagnosis Date    H/O bilateral mastectomy 2/15/2016    Hypertension 2/15/2016    Lymphedema 2/15/2016    Malignant neoplasm of right breast ([facility] [address] ) 2/15/2016    Metastatic breast cancer [facility])        Past Surgical History:   Procedure Laterality Date    ENDOBRONCHIAL ULTRASOUND (EBUS) N/A 2/16/2016    Procedure: EBUS;  Surgeon: Melodie Ortiz MD;  [facility]: [facility];  Service:     MASTECTOMY Bilateral     MASTECTOMY Bilateral     right arm edema    OOPHORECTOMY      MO BRONCHOSCOPY NEEDLE BX TRACHEA MAIN STEM&/BRON N/A 2/16/2016    Procedure: Germán Hinton;  Surgeon: Melodie Ortiz MD;  [facility]: [facility];  Service: Thoracic    MO INSJ TUNNELED CTR VAD W/SUBQ PORT AGE 5 YR/> N/A 7/24/2018    Procedure: INSERTION VENOUS PORT ( PORT-A-CATH) IR;  Surgeon: Loreto Rashid DO;  [facility];  Service: Interventional Radiology    MO STEREOTACTIC RADIOSURGERY, CRANIAL,SIMPLE,EA ADD  5/3/2017         MO STEREOTACTIC RADIOSURGERY, CRANIAL,SIMPLE,EA ADD  5/3/2017         MO STEREOTACTIC RADIOSURGERY, CRANIAL,SIMPLE,SINGLE  5/3/2017            Meds/Allergies:    Prior to Admission medications    Medication Sig Start Date End Date Taking? Authorizing Provider   diclofenac (VOLTAREN) 75 mg EC tablet Take 1 tablet (75 mg total) by mouth daily as needed (pain) 2/12/20  Yes Porfirio Abbasi MD   gabapentin (NEURONTIN) 600 MG tablet Take 1 tablet (600 mg total) by mouth 4 (four) times a day 12/31/19  Yes Porfirio Abbasi MD   HYDROmorphone (DILAUDID) 8 MG tablet 1 tab PO every 6 hours as needed  Max use of 4 tablets in 24 hours  1/22/20  Yes Porfirio Abbasi MD   lactulose 20 g/30 mL 30mL PO daily  May take an extra dose up to 3x per day as needed for constipation 10/23/19  Yes Porfirio Abbasi MD   Misc   Devices (QUAD CANE) MISC by Does not apply route daily 12/12/19  Yes Lexie Da Silva DO   OLANZapine (ZyPREXA) 10 mg tablet Take 1 tablet (10 mg total) by mouth daily at bedtime 2/26/20  Yes Porfirio Abbasi MD   pantoprazole (PROTONIX) 40 mg tablet TAKE 1 TABLET BY MOUTH EVERY DAY 2/12/20  Yes Porfirio Abbasi MD   promethazine (PHENERGAN) 25 mg tablet TAKE 1 TABLET (25 MG TOTAL) BY MOUTH EVERY 6 (SIX) HOURS AS NEEDED FOR NAUSEA OR VOMITING 2/26/20  Yes Porfirio Abbasi MD   senna (SENOKOT) 8 6 mg Take 2 tablets by mouth 2 (two) times a day as needed   8/3/17  Yes Historical Provider, MD   zolpidem (AMBIEN) 10 mg tablet TAKE 1 TABLET BY MOUTH DAILY AT BEDTIME AS NEEDED FOR SLEEP 2/12/20  Yes Bean August MD   albuterol (PROVENTIL HFA,VENTOLIN HFA) 90 mcg/act inhaler TAKE 2 PUFFS BY MOUTH EVERY 6 HOURS AS NEEDED FOR WHEEZING 9/4/19   Alice Carmen PA-C   furosemide (LASIX) 20 mg tablet Take 1 tablet (20 mg total) by mouth daily as needed (swelling) 10/23/19   Bean August MD     I have reviewed home medications with patient personally  Allergies: No Known Allergies    Social History:     Marital Status: /Civil Union     Substance Use History:   Social History     Substance and Sexual Activity   Alcohol Use Yes    Frequency: 2-4 times a month    Drinks per session: 3 or 4    Binge frequency: Less than monthly    Comment: social     Social History     Tobacco Use   Smoking Status Current Every Day Smoker    Packs/day: 0 50    Years: 40 00    Pack years: 20 00    Types: Cigarettes    Start date: 1978   Smokeless Tobacco Never Used     Social History     Substance and Sexual Activity   Drug Use Yes    Types: Marijuana       Family History:    non-contributory    Physical Exam:     Vitals:   Blood Pressure: 102/60 (03/10/20 2254)  Pulse: 95 (03/10/20 2254)  Temperature: 98 2 °F (36 8 °C) (03/10/20 2254)  Temp Source: Oral (03/10/20 2254)  Respirations: 16 (03/10/20 2254)  Weight - Scale: 78 kg (171 lb 15 3 oz) (03/10/20 1949)  SpO2: 94 % (03/10/20 2254)    Physical Exam   Constitutional: She is oriented to person, place, and time  No distress  Chronically ill appearing female   HENT:   Head: Normocephalic  Eyes: Pupils are equal, round, and reactive to light  EOM are normal  No scleral icterus  Neck: Normal range of motion  Neck supple  Cardiovascular: Normal rate, regular rhythm, normal heart sounds and intact distal pulses  Exam reveals no friction rub     No murmur heard   Pulmonary/Chest: Effort normal and breath sounds normal  No stridor  No respiratory distress  She has no wheezes  She has no rales  Abdominal: Soft  Bowel sounds are normal  She exhibits no distension  There is no tenderness  Musculoskeletal: Normal range of motion  She exhibits edema (Right upper extremity-baseline) and tenderness ( bilateral thighs to palpation  Slightly worse than baseline)  Neurological: She is alert and oriented to person, place, and time  Skin: Skin is warm and dry  Capillary refill takes less than 2 seconds  Psychiatric: She has a normal mood and affect  Additional Data:     Lab Results: I have personally reviewed pertinent reports  Results from last 7 days   Lab Units 03/10/20  2006   WBC Thousand/uL 9 30   HEMOGLOBIN g/dL 11 0*   HEMATOCRIT % 34 6*   PLATELETS Thousands/uL 433*   NEUTROS PCT % 73   LYMPHS PCT % 11*   MONOS PCT % 15*   EOS PCT % 0     Results from last 7 days   Lab Units 03/10/20  2006   SODIUM mmol/L 139   POTASSIUM mmol/L 3 0*   CHLORIDE mmol/L 102   CO2 mmol/L 24   BUN mg/dL 5   CREATININE mg/dL 0 85   ANION GAP mmol/L 13   CALCIUM mg/dL 8 6   ALBUMIN g/dL 2 6*   TOTAL BILIRUBIN mg/dL 0 41   ALK PHOS U/L 170*   ALT U/L 54   AST U/L 141*   GLUCOSE RANDOM mg/dL 111                       Imaging: I have personally reviewed pertinent reports  XR chest 2 views    (Results Pending)       EKG, Pathology, and Other Studies Reviewed on Admission:   · EKG: NSR, HR 95    Allscripts / Epic Records Reviewed: Yes     ** Please Note: This note has been constructed using a voice recognition system   **

## 2020-03-11 NOTE — ASSESSMENT & PLAN NOTE
· Potassium 3 on arrival to emergency department  Does complain of muscle cramping to bilateral thighs  · Receive 40 mEq use in ED  · Received one liter in ED   · Recheck in a m

## 2020-03-12 LAB
ALBUMIN SERPL BCP-MCNC: 2.1 G/DL (ref 3.5–5)
ALP SERPL-CCNC: 141 U/L (ref 46–116)
ALT SERPL W P-5'-P-CCNC: 61 U/L (ref 12–78)
ANION GAP SERPL CALCULATED.3IONS-SCNC: 11 MMOL/L (ref 4–13)
AST SERPL W P-5'-P-CCNC: 152 U/L (ref 5–45)
BASOPHILS # BLD AUTO: 0.03 THOUSANDS/ΜL (ref 0–0.1)
BASOPHILS NFR BLD AUTO: 1 % (ref 0–1)
BILIRUB SERPL-MCNC: 0.32 MG/DL (ref 0.2–1)
BUN SERPL-MCNC: 2 MG/DL (ref 5–25)
CALCIUM SERPL-MCNC: 8.4 MG/DL (ref 8.3–10.1)
CHLORIDE SERPL-SCNC: 109 MMOL/L (ref 100–108)
CO2 SERPL-SCNC: 24 MMOL/L (ref 21–32)
CREAT SERPL-MCNC: 0.52 MG/DL (ref 0.6–1.3)
EOSINOPHIL # BLD AUTO: 0.1 THOUSAND/ΜL (ref 0–0.61)
EOSINOPHIL NFR BLD AUTO: 2 % (ref 0–6)
ERYTHROCYTE [DISTWIDTH] IN BLOOD BY AUTOMATED COUNT: 17.5 % (ref 11.6–15.1)
GFR SERPL CREATININE-BSD FRML MDRD: 106 ML/MIN/1.73SQ M
GLUCOSE SERPL-MCNC: 97 MG/DL (ref 65–140)
HCT VFR BLD AUTO: 32.9 % (ref 34.8–46.1)
HGB BLD-MCNC: 10.1 G/DL (ref 11.5–15.4)
IMM GRANULOCYTES # BLD AUTO: 0.01 THOUSAND/UL (ref 0–0.2)
IMM GRANULOCYTES NFR BLD AUTO: 0 % (ref 0–2)
LYMPHOCYTES # BLD AUTO: 0.9 THOUSANDS/ΜL (ref 0.6–4.47)
LYMPHOCYTES NFR BLD AUTO: 19 % (ref 14–44)
MCH RBC QN AUTO: 27.7 PG (ref 26.8–34.3)
MCHC RBC AUTO-ENTMCNC: 30.7 G/DL (ref 31.4–37.4)
MCV RBC AUTO: 90 FL (ref 82–98)
MONOCYTES # BLD AUTO: 0.8 THOUSAND/ΜL (ref 0.17–1.22)
MONOCYTES NFR BLD AUTO: 17 % (ref 4–12)
NEUTROPHILS # BLD AUTO: 2.98 THOUSANDS/ΜL (ref 1.85–7.62)
NEUTS SEG NFR BLD AUTO: 61 % (ref 43–75)
NRBC BLD AUTO-RTO: 0 /100 WBCS
PLATELET # BLD AUTO: 425 THOUSANDS/UL (ref 149–390)
PMV BLD AUTO: 9.3 FL (ref 8.9–12.7)
POTASSIUM SERPL-SCNC: 3.4 MMOL/L (ref 3.5–5.3)
PROT SERPL-MCNC: 6.6 G/DL (ref 6.4–8.2)
RBC # BLD AUTO: 3.65 MILLION/UL (ref 3.81–5.12)
SODIUM SERPL-SCNC: 144 MMOL/L (ref 136–145)
WBC # BLD AUTO: 4.82 THOUSAND/UL (ref 4.31–10.16)

## 2020-03-12 PROCEDURE — 85025 COMPLETE CBC W/AUTO DIFF WBC: CPT | Performed by: INTERNAL MEDICINE

## 2020-03-12 PROCEDURE — 80053 COMPREHEN METABOLIC PANEL: CPT | Performed by: INTERNAL MEDICINE

## 2020-03-12 PROCEDURE — 99222 1ST HOSP IP/OBS MODERATE 55: CPT | Performed by: PODIATRIST

## 2020-03-12 PROCEDURE — NC001 PR NO CHARGE: Performed by: PODIATRIST

## 2020-03-12 PROCEDURE — 0HBRXZZ EXCISION OF TOE NAIL, EXTERNAL APPROACH: ICD-10-PCS | Performed by: PODIATRIST

## 2020-03-12 PROCEDURE — 99233 SBSQ HOSP IP/OBS HIGH 50: CPT | Performed by: NURSE PRACTITIONER

## 2020-03-12 PROCEDURE — 99232 SBSQ HOSP IP/OBS MODERATE 35: CPT | Performed by: INTERNAL MEDICINE

## 2020-03-12 RX ORDER — POTASSIUM CHLORIDE 20 MEQ/1
20 TABLET, EXTENDED RELEASE ORAL ONCE
Status: COMPLETED | OUTPATIENT
Start: 2020-03-12 | End: 2020-03-12

## 2020-03-12 RX ADMIN — GABAPENTIN 800 MG: 400 CAPSULE ORAL at 17:28

## 2020-03-12 RX ADMIN — GABAPENTIN 800 MG: 400 CAPSULE ORAL at 12:59

## 2020-03-12 RX ADMIN — POTASSIUM CHLORIDE 20 MEQ: 1500 TABLET, EXTENDED RELEASE ORAL at 16:33

## 2020-03-12 RX ADMIN — HYDROMORPHONE HYDROCHLORIDE 8 MG: 2 TABLET ORAL at 14:47

## 2020-03-12 RX ADMIN — ENOXAPARIN SODIUM 40 MG: 40 INJECTION SUBCUTANEOUS at 09:46

## 2020-03-12 RX ADMIN — OLANZAPINE 10 MG: 10 TABLET, FILM COATED ORAL at 22:43

## 2020-03-12 RX ADMIN — GABAPENTIN 800 MG: 400 CAPSULE ORAL at 09:46

## 2020-03-12 RX ADMIN — PANTOPRAZOLE SODIUM 40 MG: 40 TABLET, DELAYED RELEASE ORAL at 05:57

## 2020-03-12 RX ADMIN — GABAPENTIN 800 MG: 400 CAPSULE ORAL at 22:43

## 2020-03-12 NOTE — PLAN OF CARE
Problem: Potential for Falls  Goal: Patient will remain free of falls  Description  INTERVENTIONS:  - Assess patient frequently for physical needs  -  Identify cognitive and physical deficits and behaviors that affect risk of falls    -  [place] fall precautions as indicated by assessment   - Educate patient/family on patient safety including physical limitations  - Instruct patient to call for assistance with activity based on assessment  - Modify environment to reduce risk of injury  - Consider OT/PT consult to assist with strengthening/mobility  Outcome: Progressing     Problem: Prexisting or High Potential for Compromised Skin Integrity  Goal: Skin integrity is maintained or improved  Description  INTERVENTIONS:  - Identify patients at risk for skin breakdown  - Assess and monitor skin integrity  - Assess and monitor nutrition and hydration status  - Monitor labs   - Assess for incontinence   - Turn and reposition patient  - Assist with mobility/ambulation  - Relieve pressure over bony prominences  - Avoid friction and shearing  - Provide appropriate hygiene as needed including keeping skin clean and dry  - Evaluate need for skin moisturizer/barrier cream  - Collaborate with interdisciplinary team   - Patient/family teaching  - Consider wound care consult   Outcome: Progressing     Problem: MUSCULOSKELETAL - ADULT  Goal: Maintain or return mobility to safest level of function  Description  INTERVENTIONS:  - Assess patient's ability to carry out ADLs; assess patient's baseline for ADL function and identify physical deficits which impact ability to perform ADLs (bathing, care of mouth/teeth, toileting, grooming, dressing, etc )  - Assess/evaluate cause of self-care deficits   - Assess range of motion  - Assess patient's mobility  - Assess patient's need for assistive devices and provide as appropriate  - Encourage maximum independence but intervene and supervise when necessary  - Involve family in performance of ADLs  - Assess for home care needs following discharge   - Consider OT consult to assist with ADL evaluation and planning for discharge  - Provide patient education as appropriate  Outcome: Progressing

## 2020-03-12 NOTE — PROGRESS NOTES
Progress Note - Tania Cruz 1960, 64 y o  female MRN: [id number]    Unit/Bed#: S -82 Encounter: [id number]    Primary Care Provider: Tarah Jackson MD   Date and time admitted to hospital: 3/10/2020  7:43 PM        Ambulatory dysfunction  Assessment & Plan  Ongoing and worsening ambulatory dysfunction and weakness  Reports muscle cramps to bilateral thighs  Presents to emergency department with worsening weakness  Currently lives alone at home  She reports the inability to care for ADLs  PT OT consulted      Hypokalemia  Assessment & Plan  Replace with PO K      Palliative care patient  Assessment & Plan  Last seen in office on 2020  Remains full Code  DC to STR  * Bilateral malignant neoplasm of breast in female, estrogen receptor positive ([facility] [address] )  Assessment & Plan  No spinal mets that are new  Will likely need rehab  Medically stable for DC  VTE Pharmacologic Prophylaxis:   Pharmacologic: Heparin  Mechanical VTE Prophylaxis in Place: Yes    Patient Centered Rounds: I have performed bedside rounds with nursing staff today  Discussions with Specialists or Other Care Team Provider: Discussed with patient and with Palliative   Education and Discussions with Family / Patient: Discussed with patient and with family  Time Spent for Care: 30 minutes  More than 50% of total time spent on counseling and coordination of care as described above  Current Length of Stay: 2 day(s)    Current Patient Status: Inpatient   Certification Statement: The patient will continue to require additional inpatient hospital stay due to rehab  Discharge Plan: Needs rehab  Code Status: Level 1 - Full Code      Subjective:   Patient seen and examined  No new symptoms     Feeling "good"    Objective:     Vitals:   Temp (24hrs), Av 2 °F (36 8 °C), Min:98 1 °F (36 7 °C), Max:98 3 °F (36 8 °C)    Temp:  [98 1 °F (36 7 °C)-98 3 °F (36 8 °C)] 98 3 °F (36 8 °C)  HR:  [83-86] 83  Resp:  [18-20] 20  BP: (116-148)/(67-71) 148/71  SpO2:  [93 %-96 %] 96 %  Body mass index is 28 62 kg/m²  Input and Output Summary (last 24 hours): Intake/Output Summary (Last 24 hours) at 3/12/2020 1529  Last data filed at 3/12/2020 1500  Gross per 24 hour   Intake 1200 ml   Output 3200 ml   Net -2000 ml       Physical Exam:     Physical Exam   Constitutional: She is oriented to person, place, and time  No distress  Frail, cachectic  HENT:   Head: Normocephalic  Mouth/Throat: No oropharyngeal exudate  Eyes: Pupils are equal, round, and reactive to light  Right eye exhibits no discharge  Left eye exhibits no discharge  No scleral icterus  Neck: Normal range of motion  No JVD present  No tracheal deviation present  No thyromegaly present  Cardiovascular: Normal rate  Exam reveals no gallop and no friction rub  No murmur heard  Pulmonary/Chest: No stridor  No respiratory distress  She has no wheezes  She has no rales  She exhibits no tenderness  Abdominal: She exhibits no distension and no mass  There is no guarding  No hernia  Musculoskeletal: She exhibits no edema, tenderness or deformity  Lymphadenopathy:     She has no cervical adenopathy  Neurological: She is alert and oriented to person, place, and time  She displays normal reflexes  No cranial nerve deficit or sensory deficit  She exhibits normal muscle tone  Coordination normal    Skin: Capillary refill takes less than 2 seconds  No rash noted  She is not diaphoretic  No erythema  There is pallor  Psychiatric: She has a normal mood and affect           Additional Data:     Labs:    Results from last 7 days   Lab Units 03/12/20  0555   WBC Thousand/uL 4 82   HEMOGLOBIN g/dL 10 1*   HEMATOCRIT % 32 9*   PLATELETS Thousands/uL 425*   NEUTROS PCT % 61   LYMPHS PCT % 19   MONOS PCT % 17*   EOS PCT % 2     Results from last 7 days   Lab Units 03/12/20  0555   SODIUM mmol/L 144   POTASSIUM mmol/L 3 4*   CHLORIDE mmol/L 109* CO2 mmol/L 24   BUN mg/dL 2*   CREATININE mg/dL 0 52*   ANION GAP mmol/L 11   CALCIUM mg/dL 8 4   ALBUMIN g/dL 2 1*   TOTAL BILIRUBIN mg/dL 0 32   ALK PHOS U/L 141*   ALT U/L 61   AST U/L 152*   GLUCOSE RANDOM mg/dL 97                           * I Have Reviewed All Lab Data Listed Above  * Additional Pertinent Lab Tests Reviewed: All Labs For Current Hospital Admission Reviewed    Imaging:    Imaging Reports Reviewed Today Include:   None pertinent to this encounter   Imaging Personally Reviewed by Myself Includes:  As above  Recent Cultures (last 7 days):           Last 24 Hours Medication List:     Current Facility-Administered Medications:  albuterol 2 puff Inhalation Q6H PRN Cornelia Pickerel, CRNP   enoxaparin 40 mg Subcutaneous Daily Cornelia Pickerel, CRNP   furosemide 20 mg Oral Daily PRN Cornelia Pickerel, CRNP   gabapentin 800 mg Oral 4x Daily [place] Maria Teresa, CRNP   HYDROmorphone 8 mg Oral Q6H PRN Cornelia Pickerel, CRNP   ibuprofen 600 mg Oral Q12H PRN Cornelia Pickerel, CRNP   lactulose 20 g Oral BID PRN Cornelia Pickerel, CRNP   OLANZapine 10 mg Oral HS Cornelia Pickerel, CRNP   pantoprazole 40 mg Oral Early Morning Cornelia Pickerel, CRNP   potassium chloride 20 mEq Oral Once Chay Espinal MD   promethazine 25 mg Oral Q6H PRN Cornelia Pickerel, CRNP   senna 2 tablet Oral HS PRN Cornelia Pickerel, CRNP   zolpidem 10 mg Oral HS PRN Cornelia Pickerel, CRNP        Today, Patient Was Seen By: Chay Espinal MD    ** Please Note: Dictation voice to text software may have been used in the creation of this document   **

## 2020-03-12 NOTE — PROGRESS NOTES
Progress Note - Palliative & Supportive Care  Nguyen Agent  64 y o   female  S /S -01   MRN: [id number]  Encounter: [id number]     Assessment  Metastatic bilateral stage IVmalignant neoplasm of breast  Metastasis to brain  Hypokalemia  Ambulatory dysfunction  Cancer related pain  Chemo induced nausea and vomititng  Difficulty sleeping  Decreased appetite  Weakness  LE pain  Goals of care counseling     Goals:  Level 1 code status  · Agreeable to STR  Would like to try [employer]  CM will investigate bed  · Disease focused care  Will continue discussions regarding [address] as patient's clinical presentation evolves  · Will follow Palliative Medicine on an outpatient basis after discharge for continued symptom management      Plan:  Symptom management:  Cancer related pain   · Gabapentin to 800mg PO QID  · Hydromorphone 8mg PO q6h PRN     Chemo induced nausea and vomiting  · Olanzapine 10mg once daily @hs     Difficulty sleeping  · Zolpidem 10mg PO once daily @hs      Lower extremity weakness  · PT/OT following  · STR     Onychauxis  · Podiatry consult pending    24 History  Patient seen at bedside with no family present  Patient seen in coordination with Palliative Medicine Raya YEE  Chart reviewed before visit  Potassium improved to 3 4    Pins and needles improved somewhat  PRN Use of Pain Medications: none    Worked with PT and feels it went well  Remains agreeable to rehab    Review of Systems: Pertinent items are noted in HPI      Medications    Current Facility-Administered Medications:     albuterol (PROVENTIL HFA,VENTOLIN HFA) inhaler 2 puff, 2 puff, Inhalation, Q6H PRN, Cornelia Pickerel, CRNP    enoxaparin (LOVENOX) subcutaneous injection 40 mg, 40 mg, Subcutaneous, Daily, Cornelia Pickerel, CRNP, 40 mg at 03/11/20 5402    furosemide (LASIX) tablet 20 mg, 20 mg, Oral, Daily PRN, Cornelia Pickerel, CRNP    gabapentin (NEURONTIN) capsule 800 mg, 800 mg, Oral, 4x Daily, [employer], CRNP, 800 mg at 03/11/20 2125    HYDROmorphone (DILAUDID) tablet 8 mg, 8 mg, Oral, Q6H PRN, Hernandez Perch, CRNP    ibuprofen (MOTRIN) tablet 600 mg, 600 mg, Oral, Q12H PRN, Hernandez Perch, CRNP, 600 mg at 03/11/20 1537    lactulose 20 g/30 mL oral solution 20 g, 20 g, Oral, BID PRN, Hernandez Perch, CRNP    OLANZapine (ZyPREXA) tablet 10 mg, 10 mg, Oral, HS, Hernandez Perch, CRNP, 10 mg at 03/11/20 2125    pantoprazole (PROTONIX) EC tablet 40 mg, 40 mg, Oral, Early Morning, Hernandez Perch, CRNP, 40 mg at 03/12/20 0557    promethazine (PHENERGAN) tablet 25 mg, 25 mg, Oral, Q6H PRN, Hernandez Perch, CRNP    [facility]) tablet 17 2 mg, 2 tablet, Oral, HS PRN, Hernandez Perch, CRNP    zolpidem (AMBIEN) tablet 10 mg, 10 mg, Oral, HS PRN, Hernandez Perch, CRNP    Objective  /67 (BP Location: Left arm)   Pulse 86   Temp 98 1 °F (36 7 °C) (Oral)   Resp 18   Wt 78 kg (171 lb 15 3 oz)   LMP  (LMP Unknown)   SpO2 93%   BMI 28 62 kg/m²   Physical Exam:   Constitutional: Appears chronically ill  In no acute distress  Head: Normocephalic and atraumatic  Eyes: EOM are normal  No ocular discharge  No scleral icterus  Neck: no visible adenopathy or masses  Respiratory: Effort normal  No stridor  No respiratory distress  Gastrointestinal: No abdominal distension  Musculoskeletal: No edema  Neurological: Alert, oriented and appropriately conversant  Skin: Dry, no diaphoresis  Psychiatric: Displays a normal mood and affect  Behavior, judgement and thought content appear normal      Lab Results:   I have personally reviewed pertinent labs  , CBC:   Lab Results   Component Value Date    WBC 4 82 03/12/2020    HGB 10 1 (L) 03/12/2020    HCT 32 9 (L) 03/12/2020    MCV 90 03/12/2020     (H) 03/12/2020    MCH 27 7 03/12/2020    MCHC 30 7 (L) 03/12/2020    RDW 17 5 (H) 03/12/2020    MPV 9 3 03/12/2020    NRBC 0 03/12/2020   , CMP:   Lab Results   Component Value Date    SODIUM 144 03/12/2020    K 3 4 (L) 03/12/2020     (H) 03/12/2020 CO2 24 03/12/2020    BUN 2 (L) 03/12/2020    CREATININE 0 52 (L) 03/12/2020    CALCIUM 8 4 03/12/2020     (H) 03/12/2020    ALT 61 03/12/2020    ALKPHOS 141 (H) 03/12/2020    EGFR 106 03/12/2020     Counseling / Coordination of Care  Total floor / unit time spent today 25 minutes       Silvia Gerbers, [address] [facility]

## 2020-03-12 NOTE — ASSESSMENT & PLAN NOTE
Ongoing and worsening ambulatory dysfunction and weakness  Reports muscle cramps to bilateral thighs  Presents to emergency department with worsening weakness  Currently lives alone at home  She reports the inability to care for ADLs    PT OT consulted

## 2020-03-12 NOTE — PALLIATIVE CARE CONFERENCE
Palliative Care MSW and FAUZIA Kaur, met with pt this morning at bedside  MSW introduced self and role  Cookie Yeung spoke with pt regarding possible discharge to [facility]  Cookie Yeung also expressed that podiatry was consulted and she was pleased at that  Pt was very pleasant and agreeable  Pt was resting comfortably

## 2020-03-12 NOTE — CONSULTS
Consult - Podiatry   José Miguel Weiss 64 y o  female MRN: [id number]  Unit/Bed#: S -01 Encounter: [id number]    Assessment/Plan     Assessment:  1  Onychogryphosis  2  Painful toenails     Plan:  1  Using nail Nipper the nails were debrided in thickness and length  She would benefit from a bur which I do not have the hospital   She may follow up in my office as an outpatient as needed  History of Present Illness     HPI:  José Miguel Weiss is a 64 y o  female who presents with malignant breast cancer  I was asked to evaluate her feet  Patient has extremely overgrown toenails and cannot wear shoes  She is having difficulty walking with any shoe on her foot  The nails cause pain       Consults  Review of Systems   Constitutional:  General weakness  HENT: Negative  Eyes: Negative  Respiratory: Negative  Cardiovascular: Negative  Gastrointestinal: Negative  Musculoskeletal:  Ambulatory dysfunction   Skin:  Long painful toenails   Neurological: Negative      Psych: negative      Historical Information   Past Medical History:   Diagnosis Date    H/O bilateral mastectomy 2/15/2016    Hypertension 2/15/2016    Lymphedema 2/15/2016    Malignant neoplasm of right breast ([address] ) 2/15/2016    Metastatic breast cancer [facility])      Past Surgical History:   Procedure Laterality Date    ENDOBRONCHIAL ULTRASOUND (EBUS) N/A 2/16/2016    Procedure: EBUS;  Surgeon: Janett Reynoso MD;  [facility];  Service:     MASTECTOMY Bilateral     MASTECTOMY Bilateral     right arm edema    OOPHORECTOMY      [place] BRONCHOSCOPY NEEDLE BX TRACHEA MAIN STEM&/BRON N/A 2/16/2016    Procedure: Tor Keating;  Surgeon: Janett Reynoso MD;  [facility];  Service: Thoracic    [place] INSJ TUNNELED CTR VAD W/SUBQ PORT AGE 5 YR/> N/A 7/24/2018    Procedure: INSERTION VENOUS PORT ( PORT-A-CATH) IR;  Surgeon: Chaka Montana DO;  [facility];  Service: Interventional Radiology    [place] STEREOTACTIC RADIOSURGERY, CRANIAL,SIMPLE,EA ADD  5/3/2017         [place] STEREOTACTIC RADIOSURGERY, CRANIAL,SIMPLE,EA ADD  5/3/2017         [place] STEREOTACTIC RADIOSURGERY, CRANIAL,SIMPLE,SINGLE  5/3/2017          Social History   Social History     Substance and Sexual Activity   Alcohol Use Yes    Frequency: 2-4 times a month    Drinks per session: 3 or 4    Binge frequency: Less than monthly    Comment: social     Social History     Substance and Sexual Activity   Drug Use Yes    Types: Marijuana     Social History     Tobacco Use   Smoking Status Current Every Day Smoker    Packs/day: 0 50    Years: 40 00    Pack years: 20 00    Types: Cigarettes    Start date: 1978   Smokeless Tobacco Never Used     Family History:   Family History   Problem Relation Age of Onset    Cancer Sister     Prostate cancer Brother        Meds/Allergies   Medications Prior to Admission   Medication    diclofenac (VOLTAREN) 75 mg EC tablet    gabapentin (NEURONTIN) 600 MG tablet    HYDROmorphone (DILAUDID) 8 MG tablet    lactulose 20 g/30 mL    Misc   Devices (QUAD CANE) MISC    OLANZapine (ZyPREXA) 10 mg tablet    pantoprazole (PROTONIX) 40 mg tablet    promethazine (PHENERGAN) 25 mg tablet    senna (SENOKOT) 8 6 mg    zolpidem (AMBIEN) 10 mg tablet    albuterol (PROVENTIL HFA,VENTOLIN HFA) 90 mcg/act inhaler    furosemide (LASIX) 20 mg tablet     No Known Allergies    Objective   First Vitals:   Blood Pressure: 139/71 (03/10/20 1949)  Pulse: 103 (03/10/20 1949)  Temperature: 98 9 °F (37 2 °C) (03/10/20 1950)  Temp Source: Oral (03/10/20 1950)  Respirations: 18 (03/10/20 1949)  Weight - Scale: 78 kg (171 lb 15 3 oz) (03/10/20 1949)  SpO2: 93 % (03/10/20 1949)    Current Vitals:   Blood Pressure: 116/67 (03/11/20 2232)  Pulse: 86 (03/11/20 2232)  Temperature: 98 1 °F (36 7 °C) (03/11/20 2232)  Temp Source: Oral (03/11/20 2232)  Respirations: 18 (03/11/20 2232)  Weight - Scale: 78 kg (171 lb 15 3 oz) (03/10/20 2332)  SpO2: 93 % (03/11/20 2232)        /67 (BP Location: Left arm)   Pulse 86   Temp 98 1 °F (36 7 °C) (Oral)   Resp 18   Wt 78 kg (171 lb 15 3 oz)   LMP  (LMP Unknown)   SpO2 93%   BMI 28 62 kg/m²   Physical Exam:  General:    Alert, cooperative, no distress   Head:     Normocephalic, without obvious abnormality, atraumatic                   Skin:   Toenails on the great toes are severely dystrophic thickened and shai's warm shaped  No open lesions  Lungs:     Respirations unlabored   Chest wall:    No tenderness or deformity   Heart/vasc:    Regular rate and rhythm  Palpable pedal pulses  Capillary refills brisk  No cellulitis  Abdomen:     Soft, non-tender, no distention           Lower MSK:   Tenderness to great toenails with pressure  Ankle range of motion intact  Negative Homans sign   Psychiatric:  AAOx3, no depression           Neurologic:   No focal deficit bilateral lower extremity       Lab Results:   Admission on 03/10/2020   Component Date Value    WBC 03/10/2020 9 30     RBC 03/10/2020 3 88     Hemoglobin 03/10/2020 11 0*    Hematocrit 03/10/2020 34 6*    MCV 03/10/2020 89     MCH 03/10/2020 28 4     MCHC 03/10/2020 31 8     RDW 03/10/2020 17 7*    MPV 03/10/2020 8 9     Platelets 93/95/8504 433*    nRBC 03/10/2020 0     Neutrophils Relative 03/10/2020 73     Immat GRANS % 03/10/2020 1     Lymphocytes Relative 03/10/2020 11*    Monocytes Relative 03/10/2020 15*    Eosinophils Relative 03/10/2020 0     Basophils Relative 03/10/2020 0     Neutrophils Absolute 03/10/2020 6 83     Immature Grans Absolute 03/10/2020 0 05     Lymphocytes Absolute 03/10/2020 0 99     Monocytes Absolute 03/10/2020 1 35*    Eosinophils Absolute 03/10/2020 0 04     Basophils Absolute 03/10/2020 0 04     Sodium 03/10/2020 139     Potassium 03/10/2020 3 0*    Chloride 03/10/2020 102     CO2 03/10/2020 24     ANION GAP 03/10/2020 13     BUN 03/10/2020 5     Creatinine 03/10/2020 0 85     Glucose 03/10/2020 111     Calcium 03/10/2020 8 6     AST 03/10/2020 141*    ALT 03/10/2020 54     Alkaline Phosphatase 03/10/2020 170*    Total Protein 03/10/2020 7 4     Albumin 03/10/2020 2 6*    Total Bilirubin 03/10/2020 0 41     eGFR 03/10/2020 76     Troponin I 03/10/2020 0 03     Magnesium 03/10/2020 1 8     Color, UA 03/10/2020 Yellow     Clarity, UA 03/10/2020 Clear     Specific Gravity, UA 03/10/2020 <=1 005     pH, UA 03/10/2020 6 5     Leukocytes, UA 03/10/2020 Negative     Nitrite, UA 03/10/2020 Negative     Protein, UA 03/10/2020 Negative     Glucose, UA 03/10/2020 Negative     Ketones, UA 03/10/2020 Negative     Urobilinogen, UA 03/10/2020 1 0     Bilirubin, UA 03/10/2020 Negative     Blood, UA 03/10/2020 Negative     Sodium 03/11/2020 143     Potassium 03/11/2020 3 1*    Chloride 03/11/2020 109*    CO2 03/11/2020 23     ANION GAP 03/11/2020 11     BUN 03/11/2020 3*    Creatinine 03/11/2020 0 62     Glucose 03/11/2020 84     Calcium 03/11/2020 8 2*    eGFR 03/11/2020 100     WBC 03/11/2020 5 58     RBC 03/11/2020 3 59*    Hemoglobin 03/11/2020 10 1*    Hematocrit 03/11/2020 33 4*    MCV 03/11/2020 93     MCH 03/11/2020 28 1     MCHC 03/11/2020 30 2*    RDW 03/11/2020 17 9*    MPV 03/11/2020 9 6     Platelets 71/80/1299 409*    nRBC 03/11/2020 0     Neutrophils Relative 03/11/2020 63     Immat GRANS % 03/11/2020 1     Lymphocytes Relative 03/11/2020 18     Monocytes Relative 03/11/2020 16*    Eosinophils Relative 03/11/2020 1     Basophils Relative 03/11/2020 1     Neutrophils Absolute 03/11/2020 3 58     Immature Grans Absolute 03/11/2020 0 03     Lymphocytes Absolute 03/11/2020 0 98     Monocytes Absolute 03/11/2020 0 88     Eosinophils Absolute 03/11/2020 0 07     Basophils Absolute 03/11/2020 0 04     Ventricular Rate 03/10/2020 95     Atrial Rate 03/10/2020 99     IA Interval 03/10/2020 128     QRSD Interval 03/10/2020 80     QT Interval 03/10/2020 320     QTC Interval 03/10/2020 403     P Axis 03/10/2020 41     QRS Axis 03/10/2020 64     T Wave Axis 03/10/2020 62     WBC 03/12/2020 4 82     RBC 03/12/2020 3 65*    Hemoglobin 03/12/2020 10 1*    Hematocrit 03/12/2020 32 9*    MCV 03/12/2020 90     MCH 03/12/2020 27 7     MCHC 03/12/2020 30 7*    RDW 03/12/2020 17 5*    MPV 03/12/2020 9 3     Platelets 59/53/7418 425*    nRBC 03/12/2020 0     Neutrophils Relative 03/12/2020 61     Immat GRANS % 03/12/2020 0     Lymphocytes Relative 03/12/2020 19     Monocytes Relative 03/12/2020 17*    Eosinophils Relative 03/12/2020 2     Basophils Relative 03/12/2020 1     Neutrophils Absolute 03/12/2020 2 98     Immature Grans Absolute 03/12/2020 0 01     Lymphocytes Absolute 03/12/2020 0 90     Monocytes Absolute 03/12/2020 0 80     Eosinophils Absolute 03/12/2020 0 10     Basophils Absolute 03/12/2020 0 03     Sodium 03/12/2020 144     Potassium 03/12/2020 3 4*    Chloride 03/12/2020 109*    CO2 03/12/2020 24     ANION GAP 03/12/2020 11     BUN 03/12/2020 2*    Creatinine 03/12/2020 0 52*    Glucose 03/12/2020 97     Calcium 03/12/2020 8 4     AST 03/12/2020 152*    ALT 03/12/2020 61     Alkaline Phosphatase 03/12/2020 141*    Total Protein 03/12/2020 6 6     Albumin 03/12/2020 2 1*    Total Bilirubin 03/12/2020 0 32     eGFR 03/12/2020 106                            Imaging: I have personally reviewed pertinent films in PACS      Code Status: Level 1 - Full Code  Advance Directive and Living Will:      Power of :    POLST:        Portions of the record may have been created with voice recognition software  Occasional wrong word or "sound a like" substitutions may have occurred due to the inherent limitations of voice recognition software  Read the chart carefully and recognize, using context, where substitutions have occurred

## 2020-03-13 ENCOUNTER — APPOINTMENT (INPATIENT)
Dept: ULTRASOUND IMAGING | Facility: HOSPITAL | Age: 60
DRG: 641 | End: 2020-03-13
Payer: MEDICARE

## 2020-03-13 ENCOUNTER — APPOINTMENT (INPATIENT)
Dept: RADIOLOGY | Facility: HOSPITAL | Age: 60
DRG: 641 | End: 2020-03-13
Payer: MEDICARE

## 2020-03-13 PROCEDURE — 73030 X-RAY EXAM OF SHOULDER: CPT

## 2020-03-13 PROCEDURE — 73060 X-RAY EXAM OF HUMERUS: CPT

## 2020-03-13 PROCEDURE — 99232 SBSQ HOSP IP/OBS MODERATE 35: CPT | Performed by: INTERNAL MEDICINE

## 2020-03-13 PROCEDURE — 97116 GAIT TRAINING THERAPY: CPT

## 2020-03-13 PROCEDURE — 99232 SBSQ HOSP IP/OBS MODERATE 35: CPT | Performed by: NURSE PRACTITIONER

## 2020-03-13 PROCEDURE — 93971 EXTREMITY STUDY: CPT

## 2020-03-13 PROCEDURE — 93971 EXTREMITY STUDY: CPT | Performed by: SURGERY

## 2020-03-13 RX ORDER — NICOTINE 21 MG/24HR
14 PATCH, TRANSDERMAL 24 HOURS TRANSDERMAL DAILY
Status: DISCONTINUED | OUTPATIENT
Start: 2020-03-13 | End: 2020-03-14 | Stop reason: HOSPADM

## 2020-03-13 RX ORDER — HYDROMORPHONE HCL/PF 1 MG/ML
1 SYRINGE (ML) INJECTION
Status: DISCONTINUED | OUTPATIENT
Start: 2020-03-13 | End: 2020-03-14 | Stop reason: HOSPADM

## 2020-03-13 RX ADMIN — GABAPENTIN 800 MG: 400 CAPSULE ORAL at 21:28

## 2020-03-13 RX ADMIN — GABAPENTIN 800 MG: 400 CAPSULE ORAL at 17:24

## 2020-03-13 RX ADMIN — GABAPENTIN 800 MG: 400 CAPSULE ORAL at 10:31

## 2020-03-13 RX ADMIN — PANTOPRAZOLE SODIUM 40 MG: 40 TABLET, DELAYED RELEASE ORAL at 05:51

## 2020-03-13 RX ADMIN — ENOXAPARIN SODIUM 40 MG: 40 INJECTION SUBCUTANEOUS at 10:31

## 2020-03-13 RX ADMIN — HYDROMORPHONE HYDROCHLORIDE 8 MG: 2 TABLET ORAL at 10:31

## 2020-03-13 RX ADMIN — GABAPENTIN 800 MG: 400 CAPSULE ORAL at 12:53

## 2020-03-13 RX ADMIN — OLANZAPINE 10 MG: 10 TABLET, FILM COATED ORAL at 21:28

## 2020-03-13 RX ADMIN — NICOTINE 14 MG: 14 PATCH TRANSDERMAL at 17:24

## 2020-03-13 RX ADMIN — HYDROMORPHONE HYDROCHLORIDE 1 MG: 1 INJECTION, SOLUTION INTRAMUSCULAR; INTRAVENOUS; SUBCUTANEOUS at 15:54

## 2020-03-13 NOTE — PROGRESS NOTES
Progress Note - Shira Ch 1960, 64 y o  female MRN: [id number]    Unit/Bed#: S -06 Encounter: [id number]    Primary Care Provider: Marilu العراقي MD   Date and time admitted to hospital: 3/10/2020  7:43 PM        Ambulatory dysfunction  Assessment & Plan  Ongoing and worsening ambulatory dysfunction and weakness  Reports muscle cramps to bilateral thighs  Presents to emergency department with worsening weakness  Currently lives alone at home  She reports the inability to care for ADLs  DC to STR  Hypokalemia  Assessment & Plan  Replace with PO K    K today is 3 4  Will continue to monitor while patient is in hospital     Palliative care patient  [id number] seen in office on February 12, 2020  Remains full Code  DC to STR  * Bilateral malignant neoplasm of breast in female, estrogen receptor positive ([facility] [address] )  Assessment & Plan  No spinal mets that are new  Will likely need rehab  Plan for rehab today however worsening pain in right shoulder and RUE  Will get doppler and Xray  Will start dilaudid IV for breakthrough pain   VTE Pharmacologic Prophylaxis:   Pharmacologic: Heparin  Mechanical VTE Prophylaxis in Place: Yes    Patient Centered Rounds: I have performed bedside rounds with nursing staff today  Discussions with Specialists or Other Care Team Provider: Discussed with CM and deferred DC to tomorrow  Also discussed with nursing  Education and Discussions with Family / Patient: discussed with patient and she will update her family  Time Spent for Care: 30 minutes  More than 50% of total time spent on counseling and coordination of care as described above  Current Length of Stay: 3 day(s)    Current Patient Status: Inpatient   Certification Statement: The patient will continue to require additional inpatient hospital stay due to right arm pain  Discharge Plan: Not medically stable for DC       Code Status: Level 1 - Full Code      Subjective:   Patient seen and examined  Feeling "okay"  But "8/10" pain in right arm  Objective:     Vitals:   Temp (24hrs), Av 5 °F (36 9 °C), Min:98 °F (36 7 °C), Max:98 7 °F (37 1 °C)    Temp:  [98 °F (36 7 °C)-98 7 °F (37 1 °C)] 98 °F (36 7 °C)  HR:  [87-92] 87  Resp:  [14-20] 20  BP: (111-117)/(61-67) 117/61  SpO2:  [91 %-95 %] 95 %  Body mass index is 28 76 kg/m²  Input and Output Summary (last 24 hours): Intake/Output Summary (Last 24 hours) at 3/13/2020 1552  Last data filed at 3/13/2020 1438  Gross per 24 hour   Intake 1200 ml   Output 1450 ml   Net -250 ml       Physical Exam:     Physical Exam   Constitutional: No distress  Frail  HENT:   Head: Normocephalic  Mouth/Throat: No oropharyngeal exudate  Eyes: Right eye exhibits no discharge  Left eye exhibits no discharge  No scleral icterus  Neck: Normal range of motion  No JVD present  No tracheal deviation present  No thyromegaly present  Cardiovascular: Normal rate  Exam reveals no friction rub  No murmur heard  Pulmonary/Chest: No stridor  No respiratory distress  She has no wheezes  She has rales  She exhibits no tenderness  Abdominal: Soft  She exhibits no distension and no mass  There is no tenderness  There is no rebound and no guarding  No hernia  Musculoskeletal: She exhibits edema and tenderness  She exhibits no deformity  Right UE  Pulses present both ulnar and radial    Lymphadenopathy:     She has no cervical adenopathy  Neurological: She displays normal reflexes  No sensory deficit  She exhibits normal muscle tone  Coordination normal    Skin: Capillary refill takes less than 2 seconds  No rash noted  She is not diaphoretic  No erythema  There is pallor  Psychiatric: She has a normal mood and affect           Additional Data:     Labs:    Results from last 7 days   Lab Units 20  0555   WBC Thousand/uL 4 82   HEMOGLOBIN g/dL 10 1*   HEMATOCRIT % 32 9*   PLATELETS Thousands/uL 425* NEUTROS PCT % 61   LYMPHS PCT % 19   MONOS PCT % 17*   EOS PCT % 2     Results from last 7 days   Lab Units 03/12/20  0555   SODIUM mmol/L 144   POTASSIUM mmol/L 3 4*   CHLORIDE mmol/L 109*   CO2 mmol/L 24   BUN mg/dL 2*   CREATININE mg/dL 0 52*   ANION GAP mmol/L 11   CALCIUM mg/dL 8 4   ALBUMIN g/dL 2 1*   TOTAL BILIRUBIN mg/dL 0 32   ALK PHOS U/L 141*   ALT U/L 61   AST U/L 152*   GLUCOSE RANDOM mg/dL 97                           * I Have Reviewed All Lab Data Listed Above  * Additional Pertinent Lab Tests Reviewed: All Labs For Current Hospital Admission Reviewed    Imaging:    Imaging Reports Reviewed Today Include:   None pertinent to this encounter  Imaging Personally Reviewed by Myself Includes:  As above  Recent Cultures (last 7 days):           Last 24 Hours Medication List:     Current Facility-Administered Medications:  albuterol 2 puff Inhalation Q6H PRN Josselyn Dimitrios, CRNP   enoxaparin 40 mg Subcutaneous Daily Josselyn Dimitrios, CRNP   furosemide 20 mg Oral Daily PRN Josselyn Dimitrios, CRNP   gabapentin 800 mg Oral 4x Daily Norva Dapper, CRNP   HYDROmorphone 1 mg Intravenous Q3H PRN Irma Soriano MD   HYDROmorphone 8 mg Oral Q6H PRN Josselyn Dimitrios, CRNP   ibuprofen 600 mg Oral Q12H PRN Josselyn Dimitrios, CRNP   lactulose 20 g Oral BID PRN Josselyn Dimitrios, CRNP   OLANZapine 10 mg Oral HS Josselyn Dimitrios, CRNP   pantoprazole 40 mg Oral Early Morning Josselyn Dimitrios, CRNP   promethazine 25 mg Oral Q6H PRN Josselyn Dimitrios, CRNP   senna 2 tablet Oral HS PRN Josselyn Dimitrios, CRNP   zolpidem 10 mg Oral HS PRN Josselyn Dimitrios, CRNP        Today, Patient Was Seen By: Irma Soriano MD    ** Please Note: Dictation voice to text software may have been used in the creation of this document   **

## 2020-03-13 NOTE — ASSESSMENT & PLAN NOTE
Ongoing and worsening ambulatory dysfunction and weakness  Reports muscle cramps to bilateral thighs  Presents to emergency department with worsening weakness  Currently lives alone at home  She reports the inability to care for ADLs  DC to STR

## 2020-03-13 NOTE — ASSESSMENT & PLAN NOTE
No spinal mets that are new  Will likely need rehab  Plan for rehab today however worsening pain in right shoulder and RUE  Will get doppler and Xray  Will start dilaudid IV for breakthrough pain

## 2020-03-13 NOTE — PHYSICAL THERAPY NOTE
PHYSICAL THERAPY TREATMENT NOTE  NAME:  Bre Median  DATE: 03/13/20    Length Of Stay: 3  Performed at least 2 patient identifiers during session: Name and Birthday    TREATMENT:      03/13/20 8230   Pain Assessment   Pain Assessment Tool 0-10   Pain Score 8   Pain Location/Orientation Location: Shoulder   Effect of Pain on Daily Activities limits speed and indep of mobility, limits shoulder movement   Patient's Stated Pain Goal No pain   Hospital Pain Intervention(s) Repositioned; Ambulation/increased activity; Emotional support;Medication (See MAR)  (RN medicating pt between split session)   Restrictions/Precautions   Weight Bearing Precautions Per Order No   Other Precautions Bed Alarm; Chair Alarm;Cognitive;Pain; Fall Risk   General   Chart Reviewed Yes   Response to Previous Treatment Patient reporting fatigue but able to participate  Family/Caregiver Present No   Cognition   Overall Cognitive Status WFL   Arousal/Participation Alert   Orientation Level Oriented to person   Memory Within functional limits   Following Commands Follows one step commands with increased time or repetition   Subjective   Subjective Pt reports that she wants to walk more  Bed Mobility   Supine to Sit 4  Minimal assistance   Additional items Assist x 1; Increased time required; Bedrails;HOB elevated;Verbal cues   Transfers   Sit to Stand 5  Supervision   Additional items Assist x 1; Increased time required;Verbal cues;Armrests   Stand to Sit 5  Supervision   Additional items Assist x 1; Increased time required;Verbal cues;Armrests   Other 4  Minimal assistance   Additional items Armrests; Increased time required;Verbal cues  (steppage transfer bed to chair w/o device)   Ambulation/Elevation   Gait pattern Excessively slow; Inconsistent sandra; Ataxia  (inceased myoclonic jerking as gait progresses)   Gait Assistance 4  Minimal assist  (gait belt)   Additional items Assist x 1;Verbal cues   Assistive Device Rolling walker   Distance 2'+100'x2   Stair Management Assistance Not tested   Balance   Static Sitting Good   Static Standing Fair -   Ambulatory Poor +   Endurance Deficit   Endurance Deficit Yes   Endurance Deficit Description limited amb distance   Activity Tolerance   Activity Tolerance Patient limited by fatigue;Patient limited by pain   Nurse Made Aware spoke to RN and PCA   Exercises   Neuro re-ed unable to perform therex due to PT needing 2 bedside images/tests   Assessment   Prognosis Fair   Problem List Decreased strength;Decreased range of motion;Decreased endurance; Impaired balance;Decreased mobility; Decreased coordination;Decreased cognition; Impaired judgement;Decreased safety awareness; Impaired vision;Obesity; Decreased skin integrity;Pain  (gait deviations)   Assessment Pt did make mobility progress from last session  Pt is amb much further distances and for more trials this session  Pt also needed less physical A For transfers and for ambulation today compared to yesterday, but she is not at her mobility baseline  Recommend continued trials for mobility w/ walker and with LE therex with low reps due to fatigue  Barriers to Discharge Decreased caregiver support; Inaccessible home environment   Goals   Patient Goals to get up and walk more here   STG Expiration Date 03/21/20   PT Treatment Day 2   Plan   Treatment/Interventions Functional transfer training;LE strengthening/ROM; Therapeutic exercise; Endurance training;Cognitive reorientation;Patient/family training;Equipment eval/education; Bed mobility;Gait training;Continued evaluation   Progress Progressing toward goals   PT Frequency Other (Comment)  (3-5x/wk)   Recommendation   Recommendation Post acute IP rehab   Equipment Recommended Walker; Wheelchair       Andrew Montes De Oca, PT, DPT

## 2020-03-13 NOTE — PLAN OF CARE
Problem: Potential for Falls  Goal: Patient will remain free of falls  Description  INTERVENTIONS:  - Assess patient frequently for physical needs  -  Identify cognitive and physical deficits and behaviors that affect risk of falls  -  Superior fall precautions as indicated by assessment   - Educate patient/family on patient safety including physical limitations  - Instruct patient to call for assistance with activity based on assessment  - Modify environment to reduce risk of injury  - Consider OT/PT consult to assist with strengthening/mobility  Outcome: Progressing     Problem: Nutrition/Hydration-ADULT  Goal: Nutrient/Hydration intake appropriate for improving, restoring or maintaining nutritional needs  Description  Monitor and assess patient's nutrition/hydration status for malnutrition  Collaborate with interdisciplinary team and initiate plan and interventions as ordered  Monitor patient's weight and dietary intake as ordered or per policy  Utilize nutrition screening tool and intervene as necessary  Determine patient's food preferences and provide high-protein, high-caloric foods as appropriate       INTERVENTIONS:  - Monitor oral intake, urinary output, labs, and treatment plans  - Assess nutrition and hydration status and recommend course of action  - Evaluate amount of meals eaten  - Assist patient with eating if necessary   - Allow adequate time for meals  - Recommend/ encourage appropriate diets, oral nutritional supplements, and vitamin/mineral supplements  - Order, calculate, and assess calorie counts as needed  - Recommend, monitor, and adjust tube feedings and TPN/PPN based on assessed needs  - Assess need for intravenous fluids  - Provide specific nutrition/hydration education as appropriate  - Include patient/family/caregiver in decisions related to nutrition  Outcome: Progressing     Problem: Prexisting or High Potential for Compromised Skin Integrity  Goal: Skin integrity is maintained or improved  Description  INTERVENTIONS:  - Identify patients at risk for skin breakdown  - Assess and monitor skin integrity  - Assess and monitor nutrition and hydration status  - Monitor labs   - Assess for incontinence   - Turn and reposition patient  - Assist with mobility/ambulation  - Relieve pressure over bony prominences  - Avoid friction and shearing  - Provide appropriate hygiene as needed including keeping skin clean and dry  - Evaluate need for skin moisturizer/barrier cream  - Collaborate with interdisciplinary team   - Patient/family teaching  - Consider wound care consult   Outcome: Progressing     Problem: MUSCULOSKELETAL - ADULT  Goal: Maintain or return mobility to safest level of function  Description  INTERVENTIONS:  - Assess patient's ability to carry out ADLs; assess patient's baseline for ADL function and identify physical deficits which impact ability to perform ADLs (bathing, care of mouth/teeth, toileting, grooming, dressing, etc )  - Assess/evaluate cause of self-care deficits   - Assess range of motion  - Assess patient's mobility  - Assess patient's need for assistive devices and provide as appropriate  - Encourage maximum independence but intervene and supervise when necessary  - Involve family in performance of ADLs  - Assess for home care needs following discharge   - Consider OT consult to assist with ADL evaluation and planning for discharge  - Provide patient education as appropriate  Outcome: Progressing

## 2020-03-13 NOTE — PLAN OF CARE
Problem: PHYSICAL THERAPY ADULT  Goal: Performs mobility at highest level of function for planned discharge setting  See evaluation for individualized goals  Description  Treatment/Interventions: Functional transfer training, LE strengthening/ROM, Therapeutic exercise, Endurance training, Cognitive reorientation, Patient/family training, Equipment eval/education, Gait training, Bed mobility, Spoke to nursing  Equipment Recommended: Selma Espinal, Wheelchair       See flowsheet documentation for full assessment, interventions and recommendations  Outcome: Progressing  Note:   Prognosis: Fair  Problem List: Decreased strength, Decreased range of motion, Decreased endurance, Impaired balance, Decreased mobility, Decreased coordination, Decreased cognition, Impaired judgement, Decreased safety awareness, Impaired vision, Obesity, Decreased skin integrity, Pain(gait deviations)  Assessment: Pt did make mobility progress from last session  Pt is amb much further distances and for more trials this session  Pt also needed less physical A For transfers and for ambulation today compared to yesterday, but she is not at her mobility baseline  Recommend continued trials for mobility w/ walker and with LE therex with low reps due to fatigue  Barriers to Discharge: Decreased caregiver support, Inaccessible home environment     Recommendation: Post acute IP rehab          See flowsheet documentation for full assessment

## 2020-03-13 NOTE — SOCIAL WORK
CM spoke with Inocencio Banks from [facility] is able to accept for inpatient rehab  Patient will be in Room 318 W  Report number  ask for 3rd floor  Fax number is   CM spoke with patient at the bedside  Patient updated [facility] is able to accept for inpatient rehab  CM discussed visitation limitations at [facility]  Family is able to deliver belongings to the facility and may visit after answering protocol questions  Patient expressed understanding  CM offered to contact patient's family  Patient declined  Patient stated she spoke with her  already  CM reviewed with patient at the bedside re:IMM  Patient understands her Medicare rights  Patient provided a copy of the IMM at bedside  No other questions/concerns at this time

## 2020-03-14 VITALS
SYSTOLIC BLOOD PRESSURE: 113 MMHG | WEIGHT: 168.21 LBS | HEART RATE: 92 BPM | RESPIRATION RATE: 18 BRPM | TEMPERATURE: 98.5 F | DIASTOLIC BLOOD PRESSURE: 75 MMHG | BODY MASS INDEX: 27.99 KG/M2 | OXYGEN SATURATION: 91 %

## 2020-03-14 LAB
ANION GAP SERPL CALCULATED.3IONS-SCNC: 9 MMOL/L (ref 4–13)
BASOPHILS # BLD AUTO: 0.03 THOUSANDS/ΜL (ref 0–0.1)
BASOPHILS NFR BLD AUTO: 1 % (ref 0–1)
BUN SERPL-MCNC: 5 MG/DL (ref 5–25)
CALCIUM SERPL-MCNC: 9.3 MG/DL (ref 8.3–10.1)
CHLORIDE SERPL-SCNC: 106 MMOL/L (ref 100–108)
CO2 SERPL-SCNC: 24 MMOL/L (ref 21–32)
CREAT SERPL-MCNC: 0.59 MG/DL (ref 0.6–1.3)
EOSINOPHIL # BLD AUTO: 0.13 THOUSAND/ΜL (ref 0–0.61)
EOSINOPHIL NFR BLD AUTO: 2 % (ref 0–6)
ERYTHROCYTE [DISTWIDTH] IN BLOOD BY AUTOMATED COUNT: 17.7 % (ref 11.6–15.1)
GFR SERPL CREATININE-BSD FRML MDRD: 102 ML/MIN/1.73SQ M
GLUCOSE SERPL-MCNC: 103 MG/DL (ref 65–140)
HCT VFR BLD AUTO: 32.7 % (ref 34.8–46.1)
HGB BLD-MCNC: 10 G/DL (ref 11.5–15.4)
IMM GRANULOCYTES # BLD AUTO: 0.02 THOUSAND/UL (ref 0–0.2)
IMM GRANULOCYTES NFR BLD AUTO: 0 % (ref 0–2)
LACTATE SERPL-SCNC: 0.6 MMOL/L (ref 0.5–2)
LYMPHOCYTES # BLD AUTO: 0.9 THOUSANDS/ΜL (ref 0.6–4.47)
LYMPHOCYTES NFR BLD AUTO: 15 % (ref 14–44)
MCH RBC QN AUTO: 27.6 PG (ref 26.8–34.3)
MCHC RBC AUTO-ENTMCNC: 30.6 G/DL (ref 31.4–37.4)
MCV RBC AUTO: 90 FL (ref 82–98)
MONOCYTES # BLD AUTO: 0.87 THOUSAND/ΜL (ref 0.17–1.22)
MONOCYTES NFR BLD AUTO: 15 % (ref 4–12)
NEUTROPHILS # BLD AUTO: 4.02 THOUSANDS/ΜL (ref 1.85–7.62)
NEUTS SEG NFR BLD AUTO: 67 % (ref 43–75)
NRBC BLD AUTO-RTO: 0 /100 WBCS
PLATELET # BLD AUTO: 418 THOUSANDS/UL (ref 149–390)
PMV BLD AUTO: 9.4 FL (ref 8.9–12.7)
POTASSIUM SERPL-SCNC: 4 MMOL/L (ref 3.5–5.3)
RBC # BLD AUTO: 3.62 MILLION/UL (ref 3.81–5.12)
SODIUM SERPL-SCNC: 139 MMOL/L (ref 136–145)
WBC # BLD AUTO: 5.97 THOUSAND/UL (ref 4.31–10.16)

## 2020-03-14 PROCEDURE — 85025 COMPLETE CBC W/AUTO DIFF WBC: CPT | Performed by: INTERNAL MEDICINE

## 2020-03-14 PROCEDURE — 83605 ASSAY OF LACTIC ACID: CPT | Performed by: INTERNAL MEDICINE

## 2020-03-14 PROCEDURE — 99239 HOSP IP/OBS DSCHRG MGMT >30: CPT | Performed by: INTERNAL MEDICINE

## 2020-03-14 PROCEDURE — 97167 OT EVAL HIGH COMPLEX 60 MIN: CPT

## 2020-03-14 PROCEDURE — 80048 BASIC METABOLIC PNL TOTAL CA: CPT | Performed by: INTERNAL MEDICINE

## 2020-03-14 RX ORDER — HYDROMORPHONE HYDROCHLORIDE 8 MG/1
8 TABLET ORAL EVERY 6 HOURS PRN
Qty: 120 TABLET | Refills: 0 | Status: SHIPPED | OUTPATIENT
Start: 2020-03-14 | End: 2020-03-27 | Stop reason: SDUPTHER

## 2020-03-14 RX ADMIN — ENOXAPARIN SODIUM 40 MG: 40 INJECTION SUBCUTANEOUS at 09:30

## 2020-03-14 RX ADMIN — GABAPENTIN 800 MG: 400 CAPSULE ORAL at 12:30

## 2020-03-14 RX ADMIN — PANTOPRAZOLE SODIUM 40 MG: 40 TABLET, DELAYED RELEASE ORAL at 05:36

## 2020-03-14 RX ADMIN — GABAPENTIN 800 MG: 400 CAPSULE ORAL at 09:30

## 2020-03-14 RX ADMIN — HYDROMORPHONE HYDROCHLORIDE 8 MG: 2 TABLET ORAL at 09:30

## 2020-03-14 NOTE — ASSESSMENT & PLAN NOTE
No spinal mets that are new  DC to rehab today  Left UE - negative doppler, X-ray no fracture  Now complaining of RUE pain - exam normal  Normal ROM no swelling and pulses present  No further inpatient work up

## 2020-03-14 NOTE — PLAN OF CARE
Problem: OCCUPATIONAL THERAPY ADULT  Goal: Performs self-care activities at highest level of function for planned discharge setting  See evaluation for individualized goals  Description  Treatment Interventions: ADL retraining, Functional transfer training, UE strengthening/ROM, Endurance training, Patient/family training, Neuromuscular reeducation, Continued evaluation, Activityengagement, Energy conservation          See flowsheet documentation for full assessment, interventions and recommendations  Note:   Limitation: Decreased ADL status, Decreased UE ROM, Decreased UE strength, Decreased Safe judgement during ADL, Decreased endurance, Decreased self-care trans, Decreased high-level ADLs  Prognosis: Fair  Assessment: Patient evaluated by Occupational Therapy  Patient admitted with Bilateral malignant neoplasm of breast in female, estrogen receptor positive ([facility] [address] )  The patients occupational profile, medical and therapy history includes a expanded review of medical and/or therapy records and additional review of physical, cognitive, or psychosocial history related to current functional performance  Comorbidities affecting functional mobility and ADLS include: Bone metastases, B/L malignant neoplasm of breast , estrogen receptor positive, brain metataes  Prior to admission, patient was requiring assist for functional mobility with HHA/walker, requiring assist for ADLS, requiring assist for IADLS and living with spouse and sister in a 1 level home with 5 steps to enter  The evaluation identifies the following performance deficits: weakness, decreased ROM, impaired balance, decreased endurance, decreased coordination, increased fall risk, new onset of impairment of functional mobility, decreased ADLS, decreased IADLS, pain, decreased activity tolerance, SOB upon exertion and decreased strength, that result in activity limitations and/or participation restrictions   Patient performed bed mobility at Mod A supine to sit and transfers at [address]  Patient was limited by pain when performing UB dressing at Max A and UB bathing Max A, LB dressing at Mod A and grooming at Sup  Unable to assess strength testing due to Mets  This evaluation requires clinical decision making of high complexity, because the patient presents with comorbidites that affect occupational performance and required significant modification of tasks or assistance with consideration of multiple treatment options  The Barthel Index was used as a functional outcome tool presenting with a score of 40  Patient will benefit from skilled Occupational Therapy services to address above deficits and facilitate a safe return to prior level of function       OT Discharge Recommendation: Short Term Rehab

## 2020-03-14 NOTE — DISCHARGE SUMMARY
Discharge- Mervat Spencer 1960, 64 y o  female MRN: [id number]    Unit/Bed#: S -26 Encounter: [id number]    Primary Care Provider: Ryan Ohara MD   Date and time admitted to hospital: 3/10/2020  7:43 PM        Ambulatory dysfunction  Assessment & Plan  Ongoing and worsening ambulatory dysfunction and weakness  Reports muscle cramps to bilateral thighs  Presents to emergency department with worsening weakness  Currently lives alone at home  She reports the inability to care for ADLs  DC to STR  Hypokalemia  Assessment & Plan  Resolved  K is 4 0  Palliative care patient  Assessment & Plan  Last seen in office on February 12, 2020  Remains full Code  DC to STR  * Bilateral malignant neoplasm of breast in female, estrogen receptor positive ([facility] [address] )  Assessment & Plan  No spinal mets that are new  DC to rehab today  Left UE - negative doppler, X-ray no fracture  Now complaining of RUE pain - exam normal  Normal ROM no swelling and pulses present  No further inpatient work up  Discharging Physician / Practitioner: Paradise Kitchen MD  PCP: Ryan Ohara MD  Admission Date:   Admission Orders (From admission, onward)     Ordered        03/10/20 2156  Inpatient Admission  Once                   Discharge Date: 03/14/20    Resolved Problems  Date Reviewed: 3/14/2020    None          Consultations During Hospital Stay:  · Podiatry - Dr Hugo Fuentes  · Palliative - Dr Jeanna Hurtado    Procedures Performed:   · X- ray shoulder -   "No acute osseous abnormality  Degenerative changes as described  Mild increased right lung interstitial lung markings, either infectious or inflammatory "  X-ray humerus - "No acute osseous abnormality  Degenerative changes as described  Increased interstitial lung markings throughout the right lung either infectious or inflammatory "    Significant Findings / Test Results:   · As above  Incidental Findings:   · As above        Test Results Pending at Discharge (will require follow up): · None  Outpatient Tests Requested:  · None  Complications:  None  Reason for Admission:   Pain and FTT  Hospital Course:     José Miguel Weiss is a 64 y o  female patient who originally presented to the hospital on 3/10/2020 due to pain, weakness and FTT  Patient has metastatic breast cancer with known mets to brain and L spine  She is unable to function independently at home and needs assistance with ambulating and ADLs and will be discharged to rehab  Please see above list of diagnoses and related plan for additional information  Condition at Discharge: stable     Discharge Day Visit / Exam:     Subjective:    Patient seen and examined  Feeling 'the same'    Vitals: Blood Pressure: 113/75 (03/14/20 0947)  Pulse: 92 (03/14/20 0652)  Temperature: 98 5 °F (36 9 °C) (03/14/20 0652)  Temp Source: Oral (03/14/20 1165)  Respirations: 18 (03/14/20 5207)  Weight - Scale: 76 3 kg (168 lb 3 4 oz) (03/14/20 0534)  SpO2: 91 % (03/14/20 2057)  Exam:   Physical Exam   Constitutional:   Frail, cachectic  HENT:   Head: Normocephalic  Mouth/Throat: No oropharyngeal exudate  Eyes: Pupils are equal, round, and reactive to light  Right eye exhibits no discharge  Left eye exhibits no discharge  No scleral icterus  Cardiovascular: Normal rate  Exam reveals no friction rub  No murmur heard  Pulmonary/Chest: Effort normal  No stridor  No respiratory distress  She has no wheezes  She has rales  She exhibits no tenderness  Abdominal: She exhibits no distension  There is tenderness  There is no rebound and no guarding  No hernia  Musculoskeletal: Normal range of motion  She exhibits no edema, tenderness or deformity  ROM normal in LUE   Neurological: She displays normal reflexes  No cranial nerve deficit or sensory deficit  She exhibits normal muscle tone  Coordination normal    Skin: Capillary refill takes less than 2 seconds   No rash noted  No erythema  There is pallor  Psychiatric: She has a normal mood and affect  Discussion with Family:     Discharge instructions/Information to patient and family:   See after visit summary for information provided to patient and family  Provisions for Follow-Up Care:  See after visit summary for information related to follow-up care and any pertinent home health orders  Disposition:     Home    For Discharges to [facility] SNF:   · Not Applicable to this Patient - Not Applicable to this Patient    Planned Readmission: none  Discharge Statement:  I spent 45 minutes discharging the patient  This time was spent on the day of discharge  I had direct contact with the patient on the day of discharge  Greater than 50% of the total time was spent examining patient, answering all patient questions, arranging and discussing plan of care with patient as well as directly providing post-discharge instructions  Additional time then spent on discharge activities  Discharge Medications:  See after visit summary for reconciled discharge medications provided to patient and family        ** Please Note: This note has been constructed using a voice recognition system **

## 2020-03-14 NOTE — SOCIAL WORK
CM was notified by [facility] that patient is medically cleared for discharge to [facility] and [facility] for STR  CM called [facility] [phone] and spoke to Soren Avery who confirmed that they are expecting patient; also verified in [employer] that patient is accepted today  CM spoke to spouse Robb Palacio at [phone] to verify discharge plan and spouse requests CM set up transport  CM spoke to nurse who reports patient is assist x1, appropriate for [facility] transport  CM called [facility] and spoke to Luciano Medina  Per Luciano Medina at [facility], [facility] pickup with [place] [facility]  CM called [facility] again and spoke to Víctor Bhardwaj to notify of pickup time, also updated [employer]  CM notified nurse, SLIM, spouse Robb Palacio of pickup time  IMM reviewed with spouse and a copy was provided to the patient

## 2020-03-14 NOTE — DISCHARGE INSTR - AVS FIRST PAGE
Dear Brian Kramer,     It was our pleasure to care for you here at [facility]  It is our hope that we were always able to exceed the expected standards for your care during your stay  You were hospitalized due to pain and weakness  You were cared for on the 4th floor under the service of Maria Luisa Hunter MD with the [facility] who covers for your primary care physician (PCP), Keegan Cottrell MD, while you were hospitalized  If you have any questions or concerns related to this hospitalization, you may contact us at [phone]  For follow up as well as medication refills, we recommend that you follow up with your primary care physician  A registered nurse will reach out to you by phone within a few days after your discharge to answer any additional questions that you may have after going home  However, at this time we provide for you here, the most important instructions / recommendations at discharge:     · Notable Medication Adjustments -   · None  · Testing Required after Discharge -   · As per your oncologist   · Important follow up information -   · Follow up with your primary care doctor and palliative care as well as medial oncology  · Other Instructions -   · As above   · Please review this entire after visit summary as additional general instructions including medication list, appointments, activity, diet, any pertinent wound care, and other additional recommendations from your care team that may be provided for you        Sincerely,     Maria Luisa Hunter MD

## 2020-03-19 ENCOUNTER — TELEPHONE (OUTPATIENT)
Dept: HEMATOLOGY ONCOLOGY | Facility: MEDICAL CENTER | Age: 60
End: 2020-03-19

## 2020-03-19 RX ORDER — SODIUM CHLORIDE 9 MG/ML
20 INJECTION, SOLUTION INTRAVENOUS ONCE
Status: CANCELLED | OUTPATIENT
Start: 2020-04-01

## 2020-03-19 RX ORDER — PALONOSETRON 0.05 MG/ML
0.25 INJECTION, SOLUTION INTRAVENOUS ONCE
Status: CANCELLED | OUTPATIENT
Start: 2020-04-01

## 2020-03-19 NOTE — TELEPHONE ENCOUNTER
Returned tc left voice message stating that reason infusion appointment was canceled was due to your hospital stay  Dr Rabia Goddard says you are ok for treatment  You have been rescheduled for tx this coming Tuesday at [facility]   I am not sure if you call [facility] or you want me to do that for you  Please call our office back at [phone] and ask for [facility]

## 2020-03-20 ENCOUNTER — TELEPHONE (OUTPATIENT)
Dept: HEMATOLOGY ONCOLOGY | Facility: CLINIC | Age: 60
End: 2020-03-20

## 2020-03-23 ENCOUNTER — TELEPHONE (OUTPATIENT)
Dept: PALLIATIVE MEDICINE | Facility: CLINIC | Age: 60
End: 2020-03-23

## 2020-03-23 NOTE — TELEPHONE ENCOUNTER
Telephone call to patient today who reports she still is inpatient rehab and hopes to go home in the next couple of days  She is receptive to a virtual telephone visit on Thursday or Friday this week to assess symptom management and if she needs refill of her medications that Palliative Care prescribes   Jodi Victor

## 2020-03-24 ENCOUNTER — TELEPHONE (OUTPATIENT)
Dept: HEMATOLOGY ONCOLOGY | Facility: MEDICAL CENTER | Age: 60
End: 2020-03-24

## 2020-03-24 ENCOUNTER — HOSPITAL ENCOUNTER (OUTPATIENT)
Dept: INFUSION CENTER | Facility: CLINIC | Age: 60
End: 2020-03-24

## 2020-03-24 NOTE — TELEPHONE ENCOUNTER
Francisco Chaves from [facility] called in to confirm patient chemo appointment today 03/24/2020 at [facility] and gave her the telephone number

## 2020-03-25 NOTE — PATIENT INSTRUCTIONS
PRESCRIPTION REFILL REMINDER:  All medication refills should be requested prior to [facility] on Friday  Any refill requests after noon on Friday would be addressed the following Monday  Please protect yourself from the novel Coronavirus (COVID-19)! Even though we do not have a vaccine or any antiviral drugs for this infection, the following strategies can help you stay healthy:    = Perform hand hygiene with soap and water or hand  with at least 60% alcohol often, but especially after going to the bathroom, after blowing your nose/coughing/sneezing, before eating, and after coming into contact with a potentially contaminated public surface  = Avoid touching your face!     = Avoid close contact with people who are sick  Avoid large gatherings, and don't travel unnecessarily  = Stay home when you are sick, except to get medical care  If you can eat and drink and breathe and sleep, please consider calling your doctor's office instead of visiting in person  = Cover your coughs and sneezes with a tissue, or your elbow  = Clean frequently touched surfaces and objects daily (e g , tables, countertops, light switches, doorknobs, and cabinet handles)  Regular household detergent and water are sufficient

## 2020-03-27 ENCOUNTER — TELEMEDICINE (OUTPATIENT)
Dept: PALLIATIVE MEDICINE | Facility: CLINIC | Age: 60
End: 2020-03-27
Payer: MEDICARE

## 2020-03-27 DIAGNOSIS — I89.0 LYMPHEDEMA OF RIGHT UPPER EXTREMITY: ICD-10-CM

## 2020-03-27 DIAGNOSIS — R26.2 AMBULATORY DYSFUNCTION: ICD-10-CM

## 2020-03-27 DIAGNOSIS — C79.51 BONE METASTASES (HCC): ICD-10-CM

## 2020-03-27 DIAGNOSIS — Z17.0 BILATERAL MALIGNANT NEOPLASM OF BREAST IN FEMALE, ESTROGEN RECEPTOR POSITIVE, UNSPECIFIED SITE OF BREAST (HCC): Primary | ICD-10-CM

## 2020-03-27 DIAGNOSIS — R11.0 CHEMOTHERAPY-INDUCED NAUSEA: ICD-10-CM

## 2020-03-27 DIAGNOSIS — R63.0 DECREASED APPETITE: ICD-10-CM

## 2020-03-27 DIAGNOSIS — C50.911 BILATERAL MALIGNANT NEOPLASM OF BREAST IN FEMALE, ESTROGEN RECEPTOR POSITIVE, UNSPECIFIED SITE OF BREAST (HCC): Primary | ICD-10-CM

## 2020-03-27 DIAGNOSIS — C50.912 BILATERAL MALIGNANT NEOPLASM OF BREAST IN FEMALE, ESTROGEN RECEPTOR POSITIVE, UNSPECIFIED SITE OF BREAST (HCC): Primary | ICD-10-CM

## 2020-03-27 DIAGNOSIS — G89.3 CANCER RELATED PAIN: ICD-10-CM

## 2020-03-27 DIAGNOSIS — C79.31 BRAIN METASTASES (HCC): ICD-10-CM

## 2020-03-27 DIAGNOSIS — T45.1X5A CHEMOTHERAPY-INDUCED NAUSEA: ICD-10-CM

## 2020-03-27 PROCEDURE — 99443 PR PHYS/QHP TELEPHONE EVALUATION 21-30 MIN: CPT | Performed by: NURSE PRACTITIONER

## 2020-03-27 RX ORDER — ZOLPIDEM TARTRATE 10 MG/1
10 TABLET ORAL
COMMUNITY
End: 2020-05-18 | Stop reason: SDUPTHER

## 2020-03-27 RX ORDER — OLANZAPINE 5 MG/1
5 TABLET ORAL
Qty: 30 TABLET | Refills: 0 | Status: SHIPPED | OUTPATIENT
Start: 2020-03-27 | End: 2020-04-27

## 2020-03-27 RX ORDER — GABAPENTIN 800 MG/1
800 TABLET ORAL 4 TIMES DAILY
Qty: 120 TABLET | Refills: 1 | Status: SHIPPED | OUTPATIENT
Start: 2020-03-27 | End: 2020-05-26

## 2020-03-27 RX ORDER — HYDROMORPHONE HYDROCHLORIDE 8 MG/1
8 TABLET ORAL EVERY 6 HOURS PRN
Qty: 90 TABLET | Refills: 0 | Status: SHIPPED | OUTPATIENT
Start: 2020-03-27 | End: 2020-04-29 | Stop reason: SDUPTHER

## 2020-03-27 NOTE — PROGRESS NOTES
Outpatient Virtual Visit - Palliative and Supportive Care  Bart Moreno 64 y o  female [id number]    Intake and Identification:  Reason for visit is for symptom management    Encounter provider Nunzio Kocher, located at:  [address] [facility]  [address] [facility] [address] [facility] [address] [phone]    Recent Visits  Date Type Provider Dept   03/23/20 Telephone Teresa Bishop RN 13 [facility]   Showing recent visits within past 7 days and meeting all other requirements     Today's Visits  Date Type Provider Dept   03/27/20 Marilou, [address]   Showing today's visits and meeting all other requirements     Future Appointments  No visits were found meeting these conditions  Showing future appointments within next 150 days and meeting all other requirements             Patient agrees to participate in a virtual check in via telephone or video visit instead of presenting to the office to address urgent/immediate medical needs, or to respect quarantine and public health guidelines  Patient was informed this is a billable service  After connecting through telephone, the patient was identified by name and date of birth  Bart Moreno was informed that this was a telemedicine visit and that the visit is being conducted through telephone and patient was informed that this is not a secure, HIPAA-complaint platform  she agrees to proceed  which may not be secure and therefore might not be HIPAA-compliant  My office door was closed  No one else was in the room  She acknowledged consent and understanding of privacy and security of the virtual check-in visit  I informed the patient that I have reviewed her record in Epic and presented the opportunity for her to ask any questions regarding the visit today  The patient initiated communication and agreed to participate        Assessment and Plan  Diagnoses for this encounter are:    1  Bilateral malignant neoplasm of breast in female, estrogen receptor positive, unspecified site of breast ([facility] [address] )    2  Bone metastases ([facility] [address] )    3  Ambulatory dysfunction    4  Brain metastases ([facility] [address] )    5  Cancer related pain    6  Lymphedema of right upper extremity    7  Decreased appetite    8  Chemotherapy-induced nausea         Medications adjusted this encounter:  Requested Prescriptions     Signed Prescriptions Disp Refills    HYDROmorphone (Dilaudid) 8 MG tablet 90 tablet 0     Sig: Take 1 tablet (8 mg total) by mouth every 6 (six) hours as needed for moderate pain 1 tab PO every 6 hours as needed  Max use of 4 tablets in 24 hours  Max Daily Amount: 32 mg    OLANZapine (ZyPREXA) 5 mg tablet 30 tablet 0     Sig: Take 1 tablet (5 mg total) by mouth daily at bedtime    gabapentin (NEURONTIN) 800 mg tablet 120 tablet 1     Sig: Take 1 tablet (800 mg total) by mouth 4 (four) times a day     Medications Discontinued During This Encounter   Medication Reason    HYDROmorphone (Dilaudid) 8 MG tablet Reorder    gabapentin (NEURONTIN) 600 MG tablet Reorder        Angy was assessed today for symptoms and planning cares related to above illnesses  I have reviewed the patient's controlled substance dispensing history in the Prescription Drug Monitoring Program in compliance with the [facility] regulations before prescribing any controlled substances  If there are questions or concerns, please contact us through our clinic/answering service 24 hours a day, seven days a week  Disposition:  Time spent providing supportive listening, medication reconciliation with patient over the phone  She plans to resume her chemotherapy treatment next week  No changes were made to her palliative regimen today  Refills for one month provided    Follow up one month       Obdulia Chino [address] [facility] and Supportive Care  [phone]      Kalpana Gaspar is a 64 y o  female with metastatic breast cancer originally diagnosed in 2010   She is under the care of Dr Bryon Martinez in medical oncology and Dr Jamil Pereyra in radiation oncology   She had a bilateral mastectomy and has undergone multiple chemotherapy regimens  Bunny Romp has chronic lymphedema in her right arm   She has brain mets for which she recived SRS in May 2017   In March 2019 she completed whole brain radiation for multiple recurrent brain metastasis   Her current treatment regime is Kadcyla with premeds of Dexamethasone, Emend, Aloxi  She was hospitalized at [facility] 2 weeks ago (3/10-3/14/20) with increased weakness  She was discharged to [facility] for rehab  Pt has followed with Dr Janett Álvarez for symptoms of cancer related pain, neuropathy and nausea  Per Dr Luis Cruz last clinic noted dated 2/12/20 "Her pain has been difficult to control   She has experienced consistent side effects from long acting opioids leading to sedation and confusion   Long acting opioids have also never provided her pain relief - forms tried have included methadone, MS Contin, Fentanyl patches "  She has also been on steroids in past but in spite of multiple dose adjustments over time she failed to achieve any noticeable benefit and felt bloated while taking them        She is currently maintained on gabapentin 800mg QID and Dilaudid 8mg PRN  A virtual telephone visit is being conducted today for follow up of her symptoms  She was discharged home from rehab earlier this week  She does feel a little stronger  She does have home health services in place; she has VNA and home PT    She rates her pain 7/10 when moving around  Pain is worst in her back  She has been taking her hydromorphone 2-3 times per day        Past Medical History:   Diagnosis Date    H/O bilateral mastectomy 2/15/2016    Hypertension 2/15/2016    Lymphedema 2/15/2016    Malignant neoplasm of right breast ([facility] [address] ) 2/15/2016    Metastatic breast cancer ([facility] [address] )      Past Surgical History:   Procedure Laterality Date    ENDOBRONCHIAL ULTRASOUND (EBUS) N/A 2/16/2016    Procedure: EBUS;  Surgeon: Nat Glasgow MD;  [facility];  Service:     MASTECTOMY Bilateral     MASTECTOMY Bilateral     right arm edema    OOPHORECTOMY      CA BRONCHOSCOPY NEEDLE BX TRACHEA MAIN STEM&/BRON N/A 2/16/2016    Procedure: Layman Netters;  Surgeon: Nat Glasgow MD;  [facility];  Service: Thoracic    CA INSJ TUNNELED CTR VAD W/SUBQ PORT AGE 5 YR/> N/A 7/24/2018    Procedure: INSERTION VENOUS PORT ( PORT-A-CATH) IR;  Surgeon: Ada Musa DO;  [facility];  Service: Interventional Radiology    CA STEREOTACTIC RADIOSURGERY, CRANIAL,SIMPLE,EA ADD  5/3/2017         CA STEREOTACTIC RADIOSURGERY, CRANIAL,SIMPLE,EA ADD  5/3/2017         CA STEREOTACTIC RADIOSURGERY, CRANIAL,SIMPLE,SINGLE  5/3/2017          Current Outpatient Medications   Medication Sig Dispense Refill    zolpidem (AMBIEN) 10 mg tablet Take 10 mg by mouth daily at bedtime as needed      albuterol (PROVENTIL HFA,VENTOLIN HFA) 90 mcg/act inhaler TAKE 2 PUFFS BY MOUTH EVERY 6 HOURS AS NEEDED FOR WHEEZING 18 Inhaler 2    diclofenac (VOLTAREN) 75 mg EC tablet Take 1 tablet (75 mg total) by mouth daily as needed (pain) 14 tablet 0    gabapentin (NEURONTIN) 800 mg tablet Take 1 tablet (800 mg total) by mouth 4 (four) times a day 120 tablet 1    HYDROmorphone (Dilaudid) 8 MG tablet Take 1 tablet (8 mg total) by mouth every 6 (six) hours as needed for moderate pain 1 tab PO every 6 hours as needed  Max use of 4 tablets in 24 hours  Max Daily Amount: 32 mg 90 tablet 0    lactulose 20 g/30 mL 30mL PO daily  May take an extra dose up to 3x per day as needed for constipation 946 mL 5    Misc   Devices (QUAD CANE) MISC by Does not apply route daily 1 each 0    OLANZapine (ZyPREXA) 5 mg tablet Take 1 tablet (5 mg total) by mouth daily at bedtime 30 tablet 0    pantoprazole (PROTONIX) 40 mg tablet TAKE 1 TABLET BY MOUTH EVERY DAY 90 tablet 1    senna (SENOKOT) 8 6 mg Take 2 tablets by mouth 2 (two) times a day as needed         No current facility-administered medications for this visit  No Known Allergies    Review of Systems   Constitution: Positive for malaise/fatigue  Musculoskeletal: Positive for back pain and muscle weakness  Gastrointestinal: Positive for nausea (chronic )  Neurological: Positive for numbness and paresthesias  All other systems reviewed and are negative  I spent 30+ minutes with the patient during this PHONE visit  Cares and counseling included the following: instructions for disease self management, treatment instructions, follow up requirements, patient and family counseling/involvement in care and compliance with treatment regimen  All of the patient's questions were answered during this discussion

## 2020-04-01 ENCOUNTER — HOSPITAL ENCOUNTER (OUTPATIENT)
Dept: INFUSION CENTER | Facility: CLINIC | Age: 60
Discharge: HOME | End: 2020-04-01
Payer: MEDICARE

## 2020-04-01 VITALS
BODY MASS INDEX: 28.07 KG/M2 | TEMPERATURE: 97.7 F | HEIGHT: 65 IN | WEIGHT: 168.5 LBS | OXYGEN SATURATION: 97 % | HEART RATE: 105 BPM | SYSTOLIC BLOOD PRESSURE: 136 MMHG | DIASTOLIC BLOOD PRESSURE: 82 MMHG | RESPIRATION RATE: 17 BRPM

## 2020-04-01 DIAGNOSIS — C50.912 BILATERAL MALIGNANT NEOPLASM OF BREAST IN FEMALE, ESTROGEN RECEPTOR POSITIVE, UNSPECIFIED SITE OF BREAST (HCC): Primary | ICD-10-CM

## 2020-04-01 DIAGNOSIS — C50.911 BILATERAL MALIGNANT NEOPLASM OF BREAST IN FEMALE, ESTROGEN RECEPTOR POSITIVE, UNSPECIFIED SITE OF BREAST (HCC): Primary | ICD-10-CM

## 2020-04-01 DIAGNOSIS — Z17.0 BILATERAL MALIGNANT NEOPLASM OF BREAST IN FEMALE, ESTROGEN RECEPTOR POSITIVE, UNSPECIFIED SITE OF BREAST (HCC): Primary | ICD-10-CM

## 2020-04-01 LAB
ALBUMIN SERPL BCP-MCNC: 2.6 G/DL (ref 3.5–5)
ALP SERPL-CCNC: 136 U/L (ref 46–116)
ALT SERPL W P-5'-P-CCNC: 26 U/L (ref 12–78)
ANION GAP SERPL CALCULATED.3IONS-SCNC: 8 MMOL/L (ref 4–13)
AST SERPL W P-5'-P-CCNC: 26 U/L (ref 5–45)
BASOPHILS # BLD AUTO: 0.03 THOUSANDS/ΜL (ref 0–0.1)
BASOPHILS NFR BLD AUTO: 0 % (ref 0–1)
BILIRUB SERPL-MCNC: 0.33 MG/DL (ref 0.2–1)
BUN SERPL-MCNC: 4 MG/DL (ref 5–25)
CALCIUM SERPL-MCNC: 8.5 MG/DL (ref 8.3–10.1)
CHLORIDE SERPL-SCNC: 106 MMOL/L (ref 100–108)
CO2 SERPL-SCNC: 27 MMOL/L (ref 21–32)
CREAT SERPL-MCNC: 0.53 MG/DL (ref 0.6–1.3)
EOSINOPHIL # BLD AUTO: 0.1 THOUSAND/ΜL (ref 0–0.61)
EOSINOPHIL NFR BLD AUTO: 1 % (ref 0–6)
ERYTHROCYTE [DISTWIDTH] IN BLOOD BY AUTOMATED COUNT: 17 % (ref 11.6–15.1)
GFR SERPL CREATININE-BSD FRML MDRD: 106 ML/MIN/1.73SQ M
GLUCOSE SERPL-MCNC: 108 MG/DL (ref 65–140)
HCT VFR BLD AUTO: 38.4 % (ref 34.8–46.1)
HGB BLD-MCNC: 11.7 G/DL (ref 11.5–15.4)
IMM GRANULOCYTES # BLD AUTO: 0.02 THOUSAND/UL (ref 0–0.2)
IMM GRANULOCYTES NFR BLD AUTO: 0 % (ref 0–2)
LYMPHOCYTES # BLD AUTO: 0.95 THOUSANDS/ΜL (ref 0.6–4.47)
LYMPHOCYTES NFR BLD AUTO: 13 % (ref 14–44)
MCH RBC QN AUTO: 27.8 PG (ref 26.8–34.3)
MCHC RBC AUTO-ENTMCNC: 30.5 G/DL (ref 31.4–37.4)
MCV RBC AUTO: 91 FL (ref 82–98)
MONOCYTES # BLD AUTO: 0.76 THOUSAND/ΜL (ref 0.17–1.22)
MONOCYTES NFR BLD AUTO: 11 % (ref 4–12)
NEUTROPHILS # BLD AUTO: 5.21 THOUSANDS/ΜL (ref 1.85–7.62)
NEUTS SEG NFR BLD AUTO: 75 % (ref 43–75)
NRBC BLD AUTO-RTO: 0 /100 WBCS
PLATELET # BLD AUTO: 387 THOUSANDS/UL (ref 149–390)
PMV BLD AUTO: 9.5 FL (ref 8.9–12.7)
POTASSIUM SERPL-SCNC: 3.4 MMOL/L (ref 3.5–5.3)
PROT SERPL-MCNC: 7.2 G/DL (ref 6.4–8.2)
RBC # BLD AUTO: 4.21 MILLION/UL (ref 3.81–5.12)
SODIUM SERPL-SCNC: 141 MMOL/L (ref 136–145)
WBC # BLD AUTO: 7.07 THOUSAND/UL (ref 4.31–10.16)

## 2020-04-01 PROCEDURE — 96367 TX/PROPH/DG ADDL SEQ IV INF: CPT

## 2020-04-01 PROCEDURE — 80053 COMPREHEN METABOLIC PANEL: CPT | Performed by: INTERNAL MEDICINE

## 2020-04-01 PROCEDURE — 96375 TX/PRO/DX INJ NEW DRUG ADDON: CPT

## 2020-04-01 PROCEDURE — 85025 COMPLETE CBC W/AUTO DIFF WBC: CPT | Performed by: INTERNAL MEDICINE

## 2020-04-01 PROCEDURE — 96413 CHEMO IV INFUSION 1 HR: CPT

## 2020-04-01 RX ORDER — PALONOSETRON 0.05 MG/ML
0.25 INJECTION, SOLUTION INTRAVENOUS ONCE
Status: COMPLETED | OUTPATIENT
Start: 2020-04-01 | End: 2020-04-01

## 2020-04-01 RX ORDER — SODIUM CHLORIDE 9 MG/ML
20 INJECTION, SOLUTION INTRAVENOUS ONCE
Status: COMPLETED | OUTPATIENT
Start: 2020-04-01 | End: 2020-04-01

## 2020-04-01 RX ADMIN — FOSAPREPITANT 150 MG: 150 INJECTION, POWDER, LYOPHILIZED, FOR SOLUTION INTRAVENOUS at 13:38

## 2020-04-01 RX ADMIN — ADO-TRASTUZUMAB EMTANSINE 300 MG: 20 INJECTION, POWDER, LYOPHILIZED, FOR SOLUTION INTRAVENOUS at 14:25

## 2020-04-01 RX ADMIN — PALONOSETRON 0.25 MG: 0.05 INJECTION, SOLUTION INTRAVENOUS at 13:34

## 2020-04-01 RX ADMIN — SODIUM CHLORIDE 20 ML/HR: 0.9 INJECTION, SOLUTION INTRAVENOUS at 11:32

## 2020-04-01 RX ADMIN — DEXAMETHASONE SODIUM PHOSPHATE 10 MG: 10 INJECTION, SOLUTION INTRAMUSCULAR; INTRAVENOUS at 13:14

## 2020-04-17 ENCOUNTER — TELEPHONE (OUTPATIENT)
Dept: PALLIATIVE MEDICINE | Facility: CLINIC | Age: 60
End: 2020-04-17

## 2020-04-17 DIAGNOSIS — T45.1X5A CHEMOTHERAPY-INDUCED NAUSEA: ICD-10-CM

## 2020-04-17 DIAGNOSIS — Z17.0 BILATERAL MALIGNANT NEOPLASM OF BREAST IN FEMALE, ESTROGEN RECEPTOR POSITIVE, UNSPECIFIED SITE OF BREAST (HCC): Primary | ICD-10-CM

## 2020-04-17 DIAGNOSIS — R11.0 CHEMOTHERAPY-INDUCED NAUSEA: ICD-10-CM

## 2020-04-17 DIAGNOSIS — C50.912 BILATERAL MALIGNANT NEOPLASM OF BREAST IN FEMALE, ESTROGEN RECEPTOR POSITIVE, UNSPECIFIED SITE OF BREAST (HCC): Primary | ICD-10-CM

## 2020-04-17 DIAGNOSIS — C50.911 BILATERAL MALIGNANT NEOPLASM OF BREAST IN FEMALE, ESTROGEN RECEPTOR POSITIVE, UNSPECIFIED SITE OF BREAST (HCC): Primary | ICD-10-CM

## 2020-04-17 DIAGNOSIS — C79.51 BONE METASTASES (HCC): ICD-10-CM

## 2020-04-17 DIAGNOSIS — C79.31 BRAIN METASTASES (HCC): ICD-10-CM

## 2020-04-17 RX ORDER — PROMETHAZINE HYDROCHLORIDE 25 MG/1
25 TABLET ORAL EVERY 6 HOURS PRN
Qty: 30 TABLET | Refills: 0 | Status: SHIPPED | OUTPATIENT
Start: 2020-04-17 | End: 2020-04-27

## 2020-04-22 RX ORDER — SODIUM CHLORIDE 9 MG/ML
20 INJECTION, SOLUTION INTRAVENOUS ONCE
Status: CANCELLED | OUTPATIENT
Start: 2020-04-23

## 2020-04-22 RX ORDER — PALONOSETRON 0.05 MG/ML
0.25 INJECTION, SOLUTION INTRAVENOUS ONCE
Status: CANCELLED | OUTPATIENT
Start: 2020-04-23

## 2020-04-23 ENCOUNTER — HOSPITAL ENCOUNTER (OUTPATIENT)
Dept: INFUSION CENTER | Facility: CLINIC | Age: 60
Discharge: HOME | End: 2020-04-23
Payer: MEDICARE

## 2020-04-23 VITALS
TEMPERATURE: 97.6 F | OXYGEN SATURATION: 99 % | RESPIRATION RATE: 22 BRPM | SYSTOLIC BLOOD PRESSURE: 120 MMHG | WEIGHT: 161 LBS | HEIGHT: 65 IN | HEART RATE: 97 BPM | DIASTOLIC BLOOD PRESSURE: 78 MMHG | BODY MASS INDEX: 26.82 KG/M2

## 2020-04-23 DIAGNOSIS — C50.919 MALIGNANT NEOPLASM OF FEMALE BREAST, UNSPECIFIED ESTROGEN RECEPTOR STATUS, UNSPECIFIED LATERALITY, UNSPECIFIED SITE OF BREAST (HCC): Primary | ICD-10-CM

## 2020-04-23 DIAGNOSIS — C50.912 BILATERAL MALIGNANT NEOPLASM OF BREAST IN FEMALE, ESTROGEN RECEPTOR POSITIVE, UNSPECIFIED SITE OF BREAST (HCC): Primary | ICD-10-CM

## 2020-04-23 DIAGNOSIS — C50.919 MALIGNANT NEOPLASM OF FEMALE BREAST, UNSPECIFIED ESTROGEN RECEPTOR STATUS, UNSPECIFIED LATERALITY, UNSPECIFIED SITE OF BREAST (HCC): ICD-10-CM

## 2020-04-23 DIAGNOSIS — Z17.0 BILATERAL MALIGNANT NEOPLASM OF BREAST IN FEMALE, ESTROGEN RECEPTOR POSITIVE, UNSPECIFIED SITE OF BREAST (HCC): Primary | ICD-10-CM

## 2020-04-23 DIAGNOSIS — C50.911 BILATERAL MALIGNANT NEOPLASM OF BREAST IN FEMALE, ESTROGEN RECEPTOR POSITIVE, UNSPECIFIED SITE OF BREAST (HCC): Primary | ICD-10-CM

## 2020-04-23 LAB
ALBUMIN SERPL BCP-MCNC: 2.8 G/DL (ref 3.5–5)
ALP SERPL-CCNC: 161 U/L (ref 46–116)
ALT SERPL W P-5'-P-CCNC: 39 U/L (ref 12–78)
ANION GAP SERPL CALCULATED.3IONS-SCNC: 10 MMOL/L (ref 4–13)
AST SERPL W P-5'-P-CCNC: 39 U/L (ref 5–45)
BASOPHILS # BLD AUTO: 0.03 THOUSANDS/ΜL (ref 0–0.1)
BASOPHILS NFR BLD AUTO: 1 % (ref 0–1)
BILIRUB SERPL-MCNC: 0.25 MG/DL (ref 0.2–1)
BUN SERPL-MCNC: 6 MG/DL (ref 5–25)
CALCIUM SERPL-MCNC: 9 MG/DL (ref 8.3–10.1)
CHLORIDE SERPL-SCNC: 104 MMOL/L (ref 100–108)
CO2 SERPL-SCNC: 25 MMOL/L (ref 21–32)
CREAT SERPL-MCNC: 0.67 MG/DL (ref 0.6–1.3)
EOSINOPHIL # BLD AUTO: 0.09 THOUSAND/ΜL (ref 0–0.61)
EOSINOPHIL NFR BLD AUTO: 1 % (ref 0–6)
ERYTHROCYTE [DISTWIDTH] IN BLOOD BY AUTOMATED COUNT: 16.5 % (ref 11.6–15.1)
GFR SERPL CREATININE-BSD FRML MDRD: 98 ML/MIN/1.73SQ M
GLUCOSE SERPL-MCNC: 112 MG/DL (ref 65–140)
HCT VFR BLD AUTO: 36.6 % (ref 34.8–46.1)
HGB BLD-MCNC: 11.1 G/DL (ref 11.5–15.4)
IMM GRANULOCYTES # BLD AUTO: 0.02 THOUSAND/UL (ref 0–0.2)
IMM GRANULOCYTES NFR BLD AUTO: 0 % (ref 0–2)
LYMPHOCYTES # BLD AUTO: 1.14 THOUSANDS/ΜL (ref 0.6–4.47)
LYMPHOCYTES NFR BLD AUTO: 18 % (ref 14–44)
MCH RBC QN AUTO: 27.3 PG (ref 26.8–34.3)
MCHC RBC AUTO-ENTMCNC: 30.3 G/DL (ref 31.4–37.4)
MCV RBC AUTO: 90 FL (ref 82–98)
MONOCYTES # BLD AUTO: 0.75 THOUSAND/ΜL (ref 0.17–1.22)
MONOCYTES NFR BLD AUTO: 12 % (ref 4–12)
NEUTROPHILS # BLD AUTO: 4.35 THOUSANDS/ΜL (ref 1.85–7.62)
NEUTS SEG NFR BLD AUTO: 68 % (ref 43–75)
NRBC BLD AUTO-RTO: 0 /100 WBCS
PLATELET # BLD AUTO: 453 THOUSANDS/UL (ref 149–390)
PMV BLD AUTO: 9.1 FL (ref 8.9–12.7)
POTASSIUM SERPL-SCNC: 3.8 MMOL/L (ref 3.5–5.3)
PROT SERPL-MCNC: 7.6 G/DL (ref 6.4–8.2)
RBC # BLD AUTO: 4.06 MILLION/UL (ref 3.81–5.12)
SODIUM SERPL-SCNC: 139 MMOL/L (ref 136–145)
WBC # BLD AUTO: 6.38 THOUSAND/UL (ref 4.31–10.16)

## 2020-04-23 PROCEDURE — 96367 TX/PROPH/DG ADDL SEQ IV INF: CPT

## 2020-04-23 PROCEDURE — 96375 TX/PRO/DX INJ NEW DRUG ADDON: CPT

## 2020-04-23 PROCEDURE — 96413 CHEMO IV INFUSION 1 HR: CPT

## 2020-04-23 PROCEDURE — 80053 COMPREHEN METABOLIC PANEL: CPT | Performed by: INTERNAL MEDICINE

## 2020-04-23 PROCEDURE — 85025 COMPLETE CBC W/AUTO DIFF WBC: CPT | Performed by: INTERNAL MEDICINE

## 2020-04-23 RX ORDER — SODIUM CHLORIDE 9 MG/ML
20 INJECTION, SOLUTION INTRAVENOUS ONCE
Status: COMPLETED | OUTPATIENT
Start: 2020-04-23 | End: 2020-04-23

## 2020-04-23 RX ORDER — PALONOSETRON 0.05 MG/ML
0.25 INJECTION, SOLUTION INTRAVENOUS ONCE
Status: COMPLETED | OUTPATIENT
Start: 2020-04-23 | End: 2020-04-23

## 2020-04-23 RX ADMIN — SODIUM CHLORIDE 150 MG: 0.9 INJECTION, SOLUTION INTRAVENOUS at 13:48

## 2020-04-23 RX ADMIN — DEXAMETHASONE SODIUM PHOSPHATE 10 MG: 10 INJECTION, SOLUTION INTRAMUSCULAR; INTRAVENOUS at 13:20

## 2020-04-23 RX ADMIN — ADO-TRASTUZUMAB EMTANSINE 260 MG: 20 INJECTION, POWDER, LYOPHILIZED, FOR SOLUTION INTRAVENOUS at 14:56

## 2020-04-23 RX ADMIN — SODIUM CHLORIDE 20 ML/HR: 0.9 INJECTION, SOLUTION INTRAVENOUS at 13:20

## 2020-04-23 RX ADMIN — PALONOSETRON HYDROCHLORIDE 0.25 MG: 0.25 INJECTION, SOLUTION INTRAVENOUS at 14:24

## 2020-04-25 DIAGNOSIS — R63.0 DECREASED APPETITE: ICD-10-CM

## 2020-04-25 DIAGNOSIS — Z17.0 BILATERAL MALIGNANT NEOPLASM OF BREAST IN FEMALE, ESTROGEN RECEPTOR POSITIVE, UNSPECIFIED SITE OF BREAST (HCC): ICD-10-CM

## 2020-04-25 DIAGNOSIS — C50.912 BILATERAL MALIGNANT NEOPLASM OF BREAST IN FEMALE, ESTROGEN RECEPTOR POSITIVE, UNSPECIFIED SITE OF BREAST (HCC): ICD-10-CM

## 2020-04-25 DIAGNOSIS — T45.1X5A CHEMOTHERAPY-INDUCED NAUSEA: ICD-10-CM

## 2020-04-25 DIAGNOSIS — R11.0 CHEMOTHERAPY-INDUCED NAUSEA: ICD-10-CM

## 2020-04-25 DIAGNOSIS — C50.911 BILATERAL MALIGNANT NEOPLASM OF BREAST IN FEMALE, ESTROGEN RECEPTOR POSITIVE, UNSPECIFIED SITE OF BREAST (HCC): ICD-10-CM

## 2020-04-27 DIAGNOSIS — T45.1X5A CHEMOTHERAPY-INDUCED NAUSEA: ICD-10-CM

## 2020-04-27 DIAGNOSIS — C50.912 BILATERAL MALIGNANT NEOPLASM OF BREAST IN FEMALE, ESTROGEN RECEPTOR POSITIVE, UNSPECIFIED SITE OF BREAST (HCC): ICD-10-CM

## 2020-04-27 DIAGNOSIS — C79.51 BONE METASTASES (HCC): ICD-10-CM

## 2020-04-27 DIAGNOSIS — C50.911 BILATERAL MALIGNANT NEOPLASM OF BREAST IN FEMALE, ESTROGEN RECEPTOR POSITIVE, UNSPECIFIED SITE OF BREAST (HCC): ICD-10-CM

## 2020-04-27 DIAGNOSIS — C79.31 BRAIN METASTASES (HCC): ICD-10-CM

## 2020-04-27 DIAGNOSIS — Z17.0 BILATERAL MALIGNANT NEOPLASM OF BREAST IN FEMALE, ESTROGEN RECEPTOR POSITIVE, UNSPECIFIED SITE OF BREAST (HCC): ICD-10-CM

## 2020-04-27 DIAGNOSIS — R11.0 CHEMOTHERAPY-INDUCED NAUSEA: ICD-10-CM

## 2020-04-27 RX ORDER — OLANZAPINE 5 MG/1
TABLET ORAL
Qty: 30 TABLET | Refills: 0 | Status: SHIPPED | OUTPATIENT
Start: 2020-04-27 | End: 2020-04-29 | Stop reason: SDUPTHER

## 2020-04-27 RX ORDER — PROMETHAZINE HYDROCHLORIDE 25 MG/1
TABLET ORAL
Qty: 30 TABLET | Refills: 0 | Status: SHIPPED | OUTPATIENT
Start: 2020-04-27 | End: 2020-05-13 | Stop reason: SDUPTHER

## 2020-04-29 ENCOUNTER — TELEMEDICINE (OUTPATIENT)
Dept: PALLIATIVE MEDICINE | Facility: CLINIC | Age: 60
End: 2020-04-29
Payer: MEDICARE

## 2020-04-29 DIAGNOSIS — C50.911 BILATERAL MALIGNANT NEOPLASM OF BREAST IN FEMALE, ESTROGEN RECEPTOR POSITIVE, UNSPECIFIED SITE OF BREAST (HCC): ICD-10-CM

## 2020-04-29 DIAGNOSIS — R63.0 DECREASED APPETITE: ICD-10-CM

## 2020-04-29 DIAGNOSIS — T45.1X5A CHEMOTHERAPY-INDUCED NAUSEA: ICD-10-CM

## 2020-04-29 DIAGNOSIS — G89.3 CANCER RELATED PAIN: ICD-10-CM

## 2020-04-29 DIAGNOSIS — R11.0 CHEMOTHERAPY-INDUCED NAUSEA: ICD-10-CM

## 2020-04-29 DIAGNOSIS — C79.31 BRAIN METASTASES (HCC): ICD-10-CM

## 2020-04-29 DIAGNOSIS — C79.51 BONE METASTASES (HCC): Primary | ICD-10-CM

## 2020-04-29 DIAGNOSIS — Z17.0 BILATERAL MALIGNANT NEOPLASM OF BREAST IN FEMALE, ESTROGEN RECEPTOR POSITIVE, UNSPECIFIED SITE OF BREAST (HCC): ICD-10-CM

## 2020-04-29 DIAGNOSIS — C50.912 BILATERAL MALIGNANT NEOPLASM OF BREAST IN FEMALE, ESTROGEN RECEPTOR POSITIVE, UNSPECIFIED SITE OF BREAST (HCC): ICD-10-CM

## 2020-04-29 PROCEDURE — 99442 PR PHYS/QHP TELEPHONE EVALUATION 11-20 MIN: CPT | Performed by: NURSE PRACTITIONER

## 2020-04-29 RX ORDER — OLANZAPINE 5 MG/1
5 TABLET ORAL
Qty: 30 TABLET | Refills: 3 | Status: SHIPPED | OUTPATIENT
Start: 2020-04-29 | End: 2020-05-18

## 2020-04-29 RX ORDER — HYDROMORPHONE HYDROCHLORIDE 8 MG/1
8 TABLET ORAL EVERY 6 HOURS PRN
Qty: 90 TABLET | Refills: 0 | Status: SHIPPED | OUTPATIENT
Start: 2020-04-29 | End: 2020-05-18 | Stop reason: SDUPTHER

## 2020-05-01 ENCOUNTER — TELEPHONE (OUTPATIENT)
Dept: PALLIATIVE MEDICINE | Facility: CLINIC | Age: 60
End: 2020-05-01

## 2020-05-12 ENCOUNTER — TELEPHONE (OUTPATIENT)
Dept: HEMATOLOGY ONCOLOGY | Facility: CLINIC | Age: 60
End: 2020-05-12

## 2020-05-12 RX ORDER — SODIUM CHLORIDE 9 MG/ML
20 INJECTION, SOLUTION INTRAVENOUS ONCE
Status: CANCELLED | OUTPATIENT
Start: 2020-05-13

## 2020-05-12 RX ORDER — PALONOSETRON 0.05 MG/ML
0.25 INJECTION, SOLUTION INTRAVENOUS ONCE
Status: CANCELLED | OUTPATIENT
Start: 2020-05-13

## 2020-05-13 ENCOUNTER — OFFICE VISIT (OUTPATIENT)
Dept: HEMATOLOGY ONCOLOGY | Facility: CLINIC | Age: 60
End: 2020-05-13
Payer: MEDICARE

## 2020-05-13 ENCOUNTER — HOSPITAL ENCOUNTER (OUTPATIENT)
Dept: INFUSION CENTER | Facility: CLINIC | Age: 60
Discharge: HOME | End: 2020-05-13
Payer: MEDICARE

## 2020-05-13 VITALS
DIASTOLIC BLOOD PRESSURE: 74 MMHG | HEIGHT: 67 IN | BODY MASS INDEX: 24.64 KG/M2 | OXYGEN SATURATION: 95 % | RESPIRATION RATE: 18 BRPM | TEMPERATURE: 97.5 F | HEART RATE: 92 BPM | WEIGHT: 157 LBS | SYSTOLIC BLOOD PRESSURE: 138 MMHG

## 2020-05-13 VITALS
BODY MASS INDEX: 25.99 KG/M2 | HEIGHT: 65 IN | RESPIRATION RATE: 20 BRPM | HEART RATE: 96 BPM | SYSTOLIC BLOOD PRESSURE: 114 MMHG | DIASTOLIC BLOOD PRESSURE: 74 MMHG | WEIGHT: 156 LBS | TEMPERATURE: 99.6 F | OXYGEN SATURATION: 96 %

## 2020-05-13 DIAGNOSIS — R11.0 CHEMOTHERAPY-INDUCED NAUSEA: ICD-10-CM

## 2020-05-13 DIAGNOSIS — C79.51 BONE METASTASES (HCC): ICD-10-CM

## 2020-05-13 DIAGNOSIS — C50.912 BILATERAL MALIGNANT NEOPLASM OF BREAST IN FEMALE, ESTROGEN RECEPTOR POSITIVE, UNSPECIFIED SITE OF BREAST (HCC): Primary | ICD-10-CM

## 2020-05-13 DIAGNOSIS — Z17.0 BILATERAL MALIGNANT NEOPLASM OF BREAST IN FEMALE, ESTROGEN RECEPTOR POSITIVE, UNSPECIFIED SITE OF BREAST (HCC): Primary | ICD-10-CM

## 2020-05-13 DIAGNOSIS — T45.1X5A CHEMOTHERAPY-INDUCED NAUSEA: ICD-10-CM

## 2020-05-13 DIAGNOSIS — C50.911 BILATERAL MALIGNANT NEOPLASM OF BREAST IN FEMALE, ESTROGEN RECEPTOR POSITIVE, UNSPECIFIED SITE OF BREAST (HCC): Primary | ICD-10-CM

## 2020-05-13 LAB
ALBUMIN SERPL BCP-MCNC: 2.6 G/DL (ref 3.5–5)
ALP SERPL-CCNC: 150 U/L (ref 46–116)
ALT SERPL W P-5'-P-CCNC: 23 U/L (ref 12–78)
ANION GAP SERPL CALCULATED.3IONS-SCNC: 9 MMOL/L (ref 4–13)
AST SERPL W P-5'-P-CCNC: 30 U/L (ref 5–45)
BASOPHILS # BLD AUTO: 0.02 THOUSANDS/ΜL (ref 0–0.1)
BASOPHILS NFR BLD AUTO: 0 % (ref 0–1)
BILIRUB SERPL-MCNC: 0.35 MG/DL (ref 0.2–1)
BUN SERPL-MCNC: 4 MG/DL (ref 5–25)
CALCIUM SERPL-MCNC: 8.6 MG/DL (ref 8.3–10.1)
CANCER AG27-29 SERPL-ACNC: 26.9 U/ML (ref 0–42.3)
CHLORIDE SERPL-SCNC: 103 MMOL/L (ref 100–108)
CO2 SERPL-SCNC: 27 MMOL/L (ref 21–32)
CREAT SERPL-MCNC: 0.53 MG/DL (ref 0.6–1.3)
EOSINOPHIL # BLD AUTO: 0.05 THOUSAND/ΜL (ref 0–0.61)
EOSINOPHIL NFR BLD AUTO: 1 % (ref 0–6)
ERYTHROCYTE [DISTWIDTH] IN BLOOD BY AUTOMATED COUNT: 16.4 % (ref 11.6–15.1)
GFR SERPL CREATININE-BSD FRML MDRD: 106 ML/MIN/1.73SQ M
GLUCOSE P FAST SERPL-MCNC: 109 MG/DL (ref 65–99)
GLUCOSE SERPL-MCNC: 109 MG/DL (ref 65–140)
HCT VFR BLD AUTO: 35.1 % (ref 34.8–46.1)
HGB BLD-MCNC: 10.9 G/DL (ref 11.5–15.4)
IMM GRANULOCYTES # BLD AUTO: 0.02 THOUSAND/UL (ref 0–0.2)
IMM GRANULOCYTES NFR BLD AUTO: 0 % (ref 0–2)
LYMPHOCYTES # BLD AUTO: 1.19 THOUSANDS/ΜL (ref 0.6–4.47)
LYMPHOCYTES NFR BLD AUTO: 18 % (ref 14–44)
MCH RBC QN AUTO: 27.4 PG (ref 26.8–34.3)
MCHC RBC AUTO-ENTMCNC: 31.1 G/DL (ref 31.4–37.4)
MCV RBC AUTO: 88 FL (ref 82–98)
MONOCYTES # BLD AUTO: 0.79 THOUSAND/ΜL (ref 0.17–1.22)
MONOCYTES NFR BLD AUTO: 12 % (ref 4–12)
NEUTROPHILS # BLD AUTO: 4.47 THOUSANDS/ΜL (ref 1.85–7.62)
NEUTS SEG NFR BLD AUTO: 69 % (ref 43–75)
NRBC BLD AUTO-RTO: 0 /100 WBCS
PLATELET # BLD AUTO: 423 THOUSANDS/UL (ref 149–390)
PMV BLD AUTO: 9.2 FL (ref 8.9–12.7)
POTASSIUM SERPL-SCNC: 3.5 MMOL/L (ref 3.5–5.3)
PROT SERPL-MCNC: 7.5 G/DL (ref 6.4–8.2)
RBC # BLD AUTO: 3.98 MILLION/UL (ref 3.81–5.12)
SODIUM SERPL-SCNC: 139 MMOL/L (ref 136–145)
WBC # BLD AUTO: 6.54 THOUSAND/UL (ref 4.31–10.16)

## 2020-05-13 PROCEDURE — 96375 TX/PRO/DX INJ NEW DRUG ADDON: CPT

## 2020-05-13 PROCEDURE — 86300 IMMUNOASSAY TUMOR CA 15-3: CPT

## 2020-05-13 PROCEDURE — 99214 OFFICE O/P EST MOD 30 MIN: CPT | Performed by: NURSE PRACTITIONER

## 2020-05-13 PROCEDURE — 85025 COMPLETE CBC W/AUTO DIFF WBC: CPT

## 2020-05-13 PROCEDURE — 96413 CHEMO IV INFUSION 1 HR: CPT

## 2020-05-13 PROCEDURE — 80053 COMPREHEN METABOLIC PANEL: CPT

## 2020-05-13 PROCEDURE — 96367 TX/PROPH/DG ADDL SEQ IV INF: CPT

## 2020-05-13 RX ORDER — PALONOSETRON 0.05 MG/ML
0.25 INJECTION, SOLUTION INTRAVENOUS ONCE
Status: COMPLETED | OUTPATIENT
Start: 2020-05-13 | End: 2020-05-13

## 2020-05-13 RX ORDER — SODIUM CHLORIDE 9 MG/ML
20 INJECTION, SOLUTION INTRAVENOUS ONCE
Status: COMPLETED | OUTPATIENT
Start: 2020-05-13 | End: 2020-05-13

## 2020-05-13 RX ADMIN — PALONOSETRON HYDROCHLORIDE 0.25 MG: 0.25 INJECTION, SOLUTION INTRAVENOUS at 12:28

## 2020-05-13 RX ADMIN — ADO-TRASTUZUMAB EMTANSINE 260 MG: 20 INJECTION, POWDER, LYOPHILIZED, FOR SOLUTION INTRAVENOUS at 13:38

## 2020-05-13 RX ADMIN — DEXAMETHASONE SODIUM PHOSPHATE 10 MG: 10 INJECTION, SOLUTION INTRAMUSCULAR; INTRAVENOUS at 12:31

## 2020-05-13 RX ADMIN — SODIUM CHLORIDE 20 ML/HR: 0.9 INJECTION, SOLUTION INTRAVENOUS at 12:22

## 2020-05-13 RX ADMIN — SODIUM CHLORIDE 150 MG: 0.9 INJECTION, SOLUTION INTRAVENOUS at 12:57

## 2020-05-18 ENCOUNTER — TELEMEDICINE (OUTPATIENT)
Dept: PALLIATIVE MEDICINE | Facility: CLINIC | Age: 60
End: 2020-05-18
Payer: MEDICARE

## 2020-05-18 DIAGNOSIS — F51.01 PRIMARY INSOMNIA: Primary | ICD-10-CM

## 2020-05-18 DIAGNOSIS — G89.3 CANCER RELATED PAIN: ICD-10-CM

## 2020-05-18 PROCEDURE — 99443 PR PHYS/QHP TELEPHONE EVALUATION 21-30 MIN: CPT | Performed by: FAMILY MEDICINE

## 2020-05-18 RX ORDER — ZOLPIDEM TARTRATE 10 MG/1
10 TABLET ORAL
Qty: 30 TABLET | Refills: 0 | Status: SHIPPED | OUTPATIENT
Start: 2020-05-18 | End: 2020-06-05 | Stop reason: SDUPTHER

## 2020-05-18 RX ORDER — HYDROMORPHONE HYDROCHLORIDE 8 MG/1
8 TABLET ORAL EVERY 6 HOURS PRN
Qty: 90 TABLET | Refills: 0 | Status: SHIPPED | OUTPATIENT
Start: 2020-05-25 | End: 2020-06-15 | Stop reason: SDUPTHER

## 2020-05-26 ENCOUNTER — TELEMEDICINE (OUTPATIENT)
Dept: PALLIATIVE MEDICINE | Facility: CLINIC | Age: 60
End: 2020-05-26
Payer: MEDICARE

## 2020-05-26 DIAGNOSIS — R11.0 NAUSEA: ICD-10-CM

## 2020-05-26 DIAGNOSIS — F11.90 CHRONIC, CONTINUOUS USE OF OPIOIDS: ICD-10-CM

## 2020-05-26 DIAGNOSIS — G44.209 TENSION-TYPE HEADACHE, NOT INTRACTABLE, UNSPECIFIED CHRONICITY PATTERN: ICD-10-CM

## 2020-05-26 DIAGNOSIS — G89.3 CANCER RELATED PAIN: ICD-10-CM

## 2020-05-26 DIAGNOSIS — R63.0 ANOREXIA: Primary | ICD-10-CM

## 2020-05-26 DIAGNOSIS — K59.00 CONSTIPATION, UNSPECIFIED CONSTIPATION TYPE: ICD-10-CM

## 2020-05-26 DIAGNOSIS — G89.4 CHRONIC PAIN SYNDROME: ICD-10-CM

## 2020-05-26 PROCEDURE — 99214 OFFICE O/P EST MOD 30 MIN: CPT

## 2020-05-26 RX ORDER — GABAPENTIN 800 MG/1
800 TABLET ORAL 4 TIMES DAILY
Qty: 120 TABLET | Refills: 1 | Status: SHIPPED | OUTPATIENT
Start: 2020-05-26 | End: 2020-07-29 | Stop reason: SDUPTHER

## 2020-05-27 DIAGNOSIS — C50.912 BILATERAL MALIGNANT NEOPLASM OF BREAST IN FEMALE, ESTROGEN RECEPTOR POSITIVE, UNSPECIFIED SITE OF BREAST (HCC): ICD-10-CM

## 2020-05-27 DIAGNOSIS — R11.0 CHEMOTHERAPY-INDUCED NAUSEA: ICD-10-CM

## 2020-05-27 DIAGNOSIS — Z17.0 BILATERAL MALIGNANT NEOPLASM OF BREAST IN FEMALE, ESTROGEN RECEPTOR POSITIVE, UNSPECIFIED SITE OF BREAST (HCC): ICD-10-CM

## 2020-05-27 DIAGNOSIS — C50.911 BILATERAL MALIGNANT NEOPLASM OF BREAST IN FEMALE, ESTROGEN RECEPTOR POSITIVE, UNSPECIFIED SITE OF BREAST (HCC): ICD-10-CM

## 2020-05-27 DIAGNOSIS — R63.0 DECREASED APPETITE: ICD-10-CM

## 2020-05-27 DIAGNOSIS — T45.1X5A CHEMOTHERAPY-INDUCED NAUSEA: ICD-10-CM

## 2020-05-27 RX ORDER — OLANZAPINE 5 MG/1
TABLET ORAL
Qty: 30 TABLET | Refills: 0 | OUTPATIENT
Start: 2020-05-27

## 2020-06-01 ENCOUNTER — HOSPITAL ENCOUNTER (OUTPATIENT)
Dept: MRI IMAGING | Facility: HOSPITAL | Age: 60
Discharge: HOME | End: 2020-06-01
Payer: MEDICARE

## 2020-06-01 DIAGNOSIS — C50.912 BILATERAL MALIGNANT NEOPLASM OF BREAST IN FEMALE, ESTROGEN RECEPTOR POSITIVE, UNSPECIFIED SITE OF BREAST (HCC): ICD-10-CM

## 2020-06-01 DIAGNOSIS — C79.51 BONE METASTASES (HCC): ICD-10-CM

## 2020-06-01 DIAGNOSIS — C50.911 BILATERAL MALIGNANT NEOPLASM OF BREAST IN FEMALE, ESTROGEN RECEPTOR POSITIVE, UNSPECIFIED SITE OF BREAST (HCC): ICD-10-CM

## 2020-06-01 DIAGNOSIS — Z17.0 BILATERAL MALIGNANT NEOPLASM OF BREAST IN FEMALE, ESTROGEN RECEPTOR POSITIVE, UNSPECIFIED SITE OF BREAST (HCC): ICD-10-CM

## 2020-06-01 PROCEDURE — 70553 MRI BRAIN STEM W/O & W/DYE: CPT

## 2020-06-01 PROCEDURE — A9585 GADOBUTROL INJECTION: HCPCS | Performed by: NURSE PRACTITIONER

## 2020-06-01 RX ORDER — PALONOSETRON 0.05 MG/ML
0.25 INJECTION, SOLUTION INTRAVENOUS ONCE
Status: CANCELLED | OUTPATIENT
Start: 2020-06-04

## 2020-06-01 RX ORDER — SODIUM CHLORIDE 9 MG/ML
20 INJECTION, SOLUTION INTRAVENOUS ONCE
Status: CANCELLED | OUTPATIENT
Start: 2020-06-04

## 2020-06-01 RX ADMIN — GADOBUTROL 7 ML: 604.72 INJECTION INTRAVENOUS at 11:41

## 2020-06-04 ENCOUNTER — TELEPHONE (OUTPATIENT)
Dept: HEMATOLOGY ONCOLOGY | Facility: MEDICAL CENTER | Age: 60
End: 2020-06-04

## 2020-06-04 ENCOUNTER — HOSPITAL ENCOUNTER (OUTPATIENT)
Dept: INFUSION CENTER | Facility: CLINIC | Age: 60
Discharge: HOME | End: 2020-06-04
Payer: MEDICARE

## 2020-06-04 VITALS
SYSTOLIC BLOOD PRESSURE: 128 MMHG | WEIGHT: 151 LBS | BODY MASS INDEX: 24.27 KG/M2 | OXYGEN SATURATION: 97 % | RESPIRATION RATE: 20 BRPM | TEMPERATURE: 98.8 F | HEIGHT: 66 IN | HEART RATE: 97 BPM | DIASTOLIC BLOOD PRESSURE: 74 MMHG

## 2020-06-04 DIAGNOSIS — C50.911 BILATERAL MALIGNANT NEOPLASM OF BREAST IN FEMALE, ESTROGEN RECEPTOR POSITIVE, UNSPECIFIED SITE OF BREAST (HCC): Primary | ICD-10-CM

## 2020-06-04 DIAGNOSIS — C50.912 BILATERAL MALIGNANT NEOPLASM OF BREAST IN FEMALE, ESTROGEN RECEPTOR POSITIVE, UNSPECIFIED SITE OF BREAST (HCC): Primary | ICD-10-CM

## 2020-06-04 DIAGNOSIS — C79.51 BONE METASTASES (HCC): ICD-10-CM

## 2020-06-04 DIAGNOSIS — Z17.0 BILATERAL MALIGNANT NEOPLASM OF BREAST IN FEMALE, ESTROGEN RECEPTOR POSITIVE, UNSPECIFIED SITE OF BREAST (HCC): Primary | ICD-10-CM

## 2020-06-04 LAB
ALBUMIN SERPL BCP-MCNC: 2.8 G/DL (ref 3.5–5)
ALP SERPL-CCNC: 130 U/L (ref 46–116)
ALT SERPL W P-5'-P-CCNC: 28 U/L (ref 12–78)
ANION GAP SERPL CALCULATED.3IONS-SCNC: 11 MMOL/L (ref 4–13)
AST SERPL W P-5'-P-CCNC: 41 U/L (ref 5–45)
BASOPHILS # BLD AUTO: 0.03 THOUSANDS/ΜL (ref 0–0.1)
BASOPHILS NFR BLD AUTO: 1 % (ref 0–1)
BILIRUB SERPL-MCNC: 0.21 MG/DL (ref 0.2–1)
BUN SERPL-MCNC: 4 MG/DL (ref 5–25)
CALCIUM SERPL-MCNC: 8.6 MG/DL (ref 8.3–10.1)
CHLORIDE SERPL-SCNC: 105 MMOL/L (ref 100–108)
CO2 SERPL-SCNC: 24 MMOL/L (ref 21–32)
CREAT SERPL-MCNC: 0.6 MG/DL (ref 0.6–1.3)
EOSINOPHIL # BLD AUTO: 0.06 THOUSAND/ΜL (ref 0–0.61)
EOSINOPHIL NFR BLD AUTO: 1 % (ref 0–6)
ERYTHROCYTE [DISTWIDTH] IN BLOOD BY AUTOMATED COUNT: 17.9 % (ref 11.6–15.1)
GFR SERPL CREATININE-BSD FRML MDRD: 102 ML/MIN/1.73SQ M
GLUCOSE SERPL-MCNC: 101 MG/DL (ref 65–140)
HCT VFR BLD AUTO: 36.1 % (ref 34.8–46.1)
HGB BLD-MCNC: 10.9 G/DL (ref 11.5–15.4)
IMM GRANULOCYTES # BLD AUTO: 0.02 THOUSAND/UL (ref 0–0.2)
IMM GRANULOCYTES NFR BLD AUTO: 0 % (ref 0–2)
LYMPHOCYTES # BLD AUTO: 1.33 THOUSANDS/ΜL (ref 0.6–4.47)
LYMPHOCYTES NFR BLD AUTO: 22 % (ref 14–44)
MCH RBC QN AUTO: 26.6 PG (ref 26.8–34.3)
MCHC RBC AUTO-ENTMCNC: 30.2 G/DL (ref 31.4–37.4)
MCV RBC AUTO: 88 FL (ref 82–98)
MONOCYTES # BLD AUTO: 0.76 THOUSAND/ΜL (ref 0.17–1.22)
MONOCYTES NFR BLD AUTO: 13 % (ref 4–12)
NEUTROPHILS # BLD AUTO: 3.77 THOUSANDS/ΜL (ref 1.85–7.62)
NEUTS SEG NFR BLD AUTO: 63 % (ref 43–75)
NRBC BLD AUTO-RTO: 0 /100 WBCS
PLATELET # BLD AUTO: 386 THOUSANDS/UL (ref 149–390)
PMV BLD AUTO: 9 FL (ref 8.9–12.7)
POTASSIUM SERPL-SCNC: 3.4 MMOL/L (ref 3.5–5.3)
PROT SERPL-MCNC: 7.1 G/DL (ref 6.4–8.2)
RBC # BLD AUTO: 4.1 MILLION/UL (ref 3.81–5.12)
SODIUM SERPL-SCNC: 140 MMOL/L (ref 136–145)
WBC # BLD AUTO: 5.97 THOUSAND/UL (ref 4.31–10.16)

## 2020-06-04 PROCEDURE — 96367 TX/PROPH/DG ADDL SEQ IV INF: CPT

## 2020-06-04 PROCEDURE — 80053 COMPREHEN METABOLIC PANEL: CPT | Performed by: INTERNAL MEDICINE

## 2020-06-04 PROCEDURE — 96401 CHEMO ANTI-NEOPL SQ/IM: CPT

## 2020-06-04 PROCEDURE — 96375 TX/PRO/DX INJ NEW DRUG ADDON: CPT

## 2020-06-04 PROCEDURE — 96413 CHEMO IV INFUSION 1 HR: CPT

## 2020-06-04 PROCEDURE — 85025 COMPLETE CBC W/AUTO DIFF WBC: CPT | Performed by: INTERNAL MEDICINE

## 2020-06-04 RX ORDER — SODIUM CHLORIDE 9 MG/ML
20 INJECTION, SOLUTION INTRAVENOUS ONCE
Status: COMPLETED | OUTPATIENT
Start: 2020-06-04 | End: 2020-06-04

## 2020-06-04 RX ORDER — PALONOSETRON 0.05 MG/ML
0.25 INJECTION, SOLUTION INTRAVENOUS ONCE
Status: COMPLETED | OUTPATIENT
Start: 2020-06-04 | End: 2020-06-04

## 2020-06-04 RX ADMIN — SODIUM CHLORIDE 20 ML/HR: 0.9 INJECTION, SOLUTION INTRAVENOUS at 11:20

## 2020-06-04 RX ADMIN — ADO-TRASTUZUMAB EMTANSINE 260 MG: 20 INJECTION, POWDER, LYOPHILIZED, FOR SOLUTION INTRAVENOUS at 12:58

## 2020-06-04 RX ADMIN — DEXAMETHASONE SODIUM PHOSPHATE 10 MG: 10 INJECTION, SOLUTION INTRAMUSCULAR; INTRAVENOUS at 11:55

## 2020-06-04 RX ADMIN — SODIUM CHLORIDE 150 MG: 0.9 INJECTION, SOLUTION INTRAVENOUS at 12:19

## 2020-06-04 RX ADMIN — DENOSUMAB 120 MG: 120 INJECTION SUBCUTANEOUS at 13:24

## 2020-06-04 RX ADMIN — PALONOSETRON HYDROCHLORIDE 0.25 MG: 0.25 INJECTION, SOLUTION INTRAVENOUS at 12:18

## 2020-06-05 ENCOUNTER — TELEPHONE (OUTPATIENT)
Dept: HEMATOLOGY ONCOLOGY | Facility: CLINIC | Age: 60
End: 2020-06-05

## 2020-06-05 DIAGNOSIS — F51.01 PRIMARY INSOMNIA: ICD-10-CM

## 2020-06-08 ENCOUNTER — TELEPHONE (OUTPATIENT)
Dept: HEMATOLOGY ONCOLOGY | Facility: CLINIC | Age: 60
End: 2020-06-08

## 2020-06-08 DIAGNOSIS — G89.3 CANCER RELATED PAIN: ICD-10-CM

## 2020-06-11 RX ORDER — ZOLPIDEM TARTRATE 10 MG/1
10 TABLET ORAL
Qty: 30 TABLET | Refills: 0 | Status: SHIPPED | OUTPATIENT
Start: 2020-06-11 | End: 2020-08-12 | Stop reason: SDUPTHER

## 2020-06-11 RX ORDER — HYDROMORPHONE HYDROCHLORIDE 8 MG/1
8 TABLET ORAL EVERY 6 HOURS PRN
Qty: 90 TABLET | Refills: 0 | OUTPATIENT
Start: 2020-06-11

## 2020-06-12 ENCOUNTER — TELEPHONE (OUTPATIENT)
Dept: PALLIATIVE MEDICINE | Facility: CLINIC | Age: 60
End: 2020-06-12

## 2020-06-15 ENCOUNTER — SOCIAL WORK (OUTPATIENT)
Dept: PALLIATIVE MEDICINE | Facility: CLINIC | Age: 60
End: 2020-06-15
Payer: MEDICARE

## 2020-06-15 ENCOUNTER — OFFICE VISIT (OUTPATIENT)
Dept: PALLIATIVE MEDICINE | Facility: CLINIC | Age: 60
End: 2020-06-15
Payer: MEDICARE

## 2020-06-15 VITALS
SYSTOLIC BLOOD PRESSURE: 100 MMHG | TEMPERATURE: 97.8 F | HEIGHT: 66 IN | DIASTOLIC BLOOD PRESSURE: 70 MMHG | HEART RATE: 103 BPM | OXYGEN SATURATION: 98 % | BODY MASS INDEX: 24.59 KG/M2 | RESPIRATION RATE: 16 BRPM | WEIGHT: 153 LBS

## 2020-06-15 DIAGNOSIS — R11.2 CINV (CHEMOTHERAPY-INDUCED NAUSEA AND VOMITING): Chronic | ICD-10-CM

## 2020-06-15 DIAGNOSIS — G89.3 CANCER RELATED PAIN: ICD-10-CM

## 2020-06-15 DIAGNOSIS — C79.31 BRAIN METASTASES (HCC): ICD-10-CM

## 2020-06-15 DIAGNOSIS — T40.2X5A THERAPEUTIC OPIOID-INDUCED CONSTIPATION (OIC): Chronic | ICD-10-CM

## 2020-06-15 DIAGNOSIS — C79.51 BONE METASTASES (HCC): ICD-10-CM

## 2020-06-15 DIAGNOSIS — R26.2 AMBULATORY DYSFUNCTION: ICD-10-CM

## 2020-06-15 DIAGNOSIS — G89.3 CANCER-RELATED PAIN: ICD-10-CM

## 2020-06-15 DIAGNOSIS — G47.01 INSOMNIA DUE TO MEDICAL CONDITION: ICD-10-CM

## 2020-06-15 DIAGNOSIS — T45.1X5A CINV (CHEMOTHERAPY-INDUCED NAUSEA AND VOMITING): Chronic | ICD-10-CM

## 2020-06-15 DIAGNOSIS — Z71.6 ENCOUNTER FOR SMOKING CESSATION COUNSELING: ICD-10-CM

## 2020-06-15 DIAGNOSIS — K59.03 THERAPEUTIC OPIOID-INDUCED CONSTIPATION (OIC): Chronic | ICD-10-CM

## 2020-06-15 DIAGNOSIS — R64 MALIGNANT CACHEXIA (HCC): Chronic | ICD-10-CM

## 2020-06-15 DIAGNOSIS — K59.09 CHRONIC CONSTIPATION WITH OVERFLOW: ICD-10-CM

## 2020-06-15 DIAGNOSIS — Z51.5 PALLIATIVE CARE PATIENT: Primary | ICD-10-CM

## 2020-06-15 DIAGNOSIS — Z71.89 COUNSELING AND COORDINATION OF CARE: Primary | ICD-10-CM

## 2020-06-15 PROBLEM — R73.9 HYPERGLYCEMIA: Status: RESOLVED | Noted: 2018-02-06 | Resolved: 2020-06-15

## 2020-06-15 PROBLEM — Z72.0 TOBACCO ABUSE: Chronic | Status: ACTIVE | Noted: 2018-09-24

## 2020-06-15 PROBLEM — E87.6 HYPOKALEMIA: Status: RESOLVED | Noted: 2020-03-10 | Resolved: 2020-06-15

## 2020-06-15 PROBLEM — L85.3 DRY SKIN: Status: RESOLVED | Noted: 2018-02-06 | Resolved: 2020-06-15

## 2020-06-15 PROBLEM — R42 DIZZINESS: Status: RESOLVED | Noted: 2018-02-06 | Resolved: 2020-06-15

## 2020-06-15 PROCEDURE — NC001 PR NO CHARGE

## 2020-06-15 PROCEDURE — 99215 OFFICE O/P EST HI 40 MIN: CPT | Performed by: FAMILY MEDICINE

## 2020-06-15 RX ORDER — SENNOSIDES 8.6 MG
1 TABLET ORAL 2 TIMES DAILY
Qty: 60 EACH | Refills: 11 | Status: SHIPPED | OUTPATIENT
Start: 2020-06-15 | End: 2020-08-12 | Stop reason: ALTCHOICE

## 2020-06-15 RX ORDER — PANTOPRAZOLE SODIUM 40 MG/1
40 TABLET, DELAYED RELEASE ORAL DAILY
Qty: 90 TABLET | Refills: 3 | Status: SHIPPED | OUTPATIENT
Start: 2020-06-15 | End: 2021-04-05 | Stop reason: SDUPTHER

## 2020-06-15 RX ORDER — HYDROMORPHONE HYDROCHLORIDE 8 MG/1
8 TABLET ORAL EVERY 6 HOURS PRN
Qty: 90 TABLET | Refills: 0 | Status: SHIPPED | OUTPATIENT
Start: 2020-06-15 | End: 2020-08-18

## 2020-06-15 RX ORDER — ONDANSETRON 4 MG/1
4 TABLET, FILM COATED ORAL
Qty: 120 TABLET | Refills: 1 | Status: SHIPPED | OUTPATIENT
Start: 2020-06-15 | End: 2020-08-18 | Stop reason: SDUPTHER

## 2020-06-26 RX ORDER — PALONOSETRON 0.05 MG/ML
0.25 INJECTION, SOLUTION INTRAVENOUS ONCE
Status: CANCELLED | OUTPATIENT
Start: 2020-07-01

## 2020-06-26 RX ORDER — SODIUM CHLORIDE 9 MG/ML
20 INJECTION, SOLUTION INTRAVENOUS ONCE
Status: CANCELLED | OUTPATIENT
Start: 2020-07-01

## 2020-07-01 ENCOUNTER — HOSPITAL ENCOUNTER (OUTPATIENT)
Dept: INFUSION CENTER | Facility: HOSPITAL | Age: 60
Discharge: HOME | End: 2020-07-01
Attending: INTERNAL MEDICINE
Payer: MEDICARE

## 2020-07-01 VITALS
SYSTOLIC BLOOD PRESSURE: 113 MMHG | TEMPERATURE: 98.4 F | RESPIRATION RATE: 20 BRPM | OXYGEN SATURATION: 95 % | HEART RATE: 82 BPM | HEIGHT: 66 IN | BODY MASS INDEX: 23.99 KG/M2 | DIASTOLIC BLOOD PRESSURE: 68 MMHG | WEIGHT: 149.25 LBS

## 2020-07-01 DIAGNOSIS — C50.911 BILATERAL MALIGNANT NEOPLASM OF BREAST IN FEMALE, ESTROGEN RECEPTOR POSITIVE, UNSPECIFIED SITE OF BREAST (HCC): Primary | ICD-10-CM

## 2020-07-01 DIAGNOSIS — C50.912 BILATERAL MALIGNANT NEOPLASM OF BREAST IN FEMALE, ESTROGEN RECEPTOR POSITIVE, UNSPECIFIED SITE OF BREAST (HCC): Primary | ICD-10-CM

## 2020-07-01 DIAGNOSIS — Z17.0 BILATERAL MALIGNANT NEOPLASM OF BREAST IN FEMALE, ESTROGEN RECEPTOR POSITIVE, UNSPECIFIED SITE OF BREAST (HCC): Primary | ICD-10-CM

## 2020-07-01 LAB
ALBUMIN SERPL BCP-MCNC: 2.7 G/DL (ref 3.5–5)
ALP SERPL-CCNC: 144 U/L (ref 46–116)
ALT SERPL W P-5'-P-CCNC: 24 U/L (ref 12–78)
ANION GAP SERPL CALCULATED.3IONS-SCNC: 5 MMOL/L (ref 4–13)
AST SERPL W P-5'-P-CCNC: 30 U/L (ref 5–45)
BASOPHILS # BLD AUTO: 0.02 THOUSANDS/ΜL (ref 0–0.1)
BASOPHILS NFR BLD AUTO: 0 % (ref 0–1)
BILIRUB SERPL-MCNC: 0.31 MG/DL (ref 0.2–1)
BUN SERPL-MCNC: 6 MG/DL (ref 5–25)
CALCIUM SERPL-MCNC: 9.1 MG/DL (ref 8.3–10.1)
CHLORIDE SERPL-SCNC: 108 MMOL/L (ref 100–108)
CO2 SERPL-SCNC: 27 MMOL/L (ref 21–32)
CREAT SERPL-MCNC: 0.48 MG/DL (ref 0.6–1.3)
EOSINOPHIL # BLD AUTO: 0.06 THOUSAND/ΜL (ref 0–0.61)
EOSINOPHIL NFR BLD AUTO: 1 % (ref 0–6)
ERYTHROCYTE [DISTWIDTH] IN BLOOD BY AUTOMATED COUNT: 19 % (ref 11.6–15.1)
GFR SERPL CREATININE-BSD FRML MDRD: 109 ML/MIN/1.73SQ M
GLUCOSE SERPL-MCNC: 97 MG/DL (ref 65–140)
HCT VFR BLD AUTO: 35.6 % (ref 34.8–46.1)
HGB BLD-MCNC: 10.9 G/DL (ref 11.5–15.4)
IMM GRANULOCYTES # BLD AUTO: 0.02 THOUSAND/UL (ref 0–0.2)
IMM GRANULOCYTES NFR BLD AUTO: 0 % (ref 0–2)
LYMPHOCYTES # BLD AUTO: 1.07 THOUSANDS/ΜL (ref 0.6–4.47)
LYMPHOCYTES NFR BLD AUTO: 15 % (ref 14–44)
MCH RBC QN AUTO: 27.1 PG (ref 26.8–34.3)
MCHC RBC AUTO-ENTMCNC: 30.6 G/DL (ref 31.4–37.4)
MCV RBC AUTO: 89 FL (ref 82–98)
MONOCYTES # BLD AUTO: 0.84 THOUSAND/ΜL (ref 0.17–1.22)
MONOCYTES NFR BLD AUTO: 12 % (ref 4–12)
NEUTROPHILS # BLD AUTO: 5.08 THOUSANDS/ΜL (ref 1.85–7.62)
NEUTS SEG NFR BLD AUTO: 72 % (ref 43–75)
NRBC BLD AUTO-RTO: 0 /100 WBCS
PLATELET # BLD AUTO: 391 THOUSANDS/UL (ref 149–390)
PMV BLD AUTO: 9.2 FL (ref 8.9–12.7)
POTASSIUM SERPL-SCNC: 3.3 MMOL/L (ref 3.5–5.3)
PROT SERPL-MCNC: 7.1 G/DL (ref 6.4–8.2)
RBC # BLD AUTO: 4.02 MILLION/UL (ref 3.81–5.12)
SODIUM SERPL-SCNC: 140 MMOL/L (ref 136–145)
WBC # BLD AUTO: 7.09 THOUSAND/UL (ref 4.31–10.16)

## 2020-07-01 PROCEDURE — 96413 CHEMO IV INFUSION 1 HR: CPT

## 2020-07-01 PROCEDURE — 80053 COMPREHEN METABOLIC PANEL: CPT | Performed by: INTERNAL MEDICINE

## 2020-07-01 PROCEDURE — 96367 TX/PROPH/DG ADDL SEQ IV INF: CPT

## 2020-07-01 PROCEDURE — 85025 COMPLETE CBC W/AUTO DIFF WBC: CPT | Performed by: INTERNAL MEDICINE

## 2020-07-01 PROCEDURE — 96375 TX/PRO/DX INJ NEW DRUG ADDON: CPT

## 2020-07-01 RX ORDER — SODIUM CHLORIDE 9 MG/ML
20 INJECTION, SOLUTION INTRAVENOUS ONCE
Status: COMPLETED | OUTPATIENT
Start: 2020-07-01 | End: 2020-07-01

## 2020-07-01 RX ORDER — PALONOSETRON 0.05 MG/ML
0.25 INJECTION, SOLUTION INTRAVENOUS ONCE
Status: COMPLETED | OUTPATIENT
Start: 2020-07-01 | End: 2020-07-01

## 2020-07-01 RX ADMIN — ADO-TRASTUZUMAB EMTANSINE 260 MG: 20 INJECTION, POWDER, LYOPHILIZED, FOR SOLUTION INTRAVENOUS at 12:20

## 2020-07-01 RX ADMIN — PALONOSETRON HYDROCHLORIDE 0.25 MG: 0.25 INJECTION, SOLUTION INTRAVENOUS at 11:08

## 2020-07-01 RX ADMIN — DEXAMETHASONE SODIUM PHOSPHATE 10 MG: 10 INJECTION, SOLUTION INTRAMUSCULAR; INTRAVENOUS at 11:08

## 2020-07-01 RX ADMIN — SODIUM CHLORIDE 20 ML/HR: 0.9 INJECTION, SOLUTION INTRAVENOUS at 11:08

## 2020-07-01 RX ADMIN — FOSAPREPITANT DIMEGLUMINE 150 MG: 150 INJECTION, POWDER, LYOPHILIZED, FOR SOLUTION INTRAVENOUS at 11:46

## 2020-07-02 ENCOUNTER — HOSPITAL ENCOUNTER (EMERGENCY)
Facility: HOSPITAL | Age: 60
Discharge: HOME | End: 2020-07-02
Attending: EMERGENCY MEDICINE | Admitting: EMERGENCY MEDICINE
Payer: MEDICARE

## 2020-07-02 ENCOUNTER — TELEPHONE (OUTPATIENT)
Dept: HEMATOLOGY ONCOLOGY | Facility: CLINIC | Age: 60
End: 2020-07-02

## 2020-07-02 ENCOUNTER — APPOINTMENT (EMERGENCY)
Dept: CT IMAGING | Facility: HOSPITAL | Age: 60
End: 2020-07-02
Payer: MEDICARE

## 2020-07-02 VITALS
OXYGEN SATURATION: 99 % | SYSTOLIC BLOOD PRESSURE: 157 MMHG | RESPIRATION RATE: 16 BRPM | WEIGHT: 155.42 LBS | DIASTOLIC BLOOD PRESSURE: 83 MMHG | HEART RATE: 86 BPM | BODY MASS INDEX: 25.1 KG/M2 | TEMPERATURE: 97.9 F

## 2020-07-02 DIAGNOSIS — C50.919 METASTATIC BREAST CANCER (HCC): ICD-10-CM

## 2020-07-02 DIAGNOSIS — R53.1 GENERALIZED WEAKNESS: Primary | ICD-10-CM

## 2020-07-02 LAB
ALBUMIN SERPL BCP-MCNC: 2.7 G/DL (ref 3.5–5)
ALP SERPL-CCNC: 136 U/L (ref 46–116)
ALT SERPL W P-5'-P-CCNC: 29 U/L (ref 12–78)
ANION GAP SERPL CALCULATED.3IONS-SCNC: 9 MMOL/L (ref 4–13)
APTT PPP: 29 SECONDS (ref 23–37)
AST SERPL W P-5'-P-CCNC: 37 U/L (ref 5–45)
BASOPHILS # BLD AUTO: 0.02 THOUSANDS/ΜL (ref 0–0.1)
BASOPHILS NFR BLD AUTO: 0 % (ref 0–1)
BILIRUB SERPL-MCNC: 0.14 MG/DL (ref 0.2–1)
BUN SERPL-MCNC: 8 MG/DL (ref 5–25)
CALCIUM SERPL-MCNC: 8.7 MG/DL (ref 8.3–10.1)
CHLORIDE SERPL-SCNC: 102 MMOL/L (ref 100–108)
CO2 SERPL-SCNC: 28 MMOL/L (ref 21–32)
CREAT SERPL-MCNC: 0.71 MG/DL (ref 0.6–1.3)
EOSINOPHIL # BLD AUTO: 0 THOUSAND/ΜL (ref 0–0.61)
EOSINOPHIL NFR BLD AUTO: 0 % (ref 0–6)
ERYTHROCYTE [DISTWIDTH] IN BLOOD BY AUTOMATED COUNT: 19.1 % (ref 11.6–15.1)
GFR SERPL CREATININE-BSD FRML MDRD: 95 ML/MIN/1.73SQ M
GLUCOSE SERPL-MCNC: 104 MG/DL (ref 65–140)
HCT VFR BLD AUTO: 32.4 % (ref 34.8–46.1)
HGB BLD-MCNC: 10.2 G/DL (ref 11.5–15.4)
IMM GRANULOCYTES # BLD AUTO: 0.05 THOUSAND/UL (ref 0–0.2)
IMM GRANULOCYTES NFR BLD AUTO: 1 % (ref 0–2)
INR PPP: 1.07 (ref 0.84–1.19)
LYMPHOCYTES # BLD AUTO: 1.06 THOUSANDS/ΜL (ref 0.6–4.47)
LYMPHOCYTES NFR BLD AUTO: 10 % (ref 14–44)
MCH RBC QN AUTO: 27.6 PG (ref 26.8–34.3)
MCHC RBC AUTO-ENTMCNC: 31.5 G/DL (ref 31.4–37.4)
MCV RBC AUTO: 88 FL (ref 82–98)
MONOCYTES # BLD AUTO: 1 THOUSAND/ΜL (ref 0.17–1.22)
MONOCYTES NFR BLD AUTO: 9 % (ref 4–12)
NEUTROPHILS # BLD AUTO: 8.46 THOUSANDS/ΜL (ref 1.85–7.62)
NEUTS SEG NFR BLD AUTO: 80 % (ref 43–75)
NRBC BLD AUTO-RTO: 0 /100 WBCS
PLATELET # BLD AUTO: 332 THOUSANDS/UL (ref 149–390)
PMV BLD AUTO: 9 FL (ref 8.9–12.7)
POTASSIUM SERPL-SCNC: 3.5 MMOL/L (ref 3.5–5.3)
PROT SERPL-MCNC: 6.7 G/DL (ref 6.4–8.2)
PROTHROMBIN TIME: 13.3 SECONDS (ref 11.6–14.5)
RBC # BLD AUTO: 3.69 MILLION/UL (ref 3.81–5.12)
SODIUM SERPL-SCNC: 139 MMOL/L (ref 136–145)
WBC # BLD AUTO: 10.59 THOUSAND/UL (ref 4.31–10.16)

## 2020-07-02 PROCEDURE — 72128 CT CHEST SPINE W/O DYE: CPT

## 2020-07-02 PROCEDURE — 85610 PROTHROMBIN TIME: CPT | Performed by: EMERGENCY MEDICINE

## 2020-07-02 PROCEDURE — 99284 EMERGENCY DEPT VISIT MOD MDM: CPT

## 2020-07-02 PROCEDURE — 96374 THER/PROPH/DIAG INJ IV PUSH: CPT

## 2020-07-02 PROCEDURE — 80053 COMPREHEN METABOLIC PANEL: CPT | Performed by: EMERGENCY MEDICINE

## 2020-07-02 PROCEDURE — 99285 EMERGENCY DEPT VISIT HI MDM: CPT | Performed by: EMERGENCY MEDICINE

## 2020-07-02 PROCEDURE — 36415 COLL VENOUS BLD VENIPUNCTURE: CPT | Performed by: EMERGENCY MEDICINE

## 2020-07-02 PROCEDURE — 85730 THROMBOPLASTIN TIME PARTIAL: CPT | Performed by: EMERGENCY MEDICINE

## 2020-07-02 PROCEDURE — 72131 CT LUMBAR SPINE W/O DYE: CPT

## 2020-07-02 PROCEDURE — 85025 COMPLETE CBC W/AUTO DIFF WBC: CPT | Performed by: EMERGENCY MEDICINE

## 2020-07-02 RX ORDER — HYDROMORPHONE HCL/PF 1 MG/ML
1 SYRINGE (ML) INJECTION ONCE
Status: COMPLETED | OUTPATIENT
Start: 2020-07-02 | End: 2020-07-02

## 2020-07-02 RX ADMIN — HYDROMORPHONE HYDROCHLORIDE 1 MG: 1 INJECTION, SOLUTION INTRAMUSCULAR; INTRAVENOUS; SUBCUTANEOUS at 14:34

## 2020-07-02 NOTE — ED NOTES
Family member at bedside states pt was unable to get up from the toilet  Very weak requiring assistance       Prasanna Valdivia RN  07/02/20 9227

## 2020-07-02 NOTE — TELEPHONE ENCOUNTER
Spoke with Arron Mccauley,  She is going to go to the ED as soon as her  comes home     Will notify Dr Chris Lombardo

## 2020-07-02 NOTE — ED PROVIDER NOTES
History  Chief Complaint   Patient presents with    Fatigue     weak, unable to ambulate       History provided by:  Patient  Fatigue   Severity:  Severe  Onset quality:  Gradual  Duration:  2 days  Progression:  Worsening  Chronicity:  New  Context comment:  Patient with known metastatic breast cancer increasing weakness increasing back pain, today come get off the toilet due to combination of worsening pain and weakness in her legs  Relieved by:  None tried  Worsened by:  Nothing  Ineffective treatments:  None tried  Associated symptoms: extremity numbness    Associated symptoms: no abdominal pain, no chest pain, no cough, no diarrhea, no dizziness, no dysuria, no falls, no fever, no frequency, no headaches, no nausea, no shortness of breath and no vomiting        Prior to Admission Medications   Prescriptions Last Dose Informant Patient Reported? Taking? HYDROmorphone (Dilaudid) 8 MG tablet   No No   Sig: Take 1 tablet (8 mg total) by mouth every 6 (six) hours as needed for moderate pain 1 tab PO every 6 hours as needed  Max use of 4 tablets in 24 hours  Max Daily Amount: 32 mg   Misc  Devices (QUAD CANE) MISC  Self No No   Sig: by Does not apply route daily   albuterol (PROVENTIL HFA,VENTOLIN HFA) 90 mcg/act inhaler  Self No No   Sig: TAKE 2 PUFFS BY MOUTH EVERY 6 HOURS AS NEEDED FOR WHEEZING   diclofenac (VOLTAREN) 75 mg EC tablet  Self No No   Sig: Take 1 tablet (75 mg total) by mouth daily as needed (pain)   gabapentin (NEURONTIN) 800 mg tablet   No No   Sig: TAKE 1 TABLET (800 MG TOTAL) BY MOUTH 4 (FOUR) TIMES A DAY   lactulose 20 g/30 mL  Self No No   SimL PO daily    May take an extra dose up to 3x per day as needed for constipation   nicotine polacrilex (NICORETTE) 2 mg gum   No No   Sig: Chew 1 each (2 mg total) as needed for smoking cessation   ondansetron (ZOFRAN) 4 mg tablet   No No   Sig: Take 1 tablet (4 mg total) by mouth 4 (four) times a day (before meals and at bedtime) For cancer nausea pantoprazole (PROTONIX) 40 mg tablet   No No   Sig: Take 1 tablet (40 mg total) by mouth daily Every morning, before coffee  promethazine (PHENERGAN) 25 mg tablet   No No   Sig: Take 1 tablet (25 mg total) by mouth every 6 (six) hours as needed for nausea or vomiting   senna (SENOKOT) 8 6 mg   No No   Sig: Take 1 tablet (8 6 mg total) by mouth 2 (two) times a day Hold for loose stool     zolpidem (AMBIEN) 10 mg tablet   No No   Sig: Take 1 tablet (10 mg total) by mouth daily at bedtime as needed for sleep      Facility-Administered Medications: None       Past Medical History:   Diagnosis Date    Dehydration 6/11/2019    Dizziness 2/6/2018    Dry skin 2/6/2018    H/O bilateral mastectomy 2/15/2016    Hyperglycemia 2/6/2018    Hypertension 2/15/2016    Hypokalemia 3/10/2020    Lymphedema 2/15/2016    Malignant neoplasm of right breast ([facility] [address] ) 2/15/2016    Metastatic breast cancer [facility])        Past Surgical History:   Procedure Laterality Date    ENDOBRONCHIAL ULTRASOUND (EBUS) N/A 2/16/2016    Procedure: EBUS;  Surgeon: Drew Patel MD;  [facility];  Service:     MASTECTOMY Bilateral     MASTECTOMY Bilateral     right arm edema    OOPHORECTOMY      IN BRONCHOSCOPY NEEDLE BX TRACHEA MAIN STEM&/BRON N/A 2/16/2016    Procedure: Iona Whittaker;  Surgeon: Drew Patel MD;  Location[facility];  Service: Thoracic    IN INSJ TUNNELED CTR VAD W/SUBQ PORT AGE 5 YR/> N/A 7/24/2018    Procedure: INSERTION VENOUS PORT ( PORT-A-CATH) IR;  Surgeon: Bhumika Mcrae DO;  [facility];  Service: Interventional Radiology    IN STEREOTACTIC RADIOSURGERY, CRANIAL,SIMPLE,EA ADD  5/3/2017         IN STEREOTACTIC RADIOSURGERY, CRANIAL,SIMPLE,EA ADD  5/3/2017         IN STEREOTACTIC RADIOSURGERY, CRANIAL,SIMPLE,SINGLE  5/3/2017            Family History   Problem Relation Age of Onset    Cancer Sister     Prostate cancer Brother      I have reviewed and agree with the history as documented  E-Cigarette/Vaping    E-Cigarette Use Never User      E-Cigarette/Vaping Substances    Nicotine Yes     THC No     CBD No     Flavoring No     Other No     Unknown No      Social History     Tobacco Use    Smoking status: Current Every Day Smoker     Packs/day: 0 50     Years: 40 00     Pack years: 20 00     Types: Cigarettes     Start date: 1978    Smokeless tobacco: Never Used   Substance Use Topics    Alcohol use: Yes     Frequency: 2-4 times a month     Drinks per session: 3 or 4     Binge frequency: Less than monthly     Comment: once a week    Drug use: Not Currently     Types: Marijuana       Review of Systems   Constitutional: Positive for fatigue  Negative for activity change, chills, diaphoresis and fever  HENT: Negative for congestion, sinus pressure and sore throat  Eyes: Negative for pain and visual disturbance  Respiratory: Negative for cough, chest tightness, shortness of breath, wheezing and stridor  Cardiovascular: Negative for chest pain and palpitations  Gastrointestinal: Negative for abdominal distention, abdominal pain, constipation, diarrhea, nausea and vomiting  Genitourinary: Negative for dysuria and frequency  Musculoskeletal: Negative for falls, neck pain and neck stiffness  Skin: Negative for rash  Neurological: Positive for weakness and numbness  Negative for dizziness, speech difficulty, light-headedness and headaches  Physical Exam  Physical Exam   Constitutional: She is oriented to person, place, and time  No distress  Frail, appears older than stated age, chronically ill in appearance   HENT:   Head: Normocephalic and atraumatic  Eyes: Pupils are equal, round, and reactive to light  Neck: Normal range of motion  Neck supple  No tracheal deviation present  Cardiovascular: Normal rate, regular rhythm, normal heart sounds and intact distal pulses  No murmur heard    Pulmonary/Chest: Effort normal and breath sounds normal  No stridor  No respiratory distress  Abdominal: Soft  She exhibits no distension  There is no tenderness  There is no rebound and no guarding  Musculoskeletal: Normal range of motion  She exhibits no tenderness or deformity  Bilateral lower extremity weakness   Neurological: She is alert and oriented to person, place, and time  Decreased sensation bilateral lower extremities   Skin: Skin is warm and dry  She is not diaphoretic  No erythema  No pallor  Psychiatric: She has a normal mood and affect  Vitals reviewed        Vital Signs  ED Triage Vitals [07/02/20 1309]   Temperature Pulse Respirations Blood Pressure SpO2   97 9 °F (36 6 °C) 81 16 132/73 96 %      Temp Source Heart Rate Source Patient Position - Orthostatic VS BP Location FiO2 (%)   Oral Monitor Sitting Left arm --      Pain Score       7           Vitals:    07/02/20 1309   BP: 132/73   Pulse: 81   Patient Position - Orthostatic VS: Sitting         Visual Acuity      ED Medications  Medications   HYDROmorphone (DILAUDID) injection 1 mg (1 mg Intravenous Given 7/2/20 1434)       Diagnostic Studies  Results Reviewed     Procedure Component Value Units Date/Time    Comprehensive metabolic panel [[id number]]  (Abnormal) Collected:  07/02/20 1435    Lab Status:  Final result Specimen:  Blood from Line, Venous Updated:  07/02/20 1514     Sodium 139 mmol/L      Potassium 3 5 mmol/L      Chloride 102 mmol/L      CO2 28 mmol/L      ANION GAP 9 mmol/L      BUN 8 mg/dL      Creatinine 0 71 mg/dL      Glucose 104 mg/dL      Calcium 8 7 mg/dL      AST 37 U/L      ALT 29 U/L      Alkaline Phosphatase 136 U/L      Total Protein 6 7 g/dL      Albumin 2 7 g/dL      Total Bilirubin 0 14 mg/dL      eGFR 95 ml/min/1 73sq m     Narrative:       [place] guidelines for Chronic Kidney Disease (CKD):     Stage 1 with normal or high GFR (GFR > 90 mL/min/1 73 square meters)    Stage 2 Mild CKD (GFR = 60-89 mL/min/1 73 square meters)    Stage 3A Moderate CKD (GFR = 45-59 mL/min/1 73 square meters)    Stage 3B Moderate CKD (GFR = 30-44 mL/min/1 73 square meters)    Stage 4 Severe CKD (GFR = 15-29 mL/min/1 73 square meters)    Stage 5 End Stage CKD (GFR <15 mL/min/1 73 square meters)  Note: GFR calculation is accurate only with a steady state creatinine    CBC and differential [[id number]]  (Abnormal) Collected:  07/02/20 1435    Lab Status:  Final result Specimen:  Blood from Line, Venous Updated:  07/02/20 1500     WBC 10 59 Thousand/uL      RBC 3 69 Million/uL      Hemoglobin 10 2 g/dL      Hematocrit 32 4 %      MCV 88 fL      MCH 27 6 pg      MCHC 31 5 g/dL      RDW 19 1 %      MPV 9 0 fL      Platelets 532 Thousands/uL      nRBC 0 /100 WBCs      Neutrophils Relative 80 %      Immat GRANS % 1 %      Lymphocytes Relative 10 %      Monocytes Relative 9 %      Eosinophils Relative 0 %      Basophils Relative 0 %      Neutrophils Absolute 8 46 Thousands/µL      Immature Grans Absolute 0 05 Thousand/uL      Lymphocytes Absolute 1 06 Thousands/µL      Monocytes Absolute 1 00 Thousand/µL      Eosinophils Absolute 0 00 Thousand/µL      Basophils Absolute 0 02 Thousands/µL     Protime-INR [[id number]]  (Normal) Collected:  07/02/20 1435    Lab Status:  Final result Specimen:  Blood from Line, Venous Updated:  07/02/20 1456     Protime 13 3 seconds      INR 1 07    APTT [[id number]]  (Normal) Collected:  07/02/20 1435    Lab Status:  Final result Specimen:  Blood from Line, Venous Updated:  07/02/20 1456     PTT 29 seconds                  CT spine thoracic & lumbar wo contrast   Final Result by Luis A Urban MD (07/02 1439)         1  No acute abnormality identified in the thoracic or lumbar spine  2   Mild degenerative change as noted, stable since the prior exam    3   Nonspecific small sclerotic foci in the T9, T11, and T12 vertebrae, unchanged from the prior exam    4   Additional findings as noted                    Workstation performed: [id number] Procedures  Procedures         ED Course  ED Course as of Jul 02 1539   Thu Jul 02, 2020   1538 Long conversation with patient and , patient feels improved with IV fluids, pain medication, recommended/offered admission to hospital for PT, ambulatory dysfunction, etc , patient does not want this   states that they have wheelchair ramp set up at home this is been more of a gradual decline in that he has observed her being more more in a wheelchair now he is recognizing that she has difficulty even just standing, they will range for family to be home 24 hours a day the she does not want to be admitted to the hospital   Patient to be discharged  MDM  Number of Diagnoses or Management Options  Generalized weakness: new and requires workup  Metastatic breast cancer [facility]): minor  Diagnosis management comments:       Initial ED assessment:  26-year-old metastatic breast cancer, increasing lower extremity weakness difficulty ambulating today sat on toilet could not get back up      Initial DDx includes but is not limited to:   Osseous lesions of the spine great fractures, electrolyte abnormality, renal failure    Initial ED plan:   CT scan of the spine, blood work, pain controlling medications, disposition pending workup       Amount and/or Complexity of Data Reviewed  Clinical lab tests: ordered and reviewed  Tests in the radiology section of CPT®: ordered and reviewed  Decide to obtain previous medical records or to obtain history from someone other than the patient: yes  Obtain history from someone other than the patient: yes  Review and summarize past medical records: yes  Independent visualization of images, tracings, or specimens: yes          Disposition  Final diagnoses:   Generalized weakness   Metastatic breast cancer ([facility] [address] )     Time reflects when diagnosis was documented in both MDM as applicable and the Disposition within this note Time User Action Codes Description Comment    7/2/2020  3:39 PM Ardia Reus Add [R53 1] Generalized weakness     7/2/2020  3:39 PM Ardia Reus Add [C50 919] Metastatic breast cancer [facility])       ED Disposition     ED Disposition Condition Date/Time Comment    Discharge Stable Thu Jul 2, 2020  3:39 PM Toro Perish discharge to home/self care  Follow-up Information    None         Patient's Medications   Discharge Prescriptions    No medications on file     No discharge procedures on file      PDMP Review       Value Time User    PDMP Reviewed  Yes 4/29/2020  1:01 PM Danielle Sorto          ED Provider  Electronically Signed by           Ariana Hsieh DO  07/02/20 [id number]

## 2020-07-02 NOTE — TELEPHONE ENCOUNTER
Patient called to report that she is experiencing continuous dizziness that started this morning  She reports that she is experiencing new total bilateral leg numbness  and is unable to walk more than a few steps  Patient denies chest pain or SOB  Patient advised to go to ER for evaluation  Patient will be going to [address] [place] [address] for evaluation

## 2020-07-02 NOTE — ED NOTES
PT awake and alert, no distress noted  No other questions upon d/c       April Durga Fuentes RN  07/02/20 9456

## 2020-07-21 ENCOUNTER — TELEPHONE (OUTPATIENT)
Dept: PALLIATIVE MEDICINE | Facility: CLINIC | Age: 60
End: 2020-07-21

## 2020-07-28 ENCOUNTER — TELEPHONE (OUTPATIENT)
Dept: HEMATOLOGY ONCOLOGY | Facility: CLINIC | Age: 60
End: 2020-07-28

## 2020-07-28 DIAGNOSIS — Z17.0 BILATERAL MALIGNANT NEOPLASM OF BREAST IN FEMALE, ESTROGEN RECEPTOR POSITIVE, UNSPECIFIED SITE OF BREAST (HCC): Primary | ICD-10-CM

## 2020-07-28 DIAGNOSIS — C50.912 BILATERAL MALIGNANT NEOPLASM OF BREAST IN FEMALE, ESTROGEN RECEPTOR POSITIVE, UNSPECIFIED SITE OF BREAST (HCC): Primary | ICD-10-CM

## 2020-07-28 DIAGNOSIS — C50.911 BILATERAL MALIGNANT NEOPLASM OF BREAST IN FEMALE, ESTROGEN RECEPTOR POSITIVE, UNSPECIFIED SITE OF BREAST (HCC): Primary | ICD-10-CM

## 2020-07-28 RX ORDER — SODIUM CHLORIDE 9 MG/ML
20 INJECTION, SOLUTION INTRAVENOUS ONCE
Status: CANCELLED | OUTPATIENT
Start: 2020-07-29

## 2020-07-28 RX ORDER — PALONOSETRON 0.05 MG/ML
0.25 INJECTION, SOLUTION INTRAVENOUS ONCE
Status: CANCELLED | OUTPATIENT
Start: 2020-07-29

## 2020-07-28 NOTE — TELEPHONE ENCOUNTER
Called to speak with patient to see if she will be going to her infusion tomorrow  Patient explained she is planning on going for her treatment  She is feeling better since her ED visit  She will be reaching out to palliative care regarding her gabapentin Rx

## 2020-07-29 ENCOUNTER — HOSPITAL ENCOUNTER (OUTPATIENT)
Dept: INFUSION CENTER | Facility: CLINIC | Age: 60
Discharge: HOME | End: 2020-07-29
Payer: MEDICARE

## 2020-07-29 VITALS
BODY MASS INDEX: 24.11 KG/M2 | DIASTOLIC BLOOD PRESSURE: 68 MMHG | HEIGHT: 66 IN | OXYGEN SATURATION: 99 % | SYSTOLIC BLOOD PRESSURE: 118 MMHG | WEIGHT: 150 LBS | HEART RATE: 86 BPM | TEMPERATURE: 97.9 F | RESPIRATION RATE: 18 BRPM

## 2020-07-29 DIAGNOSIS — T45.1X5A CHEMOTHERAPY-INDUCED NAUSEA: ICD-10-CM

## 2020-07-29 DIAGNOSIS — G89.3 CANCER RELATED PAIN: ICD-10-CM

## 2020-07-29 DIAGNOSIS — Z17.0 BILATERAL MALIGNANT NEOPLASM OF BREAST IN FEMALE, ESTROGEN RECEPTOR POSITIVE, UNSPECIFIED SITE OF BREAST (HCC): Primary | ICD-10-CM

## 2020-07-29 DIAGNOSIS — R11.0 CHEMOTHERAPY-INDUCED NAUSEA: ICD-10-CM

## 2020-07-29 DIAGNOSIS — C79.51 BONE METASTASES (HCC): ICD-10-CM

## 2020-07-29 DIAGNOSIS — C50.911 BILATERAL MALIGNANT NEOPLASM OF BREAST IN FEMALE, ESTROGEN RECEPTOR POSITIVE, UNSPECIFIED SITE OF BREAST (HCC): Primary | ICD-10-CM

## 2020-07-29 DIAGNOSIS — C50.912 BILATERAL MALIGNANT NEOPLASM OF BREAST IN FEMALE, ESTROGEN RECEPTOR POSITIVE, UNSPECIFIED SITE OF BREAST (HCC): Primary | ICD-10-CM

## 2020-07-29 LAB
ALBUMIN SERPL BCP-MCNC: 2.9 G/DL (ref 3.5–5)
ALP SERPL-CCNC: 142 U/L (ref 46–116)
ALT SERPL W P-5'-P-CCNC: 25 U/L (ref 12–78)
ANION GAP SERPL CALCULATED.3IONS-SCNC: 9 MMOL/L (ref 4–13)
AST SERPL W P-5'-P-CCNC: 23 U/L (ref 5–45)
BASOPHILS # BLD AUTO: 0.02 THOUSANDS/ΜL (ref 0–0.1)
BASOPHILS NFR BLD AUTO: 0 % (ref 0–1)
BILIRUB SERPL-MCNC: 0.24 MG/DL (ref 0.2–1)
BUN SERPL-MCNC: 7 MG/DL (ref 5–25)
CALCIUM SERPL-MCNC: 8.7 MG/DL (ref 8.3–10.1)
CHLORIDE SERPL-SCNC: 107 MMOL/L (ref 100–108)
CO2 SERPL-SCNC: 27 MMOL/L (ref 21–32)
CREAT SERPL-MCNC: 0.57 MG/DL (ref 0.6–1.3)
EOSINOPHIL # BLD AUTO: 0.14 THOUSAND/ΜL (ref 0–0.61)
EOSINOPHIL NFR BLD AUTO: 2 % (ref 0–6)
ERYTHROCYTE [DISTWIDTH] IN BLOOD BY AUTOMATED COUNT: 19.6 % (ref 11.6–15.1)
GFR SERPL CREATININE-BSD FRML MDRD: 103 ML/MIN/1.73SQ M
GLUCOSE SERPL-MCNC: 86 MG/DL (ref 65–140)
HCT VFR BLD AUTO: 34.6 % (ref 34.8–46.1)
HGB BLD-MCNC: 10.9 G/DL (ref 11.5–15.4)
IMM GRANULOCYTES # BLD AUTO: 0.02 THOUSAND/UL (ref 0–0.2)
IMM GRANULOCYTES NFR BLD AUTO: 0 % (ref 0–2)
LYMPHOCYTES # BLD AUTO: 1.5 THOUSANDS/ΜL (ref 0.6–4.47)
LYMPHOCYTES NFR BLD AUTO: 24 % (ref 14–44)
MCH RBC QN AUTO: 28.2 PG (ref 26.8–34.3)
MCHC RBC AUTO-ENTMCNC: 31.5 G/DL (ref 31.4–37.4)
MCV RBC AUTO: 89 FL (ref 82–98)
MONOCYTES # BLD AUTO: 0.67 THOUSAND/ΜL (ref 0.17–1.22)
MONOCYTES NFR BLD AUTO: 11 % (ref 4–12)
NEUTROPHILS # BLD AUTO: 3.98 THOUSANDS/ΜL (ref 1.85–7.62)
NEUTS SEG NFR BLD AUTO: 63 % (ref 43–75)
NRBC BLD AUTO-RTO: 0 /100 WBCS
PLATELET # BLD AUTO: 403 THOUSANDS/UL (ref 149–390)
PMV BLD AUTO: 8.9 FL (ref 8.9–12.7)
POTASSIUM SERPL-SCNC: 3.8 MMOL/L (ref 3.5–5.3)
PROT SERPL-MCNC: 7.1 G/DL (ref 6.4–8.2)
RBC # BLD AUTO: 3.87 MILLION/UL (ref 3.81–5.12)
SODIUM SERPL-SCNC: 143 MMOL/L (ref 136–145)
WBC # BLD AUTO: 6.33 THOUSAND/UL (ref 4.31–10.16)

## 2020-07-29 PROCEDURE — 80053 COMPREHEN METABOLIC PANEL: CPT

## 2020-07-29 PROCEDURE — 96367 TX/PROPH/DG ADDL SEQ IV INF: CPT

## 2020-07-29 PROCEDURE — 85025 COMPLETE CBC W/AUTO DIFF WBC: CPT

## 2020-07-29 PROCEDURE — 96375 TX/PRO/DX INJ NEW DRUG ADDON: CPT

## 2020-07-29 PROCEDURE — 96413 CHEMO IV INFUSION 1 HR: CPT

## 2020-07-29 RX ORDER — PALONOSETRON 0.05 MG/ML
0.25 INJECTION, SOLUTION INTRAVENOUS ONCE
Status: COMPLETED | OUTPATIENT
Start: 2020-07-29 | End: 2020-07-29

## 2020-07-29 RX ORDER — SODIUM CHLORIDE 9 MG/ML
20 INJECTION, SOLUTION INTRAVENOUS ONCE
Status: COMPLETED | OUTPATIENT
Start: 2020-07-29 | End: 2020-07-29

## 2020-07-29 RX ORDER — GABAPENTIN 800 MG/1
800 TABLET ORAL 4 TIMES DAILY
Qty: 120 TABLET | Refills: 1 | Status: SHIPPED | OUTPATIENT
Start: 2020-07-29 | End: 2020-08-12 | Stop reason: ALTCHOICE

## 2020-07-29 RX ADMIN — DEXAMETHASONE SODIUM PHOSPHATE 10 MG: 10 INJECTION, SOLUTION INTRAMUSCULAR; INTRAVENOUS at 11:40

## 2020-07-29 RX ADMIN — ADO-TRASTUZUMAB EMTANSINE 260 MG: 20 INJECTION, POWDER, LYOPHILIZED, FOR SOLUTION INTRAVENOUS at 13:25

## 2020-07-29 RX ADMIN — SODIUM CHLORIDE 150 MG: 0.9 INJECTION, SOLUTION INTRAVENOUS at 12:20

## 2020-07-29 RX ADMIN — PALONOSETRON HYDROCHLORIDE 0.25 MG: 0.25 INJECTION, SOLUTION INTRAVENOUS at 12:05

## 2020-07-29 RX ADMIN — SODIUM CHLORIDE 20 ML/HR: 0.9 INJECTION, SOLUTION INTRAVENOUS at 11:30

## 2020-07-29 NOTE — PROGRESS NOTES
Tolerated infusion without incident: No adverse reactions noted: Verified follow up appt with patient: AVS offered and declined

## 2020-07-29 NOTE — PROGRESS NOTES
Patient to [facility] for Lab testing / Agustin Blanco:  Offers no complaints at present time: Right PAC accessed without difficulty: Good blood return noted: Labs drawn per MD order

## 2020-08-12 ENCOUNTER — OFFICE VISIT (OUTPATIENT)
Dept: HEMATOLOGY ONCOLOGY | Facility: CLINIC | Age: 60
End: 2020-08-12
Payer: MEDICARE

## 2020-08-12 VITALS
HEART RATE: 83 BPM | TEMPERATURE: 97.9 F | SYSTOLIC BLOOD PRESSURE: 138 MMHG | HEIGHT: 66 IN | RESPIRATION RATE: 17 BRPM | DIASTOLIC BLOOD PRESSURE: 74 MMHG | WEIGHT: 146 LBS | BODY MASS INDEX: 23.46 KG/M2 | OXYGEN SATURATION: 98 %

## 2020-08-12 DIAGNOSIS — C79.51 BONE METASTASES (HCC): ICD-10-CM

## 2020-08-12 DIAGNOSIS — D63.8 ANEMIA OF CHRONIC DISEASE: ICD-10-CM

## 2020-08-12 DIAGNOSIS — C50.912 BILATERAL MALIGNANT NEOPLASM OF BREAST IN FEMALE, ESTROGEN RECEPTOR POSITIVE, UNSPECIFIED SITE OF BREAST (HCC): Primary | ICD-10-CM

## 2020-08-12 DIAGNOSIS — Z17.0 BILATERAL MALIGNANT NEOPLASM OF BREAST IN FEMALE, ESTROGEN RECEPTOR POSITIVE, UNSPECIFIED SITE OF BREAST (HCC): Primary | ICD-10-CM

## 2020-08-12 DIAGNOSIS — K59.00 CONSTIPATION, UNSPECIFIED CONSTIPATION TYPE: ICD-10-CM

## 2020-08-12 DIAGNOSIS — D75.839 THROMBOCYTOSIS: ICD-10-CM

## 2020-08-12 DIAGNOSIS — T40.2X5A THERAPEUTIC OPIOID-INDUCED CONSTIPATION (OIC): ICD-10-CM

## 2020-08-12 DIAGNOSIS — C50.911 BILATERAL MALIGNANT NEOPLASM OF BREAST IN FEMALE, ESTROGEN RECEPTOR POSITIVE, UNSPECIFIED SITE OF BREAST (HCC): Primary | ICD-10-CM

## 2020-08-12 DIAGNOSIS — F51.01 PRIMARY INSOMNIA: ICD-10-CM

## 2020-08-12 DIAGNOSIS — K59.03 THERAPEUTIC OPIOID-INDUCED CONSTIPATION (OIC): ICD-10-CM

## 2020-08-12 PROCEDURE — 99215 OFFICE O/P EST HI 40 MIN: CPT | Performed by: INTERNAL MEDICINE

## 2020-08-12 RX ORDER — PREGABALIN 25 MG/1
25 CAPSULE ORAL 3 TIMES DAILY
Qty: 90 CAPSULE | Refills: 5 | Status: SHIPPED | OUTPATIENT
Start: 2020-08-12 | End: 2020-09-03 | Stop reason: SDUPTHER

## 2020-08-12 RX ORDER — ZOLPIDEM TARTRATE 10 MG/1
10 TABLET ORAL
Qty: 30 TABLET | Refills: 5 | Status: SHIPPED | OUTPATIENT
Start: 2020-08-12 | End: 2020-12-03 | Stop reason: SDUPTHER

## 2020-08-12 RX ORDER — LACTULOSE 20 G/30ML
SOLUTION ORAL
Qty: 946 ML | Refills: 5 | Status: SHIPPED | OUTPATIENT
Start: 2020-08-12 | End: 2020-12-03

## 2020-08-12 NOTE — PROGRESS NOTES
[facility] HEMATOLOGY ONCOLOGY SPECIALISTS [facility]  [address]  [place] [address]  [phone]    Amaury Mao,1960, [id number]  08/12/20    Discussion:   In summary, this is a 82-year-old female history of stage IV breast cancer as outlined  She has been on Kadcyla for about a year now  Recent CBC shows normal white count, mild thrombocytosis, mild anemia  These are probably both reactive to her cancer  Anemia could also result from [address]  Observation is favored  She has some pain in the bilateral thighs  She has constipation  She had been on Senokot without benefit  She had previously been on lactulose  Lactulose was renewed  She has been on gabapentin for neuropathic pain  She has been taking 800 mg 3 times a day without efficacy  Gabapentin is discontinued  Lyrica 25 mg p o  t i d  is arranged  She has a follow-up appointment in palliative care next week and I would expect some of these will be reviewed at that time  Most recent tumor marker was in May 2020  This had normalized by that time  Most recent CT scan was about 6 months ago showing some bony lesions but no soft tissue disease  I have elected to proceed with repeat tumor marker at this time  If it is abnormal, imaging would follow  If normal imaging could be deferred  I discussed the above with the patient  The patient  voiced understanding and agreement   ______________________________________________________________________    Chief Complaint   Patient presents with    Follow-up       HPI:  Oncology History   Ariella Tejeda is a 66y o  year old female who presents with stage IV metastatic breast carcinoma with a history of brain metastasis treated with SRS in May 2017 now with the multiple recurrent brain metastasis  She completed whole-brain radiation therapy March 12, 2019,with previous SRS to brain in May of 2017 and L2-S1 completed on 8/24/16  She was last seen 4/23/19     She had f/u scans done 5/1/19  No CT evidence of new soft tissue tumor mass in the chest, abdomen or pelvis  No significant interval change in subtle lucent and sclerotic lesions within the lower thoracic and lumbar vertebral bodies suggestive of osseous metastatic disease  Pt continues to see Palliative care  Continues to c/o pain while  on Decadron, Gabapentin, Flexeril, Dilaudid prn for   Pain  control  Has persistent nausea, and constipation  6/17/19 MRI Brain showed multiple supratentorial and infratentorial metastatic lesions, as described above and overall improved since prior examination  No definite new nodular enhancing lesions identified on the current examination  Had med onc PA f/u in June  Pt has chronic constipation, and was seen x 2 in recent past in the ER for this  Pt had palliative care f/u, today  She had requested a break for chemo, and her next chemo is being held  t had c/o weakness, and had refused to do PT, she has been taking steroids for   awhile  CT scan scheduled for 7/29/19 8/7/19 F/U with J Carlos Kessler PA-C:  8/1/19 CT scans demonstrated disease stability in her chest abdomen and pelvis  However, patient is symptomatic with dizziness and proximal weakness  Re-evaluation with MRI of the brain is necessary to rule out new metastatic lesion to the brain  Regimen:  Kadcyla 3 6 mg/KG IV Day 1, Q 3 weeks      8/21/19 MRI of brain     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, estrogen receptor positive ([facility] [address] )   2010 Initial Diagnosis    The patient had a left sided T1 B , N0 ER positive, OK and HER-2 negative invasive ductal carcinoma, right-sided DCIS, ER/OK positive, HER-2 negative  Bilateral mastectomy  2010 - 4/2011 Hormone Therapy    Tamoxifen 20 mg daily  Last menstrual period was on 3/2010      4/2011 -  Hormone Therapy    Arimidex 1 mg daily  Unknown when medication was discontinued   Patient was lost to follow up      12/9/2015 Progression    · Right arm swelling in December 2015  U/S found a nonvascular structure in the right axilla measuring 2 2 x 1 3 cm  · Subsequent CT Scan of the chest on 12/9/15, showed multiple bilateral pulmonary nodules measuring 3-7 mm  · PET 2/4/16, which revealed hypermetabolic hilar and mediastinal nodes  There is a pre carinal node with a SUV of 5 4 measuring 1 8 x 1 7 cm, and a subcarinal node measuring 1 9 x 1 3 cm with a SUV of 5 3  There is right hilar activity of 3 5 and left hilar activity of 2 8  The subcentimeter nodules are too small for PET evaluation  She also has uptake in her L3 and L5 vertebral bodies, both concerning for bony metastases  2/16/2016 Biopsy    Bronchoscopic biopsy showed Metastatic non-small cell carcinoma, favor adenocarcinoma  ER 90%, ID 0%  Her 2 +2, positive by FISH        3/1/2016 - 6/2016 Chemotherapy    Taxotere 75 mg/m2, Day 1  Perjeta 420 mg, Day 1  Herceptin 6 mg/kg, Day 1  Cycle length= 21 days     3/1/2016 - 2/2017 Hormone Therapy    Anastrozole 1 mg daily     6/2016 - 2/2017 Chemotherapy    Perjeta 420 mg, Day 1  Herceptin 6 mg/kg, Day 1  Cycle length= 21 days     2/2017 Progression    Bony progression      2/10/2017 - 4/12/2018 Chemotherapy    Perjeta 420 mg, Day 1  Herceptin 6 mg/kg, Day 1  Taxotere 75 mg/m2  Cycle length= 21 days     3/3/2017 -  Chemotherapy    Xgeva 120 mg IV, Day 1  Cycle length =  84 days     4/18/2017 Progression     brain MRI showed a 5 mm right parietal lesion, 3 mm right frontal lesion, 3 mm left parahippocampal lesion  5/2017 - 5/2017 Radiation    SRS to brain lesions     5/3/2018 - 8/2018 Chemotherapy    Perjeta 420 mg,  Day 1  Herceptin 6 mg/kg,  Day 1  Cycle length = 21 days  Cycle 3 was administered on 6/14/18    ** taxotere was temporarily discontinued secondary for desire of treatment holiday  Taxotere was readded into the treatment regimen during last cycle         9/5/2018 Progression    Cutaneous disease progression  9/13/2018 - 9/18/2019 Chemotherapy    Kadcyla 3 6 mg/kg dose IV Day 1  Cycle length =  21 days  Cycles planned = until progression    Interrupted at the end of January secondary to progressive fatigue and interval development of brain mets requiring radiation  Restarted on 3/14/19          2/18/2019 Progression    MRI brain demonstrated disease progression; CT of the chest abdomen pelvis was stable along with the patient's tumor markers  2/27/2019 - 3/12/2019 Radiation    WBRT  X 10 sessions  9/18/2019 -  Chemotherapy    Kadcyla 3 6 mg/KG IV Day 1  Cycle length= 3 weeks    Cycle enlongation after scans demonstrated stablity in August   Quality of life reasons- cycle was elongated  Bone metastases ([address] )   7/6/2016 Initial Diagnosis    Bone metastases ([address] )     8/10/2016 - 8/24/2016 Radiation    Treatments:  Course: C1    Plan ID Energy Fractions Dose per Fraction (cGy) Total Dose Delivered (cGy) Elapsed Days   L2-S1 Spine 10X 10 / 10 300 3,000 14      Treatment Dates:  8/10/2016 - 8/24/2016  Brain metastases ([facility] [address] )   4/26/2017 Initial Diagnosis    Brain metastases (HCC)         Interval History:    Clinically stable  ECOG-  2 - Symptomatic, <50% confined to bed    Review of Systems   Constitutional: Negative for appetite change, diaphoresis, fatigue and fever  HENT: Negative for sinus pain  Eyes: Negative for discharge  Respiratory: Negative for cough and shortness of breath  Cardiovascular: Negative for chest pain  Gastrointestinal: Negative for abdominal pain, constipation and diarrhea  Endocrine: Negative for cold intolerance  Genitourinary: Negative for difficulty urinating and hematuria  Musculoskeletal: Negative for joint swelling  Skin: Negative for rash  Allergic/Immunologic: Negative for environmental allergies  Neurological: Negative for dizziness and headaches  Hematological: Negative for adenopathy     Psychiatric/Behavioral: Negative for agitation  Past Medical History:   Diagnosis Date    Dehydration 6/11/2019    Dizziness 2/6/2018    Dry skin 2/6/2018    H/O bilateral mastectomy 2/15/2016    Hyperglycemia 2/6/2018    Hypertension 2/15/2016    Hypokalemia 3/10/2020    Lymphedema 2/15/2016    Malignant neoplasm of right breast ([facility] [address] ) 2/15/2016    Metastatic breast cancer [facility])      Patient Active Problem List   Diagnosis    H/O bilateral mastectomy    Lymphedema of right upper extremity    Pulmonary nodule    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, estrogen receptor positive ([facility] [address] )    Bone metastases ([facility] [address] )    Brain metastases (HCC)    Cancer-related pain    CINV (chemotherapy-induced nausea and vomiting)    Therapeutic opioid-induced constipation (OIC)    Malignant cachexia ([facility] [address] )    Dyspareunia, female    Herniated lumbar intervertebral disc    Mediastinal lymphadenopathy    Other fatigue    Bilateral pleural effusion    Abnormal CT of the chest    SOB (shortness of breath)    Tobacco abuse    Financial difficulties    Dehydration    Edema    Palliative care patient    Ambulatory dysfunction    Insomnia due to medical condition       Current Outpatient Medications:     albuterol (PROVENTIL HFA,VENTOLIN HFA) 90 mcg/act inhaler, TAKE 2 PUFFS BY MOUTH EVERY 6 HOURS AS NEEDED FOR WHEEZING, Disp: 18 Inhaler, Rfl: 2    diclofenac (VOLTAREN) 75 mg EC tablet, Take 1 tablet (75 mg total) by mouth daily as needed (pain), Disp: 14 tablet, Rfl: 0    HYDROmorphone (Dilaudid) 8 MG tablet, Take 1 tablet (8 mg total) by mouth every 6 (six) hours as needed for moderate pain 1 tab PO every 6 hours as needed  Max use of 4 tablets in 24 hours  Max Daily Amount: 32 mg, Disp: 90 tablet, Rfl: 0    lactulose 20 g/30 mL, 30mL PO daily  May take an extra dose up to 3x per day as needed for constipation, Disp: 946 mL, Rfl: 5    Misc   Devices (QUAD CANE) MISC, by Does not apply route daily, Disp: 1 each, Rfl: 0   nicotine polacrilex (NICORETTE) 2 mg gum, Chew 1 each (2 mg total) as needed for smoking cessation, Disp: 100 each, Rfl: 0    ondansetron (ZOFRAN) 4 mg tablet, Take 1 tablet (4 mg total) by mouth 4 (four) times a day (before meals and at bedtime) For cancer nausea, Disp: 120 tablet, Rfl: 1    pantoprazole (PROTONIX) 40 mg tablet, Take 1 tablet (40 mg total) by mouth daily Every morning, before coffee , Disp: 90 tablet, Rfl: 3    promethazine (PHENERGAN) 25 mg tablet, Take 1 tablet (25 mg total) by mouth every 6 (six) hours as needed for nausea or vomiting, Disp: 90 tablet, Rfl: 1    zolpidem (AMBIEN) 10 mg tablet, Take 1 tablet (10 mg total) by mouth daily at bedtime as needed for sleep, Disp: 30 tablet, Rfl: 5    pregabalin (LYRICA) 25 mg capsule, Take 1 capsule (25 mg total) by mouth 3 (three) times a day, Disp: 90 capsule, Rfl: 5  No Known Allergies  Past Surgical History:   Procedure Laterality Date    ENDOBRONCHIAL ULTRASOUND (EBUS) N/A 2/16/2016    Procedure: EBUS;  Surgeon: Javier Ronquillo MD;  [facility]: [facility];  Service:     MASTECTOMY Bilateral     MASTECTOMY Bilateral     right arm edema    OOPHORECTOMY      DE BRONCHOSCOPY NEEDLE BX TRACHEA MAIN STEM&/BRON N/A 2/16/2016    Procedure: FLEX BRONCH;  Surgeon: Javier Ronquillo MD;  [facility];  Service: Thoracic    DE INSJ TUNNELED CTR VAD W/SUBQ PORT AGE 5 YR/> N/A 7/24/2018    Procedure: INSERTION VENOUS PORT ( PORT-A-CATH) IR;  Surgeon: Venancio Mcclellan DO;  [facility];  Service: Interventional Radiology    DE STEREOTACTIC RADIOSURGERY, CRANIAL,SIMPLE,EA ADD  5/3/2017         DE STEREOTACTIC RADIOSURGERY, CRANIAL,SIMPLE,EA ADD  5/3/2017         DE STEREOTACTIC RADIOSURGERY, CRANIAL,SIMPLE,SINGLE  5/3/2017          Social History     Objective:  Vitals:    08/12/20 1030   BP: 138/74   BP Location: Left arm   Patient Position: Sitting   Pulse: 83   Resp: 17   Temp: 97 9 °F (36 6 °C)   TempSrc: Oral   SpO2: 98% Weight: 66 2 kg (146 lb)   Height: 5' 5 9" (1 674 m)     Physical Exam  Constitutional:       Appearance: She is well-developed  HENT:      Head: Normocephalic and atraumatic  Eyes:      Pupils: Pupils are equal, round, and reactive to light  Neck:      Musculoskeletal: Neck supple  Cardiovascular:      Rate and Rhythm: Normal rate  Heart sounds: No murmur  Pulmonary:      Effort: No respiratory distress  Breath sounds: No wheezing or rales  Abdominal:      General: There is no distension  Palpations: Abdomen is soft  Tenderness: There is no abdominal tenderness  There is no rebound  Musculoskeletal:         General: No tenderness  Lymphadenopathy:      Cervical: No cervical adenopathy  Skin:     General: Skin is warm  Findings: No rash  Neurological:      Mental Status: She is alert and oriented to person, place, and time  Deep Tendon Reflexes: Reflexes normal    Psychiatric:         Thought Content: Thought content normal            Labs: I personally reviewed the labs and imaging pertinent to this patient care

## 2020-08-13 NOTE — PATIENT INSTRUCTIONS
PRESCRIPTION REFILL REMINDER:  All medication refills should be requested prior to [facility] on Friday  Any refill requests after noon on Friday would be addressed the following Monday  Please protect yourself from the novel Coronavirus (COVID-19)! Even though we STILL do not have a vaccine or good antiviral drugs for this infection, the following strategies can help you stay healthy:    = Wash your hands with soap and water, or hand  with at least 60% alcohol, often  = Avoid touching your face!   = Avoid close contact with others, even if they seem healthy  Avoid gatherings of more than 10 people, and don't travel unnecessarily  = Stay home, except to get medical care or other essentials  If you can eat and drink and breathe and sleep, please consider calling your doctor's office instead of visiting in person, even if you are ill   = Cover your cough with a tissue, or your elbow  = Clean frequently touched surfaces and objects daily (e g , tables, countertops, light switches, doorknobs, and cabinet handles)  Regular household detergent and water are sufficient  Numbers of cases of Coronavirus are spiking in many [place] States  This is not a more dangerous virus, but a sign that more people in a community are spreading the virus  Please check the local disease reports near you if you consider travelling this summer  We do NOT advise travel to any community or State with a rising viral caseload  Check out [employer] for [place] data that are updated daily  http://www [employer] Be[employer]/   Global Epidemics  Org, from [facility] (OUTPATIENT CAMPUS), will give you [id number] information on virus cases  Https://globalepidemics  org/          Your two new medicines are:    - methadone - use once at bedtime every day for pain  DO NOT take more than once a day    The pharmacy should split the tablets for you, so you only take a half-tablet      - ondansetron/Zofran - this is a nausea medicine that you should use three times a day, one tablet before every meal

## 2020-08-18 ENCOUNTER — OFFICE VISIT (OUTPATIENT)
Dept: PALLIATIVE MEDICINE | Facility: CLINIC | Age: 60
End: 2020-08-18
Payer: MEDICARE

## 2020-08-18 ENCOUNTER — SOCIAL WORK (OUTPATIENT)
Dept: PALLIATIVE MEDICINE | Facility: CLINIC | Age: 60
End: 2020-08-18
Payer: MEDICARE

## 2020-08-18 VITALS
OXYGEN SATURATION: 98 % | RESPIRATION RATE: 20 BRPM | SYSTOLIC BLOOD PRESSURE: 130 MMHG | HEART RATE: 98 BPM | HEIGHT: 65 IN | TEMPERATURE: 97.6 F | BODY MASS INDEX: 24.57 KG/M2 | DIASTOLIC BLOOD PRESSURE: 80 MMHG | WEIGHT: 147.5 LBS

## 2020-08-18 DIAGNOSIS — Z51.5 PALLIATIVE CARE PATIENT: ICD-10-CM

## 2020-08-18 DIAGNOSIS — Z71.89 COUNSELING AND COORDINATION OF CARE: Primary | ICD-10-CM

## 2020-08-18 DIAGNOSIS — R11.2 CINV (CHEMOTHERAPY-INDUCED NAUSEA AND VOMITING): Chronic | ICD-10-CM

## 2020-08-18 DIAGNOSIS — G89.3 CANCER-RELATED PAIN: Primary | ICD-10-CM

## 2020-08-18 DIAGNOSIS — T45.1X5A CINV (CHEMOTHERAPY-INDUCED NAUSEA AND VOMITING): Chronic | ICD-10-CM

## 2020-08-18 DIAGNOSIS — G89.3 CANCER-RELATED PAIN: ICD-10-CM

## 2020-08-18 PROCEDURE — 99214 OFFICE O/P EST MOD 30 MIN: CPT | Performed by: FAMILY MEDICINE

## 2020-08-18 PROCEDURE — NC001 PR NO CHARGE

## 2020-08-18 RX ORDER — METHADONE HYDROCHLORIDE 5 MG/1
2.5 TABLET ORAL
Qty: 7 TABLET | Refills: 0 | Status: SHIPPED | OUTPATIENT
Start: 2020-08-18 | End: 2020-08-18 | Stop reason: SDUPTHER

## 2020-08-18 RX ORDER — METHADONE HYDROCHLORIDE 5 MG/1
2.5 TABLET ORAL
Qty: 7 TABLET | Refills: 0 | Status: CANCELLED | OUTPATIENT
Start: 2020-08-18

## 2020-08-18 RX ORDER — METHADONE HYDROCHLORIDE 5 MG/1
2.5 TABLET ORAL
Qty: 7 TABLET | Refills: 0 | Status: SHIPPED | OUTPATIENT
Start: 2020-08-18 | End: 2020-09-03 | Stop reason: SDUPTHER

## 2020-08-18 RX ORDER — ONDANSETRON HYDROCHLORIDE 8 MG/1
8 TABLET, FILM COATED ORAL
Qty: 45 TABLET | Refills: 0 | Status: SHIPPED | OUTPATIENT
Start: 2020-08-18 | End: 2020-10-01 | Stop reason: SDUPTHER

## 2020-08-18 NOTE — PROGRESS NOTES
Outpatient Follow-Up - Palliative and Supportive Care   Luisana Toribio 64 y o  female [id number]    Assessment & Plan  1  Cancer-related pain    2  Palliative care patient    3  CINV (chemotherapy-induced nausea and vomiting)        Medications adjusted this encounter:  Requested Prescriptions     Signed Prescriptions Disp Refills    ondansetron (ZOFRAN) 8 mg tablet 45 tablet 0     Sig: Take 1 tablet (8 mg total) by mouth 3 (three) times a day before meals For cancer nausea    methadone (DOLOPHINE) 5 mg tablet 7 tablet 0     Sig: Take 0 5 tablets (2 5 mg total) by mouth daily at bedtime For cancer painMax Daily Amount: 2 5 mg     Medications Discontinued During This Encounter   Medication Reason    HYDROmorphone (Dilaudid) 8 MG tablet     ondansetron (ZOFRAN) 4 mg tablet Reorder    methadone (DOLOPHINE) 5 mg tablet Reorder     - More aggressive antiemetic therapy to attempt to improve appetite and restore lost weight  Pt has lost 30# since Feburary     Ms Chandni Badillo was seen today for symptoms and planning cares related to above illnesses  I have reviewed the patient's controlled substance dispensing history in the Prescription Drug Monitoring Program in compliance with the [facility] regulations before prescribing any controlled substances  She is invited to continue to follow with us  If there are questions or concerns, please contact us through our clinic/answering service 24 hours a day, seven days a week  Abril Kahn MD  [facility] [address] Palliative and Supportive Care  [phone]      Visit Information    Accompanied By: No one    Source of History: Self    History Limitations: None    Contacts: Follow up visit for:  symptom management, pain, neoplasm related, support    History of Present Illness      Luisana Toribio is a 64 y o  female who follows with us for pain and symptoms related to her breast cancer  Since last visit, she has been unwell        In today's visit, pt cannot report her medications, unclear as to how we can help  Does report some abnl movements in RLE, but no spells, no falls, no fainting recently  Still not helped for her RUE lymphedema by her lymphedema therapies  We agreed to refill her methadone today, but request rapid interval f/up (2wks), with her bringing in her pills to help perform med rec  Helpfully, she has cut down her smoking habit from 1/2ppd to 1/4ppd  She is offered congratulations from myself and Ms Mauri Calerolencho on this note          Historically, her right arm pain and lymphedema have been challenging to control  She has experienced consistent side effects from long acting opioids leading to sedation and confusion   Long acting opioids have also never provided her pain relief - forms tried have included methadone, MS Contin, Fentanyl patches  Steroids have not been helpful to reduce inflammation and improve pain, either  She continues to smoke 1/2 ppd, which is less than before  She expresses interest and motivation in quitting  Her daughter is also promised to quit with her  Metastatic breast cancer with brain metastasis originally diagnosed in 2010  She had a bilateral mastectomy and has undergone multiple chemotherapy regimens and has completed WBRT in March 2019  She follows with Dr Jonny Quan and is currently undergoing treatment with Kadcyla with premeds of Dexamethasone, Emend, Aloxi  She struggles with persistent nausea        Past medical, surgical, social, and family histories are reviewed and pertinent updates are made  Review of Systems   Constitution: Positive for decreased appetite and weight loss  Negative for weight gain  HENT: Negative for hoarse voice and nosebleeds  Eyes: Negative for vision loss in left eye and vision loss in right eye  Cardiovascular: Negative for chest pain and dyspnea on exertion  Respiratory: Negative for cough and shortness of breath  Endocrine: Negative for polydipsia, polyphagia and polyuria     Skin: Negative for flushing, itching and rash  Musculoskeletal: Negative for falls  Gastrointestinal: Positive for nausea  Negative for anorexia, excessive appetite, jaundice and vomiting  Genitourinary: Negative for frequency  Neurological: Positive for difficulty with concentration and tremors  Negative for dizziness and seizures  Psychiatric/Behavioral: Negative for depression and memory loss  The patient has insomnia  The patient is not nervous/anxious  Vital Signs    /80 (BP Location: Left arm, Patient Position: Sitting, Cuff Size: Standard)   Pulse 98   Temp 97 6 °F (36 4 °C) (Tympanic)   Resp 20   Ht 5' 5" (1 651 m)   Wt 66 9 kg (147 lb 8 oz)   LMP  (LMP Unknown)   SpO2 98%   BMI 24 55 kg/m²     Physical Exam and Objective Data  Physical Exam  Constitutional:       General: She is not in acute distress  Appearance: She is not diaphoretic  Comments: frail   HENT:      Head: Normocephalic and atraumatic  Right Ear: External ear normal       Left Ear: External ear normal       Mouth/Throat:      Pharynx: Oropharyngeal exudate (mucous membranes tacky) present  Eyes:      General:         Right eye: No discharge  Left eye: No discharge  Conjunctiva/sclera: Conjunctivae normal       Pupils: Pupils are equal, round, and reactive to light  Neck:      Trachea: No tracheal deviation  Cardiovascular:      Rate and Rhythm: Regular rhythm  Tachycardia present  Pulmonary:      Effort: Pulmonary effort is normal  No respiratory distress  Breath sounds: No stridor  Wheezing present  Abdominal:      General: There is no distension  Palpations: Abdomen is soft  Comments: Scaphoid   Skin:     General: Skin is warm and dry  Findings: No erythema or rash  Neurological:      General: No focal deficit present  Mental Status: Mental status is at baseline  Cranial Nerves: No cranial nerve deficit        Coordination: Coordination abnormal (semichoreic motions of RLE and foot  Pt does seem to be able to override these motions voluntarily)  Psychiatric:         Mood and Affect: Mood normal          Behavior: Behavior normal          Thought Content: Thought content normal          Judgment: Judgment normal            Radiology and Laboratory:  I personally reviewed and interpreted the following results - none new today    35+ minutes was spent face to face with Marychuy Search with greater than 50% of the time spent in counseling or coordination of care including: chart review; symptom pursuit  All of the patient's questions were answered during this discussion

## 2020-08-18 NOTE — PATIENT INSTRUCTIONS
PRESCRIPTION REFILL REMINDER:  All medication refills should be requested prior to [facility] on Friday  Any refill requests after noon on Friday would be addressed the following Monday  Please protect yourself from the novel Coronavirus (COVID-19)! Even though we STILL do not have a vaccine or good antiviral drugs for this infection, the following strategies can help you stay healthy:    = Wash your hands with soap and water, or hand  with at least 60% alcohol, often  = Avoid touching your face!   = Avoid close contact with others, even if they seem healthy  Avoid gatherings of more than 10 people, and don't travel unnecessarily  = Stay home, except to get medical care or other essentials  If you can eat and drink and breathe and sleep, please consider calling your doctor's office instead of visiting in person, even if you are ill   = Cover your cough with a tissue, or your elbow  = Clean frequently touched surfaces and objects daily (e g , tables, countertops, light switches, doorknobs, and cabinet handles)  Regular household detergent and water are sufficient  Numbers of cases of Coronavirus are spiking in many [place] States  This is not a more dangerous virus, but a sign that more people in a community are spreading the virus  Please check the local disease reports near you if you consider travelling this summer  We do NOT advise travel to any community or State with a rising viral caseload  Check out [employer] for [place] data that are updated daily  http://www Telx/   Global Epidemics  Org, from [facility] (OUTPATIENT CAMPUS), will give you Pzrjrr-no-Swcvmr information on virus cases  Https://globalepidemics  org/

## 2020-08-18 NOTE — PROGRESS NOTES
Sarahy Dubose presents for [facility] follow up today  She endorses continued pain and persistent nausea  She continues to smoke cigarettes but has cut down on the amount/day  She continues to have lymphedema in her R arm, but does not wrap or have a sleeve  She utilized a RW today for ambulation  She continues to be disease focused and receives chemotherapy  LSW provided supportive listening  Dr Wendy Horne offered a new medication for pain  He used teach back method to discuss her medications  Sarahy Dubose did not appear to understand her medication regime or remember some of the names of her meds (this is not new for her)  Requested she return in 2 weeks with her pills to review her list and she is agreeable  Also requested her sister be present, as Sarahy Dubose stated she organizes her pills  Her sister is off of work Thursday through Saturday  She was scheduled for follow up at [place] office with Dr Wendy Horne in 2 weeks

## 2020-08-21 RX ORDER — SODIUM CHLORIDE 9 MG/ML
20 INJECTION, SOLUTION INTRAVENOUS ONCE
Status: CANCELLED | OUTPATIENT
Start: 2020-08-26

## 2020-08-21 RX ORDER — PALONOSETRON 0.05 MG/ML
0.25 INJECTION, SOLUTION INTRAVENOUS ONCE
Status: CANCELLED | OUTPATIENT
Start: 2020-08-26

## 2020-08-26 ENCOUNTER — HOSPITAL ENCOUNTER (OUTPATIENT)
Dept: INFUSION CENTER | Facility: CLINIC | Age: 60
Discharge: HOME | End: 2020-08-26
Payer: MEDICARE

## 2020-08-26 VITALS
DIASTOLIC BLOOD PRESSURE: 80 MMHG | RESPIRATION RATE: 18 BRPM | HEART RATE: 82 BPM | TEMPERATURE: 97.7 F | SYSTOLIC BLOOD PRESSURE: 130 MMHG | WEIGHT: 143 LBS | BODY MASS INDEX: 22.98 KG/M2 | HEIGHT: 66 IN

## 2020-08-26 DIAGNOSIS — C50.911 BILATERAL MALIGNANT NEOPLASM OF BREAST IN FEMALE, ESTROGEN RECEPTOR POSITIVE, UNSPECIFIED SITE OF BREAST (HCC): ICD-10-CM

## 2020-08-26 DIAGNOSIS — C50.912 BILATERAL MALIGNANT NEOPLASM OF BREAST IN FEMALE, ESTROGEN RECEPTOR POSITIVE, UNSPECIFIED SITE OF BREAST (HCC): ICD-10-CM

## 2020-08-26 DIAGNOSIS — Z17.0 BILATERAL MALIGNANT NEOPLASM OF BREAST IN FEMALE, ESTROGEN RECEPTOR POSITIVE, UNSPECIFIED SITE OF BREAST (HCC): ICD-10-CM

## 2020-08-26 DIAGNOSIS — C79.51 BONE METASTASES (HCC): Primary | ICD-10-CM

## 2020-08-26 LAB
ALBUMIN SERPL BCP-MCNC: 3 G/DL (ref 3.5–5)
ALP SERPL-CCNC: 127 U/L (ref 46–116)
ALT SERPL W P-5'-P-CCNC: 26 U/L (ref 12–78)
ANION GAP SERPL CALCULATED.3IONS-SCNC: 10 MMOL/L (ref 4–13)
AST SERPL W P-5'-P-CCNC: 20 U/L (ref 5–45)
BASOPHILS # BLD AUTO: 0.03 THOUSANDS/ΜL (ref 0–0.1)
BASOPHILS NFR BLD AUTO: 1 % (ref 0–1)
BILIRUB SERPL-MCNC: 0.27 MG/DL (ref 0.2–1)
BUN SERPL-MCNC: 6 MG/DL (ref 5–25)
CALCIUM SERPL-MCNC: 9.1 MG/DL (ref 8.3–10.1)
CANCER AG27-29 SERPL-ACNC: 47.6 U/ML (ref 0–42.3)
CHLORIDE SERPL-SCNC: 106 MMOL/L (ref 100–108)
CO2 SERPL-SCNC: 24 MMOL/L (ref 21–32)
CREAT SERPL-MCNC: 0.61 MG/DL (ref 0.6–1.3)
EOSINOPHIL # BLD AUTO: 0.05 THOUSAND/ΜL (ref 0–0.61)
EOSINOPHIL NFR BLD AUTO: 1 % (ref 0–6)
ERYTHROCYTE [DISTWIDTH] IN BLOOD BY AUTOMATED COUNT: 18.6 % (ref 11.6–15.1)
GFR SERPL CREATININE-BSD FRML MDRD: 100 ML/MIN/1.73SQ M
GLUCOSE SERPL-MCNC: 103 MG/DL (ref 65–140)
HCT VFR BLD AUTO: 34.9 % (ref 34.8–46.1)
HGB BLD-MCNC: 10.9 G/DL (ref 11.5–15.4)
IMM GRANULOCYTES # BLD AUTO: 0.01 THOUSAND/UL (ref 0–0.2)
IMM GRANULOCYTES NFR BLD AUTO: 0 % (ref 0–2)
LYMPHOCYTES # BLD AUTO: 1.36 THOUSANDS/ΜL (ref 0.6–4.47)
LYMPHOCYTES NFR BLD AUTO: 21 % (ref 14–44)
MCH RBC QN AUTO: 28.4 PG (ref 26.8–34.3)
MCHC RBC AUTO-ENTMCNC: 31.2 G/DL (ref 31.4–37.4)
MCV RBC AUTO: 91 FL (ref 82–98)
MONOCYTES # BLD AUTO: 0.64 THOUSAND/ΜL (ref 0.17–1.22)
MONOCYTES NFR BLD AUTO: 10 % (ref 4–12)
NEUTROPHILS # BLD AUTO: 4.29 THOUSANDS/ΜL (ref 1.85–7.62)
NEUTS SEG NFR BLD AUTO: 67 % (ref 43–75)
NRBC BLD AUTO-RTO: 0 /100 WBCS
PLATELET # BLD AUTO: 427 THOUSANDS/UL (ref 149–390)
PMV BLD AUTO: 8.7 FL (ref 8.9–12.7)
POTASSIUM SERPL-SCNC: 3.7 MMOL/L (ref 3.5–5.3)
PROT SERPL-MCNC: 7.2 G/DL (ref 6.4–8.2)
RBC # BLD AUTO: 3.84 MILLION/UL (ref 3.81–5.12)
SODIUM SERPL-SCNC: 140 MMOL/L (ref 136–145)
WBC # BLD AUTO: 6.38 THOUSAND/UL (ref 4.31–10.16)

## 2020-08-26 PROCEDURE — 86300 IMMUNOASSAY TUMOR CA 15-3: CPT

## 2020-08-26 PROCEDURE — 96413 CHEMO IV INFUSION 1 HR: CPT

## 2020-08-26 PROCEDURE — 96367 TX/PROPH/DG ADDL SEQ IV INF: CPT

## 2020-08-26 PROCEDURE — 96375 TX/PRO/DX INJ NEW DRUG ADDON: CPT

## 2020-08-26 PROCEDURE — 80053 COMPREHEN METABOLIC PANEL: CPT | Performed by: INTERNAL MEDICINE

## 2020-08-26 PROCEDURE — 85025 COMPLETE CBC W/AUTO DIFF WBC: CPT | Performed by: INTERNAL MEDICINE

## 2020-08-26 RX ORDER — PALONOSETRON 0.05 MG/ML
0.25 INJECTION, SOLUTION INTRAVENOUS ONCE
Status: COMPLETED | OUTPATIENT
Start: 2020-08-26 | End: 2020-08-26

## 2020-08-26 RX ORDER — SODIUM CHLORIDE 9 MG/ML
20 INJECTION, SOLUTION INTRAVENOUS ONCE
Status: COMPLETED | OUTPATIENT
Start: 2020-08-26 | End: 2020-08-26

## 2020-08-26 RX ADMIN — ADO-TRASTUZUMAB EMTANSINE 260 MG: 20 INJECTION, POWDER, LYOPHILIZED, FOR SOLUTION INTRAVENOUS at 14:50

## 2020-08-26 RX ADMIN — SODIUM CHLORIDE 150 MG: 0.9 INJECTION, SOLUTION INTRAVENOUS at 13:45

## 2020-08-26 RX ADMIN — PALONOSETRON HYDROCHLORIDE 0.25 MG: 0.25 INJECTION, SOLUTION INTRAVENOUS at 13:30

## 2020-08-26 RX ADMIN — SODIUM CHLORIDE 20 ML/HR: 0.9 INJECTION, SOLUTION INTRAVENOUS at 11:55

## 2020-08-26 RX ADMIN — DEXAMETHASONE SODIUM PHOSPHATE 10 MG: 10 INJECTION, SOLUTION INTRAMUSCULAR; INTRAVENOUS at 13:00

## 2020-08-26 NOTE — PROGRESS NOTES
Observation complete: No adverse reactions noted: Verified follow up appt with patient: AVS offered and declined

## 2020-08-31 NOTE — PATIENT INSTRUCTIONS
PRESCRIPTION REFILL REMINDER:  All medication refills should be requested prior to [facility] on Friday  Any refill requests after noon on Friday would be addressed the following Monday  Please protect yourself from the novel Coronavirus (COVID-19)! Even though we STILL do not have a vaccine or good antiviral drugs for this infection, the following strategies can help you stay healthy:    = Wash your hands with soap and water, or hand  with at least 60% alcohol, often  = Avoid touching your face!   = Avoid close contact with others, even if they seem healthy  Avoid gatherings of more than 10 people, and don't travel unnecessarily  = Stay home, except to get medical care or other essentials  If you can eat and drink and breathe and sleep, please consider calling your doctor's office instead of visiting in person, even if you are ill   = Cover your cough with a tissue, or your elbow  = Clean frequently touched surfaces and objects daily (e g , tables, countertops, light switches, doorknobs, and cabinet handles)  Regular household detergent and water are sufficient  Numbers of cases of Coronavirus are spiking in many [place] States  This is not a more dangerous virus, but a sign that more people in a community are spreading the virus  Please check the local disease reports near you if you consider travelling this summer  We do NOT advise travel to any community or State with a rising viral caseload  Check out bettermarks for [place] data that are updated daily  http://www [employer]/   Global Epidemics  Org, from [facility] ([facility] will give you [place] information on virus cases  Https://globalepidemics  org/

## 2020-09-03 ENCOUNTER — OFFICE VISIT (OUTPATIENT)
Dept: PALLIATIVE MEDICINE | Facility: CLINIC | Age: 60
End: 2020-09-03
Payer: MEDICARE

## 2020-09-03 VITALS
BODY MASS INDEX: 23.14 KG/M2 | RESPIRATION RATE: 16 BRPM | DIASTOLIC BLOOD PRESSURE: 88 MMHG | HEART RATE: 68 BPM | WEIGHT: 143.3 LBS | TEMPERATURE: 98.8 F | SYSTOLIC BLOOD PRESSURE: 150 MMHG

## 2020-09-03 DIAGNOSIS — C50.912 BILATERAL MALIGNANT NEOPLASM OF BREAST IN FEMALE, ESTROGEN RECEPTOR POSITIVE, UNSPECIFIED SITE OF BREAST (HCC): ICD-10-CM

## 2020-09-03 DIAGNOSIS — T45.1X5A CHEMOTHERAPY-INDUCED NAUSEA: ICD-10-CM

## 2020-09-03 DIAGNOSIS — Z17.0 BILATERAL MALIGNANT NEOPLASM OF BREAST IN FEMALE, ESTROGEN RECEPTOR POSITIVE, UNSPECIFIED SITE OF BREAST (HCC): ICD-10-CM

## 2020-09-03 DIAGNOSIS — F51.01 PRIMARY INSOMNIA: ICD-10-CM

## 2020-09-03 DIAGNOSIS — Z51.5 PALLIATIVE CARE PATIENT: ICD-10-CM

## 2020-09-03 DIAGNOSIS — G89.3 CANCER-RELATED PAIN: ICD-10-CM

## 2020-09-03 DIAGNOSIS — K59.00 CONSTIPATION, UNSPECIFIED CONSTIPATION TYPE: ICD-10-CM

## 2020-09-03 DIAGNOSIS — T40.2X5A THERAPEUTIC OPIOID-INDUCED CONSTIPATION (OIC): Primary | ICD-10-CM

## 2020-09-03 DIAGNOSIS — C79.51 BONE METASTASES (HCC): ICD-10-CM

## 2020-09-03 DIAGNOSIS — T45.1X5A NEUROPATHY DUE TO CHEMOTHERAPEUTIC DRUG (HCC): ICD-10-CM

## 2020-09-03 DIAGNOSIS — G62.0 NEUROPATHY DUE TO CHEMOTHERAPEUTIC DRUG (HCC): ICD-10-CM

## 2020-09-03 DIAGNOSIS — K59.03 THERAPEUTIC OPIOID-INDUCED CONSTIPATION (OIC): Primary | ICD-10-CM

## 2020-09-03 DIAGNOSIS — R11.0 CHEMOTHERAPY-INDUCED NAUSEA: ICD-10-CM

## 2020-09-03 DIAGNOSIS — C79.31 BRAIN METASTASES (HCC): ICD-10-CM

## 2020-09-03 DIAGNOSIS — C50.911 BILATERAL MALIGNANT NEOPLASM OF BREAST IN FEMALE, ESTROGEN RECEPTOR POSITIVE, UNSPECIFIED SITE OF BREAST (HCC): ICD-10-CM

## 2020-09-03 PROCEDURE — 99215 OFFICE O/P EST HI 40 MIN: CPT | Performed by: FAMILY MEDICINE

## 2020-09-03 RX ORDER — PREGABALIN 75 MG/1
75 CAPSULE ORAL
Qty: 30 CAPSULE | Refills: 0 | Status: SHIPPED | OUTPATIENT
Start: 2020-09-03 | End: 2020-10-01

## 2020-09-03 RX ORDER — DEXAMETHASONE 1 MG
1 TABLET ORAL
Qty: 30 TABLET | Refills: 0 | Status: SHIPPED | OUTPATIENT
Start: 2020-09-03 | End: 2020-10-01 | Stop reason: SDUPTHER

## 2020-09-03 RX ORDER — HYDROMORPHONE HYDROCHLORIDE 4 MG/1
4-8 TABLET ORAL EVERY 4 HOURS PRN
Qty: 120 TABLET | Refills: 0 | Status: SHIPPED | OUTPATIENT
Start: 2020-09-03 | End: 2020-10-01 | Stop reason: SDUPTHER

## 2020-09-03 RX ORDER — PREGABALIN 50 MG/1
50 CAPSULE ORAL EVERY MORNING
Qty: 30 CAPSULE | Refills: 0 | Status: SHIPPED | OUTPATIENT
Start: 2020-09-03 | End: 2020-10-01 | Stop reason: SDUPTHER

## 2020-09-03 RX ORDER — SENNA PLUS 8.6 MG/1
1 TABLET ORAL 2 TIMES DAILY
Qty: 60 TABLET | Refills: 0
Start: 2020-09-03 | End: 2020-10-01 | Stop reason: SDUPTHER

## 2020-09-03 RX ORDER — PROMETHAZINE HYDROCHLORIDE 12.5 MG/1
12.5 TABLET ORAL EVERY 6 HOURS PRN
Qty: 60 TABLET | Refills: 0 | Status: SHIPPED | OUTPATIENT
Start: 2020-09-03 | End: 2020-12-03

## 2020-09-03 RX ORDER — ZOLPIDEM TARTRATE 6.25 MG/1
6.25-12.5 TABLET, FILM COATED, EXTENDED RELEASE ORAL
Qty: 45 TABLET | Refills: 0 | Status: SHIPPED | OUTPATIENT
Start: 2020-09-03 | End: 2020-09-11

## 2020-09-03 RX ORDER — METHADONE HYDROCHLORIDE 5 MG/1
2.5 TABLET ORAL
Qty: 15 TABLET | Refills: 0 | Status: SHIPPED | OUTPATIENT
Start: 2020-09-03 | End: 2020-09-03 | Stop reason: SDUPTHER

## 2020-09-03 NOTE — TELEPHONE ENCOUNTER
This nurse received call that the [facility] [place] [facility] not [facility] the [facility]  [facility] [place] and [facility] in [place] but they do not have it in stock at either place  [address] in [place] has it in [address]  Can you please e-prescribe script there? I cancelled the script from [place]  Thank you  Not sure why the Ambien script is attached to this order but it looks like you prescribed it earlier today  You can refuse that one

## 2020-09-03 NOTE — PROGRESS NOTES
Outpatient Follow-Up - Palliative and Supportive Care   [facility] 64 y o  female [id number]    Assessment & Plan  1  Therapeutic opioid-induced constipation (OIC)    2  Constipation, unspecified constipation type    3  Primary insomnia    4  Bilateral malignant neoplasm of breast in female, estrogen receptor positive, unspecified site of breast ([facility] [address] )    5  Brain metastases ([email or web address] )    6  Bone metastases ([email or web address] )    7  Chemotherapy-induced nausea    8  Palliative care patient    9  Cancer-related pain    10  Neuropathy due to chemotherapeutic drug (HCC)        Medications adjusted this encounter:  Requested Prescriptions     Signed Prescriptions Disp Refills    pregabalin (LYRICA) 50 mg capsule 30 capsule 0     Sig: Take 1 capsule (50 mg total) by mouth every morning For neuropathy    pregabalin (LYRICA) 75 mg capsule 30 capsule 0     Sig: Take 1 capsule (75 mg total) by mouth daily at bedtime For neuropathy    dexamethasone (DECADRON) 1 mg tablet 30 tablet 0     Sig: Take 1 tablet (1 mg total) by mouth daily with breakfast To help appetite    zolpidem (AMBIEN CR) 6 25 MG CR tablet 45 tablet 0     Sig: Take 1-2 tablets (6 25-12 5 mg total) by mouth daily at bedtime as needed for sleep    promethazine (PHENERGAN) 12 5 MG tablet 60 tablet 0     Sig: Take 1 tablet (12 5 mg total) by mouth every 6 (six) hours as needed (back-up for nausea not helped by ondansetron)    HYDROmorphone (DILAUDID) 4 mg tablet 120 tablet 0     Sig: Take 1-2 tablets (4-8 mg total) by mouth every 4 (four) hours as needed (cancer pain  Maximum 4 tabs/day  )Max Daily Amount: 48 mg    methadone (DOLOPHINE) 5 mg tablet 15 tablet 0     Sig: Take 0 5 tablets (2 5 mg total) by mouth daily at bedtime For cancer painMax Daily Amount: 2 5 mg    senna (SENOKOT) 8 6 MG tablet 60 tablet 0     Sig: Take 1 tablet (8 6 mg total) by mouth 2 (two) times a day Hold for loose stool       Medications Discontinued During This Encounter   Medication Reason    nicotine polacrilex (NICORETTE) 2 mg gum     diclofenac (VOLTAREN) 75 mg EC tablet     pregabalin (LYRICA) 25 mg capsule Reorder    promethazine (PHENERGAN) 25 mg tablet Reorder    methadone (DOLOPHINE) 5 mg tablet Reorder     - More aggressive antiemetic therapy to attempt to improve appetite and restore lost weight  Pt has lost 30# since Feburary     Ms Estefania Jean was seen today for symptoms and planning cares related to above illnesses  I have reviewed the patient's controlled substance dispensing history in the Prescription Drug Monitoring Program in compliance with the [facility] regulations before prescribing any controlled substances  She is invited to continue to follow with us  If there are questions or concerns, please contact us through our clinic/answering service 24 hours a day, seven days a week  Rosemarie Rod MD  [facility]  [phone]      Visit Information    Accompanied By: No one    Source of History: Self    History Limitations: None    Contacts: Follow up visit for:  symptom management, pain, neoplasm related, support    History of Present Illness      Luis Fernando Daniel is a 64 y o  female who follows with us for pain and symptoms related to her breast cancer  Since last visit, sh4e has continued to be confused about which meds are supposed to be used for her pain and nausea  At last visit, 8/18/20, pt couldn't report her medications  Abnormal movements of the RLE were reported  Still not helped for her RUE lymphedema by her lymphedema therapies  Rapid interval f/up (2wks) was requested, and she has brought her pills to perform med rec today as requested  She does not have a decent bowel regimen; will occ stool only once per week  We agreed to reduce her overall pill burden, and many empty pill bottles were removed from her med kit, with a greater emphasis on scheduled meds, with limited access to PRNs    Importantly, we made no changes to methadone, but reduced hydromorphone size tabs and allowed additiaonl frequency to prevent hyperdosing            Historically, her right arm pain and lymphedema have been challenging to control  She has experienced consistent side effects from long acting opioids leading to sedation and confusion   Long acting opioids have also never provided her pain relief - forms tried have included methadone, MS Contin, Fentanyl patches  Steroids have not been helpful to reduce inflammation and improve pain, either  She continues to smoke 1/2 ppd, which is less than before  She expresses interest and motivation in quitting  Her daughter is also promised to quit with her  Metastatic breast cancer with brain metastasis originally diagnosed in 2010  She had a bilateral mastectomy and has undergone multiple chemotherapy regimens and has completed WBRT in March 2019  She follows with Dr Helene Suarez and is currently undergoing treatment with [facility] with premeds of Dexamethasone, Emend, Aloxi  She struggles with persistent nausea        Past medical, surgical, social, and family histories are reviewed and pertinent updates are made  Review of Systems   Constitution: Positive for decreased appetite and weight loss  Negative for weight gain  HENT: Negative for hoarse voice and nosebleeds  Eyes: Negative for vision loss in left eye and vision loss in right eye  Cardiovascular: Negative for chest pain and dyspnea on exertion  Respiratory: Negative for cough and shortness of breath  Endocrine: Negative for polydipsia, polyphagia and polyuria  Skin: Negative for flushing and itching  Musculoskeletal: Negative for falls  Gastrointestinal: Positive for nausea  Negative for anorexia, jaundice and vomiting  Genitourinary: Negative for frequency  Neurological: Positive for loss of balance, paresthesias and tremors  Negative for dizziness  Psychiatric/Behavioral: Negative for depression and memory loss   The patient is not nervous/anxious  Vital Signs    /88 (BP Location: Left arm, Patient Position: Sitting, Cuff Size: Standard)   Pulse 68   Temp 98 8 °F (37 1 °C) (Oral)   Resp 16   Wt 65 kg (143 lb 4 8 oz)   LMP  (LMP Unknown)   BMI 23 14 kg/m²     Physical Exam and Objective Data  Physical Exam  Constitutional:       General: She is not in acute distress  Appearance: She is ill-appearing  She is not toxic-appearing or diaphoretic  Comments: frail   HENT:      Head: Normocephalic and atraumatic  Right Ear: External ear normal       Left Ear: External ear normal       Mouth/Throat:      Pharynx: Oropharyngeal exudate (mucous membranes tacky) present  Eyes:      General:         Right eye: No discharge  Left eye: No discharge  Conjunctiva/sclera: Conjunctivae normal       Pupils: Pupils are equal, round, and reactive to light  Neck:      Trachea: No tracheal deviation  Cardiovascular:      Rate and Rhythm: Regular rhythm  Tachycardia present  Pulmonary:      Effort: Pulmonary effort is normal  No respiratory distress  Breath sounds: No stridor  Abdominal:      General: There is no distension  Palpations: Abdomen is soft  Comments: Scaphoid   Musculoskeletal:         General: Swelling (LILIANE has terrible lymphedema, as per baseline, from shoulder to fingers) present  Skin:     General: Skin is warm and dry  Findings: No erythema or rash  Neurological:      General: No focal deficit present  Mental Status: She is alert and oriented to person, place, and time  Mental status is at baseline  Cranial Nerves: No cranial nerve deficit  Psychiatric:         Mood and Affect: Mood normal          Thought Content:  Thought content normal       Comments: Judgment fair, insight limited           Radiology and Laboratory:  I personally reviewed and interpreted the following results - none new today    40+ minutes was spent face to face with Angy ORELLANA Estefania Jean and her sisters with greater than 50% of the time spent in counseling or coordination of care including: chart review; symptom pursuit; full med rec and extensive educaiton about each drug we Rxed today  All of the patient's questions were answered during this discussion

## 2020-09-04 RX ORDER — ZOLPIDEM TARTRATE 6.25 MG/1
6.25-12.5 TABLET, FILM COATED, EXTENDED RELEASE ORAL
Qty: 45 TABLET | Refills: 0 | OUTPATIENT
Start: 2020-09-04

## 2020-09-04 RX ORDER — METHADONE HYDROCHLORIDE 5 MG/1
2.5 TABLET ORAL
Qty: 15 TABLET | Refills: 0 | Status: SHIPPED | OUTPATIENT
Start: 2020-09-04 | End: 2020-10-01 | Stop reason: SDUPTHER

## 2020-09-22 ENCOUNTER — TELEPHONE (OUTPATIENT)
Dept: HEMATOLOGY ONCOLOGY | Facility: CLINIC | Age: 60
End: 2020-09-22

## 2020-09-22 DIAGNOSIS — C50.911 BILATERAL MALIGNANT NEOPLASM OF BREAST IN FEMALE, ESTROGEN RECEPTOR POSITIVE, UNSPECIFIED SITE OF BREAST (HCC): Primary | ICD-10-CM

## 2020-09-22 DIAGNOSIS — C50.912 BILATERAL MALIGNANT NEOPLASM OF BREAST IN FEMALE, ESTROGEN RECEPTOR POSITIVE, UNSPECIFIED SITE OF BREAST (HCC): Primary | ICD-10-CM

## 2020-09-22 DIAGNOSIS — Z17.0 BILATERAL MALIGNANT NEOPLASM OF BREAST IN FEMALE, ESTROGEN RECEPTOR POSITIVE, UNSPECIFIED SITE OF BREAST (HCC): Primary | ICD-10-CM

## 2020-09-22 NOTE — TELEPHONE ENCOUNTER
Dr Yanira Lopez ordered patient to have a ct, chest abd and pelvis with contrast   Called Angy and discussed the need for a CT  Will have scheduling call her with appt date

## 2020-09-22 NOTE — TELEPHONE ENCOUNTER
Spoke with patient  She is set up for her CT on 9/29  [facility] is set up and santiago tristan be picking up barium prior to the test  I told her someone would be reaching out to her for further appointments

## 2020-09-29 ENCOUNTER — HOSPITAL ENCOUNTER (OUTPATIENT)
Dept: CT IMAGING | Facility: HOSPITAL | Age: 60
Discharge: HOME | End: 2020-09-29
Payer: MEDICARE

## 2020-09-29 ENCOUNTER — HOSPITAL ENCOUNTER (OUTPATIENT)
Dept: RADIOLOGY | Facility: MEDICAL CENTER | Age: 60
Discharge: HOME | End: 2020-09-29
Payer: MEDICARE

## 2020-09-29 DIAGNOSIS — C50.911 BILATERAL MALIGNANT NEOPLASM OF BREAST IN FEMALE, ESTROGEN RECEPTOR POSITIVE, UNSPECIFIED SITE OF BREAST (HCC): ICD-10-CM

## 2020-09-29 DIAGNOSIS — Z17.0 BILATERAL MALIGNANT NEOPLASM OF BREAST IN FEMALE, ESTROGEN RECEPTOR POSITIVE, UNSPECIFIED SITE OF BREAST (HCC): ICD-10-CM

## 2020-09-29 DIAGNOSIS — C50.912 BILATERAL MALIGNANT NEOPLASM OF BREAST IN FEMALE, ESTROGEN RECEPTOR POSITIVE, UNSPECIFIED SITE OF BREAST (HCC): ICD-10-CM

## 2020-09-29 PROCEDURE — G1004 CDSM NDSC: HCPCS

## 2020-09-29 PROCEDURE — 71260 CT THORAX DX C+: CPT

## 2020-09-29 PROCEDURE — 74177 CT ABD & PELVIS W/CONTRAST: CPT

## 2020-09-29 RX ADMIN — IOHEXOL 100 ML: 350 INJECTION, SOLUTION INTRAVENOUS at 11:05

## 2020-10-01 ENCOUNTER — TELEPHONE (OUTPATIENT)
Dept: HEMATOLOGY ONCOLOGY | Facility: CLINIC | Age: 60
End: 2020-10-01

## 2020-10-01 ENCOUNTER — OFFICE VISIT (OUTPATIENT)
Dept: PALLIATIVE MEDICINE | Facility: CLINIC | Age: 60
End: 2020-10-01
Payer: MEDICARE

## 2020-10-01 VITALS
BODY MASS INDEX: 23.81 KG/M2 | WEIGHT: 148.15 LBS | SYSTOLIC BLOOD PRESSURE: 110 MMHG | OXYGEN SATURATION: 98 % | RESPIRATION RATE: 16 BRPM | TEMPERATURE: 97 F | DIASTOLIC BLOOD PRESSURE: 78 MMHG | HEIGHT: 66 IN | HEART RATE: 90 BPM

## 2020-10-01 DIAGNOSIS — C79.51 BONE METASTASES (HCC): ICD-10-CM

## 2020-10-01 DIAGNOSIS — K59.03 THERAPEUTIC OPIOID-INDUCED CONSTIPATION (OIC): ICD-10-CM

## 2020-10-01 DIAGNOSIS — Z51.5 PALLIATIVE CARE PATIENT: ICD-10-CM

## 2020-10-01 DIAGNOSIS — K59.00 CONSTIPATION, UNSPECIFIED CONSTIPATION TYPE: ICD-10-CM

## 2020-10-01 DIAGNOSIS — T40.2X5A THERAPEUTIC OPIOID-INDUCED CONSTIPATION (OIC): ICD-10-CM

## 2020-10-01 DIAGNOSIS — R11.2 CINV (CHEMOTHERAPY-INDUCED NAUSEA AND VOMITING): Chronic | ICD-10-CM

## 2020-10-01 DIAGNOSIS — T45.1X5A CINV (CHEMOTHERAPY-INDUCED NAUSEA AND VOMITING): Chronic | ICD-10-CM

## 2020-10-01 DIAGNOSIS — C79.31 BRAIN METASTASES (HCC): ICD-10-CM

## 2020-10-01 DIAGNOSIS — G89.3 CANCER-RELATED PAIN: ICD-10-CM

## 2020-10-01 PROCEDURE — 99214 OFFICE O/P EST MOD 30 MIN: CPT | Performed by: FAMILY MEDICINE

## 2020-10-01 RX ORDER — ONDANSETRON HYDROCHLORIDE 8 MG/1
8 TABLET, FILM COATED ORAL
Qty: 90 TABLET | Refills: 1 | Status: SHIPPED | OUTPATIENT
Start: 2020-10-01 | End: 2020-12-03 | Stop reason: SDUPTHER

## 2020-10-01 RX ORDER — HYDROMORPHONE HYDROCHLORIDE 4 MG/1
4-8 TABLET ORAL EVERY 4 HOURS PRN
Qty: 120 TABLET | Refills: 0 | Status: SHIPPED | OUTPATIENT
Start: 2020-10-01 | End: 2020-11-04 | Stop reason: SDUPTHER

## 2020-10-01 RX ORDER — SENNA PLUS 8.6 MG/1
1 TABLET ORAL
Qty: 90 TABLET | Refills: 3 | Status: SHIPPED | OUTPATIENT
Start: 2020-10-01 | End: 2021-02-08 | Stop reason: SDUPTHER

## 2020-10-01 RX ORDER — METHADONE HYDROCHLORIDE 5 MG/1
2.5 TABLET ORAL
Qty: 15 TABLET | Refills: 0 | Status: SHIPPED | OUTPATIENT
Start: 2020-10-01 | End: 2020-10-02 | Stop reason: SDUPTHER

## 2020-10-01 RX ORDER — DEXAMETHASONE 1 MG
1 TABLET ORAL
Qty: 30 TABLET | Refills: 0 | Status: SHIPPED | OUTPATIENT
Start: 2020-10-01 | End: 2020-11-04 | Stop reason: SDUPTHER

## 2020-10-01 RX ORDER — PREGABALIN 50 MG/1
CAPSULE ORAL
Qty: 90 CAPSULE | Refills: 0 | Status: SHIPPED | OUTPATIENT
Start: 2020-10-01 | End: 2020-11-05 | Stop reason: SDUPTHER

## 2020-10-02 ENCOUNTER — TELEPHONE (OUTPATIENT)
Dept: PALLIATIVE MEDICINE | Facility: CLINIC | Age: 60
End: 2020-10-02

## 2020-10-02 RX ORDER — METHADONE HYDROCHLORIDE 5 MG/1
2.5 TABLET ORAL
Qty: 15 TABLET | Refills: 0 | Status: SHIPPED | OUTPATIENT
Start: 2020-11-23 | End: 2020-12-03

## 2020-10-02 RX ORDER — METHADONE HYDROCHLORIDE 5 MG/1
2.5 TABLET ORAL
Qty: 15 TABLET | Refills: 0 | Status: SHIPPED | OUTPATIENT
Start: 2020-10-26 | End: 2020-12-03

## 2020-10-02 RX ORDER — HYDROMORPHONE HYDROCHLORIDE 4 MG/1
4-8 TABLET ORAL EVERY 4 HOURS PRN
Qty: 120 TABLET | Refills: 0 | Status: SHIPPED | OUTPATIENT
Start: 2020-10-26 | End: 2020-12-03

## 2020-10-02 RX ORDER — METHADONE HYDROCHLORIDE 5 MG/1
2.5 TABLET ORAL
Qty: 15 TABLET | Refills: 0 | Status: SHIPPED | OUTPATIENT
Start: 2020-10-02 | End: 2020-12-03

## 2020-10-02 RX ORDER — HYDROMORPHONE HYDROCHLORIDE 4 MG/1
4-8 TABLET ORAL EVERY 4 HOURS PRN
Qty: 120 TABLET | Refills: 0 | Status: SHIPPED | OUTPATIENT
Start: 2020-11-23 | End: 2020-12-03

## 2020-10-05 RX ORDER — MELOXICAM 15 MG/1
TABLET ORAL
COMMUNITY
Start: 2020-09-15 | End: 2020-12-03

## 2020-10-09 ENCOUNTER — TELEPHONE (OUTPATIENT)
Dept: HEMATOLOGY ONCOLOGY | Facility: CLINIC | Age: 60
End: 2020-10-09

## 2020-10-13 ENCOUNTER — OFFICE VISIT (OUTPATIENT)
Dept: HEMATOLOGY ONCOLOGY | Facility: CLINIC | Age: 60
End: 2020-10-13
Payer: MEDICARE

## 2020-10-13 VITALS
RESPIRATION RATE: 16 BRPM | OXYGEN SATURATION: 98 % | DIASTOLIC BLOOD PRESSURE: 70 MMHG | HEIGHT: 65 IN | SYSTOLIC BLOOD PRESSURE: 130 MMHG | TEMPERATURE: 98.5 F | BODY MASS INDEX: 24.66 KG/M2 | WEIGHT: 148 LBS | HEART RATE: 79 BPM

## 2020-10-13 DIAGNOSIS — C79.51 BONE METASTASES (HCC): ICD-10-CM

## 2020-10-13 DIAGNOSIS — I42.7 CARDIOMYOPATHY SECONDARY TO DRUG (HCC): Primary | ICD-10-CM

## 2020-10-13 DIAGNOSIS — R93.89 ABNORMAL CT OF THE CHEST: ICD-10-CM

## 2020-10-13 DIAGNOSIS — C50.911 BILATERAL MALIGNANT NEOPLASM OF BREAST IN FEMALE, ESTROGEN RECEPTOR POSITIVE, UNSPECIFIED SITE OF BREAST (HCC): ICD-10-CM

## 2020-10-13 DIAGNOSIS — R91.1 PULMONARY NODULE: ICD-10-CM

## 2020-10-13 DIAGNOSIS — C50.912 BILATERAL MALIGNANT NEOPLASM OF BREAST IN FEMALE, ESTROGEN RECEPTOR POSITIVE, UNSPECIFIED SITE OF BREAST (HCC): ICD-10-CM

## 2020-10-13 DIAGNOSIS — D63.8 ANEMIA OF CHRONIC DISEASE: ICD-10-CM

## 2020-10-13 DIAGNOSIS — Z17.0 BILATERAL MALIGNANT NEOPLASM OF BREAST IN FEMALE, ESTROGEN RECEPTOR POSITIVE, UNSPECIFIED SITE OF BREAST (HCC): ICD-10-CM

## 2020-10-13 PROBLEM — R60.9 EDEMA: Status: RESOLVED | Noted: 2019-10-23 | Resolved: 2020-10-13

## 2020-10-13 PROCEDURE — 99215 OFFICE O/P EST HI 40 MIN: CPT | Performed by: INTERNAL MEDICINE

## 2020-10-16 ENCOUNTER — HOSPITAL ENCOUNTER (EMERGENCY)
Facility: HOSPITAL | Age: 60
Discharge: HOME | End: 2020-10-16
Attending: EMERGENCY MEDICINE
Payer: MEDICARE

## 2020-10-16 VITALS
DIASTOLIC BLOOD PRESSURE: 72 MMHG | BODY MASS INDEX: 25.23 KG/M2 | HEART RATE: 74 BPM | RESPIRATION RATE: 16 BRPM | WEIGHT: 151.46 LBS | TEMPERATURE: 98.1 F | SYSTOLIC BLOOD PRESSURE: 121 MMHG | OXYGEN SATURATION: 97 % | HEIGHT: 65 IN

## 2020-10-16 DIAGNOSIS — S01.511A LIP LACERATION, INITIAL ENCOUNTER: Primary | ICD-10-CM

## 2020-10-16 PROCEDURE — 90471 IMMUNIZATION ADMIN: CPT

## 2020-10-16 PROCEDURE — 99283 EMERGENCY DEPT VISIT LOW MDM: CPT

## 2020-10-16 PROCEDURE — 12014 RPR F/E/E/N/L/M 5.1-7.5 CM: CPT | Performed by: PHYSICIAN ASSISTANT

## 2020-10-16 PROCEDURE — 90715 TDAP VACCINE 7 YRS/> IM: CPT | Performed by: PHYSICIAN ASSISTANT

## 2020-10-16 PROCEDURE — 99282 EMERGENCY DEPT VISIT SF MDM: CPT | Performed by: PHYSICIAN ASSISTANT

## 2020-10-16 RX ORDER — PALONOSETRON 0.05 MG/ML
0.25 INJECTION, SOLUTION INTRAVENOUS ONCE
Status: CANCELLED | OUTPATIENT
Start: 2020-10-27

## 2020-10-16 RX ORDER — LIDOCAINE HYDROCHLORIDE 20 MG/ML
5 INJECTION, SOLUTION EPIDURAL; INFILTRATION; INTRACAUDAL; PERINEURAL ONCE
Status: COMPLETED | OUTPATIENT
Start: 2020-10-16 | End: 2020-10-16

## 2020-10-16 RX ORDER — SODIUM CHLORIDE 9 MG/ML
20 INJECTION, SOLUTION INTRAVENOUS ONCE
Status: CANCELLED | OUTPATIENT
Start: 2020-10-27

## 2020-10-16 RX ADMIN — LIDOCAINE HYDROCHLORIDE 5 ML: 20 INJECTION, SOLUTION EPIDURAL; INFILTRATION; INTRACAUDAL; PERINEURAL at 05:27

## 2020-10-16 RX ADMIN — TETANUS TOXOID, REDUCED DIPHTHERIA TOXOID AND ACELLULAR PERTUSSIS VACCINE, ADSORBED 0.5 ML: 5; 2.5; 8; 8; 2.5 SUSPENSION INTRAMUSCULAR at 05:29

## 2020-10-26 ENCOUNTER — TELEPHONE (OUTPATIENT)
Dept: INFUSION CENTER | Facility: CLINIC | Age: 60
End: 2020-10-26

## 2020-10-26 DIAGNOSIS — C50.911 BILATERAL MALIGNANT NEOPLASM OF BREAST IN FEMALE, ESTROGEN RECEPTOR POSITIVE, UNSPECIFIED SITE OF BREAST (HCC): Primary | ICD-10-CM

## 2020-10-26 DIAGNOSIS — C50.912 BILATERAL MALIGNANT NEOPLASM OF BREAST IN FEMALE, ESTROGEN RECEPTOR POSITIVE, UNSPECIFIED SITE OF BREAST (HCC): Primary | ICD-10-CM

## 2020-10-26 DIAGNOSIS — Z17.0 BILATERAL MALIGNANT NEOPLASM OF BREAST IN FEMALE, ESTROGEN RECEPTOR POSITIVE, UNSPECIFIED SITE OF BREAST (HCC): Primary | ICD-10-CM

## 2020-10-27 ENCOUNTER — HOSPITAL ENCOUNTER (OUTPATIENT)
Dept: INFUSION CENTER | Facility: CLINIC | Age: 60
Discharge: HOME | End: 2020-10-27
Payer: MEDICARE

## 2020-10-27 VITALS
HEIGHT: 65 IN | TEMPERATURE: 98.4 F | RESPIRATION RATE: 20 BRPM | HEART RATE: 84 BPM | SYSTOLIC BLOOD PRESSURE: 135 MMHG | BODY MASS INDEX: 24.49 KG/M2 | DIASTOLIC BLOOD PRESSURE: 77 MMHG | WEIGHT: 147 LBS | OXYGEN SATURATION: 98 %

## 2020-10-27 DIAGNOSIS — C50.911 BILATERAL MALIGNANT NEOPLASM OF BREAST IN FEMALE, ESTROGEN RECEPTOR POSITIVE, UNSPECIFIED SITE OF BREAST (HCC): Primary | ICD-10-CM

## 2020-10-27 DIAGNOSIS — C50.912 BILATERAL MALIGNANT NEOPLASM OF BREAST IN FEMALE, ESTROGEN RECEPTOR POSITIVE, UNSPECIFIED SITE OF BREAST (HCC): Primary | ICD-10-CM

## 2020-10-27 DIAGNOSIS — Z17.0 BILATERAL MALIGNANT NEOPLASM OF BREAST IN FEMALE, ESTROGEN RECEPTOR POSITIVE, UNSPECIFIED SITE OF BREAST (HCC): Primary | ICD-10-CM

## 2020-10-27 DIAGNOSIS — C79.51 BONE METASTASES (HCC): ICD-10-CM

## 2020-10-27 LAB
ALBUMIN SERPL BCP-MCNC: 3.2 G/DL (ref 3.5–5)
ALP SERPL-CCNC: 134 U/L (ref 46–116)
ALT SERPL W P-5'-P-CCNC: 50 U/L (ref 12–78)
ANION GAP SERPL CALCULATED.3IONS-SCNC: 10 MMOL/L (ref 4–13)
AST SERPL W P-5'-P-CCNC: 30 U/L (ref 5–45)
BASOPHILS # BLD AUTO: 0.02 THOUSANDS/ΜL (ref 0–0.1)
BASOPHILS NFR BLD AUTO: 0 % (ref 0–1)
BILIRUB SERPL-MCNC: 0.35 MG/DL (ref 0.2–1)
BUN SERPL-MCNC: 8 MG/DL (ref 5–25)
CALCIUM ALBUM COR SERPL-MCNC: 9.8 MG/DL (ref 8.3–10.1)
CALCIUM SERPL-MCNC: 9.2 MG/DL (ref 8.3–10.1)
CANCER AG27-29 SERPL-ACNC: 49.5 U/ML (ref 0–42.3)
CHLORIDE SERPL-SCNC: 105 MMOL/L (ref 100–108)
CO2 SERPL-SCNC: 24 MMOL/L (ref 21–32)
CREAT SERPL-MCNC: 0.58 MG/DL (ref 0.6–1.3)
EOSINOPHIL # BLD AUTO: 0.04 THOUSAND/ΜL (ref 0–0.61)
EOSINOPHIL NFR BLD AUTO: 0 % (ref 0–6)
ERYTHROCYTE [DISTWIDTH] IN BLOOD BY AUTOMATED COUNT: 16.4 % (ref 11.6–15.1)
GFR SERPL CREATININE-BSD FRML MDRD: 102 ML/MIN/1.73SQ M
GLUCOSE SERPL-MCNC: 90 MG/DL (ref 65–140)
HCT VFR BLD AUTO: 37.6 % (ref 34.8–46.1)
HGB BLD-MCNC: 12.1 G/DL (ref 11.5–15.4)
IMM GRANULOCYTES # BLD AUTO: 0.03 THOUSAND/UL (ref 0–0.2)
IMM GRANULOCYTES NFR BLD AUTO: 0 % (ref 0–2)
LYMPHOCYTES # BLD AUTO: 1.89 THOUSANDS/ΜL (ref 0.6–4.47)
LYMPHOCYTES NFR BLD AUTO: 21 % (ref 14–44)
MCH RBC QN AUTO: 30.3 PG (ref 26.8–34.3)
MCHC RBC AUTO-ENTMCNC: 32.2 G/DL (ref 31.4–37.4)
MCV RBC AUTO: 94 FL (ref 82–98)
MONOCYTES # BLD AUTO: 0.67 THOUSAND/ΜL (ref 0.17–1.22)
MONOCYTES NFR BLD AUTO: 7 % (ref 4–12)
NEUTROPHILS # BLD AUTO: 6.49 THOUSANDS/ΜL (ref 1.85–7.62)
NEUTS SEG NFR BLD AUTO: 72 % (ref 43–75)
NRBC BLD AUTO-RTO: 0 /100 WBCS
PLATELET # BLD AUTO: 319 THOUSANDS/UL (ref 149–390)
PMV BLD AUTO: 9.3 FL (ref 8.9–12.7)
POTASSIUM SERPL-SCNC: 3.9 MMOL/L (ref 3.5–5.3)
PROT SERPL-MCNC: 7.8 G/DL (ref 6.4–8.2)
RBC # BLD AUTO: 4 MILLION/UL (ref 3.81–5.12)
SODIUM SERPL-SCNC: 139 MMOL/L (ref 136–145)
WBC # BLD AUTO: 9.14 THOUSAND/UL (ref 4.31–10.16)

## 2020-10-27 PROCEDURE — 96375 TX/PRO/DX INJ NEW DRUG ADDON: CPT

## 2020-10-27 PROCEDURE — 96401 CHEMO ANTI-NEOPL SQ/IM: CPT

## 2020-10-27 PROCEDURE — 96367 TX/PROPH/DG ADDL SEQ IV INF: CPT

## 2020-10-27 PROCEDURE — 96413 CHEMO IV INFUSION 1 HR: CPT

## 2020-10-27 PROCEDURE — 80053 COMPREHEN METABOLIC PANEL: CPT | Performed by: INTERNAL MEDICINE

## 2020-10-27 PROCEDURE — 85025 COMPLETE CBC W/AUTO DIFF WBC: CPT | Performed by: INTERNAL MEDICINE

## 2020-10-27 PROCEDURE — 86300 IMMUNOASSAY TUMOR CA 15-3: CPT

## 2020-10-27 RX ORDER — PALONOSETRON 0.05 MG/ML
0.25 INJECTION, SOLUTION INTRAVENOUS ONCE
Status: COMPLETED | OUTPATIENT
Start: 2020-10-27 | End: 2020-10-27

## 2020-10-27 RX ORDER — SODIUM CHLORIDE 9 MG/ML
20 INJECTION, SOLUTION INTRAVENOUS ONCE
Status: COMPLETED | OUTPATIENT
Start: 2020-10-27 | End: 2020-10-27

## 2020-10-27 RX ADMIN — PALONOSETRON HYDROCHLORIDE 0.25 MG: 0.25 INJECTION, SOLUTION INTRAVENOUS at 10:10

## 2020-10-27 RX ADMIN — FOSAPREPITANT 150 MG: 150 INJECTION, POWDER, LYOPHILIZED, FOR SOLUTION INTRAVENOUS at 10:15

## 2020-10-27 RX ADMIN — ADO-TRASTUZUMAB EMTANSINE 260 MG: 20 INJECTION, POWDER, LYOPHILIZED, FOR SOLUTION INTRAVENOUS at 11:40

## 2020-10-27 RX ADMIN — SODIUM CHLORIDE 20 ML/HR: 0.9 INJECTION, SOLUTION INTRAVENOUS at 09:30

## 2020-10-27 RX ADMIN — DENOSUMAB 120 MG: 120 INJECTION SUBCUTANEOUS at 12:40

## 2020-10-27 RX ADMIN — DEXAMETHASONE SODIUM PHOSPHATE 10 MG: 10 INJECTION, SOLUTION INTRAMUSCULAR; INTRAVENOUS at 09:40

## 2020-10-30 NOTE — PROGRESS NOTES
Exposed to coronavirus on Monday. Was tested on Wednesday. Received negative result. However, last pm started with cough, sore throat, stuffy nose, vomiting, shakes, temp 99.5, no loss of smell or taste. Please advise.  Please call back at [phone] To clinic for port a cath lab draw  Pt reports fatigue as her only complaint today  Labs drawn as per orders

## 2020-11-04 DIAGNOSIS — G89.3 CANCER-RELATED PAIN: ICD-10-CM

## 2020-11-04 DIAGNOSIS — Z51.5 PALLIATIVE CARE PATIENT: ICD-10-CM

## 2020-11-04 DIAGNOSIS — C79.31 BRAIN METASTASES (HCC): ICD-10-CM

## 2020-11-04 DIAGNOSIS — C79.51 BONE METASTASES (HCC): ICD-10-CM

## 2020-11-04 RX ORDER — HYDROMORPHONE HYDROCHLORIDE 4 MG/1
4-8 TABLET ORAL EVERY 4 HOURS PRN
Qty: 120 TABLET | Refills: 0 | Status: SHIPPED | OUTPATIENT
Start: 2020-11-04 | End: 2020-12-03 | Stop reason: SDUPTHER

## 2020-11-04 RX ORDER — DEXAMETHASONE 1 MG
1 TABLET ORAL
Qty: 30 TABLET | Refills: 0 | Status: SHIPPED | OUTPATIENT
Start: 2020-11-04 | End: 2020-12-03 | Stop reason: SDUPTHER

## 2020-11-05 ENCOUNTER — TELEPHONE (OUTPATIENT)
Dept: HEMATOLOGY ONCOLOGY | Facility: CLINIC | Age: 60
End: 2020-11-05

## 2020-11-05 DIAGNOSIS — K59.00 CONSTIPATION, UNSPECIFIED CONSTIPATION TYPE: ICD-10-CM

## 2020-11-06 RX ORDER — PREGABALIN 50 MG/1
CAPSULE ORAL
Qty: 90 CAPSULE | Refills: 0 | Status: SHIPPED | OUTPATIENT
Start: 2020-11-06 | End: 2020-12-03 | Stop reason: SDUPTHER

## 2020-11-17 RX ORDER — PALONOSETRON 0.05 MG/ML
0.25 INJECTION, SOLUTION INTRAVENOUS ONCE
Status: CANCELLED | OUTPATIENT
Start: 2020-11-24

## 2020-11-17 RX ORDER — SODIUM CHLORIDE 9 MG/ML
20 INJECTION, SOLUTION INTRAVENOUS ONCE
Status: CANCELLED | OUTPATIENT
Start: 2020-11-24

## 2020-11-19 DIAGNOSIS — C50.912 BILATERAL MALIGNANT NEOPLASM OF BREAST IN FEMALE, ESTROGEN RECEPTOR POSITIVE, UNSPECIFIED SITE OF BREAST (HCC): Primary | ICD-10-CM

## 2020-11-19 DIAGNOSIS — Z17.0 BILATERAL MALIGNANT NEOPLASM OF BREAST IN FEMALE, ESTROGEN RECEPTOR POSITIVE, UNSPECIFIED SITE OF BREAST (HCC): Primary | ICD-10-CM

## 2020-11-19 DIAGNOSIS — C50.911 BILATERAL MALIGNANT NEOPLASM OF BREAST IN FEMALE, ESTROGEN RECEPTOR POSITIVE, UNSPECIFIED SITE OF BREAST (HCC): Primary | ICD-10-CM

## 2020-11-20 ENCOUNTER — HOSPITAL ENCOUNTER (OUTPATIENT)
Dept: INFUSION CENTER | Facility: CLINIC | Age: 60
Discharge: HOME | End: 2020-11-20
Payer: MEDICARE

## 2020-11-20 VITALS — TEMPERATURE: 97.2 F

## 2020-11-20 DIAGNOSIS — E86.0 DEHYDRATION: ICD-10-CM

## 2020-11-20 DIAGNOSIS — C79.51 BONE METASTASES (HCC): Primary | ICD-10-CM

## 2020-11-20 DIAGNOSIS — Z17.0 BILATERAL MALIGNANT NEOPLASM OF BREAST IN FEMALE, ESTROGEN RECEPTOR POSITIVE, UNSPECIFIED SITE OF BREAST (HCC): ICD-10-CM

## 2020-11-20 DIAGNOSIS — C50.912 BILATERAL MALIGNANT NEOPLASM OF BREAST IN FEMALE, ESTROGEN RECEPTOR POSITIVE, UNSPECIFIED SITE OF BREAST (HCC): ICD-10-CM

## 2020-11-20 DIAGNOSIS — C50.911 BILATERAL MALIGNANT NEOPLASM OF BREAST IN FEMALE, ESTROGEN RECEPTOR POSITIVE, UNSPECIFIED SITE OF BREAST (HCC): ICD-10-CM

## 2020-11-20 LAB
ALBUMIN SERPL BCP-MCNC: 3 G/DL (ref 3.5–5)
ALP SERPL-CCNC: 132 U/L (ref 46–116)
ALT SERPL W P-5'-P-CCNC: 78 U/L (ref 12–78)
ANION GAP SERPL CALCULATED.3IONS-SCNC: 11 MMOL/L (ref 4–13)
AST SERPL W P-5'-P-CCNC: 31 U/L (ref 5–45)
BASOPHILS # BLD AUTO: 0.05 THOUSANDS/ΜL (ref 0–0.1)
BASOPHILS NFR BLD AUTO: 1 % (ref 0–1)
BILIRUB SERPL-MCNC: 0.2 MG/DL (ref 0.2–1)
BUN SERPL-MCNC: 8 MG/DL (ref 5–25)
CALCIUM ALBUM COR SERPL-MCNC: 9.7 MG/DL (ref 8.3–10.1)
CALCIUM SERPL-MCNC: 8.9 MG/DL (ref 8.3–10.1)
CHLORIDE SERPL-SCNC: 105 MMOL/L (ref 100–108)
CO2 SERPL-SCNC: 21 MMOL/L (ref 21–32)
CREAT SERPL-MCNC: 0.85 MG/DL (ref 0.6–1.3)
EOSINOPHIL # BLD AUTO: 0.07 THOUSAND/ΜL (ref 0–0.61)
EOSINOPHIL NFR BLD AUTO: 1 % (ref 0–6)
ERYTHROCYTE [DISTWIDTH] IN BLOOD BY AUTOMATED COUNT: 16.8 % (ref 11.6–15.1)
GFR SERPL CREATININE-BSD FRML MDRD: 76 ML/MIN/1.73SQ M
GLUCOSE SERPL-MCNC: 174 MG/DL (ref 65–140)
HCT VFR BLD AUTO: 39.7 % (ref 34.8–46.1)
HGB BLD-MCNC: 12.3 G/DL (ref 11.5–15.4)
IMM GRANULOCYTES # BLD AUTO: 0.05 THOUSAND/UL (ref 0–0.2)
IMM GRANULOCYTES NFR BLD AUTO: 1 % (ref 0–2)
LYMPHOCYTES # BLD AUTO: 2.1 THOUSANDS/ΜL (ref 0.6–4.47)
LYMPHOCYTES NFR BLD AUTO: 23 % (ref 14–44)
MCH RBC QN AUTO: 30 PG (ref 26.8–34.3)
MCHC RBC AUTO-ENTMCNC: 31 G/DL (ref 31.4–37.4)
MCV RBC AUTO: 97 FL (ref 82–98)
MONOCYTES # BLD AUTO: 0.89 THOUSAND/ΜL (ref 0.17–1.22)
MONOCYTES NFR BLD AUTO: 10 % (ref 4–12)
NEUTROPHILS # BLD AUTO: 6.09 THOUSANDS/ΜL (ref 1.85–7.62)
NEUTS SEG NFR BLD AUTO: 64 % (ref 43–75)
NRBC BLD AUTO-RTO: 0 /100 WBCS
PLATELET # BLD AUTO: 367 THOUSANDS/UL (ref 149–390)
PMV BLD AUTO: 9.1 FL (ref 8.9–12.7)
POTASSIUM SERPL-SCNC: 3.6 MMOL/L (ref 3.5–5.3)
PROT SERPL-MCNC: 7.5 G/DL (ref 6.4–8.2)
RBC # BLD AUTO: 4.1 MILLION/UL (ref 3.81–5.12)
SODIUM SERPL-SCNC: 137 MMOL/L (ref 136–145)
WBC # BLD AUTO: 9.25 THOUSAND/UL (ref 4.31–10.16)

## 2020-11-20 PROCEDURE — 85025 COMPLETE CBC W/AUTO DIFF WBC: CPT | Performed by: INTERNAL MEDICINE

## 2020-11-20 PROCEDURE — 80053 COMPREHEN METABOLIC PANEL: CPT | Performed by: INTERNAL MEDICINE

## 2020-11-24 ENCOUNTER — HOSPITAL ENCOUNTER (OUTPATIENT)
Dept: INFUSION CENTER | Facility: CLINIC | Age: 60
Discharge: HOME | End: 2020-11-24
Payer: MEDICARE

## 2020-11-24 VITALS
DIASTOLIC BLOOD PRESSURE: 70 MMHG | SYSTOLIC BLOOD PRESSURE: 102 MMHG | HEART RATE: 98 BPM | HEIGHT: 65 IN | TEMPERATURE: 97.1 F | RESPIRATION RATE: 18 BRPM | BODY MASS INDEX: 24.74 KG/M2 | OXYGEN SATURATION: 97 % | WEIGHT: 148.5 LBS

## 2020-11-24 DIAGNOSIS — C50.911 BILATERAL MALIGNANT NEOPLASM OF BREAST IN FEMALE, ESTROGEN RECEPTOR POSITIVE, UNSPECIFIED SITE OF BREAST (HCC): Primary | ICD-10-CM

## 2020-11-24 DIAGNOSIS — C50.912 BILATERAL MALIGNANT NEOPLASM OF BREAST IN FEMALE, ESTROGEN RECEPTOR POSITIVE, UNSPECIFIED SITE OF BREAST (HCC): Primary | ICD-10-CM

## 2020-11-24 DIAGNOSIS — Z17.0 BILATERAL MALIGNANT NEOPLASM OF BREAST IN FEMALE, ESTROGEN RECEPTOR POSITIVE, UNSPECIFIED SITE OF BREAST (HCC): Primary | ICD-10-CM

## 2020-11-24 PROCEDURE — 96375 TX/PRO/DX INJ NEW DRUG ADDON: CPT

## 2020-11-24 PROCEDURE — 96413 CHEMO IV INFUSION 1 HR: CPT

## 2020-11-24 PROCEDURE — 96367 TX/PROPH/DG ADDL SEQ IV INF: CPT

## 2020-11-24 RX ORDER — PALONOSETRON 0.05 MG/ML
0.25 INJECTION, SOLUTION INTRAVENOUS ONCE
Status: COMPLETED | OUTPATIENT
Start: 2020-11-24 | End: 2020-11-24

## 2020-11-24 RX ORDER — SODIUM CHLORIDE 9 MG/ML
20 INJECTION, SOLUTION INTRAVENOUS ONCE
Status: COMPLETED | OUTPATIENT
Start: 2020-11-24 | End: 2020-11-24

## 2020-11-24 RX ADMIN — ADO-TRASTUZUMAB EMTANSINE 260 MG: 20 INJECTION, POWDER, LYOPHILIZED, FOR SOLUTION INTRAVENOUS at 13:45

## 2020-11-24 RX ADMIN — FOSAPREPITANT 150 MG: 150 INJECTION, POWDER, LYOPHILIZED, FOR SOLUTION INTRAVENOUS at 13:02

## 2020-11-24 RX ADMIN — PALONOSETRON 0.25 MG: 0.05 INJECTION, SOLUTION INTRAVENOUS at 13:41

## 2020-11-24 RX ADMIN — DEXAMETHASONE SODIUM PHOSPHATE 10 MG: 10 INJECTION, SOLUTION INTRAMUSCULAR; INTRAVENOUS at 12:42

## 2020-11-24 RX ADMIN — SODIUM CHLORIDE 20 ML/HR: 0.9 INJECTION, SOLUTION INTRAVENOUS at 12:35

## 2020-12-03 ENCOUNTER — OFFICE VISIT (OUTPATIENT)
Dept: PALLIATIVE MEDICINE | Facility: CLINIC | Age: 60
End: 2020-12-03
Payer: MEDICARE

## 2020-12-03 VITALS
OXYGEN SATURATION: 99 % | SYSTOLIC BLOOD PRESSURE: 124 MMHG | WEIGHT: 149.47 LBS | HEART RATE: 101 BPM | DIASTOLIC BLOOD PRESSURE: 80 MMHG | TEMPERATURE: 97.3 F | RESPIRATION RATE: 18 BRPM | BODY MASS INDEX: 24.9 KG/M2 | HEIGHT: 65 IN

## 2020-12-03 DIAGNOSIS — R11.2 CINV (CHEMOTHERAPY-INDUCED NAUSEA AND VOMITING): Chronic | ICD-10-CM

## 2020-12-03 DIAGNOSIS — F51.01 PRIMARY INSOMNIA: ICD-10-CM

## 2020-12-03 DIAGNOSIS — C79.51 BONE METASTASES (HCC): ICD-10-CM

## 2020-12-03 DIAGNOSIS — C79.31 BRAIN METASTASES (HCC): ICD-10-CM

## 2020-12-03 DIAGNOSIS — K59.00 CONSTIPATION, UNSPECIFIED CONSTIPATION TYPE: ICD-10-CM

## 2020-12-03 DIAGNOSIS — Z51.5 PALLIATIVE CARE PATIENT: ICD-10-CM

## 2020-12-03 DIAGNOSIS — G89.3 CANCER-RELATED PAIN: ICD-10-CM

## 2020-12-03 DIAGNOSIS — T45.1X5A CINV (CHEMOTHERAPY-INDUCED NAUSEA AND VOMITING): Chronic | ICD-10-CM

## 2020-12-03 PROCEDURE — 99214 OFFICE O/P EST MOD 30 MIN: CPT | Performed by: FAMILY MEDICINE

## 2020-12-03 RX ORDER — ZOLPIDEM TARTRATE 10 MG/1
10 TABLET ORAL
Qty: 30 TABLET | Refills: 0 | Status: SHIPPED | OUTPATIENT
Start: 2020-12-03 | End: 2021-01-06 | Stop reason: SDUPTHER

## 2020-12-03 RX ORDER — ONDANSETRON HYDROCHLORIDE 8 MG/1
8 TABLET, FILM COATED ORAL
Qty: 90 TABLET | Refills: 0 | Status: SHIPPED | OUTPATIENT
Start: 2020-12-03 | End: 2021-01-06 | Stop reason: SDUPTHER

## 2020-12-03 RX ORDER — HYDROMORPHONE HYDROCHLORIDE 4 MG/1
4-8 TABLET ORAL EVERY 4 HOURS PRN
Qty: 180 TABLET | Refills: 0 | Status: SHIPPED | OUTPATIENT
Start: 2020-12-03 | End: 2021-01-06 | Stop reason: SDUPTHER

## 2020-12-03 RX ORDER — OLANZAPINE 7.5 MG/1
7.5 TABLET ORAL
Qty: 30 TABLET | Refills: 0 | Status: SHIPPED | OUTPATIENT
Start: 2020-12-03 | End: 2020-12-27

## 2020-12-03 RX ORDER — DEXAMETHASONE 1 MG
1 TABLET ORAL
Qty: 30 TABLET | Refills: 0 | Status: SHIPPED | OUTPATIENT
Start: 2020-12-03 | End: 2021-01-06 | Stop reason: SDUPTHER

## 2020-12-03 RX ORDER — PREGABALIN 50 MG/1
CAPSULE ORAL
Qty: 90 CAPSULE | Refills: 0 | Status: SHIPPED | OUTPATIENT
Start: 2020-12-03 | End: 2021-01-06 | Stop reason: SDUPTHER

## 2020-12-15 RX ORDER — PALONOSETRON 0.05 MG/ML
0.25 INJECTION, SOLUTION INTRAVENOUS ONCE
Status: CANCELLED | OUTPATIENT
Start: 2020-12-22

## 2020-12-15 RX ORDER — SODIUM CHLORIDE 9 MG/ML
20 INJECTION, SOLUTION INTRAVENOUS ONCE
Status: CANCELLED | OUTPATIENT
Start: 2020-12-22

## 2020-12-21 ENCOUNTER — HOSPITAL ENCOUNTER (OUTPATIENT)
Dept: INFUSION CENTER | Facility: CLINIC | Age: 60
Discharge: HOME | End: 2020-12-21
Payer: MEDICARE

## 2020-12-21 VITALS — TEMPERATURE: 97.1 F

## 2020-12-21 DIAGNOSIS — C50.912 BILATERAL MALIGNANT NEOPLASM OF BREAST IN FEMALE, ESTROGEN RECEPTOR POSITIVE, UNSPECIFIED SITE OF BREAST (HCC): Primary | ICD-10-CM

## 2020-12-21 DIAGNOSIS — E86.0 DEHYDRATION: ICD-10-CM

## 2020-12-21 DIAGNOSIS — C50.911 BILATERAL MALIGNANT NEOPLASM OF BREAST IN FEMALE, ESTROGEN RECEPTOR POSITIVE, UNSPECIFIED SITE OF BREAST (HCC): Primary | ICD-10-CM

## 2020-12-21 DIAGNOSIS — Z17.0 BILATERAL MALIGNANT NEOPLASM OF BREAST IN FEMALE, ESTROGEN RECEPTOR POSITIVE, UNSPECIFIED SITE OF BREAST (HCC): Primary | ICD-10-CM

## 2020-12-21 LAB
ALBUMIN SERPL BCP-MCNC: 3 G/DL (ref 3.5–5)
ALP SERPL-CCNC: 108 U/L (ref 46–116)
ALT SERPL W P-5'-P-CCNC: 48 U/L (ref 12–78)
ANION GAP SERPL CALCULATED.3IONS-SCNC: 10 MMOL/L (ref 4–13)
AST SERPL W P-5'-P-CCNC: 25 U/L (ref 5–45)
BASOPHILS # BLD AUTO: 0.04 THOUSANDS/ΜL (ref 0–0.1)
BASOPHILS NFR BLD AUTO: 1 % (ref 0–1)
BILIRUB SERPL-MCNC: 0.12 MG/DL (ref 0.2–1)
BUN SERPL-MCNC: 10 MG/DL (ref 5–25)
CALCIUM ALBUM COR SERPL-MCNC: 9.8 MG/DL (ref 8.3–10.1)
CALCIUM SERPL-MCNC: 9 MG/DL (ref 8.3–10.1)
CANCER AG27-29 SERPL-ACNC: 51.5 U/ML (ref 0–42.3)
CHLORIDE SERPL-SCNC: 107 MMOL/L (ref 100–108)
CO2 SERPL-SCNC: 25 MMOL/L (ref 21–32)
CREAT SERPL-MCNC: 0.71 MG/DL (ref 0.6–1.3)
EOSINOPHIL # BLD AUTO: 0.01 THOUSAND/ΜL (ref 0–0.61)
EOSINOPHIL NFR BLD AUTO: 0 % (ref 0–6)
ERYTHROCYTE [DISTWIDTH] IN BLOOD BY AUTOMATED COUNT: 17.6 % (ref 11.6–15.1)
GFR SERPL CREATININE-BSD FRML MDRD: 94 ML/MIN/1.73SQ M
GLUCOSE SERPL-MCNC: 133 MG/DL (ref 65–140)
HCT VFR BLD AUTO: 35.7 % (ref 34.8–46.1)
HGB BLD-MCNC: 11.3 G/DL (ref 11.5–15.4)
IMM GRANULOCYTES # BLD AUTO: 0.05 THOUSAND/UL (ref 0–0.2)
IMM GRANULOCYTES NFR BLD AUTO: 1 % (ref 0–2)
LYMPHOCYTES # BLD AUTO: 1.31 THOUSANDS/ΜL (ref 0.6–4.47)
LYMPHOCYTES NFR BLD AUTO: 15 % (ref 14–44)
MCH RBC QN AUTO: 30.4 PG (ref 26.8–34.3)
MCHC RBC AUTO-ENTMCNC: 31.7 G/DL (ref 31.4–37.4)
MCV RBC AUTO: 96 FL (ref 82–98)
MONOCYTES # BLD AUTO: 0.58 THOUSAND/ΜL (ref 0.17–1.22)
MONOCYTES NFR BLD AUTO: 7 % (ref 4–12)
NEUTROPHILS # BLD AUTO: 6.89 THOUSANDS/ΜL (ref 1.85–7.62)
NEUTS SEG NFR BLD AUTO: 76 % (ref 43–75)
NRBC BLD AUTO-RTO: 0 /100 WBCS
PLATELET # BLD AUTO: 357 THOUSANDS/UL (ref 149–390)
PMV BLD AUTO: 9.3 FL (ref 8.9–12.7)
POTASSIUM SERPL-SCNC: 4.2 MMOL/L (ref 3.5–5.3)
PROT SERPL-MCNC: 7.3 G/DL (ref 6.4–8.2)
RBC # BLD AUTO: 3.72 MILLION/UL (ref 3.81–5.12)
SODIUM SERPL-SCNC: 142 MMOL/L (ref 136–145)
WBC # BLD AUTO: 8.88 THOUSAND/UL (ref 4.31–10.16)

## 2020-12-21 PROCEDURE — 85025 COMPLETE CBC W/AUTO DIFF WBC: CPT | Performed by: INTERNAL MEDICINE

## 2020-12-21 PROCEDURE — 86300 IMMUNOASSAY TUMOR CA 15-3: CPT

## 2020-12-21 PROCEDURE — 80053 COMPREHEN METABOLIC PANEL: CPT | Performed by: INTERNAL MEDICINE

## 2020-12-22 ENCOUNTER — HOSPITAL ENCOUNTER (OUTPATIENT)
Dept: INFUSION CENTER | Facility: CLINIC | Age: 60
Discharge: HOME | End: 2020-12-22
Payer: MEDICARE

## 2020-12-22 VITALS
WEIGHT: 148.5 LBS | HEART RATE: 94 BPM | SYSTOLIC BLOOD PRESSURE: 138 MMHG | TEMPERATURE: 96.9 F | BODY MASS INDEX: 24.71 KG/M2 | DIASTOLIC BLOOD PRESSURE: 88 MMHG | OXYGEN SATURATION: 97 % | RESPIRATION RATE: 18 BRPM

## 2020-12-22 DIAGNOSIS — Z17.0 BILATERAL MALIGNANT NEOPLASM OF BREAST IN FEMALE, ESTROGEN RECEPTOR POSITIVE, UNSPECIFIED SITE OF BREAST (HCC): Primary | ICD-10-CM

## 2020-12-22 DIAGNOSIS — C50.911 BILATERAL MALIGNANT NEOPLASM OF BREAST IN FEMALE, ESTROGEN RECEPTOR POSITIVE, UNSPECIFIED SITE OF BREAST (HCC): Primary | ICD-10-CM

## 2020-12-22 DIAGNOSIS — C50.912 BILATERAL MALIGNANT NEOPLASM OF BREAST IN FEMALE, ESTROGEN RECEPTOR POSITIVE, UNSPECIFIED SITE OF BREAST (HCC): Primary | ICD-10-CM

## 2020-12-22 PROCEDURE — 96375 TX/PRO/DX INJ NEW DRUG ADDON: CPT

## 2020-12-22 PROCEDURE — 96367 TX/PROPH/DG ADDL SEQ IV INF: CPT

## 2020-12-22 PROCEDURE — 96413 CHEMO IV INFUSION 1 HR: CPT

## 2020-12-22 RX ORDER — PALONOSETRON 0.05 MG/ML
0.25 INJECTION, SOLUTION INTRAVENOUS ONCE
Status: COMPLETED | OUTPATIENT
Start: 2020-12-22 | End: 2020-12-22

## 2020-12-22 RX ORDER — SODIUM CHLORIDE 9 MG/ML
20 INJECTION, SOLUTION INTRAVENOUS ONCE
Status: COMPLETED | OUTPATIENT
Start: 2020-12-22 | End: 2020-12-22

## 2020-12-22 RX ADMIN — FOSAPREPITANT 150 MG: 150 INJECTION, POWDER, LYOPHILIZED, FOR SOLUTION INTRAVENOUS at 13:05

## 2020-12-22 RX ADMIN — DEXAMETHASONE SODIUM PHOSPHATE 10 MG: 10 INJECTION, SOLUTION INTRAMUSCULAR; INTRAVENOUS at 12:34

## 2020-12-22 RX ADMIN — PALONOSETRON 0.25 MG: 0.05 INJECTION, SOLUTION INTRAVENOUS at 13:01

## 2020-12-22 RX ADMIN — SODIUM CHLORIDE 20 ML/HR: 0.9 INJECTION, SOLUTION INTRAVENOUS at 12:30

## 2020-12-22 RX ADMIN — ADO-TRASTUZUMAB EMTANSINE 260 MG: 20 INJECTION, POWDER, LYOPHILIZED, FOR SOLUTION INTRAVENOUS at 13:41

## 2020-12-23 ENCOUNTER — HOSPITAL ENCOUNTER (OUTPATIENT)
Dept: NON INVASIVE DIAGNOSTICS | Facility: CLINIC | Age: 60
Discharge: HOME | End: 2020-12-23
Payer: MEDICARE

## 2020-12-23 DIAGNOSIS — I42.7 CARDIOMYOPATHY SECONDARY TO DRUG (HCC): ICD-10-CM

## 2020-12-23 PROCEDURE — C8929 TTE W OR WO FOL WCON,DOPPLER: HCPCS

## 2020-12-23 PROCEDURE — 93306 TTE W/DOPPLER COMPLETE: CPT | Performed by: INTERNAL MEDICINE

## 2020-12-23 RX ADMIN — PERFLUTREN 0.4 ML/MIN: 6.52 INJECTION, SUSPENSION INTRAVENOUS at 11:00

## 2020-12-27 DIAGNOSIS — T45.1X5A CINV (CHEMOTHERAPY-INDUCED NAUSEA AND VOMITING): Chronic | ICD-10-CM

## 2020-12-27 DIAGNOSIS — R11.2 CINV (CHEMOTHERAPY-INDUCED NAUSEA AND VOMITING): Chronic | ICD-10-CM

## 2020-12-27 DIAGNOSIS — F51.01 PRIMARY INSOMNIA: ICD-10-CM

## 2020-12-27 DIAGNOSIS — Z51.5 PALLIATIVE CARE PATIENT: ICD-10-CM

## 2020-12-27 RX ORDER — OLANZAPINE 7.5 MG/1
7.5 TABLET ORAL
Qty: 30 TABLET | Refills: 0 | Status: SHIPPED | OUTPATIENT
Start: 2020-12-27 | End: 2021-03-11 | Stop reason: SDUPTHER

## 2021-01-06 ENCOUNTER — TELEMEDICINE (OUTPATIENT)
Dept: PALLIATIVE MEDICINE | Facility: CLINIC | Age: 61
End: 2021-01-06
Payer: COMMERCIAL

## 2021-01-06 DIAGNOSIS — C79.31 BRAIN METASTASES (HCC): ICD-10-CM

## 2021-01-06 DIAGNOSIS — R11.2 CINV (CHEMOTHERAPY-INDUCED NAUSEA AND VOMITING): Chronic | ICD-10-CM

## 2021-01-06 DIAGNOSIS — F51.01 PRIMARY INSOMNIA: ICD-10-CM

## 2021-01-06 DIAGNOSIS — K59.00 CONSTIPATION, UNSPECIFIED CONSTIPATION TYPE: ICD-10-CM

## 2021-01-06 DIAGNOSIS — C79.51 BONE METASTASES (HCC): ICD-10-CM

## 2021-01-06 DIAGNOSIS — Z51.5 PALLIATIVE CARE PATIENT: ICD-10-CM

## 2021-01-06 DIAGNOSIS — G89.3 CANCER-RELATED PAIN: ICD-10-CM

## 2021-01-06 DIAGNOSIS — R56.9 FOCAL SEIZURE (HCC): Primary | ICD-10-CM

## 2021-01-06 DIAGNOSIS — T45.1X5A CINV (CHEMOTHERAPY-INDUCED NAUSEA AND VOMITING): Chronic | ICD-10-CM

## 2021-01-06 PROCEDURE — 99213 OFFICE O/P EST LOW 20 MIN: CPT | Performed by: FAMILY MEDICINE

## 2021-01-06 RX ORDER — LEVETIRACETAM 250 MG/1
250 TABLET ORAL 2 TIMES DAILY
Qty: 60 TABLET | Refills: 0 | Status: SHIPPED | OUTPATIENT
Start: 2021-01-06 | End: 2021-01-29

## 2021-01-06 RX ORDER — ONDANSETRON HYDROCHLORIDE 8 MG/1
8 TABLET, FILM COATED ORAL
Qty: 90 TABLET | Refills: 0 | Status: SHIPPED | OUTPATIENT
Start: 2021-01-06 | End: 2021-03-11 | Stop reason: SDUPTHER

## 2021-01-06 RX ORDER — DEXAMETHASONE 2 MG/1
2 TABLET ORAL
Qty: 30 TABLET | Refills: 0 | Status: SHIPPED | OUTPATIENT
Start: 2021-01-06 | End: 2021-02-08 | Stop reason: SDUPTHER

## 2021-01-06 RX ORDER — HYDROMORPHONE HYDROCHLORIDE 4 MG/1
4-8 TABLET ORAL EVERY 4 HOURS PRN
Qty: 180 TABLET | Refills: 0 | Status: SHIPPED | OUTPATIENT
Start: 2021-01-06 | End: 2021-02-05 | Stop reason: SDUPTHER

## 2021-01-06 RX ORDER — PREGABALIN 50 MG/1
CAPSULE ORAL
Qty: 90 CAPSULE | Refills: 0 | Status: SHIPPED | OUTPATIENT
Start: 2021-01-06 | End: 2021-02-05 | Stop reason: SDUPTHER

## 2021-01-06 RX ORDER — ZOLPIDEM TARTRATE 10 MG/1
10 TABLET ORAL
Qty: 30 TABLET | Refills: 0 | Status: SHIPPED | OUTPATIENT
Start: 2021-01-06 | End: 2021-02-08 | Stop reason: SDUPTHER

## 2021-01-12 RX ORDER — PALONOSETRON 0.05 MG/ML
0.25 INJECTION, SOLUTION INTRAVENOUS ONCE
Status: CANCELLED | OUTPATIENT
Start: 2021-01-19

## 2021-01-12 RX ORDER — SODIUM CHLORIDE 9 MG/ML
20 INJECTION, SOLUTION INTRAVENOUS ONCE
Status: CANCELLED | OUTPATIENT
Start: 2021-01-19

## 2021-01-13 ENCOUNTER — OFFICE VISIT (OUTPATIENT)
Dept: HEMATOLOGY ONCOLOGY | Facility: CLINIC | Age: 61
End: 2021-01-13
Payer: COMMERCIAL

## 2021-01-13 VITALS
DIASTOLIC BLOOD PRESSURE: 68 MMHG | WEIGHT: 158 LBS | BODY MASS INDEX: 26.33 KG/M2 | HEIGHT: 65 IN | HEART RATE: 97 BPM | OXYGEN SATURATION: 96 % | RESPIRATION RATE: 18 BRPM | TEMPERATURE: 98 F | SYSTOLIC BLOOD PRESSURE: 122 MMHG

## 2021-01-13 DIAGNOSIS — C50.912 BILATERAL MALIGNANT NEOPLASM OF BREAST IN FEMALE, ESTROGEN RECEPTOR POSITIVE, UNSPECIFIED SITE OF BREAST (HCC): Primary | ICD-10-CM

## 2021-01-13 DIAGNOSIS — Z17.0 BILATERAL MALIGNANT NEOPLASM OF BREAST IN FEMALE, ESTROGEN RECEPTOR POSITIVE, UNSPECIFIED SITE OF BREAST (HCC): Primary | ICD-10-CM

## 2021-01-13 DIAGNOSIS — C50.911 BILATERAL MALIGNANT NEOPLASM OF BREAST IN FEMALE, ESTROGEN RECEPTOR POSITIVE, UNSPECIFIED SITE OF BREAST (HCC): Primary | ICD-10-CM

## 2021-01-13 PROCEDURE — 99214 OFFICE O/P EST MOD 30 MIN: CPT | Performed by: INTERNAL MEDICINE

## 2021-01-13 NOTE — PROGRESS NOTES
[facility] HEMATOLOGY ONCOLOGY SPECIALISTS [facility]  [address]  [place] [address]  [phone]    Paradise Mao,1960, [id number]  01/13/21    Discussion:   In summary, this is a 57-year-old female history of stage IV breast cancer as outlined  She is currently on treatment with Kadcyla/Xgeva  Most recent imaging in September showed some pulmonary in bone lesions to be stable over a span of about 6 months  The volume of her disease is not much  Recent tumor marker showed minor increase, previously 49, currently 51  This is fluctuating in her previously established range over about 18 months  Her performance status is fair  She is able to walk with a walker  She is quite fatigued  She has migratory pains  She continues to work with palliative care on these issues  I suspect that her performance status limitation is driven largely by chronic medical illness, ongoing chemotherapy, etcetera  She is agreeable to continue treatment for another 2 months  We will re-stage  At that point it may be reasonable to take a treatment holiday to evaluate if that may help her performance status  I discussed the above with the patient  The patient   And her system voiced understanding and agreement   ______________________________________________________________________    Chief Complaint   Patient presents with    Follow-up       HPI:  Oncology History Overview Note   Diana Cordova is a 66y o  year old female who presents with stage IV metastatic breast carcinoma with a history of brain metastasis treated with SRS in May 2017 now with the multiple recurrent brain metastasis  She completed whole-brain radiation therapy March 12, 2019,with previous SRS to brain in May of 2017 and L2-S1 completed on 8/24/16  She was last seen 4/23/19       She had f/u scans done 5/1/19  No CT evidence of new soft tissue tumor mass in the chest, abdomen or pelvis  No significant interval change in subtle lucent and sclerotic lesions within the lower thoracic and lumbar vertebral bodies suggestive of osseous metastatic disease  Pt continues to see Palliative care  Continues to c/o pain while  on Decadron, Gabapentin, Flexeril, Dilaudid prn for   Pain  control  Has persistent nausea, and constipation  6/17/19 MRI Brain showed multiple supratentorial and infratentorial metastatic lesions, as described above and overall improved since prior examination  No definite new nodular enhancing lesions identified on the current examination  Had med onc PA f/u in June  Pt has chronic constipation, and was seen x 2 in recent past in the ER for this  Pt had palliative care f/u, today  She had requested a break for chemo, and her next chemo is being held  t had c/o weakness, and had refused to do PT, she has been taking steroids for   awhile  CT scan scheduled for 7/29/19 8/7/19 F/U with Matt Patiño PA-C:  8/1/19 CT scans demonstrated disease stability in her chest abdomen and pelvis  However, patient is symptomatic with dizziness and proximal weakness  Re-evaluation with MRI of the brain is necessary to rule out new metastatic lesion to the brain  Regimen:  Kadcyla 3 6 mg/KG IV Day 1, Q 3 weeks      8/21/19 MRI of brain     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, estrogen receptor positive ([facility] [address] )   2010 Initial Diagnosis    The patient had a left sided T1 B , N0 ER positive, NM and HER-2 negative invasive ductal carcinoma, right-sided DCIS, ER/NM positive, HER-2 negative  Bilateral mastectomy  2010 - 4/2011 Hormone Therapy    Tamoxifen 20 mg daily  Last menstrual period was on 3/2010      4/2011 -  Hormone Therapy    Arimidex 1 mg daily  Unknown when medication was discontinued  Patient was lost to follow up      12/9/2015 Progression    · Right arm swelling in December 2015  U/S found a nonvascular structure in the right axilla measuring 2 2 x 1 3 cm  · Subsequent CT Scan of the chest on 12/9/15, showed multiple bilateral pulmonary nodules measuring 3-7 mm  · PET 2/4/16, which revealed hypermetabolic hilar and mediastinal nodes  There is a pre carinal node with a SUV of 5 4 measuring 1 8 x 1 7 cm, and a subcarinal node measuring 1 9 x 1 3 cm with a SUV of 5 3  There is right hilar activity of 3 5 and left hilar activity of 2 8  The subcentimeter nodules are too small for PET evaluation  She also has uptake in her L3 and L5 vertebral bodies, both concerning for bony metastases  2/16/2016 Biopsy    Bronchoscopic biopsy showed Metastatic non-small cell carcinoma, favor adenocarcinoma  ER 90%, VT 0%  Her 2 +2, positive by FISH        3/1/2016 - 6/2016 Chemotherapy    Taxotere 75 mg/m2, Day 1  Perjeta 420 mg, Day 1  Herceptin 6 mg/kg, Day 1  Cycle length= 21 days     3/1/2016 - 2/2017 Hormone Therapy    Anastrozole 1 mg daily     6/2016 - 2/2017 Chemotherapy    Perjeta 420 mg, Day 1  Herceptin 6 mg/kg, Day 1  Cycle length= 21 days     2/2017 Progression    Bony progression      2/10/2017 - 4/12/2018 Chemotherapy    Perjeta 420 mg, Day 1  Herceptin 6 mg/kg, Day 1  Taxotere 75 mg/m2  Cycle length= 21 days     3/3/2017 -  Chemotherapy    Xgeva 120 mg IV, Day 1  Cycle length =  84 days     4/18/2017 Progression     brain MRI showed a 5 mm right parietal lesion, 3 mm right frontal lesion, 3 mm left parahippocampal lesion  5/2017 - 5/2017 Radiation    SRS to brain lesions     5/3/2018 - 8/2018 Chemotherapy    Perjeta 420 mg,  Day 1  Herceptin 6 mg/kg,  Day 1  Cycle length = 21 days  Cycle 3 was administered on 6/14/18    ** taxotere was temporarily discontinued secondary for desire of treatment holiday  Taxotere was readded into the treatment regimen during last cycle  9/5/2018 Progression    Cutaneous disease progression  9/13/2018 - 9/18/2019 Chemotherapy    Kadcyla 3 6 mg/kg dose IV Day 1     Cycle length =  21 days  Cycles planned = until progression    Interrupted at the end of January secondary to progressive fatigue and interval development of brain mets requiring radiation  Restarted on 3/14/19          2/18/2019 Progression    MRI brain demonstrated disease progression; CT of the chest abdomen pelvis was stable along with the patient's tumor markers  2/27/2019 - 3/12/2019 Radiation    WBRT  X 10 sessions  9/18/2019 -  Chemotherapy    Kadcyla 3 6 mg/KG IV Day 1  Cycle length= 3 weeks    Cycle enlongation after scans demonstrated stablity in August   Quality of life reasons- cycle was elongated  Bone metastases ([address] )   7/6/2016 Initial Diagnosis    Bone metastases ([address] )     8/10/2016 - 8/24/2016 Radiation    Treatments:  Course: C1    Plan ID Energy Fractions Dose per Fraction (cGy) Total Dose Delivered (cGy) Elapsed Days   L2-S1 Spine 10X 10 / 10 300 3,000 14      Treatment Dates:  8/10/2016 - 8/24/2016  Brain metastases ([facility] [address] )   4/26/2017 Initial Diagnosis    Brain metastases (HCC)         Interval History:   Clinically stable  ECOG-  3 - Symptomatic, >50% confined to bed    Review of Systems   Constitutional: Negative for appetite change, diaphoresis, fatigue and fever  HENT: Negative for sinus pain  Eyes: Negative for discharge  Respiratory: Negative for cough and shortness of breath  Cardiovascular: Negative for chest pain  Gastrointestinal: Negative for abdominal pain, constipation and diarrhea  Endocrine: Negative for cold intolerance  Genitourinary: Negative for difficulty urinating and hematuria  Musculoskeletal: Negative for joint swelling  Skin: Negative for rash  Allergic/Immunologic: Negative for environmental allergies  Neurological: Negative for dizziness and headaches  Hematological: Negative for adenopathy  Psychiatric/Behavioral: Negative for agitation         Past Medical History:   Diagnosis Date    Dehydration 6/11/2019    Dizziness 2/6/2018    Dry skin 2/6/2018    Edema 10/23/2019    H/O bilateral mastectomy 2/15/2016    Hyperglycemia 2/6/2018    Hypertension 2/15/2016    Hypokalemia 3/10/2020    Lymphedema 2/15/2016    Malignant cachexia ([facility] [address] ) 10/6/2016    Malignant neoplasm of right breast ([facility] [address] ) 2/15/2016    Metastatic breast cancer [facility])      Patient Active Problem List   Diagnosis    H/O bilateral mastectomy    Lymphedema of right upper extremity    Pulmonary nodule    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, estrogen receptor positive ([facility] [address] )    Bone metastases ([facility] [address] )    Brain metastases (HCC)    Cancer-related pain    CINV (chemotherapy-induced nausea and vomiting)    Therapeutic opioid-induced constipation (OIC)    Dyspareunia, female    Herniated lumbar intervertebral disc    Mediastinal lymphadenopathy    Other fatigue    Bilateral pleural effusion    Abnormal CT of the chest    SOB (shortness of breath)    Tobacco abuse    Financial difficulties    Dehydration    Palliative care patient    Ambulatory dysfunction    Insomnia due to medical condition    Anemia of chronic disease    Thrombocytosis (HCC)       Current Outpatient Medications:     albuterol (PROVENTIL HFA,VENTOLIN HFA) 90 mcg/act inhaler, TAKE 2 PUFFS BY MOUTH EVERY 6 HOURS AS NEEDED FOR WHEEZING, Disp: 18 Inhaler, Rfl: 2    dexamethasone (DECADRON) 2 mg tablet, Take 1 tablet (2 mg total) by mouth daily with breakfast To help appetite, Disp: 30 tablet, Rfl: 0    HYDROmorphone (DILAUDID) 4 mg tablet, Take 1-2 tablets (4-8 mg total) by mouth every 4 (four) hours as needed (cancer pain  Maximum 6 tabs/day  )Max Daily Amount: 48 mg, Disp: 180 tablet, Rfl: 0    levETIRAcetam (KEPPRA) 250 mg tablet, Take 1 tablet (250 mg total) by mouth 2 (two) times a day, Disp: 60 tablet, Rfl: 0    Misc   Devices (QUAD CANE) MISC, by Does not apply route daily, Disp: 1 each, Rfl: 0    OLANZapine (ZyPREXA) 7 5 mg tablet, TAKE 1 TABLET (7 5 MG TOTAL) BY MOUTH DAILY AT BEDTIME EVERY NIGHT, TO HELP SLEEP AND APPETITE , Disp: 30 tablet, Rfl: 0    ondansetron (ZOFRAN) 8 mg tablet, Take 1 tablet (8 mg total) by mouth 3 (three) times a day before meals For cancer nausea, Disp: 90 tablet, Rfl: 0    pantoprazole (PROTONIX) 40 mg tablet, Take 1 tablet (40 mg total) by mouth daily Every morning, before coffee , Disp: 90 tablet, Rfl: 3    pregabalin (LYRICA) 50 mg capsule, Take one in the morning, and two before bed, for neuropathy and restless legs  , Disp: 90 capsule, Rfl: 0    senna (SENOKOT) 8 6 MG tablet, Take 1 tablet (8 6 mg total) by mouth daily at bedtime Hold for loose stool , Disp: 90 tablet, Rfl: 3    zolpidem (AMBIEN) 10 mg tablet, Take 1 tablet (10 mg total) by mouth daily at bedtime as needed for sleep, Disp: 30 tablet, Rfl: 0  No Known Allergies  Past Surgical History:   Procedure Laterality Date    ENDOBRONCHIAL ULTRASOUND (EBUS) N/A 2/16/2016    Procedure: EBUS;  Surgeon: July De Guzman MD;  [facility];  Service:     MASTECTOMY Bilateral     MASTECTOMY Bilateral     right arm edema    OOPHORECTOMY      IA BRONCHOSCOPY NEEDLE BX TRACHEA MAIN STEM&/BRON N/A 2/16/2016    Procedure: Johan Leavitt;  Surgeon: July De Guzman MD;  [facility];  Service: Thoracic    IA INSJ TUNNELED CTR VAD W/SUBQ PORT AGE 5 YR/> N/A 7/24/2018    Procedure: INSERTION VENOUS PORT ( PORT-A-CATH) IR;  Surgeon: Radha Richardson DO;  [facility];  Service: Interventional Radiology    IA STEREOTACTIC RADIOSURGERY, CRANIAL,SIMPLE,EA ADD  5/3/2017         IA STEREOTACTIC RADIOSURGERY, CRANIAL,SIMPLE,EA ADD  5/3/2017         IA STEREOTACTIC RADIOSURGERY, CRANIAL,SIMPLE,SINGLE  5/3/2017          Social History     Objective:  Vitals:    01/13/21 1209   BP: 122/68   BP Location: Left arm   Patient Position: Sitting   Pulse: 97   Resp: 18   Temp: 98 °F (36 7 °C)   TempSrc: Temporal   SpO2: 96%   Weight: 71 7 kg (158 lb)   Height: 5' 5" (1 651 m)     Physical Exam  Constitutional: Appearance: She is well-developed  HENT:      Head: Normocephalic and atraumatic  Eyes:      Pupils: Pupils are equal, round, and reactive to light  Neck:      Musculoskeletal: Neck supple  Cardiovascular:      Rate and Rhythm: Normal rate  Heart sounds: No murmur  Pulmonary:      Effort: No respiratory distress  Breath sounds: No wheezing or rales  Abdominal:      General: There is no distension  Palpations: Abdomen is soft  Tenderness: There is no abdominal tenderness  There is no rebound  Musculoskeletal:         General: No tenderness  Lymphadenopathy:      Cervical: No cervical adenopathy  Skin:     General: Skin is warm  Findings: No rash  Neurological:      Mental Status: She is alert and oriented to person, place, and time  Deep Tendon Reflexes: Reflexes normal    Psychiatric:         Thought Content: Thought content normal            Labs: I personally reviewed the labs and imaging pertinent to this patient care

## 2021-01-14 NOTE — PROGRESS NOTES
Outpatient Virtual Regular Visit - Follow-up with Palliative and Supportive Care  Ailyn Whitley 64 y o  female [id number]    Intake:    Reason for virtual visit is f/up cancer pain  The patient is unable to visit the clinic safely due to the coronavirus pandemic  After connecting through [email or web address], the patient was identified by name and date of birth  Ailyn Whitley was informed that this was a telemedicine visit and that the visit is being conducted through [facility] and patient was informed that this is a secure, HIPAA-compliant platform  She agrees to proceed     My office door was closed  No one else was in the room  She acknowledged consent and understanding of privacy and security of the video platform  The patient has agreed to participate and understands they can discontinue the visit at any time  Assessment and Plan  Problem List Items Addressed This Visit     Bone metastases (HCC)    Relevant Medications    dexamethasone (DECADRON) 2 mg tablet    Brain metastases (HCC) (Chronic)    Relevant Medications    dexamethasone (DECADRON) 2 mg tablet    levETIRAcetam (KEPPRA) 250 mg tablet    Cancer-related pain    Relevant Medications    HYDROmorphone (DILAUDID) 4 mg tablet    CINV (chemotherapy-induced nausea and vomiting)    Relevant Medications    ondansetron (ZOFRAN) 8 mg tablet    Palliative care patient (Chronic)    Relevant Medications    ondansetron (ZOFRAN) 8 mg tablet    HYDROmorphone (DILAUDID) 4 mg tablet      Other Visit Diagnoses     Focal seizure (HCC)    -  Primary    Relevant Medications    pregabalin (LYRICA) 50 mg capsule    levETIRAcetam (KEPPRA) 250 mg tablet    Constipation, unspecified constipation type        Relevant Medications    pregabalin (LYRICA) 50 mg capsule    Primary insomnia        Relevant Medications    zolpidem (AMBIEN) 10 mg tablet        No orders of the defined types were placed in this encounter      Medications Discontinued During This Encounter   Medication Reason    zolpidem (AMBIEN) 10 mg tablet Reorder    pregabalin (LYRICA) 50 mg capsule Reorder    ondansetron (ZOFRAN) 8 mg tablet Reorder    dexamethasone (DECADRON) 1 mg tablet Reorder    HYDROmorphone (DILAUDID) 4 mg tablet Reorder        Malorie Salinas was assessed today for symptoms and care planning related to above illnesses  I have reviewed the patient's controlled substance dispensing history in the Prescription Drug Monitoring Program in compliance with the [facility] regulations before prescribing any controlled substances  She is invited to continue to follow with us  If there are questions or concerns, please contact us through our clinic/answering service 24 hours a day, seven days a week  Olga Patrick MD  [facility]  [phone]          Subjective    Malorie Salinas is a 64 y o  female who follows with me for her cancer symptom cares  Since last visit, she has actually felt reasonably stable  We reviewed today that she has been isolation in home and keeping herself safe from the virus  She feels a bit lonely and upset about this, but pain and anxiety are manageable  Sleep is fair, and she has no particular concerns to address today  There are no family on the line, though, and this is normally who help keep her meds straight and square        Past Medical History:   Diagnosis Date    Dehydration 6/11/2019    Dizziness 2/6/2018    Dry skin 2/6/2018    Edema 10/23/2019    H/O bilateral mastectomy 2/15/2016    Hyperglycemia 2/6/2018    Hypertension 2/15/2016    Hypokalemia 3/10/2020    Lymphedema 2/15/2016    Malignant cachexia ([facility] [address] ) 10/6/2016    Malignant neoplasm of right breast ([facility] [address] ) 2/15/2016    Metastatic breast cancer [facility])      Past Surgical History:   Procedure Laterality Date    ENDOBRONCHIAL ULTRASOUND (EBUS) N/A 2/16/2016    Procedure: EBUS;  Surgeon: Rosie Cohen MD;  [facility];  Service:    Neto Mauri MASTECTOMY Bilateral     MASTECTOMY Bilateral     right arm edema    OOPHORECTOMY      AL BRONCHOSCOPY NEEDLE BX TRACHEA MAIN STEM&/BRON N/A 2/16/2016    Procedure: FLEX BRONCH;  Surgeon: Nat Glasgow MD;  [facility];  Service: Thoracic    AL INSJ TUNNELED CTR VAD W/SUBQ PORT AGE 5 YR/> N/A 7/24/2018    Procedure: INSERTION VENOUS PORT ( PORT-A-CATH) IR;  Surgeon: Ada Musa DO;  [facility];  Service: Interventional Radiology    AL STEREOTACTIC RADIOSURGERY, CRANIAL,SIMPLE,EA ADD  5/3/2017         AL STEREOTACTIC RADIOSURGERY, CRANIAL,SIMPLE,EA ADD  5/3/2017         AL STEREOTACTIC RADIOSURGERY, CRANIAL,SIMPLE,SINGLE  5/3/2017          Current Outpatient Medications   Medication Sig Dispense Refill    albuterol (PROVENTIL HFA,VENTOLIN HFA) 90 mcg/act inhaler TAKE 2 PUFFS BY MOUTH EVERY 6 HOURS AS NEEDED FOR WHEEZING 18 Inhaler 2    dexamethasone (DECADRON) 2 mg tablet Take 1 tablet (2 mg total) by mouth daily with breakfast To help appetite 30 tablet 0    HYDROmorphone (DILAUDID) 4 mg tablet Take 1-2 tablets (4-8 mg total) by mouth every 4 (four) hours as needed (cancer pain  Maximum 6 tabs/day  )Max Daily Amount: 48 mg 180 tablet 0    levETIRAcetam (KEPPRA) 250 mg tablet Take 1 tablet (250 mg total) by mouth 2 (two) times a day 60 tablet 0    Misc  Devices (QUAD CANE) MISC by Does not apply route daily 1 each 0    OLANZapine (ZyPREXA) 7 5 mg tablet TAKE 1 TABLET (7 5 MG TOTAL) BY MOUTH DAILY AT BEDTIME EVERY NIGHT, TO HELP SLEEP AND APPETITE  30 tablet 0    ondansetron (ZOFRAN) 8 mg tablet Take 1 tablet (8 mg total) by mouth 3 (three) times a day before meals For cancer nausea 90 tablet 0    pantoprazole (PROTONIX) 40 mg tablet Take 1 tablet (40 mg total) by mouth daily Every morning, before coffee  90 tablet 3    pregabalin (LYRICA) 50 mg capsule Take one in the morning, and two before bed, for neuropathy and restless legs   90 capsule 0    senna (SENOKOT) 8 6 MG tablet Take 1 tablet (8 6 mg total) by mouth daily at bedtime Hold for loose stool  90 tablet 3    zolpidem (AMBIEN) 10 mg tablet Take 1 tablet (10 mg total) by mouth daily at bedtime as needed for sleep 30 tablet 0     No current facility-administered medications for this visit  No Known Allergies    Review of Systems   Constitutional: Positive for activity change (decreased energy) and fatigue  Negative for appetite change, chills and diaphoresis  HENT: Negative for hearing loss and sinus pain  Eyes: Negative for photophobia and redness  Respiratory: Positive for cough and shortness of breath  Cardiovascular: Positive for leg swelling  Negative for chest pain  Gastrointestinal: Negative for abdominal distention and diarrhea  Endocrine: Negative for polyphagia and polyuria  Genitourinary: Negative for difficulty urinating, dysuria and flank pain  Musculoskeletal: Positive for back pain  Negative for joint swelling  Skin: Negative for pallor  Neurological: Positive for light-headedness  Negative for seizures and speech difficulty  Psychiatric/Behavioral: Positive for dysphoric mood and sleep disturbance  Negative for agitation  The patient is nervous/anxious  Video Exam  There were no vitals filed for this visit  Physical Exam  Constitutional:       General: She is not in acute distress  Appearance: She is ill-appearing  She is not toxic-appearing or diaphoretic  Comments: frail   HENT:      Head: Normocephalic and atraumatic  Right Ear: External ear normal       Left Ear: External ear normal       Mouth/Throat:      Comments: Mask not removed today  Eyes:      General:         Right eye: No discharge  Left eye: No discharge  Conjunctiva/sclera: Conjunctivae normal       Pupils: Pupils are equal, round, and reactive to light  Neck:      Trachea: No tracheal deviation  Pulmonary:      Effort: Pulmonary effort is normal  No respiratory distress        Breath sounds: No stridor  Abdominal:      General: There is no distension  Palpations: Abdomen is soft  Skin:     General: Skin is warm and dry  Findings: No erythema or rash  Neurological:      General: No focal deficit present  Mental Status: She is alert and oriented to person, place, and time  Mental status is at baseline  Cranial Nerves: No cranial nerve deficit  Psychiatric:         Mood and Affect: Mood normal          Behavior: Behavior normal       Comments: Judgment and insight fair           I spent 15+ minutes was spent during this virtual visit by Milvia, face to face with Geovani Mckeon with greater than 50% of the time spent in counseling or coordination of care including discussions of etiology of diagnosis, instructions for disease self management and patient and family counseling/involvement in care   All of the patient's questions were answered during this discussion  Encounter provider Zaida Gutiérrez MD, located at:  [address] [place] [id number] [phone]    Recent Visits  No visits were found meeting these conditions  Showing recent visits within past 7 days and meeting all other requirements     Future Appointments  No visits were found meeting these conditions  Showing future appointments within next 150 days and meeting all other requirements        VIRTUAL VISIT DISCLAIMER    Angy Robyn Frost acknowledges that She has consented to an online visit or consultation  She understands that the online visit is based solely on information provided by She, and that, in the absence of a face-to-face physical evaluation by the physician, the diagnosis She receives is both limited and provisional in terms of accuracy and completeness  This is not intended to replace a full medical face-to-face evaluation by the physician   Geovani Mckeon understands and accepts these terms  Patient was informed this is a billable service

## 2021-01-18 ENCOUNTER — HOSPITAL ENCOUNTER (OUTPATIENT)
Dept: INFUSION CENTER | Facility: CLINIC | Age: 61
Discharge: HOME | End: 2021-01-18
Payer: COMMERCIAL

## 2021-01-18 VITALS — TEMPERATURE: 96.8 F

## 2021-01-18 DIAGNOSIS — C50.912 BILATERAL MALIGNANT NEOPLASM OF BREAST IN FEMALE, ESTROGEN RECEPTOR POSITIVE, UNSPECIFIED SITE OF BREAST (HCC): Primary | ICD-10-CM

## 2021-01-18 DIAGNOSIS — C50.911 BILATERAL MALIGNANT NEOPLASM OF BREAST IN FEMALE, ESTROGEN RECEPTOR POSITIVE, UNSPECIFIED SITE OF BREAST (HCC): Primary | ICD-10-CM

## 2021-01-18 DIAGNOSIS — E86.0 DEHYDRATION: Primary | ICD-10-CM

## 2021-01-18 DIAGNOSIS — C50.911 BILATERAL MALIGNANT NEOPLASM OF BREAST IN FEMALE, ESTROGEN RECEPTOR POSITIVE, UNSPECIFIED SITE OF BREAST (HCC): ICD-10-CM

## 2021-01-18 DIAGNOSIS — Z17.0 BILATERAL MALIGNANT NEOPLASM OF BREAST IN FEMALE, ESTROGEN RECEPTOR POSITIVE, UNSPECIFIED SITE OF BREAST (HCC): ICD-10-CM

## 2021-01-18 DIAGNOSIS — C50.912 BILATERAL MALIGNANT NEOPLASM OF BREAST IN FEMALE, ESTROGEN RECEPTOR POSITIVE, UNSPECIFIED SITE OF BREAST (HCC): ICD-10-CM

## 2021-01-18 DIAGNOSIS — Z17.0 BILATERAL MALIGNANT NEOPLASM OF BREAST IN FEMALE, ESTROGEN RECEPTOR POSITIVE, UNSPECIFIED SITE OF BREAST (HCC): Primary | ICD-10-CM

## 2021-01-18 LAB
ALBUMIN SERPL BCP-MCNC: 3.2 G/DL (ref 3.5–5)
ALP SERPL-CCNC: 96 U/L (ref 46–116)
ALT SERPL W P-5'-P-CCNC: 36 U/L (ref 12–78)
ANION GAP SERPL CALCULATED.3IONS-SCNC: 7 MMOL/L (ref 4–13)
AST SERPL W P-5'-P-CCNC: 23 U/L (ref 5–45)
BASOPHILS # BLD AUTO: 0.04 THOUSANDS/ΜL (ref 0–0.1)
BASOPHILS NFR BLD AUTO: 1 % (ref 0–1)
BILIRUB SERPL-MCNC: 0.18 MG/DL (ref 0.2–1)
BUN SERPL-MCNC: 11 MG/DL (ref 5–25)
CALCIUM ALBUM COR SERPL-MCNC: 9.6 MG/DL (ref 8.3–10.1)
CALCIUM SERPL-MCNC: 9 MG/DL (ref 8.3–10.1)
CHLORIDE SERPL-SCNC: 101 MMOL/L (ref 100–108)
CO2 SERPL-SCNC: 27 MMOL/L (ref 21–32)
CREAT SERPL-MCNC: 0.72 MG/DL (ref 0.6–1.3)
EOSINOPHIL # BLD AUTO: 0.02 THOUSAND/ΜL (ref 0–0.61)
EOSINOPHIL NFR BLD AUTO: 0 % (ref 0–6)
ERYTHROCYTE [DISTWIDTH] IN BLOOD BY AUTOMATED COUNT: 16.8 % (ref 11.6–15.1)
GFR SERPL CREATININE-BSD FRML MDRD: 93 ML/MIN/1.73SQ M
GLUCOSE SERPL-MCNC: 119 MG/DL (ref 65–140)
HCT VFR BLD AUTO: 37.1 % (ref 34.8–46.1)
HGB BLD-MCNC: 11.8 G/DL (ref 11.5–15.4)
IMM GRANULOCYTES # BLD AUTO: 0.06 THOUSAND/UL (ref 0–0.2)
IMM GRANULOCYTES NFR BLD AUTO: 1 % (ref 0–2)
LYMPHOCYTES # BLD AUTO: 1.07 THOUSANDS/ΜL (ref 0.6–4.47)
LYMPHOCYTES NFR BLD AUTO: 12 % (ref 14–44)
MCH RBC QN AUTO: 30.8 PG (ref 26.8–34.3)
MCHC RBC AUTO-ENTMCNC: 31.8 G/DL (ref 31.4–37.4)
MCV RBC AUTO: 97 FL (ref 82–98)
MONOCYTES # BLD AUTO: 0.52 THOUSAND/ΜL (ref 0.17–1.22)
MONOCYTES NFR BLD AUTO: 6 % (ref 4–12)
NEUTROPHILS # BLD AUTO: 7.11 THOUSANDS/ΜL (ref 1.85–7.62)
NEUTS SEG NFR BLD AUTO: 80 % (ref 43–75)
NRBC BLD AUTO-RTO: 0 /100 WBCS
PLATELET # BLD AUTO: 295 THOUSANDS/UL (ref 149–390)
PMV BLD AUTO: 9.5 FL (ref 8.9–12.7)
POTASSIUM SERPL-SCNC: 4.3 MMOL/L (ref 3.5–5.3)
PROT SERPL-MCNC: 7.6 G/DL (ref 6.4–8.2)
RBC # BLD AUTO: 3.83 MILLION/UL (ref 3.81–5.12)
SODIUM SERPL-SCNC: 135 MMOL/L (ref 136–145)
WBC # BLD AUTO: 8.82 THOUSAND/UL (ref 4.31–10.16)

## 2021-01-18 PROCEDURE — 80053 COMPREHEN METABOLIC PANEL: CPT

## 2021-01-18 PROCEDURE — 85025 COMPLETE CBC W/AUTO DIFF WBC: CPT

## 2021-01-18 PROCEDURE — 36415 COLL VENOUS BLD VENIPUNCTURE: CPT

## 2021-01-18 NOTE — PROGRESS NOTES
Patient presents today for lab draw  Patient offers no complaints  Port accessed without incident with excellent blood return noted  Labs drawn as per order  Port flushed and de-accessed as per protocol  Patient's appointment time tomorrow confirmed  AVS printed and given to patient

## 2021-01-19 ENCOUNTER — HOSPITAL ENCOUNTER (OUTPATIENT)
Dept: INFUSION CENTER | Facility: CLINIC | Age: 61
Discharge: HOME | End: 2021-01-19
Payer: COMMERCIAL

## 2021-01-19 VITALS
OXYGEN SATURATION: 98 % | HEIGHT: 65 IN | RESPIRATION RATE: 18 BRPM | BODY MASS INDEX: 26.57 KG/M2 | TEMPERATURE: 97.3 F | DIASTOLIC BLOOD PRESSURE: 68 MMHG | SYSTOLIC BLOOD PRESSURE: 108 MMHG | WEIGHT: 159.5 LBS | HEART RATE: 77 BPM

## 2021-01-19 DIAGNOSIS — C50.912 BILATERAL MALIGNANT NEOPLASM OF BREAST IN FEMALE, ESTROGEN RECEPTOR POSITIVE, UNSPECIFIED SITE OF BREAST (HCC): ICD-10-CM

## 2021-01-19 DIAGNOSIS — C50.911 BILATERAL MALIGNANT NEOPLASM OF BREAST IN FEMALE, ESTROGEN RECEPTOR POSITIVE, UNSPECIFIED SITE OF BREAST (HCC): ICD-10-CM

## 2021-01-19 DIAGNOSIS — C79.51 BONE METASTASES (HCC): Primary | ICD-10-CM

## 2021-01-19 DIAGNOSIS — Z17.0 BILATERAL MALIGNANT NEOPLASM OF BREAST IN FEMALE, ESTROGEN RECEPTOR POSITIVE, UNSPECIFIED SITE OF BREAST (HCC): ICD-10-CM

## 2021-01-19 PROCEDURE — 96367 TX/PROPH/DG ADDL SEQ IV INF: CPT

## 2021-01-19 PROCEDURE — 96401 CHEMO ANTI-NEOPL SQ/IM: CPT

## 2021-01-19 PROCEDURE — 96375 TX/PRO/DX INJ NEW DRUG ADDON: CPT

## 2021-01-19 PROCEDURE — 96413 CHEMO IV INFUSION 1 HR: CPT

## 2021-01-19 RX ORDER — PALONOSETRON 0.05 MG/ML
0.25 INJECTION, SOLUTION INTRAVENOUS ONCE
Status: COMPLETED | OUTPATIENT
Start: 2021-01-19 | End: 2021-01-19

## 2021-01-19 RX ORDER — SODIUM CHLORIDE 9 MG/ML
20 INJECTION, SOLUTION INTRAVENOUS ONCE
Status: COMPLETED | OUTPATIENT
Start: 2021-01-19 | End: 2021-01-19

## 2021-01-19 RX ADMIN — FOSAPREPITANT 150 MG: 150 INJECTION, POWDER, LYOPHILIZED, FOR SOLUTION INTRAVENOUS at 11:57

## 2021-01-19 RX ADMIN — PALONOSETRON 0.25 MG: 0.05 INJECTION, SOLUTION INTRAVENOUS at 11:22

## 2021-01-19 RX ADMIN — DEXAMETHASONE SODIUM PHOSPHATE 10 MG: 10 INJECTION, SOLUTION INTRAMUSCULAR; INTRAVENOUS at 11:25

## 2021-01-19 RX ADMIN — SODIUM CHLORIDE 20 ML/HR: 0.9 INJECTION, SOLUTION INTRAVENOUS at 11:22

## 2021-01-19 RX ADMIN — DENOSUMAB 120 MG: 120 INJECTION SUBCUTANEOUS at 13:34

## 2021-01-19 RX ADMIN — ADO-TRASTUZUMAB EMTANSINE 260 MG: 20 INJECTION, POWDER, LYOPHILIZED, FOR SOLUTION INTRAVENOUS at 12:44

## 2021-01-19 NOTE — PLAN OF CARE
Problem: Potential for Falls  Goal: Patient will remain free of falls  Description: INTERVENTIONS:  - Assess patient frequently for physical needs  -  Identify cognitive and physical deficits and behaviors that affect risk of falls  -  [place]ons fall precautions as indicated by assessment   - Educate patient/family on patient safety including physical limitations  - Instruct patient to call for assistance with activity based on assessment  - Modify environment to reduce risk of injury  - Consider OT/PT consult to assist with strengthening/mobility  Outcome: Progressing     Problem: Knowledge Deficit  Goal: Patient/family/caregiver demonstrates understanding of disease process, treatment plan, medications, and discharge instructions  Description: Complete learning assessment and assess knowledge base    Interventions:  - Provide teaching at level of understanding  - Provide teaching via preferred learning methods  Outcome: Progressing

## 2021-01-29 DIAGNOSIS — C79.31 BRAIN METASTASES (HCC): ICD-10-CM

## 2021-01-29 DIAGNOSIS — R56.9 FOCAL SEIZURE (HCC): ICD-10-CM

## 2021-01-29 RX ORDER — LEVETIRACETAM 250 MG/1
TABLET ORAL
Qty: 60 TABLET | Refills: 0 | Status: SHIPPED | OUTPATIENT
Start: 2021-01-29 | End: 2021-02-22

## 2021-02-05 ENCOUNTER — TELEPHONE (OUTPATIENT)
Dept: HEMATOLOGY ONCOLOGY | Facility: MEDICAL CENTER | Age: 61
End: 2021-02-05

## 2021-02-05 DIAGNOSIS — Z51.5 PALLIATIVE CARE PATIENT: ICD-10-CM

## 2021-02-05 DIAGNOSIS — G89.3 CANCER-RELATED PAIN: ICD-10-CM

## 2021-02-05 DIAGNOSIS — K59.00 CONSTIPATION, UNSPECIFIED CONSTIPATION TYPE: ICD-10-CM

## 2021-02-05 RX ORDER — PREGABALIN 50 MG/1
CAPSULE ORAL
Qty: 90 CAPSULE | Refills: 0 | Status: SHIPPED | OUTPATIENT
Start: 2021-02-05 | End: 2021-02-08 | Stop reason: SDUPTHER

## 2021-02-05 RX ORDER — HYDROMORPHONE HYDROCHLORIDE 4 MG/1
4-8 TABLET ORAL EVERY 4 HOURS PRN
Qty: 180 TABLET | Refills: 0 | Status: SHIPPED | OUTPATIENT
Start: 2021-02-05 | End: 2021-03-11 | Stop reason: SDUPTHER

## 2021-02-05 NOTE — TELEPHONE ENCOUNTER
Request for refills  PDMP last fill date    Hydromorphone 4 mg   01/06    180/30  Pregabalin  01/06   90/30       Called pt at request of KACEY etienne concern was not understanding how to take these medications  Pregabalin   Pt initially told this nurse she takes 1 in am 1 in afternoon and 2 at bedtime  But then corrected herself   " I do not take in the afternoon "    Hydromorphone  Pt initially reports she takes about 3 tablets per day  However pt also states she has tablets remaining  Instructed that she is permitted 6 tablets max per day per orders and this would be ok for refill per pill count and fill date  Nurse asked pt when she ran out of pain meds  Pt responded "about 5 days ago "    Pt has virtual appointment Monday 02/08  Instructed pt to discuss her pain regimen with physician

## 2021-02-08 ENCOUNTER — TELEMEDICINE (OUTPATIENT)
Dept: PALLIATIVE MEDICINE | Facility: CLINIC | Age: 61
End: 2021-02-08
Payer: COMMERCIAL

## 2021-02-08 DIAGNOSIS — T40.2X5A THERAPEUTIC OPIOID-INDUCED CONSTIPATION (OIC): ICD-10-CM

## 2021-02-08 DIAGNOSIS — K59.00 CONSTIPATION, UNSPECIFIED CONSTIPATION TYPE: ICD-10-CM

## 2021-02-08 DIAGNOSIS — C79.51 BONE METASTASES (HCC): ICD-10-CM

## 2021-02-08 DIAGNOSIS — C79.31 BRAIN METASTASES (HCC): ICD-10-CM

## 2021-02-08 DIAGNOSIS — F51.01 PRIMARY INSOMNIA: ICD-10-CM

## 2021-02-08 DIAGNOSIS — K59.03 THERAPEUTIC OPIOID-INDUCED CONSTIPATION (OIC): ICD-10-CM

## 2021-02-08 PROCEDURE — 99214 OFFICE O/P EST MOD 30 MIN: CPT | Performed by: FAMILY MEDICINE

## 2021-02-08 RX ORDER — DEXAMETHASONE 2 MG/1
2 TABLET ORAL
Qty: 30 TABLET | Refills: 0 | Status: SHIPPED | OUTPATIENT
Start: 2021-02-08 | End: 2021-03-11 | Stop reason: SDUPTHER

## 2021-02-08 RX ORDER — ZOLPIDEM TARTRATE 10 MG/1
10 TABLET ORAL
Qty: 30 TABLET | Refills: 0 | Status: SHIPPED | OUTPATIENT
Start: 2021-02-08 | End: 2021-03-11 | Stop reason: SDUPTHER

## 2021-02-08 RX ORDER — PREGABALIN 75 MG/1
CAPSULE ORAL
Qty: 90 CAPSULE | Refills: 0 | Status: SHIPPED | OUTPATIENT
Start: 2021-02-08 | End: 2021-03-11 | Stop reason: SDUPTHER

## 2021-02-08 RX ORDER — SENNA PLUS 8.6 MG/1
1 TABLET ORAL 2 TIMES DAILY
Qty: 180 TABLET | Refills: 3 | Status: SHIPPED | OUTPATIENT
Start: 2021-02-08 | End: 2021-05-04 | Stop reason: SDUPTHER

## 2021-02-09 RX ORDER — PALONOSETRON 0.05 MG/ML
0.25 INJECTION, SOLUTION INTRAVENOUS ONCE
Status: CANCELLED | OUTPATIENT
Start: 2021-02-16

## 2021-02-09 RX ORDER — SODIUM CHLORIDE 9 MG/ML
20 INJECTION, SOLUTION INTRAVENOUS ONCE
Status: CANCELLED | OUTPATIENT
Start: 2021-02-16

## 2021-02-11 NOTE — PROGRESS NOTES
Outpatient Virtual Regular Visit - Follow-up with Palliative and Supportive Care  Chadd James 64 y o  female [id number]    Intake:    Reason for virtual visit is f/up cancer  The patient is unable to visit the clinic safely due to the coronavirus pandemic  After connecting through [employer], the patient was identified by name and date of birth  Chadd James or their agent was informed that this was a telemedicine visit and that the visit is being conducted [place] [facility] and patient was informed that this is a secure, HIPAA-compliant platform  She agrees to proceed     My office door was closed  No one else was in the room  They acknowledged consent and understanding of privacy and security of the video platform  The patient or agent has agreed to participate and understands they can discontinue the visit at any time  Assessment & Plan  1  Brain metastases ([address] )    2  Bone metastases ([address] )    3  Therapeutic opioid-induced constipation (OIC)    4  Constipation, unspecified constipation type    5  Primary insomnia      - Counseling on health screening and disease prevention, COVID-19 specific (CPT V65 49)    Medications adjusted this encounter:  Requested Prescriptions     Signed Prescriptions Disp Refills    dexamethasone (DECADRON) 2 mg tablet 30 tablet 0     Sig: Take 1 tablet (2 mg total) by mouth daily with breakfast To help appetite    senna (SENOKOT) 8 6 MG tablet 180 tablet 3     Sig: Take 1 tablet (8 6 mg total) by mouth 2 (two) times a day Hold for loose stool   pregabalin (LYRICA) 75 mg capsule 90 capsule 0     Sig: Take one in the morning, and two before bed, for neuropathy and restless legs   zolpidem (AMBIEN) 10 mg tablet 30 tablet 0     Sig: Take 1 tablet (10 mg total) by mouth daily at bedtime as needed for sleep     No orders of the defined types were placed in this encounter      Medications Discontinued During This Encounter   Medication Reason    senna (SENOKOT) 8 6 MG tablet Reorder    zolpidem (AMBIEN) 10 mg tablet Reorder    dexamethasone (DECADRON) 2 mg tablet Reorder    pregabalin (LYRICA) 50 mg capsule Reorder   - Opioids filled last week for one month      Alexandria Burrell was assessed today for symptoms and care planning related to above illnesses  I have reviewed the patient's controlled substance dispensing history in the Prescription Drug Monitoring Program in compliance with the [facility] regulations before prescribing any controlled substances  She is invited to continue to follow with us  If there are questions or concerns, please contact us through our clinic/answering service 24 hours a day, seven days a week  Brissa Cadena MD  [facility] Pall[facility] Supportive [place] [phone]        Visit Information   Accompanied By: No one    Source of History: Self    History Limitations: None    Contacts:  Britt Villar - [phone]    History of Present Illness      Alexandria Burrell is a 64 y o  female who presents in follow of symptoms related to her Stage IV breast cancer  Pertinent issues include: symptom management, pain, neoplasm related      She continues to struggle with nausea and wt loss; we advised an increase to olanzapine today  Her pain control is fair, and so we did not change this drug regimen  We advised her not to take steroids too late in the day; not after 3pm, as this will cause insomnia         Historically, her right arm pain and lymphedema have been challenging to control   She has experienced consistent side effects from long acting opioids leading to sedation and confusion   Long acting opioids have also never provided her pain relief - forms tried have included methadone, MS Contin, Fentanyl patches   Steroids have not been helpful to reduce inflammation and improve pain, either  She continues to smoke 1/2 ppd, which is less than before  She had expressed interest and motivation in quitting    Her daughter has also promised to quit with her  Metastatic breast cancer with brain metastasis originally diagnosed in 2010  She had a bilateral mastectomy and has undergone multiple chemotherapy regimens and has completed WBRT in March 2019  She follows with Dr Rabia Goddard and is currently undergoing treatment with Kadcyla with premeds of Dexamethasone, Emend, Aloxi   She struggles with persistent nausea         Past Medical History:   Diagnosis Date    Dehydration 6/11/2019    Dizziness 2/6/2018    Dry skin 2/6/2018    Edema 10/23/2019    H/O bilateral mastectomy 2/15/2016    Hyperglycemia 2/6/2018    Hypertension 2/15/2016    Hypokalemia 3/10/2020    Lymphedema 2/15/2016    Malignant cachexia ([facility] ) 10/6/2016    Malignant neoplasm of right breast ([facility] ) 2/15/2016    Metastatic breast cancer ([facility] )      Past Surgical History:   Procedure Laterality Date    ENDOBRONCHIAL ULTRASOUND (EBUS) N/A 2/16/2016    Procedure: EBUS;  Surgeon: Jaky Rangel MD;  [facility];  Service:     MASTECTOMY Bilateral     MASTECTOMY Bilateral     right arm edema    OOPHORECTOMY      GA BRONCHOSCOPY NEEDLE BX TRACHEA MAIN STEM&/BRON N/A 2/16/2016    Procedure: Cj Castillo;  Surgeon: Jaky Rangel MD;  [facility];  Service: Thoracic    GA INSJ TUNNELED CTR VAD W/SUBQ PORT AGE 5 YR/> N/A 7/24/2018    Procedure: INSERTION VENOUS PORT ( PORT-A-CATH) IR;  Surgeon: Judy Bae DO;  [facility];  Service: Interventional Radiology    GA STEREOTACTIC RADIOSURGERY, CRANIAL,SIMPLE,EA ADD  5/3/2017         GA STEREOTACTIC RADIOSURGERY, CRANIAL,SIMPLE,EA ADD  5/3/2017         GA STEREOTACTIC RADIOSURGERY, CRANIAL,SIMPLE,SINGLE  5/3/2017          Current Outpatient Medications   Medication Sig Dispense Refill    albuterol (PROVENTIL HFA,VENTOLIN HFA) 90 mcg/act inhaler TAKE 2 PUFFS BY MOUTH EVERY 6 HOURS AS NEEDED FOR WHEEZING 18 Inhaler 2    dexamethasone (DECADRON) 2 mg tablet Take 1 tablet (2 mg total) by mouth daily with breakfast To help appetite 30 tablet 0    HYDROmorphone (DILAUDID) 4 mg tablet Take 1-2 tablets (4-8 mg total) by mouth every 4 (four) hours as needed (cancer pain  Maximum 6 tabs/day  )Max Daily Amount: 48 mg 180 tablet 0    levETIRAcetam (KEPPRA) 250 mg tablet TAKE 1 TABLET BY MOUTH TWICE A DAY 60 tablet 0    Misc  Devices (QUAD CANE) MISC by Does not apply route daily 1 each 0    OLANZapine (ZyPREXA) 7 5 mg tablet TAKE 1 TABLET (7 5 MG TOTAL) BY MOUTH DAILY AT BEDTIME EVERY NIGHT, TO HELP SLEEP AND APPETITE  30 tablet 0    ondansetron (ZOFRAN) 8 mg tablet Take 1 tablet (8 mg total) by mouth 3 (three) times a day before meals For cancer nausea 90 tablet 0    pantoprazole (PROTONIX) 40 mg tablet Take 1 tablet (40 mg total) by mouth daily Every morning, before coffee  90 tablet 3    pregabalin (LYRICA) 75 mg capsule Take one in the morning, and two before bed, for neuropathy and restless legs  90 capsule 0    senna (SENOKOT) 8 6 MG tablet Take 1 tablet (8 6 mg total) by mouth 2 (two) times a day Hold for loose stool  180 tablet 3    zolpidem (AMBIEN) 10 mg tablet Take 1 tablet (10 mg total) by mouth daily at bedtime as needed for sleep 30 tablet 0     No current facility-administered medications for this visit  No Known Allergies    Review of Systems   Constitutional: Negative for chills, diaphoresis and fever  HENT: Negative for ear pain, rhinorrhea and sinus pain  Eyes: Negative for photophobia and redness  Respiratory: Negative for cough and shortness of breath  Cardiovascular: Negative for chest pain  Gastrointestinal: Negative for abdominal distention, abdominal pain and constipation  Endocrine: Negative for polyphagia and polyuria  Genitourinary: Negative for difficulty urinating, dysuria and flank pain  Musculoskeletal: Positive for back pain  Negative for gait problem and joint swelling  Neurological: Positive for light-headedness   Negative for seizures and speech difficulty  Psychiatric/Behavioral: Negative for agitation and dysphoric mood  The patient is not nervous/anxious  Video Exam  There were no vitals filed for this visit  Physical Exam  Constitutional:       General: She is not in acute distress  Appearance: She is ill-appearing  She is not toxic-appearing or diaphoretic  Comments: frail   HENT:      Head: Normocephalic and atraumatic  Right Ear: External ear normal       Left Ear: External ear normal       Mouth/Throat:      Comments: Mask not removed today  Eyes:      General:         Right eye: No discharge  Left eye: No discharge  Conjunctiva/sclera: Conjunctivae normal       Pupils: Pupils are equal, round, and reactive to light  Neck:      Trachea: No tracheal deviation  Pulmonary:      Effort: Pulmonary effort is normal  No respiratory distress  Breath sounds: No stridor  Abdominal:      General: There is no distension  Palpations: Abdomen is soft  Comments: Scaphoid   Skin:     General: Skin is warm and dry  Coloration: Skin is jaundiced (ashlyn)  Findings: No erythema or rash  Neurological:      General: No focal deficit present  Mental Status: Mental status is at baseline  She is disoriented  Cranial Nerves: No cranial nerve deficit  Psychiatric:         Mood and Affect: Mood normal       Comments: Judgment and insight limited; well supported by family today         I spent 30+ minutes was spent during this virtual visit by Milvia, face to face with Sai Marrufo and her family with greater than 50% of the time spent in counseling or coordination of care including discussions of etiology of diagnosis, risks and benefits of treatment, follow up requirements and compliance with treatment regimen3   All of the patient's or agent's questions were answered during this discussion        Encounter provider Daquan Leonard MD, located at:  [facility] [place]  [address] [address]  [phone]    Recent Visits  No visits were found meeting these conditions  Showing recent visits within past 7 days and meeting all other requirements     Future Appointments  No visits were found meeting these conditions  Showing future appointments within next 150 days and meeting all other requirements        VIRTUAL VISIT DISCLAIMER    Angy Craig or their agent has consented to an online visit or consultation  They understands that the online visit is based solely on information provided by the patient or agent, and that, in the absence of a face-to-face physical evaluation by the physician, the diagnosis they receive is both limited and provisional in terms of accuracy and completeness  This is not intended to replace a full medical face-to-face evaluation by the physician  Margarito Harris or their agent understands and accepts these terms  The patient or their agent was informed this is a billable service

## 2021-02-15 ENCOUNTER — HOSPITAL ENCOUNTER (OUTPATIENT)
Dept: INFUSION CENTER | Facility: CLINIC | Age: 61
Discharge: HOME | End: 2021-02-15
Payer: COMMERCIAL

## 2021-02-15 VITALS — TEMPERATURE: 96.9 F

## 2021-02-15 DIAGNOSIS — E86.0 DEHYDRATION: Primary | ICD-10-CM

## 2021-02-15 DIAGNOSIS — Z17.0 BILATERAL MALIGNANT NEOPLASM OF BREAST IN FEMALE, ESTROGEN RECEPTOR POSITIVE, UNSPECIFIED SITE OF BREAST (HCC): ICD-10-CM

## 2021-02-15 DIAGNOSIS — C50.912 BILATERAL MALIGNANT NEOPLASM OF BREAST IN FEMALE, ESTROGEN RECEPTOR POSITIVE, UNSPECIFIED SITE OF BREAST (HCC): ICD-10-CM

## 2021-02-15 DIAGNOSIS — C50.911 BILATERAL MALIGNANT NEOPLASM OF BREAST IN FEMALE, ESTROGEN RECEPTOR POSITIVE, UNSPECIFIED SITE OF BREAST (HCC): ICD-10-CM

## 2021-02-15 LAB
ALBUMIN SERPL BCP-MCNC: 2.9 G/DL (ref 3.5–5)
ALP SERPL-CCNC: 176 U/L (ref 46–116)
ALT SERPL W P-5'-P-CCNC: 96 U/L (ref 12–78)
ANION GAP SERPL CALCULATED.3IONS-SCNC: 11 MMOL/L (ref 4–13)
AST SERPL W P-5'-P-CCNC: 51 U/L (ref 5–45)
BASOPHILS # BLD AUTO: 0.02 THOUSANDS/ΜL (ref 0–0.1)
BASOPHILS NFR BLD AUTO: 0 % (ref 0–1)
BILIRUB SERPL-MCNC: 0.23 MG/DL (ref 0.2–1)
BUN SERPL-MCNC: 10 MG/DL (ref 5–25)
CALCIUM ALBUM COR SERPL-MCNC: 10 MG/DL (ref 8.3–10.1)
CALCIUM SERPL-MCNC: 9.1 MG/DL (ref 8.3–10.1)
CHLORIDE SERPL-SCNC: 108 MMOL/L (ref 100–108)
CO2 SERPL-SCNC: 25 MMOL/L (ref 21–32)
CREAT SERPL-MCNC: 0.64 MG/DL (ref 0.6–1.3)
EOSINOPHIL # BLD AUTO: 0 THOUSAND/ΜL (ref 0–0.61)
EOSINOPHIL NFR BLD AUTO: 0 % (ref 0–6)
ERYTHROCYTE [DISTWIDTH] IN BLOOD BY AUTOMATED COUNT: 15.9 % (ref 11.6–15.1)
GFR SERPL CREATININE-BSD FRML MDRD: 99 ML/MIN/1.73SQ M
GLUCOSE SERPL-MCNC: 106 MG/DL (ref 65–140)
HCT VFR BLD AUTO: 36.7 % (ref 34.8–46.1)
HGB BLD-MCNC: 11.5 G/DL (ref 11.5–15.4)
IMM GRANULOCYTES # BLD AUTO: 0.07 THOUSAND/UL (ref 0–0.2)
IMM GRANULOCYTES NFR BLD AUTO: 1 % (ref 0–2)
LYMPHOCYTES # BLD AUTO: 1.04 THOUSANDS/ΜL (ref 0.6–4.47)
LYMPHOCYTES NFR BLD AUTO: 12 % (ref 14–44)
MCH RBC QN AUTO: 31.1 PG (ref 26.8–34.3)
MCHC RBC AUTO-ENTMCNC: 31.3 G/DL (ref 31.4–37.4)
MCV RBC AUTO: 99 FL (ref 82–98)
MONOCYTES # BLD AUTO: 0.67 THOUSAND/ΜL (ref 0.17–1.22)
MONOCYTES NFR BLD AUTO: 8 % (ref 4–12)
NEUTROPHILS # BLD AUTO: 7.13 THOUSANDS/ΜL (ref 1.85–7.62)
NEUTS SEG NFR BLD AUTO: 79 % (ref 43–75)
NRBC BLD AUTO-RTO: 0 /100 WBCS
PLATELET # BLD AUTO: 283 THOUSANDS/UL (ref 149–390)
PMV BLD AUTO: 9.5 FL (ref 8.9–12.7)
POTASSIUM SERPL-SCNC: 4 MMOL/L (ref 3.5–5.3)
PROT SERPL-MCNC: 7.8 G/DL (ref 6.4–8.2)
RBC # BLD AUTO: 3.7 MILLION/UL (ref 3.81–5.12)
SODIUM SERPL-SCNC: 144 MMOL/L (ref 136–145)
WBC # BLD AUTO: 8.93 THOUSAND/UL (ref 4.31–10.16)

## 2021-02-15 PROCEDURE — 85025 COMPLETE CBC W/AUTO DIFF WBC: CPT | Performed by: INTERNAL MEDICINE

## 2021-02-15 PROCEDURE — 80053 COMPREHEN METABOLIC PANEL: CPT | Performed by: INTERNAL MEDICINE

## 2021-02-15 NOTE — PLAN OF CARE
Problem: Potential for Falls  Goal: Patient will remain free of falls  Description: INTERVENTIONS:  - Assess patient frequently for physical needs  -  Identify cognitive and physical deficits and behaviors that affect risk of falls  -  [place] fall precautions as indicated by assessment   - Educate patient/family on patient safety including physical limitations  - Instruct patient to call for assistance with activity based on assessment  - Modify environment to reduce risk of injury  - Consider OT/PT consult to assist with strengthening/mobility  Outcome: Progressing     Problem: Knowledge Deficit  Goal: Patient/family/caregiver demonstrates understanding of disease process, treatment plan, medications, and discharge instructions  Description: Complete learning assessment and assess knowledge base    Interventions:  - Provide teaching at level of understanding  - Provide teaching via preferred learning methods  Outcome: Progressing

## 2021-02-15 NOTE — PROGRESS NOTES
Pt to clinic for lab draw from port, pt offers no complaints at this time, aware of appointment time tomorrow, declined avs

## 2021-02-16 ENCOUNTER — HOSPITAL ENCOUNTER (OUTPATIENT)
Dept: INFUSION CENTER | Facility: CLINIC | Age: 61
Discharge: HOME | End: 2021-02-16
Payer: COMMERCIAL

## 2021-02-16 VITALS
DIASTOLIC BLOOD PRESSURE: 76 MMHG | WEIGHT: 160.5 LBS | OXYGEN SATURATION: 95 % | HEART RATE: 96 BPM | HEIGHT: 65 IN | SYSTOLIC BLOOD PRESSURE: 123 MMHG | TEMPERATURE: 97.9 F | BODY MASS INDEX: 26.74 KG/M2 | RESPIRATION RATE: 20 BRPM

## 2021-02-16 DIAGNOSIS — C50.911 BILATERAL MALIGNANT NEOPLASM OF BREAST IN FEMALE, ESTROGEN RECEPTOR POSITIVE, UNSPECIFIED SITE OF BREAST (HCC): Primary | ICD-10-CM

## 2021-02-16 DIAGNOSIS — Z17.0 BILATERAL MALIGNANT NEOPLASM OF BREAST IN FEMALE, ESTROGEN RECEPTOR POSITIVE, UNSPECIFIED SITE OF BREAST (HCC): Primary | ICD-10-CM

## 2021-02-16 DIAGNOSIS — C50.912 BILATERAL MALIGNANT NEOPLASM OF BREAST IN FEMALE, ESTROGEN RECEPTOR POSITIVE, UNSPECIFIED SITE OF BREAST (HCC): Primary | ICD-10-CM

## 2021-02-16 PROCEDURE — 96413 CHEMO IV INFUSION 1 HR: CPT

## 2021-02-16 PROCEDURE — 96367 TX/PROPH/DG ADDL SEQ IV INF: CPT

## 2021-02-16 PROCEDURE — 96375 TX/PRO/DX INJ NEW DRUG ADDON: CPT

## 2021-02-16 RX ORDER — SODIUM CHLORIDE 9 MG/ML
20 INJECTION, SOLUTION INTRAVENOUS ONCE
Status: COMPLETED | OUTPATIENT
Start: 2021-02-16 | End: 2021-02-16

## 2021-02-16 RX ORDER — PALONOSETRON 0.05 MG/ML
0.25 INJECTION, SOLUTION INTRAVENOUS ONCE
Status: COMPLETED | OUTPATIENT
Start: 2021-02-16 | End: 2021-02-16

## 2021-02-16 RX ADMIN — ADO-TRASTUZUMAB EMTANSINE 260 MG: 20 INJECTION, POWDER, LYOPHILIZED, FOR SOLUTION INTRAVENOUS at 13:37

## 2021-02-16 RX ADMIN — PALONOSETRON 0.25 MG: 0.05 INJECTION, SOLUTION INTRAVENOUS at 12:07

## 2021-02-16 RX ADMIN — DEXAMETHASONE SODIUM PHOSPHATE 10 MG: 10 INJECTION, SOLUTION INTRAMUSCULAR; INTRAVENOUS at 12:09

## 2021-02-16 RX ADMIN — FOSAPREPITANT 150 MG: 150 INJECTION, POWDER, LYOPHILIZED, FOR SOLUTION INTRAVENOUS at 12:47

## 2021-02-16 RX ADMIN — SODIUM CHLORIDE 20 ML/HR: 0.9 INJECTION, SOLUTION INTRAVENOUS at 11:52

## 2021-02-16 NOTE — PROGRESS NOTES
Nurse was messaged by [employer] RN & she stated per Dr Brandt Huge pt  Is ok to treat today with increased LFT's & he will put in an order for an echo & pt  Will need to schedule  Nurse informed the pt  Of the same  Pt  Is aware/agreeable & verbalized understanding

## 2021-02-16 NOTE — PROGRESS NOTES
Pt  Tolerated treatment & observed x 30 minutes post kadcyla w/out adverse reaction  Confirmed pts  Next appt

## 2021-02-16 NOTE — PROGRESS NOTES
Nurse teams messaged Maia Morgan RN regarding pts  Increase in LFT's  AST/ALT/alk phos have all increased since last treatment & nurse confirming with Dr Christopher Gallagher if pt  Is ok to treat with increased LFT"S for today's treatment  Pt  Does not appear jaundice  Nurse also informed Maia that a echo was done on 12/23/20 & EF=60%  Maia stated she would inform Dr Christopher Gallagher of the same & get back to nurse

## 2021-02-16 NOTE — PLAN OF CARE
Problem: Potential for Falls  Goal: Patient will remain free of falls  Description: INTERVENTIONS:  - Assess patient frequently for physical needs  -  Identify cognitive and physical deficits and behaviors that affect risk of falls    -  [place] fall precautions as indicated by assessment   - Educate patient/family on patient safety including physical limitations  - Instruct patient to call for assistance with activity based on assessment  - Modify environment to reduce risk of injury  - Consider OT/PT consult to assist with strengthening/mobility  Outcome: Progressing

## 2021-02-22 ENCOUNTER — TELEPHONE (OUTPATIENT)
Dept: HEMATOLOGY ONCOLOGY | Facility: CLINIC | Age: 61
End: 2021-02-22

## 2021-02-22 DIAGNOSIS — R56.9 FOCAL SEIZURE (HCC): ICD-10-CM

## 2021-02-22 DIAGNOSIS — C79.31 BRAIN METASTASES (HCC): ICD-10-CM

## 2021-02-22 DIAGNOSIS — I42.7 CARDIOMYOPATHY SECONDARY TO DRUG (HCC): Primary | ICD-10-CM

## 2021-02-22 RX ORDER — LEVETIRACETAM 250 MG/1
TABLET ORAL
Qty: 60 TABLET | Refills: 0 | Status: SHIPPED | OUTPATIENT
Start: 2021-02-22 | End: 2021-04-05 | Stop reason: SDUPTHER

## 2021-02-22 NOTE — TELEPHONE ENCOUNTER
Can you please see that the echo that was ordered today is scheduled  Can you please call the pt with the date and time    Lilly Onofre

## 2021-02-24 ENCOUNTER — TELEPHONE (OUTPATIENT)
Dept: PALLIATIVE MEDICINE | Facility: CLINIC | Age: 61
End: 2021-02-24

## 2021-02-24 NOTE — TELEPHONE ENCOUNTER
Pt last seen by Dr Jose Chris in need of one month follow up appt  Please reach out to pt to schedule appt      Thank you

## 2021-03-08 ENCOUNTER — TELEMEDICINE (OUTPATIENT)
Dept: PALLIATIVE MEDICINE | Facility: CLINIC | Age: 61
End: 2021-03-08
Payer: COMMERCIAL

## 2021-03-08 ENCOUNTER — TELEPHONE (OUTPATIENT)
Dept: HEMATOLOGY ONCOLOGY | Facility: MEDICAL CENTER | Age: 61
End: 2021-03-08

## 2021-03-08 DIAGNOSIS — T45.1X5A CINV (CHEMOTHERAPY-INDUCED NAUSEA AND VOMITING): Chronic | ICD-10-CM

## 2021-03-08 DIAGNOSIS — Z51.5 PALLIATIVE CARE PATIENT: ICD-10-CM

## 2021-03-08 DIAGNOSIS — R11.2 CINV (CHEMOTHERAPY-INDUCED NAUSEA AND VOMITING): Chronic | ICD-10-CM

## 2021-03-08 DIAGNOSIS — C79.31 BRAIN METASTASES (HCC): ICD-10-CM

## 2021-03-08 DIAGNOSIS — F51.01 PRIMARY INSOMNIA: ICD-10-CM

## 2021-03-08 DIAGNOSIS — G89.3 CANCER-RELATED PAIN: ICD-10-CM

## 2021-03-08 DIAGNOSIS — C79.51 BONE METASTASES (HCC): ICD-10-CM

## 2021-03-08 DIAGNOSIS — K59.00 CONSTIPATION, UNSPECIFIED CONSTIPATION TYPE: ICD-10-CM

## 2021-03-08 PROCEDURE — 99213 OFFICE O/P EST LOW 20 MIN: CPT | Performed by: FAMILY MEDICINE

## 2021-03-08 NOTE — TELEPHONE ENCOUNTER
patient daughter would like to reschedule for 03/10/2021 please call Heidy Self back at [phone]

## 2021-03-08 NOTE — PROGRESS NOTES
Outpatient Virtual Regular Visit - Follow-up with Palliative and Supportive Care  Malorie Salinas 64 y o  female [id number]    Intake:    Reason for virtual visit is f/up cancer  The patient is unable to visit the clinic safely due to the coronavirus pandemic  After connecting through [employer], the patient was identified by name and date of birth  Malorie Crimes or their agent was informed that this was a telemedicine visit and that the visit is being conducted through [facility] and patient was informed that this is a secure, HIPAA-compliant platform  She agrees to proceed     My office door was closed  No one else was in the room  They acknowledged consent and understanding of privacy and security of the video platform  The patient or agent has agreed to participate and understands they can discontinue the visit at any time  Assessment & Plan  1  Brain metastases ([facility] [address] )    2  Bone metastases ([facility] [address] )    3  Palliative care patient    4  Cancer-related pain    5  Primary insomnia    6  CINV (chemotherapy-induced nausea and vomiting)    7  Constipation, unspecified constipation type      - Counseling on health screening and disease prevention, COVID-19 specific (CPT V65 49)    Medications adjusted this encounter:  Requested Prescriptions     Signed Prescriptions Disp Refills    dexamethasone (DECADRON) 2 mg tablet 30 tablet 0     Sig: Take 1 tablet (2 mg total) by mouth daily with breakfast To help appetite    HYDROmorphone (DILAUDID) 4 mg tablet 180 tablet 0     Sig: Take 1-2 tablets (4-8 mg total) by mouth every 4 (four) hours as needed (cancer pain  Maximum 6 tabs/day  )Max Daily Amount: 48 mg    OLANZapine (ZyPREXA) 10 mg tablet 30 tablet 0     Sig: Take 1 tablet (10 mg total) by mouth daily at bedtime Every night, to help sleep and appetite      zolpidem (AMBIEN) 10 mg tablet 30 tablet 0     Sig: Take 1 tablet (10 mg total) by mouth daily at bedtime as needed for sleep    pregabalin (LYRICA) 75 mg capsule 90 capsule 0     Sig: Take one in the morning, and two before bed, for neuropathy and restless legs  No orders of the defined types were placed in this encounter  Medications Discontinued During This Encounter   Medication Reason    OLANZapine (ZyPREXA) 7 5 mg tablet Reorder    HYDROmorphone (DILAUDID) 4 mg tablet Reorder    dexamethasone (DECADRON) 2 mg tablet Reorder    pregabalin (LYRICA) 75 mg capsule Reorder    zolpidem (AMBIEN) 10 mg tablet Reorder   - Opioids filled last week for one month      Corrinne Heckler was assessed today for symptoms and care planning related to above illnesses  I have reviewed the patient's controlled substance dispensing history in the Prescription Drug Monitoring Program in compliance with the [facility] regulations before prescribing any controlled substances  She is invited to continue to follow with us  If there are questions or concerns, please contact us through our clinic/answering service 24 hours a day, seven days a week  Telma Child MD  [facility] Palliative and Supportive Care          Visit Information   Accompanied By: No one    Source of History: Self    History Limitations: None    Contacts:  [facility] [place] [phone]    History of Present Illness      Corrinne Heckler is a 64 y o  female who presents in follow of symptoms related to her Stage IV breast cancer  Pertinent issues include: symptom management, pain, neoplasm related      Since last visit, there are no great changes, and she feels generally well, under the circumstances  She is having greater degrees of nausea on current chemo regimen, but does find the current doses of her multiple antiemetics helpful    We agreed to continue to follow closely with her, every 4 weeks or so         Historically, her right arm pain and lymphedema have been challenging to control   She has experienced consistent side effects from long acting opioids leading to sedation and confusion   Long acting opioids have also never provided her pain relief - forms tried have included methadone, MS Contin, Fentanyl patches   Steroids have not been helpful to reduce inflammation and improve pain, either  She continues to smoke 1/2 ppd, which is less than before  She had expressed interest and motivation in quitting  Her daughter has also promised to quit with her  Metastatic breast cancer with brain metastasis originally diagnosed in 2010  She had a bilateral mastectomy and has undergone multiple chemotherapy regimens and has completed WBRT in March 2019  She follows with Dr Christopher Gallagher and is currently undergoing treatment with [facility] with premeds of Dexamethasone, Emend, Aloxi   She struggles with persistent nausea         Past Medical History:   Diagnosis Date    Dehydration 6/11/2019    Dizziness 2/6/2018    Dry skin 2/6/2018    Edema 10/23/2019    H/O bilateral mastectomy 2/15/2016    Hyperglycemia 2/6/2018    Hypertension 2/15/2016    Hypokalemia 3/10/2020    Lymphedema 2/15/2016    Malignant cachexia ([facility] [address] ) 10/6/2016    Malignant neoplasm of right breast ([facility] [address] ) 2/15/2016    Metastatic breast cancer [facility])      Past Surgical History:   Procedure Laterality Date    ENDOBRONCHIAL ULTRASOUND (EBUS) N/A 2/16/2016    Procedure: EBUS;  Surgeon: Nat Glasgow MD;  [facility]: [facility];  Service:     MASTECTOMY Bilateral     MASTECTOMY Bilateral     right arm edema    OOPHORECTOMY      MD BRONCHOSCOPY NEEDLE BX TRACHEA MAIN STEM&/BRON N/A 2/16/2016    Procedure: Layman Netters;  Surgeon: Nat Galsgow MD;  [facility];  Service: Thoracic    MD INSJ TUNNELED CTR VAD W/SUBQ PORT AGE 5 YR/> N/A 7/24/2018    Procedure: INSERTION VENOUS PORT ( PORT-A-CATH) IR;  Surgeon: Ada Musa DO;  [facility];  Service: Interventional Radiology    MD STEREOTACTIC RADIOSURGERY, CRANIAL,SIMPLE,EA ADD  5/3/2017         MD STEREOTACTIC RADIOSURGERY, CRANIAL,SIMPLE,EA ADD 5/3/2017         TN STEREOTACTIC RADIOSURGERY, CRANIAL,SIMPLE,SINGLE  5/3/2017          Current Outpatient Medications   Medication Sig Dispense Refill    albuterol (PROVENTIL HFA,VENTOLIN HFA) 90 mcg/act inhaler TAKE 2 PUFFS BY MOUTH EVERY 6 HOURS AS NEEDED FOR WHEEZING 18 Inhaler 2    dexamethasone (DECADRON) 2 mg tablet Take 1 tablet (2 mg total) by mouth daily with breakfast To help appetite 30 tablet 0    HYDROmorphone (DILAUDID) 4 mg tablet Take 1-2 tablets (4-8 mg total) by mouth every 4 (four) hours as needed (cancer pain  Maximum 6 tabs/day  )Max Daily Amount: 48 mg 180 tablet 0    levETIRAcetam (KEPPRA) 250 mg tablet TAKE 1 TABLET BY MOUTH TWICE A DAY 60 tablet 0    Misc  Devices (QUAD CANE) MISC by Does not apply route daily 1 each 0    OLANZapine (ZyPREXA) 10 mg tablet Take 1 tablet (10 mg total) by mouth daily at bedtime Every night, to help sleep and appetite  30 tablet 0    ondansetron (ZOFRAN) 8 mg tablet Take 1 tablet (8 mg total) by mouth 3 (three) times a day before meals For cancer nausea 90 tablet 0    pantoprazole (PROTONIX) 40 mg tablet Take 1 tablet (40 mg total) by mouth daily Every morning, before coffee  90 tablet 3    pregabalin (LYRICA) 75 mg capsule Take one in the morning, and two before bed, for neuropathy and restless legs  90 capsule 0    senna (SENOKOT) 8 6 MG tablet Take 1 tablet (8 6 mg total) by mouth 2 (two) times a day Hold for loose stool  180 tablet 3    zolpidem (AMBIEN) 10 mg tablet Take 1 tablet (10 mg total) by mouth daily at bedtime as needed for sleep 30 tablet 0     No current facility-administered medications for this visit  No Known Allergies    Review of Systems   Constitutional: Negative for chills, diaphoresis and fever  HENT: Negative for ear pain, rhinorrhea and sinus pain  Eyes: Negative for photophobia and redness  Respiratory: Negative for cough and shortness of breath  Cardiovascular: Negative for chest pain     Gastrointestinal: Negative for abdominal distention, abdominal pain and constipation  Endocrine: Negative for polyphagia and polyuria  Genitourinary: Negative for difficulty urinating, dysuria and flank pain  Musculoskeletal: Positive for back pain  Negative for gait problem and joint swelling  Neurological: Positive for light-headedness  Negative for seizures and speech difficulty  Psychiatric/Behavioral: Negative for agitation and dysphoric mood  The patient is not nervous/anxious  Video Exam  There were no vitals filed for this visit  Physical Exam  Constitutional:       General: She is not in acute distress  Appearance: She is ill-appearing  She is not toxic-appearing or diaphoretic  Comments: frail   HENT:      Head: Normocephalic and atraumatic  Right Ear: External ear normal       Left Ear: External ear normal       Mouth/Throat:      Comments: Mask not removed today  Eyes:      General:         Right eye: No discharge  Left eye: No discharge  Conjunctiva/sclera: Conjunctivae normal       Pupils: Pupils are equal, round, and reactive to light  Neck:      Trachea: No tracheal deviation  Pulmonary:      Effort: Pulmonary effort is normal  No respiratory distress  Breath sounds: No stridor  Abdominal:      General: There is no distension  Skin:     General: Skin is warm and dry  Coloration: Skin is jaundiced  Findings: No erythema or rash  Neurological:      General: No focal deficit present  Mental Status: She is alert  Mental status is at baseline  She is disoriented  Cranial Nerves: No cranial nerve deficit  Psychiatric:         Mood and Affect: Mood normal          Behavior: Behavior normal          Thought Content:  Thought content normal          I spent 25+ minutes was spent during this virtual visit by Milvia, face to face with Bart Moreno and her family with greater than 50% of the time spent in counseling or coordination of care including: chart review; symptom pursuit  All of the patient's or agent's questions were answered during this discussion  Encounter provider Saida Lamar MD, located at:  [address] [place] [id number] [phone]    Recent Visits  No visits were found meeting these conditions  Showing recent visits within past 7 days and meeting all other requirements     Future Appointments  No visits were found meeting these conditions  Showing future appointments within next 150 days and meeting all other requirements        VIRTUAL VISIT DISCLAIMER    Angy Knight Jason or their agent has consented to an online visit or consultation  They understands that the online visit is based solely on information provided by the patient or agent, and that, in the absence of a face-to-face physical evaluation by the physician, the diagnosis they receive is both limited and provisional in terms of accuracy and completeness  This is not intended to replace a full medical face-to-face evaluation by the physician  Nat Clark or their agent understands and accepts these terms  The patient or their agent was informed this is a billable service

## 2021-03-09 RX ORDER — PALONOSETRON 0.05 MG/ML
0.25 INJECTION, SOLUTION INTRAVENOUS ONCE
Status: CANCELLED | OUTPATIENT
Start: 2021-03-16

## 2021-03-09 RX ORDER — SODIUM CHLORIDE 9 MG/ML
20 INJECTION, SOLUTION INTRAVENOUS ONCE
Status: CANCELLED | OUTPATIENT
Start: 2021-03-16

## 2021-03-10 ENCOUNTER — HOSPITAL ENCOUNTER (OUTPATIENT)
Dept: INFUSION CENTER | Facility: CLINIC | Age: 61
Discharge: HOME | End: 2021-03-10
Payer: COMMERCIAL

## 2021-03-10 ENCOUNTER — TELEPHONE (OUTPATIENT)
Dept: PALLIATIVE MEDICINE | Facility: CLINIC | Age: 61
End: 2021-03-10

## 2021-03-10 ENCOUNTER — HOSPITAL ENCOUNTER (OUTPATIENT)
Dept: CT IMAGING | Facility: HOSPITAL | Age: 61
Discharge: HOME | End: 2021-03-10
Attending: INTERNAL MEDICINE
Payer: COMMERCIAL

## 2021-03-10 VITALS — TEMPERATURE: 96.9 F

## 2021-03-10 DIAGNOSIS — C50.912 BILATERAL MALIGNANT NEOPLASM OF BREAST IN FEMALE, ESTROGEN RECEPTOR POSITIVE, UNSPECIFIED SITE OF BREAST (HCC): Primary | ICD-10-CM

## 2021-03-10 DIAGNOSIS — Z17.0 BILATERAL MALIGNANT NEOPLASM OF BREAST IN FEMALE, ESTROGEN RECEPTOR POSITIVE, UNSPECIFIED SITE OF BREAST (HCC): ICD-10-CM

## 2021-03-10 DIAGNOSIS — C50.911 BILATERAL MALIGNANT NEOPLASM OF BREAST IN FEMALE, ESTROGEN RECEPTOR POSITIVE, UNSPECIFIED SITE OF BREAST (HCC): ICD-10-CM

## 2021-03-10 DIAGNOSIS — E86.0 DEHYDRATION: ICD-10-CM

## 2021-03-10 DIAGNOSIS — C50.912 BILATERAL MALIGNANT NEOPLASM OF BREAST IN FEMALE, ESTROGEN RECEPTOR POSITIVE, UNSPECIFIED SITE OF BREAST (HCC): ICD-10-CM

## 2021-03-10 DIAGNOSIS — Z17.0 BILATERAL MALIGNANT NEOPLASM OF BREAST IN FEMALE, ESTROGEN RECEPTOR POSITIVE, UNSPECIFIED SITE OF BREAST (HCC): Primary | ICD-10-CM

## 2021-03-10 DIAGNOSIS — C50.911 BILATERAL MALIGNANT NEOPLASM OF BREAST IN FEMALE, ESTROGEN RECEPTOR POSITIVE, UNSPECIFIED SITE OF BREAST (HCC): Primary | ICD-10-CM

## 2021-03-10 LAB
ALBUMIN SERPL BCP-MCNC: 3.3 G/DL (ref 3.5–5)
ALP SERPL-CCNC: 97 U/L (ref 46–116)
ALT SERPL W P-5'-P-CCNC: 78 U/L (ref 12–78)
ANION GAP SERPL CALCULATED.3IONS-SCNC: 8 MMOL/L (ref 4–13)
AST SERPL W P-5'-P-CCNC: 30 U/L (ref 5–45)
BASOPHILS # BLD AUTO: 0.06 THOUSANDS/ΜL (ref 0–0.1)
BASOPHILS NFR BLD AUTO: 1 % (ref 0–1)
BILIRUB SERPL-MCNC: 0.26 MG/DL (ref 0.2–1)
BUN SERPL-MCNC: 14 MG/DL (ref 5–25)
CALCIUM ALBUM COR SERPL-MCNC: 9.9 MG/DL (ref 8.3–10.1)
CALCIUM SERPL-MCNC: 9.3 MG/DL (ref 8.3–10.1)
CHLORIDE SERPL-SCNC: 105 MMOL/L (ref 100–108)
CO2 SERPL-SCNC: 28 MMOL/L (ref 21–32)
CREAT SERPL-MCNC: 0.58 MG/DL (ref 0.6–1.3)
EOSINOPHIL # BLD AUTO: 0.14 THOUSAND/ΜL (ref 0–0.61)
EOSINOPHIL NFR BLD AUTO: 2 % (ref 0–6)
ERYTHROCYTE [DISTWIDTH] IN BLOOD BY AUTOMATED COUNT: 16.3 % (ref 11.6–15.1)
GFR SERPL CREATININE-BSD FRML MDRD: 102 ML/MIN/1.73SQ M
GLUCOSE SERPL-MCNC: 76 MG/DL (ref 65–140)
HCT VFR BLD AUTO: 38 % (ref 34.8–46.1)
HGB BLD-MCNC: 12 G/DL (ref 11.5–15.4)
IMM GRANULOCYTES # BLD AUTO: 0.09 THOUSAND/UL (ref 0–0.2)
IMM GRANULOCYTES NFR BLD AUTO: 1 % (ref 0–2)
LYMPHOCYTES # BLD AUTO: 2.16 THOUSANDS/ΜL (ref 0.6–4.47)
LYMPHOCYTES NFR BLD AUTO: 23 % (ref 14–44)
MCH RBC QN AUTO: 30.8 PG (ref 26.8–34.3)
MCHC RBC AUTO-ENTMCNC: 31.6 G/DL (ref 31.4–37.4)
MCV RBC AUTO: 98 FL (ref 82–98)
MONOCYTES # BLD AUTO: 0.96 THOUSAND/ΜL (ref 0.17–1.22)
MONOCYTES NFR BLD AUTO: 10 % (ref 4–12)
NEUTROPHILS # BLD AUTO: 5.9 THOUSANDS/ΜL (ref 1.85–7.62)
NEUTS SEG NFR BLD AUTO: 63 % (ref 43–75)
NRBC BLD AUTO-RTO: 0 /100 WBCS
PLATELET # BLD AUTO: 326 THOUSANDS/UL (ref 149–390)
PMV BLD AUTO: 9.6 FL (ref 8.9–12.7)
POTASSIUM SERPL-SCNC: 3.7 MMOL/L (ref 3.5–5.3)
PROT SERPL-MCNC: 7.6 G/DL (ref 6.4–8.2)
RBC # BLD AUTO: 3.89 MILLION/UL (ref 3.81–5.12)
SODIUM SERPL-SCNC: 141 MMOL/L (ref 136–145)
WBC # BLD AUTO: 9.31 THOUSAND/UL (ref 4.31–10.16)

## 2021-03-10 PROCEDURE — G1004 CDSM NDSC: HCPCS

## 2021-03-10 PROCEDURE — 85025 COMPLETE CBC W/AUTO DIFF WBC: CPT | Performed by: INTERNAL MEDICINE

## 2021-03-10 PROCEDURE — 74177 CT ABD & PELVIS W/CONTRAST: CPT

## 2021-03-10 PROCEDURE — 71260 CT THORAX DX C+: CPT

## 2021-03-10 PROCEDURE — 80053 COMPREHEN METABOLIC PANEL: CPT | Performed by: INTERNAL MEDICINE

## 2021-03-10 RX ADMIN — IOHEXOL 100 ML: 350 INJECTION, SOLUTION INTRAVENOUS at 11:13

## 2021-03-10 NOTE — PLAN OF CARE
Problem: Potential for Falls  Goal: Patient will remain free of falls  Description: INTERVENTIONS:  - Assess patient frequently for physical needs  -  Identify cognitive and physical deficits and behaviors that affect risk of falls    -  [place] fall precautions as indicated by assessment   - Educate patient/family on patient safety including physical limitations  - Instruct patient to call for assistance with activity based on assessment  - Modify environment to reduce risk of injury  - Consider OT/PT consult to assist with strengthening/mobility  Outcome: Progressing

## 2021-03-10 NOTE — TELEPHONE ENCOUNTER
Patient calling stated she had virtual visit in 3/8 and all her refill medications none has been sent to pharmacy

## 2021-03-10 NOTE — PROGRESS NOTES
Patient here for lab work  Port flushed, blood return noted, labs drawn per order  Patient left accessed with power port for CT, notified CT tech of this and that patient would need to be de-accessed after CT  Per CT tech they do have a certified RN in radiology to do this, patient aware and agreeable to plan  Patient declined AVS  Next appointment reviewed with patient

## 2021-03-10 NOTE — LETTER
[address] [place] [address]  [address] [facility] [address]      March 18, 2021    MRN: [id number]     Phone: [phone]     Dear Ms  Rosanna Jesus recently had a(n) Cat Scan performed on 3/10/2021 at  [address] [place] [address] that was requested by Linwood Sigala DO  The study was reviewed by a radiologist, which is a physician who specializes in medical imaging  The radiologist issued a report describing his or her findings  In that report there was a finding that the radiologist felt warranted further discussion with your health care provider and that discussion would be beneficial to you  The results were sent to Linwood Sigala DO on 3/15/2021  We recommend that you contact Linwood Sigala DO at [phone] or set up an appointment to discuss the results of the imaging test  If you have already heard from Linwood Sigala DO regarding the results of your study, you can disregard this letter  This letter is not meant to alarm you, but intended to encourage you to follow-up on your results with the provider that sent you for the imaging study  In addition, we have enclosed answers to frequently asked questions by other patients who have also received a letter to review results with their health care provider (see page two)  Thank you for choosing [address] [place] [address] for your medical imaging needs  FREQUENTLY ASKED QUESTIONS    1  Why am I receiving this letter? [facility] requires us to notify patients who have findings on imaging exams that may require more testing or follow-up with a health professional within the next 3 months  2  How serious is the finding on the imaging test?  This letter is sent to all patients who may need follow-up or more testing within the next 3 months    Receiving this letter does not necessarily mean you have a life-threatening imaging finding or disease  Recommendations in the radiologists imaging report are general in nature and it is up to your healthcare provider to say whether those recommendations make sense for your situation  You are strongly encouraged to talk to your health care provider about the results and ask whether additional steps need to be taken  3  Where can I get a copy of the final report for my recent radiology exam?  To get a full copy of the report you can access your records online at [email or web address] or please contact [address] [place] [address] Records Department at [phone] Monday through Friday between 8 am and 6 pm          4  What do I need to do now? Please contact your health care provider who requested the imaging study to discuss what further actions (if any) are needed  You may have already heard from (your ordering provider) in regard to this test in which case you can disregard this letter  NOTICE IN ACCORDANCE WITH THE [facility] PATIENT TEST RESULT INFORMATION ACT OF 2018    You are receiving this notice as a result of a determination by your diagnostic imaging service that further discussions of your test results are warranted and would be beneficial to you  The complete results of your test or tests have been or will be sent to the health care practitioner that ordered the test or tests  It is recommended that you contact your health care practitioner to discuss your results as soon as possible

## 2021-03-11 DIAGNOSIS — Z51.5 PALLIATIVE CARE PATIENT: ICD-10-CM

## 2021-03-11 DIAGNOSIS — R11.2 CINV (CHEMOTHERAPY-INDUCED NAUSEA AND VOMITING): Chronic | ICD-10-CM

## 2021-03-11 DIAGNOSIS — T45.1X5A CINV (CHEMOTHERAPY-INDUCED NAUSEA AND VOMITING): Chronic | ICD-10-CM

## 2021-03-11 RX ORDER — HYDROMORPHONE HYDROCHLORIDE 4 MG/1
4-8 TABLET ORAL EVERY 4 HOURS PRN
Qty: 180 TABLET | Refills: 0 | Status: SHIPPED | OUTPATIENT
Start: 2021-03-11 | End: 2021-04-05 | Stop reason: SDUPTHER

## 2021-03-11 RX ORDER — ZOLPIDEM TARTRATE 10 MG/1
10 TABLET ORAL
Qty: 30 TABLET | Refills: 0 | Status: SHIPPED | OUTPATIENT
Start: 2021-03-11 | End: 2021-04-05 | Stop reason: SDUPTHER

## 2021-03-11 RX ORDER — DEXAMETHASONE 2 MG/1
2 TABLET ORAL
Qty: 30 TABLET | Refills: 0 | Status: SHIPPED | OUTPATIENT
Start: 2021-03-11 | End: 2021-04-05 | Stop reason: SDUPTHER

## 2021-03-11 RX ORDER — PREGABALIN 75 MG/1
CAPSULE ORAL
Qty: 90 CAPSULE | Refills: 0 | Status: SHIPPED | OUTPATIENT
Start: 2021-03-11 | End: 2021-04-05

## 2021-03-11 RX ORDER — OLANZAPINE 10 MG/1
10 TABLET ORAL
Qty: 30 TABLET | Refills: 0 | Status: SHIPPED | OUTPATIENT
Start: 2021-03-11 | End: 2021-04-04

## 2021-03-12 RX ORDER — ONDANSETRON HYDROCHLORIDE 8 MG/1
8 TABLET, FILM COATED ORAL
Qty: 90 TABLET | Refills: 0 | Status: SHIPPED | OUTPATIENT
Start: 2021-03-12 | End: 2021-04-05 | Stop reason: SDUPTHER

## 2021-03-15 ENCOUNTER — TELEPHONE (OUTPATIENT)
Dept: HEMATOLOGY ONCOLOGY | Facility: CLINIC | Age: 61
End: 2021-03-15

## 2021-03-15 NOTE — TELEPHONE ENCOUNTER
IMPRESSION:  A level 1 right axillary lymph node, measuring about 1 1 cm, larger     This is seen in image 26 series 2     Nodular soft tissue noted in the right axilla adjacent to the area of surgical clips seen in image 21 series 2 and minimal 1 5 cm, mildly larger  These can be evaluated with the ultrasound or short interval follow-up at 3 months     The previously noted lung nodules are stable     There is no new liver lesion, retroperitoneal lymph node enlargement  Small sclerotic foci noted within the thoracic spine are stable        The study was marked in EPIC for significant notification      Patient has f/u 3/18  Dr Savana Lema RNs aware

## 2021-03-16 ENCOUNTER — TELEPHONE (OUTPATIENT)
Dept: HEMATOLOGY ONCOLOGY | Facility: CLINIC | Age: 61
End: 2021-03-16

## 2021-03-16 ENCOUNTER — HOSPITAL ENCOUNTER (OUTPATIENT)
Dept: INFUSION CENTER | Facility: CLINIC | Age: 61
Discharge: HOME | End: 2021-03-16
Payer: COMMERCIAL

## 2021-03-16 VITALS
HEART RATE: 86 BPM | DIASTOLIC BLOOD PRESSURE: 64 MMHG | HEIGHT: 65 IN | OXYGEN SATURATION: 97 % | WEIGHT: 168.5 LBS | SYSTOLIC BLOOD PRESSURE: 138 MMHG | TEMPERATURE: 98.5 F | BODY MASS INDEX: 28.07 KG/M2 | RESPIRATION RATE: 20 BRPM

## 2021-03-16 DIAGNOSIS — Z17.0 BILATERAL MALIGNANT NEOPLASM OF BREAST IN FEMALE, ESTROGEN RECEPTOR POSITIVE, UNSPECIFIED SITE OF BREAST (HCC): Primary | ICD-10-CM

## 2021-03-16 DIAGNOSIS — C50.911 BILATERAL MALIGNANT NEOPLASM OF BREAST IN FEMALE, ESTROGEN RECEPTOR POSITIVE, UNSPECIFIED SITE OF BREAST (HCC): Primary | ICD-10-CM

## 2021-03-16 DIAGNOSIS — C50.912 BILATERAL MALIGNANT NEOPLASM OF BREAST IN FEMALE, ESTROGEN RECEPTOR POSITIVE, UNSPECIFIED SITE OF BREAST (HCC): Primary | ICD-10-CM

## 2021-03-16 PROCEDURE — 96375 TX/PRO/DX INJ NEW DRUG ADDON: CPT

## 2021-03-16 PROCEDURE — 96413 CHEMO IV INFUSION 1 HR: CPT

## 2021-03-16 PROCEDURE — 96367 TX/PROPH/DG ADDL SEQ IV INF: CPT

## 2021-03-16 RX ORDER — SODIUM CHLORIDE 9 MG/ML
20 INJECTION, SOLUTION INTRAVENOUS ONCE
Status: COMPLETED | OUTPATIENT
Start: 2021-03-16 | End: 2021-03-16

## 2021-03-16 RX ORDER — PALONOSETRON 0.05 MG/ML
0.25 INJECTION, SOLUTION INTRAVENOUS ONCE
Status: COMPLETED | OUTPATIENT
Start: 2021-03-16 | End: 2021-03-16

## 2021-03-16 RX ADMIN — FOSAPREPITANT 150 MG: 150 INJECTION, POWDER, LYOPHILIZED, FOR SOLUTION INTRAVENOUS at 12:08

## 2021-03-16 RX ADMIN — ADO-TRASTUZUMAB EMTANSINE 260 MG: 20 INJECTION, POWDER, LYOPHILIZED, FOR SOLUTION INTRAVENOUS at 12:47

## 2021-03-16 RX ADMIN — PALONOSETRON 0.25 MG: 0.05 INJECTION, SOLUTION INTRAVENOUS at 12:05

## 2021-03-16 RX ADMIN — DEXAMETHASONE SODIUM PHOSPHATE 10 MG: 10 INJECTION, SOLUTION INTRAMUSCULAR; INTRAVENOUS at 11:30

## 2021-03-16 RX ADMIN — SODIUM CHLORIDE 20 ML/HR: 0.9 INJECTION, SOLUTION INTRAVENOUS at 11:30

## 2021-03-16 NOTE — TELEPHONE ENCOUNTER
I phoned the patient and spoke with her to remind her to have her lab completed prior to her appointment on 3/18 with Danielle Padilla  The patient notes that she has no transportation to go to the lab only to her appointment this week  I asked if she would be able to get the lab completed at least on the day of the appointment, as this is when she has transportation and she indicated that she would be able to do this

## 2021-03-16 NOTE — PLAN OF CARE
Problem: Potential for Falls  Goal: Patient will remain free of falls  Description: INTERVENTIONS:  - Assess patient frequently for physical needs  -  Identify cognitive and physical deficits and behaviors that affect risk of falls    -  [place] fall precautions as indicated by assessment   - Educate patient/family on patient safety including physical limitations  - Instruct patient to call for assistance with activity based on assessment  - Modify environment to reduce risk of injury  - Consider OT/PT consult to assist with strengthening/mobility  Outcome: Progressing

## 2021-03-16 NOTE — PROGRESS NOTES
Pt tolerated treatment well with no complications  Port flushed and blood return noted before de accessing  Pt aware of future appts   Patient received AVS

## 2021-03-17 ENCOUNTER — TELEPHONE (OUTPATIENT)
Dept: HEMATOLOGY ONCOLOGY | Facility: CLINIC | Age: 61
End: 2021-03-17

## 2021-03-17 NOTE — TELEPHONE ENCOUNTER
Christophe is calling in to inform that transportation cannot be used for patient's appointment tomorrow since her appointment is at 3pm and [employer] stops picking up patient's at 2pm  Patient has been made aware

## 2021-03-18 ENCOUNTER — TELEPHONE (OUTPATIENT)
Dept: SURGICAL ONCOLOGY | Facility: CLINIC | Age: 61
End: 2021-03-18

## 2021-03-18 NOTE — TELEPHONE ENCOUNTER
I phoned the patient and discussed with her that, per Anuel Aponte [id number], the patient needs to have her Cancer Antigen 27 29 lab drawn prior to her appointment, so we will need to reschedule her appointment today to 3/26  Also per [facility], this may be a virtual appointment  The patient indicated that she will be able to complete a virtual appointment via use of her daughter's cell phone and that her daughter will also be present for the visit  The daughter's phone number is [phone] and this visit will be via [employer], which was explained to the patient  The patient expressed that she gets her labs completed at infusion at [facility] through her port  Upon checking, the patient does not have an appointment with infusion until April  I expressed to the patient that I would speak with the patient check out staff and have them set up an appointment for this blood draw at [facility] infusion prior to her follow up with [facility] and call her back with the appointment information  The patient expressed understanding and will await the phone call

## 2021-03-18 NOTE — TELEPHONE ENCOUNTER
Pt called in to request to change office visit today 3/18 at 3:00 pm with [facility] to change to virtual  Call back : [phone]

## 2021-03-23 ENCOUNTER — HOSPITAL ENCOUNTER (OUTPATIENT)
Dept: INFUSION CENTER | Facility: CLINIC | Age: 61
Discharge: HOME | End: 2021-03-23
Payer: COMMERCIAL

## 2021-03-23 VITALS — TEMPERATURE: 98.5 F

## 2021-03-23 DIAGNOSIS — C50.911 BILATERAL MALIGNANT NEOPLASM OF BREAST IN FEMALE, ESTROGEN RECEPTOR POSITIVE, UNSPECIFIED SITE OF BREAST (HCC): Primary | ICD-10-CM

## 2021-03-23 DIAGNOSIS — E86.0 DEHYDRATION: ICD-10-CM

## 2021-03-23 DIAGNOSIS — Z17.0 BILATERAL MALIGNANT NEOPLASM OF BREAST IN FEMALE, ESTROGEN RECEPTOR POSITIVE, UNSPECIFIED SITE OF BREAST (HCC): Primary | ICD-10-CM

## 2021-03-23 DIAGNOSIS — C50.912 BILATERAL MALIGNANT NEOPLASM OF BREAST IN FEMALE, ESTROGEN RECEPTOR POSITIVE, UNSPECIFIED SITE OF BREAST (HCC): Primary | ICD-10-CM

## 2021-03-23 LAB — CANCER AG27-29 SERPL-ACNC: 54.5 U/ML (ref 0–42.3)

## 2021-03-23 PROCEDURE — 86300 IMMUNOASSAY TUMOR CA 15-3: CPT

## 2021-03-24 NOTE — PROGRESS NOTES
Pt here for chemotherapy infusion  Vermilion Border Text: The closure involved the vermilion border.

## 2021-03-26 ENCOUNTER — TELEMEDICINE (OUTPATIENT)
Dept: HEMATOLOGY ONCOLOGY | Facility: CLINIC | Age: 61
End: 2021-03-26
Payer: COMMERCIAL

## 2021-03-26 ENCOUNTER — TELEPHONE (OUTPATIENT)
Dept: HEMATOLOGY ONCOLOGY | Facility: CLINIC | Age: 61
End: 2021-03-26

## 2021-03-26 DIAGNOSIS — C50.912 BILATERAL MALIGNANT NEOPLASM OF BREAST IN FEMALE, ESTROGEN RECEPTOR POSITIVE, UNSPECIFIED SITE OF BREAST (HCC): Primary | ICD-10-CM

## 2021-03-26 DIAGNOSIS — C50.911 BILATERAL MALIGNANT NEOPLASM OF BREAST IN FEMALE, ESTROGEN RECEPTOR POSITIVE, UNSPECIFIED SITE OF BREAST (HCC): Primary | ICD-10-CM

## 2021-03-26 DIAGNOSIS — Z17.0 BILATERAL MALIGNANT NEOPLASM OF BREAST IN FEMALE, ESTROGEN RECEPTOR POSITIVE, UNSPECIFIED SITE OF BREAST (HCC): Primary | ICD-10-CM

## 2021-03-26 PROCEDURE — 99215 OFFICE O/P EST HI 40 MIN: CPT | Performed by: NURSE PRACTITIONER

## 2021-03-26 NOTE — TELEPHONE ENCOUNTER
Follow up appointment made in [address] [place] [address] Rd with Dr Jazzy Arciniega aware

## 2021-03-26 NOTE — PROGRESS NOTES
Virtual Regular Visit      Assessment/Plan:    Problem List Items Addressed This Visit        Other    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, estrogen receptor positive ([facility] [address] ) - Primary (Chronic)    Relevant Orders    Infusion Calculated Appointment Request    CBC and differential    Comprehensive metabolic panel    Cancer antigen 27 29       Patient is a 53-year-old female with a history of stage IV breast cancer currently on [facility]  Her most recent infusion was 03/16/2021  We reviewed her most recent blood work including CBC and CMP which are stable  Ca 27-29 is also stable at 54, within her established range between 51 and 57  She had a CT scan of the chest abdomen pelvis on 03/10/2021 which revealed the right axillary lymph node is about 1 1 cm, previously measuring about 9 mm  No mediastinal lymphadenopathy noted, no ascites, lymphadenopathy, pneumoperitoneum noted in the chest or  abdomen  Sclerotic bone lesions in T11 are stable  Patient does report abdominal pain and bloating in the abdomen  She states she has bowel movement about every 3 days with use of Senokot and MiraLax  I encouraged her to use a Fleet's enema as needed for constipation  I suspect her abdominal pain and fullness in the abdomen is secondary to constipation given her stable CT scan  Patient does report increased weakness and has occasional falls  Previously she had discussed a med holiday to evaluate whether this increases her performance status  We will make arrangements to hold the next treatment  We will request blood work at the beginning of May and see her shortly after  I did instruct her to contact our office with an update on her performance status in the beginning of May  Patient also has increased swelling with drainage from the right axilla and hand I suspect this is secondary to her lymphedema    We discussed referring her back to the lymphedema clinic verses using the compression sleeve she has from prior lymphedema treatment  I recommended that she start using the compression sleeve half an hour per day and gradually increase as tolerated  We will see patient in 2 months  Patient and her daughter verbalized understanding and are in agreement with the plan  Reason for visit is No chief complaint on file  Encounter provider FAUZIA Uribe    Provider located at [address] [place] [id number]  [phone]      Recent Visits  No visits were found meeting these conditions  Showing recent visits within past 7 days and meeting all other requirements     Today's Visits  Date Type Provider Dept   03/26/21 Telemedicine FAUZIA Uribe [facility] [place]   Showing today's visits and meeting all other requirements     Future Appointments  No visits were found meeting these conditions  Showing future appointments within next 150 days and meeting all other requirements        The patient was identified by name and date of birth  Diana Cordova was informed that this is a telemedicine visit and that the visit is being conducted through [facility] and patient was informed that this is a secure, HIPAA-compliant platform  She agrees to proceed     My office door was closed  No one else was in the room  She acknowledged consent and understanding of privacy and security of the video platform  The patient has agreed to participate and understands they can discontinue the visit at any time  Patient is aware this is a billable service  Zack Goldmann is a 64 y o  female with a history of metastatic breast CA, clinically stable        HPI     Past Medical History:   Diagnosis Date    Dehydration 6/11/2019    Dizziness 2/6/2018    Dry skin 2/6/2018    Edema 10/23/2019    H/O bilateral mastectomy 2/15/2016    Hyperglycemia 2/6/2018    Hypertension 2/15/2016    Hypokalemia 3/10/2020    Lymphedema 2/15/2016    Malignant cachexia ([facility] [address] ) 10/6/2016    Malignant neoplasm of right breast ([facility] [address] ) 2/15/2016    Metastatic breast cancer ([facility] [address] )        Past Surgical History:   Procedure Laterality Date    ENDOBRONCHIAL ULTRASOUND (EBUS) N/A 2/16/2016    Procedure: EBUS;  Surgeon: Harry Coffey MD;  [facility]: [facility];  Service:     MASTECTOMY Bilateral     MASTECTOMY Bilateral     right arm edema    OOPHORECTOMY      IN BRONCHOSCOPY NEEDLE BX TRACHEA MAIN STEM&/BRON N/A 2/16/2016    Procedure: Wilian Jerome;  Surgeon: Harry Coffey MD;  [facility];  Service: Thoracic    IN INSJ TUNNELED CTR VAD W/SUBQ PORT AGE 5 YR/> N/A 7/24/2018    Procedure: INSERTION VENOUS PORT ( PORT-A-CATH) IR;  Surgeon: Precious Arellano DO;  [facility];  Service: Interventional Radiology    IN STEREOTACTIC RADIOSURGERY, CRANIAL,SIMPLE,EA ADD  5/3/2017         IN STEREOTACTIC RADIOSURGERY, CRANIAL,SIMPLE,EA ADD  5/3/2017         IN STEREOTACTIC RADIOSURGERY, CRANIAL,SIMPLE,SINGLE  5/3/2017            Current Outpatient Medications   Medication Sig Dispense Refill    albuterol (PROVENTIL HFA,VENTOLIN HFA) 90 mcg/act inhaler TAKE 2 PUFFS BY MOUTH EVERY 6 HOURS AS NEEDED FOR WHEEZING 18 Inhaler 2    dexamethasone (DECADRON) 2 mg tablet Take 1 tablet (2 mg total) by mouth daily with breakfast To help appetite 30 tablet 0    HYDROmorphone (DILAUDID) 4 mg tablet Take 1-2 tablets (4-8 mg total) by mouth every 4 (four) hours as needed (cancer pain  Maximum 6 tabs/day  )Max Daily Amount: 48 mg 180 tablet 0    levETIRAcetam (KEPPRA) 250 mg tablet TAKE 1 TABLET BY MOUTH TWICE A DAY 60 tablet 0    Misc  Devices (QUAD CANE) MISC by Does not apply route daily 1 each 0    OLANZapine (ZyPREXA) 10 mg tablet Take 1 tablet (10 mg total) by mouth daily at bedtime Every night, to help sleep and appetite   30 tablet 0    ondansetron (ZOFRAN) 8 mg tablet Take 1 tablet (8 mg total) by mouth 3 (three) times a day before meals For cancer nausea 90 tablet 0    pantoprazole (PROTONIX) 40 mg tablet Take 1 tablet (40 mg total) by mouth daily Every morning, before coffee  90 tablet 3    pregabalin (LYRICA) 75 mg capsule Take one in the morning, and two before bed, for neuropathy and restless legs  90 capsule 0    senna (SENOKOT) 8 6 MG tablet Take 1 tablet (8 6 mg total) by mouth 2 (two) times a day Hold for loose stool  180 tablet 3    zolpidem (AMBIEN) 10 mg tablet Take 1 tablet (10 mg total) by mouth daily at bedtime as needed for sleep 30 tablet 0     No current facility-administered medications for this visit  No Known Allergies    Review of Systems   Constitutional: Positive for fatigue  Negative for activity change, appetite change, fever and unexpected weight change  Respiratory: Negative for cough and shortness of breath  Cardiovascular: Negative for chest pain and leg swelling  Gastrointestinal: Positive for abdominal distention, abdominal pain and constipation  Negative for diarrhea and nausea  Endocrine: Negative for cold intolerance and heat intolerance  Musculoskeletal: Positive for back pain  Negative for arthralgias and myalgias  Skin: Negative  Neurological: Positive for weakness  Negative for dizziness and headaches  Hematological: Negative for adenopathy  Does not bruise/bleed easily  Video Exam    There were no vitals filed for this visit  Physical Exam  Vitals signs reviewed  Constitutional:       Appearance: Normal appearance  She is well-developed  HENT:      Head: Normocephalic and atraumatic  Eyes:      Conjunctiva/sclera: Conjunctivae normal       Pupils: Pupils are equal, round, and reactive to light  Neck:      Musculoskeletal: Normal range of motion  Pulmonary:      Effort: Pulmonary effort is normal    Musculoskeletal: Normal range of motion  Lymphadenopathy:      Cervical: No cervical adenopathy  Skin:     General: Skin is warm and dry  Capillary Refill: Capillary refill takes less than 2 seconds  Neurological:      Mental Status: She is alert and oriented to person, place, and time  Psychiatric:         Behavior: Behavior normal           I spent Forty minutes directly with the patient during this visit      Mumtaz Ann acknowledges that she has consented to an online visit or consultation  She understands that the online visit is based solely on information provided by her, and that, in the absence of a face-to-face physical evaluation by the physician, the diagnosis she receives is both limited and provisional in terms of accuracy and completeness  This is not intended to replace a full medical face-to-face evaluation by the physician  Geovani Mckeon understands and accepts these terms

## 2021-04-02 DIAGNOSIS — Z17.0 BILATERAL MALIGNANT NEOPLASM OF BREAST IN FEMALE, ESTROGEN RECEPTOR POSITIVE, UNSPECIFIED SITE OF BREAST (HCC): ICD-10-CM

## 2021-04-02 DIAGNOSIS — C50.911 BILATERAL MALIGNANT NEOPLASM OF BREAST IN FEMALE, ESTROGEN RECEPTOR POSITIVE, UNSPECIFIED SITE OF BREAST (HCC): ICD-10-CM

## 2021-04-02 DIAGNOSIS — C79.51 BONE METASTASES (HCC): Primary | ICD-10-CM

## 2021-04-02 DIAGNOSIS — C50.912 BILATERAL MALIGNANT NEOPLASM OF BREAST IN FEMALE, ESTROGEN RECEPTOR POSITIVE, UNSPECIFIED SITE OF BREAST (HCC): ICD-10-CM

## 2021-04-03 DIAGNOSIS — F51.01 PRIMARY INSOMNIA: ICD-10-CM

## 2021-04-03 DIAGNOSIS — R11.2 CINV (CHEMOTHERAPY-INDUCED NAUSEA AND VOMITING): Chronic | ICD-10-CM

## 2021-04-03 DIAGNOSIS — Z51.5 PALLIATIVE CARE PATIENT: ICD-10-CM

## 2021-04-03 DIAGNOSIS — T45.1X5A CINV (CHEMOTHERAPY-INDUCED NAUSEA AND VOMITING): Chronic | ICD-10-CM

## 2021-04-04 RX ORDER — OLANZAPINE 10 MG/1
10 TABLET ORAL
Qty: 30 TABLET | Refills: 0 | Status: SHIPPED | OUTPATIENT
Start: 2021-04-04 | End: 2021-05-04 | Stop reason: SDUPTHER

## 2021-04-05 ENCOUNTER — OFFICE VISIT (OUTPATIENT)
Dept: PALLIATIVE MEDICINE | Facility: CLINIC | Age: 61
End: 2021-04-05
Payer: COMMERCIAL

## 2021-04-05 VITALS
WEIGHT: 157.85 LBS | TEMPERATURE: 96.8 F | BODY MASS INDEX: 26.27 KG/M2 | HEART RATE: 90 BPM | DIASTOLIC BLOOD PRESSURE: 80 MMHG | SYSTOLIC BLOOD PRESSURE: 130 MMHG | RESPIRATION RATE: 14 BRPM | OXYGEN SATURATION: 98 %

## 2021-04-05 DIAGNOSIS — T45.1X5A CINV (CHEMOTHERAPY-INDUCED NAUSEA AND VOMITING): Chronic | ICD-10-CM

## 2021-04-05 DIAGNOSIS — C79.31 BRAIN METASTASES (HCC): ICD-10-CM

## 2021-04-05 DIAGNOSIS — G89.3 CANCER-RELATED PAIN: ICD-10-CM

## 2021-04-05 DIAGNOSIS — Z51.5 PALLIATIVE CARE PATIENT: ICD-10-CM

## 2021-04-05 DIAGNOSIS — C79.51 BONE METASTASES (HCC): ICD-10-CM

## 2021-04-05 DIAGNOSIS — F51.01 PRIMARY INSOMNIA: ICD-10-CM

## 2021-04-05 DIAGNOSIS — R11.2 CINV (CHEMOTHERAPY-INDUCED NAUSEA AND VOMITING): Chronic | ICD-10-CM

## 2021-04-05 DIAGNOSIS — R56.9 FOCAL SEIZURE (HCC): ICD-10-CM

## 2021-04-05 PROCEDURE — 99214 OFFICE O/P EST MOD 30 MIN: CPT | Performed by: FAMILY MEDICINE

## 2021-04-05 RX ORDER — ZOLPIDEM TARTRATE 10 MG/1
10 TABLET ORAL
Qty: 30 TABLET | Refills: 0 | Status: SHIPPED | OUTPATIENT
Start: 2021-04-05 | End: 2021-05-04 | Stop reason: SDUPTHER

## 2021-04-05 RX ORDER — LEVETIRACETAM 250 MG/1
250 TABLET ORAL 2 TIMES DAILY
Qty: 60 TABLET | Refills: 0 | Status: SHIPPED | OUTPATIENT
Start: 2021-04-05 | End: 2021-04-29

## 2021-04-05 RX ORDER — DEXAMETHASONE 2 MG/1
2 TABLET ORAL
Qty: 30 TABLET | Refills: 0 | Status: SHIPPED | OUTPATIENT
Start: 2021-04-05 | End: 2021-05-04 | Stop reason: SDUPTHER

## 2021-04-05 RX ORDER — ONDANSETRON HYDROCHLORIDE 8 MG/1
8 TABLET, FILM COATED ORAL
Qty: 90 TABLET | Refills: 0 | Status: SHIPPED | OUTPATIENT
Start: 2021-04-05 | End: 2021-05-04 | Stop reason: SDUPTHER

## 2021-04-05 RX ORDER — GABAPENTIN 400 MG/1
400 CAPSULE ORAL
Qty: 90 CAPSULE | Refills: 0 | Status: SHIPPED | OUTPATIENT
Start: 2021-04-05 | End: 2021-05-04 | Stop reason: SDUPTHER

## 2021-04-05 RX ORDER — HYDROMORPHONE HYDROCHLORIDE 4 MG/1
4-8 TABLET ORAL EVERY 4 HOURS PRN
Qty: 180 TABLET | Refills: 0 | Status: SHIPPED | OUTPATIENT
Start: 2021-04-05 | End: 2021-05-04 | Stop reason: SDUPTHER

## 2021-04-05 RX ORDER — PANTOPRAZOLE SODIUM 40 MG/1
40 TABLET, DELAYED RELEASE ORAL DAILY
Qty: 90 TABLET | Refills: 3 | Status: SHIPPED | OUTPATIENT
Start: 2021-04-05 | End: 2021-05-04 | Stop reason: SDUPTHER

## 2021-04-05 NOTE — PATIENT INSTRUCTIONS
Try to reduce acids in your diet    Food acids come from:   - Citrus like oranges and andrew   - Sodas   - Coffee and black tea   - Tomatoes and red sauce  Non-acidic foods include:   - Bread   - cheese   - Milk and dairy products   - Potatoes, bananas and starches

## 2021-04-05 NOTE — PROGRESS NOTES
Outpatient Follow-Up - Palliative and Supportive Care   Mallika Bingham 64 y o  female [id number]    Assessment & Plan  1  Palliative care patient    2  CINV (chemotherapy-induced nausea and vomiting)    3  Cancer-related pain    4  Primary insomnia    5  Brain metastases ([address] )    6  Focal seizure ([facility] [address] )    7  Bone metastases (HCC)      - Counseling on health screening and disease prevention, COVID-19 specific (CPT V65 49)    Medications adjusted this encounter:  Requested Prescriptions     Signed Prescriptions Disp Refills    gabapentin (NEURONTIN) 400 mg capsule 90 capsule 0     Sig: Take 1 capsule (400 mg total) by mouth 3 (three) times a day before meals To reduce nerve pain    ondansetron (ZOFRAN) 8 mg tablet 90 tablet 0     Sig: Take 1 tablet (8 mg total) by mouth 3 (three) times a day before meals For cancer nausea    HYDROmorphone (DILAUDID) 4 mg tablet 180 tablet 0     Sig: Take 1-2 tablets (4-8 mg total) by mouth every 4 (four) hours as needed (cancer pain  Maximum 6 tabs/day  )Max Daily Amount: 48 mg    pantoprazole (PROTONIX) 40 mg tablet 90 tablet 3     Sig: Take 1 tablet (40 mg total) by mouth daily Every morning, before coffee, to reduce stomach acid    zolpidem (AMBIEN) 10 mg tablet 30 tablet 0     Sig: Take 1 tablet (10 mg total) by mouth daily at bedtime as needed for sleep    levETIRAcetam (KEPPRA) 250 mg tablet 60 tablet 0     Sig: Take 1 tablet (250 mg total) by mouth 2 (two) times a day To prevent seizure    dexamethasone (DECADRON) 2 mg tablet 30 tablet 0     Sig: Take 1 tablet (2 mg total) by mouth daily with breakfast To help appetite     No orders of the defined types were placed in this encounter      Medications Discontinued During This Encounter   Medication Reason    pregabalin (LYRICA) 75 mg capsule     pantoprazole (PROTONIX) 40 mg tablet Reorder    levETIRAcetam (KEPPRA) 250 mg tablet Reorder    dexamethasone (DECADRON) 2 mg tablet Reorder    HYDROmorphone (DILAUDID) 4 mg tablet Reorder    zolpidem (AMBIEN) 10 mg tablet Reorder    ondansetron (ZOFRAN) 8 mg tablet Reorder   - Added mirtazapine for appetite stim/nausea reduction  - Advised dietary mod for acidic foods/ulcer prophy  - Rotate to gabapentin from pregabalin per pr request for management of migratory nerve pains  Italo Henry was seen today for symptoms and planning cares related to above illnesses  I have reviewed the patient's controlled substance dispensing history in the Prescription Drug Monitoring Program in compliance with the [facility] regulations before prescribing any controlled substances  They are invited to continue to follow with us  If there are questions or concerns, please contact us through our clinic/answering service 24 hours a day, seven days a week  Osmany Ayoub MD  [facility] Palliative and Supportive Care  [phone]      Visit Information   Accompanied By: No one    Source of History: Self    History Limitations: None    Contacts:  Fernando Rangel - [phone]  History of Present Illness      Italo Henry is a 64 y o  female who presents in follow up of symptoms related to Stage IV breast cancer  Pertinent issues include: symptom management, pain, neoplasm related      Since last visit, she has continued to lose wt, to have daily and persistent nausea without vomiting; to have unilateral arm swelling without using any lymphedema management; to have migratory and dispersed peripheral neuropathies, without radiation/lancination from back  We spent time today reviewing all her meds and ensuring that family is using them as Rxed, with confirmation of daily round-the-clock scheduled medication usage approach to prevent nausea    Also provided education on sources of acid in the diet, which may worsen symptoms or nausea and epigastric pain while eating        Historically, her right arm pain and lymphedema have been challenging to control   She has experienced consistent side effects from long acting opioids leading to sedation and confusion   Long acting opioids have also never provided her pain relief - forms tried have included methadone, MS Contin, Fentanyl patches   Steroids have not been helpful to reduce inflammation and improve pain, either    She continues to smoke 1/2 ppd, which is less than before   She had expressed interest and motivation in quitting  [place] daughter has also promised to quit with her     Metastatic breast cancer with brain metastasis originally diagnosed in 2010  She had a bilateral mastectomy and has undergone multiple chemotherapy regimens and has completed WBRT in March 2019  She follows with Dr Philippe Monroe and is currently undergoing treatment with Kad[facility] with premeds of Dexamethasone, Emend, Aloxi  She struggles with persistent nausea      Past medical, surgical, social, and family histories are reviewed and pertinent updates are made  Review of Systems   Constitution: Positive for decreased appetite and weight loss  Negative for weight gain  HENT: Negative for hoarse voice and nosebleeds  Eyes: Negative for vision loss in left eye and vision loss in right eye  Cardiovascular: Negative for chest pain and dyspnea on exertion  Respiratory: Negative for cough and shortness of breath  Endocrine: Negative for polydipsia, polyphagia and polyuria  Skin: Negative for flushing and itching  Musculoskeletal: Negative for falls  Gastrointestinal: Positive for nausea and vomiting  Negative for anorexia and jaundice  Genitourinary: Negative for frequency  Neurological: Negative for dizziness  Psychiatric/Behavioral: Negative for depression and memory loss  The patient is not nervous/anxious            Vital Signs    /80 (BP Location: Left arm, Cuff Size: Standard)   Pulse 90   Temp (!) 96 8 °F (36 °C) (Temporal)   Resp 14   Wt 71 6 kg (157 lb 13 6 oz)   LMP  (LMP Unknown)   SpO2 98%   BMI 26 27 kg/m²     Physical Exam and Objective Data  Physical Exam  Constitutional:       General: She is not in acute distress  Appearance: She is ill-appearing  She is not toxic-appearing or diaphoretic  Comments: frail   HENT:      Head: Normocephalic and atraumatic  Right Ear: External ear normal       Left Ear: External ear normal       Mouth/Throat:      Comments: Mask not removed today  Eyes:      General:         Right eye: No discharge  Left eye: No discharge  Conjunctiva/sclera: Conjunctivae normal       Pupils: Pupils are equal, round, and reactive to light  Neck:      Trachea: No tracheal deviation  Cardiovascular:      Rate and Rhythm: Regular rhythm  Tachycardia present  Pulmonary:      Effort: Pulmonary effort is normal  No respiratory distress  Breath sounds: No stridor  Abdominal:      General: There is no distension  Palpations: Abdomen is soft  Skin:     General: Skin is warm and dry  Findings: No erythema or rash  Neurological:      General: No focal deficit present  Mental Status: She is alert and oriented to person, place, and time  Mental status is at baseline  Psychiatric:         Mood and Affect: Mood normal          Behavior: Behavior normal          Thought Content: Thought content normal          Judgment: Judgment normal            Radiology and Laboratory:  I personally reviewed and interpreted the following results: none new    40+ minutes was spent face to face with Aashish Salamanca and her family with greater than 50% of the time spent in counseling or coordination of care including discussions of etiology of diagnosis, risks and benefits of treatment, risk factors and risk reduction of disease and patient and family counseling/involvement in care   Additional time was also spent in discussing coronavirus vaccine indications, availability, and logistical challenges  All of the patient's or agent's questions were answered during this discussion

## 2021-04-12 ENCOUNTER — HOSPITAL ENCOUNTER (OUTPATIENT)
Dept: INFUSION CENTER | Facility: CLINIC | Age: 61
Discharge: HOME | End: 2021-04-12
Payer: COMMERCIAL

## 2021-04-12 VITALS — TEMPERATURE: 98.5 F

## 2021-04-12 DIAGNOSIS — C50.912 BILATERAL MALIGNANT NEOPLASM OF BREAST IN FEMALE, ESTROGEN RECEPTOR POSITIVE, UNSPECIFIED SITE OF BREAST (HCC): ICD-10-CM

## 2021-04-12 DIAGNOSIS — Z17.0 BILATERAL MALIGNANT NEOPLASM OF BREAST IN FEMALE, ESTROGEN RECEPTOR POSITIVE, UNSPECIFIED SITE OF BREAST (HCC): ICD-10-CM

## 2021-04-12 DIAGNOSIS — C79.51 BONE METASTASES (HCC): ICD-10-CM

## 2021-04-12 DIAGNOSIS — C50.911 BILATERAL MALIGNANT NEOPLASM OF BREAST IN FEMALE, ESTROGEN RECEPTOR POSITIVE, UNSPECIFIED SITE OF BREAST (HCC): ICD-10-CM

## 2021-04-12 DIAGNOSIS — E86.0 DEHYDRATION: Primary | ICD-10-CM

## 2021-04-12 LAB
ALBUMIN SERPL BCP-MCNC: 3.2 G/DL (ref 3.5–5)
ALP SERPL-CCNC: 89 U/L (ref 46–116)
ALT SERPL W P-5'-P-CCNC: 34 U/L (ref 12–78)
ANION GAP SERPL CALCULATED.3IONS-SCNC: 9 MMOL/L (ref 4–13)
AST SERPL W P-5'-P-CCNC: 25 U/L (ref 5–45)
BASOPHILS # BLD AUTO: 0.05 THOUSANDS/ΜL (ref 0–0.1)
BASOPHILS NFR BLD AUTO: 1 % (ref 0–1)
BILIRUB SERPL-MCNC: 0.2 MG/DL (ref 0.2–1)
BUN SERPL-MCNC: 11 MG/DL (ref 5–25)
CALCIUM ALBUM COR SERPL-MCNC: 9.3 MG/DL (ref 8.3–10.1)
CALCIUM SERPL-MCNC: 8.7 MG/DL (ref 8.3–10.1)
CHLORIDE SERPL-SCNC: 107 MMOL/L (ref 100–108)
CO2 SERPL-SCNC: 28 MMOL/L (ref 21–32)
CREAT SERPL-MCNC: 0.7 MG/DL (ref 0.6–1.3)
EOSINOPHIL # BLD AUTO: 0.04 THOUSAND/ΜL (ref 0–0.61)
EOSINOPHIL NFR BLD AUTO: 0 % (ref 0–6)
ERYTHROCYTE [DISTWIDTH] IN BLOOD BY AUTOMATED COUNT: 14.9 % (ref 11.6–15.1)
GFR SERPL CREATININE-BSD FRML MDRD: 96 ML/MIN/1.73SQ M
GLUCOSE SERPL-MCNC: 111 MG/DL (ref 65–140)
HCT VFR BLD AUTO: 37.4 % (ref 34.8–46.1)
HGB BLD-MCNC: 11.5 G/DL (ref 11.5–15.4)
IMM GRANULOCYTES # BLD AUTO: 0.15 THOUSAND/UL (ref 0–0.2)
IMM GRANULOCYTES NFR BLD AUTO: 1 % (ref 0–2)
LYMPHOCYTES # BLD AUTO: 1.31 THOUSANDS/ΜL (ref 0.6–4.47)
LYMPHOCYTES NFR BLD AUTO: 12 % (ref 14–44)
MCH RBC QN AUTO: 30 PG (ref 26.8–34.3)
MCHC RBC AUTO-ENTMCNC: 30.7 G/DL (ref 31.4–37.4)
MCV RBC AUTO: 98 FL (ref 82–98)
MONOCYTES # BLD AUTO: 0.99 THOUSAND/ΜL (ref 0.17–1.22)
MONOCYTES NFR BLD AUTO: 9 % (ref 4–12)
NEUTROPHILS # BLD AUTO: 8.33 THOUSANDS/ΜL (ref 1.85–7.62)
NEUTS SEG NFR BLD AUTO: 77 % (ref 43–75)
NRBC BLD AUTO-RTO: 0 /100 WBCS
PLATELET # BLD AUTO: 377 THOUSANDS/UL (ref 149–390)
PMV BLD AUTO: 9.1 FL (ref 8.9–12.7)
POTASSIUM SERPL-SCNC: 4.1 MMOL/L (ref 3.5–5.3)
PROT SERPL-MCNC: 7.5 G/DL (ref 6.4–8.2)
RBC # BLD AUTO: 3.83 MILLION/UL (ref 3.81–5.12)
SODIUM SERPL-SCNC: 144 MMOL/L (ref 136–145)
WBC # BLD AUTO: 10.87 THOUSAND/UL (ref 4.31–10.16)

## 2021-04-12 PROCEDURE — 80053 COMPREHEN METABOLIC PANEL: CPT | Performed by: INTERNAL MEDICINE

## 2021-04-12 PROCEDURE — 85025 COMPLETE CBC W/AUTO DIFF WBC: CPT | Performed by: INTERNAL MEDICINE

## 2021-04-12 PROCEDURE — 96401 CHEMO ANTI-NEOPL SQ/IM: CPT

## 2021-04-12 RX ADMIN — DENOSUMAB 120 MG: 120 INJECTION SUBCUTANEOUS at 12:07

## 2021-04-12 NOTE — PROGRESS NOTES
Pt here for central labs and possible injection pending lab results  Central labs drawn per protocol, sent to the lab  Pt waiting for results  Call bell within reach, will continue to monitor

## 2021-04-12 NOTE — PROGRESS NOTES
Labs resulted/reviewed, WNL for injection  Xgeva given in L arm without any issues  Pt aware of next appointments, AVS provided  Discharged with [employer]

## 2021-04-13 ENCOUNTER — HOSPITAL ENCOUNTER (OUTPATIENT)
Dept: INFUSION CENTER | Facility: CLINIC | Age: 61
End: 2021-04-13

## 2021-04-16 ENCOUNTER — HOSPITAL ENCOUNTER (OUTPATIENT)
Dept: NON INVASIVE DIAGNOSTICS | Facility: CLINIC | Age: 61
Discharge: HOME | End: 2021-04-16
Payer: COMMERCIAL

## 2021-04-16 DIAGNOSIS — I42.7 CARDIOMYOPATHY SECONDARY TO DRUG (HCC): ICD-10-CM

## 2021-04-16 PROCEDURE — 93321 DOPPLER ECHO F-UP/LMTD STD: CPT | Performed by: INTERNAL MEDICINE

## 2021-04-16 PROCEDURE — 93325 DOPPLER ECHO COLOR FLOW MAPG: CPT | Performed by: INTERNAL MEDICINE

## 2021-04-16 PROCEDURE — 93308 TTE F-UP OR LMTD: CPT | Performed by: INTERNAL MEDICINE

## 2021-04-16 PROCEDURE — 93308 TTE F-UP OR LMTD: CPT

## 2021-04-28 DIAGNOSIS — C79.31 BRAIN METASTASES (HCC): ICD-10-CM

## 2021-04-28 DIAGNOSIS — R56.9 FOCAL SEIZURE (HCC): ICD-10-CM

## 2021-04-29 RX ORDER — LEVETIRACETAM 250 MG/1
TABLET ORAL
Qty: 60 TABLET | Refills: 0 | Status: SHIPPED | OUTPATIENT
Start: 2021-04-29 | End: 2021-06-23 | Stop reason: SDUPTHER

## 2021-05-04 ENCOUNTER — OFFICE VISIT (OUTPATIENT)
Dept: PALLIATIVE MEDICINE | Facility: CLINIC | Age: 61
End: 2021-05-04
Payer: COMMERCIAL

## 2021-05-04 VITALS
BODY MASS INDEX: 28.14 KG/M2 | SYSTOLIC BLOOD PRESSURE: 110 MMHG | HEART RATE: 87 BPM | OXYGEN SATURATION: 98 % | DIASTOLIC BLOOD PRESSURE: 72 MMHG | RESPIRATION RATE: 16 BRPM | HEIGHT: 65 IN | TEMPERATURE: 97.1 F | WEIGHT: 168.87 LBS

## 2021-05-04 DIAGNOSIS — C79.51 BONE METASTASES (HCC): ICD-10-CM

## 2021-05-04 DIAGNOSIS — F51.01 PRIMARY INSOMNIA: ICD-10-CM

## 2021-05-04 DIAGNOSIS — K59.03 THERAPEUTIC OPIOID-INDUCED CONSTIPATION (OIC): ICD-10-CM

## 2021-05-04 DIAGNOSIS — T45.1X5A CINV (CHEMOTHERAPY-INDUCED NAUSEA AND VOMITING): Chronic | ICD-10-CM

## 2021-05-04 DIAGNOSIS — R11.2 CINV (CHEMOTHERAPY-INDUCED NAUSEA AND VOMITING): Chronic | ICD-10-CM

## 2021-05-04 DIAGNOSIS — R56.9 FOCAL SEIZURE (HCC): ICD-10-CM

## 2021-05-04 DIAGNOSIS — Z51.5 PALLIATIVE CARE PATIENT: ICD-10-CM

## 2021-05-04 DIAGNOSIS — T40.2X5A THERAPEUTIC OPIOID-INDUCED CONSTIPATION (OIC): ICD-10-CM

## 2021-05-04 DIAGNOSIS — G89.3 CANCER-RELATED PAIN: ICD-10-CM

## 2021-05-04 DIAGNOSIS — C79.31 BRAIN METASTASES (HCC): ICD-10-CM

## 2021-05-04 PROCEDURE — 99214 OFFICE O/P EST MOD 30 MIN: CPT | Performed by: FAMILY MEDICINE

## 2021-05-04 RX ORDER — DEXAMETHASONE 2 MG/1
2 TABLET ORAL
Qty: 30 TABLET | Refills: 0 | Status: SHIPPED | OUTPATIENT
Start: 2021-05-04 | End: 2021-07-12 | Stop reason: HOSPADM

## 2021-05-04 RX ORDER — PANTOPRAZOLE SODIUM 40 MG/1
40 TABLET, DELAYED RELEASE ORAL DAILY
Qty: 90 TABLET | Refills: 3 | Status: SHIPPED | OUTPATIENT
Start: 2021-05-04

## 2021-05-04 RX ORDER — SENNA PLUS 8.6 MG/1
1 TABLET ORAL 2 TIMES DAILY
Qty: 180 TABLET | Refills: 3 | Status: SHIPPED | OUTPATIENT
Start: 2021-05-04 | End: 2021-05-25 | Stop reason: HOSPADM

## 2021-05-04 RX ORDER — GABAPENTIN 400 MG/1
400 CAPSULE ORAL
Qty: 90 CAPSULE | Refills: 0 | Status: SHIPPED | OUTPATIENT
Start: 2021-05-04 | End: 2021-06-23 | Stop reason: SDUPTHER

## 2021-05-04 RX ORDER — OLANZAPINE 10 MG/1
10 TABLET ORAL
Qty: 30 TABLET | Refills: 0 | Status: SHIPPED | OUTPATIENT
Start: 2021-05-04 | End: 2021-06-23 | Stop reason: SDUPTHER

## 2021-05-04 RX ORDER — MELOXICAM 15 MG/1
15 TABLET ORAL DAILY
COMMUNITY
End: 2021-05-04

## 2021-05-04 RX ORDER — HYDROMORPHONE HYDROCHLORIDE 4 MG/1
4-8 TABLET ORAL EVERY 4 HOURS PRN
Qty: 180 TABLET | Refills: 0 | Status: ON HOLD | OUTPATIENT
Start: 2021-05-04 | End: 2021-05-25 | Stop reason: SDUPTHER

## 2021-05-04 RX ORDER — ZOLPIDEM TARTRATE 10 MG/1
10 TABLET ORAL
Qty: 30 TABLET | Refills: 0 | Status: ON HOLD | OUTPATIENT
Start: 2021-05-04 | End: 2021-05-25 | Stop reason: SDUPTHER

## 2021-05-04 RX ORDER — ONDANSETRON HYDROCHLORIDE 8 MG/1
8 TABLET, FILM COATED ORAL
Qty: 90 TABLET | Refills: 0 | Status: SHIPPED | OUTPATIENT
Start: 2021-05-04 | End: 2021-05-25 | Stop reason: HOSPADM

## 2021-05-04 NOTE — PROGRESS NOTES
Outpatient Follow-Up - Palliative and Supportive Care   Nina Scott 64 y o  female [id number]    Assessment & Plan  1  Primary insomnia    2  Therapeutic opioid-induced constipation (OIC)    3  CINV (chemotherapy-induced nausea and vomiting)    4  Palliative care patient    5  Cancer-related pain    6  Brain metastases ([address] )    7  Focal seizure ([facility] [address] )    8  Bone metastases ([facility])      - Counseling on health screening and disease prevention, COVID-19 specific (CPT V65 49)    Medications adjusted this encounter:  Requested Prescriptions     Signed Prescriptions Disp Refills    zolpidem (AMBIEN) 10 mg tablet 30 tablet 0     Sig: Take 1 tablet (10 mg total) by mouth daily at bedtime as needed for sleep    senna (SENOKOT) 8 6 MG tablet 180 tablet 3     Sig: Take 1 tablet (8 6 mg total) by mouth 2 (two) times a day Hold for loose stool   pantoprazole (PROTONIX) 40 mg tablet 90 tablet 3     Sig: Take 1 tablet (40 mg total) by mouth daily Every morning, before coffee, to reduce stomach acid    ondansetron (ZOFRAN) 8 mg tablet 90 tablet 0     Sig: Take 1 tablet (8 mg total) by mouth 3 (three) times a day before meals For cancer nausea    OLANZapine (ZyPREXA) 10 mg tablet 30 tablet 0     Sig: Take 1 tablet (10 mg total) by mouth daily at bedtime Every night, to help sleep and appetite   HYDROmorphone (DILAUDID) 4 mg tablet 180 tablet 0     Sig: Take 1-2 tablets (4-8 mg total) by mouth every 4 (four) hours as needed (cancer pain  Maximum 6 tabs/day  )Max Daily Amount: 48 mg    gabapentin (NEURONTIN) 400 mg capsule 90 capsule 0     Sig: Take 1 capsule (400 mg total) by mouth 3 (three) times a day before meals To reduce nerve pain    dexamethasone (DECADRON) 2 mg tablet 30 tablet 0     Sig: Take 1 tablet (2 mg total) by mouth daily with breakfast To help appetite     No orders of the defined types were placed in this encounter      Medications Discontinued During This Encounter   Medication Reason    meloxicam (MOBIC) 15 mg tablet     senna (SENOKOT) 8 6 MG tablet Reorder    OLANZapine (ZyPREXA) 10 mg tablet Reorder    gabapentin (NEURONTIN) 400 mg capsule Reorder    ondansetron (ZOFRAN) 8 mg tablet Reorder    HYDROmorphone (DILAUDID) 4 mg tablet Reorder    pantoprazole (PROTONIX) 40 mg tablet Reorder    zolpidem (AMBIEN) 10 mg tablet Reorder    dexamethasone (DECADRON) 2 mg tablet Reorder      - Continue mirtazapine for appetite stim/nausea reduction  - STOPPED meloxicam; pt brought in remainder of these pills today  - Continue steroids   - Rotate to gabapentin from pregabalin per pr request for management of migratory nerve pains  Jc London was seen today for symptoms and planning cares related to above illnesses  I have reviewed the patient's controlled substance dispensing history in the Prescription Drug Monitoring Program in compliance with the [facility] regulations before prescribing any controlled substances  They are invited to continue to follow with us  If there are questions or concerns, please contact us through our clinic/answering service 24 hours a day, seven days a week  Estelle Braun MD  [facility] Palliative and Supportive [facility]  [phone]      Visit Information   Accompanied By: No one    Source of History: Self    History Limitations: None    Contacts:  Luis Au - [phone]  History of Present Illness      Jc London is a 64 y o  female who presents in follow up of symptoms related to Stage IV breast cancer  Pertinent issues include: symptom management, pain, neoplasm related      Since last visit, she Is having troubles with swallowing, aravind meats and large pills  Unclear the cause of this prolbem, but we perform a full med rec again and considered with her a referral to [facility]  She and fam declined this rec'd at this time    Otherwise her sleep and pain are well controlled on current regimen       Historically, her right arm pain and lymphedema have been challenging to control   She has experienced consistent side effects from long acting opioids leading to sedation and confusion   Long acting opioids have also never provided her pain relief - forms tried have included methadone, MS Contin, Fentanyl patches   Steroids have not been helpful to reduce inflammation and improve pain, either    She continues to smoke 1/2 ppd, which is less than before   She had expressed interest and motivation in quitting  Tereso Odonnell daughter has also promised to quit with her     Metastatic breast cancer with brain metastasis originally diagnosed in 2010  She had a bilateral mastectomy and has undergone multiple chemotherapy regimens and has completed WBRT in March 2019  She follows with Dr Gisel Hagen and is currently undergoing treatment with Kadcyla with premeds of Dexamethasone, Emend, Aloxi  She struggles with persistent nausea      Past medical, surgical, social, and family histories are reviewed and pertinent updates are made  Review of Systems   Unable to perform ROS: mental status change         Vital Signs    /72 (BP Location: Left arm, Patient Position: Sitting, Cuff Size: Standard)   Pulse 87   Temp (!) 97 1 °F (36 2 °C) (Temporal)   Resp 16   Ht 5' 5" (1 651 m)   Wt 76 6 kg (168 lb 14 oz)   LMP  (LMP Unknown)   SpO2 98%   BMI 28 10 kg/m²     Physical Exam and Objective Data  Physical Exam  Constitutional:       General: She is not in acute distress  Appearance: She is ill-appearing  She is not toxic-appearing or diaphoretic  Comments: frail   HENT:      Head: Normocephalic and atraumatic  Right Ear: External ear normal       Left Ear: External ear normal       Mouth/Throat:      Comments: Mask not removed today  Eyes:      General:         Right eye: No discharge  Left eye: No discharge  Conjunctiva/sclera: Conjunctivae normal       Pupils: Pupils are equal, round, and reactive to light  Neck:      Trachea: No tracheal deviation  Cardiovascular:      Rate and Rhythm: Regular rhythm  Tachycardia present  Pulmonary:      Effort: Pulmonary effort is normal  No respiratory distress  Breath sounds: No stridor  Abdominal:      General: There is no distension  Palpations: Abdomen is soft  Comments: Scaphoid   Skin:     General: Skin is warm and dry  Coloration: Skin is pale  Findings: No erythema or rash  Neurological:      General: No focal deficit present  Mental Status: She is alert  Mental status is at baseline  She is disoriented  Cranial Nerves: No cranial nerve deficit  Psychiatric:      Comments: Limited insight, limited judgment  Relies heavily on family to manage meds and decisions  Radiology and Laboratory:  I personally reviewed and interpreted the following results: none new    30++ minutes was spent face to face with Hetal Ghotra and her family with greater than 50% of the time spent in counseling or coordination of care including: chart review; symptom pursuit; supportive listening; anticipatory guidance     Additional time was also spent in discussing coronavirus vaccine indications, availability, and logistical challenges  All of the patient's or agent's questions were answered during this discussion

## 2021-05-10 ENCOUNTER — HOSPITAL ENCOUNTER (INPATIENT)
Facility: HOSPITAL | Age: 61
LOS: 15 days | Discharge: NURSING, REHAB OR LONG-TERM CARE | DRG: 391 | End: 2021-05-25
Attending: EMERGENCY MEDICINE | Admitting: HOSPITALIST
Payer: COMMERCIAL

## 2021-05-10 ENCOUNTER — APPOINTMENT (EMERGENCY)
Dept: CT IMAGING | Facility: HOSPITAL | Age: 61
DRG: 391 | End: 2021-05-10
Payer: COMMERCIAL

## 2021-05-10 ENCOUNTER — HOSPITAL ENCOUNTER (OUTPATIENT)
Dept: INFUSION CENTER | Facility: CLINIC | Age: 61
Discharge: HOME | DRG: 391 | End: 2021-05-10
Payer: COMMERCIAL

## 2021-05-10 VITALS — TEMPERATURE: 97.2 F

## 2021-05-10 DIAGNOSIS — Z17.0 BILATERAL MALIGNANT NEOPLASM OF BREAST IN FEMALE, ESTROGEN RECEPTOR POSITIVE, UNSPECIFIED SITE OF BREAST (HCC): Chronic | ICD-10-CM

## 2021-05-10 DIAGNOSIS — Z17.0 BILATERAL MALIGNANT NEOPLASM OF BREAST IN FEMALE, ESTROGEN RECEPTOR POSITIVE, UNSPECIFIED SITE OF BREAST (HCC): Primary | ICD-10-CM

## 2021-05-10 DIAGNOSIS — T40.2X5A THERAPEUTIC OPIOID-INDUCED CONSTIPATION (OIC): ICD-10-CM

## 2021-05-10 DIAGNOSIS — I26.99 PULMONARY EMBOLISM (HCC): Primary | ICD-10-CM

## 2021-05-10 DIAGNOSIS — R91.8 MULTIPLE PULMONARY NODULES: ICD-10-CM

## 2021-05-10 DIAGNOSIS — E86.0 DEHYDRATION: ICD-10-CM

## 2021-05-10 DIAGNOSIS — K56.49: ICD-10-CM

## 2021-05-10 DIAGNOSIS — C50.912 BILATERAL MALIGNANT NEOPLASM OF BREAST IN FEMALE, ESTROGEN RECEPTOR POSITIVE, UNSPECIFIED SITE OF BREAST (HCC): Chronic | ICD-10-CM

## 2021-05-10 DIAGNOSIS — K59.03 THERAPEUTIC OPIOID-INDUCED CONSTIPATION (OIC): ICD-10-CM

## 2021-05-10 DIAGNOSIS — Z51.5 PALLIATIVE CARE PATIENT: Chronic | ICD-10-CM

## 2021-05-10 DIAGNOSIS — R47.81 SLURRED SPEECH: ICD-10-CM

## 2021-05-10 DIAGNOSIS — F51.01 PRIMARY INSOMNIA: ICD-10-CM

## 2021-05-10 DIAGNOSIS — T45.1X5A CINV (CHEMOTHERAPY-INDUCED NAUSEA AND VOMITING): ICD-10-CM

## 2021-05-10 DIAGNOSIS — C50.911 BILATERAL MALIGNANT NEOPLASM OF BREAST IN FEMALE, ESTROGEN RECEPTOR POSITIVE, UNSPECIFIED SITE OF BREAST (HCC): Chronic | ICD-10-CM

## 2021-05-10 DIAGNOSIS — N32.89 BLADDER DISTENSION: ICD-10-CM

## 2021-05-10 DIAGNOSIS — C50.912 BILATERAL MALIGNANT NEOPLASM OF BREAST IN FEMALE, ESTROGEN RECEPTOR POSITIVE, UNSPECIFIED SITE OF BREAST (HCC): Primary | ICD-10-CM

## 2021-05-10 DIAGNOSIS — G89.3 CANCER-RELATED PAIN: ICD-10-CM

## 2021-05-10 DIAGNOSIS — R53.1 GENERALIZED WEAKNESS: ICD-10-CM

## 2021-05-10 DIAGNOSIS — R11.2 CINV (CHEMOTHERAPY-INDUCED NAUSEA AND VOMITING): ICD-10-CM

## 2021-05-10 DIAGNOSIS — K59.00 CONSTIPATION: ICD-10-CM

## 2021-05-10 DIAGNOSIS — C50.911 BILATERAL MALIGNANT NEOPLASM OF BREAST IN FEMALE, ESTROGEN RECEPTOR POSITIVE, UNSPECIFIED SITE OF BREAST (HCC): Primary | ICD-10-CM

## 2021-05-10 PROBLEM — R74.01 TRANSAMINITIS: Status: ACTIVE | Noted: 2021-05-10

## 2021-05-10 PROBLEM — N17.9 AKI (ACUTE KIDNEY INJURY) (HCC): Status: ACTIVE | Noted: 2021-05-10

## 2021-05-10 PROBLEM — D72.829 LEUKOCYTOSIS: Status: ACTIVE | Noted: 2021-05-10

## 2021-05-10 LAB
ABO GROUP BLD: NORMAL
ALBUMIN SERPL BCP-MCNC: 3.4 G/DL (ref 3.5–5)
ALP SERPL-CCNC: 96 U/L (ref 46–116)
ALT SERPL W P-5'-P-CCNC: 142 U/L (ref 12–78)
ANION GAP SERPL CALCULATED.3IONS-SCNC: 11 MMOL/L (ref 4–13)
ANISOCYTOSIS BLD QL SMEAR: PRESENT
APTT PPP: 23 SECONDS (ref 23–37)
AST SERPL W P-5'-P-CCNC: 67 U/L (ref 5–45)
BACTERIA UR QL AUTO: ABNORMAL /HPF
BASOPHILS # BLD MANUAL: 0 THOUSAND/UL (ref 0–0.1)
BASOPHILS NFR MAR MANUAL: 0 % (ref 0–1)
BILIRUB SERPL-MCNC: 0.24 MG/DL (ref 0.2–1)
BILIRUB UR QL STRIP: NEGATIVE
BLD GP AB SCN SERPL QL: NEGATIVE
BUN SERPL-MCNC: 16 MG/DL (ref 5–25)
CALCIUM ALBUM COR SERPL-MCNC: 9.4 MG/DL (ref 8.3–10.1)
CALCIUM SERPL-MCNC: 8.9 MG/DL (ref 8.3–10.1)
CANCER AG27-29 SERPL-ACNC: 62.3 U/ML (ref 0–42.3)
CHLORIDE SERPL-SCNC: 106 MMOL/L (ref 100–108)
CLARITY UR: CLEAR
CO2 SERPL-SCNC: 25 MMOL/L (ref 21–32)
COLOR UR: YELLOW
CREAT SERPL-MCNC: 0.91 MG/DL (ref 0.6–1.3)
EOSINOPHIL # BLD MANUAL: 0 THOUSAND/UL (ref 0–0.4)
EOSINOPHIL NFR BLD MANUAL: 0 % (ref 0–6)
ERYTHROCYTE [DISTWIDTH] IN BLOOD BY AUTOMATED COUNT: 16.2 % (ref 11.6–15.1)
GFR SERPL CREATININE-BSD FRML MDRD: 70 ML/MIN/1.73SQ M
GLUCOSE SERPL-MCNC: 140 MG/DL (ref 65–140)
GLUCOSE UR STRIP-MCNC: NEGATIVE MG/DL
HCT VFR BLD AUTO: 35.3 % (ref 34.8–46.1)
HGB BLD-MCNC: 10.9 G/DL (ref 11.5–15.4)
HGB UR QL STRIP.AUTO: NEGATIVE
INR PPP: 0.84 (ref 0.84–1.19)
KETONES UR STRIP-MCNC: NEGATIVE MG/DL
LEUKOCYTE ESTERASE UR QL STRIP: ABNORMAL
LIPASE SERPL-CCNC: 66 U/L (ref 73–393)
LYMPHOCYTES # BLD AUTO: 1.88 THOUSAND/UL (ref 0.6–4.47)
LYMPHOCYTES # BLD AUTO: 18 % (ref 14–44)
MCH RBC QN AUTO: 30.6 PG (ref 26.8–34.3)
MCHC RBC AUTO-ENTMCNC: 30.9 G/DL (ref 31.4–37.4)
MCV RBC AUTO: 99 FL (ref 82–98)
MONOCYTES # BLD AUTO: 0.63 THOUSAND/UL (ref 0–1.22)
MONOCYTES NFR BLD: 6 % (ref 4–12)
NEUTROPHILS # BLD MANUAL: 7.93 THOUSAND/UL (ref 1.85–7.62)
NEUTS BAND NFR BLD MANUAL: 2 % (ref 0–8)
NEUTS SEG NFR BLD AUTO: 74 % (ref 43–75)
NITRITE UR QL STRIP: NEGATIVE
NON-SQ EPI CELLS URNS QL MICRO: ABNORMAL /HPF
NRBC BLD AUTO-RTO: 0 /100 WBCS
NT-PROBNP SERPL-MCNC: 161 PG/ML
PH UR STRIP.AUTO: 7 [PH] (ref 4.5–8)
PLATELET # BLD AUTO: 285 THOUSANDS/UL (ref 149–390)
PLATELET BLD QL SMEAR: ADEQUATE
PMV BLD AUTO: 9.2 FL (ref 8.9–12.7)
POTASSIUM SERPL-SCNC: 4.1 MMOL/L (ref 3.5–5.3)
PROT SERPL-MCNC: 7.3 G/DL (ref 6.4–8.2)
PROT UR STRIP-MCNC: NEGATIVE MG/DL
PROTHROMBIN TIME: 11.7 SECONDS (ref 11.6–14.5)
RBC # BLD AUTO: 3.56 MILLION/UL (ref 3.81–5.12)
RBC #/AREA URNS AUTO: ABNORMAL /HPF
RH BLD: POSITIVE
SODIUM SERPL-SCNC: 142 MMOL/L (ref 136–145)
SP GR UR STRIP.AUTO: 1.01 (ref 1–1.03)
SPECIMEN EXPIRATION DATE: NORMAL
TOTAL CELLS COUNTED SPEC: 100
TROPONIN I SERPL-MCNC: <0.02 NG/ML
TSH SERPL DL<=0.05 MIU/L-ACNC: 0.56 UIU/ML (ref 0.36–3.74)
UROBILINOGEN UR QL STRIP.AUTO: 0.2 E.U./DL
WBC # BLD AUTO: 10.43 THOUSAND/UL (ref 4.31–10.16)
WBC #/AREA URNS AUTO: ABNORMAL /HPF

## 2021-05-10 PROCEDURE — 87040 BLOOD CULTURE FOR BACTERIA: CPT | Performed by: PHYSICIAN ASSISTANT

## 2021-05-10 PROCEDURE — 85610 PROTHROMBIN TIME: CPT | Performed by: PHYSICIAN ASSISTANT

## 2021-05-10 PROCEDURE — 84484 ASSAY OF TROPONIN QUANT: CPT | Performed by: PHYSICIAN ASSISTANT

## 2021-05-10 PROCEDURE — 86901 BLOOD TYPING SEROLOGIC RH(D): CPT | Performed by: PHYSICIAN ASSISTANT

## 2021-05-10 PROCEDURE — 83880 ASSAY OF NATRIURETIC PEPTIDE: CPT | Performed by: PHYSICIAN ASSISTANT

## 2021-05-10 PROCEDURE — 83690 ASSAY OF LIPASE: CPT | Performed by: PHYSICIAN ASSISTANT

## 2021-05-10 PROCEDURE — 86850 RBC ANTIBODY SCREEN: CPT | Performed by: PHYSICIAN ASSISTANT

## 2021-05-10 PROCEDURE — 74177 CT ABD & PELVIS W/CONTRAST: CPT

## 2021-05-10 PROCEDURE — 85730 THROMBOPLASTIN TIME PARTIAL: CPT | Performed by: PHYSICIAN ASSISTANT

## 2021-05-10 PROCEDURE — 36415 COLL VENOUS BLD VENIPUNCTURE: CPT | Performed by: PHYSICIAN ASSISTANT

## 2021-05-10 PROCEDURE — 84443 ASSAY THYROID STIM HORMONE: CPT | Performed by: PHYSICIAN ASSISTANT

## 2021-05-10 PROCEDURE — 70450 CT HEAD/BRAIN W/O DYE: CPT

## 2021-05-10 PROCEDURE — 99285 EMERGENCY DEPT VISIT HI MDM: CPT | Performed by: PHYSICIAN ASSISTANT

## 2021-05-10 PROCEDURE — 86300 IMMUNOASSAY TUMOR CA 15-3: CPT

## 2021-05-10 PROCEDURE — 99285 EMERGENCY DEPT VISIT HI MDM: CPT

## 2021-05-10 PROCEDURE — G1004 CDSM NDSC: HCPCS

## 2021-05-10 PROCEDURE — 93005 ELECTROCARDIOGRAM TRACING: CPT

## 2021-05-10 PROCEDURE — 80053 COMPREHEN METABOLIC PANEL: CPT | Performed by: INTERNAL MEDICINE

## 2021-05-10 PROCEDURE — 85007 BL SMEAR W/DIFF WBC COUNT: CPT | Performed by: INTERNAL MEDICINE

## 2021-05-10 PROCEDURE — 81001 URINALYSIS AUTO W/SCOPE: CPT

## 2021-05-10 PROCEDURE — 71275 CT ANGIOGRAPHY CHEST: CPT

## 2021-05-10 PROCEDURE — 86900 BLOOD TYPING SEROLOGIC ABO: CPT | Performed by: PHYSICIAN ASSISTANT

## 2021-05-10 PROCEDURE — 85027 COMPLETE CBC AUTOMATED: CPT | Performed by: INTERNAL MEDICINE

## 2021-05-10 RX ORDER — LEVETIRACETAM 500 MG/1
250 TABLET ORAL 2 TIMES DAILY
Status: DISCONTINUED | OUTPATIENT
Start: 2021-05-10 | End: 2021-05-15 | Stop reason: SDUPTHER

## 2021-05-10 RX ORDER — DEXAMETHASONE 2 MG/1
2 TABLET ORAL
Status: DISCONTINUED | OUTPATIENT
Start: 2021-05-11 | End: 2021-05-25 | Stop reason: HOSPADM

## 2021-05-10 RX ORDER — NICOTINE 21 MG/24HR
1 PATCH, TRANSDERMAL 24 HOURS TRANSDERMAL DAILY
Status: DISCONTINUED | OUTPATIENT
Start: 2021-05-11 | End: 2021-05-25 | Stop reason: HOSPADM

## 2021-05-10 RX ORDER — POLYETHYLENE GLYCOL 3350 17 G/17G
17 POWDER, FOR SOLUTION ORAL 2 TIMES DAILY
Status: DISCONTINUED | OUTPATIENT
Start: 2021-05-10 | End: 2021-05-15

## 2021-05-10 RX ORDER — ONDANSETRON 4 MG/1
8 TABLET, ORALLY DISINTEGRATING ORAL
Status: DISCONTINUED | OUTPATIENT
Start: 2021-05-10 | End: 2021-05-13

## 2021-05-10 RX ORDER — HEPARIN SODIUM 1000 [USP'U]/ML
6000 INJECTION, SOLUTION INTRAVENOUS; SUBCUTANEOUS
Status: DISCONTINUED | OUTPATIENT
Start: 2021-05-10 | End: 2021-05-11

## 2021-05-10 RX ORDER — GABAPENTIN 400 MG/1
400 CAPSULE ORAL
Status: DISCONTINUED | OUTPATIENT
Start: 2021-05-10 | End: 2021-05-25 | Stop reason: HOSPADM

## 2021-05-10 RX ORDER — HYDROMORPHONE HYDROCHLORIDE 2 MG/1
4 TABLET ORAL EVERY 4 HOURS PRN
Status: DISCONTINUED | OUTPATIENT
Start: 2021-05-10 | End: 2021-05-25 | Stop reason: HOSPADM

## 2021-05-10 RX ORDER — HEPARIN SODIUM 10000 [USP'U]/100ML
3-30 INJECTION, SOLUTION INTRAVENOUS
Status: DISCONTINUED | OUTPATIENT
Start: 2021-05-10 | End: 2021-05-11

## 2021-05-10 RX ORDER — HEPARIN SODIUM 1000 [USP'U]/ML
6000 INJECTION, SOLUTION INTRAVENOUS; SUBCUTANEOUS ONCE
Status: COMPLETED | OUTPATIENT
Start: 2021-05-10 | End: 2021-05-10

## 2021-05-10 RX ORDER — OLANZAPINE 10 MG/1
10 TABLET ORAL
Status: DISCONTINUED | OUTPATIENT
Start: 2021-05-10 | End: 2021-05-25 | Stop reason: HOSPADM

## 2021-05-10 RX ORDER — ACETAMINOPHEN 325 MG/1
650 TABLET ORAL EVERY 6 HOURS PRN
Status: DISCONTINUED | OUTPATIENT
Start: 2021-05-10 | End: 2021-05-25 | Stop reason: HOSPADM

## 2021-05-10 RX ORDER — ALBUTEROL SULFATE 90 UG/1
2 AEROSOL, METERED RESPIRATORY (INHALATION) EVERY 6 HOURS PRN
Status: DISCONTINUED | OUTPATIENT
Start: 2021-05-10 | End: 2021-05-25 | Stop reason: HOSPADM

## 2021-05-10 RX ORDER — MAGNESIUM HYDROXIDE/ALUMINUM HYDROXICE/SIMETHICONE 120; 1200; 1200 MG/30ML; MG/30ML; MG/30ML
30 SUSPENSION ORAL EVERY 6 HOURS PRN
Status: DISCONTINUED | OUTPATIENT
Start: 2021-05-10 | End: 2021-05-25 | Stop reason: HOSPADM

## 2021-05-10 RX ORDER — AMOXICILLIN 250 MG
2 CAPSULE ORAL 2 TIMES DAILY
Status: DISCONTINUED | OUTPATIENT
Start: 2021-05-10 | End: 2021-05-25 | Stop reason: HOSPADM

## 2021-05-10 RX ORDER — HEPARIN SODIUM 1000 [USP'U]/ML
3000 INJECTION, SOLUTION INTRAVENOUS; SUBCUTANEOUS
Status: DISCONTINUED | OUTPATIENT
Start: 2021-05-10 | End: 2021-05-11

## 2021-05-10 RX ORDER — ZOLPIDEM TARTRATE 5 MG/1
10 TABLET ORAL
Status: DISCONTINUED | OUTPATIENT
Start: 2021-05-10 | End: 2021-05-25 | Stop reason: HOSPADM

## 2021-05-10 RX ORDER — HYDROMORPHONE HCL/PF 1 MG/ML
1 SYRINGE (ML) INJECTION EVERY 6 HOURS PRN
Status: DISCONTINUED | OUTPATIENT
Start: 2021-05-10 | End: 2021-05-25 | Stop reason: HOSPADM

## 2021-05-10 RX ORDER — PANTOPRAZOLE SODIUM 40 MG/1
40 TABLET, DELAYED RELEASE ORAL DAILY
Status: DISCONTINUED | OUTPATIENT
Start: 2021-05-11 | End: 2021-05-25 | Stop reason: HOSPADM

## 2021-05-10 RX ORDER — HYDROMORPHONE HYDROCHLORIDE 2 MG/1
8 TABLET ORAL EVERY 4 HOURS PRN
Status: DISCONTINUED | OUTPATIENT
Start: 2021-05-10 | End: 2021-05-25 | Stop reason: HOSPADM

## 2021-05-10 RX ADMIN — HEPARIN SODIUM 18 UNITS/KG/HR: 10000 INJECTION, SOLUTION INTRAVENOUS at 16:54

## 2021-05-10 RX ADMIN — ONDANSETRON 8 MG: 4 TABLET, ORALLY DISINTEGRATING ORAL at 19:05

## 2021-05-10 RX ADMIN — LEVETIRACETAM 250 MG: 250 TABLET, FILM COATED ORAL at 19:04

## 2021-05-10 RX ADMIN — OLANZAPINE 10 MG: 10 TABLET, FILM COATED ORAL at 21:17

## 2021-05-10 RX ADMIN — IOHEXOL 100 ML: 350 INJECTION, SOLUTION INTRAVENOUS at 14:46

## 2021-05-10 RX ADMIN — HEPARIN SODIUM 6000 UNITS: 1000 INJECTION INTRAVENOUS; SUBCUTANEOUS at 16:54

## 2021-05-10 RX ADMIN — GABAPENTIN 400 MG: 400 CAPSULE ORAL at 19:05

## 2021-05-10 RX ADMIN — SENNOSIDES AND DOCUSATE SODIUM 2 TABLET: 8.6; 5 TABLET ORAL at 19:04

## 2021-05-10 NOTE — ASSESSMENT & PLAN NOTE
- patient is noted to have a mild leukocytosis on labs in the emergency room on presentation 10 43  - patient denies any fever, chills  - low suspicion of infectious process  - patient is on steroids chronically at home, this is a possible source of the leukocytosis    Plan  - will hold off on initiating antibiotics at this time and continue to trend fever curve  - will follow-up on blood culture results

## 2021-05-10 NOTE — ASSESSMENT & PLAN NOTE
- Pt has extensive history of cancer related pain  - Will continue pt on gabapentin and hydromorphone

## 2021-05-10 NOTE — ASSESSMENT & PLAN NOTE
- Diagnosed with metastatic breast cancer in 2010 with mets to the brain and bone  - S/p numerous rounds of chemotherapy  - S/P double mastectomy   - Follows with heme/onc as out patient

## 2021-05-10 NOTE — ASSESSMENT & PLAN NOTE
- Cr noted to be 0 9 on admission, baseline 0 5 - 0 7  - Most likely secondary to urinary retention as noted by bladder distension on CT    Plan:   Will straight cath x1   UA with microscopic   Urinary retention protocol   Bladder scan   Intake and output   Monitor BMP daily and observe for downward trend of creatinine   Avoid hypoperfusion of the kidneys, minimize nephrotoxins    Results from last 7 days   Lab Units 05/10/21  1057   CREATININE mg/dL 0 91   EGFR ml/min/1 73sq m 70

## 2021-05-10 NOTE — ASSESSMENT & PLAN NOTE
- patient is a history chronic constipation  - Pt can go 2-5 days between bowel movements  - patient has notable abdominal distension  - patient is maintained on senna as an outpatient, will discontinue on admission  - patient is noted to have significant fecal matter in her GI tract on CT imaging in the ED    Plan  - Will start the patient on Miralax BID and Senakot-s BID

## 2021-05-10 NOTE — ASSESSMENT & PLAN NOTE
- Patient noted to have elevated AST 67/  - CT of the abdomen did not note any acute pathology  - Liver enzymes are only mildly elevated from baseline, possibly reactive    Plan  - Will repeat LFTs in the morning and continue to trend at this time

## 2021-05-10 NOTE — ED PROVIDER NOTES
History  Chief Complaint   Patient presents with    Weakness - Generalized     pt from home, reports generalized weakness and unable to ambulate x2 days    Bloated     pt with abd distention for unk amount of time, reports LBM 2 days ago, reports pressure from abd making it "hard to breathe "     The patient is a 65-year-old female with extensive past medical history including stage IV breast cancer  The patient is currently undergoing treatment for breast cancer, and she last had chemotherapy 3 weeks ago  The patient was brought in today by her  for several reasons  He states that the patient generally ambulates with a walker, and today her legs were giving out from underneath her  He states she was unable to ambulate on her own, even with a walker  He additionally reports that he noticed the patient's speech became slurred yesterday, and he feels that it is worsened today  He additionally states that the patient's abdomen continues to become more and more distended  The patient states that she feels pressure in her abdomen but denies any significant pain  She does state that the size in her abdomen is making it difficult to breathe, and she does feel somewhat short of breath  Her last bowel movement was 2 weeks ago  She states that she generally feels unwell, and she has additionally reporting a headache  She has not taken anything for her symptoms today  They deny that the patient has had fever, chills, chest pain, nausea, vomiting, or dysuria  History provided by:  Patient   used: No        Prior to Admission Medications   Prescriptions Last Dose Informant Patient Reported? Taking? HYDROmorphone (DILAUDID) 4 mg tablet   No No   Sig: Take 1-2 tablets (4-8 mg total) by mouth every 4 (four) hours as needed (cancer pain  Maximum 6 tabs/day  )Max Daily Amount: 48 mg   Misc   Devices (QUAD CANE) MISC  Self No No   Sig: by Does not apply route daily   OLANZapine (ZyPREXA) 10 mg tablet   No No   Sig: Take 1 tablet (10 mg total) by mouth daily at bedtime Every night, to help sleep and appetite  albuterol (PROVENTIL HFA,VENTOLIN HFA) 90 mcg/act inhaler  Self No No   Sig: TAKE 2 PUFFS BY MOUTH EVERY 6 HOURS AS NEEDED FOR WHEEZING   dexamethasone (DECADRON) 2 mg tablet   No No   Sig: Take 1 tablet (2 mg total) by mouth daily with breakfast To help appetite   gabapentin (NEURONTIN) 400 mg capsule   No No   Sig: Take 1 capsule (400 mg total) by mouth 3 (three) times a day before meals To reduce nerve pain   levETIRAcetam (KEPPRA) 250 mg tablet   No No   Sig: TAKE 1 TABLET BY MOUTH TWICE A DAY   ondansetron (ZOFRAN) 8 mg tablet   No No   Sig: Take 1 tablet (8 mg total) by mouth 3 (three) times a day before meals For cancer nausea   pantoprazole (PROTONIX) 40 mg tablet   No No   Sig: Take 1 tablet (40 mg total) by mouth daily Every morning, before coffee, to reduce stomach acid   senna (SENOKOT) 8 6 MG tablet   No No   Sig: Take 1 tablet (8 6 mg total) by mouth 2 (two) times a day Hold for loose stool     zolpidem (AMBIEN) 10 mg tablet   No No   Sig: Take 1 tablet (10 mg total) by mouth daily at bedtime as needed for sleep      Facility-Administered Medications: None       Past Medical History:   Diagnosis Date    Dehydration 6/11/2019    Dizziness 2/6/2018    Dry skin 2/6/2018    Edema 10/23/2019    H/O bilateral mastectomy 2/15/2016    Hyperglycemia 2/6/2018    Hypertension 2/15/2016    Hypokalemia 3/10/2020    Lymphedema 2/15/2016    Malignant cachexia ([address] ) 10/6/2016    Malignant neoplasm of right breast ([facility] [address] ) 2/15/2016    Metastatic breast cancer [facility])        Past Surgical History:   Procedure Laterality Date    ENDOBRONCHIAL ULTRASOUND (EBUS) N/A 2/16/2016    Procedure: EBUS;  Surgeon: Annika Barbosa MD;  [facility];  Service:    Chapin Polio MASTECTOMY Bilateral     MASTECTOMY Bilateral     right arm edema    OOPHORECTOMY      MO BRONCHOSCOPY NEEDLE BX TRACHEA MAIN STEM&/BRON N/A 2/16/2016    Procedure: Jessica Easton;  Surgeon: Tiana De La O MD;  [facility];  Service: Thoracic    MI INSJ TUNNELED CTR VAD W/SUBQ PORT AGE 5 YR/> N/A 7/24/2018    Procedure: INSERTION VENOUS PORT ( PORT-A-CATH) IR;  Surgeon: Cheryl Banegas DO;  [facility];  Service: Interventional Radiology    MI STEREOTACTIC RADIOSURGERY, CRANIAL,SIMPLE,EA ADD  5/3/2017         MI STEREOTACTIC RADIOSURGERY, CRANIAL,SIMPLE,EA ADD  5/3/2017         MI STEREOTACTIC RADIOSURGERY, CRANIAL,SIMPLE,SINGLE  5/3/2017            Family History   Problem Relation Age of Onset    Cancer Sister     Prostate cancer Brother      I have reviewed and agree with the history as documented  E-Cigarette/Vaping    E-Cigarette Use Never User      E-Cigarette/Vaping Substances    Nicotine Yes     THC No     CBD No     Flavoring No     Other No     Unknown No      Social History     Tobacco Use    Smoking status: Current Every Day Smoker     Packs/day: 0 50     Years: 40 00     Pack years: 20 00     Types: Cigarettes     Start date: 1978    Smokeless tobacco: Never Used   Substance Use Topics    Alcohol use: Yes     Frequency: 2-4 times a month     Drinks per session: 3 or 4     Binge frequency: Less than monthly     Comment: once a week    Drug use: Not Currently     Types: Marijuana       Review of Systems   Constitutional: Positive for fatigue  Negative for chills and fever  HENT: Negative for ear pain and sore throat  Eyes: Negative for redness and visual disturbance  Respiratory: Positive for shortness of breath  Negative for cough  Cardiovascular: Negative for chest pain  Gastrointestinal: Positive for abdominal distention, abdominal pain and constipation  Negative for diarrhea, nausea and vomiting  Genitourinary: Negative for dysuria and hematuria  Musculoskeletal: Negative for back pain, neck pain and neck stiffness  Skin: Negative for color change and rash  Neurological: Positive for speech difficulty and weakness  Negative for dizziness, light-headedness and headaches  All other systems reviewed and are negative  Physical Exam  Physical Exam  Vitals signs and nursing note reviewed  Constitutional:       General: She is not in acute distress  Appearance: She is well-developed  She is ill-appearing  She is not toxic-appearing  HENT:      Head: Normocephalic and atraumatic  Mouth/Throat:      Pharynx: Uvula midline  Eyes:      General: Lids are normal       Extraocular Movements: Extraocular movements intact  Conjunctiva/sclera: Conjunctivae normal       Pupils: Pupils are equal, round, and reactive to light  Neck:      Musculoskeletal: Normal range of motion and neck supple  Cardiovascular:      Rate and Rhythm: Normal rate and regular rhythm  Pulses: Normal pulses  Heart sounds: Normal heart sounds  Pulmonary:      Effort: Pulmonary effort is normal       Breath sounds: Normal breath sounds  Decreased air movement present  Abdominal:      General: Abdomen is protuberant  There is distension  Tenderness: There is generalized abdominal tenderness  Musculoskeletal:      Right lower leg: No edema  Left lower leg: No edema  Skin:     General: Skin is warm and dry  Neurological:      Mental Status: She is alert and oriented to person, place, and time  Cranial Nerves: Dysarthria present  Sensory: Sensation is intact  Motor: Weakness present  Comments: The patient has weakness of all 4 extremities  There is no focal weakness           Vital Signs  ED Triage Vitals [05/10/21 1324]   Temperature Pulse Respirations Blood Pressure SpO2   98 6 °F (37 °C) 77 (!) 24 147/77 98 %      Temp Source Heart Rate Source Patient Position - Orthostatic VS BP Location FiO2 (%)   Oral -- -- -- --      Pain Score       7           Vitals:    05/10/21 1630 05/10/21 1645 05/10/21 1700 05/10/21 1730   BP:       Pulse: 68 68 70 68         Visual Acuity      ED Medications  Medications   heparin (porcine) 25,000 units in 0 45% NaCl 250 mL infusion (premix) (18 Units/kg/hr × 75 kg (Order-Specific) Intravenous New Bag 5/10/21 1654)   heparin (porcine) injection 6,000 Units (has no administration in time range)   heparin (porcine) injection 3,000 Units (has no administration in time range)   iohexol (OMNIPAQUE) 350 MG/ML injection (MULTI-DOSE) 100 mL (100 mL Intravenous Given 5/10/21 1446)   heparin (porcine) injection 6,000 Units (6,000 Units Intravenous Given 5/10/21 1654)       Diagnostic Studies  Results Reviewed     Procedure Component Value Units Date/Time    Blood culture #2 [[id number]] Collected: 05/10/21 1743    Lab Status: In process Specimen: Blood from Arm, Left Updated: 05/10/21 1745    Protime-INR [[id number]]  (Normal) Collected: 05/10/21 1421    Lab Status: Final result Specimen: Blood from Arm, Left Updated: 05/10/21 1532     Protime 11 7 seconds      INR 0 84    APTT [[id number]]  (Normal) Collected: 05/10/21 1421    Lab Status: Final result Specimen: Blood from Arm, Left Updated: 05/10/21 1532     PTT 23 seconds     Blood culture #1 [[id number]] Collected: 05/10/21 1458    Lab Status: In process Specimen: Blood from Arm, Left Updated: 05/10/21 1501    Lipase [[id number]]  (Abnormal) Collected: 05/10/21 1421    Lab Status: Final result Specimen: Blood from Arm, Left Updated: 05/10/21 1500     Lipase 66 u/L     TSH [[id number]]  (Normal) Collected: 05/10/21 1421    Lab Status: Final result Specimen: Blood from Arm, Left Updated: 05/10/21 1500     TSH 3RD GENERATON 0 559 uIU/mL     Narrative:      Patients undergoing fluorescein dye angiography may retain small amounts of fluorescein in the body for 48-72 hours post procedure  Samples containing fluorescein can produce falsely depressed TSH values  If the patient had this procedure,a specimen should be resubmitted post fluorescein clearance        NT-BNP PRO [[id number]] (Abnormal) Collected: 05/10/21 1421    Lab Status: Final result Specimen: Blood from Arm, Left Updated: 05/10/21 1500     NT-proBNP 161 pg/mL     Troponin I [[id number]]  (Normal) Collected: 05/10/21 1421    Lab Status: Final result Specimen: Blood from Arm, Left Updated: 05/10/21 1455     Troponin I <0 02 ng/mL                  CT head without contrast   Final Result by Boogie Rodriguez DO (05/10 7355)      No acute intracranial abnormality or significant change from prior study  Changes within the brain parenchyma again suggesting sequela of prior whole brain radiation including previously treated right periatrial dystrophic calcification  No new lesions detected within limitations of noncontrast imaging  Intracranial metastatic disease again better evaluated with contrast-enhanced MRI  Workstation performed: [id number]         PE Study with CT Abdomen and Pelvis with contrast   Final Result by Boogie Rodriguez DO (05/10 5022)      1  Limited CT pulmonary angiogram, however, suggestion of a small subsegmental left upper lobe pulmonary embolus  Dilated RV/LV ratio which is nonspecific and could be indicative of elevated right heart pressures in the setting of COPD/pulmonary    hypertension  No bowing of the intraventricular septum  2   Small bilateral pulmonary nodules, grossly similar  1 cm pleural-based left apical nodule appears larger  Cannot exclude evolving metastasis  3   No acute intra-abdominal abnormality  4   Large volume of colonic stool suggesting constipation  5   Marked urinary bladder distention  I personally discussed this study with physician assistant Samantha Eaton on 5/10/2021 at 3:50 PM                Workstation performed: [id number]                    Procedures  ECG 12 Lead Documentation Only    Date/Time: 5/10/2021 5:55 PM  Performed by: Moe Kennedy PA-C  Authorized by:  Anum Masters PA-C     Comments:      Normal sinus rhythm at 73  Normal axis  No acute ST-T changes  T-wave inversion in V1, V2              ED Course  ED Course as of May 10 1800   Mon May 10, 2021   1351 Patient had CBC and CMP completed approximately 2 hours ago as outpatient  1528 Updated patient and her  on results I have so far  Still pending several lab results as well PE study with CT of abdomen and pelvis  MDM  Number of Diagnoses or Management Options  Bladder distension: new and requires workup  Constipation: new and requires workup  Generalized weakness: new and requires workup  Multiple pulmonary nodules: new and requires workup  Pulmonary embolism [facility]): new and requires workup  Slurred speech: new and requires workup  Diagnosis management comments: Patient is a breast cancer patient who presents to the emergency department for evaluation of multiple issues  While patient is extremely weak on exam, there is no focal weakness  She is currently unable to ambulate  The patient's speech does sound somewhat slurred, which reportedly started yesterday  Patient has a history of brain Mets  Labs and imaging ordered  Of note, the patient had CBC and CMP completed earlier today as outpatient  CT of head does show any significantly worsening disease  CT of chest abdomen and pelvis had multiple findings, including a possible small pulmonary embolism  There are also multiple pulmonary nodules  There is nothing acute in the abdomen, however the patient does have a significant stool burden  Bladder is distended on CT  The patient has been able to urinate in the bed pan while in the ED  Labs reviewed with no significant findings  ECG does not show acute ischemic change  Patient will be started on heparin for the pulmonary embolism and admitted  She is agreeable to the plan  Case discussed with VAISHNAVI who agreed to accept the patient under the service of Dr Ju Art       Patient is stable for admission  Amount and/or Complexity of Data Reviewed  Clinical lab tests: ordered and reviewed  Tests in the radiology section of CPT®: ordered and reviewed  Decide to obtain previous medical records or to obtain history from someone other than the patient: yes  Review and summarize past medical records: yes  Discuss the patient with other providers: yes (  [facility])    Risk of Complications, Morbidity, and/or Mortality  Presenting problems: high  Diagnostic procedures: moderate  Management options: high    Patient Progress  Patient progress: stable      Disposition  Final diagnoses:   Generalized weakness   Pulmonary embolism (HCC)   Constipation   Multiple pulmonary nodules   Bladder distension   Slurred speech     Time reflects when diagnosis was documented in both MDM as applicable and the Disposition within this note     Time User Action Codes Description Comment    5/10/2021  4:36 PM Anum Delgadillo Add [R53 1] Generalized weakness     5/10/2021  4:37 PM Anum Delgadillo Add [I26 99] Pulmonary embolism ([address] )     5/10/2021  4:37 PM Anum Delgadillo Add [K59 00] Constipation     5/10/2021  4:37 PM Richa [place] Add [R91 8] Multiple pulmonary nodules     5/10/2021  4:37 PM Richa [place] Add [N32 89] Bladder distension     5/10/2021  4:37 PM Richa [place] Add [R47 81] Slurred speech       ED Disposition     ED Disposition Condition Date/Time Comment    Admit Stable Mon May 10, 2021  4:36 PM Case was discussed with VAISHNAVI and the patient's admission status was agreed to be Admission Status: inpatient status to the service of Dr Chas Palomino   Follow-up Information    None         Patient's Medications   Discharge Prescriptions    No medications on file     No discharge procedures on file      PDMP Review       Value Time User    PDMP Reviewed  Yes 5/4/2021 11:00 AM Alyssia Barrera MD          ED Provider  Electronically Signed by           Kamari Barth MEET  05/10/21 1805

## 2021-05-10 NOTE — ASSESSMENT & PLAN NOTE
- Patient reports frequent nausea  - Patient is maintained on Zofran, Protonix as an outpatient will continue

## 2021-05-10 NOTE — QUICK NOTE
[facility] Hospitalist Service Attending Physician Attestation Note - Admission    I have seen and examined Jc London personally and have reviewed the medical record independently  I have reviewed the case with the resident physician including all assessments and the plan of care for each  I agree with the resident physician and offer the following addendum to the below statements by the resident physician:     Date Evaluated: 5/10/2021    65-year-old female history of metastatic breast cancer with Mets to brain and bone who presents with generalized weakness  Patient also complains of abdominal bloating and shortness of breath with difficulty taking a deep inspiration  Patient CT imaging on admission was notable for a small left upper lobe pulmonary embolism  Also noted constipation and a massively distended bladder  Patient does admit to urinary frequency and possibly feeling of incomplete emptying  Last good bowel movement was about 4 days ago  She denies bladder or bowel incontinence at this time  Chronically ill-appearing female in no acute distress  Lungs are clear to auscultation bilaterally   Abdomen is firm nontender nondistended  Bilateral lower extremities notably weak mostly with hip extension, dorsiflexion was 5/5      A/P:  65-year-old female history of metastatic breast cancer presenting with generalized weakness and abdominal bloating  Suspect majority of symptoms are related to patient's constipation and urinary retention  Will start aggressive bowel regimen  Urinary retention protocol in place  Patient may require Simpson catheter placement  It is notable the patient has no evidence of hydronephrosis so this may be a fairly acute process  Education and Discussions with Family / Patient: patient     Time Spent for Care: 60 minutes  More than 50% of total time spent on counseling and coordination of care as described above      Current Patient Status: Inpatient   Anticipated Length of Stay:  Patient will be admitted on an Inpatient basis with an anticipated length of stay of  > 2 midnights  Justification for Hospital Stay: generalized weakness in pt with metastatic breast ca     Epic / Care Everywhere Records Reviewed Independently: Yes     For detailed history, assessment, and plan of care, please review the statements below by Dr Charissa Amanda MD

## 2021-05-10 NOTE — ASSESSMENT & PLAN NOTE
- Patient has a history of tobacco abuse  - Will order the patient nicotine replacement  - Encourage cessation

## 2021-05-10 NOTE — ED NOTES
CALLED LAB, RE:BLOOD CULTURE #2 WAS SENT DOWN @14:21 WITH OTHER BLOODWORK     Monster Roe RN  05/10/21 4584

## 2021-05-10 NOTE — ASSESSMENT & PLAN NOTE
- Pt has breast cancer with mets to the brain  - CT 5/10: No acute intracranial abnormality or significant change from prior study   - Most recent MRI was in 2020

## 2021-05-10 NOTE — NURSING NOTE
Patient arrives via [facility] for labs and port flush  Patient reports chronic bilateral leg weakness, denies any falls at home  Note reviewed from 5/4 Dr Roseann Villalba office, rotation to gabapentin from pregabalin for migratory nerve pains  Patient reports her appetite has been OK at home  Patient aware to contact palliative office for any worsening symptoms  Port accessed without issue, excellent blood return noted  Labs drawn as per orders  Port deaccessed as per policy, bandaid in place  Patient tolerated well  Patient made aware of next appointment with Dr Marisol Antonio office, AVS printed and provided to patient  [facility] contacted for patient   RN brought patient to 1st floor lobby in wheelchair for [facility] transport

## 2021-05-10 NOTE — H&P
[facility]  H&P- Josh Soto 1960, 64 y o  female MRN: [id number]  Unit/Bed#: FT 03 Encounter: [id number]  Primary Care Provider: Lilly Roberto MD   Date and time admitted to hospital: 5/10/2021  1:16 PM    Weakness generalized  Assessment & Plan  - Patient presents with a 2-3 day history of gradual onset generalized weakness and slurred speech  - Most likely secondary to deconditioning in the setting of patient's cancer history versus infection    Plan  - PT/OT evaluation and treat  - will consider imaging the spine given the fact that the patient has a history of Mets, however will hold off for now    * [facility] ([facility] )  Assessment & Plan  - patient presents with a 2-3 day history of gradual onset weakness  - imaging in the ED noted a "small subsegmental left upper lobe pulmonary embolus"  - patient does report some mild shortness of breath    Plan  - heparin gtt  - patient will need p o   Anticoagulation  - telemetry monitoring      GILBERTO (acute kidney injury) ([facility] [address] )  Assessment & Plan  - Cr noted to be 0 9 on admission, baseline 0 5 - 0 7  - Most likely secondary to urinary retention as noted by bladder distension on CT    Plan:   Will straight cath x1   UA with microscopic   Urinary retention protocol   Bladder scan   Intake and output   Monitor BMP daily and observe for downward trend of creatinine   Avoid hypoperfusion of the kidneys, minimize nephrotoxins    Results from last 7 days   Lab Units 05/10/21  1057   CREATININE mg/dL 0 91   EGFR ml/min/1 73sq m 70       Leukocytosis  Assessment & Plan  - patient is noted to have a mild leukocytosis on labs in the emergency room on presentation 10 43  - patient denies any fever, chills  - low suspicion of infectious process  - patient is on steroids chronically at home, this is a possible source of the leukocytosis    Plan  - will hold off on initiating antibiotics at this time and continue to trend fever curve  - will follow-up on blood culture results    Transaminitis  Assessment & Plan  - Patient noted to have elevated AST 67/  - CT of the abdomen did not note any acute pathology  - Liver enzymes are only mildly elevated from baseline, possibly reactive    Plan  - Will repeat LFTs in the morning and continue to trend at this time    Anemia of chronic disease  Assessment & Plan  - Pt has noted decrease in hgb on addmisison    Tobacco abuse  Assessment & Plan  - Patient has a history of tobacco abuse  - Will order the patient nicotine replacement  - Encourage cessation    Therapeutic opioid-induced constipation (OIC)  Assessment & Plan  - patient is a history chronic constipation  - Pt can go 2-5 days between bowel movements  - patient has notable abdominal distension  - patient is maintained on senna as an outpatient, will discontinue on admission  - patient is noted to have significant fecal matter in her GI tract on CT imaging in the ED    Plan  - Will start the patient on Miralax BID and Senakot-s BID    CINV (chemotherapy-induced nausea and vomiting)  Assessment & Plan  - Patient reports frequent nausea  - Patient is maintained on Zofran, Protonix as an outpatient will continue    Cancer-related pain  Assessment & Plan  - Pt has extensive history of cancer related pain  - Will continue pt on gabapentin and hydromorphone    Brain metastases ([facility] [address] )  Assessment & Plan  - Pt has breast cancer with mets to the brain  - [facility] 5/10: No acute intracranial abnormality or significant change from prior study   - Most recent MRI was in 2020    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, estrogen receptor positive ([facility] [address] )  Assessment & Plan  - Diagnosed with metastatic breast cancer in 2010 with mets to the brain and bone  - S/p numerous rounds of chemotherapy  - S/P double mastectomy   - Follows with heme/onc as out patient    VTE Prophylaxis: Heparin Drip  / sequential compression device   Code Status:  Full code  POLST: There is no POLST form on file for this patient (pre-hospital)    Anticipated Length of Stay:  Patient will be admitted on an Inpatient basis with an anticipated length of stay of  > 2 midnights  Justification for Hospital Stay:  Pulmonary embolism    Chief Complaint:   Generalized weakness    History of Present Illness:    Ariella Tejeda is a 64 y o  female with past medical history of metastatic breast cancer diagnosed in 2010 now status post multiple rounds of chemotherapy and a bilateral mastectomy, chronic constipation, chemo induced nausea and vomiting, who presents with bilateral leg weakness and slurred speech  The patient's  reports that the patients is weakness and slurred speech started gradually approximately 2-3 days ago  The patient reports that she also has significant constipation and abdominal bloating, however the patient can go 2-5 days between bowel movements with her last good bowel movement approximately 4 days ago  The patient denies any acute bowel or bladder incontinence currently  In the emergency room the patient had a CT of her chest abdomen and pelvis that revealed a small left-sided upper lobe pulmonary embolism, heparin drip initiated by the emergency room and continued on admission  CT also demonstrated significant bladder distension, urinary retention protocol was ordered along with a 1 time straight catheterization  CT also noted significant fecal matter OR in her GI tract and so a bowel regiment was ordered  Additionally an GILBERTO was noted on laboratory examination  The patient will be admitted to the hospital on the medicine service for treatment of her pulmonary embolism  Review of Systems:    Review of Systems   Constitutional: Positive for fatigue  Negative for chills and fever  Respiratory: Positive for shortness of breath  Negative for cough  Gastrointestinal: Positive for abdominal distention, constipation and nausea   Negative for diarrhea and vomiting  Genitourinary: Negative for dysuria, enuresis, flank pain and urgency  Musculoskeletal: Positive for back pain  Neurological: Positive for speech difficulty and weakness  Negative for dizziness and headaches  All other systems reviewed and are negative  Past Medical and Surgical History:     Past Medical History:   Diagnosis Date    Dehydration 6/11/2019    Dizziness 2/6/2018    Dry skin 2/6/2018    Edema 10/23/2019    H/O bilateral mastectomy 2/15/2016    Hyperglycemia 2/6/2018    Hypertension 2/15/2016    Hypokalemia 3/10/2020    Lymphedema 2/15/2016    Malignant cachexia ([facility] ) 10/6/2016    Malignant neoplasm of right breast ([facility] ) 2/15/2016    Metastatic breast cancer ([facility] )        Past Surgical History:   Procedure Laterality Date    ENDOBRONCHIAL ULTRASOUND (EBUS) N/A 2/16/2016    Procedure: EBUS;  Surgeon: Demarco Singh MD;  [facility]: [facility];  Service:     MASTECTOMY Bilateral     MASTECTOMY Bilateral     right arm edema    OOPHORECTOMY      MD BRONCHOSCOPY NEEDLE BX TRACHEA MAIN STEM&/BRON N/A 2/16/2016    Procedure: Katie Leidy;  Surgeon: Demarco Singh MD;  [facility]: [facility];  Service: Thoracic    MD INSJ TUNNELED CTR VAD W/SUBQ PORT AGE 5 YR/> N/A 7/24/2018    Procedure: INSERTION VENOUS PORT ( PORT-A-CATH) IR;  Surgeon: Yolie Michael DO;  [facility];  Service: Interventional Radiology    MD STEREOTACTIC RADIOSURGERY, CRANIAL,SIMPLE,EA ADD  5/3/2017         MD STEREOTACTIC RADIOSURGERY, CRANIAL,SIMPLE,EA ADD  5/3/2017         MD STEREOTACTIC RADIOSURGERY, CRANIAL,SIMPLE,SINGLE  5/3/2017            Meds/Allergies:    Prior to Admission medications    Medication Sig Start Date End Date Taking?  Authorizing Provider   albuterol (PROVENTIL HFA,VENTOLIN HFA) 90 mcg/act inhaler TAKE 2 PUFFS BY MOUTH EVERY 6 HOURS AS NEEDED FOR WHEEZING 9/4/19   Alice Carmen PA-C   dexamethasone (DECADRON) 2 mg tablet Take 1 tablet (2 mg total) by mouth daily with breakfast To help appetite 5/4/21   Rosanne Lemon MD   gabapentin (NEURONTIN) 400 mg capsule Take 1 capsule (400 mg total) by mouth 3 (three) times a day before meals To reduce nerve pain 5/4/21   Rosanne Lemon MD   HYDROmorphone (DILAUDID) 4 mg tablet Take 1-2 tablets (4-8 mg total) by mouth every 4 (four) hours as needed (cancer pain  Maximum 6 tabs/day  )Max Daily Amount: 48 mg 5/4/21   Rosanne Lemon MD   levETIRAcetam (KEPPRA) 250 mg tablet TAKE 1 TABLET BY MOUTH TWICE A DAY 4/29/21   Rosanne Lemon MD   Misc  Devices (QUAD CANE) MISC by Does not apply route daily 12/12/19   Molly Schreiber DO   OLANZapine (ZyPREXA) 10 mg tablet Take 1 tablet (10 mg total) by mouth daily at bedtime Every night, to help sleep and appetite  5/4/21   Rosanne Lemon MD   ondansetron [facility]) 8 mg tablet Take 1 tablet (8 mg total) by mouth 3 (three) times a day before meals For cancer nausea 5/4/21   Rosanne Lemon MD   pantoprazole (PROTONIX) 40 mg tablet Take 1 tablet (40 mg total) by mouth daily Every morning, before coffee, to reduce stomach acid 5/4/21   Rosanne Lemon MD   senna (SENOKOT) 8 6 MG tablet Take 1 tablet (8 6 mg total) by mouth 2 (two) times a day Hold for loose stool  5/4/21   Rosanne Lemon MD   zolpidem (AMBIEN) 10 mg tablet Take 1 tablet (10 mg total) by mouth daily at bedtime as needed for sleep 5/4/21   Rosanne Lemon MD     I have reviewed home medications with patient personally      Allergies: No Known Allergies    Social History:     Marital Status: /Civil Union   Patient Pre-hospital Living Situation:  Lives at home with   Patient Pre-hospital Level of Mobility:  Uses a walker and cane  Substance Use History:   Social History     Substance and Sexual Activity   Alcohol Use Yes    Frequency: 2-4 times a month    Drinks per session: 3 or 4    Binge frequency: Less than monthly    Comment: once a week     Social History     Tobacco Use   Smoking Status Current Every Day Smoker    Packs/day: 0 50    Years: 40 00    Pack years: 20 00    Types: Cigarettes    Start date: 1978   Smokeless Tobacco Never Used     Social History     Substance and Sexual Activity   Drug Use Not Currently    Types: Marijuana       Family History:    Family History   Problem Relation Age of Onset    Cancer Sister     Prostate cancer Brother        Physical Exam:     Vitals:   Blood Pressure: 140/74 (05/10/21 1330)  Pulse: 66 (05/10/21 1845)  Temperature: 98 6 °F (37 °C) (05/10/21 1324)  Temp Source: Oral (05/10/21 1324)  Respirations: (!) 24 (05/10/21 1324)  SpO2: 98 % (05/10/21 1845)    Physical Exam  Vitals signs and nursing note reviewed  Constitutional:       General: She is not in acute distress  Appearance: She is well-developed  She is not diaphoretic  HENT:      Head: Normocephalic and atraumatic  Nose: Nose normal    Eyes:      General: No scleral icterus  Right eye: No discharge  Left eye: No discharge  Extraocular Movements: Extraocular movements intact  Conjunctiva/sclera: Conjunctivae normal       Pupils: Pupils are equal, round, and reactive to light  Neck:      Musculoskeletal: Normal range of motion  Cardiovascular:      Rate and Rhythm: Normal rate and regular rhythm  Heart sounds: Normal heart sounds  No murmur  No friction rub  No gallop  Pulmonary:      Effort: Pulmonary effort is normal  No respiratory distress  Breath sounds: No stridor  Wheezing present  No rhonchi or rales  Chest:      Chest wall: No tenderness  Abdominal:      General: Abdomen is flat  There is distension  Palpations: Abdomen is soft  Tenderness: There is no abdominal tenderness  Musculoskeletal: Normal range of motion  Right lower leg: No edema  Left lower leg: No edema  Skin:     General: Skin is warm and dry  Neurological:      General: No focal deficit present        Mental Status: She is alert and oriented to person, place, and time  GCS: GCS eye subscore is 4  GCS verbal subscore is 5  GCS motor subscore is 6  Cranial Nerves: No cranial nerve deficit  Sensory: No sensory deficit  Motor: Weakness present  Comments:   Hip extension left 5/5 right 5/5  Hip flexion: left 3/5 right 3/5  Dorsiflexion: left 5/5 right 5/5  Plantar flexion: left 5/5 right 5/5   Psychiatric:         Behavior: Behavior normal          Thought Content: Thought content normal          Judgment: Judgment normal          Additional Data:     Lab Results: I have personally reviewed pertinent reports  Results from last 7 days   Lab Units 05/10/21  1057   WBC Thousand/uL 10 43*   HEMOGLOBIN g/dL 10 9*   HEMATOCRIT % 35 3   PLATELETS Thousands/uL 285   LYMPHO PCT % 18   MONO PCT % 6   EOS PCT % 0     Results from last 7 days   Lab Units 05/10/21  1057   POTASSIUM mmol/L 4 1   CHLORIDE mmol/L 106   CO2 mmol/L 25   BUN mg/dL 16   CREATININE mg/dL 0 91   CALCIUM mg/dL 8 9   ALK PHOS U/L 96   ALT U/L 142*   AST U/L 67*     Results from last 7 days   Lab Units 05/10/21  1421   INR  0 84       Imaging: I have personally reviewed pertinent reports  Ct Head Without Contrast    Result Date: 5/10/2021  Narrative: CT BRAIN - WITHOUT CONTRAST INDICATION:  Known brain mets, slurred speech  COMPARISON:  CT head 3/11/2020, MRI brain 6/1/2020 TECHNIQUE:  CT examination of the brain was performed  In addition to axial images, sagittal and coronal 2D reformatted images were created and submitted for interpretation  Radiation dose length product (DLP) for this visit:  821 mGy-cm  This examination, like all CT scans performed in the [facility], was performed utilizing techniques to minimize radiation dose exposure, including the use of iterative reconstruction and automated exposure control  IMAGE QUALITY:  Diagnostic  FINDINGS: PARENCHYMA:  No CT signs of acute, large vessel territorial infarction    No acute parenchymal hemorrhage  Calcified lesion posterior to the atria of the right lateral ventricle again noted corresponding to site of previous enhancing metastasis on MRI  No significant change  Scattered subcentimeter cortical calcifications in both frontal and left parietal lobes, unchanged  Bilateral basal ganglia calcifications  Vascular complications  There is moderately advanced bilateral subcortical and periventricular white matter hypodensity, nonspecific but possibly reflecting changes of whole brain radiation therapy  VENTRICLES AND EXTRA-AXIAL SPACES:  Normal for the patient's age  VISUALIZED ORBITS AND PARANASAL SINUSES:  Unremarkable  CALVARIUM AND EXTRACRANIAL SOFT TISSUES:  Normal      Impression: No acute intracranial abnormality or significant change from prior study  Changes within the brain parenchyma again suggesting sequela of prior whole brain radiation including previously treated right periatrial dystrophic calcification  No new lesions detected within limitations of noncontrast imaging  Intracranial metastatic disease again better evaluated with contrast-enhanced MRI  Workstation performed: [id number]     Pe Study With Ct Abdomen And Pelvis With Contrast    Result Date: 5/10/2021  Narrative: CT PULMONARY ANGIOGRAM OF THE CHEST AND CT ABDOMEN AND PELVIS WITH INTRAVENOUS CONTRAST INDICATION:  Weakness, generalized bloating, history of stage IV breast cancer  COMPARISON:  CT chest, abdomen and pelvis 3/10/2021 and 9/29/2020 TECHNIQUE:  CT examination of the chest, abdomen and pelvis was performed  Thin section CT angiographic technique was used in the chest in order to evaluate for pulmonary embolus and coronal 3D MIP postprocessing was performed on the acquisition scanner  Axial, sagittal, and coronal 2D reformatted images were created from the source data and submitted for interpretation  Radiation dose length product (DLP) for this visit:  736 mGy-cm    This examination, like all CT scans performed in the [facility], was performed utilizing techniques to minimize radiation dose exposure, including the use of iterative reconstruction and automated exposure control  IV Contrast:  100 mL of iohexol (OMNIPAQUE) Enteric Contrast:  Enteric contrast was not administered  FINDINGS: CHEST PULMONARY ARTERIAL TREE:  Evaluation limited by respiratory motion particularly in the right lower lobe  Question small subsegmental left upper lobe pulmonary embolus series 2/67  No other evidence of central pulmonary emboli  Subsegmental vessels cannot be reliably evaluated  LUNGS:  Mildly degraded by respiratory motion  Mild COPD  5-6 mm left lower lobe nodule, unchanged  5 mm anteromedial right upper lobe nodule unchanged series 2/58  3 mm subpleural right middle lobe nodule series 6/65 is similar  6 mm right lower lobe nodule series 6/65, unchanged  1 cm pleural-based nodule into left apex series 2/30, more conspicuous  No definite new nodules  PLEURA:  Unremarkable  HEART/AORTA:  Mild cardiomegaly  RV/LV ratio is dilated measuring greater than 1 (4 4/3 6 cm)  No bowing of the interventricular septum  Atherosclerotic changes thoracic aorta and coronary arteries  The main pulmonary artery is borderline dilated which may be indicative of pulmonary artery hypertension  MEDIASTINUM AND CHEO:  Mediastinal lipomatosis  Small hiatal hernia  Calcified right hilar lymph nodes  CHEST WALL AND LOWER NECK: Bilateral breast implants with capsular calcification on the right and bilateral axillary clips  Slight soft tissue thickening seen in the region of a previously described lymph node in the right axilla series 2/100  Right chest wall Port-A-Cath  ABDOMEN LIVER/BILIARY TREE:  Unremarkable  GALLBLADDER:  No calcified gallstones  No pericholecystic inflammatory change  SPLEEN:  Normal size  Small calcified granulomas  PANCREAS:  Mild to moderate fatty replacement of the pancreas without acute findings  ADRENAL GLANDS:  Unremarkable  KIDNEYS/URETERS:  Subcentimeter bilateral renal hypodensities too small to characterize  No hydronephrosis  STOMACH AND BOWEL:  The stomach is poorly distended, evaluation limited  Small hiatal hernia  Small bowel is normal caliber  There is a large volume of colonic stool suggesting constipation  No focal inflammatory changes or extraluminal fluid collections  APPENDIX:  No findings to suggest appendicitis  ABDOMINOPELVIC CAVITY:  No ascites  No pneumoperitoneum  No enlarged lymphadenopathy  VESSELS: Atherosclerotic changes abdominal aorta without evidence of aneurysm  PELVIS REPRODUCTIVE ORGANS:  Status post hysterectomy  No adnexal masses  URINARY BLADDER:  Massively distended without focal pathology  ABDOMINAL WALL/INGUINAL REGIONS:  Unremarkable  OSSEOUS STRUCTURES:  No acute fracture  Small sclerotic focus within the right superior T10 vertebral body, unchanged  Impression: 1  Limited CT pulmonary angiogram, however, suggestion of a small subsegmental left upper lobe pulmonary embolus  Dilated RV/LV ratio which is nonspecific and could be indicative of elevated right heart pressures in the setting of COPD/pulmonary hypertension  No bowing of the intraventricular septum  2   Small bilateral pulmonary nodules, grossly similar  1 cm pleural-based left apical nodule appears larger  Cannot exclude evolving metastasis  3   No acute intra-abdominal abnormality  4   Large volume of colonic stool suggesting constipation  5   Marked urinary bladder distention  I personally discussed this study with physician assistant Samantha Eaton on 5/10/2021 at 3:50 PM  Workstation performed: [id number]       EKG, Pathology, and Other Studies Reviewed on Admission:   EKG:  NSR, T-wave inversion in V1 and V2    Epic / Care Everywhere Records Reviewed: Yes     ** Please Note: This note has been constructed using a voice recognition system   **

## 2021-05-10 NOTE — PLAN OF CARE
Problem: Potential for Falls  Goal: Patient will remain free of falls  Description: INTERVENTIONS:  - Assess patient frequently for physical needs  -  Identify cognitive and physical deficits and behaviors that affect risk of falls    -  [place] fall precautions as indicated by assessment   - Educate patient/family on patient safety including physical limitations  - Instruct patient to call for assistance with activity based on assessment  - Modify environment to reduce risk of injury  - Consider OT/PT consult to assist with strengthening/mobility  Outcome: Progressing

## 2021-05-10 NOTE — ASSESSMENT & PLAN NOTE
- Patient presents with a 2-3 day history of gradual onset generalized weakness and slurred speech  - Most likely secondary to deconditioning in the setting of patient's cancer history versus infection    Plan  - PT/OT evaluation and treat  - will consider imaging the spine given the fact that the patient has a history of Mets, however will hold off for now

## 2021-05-11 LAB
ALBUMIN SERPL BCP-MCNC: 3.2 G/DL (ref 3.5–5)
ALP SERPL-CCNC: 85 U/L (ref 46–116)
ALT SERPL W P-5'-P-CCNC: 110 U/L (ref 12–78)
ANION GAP SERPL CALCULATED.3IONS-SCNC: 10 MMOL/L (ref 4–13)
APTT PPP: 76 SECONDS (ref 23–37)
APTT PPP: 93 SECONDS (ref 23–37)
AST SERPL W P-5'-P-CCNC: 41 U/L (ref 5–45)
ATRIAL RATE: 76 BPM
BACTERIA UR QL AUTO: ABNORMAL /HPF
BILIRUB SERPL-MCNC: 0.27 MG/DL (ref 0.2–1)
BILIRUB UR QL STRIP: NEGATIVE
BUN SERPL-MCNC: 12 MG/DL (ref 5–25)
CALCIUM ALBUM COR SERPL-MCNC: 9 MG/DL (ref 8.3–10.1)
CALCIUM SERPL-MCNC: 8.4 MG/DL (ref 8.3–10.1)
CHLORIDE SERPL-SCNC: 109 MMOL/L (ref 100–108)
CLARITY UR: CLEAR
CO2 SERPL-SCNC: 27 MMOL/L (ref 21–32)
COLOR UR: YELLOW
CREAT SERPL-MCNC: 0.63 MG/DL (ref 0.6–1.3)
ERYTHROCYTE [DISTWIDTH] IN BLOOD BY AUTOMATED COUNT: 16.7 % (ref 11.6–15.1)
GFR SERPL CREATININE-BSD FRML MDRD: 99 ML/MIN/1.73SQ M
GLUCOSE SERPL-MCNC: 96 MG/DL (ref 65–140)
GLUCOSE UR STRIP-MCNC: NEGATIVE MG/DL
HCT VFR BLD AUTO: 37.1 % (ref 34.8–46.1)
HGB BLD-MCNC: 11.4 G/DL (ref 11.5–15.4)
HGB UR QL STRIP.AUTO: NEGATIVE
KETONES UR STRIP-MCNC: NEGATIVE MG/DL
LEUKOCYTE ESTERASE UR QL STRIP: ABNORMAL
MAGNESIUM SERPL-MCNC: 2.5 MG/DL (ref 1.6–2.6)
MCH RBC QN AUTO: 30.1 PG (ref 26.8–34.3)
MCHC RBC AUTO-ENTMCNC: 30.7 G/DL (ref 31.4–37.4)
MCV RBC AUTO: 98 FL (ref 82–98)
NITRITE UR QL STRIP: NEGATIVE
NON-SQ EPI CELLS URNS QL MICRO: ABNORMAL /HPF
P AXIS: 36 DEGREES
PH UR STRIP.AUTO: 7 [PH]
PLATELET # BLD AUTO: 281 THOUSANDS/UL (ref 149–390)
PMV BLD AUTO: 9.3 FL (ref 8.9–12.7)
POTASSIUM SERPL-SCNC: 3.8 MMOL/L (ref 3.5–5.3)
PR INTERVAL: 138 MS
PROT SERPL-MCNC: 7.2 G/DL (ref 6.4–8.2)
PROT UR STRIP-MCNC: NEGATIVE MG/DL
QRS AXIS: 54 DEGREES
QRSD INTERVAL: 80 MS
QT INTERVAL: 406 MS
QTC INTERVAL: 448 MS
RBC # BLD AUTO: 3.79 MILLION/UL (ref 3.81–5.12)
RBC #/AREA URNS AUTO: ABNORMAL /HPF
SODIUM SERPL-SCNC: 146 MMOL/L (ref 136–145)
SP GR UR STRIP.AUTO: 1.01 (ref 1–1.03)
T WAVE AXIS: 61 DEGREES
UROBILINOGEN UR QL STRIP.AUTO: 0.2 E.U./DL
VENTRICULAR RATE: 73 BPM
WBC # BLD AUTO: 7.95 THOUSAND/UL (ref 4.31–10.16)
WBC #/AREA URNS AUTO: ABNORMAL /HPF

## 2021-05-11 PROCEDURE — 97167 OT EVAL HIGH COMPLEX 60 MIN: CPT

## 2021-05-11 PROCEDURE — 85027 COMPLETE CBC AUTOMATED: CPT | Performed by: PSYCHIATRY & NEUROLOGY

## 2021-05-11 PROCEDURE — 81001 URINALYSIS AUTO W/SCOPE: CPT | Performed by: PSYCHIATRY & NEUROLOGY

## 2021-05-11 PROCEDURE — 99232 SBSQ HOSP IP/OBS MODERATE 35: CPT | Performed by: INTERNAL MEDICINE

## 2021-05-11 PROCEDURE — 85730 THROMBOPLASTIN TIME PARTIAL: CPT | Performed by: INTERNAL MEDICINE

## 2021-05-11 PROCEDURE — 80053 COMPREHEN METABOLIC PANEL: CPT | Performed by: PSYCHIATRY & NEUROLOGY

## 2021-05-11 PROCEDURE — 97530 THERAPEUTIC ACTIVITIES: CPT

## 2021-05-11 PROCEDURE — 83735 ASSAY OF MAGNESIUM: CPT | Performed by: PSYCHIATRY & NEUROLOGY

## 2021-05-11 PROCEDURE — 93010 ELECTROCARDIOGRAM REPORT: CPT | Performed by: INTERNAL MEDICINE

## 2021-05-11 PROCEDURE — 97163 PT EVAL HIGH COMPLEX 45 MIN: CPT

## 2021-05-11 RX ORDER — SODIUM PHOSPHATE, DIBASIC AND SODIUM PHOSPHATE, MONOBASIC 7; 19 G/133ML; G/133ML
1 ENEMA RECTAL ONCE
Status: COMPLETED | OUTPATIENT
Start: 2021-05-11 | End: 2021-05-11

## 2021-05-11 RX ADMIN — PANTOPRAZOLE SODIUM 40 MG: 40 TABLET, DELAYED RELEASE ORAL at 06:35

## 2021-05-11 RX ADMIN — OLANZAPINE 10 MG: 10 TABLET, FILM COATED ORAL at 21:00

## 2021-05-11 RX ADMIN — ONDANSETRON 8 MG: 4 TABLET, ORALLY DISINTEGRATING ORAL at 06:35

## 2021-05-11 RX ADMIN — SENNOSIDES AND DOCUSATE SODIUM 2 TABLET: 8.6; 5 TABLET ORAL at 10:32

## 2021-05-11 RX ADMIN — GABAPENTIN 400 MG: 400 CAPSULE ORAL at 10:32

## 2021-05-11 RX ADMIN — APIXABAN 10 MG: 5 TABLET, FILM COATED ORAL at 17:12

## 2021-05-11 RX ADMIN — GABAPENTIN 400 MG: 400 CAPSULE ORAL at 06:35

## 2021-05-11 RX ADMIN — METHYLNALTREXONE BROMIDE 8 MG: 8 INJECTION, SOLUTION SUBCUTANEOUS at 18:23

## 2021-05-11 RX ADMIN — HYDROMORPHONE HYDROCHLORIDE 8 MG: 2 TABLET ORAL at 15:35

## 2021-05-11 RX ADMIN — POLYETHYLENE GLYCOL 3350 17 G: 17 POWDER, FOR SOLUTION ORAL at 20:56

## 2021-05-11 RX ADMIN — LEVETIRACETAM 250 MG: 250 TABLET, FILM COATED ORAL at 17:12

## 2021-05-11 RX ADMIN — POLYETHYLENE GLYCOL 3350 17 G: 17 POWDER, FOR SOLUTION ORAL at 10:31

## 2021-05-11 RX ADMIN — SENNOSIDES AND DOCUSATE SODIUM 2 TABLET: 8.6; 5 TABLET ORAL at 17:12

## 2021-05-11 RX ADMIN — SODIUM PHOSPHATE, DIBASIC AND SODIUM PHOSPHATE, MONOBASIC 1 ENEMA: 7; 19 ENEMA RECTAL at 14:31

## 2021-05-11 RX ADMIN — GABAPENTIN 400 MG: 400 CAPSULE ORAL at 15:35

## 2021-05-11 RX ADMIN — ONDANSETRON 8 MG: 4 TABLET, ORALLY DISINTEGRATING ORAL at 10:32

## 2021-05-11 RX ADMIN — LEVETIRACETAM 250 MG: 250 TABLET, FILM COATED ORAL at 10:32

## 2021-05-11 RX ADMIN — HEPARIN SODIUM 15 UNITS/KG/HR: 10000 INJECTION, SOLUTION INTRAVENOUS at 15:36

## 2021-05-11 RX ADMIN — ONDANSETRON 8 MG: 4 TABLET, ORALLY DISINTEGRATING ORAL at 15:35

## 2021-05-11 RX ADMIN — NICOTINE 1 PATCH: 14 PATCH, EXTENDED RELEASE TRANSDERMAL at 10:31

## 2021-05-11 RX ADMIN — DEXAMETHASONE 2 MG: 2 TABLET ORAL at 06:35

## 2021-05-11 NOTE — ASSESSMENT & PLAN NOTE
- Patient presents with a 2-3 day history of gradual onset generalized weakness and slurred speech  - Most likely secondary to deconditioning in the setting of patient's cancer history versus infection  - patient's weakness is improving  - OT is recommending post acute rehab services    Plan  - PT/OT evaluation and treat  - will consider imaging the spine given the fact that the patient has a history of Mets, however will hold off for now

## 2021-05-11 NOTE — UTILIZATION REVIEW
Initial Clinical Review    Admission: Date/Time/Statement:   Admission Orders (From admission, onward)     Ordered        05/10/21 1638  Inpatient Admission  Once                   Orders Placed This Encounter   Procedures    Inpatient Admission     Standing Status:   Standing     Number of Occurrences:   1     Order Specific Question:   Level of Care     Answer:   Med Surg [16]     Order Specific Question:   Estimated length of stay     Answer:   More than 2 Midnights     Order Specific Question:   Certification     Answer:   I certify that inpatient services are medically necessary for this patient for a duration of greater than two midnights  See H&P and MD Progress Notes for additional information about the patient's course of treatment  ED Arrival Information     Expected Arrival Acuity Means of Arrival Escorted By Service Admission Type    - 5/10/2021 13:16 Urgent Ambulance [facility] Hospitalist Urgent    Arrival Complaint    weakness        Chief Complaint   Patient presents with    Weakness - Generalized     pt from home, reports generalized weakness and unable to ambulate x2 days    Bloated     pt with abd distention for unk amount of time, reports LBM 2 days ago, reports pressure from abd making it "hard to breathe "       Initial Presentation: This is a 64year old female from home to ED via ems admitted inpatient due to  PE/generalized weakness/opoid induced constipation  Patient has breast cancer with metastases to brain on chemotherapy  Presented with multiple complaints:   Feels unwell, today legs given out, speech more slurred and started day prior to arrival, abdomen increased distention and shortness of breath, constipated with last BM 2 weeks ago  On exam decreased air movement  Abdominal distention and generalized tenderness  Dysarthria  Generalized weakness  NT-proBNP 161  Ct head showed no new lesions  Ct chest/abdomen showed PE, dilated RV/LV ratio    Constipation and bladder distention  In the ED bolused with heparin and gtt started  Plan is continue heparin gtt, telemetry  agressive bowel regimen    Date: 5/11/2021   Day 2:  Patient has constipation, feels is adequately urinating     Patient  with decreased strength and endurance  On exam abdominal distention  Weakness  Heparin gtt dc  And transitioned to Eliquis, telemetry continues  To continue Miralax and Senakot, add Relistor  Continue antiemetics ad needed  Date: 5/12/2021   Day 3:  Patient continues with no BM despite fleets and Relistor  On exam abdomen distended  Plan to give another dose of relisor, SSE, mag citrate        ED Triage Vitals   Temperature Pulse Respirations Blood Pressure SpO2   05/10/21 1324 05/10/21 1324 05/10/21 1324 05/10/21 1324 05/10/21 1324   98 6 °F (37 °C) 77 (!) 24 147/77 98 %      Temp Source Heart Rate Source Patient Position - Orthostatic VS BP Location FiO2 (%)   05/10/21 1324 05/10/21 2100 05/10/21 2100 05/10/21 2100 --   Oral Monitor Lying Right arm       Pain Score       05/10/21 1324       7          Wt Readings from Last 1 Encounters:   05/10/21 77 9 kg (171 lb 11 8 oz)     Additional Vital Signs:   05/12/21 0731  98 6 °F (37 °C)  80  18  138/67  --  92 %  None (Room air)  Lying   05/11/21 2206  98 4 °F (36 9 °C)  93  16  134/74  --  94 %  None (Room air)  Lying   05/11/21 1500  97 5 °F (36 4 °C)  95  20  123/68  --  96 %  None (Room air)         05/11/21 0827  99 °F (37 2 °C)  72  20  140/89  --  96 %  None (Room air)  Lying   05/10/21 2348  98 9 °F (37 2 °C)  68  18  138/74  --  97 %  None (Room air)  --   05/10/21 2248  --  76  18  141/85  108  95 %  None (Room air)  Lying   05/10/21 2225  --  77  18  135/86  --  97 %  None (Room air)  Lying   05/10/21 2100  --  68  20  138/77  --  98 %  None (Room air)           05/10 0701   05/11 0700 05/11 0701 05/12 0700 05/12 0701 05/13 0700   Urine (mL/kg/hr) 1550 1375 (0 7)    Total Output 1550 1375    Net -1550 -1375 Pertinent Labs/Diagnostic Test Results:   5/10/2021 ct head  No acute intracranial abnormality or significant change from prior study  Changes within the brain parenchyma again suggesting sequela of prior whole brain radiation including previously treated right periatrial dystrophic calcification  No new lesions detected within limitations of noncontrast imaging  Intracranial metastatic disease again better evaluated with contrast-enhanced MRI     5/10/2021 PE study with ct abdomen - Limited CT pulmonary angiogram, however, suggestion of a small subsegmental left upper lobe pulmonary embolus  Dilated RV/LV ratio which is nonspecific and could be indicative of elevated right heart pressures in the setting of COPD/pulmonary hypertension  No bowing of the intraventricular septum    2   Small bilateral pulmonary nodules, grossly similar  1 cm pleural-based left apical nodule appears larger  Cannot exclude evolving metastasis    3   No acute intra-abdominal abnormality    4   Large volume of colonic stool suggesting constipation    5   Marked urinary bladder distention      5/10/2021 ECG Normal sinus rhythm  Nonspecific T wave abnormality  Abnormal ECG  When compared with ECG of 10-MAR-2020 21:21,  No significant change was found    Results from last 7 days   Lab Units 05/12/21  0817 05/11/21  1016 05/10/21  1057   WBC Thousand/uL 7 95 7 95 10 43*   HEMOGLOBIN g/dL 10 8* 11 4* 10 9*   HEMATOCRIT % 34 2* 37 1 35 3   PLATELETS Thousands/uL 280 281 285   BANDS PCT %  --   --  2     Results from last 7 days   Lab Units 05/12/21  0817 05/11/21  1016 05/10/21  1057   SODIUM mmol/L 146* 146* 142   POTASSIUM mmol/L 3 9 3 8 4 1   CHLORIDE mmol/L 109* 109* 106   CO2 mmol/L 28 27 25   ANION GAP mmol/L 9 10 11   BUN mg/dL 13 12 16   CREATININE mg/dL 0 63 0 63 0 91   EGFR ml/min/1 73sq m 99 99 70   CALCIUM mg/dL 8 6 8 4 8 9   MAGNESIUM mg/dL  --  2 5  --      Results from last 7 days   Lab Units 05/12/21 0817 05/11/21  1016 05/10/21  1057   AST U/L 43 41 67*   ALT U/L 111* 110* 142*   ALK PHOS U/L 89 85 96   TOTAL PROTEIN g/dL 6 6 7 2 7 3   ALBUMIN g/dL 3 0* 3 2* 3 4*   TOTAL BILIRUBIN mg/dL 0 28 0 27 0 24     Results from last 7 days   Lab Units 05/12/21  0817 05/11/21  1016 05/10/21  1057   GLUCOSE RANDOM mg/dL 88 96 140     Results from last 7 days   Lab Units 05/10/21  1421   TROPONIN I ng/mL <0 02     Results from last 7 days   Lab Units 05/11/21  1016 05/11/21  0056 05/10/21  1421   PROTIME seconds  --   --  11 7   INR   --   --  0 84   PTT seconds 76* 93* 23     Results from last 7 days   Lab Units 05/10/21  1421   TSH 3RD GENERATON uIU/mL 0 559     Results from last 7 days   Lab Units 05/10/21  1421   NT-PRO BNP pg/mL 161*     Results from last 7 days   Lab Units 05/10/21  1421   LIPASE u/L 66*     Results from last 7 days   Lab Units 05/11/21  0040 05/10/21  1801   CLARITY UA  Clear Clear   COLOR UA  Yellow Yellow   SPEC GRAV UA  1 010 1 010   PH UA  7 0 7 0   GLUCOSE UA mg/dl Negative Negative   KETONES UA mg/dl Negative Negative   BLOOD UA  Negative Negative   PROTEIN UA mg/dl Negative Negative   NITRITE UA  Negative Negative   BILIRUBIN UA  Negative Negative   UROBILINOGEN UA E U /dl 0 2 0 2   LEUKOCYTES UA  Moderate* Moderate*   WBC UA /hpf 4-10* 4-10*   RBC UA /hpf 0-1 None Seen   BACTERIA UA /hpf Occasional Occasional   EPITHELIAL CELLS WET PREP /hpf None Seen Occasional     Results from last 7 days   Lab Units 05/10/21  1743 05/10/21  1458   BLOOD CULTURE  No Growth at 24 hrs  No Growth at 24 hrs  No Growth at 24 hrs       Results from last 7 days   Lab Units 05/10/21  1057   TOTAL COUNTED  100     ED Treatment:   Medication Administration from 05/10/2021 1315 to 05/10/2021 2233       Date/Time Order Dose Route Action Comments     05/10/2021 1446 iohexol (OMNIPAQUE) 350 MG/ML injection (MULTI-DOSE) 100 mL 100 mL Intravenous Given      05/10/2021 1654 heparin (porcine) injection 6,000 Units 6,000 Units Intravenous Given      05/10/2021 1654 heparin (porcine) 25,000 units in 0 45% NaCl 250 mL infusion (premix) 18 Units/kg/hr Intravenous New Bag      05/10/2021 1904 levETIRAcetam (KEPPRA) tablet 250 mg 250 mg Oral Given      05/10/2021 1905 ondansetron (ZOFRAN-ODT) dispersible tablet 8 mg 8 mg Oral Given      05/10/2021 2117 OLANZapine (ZyPREXA) tablet 10 mg 10 mg Oral Given      05/10/2021 1905 gabapentin (NEURONTIN) capsule 400 mg 400 mg Oral Given      05/10/2021 1904 senna-docusate sodium (SENOKOT S) 8 6-50 mg per tablet 2 tablet 2 tablet Oral Given         Past Medical History:   Diagnosis Date    Dehydration 6/11/2019    Dizziness 2/6/2018    Dry skin 2/6/2018    Edema 10/23/2019    H/O bilateral mastectomy 2/15/2016    Hyperglycemia 2/6/2018    Hypertension 2/15/2016    Hypokalemia 3/10/2020    Lymphedema 2/15/2016    Malignant cachexia ([facility] [address] ) 10/6/2016    Malignant neoplasm of right breast ([facility] [address] ) 2/15/2016    Metastatic breast cancer ([facility] [address] )      Present on Admission:   Tobacco abuse   Brain metastases (HCC)   Anemia of chronic disease   Therapeutic opioid-induced constipation (OIC)   CINV (chemotherapy-induced nausea and vomiting)   Cancer-related pain      Admitting Diagnosis: Slurred speech [R47 81]  Bladder distension [N32 89]  Pulmonary embolism (HCC) [I26 99]  Weakness [R53 1]  Constipation [K59 00]  Multiple pulmonary nodules [R91 8]  Generalized weakness [R53 1]  Age/Sex: 64 y o  female  Admission Orders:  5/10/2021 1638 inpatient   Scheduled Medications:  dexamethasone, 2 mg, Oral, Daily With Breakfast  gabapentin, 400 mg, Oral, TID AC  levETIRAcetam, 250 mg, Oral, BID  nicotine, 1 patch, Transdermal, Daily  OLANZapine, 10 mg, Oral, HS  ondansetron, 8 mg, Oral, TID AC  pantoprazole, 40 mg, Oral, Daily  polyethylene glycol, 17 g, Oral, BID  senna-docusate sodium, 2 tablet, Oral, BID  sodium phosphate-biphosphate, 1 enema, Rectal, Once    apixaban (ELIQUIS) tablet 10 mg   Dose: 10 mg  Freq: 2 times daily Route: PO  Start: 05/11/21 1800 End: 05/18/21 1759    methylnaltrexone (RELISTOR) subcutaneous injection 12 mg   Dose: 12 mg  Freq: Once Route: SC  Start: 05/12/21 1800    methylnaltrexone (RELISTOR) subcutaneous injection 8 mg   Dose: 8 mg  Freq: Once Route: SC  Start: 05/11/21 1700 End: 05/11/21 1823    Continuous IV Infusions:  heparin (porcine), 3-30 Units/kg/hr (Order-Specific), Intravenous, Titrated - dc 1600 5/11/2021       PRN Meds: not used   acetaminophen, 650 mg, Oral, Q6H PRN  albuterol, 2 puff, Inhalation, Q6H PRN  aluminum-magnesium hydroxide-simethicone, 30 mL, Oral, Q6H PRN  heparin (porcine), 3,000 Units, Intravenous, Q1H PRN  heparin (porcine), 6,000 Units, Intravenous, Q1H PRN  HYDROmorphone, 1 mg, Intravenous, Q6H PRN  HYDROmorphone, 4 mg, Oral, Q4H PRN  HYDROmorphone, 8 mg, Oral, Q4H PRN - used x 1 4/11/2021   zolpidem, 10 mg, Oral, HS PRN          Network Utilization Review Department  ATTENTION: Please call with any questions or concerns to [phone] and carefully listen to the prompts so that you are directed to the right person  All voicemails are confidential   Fab Sousa all requests for admission clinical reviews, approved or denied determinations and any other requests to dedicated fax number below belonging to the campus where the patient is receiving treatment   List of dedicated fax numbers for the Facilities:  [address] DENIALS (Administrative/Medical Necessity) [phone]   [address] (Maternity/NICU/Pediatrics) [phone]   [address] [address] [facility] [address] [address] [facility] 820 [facility] 863 [facility] [phone]   [address] [place] [address] [phone]

## 2021-05-11 NOTE — PHYSICAL THERAPY NOTE
PHYSICAL THERAPY EVALUATION AND TREATMENT  NOTE          Patient Name: Hetal Ghotra  IWCRJ'V Date: 5/11/2021  AGE:   64 y o  Mrn:   [id number]  ADMIT DX:  Slurred speech [R47 81]  Bladder distension [N32 89]  Pulmonary embolism ([facility] [address] ) [I26 99]  Weakness [R53 1]  Constipation [K59 00]  Multiple pulmonary nodules [R91 8]  Generalized weakness [R53 1]    Past Medical History:   Diagnosis Date    Dehydration 6/11/2019    Dizziness 2/6/2018    Dry skin 2/6/2018    Edema 10/23/2019    H/O bilateral mastectomy 2/15/2016    Hyperglycemia 2/6/2018    Hypertension 2/15/2016    Hypokalemia 3/10/2020    Lymphedema 2/15/2016    Malignant cachexia ([facility] [address] ) 10/6/2016    Malignant neoplasm of right breast ([facility] [address] ) 2/15/2016    Metastatic breast cancer ([facility] [address] )      Length Of Stay: 1  PHYSICAL THERAPY EVALUATION :      05/11/21 1124   PT Last Visit   PT Visit Date 05/11/21   Note Type   Note type Evaluation   Pain Assessment   Pain Assessment Tool 0-10   Pain Score 6   Pain Location/Orientation Location: Back   Patient's Stated Pain Goal No pain   Hospital Pain Intervention(s) Repositioned; Ambulation/increased activity   Home Living   Type of [address] [place] [address] One level  (5STE)   Bathroom Shower/Tub Tub/shower unit   Bathroom Toilet Standard   Bathroom Equipment Shower chair   Bathroom Accessibility Accessible via wheelchair   [address] [place] [address],[address]   Additional Comments Pt states she lives with her  and her sister   Prior Function   Level of [place] Needs assistance with ADLs and functional mobility   Lives With Spouse; Other (Comment)  (sister)   Receives Help From Family   ADL Assistance Needs assistance   IADLs Needs assistance   Falls in the last 6 months 0   Comments Pt's  reports she utilizes RW in the home for short distances   Pt states she requires A with bathing   Restrictions/Precautions   Weight Bearing Precautions Per Order No   Other Precautions Cognitive; Bed Alarm; Chair Alarm;Telemetry;Multiple lines;Limb alert;Pain   General   Additional Pertinent History per Dr Shelbi Cisneros via tiger text, can proceed w/ PT given pt's dx of PE - "pt is therapeutic on heparin"   Cognition   Overall Cognitive Status Impaired   Arousal/Participation Cooperative   Attention Attends with cues to redirect   Orientation Level Oriented to person;Oriented to place   Memory Decreased recall of recent events   Following Commands Follows multistep commands with increased time or repetition   Strength RLE   R Hip Flexion 3/5  (did not apply manual resistance d/t ? mets)   R Knee Extension 3/5  (did not apply manual resistance d/t ? mets)   R Knee Flexion 3/5  (did not apply manual resistance d/t ? mets)   R Ankle Dorsiflexion 3/5  (did not apply manual resistance d/t ? mets)   R Ankle Plantar Flexion   (did not apply manual resistance d/t ? mets)   Strength LLE   L Hip Flexion 3/5  (did not apply manual resistance d/t ? mets)   L Knee Extension 3/5  (did not apply manual resistance d/t ? mets)   L Knee Flexion 3/5  (did not apply manual resistance d/t ? mets)   L Ankle Dorsiflexion 3/5  (did not apply manual resistance d/t ? mets)   L Ankle Plantar Flexion 3/5  (did not apply manual resistance d/t ? mets)   Coordination   Movements are Fluid and Coordinated 0   Coordination and Movement Description + myoclonic jerks, tremors, ataxic    Bed Mobility   Supine to Sit 5  Supervision   Additional items Assist x 1; Increased time required;HOB elevated; Bedrails   Sit to Supine Unable to assess   Transfers   Sit to Stand 3  Moderate assistance   Additional items Assist x 1;Bedrails;Armrests; Increased time required;Verbal cues  (myoclonic jerking, tremors)   Stand to Sit 3  Moderate assistance   Additional items Assist x 1;Bedrails;Armrests; Increased time required;Verbal cues  (myoclonic jerking, tremors)   Toilet transfer 3  Moderate assistance   Additional items Assist x 2;Increased time required;Verbal cues; Commode   Additional Comments Pt required mod A x 2 to perform SPT from EOB to commode d/t tremors, poor coordination, weakness, and ataxia   Balance   Static Sitting Fair -   Static Standing Poor +   Endurance Deficit   Endurance Deficit Yes   Endurance Deficit Description pt w/ reports of SOB following bed mobility   Activity Tolerance   Activity Tolerance Patient limited by fatigue;Patient limited by pain   Medical Staff Albino coordination w/ OT, Shyann    Nurse Made Aware RN, Radha Locke made aware   Assessment   Prognosis Fair   Problem List Decreased strength;Decreased endurance; Impaired balance;Decreased mobility; Decreased coordination;Pain   Assessment Pt is a 64 y o  female seen for PT evaluation s/p admit to [facility] on 5/10/2021 w/ generalized weakness and PE  Order placed for PT  Upon evaluation, pt requires supervision for bed mobility with HOB elevated and reliance on bed rails, moderate assistance x 1 for sit <> stand transfers, and moderate assistance x 2 for SPT  Pt's clinical presentation is currently unstable/unpredictable given the functional mobility deficits above, especially (but not limited to) weakness, decreased endurance, poor coordination, and balance impairments  Pt has a PMH of breast cancer with metastases to brain and she is on chemotherapy  Pt to benefit from continued skilled PT tx while in hospital and upon DC to address deficits as defined above and maximize level of functional mobility and reduce care giver burden  Recommend gait training with walker, therex, and case management consult      Goals   Patient Goals Pt reports, "I want to go home "   [facility] Expiration Date 05/21/21   Short Term Goal #1 Patient will: Perform all bed mobility tasks modified independent to improve independence and reduce care giver burden, Perform all transfers w/ minx1 to improve independence, Ambulate at least 50 ft  with roller walker w/ minx1 w/o LOB and Tolerate standing 5 minutes w/ supervision to facilitate functional task performance   Plan   Treatment/Interventions ADL retraining;Functional transfer training;LE strengthening/ROM; Elevations; Therapeutic exercise; Endurance training;Patient/family training;Bed mobility;Gait training   PT Frequency Other (Comment)  (3-5x/week)   Recommendation   PT Discharge Recommendation Post acute rehabilitation services   Equipment Recommended Walker   AM-PAC Basic Mobility Inpatient   Turning in Bed Without Bedrails 3   Lying on Back to Sitting on Edge of Flat Bed 2   Moving Bed to Chair 2   Standing Up From Chair 2   Walk in Room 2   Climb 3-5 Stairs 1   Basic Mobility Inpatient Raw Score 12   Basic Mobility Standardized Score 32 23     Patient's identity confirmed via 2 patient identifiers (full name and ) at start of session        The patient's AM-PAC Basic Mobility Inpatient Short Form Raw Score is  12, Standardized Score is  32 23  A standardized score less than 42 9 suggests the patient may benefit from discharge to post-acute rehabilitation services  Please also refer to the recommendation of the Physical Therapist for safe discharge planning  PHYSICAL THERAPY TREATMENT NOTE    Name: Kelley Pacheco  : 1960  Time in: 1116  Time out: 1124  Total treat time: 8 minutes     S: Pt is agreeable to participate in additional mobility training  O: Pt was able to perform a SPT from the commode to chair with mod A x 2  She was able to ambulate 10 ft with RW and mod A x 1 and close chair follow due to increased unsteadiness  A: Pt required frequent verbal cues for hand placement during transfers and required assistance to maneuver RW with ambulation and SPT  Pt demonstrates myoclonic jerking and ataxia, which limits her functional mobility  She was limited with tolerance to activity d/t subjective reports of SOB, however, vitals remained within safe limits       P: Continue per evaluation above     Skilled inpatient PT is recommended during this admission in order to progress patient toward goals so patient is able to maximize independence    Berta Barron, PT

## 2021-05-11 NOTE — ASSESSMENT & PLAN NOTE
- Cr noted to be 0 9 on admission, baseline 0 5 - 0 7  - Most likely secondary to urinary retention as noted by bladder distension on CT  - Patient is now back to baseline    Plan:   Intake and output   Monitor BMP daily and observe for downward trend of creatinine   Avoid hypoperfusion of the kidneys, minimize nephrotoxins    Results from last 7 days   Lab Units 05/11/21  1016 05/10/21  1057   CREATININE mg/dL 0 63 0 91   EGFR ml/min/1 73sq m 99 70

## 2021-05-11 NOTE — ASSESSMENT & PLAN NOTE
- Patient noted to have elevated AST 67/ in ED  - CT of the abdomen did not note any acute pathology  - Liver enzymes in ED mildly elevated from baseline, possibly reactive  - resolving    Plan  - Will repeat LFTs in the morning and continue to trend at this time

## 2021-05-11 NOTE — PLAN OF CARE
Problem: Potential for Falls  Goal: Patient will remain free of falls  Description: INTERVENTIONS:  - Assess patient frequently for physical needs  -  Identify cognitive and physical deficits and behaviors that affect risk of falls    -  [place] fall precautions as indicated by assessment   - Educate patient/family on patient safety including physical limitations  - Instruct patient to call for assistance with activity based on assessment  - Modify environment to reduce risk of injury  - Consider OT/PT consult to assist with strengthening/mobility  Outcome: Progressing     Problem: Prexisting or High Potential for Compromised Skin Integrity  Goal: Skin integrity is maintained or improved  Description: INTERVENTIONS:  - Identify patients at risk for skin breakdown  - Assess and monitor skin integrity  - Assess and monitor nutrition and hydration status  - Monitor labs   - Assess for incontinence   - Turn and reposition patient  - Assist with mobility/ambulation  - Relieve pressure over bony prominences  - Avoid friction and shearing  - Provide appropriate hygiene as needed including keeping skin clean and dry  - Evaluate need for skin moisturizer/barrier cream  - Collaborate with interdisciplinary team   - Patient/family teaching  - Consider wound care consult   Outcome: Progressing     Problem: PAIN - ADULT  Goal: Verbalizes/displays adequate comfort level or baseline comfort level  Description: Interventions:  - Encourage patient to monitor pain and request assistance  - Assess pain using appropriate pain scale  - Administer analgesics based on type and severity of pain and evaluate response  - Implement non-pharmacological measures as appropriate and evaluate response  - Consider cultural and social influences on pain and pain management  - Notify physician/advanced practitioner if interventions unsuccessful or patient reports new pain  Outcome: Progressing     Problem: INFECTION - ADULT  Goal: Absence or prevention of progression during hospitalization  Description: INTERVENTIONS:  - Assess and monitor for signs and symptoms of infection  - Monitor lab/diagnostic results  - Monitor all insertion sites, i e  indwelling lines, tubes, and drains  - Monitor endotracheal if appropriate and nasal secretions for changes in amount and color  - [place] appropriate cooling/warming therapies per order  - Administer medications as ordered  - Instruct and encourage patient and family to use good hand hygiene technique  - Identify and instruct in appropriate isolation precautions for identified infection/condition  Outcome: Progressing     Problem: SAFETY ADULT  Goal: Patient will remain free of falls  Description: INTERVENTIONS:  - Assess patient frequently for physical needs  -  Identify cognitive and physical deficits and behaviors that affect risk of falls    -  [place] fall precautions as indicated by assessment   - Educate patient/family on patient safety including physical limitations  - Instruct patient to call for assistance with activity based on assessment  - Modify environment to reduce risk of injury  - Consider OT/PT consult to assist with strengthening/mobility  Outcome: Progressing  Goal: Maintain or return to baseline ADL function  Description: INTERVENTIONS:  -  Assess patient's ability to carry out ADLs; assess patient's baseline for ADL function and identify physical deficits which impact ability to perform ADLs (bathing, care of mouth/teeth, toileting, grooming, dressing, etc )  - Assess/evaluate cause of self-care deficits   - Assess range of motion  - Assess patient's mobility; develop plan if impaired  - Assess patient's need for assistive devices and provide as appropriate  - Encourage maximum independence but intervene and supervise when necessary  - Involve family in performance of ADLs  - Assess for home care needs following discharge   - Consider OT consult to assist with ADL evaluation and planning for discharge  - Provide patient education as appropriate  Outcome: Progressing  Goal: Maintain or return mobility status to optimal level  Description: INTERVENTIONS:  - Assess patient's baseline mobility status (ambulation, transfers, stairs, etc )    - Identify cognitive and physical deficits and behaviors that affect mobility  - Identify mobility aids required to assist with transfers and/or ambulation (gait belt, sit-to-stand, lift, walker, cane, etc )  - [place] fall precautions as indicated by assessment  - Record patient progress and toleration of activity level on Mobility SBAR; progress patient to next Phase/Stage  - Instruct patient to call for assistance with activity based on assessment  - Consider rehabilitation consult to assist with strengthening/weightbearing, etc   Outcome: Progressing     Problem: DISCHARGE PLANNING  Goal: Discharge to home or other facility with appropriate resources  Description: INTERVENTIONS:  - Identify barriers to discharge w/patient and caregiver  - Arrange for needed discharge resources and transportation as appropriate  - Identify discharge learning needs (meds, wound care, etc )  - Arrange for interpretive services to assist at discharge as needed  - Refer to Case Management Department for coordinating discharge planning if the patient needs post-hospital services based on physician/advanced practitioner order or complex needs related to functional status, cognitive ability, or social support system  Outcome: Progressing     Problem: Knowledge Deficit  Goal: Patient/family/caregiver demonstrates understanding of disease process, treatment plan, medications, and discharge instructions  Description: Complete learning assessment and assess knowledge base    Interventions:  - Provide teaching at level of understanding  - Provide teaching via preferred learning methods  Outcome: Progressing     Problem: RESPIRATORY - ADULT  Goal: Achieves optimal ventilation and oxygenation  Description: INTERVENTIONS:  - Assess for changes in respiratory status  - Assess for changes in mentation and behavior  - Position to facilitate oxygenation and minimize respiratory effort  - Oxygen administered by appropriate delivery if ordered  - Initiate smoking cessation education as indicated  - Encourage broncho-pulmonary hygiene including cough, deep breathe, Incentive Spirometry  - Assess the need for suctioning and aspirate as needed  - Assess and instruct to report SOB or any respiratory difficulty  - Respiratory Therapy support as indicated  Outcome: Progressing     Problem: GASTROINTESTINAL - ADULT  Goal: Minimal or absence of nausea and/or vomiting  Description: INTERVENTIONS:  - Administer IV fluids if ordered to ensure adequate hydration  - Maintain NPO status until nausea and vomiting are resolved  - Nasogastric tube if ordered  - Administer ordered antiemetic medications as needed  - Provide nonpharmacologic comfort measures as appropriate  - Advance diet as tolerated, if ordered  - Consider nutrition services referral to assist patient with adequate nutrition and appropriate food choices  Outcome: Progressing  Goal: Maintains or returns to baseline bowel function  Description: INTERVENTIONS:  - Assess bowel function  - Encourage oral fluids to ensure adequate hydration  - Administer IV fluids if ordered to ensure adequate hydration  - Administer ordered medications as needed  - Encourage mobilization and activity  - Consider nutritional services referral to assist patient with adequate nutrition and appropriate food choices  Outcome: Progressing

## 2021-05-11 NOTE — PLAN OF CARE
Problem: OCCUPATIONAL THERAPY ADULT  Goal: Performs self-care activities at highest level of function for planned discharge setting  See evaluation for individualized goals  Description: Treatment Interventions: ADL retraining, Functional transfer training, UE strengthening/ROM, Endurance training, Patient/family training, Equipment evaluation/education, Fine motor coordination activities, Continued evaluation, Compensatory technique education, Energy conservation, Activityengagement  Equipment Recommended: Shower/Tub chair with back ($), Bedside commode       See flowsheet documentation for full assessment, interventions and recommendations  Note: Limitation: Decreased ADL status, Decreased UE strength, Decreased cognition, Decreased endurance, Decreased fine motor control, Decreased self-care trans, Decreased high-level ADLs     Assessment: Pt is a 65yo female admitted to [facility] on 5/10/2021  Pt presents w/ pulmonary embolism and significant PMH impacting her occupational performance including bilateral mastectomy (2016), metastatic breast cancer w/ mets to brain/bone, lymphedema, HTN, s/p chemo  Pt w/ active OT orders and activity orders; per SLIM appropriate to see pt  Pt reports daljitng w/ spouse, Ruben Heller and sister in mobile home w/ 5 [employer] PTA  Pt reports using RW for functional mobility and needs assistance w/ bathing and IADL tasks  Pt able to ambulate 15-20" using RW  Personal factors impacting performance include difficulty completing ADL, difficulty completing IADL, limited insight into deficits, difficulty managing steps, med / health mgmt  Upon eval, pt alert and oriented to person, place  Able to participate in conversation w/ cued recall froms pouse at times and communicate wants / needs  Pt reports R hand dominance and demonstrated B UE AROM WFL to complete ADL  Pt completed bed mobility w/ S  Pt required mod A to complete sit <> stand  Pt required mod A x2 using RW to complete transfer to commode   Pt engaged in functional mobility short distances w/ mod I a x2 using RW  Pt required mod A to complete LBD and mod A to complete toileting  Pt presents w/ increased pain, limited insight into deficits, decreased cognition, decreased coordination, generalized weakness /deconditioning, decreased standing balance, decreased standing tolerance, decreased sitting balance impacting her I w/ dressing, bathing, oral hygiene, clothing mgmt, functional transfers, functional mobility, and activity engagement  Pt completing ADL below baseline level of I and would benefit from OT while in acute care to address deficits  The patient's raw score on the AM-PAC Daily Activity inpatient short form is 17, standardized score is 37 26, less than 39 4  Patients at this level are likely to benefit from discharge to post-acute rehabilitation services  Please refer to the recommendation of the Occupational Therapist for safe discharge planning  Recommend post acute rehab when medically stable for discharge from acute care   Will continue to follow     OT Discharge Recommendation: Post acute rehabilitation services

## 2021-05-11 NOTE — PROGRESS NOTES
[facility]  Progress Note - [facility] 1960, 64 y o  female MRN: [id number]  Unit/Bed#: S -01 Encounter: [id number]  Primary Care Provider: Guillermo Lindsey MD   Date and time admitted to hospital: 5/10/2021  1:16 PM    Weakness generalized  Assessment & Plan  - Patient presents with a 2-3 day history of gradual onset generalized weakness and slurred speech  - Most likely secondary to deconditioning in the setting of patient's cancer history versus infection  - patient's weakness is improving  - OT is recommending post acute rehab services    Plan  - PT/OT evaluation and treat  - will consider imaging the spine given the fact that the patient has a history of Mets, however will hold off for now    * [facility]  Assessment & Plan  - patient presents with a 2-3 day history of gradual onset weakness  - imaging in the ED noted a "small subsegmental left upper lobe pulmonary embolus"  - patient does report some continued mild shortness of breath    Plan  - heparin gtt discontinued  - will start the patient on Eliquis 10 mg for 7 days b i d , followed by Eliquis 5 mg b i d   - telemetry monitoring      GILBERTO (acute kidney injury) ([facility] [address] )  Assessment & Plan  - Cr noted to be 0 9 on admission, baseline 0 5 - 0 7  - Most likely secondary to urinary retention as noted by bladder distension on CT  - Patient is now back to baseline    Plan:   Intake and output   Monitor BMP daily and observe for downward trend of creatinine   Avoid hypoperfusion of the kidneys, minimize nephrotoxins    Results from last 7 days   Lab Units 05/11/21  1016 05/10/21  1057   CREATININE mg/dL 0 63 0 91   EGFR ml/min/1 73sq m 99 70       Leukocytosis  Assessment & Plan  - patient is noted to have a mild leukocytosis on labs in the emergency room on presentation 10 43  - patient denies any fever, chills  - low suspicion of infectious process  - patient is on steroids chronically at home, this is a possible source of the leukocytosis  - resolved    Plan  - will continue to monitor    Transaminitis  Assessment & Plan  - Patient noted to have elevated AST 67/ in ED  - CT of the abdomen did not note any acute pathology  - Liver enzymes in ED mildly elevated from baseline, possibly reactive  - resolving    Plan  - Will repeat LFTs in the morning and continue to trend at this time    Anemia of chronic disease  Assessment & Plan  - Pt has noted decrease in hgb on admission  - improving  - will continue to monitor    Tobacco abuse  Assessment & Plan  - Patient has a history of tobacco abuse  - Will order the patient nicotine replacement  - Encourage cessation    Therapeutic opioid-induced constipation (OIC)  Assessment & Plan  - patient is a history chronic constipation  - Pt can go 2-5 days between bowel movements  - patient has notable abdominal distension  - patient is maintained on senna as an outpatient, will discontinue on admission  - patient is noted to have significant fecal matter in her GI tract on CT imaging in the ED    Plan  - Patient on Miralax BID and Senakot-s BID  - will add Relistor    CINV (chemotherapy-induced nausea and vomiting)  Assessment & Plan  - Patient reports frequent nausea  - Patient is maintained on Zofran, Protonix as an outpatient will continue    Cancer-related pain  Assessment & Plan  - Pt has extensive history of cancer related pain  - Will continue pt on gabapentin and hydromorphone  - will consult palliative care    Brain metastases [facility])  Assessment & Plan  - Pt has breast cancer with mets to the brain  - [facility] 5/10: No acute intracranial abnormality or significant change from prior study   - Most recent MRI was in 2020    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, estrogen receptor positive ([facility] [address] )  Assessment & Plan  - Diagnosed with metastatic breast cancer in 2010 with mets to the brain and bone  - S/p numerous rounds of chemotherapy  - S/P double mastectomy   - Follows with heme/onc as out patient        VTE Pharmacologic Prophylaxis:   Pharmacologic: Apixaban (Eliquis)  Mechanical VTE Prophylaxis in Place: Yes    Discussions with Specialists or Other Care Team Provider:  None    Education and Discussions with Family / Patient:  Discussed with patient, and patient's daughter, and patient's     Current Length of Stay: 1 day(s)    Current Patient Status: Inpatient     Discharge Plan / Estimated Discharge Date: To be determined    Code Status: Level 1 - Full Code      Subjective:   Patient seen and examined  No acute events overnight  The patient has still not had a bowel movement and is still constipated  The patient has been urinating significantly and does not feel that her bladder is as distended  The patient does report feeling somewhat better since admission  Upon my a seeing her today the patient was eating lunch and had a very good appetite  Patient denies  any headache, dizziness, nausea, vomiting, constipation, diarrhea, chest pain, palpitations, shortness of breath, wheezing  A 12 point review of systems was completed and was negative unless noted above  Objective:     Vitals:   Temp (24hrs), Av 5 °F (36 9 °C), Min:97 5 °F (36 4 °C), Max:99 °F (37 2 °C)    Temp:  [97 5 °F (36 4 °C)-99 °F (37 2 °C)] 97 5 °F (36 4 °C)  HR:  [66-95] 95  Resp:  [18-20] 20  BP: (123-141)/(68-89) 123/68  SpO2:  [95 %-98 %] 96 %  Body mass index is 28 58 kg/m²  Input and Output Summary (last 24 hours): Intake/Output Summary (Last 24 hours) at 2021 1644  Last data filed at 2021 1226  Gross per 24 hour   Intake --   Output 2075 ml   Net -2075 ml       Physical Exam:     Physical Exam  Vitals signs and nursing note reviewed  Constitutional:       General: She is not in acute distress  Appearance: She is well-developed  She is not diaphoretic  HENT:      Head: Normocephalic and atraumatic        Nose: Nose normal    Eyes: General: No scleral icterus  Right eye: No discharge  Left eye: No discharge  Extraocular Movements: Extraocular movements intact  Conjunctiva/sclera: Conjunctivae normal       Pupils: Pupils are equal, round, and reactive to light  Neck:      Musculoskeletal: Normal range of motion  Cardiovascular:      Rate and Rhythm: Normal rate and regular rhythm  Heart sounds: Normal heart sounds  No murmur  No friction rub  No gallop  Pulmonary:      Effort: Pulmonary effort is normal  No respiratory distress  Breath sounds: No stridor  No wheezing, rhonchi or rales  Chest:      Chest wall: No tenderness  Abdominal:      General: Abdomen is flat  There is distension  Palpations: Abdomen is soft  Tenderness: There is no abdominal tenderness  Musculoskeletal: Normal range of motion  Right lower leg: No edema  Left lower leg: No edema  Skin:     General: Skin is warm and dry  Neurological:      General: No focal deficit present  Mental Status: She is alert and oriented to person, place, and time  GCS: GCS eye subscore is 4  GCS verbal subscore is 5  GCS motor subscore is 6  Cranial Nerves: No cranial nerve deficit  Sensory: No sensory deficit  Motor: Weakness present  Comments:   Hip extension left 5/5 right 5/5  Hip flexion: left 3/5 right 3/5  Dorsiflexion: left 5/5 right 5/5  Plantar flexion: left 5/5 right 5/5   Psychiatric:         Behavior: Behavior normal          Thought Content: Thought content normal          Judgment: Judgment normal        Additional Data:     Labs: I have personally reviewed pertinent reports    Results from last 7 days   Lab Units 05/11/21  1016 05/10/21  1057   WBC Thousand/uL 7 95 10 43*   HEMOGLOBIN g/dL 11 4* 10 9*   HEMATOCRIT % 37 1 35 3   PLATELETS Thousands/uL 281 285   MONO PCT %  --  6      Results from last 7 days   Lab Units 05/11/21  1016 05/10/21  1057   POTASSIUM mmol/L 3 8 4 1 CHLORIDE mmol/L 109* 106   CO2 mmol/L 27 25   BUN mg/dL 12 16   CREATININE mg/dL 0 63 0 91   CALCIUM mg/dL 8 4 8 9   ALK PHOS U/L 85 96   ALT U/L 110* 142*   AST U/L 41 67*     Results from last 7 days   Lab Units 05/11/21  1016   MAGNESIUM mg/dL 2 5          Results from last 7 days   Lab Units 05/11/21  1016 05/11/21  0056 05/10/21  1421   INR   --   --  0 84   PTT seconds 76* 93* 23         Results from last 7 days   Lab Units 05/10/21  1421   TROPONIN I ng/mL <0 02       * Additional Pertinent Lab Tests Reviewed: [place] [facility] Admission Reviewed    Radiology Results: I have personally reviewed pertinent reports  Ct Head Without Contrast    Result Date: 5/10/2021  Impression: No acute intracranial abnormality or significant change from prior study  Changes within the brain parenchyma again suggesting sequela of prior whole brain radiation including previously treated right periatrial dystrophic calcification  No new lesions detected within limitations of noncontrast imaging  Intracranial metastatic disease again better evaluated with contrast-enhanced MRI  Workstation performed: [id number]     Pe Study With Ct Abdomen And Pelvis With Contrast    Result Date: 5/10/2021  Impression: 1  Limited CT pulmonary angiogram, however, suggestion of a small subsegmental left upper lobe pulmonary embolus  Dilated RV/LV ratio which is nonspecific and could be indicative of elevated right heart pressures in the setting of COPD/pulmonary hypertension  No bowing of the intraventricular septum  2   Small bilateral pulmonary nodules, grossly similar  1 cm pleural-based left apical nodule appears larger  Cannot exclude evolving metastasis  3   No acute intra-abdominal abnormality  4   Large volume of colonic stool suggesting constipation  5   Marked urinary bladder distention   I personally discussed this study with physician assistant Samantha Eaton on 5/10/2021 at 3:50 PM  Workstation performed: [id number]       Recent Cultures (last 7 days):     Results from last 7 days   Lab Units 05/10/21  1743 05/10/21  1458   BLOOD CULTURE  Received in Microbiology Lab  Culture in Progress  Received in Microbiology Lab  Culture in Progress  Received in Microbiology Lab  Culture in Progress  Last 24 Hours Medication List:   Current Facility-Administered Medications   Medication Dose Route Frequency Provider Last Rate    acetaminophen  650 mg Oral Q6H PRN Kenith Shingles, DO      albuterol  2 puff Inhalation Q6H PRN Kenith Shingles, DO      aluminum-magnesium hydroxide-simethicone  30 mL Oral Q6H PRN Kenith Shingles, DO      apixaban  10 mg Oral BID Kenith Shingles, DO      [START ON 5/18/2021] apixaban  5 mg Oral BID Kenith Shingles, DO      dexamethasone  2 mg Oral Daily With Breakfast Kenith Shingles, DO      gabapentin  400 mg Oral TID AC Juliocesar Simms, DO      HYDROmorphone  1 mg Intravenous Q6H PRN Kenith Shingles, DO      HYDROmorphone  4 mg Oral Q4H PRN Kenith Shingles, DO      HYDROmorphone  8 mg Oral Q4H PRN Kenith Shingles, DO      levETIRAcetam  250 mg Oral BID Kenith Shingles, DO      nicotine  1 patch Transdermal Daily Juliocesar Carrington, DO      OLANZapine  10 mg Oral HS Juliocesar Simms, DO      ondansetron  8 mg Oral TID RAFA Simms, DO      pantoprazole  40 mg Oral Daily Kenith Shinlites, DO      polyethylene glycol  17 g Oral BID Kenith Shingles, DO      senna-docusate sodium  2 tablet Oral BID Kenith Shingles, DO      zolpidem  10 mg Oral HS PRN Kenith Shingles, DO          Today, Patient Was Seen By: Sondra Rico DO    Portions of the record may have been created with voice recognition software  Occasional wrong word or "sound a like" substitutions may have occurred due to the inherent limitations of voice recognition software  Read the chart carefully and recognize, using context, where substitutions have occurred

## 2021-05-11 NOTE — OCCUPATIONAL THERAPY NOTE
Occupational Therapy Evaluation     Patient Name: Caden FALCON Date: 5/11/2021  Problem List  Principal Problem:    Pulmonary embolism ([facility] )  Active Problems:    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, estrogen receptor positive ([facility] )    Brain metastases (HCC)    Cancer-related pain    CINV (chemotherapy-induced nausea and vomiting)    Therapeutic opioid-induced constipation (OIC)    Weakness generalized    Tobacco abuse    Anemia of chronic disease    GILBERTO (acute kidney injury) ([address] )    Transaminitis    Leukocytosis    Past Medical History  Past Medical History:   Diagnosis Date    Dehydration 6/11/2019    Dizziness 2/6/2018    Dry skin 2/6/2018    Edema 10/23/2019    H/O bilateral mastectomy 2/15/2016    Hyperglycemia 2/6/2018    Hypertension 2/15/2016    Hypokalemia 3/10/2020    Lymphedema 2/15/2016    Malignant cachexia ([facility] ) 10/6/2016    Malignant neoplasm of right breast ([facility] ) 2/15/2016    Metastatic breast cancer ([facility] )      Past Surgical History  Past Surgical History:   Procedure Laterality Date    ENDOBRONCHIAL ULTRASOUND (EBUS) N/A 2/16/2016    Procedure: EBUS;  Surgeon: Kassi Braxton MD;  [facility];  Service:     MASTECTOMY Bilateral     MASTECTOMY Bilateral     right arm edema    OOPHORECTOMY      SD BRONCHOSCOPY NEEDLE BX TRACHEA MAIN STEM&/BRON N/A 2/16/2016    Procedure: Beckie Vazquez;  Surgeon: Kassi Braxton MD;  [facility];  Service: Thoracic    SD INSJ TUNNELED CTR VAD W/SUBQ PORT AGE 5 YR/> N/A 7/24/2018    Procedure: INSERTION VENOUS PORT ( PORT-A-CATH) IR;  Surgeon: Vianca Alicea DO;  [facility];  Service: Interventional Radiology    SD STEREOTACTIC RADIOSURGERY, CRANIAL,SIMPLE,EA ADD  5/3/2017         SD STEREOTACTIC RADIOSURGERY, CRANIAL,SIMPLE,EA ADD  5/3/2017         SD STEREOTACTIC RADIOSURGERY, CRANIAL,SIMPLE,SINGLE  5/3/2017             05/11/21 1125   OT Last Visit   OT Visit Date 05/11/21  (Tuesday)   Note Type   Note type Evaluation   Restrictions/Precautions   Weight Bearing Precautions Per Order No   Other Precautions Cognitive; Chair Alarm; Bed Alarm;Telemetry; Fall Risk;Multiple lines;Pain;Limb alert  (IV, purewick)   Pain Assessment   Pain Assessment Tool 0-10   Pain Score 6   Pain Location/Orientation Location: Back   Effect of Pain on Daily Activities limits activity tolerance and I w/ ADL performance   Patient's Stated Pain Goal No pain   Hospital Pain Intervention(s) Repositioned; Ambulation/increased activity; Emotional support   Home Living   Type of Home Mobile home  ([address])   Home Layout One level   Bathroom Shower/Tub Tub/shower unit   [address] [place] [address]  chair   Bathroom Accessibility Accessible via walker   Home Equipment Walker;Grab bars   Additional Comments Pt reports living w/ her , Daysi Reyna and sister in mobile home   Prior Function   Level of [place] Needs assistance with IADLs   Lives With Spouse; Other (Comment)  (spouse, Daysi Reyna and sister)   Receives Help From Family   ADL Assistance Needs assistance  (A w/ bathing)   IADLs Needs assistance   Falls in the last 6 months 0  (pt reports 0 since DC home from rehab)   Vocational   (not working )   Comments Pt reports using RW for functional mobility and I w/ ADL except bathing  Pt needs assistance w/ IADL   Lifestyle   Autonomy Pt reports needing assistance w/ bathing and using RW for functional mobility  Pt reports ambualting ~ 15-20' at home   Reciprocal Relationships Pt reports living w/  sister in mobile home w/ 5 [address]   Service to Others Pt reports that she worked in [address] Pt stated "not much"  Will continue to assess   ADL   Eating Assistance 6  Modified independent  (to manage beverage)   Eating Deficit Setup   Grooming Assistance 5  Supervision/Setup  (seated)   Grooming Deficit Setup;Supervision/safety; Increased time to complete   UB Bathing Assistance Unable to assess  (due to decreased activity tolerance)   LB Bathing Assistance Unable to assess  (due to decreased activity tolerance)   UB Dressing Assistance 4  Minimal Assistance   UB Dressing Deficit Setup;Supervision/safety; Increased time to complete; Fasteners   LB Dressing Assistance 3  Moderate Assistance   LB Dressing Deficit Don/doff R sock; Don/doff L sock; Setup;Steadying;Verbal cueing;Supervision/safety; Increased time to complete   Toileting Assistance  3  Moderate Assistance   Toileting Deficit Bedside commode;Clothing management up;Clothing management down;Perineal hygiene;Setup;Steadying;Verbal cueing;Supervison/safety; Increased time to complete   Bed Mobility   Supine to Sit 5  Supervision   Additional items Assist x 1; Increased time required;Verbal cues;HOB elevated; Bedrails   Sit to Supine Unable to assess   Additional Comments Pt seated OOB in chair post eval w/ needs met, call bell in reach and chair alarm activated   Transfers   Sit to Stand 3  Moderate assistance   Additional items Assist x 1;Bedrails;Armrests; Increased time required;Verbal cues   Stand to Sit 3  Moderate assistance   Additional items Assist x 1;Bedrails;Armrests; Increased time required;Verbal cues   Toilet transfer 3  Moderate assistance   Additional items Assist x 2; Increased time required;Verbal cues; Commode   Additional Comments Pt required mod A x2 using RW to complete SPT from EOB to commode  Unsteady and impaired coordination  Ataxic   Functional Mobility   Functional Mobility 3  Moderate assistance   Additional Comments mod A x2 using RW w/ chair follow ~ 6' forward   Additional items Rolling walker   Balance   Static Sitting Fair -   Static Standing Poor +   Activity Tolerance   Activity Tolerance Patient limited by fatigue;Patient limited by pain   Medical Staff Made Aware care coordination w/ PT, Moses and SLIM   Spoke to [employer], [address] [facility] per [place] appropriate to see pt   RUE Assessment RUE Assessment WFL   RUE Strength   RUE Overall Strength   (able to complete ADL)   Edema   RUE Edema Non-pitting   LUE Assessment   LUE Assessment WFL   LUE Strength   LUE Overall Strength   (able to complete ADL)   Hand Function   Gross Motor Coordination Functional   Fine Motor Coordination Impaired   Sensation   Light Touch Not tested   Sharp/Dull Not tested   Additional Comments Responded to light touch during ADL tasks   Cognition   Overall Cognitive Status Impaired   Arousal/Participation Alert   Attention Attends with cues to redirect   Orientation Level Oriented to person;Oriented to place   Memory Decreased recall of recent events   Following Commands Follows one step commands with increased time or repetition   Comments Identified pt by full name and birthdate  Pt alert and oriented to person, place  Limited recall details recent events and timeframe  Supportive , Saskia Paniagua present assisted pt w/ recall  Pt able to introduce  and follow one step directions during ADL tasks  Able to communicate wants / needs  Assessment   Limitation Decreased ADL status; Decreased UE strength;Decreased cognition;Decreased endurance;Decreased fine motor control;Decreased self-care trans;Decreased high-level ADLs   Assessment Pt is a 63yo female [facility]10[facility]2021  Pt presents w/ pulmonary embolism and significant PMH impacting her occupational performance including bilateral mastectomy (2016), metastatic breast cancer w/ mets to brain/bone, lymphedema, HTN, s/p chemo  Pt w/ active OT orders and activity orders; per SLIM appropriate to see pt  Pt reports livng w/ spouse, Saskia Headmel and sister in mobile home w/ 5 [address] PTA  Pt reports using RW for functional mobility and needs assistance w/ bathing and IADL tasks  Pt able to ambulate 15-20" using RW   Personal factors impacting performance include difficulty completing ADL, difficulty completing IADL, limited insight into deficits, difficulty managing steps, med / health mgmt  Upon eval, pt alert and oriented to person, place  Able to participate in conversation w/ cued recall froms pouse at times and communicate wants / needs  Pt reports R hand dominance and demonstrated B UE AROM WFL to complete ADL  Pt completed bed mobility w/ S  Pt required mod A to complete sit <> stand  Pt required mod A x2 using RW to complete transfer to commode  Pt engaged in functional mobility short distances w/ mod I a x2 using RW  Pt required mod A to complete LBD and mod A to complete toileting  Pt presents w/ increased pain, limited insight into deficits, decreased cognition, decreased coordination, generalized weakness /deconditioning, decreased standing balance, decreased standing tolerance, decreased sitting balance impacting her I w/ dressing, bathing, oral hygiene, clothing mgmt, functional transfers, functional mobility, and activity engagement  Pt completing ADL below baseline level of I and would benefit from OT while in acute care to address deficits  The patient's raw score on the AM-PAC Daily Activity inpatient short form is 17, standardized score is 37 26, less than 39 4  Patients at this level are likely to benefit from discharge to post-acute rehabilitation services  Please refer to the recommendation of the Occupational Therapist for safe discharge planning  Recommend post acute rehab when medically stable for discharge from acute care  Will continue to follow   Goals   Patient Goals Pt stated that she would like to be able to walk and go home   Plan   Treatment Interventions ADL retraining;Functional transfer training;UE strengthening/ROM; Endurance training;Patient/family training;Equipment evaluation/education; Fine motor coordination activities;Continued evaluation; Compensatory technique education; Energy conservation; Activityengagement   Goal Expiration Date 05/23/21   OT Frequency 3-5x/wk   Recommendation   OT Discharge Recommendation Post acute rehabilitation services Equipment Recommended Shower/Tub chair with back ($);Bedside commode   Commode Type Standard   AM-PAC Daily Activity Inpatient   Lower Body Dressing 2   Bathing 2   Toileting 2   Upper Body Dressing 3   Grooming 4   Eating 4   Daily Activity Raw Score 17   Daily Activity Standardized Score (Calc for Raw Score >=11) 37 26   AM-PAC Applied Cognition Inpatient   Following a Speech/Presentation 3   Understanding Ordinary Conversation 4   Taking Medications 3   Remembering Where Things Are Placed or Put Away 3   Remembering List of 4-5 Errands 2   Taking Care of Complicated Tasks 2   Applied Cognition Raw Score 17   Applied Cognition Standardized Score 36 52      Pt goals to be met by 5/23/2021:  -Pt will complete functional transfers to bed, chair, and toilet using AD, DME as needed w/ min A x1 to max I w/ ADL performance to return home    -Pt will complete functional tub/shower transfer using AD, DME as needed w/ min A to max I w/ bathing    -Pt will demonstrate good attention and participation in continued evaluation to assess functional cognitive skills to assist in DC Planning    -Pt will demonstrate increased functional standing tolerance for at least 10 minutes using AD, DME as needed w/ at least fair - balance using AD while engaged in ADL tasks to max I to return home    -Pt will consistently engage in functional mobility to / from bathroom using AD as needed w/ min A x 1 to max I and minimize burden of care to return home     -Pt will demonstrate good attention and understanding EC tech to max I w/ ADL performance    -Pt will complete UBD w/ mod I after set- up    -Pt will complete LBD w/ min A x1 after set- up using AE as needed    Nhung Fung, OTR/L

## 2021-05-11 NOTE — ASSESSMENT & PLAN NOTE
- patient presents with a 2-3 day history of gradual onset weakness  - imaging in the ED noted a "small subsegmental left upper lobe pulmonary embolus"  - patient does report some continued mild shortness of breath    Plan  - heparin gtt discontinued  - will start the patient on Eliquis 10 mg for 7 days b i d , followed by Eliquis 5 mg b i d   - telemetry monitoring

## 2021-05-11 NOTE — PLAN OF CARE
Problem: PHYSICAL THERAPY ADULT  Goal: Performs mobility at highest level of function for planned discharge setting  See evaluation for individualized goals  Description: Treatment/Interventions: ADL retraining, Functional transfer training, LE strengthening/ROM, Elevations, Therapeutic exercise, Endurance training, Patient/family training, Bed mobility, Gait training  Equipment Recommended: Vandana       See flowsheet documentation for full assessment, interventions and recommendations  Note: Prognosis: Fair  Problem List: Decreased strength, Decreased endurance, Impaired balance, Decreased mobility, Decreased coordination, Pain  Assessment: Pt is a 64 y o  female seen for PT evaluation s/p admit to [facility] on 5/10/2021 w/ generalized weakness and PE  Order placed for PT  Upon evaluation, pt requires supervision for bed mobility with HOB elevated and reliance on bed rails, moderate assistance x 1 for sit <> stand transfers, and moderate assistance x 2 for SPT  Pt's clinical presentation is currently unstable/unpredictable given the functional mobility deficits above, especially (but not limited to) weakness, decreased endurance, poor coordination, and balance impairments  Pt has a PMH of breast cancer with metastases to brain and she is on chemotherapy  Pt to benefit from continued skilled PT tx while in hospital and upon DC to address deficits as defined above and maximize level of functional mobility and reduce care giver burden  Recommend gait training with walker, therex, and case management consult  PT Discharge Recommendation: Post acute rehabilitation services          See flowsheet documentation for full assessment

## 2021-05-11 NOTE — ASSESSMENT & PLAN NOTE
- patient is noted to have a mild leukocytosis on labs in the emergency room on presentation 10 43  - patient denies any fever, chills  - low suspicion of infectious process  - patient is on steroids chronically at home, this is a possible source of the leukocytosis  - resolved    Plan  - will continue to monitor

## 2021-05-11 NOTE — ASSESSMENT & PLAN NOTE
- patient presents with a 2-3 day history of gradual onset weakness  - imaging in the ED noted a "small subsegmental left upper lobe pulmonary embolus"  - patient does report some mild shortness of breath    Plan  - heparin gtt  - patient will need p o   Anticoagulation  - telemetry monitoring

## 2021-05-11 NOTE — ASSESSMENT & PLAN NOTE
- Pt has extensive history of cancer related pain  - Will continue pt on gabapentin and hydromorphone  - will consult palliative care

## 2021-05-11 NOTE — ASSESSMENT & PLAN NOTE
- patient is a history chronic constipation  - Pt can go 2-5 days between bowel movements  - patient has notable abdominal distension  - patient is maintained on senna as an outpatient, will discontinue on admission  - patient is noted to have significant fecal matter in her GI tract on CT imaging in the ED    Plan  - Patient on Miralax BID and Senakot-s BID  - will add Relistor

## 2021-05-12 PROBLEM — N17.9 AKI (ACUTE KIDNEY INJURY) (HCC): Status: RESOLVED | Noted: 2021-05-10 | Resolved: 2021-05-12

## 2021-05-12 LAB
ALBUMIN SERPL BCP-MCNC: 3 G/DL (ref 3.5–5)
ALP SERPL-CCNC: 89 U/L (ref 46–116)
ALT SERPL W P-5'-P-CCNC: 111 U/L (ref 12–78)
ANION GAP SERPL CALCULATED.3IONS-SCNC: 9 MMOL/L (ref 4–13)
AST SERPL W P-5'-P-CCNC: 43 U/L (ref 5–45)
BILIRUB SERPL-MCNC: 0.28 MG/DL (ref 0.2–1)
BUN SERPL-MCNC: 13 MG/DL (ref 5–25)
CALCIUM ALBUM COR SERPL-MCNC: 9.4 MG/DL (ref 8.3–10.1)
CALCIUM SERPL-MCNC: 8.6 MG/DL (ref 8.3–10.1)
CHLORIDE SERPL-SCNC: 109 MMOL/L (ref 100–108)
CO2 SERPL-SCNC: 28 MMOL/L (ref 21–32)
CREAT SERPL-MCNC: 0.63 MG/DL (ref 0.6–1.3)
ERYTHROCYTE [DISTWIDTH] IN BLOOD BY AUTOMATED COUNT: 16.7 % (ref 11.6–15.1)
GFR SERPL CREATININE-BSD FRML MDRD: 99 ML/MIN/1.73SQ M
GLUCOSE SERPL-MCNC: 88 MG/DL (ref 65–140)
HCT VFR BLD AUTO: 34.2 % (ref 34.8–46.1)
HGB BLD-MCNC: 10.8 G/DL (ref 11.5–15.4)
MCH RBC QN AUTO: 30.9 PG (ref 26.8–34.3)
MCHC RBC AUTO-ENTMCNC: 31.6 G/DL (ref 31.4–37.4)
MCV RBC AUTO: 98 FL (ref 82–98)
PLATELET # BLD AUTO: 280 THOUSANDS/UL (ref 149–390)
PMV BLD AUTO: 9.5 FL (ref 8.9–12.7)
POTASSIUM SERPL-SCNC: 3.9 MMOL/L (ref 3.5–5.3)
PROT SERPL-MCNC: 6.6 G/DL (ref 6.4–8.2)
RBC # BLD AUTO: 3.49 MILLION/UL (ref 3.81–5.12)
SODIUM SERPL-SCNC: 146 MMOL/L (ref 136–145)
WBC # BLD AUTO: 7.95 THOUSAND/UL (ref 4.31–10.16)

## 2021-05-12 PROCEDURE — 85027 COMPLETE CBC AUTOMATED: CPT | Performed by: PSYCHIATRY & NEUROLOGY

## 2021-05-12 PROCEDURE — 99232 SBSQ HOSP IP/OBS MODERATE 35: CPT | Performed by: INTERNAL MEDICINE

## 2021-05-12 PROCEDURE — 80053 COMPREHEN METABOLIC PANEL: CPT | Performed by: PSYCHIATRY & NEUROLOGY

## 2021-05-12 PROCEDURE — 99223 1ST HOSP IP/OBS HIGH 75: CPT | Performed by: NURSE PRACTITIONER

## 2021-05-12 RX ORDER — MAGNESIUM CARB/ALUMINUM HYDROX 105-160MG
296 TABLET,CHEWABLE ORAL ONCE
Status: COMPLETED | OUTPATIENT
Start: 2021-05-12 | End: 2021-05-12

## 2021-05-12 RX ORDER — BISACODYL 10 MG
10 SUPPOSITORY, RECTAL RECTAL DAILY PRN
Status: DISCONTINUED | OUTPATIENT
Start: 2021-05-12 | End: 2021-05-25 | Stop reason: HOSPADM

## 2021-05-12 RX ORDER — LIDOCAINE 50 MG/G
1 PATCH TOPICAL DAILY
Status: DISCONTINUED | OUTPATIENT
Start: 2021-05-13 | End: 2021-05-25 | Stop reason: HOSPADM

## 2021-05-12 RX ADMIN — LEVETIRACETAM 250 MG: 250 TABLET, FILM COATED ORAL at 08:19

## 2021-05-12 RX ADMIN — OLANZAPINE 10 MG: 10 TABLET, FILM COATED ORAL at 21:33

## 2021-05-12 RX ADMIN — APIXABAN 10 MG: 5 TABLET, FILM COATED ORAL at 08:19

## 2021-05-12 RX ADMIN — ONDANSETRON 8 MG: 4 TABLET, ORALLY DISINTEGRATING ORAL at 17:15

## 2021-05-12 RX ADMIN — METHYLNALTREXONE BROMIDE 12 MG: 12 INJECTION, SOLUTION SUBCUTANEOUS at 14:41

## 2021-05-12 RX ADMIN — GABAPENTIN 400 MG: 400 CAPSULE ORAL at 17:15

## 2021-05-12 RX ADMIN — LEVETIRACETAM 250 MG: 250 TABLET, FILM COATED ORAL at 17:15

## 2021-05-12 RX ADMIN — PANTOPRAZOLE SODIUM 40 MG: 40 TABLET, DELAYED RELEASE ORAL at 06:04

## 2021-05-12 RX ADMIN — ONDANSETRON 8 MG: 4 TABLET, ORALLY DISINTEGRATING ORAL at 06:04

## 2021-05-12 RX ADMIN — POLYETHYLENE GLYCOL 3350 17 G: 17 POWDER, FOR SOLUTION ORAL at 08:19

## 2021-05-12 RX ADMIN — GABAPENTIN 400 MG: 400 CAPSULE ORAL at 06:04

## 2021-05-12 RX ADMIN — BISACODYL 10 MG: 10 SUPPOSITORY RECTAL at 13:33

## 2021-05-12 RX ADMIN — ONDANSETRON 8 MG: 4 TABLET, ORALLY DISINTEGRATING ORAL at 10:45

## 2021-05-12 RX ADMIN — APIXABAN 10 MG: 5 TABLET, FILM COATED ORAL at 17:15

## 2021-05-12 RX ADMIN — DEXAMETHASONE 2 MG: 2 TABLET ORAL at 08:19

## 2021-05-12 RX ADMIN — NICOTINE 1 PATCH: 14 PATCH, EXTENDED RELEASE TRANSDERMAL at 08:19

## 2021-05-12 RX ADMIN — MAGNESIUM CITRATE 296 ML: 1.75 LIQUID ORAL at 08:30

## 2021-05-12 RX ADMIN — SENNOSIDES AND DOCUSATE SODIUM 2 TABLET: 8.6; 5 TABLET ORAL at 17:16

## 2021-05-12 RX ADMIN — GABAPENTIN 400 MG: 400 CAPSULE ORAL at 10:43

## 2021-05-12 RX ADMIN — POLYETHYLENE GLYCOL 3350 17 G: 17 POWDER, FOR SOLUTION ORAL at 21:33

## 2021-05-12 RX ADMIN — SENNOSIDES AND DOCUSATE SODIUM 2 TABLET: 8.6; 5 TABLET ORAL at 08:19

## 2021-05-12 NOTE — PLAN OF CARE
Problem: Potential for Falls  Goal: Patient will remain free of falls  Description: INTERVENTIONS:  - Assess patient frequently for physical needs  -  Identify cognitive and physical deficits and behaviors that affect risk of falls    -  [place] fall precautions as indicated by assessment   - Educate patient/family on patient safety including physical limitations  - Instruct patient to call for assistance with activity based on assessment  - Modify environment to reduce risk of injury  - Consider OT/PT consult to assist with strengthening/mobility  Outcome: Progressing     Problem: Prexisting or High Potential for Compromised Skin Integrity  Goal: Skin integrity is maintained or improved  Description: INTERVENTIONS:  - Identify patients at risk for skin breakdown  - Assess and monitor skin integrity  - Assess and monitor nutrition and hydration status  - Monitor labs   - Assess for incontinence   - Turn and reposition patient  - Assist with mobility/ambulation  - Relieve pressure over bony prominences  - Avoid friction and shearing  - Provide appropriate hygiene as needed including keeping skin clean and dry  - Evaluate need for skin moisturizer/barrier cream  - Collaborate with interdisciplinary team   - Patient/family teaching  - Consider wound care consult   Outcome: Progressing     Problem: PAIN - ADULT  Goal: Verbalizes/displays adequate comfort level or baseline comfort level  Description: Interventions:  - Encourage patient to monitor pain and request assistance  - Assess pain using appropriate pain scale  - Administer analgesics based on type and severity of pain and evaluate response  - Implement non-pharmacological measures as appropriate and evaluate response  - Consider cultural and social influences on pain and pain management  - Notify physician/advanced practitioner if interventions unsuccessful or patient reports new pain  Outcome: Progressing     Problem: INFECTION - ADULT  Goal: Absence or prevention of progression during hospitalization  Description: INTERVENTIONS:  - Assess and monitor for signs and symptoms of infection  - Monitor lab/diagnostic results  - Monitor all insertion sites, i e  indwelling lines, tubes, and drains  - Monitor endotracheal if appropriate and nasal secretions for changes in amount and color  - [place] appropriate cooling/warming therapies per order  - Administer medications as ordered  - Instruct and encourage patient and family to use good hand hygiene technique  - Identify and instruct in appropriate isolation precautions for identified infection/condition  Outcome: Progressing     Problem: SAFETY ADULT  Goal: Patient will remain free of falls  Description: INTERVENTIONS:  - Assess patient frequently for physical needs  -  Identify cognitive and physical deficits and behaviors that affect risk of falls    -  [place] fall precautions as indicated by assessment   - Educate patient/family on patient safety including physical limitations  - Instruct patient to call for assistance with activity based on assessment  - Modify environment to reduce risk of injury  - Consider OT/PT consult to assist with strengthening/mobility  Outcome: Progressing  Goal: Maintain or return to baseline ADL function  Description: INTERVENTIONS:  -  Assess patient's ability to carry out ADLs; assess patient's baseline for ADL function and identify physical deficits which impact ability to perform ADLs (bathing, care of mouth/teeth, toileting, grooming, dressing, etc )  - Assess/evaluate cause of self-care deficits   - Assess range of motion  - Assess patient's mobility; develop plan if impaired  - Assess patient's need for assistive devices and provide as appropriate  - Encourage maximum independence but intervene and supervise when necessary  - Involve family in performance of ADLs  - Assess for home care needs following discharge   - Consider OT consult to assist with ADL evaluation and planning for discharge  - Provide patient education as appropriate  Outcome: Progressing  Goal: Maintain or return mobility status to optimal level  Description: INTERVENTIONS:  - Assess patient's baseline mobility status (ambulation, transfers, stairs, etc )    - Identify cognitive and physical deficits and behaviors that affect mobility  - Identify mobility aids required to assist with transfers and/or ambulation (gait belt, sit-to-stand, lift, walker, cane, etc )  - [place] fall precautions as indicated by assessment  - Record patient progress and toleration of activity level on Mobility SBAR; progress patient to next Phase/Stage  - Instruct patient to call for assistance with activity based on assessment  - Consider rehabilitation consult to assist with strengthening/weightbearing, etc   Outcome: Progressing     Problem: DISCHARGE PLANNING  Goal: Discharge to home or other facility with appropriate resources  Description: INTERVENTIONS:  - Identify barriers to discharge w/patient and caregiver  - Arrange for needed discharge resources and transportation as appropriate  - Identify discharge learning needs (meds, wound care, etc )  - Arrange for interpretive services to assist at discharge as needed  - Refer to Case Management Department for coordinating discharge planning if the patient needs post-hospital services based on physician/advanced practitioner order or complex needs related to functional status, cognitive ability, or social support system  Outcome: Progressing     Problem: Knowledge Deficit  Goal: Patient/family/caregiver demonstrates understanding of disease process, treatment plan, medications, and discharge instructions  Description: Complete learning assessment and assess knowledge base    Interventions:  - Provide teaching at level of understanding  - Provide teaching via preferred learning methods  Outcome: Progressing     Problem: RESPIRATORY - ADULT  Goal: Achieves optimal ventilation and oxygenation  Description: INTERVENTIONS:  - Assess for changes in respiratory status  - Assess for changes in mentation and behavior  - Position to facilitate oxygenation and minimize respiratory effort  - Oxygen administered by appropriate delivery if ordered  - Initiate smoking cessation education as indicated  - Encourage broncho-pulmonary hygiene including cough, deep breathe, Incentive Spirometry  - Assess the need for suctioning and aspirate as needed  - Assess and instruct to report SOB or any respiratory difficulty  - Respiratory Therapy support as indicated  Outcome: Progressing     Problem: GASTROINTESTINAL - ADULT  Goal: Minimal or absence of nausea and/or vomiting  Description: INTERVENTIONS:  - Administer IV fluids if ordered to ensure adequate hydration  - Maintain NPO status until nausea and vomiting are resolved  - Nasogastric tube if ordered  - Administer ordered antiemetic medications as needed  - Provide nonpharmacologic comfort measures as appropriate  - Advance diet as tolerated, if ordered  - Consider nutrition services referral to assist patient with adequate nutrition and appropriate food choices  Outcome: Progressing  Goal: Maintains or returns to baseline bowel function  Description: INTERVENTIONS:  - Assess bowel function  - Encourage oral fluids to ensure adequate hydration  - Administer IV fluids if ordered to ensure adequate hydration  - Administer ordered medications as needed  - Encourage mobilization and activity  - Consider nutritional services referral to assist patient with adequate nutrition and appropriate food choices  Outcome: Progressing

## 2021-05-12 NOTE — CONSULTS
Consultation - Palliative and Supportive Care   Mayra Maldonado 64 y o  female [id number]    Patient Active Problem List   Diagnosis    H/O bilateral mastectomy    Lymphedema of right upper extremity    Pulmonary nodule    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, estrogen receptor positive ([facility] [address] )    Bone metastases ([facility] [address] )    Brain metastases (HCC)    Cancer-related pain    CINV (chemotherapy-induced nausea and vomiting)    Therapeutic opioid-induced constipation (OIC)    Dyspareunia, female    Herniated lumbar intervertebral disc    Mediastinal lymphadenopathy    Weakness generalized    Bilateral pleural effusion    Abnormal CT of the chest    SOB (shortness of breath)    Tobacco abuse    Financial difficulties    Dehydration    Palliative care patient    Ambulatory dysfunction    Insomnia due to medical condition    Anemia of chronic disease    Thrombocytosis (HCC)    Transaminitis    Leukocytosis    Pulmonary embolism (HCC)     Active issues specifically addressed today include:   Cancer related pain  OIC  Metastatic breast CA  CINV  Decreased appetite    Plan:  1  Symptom management   Pain  · Hydromorphone 4-8 mg PO Q 4 hours moderate-severe pain  · Hydromorphone 1 mg IN Q 6 hour prn breakthrough pain  · Dexamethasone 2mg PO once daily  · Neurontin 400 mg TID    OIC  · Senna 2 tablets BID  · Miralax BID  · relistor and magnesium citrate given today    Poor appetite  · Decadron 2 mg daily  · Zyprexa 10 mg PO Q hs    2  Goals   · Goals of care evolving  · Disease focused care  Will continue discussions regarding [address] as patient's clinical presentation evolves  · Wishes to continue chemo however wishes to discuss other options with oncology  Her next oncology appointment is with Dr Chris Lombardo on 5/26/21  · Will follow Palliative Medicine on an outpatient basis after discharge for continued symptom management   Her next appointment is with Dr Lucila Knight on 6/3/21     Code Status: Full Code - Level 1     Decisional apparatus:  Patient is competent on my exam today  If competence is lost, patient's substitute decision maker would default to spouse by [place]  Advance Directive / Living Will / POLST:  None on file     I have reviewed the patient's controlled substance dispensing history in the Prescription Drug Monitoring Program in compliance with the [facility] regulations before prescribing any controlled substances  We appreciate the invitation to be involved in this patient's care  We will continue to follow   Please do not hesitate to reach our on call provider through our clinic answering service at  should you have acute symptom control concerns  FAUZIA Stratton  Palliative and Supportive Care  Clinic/Answering Service: [phone]  You can find me on [employer]! IDENTIFICATION:  Inpatient consult to Palliative Care  Consult performed by: FAUZIA Stratton  Consult ordered by: Elizabeth Mehta DO        Physician Requesting Consult: Michelle Rivera MD  Reason for Consult / Principal Problem: known to palliative  Hx and PE limited by: N/A    HISTORY OF PRESENT ILLNESS:       Kasie Horn is a 64 y o  female with stage IV breast cancer diagnosed in 2010 with known breast metastasis who presented with bilateral leg weakness and slurred speech which was attributed to overall decompensation in setting of ongoing cancer treatment  CT chest abdomen and pelvis completed in the emergency department revealed a left-sided pulmonary embolus for which she is now on Eloquis  Labs revealed GILBERTO likely secondary to urinary retention  Ms Nolan Maldonado is followed by Dr Royer Caal of palliative care  Historically, she has experienced sedation and confusion from long acting opioids without pain improvement  She had been experiencing significant symptoms of poor appetite, nausea, peripheral neuropathy    She also has had significant opioid induced constipation  From an oncology standpoint, she follows with Dr Hailee Esteban  She had b/l mastectomy and has undergone multiple chemotherapy regimens  She has significant lymphedema post mastectomy  Her current regimen is Kadcyla  On imaging her disease has been stable for the past 6 months, her CA 27-29 has also remained stable  Her performance status overall has declined more recently  During this admission, the patient has been experiencing back pain  At time of visit, she is comfortable  She is most concerned about constipation  No BM for the past 4 days  She is passing gas, she is eating normally, and denies n/v  She is requesting a suppository in addition to all of the other meds she has received, "please do everything, I want to get out of here "    Briefly discussed goals  She really wishes to continue chemo and wants to discuss this further with Dr Hailee Esteban  She is not interested in hospice today  Review of Systems   Constitution: Positive for malaise/fatigue  Gastrointestinal: Positive for bloating, abdominal pain, change in bowel habit and constipation  All other systems reviewed and are negative        Past Medical History:   Diagnosis Date    Dehydration 6/11/2019    Dizziness 2/6/2018    Dry skin 2/6/2018    Edema 10/23/2019    H/O bilateral mastectomy 2/15/2016    Hyperglycemia 2/6/2018    Hypertension 2/15/2016    Hypokalemia 3/10/2020    Lymphedema 2/15/2016    Malignant cachexia ([facility] [address] ) 10/6/2016    Malignant neoplasm of right breast ([facility] [address] ) 2/15/2016    Metastatic breast cancer [facility])      Past Surgical History:   Procedure Laterality Date    ENDOBRONCHIAL ULTRASOUND (EBUS) N/A 2/16/2016    Procedure: EBUS;  Surgeon: Kingston Rutledge MD;  Location: [facility];  Service:    [facility] MASTECTOMY Bilateral     MASTECTOMY Bilateral     right arm edema    OOPHORECTOMY      SD BRONCHOSCOPY NEEDLE BX TRACHEA MAIN STEM&/BRON N/A 2/16/2016    Procedure: FLEX BRONCH;  Surgeon: Kassi Braxton MD;  [facility];  Service: Thoracic    KS INSJ TUNNELED CTR VAD W/SUBQ PORT AGE 5 YR/> N/A 7/24/2018    Procedure: INSERTION VENOUS PORT ( PORT-A-CATH) IR;  Surgeon: Vianca Alicea DO;  [facility];  Service: Interventional Radiology    KS STEREOTACTIC RADIOSURGERY, CRANIAL,SIMPLE,EA ADD  5/3/2017         KS STEREOTACTIC RADIOSURGERY, CRANIAL,SIMPLE,EA ADD  5/3/2017         KS STEREOTACTIC RADIOSURGERY, CRANIAL,SIMPLE,SINGLE  5/3/2017          Social History     Socioeconomic History    Marital status: /Civil Union     Spouse name: Not on file    Number of children: 1    Years of education: Not on file    Highest education level: Not on file   Occupational History    Not on file   Social Needs    Financial resource strain: Not on file    Food insecurity     Worry: Not on file     Inability: Not on file   [employer] needs     Medical: Not on file     Non-medical: Not on file   Tobacco Use    Smoking status: Current Every Day Smoker     Packs/day: 0 50     Years: 40 00     Pack years: 20 00     Types: Cigarettes     Start date: 1978    Smokeless tobacco: Never Used   Substance and Sexual Activity    Alcohol use: Not Currently     Frequency: 2-4 times a month     Drinks per session: 3 or 4     Binge frequency: Less than monthly     Comment: once a week    Drug use: Not Currently     Types: Marijuana    Sexual activity: Not on file   Lifestyle    Physical activity     Days per week: Not on file     Minutes per session: Not on file    Stress: Not on file   Relationships    Social connections     Talks on phone: Not on file     Gets together: Not on file     Attends Caodaism service: Not on file     Active member of club or organization: Not on file     Attends meetings of clubs or organizations: Not on file     Relationship status: Not on file    Intimate partner violence     Fear of current or ex partner: Not on file Emotionally abused: Not on file     Physically abused: Not on file     Forced sexual activity: Not on file   Other Topics Concern    Not on file   Social History Narrative    Not on file     Family History   Problem Relation Age of Onset    Cancer Sister     Prostate cancer Brother        MEDICATIONS / ALLERGIES:    current meds:   Current Facility-Administered Medications   Medication Dose Route Frequency    acetaminophen (TYLENOL) tablet 650 mg  650 mg Oral Q6H PRN    albuterol (PROVENTIL HFA,VENTOLIN HFA) inhaler 2 puff  2 puff Inhalation Q6H PRN    aluminum-magnesium hydroxide-simethicone (MYLANTA) oral suspension 30 mL  30 mL Oral Q6H PRN    apixaban (ELIQUIS) tablet 10 mg  10 mg Oral BID    [START ON 5/18/2021] apixaban (ELIQUIS) tablet 5 mg  5 mg Oral BID    bisacodyl (DULCOLAX) rectal suppository 10 mg  10 mg Rectal Daily PRN    dexamethasone (DECADRON) tablet 2 mg  2 mg Oral Daily With Breakfast    gabapentin (NEURONTIN) capsule 400 mg  400 mg Oral TID AC    HYDROmorphone (DILAUDID) injection 1 mg  1 mg Intravenous Q6H PRN    HYDROmorphone (DILAUDID) tablet 4 mg  4 mg Oral Q4H PRN    HYDROmorphone (DILAUDID) tablet 8 mg  8 mg Oral Q4H PRN    levETIRAcetam (KEPPRA) tablet 250 mg  250 mg Oral BID    nicotine (NICODERM CQ) 14 mg/24hr TD 24 hr patch 1 patch  1 patch Transdermal Daily    OLANZapine (ZyPREXA) tablet 10 mg  10 mg Oral HS    ondansetron (ZOFRAN-ODT) dispersible tablet 8 mg  8 mg Oral TID AC    pantoprazole (PROTONIX) EC tablet 40 mg  40 mg Oral Daily    polyethylene glycol (MIRALAX) packet 17 g  17 g Oral BID    senna-docusate sodium (SENOKOT S) 8 6-50 mg per tablet 2 tablet  2 tablet Oral BID    zolpidem (AMBIEN) tablet 10 mg  10 mg Oral HS PRN       No Known Allergies    OBJECTIVE:  Blood pressure 112/61, pulse 88, temperature 98 6 °F (37 °C), temperature source Oral, resp   rate 18, height 5' 5" (1 651 m), weight 77 9 kg (171 lb 11 8 oz), SpO2 93 %, not currently breastfeeding  Physical Exam  Physical Exam  Vitals signs and nursing note reviewed  Constitutional:       Appearance: She is obese  HENT:      Head: Normocephalic and atraumatic  Nose: Nose normal       Mouth/Throat:      Mouth: Mucous membranes are moist       Pharynx: Oropharynx is clear  Eyes:      Conjunctiva/sclera: Conjunctivae normal       Pupils: Pupils are equal, round, and reactive to light  Neck:      Musculoskeletal: Normal range of motion  Cardiovascular:      Rate and Rhythm: Normal rate  Pulmonary:      Effort: Pulmonary effort is normal       Breath sounds: Normal breath sounds  Abdominal:      General: There is distension  Tenderness: There is abdominal tenderness  Musculoskeletal: Normal range of motion  Skin:     General: Skin is warm and dry  Neurological:      Mental Status: She is oriented to person, place, and time  Psychiatric:         Mood and Affect: Mood normal          Behavior: Behavior normal          Thought Content: Thought content normal          Judgment: Judgment normal          Lab Results:   CBC:   Lab Results   Component Value Date    WBC 7 95 05/12/2021    HGB 10 8 (L) 05/12/2021    HCT 34 2 (L) 05/12/2021    MCV 98 05/12/2021     05/12/2021    MCH 30 9 05/12/2021    MCHC 31 6 05/12/2021    RDW 16 7 (H) 05/12/2021    MPV 9 5 05/12/2021   , CMP:   Lab Results   Component Value Date    SODIUM 146 (H) 05/12/2021    K 3 9 05/12/2021     (H) 05/12/2021    CO2 28 05/12/2021    BUN 13 05/12/2021    CREATININE 0 63 05/12/2021    CALCIUM 8 6 05/12/2021    AST 43 05/12/2021     (H) 05/12/2021    ALKPHOS 89 05/12/2021    EGFR 99 05/12/2021     Counseling / Coordination of Care    Total floor / unit time spent today 80 minutes  Greater than 50% of total time was spent with the patient and / or family counseling and / or coordination of care   A description of the counseling / coordination of care: Introduced role of Palliative Medicine  Reviewed expected disease trajectory, prognosis, code status, and comfort care versus disease directed therapy  Spent time supportive listening regarding frustrations of diagnosis and treatment options available at this time

## 2021-05-12 NOTE — ASSESSMENT & PLAN NOTE
- patient is a history chronic constipation  - Pt can go 2-5 days between bowel movements  - patient has notable abdominal distension  - patient is maintained on senna as an outpatient, will discontinue on admission  - patient is noted to have significant fecal matter in her GI tract on CT imaging in the ED    Plan  - Patient on Miralax BID and Senakot-s BID  - will add Relistor, mag citrate and enema

## 2021-05-12 NOTE — ASSESSMENT & PLAN NOTE
- Patient presents with a 2-3 day history of gradual onset generalized weakness and slurred speech  - Most likely secondary to deconditioning in the setting of patient's cancer history versus infection  - patient's weakness is improving  -PT/OT is recommending post acute rehab services    Plan  - PT/OT  - will consider imaging the spine given the fact that the patient has a history of Mets, however will hold off for now

## 2021-05-12 NOTE — PLAN OF CARE
Problem: Potential for Falls  Goal: Patient will remain free of falls  Description: INTERVENTIONS:  - Assess patient frequently for physical needs  -  Identify cognitive and physical deficits and behaviors that affect risk of falls    -  [place] fall precautions as indicated by assessment   - Educate patient/family on patient safety including physical limitations  - Instruct patient to call for assistance with activity based on assessment  - Modify environment to reduce risk of injury  - Consider OT/PT consult to assist with strengthening/mobility  Outcome: Progressing     Problem: Prexisting or High Potential for Compromised Skin Integrity  Goal: Skin integrity is maintained or improved  Description: INTERVENTIONS:  - Identify patients at risk for skin breakdown  - Assess and monitor skin integrity  - Assess and monitor nutrition and hydration status  - Monitor labs   - Assess for incontinence   - Turn and reposition patient  - Assist with mobility/ambulation  - Relieve pressure over bony prominences  - Avoid friction and shearing  - Provide appropriate hygiene as needed including keeping skin clean and dry  - Evaluate need for skin moisturizer/barrier cream  - Collaborate with interdisciplinary team   - Patient/family teaching  - Consider wound care consult   Outcome: Progressing     Problem: PAIN - ADULT  Goal: Verbalizes/displays adequate comfort level or baseline comfort level  Description: Interventions:  - Encourage patient to monitor pain and request assistance  - Assess pain using appropriate pain scale  - Administer analgesics based on type and severity of pain and evaluate response  - Implement non-pharmacological measures as appropriate and evaluate response  - Consider cultural and social influences on pain and pain management  - Notify physician/advanced practitioner if interventions unsuccessful or patient reports new pain  Outcome: Progressing     Problem: INFECTION - ADULT  Goal: Absence or prevention of progression during hospitalization  Description: INTERVENTIONS:  - Assess and monitor for signs and symptoms of infection  - Monitor lab/diagnostic results  - Monitor all insertion sites, i e  indwelling lines, tubes, and drains  - Monitor endotracheal if appropriate and nasal secretions for changes in amount and color  - [place] appropriate cooling/warming therapies per order  - Administer medications as ordered  - Instruct and encourage patient and family to use good hand hygiene technique  - Identify and instruct in appropriate isolation precautions for identified infection/condition  Outcome: Progressing     Problem: SAFETY ADULT  Goal: Patient will remain free of falls  Description: INTERVENTIONS:  - Assess patient frequently for physical needs  -  Identify cognitive and physical deficits and behaviors that affect risk of falls    -  [place] fall precautions as indicated by assessment   - Educate patient/family on patient safety including physical limitations  - Instruct patient to call for assistance with activity based on assessment  - Modify environment to reduce risk of injury  - Consider OT/PT consult to assist with strengthening/mobility  Outcome: Progressing  Goal: Maintain or return to baseline ADL function  Description: INTERVENTIONS:  -  Assess patient's ability to carry out ADLs; assess patient's baseline for ADL function and identify physical deficits which impact ability to perform ADLs (bathing, care of mouth/teeth, toileting, grooming, dressing, etc )  - Assess/evaluate cause of self-care deficits   - Assess range of motion  - Assess patient's mobility; develop plan if impaired  - Assess patient's need for assistive devices and provide as appropriate  - Encourage maximum independence but intervene and supervise when necessary  - Involve family in performance of ADLs  - Assess for home care needs following discharge   - Consider OT consult to assist with ADL evaluation and planning for discharge  - Provide patient education as appropriate  Outcome: Progressing  Goal: Maintain or return mobility status to optimal level  Description: INTERVENTIONS:  - Assess patient's baseline mobility status (ambulation, transfers, stairs, etc )    - Identify cognitive and physical deficits and behaviors that affect mobility  - Identify mobility aids required to assist with transfers and/or ambulation (gait belt, sit-to-stand, lift, walker, cane, etc )  - [place] fall precautions as indicated by assessment  - Record patient progress and toleration of activity level on Mobility SBAR; progress patient to next Phase/Stage  - Instruct patient to call for assistance with activity based on assessment  - Consider rehabilitation consult to assist with strengthening/weightbearing, etc   Outcome: Progressing     Problem: DISCHARGE PLANNING  Goal: Discharge to home or other facility with appropriate resources  Description: INTERVENTIONS:  - Identify barriers to discharge w/patient and caregiver  - Arrange for needed discharge resources and transportation as appropriate  - Identify discharge learning needs (meds, wound care, etc )  - Arrange for interpretive services to assist at discharge as needed  - Refer to Case Management Department for coordinating discharge planning if the patient needs post-hospital services based on physician/advanced practitioner order or complex needs related to functional status, cognitive ability, or social support system  Outcome: Progressing     Problem: Knowledge Deficit  Goal: Patient/family/caregiver demonstrates understanding of disease process, treatment plan, medications, and discharge instructions  Description: Complete learning assessment and assess knowledge base    Interventions:  - Provide teaching at level of understanding  - Provide teaching via preferred learning methods  Outcome: Progressing     Problem: RESPIRATORY - ADULT  Goal: Achieves optimal ventilation and oxygenation  Description: INTERVENTIONS:  - Assess for changes in respiratory status  - Assess for changes in mentation and behavior  - Position to facilitate oxygenation and minimize respiratory effort  - Oxygen administered by appropriate delivery if ordered  - Initiate smoking cessation education as indicated  - Encourage broncho-pulmonary hygiene including cough, deep breathe, Incentive Spirometry  - Assess the need for suctioning and aspirate as needed  - Assess and instruct to report SOB or any respiratory difficulty  - Respiratory Therapy support as indicated  Outcome: Progressing     Problem: GASTROINTESTINAL - ADULT  Goal: Minimal or absence of nausea and/or vomiting  Description: INTERVENTIONS:  - Administer IV fluids if ordered to ensure adequate hydration  - Maintain NPO status until nausea and vomiting are resolved  - Nasogastric tube if ordered  - Administer ordered antiemetic medications as needed  - Provide nonpharmacologic comfort measures as appropriate  - Advance diet as tolerated, if ordered  - Consider nutrition services referral to assist patient with adequate nutrition and appropriate food choices  Outcome: Progressing  Goal: Maintains or returns to baseline bowel function  Description: INTERVENTIONS:  - Assess bowel function  - Encourage oral fluids to ensure adequate hydration  - Administer IV fluids if ordered to ensure adequate hydration  - Administer ordered medications as needed  - Encourage mobilization and activity  - Consider nutritional services referral to assist patient with adequate nutrition and appropriate food choices  Outcome: Progressing

## 2021-05-12 NOTE — PLAN OF CARE
Problem: Potential for Falls  Goal: Patient will remain free of falls  Description: INTERVENTIONS:  - Assess patient frequently for physical needs  -  Identify cognitive and physical deficits and behaviors that affect risk of falls    -  [place] fall precautions as indicated by assessment   - Educate patient/family on patient safety including physical limitations  - Instruct patient to call for assistance with activity based on assessment  - Modify environment to reduce risk of injury  - Consider OT/PT consult to assist with strengthening/mobility  Outcome: Progressing     Problem: Prexisting or High Potential for Compromised Skin Integrity  Goal: Skin integrity is maintained or improved  Description: INTERVENTIONS:  - Identify patients at risk for skin breakdown  - Assess and monitor skin integrity  - Assess and monitor nutrition and hydration status  - Monitor labs   - Assess for incontinence   - Turn and reposition patient  - Assist with mobility/ambulation  - Relieve pressure over bony prominences  - Avoid friction and shearing  - Provide appropriate hygiene as needed including keeping skin clean and dry  - Evaluate need for skin moisturizer/barrier cream  - Collaborate with interdisciplinary team   - Patient/family teaching  - Consider wound care consult   Outcome: Progressing     Problem: PAIN - ADULT  Goal: Verbalizes/displays adequate comfort level or baseline comfort level  Description: Interventions:  - Encourage patient to monitor pain and request assistance  - Assess pain using appropriate pain scale  - Administer analgesics based on type and severity of pain and evaluate response  - Implement non-pharmacological measures as appropriate and evaluate response  - Consider cultural and social influences on pain and pain management  - Notify physician/advanced practitioner if interventions unsuccessful or patient reports new pain  Outcome: Progressing     Problem: INFECTION - ADULT  Goal: Absence or prevention of progression during hospitalization  Description: INTERVENTIONS:  - Assess and monitor for signs and symptoms of infection  - Monitor lab/diagnostic results  - Monitor all insertion sites, i e  indwelling lines, tubes, and drains  - Monitor endotracheal if appropriate and nasal secretions for changes in amount and color  - [place] appropriate cooling/warming therapies per order  - Administer medications as ordered  - Instruct and encourage patient and family to use good hand hygiene technique  - Identify and instruct in appropriate isolation precautions for identified infection/condition  Outcome: Progressing     Problem: SAFETY ADULT  Goal: Patient will remain free of falls  Description: INTERVENTIONS:  - Assess patient frequently for physical needs  -  Identify cognitive and physical deficits and behaviors that affect risk of falls    -  [place] fall precautions as indicated by assessment   - Educate patient/family on patient safety including physical limitations  - Instruct patient to call for assistance with activity based on assessment  - Modify environment to reduce risk of injury  - Consider OT/PT consult to assist with strengthening/mobility  Outcome: Progressing  Goal: Maintain or return to baseline ADL function  Description: INTERVENTIONS:  -  Assess patient's ability to carry out ADLs; assess patient's baseline for ADL function and identify physical deficits which impact ability to perform ADLs (bathing, care of mouth/teeth, toileting, grooming, dressing, etc )  - Assess/evaluate cause of self-care deficits   - Assess range of motion  - Assess patient's mobility; develop plan if impaired  - Assess patient's need for assistive devices and provide as appropriate  - Encourage maximum independence but intervene and supervise when necessary  - Involve family in performance of ADLs  - Assess for home care needs following discharge   - Consider OT consult to assist with ADL evaluation and planning for discharge  - Provide patient education as appropriate  Outcome: Progressing  Goal: Maintain or return mobility status to optimal level  Description: INTERVENTIONS:  - Assess patient's baseline mobility status (ambulation, transfers, stairs, etc )    - Identify cognitive and physical deficits and behaviors that affect mobility  - Identify mobility aids required to assist with transfers and/or ambulation (gait belt, sit-to-stand, lift, walker, cane, etc )  - [place] fall precautions as indicated by assessment  - Record patient progress and toleration of activity level on Mobility SBAR; progress patient to next Phase/Stage  - Instruct patient to call for assistance with activity based on assessment  - Consider rehabilitation consult to assist with strengthening/weightbearing, etc   Outcome: Progressing     Problem: DISCHARGE PLANNING  Goal: Discharge to home or other facility with appropriate resources  Description: INTERVENTIONS:  - Identify barriers to discharge w/patient and caregiver  - Arrange for needed discharge resources and transportation as appropriate  - Identify discharge learning needs (meds, wound care, etc )  - Arrange for interpretive services to assist at discharge as needed  - Refer to Case Management Department for coordinating discharge planning if the patient needs post-hospital services based on physician/advanced practitioner order or complex needs related to functional status, cognitive ability, or social support system  Outcome: Progressing     Problem: Knowledge Deficit  Goal: Patient/family/caregiver demonstrates understanding of disease process, treatment plan, medications, and discharge instructions  Description: Complete learning assessment and assess knowledge base    Interventions:  - Provide teaching at level of understanding  - Provide teaching via preferred learning methods  Outcome: Progressing     Problem: RESPIRATORY - ADULT  Goal: Achieves optimal ventilation and oxygenation  Description: INTERVENTIONS:  - Assess for changes in respiratory status  - Assess for changes in mentation and behavior  - Position to facilitate oxygenation and minimize respiratory effort  - Oxygen administered by appropriate delivery if ordered  - Initiate smoking cessation education as indicated  - Encourage broncho-pulmonary hygiene including cough, deep breathe, Incentive Spirometry  - Assess the need for suctioning and aspirate as needed  - Assess and instruct to report SOB or any respiratory difficulty  - Respiratory Therapy support as indicated  Outcome: Progressing     Problem: GASTROINTESTINAL - ADULT  Goal: Minimal or absence of nausea and/or vomiting  Description: INTERVENTIONS:  - Administer IV fluids if ordered to ensure adequate hydration  - Maintain NPO status until nausea and vomiting are resolved  - Nasogastric tube if ordered  - Administer ordered antiemetic medications as needed  - Provide nonpharmacologic comfort measures as appropriate  - Advance diet as tolerated, if ordered  - Consider nutrition services referral to assist patient with adequate nutrition and appropriate food choices  Outcome: Progressing  Goal: Maintains or returns to baseline bowel function  Description: INTERVENTIONS:  - Assess bowel function  - Encourage oral fluids to ensure adequate hydration  - Administer IV fluids if ordered to ensure adequate hydration  - Administer ordered medications as needed  - Encourage mobilization and activity  - Consider nutritional services referral to assist patient with adequate nutrition and appropriate food choices  Outcome: Progressing

## 2021-05-12 NOTE — ASSESSMENT & PLAN NOTE
- Cr noted to be 0 9 on admission, baseline 0 5 - 0 7  - Most likely secondary to urinary retention as noted by bladder distension on CT  - Patient is now back to baseline    Plan:   Intake and output   Monitor BMP daily and observe for downward trend of creatinine   Avoid hypoperfusion of the kidneys, minimize nephrotoxins    Results from last 7 days   Lab Units 05/12/21  0817 05/11/21  1016 05/10/21  1057   CREATININE mg/dL 0 63 0 63 0 91   EGFR ml/min/1 73sq m 99 99 70

## 2021-05-12 NOTE — ASSESSMENT & PLAN NOTE
- patient presents with a 2-3 day history of gradual onset weakness  - imaging in the ED noted a "small subsegmental left upper lobe pulmonary embolus"  - patient does report some continued mild shortness of breath    Plan  - Patient started on Eliquis 10 mg for 7 days b i d , followed by Eliquis 5 mg b i d

## 2021-05-12 NOTE — CASE MANAGEMENT
Cm attempted to meet with pt however she is sleeping  Cm will meet with pt at a later time to complete assessment and review DCP

## 2021-05-12 NOTE — PROGRESS NOTES
[facility]  Progress Note - [facility] 1960, 64 y o  female MRN: [id number]  Unit/Bed#: S -01 Encounter: [id number]  Primary Care Provider: Virginia Mendoza MD   Date and time admitted to hospital: 5/10/2021  1:16 PM    Weakness generalized  Assessment & Plan  - Patient presents with a 2-3 day history of gradual onset generalized weakness and slurred speech  - Most likely secondary to deconditioning in the setting of patient's cancer history versus infection  - patient's weakness is improving  -PT/OT is recommending post acute rehab services    Plan  - PT/OT  - will consider imaging the spine given the fact that the patient has a history of Mets, however will hold off for now    * Pulmonary embolism ([facility] [address] )  Assessment & Plan  - patient presents with a 2-3 day history of gradual onset weakness  - imaging in the ED noted a "small subsegmental left upper lobe pulmonary embolus"  - patient does report some continued mild shortness of breath    Plan  - Patient started on Eliquis 10 mg for 7 days b i d , followed by Eliquis 5 mg b i d        GILBERTO (acute kidney injury) (HCC)-resolved as of 5/12/2021  Assessment & Plan  - Cr noted to be 0 9 on admission, baseline 0 5 - 0 7  - Most likely secondary to urinary retention as noted by bladder distension on CT  - Patient is now back to baseline    Plan:   Intake and output   Monitor BMP daily and observe for downward trend of creatinine   Avoid hypoperfusion of the kidneys, minimize nephrotoxins    Results from last 7 days   Lab Units 05/12/21  0817 05/11/21  1016 05/10/21  1057   CREATININE mg/dL 0 63 0 63 0 91   EGFR ml/min/1 73sq m 99 99 70       Leukocytosis  Assessment & Plan  - Patient is noted to have a mild leukocytosis on labs in the emergency room on presentation 10 43  - Patient denies any fever, chills  - Low suspicion of infectious process  - Patient is on steroids chronically at home, this is a possible source of the leukocytosis  - Resolved    Plan  - Will continue to monitor    Transaminitis  Assessment & Plan  - Patient noted to have elevated AST 67/ in ED  - CT of the abdomen did not note any acute pathology  - Liver enzymes in ED mildly elevated from baseline, possibly reactive  - resolving    Plan  - Will repeat LFTs in the morning and continue to trend at this time    Anemia of chronic disease  Assessment & Plan  - Pt has noted decrease in hgb on admission  - improving  - will continue to monitor    Tobacco abuse  Assessment & Plan  - Patient has a history of tobacco abuse  - Will order the patient nicotine replacement  - Encourage cessation    Therapeutic opioid-induced constipation (OIC)  Assessment & Plan  - patient is a history chronic constipation  - Pt can go 2-5 days between bowel movements  - patient has notable abdominal distension  - patient is maintained on senna as an outpatient, will discontinue on admission  - patient is noted to have significant fecal matter in her GI tract on CT imaging in the ED    Plan  - Patient on Miralax BID and Senakot-s BID  - will add Relistor, mag citrate and enema    CINV (chemotherapy-induced nausea and vomiting)  Assessment & Plan  - Patient reports frequent nausea  - Patient is maintained on Zofran, Protonix as an outpatient will continue    Cancer-related pain  Assessment & Plan  - Pt has extensive history of cancer related pain  - Will continue pt on gabapentin and hydromorphone  - will consult palliative care    Brain metastases [facility])  Assessment & Plan  - Pt has breast cancer with mets to the brain  - [facility] 5/10: No acute intracranial abnormality or significant change from prior study   - Most recent MRI was in 2020    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, estrogen receptor positive ([facility] [address] )  Assessment & Plan  - Diagnosed with metastatic breast cancer in 2010 with mets to the brain and bone  - S/p numerous rounds of chemotherapy  - S/P double mastectomy   - Follows with heme/onc as out patient          VTE Pharmacologic Prophylaxis:   Pharmacologic: Apixaban (Eliquis)  Mechanical VTE Prophylaxis in Place: Yes    Discussions with Specialists or Other Care Team Provider:  Palliative care    Education and Discussions with Family / Patient:  Discussed with patient, will contact patient's     Current Length of Stay: 2 day(s)    Current Patient Status: Inpatient     Discharge Plan / Estimated Discharge Date: To be determined    Code Status: Level 1 - Full Code      Subjective:   Patient seen and examined  No acute events overnight  The patient was transition to Eliquis yesterday  The patient is still significantly constipated despite treatment, the patient did have 1 small bowel movement today however she is still significantly distended will up her bowel regiment and if needed contact GI tomorrow  Patient denies  any headache, dizziness, nausea, vomiting, constipation, diarrhea, chest pain, palpitations, shortness of breath, wheezing  A 12 point review of systems was completed and was negative unless noted above  Objective:     Vitals:   Temp (24hrs), Av 2 °F (36 8 °C), Min:97 5 °F (36 4 °C), Max:98 6 °F (37 °C)    Temp:  [97 5 °F (36 4 °C)-98 6 °F (37 °C)] 98 6 °F (37 °C)  HR:  [80-95] 80  Resp:  [16-20] 18  BP: (123-138)/(67-74) 138/67  SpO2:  [92 %-96 %] 92 %  Body mass index is 28 58 kg/m²  Input and Output Summary (last 24 hours): Intake/Output Summary (Last 24 hours) at 2021 1410  Last data filed at 2021 1119  Gross per 24 hour   Intake --   Output 1100 ml   Net -1100 ml       Physical Exam:     Physical Exam  Vitals signs and nursing note reviewed  Constitutional:       General: She is not in acute distress  Appearance: She is well-developed  She is not diaphoretic  HENT:      Head: Normocephalic and atraumatic  Nose: Nose normal    Eyes:      General: No scleral icterus  Right eye: No discharge  Left eye: No discharge  Extraocular Movements: Extraocular movements intact  Conjunctiva/sclera: Conjunctivae normal       Pupils: Pupils are equal, round, and reactive to light  Neck:      Musculoskeletal: Normal range of motion  Cardiovascular:      Rate and Rhythm: Normal rate and regular rhythm  Heart sounds: Normal heart sounds  No murmur  No friction rub  No gallop  Pulmonary:      Effort: Pulmonary effort is normal  No respiratory distress  Breath sounds: No stridor  No wheezing, rhonchi or rales  Chest:      Chest wall: No tenderness  Abdominal:      General: Abdomen is flat  There is distension  Palpations: Abdomen is soft  Tenderness: There is no abdominal tenderness  Musculoskeletal: Normal range of motion  Right lower leg: No edema  Left lower leg: No edema  Skin:     General: Skin is warm and dry  Neurological:      General: No focal deficit present  Mental Status: She is alert and oriented to person, place, and time  Cranial Nerves: No cranial nerve deficit  Sensory: No sensory deficit  Psychiatric:         Behavior: Behavior normal          Thought Content: Thought content normal          Judgment: Judgment normal          Additional Data:     Labs: I have personally reviewed pertinent reports    Results from last 7 days   Lab Units 05/12/21  0817 05/11/21  1016 05/10/21  1057   WBC Thousand/uL 7 95 7 95 10 43*   HEMOGLOBIN g/dL 10 8* 11 4* 10 9*   HEMATOCRIT % 34 2* 37 1 35 3   PLATELETS Thousands/uL 280 281 285   MONO PCT %  --   --  6      Results from last 7 days   Lab Units 05/12/21  0817 05/11/21  1016 05/10/21  1057   POTASSIUM mmol/L 3 9 3 8 4 1   CHLORIDE mmol/L 109* 109* 106   CO2 mmol/L 28 27 25   BUN mg/dL 13 12 16   CREATININE mg/dL 0 63 0 63 0 91   CALCIUM mg/dL 8 6 8 4 8 9   ALK PHOS U/L 89 85 96   ALT U/L 111* 110* 142*   AST U/L 43 41 67*     Results from last 7 days   Lab Units 05/11/21  1016   MAGNESIUM mg/dL 2 5          Results from last 7 days   Lab Units 05/11/21  1016 05/11/21  0056 05/10/21  1421   INR   --   --  0 84   PTT seconds 76* 93* 23         Results from last 7 days   Lab Units 05/10/21  1421   TROPONIN I ng/mL <0 02       * Additional Pertinent Lab Tests Reviewed: [place] [address] Admission Reviewed    Radiology Results: I have personally reviewed pertinent reports  Ct Head Without Contrast    Result Date: 5/10/2021  Impression: No acute intracranial abnormality or significant change from prior study  Changes within the brain parenchyma again suggesting sequela of prior whole brain radiation including previously treated right periatrial dystrophic calcification  No new lesions detected within limitations of noncontrast imaging  Intracranial metastatic disease again better evaluated with contrast-enhanced MRI  Workstation performed: [id number]     Pe Study With Ct Abdomen And Pelvis With Contrast    Result Date: 5/10/2021  Impression: 1  Limited CT pulmonary angiogram, however, suggestion of a small subsegmental left upper lobe pulmonary embolus  Dilated RV/LV ratio which is nonspecific and could be indicative of elevated right heart pressures in the setting of COPD/pulmonary hypertension  No bowing of the intraventricular septum  2   Small bilateral pulmonary nodules, grossly similar  1 cm pleural-based left apical nodule appears larger  Cannot exclude evolving metastasis  3   No acute intra-abdominal abnormality  4   Large volume of colonic stool suggesting constipation  5   Marked urinary bladder distention  I personally discussed this study with physician assistant Samantha Eaton on 5/10/2021 at 3:50 PM  Workstation performed: [id number]       Recent Cultures (last 7 days):     Results from last 7 days   Lab Units 05/10/21  1743 05/10/21  1458   BLOOD CULTURE  No Growth at 24 hrs  No Growth at 24 hrs  No Growth at 24 hrs         Last 24 Hours Medication List:   Current Facility-Administered Medications   Medication Dose Route Frequency Provider Last Rate    acetaminophen  650 mg Oral Q6H PRN Rajni Mckeon,       albuterol  2 puff Inhalation Q6H PRN Rajni Mckeon, DO      aluminum-magnesium hydroxide-simethicone  30 mL Oral Q6H PRN Rajni Mckeon, DO      apixaban  10 mg Oral BID Rajni Mckeon,       [START ON 5/18/2021] apixaban  5 mg Oral BID Rajni Mckeon, DO      bisacodyl  10 mg Rectal Daily PRN Eleanor Goodpasture, CRNP      dexamethasone  2 mg Oral Daily With Breakfast Rajni Mckeon, DO      gabapentin  400 mg Oral TID AC Juliocesar Simms,       HYDROmorphone  1 mg Intravenous Q6H PRN Rajni Mckeon, DO      HYDROmorphone  4 mg Oral Q4H PRN Rajni Mckeon, DO      HYDROmorphone  8 mg Oral Q4H PRN Rajni Mckeon, DO      levETIRAcetam  250 mg Oral BID Rajni Mckeon, DO      methylnaltrexone bromide  12 mg Subcutaneous Once Rajni Mckeon, DO      nicotine  1 patch Transdermal Daily Juliocesar Carrington, DO      OLANZapine  10 mg Oral HS Juliocesar Simms, DO      ondansetron  8 mg Oral TID AC Juliocesar Simms,       pantoprazole  40 mg Oral Daily Rajni Mckeon,       polyethylene glycol  17 g Oral BID Rajni Mckeon, DO      senna-docusate sodium  2 tablet Oral BID Rajni Mckeon, DO      zolpidem  10 mg Oral HS PRN Rajni Mckeon,           Today, Patient Was Seen By: Rajni Mckeon DO    Portions of the record may have been created with voice recognition software  Occasional wrong word or "sound a like" substitutions may have occurred due to the inherent limitations of voice recognition software  Read the chart carefully and recognize, using context, where substitutions have occurred

## 2021-05-13 PROBLEM — D72.829 LEUKOCYTOSIS: Status: RESOLVED | Noted: 2021-05-10 | Resolved: 2021-05-12

## 2021-05-13 LAB
ALBUMIN SERPL BCP-MCNC: 3 G/DL (ref 3.5–5)
ALP SERPL-CCNC: 98 U/L (ref 46–116)
ALT SERPL W P-5'-P-CCNC: 124 U/L (ref 12–78)
ANION GAP SERPL CALCULATED.3IONS-SCNC: 11 MMOL/L (ref 4–13)
AST SERPL W P-5'-P-CCNC: 49 U/L (ref 5–45)
BILIRUB SERPL-MCNC: 0.27 MG/DL (ref 0.2–1)
BUN SERPL-MCNC: 10 MG/DL (ref 5–25)
CALCIUM ALBUM COR SERPL-MCNC: 9.4 MG/DL (ref 8.3–10.1)
CALCIUM SERPL-MCNC: 8.6 MG/DL (ref 8.3–10.1)
CHLORIDE SERPL-SCNC: 107 MMOL/L (ref 100–108)
CO2 SERPL-SCNC: 28 MMOL/L (ref 21–32)
CREAT SERPL-MCNC: 0.65 MG/DL (ref 0.6–1.3)
ERYTHROCYTE [DISTWIDTH] IN BLOOD BY AUTOMATED COUNT: 16.5 % (ref 11.6–15.1)
GFR SERPL CREATININE-BSD FRML MDRD: 98 ML/MIN/1.73SQ M
GLUCOSE SERPL-MCNC: 83 MG/DL (ref 65–140)
HCT VFR BLD AUTO: 35.9 % (ref 34.8–46.1)
HGB BLD-MCNC: 11.1 G/DL (ref 11.5–15.4)
MCH RBC QN AUTO: 30.6 PG (ref 26.8–34.3)
MCHC RBC AUTO-ENTMCNC: 30.9 G/DL (ref 31.4–37.4)
MCV RBC AUTO: 99 FL (ref 82–98)
PLATELET # BLD AUTO: 261 THOUSANDS/UL (ref 149–390)
PMV BLD AUTO: 9.2 FL (ref 8.9–12.7)
POTASSIUM SERPL-SCNC: 4 MMOL/L (ref 3.5–5.3)
PROT SERPL-MCNC: 6.8 G/DL (ref 6.4–8.2)
RBC # BLD AUTO: 3.63 MILLION/UL (ref 3.81–5.12)
SODIUM SERPL-SCNC: 146 MMOL/L (ref 136–145)
WBC # BLD AUTO: 7.14 THOUSAND/UL (ref 4.31–10.16)

## 2021-05-13 PROCEDURE — 80053 COMPREHEN METABOLIC PANEL: CPT | Performed by: PSYCHIATRY & NEUROLOGY

## 2021-05-13 PROCEDURE — 85027 COMPLETE CBC AUTOMATED: CPT | Performed by: PSYCHIATRY & NEUROLOGY

## 2021-05-13 PROCEDURE — 99232 SBSQ HOSP IP/OBS MODERATE 35: CPT | Performed by: INTERNAL MEDICINE

## 2021-05-13 PROCEDURE — 97116 GAIT TRAINING THERAPY: CPT

## 2021-05-13 PROCEDURE — 97110 THERAPEUTIC EXERCISES: CPT

## 2021-05-13 PROCEDURE — 99222 1ST HOSP IP/OBS MODERATE 55: CPT | Performed by: INTERNAL MEDICINE

## 2021-05-13 RX ORDER — POLYETHYLENE GLYCOL 3350 17 G/17G
238 POWDER, FOR SOLUTION ORAL ONCE
Status: COMPLETED | OUTPATIENT
Start: 2021-05-13 | End: 2021-05-13

## 2021-05-13 RX ORDER — MINERAL OIL 100 G/100G
1 OIL RECTAL ONCE
Status: DISCONTINUED | OUTPATIENT
Start: 2021-05-13 | End: 2021-05-13

## 2021-05-13 RX ORDER — ONDANSETRON 4 MG/1
8 TABLET, ORALLY DISINTEGRATING ORAL
Status: DISCONTINUED | OUTPATIENT
Start: 2021-05-14 | End: 2021-05-14

## 2021-05-13 RX ADMIN — METHYLNALTREXONE BROMIDE 12 MG: 12 INJECTION, SOLUTION SUBCUTANEOUS at 14:20

## 2021-05-13 RX ADMIN — GABAPENTIN 400 MG: 400 CAPSULE ORAL at 17:20

## 2021-05-13 RX ADMIN — POLYETHYLENE GLYCOL 3350 238 G: 17 POWDER, FOR SOLUTION ORAL at 14:20

## 2021-05-13 RX ADMIN — POLYETHYLENE GLYCOL 3350 17 G: 17 POWDER, FOR SOLUTION ORAL at 21:18

## 2021-05-13 RX ADMIN — LEVETIRACETAM 250 MG: 250 TABLET, FILM COATED ORAL at 08:28

## 2021-05-13 RX ADMIN — ONDANSETRON 8 MG: 4 TABLET, ORALLY DISINTEGRATING ORAL at 06:57

## 2021-05-13 RX ADMIN — SENNOSIDES AND DOCUSATE SODIUM 2 TABLET: 8.6; 5 TABLET ORAL at 08:28

## 2021-05-13 RX ADMIN — LIDOCAINE 5% 1 PATCH: 700 PATCH TOPICAL at 08:29

## 2021-05-13 RX ADMIN — OLANZAPINE 10 MG: 10 TABLET, FILM COATED ORAL at 21:18

## 2021-05-13 RX ADMIN — LACTULOSE 200 G: 10 SOLUTION ORAL; RECTAL at 11:42

## 2021-05-13 RX ADMIN — GABAPENTIN 400 MG: 400 CAPSULE ORAL at 06:56

## 2021-05-13 RX ADMIN — GABAPENTIN 400 MG: 400 CAPSULE ORAL at 14:26

## 2021-05-13 RX ADMIN — DEXAMETHASONE 2 MG: 2 TABLET ORAL at 08:28

## 2021-05-13 RX ADMIN — APIXABAN 10 MG: 5 TABLET, FILM COATED ORAL at 08:28

## 2021-05-13 RX ADMIN — PANTOPRAZOLE SODIUM 40 MG: 40 TABLET, DELAYED RELEASE ORAL at 06:56

## 2021-05-13 RX ADMIN — APIXABAN 10 MG: 5 TABLET, FILM COATED ORAL at 17:20

## 2021-05-13 RX ADMIN — SENNOSIDES AND DOCUSATE SODIUM 2 TABLET: 8.6; 5 TABLET ORAL at 17:20

## 2021-05-13 RX ADMIN — NICOTINE 1 PATCH: 14 PATCH, EXTENDED RELEASE TRANSDERMAL at 08:28

## 2021-05-13 RX ADMIN — POLYETHYLENE GLYCOL 3350 17 G: 17 POWDER, FOR SOLUTION ORAL at 08:28

## 2021-05-13 RX ADMIN — LEVETIRACETAM 250 MG: 250 TABLET, FILM COATED ORAL at 17:20

## 2021-05-13 NOTE — PLAN OF CARE
Problem: PHYSICAL THERAPY ADULT  Goal: Performs mobility at highest level of function for planned discharge setting  See evaluation for individualized goals  Description: Treatment/Interventions: ADL retraining, Functional transfer training, LE strengthening/ROM, Elevations, Therapeutic exercise, Endurance training, Patient/family training, Bed mobility, Gait training  Equipment Recommended: Obie Castleman       See flowsheet documentation for full assessment, interventions and recommendations  Outcome: Progressing  Note: Prognosis: Fair  Problem List: Decreased strength, Decreased endurance, Impaired balance, Decreased mobility, Decreased coordination, Pain, Obesity, Decreased cognition, Impaired judgement, Decreased safety awareness(gait deviations)  Assessment: Pt requires similar physical A for bed mobility, but less physical A and burden of care during transfers and gait this session  Pt was also able to amb further distances overall this session  However pt is still not at her mobility baseline  Skilled PT Recommended to progress pt toward treatment goals  Recommend continued trials with rolling walker, and possibly introduce WC            PT Discharge Recommendation: Post acute rehabilitation services          See flowsheet documentation for full assessment

## 2021-05-13 NOTE — ASSESSMENT & PLAN NOTE
- Patient reports frequent nausea, denying any current symptoms  - Patient is maintained on Zofran, Protonix as an outpatient will continue

## 2021-05-13 NOTE — PROGRESS NOTES
[facility]  Progress Note - Nina Scott 1960, 64 y o  female MRN: [id number]  Unit/Bed#: S -01 Encounter: [id number]  Primary Care Provider: Tosha Pizarro MD   Date and time admitted to hospital: 5/10/2021  1:16 PM    Weakness generalized  Assessment & Plan  - Patient presents with a 2-3 day history of gradual onset generalized weakness and slurred speech  - Most likely secondary to deconditioning in the setting of patient's cancer history versus infection but this is highly unlikely given the patients clinical findings  - patient's weakness is improving  -PT/OT is recommending post acute rehab services    Plan  - PT/OT    * Pulmonary embolism ([facility] [address] )  Assessment & Plan  - patient presents with a 2-3 day history of gradual onset weakness  - imaging in the ED noted a "small subsegmental left upper lobe pulmonary embolus"  - patient does reports improvement in her shortness of breath    Plan  - Patient started on Eliquis 10 mg for 7 days b i d , followed by Eliquis 5 mg b i d        Acute kidney failure (HCC)-resolved as of 5/12/2021  Assessment & Plan  - Cr noted to be 0 9 on admission, baseline 0 5 - 0 7  - Most likely secondary to urinary retention as noted by bladder distension on CT  - Patient is now back to baseline    Plan:   Intake and output   Monitor BMP daily and observe for downward trend of creatinine   Avoid hypoperfusion of the kidneys, minimize nephrotoxins    Results from last 7 days   Lab Units 05/13/21  0706 05/12/21  0817 05/11/21  1016   CREATININE mg/dL 0 65 0 63 0 63   EGFR ml/min/1 73sq m 98 99 99       Transaminitis  Assessment & Plan  - Patient noted to have elevated AST 67/ in ED  - CT of the abdomen did not note any acute pathology  - Liver enzymes in ED mildly elevated from baseline, possibly reactive      Plan  - Will repeat LFTs in the morning and continue to trend at this time  - Will ask GI for input    Anemia of chronic disease  Assessment & Plan  - Pt has noted decrease in hgb on admission  - improving  - will continue to monitor    Tobacco abuse  Assessment & Plan  - Patient has a history of tobacco abuse  - Will order the patient nicotine replacement  - Encourage cessation    Therapeutic opioid-induced constipation (OIC)  Assessment & Plan  - patient is a history chronic constipation  - Pt can go 2-5 days between bowel movements  - patient has notable abdominal distension  - patient is maintained on senna as an outpatient, will discontinue on admission  - patient is noted to have significant fecal matter in her GI tract on CT imaging in the ED    - Started on Senokot-S 2 tabs BID and Miralax BID since Monday (5/10) evening,   - S/P two doses of Relistor one Tuesday 5/11, second one Wed 5/12  - S/P Mag citrate Wed 5/12 mid day  - Dulcolax rectal suppository Wed 5/12 (pt request)  - fleet enema Tuesday afternoon 5/11, soap suds enema yesterday 5/12, lactulose enema 5/13    Plan  - Patient on Miralax BID and Senakot-s BID  - Pt started on Bowel Prep (high dose miralax)  - GI consulted, appreciate reccomendations    CINV (chemotherapy-induced nausea and vomiting)  Assessment & Plan  - Patient reports frequent nausea, denying any current symptoms  - Patient is maintained on Zofran, Protonix as an outpatient will continue    Cancer-related pain  Assessment & Plan  - Pt has extensive history of cancer related pain  - Will continue pt on gabapentin and hydromorphone  - Palliative care consulted, appreciate their recommendations    Per discussion with palliative care the patient they will not increase her pain medications at this time due to her severe constipation    Brain metastases [facility])  Assessment & Plan  - Pt has breast cancer with mets to the brain  - [facility] 5/10: No acute intracranial abnormality or significant change from prior study   - Most recent MRI was in 2020    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, estrogen receptor positive [facility])  Assessment & Plan  - Diagnosed with metastatic breast cancer in  with mets to the brain and bone  - S/p numerous rounds of chemotherapy  - S/P double mastectomy   - Follows with heme/onc as out patient    Leukocytosis-resolved as of 2021  Assessment & Plan  - Patient is noted to have a mild leukocytosis on labs in the emergency room on presentation 10 43  - Patient denies any fever, chills  - Low suspicion of infectious process  - Patient is on steroids chronically at home, this is a possible source of the leukocytosis  - Resolved    Plan  - Will continue to monitor        VTE Pharmacologic Prophylaxis:   Pharmacologic: Apixaban (Eliquis)  Mechanical VTE Prophylaxis in Place: Yes    Discussions with Specialists or Other Care Team Provider: GI, Palliative care    Education and Discussions with Family / Patient:  Discussed plan of care with the patient, will attempt to up the patient's family today    Current Length of Stay: 3 day(s)    Current Patient Status: Inpatient     Discharge Plan / Estimated Discharge Date: To be determined    Code Status: Level 1 - Full Code      Subjective:   Patient seen and examined  No acute events overnight  The patient has still not had a bowel movement despite aggressive bowel regiment, will consult GI today, will start the patient on a colonoscopy bowel prep for her constipation, will also add a lactate is and him a as well which was given, and and continue to monitor  The patient reports having flat S  The patient reports mild tenderness in her abdomen  Patient denies  any headache, dizziness, nausea, vomiting, constipation, diarrhea, chest pain, palpitations, shortness of breath, wheezing  A 12 point review of systems was completed and was negative unless noted above    Objective:     Vitals:   Temp (24hrs), Av 5 °F (36 9 °C), Min:97 8 °F (36 6 °C), Max:99 2 °F (37 3 °C)    Temp:  [97 8 °F (36 6 °C)-99 2 °F (37 3 °C)] 97 8 °F (36 6 °C)  HR:  [85-90] 85  Resp: [18-20] 18  BP: (112-140)/(59-72) 140/72  SpO2:  [93 %-98 %] 98 %  Body mass index is 28 58 kg/m²  Input and Output Summary (last 24 hours): Intake/Output Summary (Last 24 hours) at 5/13/2021 1332  Last data filed at 5/13/2021 1257  Gross per 24 hour   Intake 240 ml   Output 1250 ml   Net -1010 ml       Physical Exam:     Physical Exam  Vitals signs and nursing note reviewed  Constitutional:       General: She is not in acute distress  Appearance: She is well-developed  She is not diaphoretic  HENT:      Head: Normocephalic and atraumatic  Nose: Nose normal    Eyes:      General: No scleral icterus  Right eye: No discharge  Left eye: No discharge  Extraocular Movements: Extraocular movements intact  Conjunctiva/sclera: Conjunctivae normal       Pupils: Pupils are equal, round, and reactive to light  Neck:      Musculoskeletal: Normal range of motion  Cardiovascular:      Rate and Rhythm: Normal rate and regular rhythm  Heart sounds: Normal heart sounds  No murmur  No friction rub  No gallop  Pulmonary:      Effort: Pulmonary effort is normal  No respiratory distress  Breath sounds: No stridor  No wheezing, rhonchi or rales  Chest:      Chest wall: No tenderness  Abdominal:      General: Abdomen is flat  Bowel sounds are normal  There is distension  Palpations: Abdomen is soft  Tenderness: There is abdominal tenderness  Musculoskeletal: Normal range of motion  Right lower leg: No edema  Left lower leg: No edema  Skin:     General: Skin is warm and dry  Neurological:      General: No focal deficit present  Mental Status: She is alert and oriented to person, place, and time  Psychiatric:         Behavior: Behavior normal          Thought Content: Thought content normal          Judgment: Judgment normal        Additional Data:     Labs: I have personally reviewed pertinent reports    Results from last 7 days   Lab Units 05/13/21  0706 05/12/21  0817 05/11/21  1016 05/10/21  1057   WBC Thousand/uL 7 14 7 95 7 95 10 43*   HEMOGLOBIN g/dL 11 1* 10 8* 11 4* 10 9*   HEMATOCRIT % 35 9 34 2* 37 1 35 3   PLATELETS Thousands/uL 261 280 281 285   MONO PCT %  --   --   --  6      Results from last 7 days   Lab Units 05/13/21  0706 05/12/21  0817 05/11/21  1016   POTASSIUM mmol/L 4 0 3 9 3 8   CHLORIDE mmol/L 107 109* 109*   CO2 mmol/L 28 28 27   BUN mg/dL 10 13 12   CREATININE mg/dL 0 65 0 63 0 63   CALCIUM mg/dL 8 6 8 6 8 4   ALK PHOS U/L 98 89 85   ALT U/L 124* 111* 110*   AST U/L 49* 43 41     Results from last 7 days   Lab Units 05/11/21  1016   MAGNESIUM mg/dL 2 5          Results from last 7 days   Lab Units 05/11/21  1016 05/11/21  0056 05/10/21  1421   INR   --   --  0 84   PTT seconds 76* 93* 23         Results from last 7 days   Lab Units 05/10/21  1421   TROPONIN I ng/mL <0 02       * Additional Pertinent Lab Tests Reviewed: [place] [facility] Admission Reviewed    Radiology Results: I have personally reviewed pertinent reports  Ct Head Without Contrast    Result Date: 5/10/2021  Impression: No acute intracranial abnormality or significant change from prior study  Changes within the brain parenchyma again suggesting sequela of prior whole brain radiation including previously treated right periatrial dystrophic calcification  No new lesions detected within limitations of noncontrast imaging  Intracranial metastatic disease again better evaluated with contrast-enhanced MRI  Workstation performed: [id number]     Pe Study With Ct Abdomen And Pelvis With Contrast    Result Date: 5/10/2021  Impression: 1  Limited CT pulmonary angiogram, however, suggestion of a small subsegmental left upper lobe pulmonary embolus  Dilated RV/LV ratio which is nonspecific and could be indicative of elevated right heart pressures in the setting of COPD/pulmonary hypertension  No bowing of the intraventricular septum   2   Small bilateral pulmonary nodules, grossly similar  1 cm pleural-based left apical nodule appears larger  Cannot exclude evolving metastasis  3   No acute intra-abdominal abnormality  4   Large volume of colonic stool suggesting constipation  5   Marked urinary bladder distention  I personally discussed this study with physician assistant Samantha Eaton on 5/10/2021 at 3:50 PM  Workstation performed: [id number]       Recent Cultures (last 7 days):     Results from last 7 days   Lab Units 05/10/21  1743 05/10/21  1458   BLOOD CULTURE  No Growth at 48 hrs  No Growth at 48 hrs  No Growth at 48 hrs         Last 24 Hours Medication List:   Current Facility-Administered Medications   Medication Dose Route Frequency Provider Last Rate    acetaminophen  650 mg Oral Q6H PRN Ally Gonzalez, DO      albuterol  2 puff Inhalation Q6H PRN Ally Gonzalez, DO      aluminum-magnesium hydroxide-simethicone  30 mL Oral Q6H PRN Ally Gonzalez, DO      apixaban  10 mg Oral BID Ally Gonzalez DO      [START ON 5/18/2021] apixaban  5 mg Oral BID Ally Gonzalez, DO      bisacodyl  10 mg Rectal Daily PRN FAUZIA Martinez      dexamethasone  2 mg Oral Daily With Breakfast Ally Gonzalez, DO      gabapentin  400 mg Oral TID AC Juliocesar Simms DO      HYDROmorphone  1 mg Intravenous Q6H PRN Ally Gonzalez, DO      HYDROmorphone  4 mg Oral Q4H PRN Ally Gonzalez, DO      HYDROmorphone  8 mg Oral Q4H PRN Ally Gonzalez, DO      levETIRAcetam  250 mg Oral BID Ally Gonzalez, DO      lidocaine  1 patch Topical Daily FAUZIA Martinez      methylnaltrexone bromide  12 mg Subcutaneous Once Gonzales Cornelius MD      mineral oil  1 enema Rectal Once Gonzales Cornelius MD      nicotine  1 patch Transdermal Daily Ally Gonzalez, DO      OLANZapine  10 mg Oral HS Juliocesar Simms DO      ondansetron  8 mg Oral TID AC Juliocesar Simms DO      pantoprazole  40 mg Oral Daily Juliocesar Carrington, DO      polyethylene glycol  238 g Oral Once Ally Gonzalez, DO  polyethylene glycol  17 g Oral BID Neris Lieu, DO      senna-docusate sodium  2 tablet Oral BID Neris Lieu, DO      zolpidem  10 mg Oral HS PRN Neris Liecheryl, DO          Today, Patient Was Seen By: Neris Dupree DO    Portions of the record may have been created with voice recognition software  Occasional wrong word or "sound a like" substitutions may have occurred due to the inherent limitations of voice recognition software  Read the chart carefully and recognize, using context, where substitutions have occurred

## 2021-05-13 NOTE — ASSESSMENT & PLAN NOTE
- Patient presents with a 2-3 day history of gradual onset generalized weakness and slurred speech  - Most likely secondary to deconditioning in the setting of patient's cancer history versus infection but this is highly unlikely given the patients clinical findings  - patient's weakness is improving  -PT/OT is recommending post acute rehab services    Plan  - PT/OT

## 2021-05-13 NOTE — ASSESSMENT & PLAN NOTE
- Pt has extensive history of cancer related pain  - Will continue pt on gabapentin and hydromorphone  - Palliative care consulted, appreciate their recommendations    Per discussion with palliative care the patient they will not increase her pain medications at this time due to her severe constipation

## 2021-05-13 NOTE — ASSESSMENT & PLAN NOTE
- patient is a history chronic constipation  - Pt can go 2-5 days between bowel movements  - patient has notable abdominal distension  - patient is maintained on senna as an outpatient, will discontinue on admission  - patient is noted to have significant fecal matter in her GI tract on CT imaging in the ED    - Started on Senokot-S 2 tabs BID and Miralax BID since Monday (5/10) evening,   - S/P two doses of Relistor one Tuesday 5/11, second one Wed 5/12  - S/P Mag citrate Wed 5/12 mid day  - Dulcolax rectal suppository Wed 5/12 (pt request)  - fleet enema Tuesday afternoon 5/11, soap suds enema yesterday 5/12, lactulose enema 5/13    Plan  - Patient on Miralax BID and Senakot-s BID  - Pt started on Bowel Prep (high dose miralax)  - GI consulted, appreciate reccomendations

## 2021-05-13 NOTE — CASE MANAGEMENT
LOS 3   NOT A BUNDLE;  NOT A READMISSION;  GREEN UNPLANNED READMISSION RISK  CM met with pt and daughter to review role and introduce self  Pt reports she lives in a trailer which is all one floor in [place]  There are no EKATERINA as she has a ramp  She is reportedly independent with ADLs however needs assistance with showering  She is able to ambulate independently with a RW  Pt also has a shower chair and raised toilet seat  She has been to [facility] in the past and reports she will not be going to rehab again  Pt has had [facility] in the past   Pt does not have a POA and would like paperwork to complete this  Cm provided POA paperwork  She reports her  would be her HR and she would like him contacted with updates  Pt uses [facility] in [place] for medications  She is able to afford her medications with her insurance coverage  Her PCP is Van Luis MD and she denies any hx of MH/D&A  Pt reports her family will be transporting her home upon DC  Cm reviewed with pt and daughter the recommendations from the care team for rehab at [place]  [place] of choice has been reviewed  Pt and daughter do not want her to return to rehab  They both prefer pt return home with VNA services as they report pt has support from her , sister and daughter in the home  Pt would like to have referral made to [facility]  Referral has been made and they are able to accept pt  Contact information has been placed on "[employer]   Tech back was conducted regarding where contact information would be found  CM reviewed the availability of treatment team to discuss questions or concerns patient and/or family may have regarding  understanding medications and recognizing signs and symptoms at discharge  CM also encouraged patient to follow up with all recommended appointments after discharge  CM reviewed the information that will be provided to pt/family on the discharge instructions    Patient advised of importance for patient and family to participate in managing patient's medical well being

## 2021-05-13 NOTE — PHYSICAL THERAPY NOTE
PHYSICAL THERAPY TREATMENT NOTE  NAME:  Alie Garcia  DATE: 05/13/21    Length Of Stay: 3  Performed at least 2 patient identifiers during session: Name and Birthday    TREATMENT:    05/13/21 1613   PT Last Visit   PT Visit Date 05/13/21   Note Type   Note Type Treatment   Pain Assessment   Pain Assessment Tool FLACC   Pain Location/Orientation Location: Abdomen   Effect of Pain on Daily Activities limits speed and indep   Patient's Stated Pain Goal No pain   Hospital Pain Intervention(s) Repositioned; Ambulation/increased activity; Emotional support   Pain Rating: FLACC (Rest) - Face 1   Pain Rating: FLACC (Rest) - Legs 0   Pain Rating: FLACC (Rest) - Activity 1   Pain Rating: FLACC (Rest) - Cry 1   Pain Rating: FLACC (Rest) - Consolability 0   Score: FLACC (Rest) 3   Pain Rating: FLACC (Activity) - Face 1   Pain Rating: FLACC (Activity) - Legs 1   Pain Rating: FLACC (Activity) - Activity 1   Pain Rating: FLACC (Activity) - Cry 1   Pain Rating: FLACC (Activity) - Consolability 1   Score: FLACC (Activity) 5   Restrictions/Precautions   Weight Bearing Precautions Per Order No   Other Precautions Contact/isolation; Bed Alarm; Chair Alarm; Fall Risk;Limb alert;Pain   General   Chart Reviewed Yes   Family/Caregiver Present Yes  (daughter present only for start of session)   Cognition   Overall Cognitive Status Impaired   Arousal/Participation Alert   Attention Attends with cues to redirect   Orientation Level Oriented to person;Oriented to place   Memory Decreased recall of recent events   Following Commands Follows multistep commands with increased time or repetition   Subjective   Subjective Pt agreeable to participate, reports that although she has abdominal pain , she wants to get up and moving  Reported that she is usually not incontient   Bed Mobility   Supine to Sit 5  Supervision   Additional items Assist x 1; Increased time required;Verbal cues; Bedrails;HOB elevated   Sit to Supine 4  Minimal assistance Additional items Assist x 1; Increased time required;Verbal cues; Bedrails;HOB elevated   Additional Comments Min A for repositioning toward head of bed when in [place]   Transfers   Sit to Stand 4  Minimal assistance   Additional items Assist x 1; Increased time required;Verbal cues;Armrests   Stand to Sit 4  Minimal assistance   Additional items Assist x 1; Increased time required;Verbal cues   Additional Comments Pt incontinent of urine upon standing and during ambulation   Ambulation/Elevation   Gait pattern Wide REMIGIO; Ataxia; Excessively slow  (occasional increased walker advancement)   Gait Assistance 3  Moderate assist   Additional items Assist x 1;Verbal cues  (A for walker management and@ gait belt for steadying support)   Assistive Device Rolling walker  (and gait belt)   Distance 8'+6'+30'   Stair Management Assistance Not tested   Balance   Static Sitting Fair -   Static Standing Poor +   Ambulatory Poor +   Endurance Deficit   Endurance Deficit Yes   Endurance Deficit Description needs therapeutic rests between mobility trials   Activity Tolerance   Activity Tolerance Patient limited by fatigue;Patient limited by pain   Medical Staff Made Aware spoke to case management earlier in the day   Nurse Made Aware spoke to RNs and Colletta Able PCA   Exercises   Hip Flexion 5 reps;Bilateral;AROM;AAROM  (with concentration on quad set prior to SLR)   Bridging 5 reps;AROM;AAROM; Bilateral  (in [place])   Assessment   Prognosis Fair   Problem List Decreased strength;Decreased endurance; Impaired balance;Decreased mobility; Decreased coordination;Pain;Obesity; Decreased cognition; Impaired judgement;Decreased safety awareness  (gait deviations)   Assessment Pt requires similar physical A for bed mobility, but less physical A and burden of care during transfers and gait this session  Pt was also able to amb further distances overall this session  However pt is still not at her mobility baseline   Skilled PT Recommended to progress pt toward treatment goals  Recommend continued trials with rolling walker, and possibly introduce WC    Goals   Patient Goals to be able to walk more   STG Expiration Date 05/21/21   Short Term Goal #2 Add WC mobility training goals to in clude propel WC for >50' w/ S as alternative to ambulation    PT Treatment Day 2   Plan   Treatment/Interventions Functional transfer training;LE strengthening/ROM; Therapeutic exercise; Endurance training;Gait training;Bed mobility; Equipment eval/education;Patient/family training;Cognitive reorientation  (possible WC mobility)   Progress Slow progress, decreased activity tolerance   PT Frequency Other (Comment)  (3-5x/wk)   Recommendation   PT Discharge Recommendation Post acute rehabilitation services   Equipment Recommended Walker; Wheelchair   AM-PAC Basic Mobility Inpatient   Turning in Bed Without Bedrails 3   Lying on Back to Sitting on Edge of Flat Bed 2   Moving Bed to Chair 2   Standing Up From Chair 2   Walk in Room 2   Climb 3-5 Stairs 1   Basic Mobility Inpatient Raw Score 12   Basic Mobility Standardized Score 32 23       Tracey Cisneros, PT, DPT

## 2021-05-13 NOTE — ASSESSMENT & PLAN NOTE
- Cr noted to be 0 9 on admission, baseline 0 5 - 0 7  - Most likely secondary to urinary retention as noted by bladder distension on CT  - Patient is now back to baseline    Plan:   Intake and output   Monitor BMP daily and observe for downward trend of creatinine   Avoid hypoperfusion of the kidneys, minimize nephrotoxins    Results from last 7 days   Lab Units 05/13/21  0706 05/12/21  0817 05/11/21  1016   CREATININE mg/dL 0 65 0 63 0 63   EGFR ml/min/1 73sq m 98 99 99

## 2021-05-13 NOTE — ASSESSMENT & PLAN NOTE
- Patient noted to have elevated AST 67/ in ED  - CT of the abdomen did not note any acute pathology  - Liver enzymes in ED mildly elevated from baseline, possibly reactive      Plan  - Will repeat LFTs in the morning and continue to trend at this time  - Will ask GI for input

## 2021-05-13 NOTE — CONSULTS
Consultation - [facility]) [facility]  Susie Velasquez 64 y o  female MRN: [id number]  Unit/Bed#: S -01 Encounter: [id number]         Assessment/Plan:   1  Opioid induced constipation  · Patient's bowel habit is once every other day  Last actual formed bowel was about 10 days ago  · Current bowel regimen includes MiraLax b i d , Senokot S b i d  Received MAC citrate Dulcolax suppository and enemas, will Gunner Fabby over the past couple of days with no success of having a bowel movement  · Abdomen on admission showed large volume of colonic stool    Plan:  · Will trial another relistor does this morning  · Trial of mineral oil enema and disimpaction  · Continue clear liquid diet, encourage hydration and out of bed  · If above measures fail then will try polyethylene glycol bowel prep    2  Transaminitis   Noted to have fluctuating transaminitis since 2018  Liver is unremarkable on CT abdomen  ALP and bili are within normal range  Plan:  · Monitor liver enzymes     Will discuss the above plan with my attending and communicate recommendations to primary team currently      Reason for Consult / Principal Problem: Constipation     HPI:   Susie Velasquez is a 64y o  year old female with past medical history significant for metastatic breast cancer status post mastectomy and chemotherapy, chronic constipation, chemo induced nausea and vomiting, who presents on 05/10/2021 with generalized weakness and found to have pulmonary embolism and was started on heparin drip that was transition to Eliquis on 05/11/2021  Patient has chronic history of constipation in the setting of opioids  CT abdomen done on admission showed large volume of colonic stool, patient continues to complain of constipation despite aggressive bowel regimen, therefore GI service was consulted for further recommendations  Current bowel regimen includes MiraLax b i d , Senokot S 2 tablets b i d   And status post relistor on 5/11 and 5/12, Mag citrate on 5/12, Dulcolax suppository 5/12, Fleet enema 5/11, soap suds enema 5/12 and will be receiving lactulose enema ordered by primary team today  Patient's usual bowel habits is once every other day  Her last formed bowel was a week-10 days ago  She did yesterday was liquidy and small possibly an overflow rather than an actual BM  Patient does endorse discomfort and distension  She did pass any flatus today however she was passing gas yesterday  She also reports nausea  Last EGD:  None  Last Colonoscopy:  None    Review of Systems:    Review of Systems   Constitutional: Negative for activity change, appetite change, chills, diaphoresis and fever  HENT: Negative for congestion and trouble swallowing  Respiratory: Negative for shortness of breath  Cardiovascular: Negative for chest pain, palpitations and leg swelling  Gastrointestinal: Positive for abdominal distention, abdominal pain (Generalized discomfort), constipation and nausea  Negative for blood in stool, diarrhea and vomiting  Genitourinary: Negative for difficulty urinating and dysuria  Skin: Negative for color change  Neurological: Negative for dizziness, light-headedness and headaches  Psychiatric/Behavioral: Negative for confusion  The patient is not nervous/anxious  All other systems reviewed and are negative        Historical Information   Past Medical History:   Diagnosis Date    Dehydration 6/11/2019    Dizziness 2/6/2018    Dry skin 2/6/2018    Edema 10/23/2019    H/O bilateral mastectomy 2/15/2016    Hyperglycemia 2/6/2018    Hypertension 2/15/2016    Hypokalemia 3/10/2020    Lymphedema 2/15/2016    Malignant cachexia ([facility] [address] ) 10/6/2016    Malignant neoplasm of right breast ([facility] [address] ) 2/15/2016    Metastatic breast cancer [facility])      Past Surgical History:   Procedure Laterality Date    ENDOBRONCHIAL ULTRASOUND (EBUS) N/A 2/16/2016    Procedure: EBUS;  Surgeon: Elizabeth Urrutia MD;  Location: [facility] OR;  Service:     MASTECTOMY Bilateral     MASTECTOMY Bilateral     right arm edema    OOPHORECTOMY      NC BRONCHOSCOPY NEEDLE BX TRACHEA MAIN STEM&/BRON N/A 2/16/2016    Procedure: FLEX BRONCH;  Surgeon: Florencio Bowling MD;  [facility]: [facility];  Service: Thoracic    NC INSJ TUNNELED CTR VAD W/SUBQ PORT AGE 5 YR/> N/A 7/24/2018    Procedure: INSERTION VENOUS PORT ( PORT-A-CATH) IR;  Surgeon: Jerrod Vogt DO;  [facility];  Service: Interventional Radiology    NC STEREOTACTIC RADIOSURGERY, CRANIAL,SIMPLE,EA ADD  5/3/2017         NC STEREOTACTIC RADIOSURGERY, CRANIAL,SIMPLE,EA ADD  5/3/2017         NC STEREOTACTIC RADIOSURGERY, CRANIAL,SIMPLE,SINGLE  5/3/2017          Social History   Social History     Substance and Sexual Activity   Alcohol Use Not Currently    Frequency: 2-4 times a month    Drinks per session: 3 or 4    Binge frequency: Less than monthly    Comment: once a week     Social History     Substance and Sexual Activity   Drug Use Not Currently    Types: Marijuana     Social History     Tobacco Use   Smoking Status Current Every Day Smoker    Packs/day: 0 50    Years: 40 00    Pack years: 20 00    Types: Cigarettes    Start date: 1978   Smokeless Tobacco Never Used     Family History   Problem Relation Age of Onset    Cancer Sister     Prostate cancer Brother         Meds/Allergies     Current Facility-Administered Medications   Medication Dose Route Frequency    acetaminophen (TYLENOL) tablet 650 mg  650 mg Oral Q6H PRN    albuterol (PROVENTIL HFA,VENTOLIN HFA) inhaler 2 puff  2 puff Inhalation Q6H PRN    aluminum-magnesium hydroxide-simethicone (MYLANTA) oral suspension 30 mL  30 mL Oral Q6H PRN    apixaban (ELIQUIS) tablet 10 mg  10 mg Oral BID    [START ON 5/18/2021] apixaban (ELIQUIS) tablet 5 mg  5 mg Oral BID    bisacodyl (DULCOLAX) rectal suppository 10 mg  10 mg Rectal Daily PRN    dexamethasone (DECADRON) tablet 2 mg  2 mg Oral Daily With Breakfast  gabapentin (NEURONTIN) capsule 400 mg  400 mg Oral TID AC    HYDROmorphone (DILAUDID) injection 1 mg  1 mg Intravenous Q6H PRN    HYDROmorphone (DILAUDID) tablet 4 mg  4 mg Oral Q4H PRN    HYDROmorphone (DILAUDID) tablet 8 mg  8 mg Oral Q4H PRN    lactulose retention enema 200 g  200 g Rectal Once    levETIRAcetam (KEPPRA) tablet 250 mg  250 mg Oral BID    lidocaine (LIDODERM) 5 % patch 1 patch  1 patch Topical Daily    nicotine (NICODERM CQ) 14 mg/24hr TD 24 hr patch 1 patch  1 patch Transdermal Daily    OLANZapine (ZyPREXA) tablet 10 mg  10 mg Oral HS    ondansetron (ZOFRAN-ODT) dispersible tablet 8 mg  8 mg Oral TID AC    pantoprazole (PROTONIX) EC tablet 40 mg  40 mg Oral Daily    polyethylene glycol (GLYCOLAX) bowel prep 238 g  238 g Oral Once    polyethylene glycol (MIRALAX) packet 17 g  17 g Oral BID    senna-docusate sodium (SENOKOT S) 8 6-50 mg per tablet 2 tablet  2 tablet Oral BID    zolpidem (AMBIEN) tablet 10 mg  10 mg Oral HS PRN       No Known Allergies      Objective     Blood pressure 140/72, pulse 85, temperature 97 8 °F (36 6 °C), temperature source Oral, resp  rate 18, height 5' 5" (1 651 m), weight 77 9 kg (171 lb 11 8 oz), SpO2 98 %, not currently breastfeeding  Intake/Output Summary (Last 24 hours) at 5/13/2021 1030  Last data filed at 5/13/2021 0831  Gross per 24 hour   Intake 240 ml   Output 2000 ml   Net -1760 ml         PHYSICAL EXAM:      Physical Exam  Vitals signs and nursing note reviewed  Constitutional:       General: She is not in acute distress  Appearance: She is not ill-appearing or diaphoretic  HENT:      Head: Normocephalic and atraumatic  Mouth/Throat:      Mouth: Mucous membranes are moist       Pharynx: Oropharynx is clear  Eyes:      General: No scleral icterus  Extraocular Movements: Extraocular movements intact  Conjunctiva/sclera: Conjunctivae normal    Cardiovascular:      Rate and Rhythm: Normal rate and regular rhythm  Heart sounds: Normal heart sounds  No murmur  Pulmonary:      Effort: Pulmonary effort is normal  No respiratory distress  Breath sounds: Normal breath sounds  No wheezing or rales  Abdominal:      General: Bowel sounds are normal  There is distension  Palpations: Abdomen is soft  Tenderness: There is abdominal tenderness (Discomfort, generalized)  Musculoskeletal: Normal range of motion  Skin:     General: Skin is warm and dry  Capillary Refill: Capillary refill takes less than 2 seconds  Neurological:      General: No focal deficit present  Mental Status: She is alert and oriented to person, place, and time  Psychiatric:         Mood and Affect: Mood normal          Behavior: Behavior normal            Lab Results:   Results from last 7 days   Lab Units 05/13/21  0706  05/10/21  1057   WBC Thousand/uL 7 14   < > 10 43*   HEMOGLOBIN g/dL 11 1*   < > 10 9*   HEMATOCRIT % 35 9   < > 35 3   PLATELETS Thousands/uL 261   < > 285   LYMPHO PCT %  --   --  18   MONO PCT %  --   --  6   EOS PCT %  --   --  0    < > = values in this interval not displayed  Results from last 7 days   Lab Units 05/13/21  0706   POTASSIUM mmol/L 4 0   CHLORIDE mmol/L 107   CO2 mmol/L 28   BUN mg/dL 10   CREATININE mg/dL 0 65   CALCIUM mg/dL 8 6   ALK PHOS U/L 98   ALT U/L 124*   AST U/L 49*     Results from last 7 days   Lab Units 05/10/21  1421   INR  0 84     Results from last 7 days   Lab Units 05/10/21  1421   LIPASE u/L 66*       Imaging Studies: I have personally reviewed pertinent imaging studies  Ct Head Without Contrast    Result Date: 5/10/2021  Impression: No acute intracranial abnormality or significant change from prior study  Changes within the brain parenchyma again suggesting sequela of prior whole brain radiation including previously treated right periatrial dystrophic calcification  No new lesions detected within limitations of noncontrast imaging   Intracranial metastatic disease again better evaluated with contrast-enhanced MRI  Workstation performed: [id number]     Pe Study With Ct Abdomen And Pelvis With Contrast    Result Date: 5/10/2021  Impression: 1  Limited CT pulmonary angiogram, however, suggestion of a small subsegmental left upper lobe pulmonary embolus  Dilated RV/LV ratio which is nonspecific and could be indicative of elevated right heart pressures in the setting of COPD/pulmonary hypertension  No bowing of the intraventricular septum  2   Small bilateral pulmonary nodules, grossly similar  1 cm pleural-based left apical nodule appears larger  Cannot exclude evolving metastasis  3   No acute intra-abdominal abnormality  4   Large volume of colonic stool suggesting constipation  5   Marked urinary bladder distention   I personally discussed this study with physician assistant Samantha Eaton on 5/10/2021 at 3:50 PM  Workstation performed: [id number]

## 2021-05-13 NOTE — UTILIZATION REVIEW
Inpatient Admission Authorization Request   NOTIFICATION OF INPATIENT ADMISSION/INPATIENT AUTHORIZATION REQUEST   SERVICING FACILITY:   [address] [facility] [address] [place] [address]  Tax ID: [id number]  NPI: [id number]  [facility]  [address]  Place of Service Code: 24     ATTENDING PROVIDER:  Attending Name and NPI#: Omar Aleman Md [[id number]]  Address: [address], [address] [place] [address]  Phone: [phone]     UTILIZATION REVIEW CONTACT:  [facility] Utilization   Network Utilization Review Department  Phone: [phone]  Fax: [phone]  Email: [email or web address]     PHYSICIAN ADVISORY SERVICES:  FOR SDVS-WO-JEOF REVIEW - MEDICAL NECESSITY DENIAL  Phone: [phone]  Fax: [phone]  Email: [email or web address]     TYPE OF REQUEST:  Inpatient Status     ADMISSION INFORMATION:  ADMISSION DATE/TIME: 5/10/21 1638  PATIENT DIAGNOSIS CODE/DESCRIPTION:  Slurred speech [R47 81]  Bladder distension [N32 89]  Pulmonary embolism (HCC) [I26 99]  Weakness [R53 1]  Constipation [K59 00]  Multiple pulmonary nodules [R91 8]  Generalized weakness [R53 1]  DISCHARGE DATE/TIME: No discharge date for patient encounter  DISCHARGE DISPOSITION (IF DISCHARGED): Home/Self Care     IMPORTANT INFORMATION:  Please contact the [facility] directly with any questions or concerns regarding this request  Department voicemails are confidential     Send requests for admission clinical reviews, concurrent reviews, approvals, and administrative denials due to lack of clinical to fax [phone]

## 2021-05-13 NOTE — ASSESSMENT & PLAN NOTE
- patient presents with a 2-3 day history of gradual onset weakness  - imaging in the ED noted a "small subsegmental left upper lobe pulmonary embolus"  - patient does reports improvement in her shortness of breath    Plan  - Patient started on Eliquis 10 mg for 7 days b i d , followed by Eliquis 5 mg b i d

## 2021-05-14 ENCOUNTER — APPOINTMENT (INPATIENT)
Dept: RADIOLOGY | Facility: HOSPITAL | Age: 61
DRG: 391 | End: 2021-05-14
Payer: COMMERCIAL

## 2021-05-14 LAB
ALBUMIN SERPL BCP-MCNC: 3.1 G/DL (ref 3.5–5)
ALP SERPL-CCNC: 105 U/L (ref 46–116)
ALT SERPL W P-5'-P-CCNC: 130 U/L (ref 12–78)
ANION GAP SERPL CALCULATED.3IONS-SCNC: 8 MMOL/L (ref 4–13)
AST SERPL W P-5'-P-CCNC: 50 U/L (ref 5–45)
BILIRUB SERPL-MCNC: 0.32 MG/DL (ref 0.2–1)
BUN SERPL-MCNC: 8 MG/DL (ref 5–25)
CALCIUM ALBUM COR SERPL-MCNC: 9.6 MG/DL (ref 8.3–10.1)
CALCIUM SERPL-MCNC: 8.9 MG/DL (ref 8.3–10.1)
CHLORIDE SERPL-SCNC: 109 MMOL/L (ref 100–108)
CO2 SERPL-SCNC: 28 MMOL/L (ref 21–32)
CREAT SERPL-MCNC: 0.57 MG/DL (ref 0.6–1.3)
ERYTHROCYTE [DISTWIDTH] IN BLOOD BY AUTOMATED COUNT: 16.4 % (ref 11.6–15.1)
GFR SERPL CREATININE-BSD FRML MDRD: 103 ML/MIN/1.73SQ M
GLUCOSE SERPL-MCNC: 83 MG/DL (ref 65–140)
HCT VFR BLD AUTO: 36.5 % (ref 34.8–46.1)
HGB BLD-MCNC: 11.4 G/DL (ref 11.5–15.4)
MCH RBC QN AUTO: 30.4 PG (ref 26.8–34.3)
MCHC RBC AUTO-ENTMCNC: 31.2 G/DL (ref 31.4–37.4)
MCV RBC AUTO: 97 FL (ref 82–98)
PLATELET # BLD AUTO: 264 THOUSANDS/UL (ref 149–390)
PMV BLD AUTO: 9.3 FL (ref 8.9–12.7)
POTASSIUM SERPL-SCNC: 3.9 MMOL/L (ref 3.5–5.3)
PROT SERPL-MCNC: 7.1 G/DL (ref 6.4–8.2)
RBC # BLD AUTO: 3.75 MILLION/UL (ref 3.81–5.12)
SODIUM SERPL-SCNC: 145 MMOL/L (ref 136–145)
WBC # BLD AUTO: 8.8 THOUSAND/UL (ref 4.31–10.16)

## 2021-05-14 PROCEDURE — 99232 SBSQ HOSP IP/OBS MODERATE 35: CPT | Performed by: INTERNAL MEDICINE

## 2021-05-14 PROCEDURE — 74022 RADEX COMPL AQT ABD SERIES: CPT

## 2021-05-14 PROCEDURE — 80053 COMPREHEN METABOLIC PANEL: CPT | Performed by: PSYCHIATRY & NEUROLOGY

## 2021-05-14 PROCEDURE — 85027 COMPLETE CBC AUTOMATED: CPT | Performed by: PSYCHIATRY & NEUROLOGY

## 2021-05-14 RX ORDER — PROCHLORPERAZINE MALEATE 10 MG
5 TABLET ORAL
Status: DISCONTINUED | OUTPATIENT
Start: 2021-05-14 | End: 2021-05-25 | Stop reason: HOSPADM

## 2021-05-14 RX ORDER — MINERAL OIL 100 G/100G
1 OIL RECTAL ONCE
Status: COMPLETED | OUTPATIENT
Start: 2021-05-14 | End: 2021-05-14

## 2021-05-14 RX ORDER — MAGNESIUM CARB/ALUMINUM HYDROX 105-160MG
296 TABLET,CHEWABLE ORAL ONCE
Status: COMPLETED | OUTPATIENT
Start: 2021-05-14 | End: 2021-05-14

## 2021-05-14 RX ORDER — ONDANSETRON 2 MG/ML
4 INJECTION INTRAMUSCULAR; INTRAVENOUS ONCE
Status: COMPLETED | OUTPATIENT
Start: 2021-05-14 | End: 2021-05-14

## 2021-05-14 RX ORDER — PROCHLORPERAZINE MALEATE 10 MG
5 TABLET ORAL
Status: DISCONTINUED | OUTPATIENT
Start: 2021-05-14 | End: 2021-05-14

## 2021-05-14 RX ORDER — ONDANSETRON 4 MG/1
4 TABLET, ORALLY DISINTEGRATING ORAL
Status: DISCONTINUED | OUTPATIENT
Start: 2021-05-14 | End: 2021-05-14

## 2021-05-14 RX ADMIN — GABAPENTIN 400 MG: 400 CAPSULE ORAL at 17:28

## 2021-05-14 RX ADMIN — PROCHLORPERAZINE MALEATE 5 MG: 10 TABLET ORAL at 17:29

## 2021-05-14 RX ADMIN — HYDROMORPHONE HYDROCHLORIDE 8 MG: 2 TABLET ORAL at 20:11

## 2021-05-14 RX ADMIN — LIDOCAINE 5% 1 PATCH: 700 PATCH TOPICAL at 08:53

## 2021-05-14 RX ADMIN — APIXABAN 10 MG: 5 TABLET, FILM COATED ORAL at 17:28

## 2021-05-14 RX ADMIN — ACETAMINOPHEN 650 MG: 325 TABLET, FILM COATED ORAL at 18:19

## 2021-05-14 RX ADMIN — ONDANSETRON 4 MG: 4 TABLET, ORALLY DISINTEGRATING ORAL at 11:23

## 2021-05-14 RX ADMIN — HYDROMORPHONE HYDROCHLORIDE 4 MG: 2 TABLET ORAL at 04:42

## 2021-05-14 RX ADMIN — ONDANSETRON 4 MG: 4 TABLET, ORALLY DISINTEGRATING ORAL at 08:50

## 2021-05-14 RX ADMIN — PANTOPRAZOLE SODIUM 40 MG: 40 TABLET, DELAYED RELEASE ORAL at 04:42

## 2021-05-14 RX ADMIN — ONDANSETRON 4 MG: 2 INJECTION INTRAMUSCULAR; INTRAVENOUS at 20:11

## 2021-05-14 RX ADMIN — NICOTINE 1 PATCH: 14 PATCH, EXTENDED RELEASE TRANSDERMAL at 08:51

## 2021-05-14 RX ADMIN — GABAPENTIN 400 MG: 400 CAPSULE ORAL at 11:23

## 2021-05-14 RX ADMIN — MINERAL OIL 1 ENEMA: 100 ENEMA RECTAL at 14:22

## 2021-05-14 RX ADMIN — GABAPENTIN 400 MG: 400 CAPSULE ORAL at 08:49

## 2021-05-14 RX ADMIN — OLANZAPINE 10 MG: 10 TABLET, FILM COATED ORAL at 22:20

## 2021-05-14 RX ADMIN — POLYETHYLENE GLYCOL 3350 17 G: 17 POWDER, FOR SOLUTION ORAL at 22:20

## 2021-05-14 RX ADMIN — MAGNESIUM CITRATE 296 ML: 1.75 LIQUID ORAL at 16:04

## 2021-05-14 RX ADMIN — LEVETIRACETAM 250 MG: 250 TABLET, FILM COATED ORAL at 17:28

## 2021-05-14 RX ADMIN — LEVETIRACETAM 250 MG: 250 TABLET, FILM COATED ORAL at 08:50

## 2021-05-14 RX ADMIN — POLYETHYLENE GLYCOL 3350 17 G: 17 POWDER, FOR SOLUTION ORAL at 08:53

## 2021-05-14 RX ADMIN — DEXAMETHASONE 2 MG: 2 TABLET ORAL at 08:50

## 2021-05-14 RX ADMIN — LUBIPROSTONE 24 MCG: 24 CAPSULE, GELATIN COATED ORAL at 17:29

## 2021-05-14 RX ADMIN — SENNOSIDES AND DOCUSATE SODIUM 2 TABLET: 8.6; 5 TABLET ORAL at 17:28

## 2021-05-14 RX ADMIN — APIXABAN 10 MG: 5 TABLET, FILM COATED ORAL at 08:50

## 2021-05-14 RX ADMIN — BISACODYL 10 MG: 10 SUPPOSITORY RECTAL at 20:12

## 2021-05-14 RX ADMIN — SENNOSIDES AND DOCUSATE SODIUM 2 TABLET: 8.6; 5 TABLET ORAL at 08:50

## 2021-05-14 NOTE — ASSESSMENT & PLAN NOTE
- Cr noted to be 0 9 on admission, baseline 0 5 - 0 7  - Most likely secondary to urinary retention as noted by bladder distension on CT  - Patient is now back to baseline    Plan:   Intake and output   Monitor BMP daily and observe for downward trend of creatinine   Avoid hypoperfusion of the kidneys, minimize nephrotoxins    Results from last 7 days   Lab Units 05/14/21  0608 05/13/21  0706 05/12/21  0817   CREATININE mg/dL 0 57* 0 65 0 63   EGFR ml/min/1 73sq m 103 98 99

## 2021-05-14 NOTE — ASSESSMENT & PLAN NOTE
- Patient reports frequent nausea, denying any current symptoms  - Patient is maintained on Zofran, Protonix as an outpatient will continue Protonix, however due to use Zofran as constipating potential will discontinue it and start on Compazine, discussed this change with the patient and the patient was amenable to trying it

## 2021-05-14 NOTE — ASSESSMENT & PLAN NOTE
- Patient noted to have elevated AST 67/ in ED  - CT of the abdomen did not note any acute pathology  - Liver enzymes in ED mildly elevated from baseline, possibly reactive  - Per GI elevated LFTs demonstrate a hepatocellular pattern    Plan  - Will continue to monitor LFTs  - Will ask GI for input - recommend checking celiac panel, check chronic hepatitis panel

## 2021-05-14 NOTE — ASSESSMENT & PLAN NOTE
- patient is a history chronic constipation  - Pt can go 2-5 days between bowel movements  - patient has notable abdominal distension  - patient is maintained on senna as an outpatient, will discontinue on admission  - patient is noted to have significant fecal matter in her GI tract on CT imaging in the ED    - Started on Senokot-S 2 tabs BID and Miralax BID since Monday (5/10) evening,   - S/P two doses of Relistor one Tuesday 5/11, second one Wed 5/12  - S/P Mag citrate Wed 5/12 mid day  - Dulcolax rectal suppository Wed 5/12 (pt request)  - fleet enema Tuesday afternoon 5/11, soap suds enema yesterday 5/12, lactulose enema 5/13  - S/P bowel Prep (high dose miralax) 5/13    Plan  - Patient on Miralax BID and Senakot-s BID  - patient started on Amitiza  - mineral oil enema today  - GI consulted, appreciate reccomendations

## 2021-05-14 NOTE — PROGRESS NOTES
[facility]  Progress Note - Jose Juan Monson 1960, 64 y o  female MRN: [id number]  Unit/Bed#: S -01 Encounter: [id number]  Primary Care Provider: Caitlyn Everett MD   Date and time admitted to hospital: 5/10/2021  1:16 PM    Weakness generalized  Assessment & Plan  - Patient presents with a 2-3 day history of gradual onset generalized weakness and slurred speech  - Most likely secondary to deconditioning in the setting of patient's cancer history versus infection but this is highly unlikely given the patients clinical findings  - patient's weakness is improving  -PT/OT is recommending post acute rehab services    Plan  - PT/OT    * Pulmonary embolism ([facility] [address] )  Assessment & Plan  - patient presents with a 2-3 day history of gradual onset weakness  - imaging in the ED noted a "small subsegmental left upper lobe pulmonary embolus"  - patient does reports improvement in her shortness of breath    Plan  - Patient started on Eliquis 10 mg for 7 days b i d , followed by Eliquis 5 mg b i d        Acute kidney failure (HCC)-resolved as of 5/12/2021  Assessment & Plan  - Cr noted to be 0 9 on admission, baseline 0 5 - 0 7  - Most likely secondary to urinary retention as noted by bladder distension on CT  - Patient is now back to baseline    Plan:   Intake and output   Monitor BMP daily and observe for downward trend of creatinine   Avoid hypoperfusion of the kidneys, minimize nephrotoxins    Results from last 7 days   Lab Units 05/14/21  0608 05/13/21  0706 05/12/21  0817   CREATININE mg/dL 0 57* 0 65 0 63   EGFR ml/min/1 73sq m 103 98 99       Transaminitis  Assessment & Plan  - Patient noted to have elevated AST 67/ in ED  - CT of the abdomen did not note any acute pathology  - Liver enzymes in ED mildly elevated from baseline, possibly reactive  - Per GI elevated LFTs demonstrate a hepatocellular pattern    Plan  - Will continue to monitor LFTs  - Will ask GI for input - recommend checking celiac panel, check chronic hepatitis panel  Anemia of chronic disease  Assessment & Plan  - Pt has noted decrease in hgb on admission  - improving  - will continue to monitor    Tobacco abuse  Assessment & Plan  - Patient has a history of tobacco abuse  - Will order the patient nicotine replacement  - Encourage cessation    Therapeutic opioid-induced constipation (OIC)  Assessment & Plan  - patient is a history chronic constipation  - Pt can go 2-5 days between bowel movements  - patient has notable abdominal distension  - patient is maintained on senna as an outpatient, will discontinue on admission  - patient is noted to have significant fecal matter in her GI tract on CT imaging in the ED    - Started on Senokot-S 2 tabs BID and Miralax BID since Monday (5/10) evening,   - S/P two doses of Relistor one Tuesday 5/11, second one Wed 5/12  - S/P Mag citrate Wed 5/12 mid day  - Dulcolax rectal suppository Wed 5/12 (pt request)  - fleet enema Tuesday afternoon 5/11, soap suds enema yesterday 5/12, lactulose enema 5/13  - S/P bowel Prep (high dose miralax) 5/13    Plan  - Patient on Miralax BID and Senakot-s BID  - patient started on Amitiza  - mineral oil enema today  - GI consulted, appreciate reccomendations    CINV (chemotherapy-induced nausea and vomiting)  Assessment & Plan  - Patient reports frequent nausea, denying any current symptoms  - Patient is maintained on Zofran, Protonix as an outpatient will continue Protonix, however due to use Zofran as constipating potential will discontinue it and start on Compazine, discussed this change with the patient and the patient was amenable to trying it    Cancer-related pain  Assessment & Plan  - Pt has extensive history of cancer related pain  - Will continue pt on gabapentin and hydromorphone  - Palliative care consulted, appreciate their recommendations    Per discussion with palliative care the patient they will not increase her pain medications at this time due to her severe constipation    Brain metastases ([facility] [address] )  Assessment & Plan  - Pt has breast cancer with mets to the brain  - CTH 5/10: No acute intracranial abnormality or significant change from prior study   - Most recent MRI was in 2020    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, estrogen receptor positive ([facility] [address] )  Assessment & Plan  - Diagnosed with metastatic breast cancer in 2010 with mets to the brain and bone  - S/p numerous rounds of chemotherapy  - S/P double mastectomy   - Follows with heme/onc as out patient    Leukocytosis-resolved as of 5/12/2021  Assessment & Plan  - Patient is noted to have a mild leukocytosis on labs in the emergency room on presentation 10 43  - Patient denies any fever, chills  - Low suspicion of infectious process  - Patient is on steroids chronically at home, this is a possible source of the leukocytosis  - Resolved    Plan  - Will continue to monitor        VTE Pharmacologic Prophylaxis:   Pharmacologic: Apixaban (Eliquis)  Mechanical VTE Prophylaxis in Place: Yes    Discussions with Specialists or Other Care Team Provider:  Gastroenterology    Education and Discussions with Family / Patient:  Discussed plan of care with the patient, patient's , and patient's sister    Current Length of Stay: 4 day(s)    Current Patient Status: Inpatient     Discharge Plan / Estimated Discharge Date: To be determined    Code Status: Level 1 - Full Code      Subjective:   Patient seen and examined  No acute events overnight  The patient has not had a bowel movement despite an extensive bowel regiment, patient reports she is having abdominal pain  The patient reports she is passing flatus  The patient denies having had the urge to go to the bathroom  A HARRIET was performed by myself today and the rectal vault was empty for any stool  Patient denies  any headache, dizziness, nausea, vomiting, diarrhea, chest pain, palpitations, shortness of breath, wheezing   A 12 point review of systems was completed and was negative unless noted above  Objective:     Vitals:   Temp (24hrs), Av 8 °F (36 6 °C), Min:97 6 °F (36 4 °C), Max:98 1 °F (36 7 °C)    Temp:  [97 6 °F (36 4 °C)-98 1 °F (36 7 °C)] 97 6 °F (36 4 °C)  HR:  [78-79] 79  Resp:  [18] 18  BP: (124-142)/(63-83) 124/83  SpO2:  [91 %-95 %] 94 %  Body mass index is 28 58 kg/m²  Input and Output Summary (last 24 hours):     No intake or output data in the 24 hours ending 21 1613    Physical Exam:     Physical Exam  Vitals signs and nursing note reviewed  Constitutional:       General: She is not in acute distress  Appearance: She is well-developed  She is not diaphoretic  HENT:      Head: Normocephalic and atraumatic  Nose: Nose normal    Eyes:      General: No scleral icterus  Right eye: No discharge  Left eye: No discharge  Extraocular Movements: Extraocular movements intact  Conjunctiva/sclera: Conjunctivae normal       Pupils: Pupils are equal, round, and reactive to light  Neck:      Musculoskeletal: Normal range of motion  Cardiovascular:      Rate and Rhythm: Normal rate and regular rhythm  Heart sounds: Normal heart sounds  No murmur  No friction rub  No gallop  Pulmonary:      Effort: Pulmonary effort is normal  No respiratory distress  Breath sounds: No stridor  No wheezing, rhonchi or rales  Chest:      Chest wall: No tenderness  Abdominal:      General: Abdomen is flat  Bowel sounds are normal  There is distension  Palpations: Abdomen is soft  Tenderness: There is abdominal tenderness  Genitourinary:     Rectum: Normal  No mass or tenderness  Normal anal tone  Musculoskeletal: Normal range of motion  Right lower leg: No edema  Left lower leg: No edema  Skin:     General: Skin is warm and dry  Neurological:      General: No focal deficit present  Mental Status: She is alert and oriented to person, place, and time  Psychiatric:         Behavior: Behavior normal          Thought Content: Thought content normal          Judgment: Judgment normal          Additional Data:     Labs: I have personally reviewed pertinent reports  Results from last 7 days   Lab Units 05/14/21  0608 05/13/21  0706 05/12/21  0817  05/10/21  1057   WBC Thousand/uL 8 80 7 14 7 95   < > 10 43*   HEMOGLOBIN g/dL 11 4* 11 1* 10 8*   < > 10 9*   HEMATOCRIT % 36 5 35 9 34 2*   < > 35 3   PLATELETS Thousands/uL 264 261 280   < > 285   MONO PCT %  --   --   --   --  6    < > = values in this interval not displayed  Results from last 7 days   Lab Units 05/14/21  0608 05/13/21  0706 05/12/21  0817   POTASSIUM mmol/L 3 9 4 0 3 9   CHLORIDE mmol/L 109* 107 109*   CO2 mmol/L 28 28 28   BUN mg/dL 8 10 13   CREATININE mg/dL 0 57* 0 65 0 63   CALCIUM mg/dL 8 9 8 6 8 6   ALK PHOS U/L 105 98 89   ALT U/L 130* 124* 111*   AST U/L 50* 49* 43     Results from last 7 days   Lab Units 05/11/21  1016   MAGNESIUM mg/dL 2 5          Results from last 7 days   Lab Units 05/11/21  1016 05/11/21  0056 05/10/21  1421   INR   --   --  0 84   PTT seconds 76* 93* 23         Results from last 7 days   Lab Units 05/10/21  1421   TROPONIN I ng/mL <0 02       * Additional Pertinent Lab Tests Reviewed: Savannah Gutierrez Admission Reviewed    Radiology Results: I have personally reviewed pertinent reports  Xr Abdomen Obstruction Series    Result Date: 5/14/2021  Impression: Improved constipation Nonobstructive bowel gas pattern  Workstation performed: [id number]     Ct Head Without Contrast    Result Date: 5/10/2021  Impression: No acute intracranial abnormality or significant change from prior study  Changes within the brain parenchyma again suggesting sequela of prior whole brain radiation including previously treated right periatrial dystrophic calcification  No new lesions detected within limitations of noncontrast imaging   Intracranial metastatic disease again better evaluated with contrast-enhanced MRI  Workstation performed: [id number]     Pe Study With Ct Abdomen And Pelvis With Contrast    Result Date: 5/10/2021  Impression: 1  Limited CT pulmonary angiogram, however, suggestion of a small subsegmental left upper lobe pulmonary embolus  Dilated RV/LV ratio which is nonspecific and could be indicative of elevated right heart pressures in the setting of COPD/pulmonary hypertension  No bowing of the intraventricular septum  2   Small bilateral pulmonary nodules, grossly similar  1 cm pleural-based left apical nodule appears larger  Cannot exclude evolving metastasis  3   No acute intra-abdominal abnormality  4   Large volume of colonic stool suggesting constipation  5   Marked urinary bladder distention  I personally discussed this study with physician assistant Samantha Eaton on 5/10/2021 at 3:50 PM  Workstation performed: [id number]       Recent Cultures (last 7 days):     Results from last 7 days   Lab Units 05/10/21  1743 05/10/21  1458   BLOOD CULTURE  No Growth at 72 hrs  No Growth at 72 hrs  No Growth at 72 hrs         Last 24 Hours Medication List:   Current Facility-Administered Medications   Medication Dose Route Frequency Provider Last Rate    acetaminophen  650 mg Oral Q6H PRN Artelia Janine, DO      albuterol  2 puff Inhalation Q6H PRN Artelia Janine, DO      aluminum-magnesium hydroxide-simethicone  30 mL Oral Q6H PRN Artelia Janine, DO      apixaban  10 mg Oral BID Artelia Janine, DO      [START ON 5/18/2021] apixaban  5 mg Oral BID Artelia Janine, DO      bisacodyl  10 mg Rectal Daily PRN FAUZIA Fierro      dexamethasone  2 mg Oral Daily With Breakfast Artelia Janine, DO      gabapentin  400 mg Oral TID AC Juliocesar Boykinmyer, DO      HYDROmorphone  1 mg Intravenous Q6H PRN Artelia Janine, DO      HYDROmorphone  4 mg Oral Q4H PRN Artelia Janine, DO      HYDROmorphone  8 mg Oral Q4H PRN Juliocesar Hackmyer, DO      levETIRAcetam  250 mg Oral BID Sondra Rico, DO      lidocaine  1 patch Topical Daily FAUZIA Call      lubiprostone  24 mcg Oral BID With Meals Vasyl Brennan MD      nicotine  1 patch Transdermal Daily Sondra Rico, DO      OLANZapine  10 mg Oral HS Juliocesar Simms,       pantoprazole  40 mg Oral Daily Sondra Rico, DO      polyethylene glycol  17 g Oral BID Sondra Rico, DO      prochlorperazine  5 mg Oral TID AC Juliocesar Simms,       senna-docusate sodium  2 tablet Oral BID Sondra Rico, DO      zolpidem  10 mg Oral HS PRN Sondra Rico, DO          Today, Patient Was Seen By: Sondra Rico DO    Portions of the record may have been created with voice recognition software  Occasional wrong word or "sound a like" substitutions may have occurred due to the inherent limitations of voice recognition software  Read the chart carefully and recognize, using context, where substitutions have occurred

## 2021-05-14 NOTE — PROGRESS NOTES
[facility]  Italo Henry 64 y o  female MRN: [id number]  Unit/Bed#: S -01 Encounter: [id number]           Assessment/Plan:   1  Opioid induced constipation  · Patient's bowel habit is once every other day  Last actual formed bowel was about 10 days ago  · Current bowel regimen includes MiraLax b i d , Senokot S b i d  Received MAC citrate Dulcolax suppository and enemas, will Maria Del Carmen Jin over the past couple of days with no success of having a bowel movement  · Abdomen CT on admission showed large volume of colonic stool  · X-ray abdomen obstruction on 05/14:  Improved constipation, nonobstructive bowel gas pattern     Plan:  · Continue laxatives and enema, will trial mineral oil enema today   · Advance diet as tolerated, advanced to full liquid today, encourage hydration and out of bed  · Status post polyethylene glycol bowel prep yesterday     2  Transaminitis   Noted to have fluctuating transaminitis since 2018  Liver is unremarkable on CT abdomen  ALP and bili are within normal range      Plan:  · Monitor liver enzymes   · Check chronic hepatitis panel     Subjective:  Patient still not with a bowel movement yet  She reports abdominal discomfort and distention  Bowel sounds positive, patient is passing gas  ROS: As noted in the HPI, otherwise all others negative  Objective:     Vitals: Blood pressure 142/78, pulse 78, temperature 98 1 °F (36 7 °C), temperature source Oral, resp  rate 18, height 5' 5" (1 651 m), weight 77 9 kg (171 lb 11 8 oz), SpO2 91 %, not currently breastfeeding  ,Body mass index is 28 58 kg/m²  Intake/Output Summary (Last 24 hours) at 5/14/2021 1146  Last data filed at 5/13/2021 1519  Gross per 24 hour   Intake 240 ml   Output 600 ml   Net -360 ml       Physical Exam:     Physical Exam  Vitals signs and nursing note reviewed  Constitutional:       General: She is not in acute distress  Appearance: She is not ill-appearing or diaphoretic     HENT: Head: Normocephalic and atraumatic  Mouth/Throat:      Mouth: Mucous membranes are moist       Pharynx: Oropharynx is clear  Eyes:      General: No scleral icterus  Extraocular Movements: Extraocular movements intact  Conjunctiva/sclera: Conjunctivae normal    Cardiovascular:      Rate and Rhythm: Normal rate and regular rhythm  Heart sounds: Normal heart sounds  No murmur  Pulmonary:      Effort: Pulmonary effort is normal  No respiratory distress  Breath sounds: Normal breath sounds  No wheezing or rales  Abdominal:      General: Bowel sounds are normal  There is distension  Palpations: Abdomen is soft  Tenderness: There is abdominal tenderness (Discomfort, generalized)  Musculoskeletal: Normal range of motion  Skin:     General: Skin is warm and dry  Capillary Refill: Capillary refill takes less than 2 seconds  Neurological:      General: No focal deficit present  Mental Status: She is alert and oriented to person, place, and time  Psychiatric:         Mood and Affect: Mood normal          Behavior: Behavior normal           Invasive Devices     Central Venous Catheter Line            Port A Cath Right Chest -- days    Port A Cath Right Chest -- days          Peripheral Intravenous Line            Peripheral IV 05/11/21 Left;Proximal;Ventral (anterior) Forearm 2 days          Drain            External Urinary Catheter 4 days                Lab Results:  Results from last 7 days   Lab Units 05/14/21  0608  05/10/21  1057   WBC Thousand/uL 8 80   < > 10 43*   HEMOGLOBIN g/dL 11 4*   < > 10 9*   HEMATOCRIT % 36 5   < > 35 3   PLATELETS Thousands/uL 264   < > 285   LYMPHO PCT %  --   --  18   MONO PCT %  --   --  6   EOS PCT %  --   --  0    < > = values in this interval not displayed       Results from last 7 days   Lab Units 05/14/21  0608   POTASSIUM mmol/L 3 9   CHLORIDE mmol/L 109*   CO2 mmol/L 28   BUN mg/dL 8   CREATININE mg/dL 0 57*   CALCIUM mg/dL 8  9   ALK PHOS U/L 105   ALT U/L 130*   AST U/L 50*     Results from last 7 days   Lab Units 05/10/21  1421   INR  0 84     Results from last 7 days   Lab Units 05/10/21  1421   LIPASE u/L 66*       Imaging Studies: I have personally reviewed pertinent imaging studies  Xr Abdomen Obstruction Series    Result Date: 5/14/2021  Impression: Improved constipation Nonobstructive bowel gas pattern  Workstation performed: [id number]     Ct Head Without Contrast    Result Date: 5/10/2021  Impression: No acute intracranial abnormality or significant change from prior study  Changes within the brain parenchyma again suggesting sequela of prior whole brain radiation including previously treated right periatrial dystrophic calcification  No new lesions detected within limitations of noncontrast imaging  Intracranial metastatic disease again better evaluated with contrast-enhanced MRI  Workstation performed: [id number]     Pe Study With Ct Abdomen And Pelvis With Contrast    Result Date: 5/10/2021  Impression: 1  Limited CT pulmonary angiogram, however, suggestion of a small subsegmental left upper lobe pulmonary embolus  Dilated RV/LV ratio which is nonspecific and could be indicative of elevated right heart pressures in the setting of COPD/pulmonary hypertension  No bowing of the intraventricular septum  2   Small bilateral pulmonary nodules, grossly similar  1 cm pleural-based left apical nodule appears larger  Cannot exclude evolving metastasis  3   No acute intra-abdominal abnormality  4   Large volume of colonic stool suggesting constipation  5   Marked urinary bladder distention   I personally discussed this study with physician assistant Samantha Eaton on 5/10/2021 at 3:50 PM  Workstation performed: [id number]

## 2021-05-14 NOTE — PLAN OF CARE
Problem: Potential for Falls  Goal: Patient will remain free of falls  Description: INTERVENTIONS:  - Assess patient frequently for physical needs  -  Identify cognitive and physical deficits and behaviors that affect risk of falls    -  [place] fall precautions as indicated by assessment   - Educate patient/family on patient safety including physical limitations  - Instruct patient to call for assistance with activity based on assessment  - Modify environment to reduce risk of injury  - Consider OT/PT consult to assist with strengthening/mobility  Outcome: Progressing     Problem: Prexisting or High Potential for Compromised Skin Integrity  Goal: Skin integrity is maintained or improved  Description: INTERVENTIONS:  - Identify patients at risk for skin breakdown  - Assess and monitor skin integrity  - Assess and monitor nutrition and hydration status  - Monitor labs   - Assess for incontinence   - Turn and reposition patient  - Assist with mobility/ambulation  - Relieve pressure over bony prominences  - Avoid friction and shearing  - Provide appropriate hygiene as needed including keeping skin clean and dry  - Evaluate need for skin moisturizer/barrier cream  - Collaborate with interdisciplinary team   - Patient/family teaching  - Consider wound care consult   Outcome: Progressing     Problem: PAIN - ADULT  Goal: Verbalizes/displays adequate comfort level or baseline comfort level  Description: Interventions:  - Encourage patient to monitor pain and request assistance  - Assess pain using appropriate pain scale  - Administer analgesics based on type and severity of pain and evaluate response  - Implement non-pharmacological measures as appropriate and evaluate response  - Consider cultural and social influences on pain and pain management  - Notify physician/advanced practitioner if interventions unsuccessful or patient reports new pain  Outcome: Progressing     Problem: INFECTION - ADULT  Goal: Absence or prevention of progression during hospitalization  Description: INTERVENTIONS:  - Assess and monitor for signs and symptoms of infection  - Monitor lab/diagnostic results  - Monitor all insertion sites, i e  indwelling lines, tubes, and drains  - Monitor endotracheal if appropriate and nasal secretions for changes in amount and color  - [place] appropriate cooling/warming therapies per order  - Administer medications as ordered  - Instruct and encourage patient and family to use good hand hygiene technique  - Identify and instruct in appropriate isolation precautions for identified infection/condition  Outcome: Progressing     Problem: SAFETY ADULT  Goal: Patient will remain free of falls  Description: INTERVENTIONS:  - Assess patient frequently for physical needs  -  Identify cognitive and physical deficits and behaviors that affect risk of falls    -  [place] fall precautions as indicated by assessment   - Educate patient/family on patient safety including physical limitations  - Instruct patient to call for assistance with activity based on assessment  - Modify environment to reduce risk of injury  - Consider OT/PT consult to assist with strengthening/mobility  Outcome: Progressing  Goal: Maintain or return to baseline ADL function  Description: INTERVENTIONS:  -  Assess patient's ability to carry out ADLs; assess patient's baseline for ADL function and identify physical deficits which impact ability to perform ADLs (bathing, care of mouth/teeth, toileting, grooming, dressing, etc )  - Assess/evaluate cause of self-care deficits   - Assess range of motion  - Assess patient's mobility; develop plan if impaired  - Assess patient's need for assistive devices and provide as appropriate  - Encourage maximum independence but intervene and supervise when necessary  - Involve family in performance of ADLs  - Assess for home care needs following discharge   - Consider OT consult to assist with ADL evaluation and planning for discharge  - Provide patient education as appropriate  Outcome: Progressing  Goal: Maintain or return mobility status to optimal level  Description: INTERVENTIONS:  - Assess patient's baseline mobility status (ambulation, transfers, stairs, etc )    - Identify cognitive and physical deficits and behaviors that affect mobility  - Identify mobility aids required to assist with transfers and/or ambulation (gait belt, sit-to-stand, lift, walker, cane, etc )  - [place] fall precautions as indicated by assessment  - Record patient progress and toleration of activity level on Mobility SBAR; progress patient to next Phase/Stage  - Instruct patient to call for assistance with activity based on assessment  - Consider rehabilitation consult to assist with strengthening/weightbearing, etc   Outcome: Progressing     Problem: DISCHARGE PLANNING  Goal: Discharge to home or other facility with appropriate resources  Description: INTERVENTIONS:  - Identify barriers to discharge w/patient and caregiver  - Arrange for needed discharge resources and transportation as appropriate  - Identify discharge learning needs (meds, wound care, etc )  - Arrange for interpretive services to assist at discharge as needed  - Refer to Case Management Department for coordinating discharge planning if the patient needs post-hospital services based on physician/advanced practitioner order or complex needs related to functional status, cognitive ability, or social support system  Outcome: Progressing     Problem: Knowledge Deficit  Goal: Patient/family/caregiver demonstrates understanding of disease process, treatment plan, medications, and discharge instructions  Description: Complete learning assessment and assess knowledge base    Interventions:  - Provide teaching at level of understanding  - Provide teaching via preferred learning methods  Outcome: Progressing     Problem: RESPIRATORY - ADULT  Goal: Achieves optimal ventilation and oxygenation  Description: INTERVENTIONS:  - Assess for changes in respiratory status  - Assess for changes in mentation and behavior  - Position to facilitate oxygenation and minimize respiratory effort  - Oxygen administered by appropriate delivery if ordered  - Initiate smoking cessation education as indicated  - Encourage broncho-pulmonary hygiene including cough, deep breathe, Incentive Spirometry  - Assess the need for suctioning and aspirate as needed  - Assess and instruct to report SOB or any respiratory difficulty  - Respiratory Therapy support as indicated  Outcome: Progressing     Problem: GASTROINTESTINAL - ADULT  Goal: Minimal or absence of nausea and/or vomiting  Description: INTERVENTIONS:  - Administer IV fluids if ordered to ensure adequate hydration  - Maintain NPO status until nausea and vomiting are resolved  - Nasogastric tube if ordered  - Administer ordered antiemetic medications as needed  - Provide nonpharmacologic comfort measures as appropriate  - Advance diet as tolerated, if ordered  - Consider nutrition services referral to assist patient with adequate nutrition and appropriate food choices  Outcome: Progressing  Goal: Maintains or returns to baseline bowel function  Description: INTERVENTIONS:  - Assess bowel function  - Encourage oral fluids to ensure adequate hydration  - Administer IV fluids if ordered to ensure adequate hydration  - Administer ordered medications as needed  - Encourage mobilization and activity  - Consider nutritional services referral to assist patient with adequate nutrition and appropriate food choices  Outcome: Progressing

## 2021-05-15 ENCOUNTER — APPOINTMENT (INPATIENT)
Dept: RADIOLOGY | Facility: HOSPITAL | Age: 61
DRG: 391 | End: 2021-05-15
Payer: COMMERCIAL

## 2021-05-15 PROBLEM — K56.49: Status: ACTIVE | Noted: 2021-05-15

## 2021-05-15 LAB
ALBUMIN SERPL BCP-MCNC: 3.1 G/DL (ref 3.5–5)
ALP SERPL-CCNC: 124 U/L (ref 46–116)
ALT SERPL W P-5'-P-CCNC: 192 U/L (ref 12–78)
ANION GAP SERPL CALCULATED.3IONS-SCNC: 9 MMOL/L (ref 4–13)
AST SERPL W P-5'-P-CCNC: 82 U/L (ref 5–45)
BACTERIA BLD CULT: NORMAL
BACTERIA BLD CULT: NORMAL
BILIRUB SERPL-MCNC: 0.39 MG/DL (ref 0.2–1)
BUN SERPL-MCNC: 10 MG/DL (ref 5–25)
CALCIUM ALBUM COR SERPL-MCNC: 9.2 MG/DL (ref 8.3–10.1)
CALCIUM SERPL-MCNC: 8.5 MG/DL (ref 8.3–10.1)
CHLORIDE SERPL-SCNC: 107 MMOL/L (ref 100–108)
CO2 SERPL-SCNC: 30 MMOL/L (ref 21–32)
CREAT SERPL-MCNC: 0.64 MG/DL (ref 0.6–1.3)
ERYTHROCYTE [DISTWIDTH] IN BLOOD BY AUTOMATED COUNT: 16.5 % (ref 11.6–15.1)
GFR SERPL CREATININE-BSD FRML MDRD: 99 ML/MIN/1.73SQ M
GLUCOSE SERPL-MCNC: 82 MG/DL (ref 65–140)
HBV CORE AB SER QL: NORMAL
HBV CORE IGM SER QL: NORMAL
HBV SURFACE AG SER QL: NORMAL
HCT VFR BLD AUTO: 36.3 % (ref 34.8–46.1)
HCV AB SER QL: NORMAL
HGB BLD-MCNC: 11.2 G/DL (ref 11.5–15.4)
MAGNESIUM SERPL-MCNC: 2.9 MG/DL (ref 1.6–2.6)
MCH RBC QN AUTO: 30.3 PG (ref 26.8–34.3)
MCHC RBC AUTO-ENTMCNC: 30.9 G/DL (ref 31.4–37.4)
MCV RBC AUTO: 98 FL (ref 82–98)
PLATELET # BLD AUTO: 259 THOUSANDS/UL (ref 149–390)
PMV BLD AUTO: 9.4 FL (ref 8.9–12.7)
POTASSIUM SERPL-SCNC: 4 MMOL/L (ref 3.5–5.3)
PROT SERPL-MCNC: 7.1 G/DL (ref 6.4–8.2)
RBC # BLD AUTO: 3.7 MILLION/UL (ref 3.81–5.12)
SARS-COV-2 RNA RESP QL NAA+PROBE: NEGATIVE
SODIUM SERPL-SCNC: 146 MMOL/L (ref 136–145)
WBC # BLD AUTO: 8.72 THOUSAND/UL (ref 4.31–10.16)

## 2021-05-15 PROCEDURE — 86704 HEP B CORE ANTIBODY TOTAL: CPT | Performed by: INTERNAL MEDICINE

## 2021-05-15 PROCEDURE — 99232 SBSQ HOSP IP/OBS MODERATE 35: CPT | Performed by: INTERNAL MEDICINE

## 2021-05-15 PROCEDURE — 86705 HEP B CORE ANTIBODY IGM: CPT | Performed by: INTERNAL MEDICINE

## 2021-05-15 PROCEDURE — 85027 COMPLETE CBC AUTOMATED: CPT | Performed by: PSYCHIATRY & NEUROLOGY

## 2021-05-15 PROCEDURE — 83735 ASSAY OF MAGNESIUM: CPT | Performed by: INTERNAL MEDICINE

## 2021-05-15 PROCEDURE — U0005 INFEC AGEN DETEC AMPLI PROBE: HCPCS | Performed by: PSYCHIATRY & NEUROLOGY

## 2021-05-15 PROCEDURE — 80053 COMPREHEN METABOLIC PANEL: CPT | Performed by: PSYCHIATRY & NEUROLOGY

## 2021-05-15 PROCEDURE — 94762 N-INVAS EAR/PLS OXIMTRY CONT: CPT

## 2021-05-15 PROCEDURE — 87340 HEPATITIS B SURFACE AG IA: CPT | Performed by: INTERNAL MEDICINE

## 2021-05-15 PROCEDURE — 83516 IMMUNOASSAY NONANTIBODY: CPT | Performed by: PSYCHIATRY & NEUROLOGY

## 2021-05-15 PROCEDURE — U0003 INFECTIOUS AGENT DETECTION BY NUCLEIC ACID (DNA OR RNA); SEVERE ACUTE RESPIRATORY SYNDROME CORONAVIRUS 2 (SARS-COV-2) (CORONAVIRUS DISEASE [COVID-19]), AMPLIFIED PROBE TECHNIQUE, MAKING USE OF HIGH THROUGHPUT TECHNOLOGIES AS DESCRIBED BY CMS-2020-01-R: HCPCS | Performed by: PSYCHIATRY & NEUROLOGY

## 2021-05-15 PROCEDURE — 71045 X-RAY EXAM CHEST 1 VIEW: CPT

## 2021-05-15 PROCEDURE — 86255 FLUORESCENT ANTIBODY SCREEN: CPT | Performed by: PSYCHIATRY & NEUROLOGY

## 2021-05-15 PROCEDURE — 94760 N-INVAS EAR/PLS OXIMETRY 1: CPT

## 2021-05-15 PROCEDURE — 82784 ASSAY IGA/IGD/IGG/IGM EACH: CPT | Performed by: PSYCHIATRY & NEUROLOGY

## 2021-05-15 PROCEDURE — 86803 HEPATITIS C AB TEST: CPT | Performed by: INTERNAL MEDICINE

## 2021-05-15 RX ORDER — FUROSEMIDE 10 MG/ML
20 INJECTION INTRAMUSCULAR; INTRAVENOUS ONCE
Status: COMPLETED | OUTPATIENT
Start: 2021-05-15 | End: 2021-05-15

## 2021-05-15 RX ORDER — SODIUM PHOSPHATE, DIBASIC AND SODIUM PHOSPHATE, MONOBASIC 3.5; 9.5 G/66ML; G/66ML
1 ENEMA RECTAL ONCE
Status: CANCELLED | OUTPATIENT
Start: 2021-05-15 | End: 2021-05-15

## 2021-05-15 RX ORDER — FUROSEMIDE 20 MG/1
20 TABLET ORAL ONCE
Status: DISCONTINUED | OUTPATIENT
Start: 2021-05-15 | End: 2021-05-15

## 2021-05-15 RX ORDER — ALBUTEROL SULFATE 2.5 MG/3ML
2.5 SOLUTION RESPIRATORY (INHALATION) EVERY 4 HOURS PRN
Status: DISCONTINUED | OUTPATIENT
Start: 2021-05-15 | End: 2021-05-25 | Stop reason: HOSPADM

## 2021-05-15 RX ORDER — POLYETHYLENE GLYCOL 3350 17 G/17G
17 POWDER, FOR SOLUTION ORAL 3 TIMES DAILY
Status: DISCONTINUED | OUTPATIENT
Start: 2021-05-15 | End: 2021-05-15

## 2021-05-15 RX ADMIN — ALBUTEROL SULFATE 2 PUFF: 90 AEROSOL, METERED RESPIRATORY (INHALATION) at 17:24

## 2021-05-15 RX ADMIN — GABAPENTIN 400 MG: 400 CAPSULE ORAL at 11:26

## 2021-05-15 RX ADMIN — PROCHLORPERAZINE MALEATE 5 MG: 10 TABLET ORAL at 06:09

## 2021-05-15 RX ADMIN — SENNOSIDES AND DOCUSATE SODIUM 2 TABLET: 8.6; 5 TABLET ORAL at 08:52

## 2021-05-15 RX ADMIN — GABAPENTIN 400 MG: 400 CAPSULE ORAL at 16:06

## 2021-05-15 RX ADMIN — PANTOPRAZOLE SODIUM 40 MG: 40 TABLET, DELAYED RELEASE ORAL at 06:08

## 2021-05-15 RX ADMIN — FUROSEMIDE 20 MG: 10 INJECTION, SOLUTION INTRAMUSCULAR; INTRAVENOUS at 17:37

## 2021-05-15 RX ADMIN — DEXAMETHASONE 2 MG: 2 TABLET ORAL at 08:53

## 2021-05-15 RX ADMIN — LEVETIRACETAM 250 MG: 250 TABLET, FILM COATED ORAL at 08:52

## 2021-05-15 RX ADMIN — PROCHLORPERAZINE MALEATE 5 MG: 10 TABLET ORAL at 11:26

## 2021-05-15 RX ADMIN — NICOTINE 1 PATCH: 14 PATCH, EXTENDED RELEASE TRANSDERMAL at 10:24

## 2021-05-15 RX ADMIN — LUBIPROSTONE 24 MCG: 24 CAPSULE, GELATIN COATED ORAL at 16:07

## 2021-05-15 RX ADMIN — APIXABAN 10 MG: 5 TABLET, FILM COATED ORAL at 08:53

## 2021-05-15 RX ADMIN — POLYETHYLENE GLYCOL 3350 17 G: 17 POWDER, FOR SOLUTION ORAL at 08:52

## 2021-05-15 RX ADMIN — APIXABAN 10 MG: 5 TABLET, FILM COATED ORAL at 18:53

## 2021-05-15 RX ADMIN — PROCHLORPERAZINE MALEATE 5 MG: 10 TABLET ORAL at 16:07

## 2021-05-15 RX ADMIN — LUBIPROSTONE 24 MCG: 24 CAPSULE, GELATIN COATED ORAL at 10:24

## 2021-05-15 RX ADMIN — GABAPENTIN 400 MG: 400 CAPSULE ORAL at 06:08

## 2021-05-15 RX ADMIN — LEVETIRACETAM 250 MG: 100 INJECTION, SOLUTION INTRAVENOUS at 20:37

## 2021-05-15 NOTE — ASSESSMENT & PLAN NOTE
- Cr noted to be 0 9 on admission, baseline 0 5 - 0 7  - Most likely secondary to urinary retention as noted by bladder distension on CT  - Patient is now back to baseline    Plan:   Intake and output   Monitor BMP daily and observe for downward trend of creatinine   Avoid hypoperfusion of the kidneys, minimize nephrotoxins    Results from last 7 days   Lab Units 05/15/21  0619 05/14/21  0608 05/13/21  0706   CREATININE mg/dL 0 64 0 57* 0 65   EGFR ml/min/1 73sq m 99 103 98

## 2021-05-15 NOTE — PLAN OF CARE
Problem: Potential for Falls  Goal: Patient will remain free of falls  Description: INTERVENTIONS:  - Assess patient frequently for physical needs  -  Identify cognitive and physical deficits and behaviors that affect risk of falls    -  [place] fall precautions as indicated by assessment   - Educate patient/family on patient safety including physical limitations  - Instruct patient to call for assistance with activity based on assessment  - Modify environment to reduce risk of injury  - Consider OT/PT consult to assist with strengthening/mobility  Outcome: Progressing     Problem: Prexisting or High Potential for Compromised Skin Integrity  Goal: Skin integrity is maintained or improved  Description: INTERVENTIONS:  - Identify patients at risk for skin breakdown  - Assess and monitor skin integrity  - Assess and monitor nutrition and hydration status  - Monitor labs   - Assess for incontinence   - Turn and reposition patient  - Assist with mobility/ambulation  - Relieve pressure over bony prominences  - Avoid friction and shearing  - Provide appropriate hygiene as needed including keeping skin clean and dry  - Evaluate need for skin moisturizer/barrier cream  - Collaborate with interdisciplinary team   - Patient/family teaching  - Consider wound care consult   Outcome: Progressing     Problem: PAIN - ADULT  Goal: Verbalizes/displays adequate comfort level or baseline comfort level  Description: Interventions:  - Encourage patient to monitor pain and request assistance  - Assess pain using appropriate pain scale  - Administer analgesics based on type and severity of pain and evaluate response  - Implement non-pharmacological measures as appropriate and evaluate response  - Consider cultural and social influences on pain and pain management  - Notify physician/advanced practitioner if interventions unsuccessful or patient reports new pain  Outcome: Progressing     Problem: INFECTION - ADULT  Goal: Absence or prevention of progression during hospitalization  Description: INTERVENTIONS:  - Assess and monitor for signs and symptoms of infection  - Monitor lab/diagnostic results  - Monitor all insertion sites, i e  indwelling lines, tubes, and drains  - Monitor endotracheal if appropriate and nasal secretions for changes in amount and color  - [place] appropriate cooling/warming therapies per order  - Administer medications as ordered  - Instruct and encourage patient and family to use good hand hygiene technique  - Identify and instruct in appropriate isolation precautions for identified infection/condition  Outcome: Progressing     Problem: SAFETY ADULT  Goal: Patient will remain free of falls  Description: INTERVENTIONS:  - Assess patient frequently for physical needs  -  Identify cognitive and physical deficits and behaviors that affect risk of falls    -  [place] fall precautions as indicated by assessment   - Educate patient/family on patient safety including physical limitations  - Instruct patient to call for assistance with activity based on assessment  - Modify environment to reduce risk of injury  - Consider OT/PT consult to assist with strengthening/mobility  Outcome: Progressing  Goal: Maintain or return to baseline ADL function  Description: INTERVENTIONS:  -  Assess patient's ability to carry out ADLs; assess patient's baseline for ADL function and identify physical deficits which impact ability to perform ADLs (bathing, care of mouth/teeth, toileting, grooming, dressing, etc )  - Assess/evaluate cause of self-care deficits   - Assess range of motion  - Assess patient's mobility; develop plan if impaired  - Assess patient's need for assistive devices and provide as appropriate  - Encourage maximum independence but intervene and supervise when necessary  - Involve family in performance of ADLs  - Assess for home care needs following discharge   - Consider OT consult to assist with ADL evaluation and planning for discharge  - Provide patient education as appropriate  Outcome: Progressing  Goal: Maintain or return mobility status to optimal level  Description: INTERVENTIONS:  - Assess patient's baseline mobility status (ambulation, transfers, stairs, etc )    - Identify cognitive and physical deficits and behaviors that affect mobility  - Identify mobility aids required to assist with transfers and/or ambulation (gait belt, sit-to-stand, lift, walker, cane, etc )  - [place] fall precautions as indicated by assessment  - Record patient progress and toleration of activity level on Mobility SBAR; progress patient to next Phase/Stage  - Instruct patient to call for assistance with activity based on assessment  - Consider rehabilitation consult to assist with strengthening/weightbearing, etc   Outcome: Progressing     Problem: DISCHARGE PLANNING  Goal: Discharge to home or other facility with appropriate resources  Description: INTERVENTIONS:  - Identify barriers to discharge w/patient and caregiver  - Arrange for needed discharge resources and transportation as appropriate  - Identify discharge learning needs (meds, wound care, etc )  - Arrange for interpretive services to assist at discharge as needed  - Refer to Case Management Department for coordinating discharge planning if the patient needs post-hospital services based on physician/advanced practitioner order or complex needs related to functional status, cognitive ability, or social support system  Outcome: Progressing     Problem: Knowledge Deficit  Goal: Patient/family/caregiver demonstrates understanding of disease process, treatment plan, medications, and discharge instructions  Description: Complete learning assessment and assess knowledge base    Interventions:  - Provide teaching at level of understanding  - Provide teaching via preferred learning methods  Outcome: Progressing     Problem: RESPIRATORY - ADULT  Goal: Achieves optimal ventilation and oxygenation  Description: INTERVENTIONS:  - Assess for changes in respiratory status  - Assess for changes in mentation and behavior  - Position to facilitate oxygenation and minimize respiratory effort  - Oxygen administered by appropriate delivery if ordered  - Initiate smoking cessation education as indicated  - Encourage broncho-pulmonary hygiene including cough, deep breathe, Incentive Spirometry  - Assess the need for suctioning and aspirate as needed  - Assess and instruct to report SOB or any respiratory difficulty  - Respiratory Therapy support as indicated  Outcome: Progressing     Problem: GASTROINTESTINAL - ADULT  Goal: Minimal or absence of nausea and/or vomiting  Description: INTERVENTIONS:  - Administer IV fluids if ordered to ensure adequate hydration  - Maintain NPO status until nausea and vomiting are resolved  - Nasogastric tube if ordered  - Administer ordered antiemetic medications as needed  - Provide nonpharmacologic comfort measures as appropriate  - Advance diet as tolerated, if ordered  - Consider nutrition services referral to assist patient with adequate nutrition and appropriate food choices  Outcome: Progressing  Goal: Maintains or returns to baseline bowel function  Description: INTERVENTIONS:  - Assess bowel function  - Encourage oral fluids to ensure adequate hydration  - Administer IV fluids if ordered to ensure adequate hydration  - Administer ordered medications as needed  - Encourage mobilization and activity  - Consider nutritional services referral to assist patient with adequate nutrition and appropriate food choices  Outcome: Progressing

## 2021-05-15 NOTE — ASSESSMENT & PLAN NOTE
- Patient noted to have elevated AST 67/ in ED  - CT of the abdomen did not note any acute pathology  - Liver enzymes in ED mildly elevated from baseline, possibly reactive  - Per GI elevated LFTs demonstrate a hepatocellular pattern    Plan  - Will continue to monitor LFTs  - Celiac panel, and chronic hepatitis panel pending  - GI consulted, appreciate their recommendations

## 2021-05-15 NOTE — ASSESSMENT & PLAN NOTE
- patient is a history chronic constipation (2/2 opioid induced constipation)  - Pt can go 2-5 days between bowel movements, pt reports she had not had a significant bowel movement prior to admission since approximately 5/6   - patient had notable abdominal distension and pain on admission  - patient is maintained on senna as an outpatient, discontinued on admission and switch to Senokot-S    Imaging  - patient noted to have significant fecal matter in her GI tract on CT imaging in the ED  - X-Ray Abdomen 5/14/2021: Improved constipation Nonobstructive bowel gas pattern    Treatments  - Started on Senokot-S 2 tabs BID and Miralax BID since Monday (5/10) evening  - S/P Relistor 5/11, 5/12, 5/13  - S/P Mag citrate 5/12  - Dulcolax rectal suppository 5/12 (pt request)  - fleet enema 5/11, soap suds enema 5/12, lactulose enema 5/13, mineral oil enema 5/14  - S/P Bowel Prep (high dose miralax) 5/13  - Started on Amitiza 24mcg BID 5/14    Patient has had the following BMs:  - 5/12: Small and formed  - 5/14: 2 small, hard and compact    Plan  - Continue Miralax BID 17g (1 packet) BID, Senakot-S 2 Pills BID and Amitiza 24mcg BID  - Soap suds enema today  - GI consulted, appreciate reccomendations

## 2021-05-15 NOTE — PROGRESS NOTES
[facility] 64 [facility]  [facility] MRN: [id number]    Unit/Bed#: S -01 Encounter: [id number]      Assessment and Plan:    1  Opiate induced constipation-slight improvement on x-ray yesterday, she had mineral oil enema and magnesium citrate yesterday in addition to scheduled MiraLax twice daily, Senokot twice daily, Amitiza twice daily with only 2 small hard formed stools  Thyroid function and calcium normal   Abdomen is soft nontender but distended with positive bowel sounds  Patient denies any nausea or vomiting, she would like to eat regular diet  -minimize use of narcotics  -monitor electrolytes and replete as necessary  -await celiac panel  -continue Amitiza 24 mcg b i d, Senokot 2 tablets b i d  -Will order 1/2 GoLytely prep to be given    2  Elevated LFTs in a hepatocellular pattern, chronic hepatitis panel negative  Patient noted with fluctuating LFTs since 2018  -await celiac panel  -monitor CMP        Subjective:     Patient lying in, she denies any abdominal discomfort but continues with abdominal bloating  Positive bowel sounds  Denies any nausea or vomiting and she is tolerating her diet      Medication Administration - last 24 hours from 05/14/2021 1442 to 05/15/2021 1442       Date/Time Order Dose Route Action Action by     05/15/2021 0852 levETIRAcetam (KEPPRA) tablet 250 mg 250 mg Oral Given Adrienne Spicer RN     05/14/2021 1728 levETIRAcetam (KEPPRA) tablet 250 mg 250 mg Oral Given Kenzie Robertson, MINNA     05/15/2021 8941 pantoprazole (PROTONIX) EC tablet 40 mg 40 mg Oral Given Jaida Calloway RN     05/14/2021 2220 OLANZapine (ZyPREXA) tablet 10 mg 10 mg Oral Given Jaida Calloway, MINNA     05/15/2021 1126 gabapentin (NEURONTIN) capsule 400 mg 400 mg Oral Given Adrienne Spicer RN     05/15/2021 8705 gabapentin (NEURONTIN) capsule 400 mg 400 mg Oral Given Jaida Calloway RN     05/14/2021 1728 gabapentin (NEURONTIN) capsule 400 mg 400 mg Oral Given Hugotarsha Thompson [facility], RN     05/15/2021 0701 dexamethasone (DECADRON) tablet 2 mg 2 mg Oral Given [facility] Allisonuckles, RN     05/15/2021 1024 nicotine (NICODERM CQ) 14 mg/24hr TD 24 hr patch 1 patch 1 patch Transdermal Medication Applied Zuleika Knuckles, RN     05/15/2021 0852 nicotine (NICODERM CQ) 14 mg/24hr TD 24 hr patch 1 patch 1 patch Transdermal Patch Removed Zuleika Knuckles, RN     05/14/2021 1819 acetaminophen (TYLENOL) tablet 650 mg 650 mg [facility], RN     05/15/2021 0852 polyethylene glycol (MIRALAX) packet 17 g 17 g Oral Given [facility] Jeanethles, RN     05/14/2021 2220 polyethylene glycol (MIRALAX) packet 17 g 17 g Oral Given jeredmon Khai, RN     05/15/2021 0564 senna-docusate sodium (SENOKOT S) 8 6-50 mg per tablet 2 tablet 2 tablet Oral Given [facility] Allisonuckles, RN     05/14/2021 1728 senna-docusate sodium (SENOKOT S) 8 6-50 mg per tablet 2 tablet 2 tablet Oral Given [facility] Hus [facility], RN     05/14/2021 2011 HYDROmorphone (DILAUDID) tablet 8 mg 8 mg Oral Given Thurmon Safe, RN     05/15/2021 0853 apixaban (ELIQUIS) tablet 10 mg 10 mg Oral Given Zuleika Knuckles, RN     05/14/2021 1728 apixaban (ELIQUIS) tablet 10 mg 10 mg Oral [facility], RN     05/14/2021 2012 bisacodyl (DULCOLAX) rectal suppository 10 mg 10 mg Rectal Given [facility] Khai, RN     05/15/2021 2053 lidocaine (LIDODERM) 5 % patch 1 patch 1 patch Topical Patch Removed [facility] Khai, RN     05/15/2021 1103 lidocaine (LIDODERM) 5 % patch 1 patch 1 patch Topical Not Given Zuleika Knuckles, RN     05/14/2021 1613 ondansetron (ZOFRAN-ODT) dispersible tablet 4 mg   Oral Canceled Entry Galo Robertson RN     05/14/2021 1604 magnesium citrate (CITROMA) oral solution 296 mL 296 mL Oral Given Galo Robertson RN     05/15/2021 1024 lubiprostone (AMITIZA) capsule 24 mcg 24 mcg Oral Given Zuleika Wagner RN     05/14/2021 1729 lubiprostone (Cherl Grit) capsule 24 mcg 24 mcg Oral Given Monica Robertson RN     05/15/2021 1126 prochlorperazine (COMPAZINE) tablet 5 mg 5 mg Oral Given Avis Argueta RN     05/15/2021 0331 prochlorperazine (COMPAZINE) tablet 5 mg 5 mg Oral Given Nicholas Saeed RN     05/14/2021 1729 prochlorperazine (COMPAZINE) tablet 5 mg 5 mg Oral Given Monica Robertson RN     05/14/2021 2011 ondansetron (ZOFRAN) injection 4 mg 4 mg Intravenous Given Nicholas Saeed RN          Objective:     Vitals: Blood pressure 111/75, pulse 77, temperature 98 2 °F (36 8 °C), temperature source Oral, resp  rate 16, height 5' 5" (1 651 m), weight 77 9 kg (171 lb 11 8 oz), SpO2 96 %, not currently breastfeeding  ,Body mass index is 28 58 kg/m²  Intake/Output Summary (Last 24 hours) at 5/15/2021 1442  Last data filed at 5/15/2021 0630  Gross per 24 hour   Intake --   Output 1550 ml   Net -1550 ml       Physical Exam:   General Appearance: Awake and alert, in no acute distress  Abdomen: Soft, non-tender, non-distended; bowel sounds normal; no masses or no organomegaly    Invasive Devices     Central Venous Catheter Line            Port A Cath Right Chest -- days    Port A Cath Right Chest -- days          Drain            External Urinary Catheter 5 days                Lab Results:  Admission on 05/10/2021   Component Date Value    Protime 05/10/2021 11 7     INR 05/10/2021 0 84     PTT 05/10/2021 23     ABO Grouping 05/10/2021 AB     Rh Factor 05/10/2021 Positive     Antibody Screen 05/10/2021 Negative     Specimen Expiration Date 05/10/2021 [id number]     Lipase 05/10/2021 66*    TSH 3RD GENERATON 05/10/2021 0 559     Troponin I 05/10/2021 <0 02     NT-proBNP 05/10/2021 161*    Blood Culture 05/10/2021 No Growth After 4 Days   Blood Culture 05/10/2021 No Growth After 4 Days       Color, UA 05/10/2021 Yellow     Clarity, UA 05/10/2021 Clear     pH, UA 05/10/2021 7 0     Leukocytes, UA 05/10/2021 Moderate*    Nitrite, UA 05/10/2021 Negative     Protein, UA 05/10/2021 Negative     Glucose, UA 05/10/2021 Negative     Ketones, UA 05/10/2021 Negative     Urobilinogen, UA 05/10/2021 0 2     Bilirubin, UA 05/10/2021 Negative     Blood, UA 05/10/2021 Negative     Specific Gravity, UA 05/10/2021 1 010     RBC, UA 05/10/2021 None Seen     WBC, UA 05/10/2021 4-10*    Epithelial Cells 05/10/2021 Occasional     Bacteria, UA 05/10/2021 Occasional     Color, UA 05/11/2021 Yellow     Clarity, UA 05/11/2021 Clear     Specific Gravity, UA 05/11/2021 1 010     pH, UA 05/11/2021 7 0     Leukocytes, UA 05/11/2021 Moderate*    Nitrite, UA 05/11/2021 Negative     Protein, UA 05/11/2021 Negative     Glucose, UA 05/11/2021 Negative     Ketones, UA 05/11/2021 Negative     Urobilinogen, UA 05/11/2021 0 2     Bilirubin, UA 05/11/2021 Negative     Blood, UA 05/11/2021 Negative     PTT 05/11/2021 93*    RBC, UA 05/11/2021 0-1     WBC, UA 05/11/2021 4-10*    Epithelial Cells 05/11/2021 None Seen     Bacteria, UA 05/11/2021 Occasional     Sodium 05/11/2021 146*    Potassium 05/11/2021 3 8     Chloride 05/11/2021 109*    CO2 05/11/2021 27     ANION GAP 05/11/2021 10     BUN 05/11/2021 12     Creatinine 05/11/2021 0 63     Glucose 05/11/2021 96     Calcium 05/11/2021 8 4     Corrected Calcium 05/11/2021 9 0     AST 05/11/2021 41     ALT 05/11/2021 110*    Alkaline Phosphatase 05/11/2021 85     Total Protein 05/11/2021 7 2     Albumin 05/11/2021 3 2*    Total Bilirubin 05/11/2021 0 27     eGFR 05/11/2021 99     Magnesium 05/11/2021 2 5     WBC 05/11/2021 7 95     RBC 05/11/2021 3 79*    Hemoglobin 05/11/2021 11 4*    Hematocrit 05/11/2021 37 1     MCV 05/11/2021 98     MCH 05/11/2021 30 1     MCHC 05/11/2021 30 7*    RDW 05/11/2021 16 7*    Platelets 54/76/4462 281     MPV 05/11/2021 9 3     PTT 05/11/2021 76*    Ventricular Rate 05/10/2021 73     Atrial Rate 05/10/2021 76     [place] Interval 05/10/2021 138  QRSD Interval 05/10/2021 80     QT Interval 05/10/2021 406     QTC Interval 05/10/2021 448     P Axis 05/10/2021 36     QRS Axis 05/10/2021 54     T Wave Axis 05/10/2021 61     Blood Culture 05/10/2021 No Growth After 4 Days       WBC 05/12/2021 7 95     RBC 05/12/2021 3 49*    Hemoglobin 05/12/2021 10 8*    Hematocrit 05/12/2021 34 2*    MCV 05/12/2021 98     MCH 05/12/2021 30 9     MCHC 05/12/2021 31 6     RDW 05/12/2021 16 7*    Platelets 00/45/4561 280     MPV 05/12/2021 9 5     Sodium 05/12/2021 146*    Potassium 05/12/2021 3 9     Chloride 05/12/2021 109*    CO2 05/12/2021 28     ANION GAP 05/12/2021 9     BUN 05/12/2021 13     Creatinine 05/12/2021 0 63     Glucose 05/12/2021 88     Calcium 05/12/2021 8 6     Corrected Calcium 05/12/2021 9 4     AST 05/12/2021 43     ALT 05/12/2021 111*    Alkaline Phosphatase 05/12/2021 89     Total Protein 05/12/2021 6 6     Albumin 05/12/2021 3 0*    Total Bilirubin 05/12/2021 0 28     eGFR 05/12/2021 99     Sodium 05/13/2021 146*    Potassium 05/13/2021 4 0     Chloride 05/13/2021 107     CO2 05/13/2021 28     ANION GAP 05/13/2021 11     BUN 05/13/2021 10     Creatinine 05/13/2021 0 65     Glucose 05/13/2021 83     Calcium 05/13/2021 8 6     Corrected Calcium 05/13/2021 9 4     AST 05/13/2021 49*    ALT 05/13/2021 124*    Alkaline Phosphatase 05/13/2021 98     Total Protein 05/13/2021 6 8     Albumin 05/13/2021 3 0*    Total Bilirubin 05/13/2021 0 27     eGFR 05/13/2021 98     WBC 05/13/2021 7 14     RBC 05/13/2021 3 63*    Hemoglobin 05/13/2021 11 1*    Hematocrit 05/13/2021 35 9     MCV 05/13/2021 99*    MCH 05/13/2021 30 6     MCHC 05/13/2021 30 9*    RDW 05/13/2021 16 5*    Platelets 73/04/2207 261     MPV 05/13/2021 9 2     Sodium 05/14/2021 145     Potassium 05/14/2021 3 9     Chloride 05/14/2021 109*    CO2 05/14/2021 28     ANION GAP 05/14/2021 8     BUN 05/14/2021 8     Creatinine 05/14/2021 0 57*    Glucose 05/14/2021 83     Calcium 05/14/2021 8 9     Corrected Calcium 05/14/2021 9 6     AST 05/14/2021 50*    ALT 05/14/2021 130*    Alkaline Phosphatase 05/14/2021 105     Total Protein 05/14/2021 7 1     Albumin 05/14/2021 3 1*    Total Bilirubin 05/14/2021 0 32     eGFR 05/14/2021 103     WBC 05/14/2021 8 80     RBC 05/14/2021 3 75*    Hemoglobin 05/14/2021 11 4*    Hematocrit 05/14/2021 36 5     MCV 05/14/2021 97     MCH 05/14/2021 30 4     MCHC 05/14/2021 31 2*    RDW 05/14/2021 16 4*    Platelets 56/77/7806 264     MPV 05/14/2021 9 3     Hepatitis B Surface Ag 05/15/2021 Non-reactive     Hepatitis C Ab 05/15/2021 Non-reactive     Hep B C IgM 05/15/2021 Non-reactive     Hep B Core Total Ab 05/15/2021 Non-reactive     Magnesium 05/15/2021 2 9*    WBC 05/15/2021 8 72     RBC 05/15/2021 3 70*    Hemoglobin 05/15/2021 11 2*    Hematocrit 05/15/2021 36 3     MCV 05/15/2021 98     MCH 05/15/2021 30 3     MCHC 05/15/2021 30 9*    RDW 05/15/2021 16 5*    Platelets 96/89/4332 259     MPV 05/15/2021 9 4     Sodium 05/15/2021 146*    Potassium 05/15/2021 4 0     Chloride 05/15/2021 107     CO2 05/15/2021 30     ANION GAP 05/15/2021 9     BUN 05/15/2021 10     Creatinine 05/15/2021 0 64     Glucose 05/15/2021 82     Calcium 05/15/2021 8 5     Corrected Calcium 05/15/2021 9 2     AST 05/15/2021 82*    ALT 05/15/2021 192*    Alkaline Phosphatase 05/15/2021 124*    Total Protein 05/15/2021 7 1     Albumin 05/15/2021 3 1*    Total Bilirubin 05/15/2021 0 39     eGFR 05/15/2021 99        Imaging Studies: I have personally reviewed pertinent imaging studies

## 2021-05-15 NOTE — ASSESSMENT & PLAN NOTE
- patient is a history chronic constipation secondary to chronic opioid use for cancer  - Pt can go 2-5 days between bowel movements, pt reports she had not had a significant bowel movement prior to admission since approximately 5/6   - patient is maintained on senna as an outpatient, discontinued on admission and switch to Senokot-S    Plan  - Continue Miralax BID 17g (1 packet) BID, Senakot-S 2 Pills BID and Amitiza 24mcg BID

## 2021-05-15 NOTE — PROGRESS NOTES
[facility]  Progress Note - Vista Senna 1960, 64 y o  female MRN: [id number]  Unit/Bed#: S -01 Encounter: [id number]  Primary Care Provider: Adelaide Real MD   Date and time admitted to hospital: 5/10/2021  1:16 PM    Weakness generalized  Assessment & Plan  - Patient presents with a 2-3 day history of gradual onset generalized weakness and slurred speech  - Most likely secondary to deconditioning in the setting of patient's cancer history versus infection but this is highly unlikely given the patients clinical findings  - patient's weakness is improving  -PT/OT is recommending post acute rehab services    Plan  - PT/OT    * Pulmonary embolism ([facility] [address] )  Assessment & Plan  - patient presents with a 2-3 day history of gradual onset weakness  - imaging in the ED noted a "small subsegmental left upper lobe pulmonary embolus"  - patient does reports improvement in her shortness of breath    Plan  - Patient started on Eliquis 10 mg for 7 days b i d , followed by Eliquis 5 mg b i d      Impaction of intestine ([facility] [address] )  Assessment & Plan  - patient is a history chronic constipation (2/2 opioid induced constipation)  - Pt can go 2-5 days between bowel movements, pt reports she had not had a significant bowel movement prior to admission since approximately 5/6   - patient had notable abdominal distension and pain on admission  - patient is maintained on senna as an outpatient, discontinued on admission and switch to Senokot-S    Imaging  - patient noted to have significant fecal matter in her GI tract on CT imaging in the ED  - X-Ray Abdomen 5/14/2021: Improved constipation Nonobstructive bowel gas pattern    Treatments  - Started on Senokot-S 2 tabs BID and Miralax BID since Monday (5/10) evening  - S/P Relistor 5/11, 5/12, 5/13  - S/P Mag citrate 5/12  - Dulcolax rectal suppository 5/12 (pt request)  - fleet enema 5/11, soap suds enema 5/12, lactulose enema 5/13, mineral oil enema 5/14  - S/P Bowel Prep (high dose miralax) 5/13  - Started on Amitiza 24mcg BID 5/14    Patient has had the following BMs:  - 5/12: Small and formed  - 5/14: 2 small, hard and compact    Plan  - Continue Miralax BID 17g (1 packet) BID, Senakot-S 2 Pills BID and Amitiza 24mcg BID  - Soap suds enema today  - GI consulted, appreciate reccomendations      Acute kidney failure (HCC)-resolved as of 5/12/2021  Assessment & Plan  - Cr noted to be 0 9 on admission, baseline 0 5 - 0 7  - Most likely secondary to urinary retention as noted by bladder distension on CT  - Patient is now back to baseline    Plan:   Intake and output   Monitor BMP daily and observe for downward trend of creatinine   Avoid hypoperfusion of the kidneys, minimize nephrotoxins    Results from last 7 days   Lab Units 05/15/21  0619 05/14/21  0608 05/13/21  0706   CREATININE mg/dL 0 64 0 57* 0 65   EGFR ml/min/1 73sq m 99 103 98       Therapeutic opioid-induced constipation (OIC)  Assessment & Plan  - patient is a history chronic constipation secondary to chronic opioid use for cancer  - Pt can go 2-5 days between bowel movements, pt reports she had not had a significant bowel movement prior to admission since approximately 5/6   - patient is maintained on senna as an outpatient, discontinued on admission and switch to Senokot-S    Plan  - Continue Miralax BID 17g (1 packet) BID, Senakot-S 2 Pills BID and Amitiza 24mcg BID    Transaminitis  Assessment & Plan  - Patient noted to have elevated AST 67/ in ED  - CT of the abdomen did not note any acute pathology  - Liver enzymes in ED mildly elevated from baseline, possibly reactive  - Per GI elevated LFTs demonstrate a hepatocellular pattern    Plan  - Will continue to monitor LFTs  - Celiac panel, and chronic hepatitis panel pending  - GI consulted, appreciate their recommendations    Tobacco abuse  Assessment & Plan  - Patient has a history of tobacco abuse  - Will order the patient nicotine replacement  - Encourage cessation    CINV (chemotherapy-induced nausea and vomiting)  Assessment & Plan  - Patient reports frequent nausea, denying any current symptoms  - Patient is maintained on Zofran, Protonix as an outpatient will continue Protonix, however due to use Zofran as constipating potential will discontinue it and start on Compazine, discussed this change with the patient and the patient was amenable to trying it    Cancer-related pain  Assessment & Plan  - Pt has extensive history of cancer related pain  - Will continue pt on gabapentin and hydromorphone  - Palliative care consulted, appreciate their recommendations  Per discussion with palliative care the patient they will not increase her pain medications at this time due to her severe constipation    Brain metastases [facility])  Assessment & Plan  - Pt has breast cancer with mets to the brain  - CTH 5/10: No acute intracranial abnormality or significant change from prior study   - Most recent MRI was in 2020    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 [address] )  Assessment & Plan  - Diagnosed with metastatic breast cancer in 2010 with mets to the brain and bone  - S/p numerous rounds of chemotherapy  - S/P double mastectomy   - Follows with heme/onc as out patient      VTE Pharmacologic Prophylaxis:   Pharmacologic: Apixaban (Eliquis)  Mechanical VTE Prophylaxis in Place: Yes    Discussions with Specialists or Other Care Team Provider: GI    Education and Discussions with Family / Patient:  Discussed plan of care with the patient, discussed plan of care with the patients family members extensively yesterday    Current Length of Stay: 5 day(s)    Current Patient Status: Inpatient     Discharge Plan / Estimated Discharge Date: To be determined    Code Status: Level 1 - Full Code    Subjective:   Patient seen and examined  No acute events overnight  The patient had to small hard formed bowel movements overnight   The patient reports that her stomach pain has significantly improved however she is still significantly distended  Patient denies any headache, dizziness, nausea, vomiting, diarrhea, chest pain, palpitations, shortness of breath, wheezing  A 12 point review of systems was completed and was negative unless noted above  Objective:     Vitals:   Temp (24hrs), Av 8 °F (36 6 °C), Min:97 6 °F (36 4 °C), Max:98 2 °F (36 8 °C)    Temp:  [97 6 °F (36 4 °C)-98 2 °F (36 8 °C)] 98 2 °F (36 8 °C)  HR:  [77-79] 77  Resp:  [16-18] 16  BP: (111-124)/(75-83) 111/75  SpO2:  [93 %-96 %] 96 %  Body mass index is 28 58 kg/m²  Input and Output Summary (last 24 hours): Intake/Output Summary (Last 24 hours) at 5/15/2021 1133  Last data filed at 5/15/2021 0630  Gross per 24 hour   Intake --   Output 1550 ml   Net -1550 ml       Physical Exam:     Physical Exam  Vitals signs and nursing note reviewed  Constitutional:       General: She is not in acute distress  Appearance: She is well-developed  She is not diaphoretic  HENT:      Head: Normocephalic and atraumatic  Nose: Nose normal    Eyes:      General: No scleral icterus  Right eye: No discharge  Left eye: No discharge  Extraocular Movements: Extraocular movements intact  Conjunctiva/sclera: Conjunctivae normal       Pupils: Pupils are equal, round, and reactive to light  Neck:      Musculoskeletal: Normal range of motion  Cardiovascular:      Rate and Rhythm: Normal rate and regular rhythm  Heart sounds: Normal heart sounds  No murmur  No friction rub  No gallop  Pulmonary:      Effort: Pulmonary effort is normal  No respiratory distress  Breath sounds: No stridor  No wheezing, rhonchi or rales  Chest:      Chest wall: No tenderness  Abdominal:      General: Abdomen is flat  Bowel sounds are normal  There is distension  Palpations: Abdomen is soft  Tenderness: There is no abdominal tenderness     Genitourinary: Rectum: Normal  No mass or tenderness  Normal anal tone  Musculoskeletal: Normal range of motion  Right lower leg: No edema  Left lower leg: No edema  Skin:     General: Skin is warm and dry  Neurological:      General: No focal deficit present  Mental Status: She is alert and oriented to person, place, and time  Psychiatric:         Behavior: Behavior normal          Thought Content: Thought content normal          Judgment: Judgment normal         Additional Data:     Labs: I have personally reviewed pertinent reports  Results from last 7 days   Lab Units 05/15/21  0620 05/14/21  0608 05/13/21  0706  05/10/21  1057   WBC Thousand/uL 8 72 8 80 7 14   < > 10 43*   HEMOGLOBIN g/dL 11 2* 11 4* 11 1*   < > 10 9*   HEMATOCRIT % 36 3 36 5 35 9   < > 35 3   PLATELETS Thousands/uL 259 264 261   < > 285   MONO PCT %  --   --   --   --  6    < > = values in this interval not displayed  Results from last 7 days   Lab Units 05/15/21  0619 05/14/21  0608 05/13/21  0706   POTASSIUM mmol/L 4 0 3 9 4 0   CHLORIDE mmol/L 107 109* 107   CO2 mmol/L 30 28 28   BUN mg/dL 10 8 10   CREATININE mg/dL 0 64 0 57* 0 65   CALCIUM mg/dL 8 5 8 9 8 6   ALK PHOS U/L 124* 105 98   ALT U/L 192* 130* 124*   AST U/L 82* 50* 49*     Results from last 7 days   Lab Units 05/15/21  0619 05/11/21  1016   MAGNESIUM mg/dL 2 9* 2 5          Results from last 7 days   Lab Units 05/11/21  1016 05/11/21  0056 05/10/21  1421   INR   --   --  0 84   PTT seconds 76* 93* 23         Results from last 7 days   Lab Units 05/10/21  1421   TROPONIN I ng/mL <0 02       * Additional Pertinent Lab Tests Reviewed: [address] Admission Reviewed    Radiology Results: I have personally reviewed pertinent reports  Xr Abdomen Obstruction Series    Result Date: 5/14/2021  Impression: Improved constipation Nonobstructive bowel gas pattern   Workstation performed: [id number]     Ct Head Without Contrast    Result Date: 5/10/2021  Impression: No acute intracranial abnormality or significant change from prior study  Changes within the brain parenchyma again suggesting sequela of prior whole brain radiation including previously treated right periatrial dystrophic calcification  No new lesions detected within limitations of noncontrast imaging  Intracranial metastatic disease again better evaluated with contrast-enhanced MRI  Workstation performed: [id number]     Pe Study With Ct Abdomen And Pelvis With Contrast    Result Date: 5/10/2021  Impression: 1  Limited CT pulmonary angiogram, however, suggestion of a small subsegmental left upper lobe pulmonary embolus  Dilated RV/LV ratio which is nonspecific and could be indicative of elevated right heart pressures in the setting of COPD/pulmonary hypertension  No bowing of the intraventricular septum  2   Small bilateral pulmonary nodules, grossly similar  1 cm pleural-based left apical nodule appears larger  Cannot exclude evolving metastasis  3   No acute intra-abdominal abnormality  4   Large volume of colonic stool suggesting constipation  5   Marked urinary bladder distention  I personally discussed this study with physician assistant Samantha Eaton on 5/10/2021 at 3:50 PM  Workstation performed: [id number]       Recent Cultures (last 7 days):     Results from last 7 days   Lab Units 05/10/21  1743 05/10/21  1458   BLOOD CULTURE  No Growth After 4 Days  No Growth After 4 Days  No Growth After 4 Days         Last 24 Hours Medication List:   Current Facility-Administered Medications   Medication Dose Route Frequency Provider Last Rate    acetaminophen  650 mg Oral Q6H PRN Artelia Janine, DO      albuterol  2 puff Inhalation Q6H PRN Artelia Janine, DO      aluminum-magnesium hydroxide-simethicone  30 mL Oral Q6H PRN Artelia Janine, DO      apixaban  10 mg Oral BID Artelia Janine, DO      [START ON 5/18/2021] apixaban  5 mg Oral BID Juliocesar Simms DO      bisacodyl  10 mg Rectal Daily PRN FAUZIA Lorenzana      dexamethasone  2 mg Oral Daily With Breakfast [employer], DO      gabapentin  400 mg Oral TID RAFA Simms,       HYDROmorphone  1 mg Intravenous Q6H PRN [employer], DO      HYDROmorphone  4 mg Oral Q4H PRN Michele [employer], DO      HYDROmorphone  8 mg Oral Q4H PRN [employer], DO      levETIRAcetam  250 mg Oral BID [employer], DO      lidocaine  1 patch Topical Daily FAUZIA Lorenzana      lubiprostone  24 mcg Oral BID With Meals Micaela Schraedr MD      nicotine  1 patch Transdermal Daily [employer], DO      OLANZapine  10 mg Oral HS Juliocesar Simms, DO      pantoprazole  40 mg Oral Daily [employer], DO      polyethylene glycol  17 g Oral BID [employer], DO      prochlorperazine  5 mg Oral TID AC Juliocesar Simms, DO      senna-docusate sodium  2 tablet Oral BID [employer], DO      zolpidem  10 mg Oral HS PRN [employer], DO          Today, Patient Was Seen By: [employer], DO    Portions of the record may have been created with voice recognition software  Occasional wrong word or "sound a like" substitutions may have occurred due to the inherent limitations of voice recognition software  Read the chart carefully and recognize, using context, where substitutions have occurred

## 2021-05-15 NOTE — QUICK NOTE
Received priority message from nurse informing me that patient was wheezing and became hypoxic on room air and her stomach had become distended following drinking bowel prep  I responded immediately to the patient's room and the patient was noted to be on a nasal cannula  RN reported that the patient had been setting 89 percent on room air prior to his intervening  Patient was noted to be significantly distended compared to the earlier examination  Patient was noted to be in visibly mild respiratory distress  Examination revealed bilateral crackles, a Stat chest x-ray therefore was obtained and on my wet read was concerning for aspiration  20 milligrams IV Lasix was ordered  Along with a breathing treatment, however this was unable to be completed due to the patient not having a COVID test, a stat COVID test was ordered in case of future need  However we were able to hold off on providing her with a nebulizer treatment as her sats had significantly improved     Senior resident was also called and came bedside  Patient's vital signs and pulse ox started to improve  An NG tube was placed due to the patients distension and concern for the patient aspirating again as she had started vomiting  NG tube was placed successfully by RN, x-ray confirmed proper placement on my wet read  Of note during this episode the patient had a significant liquidly bowel movement  I attempted to contact the patient's  and her daughter however they were unable to be gotten hold of however I was able to get a hold of the patient's Patient's sisters Ophelia Calvin and Casimiro Myoa who were notified of the episode and their questions were answered  The patient's sisters stated that they would informed the patient's  of the episode       Assessment  - concern for aspiration    Plan  - NG tube to continuous suction  - continuous pulse oximetry  - continue to monitor temperature, will consider initiating antibiotics if patient becomes febrile or clinical condition worsens but will hold off at this time  - patient to be NPO

## 2021-05-16 ENCOUNTER — APPOINTMENT (INPATIENT)
Dept: RADIOLOGY | Facility: HOSPITAL | Age: 61
DRG: 391 | End: 2021-05-16
Payer: COMMERCIAL

## 2021-05-16 PROBLEM — J96.01 ACUTE RESPIRATORY FAILURE WITH HYPOXIA (HCC): Status: ACTIVE | Noted: 2021-05-16

## 2021-05-16 LAB
ALBUMIN SERPL BCP-MCNC: 3.1 G/DL (ref 3.5–5)
ALP SERPL-CCNC: 146 U/L (ref 46–116)
ALT SERPL W P-5'-P-CCNC: 234 U/L (ref 12–78)
ANION GAP SERPL CALCULATED.3IONS-SCNC: 9 MMOL/L (ref 4–13)
AST SERPL W P-5'-P-CCNC: 106 U/L (ref 5–45)
BACTERIA BLD CULT: NORMAL
BILIRUB SERPL-MCNC: 0.49 MG/DL (ref 0.2–1)
BUN SERPL-MCNC: 11 MG/DL (ref 5–25)
CALCIUM ALBUM COR SERPL-MCNC: 9.2 MG/DL (ref 8.3–10.1)
CALCIUM SERPL-MCNC: 8.5 MG/DL (ref 8.3–10.1)
CHLORIDE SERPL-SCNC: 106 MMOL/L (ref 100–108)
CO2 SERPL-SCNC: 29 MMOL/L (ref 21–32)
CREAT SERPL-MCNC: 0.6 MG/DL (ref 0.6–1.3)
ERYTHROCYTE [DISTWIDTH] IN BLOOD BY AUTOMATED COUNT: 16.2 % (ref 11.6–15.1)
GFR SERPL CREATININE-BSD FRML MDRD: 101 ML/MIN/1.73SQ M
GLUCOSE SERPL-MCNC: 100 MG/DL (ref 65–140)
HCT VFR BLD AUTO: 35.2 % (ref 34.8–46.1)
HGB BLD-MCNC: 11 G/DL (ref 11.5–15.4)
MCH RBC QN AUTO: 30.6 PG (ref 26.8–34.3)
MCHC RBC AUTO-ENTMCNC: 31.3 G/DL (ref 31.4–37.4)
MCV RBC AUTO: 98 FL (ref 82–98)
PLATELET # BLD AUTO: 279 THOUSANDS/UL (ref 149–390)
PMV BLD AUTO: 9.1 FL (ref 8.9–12.7)
POTASSIUM SERPL-SCNC: 3.7 MMOL/L (ref 3.5–5.3)
PROCALCITONIN SERPL-MCNC: <0.05 NG/ML
PROT SERPL-MCNC: 7.1 G/DL (ref 6.4–8.2)
RBC # BLD AUTO: 3.6 MILLION/UL (ref 3.81–5.12)
SODIUM SERPL-SCNC: 144 MMOL/L (ref 136–145)
WBC # BLD AUTO: 10.34 THOUSAND/UL (ref 4.31–10.16)

## 2021-05-16 PROCEDURE — 94762 N-INVAS EAR/PLS OXIMTRY CONT: CPT

## 2021-05-16 PROCEDURE — 74022 RADEX COMPL AQT ABD SERIES: CPT

## 2021-05-16 PROCEDURE — 99232 SBSQ HOSP IP/OBS MODERATE 35: CPT | Performed by: INTERNAL MEDICINE

## 2021-05-16 PROCEDURE — 85027 COMPLETE CBC AUTOMATED: CPT | Performed by: PSYCHIATRY & NEUROLOGY

## 2021-05-16 PROCEDURE — 80053 COMPREHEN METABOLIC PANEL: CPT | Performed by: PSYCHIATRY & NEUROLOGY

## 2021-05-16 PROCEDURE — 84145 PROCALCITONIN (PCT): CPT | Performed by: INTERNAL MEDICINE

## 2021-05-16 PROCEDURE — 94760 N-INVAS EAR/PLS OXIMETRY 1: CPT

## 2021-05-16 RX ORDER — FUROSEMIDE 10 MG/ML
40 INJECTION INTRAMUSCULAR; INTRAVENOUS ONCE
Status: COMPLETED | OUTPATIENT
Start: 2021-05-16 | End: 2021-05-16

## 2021-05-16 RX ORDER — BISACODYL 10 MG
10 SUPPOSITORY, RECTAL RECTAL ONCE
Status: COMPLETED | OUTPATIENT
Start: 2021-05-16 | End: 2021-05-16

## 2021-05-16 RX ADMIN — LUBIPROSTONE 24 MCG: 24 CAPSULE, GELATIN COATED ORAL at 15:50

## 2021-05-16 RX ADMIN — LEVETIRACETAM 250 MG: 100 INJECTION, SOLUTION INTRAVENOUS at 08:28

## 2021-05-16 RX ADMIN — GABAPENTIN 400 MG: 400 CAPSULE ORAL at 15:50

## 2021-05-16 RX ADMIN — LUBIPROSTONE 24 MCG: 24 CAPSULE, GELATIN COATED ORAL at 08:27

## 2021-05-16 RX ADMIN — APIXABAN 10 MG: 5 TABLET, FILM COATED ORAL at 17:15

## 2021-05-16 RX ADMIN — PROCHLORPERAZINE MALEATE 5 MG: 10 TABLET ORAL at 10:36

## 2021-05-16 RX ADMIN — DEXAMETHASONE 2 MG: 2 TABLET ORAL at 08:26

## 2021-05-16 RX ADMIN — METRONIDAZOLE 500 MG: 500 INJECTION, SOLUTION INTRAVENOUS at 17:15

## 2021-05-16 RX ADMIN — SENNOSIDES AND DOCUSATE SODIUM 2 TABLET: 8.6; 5 TABLET ORAL at 08:27

## 2021-05-16 RX ADMIN — APIXABAN 10 MG: 5 TABLET, FILM COATED ORAL at 08:26

## 2021-05-16 RX ADMIN — METRONIDAZOLE 500 MG: 500 INJECTION, SOLUTION INTRAVENOUS at 10:30

## 2021-05-16 RX ADMIN — CEFTRIAXONE SODIUM 1000 MG: 10 INJECTION, POWDER, FOR SOLUTION INTRAVENOUS at 10:41

## 2021-05-16 RX ADMIN — OLANZAPINE 10 MG: 10 TABLET, FILM COATED ORAL at 21:40

## 2021-05-16 RX ADMIN — LIDOCAINE 5% 1 PATCH: 700 PATCH TOPICAL at 08:30

## 2021-05-16 RX ADMIN — BISACODYL 10 MG: 10 SUPPOSITORY RECTAL at 20:07

## 2021-05-16 RX ADMIN — NICOTINE 1 PATCH: 14 PATCH, EXTENDED RELEASE TRANSDERMAL at 08:27

## 2021-05-16 RX ADMIN — PROCHLORPERAZINE MALEATE 5 MG: 10 TABLET ORAL at 15:50

## 2021-05-16 RX ADMIN — SENNOSIDES AND DOCUSATE SODIUM 2 TABLET: 8.6; 5 TABLET ORAL at 17:15

## 2021-05-16 RX ADMIN — FUROSEMIDE 40 MG: 10 INJECTION, SOLUTION INTRAMUSCULAR; INTRAVENOUS at 10:29

## 2021-05-16 RX ADMIN — LEVETIRACETAM 250 MG: 100 INJECTION, SOLUTION INTRAVENOUS at 21:40

## 2021-05-16 RX ADMIN — GABAPENTIN 400 MG: 400 CAPSULE ORAL at 10:36

## 2021-05-16 NOTE — ASSESSMENT & PLAN NOTE
· Trending up    · Continue to monitor  · GI are following  · Hepatitis panel is negative  · CT abdomen in March showed stable small hypodensity in the dome of the liver   ·

## 2021-05-16 NOTE — ASSESSMENT & PLAN NOTE
· Likely secondary to vascular congestion or aspiration  · Currently on 4 L supplemental oxygen with saturation 96%  · Check procalcitonin level  · Start IV ceftriaxone and Flagyl  · Will give another dose of IV Lasix 40 mg  · Wean off supplemental oxygen as tolerated to keep saturation over 90%  · Remove NG tube and start clear liquids

## 2021-05-16 NOTE — PROGRESS NOTES
[facility]  Progress Note - Chin Avila 1960, 64 y o  female MRN: [id number]  Unit/Bed#: S -01 Encounter: [id number]  Primary Care Provider: Zohaib Aponte MD   Date and time admitted to hospital: 5/10/2021  1:16 PM    Acute respiratory failure with hypoxia ([address] )  Assessment & Plan  · Concern for aspiration  · Chest x-ray showing mild vascular congestion with no overt pulmonary edema  Most recent echocardiogram in April showing normal ejection fraction with diastolic dysfunctions and no significant valvular abnormalities  · Currently on 4 L supplemental oxygen with saturation 96%  · Check procalcitonin level  · Start IV ceftriaxone and Flagyl  · Will give another dose of IV Lasix 40 mg  · Wean off supplemental oxygen as tolerated to keep saturation over 90%  · Remove NG tube and start clear liquids  Therapeutic opioid-induced constipation (OIC)  Assessment & Plan  · Continue current bowel regimen : Amitiza 24 mcg b i d, Senokot 2 tablets b i d, MiraLax, and enema  · She did receive 1/2 gallon of golytely 5/15 but was not completed due to subsequent acute hypoxic respiratory failure  · Patient had 2 small bowel movements yesterday  · Might require colonoscopy   · Appreciate GI recommendations    * Pulmonary embolism ([facility] [address] )  Assessment & Plan  · Initiated on Eliquis starter pack this admission  Transaminitis  Assessment & Plan  · Trending up    · Continue to monitor  · GI are following  · But that is panel is negative  · CT abdomen in March showed stable small hypodensity in the dome of the liver     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, estrogen receptor positive ([facility] [address] )  Assessment & Plan  · Status post chemotherapy and mastectomy  ·  Follow with oncologist as outpatient        VTE Pharmacologic Prophylaxis:   Pharmacologic: Apixaban (Eliquis)  Mechanical VTE Prophylaxis in Place: Yes    Discussions with Specialists or Other Care Team Provider: Gastroenterologist    Education and Discussions with Family / Patient:  Patient and her   Current Length of Stay: 6 day(s)    Current Patient Status: Inpatient     Discharge Plan / Estimated Discharge Date: To be determined  Code Status: Level 1 - Full Code      Subjective:   Patient lying down in bed with no distress  She reports mild abdominal pain and distension  Her breathing is improving  She denies chest pain  She wants her NG tube removed  Objective:     Vitals:   Temp (24hrs), Av 3 °F (36 8 °C), Min:97 9 °F (36 6 °C), Max:98 6 °F (37 °C)    Temp:  [97 9 °F (36 6 °C)-98 6 °F (37 °C)] 98 3 °F (36 8 °C)  HR:  [] 93  Resp:  [16-22] 16  BP: (111-159)/(72-99) 111/72  SpO2:  [89 %-98 %] 95 %  Body mass index is 28 58 kg/m²  Input and Output Summary (last 24 hours): Intake/Output Summary (Last 24 hours) at 2021 1137  Last data filed at 2021 1132  Gross per 24 hour   Intake 1910 ml   Output 800 ml   Net 1110 ml       Physical Exam:     Physical Exam  Vitals signs and nursing note reviewed  Constitutional:       General: She is not in acute distress  Appearance: She is not ill-appearing  HENT:      Head: Normocephalic and atraumatic  Comments: NG tube in place     Mouth/Throat:      Mouth: Mucous membranes are moist    Eyes:      Extraocular Movements: Extraocular movements intact  Pupils: Pupils are equal, round, and reactive to light  Neck:      Musculoskeletal: Normal range of motion and neck supple  Cardiovascular:      Rate and Rhythm: Normal rate and regular rhythm  Pulses: Normal pulses  Heart sounds: Normal heart sounds  Pulmonary:      Effort: Pulmonary effort is normal       Breath sounds: Rhonchi present  Comments: On 4L supplemental oxygen  Abdominal:      General: There is distension  Palpations: There is no mass  Tenderness: There is abdominal tenderness (Mild generalized)        Comments: Hypoactive bowel sounds   Musculoskeletal:      Right lower leg: No edema  Left lower leg: No edema  Skin:     General: Skin is warm  Capillary Refill: Capillary refill takes 2 to 3 seconds  Neurological:      General: No focal deficit present  Mental Status: She is alert and oriented to person, place, and time  Psychiatric:         Mood and Affect: Mood normal          Behavior: Behavior normal              Additional Data:     Labs:    Results from last 7 days   Lab Units 05/16/21  0629  05/10/21  1057   WBC Thousand/uL 10 34*   < > 10 43*   HEMOGLOBIN g/dL 11 0*   < > 10 9*   HEMATOCRIT % 35 2   < > 35 3   PLATELETS Thousands/uL 279   < > 285   LYMPHO PCT %  --   --  18   MONO PCT %  --   --  6   EOS PCT %  --   --  0    < > = values in this interval not displayed  Results from last 7 days   Lab Units 05/16/21  0629   POTASSIUM mmol/L 3 7   CHLORIDE mmol/L 106   CO2 mmol/L 29   BUN mg/dL 11   CREATININE mg/dL 0 60   CALCIUM mg/dL 8 5   ALK PHOS U/L 146*   ALT U/L 234*   AST U/L 106*     Results from last 7 days   Lab Units 05/10/21  1421   INR  0 84       * I Have Reviewed All Lab Data Listed Above  * Additional Pertinent Lab Tests Reviewed: [place] [address] Admission Reviewed    Imaging:    Reviewed chest x-ray    Recent Cultures (last 7 days):     Results from last 7 days   Lab Units 05/10/21  1743 05/10/21  1458   BLOOD CULTURE  No Growth After 5 Days  No Growth After 5 Days  No Growth After 5 Days         Last 24 Hours Medication List:   Current Facility-Administered Medications   Medication Dose Route Frequency Provider Last Rate    acetaminophen  650 mg Oral Q6H PRN Alexsandra Ariass, DO      albuterol  2 puff Inhalation Q6H PRN Alexsandra Ariass, DO      albuterol  2 5 mg Nebulization Q4H PRN Behuang Saas, DO      aluminum-magnesium hydroxide-simethicone  30 mL Oral Q6H PRN Behuang Saas, DO      apixaban  10 mg Oral BID Juliocesar Simms DO      [START ON 5/18/2021] apixaban  5 mg Oral BID [facility],       bisacodyl  10 mg Rectal Daily PRN Ree Guard, CRNP      cefTRIAXone  1,000 mg Intravenous Q24H Kehinde Barrientos MD 1,000 mg (05/16/21 1041)    dexamethasone  2 mg Oral Daily With Breakfast [facility],       gabapentin  400 mg Oral TID AC Juliocesar Simms DO      HYDROmorphone  1 mg Intravenous Q6H PRN [facility], DO      HYDROmorphone  4 mg Oral Q4H PRN [facility], DO      HYDROmorphone  8 mg Oral Q4H PRN [facility],       levETIRAcetam  250 mg Intravenous Q12H [facility] Juliocesar Simms  mg (05/16/21 3283)    lidocaine  1 patch Topical Daily Ree Guard, CRNP      lubiprostone  24 mcg Oral BID With Meals Guerrero Colin MD      metroNIDAZOLE  500 mg Intravenous Q8H Kehinde Barrientos  mg (05/16/21 1030)    nicotine  1 patch Transdermal Daily Juliocesar Simms DO      OLANZapine  10 mg Oral HS Juliocesar Simms DO      pantoprazole  40 mg Oral Daily [facility], DO      prochlorperazine  5 mg Oral TID AC Juliocesar Simms DO      senna-docusate sodium  2 tablet Oral BID [facility], DO      zolpidem  10 mg Oral HS PRN [facility],           Today, Patient Was Seen By: Romelia Ulloa MD    ** Please Note: This note has been constructed using a voice recognition system   **

## 2021-05-16 NOTE — PROGRESS NOTES
Progress Note- Toro Webster 64 y o  female MRN: [id number]    Unit/Bed#: S -01 Encounter: [id number]      Assessment and Plan:    1  Opiate induced constipation-she has had Relistor subQ, had multiple enemas, and bowel preps in addition to scheduled MiraLax twice daily, Senokot twice daily, Amitiza twice daily  XR showed slight improvement on 5/14, last night pt drank 1/2 GoLYTELY prep very quickly yesterday and started to wheeze and there was concern for aspiration  She had NG tube placed due to increased abdominal distention which has since been removed  She had significant large liquid BM last night, however abdomen is more distended than yesterday, remains soft with positive bowel sounds  Patient denies any nausea/vomiting   -Thyroid function and calcium normal    -minimize use of narcotics  -monitor electrolytes and replete as necessary  -Encourage OOB to chair  -await celiac panel  -continue Amitiza 24 mcg b i d, Senokot 2 tablets b i d, Miralax BID  -Obtain stat obstruction series to evaluate stool pattern and increased abdominal distention  -Dulcolax suppository x1  -Patient made need rectal tube vs decompressive colonoscopy   -Last dose of Eliquis 5/16 at 5pm, hold Eliquis  Will make patient NPO for possible colonoscopy with decompression tomorrow         2  Elevated LFTs in a hepatocellular pattern, chronic hepatitis panel negative  Patient noted with fluctuating LFTs since 2018  LFTS trending up  -Await RUQ US  -follow-up JAIR, AMA, ASMA, iron panel, celiac panel  -trend CMP    ______________________________________________________________________    Subjective:     Patient seen lying in bed, she appears to be in no acute distress  She drank 1/2 GoLYTELY prep last night very quickly, and then started wheezing and there was concern for aspiration  CXR done and she received Lasix and breathing treatment   NG tube was ultimately placed due to worsening abdominal distention and placed to suction, 150 cc removed  Apparently she had large liquid bowel movement at that time as well      Medication Administration - last 24 hours from 05/15/2021 1836 to 05/16/2021 1836       Date/Time Order Dose Route Action Action by     05/15/2021 1853 levETIRAcetam (KEPPRA) tablet 250 mg 250 mg Oral Not Given Tammy Roth, MINNA     05/16/2021 5343 pantoprazole (PROTONIX) EC tablet 40 mg 40 mg Oral Not Given Janusz Yanes, MINNA     05/15/2021 2200 OLANZapine (ZyPREXA) tablet 10 mg 10 mg Oral Not Given Janusz Yanes, MINNA     05/16/2021 1550 gabapentin (NEURONTIN) capsule 400 mg 400 mg Oral Given Tammy Roth, MINNA     05/16/2021 1036 gabapentin (NEURONTIN) capsule 400 mg 400 mg Oral Given Tammy Roth, MINNA     05/16/2021 0622 gabapentin (NEURONTIN) capsule 400 mg 400 mg Oral Not Given Janusz Yanes RN     05/16/2021 0826 dexamethasone (DECADRON) tablet 2 mg 2 mg Oral Given Tammy Roth, MINNA     05/16/2021 0827 nicotine (NICODERM CQ) 14 mg/24hr TD 24 hr patch 1 patch 1 patch Transdermal Medication Applied Tammy Roth RN     05/16/2021 0826 nicotine (NICODERM CQ) 14 mg/24hr TD 24 hr patch 1 patch 1 patch Transdermal Patch Removed Tammy Roth, MINNA     05/16/2021 1715 senna-docusate sodium (SENOKOT S) 8 6-50 mg per tablet 2 tablet 2 tablet Oral Given Tammy Roth, MINNA     05/16/2021 0827 senna-docusate sodium (SENOKOT S) 8 6-50 mg per tablet 2 tablet 2 tablet Oral Given Tammy Roth, MINNA     05/15/2021 1853 senna-docusate sodium (SENOKOT S) 8 6-50 mg per tablet 2 tablet 2 tablet Oral Not Given Tammy Roth, MINNA     05/16/2021 1715 apixaban (ELIQUIS) tablet 10 mg 10 mg Oral Given Tammy Roth, MINNA     05/16/2021 0826 apixaban (ELIQUIS) tablet 10 mg 10 mg Oral Given Tammy Roth RN     05/15/2021 1853 apixaban (ELIQUIS) tablet 10 mg 10 mg Oral Given Tammy Roth RN     05/16/2021 0830 lidocaine (LIDODERM) 5 % patch 1 patch 1 patch Topical Medication Applied Rosalie Mckinnon, MINNA     05/15/2021 2053 lidocaine (LIDODERM) 5 % patch 1 patch 1 patch Topical Patch Removed Lacie Mcginnis, MINNA     05/16/2021 1550 lubiprostone (AMITIZA) capsule 24 mcg 24 mcg Oral Given Rosalie Mckinnon, MINNA     05/16/2021 0827 lubiprostone (AMITIZA) capsule 24 mcg 24 mcg Oral Given Rosalie Mckinnon, MINNA     05/16/2021 1550 prochlorperazine (COMPAZINE) tablet 5 mg 5 mg Oral Given Rosalie Mckinnon, MINNA     05/16/2021 1036 prochlorperazine (COMPAZINE) tablet 5 mg 5 mg Oral Given Rosalie Mckinnon, MINNA     05/16/2021 8397 prochlorperazine (COMPAZINE) tablet 5 mg 5 mg Oral Not Given Krystal Parikh, MINNA     05/16/2021 7709 levETIRAcetam (KEPPRA) 250 mg in sodium chloride 0 9 % 100 mL IVPB 250 mg Intravenous [address] Rosalie Mckinnon RN     05/15/2021 2037 levETIRAcetam (KEPPRA) 250 mg in sodium chloride 0 9 % 100 mL IVPB 250 mg Intravenous [address] Krystal Parikh, MINNA     05/16/2021 1029 furosemide (LASIX) injection 40 mg 40 mg Intravenous Given Rosalie Mckinnon, MINNA     05/16/2021 1041 cefTRIAXone (ROCEPHIN) 1,000 mg in dextrose 5 % 50 mL IVPB 1,000 mg Intravenous [address] [facility], MINNA     05/16/2021 1715 metroNIDAZOLE (FLAGYL) IVPB (premix) 500 mg 100 mL 500 mg Intravenous [address] [facility]     05/16/2021 1030 metroNIDAZOLE (FLAGYL) IVPB (premix) 500 mg 100 mL 500 mg Intravenous New [address] Irving Marcelino RN          Objective:     Vitals: Blood pressure 104/66, pulse 90, temperature 98 3 °F (36 8 °C), temperature source Oral, resp  rate 18, height 5' 5" (1 651 m), weight 77 9 kg (171 lb 11 8 oz), SpO2 98 %, not currently breastfeeding  ,Body mass index is 28 58 kg/m²        Intake/Output Summary (Last 24 hours) at 5/16/2021 1836  Last data filed at 5/16/2021 1500  Gross per 24 hour   Intake 620 ml   Output 800 ml   Net -180 ml       Physical Exam:   General Appearance: Awake and alert, in no acute distress  Abdomen: Soft, largely distended, non tender; bowel sounds present; no masses or no organomegaly    Invasive Devices     Central Venous Catheter Line            Port A Cath Right Chest -- days    Port A Cath Right Chest -- days          Drain            External Urinary Catheter 6 days                Lab Results:  No results displayed because visit has over 200 results  Imaging Studies: I have personally reviewed pertinent imaging studies

## 2021-05-16 NOTE — ASSESSMENT & PLAN NOTE
· Concern for aspiration  · Chest x-ray showing mild vascular congestion with no overt pulmonary edema  Most recent echocardiogram in April showing normal ejection fraction with diastolic dysfunctions and no significant valvular abnormalities  · Currently on 4 L supplemental oxygen with saturation 96%  · Check procalcitonin level  · Start IV ceftriaxone and Flagyl  · Will give another dose of IV Lasix 40 mg  · Wean off supplemental oxygen as tolerated to keep saturation over 90%  · Remove NG tube and start clear liquids

## 2021-05-16 NOTE — ASSESSMENT & PLAN NOTE
· Continue current bowel regimen : Amitiza 24 mcg b i d, Senokot 2 tablets b i d, MiraLax, and enema  · She did receive 1/2 gallon of golytely 5/15 but was not completed due to subsequent acute hypoxic respiratory failure  · Patient had 2 small bowel movements yesterday     · Might require colonoscopy   · Appreciate GI recommendations

## 2021-05-17 ENCOUNTER — APPOINTMENT (INPATIENT)
Dept: ULTRASOUND IMAGING | Facility: HOSPITAL | Age: 61
DRG: 391 | End: 2021-05-17
Payer: COMMERCIAL

## 2021-05-17 LAB
ALBUMIN SERPL BCP-MCNC: 2.8 G/DL (ref 3.5–5)
ALP SERPL-CCNC: 130 U/L (ref 46–116)
ALT SERPL W P-5'-P-CCNC: 158 U/L (ref 12–78)
ANION GAP SERPL CALCULATED.3IONS-SCNC: 10 MMOL/L (ref 4–13)
AST SERPL W P-5'-P-CCNC: 50 U/L (ref 5–45)
BILIRUB SERPL-MCNC: 0.44 MG/DL (ref 0.2–1)
BUN SERPL-MCNC: 9 MG/DL (ref 5–25)
CALCIUM ALBUM COR SERPL-MCNC: 9.1 MG/DL (ref 8.3–10.1)
CALCIUM SERPL-MCNC: 8.1 MG/DL (ref 8.3–10.1)
CHLORIDE SERPL-SCNC: 106 MMOL/L (ref 100–108)
CO2 SERPL-SCNC: 27 MMOL/L (ref 21–32)
CREAT SERPL-MCNC: 0.53 MG/DL (ref 0.6–1.3)
ENDOMYSIUM IGA SER QL: NEGATIVE
ERYTHROCYTE [DISTWIDTH] IN BLOOD BY AUTOMATED COUNT: 16.4 % (ref 11.6–15.1)
FERRITIN SERPL-MCNC: 108 NG/ML (ref 8–388)
GFR SERPL CREATININE-BSD FRML MDRD: 105 ML/MIN/1.73SQ M
GLIADIN PEPTIDE IGA SER-ACNC: 19 UNITS (ref 0–19)
GLIADIN PEPTIDE IGG SER-ACNC: 5 UNITS (ref 0–19)
GLUCOSE SERPL-MCNC: 85 MG/DL (ref 65–140)
HCT VFR BLD AUTO: 32.9 % (ref 34.8–46.1)
HGB BLD-MCNC: 10.3 G/DL (ref 11.5–15.4)
IGA SERPL-MCNC: 278 MG/DL (ref 87–352)
IRON SATN MFR SERPL: 22 %
IRON SERPL-MCNC: 82 UG/DL (ref 50–170)
MCH RBC QN AUTO: 30.7 PG (ref 26.8–34.3)
MCHC RBC AUTO-ENTMCNC: 31.3 G/DL (ref 31.4–37.4)
MCV RBC AUTO: 98 FL (ref 82–98)
PLATELET # BLD AUTO: 259 THOUSANDS/UL (ref 149–390)
PMV BLD AUTO: 9.2 FL (ref 8.9–12.7)
POTASSIUM SERPL-SCNC: 3.3 MMOL/L (ref 3.5–5.3)
PROCALCITONIN SERPL-MCNC: <0.05 NG/ML
PROT SERPL-MCNC: 6.9 G/DL (ref 6.4–8.2)
RBC # BLD AUTO: 3.35 MILLION/UL (ref 3.81–5.12)
SODIUM SERPL-SCNC: 143 MMOL/L (ref 136–145)
TIBC SERPL-MCNC: 374 UG/DL (ref 250–450)
TTG IGA SER-ACNC: <2 U/ML (ref 0–3)
TTG IGG SER-ACNC: <2 U/ML (ref 0–5)
WBC # BLD AUTO: 6.85 THOUSAND/UL (ref 4.31–10.16)

## 2021-05-17 PROCEDURE — 97116 GAIT TRAINING THERAPY: CPT

## 2021-05-17 PROCEDURE — 97530 THERAPEUTIC ACTIVITIES: CPT

## 2021-05-17 PROCEDURE — 99232 SBSQ HOSP IP/OBS MODERATE 35: CPT | Performed by: NURSE PRACTITIONER

## 2021-05-17 PROCEDURE — 83540 ASSAY OF IRON: CPT | Performed by: NURSE PRACTITIONER

## 2021-05-17 PROCEDURE — 94760 N-INVAS EAR/PLS OXIMETRY 1: CPT

## 2021-05-17 PROCEDURE — 85027 COMPLETE CBC AUTOMATED: CPT | Performed by: INTERNAL MEDICINE

## 2021-05-17 PROCEDURE — 99232 SBSQ HOSP IP/OBS MODERATE 35: CPT | Performed by: INTERNAL MEDICINE

## 2021-05-17 PROCEDURE — 86038 ANTINUCLEAR ANTIBODIES: CPT | Performed by: NURSE PRACTITIONER

## 2021-05-17 PROCEDURE — 82728 ASSAY OF FERRITIN: CPT | Performed by: NURSE PRACTITIONER

## 2021-05-17 PROCEDURE — 76705 ECHO EXAM OF ABDOMEN: CPT

## 2021-05-17 PROCEDURE — 80053 COMPREHEN METABOLIC PANEL: CPT | Performed by: INTERNAL MEDICINE

## 2021-05-17 PROCEDURE — 86235 NUCLEAR ANTIGEN ANTIBODY: CPT | Performed by: NURSE PRACTITIONER

## 2021-05-17 PROCEDURE — 86256 FLUORESCENT ANTIBODY TITER: CPT | Performed by: NURSE PRACTITIONER

## 2021-05-17 PROCEDURE — 84145 PROCALCITONIN (PCT): CPT | Performed by: INTERNAL MEDICINE

## 2021-05-17 PROCEDURE — 83550 IRON BINDING TEST: CPT | Performed by: NURSE PRACTITIONER

## 2021-05-17 PROCEDURE — 94762 N-INVAS EAR/PLS OXIMTRY CONT: CPT

## 2021-05-17 RX ORDER — FUROSEMIDE 20 MG/1
20 TABLET ORAL DAILY
Status: DISCONTINUED | OUTPATIENT
Start: 2021-05-17 | End: 2021-05-18

## 2021-05-17 RX ORDER — DEXTROSE, SODIUM CHLORIDE, AND POTASSIUM CHLORIDE 5; .45; .3 G/100ML; G/100ML; G/100ML
125 INJECTION INTRAVENOUS CONTINUOUS
Status: DISCONTINUED | OUTPATIENT
Start: 2021-05-17 | End: 2021-05-17

## 2021-05-17 RX ADMIN — PROCHLORPERAZINE MALEATE 5 MG: 10 TABLET ORAL at 16:41

## 2021-05-17 RX ADMIN — METRONIDAZOLE 500 MG: 500 INJECTION, SOLUTION INTRAVENOUS at 17:22

## 2021-05-17 RX ADMIN — METRONIDAZOLE 500 MG: 500 INJECTION, SOLUTION INTRAVENOUS at 09:59

## 2021-05-17 RX ADMIN — LEVETIRACETAM 250 MG: 100 INJECTION, SOLUTION INTRAVENOUS at 11:48

## 2021-05-17 RX ADMIN — CEFTRIAXONE SODIUM 1000 MG: 10 INJECTION, POWDER, FOR SOLUTION INTRAVENOUS at 10:02

## 2021-05-17 RX ADMIN — LUBIPROSTONE 24 MCG: 24 CAPSULE, GELATIN COATED ORAL at 10:02

## 2021-05-17 RX ADMIN — DEXAMETHASONE 2 MG: 2 TABLET ORAL at 10:02

## 2021-05-17 RX ADMIN — APIXABAN 10 MG: 5 TABLET, FILM COATED ORAL at 17:21

## 2021-05-17 RX ADMIN — LEVETIRACETAM 250 MG: 100 INJECTION, SOLUTION INTRAVENOUS at 22:03

## 2021-05-17 RX ADMIN — NICOTINE 1 PATCH: 14 PATCH, EXTENDED RELEASE TRANSDERMAL at 10:00

## 2021-05-17 RX ADMIN — APIXABAN 10 MG: 5 TABLET, FILM COATED ORAL at 10:00

## 2021-05-17 RX ADMIN — SENNOSIDES AND DOCUSATE SODIUM 2 TABLET: 8.6; 5 TABLET ORAL at 10:00

## 2021-05-17 RX ADMIN — METRONIDAZOLE 500 MG: 500 INJECTION, SOLUTION INTRAVENOUS at 02:09

## 2021-05-17 RX ADMIN — GABAPENTIN 400 MG: 400 CAPSULE ORAL at 11:59

## 2021-05-17 RX ADMIN — DEXTROSE, SODIUM CHLORIDE, AND POTASSIUM CHLORIDE 125 ML/HR: 5; .45; .3 INJECTION INTRAVENOUS at 12:06

## 2021-05-17 RX ADMIN — LUBIPROSTONE 24 MCG: 24 CAPSULE, GELATIN COATED ORAL at 16:41

## 2021-05-17 RX ADMIN — OLANZAPINE 10 MG: 10 TABLET, FILM COATED ORAL at 22:03

## 2021-05-17 RX ADMIN — SENNOSIDES AND DOCUSATE SODIUM 2 TABLET: 8.6; 5 TABLET ORAL at 17:21

## 2021-05-17 RX ADMIN — LIDOCAINE 5% 1 PATCH: 700 PATCH TOPICAL at 10:01

## 2021-05-17 RX ADMIN — GABAPENTIN 400 MG: 400 CAPSULE ORAL at 17:22

## 2021-05-17 RX ADMIN — PROCHLORPERAZINE MALEATE 5 MG: 10 TABLET ORAL at 11:59

## 2021-05-17 NOTE — PROGRESS NOTES
[facility]  Progress Note - Evelina Gray 1960, 64 y o  female MRN: [id number]  Unit/Bed#: S -01 Encounter: [id number]  Primary Care Provider: Denise Stoll MD   Date and time admitted to hospital: 5/10/2021  1:16 PM    * Pulmonary embolism ([facility] [address] )  Assessment & Plan  - patient presents with a 2-3 day history of gradual onset weakness  - imaging in the ED noted a "small subsegmental left upper lobe pulmonary embolus"  - patient does reports improvement in her shortness of breath    Plan  - Patient started on Eliquis 10 mg for 7 days b i d , followed by Eliquis 5 mg b i d  Impaction of intestine ([facility] [address] )  Assessment & Plan  - patient is a history chronic constipation (2/2 opioid induced constipation)  - Pt can go 2-5 days between bowel movements, pt reports she had not had a significant bowel movement prior to admission since approximately    - patient had notable abdominal distension and pain on admission  - patient is maintained on senna as an outpatient, discontinued on admission and switch to Senokot-S    Imaging  - patient noted to have significant fecal matter in her GI tract on CT imaging in the ED  - X-Ray Abdomen 2021: Improved constipation Nonobstructive bowel gas pattern    Treatments  - Started on Senokot-S 2 tabs BID and Miralax BID since Monday (5/10) evening  - S/P Relistor , ,   - S/P Mag citrate   - Dulcolax rectal suppository  (pt request)  - fleet enema , soap suds enema , lactulose enema , mineral oil enema   - S/P Bowel Prep (high dose miralax)   - Started on Amitiza 24mcg BID     Patient has had the following BMs:  - : Small and formed  - : 2 small, hard and compact    Per GI:  -"would not recommend decompressive colonoscopy at this time  Would not recommend colonoscopy in general for assessment at this time due to new PE, eliquis on board, and stage 4 cancer   Could consider this several months down the road once eliquis can be held safely pending patient's cancer status"    Plan  - Continue Miralax BID 17g (1 packet) BID, Senakot-S 2 Pills BID and Amitiza 24mcg BID  - GI following, will defer to them for plan      Therapeutic opioid-induced constipation (OIC)  Assessment & Plan  - patient is a history chronic constipation secondary to chronic opioid use for cancer  - Pt can go 2-5 days between bowel movements, pt reports she had not had a significant bowel movement prior to admission since approximately 5/6   - patient is maintained on senna as an outpatient, discontinued on admission and switch to Senokot-S    Plan  - Continue Miralax BID 17g (1 packet) BID, Senakot-S 2 Pills BID and Amitiza 24mcg BID    Transaminitis  Assessment & Plan  - Patient noted to have elevated AST 67/ in ED  - CT of the abdomen did not note any acute pathology  - Liver enzymes in ED mildly elevated from baseline, possibly reactive  - Per GI elevated LFTs demonstrate a hepatocellular pattern  - hepatitis panel negative    Plan  - Will continue to monitor LFTs  - Celiac panel, and panel pending  - GI consulted, appreciate their recommendations    Anemia of chronic disease  Assessment & Plan  - Pt has noted decrease in hgb on admission  - improving  - will continue to monitor    Tobacco abuse  Assessment & Plan  - Patient has a history of tobacco abuse  - Will order the patient nicotine replacement  - Encourage cessation    Weakness generalized  Assessment & Plan  - Patient presents with a 2-3 day history of gradual onset generalized weakness and slurred speech  - Most likely secondary to deconditioning in the setting of patient's cancer history versus infection but this is highly unlikely given the patients clinical findings  - patient's weakness is improving  -PT/OT is recommending post acute rehab services    Plan  - PT/OT    CINV (chemotherapy-induced nausea and vomiting)  Assessment & Plan  - Patient reports frequent nausea, denying any current symptoms  - Patient is maintained on Zofran, Protonix as an outpatient will continue Protonix, however due to use Zofran as constipating potential will discontinue it and start on Compazine, discussed this change with the patient and the patient was amenable to trying it    Cancer-related pain  Assessment & Plan  - Pt has extensive history of cancer related pain  - Will continue pt on gabapentin and hydromorphone  - Palliative care consulted, appreciate their recommendations  Brain metastases ([facility] [address] )  Assessment & Plan  - Pt has breast cancer with mets to the brain  - CT 5/10: No acute intracranial abnormality or significant change from prior study   - Most recent MRI was in 2020    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, estrogen receptor positive ([facility] [address] )  Assessment & Plan  - Diagnosed with metastatic breast cancer in 2010 with mets to the brain and bone  - S/p numerous rounds of chemotherapy  - S/P double mastectomy   - Follows with heme/onc as out patient    Leukocytosis-resolved as of 5/12/2021  Assessment & Plan  - Patient is noted to have a mild leukocytosis on labs in the emergency room on presentation 10 43  - Patient denies any fever, chills  - Low suspicion of infectious process  - Patient is on steroids chronically at home, this is a possible source of the leukocytosis  - Resolved    Plan  - Will continue to monitor      VTE Pharmacologic Prophylaxis:   Pharmacologic: Apixaban (Eliquis)  Mechanical VTE Prophylaxis in Place: Yes    Discussions with Specialists or Other Care Team Provider:  Gastroenterology    Education and Discussions with Family / Patient:  Discussed plan of care with the patient, Gastroenterology updated the patient's family directly  Current Length of Stay: 7 day(s)    Current Patient Status: Inpatient     Discharge Plan / Estimated Discharge Date: To be determined    Code Status: Level 1 - Full Code    Subjective:   Patient seen and examined  No acute events overnight  The patient has had a number of bowel movements in the last 24 hours, some have been watery and others have been significantly formed  The patient denies having any current abdominal pain patient reports having flatus  Patient denies any headache, dizziness, nausea, vomiting, constipation, diarrhea, chest pain, palpitations, shortness of breath, wheezing  A 12 point review of systems was completed and was negative unless noted above  Objective:     Vitals:   Temp (24hrs), Av 1 °F (36 7 °C), Min:97 8 °F (36 6 °C), Max:98 3 °F (36 8 °C)    Temp:  [97 8 °F (36 6 °C)-98 3 °F (36 8 °C)] 98 3 °F (36 8 °C)  HR:  [72-90] 85  Resp:  [16-20] 20  BP: (103-120)/(57-76) 103/57  SpO2:  [90 %-99 %] 95 %  Body mass index is 28 58 kg/m²  Input and Output Summary (last 24 hours): Intake/Output Summary (Last 24 hours) at 2021 1511  Last data filed at 2021 1941  Gross per 24 hour   Intake 120 ml   Output 200 ml   Net -80 ml       Physical Exam:     Physical Exam  Vitals signs and nursing note reviewed  Constitutional:       General: She is not in acute distress  Appearance: She is well-developed  She is not diaphoretic  HENT:      Head: Normocephalic and atraumatic  Nose: Nose normal    Eyes:      General: No scleral icterus  Right eye: No discharge  Left eye: No discharge  Extraocular Movements: Extraocular movements intact  Conjunctiva/sclera: Conjunctivae normal       Pupils: Pupils are equal, round, and reactive to light  Neck:      Musculoskeletal: Normal range of motion  Cardiovascular:      Rate and Rhythm: Normal rate and regular rhythm  Heart sounds: Normal heart sounds  No murmur  No friction rub  No gallop  Pulmonary:      Effort: Pulmonary effort is normal  No respiratory distress  Breath sounds: No stridor  No wheezing, rhonchi or rales  Chest:      Chest wall: No tenderness     Abdominal:      General: Abdomen is flat  Bowel sounds are normal  There is distension  Palpations: Abdomen is soft  Tenderness: There is no abdominal tenderness  Genitourinary:     Rectum: Normal  No mass or tenderness  Normal anal tone  Musculoskeletal: Normal range of motion  Right lower leg: No edema  Left lower leg: No edema  Skin:     General: Skin is warm and dry  Neurological:      General: No focal deficit present  Mental Status: She is alert and oriented to person, place, and time  Psychiatric:         Behavior: Behavior normal          Thought Content: Thought content normal          Judgment: Judgment normal           Additional Data:     Labs: I have personally reviewed pertinent reports  Results from last 7 days   Lab Units 05/17/21  0627 05/16/21  0629 05/15/21  0620   WBC Thousand/uL 6 85 10 34* 8 72   HEMOGLOBIN g/dL 10 3* 11 0* 11 2*   HEMATOCRIT % 32 9* 35 2 36 3   PLATELETS Thousands/uL 259 279 259      Results from last 7 days   Lab Units 05/17/21  0627 05/16/21  0629 05/15/21  0619   POTASSIUM mmol/L 3 3* 3 7 4 0   CHLORIDE mmol/L 106 106 107   CO2 mmol/L 27 29 30   BUN mg/dL 9 11 10   CREATININE mg/dL 0 53* 0 60 0 64   CALCIUM mg/dL 8 1* 8 5 8 5   ALK PHOS U/L 130* 146* 124*   ALT U/L 158* 234* 192*   AST U/L 50* 106* 82*     Results from last 7 days   Lab Units 05/15/21  0619 05/11/21  1016   MAGNESIUM mg/dL 2 9* 2 5          Results from last 7 days   Lab Units 05/11/21  1016 05/11/21  0056   PTT seconds 76* 93*               * Additional Pertinent Lab Tests Reviewed: All Labs Within Last 24 Hours Reviewed    Radiology Results: I have personally reviewed pertinent reports  Xr Chest Portable    Result Date: 5/16/2021  Impression: Stable chest   No acute infiltrates  Workstation performed: [id number]     Xr Chest Portable    Result Date: 5/16/2021  Impression: NG tube in stomach  Mild central vascular prominence without overt pulmonary edema   Workstation performed: [id number]     [employer] Abdomen Obstruction Series    Result Date: 5/14/2021  Impression: Improved constipation Nonobstructive bowel gas pattern  Workstation performed: [id number]     Ct Head Without Contrast    Result Date: 5/10/2021  Impression: No acute intracranial abnormality or significant change from prior study  Changes within the brain parenchyma again suggesting sequela of prior whole brain radiation including previously treated right periatrial dystrophic calcification  No new lesions detected within limitations of noncontrast imaging  Intracranial metastatic disease again better evaluated with contrast-enhanced MRI  Workstation performed: [id number]     Pe Study With Ct Abdomen And Pelvis With Contrast    Result Date: 5/10/2021  Impression: 1  Limited CT pulmonary angiogram, however, suggestion of a small subsegmental left upper lobe pulmonary embolus  Dilated RV/LV ratio which is nonspecific and could be indicative of elevated right heart pressures in the setting of COPD/pulmonary hypertension  No bowing of the intraventricular septum  2   Small bilateral pulmonary nodules, grossly similar  1 cm pleural-based left apical nodule appears larger  Cannot exclude evolving metastasis  3   No acute intra-abdominal abnormality  4   Large volume of colonic stool suggesting constipation  5   Marked urinary bladder distention  I personally discussed this study with physician assistant Samantha Eaton on 5/10/2021 at 3:50 PM  Workstation performed: [id number]       Recent Cultures (last 7 days):     Results from last 7 days   Lab Units 05/10/21  1743   BLOOD CULTURE  No Growth After 5 Days  No Growth After 5 Days         Last 24 Hours Medication List:   Current Facility-Administered Medications   Medication Dose Route Frequency Provider Last Rate    acetaminophen  650 mg Oral Q6H PRN Ariaser Marciano, DO      albuterol  2 puff Inhalation Q6H PRN Juliocesar Simms DO      albuterol  2 5 mg Nebulization Q4H PRN Almer Marciano, DO  aluminum-magnesium hydroxide-simethicone  30 mL Oral Q6H PRN [facility]      apixaban  10 mg Oral BID [facility]      [START ON 5/18/2021] apixaban  5 mg Oral BID [facility]      bisacodyl  10 mg Rectal Daily PRN FAUZIA Jones      cefTRIAXone  1,000 mg Intravenous Q24H Kehinde Barrientos MD 1,000 mg (05/17/21 1002)    dexamethasone  2 mg Oral Daily With Breakfast Juliocesar Simms DO      dextrose 5 % and sodium chloride 0 45 % with KCl 40 mEq/L  125 mL/hr Intravenous Continuous Juliocesar Simms  mL/hr (05/17/21 1206)    gabapentin  400 mg Oral TID AC Juliocesar Simms DO      HYDROmorphone  1 mg Intravenous Q6H PRN [facility]      HYDROmorphone  4 mg Oral Q4H PRN [facility]      HYDROmorphone  8 mg Oral Q4H PRN [facility]      levETIRAcetam  250 mg Intravenous Q12H [address] Juliocesar Simms  mg (05/17/21 1148)    lidocaine  1 patch Topical Daily FAUZIA Jones      lubiprostone  24 mcg Oral BID With Meals Kenan Monteiro MD      metroNIDAZOLE  500 mg Intravenous Q8H Kehinde Barrientos  mg (05/17/21 0959)    nicotine  1 patch Transdermal Daily [facility]      OLANZapine  10 mg Oral HS Juliocesar Simms DO      pantoprazole  40 mg Oral Daily [facility]      prochlorperazine  5 mg Oral TID AC Juliocesar Simms DO      senna-docusate sodium  2 tablet Oral BID [facility]      zolpidem  10 mg Oral HS PRN [facility]          Today, Patient Was Seen By: [facility]    Portions of the record may have been created with voice recognition software  Occasional wrong word or "sound a like" substitutions may have occurred due to the inherent limitations of voice recognition software  Read the chart carefully and recognize, using context, where substitutions have occurred

## 2021-05-17 NOTE — UTILIZATION REVIEW
Continued Stay Review    Date: 5/15/2021                       Current Patient Class: inpatient  Current Level of Care:  Med surg     HPI:60 y o  female initially admitted on 5/10/2021 inpatient due to PE/generalized weakness/opoid induced constipation  Patient has breast cancer with metastases to brain on chemotherapy  Patient started on Eliquis for PE, received multiple treatments for impaction:  Initially Senokot-S 2 tabs BID and Miralax BID then Relistor 5/11, 5/12, 5/13, Mag citrate 5/12 and Duculox supp 5/12, bowel prep 5/13 and started on Amitiza on 5/14    Assessment/Plan:  5/15/2021  Patient had 2 hard BM yesterday  Has improved abdominal pain with persistent abdominal distention  On exam abdominal distention  ALT 92  AST 82  Today for SSE, Continue Miralax BID 17g (1 packet) BID, Senakot-S 2 Pills BID and Amitiza 24mcg BID  Continue current pain regimen due to constipation  5/16/2021:   Last evening patient while drinking golytely became  wheezing and hypoxic to 89%, on exam crackles and initial read suspected aspiration and started on lasix, NGT placed  Today started on IV antibiotics  Given additional Lasix  Vital Signs:   05/15/21 2311  --  --  --  --  --  98 %  44  6 L/min  Nasal cannula  --   05/15/21 2236  98 2 °F (36 8 °C)  90  18  124/81  98  97 %  44  6 L/min  Nasal cannula  Lying   05/15/21 1933  --  --  --  --  --  --  40  5 L/min  Nasal cannula  --   05/15/21 1900  98 6 °F (37 °C)  106Abnormal   18  140/93  112  96 %  40  5 L/min  Nasal cannula  Lying   05/15/21 1719  97 9 °F (36 6 °C)  103  22  159/99  124  89 %Abnormal   28  2 L/min  Nasal cannula         Pertinent Labs/Diagnostic Results:   5/10/2021 ct head  No acute intracranial abnormality or significant change from prior study    Changes within the brain parenchyma again suggesting sequela of prior whole brain radiation including previously treated right periatrial dystrophic calcification   No new lesions detected within limitations of noncontrast imaging  Intracranial metastatic disease again better evaluated with contrast-enhanced MRI      5/10/2021 PE study with ct abdomen - Limited CT pulmonary angiogram, however, suggestion of a small subsegmental left upper lobe pulmonary embolus   Dilated RV/LV ratio which is nonspecific and could be indicative of elevated right heart pressures in the setting of COPD/pulmonary hypertension   No bowing of the intraventricular septum    2   Small bilateral pulmonary nodules, grossly similar     1 cm pleural-based left apical nodule appears larger   Cannot exclude evolving metastasis    3   No acute intra-abdominal abnormality    4   Large volume of colonic stool suggesting constipation    5   Marked urinary bladder distention      5/10/2021 ECG Normal sinus rhythm  Nonspecific T wave abnormality  Abnormal ECG  When compared with ECG of 10-MAR-2020 21:21,  No significant change was found    5/15/2021 CxR - NG tube in stomach  Mild central vascular prominence without overt pulmonary edema      5/16/2021 abdomen obstruction - Nonobstructive bowel gas pattern    Cardiomegaly and pulmonary vascular congestion suggests CHF  Results from last 7 days   Lab Units 05/15/21  1756   SARS-COV-2  Negative     Results from last 7 days   Lab Units 05/17/21  0627 05/16/21  0629 05/15/21  0620 05/14/21  0608 05/13/21  0706   WBC Thousand/uL 6 85 10 34* 8 72 8 80 7 14   HEMOGLOBIN g/dL 10 3* 11 0* 11 2* 11 4* 11 1*   HEMATOCRIT % 32 9* 35 2 36 3 36 5 35 9   PLATELETS Thousands/uL 259 279 259 264 261         Results from last 7 days   Lab Units 05/17/21  0627 05/16/21  0629 05/15/21  0619 05/14/21  0608 05/13/21  0706  05/11/21  1016   SODIUM mmol/L 143 144 146* 145 146*   < > 146*   POTASSIUM mmol/L 3 3* 3 7 4 0 3 9 4 0   < > 3 8   CHLORIDE mmol/L 106 106 107 109* 107   < > 109*   CO2 mmol/L 27 29 30 28 28   < > 27   ANION GAP mmol/L 10 9 9 8 11   < > 10   BUN mg/dL 9 11 10 8 10   < > 12   CREATININE mg/dL 0 53* 0 60 0 64 0 57* 0 65   < > 0 63   EGFR ml/min/1 73sq m 105 101 99 103 98   < > 99   CALCIUM mg/dL 8 1* 8 5 8 5 8 9 8 6   < > 8 4   MAGNESIUM mg/dL  --   --  2 9*  --   --   --  2 5    < > = values in this interval not displayed  Results from last 7 days   Lab Units 05/17/21  0627 05/16/21  0629 05/15/21  0619 05/14/21  0608 05/13/21  0706   AST U/L 50* 106* 82* 50* 49*   ALT U/L 158* 234* 192* 130* 124*   ALK PHOS U/L 130* 146* 124* 105 98   TOTAL PROTEIN g/dL 6 9 7 1 7 1 7 1 6 8   ALBUMIN g/dL 2 8* 3 1* 3 1* 3 1* 3 0*   TOTAL BILIRUBIN mg/dL 0 44 0 49 0 39 0 32 0 27     Results from last 7 days   Lab Units 05/17/21  0627 05/16/21  0629 05/15/21  0619 05/14/21  0608 05/13/21  0706 05/12/21  0817 05/11/21  1016   GLUCOSE RANDOM mg/dL 85 100 82 83 83 88 96     Results from last 7 days   Lab Units 05/11/21  1016 05/11/21  0056   PTT seconds 76* 93*     Results from last 7 days   Lab Units 05/17/21  0627 05/16/21  0629   PROCALCITONIN ng/ml <0 05 <0 05     Results from last 7 days   Lab Units 05/17/21  0627   FERRITIN ng/mL 108     Results from last 7 days   Lab Units 05/15/21  0619   HEP B S AG  Non-reactive   HEP C AB  Non-reactive   HEP B C IGM  Non-reactive   HEP B C TOTAL AB  Non-reactive     Results from last 7 days   Lab Units 05/11/21  0040 05/10/21  1801   CLARITY UA  Clear Clear   COLOR UA  Yellow Yellow   SPEC GRAV UA  1 010 1 010   PH UA  7 0 7 0   GLUCOSE UA mg/dl Negative Negative   KETONES UA mg/dl Negative Negative   BLOOD UA  Negative Negative   PROTEIN UA mg/dl Negative Negative   NITRITE UA  Negative Negative   BILIRUBIN UA  Negative Negative   UROBILINOGEN UA E U /dl 0 2 0 2   LEUKOCYTES UA  Moderate* Moderate*   WBC UA /hpf 4-10* 4-10*   RBC UA /hpf 0-1 None Seen   BACTERIA UA /hpf Occasional Occasional   EPITHELIAL CELLS WET PREP /hpf None Seen Occasional     Results from last 7 days   Lab Units 05/10/21  9968   BLOOD CULTURE  No Growth After 5 Days  No Growth After 5 Days  Medications:   Scheduled Medications:  apixaban, 10 mg, Oral, BID  [START ON 5/18/2021] apixaban, 5 mg, Oral, BID  cefTRIAXone, 1,000 mg, Intravenous, Q24H  dexamethasone, 2 mg, Oral, Daily With Breakfast  gabapentin, 400 mg, Oral, TID AC  levETIRAcetam, 250 mg, Intravenous, Q12H STEFFI  lidocaine, 1 patch, Topical, Daily  lubiprostone, 24 mcg, Oral, BID With Meals  metroNIDAZOLE, 500 mg, Intravenous, Q8H  nicotine, 1 patch, Transdermal, Daily  OLANZapine, 10 mg, Oral, HS  pantoprazole, 40 mg, Oral, Daily  prochlorperazine, 5 mg, Oral, TID AC  senna-docusate sodium, 2 tablet, Oral, BID      Continuous IV Infusions:  dextrose 5 % and sodium chloride 0 45 % with KCl 40 mEq/L, 125 mL/hr, Intravenous, Continuous      PRN Meds:  acetaminophen, 650 mg, Oral, Q6H PRN  albuterol, 2 puff, Inhalation, Q6H PRN - used x 1   albuterol, 2 5 mg, Nebulization, Q4H PRN  aluminum-magnesium hydroxide-simethicone, 30 mL, Oral, Q6H PRN  bisacodyl, 10 mg, Rectal, Daily PRN  HYDROmorphone, 1 mg, Intravenous, Q6H PRN  HYDROmorphone, 4 mg, Oral, Q4H PRN  HYDROmorphone, 8 mg, Oral, Q4H PRN  zolpidem, 10 mg, Oral, HS PRN        Discharge Plan: To be determined  Network Utilization Review Department  ATTENTION: Please call with any questions or concerns to [phone] and carefully listen to the prompts so that you are directed to the right person  All voicemails are confidential   Chloe Marrufo all requests for admission clinical reviews, approved or denied determinations and any other requests to dedicated fax number below belonging to the campus where the patient is receiving treatment   List of dedicated fax numbers for the Facilities:  [address] DENIALS (Administrative/Medical Necessity) [phone]   [address] [place] [address] (Maternity/NICU/Pediatrics) 261 [facility],[address]  [address] [id number] [place] [address] Richie Savage 1481 [phone] [phone]

## 2021-05-17 NOTE — ASSESSMENT & PLAN NOTE
- patient is a history chronic constipation (2/2 opioid induced constipation)  - Pt can go 2-5 days between bowel movements, pt reports she had not had a significant bowel movement prior to admission since approximately 5/6   - patient had notable abdominal distension and pain on admission  - patient is maintained on senna as an outpatient, discontinued on admission and switch to Senokot-S    Imaging  - patient noted to have significant fecal matter in her GI tract on CT imaging in the ED  - X-Ray Abdomen 5/14/2021: Improved constipation Nonobstructive bowel gas pattern    Treatments  - Started on Senokot-S 2 tabs BID and Miralax BID since Monday (5/10) evening  - S/P Relistor 5/11, 5/12, 5/13  - S/P Mag citrate 5/12  - Dulcolax rectal suppository 5/12 (pt request)  - fleet enema 5/11, soap suds enema 5/12, lactulose enema 5/13, mineral oil enema 5/14  - S/P Bowel Prep (high dose miralax) 5/13  - Started on Amitiza 24mcg BID 5/14    Patient has had the following BMs:  - 5/12: Small and formed  - 5/14: 2 small, hard and compact    Per GI:  -"would not recommend decompressive colonoscopy at this time  Would not recommend colonoscopy in general for assessment at this time due to new PE, eliquis on board, and stage 4 cancer   Could consider this several months down the road once eliquis can be held safely pending patient's cancer status"    Plan  - Continue Miralax BID 17g (1 packet) BID, Senakot-S 2 Pills BID and Amitiza 24mcg BID  - GI following, will defer to them for plan

## 2021-05-17 NOTE — PHYSICAL THERAPY NOTE
PHYSICAL THERAPY NOTE      Patient Name: Ana Maria Coronado  [id number] Date: 21 1111   PT Last Visit   PT Visit Date 21   Note Type   Note Type Treatment   Pain Assessment   Pain Assessment Tool 0-10   Pain Score 8   Pain Location/Orientation Orientation: Bilateral;Location: Back   Restrictions/Precautions   Weight Bearing Precautions Per Order No   Other Precautions Bed Alarm; Chair Alarm; Fall Risk;Limb alert;Pain   General   Family/Caregiver Present No   Subjective   Subjective Patient supine in bed and is agreeable to participate in therapy session  Patient identifers obtained from name &   Bed Mobility   Supine to Sit 5  Supervision   Additional items Assist x 1;HOB elevated; Bedrails; Increased time required;Verbal cues;LE management   Sit to Supine Unable to assess   Additional Comments Pt seated OOB in recliner post session with chair alarm engaged, call bell and belongings in reach  Transfers   Sit to Stand 4  Minimal assistance   Additional items Assist x 1; Armrests; Increased time required;Verbal cues   Stand to Sit 4  Minimal assistance   Additional items Assist x 1; Armrests; Increased time required;Verbal cues   Ambulation/Elevation   Gait pattern Wide REMIGIO; Ataxia; Excessively slow   Gait Assistance 3  Moderate assist   Additional items Assist x 1;Verbal cues   Assistive Device Rolling walker   Distance 6' x1, 15' x2   Balance   Static Sitting Fair -   Static Standing Poor +   Ambulatory Poor +   Endurance Deficit   Endurance Deficit Yes   Endurance Deficit Description limited ambulation distance and activity tolerance   Activity Tolerance   Activity Tolerance Patient limited by fatigue;Patient limited by pain   Nurse Made Aware Spoke to Veronika Dow RN    Assessment   Prognosis Fair   Problem List Decreased strength;Decreased endurance; Impaired balance;Decreased mobility; Decreased coordination;Pain;Obesity; Decreased cognition; Impaired judgement;Decreased safety awareness   Assessment Patient agreeable to participate in therapy session  Patient remains consistent with supervision for supine to sit transfers  Multiple sit<>stand transfers with consistent min ax1 and instruction for hand placement and body positioning  Pt able to ambulate short gait distances with mod ax1 and close chair follow  Pt requires steadying balance assist and guidance for walker and path navigation  Verbal instruction provided for step length and to decrease walker advancement  SpO2 remained above 90% on room air with exertion, mild SOB noted with fatigue  Continue to focus on OOB mobility with progression of transfers and ambulation as appropriate and able  Goals   Patient Goals none stated   STG Expiration Date 05/21/21   PT Treatment Day 3   Plan   Treatment/Interventions Functional transfer training;LE strengthening/ROM; Therapeutic exercise; Endurance training;Gait training;Bed mobility; Equipment eval/education;Patient/family training;Cognitive reorientation   Progress Slow progress, decreased activity tolerance   PT Frequency Other (Comment)  (3-5x/week)   Recommendation   PT Discharge Recommendation Post acute rehabilitation services   Equipment Recommended Walker; Wheelchair   AM-PAC Basic Mobility Inpatient   Turning in Bed Without Bedrails 3   Lying on Back to Sitting on Edge of Flat Bed 3   Moving Bed to Chair 2   Standing Up From Chair 3   Walk in Room 2   Climb 3-5 Stairs 1   Basic Mobility Inpatient Raw Score 14   Basic Mobility Standardized Score 35 55       Bel Woodward PTA

## 2021-05-17 NOTE — PLAN OF CARE
Problem: PHYSICAL THERAPY ADULT  Goal: Performs mobility at highest level of function for planned discharge setting  See evaluation for individualized goals  Description: Treatment/Interventions: ADL retraining, Functional transfer training, LE strengthening/ROM, Elevations, Therapeutic exercise, Endurance training, Patient/family training, Bed mobility, Gait training  Equipment Recommended: Aleena Vargas       See flowsheet documentation for full assessment, interventions and recommendations  Outcome: Progressing  Note: Prognosis: Fair  Problem List: Decreased strength, Decreased endurance, Impaired balance, Decreased mobility, Decreased coordination, Pain, Obesity, Decreased cognition, Impaired judgement, Decreased safety awareness  Assessment: Patient agreeable to participate in therapy session  Patient remains consistent with supervision for supine to sit transfers  Multiple sit<>stand transfers with consistent min ax1 and instruction for hand placement and body positioning  Pt able to ambulate short gait distances with mod ax1 and close chair follow  Pt requires steadying balance assist and guidance for walker and path navigation  Verbal instruction provided for step length and to decrease walker advancement  SpO2 remained above 90% on room air with exertion, mild SOB noted with fatigue  Continue to focus on OOB mobility with progression of transfers and ambulation as appropriate and able  PT Discharge Recommendation: Post acute rehabilitation services          See flowsheet documentation for full assessment

## 2021-05-17 NOTE — ASSESSMENT & PLAN NOTE
- Patient noted to have elevated AST 67/ in ED  - CT of the abdomen did not note any acute pathology  - Liver enzymes in ED mildly elevated from baseline, possibly reactive  - Per GI elevated LFTs demonstrate a hepatocellular pattern  - hepatitis panel negative    Plan  - Will continue to monitor LFTs  - Celiac panel, and panel pending  - GI consulted, appreciate their recommendations

## 2021-05-17 NOTE — PLAN OF CARE
Problem: Potential for Falls  Goal: Patient will remain free of falls  Description: INTERVENTIONS:  - Assess patient frequently for physical needs  -  Identify cognitive and physical deficits and behaviors that affect risk of falls    -  [place] fall precautions as indicated by assessment   - Educate patient/family on patient safety including physical limitations  - Instruct patient to call for assistance with activity based on assessment  - Modify environment to reduce risk of injury  - Consider OT/PT consult to assist with strengthening/mobility  Outcome: Progressing     Problem: Prexisting or High Potential for Compromised Skin Integrity  Goal: Skin integrity is maintained or improved  Description: INTERVENTIONS:  - Identify patients at risk for skin breakdown  - Assess and monitor skin integrity  - Assess and monitor nutrition and hydration status  - Monitor labs   - Assess for incontinence   - Turn and reposition patient  - Assist with mobility/ambulation  - Relieve pressure over bony prominences  - Avoid friction and shearing  - Provide appropriate hygiene as needed including keeping skin clean and dry  - Evaluate need for skin moisturizer/barrier cream  - Collaborate with interdisciplinary team   - Patient/family teaching  - Consider wound care consult   Outcome: Progressing     Problem: PAIN - ADULT  Goal: Verbalizes/displays adequate comfort level or baseline comfort level  Description: Interventions:  - Encourage patient to monitor pain and request assistance  - Assess pain using appropriate pain scale  - Administer analgesics based on type and severity of pain and evaluate response  - Implement non-pharmacological measures as appropriate and evaluate response  - Consider cultural and social influences on pain and pain management  - Notify physician/advanced practitioner if interventions unsuccessful or patient reports new pain  Outcome: Progressing     Problem: INFECTION - ADULT  Goal: Absence or prevention of progression during hospitalization  Description: INTERVENTIONS:  - Assess and monitor for signs and symptoms of infection  - Monitor lab/diagnostic results  - Monitor all insertion sites, i e  indwelling lines, tubes, and drains  - Monitor endotracheal if appropriate and nasal secretions for changes in amount and color  - [place] appropriate cooling/warming therapies per order  - Administer medications as ordered  - Instruct and encourage patient and family to use good hand hygiene technique  - Identify and instruct in appropriate isolation precautions for identified infection/condition  Outcome: Progressing     Problem: SAFETY ADULT  Goal: Patient will remain free of falls  Description: INTERVENTIONS:  - Assess patient frequently for physical needs  -  Identify cognitive and physical deficits and behaviors that affect risk of falls    -  [place] fall precautions as indicated by assessment   - Educate patient/family on patient safety including physical limitations  - Instruct patient to call for assistance with activity based on assessment  - Modify environment to reduce risk of injury  - Consider OT/PT consult to assist with strengthening/mobility  Outcome: Progressing  Goal: Maintain or return to baseline ADL function  Description: INTERVENTIONS:  -  Assess patient's ability to carry out ADLs; assess patient's baseline for ADL function and identify physical deficits which impact ability to perform ADLs (bathing, care of mouth/teeth, toileting, grooming, dressing, etc )  - Assess/evaluate cause of self-care deficits   - Assess range of motion  - Assess patient's mobility; develop plan if impaired  - Assess patient's need for assistive devices and provide as appropriate  - Encourage maximum independence but intervene and supervise when necessary  - Involve family in performance of ADLs  - Assess for home care needs following discharge   - Consider OT consult to assist with ADL evaluation and planning for discharge  - Provide patient education as appropriate  Outcome: Progressing  Goal: Maintain or return mobility status to optimal level  Description: INTERVENTIONS:  - Assess patient's baseline mobility status (ambulation, transfers, stairs, etc )    - Identify cognitive and physical deficits and behaviors that affect mobility  - Identify mobility aids required to assist with transfers and/or ambulation (gait belt, sit-to-stand, lift, walker, cane, etc )  - [place] fall precautions as indicated by assessment  - Record patient progress and toleration of activity level on Mobility SBAR; progress patient to next Phase/Stage  - Instruct patient to call for assistance with activity based on assessment  - Consider rehabilitation consult to assist with strengthening/weightbearing, etc   Outcome: Progressing     Problem: DISCHARGE PLANNING  Goal: Discharge to home or other facility with appropriate resources  Description: INTERVENTIONS:  - Identify barriers to discharge w/patient and caregiver  - Arrange for needed discharge resources and transportation as appropriate  - Identify discharge learning needs (meds, wound care, etc )  - Arrange for interpretive services to assist at discharge as needed  - Refer to Case Management Department for coordinating discharge planning if the patient needs post-hospital services based on physician/advanced practitioner order or complex needs related to functional status, cognitive ability, or social support system  Outcome: Progressing     Problem: Knowledge Deficit  Goal: Patient/family/caregiver demonstrates understanding of disease process, treatment plan, medications, and discharge instructions  Description: Complete learning assessment and assess knowledge base    Interventions:  - Provide teaching at level of understanding  - Provide teaching via preferred learning methods  Outcome: Progressing     Problem: RESPIRATORY - ADULT  Goal: Achieves optimal ventilation and oxygenation  Description: INTERVENTIONS:  - Assess for changes in respiratory status  - Assess for changes in mentation and behavior  - Position to facilitate oxygenation and minimize respiratory effort  - Oxygen administered by appropriate delivery if ordered  - Initiate smoking cessation education as indicated  - Encourage broncho-pulmonary hygiene including cough, deep breathe, Incentive Spirometry  - Assess the need for suctioning and aspirate as needed  - Assess and instruct to report SOB or any respiratory difficulty  - Respiratory Therapy support as indicated  Outcome: Progressing     Problem: GASTROINTESTINAL - ADULT  Goal: Minimal or absence of nausea and/or vomiting  Description: INTERVENTIONS:  - Administer IV fluids if ordered to ensure adequate hydration  - Maintain NPO status until nausea and vomiting are resolved  - Nasogastric tube if ordered  - Administer ordered antiemetic medications as needed  - Provide nonpharmacologic comfort measures as appropriate  - Advance diet as tolerated, if ordered  - Consider nutrition services referral to assist patient with adequate nutrition and appropriate food choices  Outcome: Progressing  Goal: Maintains or returns to baseline bowel function  Description: INTERVENTIONS:  - Assess bowel function  - Encourage oral fluids to ensure adequate hydration  - Administer IV fluids if ordered to ensure adequate hydration  - Administer ordered medications as needed  - Encourage mobilization and activity  - Consider nutritional services referral to assist patient with adequate nutrition and appropriate food choices  Outcome: Progressing

## 2021-05-17 NOTE — PROGRESS NOTES
Progress Note - Palliative & Supportive Care  Nina Scott  64 y o   female  S /S -01   MRN: [id number]  Encounter: [id number]     Assessment   H/O bilateral mastectomy    Lymphedema of right upper extremity    Pulmonary nodule    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, estrogen receptor positive (HCC)    Bone metastases (HCC)    Brain metastases (HCC)    Cancer-related pain    CINV (chemotherapy-induced nausea and vomiting)    Therapeutic opioid-induced constipation (OIC)    Dyspareunia, female    Herniated lumbar intervertebral disc    Mediastinal lymphadenopathy    Weakness generalized    Bilateral pleural effusion    Abnormal CT of the chest    SOB (shortness of breath)    Tobacco abuse    Financial difficulties    Dehydration    Palliative care patient    Ambulatory dysfunction    Insomnia due to medical condition    Anemia of chronic disease    Thrombocytosis (HCC)    Transaminitis    Leukocytosis    Pulmonary embolism (HCC)      Active issues specifically addressed today include:   Cancer related pain  OIC  Metastatic breast CA  CINV  Decreased appetite    Goals of Care  Level 1 code status  · Disease focused care  Will continue discussions regarding [address] as patient's clinical presentation evolves  · Colonoscopy scheduled for this afternoon  · Encouraged follow up with Palliative Medicine on an outpatient basis after discharge for continued symptom management  Her next appointment is scheduled with Dr Wing Flor on 6/3/21  Plan  Symptom Management  Pain  No changes today    · Hydromorphone 4-8 mg PO Q 4 hours moderate-severe pain  · Hydromorphone 1 mg IN Q 6 hour prn breakthrough pain  · Dexamethasone 2mg PO once daily  · Neurontin 400 mg TID     OIC  · Senna 2 tablets BID  · Miralax BID  · relistor and magnesium citrate given today     Poor appetite  · Decadron 2 mg daily  · Zyprexa 10 mg PO Q hs    24 History  Chart reviewed before visit  Out of bed in the chair  Feeling better  Pain intermittent  Large bowel movement yesterday  NPO for colonoscopy today    Review of Systems: Pertinent items are noted in HPI      Medications    Current Facility-Administered Medications:     acetaminophen (TYLENOL) tablet 650 mg, 650 mg, Oral, Q6H PRN, Juliocesar Simms DO, 650 mg at 05/14/21 1819    albuterol (PROVENTIL HFA,VENTOLIN HFA) inhaler 2 puff, 2 puff, Inhalation, Q6H PRN, More Gonzalez, DO, 2 puff at 05/15/21 1724    albuterol inhalation solution 2 5 mg, 2 5 mg, Nebulization, Q4H PRN, More Robbs, DO    aluminum-magnesium hydroxide-simethicone (MYLANTA) oral suspension 30 mL, 30 mL, Oral, Q6H PRN, More Gonzalez, DO    apixaban (ELIQUIS) tablet 10 mg, 10 mg, Oral, BID, Juliocesar Simms DO, 10 mg at 05/16/21 1715    [START ON 5/18/2021] apixaban (ELIQUIS) tablet 5 mg, 5 mg, Oral, BID, More Gonzalez, DO    bisacodyl (DULCOLAX) rectal suppository 10 mg, 10 mg, Rectal, Daily PRN, FAUZIA Giordano, 10 mg at 05/14/21 2012    cefTRIAXone (ROCEPHIN) 1,000 mg in dextrose 5 % 50 mL IVPB, 1,000 mg, Intravenous, Q24H, Kehinde Barrientos MD, Last Rate: 100 mL/hr at 05/16/21 1041, 1,000 mg at 05/16/21 1041    dexamethasone (DECADRON) tablet 2 mg, 2 mg, Oral, Daily With Breakfast, Juliocesar Simms DO, 2 mg at 05/16/21 0826    dextrose 5 % and sodium chloride 0 45 % with KCl 40 mEq/L infusion (premix), 50 mL/hr, Intravenous, Continuous, Juliocesar Simms DO    gabapentin (NEURONTIN) capsule 400 mg, 400 mg, Oral, TID AC, Juliocesar Simms DO, Stopped at 05/17/21 0620    HYDROmorphone (DILAUDID) injection 1 mg, 1 mg, Intravenous, Q6H PRN, More Gonzalez,     HYDROmorphone (DILAUDID) tablet 4 mg, 4 mg, Oral, Q4H PRN, Juliocesar Haoscarmyer, DO, 4 mg at 05/14/21 0442    HYDROmorphone (DILAUDID) tablet 8 mg, 8 mg, Oral, Q4H PRN, Juliocesar Boykinmyer, DO, 8 mg at 05/14/21 2011    levETIRAcetam (KEPPRA) 250 mg in sodium chloride 0 9 % 100 mL IVPB, 250 mg, Intravenous, Q12H [address], [facility], Last Rate: 400 mL/hr at 05/16/21 2140, 250 mg at 05/16/21 2140    lidocaine (LIDODERM) 5 % patch 1 patch, 1 patch, Topical, Daily, FAUZIA Thurman, 1 patch at 05/16/21 0830    lubiprostone (AMITIZA) capsule 24 mcg, 24 mcg, Oral, BID With Meals, Elayne Barajas MD, 24 mcg at 05/16/21 1550    metroNIDAZOLE (FLAGYL) IVPB (premix) 500 mg 100 mL, 500 mg, Intravenous, Q8H, Kehinde Barrientos MD, Last Rate: 200 mL/hr at 05/17/21 0209, 500 mg at 05/17/21 0209    nicotine (NICODERM CQ) 14 mg/24hr TD 24 hr patch 1 patch, 1 patch, Transdermal, Daily, Juliocesar Simms DO, 1 patch at 05/16/21 0827    OLANZapine (ZyPREXA) tablet 10 mg, 10 mg, Oral, HS, Juliocesar Simms DO, 10 mg at 05/16/21 2140    pantoprazole (PROTONIX) EC tablet 40 mg, 40 mg, Oral, Daily, [facility], Stopped at 05/17/21 0524    prochlorperazine (COMPAZINE) tablet 5 mg, 5 mg, Oral, TID AC, Juliocesar Simms DO, Stopped at 05/17/21 3952    senna-docusate sodium (SENOKOT S) 8 6-50 mg per tablet 2 tablet, 2 tablet, Oral, BID, Juliocesar Simms DO, 2 tablet at 05/16/21 1715    zolpidem (AMBIEN) tablet 10 mg, 10 mg, Oral, HS PRN, Juliocesar Simms DO    Objective  /76 (BP Location: Right arm)   Pulse 72   Temp 98 3 °F (36 8 °C) (Oral)   Resp 16   Ht 5' 5" (1 651 m)   Wt 77 9 kg (171 lb 11 8 oz)   LMP  (LMP Unknown)   SpO2 96%   BMI 28 58 kg/m²   Physical Exam:   Constitutional: Obese  Appears chronically ill  In no acute distress  Head: Normocephalic and atraumatic  Eyes: EOM are normal  No ocular discharge  No scleral icterus  Neck: no visible adenopathy or masses  Respiratory: Effort normal  No stridor  No respiratory distress  Gastrointestinal: No abdominal distension  Musculoskeletal: No edema  Neurological: Alert, oriented and appropriately conversant  Skin: Dry, no diaphoresis  Psychiatric: Displays a normal mood and affect   Behavior, judgement and thought content appear normal      Lab Results:   I have personally reviewed pertinent labs  , CBC:   Lab Results   Component Value Date    WBC 6 85 05/17/2021    HGB 10 3 (L) 05/17/2021    HCT 32 9 (L) 05/17/2021    MCV 98 05/17/2021     05/17/2021    MCH 30 7 05/17/2021    MCHC 31 3 (L) 05/17/2021    RDW 16 4 (H) 05/17/2021    MPV 9 2 05/17/2021   , CMP:   Lab Results   Component Value Date    SODIUM 143 05/17/2021    K 3 3 (L) 05/17/2021     05/17/2021    CO2 27 05/17/2021    BUN 9 05/17/2021    CREATININE 0 53 (L) 05/17/2021    CALCIUM 8 1 (L) 05/17/2021    AST 50 (H) 05/17/2021     (H) 05/17/2021    ALKPHOS 130 (H) 05/17/2021    EGFR 105 05/17/2021     Counseling / Coordination of Care  Total floor / unit time spent today 25 minutes       Shante Langley, [address] [place] [address]

## 2021-05-17 NOTE — PROGRESS NOTES
Progress Note - Josh Soto 64 y o  female MRN: [id number]    Unit/Bed#: S -01 Encounter: [id number]    Assessment and Plan:   Principal Problem:    Pulmonary embolism ([facility] )  Active Problems:    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, estrogen receptor positive ([facility] )    Brain metastases (HCC)    Cancer-related pain    CINV (chemotherapy-induced nausea and vomiting)    Therapeutic opioid-induced constipation (OIC)    Weakness generalized    Tobacco abuse    Anemia of chronic disease    Transaminitis    Impaction of intestine (HCC)    Acute respiratory failure with hypoxia ([facility] )    #1  Opioid induced constipation with gaseous bowel distention: s/p multiple BM over weekend  Xray with improvement of colonic stool, with gaseous distention but nothing significantly dilated, abdomen soft but distended and nontender    -continue amitiza 24 mcg BID  -miralax and senokot regularly  -monitor stool output and abdominal exam  -encoruage laying on side in bed and OOB to chair  -minimize narcotics as much as possible  -would not recommend decompressive colonoscopy at this time  Would not recommend colonoscopy in general for assessment at this time due to new PE, eliquis on board, and stage 4 cancer  Could consider this several months down the road once eliquis can be held safely pending patient's cancer status  -advance diet  -discussed plan with sister over phone and she is in agreement    ----------------------------------------------------------------------------------------------------------------    Subjective:     Patient denies any abdominal pain, mild nausea, had a liquid BM this AM in bedpan    Objective:     Vitals: Blood pressure 120/76, pulse 72, temperature 98 3 °F (36 8 °C), temperature source Oral, resp  rate 16, height 5' 5" (1 651 m), weight 77 9 kg (171 lb 11 8 oz), SpO2 91 %, not currently breastfeeding  ,Body mass index is 28 58 kg/m²        Intake/Output Summary (Last 24 hours) at 5/17/2021 1146  Last data filed at 5/16/2021 1941  Gross per 24 hour   Intake 600 ml   Output 200 ml   Net 400 ml       Physical Exam:     General Appearance: Alert, appears stated age and cooperative  Lungs: Clear to auscultation bilaterally, no rales or rhonchi, no labored breathing/accessory muscle use  Heart: Regular rate and rhythm, S1, S2 normal, no murmur, click, rub or gallop  Abdomen: Soft, non-tender, distended and tympanic to percussion, not tense or firm; bowel sounds normal; no masses or no organomegaly  Extremities: No cyanosis, clubbing, or edema    Invasive Devices     Central Venous Catheter Line            Port A Cath Right Chest -- days    Port A Cath Right Chest -- days          Drain            External Urinary Catheter 7 days                Lab Results:  Results from last 7 days   Lab Units 05/17/21  0627   WBC Thousand/uL 6 85   HEMOGLOBIN g/dL 10 3*   HEMATOCRIT % 32 9*   PLATELETS Thousands/uL 259     Results from last 7 days   Lab Units 05/17/21  0627   POTASSIUM mmol/L 3 3*   CHLORIDE mmol/L 106   CO2 mmol/L 27   BUN mg/dL 9   CREATININE mg/dL 0 53*   CALCIUM mg/dL 8 1*   ALK PHOS U/L 130*   ALT U/L 158*   AST U/L 50*     Invalid input(s): BILI  Results from last 7 days   Lab Units 05/10/21  1421   INR  0 84     Results from last 7 days   Lab Units 05/10/21  1421   LIPASE u/L 66*       Imaging Studies: I have personally reviewed pertinent imaging studies  Xr Chest Portable    Result Date: 5/16/2021  Impression: Stable chest   No acute infiltrates  Workstation performed: [id number]     Xr Chest Portable    Result Date: 5/16/2021  Impression: NG tube in stomach  Mild central vascular prominence without overt pulmonary edema  Workstation performed: [id number]     Xr Abdomen Obstruction Series    Result Date: 5/14/2021  Impression: Improved constipation Nonobstructive bowel gas pattern   Workstation performed: [id number]     Ct Head Without Contrast    Result Date: 5/10/2021  Impression: No acute intracranial abnormality or significant change from prior study  Changes within the brain parenchyma again suggesting sequela of prior whole brain radiation including previously treated right periatrial dystrophic calcification  No new lesions detected within limitations of noncontrast imaging  Intracranial metastatic disease again better evaluated with contrast-enhanced MRI  Workstation performed: [id number]     Pe Study With Ct Abdomen And Pelvis With Contrast    Result Date: 5/10/2021  Impression: 1  Limited CT pulmonary angiogram, however, suggestion of a small subsegmental left upper lobe pulmonary embolus  Dilated RV/LV ratio which is nonspecific and could be indicative of elevated right heart pressures in the setting of COPD/pulmonary hypertension  No bowing of the intraventricular septum  2   Small bilateral pulmonary nodules, grossly similar  1 cm pleural-based left apical nodule appears larger  Cannot exclude evolving metastasis  3   No acute intra-abdominal abnormality  4   Large volume of colonic stool suggesting constipation  5   Marked urinary bladder distention   I personally discussed this study with physician assistant Samantha Eaton on 5/10/2021 at 3:50 PM  Workstation performed: [id number]

## 2021-05-17 NOTE — ASSESSMENT & PLAN NOTE
- Pt has extensive history of cancer related pain  - Will continue pt on gabapentin and hydromorphone  - Palliative care consulted, appreciate their recommendations

## 2021-05-18 LAB
ACTIN IGG SERPL-ACNC: 6 UNITS (ref 0–19)
ALBUMIN SERPL BCP-MCNC: 2.7 G/DL (ref 3.5–5)
ALP SERPL-CCNC: 123 U/L (ref 46–116)
ALT SERPL W P-5'-P-CCNC: 107 U/L (ref 12–78)
ANION GAP SERPL CALCULATED.3IONS-SCNC: 11 MMOL/L (ref 4–13)
AST SERPL W P-5'-P-CCNC: 30 U/L (ref 5–45)
BILIRUB SERPL-MCNC: 0.29 MG/DL (ref 0.2–1)
BUN SERPL-MCNC: 7 MG/DL (ref 5–25)
CALCIUM ALBUM COR SERPL-MCNC: 8.8 MG/DL (ref 8.3–10.1)
CALCIUM SERPL-MCNC: 7.8 MG/DL (ref 8.3–10.1)
CHLORIDE SERPL-SCNC: 111 MMOL/L (ref 100–108)
CO2 SERPL-SCNC: 23 MMOL/L (ref 21–32)
CREAT SERPL-MCNC: 0.54 MG/DL (ref 0.6–1.3)
ERYTHROCYTE [DISTWIDTH] IN BLOOD BY AUTOMATED COUNT: 16.4 % (ref 11.6–15.1)
GFR SERPL CREATININE-BSD FRML MDRD: 104 ML/MIN/1.73SQ M
GLUCOSE SERPL-MCNC: 90 MG/DL (ref 65–140)
HCT VFR BLD AUTO: 31.9 % (ref 34.8–46.1)
HGB BLD-MCNC: 9.9 G/DL (ref 11.5–15.4)
MCH RBC QN AUTO: 30.5 PG (ref 26.8–34.3)
MCHC RBC AUTO-ENTMCNC: 31 G/DL (ref 31.4–37.4)
MCV RBC AUTO: 98 FL (ref 82–98)
MITOCHONDRIA M2 IGG SER-ACNC: <20 UNITS (ref 0–20)
PLATELET # BLD AUTO: 272 THOUSANDS/UL (ref 149–390)
PMV BLD AUTO: 9.1 FL (ref 8.9–12.7)
POTASSIUM SERPL-SCNC: 3.6 MMOL/L (ref 3.5–5.3)
PROT SERPL-MCNC: 6.5 G/DL (ref 6.4–8.2)
RBC # BLD AUTO: 3.25 MILLION/UL (ref 3.81–5.12)
SODIUM SERPL-SCNC: 145 MMOL/L (ref 136–145)
WBC # BLD AUTO: 6.75 THOUSAND/UL (ref 4.31–10.16)

## 2021-05-18 PROCEDURE — 80053 COMPREHEN METABOLIC PANEL: CPT | Performed by: PSYCHIATRY & NEUROLOGY

## 2021-05-18 PROCEDURE — 85027 COMPLETE CBC AUTOMATED: CPT | Performed by: PSYCHIATRY & NEUROLOGY

## 2021-05-18 PROCEDURE — 99232 SBSQ HOSP IP/OBS MODERATE 35: CPT | Performed by: INTERNAL MEDICINE

## 2021-05-18 PROCEDURE — 94760 N-INVAS EAR/PLS OXIMETRY 1: CPT

## 2021-05-18 RX ORDER — LEVETIRACETAM 500 MG/1
250 TABLET ORAL EVERY 12 HOURS SCHEDULED
Status: DISCONTINUED | OUTPATIENT
Start: 2021-05-18 | End: 2021-05-25 | Stop reason: HOSPADM

## 2021-05-18 RX ADMIN — OLANZAPINE 10 MG: 10 TABLET, FILM COATED ORAL at 21:23

## 2021-05-18 RX ADMIN — FUROSEMIDE 20 MG: 20 TABLET ORAL at 08:36

## 2021-05-18 RX ADMIN — APIXABAN 10 MG: 5 TABLET, FILM COATED ORAL at 08:36

## 2021-05-18 RX ADMIN — NICOTINE 1 PATCH: 14 PATCH, EXTENDED RELEASE TRANSDERMAL at 08:37

## 2021-05-18 RX ADMIN — GABAPENTIN 400 MG: 400 CAPSULE ORAL at 08:35

## 2021-05-18 RX ADMIN — LUBIPROSTONE 24 MCG: 24 CAPSULE, GELATIN COATED ORAL at 17:09

## 2021-05-18 RX ADMIN — GABAPENTIN 400 MG: 400 CAPSULE ORAL at 12:22

## 2021-05-18 RX ADMIN — GABAPENTIN 400 MG: 400 CAPSULE ORAL at 17:09

## 2021-05-18 RX ADMIN — LEVETIRACETAM 250 MG: 100 INJECTION, SOLUTION INTRAVENOUS at 08:36

## 2021-05-18 RX ADMIN — METHYLNALTREXONE BROMIDE 12 MG: 12 INJECTION, SOLUTION SUBCUTANEOUS at 12:54

## 2021-05-18 RX ADMIN — PANTOPRAZOLE SODIUM 40 MG: 40 TABLET, DELAYED RELEASE ORAL at 06:51

## 2021-05-18 RX ADMIN — LUBIPROSTONE 24 MCG: 24 CAPSULE, GELATIN COATED ORAL at 08:36

## 2021-05-18 RX ADMIN — PROCHLORPERAZINE MALEATE 5 MG: 10 TABLET ORAL at 12:22

## 2021-05-18 RX ADMIN — APIXABAN 5 MG: 5 TABLET, FILM COATED ORAL at 20:16

## 2021-05-18 RX ADMIN — SENNOSIDES AND DOCUSATE SODIUM 2 TABLET: 8.6; 5 TABLET ORAL at 17:09

## 2021-05-18 RX ADMIN — LEVETIRACETAM 250 MG: 500 TABLET, FILM COATED ORAL at 20:16

## 2021-05-18 RX ADMIN — SENNOSIDES AND DOCUSATE SODIUM 2 TABLET: 8.6; 5 TABLET ORAL at 08:35

## 2021-05-18 RX ADMIN — METRONIDAZOLE 500 MG: 500 INJECTION, SOLUTION INTRAVENOUS at 02:14

## 2021-05-18 RX ADMIN — DEXAMETHASONE 2 MG: 2 TABLET ORAL at 08:36

## 2021-05-18 RX ADMIN — PROCHLORPERAZINE MALEATE 5 MG: 10 TABLET ORAL at 17:09

## 2021-05-18 RX ADMIN — PROCHLORPERAZINE MALEATE 5 MG: 10 TABLET ORAL at 06:51

## 2021-05-18 NOTE — PROGRESS NOTES
Progress Note - Ahmet Kurtz 64 y o  female MRN: [id number]    Unit/Bed#: S -01 Encounter: [id number]    Assessment and Plan:   Principal Problem:    Pulmonary embolism ([facility] )  Active Problems:    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, estrogen receptor positive ([facility] )    Brain metastases (HCC)    Cancer-related pain    CINV (chemotherapy-induced nausea and vomiting)    Therapeutic opioid-induced constipation (OIC)    Weakness generalized    Tobacco abuse    Anemia of chronic disease    Transaminitis    Impaction of intestine (HCC)    Acute respiratory failure with hypoxia ([facility] )    #1  Opioid induced constipation with gaseous bowel distention: s/p multiple BM over weekend  Xray with improvement of colonic stool, with gaseous distention but nothing significantly dilated, abdomen soft but distended and nontender, had 2 Bm yesterday, tolerating diet    -continue amitiza 24 mcg BID  -miralax and senokot regularly  -monitor stool output and abdominal exam  -encoruage laying on side in bed and OOB to chair  -minimize narcotics as much as possible  -would not recommend decompressive colonoscopy at this time  Would not recommend colonoscopy in general for assessment at this time due to new PE, eliquis on board, and stage 4 cancer  Could consider this several months down the road once eliquis can be held safely pending patient's cancer status  -diet as tolerated    ----------------------------------------------------------------------------------------------------------------    Subjective:  Reports some mild nausea but no vomiting, tolerated solid diet yesterday, was up in the chair yesterday, denies any abdominal pain, is passing gas     Objective:     Vitals: Blood pressure 111/68, pulse 89, temperature 97 8 °F (36 6 °C), temperature source Oral, resp  rate 18, height 5' 5" (1 651 m), weight 77 9 kg (171 lb 11 8 oz), SpO2 96 %, not currently breastfeeding  ,Body mass index is 28 58 kg/m²      No intake or output data in the 24 hours ending 05/18/21 1143    Physical Exam:     General Appearance: Alert, appears stated age and cooperative  Lungs: Clear to auscultation bilaterally, no rales or rhonchi, no labored breathing/accessory muscle use  Heart: Regular rate and rhythm, S1, S2 normal, no murmur, click, rub or gallop  Abdomen: Soft, non-tender, distended and tympanic to percussion; bowel sounds normal; no masses or no organomegaly  Extremities: No cyanosis, clubbing, or edema    Invasive Devices     Central Venous Catheter Line            Port A Cath Right Chest -- days    Port A Cath Right Chest -- days                Lab Results:  Results from last 7 days   Lab Units 05/18/21  0658   WBC Thousand/uL 6 75   HEMOGLOBIN g/dL 9 9*   HEMATOCRIT % 31 9*   PLATELETS Thousands/uL 272     Results from last 7 days   Lab Units 05/18/21  0658   POTASSIUM mmol/L 3 6   CHLORIDE mmol/L 111*   CO2 mmol/L 23   BUN mg/dL 7   CREATININE mg/dL 0 54*   CALCIUM mg/dL 7 8*   ALK PHOS U/L 123*   ALT U/L 107*   AST U/L 30     Invalid input(s): BILI            Imaging Studies: I have personally reviewed pertinent imaging studies  Xr Chest Portable    Result Date: 5/16/2021  Impression: Stable chest   No acute infiltrates  Workstation performed: [id number]     Xr Chest Portable    Result Date: 5/16/2021  Impression: NG tube in stomach  Mild central vascular prominence without overt pulmonary edema  Workstation performed: [id number]     Xr Abdomen Obstruction Series    Result Date: 5/14/2021  Impression: Improved constipation Nonobstructive bowel gas pattern  Workstation performed: [id number]     Ct Head Without Contrast    Result Date: 5/10/2021  Impression: No acute intracranial abnormality or significant change from prior study  Changes within the brain parenchyma again suggesting sequela of prior whole brain radiation including previously treated right periatrial dystrophic calcification    No new lesions detected within limitations of noncontrast imaging  Intracranial metastatic disease again better evaluated with contrast-enhanced MRI  Workstation performed: [id number]     Pe Study With Ct Abdomen And Pelvis With Contrast    Result Date: 5/10/2021  Impression: 1  Limited CT pulmonary angiogram, however, suggestion of a small subsegmental left upper lobe pulmonary embolus  Dilated RV/LV ratio which is nonspecific and could be indicative of elevated right heart pressures in the setting of COPD/pulmonary hypertension  No bowing of the intraventricular septum  2   Small bilateral pulmonary nodules, grossly similar  1 cm pleural-based left apical nodule appears larger  Cannot exclude evolving metastasis  3   No acute intra-abdominal abnormality  4   Large volume of colonic stool suggesting constipation  5   Marked urinary bladder distention   I personally discussed this study with physician assistant Samantha Eaton on 5/10/2021 at 3:50 PM  Workstation performed: [id number]

## 2021-05-18 NOTE — ASSESSMENT & PLAN NOTE
- Cr noted to be 0 9 on admission, baseline 0 5 - 0 7  - Most likely secondary to urinary retention as noted by bladder distension on CT  - Patient is now back to baseline    Plan:   Intake and output   Monitor BMP daily and observe for downward trend of creatinine   Avoid hypoperfusion of the kidneys, minimize nephrotoxins    Results from last 7 days   Lab Units 05/18/21  0658 05/17/21  0627 05/16/21  0629   CREATININE mg/dL 0 54* 0 53* 0 60   EGFR ml/min/1 73sq m 104 105 101

## 2021-05-18 NOTE — ASSESSMENT & PLAN NOTE
- patient is a history chronic constipation (2/2 opioid induced constipation)  - Pt can go 2-5 days between bowel movements, pt reports she had not had a significant bowel movement prior to admission since approximately 5/6   - patient had notable abdominal distension and pain on admission  - patient is maintained on senna as an outpatient, discontinued on admission and switch to Senokot-S    Per GI:  -"would not recommend decompressive colonoscopy at this time  Would not recommend colonoscopy in general for assessment at this time due to new PE, eliquis on board, and stage 4 cancer   Could consider this several months down the road once eliquis can be held safely pending patient's cancer status"    Plan  - Continue Miralax BID 17g (1 packet) BID, Senakot-S 2 Pills BID and Amitiza 24mcg BID  - GI following, will defer to them for plan

## 2021-05-18 NOTE — ASSESSMENT & PLAN NOTE
- Patient noted to have elevated AST 67/ in ED  - CT of the abdomen did not note any acute pathology  - Liver enzymes in ED mildly elevated from baseline, possibly reactive  - Per GI elevated LFTs demonstrate a hepatocellular pattern  - hepatitis panel negative  - Celiac panel negative    Plan  - Will continue to monitor LFTs  - GI consulted, appreciate their recommendations

## 2021-05-18 NOTE — PROGRESS NOTES
[facility]  Progress Note - [facility] 1960, 64 y o  female MRN: [id number]  Unit/Bed#: S -01 Encounter: [id number]  Primary Care Provider: Virginia Mendoza MD   Date and time admitted to hospital: 5/10/2021  1:16 PM    * Pulmonary embolism ([facility] [address] )  Assessment & Plan  - patient presents with a 2-3 day history of gradual onset weakness  - imaging in the ED noted a "small subsegmental left upper lobe pulmonary embolus"  - patient does reports improvement in her shortness of breath    Plan  - Patient started on Eliquis 10 mg for 7 days b i d , followed by Eliquis 5 mg b i d  Impaction of intestine ([facility] [address] )  Assessment & Plan  - patient is a history chronic constipation (2/2 opioid induced constipation)  - Pt can go 2-5 days between bowel movements, pt reports she had not had a significant bowel movement prior to admission since approximately 5/6   - patient had notable abdominal distension and pain on admission  - patient is maintained on senna as an outpatient, discontinued on admission and switch to Senokot-S    Per GI:  -"would not recommend decompressive colonoscopy at this time  Would not recommend colonoscopy in general for assessment at this time due to new PE, eliquis on board, and stage 4 cancer   Could consider this several months down the road once eliquis can be held safely pending patient's cancer status"    Plan  - Continue Miralax BID 17g (1 packet) BID, Senakot-S 2 Pills BID and Amitiza 24mcg BID  - GI following, will defer to them for plan    Acute kidney failure (HCC)-resolved as of 5/12/2021  Assessment & Plan  - Cr noted to be 0 9 on admission, baseline 0 5 - 0 7  - Most likely secondary to urinary retention as noted by bladder distension on CT  - Patient is now back to baseline    Plan:   Intake and output   Monitor BMP daily and observe for downward trend of creatinine   Avoid hypoperfusion of the kidneys, minimize nephrotoxins    Results from last 7 days   Lab Units 05/18/21  0658 05/17/21  0627 05/16/21  0629   CREATININE mg/dL 0 54* 0 53* 0 60   EGFR ml/min/1 73sq m 104 105 101       Therapeutic opioid-induced constipation (OIC)  Assessment & Plan  - patient is a history chronic constipation secondary to chronic opioid use for cancer  - Pt can go 2-5 days between bowel movements, pt reports she had not had a significant bowel movement prior to admission since approximately 5/6   - patient is maintained on senna as an outpatient, discontinued on admission and switch to Senokot-S    Plan  - Continue Miralax BID 17g (1 packet) BID, Senakot-S 2 Pills BID and Amitiza 24mcg BID    Acute respiratory failure with hypoxia (HCC)  Assessment & Plan  · Concern for aspiration  · Chest x-ray showing mild vascular congestion with no overt pulmonary edema  Most recent echocardiogram in April showing normal ejection fraction with diastolic dysfunctions and no significant valvular abnormalities     · Currently on room air saturating greater than 90%  · Procalcitonin negative x2  · Discontinue ceftriaxone and Flagyl  · Resolved    Transaminitis  Assessment & Plan  - Patient noted to have elevated AST 67/ in ED  - CT of the abdomen did not note any acute pathology  - Liver enzymes in ED mildly elevated from baseline, possibly reactive  - Per GI elevated LFTs demonstrate a hepatocellular pattern  - hepatitis panel negative  - Celiac panel negative    Plan  - Will continue to monitor LFTs  - GI consulted, appreciate their recommendations    Anemia of chronic disease  Assessment & Plan  - Pt has noted decrease in hgb on admission  - will continue to monitor    Tobacco abuse  Assessment & Plan  - Patient has a history of tobacco abuse  - Will order the patient nicotine replacement  - Encourage cessation    Weakness generalized  Assessment & Plan  - Patient presents with a 2-3 day history of gradual onset generalized weakness and slurred speech  - Most likely secondary to deconditioning in the setting of patient's cancer history versus infection but this is highly unlikely given the patients clinical findings  - patient's weakness is improving  -PT/OT is recommending post acute rehab services    Plan  - PT/OT    CINV (chemotherapy-induced nausea and vomiting)  Assessment & Plan  - Patient reports frequent nausea, denying any current symptoms  - Patient is maintained on Zofran, Protonix as an outpatient will continue Protonix  - due to Zofran use having constipating potential we discontinued it and start her on Compazine    Cancer-related pain  Assessment & Plan  - Pt has extensive history of cancer related pain  - Will continue pt on gabapentin and hydromorphone  - Palliative care consulted, appreciate their recommendations      Brain metastases ([facility] [address] )  Assessment & Plan  - Pt has breast cancer with mets to the brain  - CTH 5/10: No acute intracranial abnormality or significant change from prior study   - Most recent MRI was in 2020    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, estrogen receptor positive ([facility] [address] )  Assessment & Plan  - Diagnosed with metastatic breast cancer in 2010 with mets to the brain and bone  - S/p numerous rounds of chemotherapy  - S/P double mastectomy   - Follows with heme/onc as out patient    Leukocytosis-resolved as of 5/12/2021  Assessment & Plan  - Patient is noted to have a mild leukocytosis on labs in the emergency room on presentation 10 43  - Patient denies any fever, chills  - Low suspicion of infectious process  - Patient is on steroids chronically at home, this is a possible source of the leukocytosis  - Resolved    Plan  - Will continue to monitor      VTE Pharmacologic Prophylaxis:   Pharmacologic: Apixaban (Eliquis)  Mechanical VTE Prophylaxis in Place: Yes    Discussions with Specialists or Other Care Team Provider:  Gastroenterology    Education and Discussions with Family / Patient:  Discussed plan of care with the patient    Current Length of Stay: 8 day(s)    Current Patient Status: Inpatient     Discharge Plan / Estimated Discharge Date: To be determined    Code Status: Level 1 - Full Code    Subjective:   Patient seen and examined  No acute events overnight  The patient has had multiple small muddy consistency bowel movements  We discussed with the patient that we wanted to advance her diet and watch her at least another 24 hours before discharging her  Patient denies any headache, dizziness, nausea, vomiting, diarrhea, chest pain, palpitations, shortness of breath, wheezing  A 12 point review of systems was completed and was negative unless noted above  Objective:     Vitals:   Temp (24hrs), Av 1 °F (36 7 °C), Min:97 8 °F (36 6 °C), Max:98 6 °F (37 °C)    Temp:  [97 8 °F (36 6 °C)-98 6 °F (37 °C)] 98 6 °F (37 °C)  HR:  [87-89] 88  Resp:  [18] 18  BP: (111-127)/(68-71) 116/71  SpO2:  [94 %-97 %] 94 %  Body mass index is 28 58 kg/m²  Input and Output Summary (last 24 hours):     No intake or output data in the 24 hours ending 21 9222    Physical Exam:     Physical Exam  Vitals signs and nursing note reviewed  Constitutional:       General: She is not in acute distress  Appearance: She is well-developed  She is not diaphoretic  HENT:      Head: Normocephalic and atraumatic  Nose: Nose normal    Eyes:      General: No scleral icterus  Right eye: No discharge  Left eye: No discharge  Extraocular Movements: Extraocular movements intact  Conjunctiva/sclera: Conjunctivae normal       Pupils: Pupils are equal, round, and reactive to light  Neck:      Musculoskeletal: Normal range of motion  Cardiovascular:      Rate and Rhythm: Normal rate and regular rhythm  Heart sounds: Normal heart sounds  No murmur  No friction rub  No gallop  Pulmonary:      Effort: Pulmonary effort is normal  No respiratory distress  Breath sounds: No stridor  No wheezing, rhonchi or rales     Chest:      Chest wall: No tenderness  Abdominal:      General: Abdomen is flat  Bowel sounds are normal  There is distension  Palpations: Abdomen is soft  Tenderness: There is no abdominal tenderness  Genitourinary:     Rectum: Normal  No mass or tenderness  Normal anal tone  Musculoskeletal: Normal range of motion  Right lower leg: No edema  Left lower leg: No edema  Skin:     General: Skin is warm and dry  Neurological:      General: No focal deficit present  Mental Status: She is alert and oriented to person, place, and time  Psychiatric:         Behavior: Behavior normal          Thought Content: Thought content normal          Judgment: Judgment normal          Additional Data:     Labs: I have personally reviewed pertinent reports  Results from last 7 days   Lab Units 05/18/21  0658 05/17/21  0627 05/16/21  0629   WBC Thousand/uL 6 75 6 85 10 34*   HEMOGLOBIN g/dL 9 9* 10 3* 11 0*   HEMATOCRIT % 31 9* 32 9* 35 2   PLATELETS Thousands/uL 272 259 279      Results from last 7 days   Lab Units 05/18/21  0658 05/17/21  0627 05/16/21  0629   POTASSIUM mmol/L 3 6 3 3* 3 7   CHLORIDE mmol/L 111* 106 106   CO2 mmol/L 23 27 29   BUN mg/dL 7 9 11   CREATININE mg/dL 0 54* 0 53* 0 60   CALCIUM mg/dL 7 8* 8 1* 8 5   ALK PHOS U/L 123* 130* 146*   ALT U/L 107* 158* 234*   AST U/L 30 50* 106*     Results from last 7 days   Lab Units 05/15/21  0619   MAGNESIUM mg/dL 2 9*                        * Additional Pertinent Lab Tests Reviewed: All Labs Within Last 24 Hours Reviewed    Radiology Results: I have personally reviewed pertinent reports  Xr Chest Portable    Result Date: 5/16/2021  Impression: Stable chest   No acute infiltrates  Workstation performed: [id number]     Xr Chest Portable    Result Date: 5/16/2021  Impression: NG tube in stomach  Mild central vascular prominence without overt pulmonary edema   Workstation performed: [id number]     Xr Abdomen Obstruction Series    Result Date: 5/14/2021  Impression: Improved constipation Nonobstructive bowel gas pattern  Workstation performed: [id number]     Ct Head Without Contrast    Result Date: 5/10/2021  Impression: No acute intracranial abnormality or significant change from prior study  Changes within the brain parenchyma again suggesting sequela of prior whole brain radiation including previously treated right periatrial dystrophic calcification  No new lesions detected within limitations of noncontrast imaging  Intracranial metastatic disease again better evaluated with contrast-enhanced MRI  Workstation performed: [id number]     Pe Study With Ct Abdomen And Pelvis With Contrast    Result Date: 5/10/2021  Impression: 1  Limited CT pulmonary angiogram, however, suggestion of a small subsegmental left upper lobe pulmonary embolus  Dilated RV/LV ratio which is nonspecific and could be indicative of elevated right heart pressures in the setting of COPD/pulmonary hypertension  No bowing of the intraventricular septum  2   Small bilateral pulmonary nodules, grossly similar  1 cm pleural-based left apical nodule appears larger  Cannot exclude evolving metastasis  3   No acute intra-abdominal abnormality  4   Large volume of colonic stool suggesting constipation  5   Marked urinary bladder distention   I personally discussed this study with physician assistant KAILEE Saavedra on 5/10/2021 at 3:50 PM  Workstation performed: [id number]       Recent Cultures (last 7 days):           Last 24 Hours Medication List:   Current Facility-Administered Medications   Medication Dose Route Frequency Provider Last Rate    acetaminophen  650 mg Oral Q6H PRN Claudia Fill, DO      albuterol  2 puff Inhalation Q6H PRN Claudia Fill, DO      albuterol  2 5 mg Nebulization Q4H PRN Claudia Fill, DO      aluminum-magnesium hydroxide-simethicone  30 mL Oral Q6H PRN Claudia Fill, DO      apixaban  5 mg Oral BID Juliocesar Simms, DO      bisacodyl  10 mg Rectal Daily PRN FAUZIA Grant      dexamethasone  2 mg Oral Daily With Breakfast Alise Dyer, DO      gabapentin  400 mg Oral TID AC Juliocesar Carrington,       HYDROmorphone  1 mg Intravenous Q6H PRN Alise Dyer, DO      HYDROmorphone  4 mg Oral Q4H PRN Alise Dyer, DO      HYDROmorphone  8 mg Oral Q4H PRN Alise Dyer, DO      levETIRAcetam  250 mg Oral Q12H [address] Juliocesar Charissa, DO      lidocaine  1 patch Topical Daily FAUZIA Grant      lubiprostone  24 mcg Oral BID With Meals Anitha Guerra MD      nicotine  1 patch Transdermal Daily Alise Dyer, DO      OLANZapine  10 mg Oral HS Juliocesar Simms,       pantoprazole  40 mg Oral Daily Alise Dyer, DO      prochlorperazine  5 mg Oral TID AC Juliocesar Simms DO      senna-docusate sodium  2 tablet Oral BID Alise Dyer,       zolpidem  10 mg Oral HS PRN Alise Dyer,           Today, Patient Was Seen By: Alise Dyer DO    Portions of the record may have been created with voice recognition software  Occasional wrong word or "sound a like" substitutions may have occurred due to the inherent limitations of voice recognition software  Read the chart carefully and recognize, using context, where substitutions have occurred

## 2021-05-18 NOTE — PLAN OF CARE
Problem: Potential for Falls  Goal: Patient will remain free of falls  Description: INTERVENTIONS:  - Assess patient frequently for physical needs  -  Identify cognitive and physical deficits and behaviors that affect risk of falls    -  [place] fall precautions as indicated by assessment   - Educate patient/family on patient safety including physical limitations  - Instruct patient to call for assistance with activity based on assessment  - Modify environment to reduce risk of injury  - Consider OT/PT consult to assist with strengthening/mobility  Outcome: Progressing     Problem: Prexisting or High Potential for Compromised Skin Integrity  Goal: Skin integrity is maintained or improved  Description: INTERVENTIONS:  - Identify patients at risk for skin breakdown  - Assess and monitor skin integrity  - Assess and monitor nutrition and hydration status  - Monitor labs   - Assess for incontinence   - Turn and reposition patient  - Assist with mobility/ambulation  - Relieve pressure over bony prominences  - Avoid friction and shearing  - Provide appropriate hygiene as needed including keeping skin clean and dry  - Evaluate need for skin moisturizer/barrier cream  - Collaborate with interdisciplinary team   - Patient/family teaching  - Consider wound care consult   Outcome: Progressing     Problem: PAIN - ADULT  Goal: Verbalizes/displays adequate comfort level or baseline comfort level  Description: Interventions:  - Encourage patient to monitor pain and request assistance  - Assess pain using appropriate pain scale  - Administer analgesics based on type and severity of pain and evaluate response  - Implement non-pharmacological measures as appropriate and evaluate response  - Consider cultural and social influences on pain and pain management  - Notify physician/advanced practitioner if interventions unsuccessful or patient reports new pain  Outcome: Progressing     Problem: INFECTION - ADULT  Goal: Absence or prevention of progression during hospitalization  Description: INTERVENTIONS:  - Assess and monitor for signs and symptoms of infection  - Monitor lab/diagnostic results  - Monitor all insertion sites, i e  indwelling lines, tubes, and drains  - Monitor endotracheal if appropriate and nasal secretions for changes in amount and color  - [place] appropriate cooling/warming therapies per order  - Administer medications as ordered  - Instruct and encourage patient and family to use good hand hygiene technique  - Identify and instruct in appropriate isolation precautions for identified infection/condition  Outcome: Progressing     Problem: SAFETY ADULT  Goal: Patient will remain free of falls  Description: INTERVENTIONS:  - Assess patient frequently for physical needs  -  Identify cognitive and physical deficits and behaviors that affect risk of falls    -  [place] fall precautions as indicated by assessment   - Educate patient/family on patient safety including physical limitations  - Instruct patient to call for assistance with activity based on assessment  - Modify environment to reduce risk of injury  - Consider OT/PT consult to assist with strengthening/mobility  Outcome: Progressing  Goal: Maintain or return to baseline ADL function  Description: INTERVENTIONS:  -  Assess patient's ability to carry out ADLs; assess patient's baseline for ADL function and identify physical deficits which impact ability to perform ADLs (bathing, care of mouth/teeth, toileting, grooming, dressing, etc )  - Assess/evaluate cause of self-care deficits   - Assess range of motion  - Assess patient's mobility; develop plan if impaired  - Assess patient's need for assistive devices and provide as appropriate  - Encourage maximum independence but intervene and supervise when necessary  - Involve family in performance of ADLs  - Assess for home care needs following discharge   - Consider OT consult to assist with ADL evaluation and planning for discharge  - Provide patient education as appropriate  Outcome: Progressing  Goal: Maintain or return mobility status to optimal level  Description: INTERVENTIONS:  - Assess patient's baseline mobility status (ambulation, transfers, stairs, etc )    - Identify cognitive and physical deficits and behaviors that affect mobility  - Identify mobility aids required to assist with transfers and/or ambulation (gait belt, sit-to-stand, lift, walker, cane, etc )  - [place] fall precautions as indicated by assessment  - Record patient progress and toleration of activity level on Mobility SBAR; progress patient to next Phase/Stage  - Instruct patient to call for assistance with activity based on assessment  - Consider rehabilitation consult to assist with strengthening/weightbearing, etc   Outcome: Progressing     Problem: DISCHARGE PLANNING  Goal: Discharge to home or other facility with appropriate resources  Description: INTERVENTIONS:  - Identify barriers to discharge w/patient and caregiver  - Arrange for needed discharge resources and transportation as appropriate  - Identify discharge learning needs (meds, wound care, etc )  - Arrange for interpretive services to assist at discharge as needed  - Refer to Case Management Department for coordinating discharge planning if the patient needs post-hospital services based on physician/advanced practitioner order or complex needs related to functional status, cognitive ability, or social support system  Outcome: Progressing     Problem: Knowledge Deficit  Goal: Patient/family/caregiver demonstrates understanding of disease process, treatment plan, medications, and discharge instructions  Description: Complete learning assessment and assess knowledge base    Interventions:  - Provide teaching at level of understanding  - Provide teaching via preferred learning methods  Outcome: Progressing     Problem: RESPIRATORY - ADULT  Goal: Achieves optimal ventilation and oxygenation  Description: INTERVENTIONS:  - Assess for changes in respiratory status  - Assess for changes in mentation and behavior  - Position to facilitate oxygenation and minimize respiratory effort  - Oxygen administered by appropriate delivery if ordered  - Initiate smoking cessation education as indicated  - Encourage broncho-pulmonary hygiene including cough, deep breathe, Incentive Spirometry  - Assess the need for suctioning and aspirate as needed  - Assess and instruct to report SOB or any respiratory difficulty  - Respiratory Therapy support as indicated  Outcome: Progressing     Problem: GASTROINTESTINAL - ADULT  Goal: Minimal or absence of nausea and/or vomiting  Description: INTERVENTIONS:  - Administer IV fluids if ordered to ensure adequate hydration  - Maintain NPO status until nausea and vomiting are resolved  - Nasogastric tube if ordered  - Administer ordered antiemetic medications as needed  - Provide nonpharmacologic comfort measures as appropriate  - Advance diet as tolerated, if ordered  - Consider nutrition services referral to assist patient with adequate nutrition and appropriate food choices  Outcome: Progressing  Goal: Maintains or returns to baseline bowel function  Description: INTERVENTIONS:  - Assess bowel function  - Encourage oral fluids to ensure adequate hydration  - Administer IV fluids if ordered to ensure adequate hydration  - Administer ordered medications as needed  - Encourage mobilization and activity  - Consider nutritional services referral to assist patient with adequate nutrition and appropriate food choices  Outcome: Progressing

## 2021-05-18 NOTE — ASSESSMENT & PLAN NOTE
- Patient reports frequent nausea, denying any current symptoms  - Patient is maintained on Zofran, Protonix as an outpatient will continue Protonix  - due to Zofran use having constipating potential we discontinued it and start her on Compazine

## 2021-05-18 NOTE — ASSESSMENT & PLAN NOTE
· Concern for aspiration  · Chest x-ray showing mild vascular congestion with no overt pulmonary edema  Most recent echocardiogram in April showing normal ejection fraction with diastolic dysfunctions and no significant valvular abnormalities     · Currently on room air saturating greater than 90%  · Procalcitonin negative x2  · Discontinue ceftriaxone and Flagyl  · Resolved

## 2021-05-19 LAB — RYE IGE QN: NEGATIVE

## 2021-05-19 PROCEDURE — 97116 GAIT TRAINING THERAPY: CPT

## 2021-05-19 PROCEDURE — 97542 WHEELCHAIR MNGMENT TRAINING: CPT

## 2021-05-19 PROCEDURE — 94760 N-INVAS EAR/PLS OXIMETRY 1: CPT

## 2021-05-19 PROCEDURE — 99232 SBSQ HOSP IP/OBS MODERATE 35: CPT | Performed by: INTERNAL MEDICINE

## 2021-05-19 RX ORDER — POLYETHYLENE GLYCOL 3350 17 G/17G
17 POWDER, FOR SOLUTION ORAL DAILY
Qty: 510 G | Refills: 0 | Status: SHIPPED | OUTPATIENT
Start: 2021-05-20 | End: 2021-05-25 | Stop reason: HOSPADM

## 2021-05-19 RX ORDER — PROCHLORPERAZINE MALEATE 5 MG/1
5 TABLET ORAL
Qty: 90 TABLET | Refills: 0 | Status: SHIPPED | OUTPATIENT
Start: 2021-05-19 | End: 2021-06-23 | Stop reason: SDUPTHER

## 2021-05-19 RX ORDER — AMOXICILLIN 250 MG
2 CAPSULE ORAL 2 TIMES DAILY
Qty: 120 TABLET | Refills: 0 | Status: SHIPPED | OUTPATIENT
Start: 2021-05-19 | End: 2021-06-18

## 2021-05-19 RX ORDER — BISACODYL 10 MG
10 SUPPOSITORY, RECTAL RECTAL DAILY PRN
Qty: 12 SUPPOSITORY | Refills: 0 | Status: SHIPPED | OUTPATIENT
Start: 2021-05-19

## 2021-05-19 RX ORDER — POLYETHYLENE GLYCOL 3350 17 G/17G
17 POWDER, FOR SOLUTION ORAL DAILY
Status: DISCONTINUED | OUTPATIENT
Start: 2021-05-19 | End: 2021-05-20

## 2021-05-19 RX ADMIN — PROCHLORPERAZINE MALEATE 5 MG: 10 TABLET ORAL at 17:38

## 2021-05-19 RX ADMIN — SENNOSIDES AND DOCUSATE SODIUM 2 TABLET: 8.6; 5 TABLET ORAL at 17:38

## 2021-05-19 RX ADMIN — DEXAMETHASONE 2 MG: 2 TABLET ORAL at 08:07

## 2021-05-19 RX ADMIN — NICOTINE 1 PATCH: 14 PATCH, EXTENDED RELEASE TRANSDERMAL at 08:09

## 2021-05-19 RX ADMIN — PANTOPRAZOLE SODIUM 40 MG: 40 TABLET, DELAYED RELEASE ORAL at 06:56

## 2021-05-19 RX ADMIN — APIXABAN 5 MG: 5 TABLET, FILM COATED ORAL at 17:38

## 2021-05-19 RX ADMIN — LUBIPROSTONE 24 MCG: 24 CAPSULE, GELATIN COATED ORAL at 17:38

## 2021-05-19 RX ADMIN — LIDOCAINE 5% 1 PATCH: 700 PATCH TOPICAL at 08:08

## 2021-05-19 RX ADMIN — GABAPENTIN 400 MG: 400 CAPSULE ORAL at 06:56

## 2021-05-19 RX ADMIN — LEVETIRACETAM 250 MG: 500 TABLET, FILM COATED ORAL at 21:14

## 2021-05-19 RX ADMIN — LUBIPROSTONE 24 MCG: 24 CAPSULE, GELATIN COATED ORAL at 08:10

## 2021-05-19 RX ADMIN — ACETAMINOPHEN 650 MG: 325 TABLET, FILM COATED ORAL at 17:38

## 2021-05-19 RX ADMIN — SENNOSIDES AND DOCUSATE SODIUM 2 TABLET: 8.6; 5 TABLET ORAL at 08:07

## 2021-05-19 RX ADMIN — PROCHLORPERAZINE MALEATE 5 MG: 10 TABLET ORAL at 11:26

## 2021-05-19 RX ADMIN — POLYETHYLENE GLYCOL 3350 17 G: 17 POWDER, FOR SOLUTION ORAL at 11:27

## 2021-05-19 RX ADMIN — OLANZAPINE 10 MG: 10 TABLET, FILM COATED ORAL at 21:14

## 2021-05-19 RX ADMIN — LEVETIRACETAM 250 MG: 500 TABLET, FILM COATED ORAL at 08:07

## 2021-05-19 RX ADMIN — APIXABAN 5 MG: 5 TABLET, FILM COATED ORAL at 08:13

## 2021-05-19 RX ADMIN — GABAPENTIN 400 MG: 400 CAPSULE ORAL at 11:26

## 2021-05-19 RX ADMIN — PROCHLORPERAZINE MALEATE 5 MG: 10 TABLET ORAL at 06:56

## 2021-05-19 RX ADMIN — GABAPENTIN 400 MG: 400 CAPSULE ORAL at 18:49

## 2021-05-19 NOTE — ASSESSMENT & PLAN NOTE
- Patient noted to have elevated AST 67/ in ED  - CT of the abdomen did not note any acute pathology  - Liver enzymes in ED mildly elevated from baseline, possibly reactive  - Per GI elevated LFTs demonstrate a hepatocellular pattern  - hepatitis panel negative  - Celiac panel negative    Plan  - patient to follow-up with GI as an outpatient

## 2021-05-19 NOTE — ASSESSMENT & PLAN NOTE
- Pt has extensive history of cancer related pain  - Pt to follow up with palliative care as out patient   - Pt to continue PTA pain medication regiment

## 2021-05-19 NOTE — DISCHARGE INSTR - AVS FIRST PAGE
Dear Ariella Tejeda,     It was our pleasure to care for you here at [facility]  It is our hope that we were always able to exceed the expected standards for your care during your stay  You were hospitalized due to pulmonary embolism and severe constipation  You were cared for on the [place] [address] 4th floor by Sondra Rico DO under the service of Allen Schultz MD with the [facility] who covers for your primary care physician (PCP), Ivone Coon MD, while you were hospitalized  If you have any questions or concerns related to this hospitalization, you may contact us at [phone]  For follow up as well as any medication refills, we recommend that you follow up with your primary care physician  A registered nurse will reach out to you by phone within a few days after your discharge to answer any additional questions that you may have after going home    However, at this time we provide for you here, the most important instructions / recommendations at discharge:     · Notable Medication Adjustments -   · Start Eliquis 5 mg twice a day  · Start Lubiprostone (Amitiza) 24mcg twice a day with meals  · Start Senna-Docusate (Senokot-S) take 2 tablets by mouth 2 times a day  · Start polyethylene glycol (MiraLax) 1 packet or 17g if from bottle daily  · Start Compazine 5 mg 3 times a day before meals  · Start Bisacodyl (Dulcolax) suppository as needed daily for constipation  · STOP Ondansetron (Zofran) that you had been using prior to admission, as you have been switched to Compazine  · STOP Senna (Senokot) that you had been using prior to admission, as you have been switched to Senokot-S  · Testing Required after Discharge -   · None  · Important follow up information -   · Please follow-up with your primary care provider within 1 week regarding your pulmonary embolism, they will decide how long you need to stay on the Eliquis  · Please follow-up with Gastroenterology within 1-2 weeks for your constipation and to discuss your eventual need for a colonoscopy  · Other Instructions -   · Please follow the following regiment for your constipation unless directed to change it by Gastroenterology and or your primary care provider:             - Lubiprostone (Amitiza) 24mcg twice a day with meals; Senna-Docusate (Senokot-S) 2 tablets 2 times a day, and polyethylene glycol (MiraLax) 1 packet or 17g if from bottle daily             - If you develop water diarrhea, take only 1 pill of Senokot S twice a day instead of 2 and stop taking polyethylene glycol (MiraLax)  You may then resume these medications at the regular dosages and frequencies once the diarrhea subsides             - If you develop constipation and have not had a bowel movement in 2 days, increase polyethylene glycol (MiraLax) to 1 packet or 17g if from bottle twice a day up from once a day, and use a Bisacodyl (Dulcolax) suppository daily until you have resumed have regular bowel movements at which time you can resume the original medications at the regular dosages and frequencies once the diarrhea subsides and stop the suppository  - If you develop constipation and have not had a bowel movement in 4-5 days contact the gastroenterologist and or your primary care provider as soon as possible for further directions  · Please review this entire after visit summary as additional general instructions including medication list, appointments, activity, diet, any pertinent wound care, and other additional recommendations from your care team that may be provided for you        Sincerely,     Darin Washburn DO

## 2021-05-19 NOTE — PHYSICAL THERAPY NOTE
PHYSICAL THERAPY NOTE    Patient Name: Ana Maria HAYES Date: 5/19/2021 05/19/21 1111   PT Last Visit   PT Visit Date 05/19/21   Note Type   Note Type Treatment   Pain Assessment   Pain Assessment Tool Pain Assessment not indicated - pt denies pain   Pain Score No Pain   Restrictions/Precautions   Weight Bearing Precautions Per Order No   Other Precautions Bed Alarm; Chair Alarm; Fall Risk;Limb alert   General   Family/Caregiver Present No   Subjective   Subjective Patient seated OOB in recliner with call bell on and is agreeable to participate in therapy session  Bed Mobility   Supine to Sit Unable to assess   Sit to Supine Unable to assess   Additional Comments Patient seated OOB in recliner pre and post session with chair alarm engaged, call bell and belongings in reach  Transfers   Sit to Stand 4  Minimal assistance   Additional items Assist x 1; Armrests; Increased time required;Verbal cues   Stand to Sit 4  Minimal assistance   Additional items Assist x 1; Armrests; Increased time required;Verbal cues   Toilet transfer 3  Moderate assistance   Additional items Assist x 1; Armrests; Increased time required;Verbal cues; Commode   Ambulation/Elevation   Gait pattern Wide REMIGIO; Ataxia; Excessively slow   Gait Assistance 3  Moderate assist   Additional items Assist x 1;Verbal cues   Assistive Device Rolling walker   Distance 20' x2   Balance   Static Sitting Fair -   Dynamic Sitting Fair   Static Standing Poor +   Ambulatory Poor   Endurance Deficit   Endurance Deficit Yes   Endurance Deficit Description limited ambulation distance and activity tolerance   Activity Tolerance   Activity Tolerance Patient limited by fatigue   Nurse Made Aware Spoke to Maikel Nascimento RN   Assessment   Prognosis Fair   Problem List Decreased strength;Decreased endurance; Impaired balance;Decreased mobility; Decreased coordination;Pain;Obesity; Decreased cognition; Impaired judgement;Decreased safety awareness   Assessment Patient agreeable to participate in therapy session  Multiple sit<>stand transfers with consistent min ax1 however requires increased time and instruction for body positioning and hand placement  Patient able to ambulate increased gait distance with roller walker and mod ax1  requires steadying balance assist, walker management and assist for weigth shift at times as patient with difficulty weight shifting and advancing LEs with fatigue  Educated with verbal and visual demonstration for wheelchair mobility including turns, brakes and leg rests  Pt states understanding however declines trial secondary to arrival of meal  Will continue to follow as appropriate and able  Goals   Patient Goals to get better so I can walk   STG Expiration Date 05/21/21   PT Treatment Day 4   Plan   Treatment/Interventions Functional transfer training;LE strengthening/ROM; Therapeutic exercise; Endurance training;Gait training;Bed mobility; Equipment eval/education;Patient/family training;Spoke to nursing   Progress Slow progress, decreased activity tolerance   PT Frequency Other (Comment)  (3-5x/week)   Recommendation   PT Discharge Recommendation Post acute rehabilitation services   Equipment Recommended Walker; Wheelchair   AM-PAC Basic Mobility Inpatient   Turning in Bed Without Bedrails 3   Lying on Back to Sitting on Edge of Flat Bed 3   Moving Bed to Chair 2   Standing Up From Chair 3   Walk in Room 2   Climb 3-5 Stairs 1   Basic Mobility Inpatient Raw Score 14   Basic Mobility Standardized Score 35 55       Nichole Serrano PTA

## 2021-05-19 NOTE — PROGRESS NOTES
[facility]  Progress Note - Mallika Bingham 1960, 64 y o  female MRN: [id number]  Unit/Bed#: S -01 Encounter: [id number]  Primary Care Provider: Ousmane Jain MD   Date and time admitted to hospital: 5/10/2021  1:16 PM    * Pulmonary embolism ([facility] [address] )  Assessment & Plan  - patient presents with a 2-3 day history of gradual onset weakness  - imaging in the ED noted a "small subsegmental left upper lobe pulmonary embolus"  - patient does reports improvement in her shortness of breath    Plan  - Patient started on Eliquis 10 mg for 7 days b i d , followed by Eliquis 5 mg b i d , pt to continue on d/c and follow up with pcp to determine duration of therapy  [facility]  Assessment & Plan  - patient is a history chronic constipation (2/2 opioid induced constipation)  - Pt can go 2-5 days between bowel movements, pt reports she had not had a significant bowel movement prior to admission since approximately 5/6   - patient had notable abdominal distension and pain on admission  - patient is maintained on senna as an outpatient, discontinued on admission and switch to Senokot-S    Per GI:  -"would not recommend decompressive colonoscopy at this time  Would not recommend colonoscopy in general for assessment at this time due to new PE, eliquis on board, and stage 4 cancer   Could consider this several months down the road once eliquis can be held safely pending patient's cancer status"    Plan  - Continue Miralax BID 17g (1 packet), Senakot-S 2 Pills BID and Amitiza 24mcg BID on D/C  - Pt to follow up with GI as an out patient    Therapeutic opioid-induced constipation (OIC)  Assessment & Plan  - patient is a history chronic constipation secondary to chronic opioid use for cancer  - Pt can go 2-5 days between bowel movements, pt reports she had not had a significant bowel movement prior to admission since approximately 5/6   - patient is maintained on senna as an outpatient, discontinued on admission and switch to Senokot-S    Plan   - Continue Miralax BID 17g (1 packet) qd, Senakot-S 2 Pills BID and Amitiza 24mcg BID    Acute respiratory failure with hypoxia (HCC)  Assessment & Plan  · Concerned for aspiration  · Chest x-ray showing mild vascular congestion with no overt pulmonary edema  Most recent echocardiogram in April showing normal ejection fraction with diastolic dysfunctions and no significant valvular abnormalities     · Procalcitonin negative x2  · Resolved    Transaminitis  Assessment & Plan  - Patient noted to have elevated AST 67/ in ED  - CT of the abdomen did not note any acute pathology  - Liver enzymes in ED mildly elevated from baseline, possibly reactive  - Per GI elevated LFTs demonstrate a hepatocellular pattern  - hepatitis panel negative  - Celiac panel negative    Plan  - patient to follow-up with GI as an outpatient    Anemia of chronic disease  Assessment & Plan  - Pt has noted decrease in hgb on admission    Tobacco abuse  Assessment & Plan  - Patient has a history of tobacco abuse  - Will order the patient nicotine replacement  - Encourage cessation    Weakness generalized  Assessment & Plan  - Patient presents with a 2-3 day history of gradual onset generalized weakness and slurred speech  - Most likely secondary to deconditioning in the setting of patient's cancer history versus infection but this is highly unlikely given the patients clinical findings  - patient's weakness is improving  -PT/OT is recommending post acute rehab services    Plan  - patient to have home rehab on discharge    CINV (chemotherapy-induced nausea and vomiting)  Assessment & Plan  - Patient reports frequent nausea, denying any current symptoms  - Patient is maintained on Zofran, continue Protonix but due to Zofran use having constipating potential we discontinued it and start her on Compazine    Cancer-related pain  Assessment & Plan  - Pt has extensive history of cancer related pain  - Pt to follow up with palliative care as out patient   - Pt to continue PTA pain medication regiment    Brain metastases ([facility] [address] )  Assessment & Plan  - Pt has breast cancer with mets to the brain  - CTH 5/10: No acute intracranial abnormality or significant change from prior study   - Most recent MRI was in 2020    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, estrogen receptor positive ([facility] [address] )  Assessment & Plan  - Diagnosed with metastatic breast cancer in 2010 with mets to the brain and bone  - S/p numerous rounds of chemotherapy  - S/P double mastectomy   - Follows with heme/onc as out patient    Leukocytosis-resolved as of 5/12/2021  Assessment & Plan  - Patient is noted to have a mild leukocytosis on labs in the emergency room on presentation 10 43  - Patient denies any fever, chills  - Low suspicion of infectious process  - Patient is on steroids chronically at home, this is a possible source of the leukocytosis  - Resolved      VTE Pharmacologic Prophylaxis:   Pharmacologic: Apixaban (Eliquis)  Mechanical VTE Prophylaxis in Place: Yes    Discussions with Specialists or Other Care Team Provider: GI    Education and Discussions with Family / Patient: Discussed plan of care with patient and   Current Length of Stay: 9 day(s)    Current Patient Status: Inpatient     Discharge Plan / Estimated Discharge Date: Tomorrow or Friday    Code Status: Level 1 - Full Code    Subjective:   Patient seen and examined  No acute events overnight  Pt has had multiple BM  Pt was due to be discharged home with home pt ot due to the fact the pts and family initally were against rehab, however at the end of the day while helping the patient stand I noticed how weak she was and I stated that I was concerned she would be too weak that being at home would be unsafe for her and I recommended she go to a rehab facility   The patient and her  who was in the room discussed this and decided that they would infact like for her to go to rehab  The CM was informed of the change of plan  Patient denies any headache, dizziness, nausea, vomiting, constipation, diarrhea, chest pain, palpitations, shortness of breath, wheezing  A 12 point review of systems was completed and was negative unless noted above  Objective:     Vitals:   Temp (24hrs), Av 2 °F (36 8 °C), Min:97 3 °F (36 3 °C), Max:99 4 °F (37 4 °C)    Temp:  [97 3 °F (36 3 °C)-99 4 °F (37 4 °C)] 98 °F (36 7 °C)  HR:  [83-92] 92  Resp:  [16-18] 18  BP: (115-129)/(66-77) 129/77  SpO2:  [94 %-97 %] 97 %  Body mass index is 28 58 kg/m²  Input and Output Summary (last 24 hours): Intake/Output Summary (Last 24 hours) at 2021 1606  Last data filed at 2021 0900  Gross per 24 hour   Intake 200 ml   Output --   Net 200 ml       Physical Exam:     Physical Exam  Vitals signs and nursing note reviewed  Constitutional:       General: She is not in acute distress  Appearance: She is well-developed  She is not diaphoretic  HENT:      Head: Normocephalic and atraumatic  Nose: Nose normal    Eyes:      General: No scleral icterus  Right eye: No discharge  Left eye: No discharge  Extraocular Movements: Extraocular movements intact  Conjunctiva/sclera: Conjunctivae normal       Pupils: Pupils are equal, round, and reactive to light  Neck:      Musculoskeletal: Normal range of motion  Cardiovascular:      Rate and Rhythm: Normal rate and regular rhythm  Heart sounds: Normal heart sounds  No murmur  No friction rub  No gallop  Pulmonary:      Effort: Pulmonary effort is normal  No respiratory distress  Breath sounds: No stridor  No wheezing, rhonchi or rales  Chest:      Chest wall: No tenderness  Abdominal:      General: Abdomen is flat  Bowel sounds are normal  There is distension  Palpations: Abdomen is soft  Tenderness: There is no abdominal tenderness     Genitourinary:     Rectum: Normal  No mass or tenderness  Normal anal tone  Musculoskeletal: Normal range of motion  Right lower leg: No edema  Left lower leg: No edema  Skin:     General: Skin is warm and dry  Neurological:      General: No focal deficit present  Mental Status: She is alert and oriented to person, place, and time  Motor: Weakness present  Psychiatric:         Behavior: Behavior normal          Thought Content: Thought content normal          Judgment: Judgment normal           Additional Data:     Labs: I have personally reviewed pertinent reports  Results from last 7 days   Lab Units 05/18/21  0658 05/17/21  0627 05/16/21  0629   WBC Thousand/uL 6 75 6 85 10 34*   HEMOGLOBIN g/dL 9 9* 10 3* 11 0*   HEMATOCRIT % 31 9* 32 9* 35 2   PLATELETS Thousands/uL 272 259 279      Results from last 7 days   Lab Units 05/18/21  0658 05/17/21  0627 05/16/21  0629   POTASSIUM mmol/L 3 6 3 3* 3 7   CHLORIDE mmol/L 111* 106 106   CO2 mmol/L 23 27 29   BUN mg/dL 7 9 11   CREATININE mg/dL 0 54* 0 53* 0 60   CALCIUM mg/dL 7 8* 8 1* 8 5   ALK PHOS U/L 123* 130* 146*   ALT U/L 107* 158* 234*   AST U/L 30 50* 106*     Results from last 7 days   Lab Units 05/15/21  0619   MAGNESIUM mg/dL 2 9*                        * Additional Pertinent Lab Tests Reviewed: All Labs Within Last 24 Hours Reviewed    Radiology Results: I have personally reviewed pertinent reports  Xr Chest Portable    Result Date: 5/16/2021  Impression: Stable chest   No acute infiltrates  Workstation performed: [id number]     Xr Chest Portable    Result Date: 5/16/2021  Impression: NG tube in stomach  Mild central vascular prominence without overt pulmonary edema  Workstation performed: [id number]     Xr Abdomen Obstruction Series    Result Date: 5/14/2021  Impression: Improved constipation Nonobstructive bowel gas pattern   Workstation performed: [id number]     Ct Head Without Contrast    Result Date: 5/10/2021  Impression: No acute intracranial abnormality or significant change from prior study  Changes within the brain parenchyma again suggesting sequela of prior whole brain radiation including previously treated right periatrial dystrophic calcification  No new lesions detected within limitations of noncontrast imaging  Intracranial metastatic disease again better evaluated with contrast-enhanced MRI  Workstation performed: [id number]     Pe Study With Ct Abdomen And Pelvis With Contrast    Result Date: 5/10/2021  Impression: 1  Limited CT pulmonary angiogram, however, suggestion of a small subsegmental left upper lobe pulmonary embolus  Dilated RV/LV ratio which is nonspecific and could be indicative of elevated right heart pressures in the setting of COPD/pulmonary hypertension  No bowing of the intraventricular septum  2   Small bilateral pulmonary nodules, grossly similar  1 cm pleural-based left apical nodule appears larger  Cannot exclude evolving metastasis  3   No acute intra-abdominal abnormality  4   Large volume of colonic stool suggesting constipation  5   Marked urinary bladder distention   I personally discussed this study with physician assistant KAILEE Kitchen on 5/10/2021 at 3:50 PM  Workstation performed: [id number]       Recent Cultures (last 7 days):           Last 24 Hours Medication List:   Current Facility-Administered Medications   Medication Dose Route Frequency Provider Last Rate    acetaminophen  650 mg Oral Q6H PRN Alise Maizes, DO      albuterol  2 puff Inhalation Q6H PRN Alise Maizes, DO      albuterol  2 5 mg Nebulization Q4H PRN Alise Maizes, DO      aluminum-magnesium hydroxide-simethicone  30 mL Oral Q6H PRN Alise Maizes, DO      apixaban  5 mg Oral BID France Maizes, DO      bisacodyl  10 mg Rectal Daily PRN FAUZIA Grant      dexamethasone  2 mg Oral Daily With Breakfast France Maizes, DO      gabapentin  400 mg Oral TID AC Juliocesar Simms, DO      HYDROmorphone  1 mg Intravenous Q6H PRN Juliocesar Carrington, DO      HYDROmorphone  4 mg Oral Q4H PRN Algernon Bold, DO      HYDROmorphone  8 mg Oral Q4H PRN Algernon Bold, DO      levETIRAcetam  250 mg Oral Q12H [facility] Algernon Bold, DO      lidocaine  1 patch Topical Daily FAUZIA Perdomo      lubiprostone  24 mcg Oral BID With Meals Radha French MD      nicotine  1 patch Transdermal Daily Algernon Bold, DO      OLANZapine  10 mg Oral HS Juliocesar Simms, DO      pantoprazole  40 mg Oral Daily Algernon Bold, DO      polyethylene glycol  17 g Oral Daily Algernon Bold, DO      prochlorperazine  5 mg Oral TID AC Juliocesar Simms, DO      senna-docusate sodium  2 tablet Oral BID Algernon Bold, DO      zolpidem  10 mg Oral HS PRN Algernon Bold, DO          Today, Patient Was Seen By: Lisa Mccauley DO    Portions of the record may have been created with voice recognition software  Occasional wrong word or "sound a like" substitutions may have occurred due to the inherent limitations of voice recognition software  Read the chart carefully and recognize, using context, where substitutions have occurred

## 2021-05-19 NOTE — PLAN OF CARE
Problem: PHYSICAL THERAPY ADULT  Goal: Performs mobility at highest level of function for planned discharge setting  See evaluation for individualized goals  Description: Treatment/Interventions: ADL retraining, Functional transfer training, LE strengthening/ROM, Elevations, Therapeutic exercise, Endurance training, Patient/family training, Bed mobility, Gait training  Equipment Recommended: Rosey Roberto       See flowsheet documentation for full assessment, interventions and recommendations  Outcome: Progressing  Note: Prognosis: Fair  Problem List: Decreased strength, Decreased endurance, Impaired balance, Decreased mobility, Decreased coordination, Pain, Obesity, Decreased cognition, Impaired judgement, Decreased safety awareness  Assessment: Patient agreeable to participate in therapy session  Multiple sit<>stand transfers with consistent min ax1 however requires increased time and instruction for body positioning and hand placement  Patient able to ambulate increased gait distance with roller walker and mod ax1  requires steadying balance assist, walker management and assist for weigth shift at times as patient with difficulty weight shifting and advancing LEs with fatigue  Educated with verbal and visual demonstration for wheelchair mobility including turns, brakes and leg rests  Pt states understanding however declines trial secondary to arrival of meal  Will continue to follow as appropriate and able  PT Discharge Recommendation: Post acute rehabilitation services          See flowsheet documentation for full assessment

## 2021-05-19 NOTE — ASSESSMENT & PLAN NOTE
- patient is a history chronic constipation (2/2 opioid induced constipation)  - Pt can go 2-5 days between bowel movements, pt reports she had not had a significant bowel movement prior to admission since approximately 5/6   - patient had notable abdominal distension and pain on admission  - patient is maintained on senna as an outpatient, discontinued on admission and switch to Senokot-S    Per GI:  -"would not recommend decompressive colonoscopy at this time  Would not recommend colonoscopy in general for assessment at this time due to new PE, eliquis on board, and stage 4 cancer   Could consider this several months down the road once eliquis can be held safely pending patient's cancer status"    Plan  - Continue Miralax BID 17g (1 packet), Senakot-S 2 Pills BID and Amitiza 24mcg BID on D/C  - Pt to follow up with GI as an out patient

## 2021-05-19 NOTE — ASSESSMENT & PLAN NOTE
- Patient is noted to have a mild leukocytosis on labs in the emergency room on presentation 10 43  - Patient denies any fever, chills  - Low suspicion of infectious process  - Patient is on steroids chronically at home, this is a possible source of the leukocytosis  - Resolved

## 2021-05-19 NOTE — DISCHARGE INSTRUCTIONS
Chemo Induced Nausea and Vomiting   AMBULATORY CARE:   Nausea and vomiting  are common side effects of chemotherapy (chemo)  They may be worse with certain kinds of chemo  Your risk for nausea and vomiting depends on the type of chemo and the dose (amount) of chemo you get  Nausea and vomiting can lead to dehydration, low blood pressure, fatigue, trouble focusing, loss of appetite, and weight loss  Contact your healthcare provider if:   · The medicines you take for nausea and vomiting are not working  · You are vomiting and cannot keep any food or liquids down  · You cannot take your medicines  · You are losing weight  · You have questions or concerns about your condition or care  Different types of chemo-induced nausea and vomiting:   · Acute nausea and vomiting  happens within a few minutes to a few hours after you get chemo  It is usually worst during the first 4 to 6 hours after treatment and goes away within 24 hours  · Delayed nausea and vomiting  usually does not start until 24 hours or more after you get chemo  It can last for several days  · Anticipatory nausea and vomiting  is a learned response to chemo  It occurs because of past nausea and vomiting after chemo  Your brain expects that nausea and vomiting will happen again, even before the treatment is given  It can occur while you are preparing for the next treatment, during treatment, or after  Treatment for chemo-induced nausea and vomiting:   · Take medicines for nausea and vomiting exactly as directed,  even if you do not have symptoms  You may need to continue to take these medicines as long as chemo is likely to cause nausea and vomiting  For example, you may need to take these medicines for several days after your last dose of chemo  · You may need to try different medicines or take more than one kind  There may be some medicines that work better for you than others   You may also need more than one kind of medicine to prevent or control your nausea and vomiting  · Alternative treatments, such as guided imagery or progressive muscle relaxation, may also help  Ask for more information about these and other alternative treatments  Manage chemo-induced nausea and vomiting:   · Avoid eating foods that can make nausea and vomiting worse  These include high-fat, fried, high-fiber, salty, sweet, and spicy foods  · Avoid strong food odors  You may need to ask others to cook for you so that you can avoid the odor of food as it is cooking  · Eat small, frequent meals throughout the day instead of large meals  You may have less nausea and vomiting with small meals  · Eat bland foods  [place] foods may be easier for you to tolerate  Examples include clear broths, baked chicken, potatoes, rice, crackers, pretzels, and dry toast  Other bland foods include applesauce, bananas, sherbet, and yogurt  · Find the best time for you to eat or drink  on the days you have chemo treatment  You may find it helpful to have a light meal or snack before chemo  Wait at least 1 hour after chemo to eat or drink  Follow up with your healthcare provider as directed:  Write down your questions so you remember to ask them during your visits  © Copyright [address] [place] [address] Information is for End User's use only and may not be sold, redistributed or otherwise used for commercial purposes  All illustrations and images included in CareNotes® are the copyrighted property of [employer]  or Constantine Eaton  The above information is an  only  It is not intended as medical advice for individual conditions or treatments  Talk to your doctor, nurse or pharmacist before following any medical regimen to see if it is safe and effective for you  Constipation   AMBULATORY CARE:   Constipation  is when you have hard, dry bowel movements, or you go longer than usual between bowel movements   Constipation may be caused by a lack of water or high-fiber foods  Certain medicines, or a lack of fiber or physical activity may also cause constipation  Common symptoms include the following:   · Trouble pushing out your bowel movement    · Pain or bleeding during your bowel movement    · A feeling that you did not finish having your bowel movement    · Nausea    · Bloating    · Headache    Call your doctor if:   · You have blood in your bowel movements  · You have a fever and abdominal pain with the constipation  · Your constipation gets worse  · You start to vomit  · You have questions or concerns about your condition or care  Relieve constipation:  Medicine can keep you have a bowel movement more easily  Medicines may increase moisture in your bowel movement or increase the motion of your intestines  · A suppository  may be used to help soften your bowel movements  This may make them easier to pass  A suppository is guided into your rectum through your anus  · Laxatives  can help stimulate your bowels to have a bowel movement  Your provider may recommend you only use laxatives for a short time  Long-term use may make your bowels dependent on the medicine  · An enema  is liquid medicine used to clear bowel movement from your rectum  The medicine is put into your rectum through your anus  Prevent constipation:   · Drink liquids as directed  You may need to drink extra liquids to help soften and move your bowels  Ask how much liquid to drink each day and which liquids are best for you  · Eat high-fiber foods  This may help decrease constipation by adding bulk to your bowel movements  High-fiber foods include fruit, vegetables, whole-grain breads and cereals, and beans  Your healthcare provider or dietitian can help you create a high-fiber meal plan  Your provider may also recommend a fiber supplement if you cannot get enough fiber from food  · Exercise regularly    Regular physical activity can help stimulate your intestines  Walking is a good exercise to prevent or relieve constipation  Ask which exercises are best for you  · Schedule a time each day to have a bowel movement  This may help train your body to have regular bowel movements  Bend forward while you are on the toilet to help move the bowel movement out  Sit on the toilet for at least 10 minutes, even if you do not have a bowel movement  · Talk to your healthcare provider about your medicines  Certain medicines, such as opioids, can cause constipation  Your provider may be able to make medicine changes  For example, he or she may change the kind of medicine, or change when you take it  Follow up with your healthcare provider as directed:  Write down your questions so you remember to ask them during your visits  © Copyright [address] Information is for End User's use only and may not be sold, redistributed or otherwise used for commercial purposes  All illustrations and images included in CareNotes® are the copyrighted property of A D A M , Inc  or [employer]  The above information is an  only  It is not intended as medical advice for individual conditions or treatments  Talk to your doctor, nurse or pharmacist before following any medical regimen to see if it is safe and effective for you  How to Stop Smoking   WHAT YOU NEED TO KNOW:   You will improve your health and the health of others around you if you stop smoking  Your risk for heart and lung disease, cancer, stroke, heart attack, and vision problems will also decrease  Your adolescent can help prevent or stop harm to his or her brain or body  This will help him or her become a healthy adult  You can benefit from quitting no matter how long you have smoked  DISCHARGE INSTRUCTIONS:   Prepare to stop smoking:  Nicotine is a highly addictive drug found in cigarettes   Withdrawal symptoms can happen when you stop smoking and make it hard to quit  These include anxiety, depression, irritability, trouble sleeping, and increased appetite  You increase your chances of success if you prepare to quit  · Set a quit date  [place]ure [place] a date that is within the next 2 weeks  Do not pick a day that you think may be stressful or busy  Write down the day or [place] it on your calender  · Tell friends and family that you plan to quit  Explain that you may have withdrawal symptoms when you try to quit  Ask them to support you  They may be able to encourage you and help reduce your stress to make it easier for you to quit  · Make a list of your reasons for quitting  Put the list somewhere you will see it every day, such as your refrigerator  You can look at the list when you have a craving  · Remove all tobacco and nicotine products from your home, car, and workplace  Also, remove anything else that will tempt you to smoke, such as lighters, matches, or ashtrays  Clean your car, home, and places at work that smell like smoke  The smell of smoke can trigger a craving  · Identify triggers that make you want to smoke  This may include activities, feelings, or people  Also write down 1 way you can deal with each of your triggers  For example, if you want to smoke as soon as you wake up, plan another activity during this time, such as exercise  · Make a plan for how you will quit  Learn about the tools that can help you quit, such as medicine, counseling, or nicotine replacement therapy  Choose at least 2 options to help you quit  · Help your adolescent make a plan to quit  The plan will be more successful if your adolescent makes his or her own decisions  Do not try to pressure him or her to quit immediately or in a certain way  Be supportive and offer help if needed  Tools to help you stop smoking:   · Counseling  from a trained healthcare provider can provide you with support and skills to quit smoking   The provider will also teach you to manage your withdrawal symptoms and cravings  You may receive counseling from one counselor, in group therapy, or through phone therapy called a quit line  · Nicotine replacement therapy (NRT)  such as nicotine patches, gum, or lozenges may help reduce your nicotine cravings  You may get these without a doctor's order  Do not use e-cigarettes or smokeless tobacco in place of cigarettes or to help you quit  They still contain nicotine  · Prescription medicines  such as nasal sprays or nicotine inhalers may help reduce your withdrawal symptoms  Other medicines may also be used to reduce your urge to smoke  Ask your healthcare provider about these medicines  You may need to start certain medicines 2 weeks before your quit date for them to work well  · Hypnosis  is a practice that helps guide you through thoughts and feelings  Hypnosis may help decrease your cravings and make you more willing to quit  · Acupuncture therapy  uses very thin needles to balance energy channels in the body  This is thought to help decrease cravings and symptoms of nicotine withdrawal     · Support groups  let you talk to others who are trying to quit or have already quit  It may be helpful to speak with others about how they quit  Manage your cravings:   · Avoid situations, people, and places that tempt you to smoke  Go to nonsmoking places, such as libraries or restaurants  Understand what tempts you and try to avoid these things  · Keep your hands busy  Hold things such as a stress ball or pen  · Put candy or toothpicks in your mouth  Keep lollipops, sugarless gum, or toothpicks with you at all times  · Do not have alcohol or caffeine  These drinks may tempt you to smoke  Drink healthy liquids such as water or juice instead  · Reward yourself when you resist your cravings  Rewards will motivate you and help you stay positive  · Do an activity that distracts you from your craving    Examples include cleaning, creating art, or gardening  Prevent weight gain after you quit:  You may gain a few pounds after you quit smoking  It is healthier for you to gain a few pounds than to continue to smoke  The following can help you prevent weight gain:  · Eat healthy foods  These include fruits, vegetables, whole-grain breads, low-fat dairy products, beans, lean meats, and fish  Eat healthy snacks, such as low-fat yogurt, if you get hungry between meals  · Drink water before, during, and between meals  This will make your stomach feel full and help prevent you from overeating  Ask your healthcare provider how much liquid to drink each day and which liquids are best for you  · Be physically active  Activity may help reduce your cravings and reduce stress  Take a walk or do some kind of physical activity every day  Ask your healthcare provider which activities are right for you  For support and more information:   · [employer]  Phone: 3- 582 - 616-2482  Web Address: [employer]  [address] Information is for End User's use only and may not be sold, redistributed or otherwise used for commercial purposes  All illustrations and images included in CareNotes® are the copyrighted property of A D A M , Inc  or [address] [place], [place] [address]   The above information is an  only  It is not intended as medical advice for individual conditions or treatments  Talk to your doctor, nurse or pharmacist before following any medical regimen to see if it is safe and effective for you

## 2021-05-19 NOTE — ASSESSMENT & PLAN NOTE
- Patient reports frequent nausea, denying any current symptoms  - Patient is maintained on Zofran, continue Protonix but due to Zofran use having constipating potential we discontinued it and start her on Compazine

## 2021-05-19 NOTE — ASSESSMENT & PLAN NOTE
- patient is a history chronic constipation secondary to chronic opioid use for cancer  - Pt can go 2-5 days between bowel movements, pt reports she had not had a significant bowel movement prior to admission since approximately 5/6   - patient is maintained on senna as an outpatient, discontinued on admission and switch to Senokot-S    Plan   - Continue Miralax BID 17g (1 packet) qd, Senakot-S 2 Pills BID and Amitiza 24mcg BID

## 2021-05-19 NOTE — CASE MANAGEMENT
[facility] resident (Demi) would like to DC pt on Amitiza and wanted to know the cost for the medication through pt's insurance  Per pharmacy at [facility] in [place], pt's medication would cost $100 per month for 60 pills    Cm informed [facility] resident who will meet with pt to review this cost

## 2021-05-19 NOTE — PLAN OF CARE
Problem: Potential for Falls  Goal: Patient will remain free of falls  Description: INTERVENTIONS:  - Assess patient frequently for physical needs  -  Identify cognitive and physical deficits and behaviors that affect risk of falls    -  [place] fall precautions as indicated by assessment   - Educate patient/family on patient safety including physical limitations  - Instruct patient to call for assistance with activity based on assessment  - Modify environment to reduce risk of injury  - Consider OT/PT consult to assist with strengthening/mobility  Outcome: Progressing     Problem: Prexisting or High Potential for Compromised Skin Integrity  Goal: Skin integrity is maintained or improved  Description: INTERVENTIONS:  - Identify patients at risk for skin breakdown  - Assess and monitor skin integrity  - Assess and monitor nutrition and hydration status  - Monitor labs   - Assess for incontinence   - Turn and reposition patient  - Assist with mobility/ambulation  - Relieve pressure over bony prominences  - Avoid friction and shearing  - Provide appropriate hygiene as needed including keeping skin clean and dry  - Evaluate need for skin moisturizer/barrier cream  - Collaborate with interdisciplinary team   - Patient/family teaching  - Consider wound care consult   Outcome: Progressing     Problem: PAIN - ADULT  Goal: Verbalizes/displays adequate comfort level or baseline comfort level  Description: Interventions:  - Encourage patient to monitor pain and request assistance  - Assess pain using appropriate pain scale  - Administer analgesics based on type and severity of pain and evaluate response  - Implement non-pharmacological measures as appropriate and evaluate response  - Consider cultural and social influences on pain and pain management  - Notify physician/advanced practitioner if interventions unsuccessful or patient reports new pain  Outcome: Progressing     Problem: INFECTION - ADULT  Goal: Absence or prevention of progression during hospitalization  Description: INTERVENTIONS:  - Assess and monitor for signs and symptoms of infection  - Monitor lab/diagnostic results  - Monitor all insertion sites, i e  indwelling lines, tubes, and drains  - Monitor endotracheal if appropriate and nasal secretions for changes in amount and color  - [place] appropriate cooling/warming therapies per order  - Administer medications as ordered  - Instruct and encourage patient and family to use good hand hygiene technique  - Identify and instruct in appropriate isolation precautions for identified infection/condition  Outcome: Progressing     Problem: SAFETY ADULT  Goal: Patient will remain free of falls  Description: INTERVENTIONS:  - Assess patient frequently for physical needs  -  Identify cognitive and physical deficits and behaviors that affect risk of falls    -  [place] fall precautions as indicated by assessment   - Educate patient/family on patient safety including physical limitations  - Instruct patient to call for assistance with activity based on assessment  - Modify environment to reduce risk of injury  - Consider OT/PT consult to assist with strengthening/mobility  Outcome: Progressing  Goal: Maintain or return to baseline ADL function  Description: INTERVENTIONS:  -  Assess patient's ability to carry out ADLs; assess patient's baseline for ADL function and identify physical deficits which impact ability to perform ADLs (bathing, care of mouth/teeth, toileting, grooming, dressing, etc )  - Assess/evaluate cause of self-care deficits   - Assess range of motion  - Assess patient's mobility; develop plan if impaired  - Assess patient's need for assistive devices and provide as appropriate  - Encourage maximum independence but intervene and supervise when necessary  - Involve family in performance of ADLs  - Assess for home care needs following discharge   - Consider OT consult to assist with ADL evaluation and planning for discharge  - Provide patient education as appropriate  Outcome: Progressing  Goal: Maintain or return mobility status to optimal level  Description: INTERVENTIONS:  - Assess patient's baseline mobility status (ambulation, transfers, stairs, etc )    - Identify cognitive and physical deficits and behaviors that affect mobility  - Identify mobility aids required to assist with transfers and/or ambulation (gait belt, sit-to-stand, lift, walker, cane, etc )  - [place] fall precautions as indicated by assessment  - Record patient progress and toleration of activity level on Mobility SBAR; progress patient to next Phase/Stage  - Instruct patient to call for assistance with activity based on assessment  - Consider rehabilitation consult to assist with strengthening/weightbearing, etc   Outcome: Progressing     Problem: DISCHARGE PLANNING  Goal: Discharge to home or other facility with appropriate resources  Description: INTERVENTIONS:  - Identify barriers to discharge w/patient and caregiver  - Arrange for needed discharge resources and transportation as appropriate  - Identify discharge learning needs (meds, wound care, etc )  - Arrange for interpretive services to assist at discharge as needed  - Refer to Case Management Department for coordinating discharge planning if the patient needs post-hospital services based on physician/advanced practitioner order or complex needs related to functional status, cognitive ability, or social support system  Outcome: Progressing     Problem: Knowledge Deficit  Goal: Patient/family/caregiver demonstrates understanding of disease process, treatment plan, medications, and discharge instructions  Description: Complete learning assessment and assess knowledge base    Interventions:  - Provide teaching at level of understanding  - Provide teaching via preferred learning methods  Outcome: Progressing     Problem: RESPIRATORY - ADULT  Goal: Achieves optimal ventilation and oxygenation  Description: INTERVENTIONS:  - Assess for changes in respiratory status  - Assess for changes in mentation and behavior  - Position to facilitate oxygenation and minimize respiratory effort  - Oxygen administered by appropriate delivery if ordered  - Initiate smoking cessation education as indicated  - Encourage broncho-pulmonary hygiene including cough, deep breathe, Incentive Spirometry  - Assess the need for suctioning and aspirate as needed  - Assess and instruct to report SOB or any respiratory difficulty  - Respiratory Therapy support as indicated  Outcome: Progressing     Problem: GASTROINTESTINAL - ADULT  Goal: Minimal or absence of nausea and/or vomiting  Description: INTERVENTIONS:  - Administer IV fluids if ordered to ensure adequate hydration  - Maintain NPO status until nausea and vomiting are resolved  - Nasogastric tube if ordered  - Administer ordered antiemetic medications as needed  - Provide nonpharmacologic comfort measures as appropriate  - Advance diet as tolerated, if ordered  - Consider nutrition services referral to assist patient with adequate nutrition and appropriate food choices  Outcome: Progressing  Goal: Maintains or returns to baseline bowel function  Description: INTERVENTIONS:  - Assess bowel function  - Encourage oral fluids to ensure adequate hydration  - Administer IV fluids if ordered to ensure adequate hydration  - Administer ordered medications as needed  - Encourage mobilization and activity  - Consider nutritional services referral to assist patient with adequate nutrition and appropriate food choices  Outcome: Progressing

## 2021-05-19 NOTE — ASSESSMENT & PLAN NOTE
- Patient presents with a 2-3 day history of gradual onset generalized weakness and slurred speech  - Most likely secondary to deconditioning in the setting of patient's cancer history versus infection but this is highly unlikely given the patients clinical findings  - patient's weakness is improving  -PT/OT is recommending post acute rehab services    Plan  - patient to have home rehab on discharge

## 2021-05-19 NOTE — ASSESSMENT & PLAN NOTE
- patient presents with a 2-3 day history of gradual onset weakness  - imaging in the ED noted a "small subsegmental left upper lobe pulmonary embolus"  - patient does reports improvement in her shortness of breath    Plan  - Patient started on Eliquis 10 mg for 7 days b i d , followed by Eliquis 5 mg b i d , pt to continue on d/c and follow up with pcp to determine duration of therapy

## 2021-05-19 NOTE — ASSESSMENT & PLAN NOTE
· Concerned for aspiration  · Chest x-ray showing mild vascular congestion with no overt pulmonary edema  Most recent echocardiogram in April showing normal ejection fraction with diastolic dysfunctions and no significant valvular abnormalities     · Procalcitonin negative x2  · Resolved

## 2021-05-20 ENCOUNTER — TELEPHONE (OUTPATIENT)
Dept: GASTROENTEROLOGY | Facility: AMBULARY SURGERY CENTER | Age: 61
End: 2021-05-20

## 2021-05-20 LAB — SARS-COV-2 RNA RESP QL NAA+PROBE: NEGATIVE

## 2021-05-20 PROCEDURE — U0003 INFECTIOUS AGENT DETECTION BY NUCLEIC ACID (DNA OR RNA); SEVERE ACUTE RESPIRATORY SYNDROME CORONAVIRUS 2 (SARS-COV-2) (CORONAVIRUS DISEASE [COVID-19]), AMPLIFIED PROBE TECHNIQUE, MAKING USE OF HIGH THROUGHPUT TECHNOLOGIES AS DESCRIBED BY CMS-2020-01-R: HCPCS | Performed by: PSYCHIATRY & NEUROLOGY

## 2021-05-20 PROCEDURE — U0005 INFEC AGEN DETEC AMPLI PROBE: HCPCS | Performed by: PSYCHIATRY & NEUROLOGY

## 2021-05-20 PROCEDURE — 99232 SBSQ HOSP IP/OBS MODERATE 35: CPT | Performed by: INTERNAL MEDICINE

## 2021-05-20 RX ORDER — POLYETHYLENE GLYCOL 3350 17 G/17G
17 POWDER, FOR SOLUTION ORAL 2 TIMES DAILY
Status: DISCONTINUED | OUTPATIENT
Start: 2021-05-20 | End: 2021-05-25 | Stop reason: HOSPADM

## 2021-05-20 RX ADMIN — LUBIPROSTONE 24 MCG: 24 CAPSULE, GELATIN COATED ORAL at 16:05

## 2021-05-20 RX ADMIN — LUBIPROSTONE 24 MCG: 24 CAPSULE, GELATIN COATED ORAL at 08:45

## 2021-05-20 RX ADMIN — POLYETHYLENE GLYCOL 3350 17 G: 17 POWDER, FOR SOLUTION ORAL at 20:48

## 2021-05-20 RX ADMIN — PANTOPRAZOLE SODIUM 40 MG: 40 TABLET, DELAYED RELEASE ORAL at 06:33

## 2021-05-20 RX ADMIN — ACETAMINOPHEN 650 MG: 325 TABLET, FILM COATED ORAL at 12:05

## 2021-05-20 RX ADMIN — LEVETIRACETAM 250 MG: 500 TABLET, FILM COATED ORAL at 08:46

## 2021-05-20 RX ADMIN — APIXABAN 5 MG: 5 TABLET, FILM COATED ORAL at 08:46

## 2021-05-20 RX ADMIN — PROCHLORPERAZINE MALEATE 5 MG: 10 TABLET ORAL at 12:05

## 2021-05-20 RX ADMIN — LIDOCAINE 5% 1 PATCH: 700 PATCH TOPICAL at 08:48

## 2021-05-20 RX ADMIN — SENNOSIDES AND DOCUSATE SODIUM 2 TABLET: 8.6; 5 TABLET ORAL at 08:47

## 2021-05-20 RX ADMIN — GABAPENTIN 400 MG: 400 CAPSULE ORAL at 16:05

## 2021-05-20 RX ADMIN — OLANZAPINE 10 MG: 10 TABLET, FILM COATED ORAL at 20:54

## 2021-05-20 RX ADMIN — HYDROMORPHONE HYDROCHLORIDE 8 MG: 2 TABLET ORAL at 13:43

## 2021-05-20 RX ADMIN — LEVETIRACETAM 250 MG: 500 TABLET, FILM COATED ORAL at 20:49

## 2021-05-20 RX ADMIN — PROCHLORPERAZINE MALEATE 5 MG: 10 TABLET ORAL at 06:33

## 2021-05-20 RX ADMIN — POLYETHYLENE GLYCOL 3350 17 G: 17 POWDER, FOR SOLUTION ORAL at 08:48

## 2021-05-20 RX ADMIN — NICOTINE 1 PATCH: 14 PATCH, EXTENDED RELEASE TRANSDERMAL at 08:48

## 2021-05-20 RX ADMIN — DEXAMETHASONE 2 MG: 2 TABLET ORAL at 08:47

## 2021-05-20 RX ADMIN — GABAPENTIN 400 MG: 400 CAPSULE ORAL at 12:05

## 2021-05-20 RX ADMIN — APIXABAN 5 MG: 5 TABLET, FILM COATED ORAL at 17:49

## 2021-05-20 RX ADMIN — PROCHLORPERAZINE MALEATE 5 MG: 10 TABLET ORAL at 16:04

## 2021-05-20 RX ADMIN — SENNOSIDES AND DOCUSATE SODIUM 2 TABLET: 8.6; 5 TABLET ORAL at 17:49

## 2021-05-20 RX ADMIN — GABAPENTIN 400 MG: 400 CAPSULE ORAL at 08:45

## 2021-05-20 NOTE — PROGRESS NOTES
[facility]  Progress Note - Jc London 1960, 64 y o  female MRN: [id number]  Unit/Bed#: S -01 Encounter: [id number]  Primary Care Provider: Arnie Herrera MD   Date and time admitted to hospital: 5/10/2021  1:16 PM    * Pulmonary embolism ([facility] [address] )  Assessment & Plan  - patient presents with a 2-3 day history of gradual onset weakness  - imaging in the ED noted a "small subsegmental left upper lobe pulmonary embolus"  - patient does reports improvement in her shortness of breath    Plan  - Patient started on Eliquis 10 mg for 7 days b i d , followed by Eliquis 5 mg b i d , pt to continue on d/c and follow up with pcp to determine duration of therapy  Impaction of intestine ([facility] )  Assessment & Plan  - patient is a history chronic constipation (2/2 opioid induced constipation)  - Pt can go 2-5 days between bowel movements, pt reports she had not had a significant bowel movement prior to admission since approximately 5/6   - patient had notable abdominal distension and pain on admission  - patient is maintained on senna as an outpatient, discontinued on admission and switch to Senokot-S    Per GI:  -"would not recommend decompressive colonoscopy at this time  Would not recommend colonoscopy in general for assessment at this time due to new PE, eliquis on board, and stage 4 cancer   Could consider this several months down the road once eliquis can be held safely pending patient's cancer status"    Plan  - Continue Miralax BID 17g (1 packet), Senakot-S 2 Pills BID and Amitiza 24mcg BID on D/C  - Pt to follow up with GI as an out patient    Therapeutic opioid-induced constipation (OIC)  Assessment & Plan  - patient is a history chronic constipation secondary to chronic opioid use for cancer  - Pt can go 2-5 days between bowel movements, pt reports she had not had a significant bowel movement prior to admission since approximately 5/6   - patient is maintained on senna as an outpatient, discontinued on admission and switch to Senokot-S    Plan   - Continue Miralax BID 17g (1 packet) bid, Senakot-S 2 Pills BID and Amitiza 24mcg BID    Acute respiratory failure with hypoxia (HCC)  Assessment & Plan  · Concerned for aspiration  · Chest x-ray showing mild vascular congestion with no overt pulmonary edema  Most recent echocardiogram in April showing normal ejection fraction with diastolic dysfunctions and no significant valvular abnormalities     · Procalcitonin negative x2  · Resolved    Transaminitis  Assessment & Plan  - Patient noted to have elevated AST 67/ in ED  - CT of the abdomen did not note any acute pathology  - Liver enzymes in ED mildly elevated from baseline, possibly reactive  - Per GI elevated LFTs demonstrate a hepatocellular pattern  - hepatitis panel negative  - Celiac panel negative    Plan  - patient to follow-up with GI as an outpatient    Anemia of chronic disease  Assessment & Plan  - Pt has noted decrease in hgb on admission    Tobacco abuse  Assessment & Plan  - Patient has a history of tobacco abuse  - Will order the patient nicotine replacement  - Encourage cessation    Weakness generalized  Assessment & Plan  - Patient presents with a 2-3 day history of gradual onset generalized weakness and slurred speech  - Most likely secondary to deconditioning in the setting of patient's cancer history versus infection but this is highly unlikely given the patients clinical findings  - patient's weakness is improving  -PT/OT is recommending post acute rehab services    Plan  - patient to have home rehab on discharge    CINV (chemotherapy-induced nausea and vomiting)  Assessment & Plan  - Patient reports frequent nausea, denying any current symptoms  - Patient is maintained on Zofran, continue Protonix but due to Zofran use having constipating potential we discontinued it and start her on Compazine    Cancer-related pain  Assessment & Plan  - Pt has extensive history of cancer related pain  - Pt to follow up with palliative care as out patient   - Pt to continue PTA pain medication regiment    Brain metastases ([facility] [address] )  Assessment & Plan  - Pt has breast cancer with mets to the brain  - CTH 5/10: No acute intracranial abnormality or significant change from prior study   - Most recent MRI was in     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, estrogen receptor positive ([facility] [address] )  Assessment & Plan  - Diagnosed with metastatic breast cancer in  with mets to the brain and bone  - S/p numerous rounds of chemotherapy  - S/P double mastectomy   - Follows with heme/onc as out patient    Leukocytosis-resolved as of 2021  Assessment & Plan  - Patient is noted to have a mild leukocytosis on labs in the emergency room on presentation 10 43  - Patient denies any fever, chills  - Low suspicion of infectious process  - Patient is on steroids chronically at home, this is a possible source of the leukocytosis  - Resolved    VTE Pharmacologic Prophylaxis:   Pharmacologic: Apixaban (Eliquis)  Mechanical VTE Prophylaxis in Place: Yes    Discussions with Specialists or Other Care Team Provider: GI    Education and Discussions with Family / Patient:  Discussed the plan of care with the patient    Current Length of Stay: 10 day(s)    Current Patient Status: Inpatient     Discharge Plan / Estimated Discharge Date: To be determined looking for rehab placement    Code Status: Level 1 - Full Code    Subjective:   Patient seen and examined  No acute events overnight  Patient has continued to have bowel movements  We discussed that we were to go to  Patient denies any headache, dizziness, nausea, vomiting, constipation, diarrhea, chest pain, palpitations, shortness of breath, wheezing  A 12 point review of systems was completed and was negative unless noted above      Objective:     Vitals:   Temp (24hrs), Av °F (36 7 °C), Min:98 °F (36 7 °C), Max:98 1 °F (36 7 °C)    Temp:  [98 °F (36 7 °C)-98 1 °F (36 7 °C)] 98 °F (36 7 °C)  HR:  [92-93] 93  Resp:  [16-18] 16  BP: (129-143)/(75-79) 133/79  SpO2:  [97 %-98 %] 98 %  Body mass index is 28 58 kg/m²  Input and Output Summary (last 24 hours): Intake/Output Summary (Last 24 hours) at 5/20/2021 1448  Last data filed at 5/20/2021 0900  Gross per 24 hour   Intake 300 ml   Output --   Net 300 ml       Physical Exam:     Physical Exam  Vitals signs and nursing note reviewed  Constitutional:       General: She is not in acute distress  Appearance: She is well-developed  She is not diaphoretic  HENT:      Head: Normocephalic and atraumatic  Nose: Nose normal    Eyes:      General: No scleral icterus  Right eye: No discharge  Left eye: No discharge  Extraocular Movements: Extraocular movements intact  Conjunctiva/sclera: Conjunctivae normal       Pupils: Pupils are equal, round, and reactive to light  Neck:      Musculoskeletal: Normal range of motion  Cardiovascular:      Rate and Rhythm: Normal rate and regular rhythm  Heart sounds: Normal heart sounds  No murmur  No friction rub  No gallop  Pulmonary:      Effort: Pulmonary effort is normal  No respiratory distress  Breath sounds: No stridor  No wheezing, rhonchi or rales  Chest:      Chest wall: No tenderness  Abdominal:      General: Abdomen is flat  Bowel sounds are normal  There is distension (Improved)  Palpations: Abdomen is soft  Tenderness: There is no abdominal tenderness  Genitourinary:     Rectum: Normal  No mass or tenderness  Normal anal tone  Musculoskeletal: Normal range of motion  Right lower leg: No edema  Left lower leg: No edema  Skin:     General: Skin is warm and dry  Neurological:      General: No focal deficit present  Mental Status: She is alert and oriented to person, place, and time  Motor: Weakness present     Psychiatric:         Behavior: Behavior normal          Thought Content: Thought content normal          Judgment: Judgment normal            Additional Data:     Labs: I have personally reviewed pertinent reports  Results from last 7 days   Lab Units 05/18/21  0658 05/17/21  0627 05/16/21  0629   WBC Thousand/uL 6 75 6 85 10 34*   HEMOGLOBIN g/dL 9 9* 10 3* 11 0*   HEMATOCRIT % 31 9* 32 9* 35 2   PLATELETS Thousands/uL 272 259 279      Results from last 7 days   Lab Units 05/18/21  0658 05/17/21  0627 05/16/21  0629   POTASSIUM mmol/L 3 6 3 3* 3 7   CHLORIDE mmol/L 111* 106 106   CO2 mmol/L 23 27 29   BUN mg/dL 7 9 11   CREATININE mg/dL 0 54* 0 53* 0 60   CALCIUM mg/dL 7 8* 8 1* 8 5   ALK PHOS U/L 123* 130* 146*   ALT U/L 107* 158* 234*   AST U/L 30 50* 106*     Results from last 7 days   Lab Units 05/15/21  0619   MAGNESIUM mg/dL 2 9*                        * Additional Pertinent Lab Tests Reviewed: [place] [facility] Admission Reviewed    Radiology Results: I have personally reviewed pertinent reports  Xr Chest Portable    Result Date: 5/16/2021  Impression: Stable chest   No acute infiltrates  Workstation performed: [id number]     Xr Chest Portable    Result Date: 5/16/2021  Impression: NG tube in stomach  Mild central vascular prominence without overt pulmonary edema  Workstation performed: [id number]     Xr Abdomen Obstruction Series    Result Date: 5/14/2021  Impression: Improved constipation Nonobstructive bowel gas pattern  Workstation performed: [id number]     Ct Head Without Contrast    Result Date: 5/10/2021  Impression: No acute intracranial abnormality or significant change from prior study  Changes within the brain parenchyma again suggesting sequela of prior whole brain radiation including previously treated right periatrial dystrophic calcification  No new lesions detected within limitations of noncontrast imaging  Intracranial metastatic disease again better evaluated with contrast-enhanced MRI   Workstation performed: [id number]     Pe Study With Ct Abdomen And Pelvis With Contrast    Result Date: 5/10/2021  Impression: 1  Limited CT pulmonary angiogram, however, suggestion of a small subsegmental left upper lobe pulmonary embolus  Dilated RV/LV ratio which is nonspecific and could be indicative of elevated right heart pressures in the setting of COPD/pulmonary hypertension  No bowing of the intraventricular septum  2   Small bilateral pulmonary nodules, grossly similar  1 cm pleural-based left apical nodule appears larger  Cannot exclude evolving metastasis  3   No acute intra-abdominal abnormality  4   Large volume of colonic stool suggesting constipation  5   Marked urinary bladder distention   I personally discussed this study with physician assistant KAILEE Rodriguez on 5/10/2021 at 3:50 PM  Workstation performed: [id number]     Recent Cultures (last 7 days):           Last 24 Hours Medication List:   Current Facility-Administered Medications   Medication Dose Route Frequency Provider Last Rate    acetaminophen  650 mg Oral Q6H PRN Ethelda Burows, DO      albuterol  2 puff Inhalation Q6H PRN Ethelda Burows, DO      albuterol  2 5 mg Nebulization Q4H PRN Ethelda Burows, DO      aluminum-magnesium hydroxide-simethicone  30 mL Oral Q6H PRN Ethelda Burows, DO      apixaban  5 mg Oral BID Ethelda Burows, DO      bisacodyl  10 mg Rectal Daily PRN Ethelda Boehringer CRNP      dexamethasone  2 mg Oral Daily With Breakfast Ethelda Burows, DO      gabapentin  400 mg Oral TID AC Juliocesar Simms, DO      HYDROmorphone  1 mg Intravenous Q6H PRN Ethelda Burows, DO      HYDROmorphone  4 mg Oral Q4H PRN Ethelda Burows, DO      HYDROmorphone  8 mg Oral Q4H PRN Ethelda Burows, DO      levETIRAcetam  250 mg Oral Q12H [address] Juliocesar Carrington, DO      lidocaine  1 patch Topical Daily EthAXEL GoodmanNP      lubiprostone  24 mcg Oral BID With Meals Alice Enamorado MD      nicotine  1 patch Transdermal Daily Juliocesar Simms,       OLANZapine  10 mg Oral HS [facility] Demi,       pantoprazole  40 mg Oral Daily [employer], DO      polyethylene glycol  17 g Oral BID [employer], DO      prochlorperazine  5 mg Oral TID AC Juliocesar Simms,       senna-docusate sodium  2 tablet Oral BID [employer], DO      zolpidem  10 mg Oral HS PRN Michele [employer], DO       Today, Patient Was Seen By: [employer], DO    Portions of the record may have been created with voice recognition software  Occasional wrong word or "sound a like" substitutions may have occurred due to the inherent limitations of voice recognition software  Read the chart carefully and recognize, using context, where substitutions have occurred

## 2021-05-20 NOTE — UTILIZATION REVIEW
Continued Stay Review    Date: 5/20/2021                        Current Patient Class: inpatient  Current Level of Care: med surg    HPI:60 y o  female initially admitted on  5/10/2021 inpatient due to PE/generalized weakness/opoid induced constipation    Patient has breast cancer with metastases to brain on chemotherapy  Patient started on Eliquis for PE, received multiple treatments for impaction:  Initially Senokot-S 2 tabs BID and Miralax BID then Relistor 5/11, 5/12, 5/13, Mag citrate 5/12 and Duculox supp 5/12, bowel prep 5/13 and started on Amitiza on 5/14  During stay concern for aspiration and treated with antibiotics 5/16 to 5/18/2021          Assessment/Plan:  5/20/2021 Patient and family agreeable to rehab  Referral sent to [facility] and awaiting determination  Has large BM on 5/19/2021, none today  On exam improved abdominal distention  Weakness    To continue bowel regime of Continue Miralax BID 17g (1 packet), Senakot-S 2 Pills BID and Amitiza 24mcg BID    Vital Signs:   05/20/21 0700  98 °F (36 7 °C)  93  16  133/79  --  98 %  None (Room air)  Lying   05/19/21 2216  98 1 °F (36 7 °C)  93  18  143/75  100  97 %  None (Room air)         Pertinent Labs/Diagnostic Results:   5/17/2021 RUQ US - Findings consistent with gallbladder wall adenomyomatosis  Results from last 7 days   Lab Units 05/15/21  1756   SARS-COV-2  Negative     Results from last 7 days   Lab Units 05/18/21  0658 05/17/21  0627 05/16/21  0629 05/15/21  0620 05/14/21  0608   WBC Thousand/uL 6 75 6 85 10 34* 8 72 8 80   HEMOGLOBIN g/dL 9 9* 10 3* 11 0* 11 2* 11 4*   HEMATOCRIT % 31 9* 32 9* 35 2 36 3 36 5   PLATELETS Thousands/uL 272 259 279 259 264     Results from last 7 days   Lab Units 05/18/21  0658 05/17/21  0627 05/16/21  0629 05/15/21  0619 05/14/21  0608   SODIUM mmol/L 145 143 144 146* 145   POTASSIUM mmol/L 3 6 3 3* 3 7 4 0 3 9   CHLORIDE mmol/L 111* 106 106 107 109*   CO2 mmol/L 23 27 29 30 28   ANION GAP mmol/L 11 10 9 9 8   BUN mg/dL 7 9 11 10 8   CREATININE mg/dL 0 54* 0 53* 0 60 0 64 0 57*   EGFR ml/min/1 73sq m 104 105 101 99 103   CALCIUM mg/dL 7 8* 8 1* 8 5 8 5 8 9   MAGNESIUM mg/dL  --   --   --  2 9*  --      Results from last 7 days   Lab Units 05/18/21  0658 05/17/21  0627 05/16/21  0629 05/15/21  0619 05/14/21  0608   AST U/L 30 50* 106* 82* 50*   ALT U/L 107* 158* 234* 192* 130*   ALK PHOS U/L 123* 130* 146* 124* 105   TOTAL PROTEIN g/dL 6 5 6 9 7 1 7 1 7 1   ALBUMIN g/dL 2 7* 2 8* 3 1* 3 1* 3 1*   TOTAL BILIRUBIN mg/dL 0 29 0 44 0 49 0 39 0 32     Results from last 7 days   Lab Units 05/18/21  0658 05/17/21  0627 05/16/21  0629 05/15/21  0619 05/14/21  0608   GLUCOSE RANDOM mg/dL 90 85 100 82 83     Results from last 7 days   Lab Units 05/17/21  0627 05/16/21  0629   PROCALCITONIN ng/ml <0 05 <0 05     Results from last 7 days   Lab Units 05/17/21  0627   FERRITIN ng/mL 108     Results from last 7 days   Lab Units 05/15/21  0619   HEP B S AG  Non-reactive   HEP C AB  Non-reactive   HEP B C IGM  Non-reactive   HEP B C TOTAL AB  Non-reactive       Medications:   Scheduled Medications:  apixaban, 5 mg, Oral, BID  dexamethasone, 2 mg, Oral, Daily With Breakfast  gabapentin, 400 mg, Oral, TID AC  levETIRAcetam, 250 mg, Oral, Q12H STEFFI  lidocaine, 1 patch, Topical, Daily  lubiprostone, 24 mcg, Oral, BID With Meals  nicotine, 1 patch, Transdermal, Daily  OLANZapine, 10 mg, Oral, HS  pantoprazole, 40 mg, Oral, Daily  polyethylene glycol, 17 g, Oral, BID  prochlorperazine, 5 mg, Oral, TID AC  senna-docusate sodium, 2 tablet, Oral, BID    Continuous IV Infusions: dc 5/17/2021      PRN Meds:  acetaminophen, 650 mg, Oral, Q6H PRN - used x 1 5/20/2021   albuterol, 2 puff, Inhalation, Q6H PRN  albuterol, 2 5 mg, Nebulization, Q4H PRN  aluminum-magnesium hydroxide-simethicone, 30 mL, Oral, Q6H PRN  bisacodyl, 10 mg, Rectal, Daily PRN  HYDROmorphone, 1 mg, Intravenous, Q6H PRN  HYDROmorphone, 4 mg, Oral, Q4H PRN  HYDROmorphone, 8 mg, Oral, Q4H PRN - used x 1 5/20  zolpidem, 10 mg, Oral, HS PRN        Discharge Plan: To be determined  Network Utilization Review Department  ATTENTION: Please call with any questions or concerns to [phone] and carefully listen to the prompts so that you are directed to the right person  All voicemails are confidential   Valeta Pasteanna all requests for admission clinical reviews, approved or denied determinations and any other requests to dedicated fax number below belonging to the campus where the patient is receiving treatment   List of dedicated fax numbers for the Facilities:  [address] DENIALS (Administrative/Medical Necessity) [phone]   [address] (Maternity/NICU/Pediatrics) [phone]   [address] [place] [address] [id number] [place] [address] [phone] [facility] [facility] [phone]

## 2021-05-20 NOTE — PLAN OF CARE
Problem: Potential for Falls  Goal: Patient will remain free of falls  Description: INTERVENTIONS:  - Assess patient frequently for physical needs  -  Identify cognitive and physical deficits and behaviors that affect risk of falls    -  [place] fall precautions as indicated by assessment   - Educate patient/family on patient safety including physical limitations  - Instruct patient to call for assistance with activity based on assessment  - Modify environment to reduce risk of injury  - Consider OT/PT consult to assist with strengthening/mobility  Outcome: Progressing     Problem: Prexisting or High Potential for Compromised Skin Integrity  Goal: Skin integrity is maintained or improved  Description: INTERVENTIONS:  - Identify patients at risk for skin breakdown  - Assess and monitor skin integrity  - Assess and monitor nutrition and hydration status  - Monitor labs   - Assess for incontinence   - Turn and reposition patient  - Assist with mobility/ambulation  - Relieve pressure over bony prominences  - Avoid friction and shearing  - Provide appropriate hygiene as needed including keeping skin clean and dry  - Evaluate need for skin moisturizer/barrier cream  - Collaborate with interdisciplinary team   - Patient/family teaching  - Consider wound care consult   Outcome: Progressing     Problem: PAIN - ADULT  Goal: Verbalizes/displays adequate comfort level or baseline comfort level  Description: Interventions:  - Encourage patient to monitor pain and request assistance  - Assess pain using appropriate pain scale  - Administer analgesics based on type and severity of pain and evaluate response  - Implement non-pharmacological measures as appropriate and evaluate response  - Consider cultural and social influences on pain and pain management  - Notify physician/advanced practitioner if interventions unsuccessful or patient reports new pain  Outcome: Progressing     Problem: INFECTION - ADULT  Goal: Absence or prevention of progression during hospitalization  Description: INTERVENTIONS:  - Assess and monitor for signs and symptoms of infection  - Monitor lab/diagnostic results  - Monitor all insertion sites, i e  indwelling lines, tubes, and drains  - Monitor endotracheal if appropriate and nasal secretions for changes in amount and color  - [place] appropriate cooling/warming therapies per order  - Administer medications as ordered  - Instruct and encourage patient and family to use good hand hygiene technique  - Identify and instruct in appropriate isolation precautions for identified infection/condition  Outcome: Progressing     Problem: SAFETY ADULT  Goal: Patient will remain free of falls  Description: INTERVENTIONS:  - Assess patient frequently for physical needs  -  Identify cognitive and physical deficits and behaviors that affect risk of falls    -  [place] fall precautions as indicated by assessment   - Educate patient/family on patient safety including physical limitations  - Instruct patient to call for assistance with activity based on assessment  - Modify environment to reduce risk of injury  - Consider OT/PT consult to assist with strengthening/mobility  Outcome: Progressing  Goal: Maintain or return to baseline ADL function  Description: INTERVENTIONS:  -  Assess patient's ability to carry out ADLs; assess patient's baseline for ADL function and identify physical deficits which impact ability to perform ADLs (bathing, care of mouth/teeth, toileting, grooming, dressing, etc )  - Assess/evaluate cause of self-care deficits   - Assess range of motion  - Assess patient's mobility; develop plan if impaired  - Assess patient's need for assistive devices and provide as appropriate  - Encourage maximum independence but intervene and supervise when necessary  - Involve family in performance of ADLs  - Assess for home care needs following discharge   - Consider OT consult to assist with ADL evaluation and planning for discharge  - Provide patient education as appropriate  Outcome: Progressing  Goal: Maintain or return mobility status to optimal level  Description: INTERVENTIONS:  - Assess patient's baseline mobility status (ambulation, transfers, stairs, etc )    - Identify cognitive and physical deficits and behaviors that affect mobility  - Identify mobility aids required to assist with transfers and/or ambulation (gait belt, sit-to-stand, lift, walker, cane, etc )  - [place] fall precautions as indicated by assessment  - Record patient progress and toleration of activity level on Mobility SBAR; progress patient to next Phase/Stage  - Instruct patient to call for assistance with activity based on assessment  - Consider rehabilitation consult to assist with strengthening/weightbearing, etc   Outcome: Progressing     Problem: DISCHARGE PLANNING  Goal: Discharge to home or other facility with appropriate resources  Description: INTERVENTIONS:  - Identify barriers to discharge w/patient and caregiver  - Arrange for needed discharge resources and transportation as appropriate  - Identify discharge learning needs (meds, wound care, etc )  - Arrange for interpretive services to assist at discharge as needed  - Refer to Case Management Department for coordinating discharge planning if the patient needs post-hospital services based on physician/advanced practitioner order or complex needs related to functional status, cognitive ability, or social support system  Outcome: Progressing     Problem: Knowledge Deficit  Goal: Patient/family/caregiver demonstrates understanding of disease process, treatment plan, medications, and discharge instructions  Description: Complete learning assessment and assess knowledge base    Interventions:  - Provide teaching at level of understanding  - Provide teaching via preferred learning methods  Outcome: Progressing     Problem: RESPIRATORY - ADULT  Goal: Achieves optimal ventilation and oxygenation  Description: INTERVENTIONS:  - Assess for changes in respiratory status  - Assess for changes in mentation and behavior  - Position to facilitate oxygenation and minimize respiratory effort  - Oxygen administered by appropriate delivery if ordered  - Initiate smoking cessation education as indicated  - Encourage broncho-pulmonary hygiene including cough, deep breathe, Incentive Spirometry  - Assess the need for suctioning and aspirate as needed  - Assess and instruct to report SOB or any respiratory difficulty  - Respiratory Therapy support as indicated  Outcome: Progressing     Problem: GASTROINTESTINAL - ADULT  Goal: Minimal or absence of nausea and/or vomiting  Description: INTERVENTIONS:  - Administer IV fluids if ordered to ensure adequate hydration  - Maintain NPO status until nausea and vomiting are resolved  - Nasogastric tube if ordered  - Administer ordered antiemetic medications as needed  - Provide nonpharmacologic comfort measures as appropriate  - Advance diet as tolerated, if ordered  - Consider nutrition services referral to assist patient with adequate nutrition and appropriate food choices  Outcome: Progressing  Goal: Maintains or returns to baseline bowel function  Description: INTERVENTIONS:  - Assess bowel function  - Encourage oral fluids to ensure adequate hydration  - Administer IV fluids if ordered to ensure adequate hydration  - Administer ordered medications as needed  - Encourage mobilization and activity  - Consider nutritional services referral to assist patient with adequate nutrition and appropriate food choices  Outcome: Progressing     Problem: Nutrition/Hydration-ADULT  Goal: Nutrient/Hydration intake appropriate for improving, restoring or maintaining nutritional needs  Description: Monitor and assess patient's nutrition/hydration status for malnutrition  Collaborate with interdisciplinary team and initiate plan and interventions as ordered    Monitor patient's weight and dietary intake as ordered or per policy  Utilize nutrition screening tool and intervene as necessary  Determine patient's food preferences and provide high-protein, high-caloric foods as appropriate       INTERVENTIONS:  - Monitor oral intake, urinary output, labs, and treatment plans  - Assess nutrition and hydration status and recommend course of action  - Evaluate amount of meals eaten  - Assist patient with eating if necessary   - Allow adequate time for meals  - Recommend/ encourage appropriate diets, oral nutritional supplements, and vitamin/mineral supplements  - Order, calculate, and assess calorie counts as needed  - Recommend, monitor, and adjust tube feedings and TPN/PPN based on assessed needs  - Assess need for intravenous fluids  - Provide specific nutrition/hydration education as appropriate  - Include patient/family/caregiver in decisions related to nutrition  Outcome: Progressing

## 2021-05-20 NOTE — CASE MANAGEMENT
CM met with pt to review DCP as pt and family are now in agreement with rehab  CM reviewed with her that she stated she does not want to go to rehab at [facility]  Cm reviewed the list of facilities and the next closest as she does not want to go that far away from home  [place] of choice has been reviewed  Pt states she would like to go to [facility] as this is the closest to home and she is hoping things have changed and the staffing is better  Referral has been made to [facility]  Auth will be needed for pt to be discharged to rehab  CM will submit for auth once accepting facility has been obtained  Cm will continue to follow

## 2021-05-20 NOTE — PLAN OF CARE
Problem: Potential for Falls  Goal: Patient will remain free of falls  Description: INTERVENTIONS:  - Assess patient frequently for physical needs  -  Identify cognitive and physical deficits and behaviors that affect risk of falls    -  [place] fall precautions as indicated by assessment   - Educate patient/family on patient safety including physical limitations  - Instruct patient to call for assistance with activity based on assessment  - Modify environment to reduce risk of injury  - Consider OT/PT consult to assist with strengthening/mobility  Outcome: Progressing     Problem: Prexisting or High Potential for Compromised Skin Integrity  Goal: Skin integrity is maintained or improved  Description: INTERVENTIONS:  - Identify patients at risk for skin breakdown  - Assess and monitor skin integrity  - Assess and monitor nutrition and hydration status  - Monitor labs   - Assess for incontinence   - Turn and reposition patient  - Assist with mobility/ambulation  - Relieve pressure over bony prominences  - Avoid friction and shearing  - Provide appropriate hygiene as needed including keeping skin clean and dry  - Evaluate need for skin moisturizer/barrier cream  - Collaborate with interdisciplinary team   - Patient/family teaching  - Consider wound care consult   Outcome: Progressing     Problem: PAIN - ADULT  Goal: Verbalizes/displays adequate comfort level or baseline comfort level  Description: Interventions:  - Encourage patient to monitor pain and request assistance  - Assess pain using appropriate pain scale  - Administer analgesics based on type and severity of pain and evaluate response  - Implement non-pharmacological measures as appropriate and evaluate response  - Consider cultural and social influences on pain and pain management  - Notify physician/advanced practitioner if interventions unsuccessful or patient reports new pain  Outcome: Progressing     Problem: INFECTION - ADULT  Goal: Absence or prevention of progression during hospitalization  Description: INTERVENTIONS:  - Assess and monitor for signs and symptoms of infection  - Monitor lab/diagnostic results  - Monitor all insertion sites, i e  indwelling lines, tubes, and drains  - Monitor endotracheal if appropriate and nasal secretions for changes in amount and color  - [place] appropriate cooling/warming therapies per order  - Administer medications as ordered  - Instruct and encourage patient and family to use good hand hygiene technique  - Identify and instruct in appropriate isolation precautions for identified infection/condition  Outcome: Progressing     Problem: SAFETY ADULT  Goal: Patient will remain free of falls  Description: INTERVENTIONS:  - Assess patient frequently for physical needs  -  Identify cognitive and physical deficits and behaviors that affect risk of falls    -  [place] fall precautions as indicated by assessment   - Educate patient/family on patient safety including physical limitations  - Instruct patient to call for assistance with activity based on assessment  - Modify environment to reduce risk of injury  - Consider OT/PT consult to assist with strengthening/mobility  Outcome: Progressing  Goal: Maintain or return to baseline ADL function  Description: INTERVENTIONS:  -  Assess patient's ability to carry out ADLs; assess patient's baseline for ADL function and identify physical deficits which impact ability to perform ADLs (bathing, care of mouth/teeth, toileting, grooming, dressing, etc )  - Assess/evaluate cause of self-care deficits   - Assess range of motion  - Assess patient's mobility; develop plan if impaired  - Assess patient's need for assistive devices and provide as appropriate  - Encourage maximum independence but intervene and supervise when necessary  - Involve family in performance of ADLs  - Assess for home care needs following discharge   - Consider OT consult to assist with ADL evaluation and planning for discharge  - Provide patient education as appropriate  Outcome: Progressing  Goal: Maintain or return mobility status to optimal level  Description: INTERVENTIONS:  - Assess patient's baseline mobility status (ambulation, transfers, stairs, etc )    - Identify cognitive and physical deficits and behaviors that affect mobility  - Identify mobility aids required to assist with transfers and/or ambulation (gait belt, sit-to-stand, lift, walker, cane, etc )  - [place] fall precautions as indicated by assessment  - Record patient progress and toleration of activity level on Mobility SBAR; progress patient to next Phase/Stage  - Instruct patient to call for assistance with activity based on assessment  - Consider rehabilitation consult to assist with strengthening/weightbearing, etc   Outcome: Progressing     Problem: DISCHARGE PLANNING  Goal: Discharge to home or other facility with appropriate resources  Description: INTERVENTIONS:  - Identify barriers to discharge w/patient and caregiver  - Arrange for needed discharge resources and transportation as appropriate  - Identify discharge learning needs (meds, wound care, etc )  - Arrange for interpretive services to assist at discharge as needed  - Refer to Case Management Department for coordinating discharge planning if the patient needs post-hospital services based on physician/advanced practitioner order or complex needs related to functional status, cognitive ability, or social support system  Outcome: Progressing     Problem: Knowledge Deficit  Goal: Patient/family/caregiver demonstrates understanding of disease process, treatment plan, medications, and discharge instructions  Description: Complete learning assessment and assess knowledge base    Interventions:  - Provide teaching at level of understanding  - Provide teaching via preferred learning methods  Outcome: Progressing     Problem: RESPIRATORY - ADULT  Goal: Achieves optimal ventilation and oxygenation  Description: INTERVENTIONS:  - Assess for changes in respiratory status  - Assess for changes in mentation and behavior  - Position to facilitate oxygenation and minimize respiratory effort  - Oxygen administered by appropriate delivery if ordered  - Initiate smoking cessation education as indicated  - Encourage broncho-pulmonary hygiene including cough, deep breathe, Incentive Spirometry  - Assess the need for suctioning and aspirate as needed  - Assess and instruct to report SOB or any respiratory difficulty  - Respiratory Therapy support as indicated  Outcome: Progressing     Problem: GASTROINTESTINAL - ADULT  Goal: Minimal or absence of nausea and/or vomiting  Description: INTERVENTIONS:  - Administer IV fluids if ordered to ensure adequate hydration  - Maintain NPO status until nausea and vomiting are resolved  - Nasogastric tube if ordered  - Administer ordered antiemetic medications as needed  - Provide nonpharmacologic comfort measures as appropriate  - Advance diet as tolerated, if ordered  - Consider nutrition services referral to assist patient with adequate nutrition and appropriate food choices  Outcome: Progressing  Goal: Maintains or returns to baseline bowel function  Description: INTERVENTIONS:  - Assess bowel function  - Encourage oral fluids to ensure adequate hydration  - Administer IV fluids if ordered to ensure adequate hydration  - Administer ordered medications as needed  - Encourage mobilization and activity  - Consider nutritional services referral to assist patient with adequate nutrition and appropriate food choices  Outcome: Progressing

## 2021-05-21 PROCEDURE — 97530 THERAPEUTIC ACTIVITIES: CPT

## 2021-05-21 PROCEDURE — 99232 SBSQ HOSP IP/OBS MODERATE 35: CPT | Performed by: INTERNAL MEDICINE

## 2021-05-21 PROCEDURE — 97116 GAIT TRAINING THERAPY: CPT

## 2021-05-21 PROCEDURE — 97535 SELF CARE MNGMENT TRAINING: CPT

## 2021-05-21 RX ADMIN — PROCHLORPERAZINE MALEATE 5 MG: 10 TABLET ORAL at 17:29

## 2021-05-21 RX ADMIN — HYDROMORPHONE HYDROCHLORIDE 4 MG: 2 TABLET ORAL at 06:12

## 2021-05-21 RX ADMIN — LIDOCAINE 5% 1 PATCH: 700 PATCH TOPICAL at 09:03

## 2021-05-21 RX ADMIN — APIXABAN 5 MG: 5 TABLET, FILM COATED ORAL at 17:27

## 2021-05-21 RX ADMIN — SENNOSIDES AND DOCUSATE SODIUM 2 TABLET: 8.6; 5 TABLET ORAL at 09:05

## 2021-05-21 RX ADMIN — LUBIPROSTONE 24 MCG: 24 CAPSULE, GELATIN COATED ORAL at 17:28

## 2021-05-21 RX ADMIN — LEVETIRACETAM 250 MG: 500 TABLET, FILM COATED ORAL at 21:30

## 2021-05-21 RX ADMIN — GABAPENTIN 400 MG: 400 CAPSULE ORAL at 11:59

## 2021-05-21 RX ADMIN — PROCHLORPERAZINE MALEATE 5 MG: 10 TABLET ORAL at 11:58

## 2021-05-21 RX ADMIN — LEVETIRACETAM 250 MG: 500 TABLET, FILM COATED ORAL at 09:05

## 2021-05-21 RX ADMIN — LUBIPROSTONE 24 MCG: 24 CAPSULE, GELATIN COATED ORAL at 09:07

## 2021-05-21 RX ADMIN — POLYETHYLENE GLYCOL 3350 17 G: 17 POWDER, FOR SOLUTION ORAL at 09:06

## 2021-05-21 RX ADMIN — GABAPENTIN 400 MG: 400 CAPSULE ORAL at 17:29

## 2021-05-21 RX ADMIN — PANTOPRAZOLE SODIUM 40 MG: 40 TABLET, DELAYED RELEASE ORAL at 06:08

## 2021-05-21 RX ADMIN — NICOTINE 1 PATCH: 14 PATCH, EXTENDED RELEASE TRANSDERMAL at 09:10

## 2021-05-21 RX ADMIN — APIXABAN 5 MG: 5 TABLET, FILM COATED ORAL at 09:06

## 2021-05-21 RX ADMIN — SENNOSIDES AND DOCUSATE SODIUM 2 TABLET: 8.6; 5 TABLET ORAL at 17:27

## 2021-05-21 RX ADMIN — PROCHLORPERAZINE MALEATE 5 MG: 10 TABLET ORAL at 06:07

## 2021-05-21 RX ADMIN — POLYETHYLENE GLYCOL 3350 17 G: 17 POWDER, FOR SOLUTION ORAL at 21:30

## 2021-05-21 RX ADMIN — DEXAMETHASONE 2 MG: 2 TABLET ORAL at 09:10

## 2021-05-21 RX ADMIN — OLANZAPINE 10 MG: 10 TABLET, FILM COATED ORAL at 21:30

## 2021-05-21 RX ADMIN — GABAPENTIN 400 MG: 400 CAPSULE ORAL at 06:08

## 2021-05-21 NOTE — OCCUPATIONAL THERAPY NOTE
[address] Progress Note     Patient Name: Deena Multani  RHDJF'I Date: 5/21/2021  Problem List  Principal Problem:    Pulmonary embolism ([facility] [address] )  Active Problems:    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, estrogen receptor positive ([facility] [address] )    Brain metastases (HCC)    Cancer-related pain    CINV (chemotherapy-induced nausea and vomiting)    Therapeutic opioid-induced constipation (OIC)    Weakness generalized    Tobacco abuse    Anemia of chronic disease    Transaminitis    Impaction of intestine (HCC)    Acute respiratory failure with hypoxia ([facility] [address] )        05/21/21 1146   OT Last Visit   OT Visit Date 05/21/21   Note Type   Note Type Treatment   Restrictions/Precautions   Weight Bearing Precautions Per Order No   Other Precautions Cognitive; Chair Alarm; Bed Alarm;Pain; Fall Risk  (chair alarm active at end)   General   Response to Previous Treatment Patient with no complaints from previous session   Lifestyle   Autonomy Pt reports needing assistance w/ bathing and using RW for functional mobility  Pt reports ambualting ~ 15-20' at home   Reciprocal Relationships Pt reports living w/  sister in mobile home w/ [address]   Service to Others Pt reports that she worked in [address] watching tv and spending time w/ her dtr   Pain Assessment   Pain Assessment Tool Pain Assessment not indicated - pt denies pain   Pain Score No Pain   ADL   Where Assessed Chair   Grooming Assistance 5  Supervision/Setup   Grooming Deficit Wash/dry hands; Wash/dry face; Teeth care   UB Bathing Assistance 4  Minimal Assistance   UB Bathing Deficit Right arm;Left arm; Abdomen   UB Bathing Comments assist for back   LB Bathing Assistance 3  Moderate Assistance   LB Bathing Deficit Perineal area; Buttocks;Right lower leg including foot; Left lower leg including foot   LB Bathing Comments assist for thoroughness due to SOB   UB Dressing Assistance 4  Minimal Assistance   UB Dressing Deficit Thread RUE;Thread LUE   UB Dressing Comments don/doff gown   LB Dressing Assistance 4  Minimal Assistance   LB Dressing Deficit Don/doff R sock; Don/doff L sock; Thread RLE into pants; Thread LLE into pants; Thread RLE into underwear; Thread LLE into underwear;Pull up over hips   LB Dressing Comments Min A for steadying   Transfers   Sit to Stand 4  Minimal assistance   Additional items Assist x 1; Increased time required;Verbal cues   Stand to Sit 4  Minimal assistance   Additional items Assist x 1; Increased time required;Verbal cues   Functional Mobility   Functional Mobility 4  Minimal assistance   Additional items Rolling walker   Cognition   Overall Cognitive Status Impaired   Arousal/Participation Cooperative   Attention Attends with cues to redirect   Orientation Level Oriented X4   Memory Decreased recall of recent events   Following Commands Follows multistep commands with increased time or repetition   Comments somewhat inconsistent historian at times- very pleasant and cooperative, eager to participate in therapy   Additional Activities   Additional Activities Comments pursed lip breathing   Activity Tolerance   Activity Tolerance Patient tolerated treatment well   Medical Staff Made Aware MINNA Martel- updated of L Leg itchiness   Assessment   Assessment Patient participated in Skilled OT session this date with interventions consisting of ADL re training with the use of correct body mechnaics, Energy Conservation techniques and safety awareness and fall prevention techniques   Patient agreeable to OT treatment session, upon arrival patient was found seated OOB to Chair  In comparison to previous session, patient with improvements in activity tolerance and transfers*   Patient requiring frequent rest periods and ocassional safety reminders  Patient continues to be functioning below baseline level, occupational performance remains limited secondary to factors listed above and increased risk for falls and injury     From OT standpoint, recommendation at time of d/c would be Short Term Rehab  Patient to benefit from continued Occupational Therapy treatment while in the hospital to address deficits as defined above and maximize level of functional independence with ADLs and functional mobility  The patient's raw score on the AM-PAC Daily Activity inpatient short form is 18, standardized score is 38 66, less than 39 4  Patients at this level are likely to benefit from discharge to post-acute rehabilitation services  Please refer to the recommendation of the Occupational Therapist for safe discharge planning  Plan   Treatment Interventions ADL retraining;Functional transfer training; Endurance training; Compensatory technique education; Energy conservation   Goal Expiration Date 05/23/21   OT Treatment Day 1   OT Frequency 3-5x/wk   Recommendation   OT Discharge Recommendation Post acute rehabilitation services   Equipment Recommended Shower/Tub chair with back ($)   Commode Type Standard   AM-PAC Daily Activity Inpatient   Lower Body Dressing 2   Bathing 2   Toileting 3   Upper Body Dressing 3   Grooming 4   Eating 4   Daily Activity Raw Score 18   Daily Activity Standardized Score (Calc for Raw Score >=11) 38 66   AM-[facility] Applied Cognition Inpatient   Following a Speech/Presentation 3   Understanding Ordinary Conversation 4   Taking Medications 3   Remembering Where Things Are Placed or Put Away 3   Remembering List of 4-5 Errands 3   Taking Care of Complicated Tasks 3   Applied Cognition Raw Score 19   Applied Cognition Standardized Score 39 77   Modified [place] Scale   Modified [place] Scale 4     Natasha Godwin MS, OTR/L

## 2021-05-21 NOTE — ASSESSMENT & PLAN NOTE
- patient is a history chronic constipation secondary to chronic opioid use for cancer  - Pt can go 2-5 days between bowel movements, pt reports she had not had a significant bowel movement prior to admission since approximately 5/6   - patient is maintained on senna as an outpatient, discontinued on admission and switch to Senokot-S    Plan   - on discharge Miralax BID 17g (1 packet) bid, Senakot-S 2 Pills BID and Amitiza 24mcg BID

## 2021-05-21 NOTE — PHYSICAL THERAPY NOTE
PHYSICAL THERAPY NOTE    Patient Name: Norman ROUSSEAU Date: 21 1406   PT Last Visit   PT Visit Date 21   Note Type   Note Type Treatment   Pain Assessment   Pain Assessment Tool Pain Assessment not indicated - pt denies pain   Pain Score No Pain   Restrictions/Precautions   Weight Bearing Precautions Per Order No   Other Precautions Cognitive; Chair Alarm; Bed Alarm;Pain; Fall Risk   General   Family/Caregiver Present No   Subjective   Subjective Patient supine in bed with call bell engaged and agreeable to participate in therapy session  Patient identifers obtained from name &   Bed Mobility   Supine to Sit 4  Minimal assistance   Additional items Assist x 1;HOB elevated; Bedrails; Increased time required;Verbal cues;LE management   Sit to Supine 4  Minimal assistance   Additional items Assist x 1;Bedrails; Increased time required;Verbal cues;LE management   Additional Comments Repositioning HOB with min ax1 and use of headboad to pull and B LEs to push   Transfers   Sit to Stand 4  Minimal assistance   Additional items Assist x 1; Armrests; Increased time required;Verbal cues   Stand to Sit 4  Minimal assistance   Additional items Assist x 1; Armrests; Increased time required;Verbal cues   Toilet transfer 4  Minimal assistance   Additional items Assist x 1; Increased time required;Verbal cues;Standard toilet  (grab bar)   Ambulation/Elevation   Gait pattern Wide REMIGIO; Ataxia; Excessively slow   Gait Assistance 3  Moderate assist   Additional items Assist x 1;Verbal cues   Assistive Device Rolling walker   Distance 20' x2   Balance   Static Sitting Fair -   Dynamic Sitting Fair   Static Standing Poor +   Ambulatory Poor   Endurance Deficit   Endurance Deficit Yes   Endurance Deficit Description limited activity tolerance and ambulation distance   Activity Tolerance   Activity Tolerance Patient limited by fatigue;Patient limited by pain   Nurse Made Aware Spoke to Jaiden Sinclair RN    Assessment   Prognosis Fair   Problem List Decreased strength;Decreased endurance; Impaired balance;Decreased mobility; Decreased coordination;Pain;Obesity; Decreased cognition; Impaired judgement;Decreased safety awareness   Assessment Patient with call bell engaged and agreeable to participate in therapy session  Patient remains consistent with min ax1 for transfers and 50% verbal instruction for body positioning and hand placement  Patient continues to require mod ax1 for ambulation for steadying and walker management  Poor negotiation with small spaces and objects around room requiring increased assistance at times  Continue to focus on OOB mobility with progression of transfers and ambulation as appropriate  Goals   Patient Goals none stated   STG Expiration Date 05/21/21   PT Treatment Day 5   Plan   Treatment/Interventions Functional transfer training;LE strengthening/ROM; Therapeutic exercise; Endurance training;Gait training;Bed mobility; Equipment eval/education;Patient/family training;Spoke to nursing   Progress Slow progress, decreased activity tolerance   PT Frequency Other (Comment)  (3-5x/week)   Recommendation   PT Discharge Recommendation Post acute rehabilitation services   Equipment Recommended Walker; Wheelchair   AM-PAC Basic Mobility Inpatient   Turning in Bed Without Bedrails 3   Lying on Back to Sitting on Edge of Flat Bed 3   Moving Bed to Chair 2   Standing Up From Chair 3   Walk in Room 2   Climb 3-5 Stairs 1   Basic Mobility Inpatient Raw Score 14   Basic Mobility Standardized Score 35 55       Shayla Mcnamara PTA

## 2021-05-21 NOTE — ASSESSMENT & PLAN NOTE
· Concerned for aspiration  · Chest x-ray showing mild vascular congestion with no overt pulmonary edema  Most recent echocardiogram in April showing normal ejection fraction with diastolic dysfunctions and no significant valvular abnormalities     · Procalcitonin negative x2  · Resolved 9.27 (2PC)

## 2021-05-21 NOTE — CASE MANAGEMENT
[facility] has accepted pt and prefers to submit for authorization as they have specific questions and need to provide specific information  Cm will continue to follow

## 2021-05-21 NOTE — ASSESSMENT & PLAN NOTE
- patient presents with a 2-3 day history of gradual onset weakness  - imaging in the ED noted a "small subsegmental left upper lobe pulmonary embolus"  - patient does reports improvement in her shortness of breath    Plan  - Patient started on Eliquis 10 mg for 7 days b i d, and is now maintained on Eliquis 5 mg b i d , pt to continue on d/c and follow up with pcp to determine duration of therapy

## 2021-05-21 NOTE — PLAN OF CARE
Problem: PHYSICAL THERAPY ADULT  Goal: Performs mobility at highest level of function for planned discharge setting  See evaluation for individualized goals  Description: Treatment/Interventions: ADL retraining, Functional transfer training, LE strengthening/ROM, Elevations, Therapeutic exercise, Endurance training, Patient/family training, Bed mobility, Gait training  Equipment Recommended: Osei Rodriguez       See flowsheet documentation for full assessment, interventions and recommendations  Outcome: Progressing  Note: Prognosis: Fair  Problem List: Decreased strength, Decreased endurance, Impaired balance, Decreased mobility, Decreased coordination, Pain, Obesity, Decreased cognition, Impaired judgement, Decreased safety awareness  Assessment: Patient with call bell engaged and agreeable to participate in therapy session  Patient remains consistent with min ax1 for transfers and 50% verbal instruction for body positioning and hand placement  Patient continues to require mod ax1 for ambulation for steadying and walker management  Poor negotiation with small spaces and objects around room requiring increased assistance at times  Continue to focus on OOB mobility with progression of transfers and ambulation as appropriate  PT Discharge Recommendation: Post acute rehabilitation services          See flowsheet documentation for full assessment

## 2021-05-21 NOTE — PROGRESS NOTES
[facility]  Progress Note - Alie Garcia 1960, 64 y o  female MRN: [id number]  Unit/Bed#: S -01 Encounter: [id number]  Primary Care Provider: Marleni Kim MD   Date and time admitted to hospital: 5/10/2021  1:16 PM    * Pulmonary embolism ([facility] [address] )  Assessment & Plan  - patient presents with a 2-3 day history of gradual onset weakness  - imaging in the ED noted a "small subsegmental left upper lobe pulmonary embolus"  - patient does reports improvement in her shortness of breath    Plan  - Patient started on Eliquis 10 mg for 7 days b i d, and is now maintained on Eliquis 5 mg b i d , pt to continue on d/c and follow up with pcp to determine duration of therapy  Impaction of intestine ([facility] )  Assessment & Plan  - patient is a history chronic constipation (2/2 opioid induced constipation)  - Pt can go 2-5 days between bowel movements, pt reports she had not had a significant bowel movement prior to admission since approximately 5/6   - patient had notable abdominal distension and pain on admission  - patient is maintained on senna as an outpatient, discontinued on admission and switch to Senokot-S    Per GI:  -"would not recommend decompressive colonoscopy at this time  Would not recommend colonoscopy in general for assessment at this time due to new PE, eliquis on board, and stage 4 cancer   Could consider this several months down the road once eliquis can be held safely pending patient's cancer status"    Plan  - Continue Miralax BID 17g (1 packet), Senakot-S 2 Pills BID and Amitiza 24mcg BID on D/C  - Pt to follow up with GI as an out patient    Therapeutic opioid-induced constipation (OIC)  Assessment & Plan  - patient is a history chronic constipation secondary to chronic opioid use for cancer  - Pt can go 2-5 days between bowel movements, pt reports she had not had a significant bowel movement prior to admission since approximately 5/6   - patient is maintained on senna as an outpatient, discontinued on admission and switch to Senokot-S    Plan   - on discharge Miralax BID 17g (1 packet) bid, Senakot-S 2 Pills BID and Amitiza 24mcg BID    Acute respiratory failure with hypoxia (HCC)  Assessment & Plan  · Concerned for aspiration  · Chest x-ray showing mild vascular congestion with no overt pulmonary edema  Most recent echocardiogram in April showing normal ejection fraction with diastolic dysfunctions and no significant valvular abnormalities     · Procalcitonin negative x2  · Resolved    Transaminitis  Assessment & Plan  - Patient noted to have elevated AST 67/ in ED  - CT of the abdomen did not note any acute pathology  - Liver enzymes in ED mildly elevated from baseline, possibly reactive  - Per GI elevated LFTs demonstrate a hepatocellular pattern  - hepatitis panel negative  - Celiac panel negative    Plan  - patient to follow-up with GI as an outpatient    Anemia of chronic disease  Assessment & Plan  - Pt has noted decrease in hgb on admission    Tobacco abuse  Assessment & Plan  - Patient has a history of tobacco abuse  - Will order the patient nicotine replacement  - Encourage cessation    Weakness generalized  Assessment & Plan  - Patient presents with a 2-3 day history of gradual onset generalized weakness and slurred speech  - Most likely secondary to deconditioning in the setting of patient's cancer history versus infection but this is highly unlikely given the patients clinical findings  - patient's weakness is improving  -PT/OT is recommending post acute rehab services    Plan  - patient has decided to go to inpatient rehab now, waiting on insurance authorization    CINV (chemotherapy-induced nausea and vomiting)  Assessment & Plan  - Patient reports frequent nausea, denying any current symptoms  - Patient is maintained on Zofran, continue Protonix but due to Zofran use having constipating potential we discontinued it and start her on Compazine    Cancer-related pain  Assessment & Plan  - Pt has extensive history of cancer related pain  - Pt to follow up with palliative care as out patient   - Pt to continue PTA pain medication regiment    Brain metastases ([facility] [address] )  Assessment & Plan  - Pt has breast cancer with mets to the brain  - 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, estrogen receptor positive ([facility] [address] )  Assessment & Plan  - Diagnosed with metastatic breast cancer in 2010 with mets to the brain and bone  - S/p numerous rounds of chemotherapy  - S/P double mastectomy   - Follows with heme/onc as out patient    Leukocytosis-resolved as of 5/12/2021  Assessment & Plan  - Patient is noted to have a mild leukocytosis on labs in the emergency room on presentation 10 43  - Patient denies any fever, chills  - Low suspicion of infectious process  - Patient is on steroids chronically at home, this is a possible source of the leukocytosis  - Resolved      VTE Pharmacologic Prophylaxis:   Pharmacologic: Apixaban (Eliquis)  Mechanical VTE Prophylaxis in Place: Yes    Discussions with Specialists or Other Care Team Provider:  Gastroenterology    Education and Discussions with Family / Patient:  Discussed plan of care with the patient  Current Length of Stay: 11 day(s)    Current Patient Status: Inpatient     Discharge Plan / Estimated Discharge Date: TBD, waiting for insurance approval of rehab  Pt is medically clear and stable for d/c    Code Status: Level 1 - Full Code    Subjective:   Patient seen and examined  No acute events overnight  Patient is medically see stable for discharge we are currently waiting for insurance authorization for rehab  Patient denies any headache, dizziness, nausea, vomiting, constipation, diarrhea, chest pain, palpitations, shortness of breath, wheezing   A 12 point review of systems was completed and was negative unless noted above  Objective:     Vitals:   Temp (24hrs), Av 5 °F (36 9 °C), Min:97 9 °F (36 6 °C), Max:98 9 °F (37 2 °C)    Temp:  [97 9 °F (36 6 °C)-98 9 °F (37 2 °C)] 98 9 °F (37 2 °C)  HR:  [76-92] 76  Resp:  [18] 18  BP: (130-144)/(81-93) 144/81  SpO2:  [92 %-94 %] 94 %  Body mass index is 28 58 kg/m²  Input and Output Summary (last 24 hours): Intake/Output Summary (Last 24 hours) at 2021 1333  Last data filed at 2021 2239  Gross per 24 hour   Intake --   Output 900 ml   Net -900 ml       Physical Exam:     Physical Exam  Vitals signs and nursing note reviewed  Constitutional:       General: She is not in acute distress  Appearance: She is well-developed  She is not diaphoretic  HENT:      Head: Normocephalic and atraumatic  Nose: Nose normal    Eyes:      General: No scleral icterus  Right eye: No discharge  Left eye: No discharge  Extraocular Movements: Extraocular movements intact  Conjunctiva/sclera: Conjunctivae normal       Pupils: Pupils are equal, round, and reactive to light  Neck:      Musculoskeletal: Normal range of motion  Cardiovascular:      Rate and Rhythm: Normal rate and regular rhythm  Heart sounds: Normal heart sounds  No murmur  No friction rub  No gallop  Pulmonary:      Effort: Pulmonary effort is normal  No respiratory distress  Breath sounds: No stridor  No wheezing, rhonchi or rales  Chest:      Chest wall: No tenderness  Abdominal:      General: Abdomen is flat  Bowel sounds are normal  There is distension (Improved)  Palpations: Abdomen is soft  Tenderness: There is no abdominal tenderness  Genitourinary:     Rectum: Normal  No mass or tenderness  Normal anal tone  Musculoskeletal: Normal range of motion  Right lower leg: No edema  Left lower leg: No edema  Skin:     General: Skin is warm and dry  Neurological:      General: No focal deficit present        Mental Status: She is alert and oriented to person, place, and time  Motor: Weakness present  Psychiatric:         Behavior: Behavior normal          Thought Content: Thought content normal          Judgment: Judgment normal           Additional Data:     Labs: I have personally reviewed pertinent reports  Results from last 7 days   Lab Units 05/18/21  0658 05/17/21  0627 05/16/21  0629   WBC Thousand/uL 6 75 6 85 10 34*   HEMOGLOBIN g/dL 9 9* 10 3* 11 0*   HEMATOCRIT % 31 9* 32 9* 35 2   PLATELETS Thousands/uL 272 259 279      Results from last 7 days   Lab Units 05/18/21  0658 05/17/21  0627 05/16/21  0629   POTASSIUM mmol/L 3 6 3 3* 3 7   CHLORIDE mmol/L 111* 106 106   CO2 mmol/L 23 27 29   BUN mg/dL 7 9 11   CREATININE mg/dL 0 54* 0 53* 0 60   CALCIUM mg/dL 7 8* 8 1* 8 5   ALK PHOS U/L 123* 130* 146*   ALT U/L 107* 158* 234*   AST U/L 30 50* 106*     Results from last 7 days   Lab Units 05/15/21  0619   MAGNESIUM mg/dL 2 9*                        * Additional Pertinent Lab Tests Reviewed: No New Labs Available For Today    Radiology Results: I have personally reviewed pertinent reports  Xr Chest Portable    Result Date: 5/16/2021  Impression: Stable chest   No acute infiltrates  Workstation performed: [id number]     Xr Chest Portable    Result Date: 5/16/2021  Impression: NG tube in stomach  Mild central vascular prominence without overt pulmonary edema  Workstation performed: [id number]     Xr Abdomen Obstruction Series    Result Date: 5/14/2021  Impression: Improved constipation Nonobstructive bowel gas pattern  Workstation performed: [id number]     Ct Head Without Contrast    Result Date: 5/10/2021  Impression: No acute intracranial abnormality or significant change from prior study  Changes within the brain parenchyma again suggesting sequela of prior whole brain radiation including previously treated right periatrial dystrophic calcification  No new lesions detected within limitations of noncontrast imaging  Intracranial metastatic disease again better evaluated with contrast-enhanced MRI  Workstation performed: [id number]     Pe Study With Ct Abdomen And Pelvis With Contrast    Result Date: 5/10/2021  Impression: 1  Limited CT pulmonary angiogram, however, suggestion of a small subsegmental left upper lobe pulmonary embolus  Dilated RV/LV ratio which is nonspecific and could be indicative of elevated right heart pressures in the setting of COPD/pulmonary hypertension  No bowing of the intraventricular septum  2   Small bilateral pulmonary nodules, grossly similar  1 cm pleural-based left apical nodule appears larger  Cannot exclude evolving metastasis  3   No acute intra-abdominal abnormality  4   Large volume of colonic stool suggesting constipation  5   Marked urinary bladder distention   I personally discussed this study with physician assistant KAILEE Noland on 5/10/2021 at 3:50 PM  Workstation performed: [id number]       Recent Cultures (last 7 days):           Last 24 Hours Medication List:   Current Facility-Administered Medications   Medication Dose Route Frequency Provider Last Rate    acetaminophen  650 mg Oral Q6H PRN Missy Bence, DO      albuterol  2 puff Inhalation Q6H PRN Missy Bence, DO      albuterol  2 5 mg Nebulization Q4H PRN Missy Bence, DO      aluminum-magnesium hydroxide-simethicone  30 mL Oral Q6H PRN Missy Bence, DO      apixaban  5 mg Oral BID Missy Bence, DO      bisacodyl  10 mg Rectal Daily PRN FAUZIA Terrell      dexamethasone  2 mg Oral Daily With Breakfast Missy Bence, DO      gabapentin  400 mg Oral TID AC Juliocesar Simms, DO      HYDROmorphone  1 mg Intravenous Q6H PRN Missy Bence, DO      HYDROmorphone  4 mg Oral Q4H PRN Missy Bence, DO      HYDROmorphone  8 mg Oral Q4H PRN Missy Bence, DO      levETIRAcetam  250 mg Oral Q12H [address] Juliocesar Charissa, DO      lidocaine  1 patch Topical Daily FAUZIA Terrell      lubiprostone  24 mcg Oral BID With Meals Jose Loving MD      nicotine  1 patch Transdermal Daily [facility],       OLANZapine  10 mg Oral HS Juliocesar Simms DO      pantoprazole  40 mg Oral Daily [facility],       polyethylene glycol  17 g Oral BID [facility]      prochlorperazine  5 mg Oral TID AC Juliocesar Simms DO      senna-docusate sodium  2 tablet Oral BID [facility],       zolpidem  10 mg Oral HS PRN [facility],           Today, Patient Was Seen By: [facility],     Portions of the record may have been created with voice recognition software  Occasional wrong word or "sound a like" substitutions may have occurred due to the inherent limitations of voice recognition software  Read the chart carefully and recognize, using context, where substitutions have occurred

## 2021-05-21 NOTE — ASSESSMENT & PLAN NOTE
- Patient presents with a 2-3 day history of gradual onset generalized weakness and slurred speech  - Most likely secondary to deconditioning in the setting of patient's cancer history versus infection but this is highly unlikely given the patients clinical findings  - patient's weakness is improving  -PT/OT is recommending post acute rehab services    Plan  - patient has decided to go to inpatient rehab now, waiting on insurance authorization

## 2021-05-21 NOTE — PLAN OF CARE
Problem: OCCUPATIONAL THERAPY ADULT  Goal: Performs self-care activities at highest level of function for planned discharge setting  See evaluation for individualized goals  Description: Treatment Interventions: ADL retraining, Functional transfer training, UE strengthening/ROM, Endurance training, Patient/family training, Equipment evaluation/education, Fine motor coordination activities, Continued evaluation, Compensatory technique education, Energy conservation, Activityengagement  Equipment Recommended: Shower/Tub chair with back ($), Bedside commode       See flowsheet documentation for full assessment, interventions and recommendations  Outcome: Progressing  Note: Limitation: Decreased ADL status, Decreased UE strength, Decreased cognition, Decreased endurance, Decreased fine motor control, Decreased self-care trans, Decreased high-level ADLs     Assessment: Patient participated in Skilled OT session this date with interventions consisting of ADL re training with the use of correct body mechnaics, Energy Conservation techniques and safety awareness and fall prevention techniques   Patient agreeable to OT treatment session, upon arrival patient was found seated OOB to Chair  In comparison to previous session, patient with improvements in activity tolerance and transfers*   Patient requiring frequent rest periods and ocassional safety reminders  Patient continues to be functioning below baseline level, occupational performance remains limited secondary to factors listed above and increased risk for falls and injury  From OT standpoint, recommendation at time of d/c would be Short Term Rehab  Patient to benefit from continued Occupational Therapy treatment while in the hospital to address deficits as defined above and maximize level of functional independence with ADLs and functional mobility   The patient's raw score on the AM-PAC Daily Activity inpatient short form is 18, standardized score is 38 66, less than 39 4  Patients at this level are likely to benefit from discharge to post-acute rehabilitation services  Please refer to the recommendation of the Occupational Therapist for safe discharge planning       OT Discharge Recommendation: Post acute rehabilitation services

## 2021-05-22 PROCEDURE — 99232 SBSQ HOSP IP/OBS MODERATE 35: CPT | Performed by: INTERNAL MEDICINE

## 2021-05-22 RX ADMIN — POLYETHYLENE GLYCOL 3350 17 G: 17 POWDER, FOR SOLUTION ORAL at 21:15

## 2021-05-22 RX ADMIN — POLYETHYLENE GLYCOL 3350 17 G: 17 POWDER, FOR SOLUTION ORAL at 09:45

## 2021-05-22 RX ADMIN — NICOTINE 1 PATCH: 14 PATCH, EXTENDED RELEASE TRANSDERMAL at 09:44

## 2021-05-22 RX ADMIN — GABAPENTIN 400 MG: 400 CAPSULE ORAL at 06:56

## 2021-05-22 RX ADMIN — PANTOPRAZOLE SODIUM 40 MG: 40 TABLET, DELAYED RELEASE ORAL at 06:52

## 2021-05-22 RX ADMIN — LIDOCAINE 5% 1 PATCH: 700 PATCH TOPICAL at 09:44

## 2021-05-22 RX ADMIN — LEVETIRACETAM 250 MG: 500 TABLET, FILM COATED ORAL at 09:45

## 2021-05-22 RX ADMIN — APIXABAN 5 MG: 5 TABLET, FILM COATED ORAL at 09:45

## 2021-05-22 RX ADMIN — LEVETIRACETAM 250 MG: 500 TABLET, FILM COATED ORAL at 21:15

## 2021-05-22 RX ADMIN — ACETAMINOPHEN 650 MG: 325 TABLET, FILM COATED ORAL at 12:15

## 2021-05-22 RX ADMIN — APIXABAN 5 MG: 5 TABLET, FILM COATED ORAL at 17:04

## 2021-05-22 RX ADMIN — GABAPENTIN 400 MG: 400 CAPSULE ORAL at 17:03

## 2021-05-22 RX ADMIN — GABAPENTIN 400 MG: 400 CAPSULE ORAL at 12:15

## 2021-05-22 RX ADMIN — DEXAMETHASONE 2 MG: 2 TABLET ORAL at 09:45

## 2021-05-22 RX ADMIN — OLANZAPINE 10 MG: 10 TABLET, FILM COATED ORAL at 21:15

## 2021-05-22 RX ADMIN — PROCHLORPERAZINE MALEATE 5 MG: 10 TABLET ORAL at 06:51

## 2021-05-22 RX ADMIN — SENNOSIDES AND DOCUSATE SODIUM 2 TABLET: 8.6; 5 TABLET ORAL at 17:04

## 2021-05-22 RX ADMIN — PROCHLORPERAZINE MALEATE 5 MG: 10 TABLET ORAL at 17:04

## 2021-05-22 RX ADMIN — HYDROMORPHONE HYDROCHLORIDE 4 MG: 2 TABLET ORAL at 20:24

## 2021-05-22 RX ADMIN — PROCHLORPERAZINE MALEATE 5 MG: 10 TABLET ORAL at 12:15

## 2021-05-22 RX ADMIN — SENNOSIDES AND DOCUSATE SODIUM 2 TABLET: 8.6; 5 TABLET ORAL at 09:45

## 2021-05-22 RX ADMIN — LUBIPROSTONE 24 MCG: 24 CAPSULE, GELATIN COATED ORAL at 09:47

## 2021-05-22 RX ADMIN — HYDROMORPHONE HYDROCHLORIDE 8 MG: 2 TABLET ORAL at 09:44

## 2021-05-22 RX ADMIN — LUBIPROSTONE 24 MCG: 24 CAPSULE, GELATIN COATED ORAL at 17:04

## 2021-05-22 NOTE — PLAN OF CARE
Problem: Potential for Falls  Goal: Patient will remain free of falls  Description: INTERVENTIONS:  - Assess patient frequently for physical needs  -  Identify cognitive and physical deficits and behaviors that affect risk of falls    -  [place] fall precautions as indicated by assessment   - Educate patient/family on patient safety including physical limitations  - Instruct patient to call for assistance with activity based on assessment  - Modify environment to reduce risk of injury  - Consider OT/PT consult to assist with strengthening/mobility  Outcome: Progressing     Problem: Prexisting or High Potential for Compromised Skin Integrity  Goal: Skin integrity is maintained or improved  Description: INTERVENTIONS:  - Identify patients at risk for skin breakdown  - Assess and monitor skin integrity  - Assess and monitor nutrition and hydration status  - Monitor labs   - Assess for incontinence   - Turn and reposition patient  - Assist with mobility/ambulation  - Relieve pressure over bony prominences  - Avoid friction and shearing  - Provide appropriate hygiene as needed including keeping skin clean and dry  - Evaluate need for skin moisturizer/barrier cream  - Collaborate with interdisciplinary team   - Patient/family teaching  - Consider wound care consult   Outcome: Progressing     Problem: PAIN - ADULT  Goal: Verbalizes/displays adequate comfort level or baseline comfort level  Description: Interventions:  - Encourage patient to monitor pain and request assistance  - Assess pain using appropriate pain scale  - Administer analgesics based on type and severity of pain and evaluate response  - Implement non-pharmacological measures as appropriate and evaluate response  - Consider cultural and social influences on pain and pain management  - Notify physician/advanced practitioner if interventions unsuccessful or patient reports new pain  Outcome: Progressing     Problem: INFECTION - ADULT  Goal: Absence or prevention of progression during hospitalization  Description: INTERVENTIONS:  - Assess and monitor for signs and symptoms of infection  - Monitor lab/diagnostic results  - Monitor all insertion sites, i e  indwelling lines, tubes, and drains  - Monitor endotracheal if appropriate and nasal secretions for changes in amount and color  - [place] appropriate cooling/warming therapies per order  - Administer medications as ordered  - Instruct and encourage patient and family to use good hand hygiene technique  - Identify and instruct in appropriate isolation precautions for identified infection/condition  Outcome: Progressing     Problem: SAFETY ADULT  Goal: Patient will remain free of falls  Description: INTERVENTIONS:  - Assess patient frequently for physical needs  -  Identify cognitive and physical deficits and behaviors that affect risk of falls    -  [place] fall precautions as indicated by assessment   - Educate patient/family on patient safety including physical limitations  - Instruct patient to call for assistance with activity based on assessment  - Modify environment to reduce risk of injury  - Consider OT/PT consult to assist with strengthening/mobility  Outcome: Progressing  Goal: Maintain or return to baseline ADL function  Description: INTERVENTIONS:  -  Assess patient's ability to carry out ADLs; assess patient's baseline for ADL function and identify physical deficits which impact ability to perform ADLs (bathing, care of mouth/teeth, toileting, grooming, dressing, etc )  - Assess/evaluate cause of self-care deficits   - Assess range of motion  - Assess patient's mobility; develop plan if impaired  - Assess patient's need for assistive devices and provide as appropriate  - Encourage maximum independence but intervene and supervise when necessary  - Involve family in performance of ADLs  - Assess for home care needs following discharge   - Consider OT consult to assist with ADL evaluation and planning for discharge  - Provide patient education as appropriate  Outcome: Progressing  Goal: Maintain or return mobility status to optimal level  Description: INTERVENTIONS:  - Assess patient's baseline mobility status (ambulation, transfers, stairs, etc )    - Identify cognitive and physical deficits and behaviors that affect mobility  - Identify mobility aids required to assist with transfers and/or ambulation (gait belt, sit-to-stand, lift, walker, cane, etc )  - [place] fall precautions as indicated by assessment  - Record patient progress and toleration of activity level on Mobility SBAR; progress patient to next Phase/Stage  - Instruct patient to call for assistance with activity based on assessment  - Consider rehabilitation consult to assist with strengthening/weightbearing, etc   Outcome: Progressing     Problem: DISCHARGE PLANNING  Goal: Discharge to home or other facility with appropriate resources  Description: INTERVENTIONS:  - Identify barriers to discharge w/patient and caregiver  - Arrange for needed discharge resources and transportation as appropriate  - Identify discharge learning needs (meds, wound care, etc )  - Arrange for interpretive services to assist at discharge as needed  - Refer to Case Management Department for coordinating discharge planning if the patient needs post-hospital services based on physician/advanced practitioner order or complex needs related to functional status, cognitive ability, or social support system  Outcome: Progressing     Problem: Knowledge Deficit  Goal: Patient/family/caregiver demonstrates understanding of disease process, treatment plan, medications, and discharge instructions  Description: Complete learning assessment and assess knowledge base    Interventions:  - Provide teaching at level of understanding  - Provide teaching via preferred learning methods  Outcome: Progressing     Problem: RESPIRATORY - ADULT  Goal: Achieves optimal ventilation and oxygenation  Description: INTERVENTIONS:  - Assess for changes in respiratory status  - Assess for changes in mentation and behavior  - Position to facilitate oxygenation and minimize respiratory effort  - Oxygen administered by appropriate delivery if ordered  - Initiate smoking cessation education as indicated  - Encourage broncho-pulmonary hygiene including cough, deep breathe, Incentive Spirometry  - Assess the need for suctioning and aspirate as needed  - Assess and instruct to report SOB or any respiratory difficulty  - Respiratory Therapy support as indicated  Outcome: Progressing     Problem: GASTROINTESTINAL - ADULT  Goal: Minimal or absence of nausea and/or vomiting  Description: INTERVENTIONS:  - Administer IV fluids if ordered to ensure adequate hydration  - Maintain NPO status until nausea and vomiting are resolved  - Nasogastric tube if ordered  - Administer ordered antiemetic medications as needed  - Provide nonpharmacologic comfort measures as appropriate  - Advance diet as tolerated, if ordered  - Consider nutrition services referral to assist patient with adequate nutrition and appropriate food choices  Outcome: Progressing  Goal: Maintains or returns to baseline bowel function  Description: INTERVENTIONS:  - Assess bowel function  - Encourage oral fluids to ensure adequate hydration  - Administer IV fluids if ordered to ensure adequate hydration  - Administer ordered medications as needed  - Encourage mobilization and activity  - Consider nutritional services referral to assist patient with adequate nutrition and appropriate food choices  Outcome: Progressing     Problem: Nutrition/Hydration-ADULT  Goal: Nutrient/Hydration intake appropriate for improving, restoring or maintaining nutritional needs  Description: Monitor and assess patient's nutrition/hydration status for malnutrition  Collaborate with interdisciplinary team and initiate plan and interventions as ordered    Monitor patient's weight and dietary intake as ordered or per policy  Utilize nutrition screening tool and intervene as necessary  Determine patient's food preferences and provide high-protein, high-caloric foods as appropriate       INTERVENTIONS:  - Monitor oral intake, urinary output, labs, and treatment plans  - Assess nutrition and hydration status and recommend course of action  - Evaluate amount of meals eaten  - Assist patient with eating if necessary   - Allow adequate time for meals  - Recommend/ encourage appropriate diets, oral nutritional supplements, and vitamin/mineral supplements  - Order, calculate, and assess calorie counts as needed  - Recommend, monitor, and adjust tube feedings and TPN/PPN based on assessed needs  - Assess need for intravenous fluids  - Provide specific nutrition/hydration education as appropriate  - Include patient/family/caregiver in decisions related to nutrition  Outcome: Progressing

## 2021-05-22 NOTE — CASE MANAGEMENT
Per [facility] admission, no [facility] [place] has been obtained as yet for the Pt's admission to their facility  CM will continue to follow

## 2021-05-22 NOTE — ASSESSMENT & PLAN NOTE
- Patient presents with a 2-3 day history of gradual onset generalized weakness and slurred speech  - Most likely secondary to deconditioning in the setting of patient's cancer history versus infection but this is highly unlikely given the patients clinical findings  - patient's weakness is improving  -PT/OT is recommending post acute rehab services    Plan  -PT/OT rec: post acute rehab   -awaiting on insurance authorization

## 2021-05-22 NOTE — PLAN OF CARE
Problem: Potential for Falls  Goal: Patient will remain free of falls  Description: INTERVENTIONS:  - Assess patient frequently for physical needs  -  Identify cognitive and physical deficits and behaviors that affect risk of falls    -  [place] fall precautions as indicated by assessment   - Educate patient/family on patient safety including physical limitations  - Instruct patient to call for assistance with activity based on assessment  - Modify environment to reduce risk of injury  - Consider OT/PT consult to assist with strengthening/mobility  Outcome: Progressing     Problem: Prexisting or High Potential for Compromised Skin Integrity  Goal: Skin integrity is maintained or improved  Description: INTERVENTIONS:  - Identify patients at risk for skin breakdown  - Assess and monitor skin integrity  - Assess and monitor nutrition and hydration status  - Monitor labs   - Assess for incontinence   - Turn and reposition patient  - Assist with mobility/ambulation  - Relieve pressure over bony prominences  - Avoid friction and shearing  - Provide appropriate hygiene as needed including keeping skin clean and dry  - Evaluate need for skin moisturizer/barrier cream  - Collaborate with interdisciplinary team   - Patient/family teaching  - Consider wound care consult   Outcome: Progressing     Problem: PAIN - ADULT  Goal: Verbalizes/displays adequate comfort level or baseline comfort level  Description: Interventions:  - Encourage patient to monitor pain and request assistance  - Assess pain using appropriate pain scale  - Administer analgesics based on type and severity of pain and evaluate response  - Implement non-pharmacological measures as appropriate and evaluate response  - Consider cultural and social influences on pain and pain management  - Notify physician/advanced practitioner if interventions unsuccessful or patient reports new pain  Outcome: Progressing     Problem: INFECTION - ADULT  Goal: Absence or prevention of progression during hospitalization  Description: INTERVENTIONS:  - Assess and monitor for signs and symptoms of infection  - Monitor lab/diagnostic results  - Monitor all insertion sites, i e  indwelling lines, tubes, and drains  - Monitor endotracheal if appropriate and nasal secretions for changes in amount and color  - [place] appropriate cooling/warming therapies per order  - Administer medications as ordered  - Instruct and encourage patient and family to use good hand hygiene technique  - Identify and instruct in appropriate isolation precautions for identified infection/condition  Outcome: Progressing     Problem: SAFETY ADULT  Goal: Patient will remain free of falls  Description: INTERVENTIONS:  - Assess patient frequently for physical needs  -  Identify cognitive and physical deficits and behaviors that affect risk of falls    -  [place] fall precautions as indicated by assessment   - Educate patient/family on patient safety including physical limitations  - Instruct patient to call for assistance with activity based on assessment  - Modify environment to reduce risk of injury  - Consider OT/PT consult to assist with strengthening/mobility  Outcome: Progressing  Goal: Maintain or return to baseline ADL function  Description: INTERVENTIONS:  -  Assess patient's ability to carry out ADLs; assess patient's baseline for ADL function and identify physical deficits which impact ability to perform ADLs (bathing, care of mouth/teeth, toileting, grooming, dressing, etc )  - Assess/evaluate cause of self-care deficits   - Assess range of motion  - Assess patient's mobility; develop plan if impaired  - Assess patient's need for assistive devices and provide as appropriate  - Encourage maximum independence but intervene and supervise when necessary  - Involve family in performance of ADLs  - Assess for home care needs following discharge   - Consider OT consult to assist with ADL evaluation and planning for discharge  - Provide patient education as appropriate  Outcome: Progressing  Goal: Maintain or return mobility status to optimal level  Description: INTERVENTIONS:  - Assess patient's baseline mobility status (ambulation, transfers, stairs, etc )    - Identify cognitive and physical deficits and behaviors that affect mobility  - Identify mobility aids required to assist with transfers and/or ambulation (gait belt, sit-to-stand, lift, walker, cane, etc )  - [place] fall precautions as indicated by assessment  - Record patient progress and toleration of activity level on Mobility SBAR; progress patient to next Phase/Stage  - Instruct patient to call for assistance with activity based on assessment  - Consider rehabilitation consult to assist with strengthening/weightbearing, etc   Outcome: Progressing     Problem: DISCHARGE PLANNING  Goal: Discharge to home or other facility with appropriate resources  Description: INTERVENTIONS:  - Identify barriers to discharge w/patient and caregiver  - Arrange for needed discharge resources and transportation as appropriate  - Identify discharge learning needs (meds, wound care, etc )  - Arrange for interpretive services to assist at discharge as needed  - Refer to Case Management Department for coordinating discharge planning if the patient needs post-hospital services based on physician/advanced practitioner order or complex needs related to functional status, cognitive ability, or social support system  Outcome: Progressing     Problem: Knowledge Deficit  Goal: Patient/family/caregiver demonstrates understanding of disease process, treatment plan, medications, and discharge instructions  Description: Complete learning assessment and assess knowledge base    Interventions:  - Provide teaching at level of understanding  - Provide teaching via preferred learning methods  Outcome: Progressing     Problem: RESPIRATORY - ADULT  Goal: Achieves optimal ventilation and oxygenation  Description: INTERVENTIONS:  - Assess for changes in respiratory status  - Assess for changes in mentation and behavior  - Position to facilitate oxygenation and minimize respiratory effort  - Oxygen administered by appropriate delivery if ordered  - Initiate smoking cessation education as indicated  - Encourage broncho-pulmonary hygiene including cough, deep breathe, Incentive Spirometry  - Assess the need for suctioning and aspirate as needed  - Assess and instruct to report SOB or any respiratory difficulty  - Respiratory Therapy support as indicated  Outcome: Progressing     Problem: GASTROINTESTINAL - ADULT  Goal: Minimal or absence of nausea and/or vomiting  Description: INTERVENTIONS:  - Administer IV fluids if ordered to ensure adequate hydration  - Maintain NPO status until nausea and vomiting are resolved  - Nasogastric tube if ordered  - Administer ordered antiemetic medications as needed  - Provide nonpharmacologic comfort measures as appropriate  - Advance diet as tolerated, if ordered  - Consider nutrition services referral to assist patient with adequate nutrition and appropriate food choices  Outcome: Progressing  Goal: Maintains or returns to baseline bowel function  Description: INTERVENTIONS:  - Assess bowel function  - Encourage oral fluids to ensure adequate hydration  - Administer IV fluids if ordered to ensure adequate hydration  - Administer ordered medications as needed  - Encourage mobilization and activity  - Consider nutritional services referral to assist patient with adequate nutrition and appropriate food choices  Outcome: Progressing     Problem: Nutrition/Hydration-ADULT  Goal: Nutrient/Hydration intake appropriate for improving, restoring or maintaining nutritional needs  Description: Monitor and assess patient's nutrition/hydration status for malnutrition  Collaborate with interdisciplinary team and initiate plan and interventions as ordered    Monitor patient's weight and dietary intake as ordered or per policy  Utilize nutrition screening tool and intervene as necessary  Determine patient's food preferences and provide high-protein, high-caloric foods as appropriate       INTERVENTIONS:  - Monitor oral intake, urinary output, labs, and treatment plans  - Assess nutrition and hydration status and recommend course of action  - Evaluate amount of meals eaten  - Assist patient with eating if necessary   - Allow adequate time for meals  - Recommend/ encourage appropriate diets, oral nutritional supplements, and vitamin/mineral supplements  - Order, calculate, and assess calorie counts as needed  - Recommend, monitor, and adjust tube feedings and TPN/PPN based on assessed needs  - Assess need for intravenous fluids  - Provide specific nutrition/hydration education as appropriate  - Include patient/family/caregiver in decisions related to nutrition  Outcome: Progressing

## 2021-05-22 NOTE — PROGRESS NOTES
[facility]  Progress Note - Ana Maria Coronado 1960, 64 y o  female MRN: [id number]  Unit/Bed#: S -01 Encounter: [id number]  Primary Care Provider: Ariana Beverly MD   Date and time admitted to hospital: 5/10/2021  1:16 PM    * Pulmonary embolism ([facility] [address] )  Assessment & Plan  - patient presents with a 2-3 day history of gradual onset weakness  - imaging in the ED noted a "small subsegmental left upper lobe pulmonary embolus"  - patient does reports improvement in her shortness of breath    Plan  - Patient started on Eliquis 10 mg for 7 days b i d, and is now maintained on Eliquis 5 mg b i d , pt to continue on d/c and follow up with pcp to determine duration of therapy  Weakness generalized  Assessment & Plan  - Patient presents with a 2-3 day history of gradual onset generalized weakness and slurred speech  - Most likely secondary to deconditioning in the setting of patient's cancer history versus infection but this is highly unlikely given the patients clinical findings  - patient's weakness is improving  -PT/OT is recommending post acute rehab services    Plan  -PT/OT rec: post acute rehab   -awaiting on insurance authorization    Acute respiratory failure with hypoxia ([facility] [address] )  Assessment & Plan  · Concerned for aspiration  · Chest x-ray showing mild vascular congestion with no overt pulmonary edema  Most recent echocardiogram in April showing normal ejection fraction with diastolic dysfunctions and no significant valvular abnormalities     · Procalcitonin negative x2  · Resolved    Impaction of intestine (HCC)  Assessment & Plan  - patient is a history chronic constipation (2/2 opioid induced constipation)  - Pt can go 2-5 days between bowel movements, pt reports she had not had a significant bowel movement prior to admission since approximately 5/6   - patient had notable abdominal distension and pain on admission  - patient is maintained on senna as an outpatient, discontinued on admission and switch to Senokot-S    Per GI:  -"would not recommend decompressive colonoscopy at this time  Would not recommend colonoscopy in general for assessment at this time due to new PE, eliquis on board, and stage 4 cancer   Could consider this several months down the road once eliquis can be held safely pending patient's cancer status"    Plan  - Continue Miralax BID 17g (1 packet), Senakot-S 2 Pills BID and Amitiza 24mcg BID on D/C  - Pt to follow up with GI as an out patient    Transaminitis  Assessment & Plan  - Patient noted to have elevated AST 67/ in ED  - CT of the abdomen did not note any acute pathology  - Liver enzymes in ED mildly elevated from baseline, possibly reactive  - Per GI elevated LFTs demonstrate a hepatocellular pattern  - hepatitis panel negative  - Celiac panel negative    Plan  - patient to follow-up with GI as an outpatient    Anemia of chronic disease  Assessment & Plan  - Pt has noted decrease in hgb on admission    Tobacco abuse  Assessment & Plan  - Patient has a history of tobacco abuse  - Will order the patient nicotine replacement  - Encourage cessation    Therapeutic opioid-induced constipation (OIC)  Assessment & Plan  - patient is a history chronic constipation secondary to chronic opioid use for cancer  - Pt can go 2-5 days between bowel movements, pt reports she had not had a significant bowel movement prior to admission since approximately 5/6   - patient is maintained on senna as an outpatient, discontinued on admission and switch to Senokot-S    Plan   - on discharge Miralax BID 17g (1 packet) bid, Senakot-S 2 Pills BID and Amitiza 24mcg BID    CINV (chemotherapy-induced nausea and vomiting)  Assessment & Plan  - Patient reports frequent nausea, denying any current symptoms  - Patient is maintained on Zofran, continue Protonix but due to Zofran use having constipating potential we discontinued it and start her on Compazine    Cancer-related pain  Assessment & Plan  - Pt has extensive history of cancer related pain  - Pt to follow up with palliative care as out patient   - Pt to continue PTA pain medication regiment    Brain metastases ([facility] [address] )  Assessment & Plan  - Pt has breast cancer with mets to the brain  - CT 5/10: No acute intracranial abnormality or significant change from prior study   - Most recent MRI was in     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, estrogen receptor positive ([facility] [address] )  Assessment & Plan  - Diagnosed with metastatic breast cancer in  with mets to the brain and bone  - S/p numerous rounds of chemotherapy  - S/P double mastectomy   - Follows with heme/onc as out patient      VTE Pharmacologic Prophylaxis:   Pharmacologic: Apixaban (Eliquis)  Mechanical VTE Prophylaxis in Place: Yes    Discussions with Specialists or Other Care Team Provider: nursing,     Education and Discussions with Family / Patient: will call and discuss    Current Length of Stay: 12 day(s)    Current Patient Status: Inpatient     Discharge Plan / Estimated Discharge Date: awaiting auth for rehab    Code Status: Level 1 - Full Code      Subjective:   Seen this AM in NAD, denies chest pain, SOB, or abdominal pain  Reports that she ate breakfast without an issue  No acute events overnight  Objective:     Vitals:   Temp (24hrs), Av °F (36 7 °C), Min:97 4 °F (36 3 °C), Max:98 4 °F (36 9 °C)    Temp:  [97 4 °F (36 3 °C)-98 4 °F (36 9 °C)] 98 1 °F (36 7 °C)  HR:  [68-95] 86  Resp:  [19] 19  BP: (119-166)/(62-77) 131/74  SpO2:  [94 %-97 %] 94 %  Body mass index is 28 58 kg/m²  Input and Output Summary (last 24 hours): Intake/Output Summary (Last 24 hours) at 2021 1103  Last data filed at 2021  Gross per 24 hour   Intake 480 ml   Output 500 ml   Net -20 ml       Physical Exam:     Physical Exam  Vitals signs reviewed  Constitutional:       Appearance: She is ill-appearing     HENT:      Head: Normocephalic and atraumatic  Eyes:      Extraocular Movements: Extraocular movements intact  Pupils: Pupils are equal, round, and reactive to light  Neck:      Musculoskeletal: Normal range of motion and neck supple  Cardiovascular:      Rate and Rhythm: Normal rate and regular rhythm  Pulses: Normal pulses  Heart sounds: Normal heart sounds  Pulmonary:      Effort: Pulmonary effort is normal  No respiratory distress  Breath sounds: Normal breath sounds  Abdominal:      Palpations: Abdomen is soft  Tenderness: There is no abdominal tenderness  Musculoskeletal:         General: No swelling or tenderness  Skin:     General: Skin is warm and dry  Neurological:      General: No focal deficit present  Mental Status: She is alert and oriented to person, place, and time  Motor: Weakness present  Psychiatric:         Mood and Affect: Mood normal          Behavior: Behavior normal          Thought Content: Thought content normal          Judgment: Judgment normal            Additional Data:     Labs:    Results from last 7 days   Lab Units 05/18/21  0658   WBC Thousand/uL 6 75   HEMOGLOBIN g/dL 9 9*   HEMATOCRIT % 31 9*   PLATELETS Thousands/uL 272     Results from last 7 days   Lab Units 05/18/21  0658   POTASSIUM mmol/L 3 6   CHLORIDE mmol/L 111*   CO2 mmol/L 23   BUN mg/dL 7   CREATININE mg/dL 0 54*   CALCIUM mg/dL 7 8*   ALK PHOS U/L 123*   ALT U/L 107*   AST U/L 30           * I Have Reviewed All Lab Data Listed Above    * Additional Pertinent Lab Tests Reviewed: No New Labs Available For Today    Imaging:    Imaging Reports Reviewed Today Include: none  Imaging Personally Reviewed by Myself Includes:  none    Recent Cultures (last 7 days):           Last 24 Hours Medication List:   Current Facility-Administered Medications   Medication Dose Route Frequency Provider Last Rate    acetaminophen  650 mg Oral Q6H PRN Juliocesar Simms DO      albuterol  2 puff Inhalation Q6H PRN Merle Solano Demi, DO      albuterol  2 5 mg Nebulization Q4H PRN [employer], DO      aluminum-magnesium hydroxide-simethicone  30 mL Oral Q6H PRN [employer], DO      apixaban  5 mg Oral BID [employer], DO      bisacodyl  10 mg Rectal Daily PRN  Flypad, CRNP      dexamethasone  2 mg Oral Daily With Breakfast [employer] Works, DO      gabapentin  400 mg Oral TID AC Juliocesar Simms, DO      HYDROmorphone  1 mg Intravenous Q6H PRN [employer] Works, DO      HYDROmorphone  4 mg Oral Q4H PRN [employer] Works, DO      HYDROmorphone  8 mg Oral Q4H PRN [employer]verse[employer] Works, DO      levETIRAcetam  250 mg Oral Q12H [address] Juliocesarjohnny Carrington, DO      lidocaine  1 patch Topical Daily  Flypad, CRNP      lubiprostone  24 mcg Oral BID With Meals Jose Loving MD      nicotine  1 patch Transdermal Daily [employer]verse[employer] Works, DO      OLANZapine  10 mg Oral HS Juliocesar Simms, DO      pantoprazole  40 mg Oral Daily AdverseEvents Works, DO      polyethylene glycol  17 g Oral BID AdverseEvents Works, DO      prochlorperazine  5 mg Oral TID AC Juliocesar Simms, DO      senna-docusate sodium  2 tablet Oral BID [employer] Works, DO      zolpidem  10 mg Oral HS PRN [employer], DO          Today, Patient Was Seen By: Cherry Stone MD    ** Please Note: This note has been constructed using a voice recognition system   **

## 2021-05-23 PROBLEM — J96.01 ACUTE RESPIRATORY FAILURE WITH HYPOXIA (HCC): Status: RESOLVED | Noted: 2021-05-16 | Resolved: 2021-05-23

## 2021-05-23 PROCEDURE — 99232 SBSQ HOSP IP/OBS MODERATE 35: CPT | Performed by: INTERNAL MEDICINE

## 2021-05-23 RX ADMIN — PROCHLORPERAZINE MALEATE 5 MG: 10 TABLET ORAL at 10:43

## 2021-05-23 RX ADMIN — PROCHLORPERAZINE MALEATE 5 MG: 10 TABLET ORAL at 16:59

## 2021-05-23 RX ADMIN — DEXAMETHASONE 2 MG: 2 TABLET ORAL at 07:32

## 2021-05-23 RX ADMIN — ACETAMINOPHEN 650 MG: 325 TABLET, FILM COATED ORAL at 20:09

## 2021-05-23 RX ADMIN — POLYETHYLENE GLYCOL 3350 17 G: 17 POWDER, FOR SOLUTION ORAL at 21:16

## 2021-05-23 RX ADMIN — LEVETIRACETAM 250 MG: 500 TABLET, FILM COATED ORAL at 20:09

## 2021-05-23 RX ADMIN — GABAPENTIN 400 MG: 400 CAPSULE ORAL at 06:18

## 2021-05-23 RX ADMIN — LEVETIRACETAM 250 MG: 500 TABLET, FILM COATED ORAL at 08:50

## 2021-05-23 RX ADMIN — LIDOCAINE 5% 1 PATCH: 700 PATCH TOPICAL at 08:53

## 2021-05-23 RX ADMIN — SENNOSIDES AND DOCUSATE SODIUM 2 TABLET: 8.6; 5 TABLET ORAL at 17:00

## 2021-05-23 RX ADMIN — LUBIPROSTONE 24 MCG: 24 CAPSULE, GELATIN COATED ORAL at 07:32

## 2021-05-23 RX ADMIN — PANTOPRAZOLE SODIUM 40 MG: 40 TABLET, DELAYED RELEASE ORAL at 05:20

## 2021-05-23 RX ADMIN — POLYETHYLENE GLYCOL 3350 17 G: 17 POWDER, FOR SOLUTION ORAL at 08:56

## 2021-05-23 RX ADMIN — GABAPENTIN 400 MG: 400 CAPSULE ORAL at 17:00

## 2021-05-23 RX ADMIN — NICOTINE 1 PATCH: 14 PATCH, EXTENDED RELEASE TRANSDERMAL at 08:51

## 2021-05-23 RX ADMIN — OLANZAPINE 10 MG: 10 TABLET, FILM COATED ORAL at 21:16

## 2021-05-23 RX ADMIN — SENNOSIDES AND DOCUSATE SODIUM 2 TABLET: 8.6; 5 TABLET ORAL at 08:50

## 2021-05-23 RX ADMIN — APIXABAN 5 MG: 5 TABLET, FILM COATED ORAL at 08:50

## 2021-05-23 RX ADMIN — GABAPENTIN 400 MG: 400 CAPSULE ORAL at 10:43

## 2021-05-23 RX ADMIN — LUBIPROSTONE 24 MCG: 24 CAPSULE, GELATIN COATED ORAL at 16:59

## 2021-05-23 RX ADMIN — PROCHLORPERAZINE MALEATE 5 MG: 10 TABLET ORAL at 06:18

## 2021-05-23 RX ADMIN — APIXABAN 5 MG: 5 TABLET, FILM COATED ORAL at 17:00

## 2021-05-23 NOTE — PLAN OF CARE
Problem: Potential for Falls  Goal: Patient will remain free of falls  Description: INTERVENTIONS:  - Assess patient frequently for physical needs  -  Identify cognitive and physical deficits and behaviors that affect risk of falls    -  [place] fall precautions as indicated by assessment   - Educate patient/family on patient safety including physical limitations  - Instruct patient to call for assistance with activity based on assessment  - Modify environment to reduce risk of injury  - Consider OT/PT consult to assist with strengthening/mobility  Outcome: Progressing     Problem: Prexisting or High Potential for Compromised Skin Integrity  Goal: Skin integrity is maintained or improved  Description: INTERVENTIONS:  - Identify patients at risk for skin breakdown  - Assess and monitor skin integrity  - Assess and monitor nutrition and hydration status  - Monitor labs   - Assess for incontinence   - Turn and reposition patient  - Assist with mobility/ambulation  - Relieve pressure over bony prominences  - Avoid friction and shearing  - Provide appropriate hygiene as needed including keeping skin clean and dry  - Evaluate need for skin moisturizer/barrier cream  - Collaborate with interdisciplinary team   - Patient/family teaching  - Consider wound care consult   Outcome: Progressing     Problem: PAIN - ADULT  Goal: Verbalizes/displays adequate comfort level or baseline comfort level  Description: Interventions:  - Encourage patient to monitor pain and request assistance  - Assess pain using appropriate pain scale  - Administer analgesics based on type and severity of pain and evaluate response  - Implement non-pharmacological measures as appropriate and evaluate response  - Consider cultural and social influences on pain and pain management  - Notify physician/advanced practitioner if interventions unsuccessful or patient reports new pain  Outcome: Progressing     Problem: INFECTION - ADULT  Goal: Absence or prevention of progression during hospitalization  Description: INTERVENTIONS:  - Assess and monitor for signs and symptoms of infection  - Monitor lab/diagnostic results  - Monitor all insertion sites, i e  indwelling lines, tubes, and drains  - Monitor endotracheal if appropriate and nasal secretions for changes in amount and color  - [place] appropriate cooling/warming therapies per order  - Administer medications as ordered  - Instruct and encourage patient and family to use good hand hygiene technique  - Identify and instruct in appropriate isolation precautions for identified infection/condition  Outcome: Progressing     Problem: SAFETY ADULT  Goal: Patient will remain free of falls  Description: INTERVENTIONS:  - Assess patient frequently for physical needs  -  Identify cognitive and physical deficits and behaviors that affect risk of falls    -  [place] fall precautions as indicated by assessment   - Educate patient/family on patient safety including physical limitations  - Instruct patient to call for assistance with activity based on assessment  - Modify environment to reduce risk of injury  - Consider OT/PT consult to assist with strengthening/mobility  Outcome: Progressing  Goal: Maintain or return to baseline ADL function  Description: INTERVENTIONS:  -  Assess patient's ability to carry out ADLs; assess patient's baseline for ADL function and identify physical deficits which impact ability to perform ADLs (bathing, care of mouth/teeth, toileting, grooming, dressing, etc )  - Assess/evaluate cause of self-care deficits   - Assess range of motion  - Assess patient's mobility; develop plan if impaired  - Assess patient's need for assistive devices and provide as appropriate  - Encourage maximum independence but intervene and supervise when necessary  - Involve family in performance of ADLs  - Assess for home care needs following discharge   - Consider OT consult to assist with ADL evaluation and planning for discharge  - Provide patient education as appropriate  Outcome: Progressing  Goal: Maintain or return mobility status to optimal level  Description: INTERVENTIONS:  - Assess patient's baseline mobility status (ambulation, transfers, stairs, etc )    - Identify cognitive and physical deficits and behaviors that affect mobility  - Identify mobility aids required to assist with transfers and/or ambulation (gait belt, sit-to-stand, lift, walker, cane, etc )  - [place] fall precautions as indicated by assessment  - Record patient progress and toleration of activity level on Mobility SBAR; progress patient to next Phase/Stage  - Instruct patient to call for assistance with activity based on assessment  - Consider rehabilitation consult to assist with strengthening/weightbearing, etc   Outcome: Progressing     Problem: DISCHARGE PLANNING  Goal: Discharge to home or other facility with appropriate resources  Description: INTERVENTIONS:  - Identify barriers to discharge w/patient and caregiver  - Arrange for needed discharge resources and transportation as appropriate  - Identify discharge learning needs (meds, wound care, etc )  - Arrange for interpretive services to assist at discharge as needed  - Refer to Case Management Department for coordinating discharge planning if the patient needs post-hospital services based on physician/advanced practitioner order or complex needs related to functional status, cognitive ability, or social support system  Outcome: Progressing     Problem: Knowledge Deficit  Goal: Patient/family/caregiver demonstrates understanding of disease process, treatment plan, medications, and discharge instructions  Description: Complete learning assessment and assess knowledge base    Interventions:  - Provide teaching at level of understanding  - Provide teaching via preferred learning methods  Outcome: Progressing     Problem: RESPIRATORY - ADULT  Goal: Achieves optimal ventilation and oxygenation  Description: INTERVENTIONS:  - Assess for changes in respiratory status  - Assess for changes in mentation and behavior  - Position to facilitate oxygenation and minimize respiratory effort  - Oxygen administered by appropriate delivery if ordered  - Initiate smoking cessation education as indicated  - Encourage broncho-pulmonary hygiene including cough, deep breathe, Incentive Spirometry  - Assess the need for suctioning and aspirate as needed  - Assess and instruct to report SOB or any respiratory difficulty  - Respiratory Therapy support as indicated  Outcome: Progressing     Problem: GASTROINTESTINAL - ADULT  Goal: Minimal or absence of nausea and/or vomiting  Description: INTERVENTIONS:  - Administer IV fluids if ordered to ensure adequate hydration  - Maintain NPO status until nausea and vomiting are resolved  - Nasogastric tube if ordered  - Administer ordered antiemetic medications as needed  - Provide nonpharmacologic comfort measures as appropriate  - Advance diet as tolerated, if ordered  - Consider nutrition services referral to assist patient with adequate nutrition and appropriate food choices  Outcome: Progressing  Goal: Maintains or returns to baseline bowel function  Description: INTERVENTIONS:  - Assess bowel function  - Encourage oral fluids to ensure adequate hydration  - Administer IV fluids if ordered to ensure adequate hydration  - Administer ordered medications as needed  - Encourage mobilization and activity  - Consider nutritional services referral to assist patient with adequate nutrition and appropriate food choices  Outcome: Progressing     Problem: Nutrition/Hydration-ADULT  Goal: Nutrient/Hydration intake appropriate for improving, restoring or maintaining nutritional needs  Description: Monitor and assess patient's nutrition/hydration status for malnutrition  Collaborate with interdisciplinary team and initiate plan and interventions as ordered    Monitor patient's weight and dietary intake as ordered or per policy  Utilize nutrition screening tool and intervene as necessary  Determine patient's food preferences and provide high-protein, high-caloric foods as appropriate       INTERVENTIONS:  - Monitor oral intake, urinary output, labs, and treatment plans  - Assess nutrition and hydration status and recommend course of action  - Evaluate amount of meals eaten  - Assist patient with eating if necessary   - Allow adequate time for meals  - Recommend/ encourage appropriate diets, oral nutritional supplements, and vitamin/mineral supplements  - Order, calculate, and assess calorie counts as needed  - Recommend, monitor, and adjust tube feedings and TPN/PPN based on assessed needs  - Assess need for intravenous fluids  - Provide specific nutrition/hydration education as appropriate  - Include patient/family/caregiver in decisions related to nutrition  Outcome: Progressing

## 2021-05-23 NOTE — ASSESSMENT & PLAN NOTE
- Cr noted to be 0 9 on admission, baseline 0 5 - 0 7  - Most likely secondary to urinary retention as noted by bladder distension on CT  - Patient is now back to baseline - resloved    Plan:   Intake and output   Monitor BMP daily and observe for downward trend of creatinine   Avoid hypoperfusion of the kidneys, minimize nephrotoxins    Results from last 7 days   Lab Units 05/18/21  0658 05/17/21  0627   CREATININE mg/dL 0 54* 0 53*   EGFR ml/min/1 73sq m 104 105

## 2021-05-23 NOTE — PROGRESS NOTES
[facility]  Progress Note - Alie Garcia 1960, 64 y o  female MRN: [id number]  Unit/Bed#: S -01 Encounter: [id number]  Primary Care Provider: Marleni Kim MD   Date and time admitted to hospital: 5/10/2021  1:16 PM    * Pulmonary embolism ([facility] [address] )  Assessment & Plan  - patient presents with a 2-3 day history of gradual onset weakness  - imaging in the ED noted a "small subsegmental left upper lobe pulmonary embolus"  - patient does reports improvement in her shortness of breath    Plan  - Patient started on Eliquis 10 mg for 7 days b i d, and is now maintained on Eliquis 5 mg b i d , pt to continue on d/c and follow up with pcp to determine duration of therapy  Impaction of intestine ([facility] )  Assessment & Plan  - patient is a history chronic constipation (2/2 opioid induced constipation)  - Pt can go 2-5 days between bowel movements, pt reports she had not had a significant bowel movement prior to admission since approximately 5/6   - patient had notable abdominal distension and pain on admission  - patient is maintained on senna as an outpatient, discontinued on admission and switch to Senokot-S    Per GI:  -"would not recommend decompressive colonoscopy at this time  Would not recommend colonoscopy in general for assessment at this time due to new PE, eliquis on board, and stage 4 cancer   Could consider this several months down the road once eliquis can be held safely pending patient's cancer status"    Plan  - Continue Miralax BID 17g (1 packet), Senakot-S 2 Pills BID and Amitiza 24mcg BID on D/C  - Pt to follow up with GI as an out patient    Acute kidney failure (HCC)-resolved as of 5/12/2021  Assessment & Plan  - Cr noted to be 0 9 on admission, baseline 0 5 - 0 7  - Most likely secondary to urinary retention as noted by bladder distension on CT  - Patient is now back to baseline - resloved    Plan:   Intake and output   Monitor BMP daily and observe for downward trend of creatinine   Avoid hypoperfusion of the kidneys, minimize nephrotoxins    Results from last 7 days   Lab Units 05/18/21  0658 05/17/21  0627   CREATININE mg/dL 0 54* 0 53*   EGFR ml/min/1 73sq m 104 105       Therapeutic opioid-induced constipation (OIC)  Assessment & Plan  - patient is a history chronic constipation secondary to chronic opioid use for cancer  - Pt can go 2-5 days between bowel movements, pt reports she had not had a significant bowel movement prior to admission since approximately 5/6   - patient is maintained on senna as an outpatient, discontinued on admission and switch to Senokot-S    Plan   - on discharge Miralax BID 17g (1 packet) bid, Senakot-S 2 Pills BID and Amitiza 24mcg BID    Transaminitis  Assessment & Plan  - Patient noted to have elevated AST 67/ in ED  - CT of the abdomen did not note any acute pathology  - Liver enzymes in ED mildly elevated from baseline, possibly reactive  - Per GI elevated LFTs demonstrate a hepatocellular pattern  - hepatitis panel negative  - Celiac panel negative    Plan  - patient to follow-up with GI as an outpatient    Anemia of chronic disease  Assessment & Plan  - Pt has noted decrease in hgb on admission    Tobacco abuse  Assessment & Plan  - Patient has a history of tobacco abuse  - Will order the patient nicotine replacement  - Encourage cessation    Weakness generalized  Assessment & Plan  - Patient presents with a 2-3 day history of gradual onset generalized weakness and slurred speech  - Most likely secondary to deconditioning in the setting of patient's cancer history versus infection but this is highly unlikely given the patients clinical findings  - patient's weakness is improving  -PT/OT is recommending post acute rehab services    Plan  -PT/OT rec: post acute rehab   -awaiting on insurance authorization    CINV (chemotherapy-induced nausea and vomiting)  Assessment & Plan  - Patient reports frequent nausea, denying any current symptoms  - Patient is maintained on Zofran, continue Protonix but due to Zofran use having constipating potential we discontinued it and start her on Compazine    Cancer-related pain  Assessment & Plan  - Pt has extensive history of cancer related pain  - Pt to follow up with palliative care as out patient   - Pt to continue PTA pain medication regiment    Brain metastases ([facility] [address] )  Assessment & Plan  - Pt has breast cancer with mets to the brain  - CT 5/10: No acute intracranial abnormality or significant change from prior study   - Most recent MRI was in 2020    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, estrogen receptor positive ([facility] [address] )  Assessment & Plan  - Diagnosed with metastatic breast cancer in 2010 with mets to the brain and bone  - S/p numerous rounds of chemotherapy  - S/P double mastectomy   - Follows with heme/onc as out patient    Acute respiratory failure with hypoxia (HCC)-resolved as of 5/23/2021  Assessment & Plan  · Concerned for aspiration  · Chest x-ray showing mild vascular congestion with no overt pulmonary edema  Most recent echocardiogram in April showing normal ejection fraction with diastolic dysfunctions and no significant valvular abnormalities     · Procalcitonin negative x2  · Resolved    Leukocytosis-resolved as of 5/12/2021  Assessment & Plan  - Patient is noted to have a mild leukocytosis on labs in the emergency room on presentation 10 43  - Patient denies any fever, chills  - Low suspicion of infectious process  - Patient is on steroids chronically at home, this is a possible source of the leukocytosis  - Resolved      VTE Pharmacologic Prophylaxis:   Pharmacologic: Apixaban (Eliquis)  Mechanical VTE Prophylaxis in Place: Yes    Discussions with Specialists or Other Care Team Provider:  No new discussions today    Education and Discussions with Family / Patient:  Discussed plan of care with the patient, updated pts     Current Length of Stay: 15 day(s)    Current Patient Status: Inpatient     Discharge Plan / Estimated Discharge Date: TBD, pt continues to be stable for d/c, we are still pending insurance authorization for rehab  Code Status: Level 1 - Full Code    Subjective:   Patient seen and examined  No acute events overnight  Patient is continuing to have bowel movements daily  The patient continues to be medically stable and clear for discharge delay his due to insurance company taking a significant amount of time to improve authorization for rehab  The patient will be able to be discharged as soon as authorization is obtained from the insurance company  Patient denies any headache, dizziness, nausea, vomiting, constipation, diarrhea, chest pain, palpitations, shortness of breath, wheezing  A 12 point review of systems was completed and was negative unless noted above  Objective:     Vitals:   Temp (24hrs), Av 1 °F (36 7 °C), Min:97 8 °F (36 6 °C), Max:98 3 °F (36 8 °C)    Temp:  [97 8 °F (36 6 °C)-98 3 °F (36 8 °C)] 98 3 °F (36 8 °C)  HR:  [94-96] 96  Resp:  [20] 20  BP: (105-144)/(61-77) 105/61  SpO2:  [91 %-92 %] 91 %  Body mass index is 28 58 kg/m²  Input and Output Summary (last 24 hours): Intake/Output Summary (Last 24 hours) at 2021 1034  Last data filed at 2021 2229  Gross per 24 hour   Intake 960 ml   Output --   Net 960 ml       Physical Exam:     Physical Exam  Vitals signs and nursing note reviewed  Constitutional:       General: She is not in acute distress  Appearance: She is well-developed  She is not diaphoretic  HENT:      Head: Normocephalic and atraumatic  Nose: Nose normal    Eyes:      General: No scleral icterus  Right eye: No discharge  Left eye: No discharge  Extraocular Movements: Extraocular movements intact  Conjunctiva/sclera: Conjunctivae normal       Pupils: Pupils are equal, round, and reactive to light     Neck:      Musculoskeletal: Normal range of motion  Cardiovascular:      Rate and Rhythm: Normal rate and regular rhythm  Heart sounds: Normal heart sounds  No murmur  No friction rub  No gallop  Pulmonary:      Effort: Pulmonary effort is normal  No respiratory distress  Breath sounds: No stridor  No wheezing, rhonchi or rales  Chest:      Chest wall: No tenderness  Abdominal:      General: Abdomen is flat  Bowel sounds are normal  There is distension (Improved)  Palpations: Abdomen is soft  Tenderness: There is no abdominal tenderness  Genitourinary:     Rectum: Normal  No mass or tenderness  Normal anal tone  Musculoskeletal: Normal range of motion  Right lower leg: No edema  Left lower leg: No edema  Skin:     General: Skin is warm and dry  Neurological:      General: No focal deficit present  Mental Status: She is alert and oriented to person, place, and time  Motor: Weakness present  Psychiatric:         Behavior: Behavior normal          Thought Content: Thought content normal          Judgment: Judgment normal           Additional Data:     Labs: I have personally reviewed pertinent reports  Results from last 7 days   Lab Units 05/18/21  0658 05/17/21  0627   WBC Thousand/uL 6 75 6 85   HEMOGLOBIN g/dL 9 9* 10 3*   HEMATOCRIT % 31 9* 32 9*   PLATELETS Thousands/uL 272 259      Results from last 7 days   Lab Units 05/18/21  0658 05/17/21  0627   POTASSIUM mmol/L 3 6 3 3*   CHLORIDE mmol/L 111* 106   CO2 mmol/L 23 27   BUN mg/dL 7 9   CREATININE mg/dL 0 54* 0 53*   CALCIUM mg/dL 7 8* 8 1*   ALK PHOS U/L 123* 130*   ALT U/L 107* 158*   AST U/L 30 50*                            * Additional Pertinent Lab Tests Reviewed: No New Labs Available For Today    Radiology Results: I have personally reviewed pertinent reports  Xr Chest Portable    Result Date: 5/16/2021  Impression: Stable chest   No acute infiltrates   Workstation performed: [id number]     Xr Chest Portable    Result Date: 5/16/2021  Impression: NG tube in stomach  Mild central vascular prominence without overt pulmonary edema  Workstation performed: [id number]     Xr Abdomen Obstruction Series    Result Date: 5/14/2021  Impression: Improved constipation Nonobstructive bowel gas pattern  Workstation performed: [id number]     Ct Head Without Contrast    Result Date: 5/10/2021  Impression: No acute intracranial abnormality or significant change from prior study  Changes within the brain parenchyma again suggesting sequela of prior whole brain radiation including previously treated right periatrial dystrophic calcification  No new lesions detected within limitations of noncontrast imaging  Intracranial metastatic disease again better evaluated with contrast-enhanced MRI  Workstation performed: [id number]     Pe Study With Ct Abdomen And Pelvis With Contrast    Result Date: 5/10/2021  Impression: 1  Limited CT pulmonary angiogram, however, suggestion of a small subsegmental left upper lobe pulmonary embolus  Dilated RV/LV ratio which is nonspecific and could be indicative of elevated right heart pressures in the setting of COPD/pulmonary hypertension  No bowing of the intraventricular septum  2   Small bilateral pulmonary nodules, grossly similar  1 cm pleural-based left apical nodule appears larger  Cannot exclude evolving metastasis  3   No acute intra-abdominal abnormality  4   Large volume of colonic stool suggesting constipation  5   Marked urinary bladder distention   I personally discussed this study with physician assistant AMBER J Lolita Duverney on 5/10/2021 at 3:50 PM  Workstation performed: [id number]       Recent Cultures (last 7 days):           Last 24 Hours Medication List:   Current Facility-Administered Medications   Medication Dose Route Frequency Provider Last Rate    acetaminophen  650 mg Oral Q6H PRN Sondra Rico, DO      albuterol  2 puff Inhalation Q6H PRN Juliocesar Simms, DO      albuterol  2 5 mg Nebulization Q4H PRN [employer], DO      aluminum-magnesium hydroxide-simethicone  30 mL Oral Q6H PRN [employer], DO      apixaban  5 mg Oral BID [employer], DO      bisacodyl  10 mg Rectal Daily PRN FAUZIA Michael      dexamethasone  2 mg Oral Daily With Breakfast Michele [employer], DO      gabapentin  400 mg Oral TID AC Juliocesar Simms, DO      HYDROmorphone  1 mg Intravenous Q6H PRN [employer], DO      HYDROmorphone  4 mg Oral Q4H PRN [employer], DO      HYDROmorphone  8 mg Oral Q4H PRN [employer], DO      levETIRAcetam  250 mg Oral Q12H [address] Juliocesar Charissa, DO      lidocaine  1 patch Topical Daily FAUZIA Michael      lubiprostone  24 mcg Oral BID With Meals So Quesada MD      nicotine  1 patch Transdermal Daily [employer], DO      OLANZapine  10 mg Oral HS Juliocesar Simms, DO      pantoprazole  40 mg Oral Daily [employer], DO      polyethylene glycol  17 g Oral BID [employer], DO      prochlorperazine  5 mg Oral TID AC Juliocesar Simms, DO      senna-docusate sodium  2 tablet Oral BID [employer], DO      zolpidem  10 mg Oral HS PRN [employer], DO          Today, Patient Was Seen By: [employer], DO    Portions of the record may have been created with voice recognition software  Occasional wrong word or "sound a like" substitutions may have occurred due to the inherent limitations of voice recognition software  Read the chart carefully and recognize, using context, where substitutions have occurred

## 2021-05-24 PROCEDURE — 99231 SBSQ HOSP IP/OBS SF/LOW 25: CPT | Performed by: INTERNAL MEDICINE

## 2021-05-24 PROCEDURE — 97542 WHEELCHAIR MNGMENT TRAINING: CPT

## 2021-05-24 PROCEDURE — 97116 GAIT TRAINING THERAPY: CPT

## 2021-05-24 PROCEDURE — 97168 OT RE-EVAL EST PLAN CARE: CPT

## 2021-05-24 RX ADMIN — LUBIPROSTONE 24 MCG: 24 CAPSULE, GELATIN COATED ORAL at 09:26

## 2021-05-24 RX ADMIN — PROCHLORPERAZINE MALEATE 5 MG: 10 TABLET ORAL at 07:00

## 2021-05-24 RX ADMIN — HYDROMORPHONE HYDROCHLORIDE 4 MG: 2 TABLET ORAL at 19:55

## 2021-05-24 RX ADMIN — GABAPENTIN 400 MG: 400 CAPSULE ORAL at 11:41

## 2021-05-24 RX ADMIN — LEVETIRACETAM 250 MG: 500 TABLET, FILM COATED ORAL at 08:36

## 2021-05-24 RX ADMIN — APIXABAN 5 MG: 5 TABLET, FILM COATED ORAL at 08:36

## 2021-05-24 RX ADMIN — SENNOSIDES AND DOCUSATE SODIUM 2 TABLET: 8.6; 5 TABLET ORAL at 08:37

## 2021-05-24 RX ADMIN — HYDROMORPHONE HYDROCHLORIDE 4 MG: 2 TABLET ORAL at 07:06

## 2021-05-24 RX ADMIN — LUBIPROSTONE 24 MCG: 24 CAPSULE, GELATIN COATED ORAL at 17:54

## 2021-05-24 RX ADMIN — LIDOCAINE 5% 1 PATCH: 700 PATCH TOPICAL at 08:37

## 2021-05-24 RX ADMIN — PANTOPRAZOLE SODIUM 40 MG: 40 TABLET, DELAYED RELEASE ORAL at 07:01

## 2021-05-24 RX ADMIN — POLYETHYLENE GLYCOL 3350 17 G: 17 POWDER, FOR SOLUTION ORAL at 19:57

## 2021-05-24 RX ADMIN — LEVETIRACETAM 250 MG: 500 TABLET, FILM COATED ORAL at 19:55

## 2021-05-24 RX ADMIN — SENNOSIDES AND DOCUSATE SODIUM 2 TABLET: 8.6; 5 TABLET ORAL at 17:54

## 2021-05-24 RX ADMIN — GABAPENTIN 400 MG: 400 CAPSULE ORAL at 07:01

## 2021-05-24 RX ADMIN — GABAPENTIN 400 MG: 400 CAPSULE ORAL at 18:01

## 2021-05-24 RX ADMIN — OLANZAPINE 10 MG: 10 TABLET, FILM COATED ORAL at 23:12

## 2021-05-24 RX ADMIN — APIXABAN 5 MG: 5 TABLET, FILM COATED ORAL at 17:54

## 2021-05-24 RX ADMIN — PROCHLORPERAZINE MALEATE 5 MG: 10 TABLET ORAL at 17:54

## 2021-05-24 RX ADMIN — PROCHLORPERAZINE MALEATE 5 MG: 10 TABLET ORAL at 11:41

## 2021-05-24 RX ADMIN — POLYETHYLENE GLYCOL 3350 17 G: 17 POWDER, FOR SOLUTION ORAL at 08:38

## 2021-05-24 RX ADMIN — NICOTINE 1 PATCH: 14 PATCH, EXTENDED RELEASE TRANSDERMAL at 08:38

## 2021-05-24 RX ADMIN — DEXAMETHASONE 2 MG: 2 TABLET ORAL at 08:36

## 2021-05-24 NOTE — PLAN OF CARE
Problem: PHYSICAL THERAPY ADULT  Goal: Performs mobility at highest level of function for planned discharge setting  See evaluation for individualized goals  Description: Treatment/Interventions: ADL retraining, Functional transfer training, LE strengthening/ROM, Elevations, Therapeutic exercise, Endurance training, Patient/family training, Bed mobility, Gait training  Equipment Recommended: J Carlos Mendieta       See flowsheet documentation for full assessment, interventions and recommendations  Outcome: Progressing  Note: Prognosis: Fair  Problem List: Decreased strength, Decreased endurance, Decreased mobility, Decreased coordination, Obesity, Decreased cognition, Impaired judgement, Decreased safety awareness, Impaired balance(gait deviations)  Assessment: Upon initial eval, pt needed S for bed mobility, mod A of 1-2 for transfers, and was non amb  Pt has now progressed to needing less A for transfers, is now ambulatory with a walker w/ A Of 1, can use WC w/ assist of 1, but does need more A for bed mobility  Skilled PT recommended to progress pt toward treatment goals in more consistent manner  Goals are updated below  PT Discharge Recommendation: Post acute rehabilitation services(for consistentcy of mobility)          See flowsheet documentation for full assessment

## 2021-05-24 NOTE — CASE MANAGEMENT
Cm provided updated clinical information to SB to provide to the insurance co   Cm will continue to follow

## 2021-05-24 NOTE — PLAN OF CARE
Problem: OCCUPATIONAL THERAPY ADULT  Goal: Performs self-care activities at highest level of function for planned discharge setting  See evaluation for individualized goals  Description: Treatment Interventions: ADL retraining, Functional transfer training, UE strengthening/ROM, Endurance training, Patient/family training, Equipment evaluation/education, Fine motor coordination activities, Continued evaluation, Compensatory technique education, Energy conservation, Activityengagement  Equipment Recommended: Shower/Tub chair with back ($), Bedside commode       See flowsheet documentation for full assessment, interventions and recommendations  Outcome: Progressing  Note: Limitation: Decreased ADL status, Decreased cognition, Decreased endurance, Decreased self-care trans, Decreased high-level ADLs, Decreased UE strength     Assessment: Pt is a 63 yo female [facility]  Pt previously on OT caseload and seen for OT re-eval to assess progress and assist in DC planning as goals  2021  Pt diagnosed w/ PE  Pt lives w/ spouse and sister at baseline in mobile home  Pt needs assist w/ bathing and IADL tasks using RW for functional mobility  Upon re-eval pt alert and generally oriented  Limited short term recall but able to follow directions and communicate wants / needs  Pt required min A to complete bed mobility  Pt required min A to complete sit <> stand  Pt required min A to complete LBD and UBD  VRI 4 following LBD indicating decreased activity tolerance and endurance  Pt presents w/ decreased activity tolerance, decreased endurance, decreased standing tolerance, limited insight into deficits, generalized weakness / deconditioning impacting her I w/ dressing, bathing, oral hygiene, functional mobility, functional transfers, activity engagement, clothing mgmt  Pt completing ADL below baseline level of I and would benefit from continued OT while in acute care to address deficits   The patient's raw score on the AM-PAC Daily Activity inpatient short form is 18, standardized score is 38 66, less than 39 4  Patients at this level are likely to benefit from discharge to post-acute rehabilitation services  Please refer to the recommendation of the Occupational Therapist for safe discharge planning  Recommend post acute rehab when medically stable for discharge from acute care to return to baseline level of I   Will continue to follow     OT Discharge Recommendation: Post acute rehabilitation services

## 2021-05-24 NOTE — PROGRESS NOTES
[facility]  Progress Note - Toro Webster 1960, 64 y o  female MRN: [id number]  Unit/Bed#: S -01 Encounter: [id number]  Primary Care Provider: Sancho Rascon MD   Date and time admitted to hospital: 5/10/2021  1:16 PM    * Pulmonary embolism ([facility] [address] )  Assessment & Plan  - patient presents with a 2-3 day history of gradual onset weakness  - imaging in the ED noted a "small subsegmental left upper lobe pulmonary embolus"  - patient does reports improvement in her shortness of breath    Plan  - Patient started on Eliquis 10 mg for 7 days b i d, and is now maintained on Eliquis 5 mg b i d , pt to continue on d/c and follow up with pcp to determine duration of therapy  Impaction of intestine ([facility] )  Assessment & Plan  - patient is a history chronic constipation (2/2 opioid induced constipation)  - Pt can go 2-5 days between bowel movements, pt reports she had not had a significant bowel movement prior to admission since approximately 5/6   - patient had notable abdominal distension and pain on admission  - patient is maintained on senna as an outpatient, discontinued on admission and switch to Senokot-S    Per GI:  -"would not recommend decompressive colonoscopy at this time  Would not recommend colonoscopy in general for assessment at this time due to new PE, eliquis on board, and stage 4 cancer   Could consider this several months down the road once eliquis can be held safely pending patient's cancer status"    Plan  - Continue Miralax BID 17g (1 packet), Senakot-S 2 Pills BID and Amitiza 24mcg BID on D/C  - Pt to follow up with GI as an out patient    Therapeutic opioid-induced constipation (OIC)  Assessment & Plan  - patient is a history chronic constipation secondary to chronic opioid use for cancer  - Pt can go 2-5 days between bowel movements, pt reports she had not had a significant bowel movement prior to admission since approximately 5/6   - patient is maintained on senna as an outpatient, discontinued on admission and switch to Senokot-S    Plan   - on discharge Miralax BID 17g (1 packet) bid, Senakot-S 2 Pills BID and Amitiza 24mcg BID    Transaminitis  Assessment & Plan  - Patient noted to have elevated AST 67/ in ED  - CT of the abdomen did not note any acute pathology  - Liver enzymes in ED mildly elevated from baseline, possibly reactive  - Per GI elevated LFTs demonstrate a hepatocellular pattern  - hepatitis panel negative  - Celiac panel negative    Plan  - patient to follow-up with GI as an outpatient    Anemia of chronic disease  Assessment & Plan  - Pt has noted decrease in hgb on admission    Tobacco abuse  Assessment & Plan  - Patient has a history of tobacco abuse  - Will order the patient nicotine replacement  - Encourage cessation    Weakness generalized  Assessment & Plan  - Patient presents with a 2-3 day history of gradual onset generalized weakness and slurred speech  - Most likely secondary to deconditioning in the setting of patient's cancer history versus infection but this is highly unlikely given the patients clinical findings  - patient's weakness is improving  -PT/OT is recommending post acute rehab services    Plan  -PT/OT rec: post acute rehab   -awaiting on insurance authorization    CINV (chemotherapy-induced nausea and vomiting)  Assessment & Plan  - Patient reports frequent nausea, denying any current symptoms  - Patient is maintained on Zofran, continue Protonix but due to Zofran use having constipating potential we discontinued it and start her on Compazine    Cancer-related pain  Assessment & Plan  - Pt has extensive history of cancer related pain  - Pt to follow up with palliative care as out patient   - Pt to continue PTA pain medication regiment    Brain metastases ([facility] [address] )  Assessment & Plan  - Pt has breast cancer with mets to the brain  - [facility] 5/10: No acute intracranial abnormality or significant change from prior study   - Most recent MRI was in 2020    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, estrogen receptor positive ([facility] [address] )  Assessment & Plan  - Diagnosed with metastatic breast cancer in  with mets to the brain and bone  - S/p numerous rounds of chemotherapy  - S/P double mastectomy   - Follows with heme/onc as out patient       VTE Pharmacologic Prophylaxis:   Pharmacologic: Apixaban (Eliquis)  Mechanical VTE Prophylaxis in Place: Yes    Discussions with Specialists or Other Care Team Provider: None     Education and Discussions with Family / Patient:  Discussed plan of care with the patient    Current Length of Stay: 14 day(s)    Current Patient Status: Inpatient     Discharge Plan / Estimated Discharge Date: To be determined patient is medically stable for discharge, we are still pending insurance authorization    Code Status: Level 1 - Full Code    Subjective:   Patient seen and examined  No acute events overnight  Patient is continuing to have bowel movements daily  The patient continues to be medically stable and clear for discharge the significant delay is slowly due to the insurance company taking a significant amount of time to improve authorization for rehab  The patient will be able to be discharged as soon as authorization is obtained from the insurance company  Patient denies any headache, dizziness, nausea, vomiting, constipation, diarrhea, chest pain, palpitations, shortness of breath, wheezing  A 12 point review of systems was completed and was negative unless noted above  Objective:     Vitals:   Temp (24hrs), Av 9 °F (36 6 °C), Min:97 5 °F (36 4 °C), Max:98 1 °F (36 7 °C)    Temp:  [97 5 °F (36 4 °C)-98 1 °F (36 7 °C)] 98 °F (36 7 °C)  HR:  [87-97] 97  Resp:  [20] 20  BP: ()/(56-64) 120/64  SpO2:  [93 %-96 %] 96 %  Body mass index is 28 58 kg/m²  Input and Output Summary (last 24 hours):        Intake/Output Summary (Last 24 hours) at 2021 1536  Last data filed at 2021 0112  Gross per 24 hour   Intake --   Output 500 ml   Net -500 ml       Physical Exam:     Physical Exam  Vitals signs and nursing note reviewed  Constitutional:       General: She is not in acute distress  Appearance: She is well-developed  She is not diaphoretic  HENT:      Head: Normocephalic and atraumatic  Nose: Nose normal    Eyes:      General: No scleral icterus  Right eye: No discharge  Left eye: No discharge  Extraocular Movements: Extraocular movements intact  Conjunctiva/sclera: Conjunctivae normal       Pupils: Pupils are equal, round, and reactive to light  Neck:      Musculoskeletal: Normal range of motion  Cardiovascular:      Rate and Rhythm: Normal rate and regular rhythm  Heart sounds: Normal heart sounds  No murmur  No friction rub  No gallop  Pulmonary:      Effort: Pulmonary effort is normal  No respiratory distress  Breath sounds: No stridor  No wheezing, rhonchi or rales  Chest:      Chest wall: No tenderness  Abdominal:      General: Abdomen is flat  Bowel sounds are normal  There is distension (Improved)  Palpations: Abdomen is soft  Tenderness: There is no abdominal tenderness  Genitourinary:     Rectum: Normal  No mass or tenderness  Normal anal tone  Musculoskeletal: Normal range of motion  Right lower leg: No edema  Left lower leg: No edema  Skin:     General: Skin is warm and dry  Neurological:      General: No focal deficit present  Mental Status: She is alert and oriented to person, place, and time  Motor: Weakness present  Psychiatric:         Behavior: Behavior normal          Thought Content: Thought content normal          Judgment: Judgment normal           Additional Data:     Labs: I have personally reviewed pertinent reports    Results from last 7 days   Lab Units 05/18/21  0658   WBC Thousand/uL 6 75   HEMOGLOBIN g/dL 9 9*   HEMATOCRIT % 31 9*   PLATELETS Thousands/uL 272      Results from last 7 days   Lab Units 05/18/21  0658   POTASSIUM mmol/L 3 6   CHLORIDE mmol/L 111*   CO2 mmol/L 23   BUN mg/dL 7   CREATININE mg/dL 0 54*   CALCIUM mg/dL 7 8*   ALK PHOS U/L 123*   ALT U/L 107*   AST U/L 30                            * Additional Pertinent Lab Tests Reviewed: All Labs Within Last 24 Hours Reviewed    Radiology Results: I have personally reviewed pertinent reports  Xr Chest Portable    Result Date: 5/16/2021  Impression: Stable chest   No acute infiltrates  Workstation performed: [id number]     Xr Chest Portable    Result Date: 5/16/2021  Impression: NG tube in stomach  Mild central vascular prominence without overt pulmonary edema  Workstation performed: [id number]     Xr Abdomen Obstruction Series    Result Date: 5/14/2021  Impression: Improved constipation Nonobstructive bowel gas pattern  Workstation performed: [id number]     Ct Head Without Contrast    Result Date: 5/10/2021  Impression: No acute intracranial abnormality or significant change from prior study  Changes within the brain parenchyma again suggesting sequela of prior whole brain radiation including previously treated right periatrial dystrophic calcification  No new lesions detected within limitations of noncontrast imaging  Intracranial metastatic disease again better evaluated with contrast-enhanced MRI  Workstation performed: [id number]     Pe Study With Ct Abdomen And Pelvis With Contrast    Result Date: 5/10/2021  Impression: 1  Limited CT pulmonary angiogram, however, suggestion of a small subsegmental left upper lobe pulmonary embolus  Dilated RV/LV ratio which is nonspecific and could be indicative of elevated right heart pressures in the setting of COPD/pulmonary hypertension  No bowing of the intraventricular septum  2   Small bilateral pulmonary nodules, grossly similar  1 cm pleural-based left apical nodule appears larger  Cannot exclude evolving metastasis  3   No acute intra-abdominal abnormality   4   Large volume of colonic stool suggesting constipation  5   Marked urinary bladder distention  I personally discussed this study with physician assistant KAILEE Ching on 5/10/2021 at 3:50 PM  Workstation performed: [id number]       Recent Cultures (last 7 days):           Last 24 Hours Medication List:   Current Facility-Administered Medications   Medication Dose Route Frequency Provider Last Rate    acetaminophen  650 mg Oral Q6H PRN Al Carmichael, DO      albuterol  2 puff Inhalation Q6H PRN Al Carmichael, DO      albuterol  2 5 mg Nebulization Q4H PRN Al Carmichael, DO      aluminum-magnesium hydroxide-simethicone  30 mL Oral Q6H PRN Al Carmichael, DO      apixaban  5 mg Oral BID Al Carmichael, DO      bisacodyl  10 mg Rectal Daily PRN FAUZIA Juarez      dexamethasone  2 mg Oral Daily With Breakfast Al Carmichael, DO      gabapentin  400 mg Oral TID AC Juliocesar Simms,       HYDROmorphone  1 mg Intravenous Q6H PRN Al Carmichael, DO      HYDROmorphone  4 mg Oral Q4H PRN Al Carmichael, DO      HYDROmorphone  8 mg Oral Q4H PRN Al Carmichael, DO      levETIRAcetam  250 mg Oral Q12H [facility] Juliocesar Charissa, DO      lidocaine  1 patch Topical Daily FAUZIA Juarez      lubiprostone  24 mcg Oral BID With Meals So Quesada MD      nicotine  1 patch Transdermal Daily Al Carmichael,       OLANZapine  10 mg Oral HS Juliocesar Simms DO      pantoprazole  40 mg Oral Daily Al Carmichael,       polyethylene glycol  17 g Oral BID Al Carmichael,       prochlorperazine  5 mg Oral TID AC Juliocesar Simms DO      senna-docusate sodium  2 tablet Oral BID Al Carmichael, DO      zolpidem  10 mg Oral HS PRN Al Carmichael DO          Today, Patient Was Seen By: Al Carmichael DO    Portions of the record may have been created with voice recognition software  Occasional wrong word or "sound a like" substitutions may have occurred due to the inherent limitations of voice recognition software    Read the chart carefully and recognize, using context, where substitutions have occurred

## 2021-05-24 NOTE — PHYSICAL THERAPY NOTE
PHYSICAL THERAPY TREATMENT NOTE  NAME:  Susie Velasquez  DATE: 05/24/21    Length Of Stay: 14  Performed at least 2 patient identifiers during session: Name and Birthday    TREATMENT:    05/24/21 1256   PT Last Visit   PT Visit Date 05/24/21   Note Type   Note Type Treatment   Pain Assessment   Pain Assessment Tool Pain Assessment not indicated - pt denies pain   Restrictions/Precautions   Weight Bearing Precautions Per Order No   Other Precautions Bed Alarm; Chair Alarm;Cognitive; Fall Risk;Limb alert   General   Chart Reviewed Yes   Response to Previous Treatment Patient with no complaints from previous session  Family/Caregiver Present No   Cognition   Overall Cognitive Status Impaired   Arousal/Participation Alert   Attention Attends with cues to redirect   Memory Decreased short term memory;Decreased recall of recent events;Decreased recall of precautions   Following Commands Follows one step commands with increased time or repetition   Subjective   Subjective Pt reports that she has "just been sitting here" and thinks that she is good enough possibly to go home   Bed Mobility   Supine to Sit 3  Moderate assistance   Additional items Assist x 1;Bedrails;HOB elevated; Increased time required;Verbal cues  (L egress)   Transfers   Sit to Stand 4  Minimal assistance   Additional items Assist x 1; Increased time required;Verbal cues  (instruction for hand placement, technique)   Stand to Sit 4  Minimal assistance   Additional items Assist x 1; Increased time required;Verbal cues;Armrests  (instruction for hand placement, technique)   Ambulation/Elevation   Gait pattern Excessively slow; Shuffling; Ataxia   Gait Assistance 4  Minimal assist   Additional items Assist x 1;Verbal cues  (and WC follow)   Assistive Device Rolling walker   Distance 5'+30'   Stair Management Assistance Not tested   Wheelchair Activities   Wheelchair Cushion None   Wheelchair Parts Management Yes   Left Leg Rest Level of Assistance Minimum assistance;Close supervision; Moderate verbal cues   Right Leg Rest Level of Assistance Moderate verbal cues; Close supervision   Left Brakes Level of Assistance Dependent   Right Brakes Level of Assistance Dependent   Propulsion Yes   Propulsion Type 1 Manual   Level 1 Level tile   Method 1 Left upper extremity;Right upper extremity  (unable to use BLEs due to elevated height of trial WC)   Level of Assistance 1 Minimum assistance;Minimal verbal cues   Description/ Details 1 propelled turns and straightaways for 61' w/ self regulated rests due to fatigue   Balance   Static Sitting Fair -   Static Standing Poor +   Ambulatory Poor +   Endurance Deficit   Endurance Deficit Yes   Endurance Deficit Description limited standing tolerance, self reported fatigue post mobility   Activity Tolerance   Activity Tolerance Patient limited by fatigue   Medical Staff Made Aware spoke to [facility] from case management   Nurse Made Aware spoke to RN   Assessment   Prognosis Fair   Problem List Decreased strength;Decreased endurance;Decreased mobility; Decreased coordination;Obesity; Decreased cognition; Impaired judgement;Decreased safety awareness; Impaired balance  (gait deviations)   Assessment Upon initial eval, pt needed S for bed mobility, mod A of 1-2 for transfers, and was non amb  Pt has now progressed to needing less A for transfers, is now ambulatory with a walker w/ A Of 1, can use WC w/ assist of 1, but does need more A for bed mobility  Skilled PT recommended to progress pt toward treatment goals in more consistent manner  Goals are updated below  Goals   Patient Goals to get back home and not have to go to rehab   STG Expiration Date 06/03/21   Short Term Goal #1 Patient will: Perform all bed mobility tasks modified independent to improve independence and reduce care giver burden, Perform all transfers w/supervision without verbal instruction   , Ambulate at least 50 ft  with roller walker w/S w/o uncorrected LOB to amb household distances  Propel WC for 50' w/S as alterative to ambulation  S with legrests and brakes on WC  Tolerate standing 5 minutes w/ supervision to facilitate upright activities  Perform 5 steps w/railing to facilite return to home w/EKATERINA  PT Treatment Day 1   Plan   Treatment/Interventions Functional transfer training;LE strengthening/ROM; Therapeutic exercise;Cognitive reorientation;Patient/family training;Equipment eval/education; Bed mobility;Gait training; Endurance training;Spoke to nursing;Spoke to case management  (WC mobility and management)   Progress Slow progress, decreased activity tolerance   PT Frequency   (3-5x/wk)   Recommendation   PT Discharge Recommendation Post acute rehabilitation services  (for consistentcy of mobility)   Equipment Recommended Walker; Wheelchair   AM-PAC Basic Mobility Inpatient   Turning in Bed Without Bedrails 3   Lying on Back to Sitting on Edge of Flat Bed 3   Moving Bed to Chair 3   Standing Up From Chair 3   Walk in Room 2   Climb 3-5 Stairs 1   Basic Mobility Inpatient Raw Score 15   Basic Mobility Standardized Score 36 97     Obi Bailey, PT, DPT

## 2021-05-24 NOTE — CASE MANAGEMENT
Cm received message from [employer] the the insurance is requesting additional notes and PT/OT for auth  Cm requested PTA (Laisha) see pt as she is on the schedule  She states pt's goals have  and she is reaching out to the team to have someone see her

## 2021-05-24 NOTE — OCCUPATIONAL THERAPY NOTE
Occupational Therapy Evaluation     Patient Name: Tarun Holguin  LJAXV'E Date: 5/24/2021  Problem List  Principal Problem:    Pulmonary embolism ([facility] )  Active Problems:    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, estrogen receptor positive ([facility] )    Brain metastases (HCC)    Cancer-related pain    CINV (chemotherapy-induced nausea and vomiting)    Therapeutic opioid-induced constipation (OIC)    Weakness generalized    Tobacco abuse    Anemia of chronic disease    Transaminitis    Impaction of intestine ([facility] )    Past Medical History  Past Medical History:   Diagnosis Date    Dehydration 6/11/2019    Dizziness 2/6/2018    Dry skin 2/6/2018    Edema 10/23/2019    H/O bilateral mastectomy 2/15/2016    Hyperglycemia 2/6/2018    Hypertension 2/15/2016    Hypokalemia 3/10/2020    Lymphedema 2/15/2016    Malignant cachexia ([facility] ) 10/6/2016    Malignant neoplasm of right breast ([facility] ) 2/15/2016    Metastatic breast cancer ([facility] )      Past Surgical History  Past Surgical History:   Procedure Laterality Date    ENDOBRONCHIAL ULTRASOUND (EBUS) N/A 2/16/2016    Procedure: EBUS;  Surgeon: Jc Singh MD;  [facility];  Service:     MASTECTOMY Bilateral     MASTECTOMY Bilateral     right arm edema    OOPHORECTOMY      NJ BRONCHOSCOPY NEEDLE BX TRACHEA MAIN STEM&/BRON N/A 2/16/2016    Procedure: Sancho Ariza;  Surgeon: Jc Singh MD;  [facility];  Service: Thoracic    NJ INSJ TUNNELED CTR VAD W/SUBQ PORT AGE 5 YR/> N/A 7/24/2018    Procedure: INSERTION VENOUS PORT ( PORT-A-CATH) IR;  Surgeon: Josefina Chopra DO;  [facility];  Service: Interventional Radiology    NJ STEREOTACTIC RADIOSURGERY, CRANIAL,SIMPLE,EA ADD  5/3/2017         NJ STEREOTACTIC RADIOSURGERY, CRANIAL,SIMPLE,EA ADD  5/3/2017         NJ STEREOTACTIC RADIOSURGERY, CRANIAL,SIMPLE,SINGLE  5/3/2017              05/24/21 1509   OT Last Visit   OT Visit Date 05/24/21  (Monday)   Note Type Note type Re-Evaluation   Restrictions/Precautions   Weight Bearing Precautions Per Order No   Other Precautions Chair Alarm; Bed Alarm; Fall Risk;Cognitive;Limb alert   Pain Assessment   Pain Assessment Tool 0-10   Pain Score No Pain   Home Living   Type of Home Mobile home   Home Layout One level   Bathroom Shower/Tub Tub/shower unit   Bathroom Toilet Standard   Bathroom Equipment Shower chair   Bathroom Accessibility Accessible via wheelchair   Home Equipment Walker;Life alert;Grab bars   Additional Comments Pt previously on OT caseload  Seen for OT re-eval to assess progress and assist in DC planning as goals   Pt reports living w/ spouse and sister in mobile home PTA   Prior Function   Level of [place] Needs assistance with IADLs   Lives With Spouse; Family; Other (Comment)  (spouse, Sj Lee and sister)   Receives Help From Family   ADL Assistance Needs assistance   IADLs Needs assistance   Falls in the last 6 months 0   Vocational Retired   Comments Pt reports using RW for functional mobility   Lifestyle   Autonomy Pt reports needing assistance w/ bathing and using RW for functional mobility  Pt reports ambualting ~ 15-20' at home   Reciprocal Relationships Pt reports living w/  sister in mobile home w/ 5 EKATERINA   Service to Others Pt reports that she worked in [address] [employer] watching tv and spending time w/ her dtr   Subjective   Subjective "I will do anything to get out of here"   ADL   Where Assessed Edge of bed   Eating Assistance 6  Modified independent   Eating Deficit Setup   Grooming Assistance 5  Supervision/Setup  (seated at EOB)   Grooming Deficit Setup;Supervision/safety;Standing with assistive device   UB Bathing Assistance Unable to assess   LB Bathing Assistance Unable to assess   UB Dressing Assistance 4  Minimal Assistance   UB Dressing Deficit Pull around back; Setup;Supervision/safety; Increased time to complete   LB Dressing Assistance 4  Minimal Assistance   LB Dressing Deficit Setup;Steadying; Requires assistive device for steadying;Verbal cueing; Increased time to complete;Supervision/safety; Thread RLE into pants; Thread LLE into pants;Pull up over hips;Don/doff R sock; Don/doff L sock   Toileting Assistance  Unable to assess   Toileting Deficit   (pt declined need to use bathroom)   Bed Mobility   Supine to Sit 4  Minimal assistance   Additional items Assist x 1;HOB elevated; Bedrails; Increased time required;Verbal cues   Sit to Supine 4  Minimal assistance   Additional items Assist x 1; Increased time required;LE management   Additional Comments Pt required A x2 using pad to assist w/ repositioning  Pt supine HOB elevated post re-eval w/ needs met, call bell in reach and bed alarm activated  Pt declined OOB to chair reporting she was out in the chair few hours today   Transfers   Sit to Stand 4  Minimal assistance   Additional items Assist x 1; Increased time required;Verbal cues; Bedrails;Armrests  (instruction for hand placement)   Stand to Sit 4  Minimal assistance   Additional items Assist x 1; Increased time required;Verbal cues; Bedrails;Armrests  (instruction for hand placement)   Additional Comments Pt performed sit <> stand 2 X during session from EOB w/ min A    Pt able to side step towards HOB using RW w/ min A   Functional Mobility   Functional Mobility 4  Minimal assistance   Additional Comments short distances w/ in room   Additional items Rolling walker   Balance   Static Sitting Fair -   Static Standing Poor +   Ambulatory Poor +   Activity Tolerance   Activity Tolerance Patient limited by fatigue  (VRI 4 following LBD)   Medical Staff Made Aware spoke to Kishore ANGELA and PT, [facility]   Nurse Made Aware per RNAureliano appropriate to see pt   RUE Assessment   RUE Assessment WFL   RUE Strength   RUE Overall Strength Within Functional Limits - able to perform ADL tasks with strength   LUE Assessment   LUE Assessment WFL   LUE Strength   LUE Overall Strength Within Functional Limits - able to perform ADL tasks with strength   Hand Function   Gross Motor Coordination Functional   Cognition   Overall Cognitive Status Impaired   Arousal/Participation Alert; Cooperative   Attention Attends with cues to redirect   Orientation Level Oriented to person;Oriented to place;Oriented to situation   Memory Decreased short term memory   Following Commands Follows multistep commands with increased time or repetition   Comments Identified pt by full name and birthdate  Pt able to follow one step directions during ADL tasks  Assessment   Limitation Decreased ADL status; Decreased cognition;Decreased endurance;Decreased self-care trans;Decreased high-level ADLs; Decreased UE strength   Assessment Pt is a 65 yo female [facility]  Pt previously on OT caseload and seen for OT re-eval to assess progress and assist in DC planning as goals  2021  Pt diagnosed w/ PE  Pt lives w/ spouse and sister at baseline in mobile home  Pt needs assist w/ bathing and IADL tasks using RW for functional mobility  Upon re-eval pt alert and generally oriented  Limited short term recall but able to follow directions and communicate wants / needs  Pt required min A to complete bed mobility  Pt required min A to complete sit <> stand  Pt required min A to complete LBD and UBD  VRI 4 following LBD indicating decreased activity tolerance and endurance  Pt presents w/ decreased activity tolerance, decreased endurance, decreased standing tolerance, limited insight into deficits, generalized weakness / deconditioning impacting her I w/ dressing, bathing, oral hygiene, functional mobility, functional transfers, activity engagement, clothing mgmt  Pt completing ADL below baseline level of I and would benefit from continued OT while in acute care to address deficits  The patient's raw score on the AM-PAC Daily Activity inpatient short form is 18, standardized score is 38 66, less than 39 4  Patients at this level are likely to benefit from discharge to post-acute rehabilitation services  Please refer to the recommendation of the Occupational Therapist for safe discharge planning  Recommend post acute rehab when medically stable for discharge from acute care to return to baseline level of I  Will continue to follow   Goals   Patient Goals Pt stated that she would like to go home and not have to go to rehab   Plan   Treatment Interventions ADL retraining;Functional transfer training; Endurance training;UE strengthening/ROM; Patient/family training;Equipment evaluation/education; Compensatory technique education;Continued evaluation; Activityengagement; Energy conservation   Goal Expiration Date 05/31/21   OT Frequency 3-5x/wk   Recommendation   OT Discharge Recommendation Post acute rehabilitation services   Equipment Recommended Shower/Tub chair with back ($);Bedside commode   Commode Type Standard   AM-PAC Daily Activity Inpatient   Lower Body Dressing 2   Bathing 2   Toileting 3   Upper Body Dressing 3   Grooming 4   Eating 4   Daily Activity Raw Score 18   Daily Activity Standardized Score (Calc for Raw Score >=11) 38 66   AM-PAC Applied Cognition Inpatient   Following a Speech/Presentation 3   Understanding Ordinary Conversation 4   Taking Medications 3   Remembering Where Things Are Placed or Put Away 3   Remembering List of 4-5 Errands 3   Taking Care of Complicated Tasks 3   Applied Cognition Raw Score 19   Applied Cognition Standardized Score 39 77   Barthel Index   Feeding 10   Bathing 0   Grooming Score 5   Dressing Score 5   Bladder Score 10   Bowels Score 10   Toilet Use Score 5   Transfers (Bed/Chair) Score 10   Mobility (Level Surface) Score 0   Stairs Score 0   Barthel Index Score 55     Pt goals to be met by 5/31/2021:  -Pt will demonstrate good attention and understanding EC tech to max I w/ ADL performance    -Pt will complete LBD/ UBD w/ S after set- up w/ no more than 1 rest break to max I and improve activity engagement to return to PLOF    -Pt will complete functional transfers to bed, chair, and toilet using AD, DME w/ S to max I w/ ADL performance    -Pt will consistently engage in functional mobility using AD to / from bathroom w/ S to max I w/ ADL performance    -Pt will demonstrate improved functional standing tolerance for at least 10 minutes using AD as needed w/ at least fair balance while engaged in ADL tasks standing at sink to max I w/ functional mobility to return to PLOF    -Pt will demonstrate good attention and participation in ongoing evaluation of functional cognitive skills to assist in [facility], OTR/L

## 2021-05-24 NOTE — PLAN OF CARE
Problem: Potential for Falls  Goal: Patient will remain free of falls  Description: INTERVENTIONS:  - Assess patient frequently for physical needs  -  Identify cognitive and physical deficits and behaviors that affect risk of falls  -  [place] fall precautions as indicated by assessment   - Educate patient/family on patient safety including physical limitations  - Instruct patient to call for assistance with activity based on assessment  - Modify environment to reduce risk of injury  - Consider OT/PT consult to assist with strengthening/mobility  Outcome: Progressing     Problem: Prexisting or High Potential for Compromised Skin Integrity  Goal: Skin integrity is maintained or improved  Description: INTERVENTIONS:  - Identify patients at risk for skin breakdown  - Assess and monitor skin integrity  - Assess and monitor nutrition and hydration status  - Monitor labs   - Assess for incontinence   - Turn and reposition patient  - Assist with mobility/ambulation  - Relieve pressure over bony prominences  - Avoid friction and shearing  - Provide appropriate hygiene as needed including keeping skin clean and dry  - Evaluate need for skin moisturizer/barrier cream  - Collaborate with interdisciplinary team   - Patient/family teaching  - Consider wound care consult   Outcome: Progressing     Problem: SAFETY ADULT  Goal: Patient will remain free of falls  Description: INTERVENTIONS:  - Assess patient frequently for physical needs  -  Identify cognitive and physical deficits and behaviors that affect risk of falls    -  [place] fall precautions as indicated by assessment   - Educate patient/family on patient safety including physical limitations  - Instruct patient to call for assistance with activity based on assessment  - Modify environment to reduce risk of injury  - Consider OT/PT consult to assist with strengthening/mobility  Outcome: Progressing

## 2021-05-24 NOTE — PLAN OF CARE
Problem: Potential for Falls  Goal: Patient will remain free of falls  Description: INTERVENTIONS:  - Assess patient frequently for physical needs  -  Identify cognitive and physical deficits and behaviors that affect risk of falls    -  [place] fall precautions as indicated by assessment   - Educate patient/family on patient safety including physical limitations  - Instruct patient to call for assistance with activity based on assessment  - Modify environment to reduce risk of injury  - Consider OT/PT consult to assist with strengthening/mobility  Outcome: Progressing     Problem: Prexisting or High Potential for Compromised Skin Integrity  Goal: Skin integrity is maintained or improved  Description: INTERVENTIONS:  - Identify patients at risk for skin breakdown  - Assess and monitor skin integrity  - Assess and monitor nutrition and hydration status  - Monitor labs   - Assess for incontinence   - Turn and reposition patient  - Assist with mobility/ambulation  - Relieve pressure over bony prominences  - Avoid friction and shearing  - Provide appropriate hygiene as needed including keeping skin clean and dry  - Evaluate need for skin moisturizer/barrier cream  - Collaborate with interdisciplinary team   - Patient/family teaching  - Consider wound care consult   Outcome: Progressing     Problem: PAIN - ADULT  Goal: Verbalizes/displays adequate comfort level or baseline comfort level  Description: Interventions:  - Encourage patient to monitor pain and request assistance  - Assess pain using appropriate pain scale  - Administer analgesics based on type and severity of pain and evaluate response  - Implement non-pharmacological measures as appropriate and evaluate response  - Consider cultural and social influences on pain and pain management  - Notify physician/advanced practitioner if interventions unsuccessful or patient reports new pain  Outcome: Progressing     Problem: INFECTION - ADULT  Goal: Absence or prevention of progression during hospitalization  Description: INTERVENTIONS:  - Assess and monitor for signs and symptoms of infection  - Monitor lab/diagnostic results  - Monitor all insertion sites, i e  indwelling lines, tubes, and drains  - Monitor endotracheal if appropriate and nasal secretions for changes in amount and color  - [place] appropriate cooling/warming therapies per order  - Administer medications as ordered  - Instruct and encourage patient and family to use good hand hygiene technique  - Identify and instruct in appropriate isolation precautions for identified infection/condition  Outcome: Progressing     Problem: SAFETY ADULT  Goal: Patient will remain free of falls  Description: INTERVENTIONS:  - Assess patient frequently for physical needs  -  Identify cognitive and physical deficits and behaviors that affect risk of falls    -  [place] fall precautions as indicated by assessment   - Educate patient/family on patient safety including physical limitations  - Instruct patient to call for assistance with activity based on assessment  - Modify environment to reduce risk of injury  - Consider OT/PT consult to assist with strengthening/mobility  Outcome: Progressing  Goal: Maintain or return to baseline ADL function  Description: INTERVENTIONS:  -  Assess patient's ability to carry out ADLs; assess patient's baseline for ADL function and identify physical deficits which impact ability to perform ADLs (bathing, care of mouth/teeth, toileting, grooming, dressing, etc )  - Assess/evaluate cause of self-care deficits   - Assess range of motion  - Assess patient's mobility; develop plan if impaired  - Assess patient's need for assistive devices and provide as appropriate  - Encourage maximum independence but intervene and supervise when necessary  - Involve family in performance of ADLs  - Assess for home care needs following discharge   - Consider OT consult to assist with ADL evaluation and planning for discharge  - Provide patient education as appropriate  Outcome: Progressing  Goal: Maintain or return mobility status to optimal level  Description: INTERVENTIONS:  - Assess patient's baseline mobility status (ambulation, transfers, stairs, etc )    - Identify cognitive and physical deficits and behaviors that affect mobility  - Identify mobility aids required to assist with transfers and/or ambulation (gait belt, sit-to-stand, lift, walker, cane, etc )  - [place] fall precautions as indicated by assessment  - Record patient progress and toleration of activity level on Mobility SBAR; progress patient to next Phase/Stage  - Instruct patient to call for assistance with activity based on assessment  - Consider rehabilitation consult to assist with strengthening/weightbearing, etc   Outcome: Progressing     Problem: DISCHARGE PLANNING  Goal: Discharge to home or other facility with appropriate resources  Description: INTERVENTIONS:  - Identify barriers to discharge w/patient and caregiver  - Arrange for needed discharge resources and transportation as appropriate  - Identify discharge learning needs (meds, wound care, etc )  - Arrange for interpretive services to assist at discharge as needed  - Refer to Case Management Department for coordinating discharge planning if the patient needs post-hospital services based on physician/advanced practitioner order or complex needs related to functional status, cognitive ability, or social support system  Outcome: Progressing     Problem: Knowledge Deficit  Goal: Patient/family/caregiver demonstrates understanding of disease process, treatment plan, medications, and discharge instructions  Description: Complete learning assessment and assess knowledge base    Interventions:  - Provide teaching at level of understanding  - Provide teaching via preferred learning methods  Outcome: Progressing     Problem: RESPIRATORY - ADULT  Goal: Achieves optimal ventilation and oxygenation  Description: INTERVENTIONS:  - Assess for changes in respiratory status  - Assess for changes in mentation and behavior  - Position to facilitate oxygenation and minimize respiratory effort  - Oxygen administered by appropriate delivery if ordered  - Initiate smoking cessation education as indicated  - Encourage broncho-pulmonary hygiene including cough, deep breathe, Incentive Spirometry  - Assess the need for suctioning and aspirate as needed  - Assess and instruct to report SOB or any respiratory difficulty  - Respiratory Therapy support as indicated  Outcome: Progressing     Problem: GASTROINTESTINAL - ADULT  Goal: Minimal or absence of nausea and/or vomiting  Description: INTERVENTIONS:  - Administer IV fluids if ordered to ensure adequate hydration  - Maintain NPO status until nausea and vomiting are resolved  - Nasogastric tube if ordered  - Administer ordered antiemetic medications as needed  - Provide nonpharmacologic comfort measures as appropriate  - Advance diet as tolerated, if ordered  - Consider nutrition services referral to assist patient with adequate nutrition and appropriate food choices  Outcome: Progressing  Goal: Maintains or returns to baseline bowel function  Description: INTERVENTIONS:  - Assess bowel function  - Encourage oral fluids to ensure adequate hydration  - Administer IV fluids if ordered to ensure adequate hydration  - Administer ordered medications as needed  - Encourage mobilization and activity  - Consider nutritional services referral to assist patient with adequate nutrition and appropriate food choices  Outcome: Progressing     Problem: Nutrition/Hydration-ADULT  Goal: Nutrient/Hydration intake appropriate for improving, restoring or maintaining nutritional needs  Description: Monitor and assess patient's nutrition/hydration status for malnutrition  Collaborate with interdisciplinary team and initiate plan and interventions as ordered    Monitor patient's weight and dietary intake as ordered or per policy  Utilize nutrition screening tool and intervene as necessary  Determine patient's food preferences and provide high-protein, high-caloric foods as appropriate       INTERVENTIONS:  - Monitor oral intake, urinary output, labs, and treatment plans  - Assess nutrition and hydration status and recommend course of action  - Evaluate amount of meals eaten  - Assist patient with eating if necessary   - Allow adequate time for meals  - Recommend/ encourage appropriate diets, oral nutritional supplements, and vitamin/mineral supplements  - Order, calculate, and assess calorie counts as needed  - Recommend, monitor, and adjust tube feedings and TPN/PPN based on assessed needs  - Assess need for intravenous fluids  - Provide specific nutrition/hydration education as appropriate  - Include patient/family/caregiver in decisions related to nutrition  Outcome: Progressing

## 2021-05-25 VITALS
OXYGEN SATURATION: 99 % | SYSTOLIC BLOOD PRESSURE: 127 MMHG | HEIGHT: 65 IN | WEIGHT: 171.74 LBS | TEMPERATURE: 98.1 F | RESPIRATION RATE: 16 BRPM | BODY MASS INDEX: 28.61 KG/M2 | HEART RATE: 81 BPM | DIASTOLIC BLOOD PRESSURE: 70 MMHG

## 2021-05-25 PROCEDURE — 99239 HOSP IP/OBS DSCHRG MGMT >30: CPT | Performed by: INTERNAL MEDICINE

## 2021-05-25 RX ORDER — HYDROMORPHONE HYDROCHLORIDE 4 MG/1
4-8 TABLET ORAL EVERY 4 HOURS PRN
Qty: 18 TABLET | Refills: 0 | Status: SHIPPED | OUTPATIENT
Start: 2021-05-25 | End: 2021-06-03 | Stop reason: SDUPTHER

## 2021-05-25 RX ORDER — POLYETHYLENE GLYCOL 3350 17 G/17G
17 POWDER, FOR SOLUTION ORAL DAILY
Refills: 0
Start: 2021-05-25

## 2021-05-25 RX ORDER — ZOLPIDEM TARTRATE 10 MG/1
10 TABLET ORAL
Qty: 3 TABLET | Refills: 0 | Status: SHIPPED | OUTPATIENT
Start: 2021-05-25 | End: 2021-06-23 | Stop reason: SDUPTHER

## 2021-05-25 RX ADMIN — GABAPENTIN 400 MG: 400 CAPSULE ORAL at 12:29

## 2021-05-25 RX ADMIN — LEVETIRACETAM 250 MG: 500 TABLET, FILM COATED ORAL at 08:48

## 2021-05-25 RX ADMIN — SENNOSIDES AND DOCUSATE SODIUM 2 TABLET: 8.6; 5 TABLET ORAL at 08:52

## 2021-05-25 RX ADMIN — PANTOPRAZOLE SODIUM 40 MG: 40 TABLET, DELAYED RELEASE ORAL at 06:07

## 2021-05-25 RX ADMIN — PROCHLORPERAZINE MALEATE 5 MG: 10 TABLET ORAL at 11:51

## 2021-05-25 RX ADMIN — PROCHLORPERAZINE MALEATE 5 MG: 10 TABLET ORAL at 06:07

## 2021-05-25 RX ADMIN — NICOTINE 1 PATCH: 14 PATCH, EXTENDED RELEASE TRANSDERMAL at 08:53

## 2021-05-25 RX ADMIN — PROCHLORPERAZINE MALEATE 5 MG: 10 TABLET ORAL at 15:20

## 2021-05-25 RX ADMIN — APIXABAN 5 MG: 5 TABLET, FILM COATED ORAL at 08:50

## 2021-05-25 RX ADMIN — DEXAMETHASONE 2 MG: 2 TABLET ORAL at 08:50

## 2021-05-25 RX ADMIN — POLYETHYLENE GLYCOL 3350 17 G: 17 POWDER, FOR SOLUTION ORAL at 08:53

## 2021-05-25 RX ADMIN — LIDOCAINE 5% 1 PATCH: 700 PATCH TOPICAL at 08:41

## 2021-05-25 RX ADMIN — GABAPENTIN 400 MG: 400 CAPSULE ORAL at 06:07

## 2021-05-25 RX ADMIN — GABAPENTIN 400 MG: 400 CAPSULE ORAL at 15:21

## 2021-05-25 RX ADMIN — LUBIPROSTONE 24 MCG: 24 CAPSULE, GELATIN COATED ORAL at 08:52

## 2021-05-25 NOTE — QUICK NOTE
Notified by nursing patient was found sitting on the floor, I went to evaluate mrs gay, she was aox3, no acute distress was noted, mentioned that she was trying to reach her walker to go to the bathroom and she slipped, patient mentioned that she did not hit her head and that no part of her body was hurting, on inspection there is no bruises or hematomas, NC/AT,chest/ abdomen/hip tenderness Normal ROM all 4 extremities no active pain w/ movement  Ill place Mrs [place] on fall precautions and continue monitor overnight

## 2021-05-25 NOTE — CASE MANAGEMENT
Héctor (#: [id number]) has been received from [address] [place] [address] Yenifer Tompkins  for pt to go to rehab at [facility]  Transportation is needed and they are requesting  1500  CM made referral to [facility] via [facility] requesting a 1500 WCV  time  Pt is aware of cost associated with WCV transport  IMM reviewed with patient  patient agree with discharge determination

## 2021-05-25 NOTE — ASSESSMENT & PLAN NOTE
- Patient presents with a 2-3 day history of gradual onset generalized weakness and slurred speech  - Most likely secondary to deconditioning in the setting of patient's cancer history versus infection but this is highly unlikely given the patients clinical findings  - patient's weakness is improving  -PT/OT is recommending post acute rehab services    Plan  - patient be discharged to rehab

## 2021-05-25 NOTE — CASE MANAGEMENT
Transportation has been arranged with Lorina Nyhan for  to go to [facility] for rehab  Pt, [facility], nursing Chantal) and [facility] resident [facility]) aware of DC arrangements

## 2021-05-26 ENCOUNTER — TELEPHONE (OUTPATIENT)
Dept: HEMATOLOGY ONCOLOGY | Facility: CLINIC | Age: 61
End: 2021-05-26

## 2021-05-26 NOTE — TELEPHONE ENCOUNTER
The patient was a No Show for her Wed, 05/26/2021, Appt w/ Dr Luke Oro  I called her and she is currently in-patient at [facility]  I re-scheduled her for Wed, 07/28/2021, @ 11:40 AM at [facility] and advised her of this

## 2021-05-26 NOTE — UTILIZATION REVIEW
Notification of Discharge   This is a Notification of Discharge from our facility [address]  Please be advised that this patient has been discharge from our facility  Below you will find the admission and discharge date and time including the patients disposition  UTILIZATION REVIEW CONTACT:  José Miguel Mosher  Utilization   Network Utilization Review Department  Phone: [phone] x carefully listen to the prompts  All voicemails are confidential   Email: [email or web address]     PHYSICIAN ADVISORY SERVICES:  FOR [id number] REVIEW - MEDICAL NECESSITY DENIAL  Phone: [phone]  Fax: [phone]  Email: [email or web address]     PRESENTATION DATE: 5/10/2021  1:16 PM  OBERVATION ADMISSION DATE:   INPATIENT ADMISSION DATE: 5/10/21 1638   DISCHARGE DATE: 5/25/2021  4:56 PM  DISPOSITION: Non [facility] Non [facility]      IMPORTANT INFORMATION:  Send all requests for admission clinical reviews, approved or denied determinations and any other requests to dedicated fax number below belonging to the campus where the patient is receiving treatment   List of dedicated fax numbers:  [address] [place] [address] DENIALS (Administrative/Medical Necessity) [phone]   [address]  (Maternity/NICU/Pediatrics) [phone]   Sinan Jaramillo [phone]   Ashvin Weiss [phone]   [place] [phone]   [facility]ie [facility] [address] [facility] [phone]   [facility]  [phone]   [address] [facility]  [address] [facility] [address] [facility] [phone]

## 2021-05-28 ENCOUNTER — TELEPHONE (OUTPATIENT)
Dept: GASTROENTEROLOGY | Facility: AMBULARY SURGERY CENTER | Age: 61
End: 2021-05-28

## 2021-05-28 ENCOUNTER — TELEPHONE (OUTPATIENT)
Dept: HEMATOLOGY ONCOLOGY | Facility: CLINIC | Age: 61
End: 2021-05-28

## 2021-05-28 NOTE — TELEPHONE ENCOUNTER
SAMUEL for pt letting her know that I scheduled a hosp f/u appt with [facility] on 6/9/21 in [facility] @ 10:30

## 2021-05-28 NOTE — TELEPHONE ENCOUNTER
Called Aracely no answer no v/m set up    Called patient left v/m for apt on 06/21/21 11 am with PA in [place] - requested patient called back if not convenient    Mailed out apt card too

## 2021-06-03 ENCOUNTER — OFFICE VISIT (OUTPATIENT)
Dept: PALLIATIVE MEDICINE | Facility: CLINIC | Age: 61
End: 2021-06-03
Payer: COMMERCIAL

## 2021-06-03 VITALS
SYSTOLIC BLOOD PRESSURE: 100 MMHG | RESPIRATION RATE: 16 BRPM | OXYGEN SATURATION: 98 % | HEART RATE: 92 BPM | BODY MASS INDEX: 28.8 KG/M2 | WEIGHT: 172.84 LBS | DIASTOLIC BLOOD PRESSURE: 68 MMHG | HEIGHT: 65 IN | TEMPERATURE: 97.6 F

## 2021-06-03 DIAGNOSIS — Z51.5 PALLIATIVE CARE PATIENT: Chronic | ICD-10-CM

## 2021-06-03 DIAGNOSIS — G89.3 CANCER-RELATED PAIN: ICD-10-CM

## 2021-06-03 PROCEDURE — 99214 OFFICE O/P EST MOD 30 MIN: CPT | Performed by: FAMILY MEDICINE

## 2021-06-03 RX ORDER — HYDROMORPHONE HYDROCHLORIDE 4 MG/1
6 TABLET ORAL EVERY 4 HOURS PRN
Qty: 135 TABLET | Refills: 0 | Status: SHIPPED | OUTPATIENT
Start: 2021-06-03 | End: 2021-06-23 | Stop reason: SDUPTHER

## 2021-06-08 ENCOUNTER — TELEPHONE (OUTPATIENT)
Dept: SURGICAL ONCOLOGY | Facility: CLINIC | Age: 61
End: 2021-06-08

## 2021-06-08 NOTE — TELEPHONE ENCOUNTER
Angy called, asked to schedule Star, emailed Martin Marion shared they are booked for the remainder of the month but with our cost number he would be able to schedule LYFT for Angy  He was able to schedule her, called Angy to let her know LYFT will pick her up tomorrow from her home address

## 2021-06-09 ENCOUNTER — TELEPHONE (OUTPATIENT)
Dept: HEMATOLOGY ONCOLOGY | Facility: CLINIC | Age: 61
End: 2021-06-09

## 2021-06-09 ENCOUNTER — OFFICE VISIT (OUTPATIENT)
Dept: HEMATOLOGY ONCOLOGY | Facility: CLINIC | Age: 61
End: 2021-06-09
Payer: COMMERCIAL

## 2021-06-09 VITALS
TEMPERATURE: 98.6 F | OXYGEN SATURATION: 97 % | SYSTOLIC BLOOD PRESSURE: 110 MMHG | RESPIRATION RATE: 20 BRPM | HEART RATE: 90 BPM | BODY MASS INDEX: 29.69 KG/M2 | HEIGHT: 65 IN | DIASTOLIC BLOOD PRESSURE: 76 MMHG | WEIGHT: 178.2 LBS

## 2021-06-09 DIAGNOSIS — D75.839 THROMBOCYTOSIS: ICD-10-CM

## 2021-06-09 DIAGNOSIS — Z17.0 BILATERAL MALIGNANT NEOPLASM OF BREAST IN FEMALE, ESTROGEN RECEPTOR POSITIVE, UNSPECIFIED SITE OF BREAST (HCC): ICD-10-CM

## 2021-06-09 DIAGNOSIS — C79.31 BRAIN METASTASES (HCC): Primary | ICD-10-CM

## 2021-06-09 DIAGNOSIS — C50.911 BILATERAL MALIGNANT NEOPLASM OF BREAST IN FEMALE, ESTROGEN RECEPTOR POSITIVE, UNSPECIFIED SITE OF BREAST (HCC): ICD-10-CM

## 2021-06-09 DIAGNOSIS — C50.912 BILATERAL MALIGNANT NEOPLASM OF BREAST IN FEMALE, ESTROGEN RECEPTOR POSITIVE, UNSPECIFIED SITE OF BREAST (HCC): ICD-10-CM

## 2021-06-09 DIAGNOSIS — I26.99 PULMONARY EMBOLISM, UNSPECIFIED CHRONICITY, UNSPECIFIED PULMONARY EMBOLISM TYPE, UNSPECIFIED WHETHER ACUTE COR PULMONALE PRESENT (HCC): ICD-10-CM

## 2021-06-09 DIAGNOSIS — C79.51 BONE METASTASES (HCC): ICD-10-CM

## 2021-06-09 PROCEDURE — 99215 OFFICE O/P EST HI 40 MIN: CPT | Performed by: NURSE PRACTITIONER

## 2021-06-09 NOTE — TELEPHONE ENCOUNTER
Patient was seen today by [facility] and her daughter Opal Ny is requesting a call back to discuss today's visit  She could be reached at  [phone]

## 2021-06-09 NOTE — PROGRESS NOTES
[address] [facility] [place]  [address] [facility] [place] [facility] [address] [phone]  Progress Note  Rosa Balderas, 1960, [id number]  6/9/2021    Assessment/Plan:  1  Brain metastases ([facility] [address] )  2  B[facility] ([facility] )  3  Bilateral malignant neoplasm of breast in female, estrogen receptor positive, unspecified site of breast ([facility] )  4  Pulmonary embolism, unspecified chronicity, unspecified pulmonary embolism type, unspecified whether acute cor pulmonale present ([facility] [address] )  5  Thrombocytosis ([facility] )   Patient is a 79-year-old female with a history of stage IV breast cancer most recently on [facility]  The last Kadcyla infusion was given on 03/16/2021 however has been on hold secondary to persistent symptoms including nausea without vomiting peripheral neuropathies, increased weakness and abdominal pain with constipation  Patient was admitted to the hospital on May 10, 2021 secondary to generalized weakness, acute kidney injury, therapeutic opioid induced constipation, nausea and vomiting, and was found to have a pulmonary embolism  She was started on heparin drip and eventually transition to Eliquis loading dose 10 mg p o  For 7 days b i d  Then transition to Eliquis 5 mg p o  B i d  She was discharged home on 05/25/2021 and is here for follow-up after hospitalization  We reviewed her labs from 05/18/2021 which reveal a hemoglobin of 9 9, platelet count 723, normal white count and MCV is 98  CMP revealed a creatinine of 0 54 calcium 7 8 and elevated liver enzymes,  and alk-phos 123 likely secondary to known bone metastasis  An iron panel was obtained to further evaluate her anemia which revealed a saturation of 22% and a ferritin level of 108 suggesting iron deficiency is not contributing to her anemia  I suspect her anemia is of chronic disease     Patient did have a Ca 27-29 on 05/10/2021 which is gradually rising since October 2020 from 49 5-62 3 currently  I suspect her disease is progressing given treatment holiday  We discussed patient's goals of care including cessation of chemotherapy treatment and transition to hospice  I encouraged her to discuss with her family as her disease is not curable however we can focus on patient centered care rather than disease centered care  Throughout our visit patient is visibly short of breath with exertion  She does have bilateral lower extremity edema and left upper arm edema for which she is using a compression sleeve and was referred to the lymphedema clinic  While she was in the hospital she had a CTA that revealed multiple small bilateral pulmonary nodules similar to previous however there is a 1 cm pleural-based left apical nodule that appears larger cannot exclude evolving metastasis  There is also a large volume of colonic stool suggesting constipation and marked bladder distention  Gastroenterology managed patient's constipation with MiraLax and Senokot as well as Amitiza 24 mcg b i d  Decompressive colonoscopy was not recommended secondary to new PE, Eliquis and stage IV cancer  At this time we will plan to see patient at her previously scheduled appointment on 07/28/2021 with the following blood work and CT scan  Patient and her  verbalized understanding and are agreement with the plan  - CBC and differential; Future  - Comprehensive metabolic panel; Future  - Cancer antigen 27 29; Future  - CT chest abdomen pelvis w contrast; Future    Goals and Barriers:    Current Goal:   Prolong Survival from Cancer  Barriers: None  Patient's Capacity to Self Care:  Patient is able to self care with assistance    -------------------------------------------------------------------------------------------------------    Chief Complaint   Patient presents with    Follow-up     Bilateral malignant neoplasm of breast in female, estrogen receptor positive, unspecified site of breast History of present illness/Cancer History:   Oncology History Overview Note   Tarun Holguin is a 66y o  year old female who presents with stage IV metastatic breast carcinoma with a history of brain metastasis treated with SRS in May 2017 now with the multiple recurrent brain metastasis  She completed whole-brain radiation therapy March 12, 2019,with previous SRS to brain in May of 2017 and L2-S1 completed on 8/24/16  She was last seen 4/23/19       She had f/u scans done 5/1/19  No CT evidence of new soft tissue tumor mass in the chest, abdomen or pelvis  No significant interval change in subtle lucent and sclerotic lesions within the lower thoracic and lumbar vertebral bodies suggestive of osseous metastatic disease  Pt continues to see Palliative care  Continues to c/o pain while  on Decadron, Gabapentin, Flexeril, Dilaudid prn for   Pain  control  Has persistent nausea, and constipation  6/17/19 MRI Brain showed multiple supratentorial and infratentorial metastatic lesions, as described above and overall improved since prior examination  No definite new nodular enhancing lesions identified on the current examination  Had med onc PA f/u in June  Pt has chronic constipation, and was seen x 2 in recent past in the ER for this  Pt had palliative care f/u, today  She had requested a break for chemo, and her next chemo is being held  t had c/o weakness, and had refused to do PT, she has been taking steroids for   awhile  CT scan scheduled for 7/29/19 8/7/19 F/U with Sheridan Mccray, MEET:  8/1/19 CT scans demonstrated disease stability in her chest abdomen and pelvis  However, patient is symptomatic with dizziness and proximal weakness  Re-evaluation with MRI of the brain is necessary to rule out new metastatic lesion to the brain      Regimen:  Kadcyla 3 6 mg/KG IV Day 1, Q 3 weeks      8/21/19 MRI of brain     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, estrogen receptor positive ([facility] [address] )   2010 Initial Diagnosis    The patient had a left sided T1 B , N0 ER positive, VA and HER-2 negative invasive ductal carcinoma, right-sided DCIS, ER/VA positive, HER-2 negative  Bilateral mastectomy  2010 - 4/2011 Hormone Therapy    Tamoxifen 20 mg daily  Last menstrual period was on 3/2010      4/2011 -  Hormone Therapy    Arimidex 1 mg daily  Unknown when medication was discontinued  Patient was lost to follow up      12/9/2015 Progression    · Right arm swelling in December 2015  U/S found a nonvascular structure in the right axilla measuring 2 2 x 1 3 cm  · Subsequent CT Scan of the chest on 12/9/15, showed multiple bilateral pulmonary nodules measuring 3-7 mm  · PET 2/4/16, which revealed hypermetabolic hilar and mediastinal nodes  There is a pre carinal node with a SUV of 5 4 measuring 1 8 x 1 7 cm, and a subcarinal node measuring 1 9 x 1 3 cm with a SUV of 5 3  There is right hilar activity of 3 5 and left hilar activity of 2 8  The subcentimeter nodules are too small for PET evaluation  She also has uptake in her L3 and L5 vertebral bodies, both concerning for bony metastases  2/16/2016 Biopsy    Bronchoscopic biopsy showed Metastatic non-small cell carcinoma, favor adenocarcinoma  ER 90%, VA 0%   Her 2 +2, positive by FISH        3/1/2016 - 6/2016 Chemotherapy    Taxotere 75 mg/m2, Day 1  Perjeta 420 mg, Day 1  Herceptin 6 mg/kg, Day 1  Cycle length= 21 days     3/1/2016 - 2/2017 Hormone Therapy    Anastrozole 1 mg daily     6/2016 - 2/2017 Chemotherapy    Perjeta 420 mg, Day 1  Herceptin 6 mg/kg, Day 1  Cycle length= 21 days     2/2017 Progression    Bony progression      2/10/2017 - 4/12/2018 Chemotherapy    Perjeta 420 mg, Day 1  Herceptin 6 mg/kg, Day 1  Taxotere 75 mg/m2  Cycle length= 21 days     3/3/2017 -  Chemotherapy    Xgeva 120 mg IV, Day 1  Cycle length =  84 days     4/18/2017 Progression     brain MRI showed a 5 mm right parietal lesion, 3 mm right frontal lesion, 3 mm left parahippocampal lesion  5/2017 - 5/2017 Radiation    SRS to brain lesions     5/3/2018 - 8/2018 Chemotherapy    Perjeta 420 mg,  Day 1  Herceptin 6 mg/kg,  Day 1  Cycle length = 21 days  Cycle 3 was administered on 6/14/18    ** taxotere was temporarily discontinued secondary for desire of treatment holiday  Taxotere was readded into the treatment regimen during last cycle  9/5/2018 Progression    Cutaneous disease progression  9/13/2018 - 9/18/2019 Chemotherapy    Kadcyla 3 6 mg/kg dose IV Day 1  Cycle length =  21 days  Cycles planned = until progression    Interrupted at the end of January secondary to progressive fatigue and interval development of brain mets requiring radiation  Restarted on 3/14/19          2/18/2019 Progression    MRI brain demonstrated disease progression; CT of the chest abdomen pelvis was stable along with the patient's tumor markers  2/27/2019 - 3/12/2019 Radiation    WBRT  X 10 sessions  9/18/2019 -  Chemotherapy    Kadcyla 3 6 mg/KG IV Day 1  Cycle length= 3 weeks    Cycle enlongation after scans demonstrated stablity in August   Quality of life reasons- cycle was elongated  Bone metastases ([facility] [address] )   7/6/2016 Initial Diagnosis    Bone metastases ([facility] [address] )     8/10/2016 - 8/24/2016 Radiation    Treatments:  Course: C1    Plan ID Energy Fractions Dose per Fraction (cGy) Total Dose Delivered (cGy) Elapsed Days   L2-S1 Spine 10X 10 / 10 300 3,000 14      Treatment Dates:  8/10/2016 - 8/24/2016        Brain metastases ([facility] [address] )   4/26/2017 Initial Diagnosis    Brain metastases ([address] )          Cancer Staging  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, estrogen receptor positive ([facility] [address] )  Staging form: Breast, AJCC 7th Edition  - Clinical stage from 2/16/2016: Stage IV (TX, NX, M1) - Unsigned       ECOG: 3 - Symptomatic, >50% confined to bed    Interval history:   Hospital follow-up     Review of Systems   Constitutional: Positive for fever  Negative for activity change, appetite change, fatigue and unexpected weight change  Respiratory: Positive for shortness of breath  Negative for cough  Cardiovascular: Positive for leg swelling  Negative for chest pain  Gastrointestinal: Positive for abdominal distention and constipation  Negative for abdominal pain, diarrhea and nausea  Liquid stools when she does have a bowel movement   Endocrine: Negative for cold intolerance and heat intolerance  Musculoskeletal: Negative for arthralgias and myalgias  Right arm lymphedema   Skin: Negative  Neurological: Negative for dizziness, weakness and headaches  Hematological: Negative for adenopathy  Does not bruise/bleed easily  Current Outpatient Medications:     albuterol (PROVENTIL HFA,VENTOLIN HFA) 90 mcg/act inhaler, TAKE 2 PUFFS BY MOUTH EVERY 6 HOURS AS NEEDED FOR WHEEZING, Disp: 18 Inhaler, Rfl: 2    apixaban (ELIQUIS) 5 mg, Take 1 tablet (5 mg total) by mouth 2 (two) times a day, Disp: 60 tablet, Rfl: 0    bisacodyl (DULCOLAX) 10 mg suppository, Insert 1 suppository (10 mg total) into the rectum daily as needed for constipation, Disp: 12 suppository, Rfl: 0    dexamethasone (DECADRON) 2 mg tablet, Take 1 tablet (2 mg total) by mouth daily with breakfast To help appetite, Disp: 30 tablet, Rfl: 0    gabapentin (NEURONTIN) 400 mg capsule, Take 1 capsule (400 mg total) by mouth 3 (three) times a day before meals To reduce nerve pain, Disp: 90 capsule, Rfl: 0    HYDROmorphone (DILAUDID) 4 mg tablet, Take 1 5 tablets (6 mg total) by mouth every 4 (four) hours as needed (cancer pain  )Max Daily Amount: 36 mg, Disp: 135 tablet, Rfl: 0    levETIRAcetam (KEPPRA) 250 mg tablet, TAKE 1 TABLET BY MOUTH TWICE A DAY, Disp: 60 tablet, Rfl: 0    lubiprostone (AMITIZA) 24 mcg capsule, Take 1 capsule (24 mcg total) by mouth 2 (two) times a day with meals, Disp: 60 capsule, Rfl: 0    Misc   Devices (QUAD CANE) MISC, by Does not apply route daily, Disp: 1 each, Rfl: 0    OLANZapine (ZyPREXA) 10 mg tablet, Take 1 tablet (10 mg total) by mouth daily at bedtime Every night, to help sleep and appetite , Disp: 30 tablet, Rfl: 0    pantoprazole (PROTONIX) 40 mg tablet, Take 1 tablet (40 mg total) by mouth daily Every morning, before coffee, to reduce stomach acid, Disp: 90 tablet, Rfl: 3    polyethylene glycol (MIRALAX) 17 g packet, Take 17 g by mouth daily, Disp: , Rfl: 0    prochlorperazine (COMPAZINE) 5 mg tablet, Take 1 tablet (5 mg total) by mouth 3 (three) times a day before meals, Disp: 90 tablet, Rfl: 0    senna-docusate sodium (SENOKOT S) 8 6-50 mg per tablet, Take 2 tablets by mouth 2 (two) times a day, Disp: 120 tablet, Rfl: 0    zolpidem (AMBIEN) 10 mg tablet, Take 1 tablet (10 mg total) by mouth daily at bedtime as needed for sleep, Disp: 3 tablet, Rfl: 0    No Known Allergies    Advance Directive and Living Will:        Objective:   /76 (BP Location: Left arm, Patient Position: Sitting, Cuff Size: Large)   Pulse 90   Temp 98 6 °F (37 °C) (Tympanic)   Resp 20   Ht 5' 5" (1 651 m)   Wt 80 8 kg (178 lb 3 2 oz)   LMP  (LMP Unknown)   SpO2 97%   BMI 29 65 kg/m²   Wt Readings from Last 6 Encounters:   06/09/21 80 8 kg (178 lb 3 2 oz)   06/03/21 78 4 kg (172 lb 13 5 oz)   05/10/21 77 9 kg (171 lb 11 8 oz)   05/04/21 76 6 kg (168 lb 14 oz)   04/05/21 71 6 kg (157 lb 13 6 oz)   03/16/21 76 4 kg (168 lb 8 oz)       Physical Exam  Constitutional:       Appearance: She is well-developed  She is ill-appearing  HENT:      Head: Normocephalic and atraumatic  Eyes:      Extraocular Movements: Extraocular movements intact  Conjunctiva/sclera: Conjunctivae normal       Pupils: Pupils are equal, round, and reactive to light  Neck:      Musculoskeletal: Normal range of motion and neck supple  Cardiovascular:      Rate and Rhythm: Normal rate and regular rhythm  Pulses: Normal pulses        Heart sounds: Normal heart sounds  No murmur  Pulmonary:      Effort: Tachypnea present  No respiratory distress  Breath sounds: Normal breath sounds  Abdominal:      General: Bowel sounds are decreased  There is distension  Palpations: Abdomen is soft  Musculoskeletal: Normal range of motion  Lymphadenopathy:      Cervical: No cervical adenopathy  Skin:     General: Skin is warm and dry  Capillary Refill: Capillary refill takes less than 2 seconds  Neurological:      Mental Status: She is alert and oriented to person, place, and time  Psychiatric:         Behavior: Behavior normal          Pertinent Laboratory Results and Imaging Review:  No results displayed because visit has over 200 results  Hospital Outpatient Visit on 05/10/2021   Component Date Value Ref Range Status    WBC 05/10/2021 10 43* 4 31 - 10 16 Thousand/uL Final    RBC 05/10/2021 3 56* 3 81 - 5 12 Million/uL Final    Hemoglobin 05/10/2021 10 9* 11 5 - 15 4 g/dL Final    Hematocrit 05/10/2021 35 3  34 8 - 46 1 % Final    MCV 05/10/2021 99* 82 - 98 fL Final    MCH 05/10/2021 30 6  26 8 - 34 3 pg Final    MCHC 05/10/2021 30 9* 31 4 - 37 4 g/dL Final    RDW 05/10/2021 16 2* 11 6 - 15 1 % Final    MPV 05/10/2021 9 2  8 9 - 12 7 fL Final    Platelets 46/08/2225 285  149 - 390 Thousands/uL Final    nRBC 05/10/2021 0  /100 WBCs Final    This is an appended report  These results have been appended to a previously preliminary verified report      Sodium 05/10/2021 142  136 - 145 mmol/L Final    Potassium 05/10/2021 4 1  3 5 - 5 3 mmol/L Final    Chloride 05/10/2021 106  100 - 108 mmol/L Final    CO2 05/10/2021 25  21 - 32 mmol/L Final    ANION GAP 05/10/2021 11  4 - 13 mmol/L Final    BUN 05/10/2021 16  5 - 25 mg/dL Final    Creatinine 05/10/2021 0 91  0 60 - 1 30 mg/dL Final    Standardized to IDMS reference method    Glucose 05/10/2021 140  65 - 140 mg/dL Final    If the patient is fasting, the ADA then defines impaired fasting glucose as > 100 mg/dL and diabetes as > or equal to 123 mg/dL  Specimen collection should occur prior to Sulfasalazine administration due to the potential for falsely depressed results  Specimen collection should occur prior to Sulfapyridine administration due to the potential for falsely elevated results   Calcium 05/10/2021 8 9  8 3 - 10 1 mg/dL Final    Corrected Calcium 05/10/2021 9 4  8 3 - 10 1 mg/dL Final    AST 05/10/2021 67* 5 - 45 U/L Final    Specimen collection should occur prior to Sulfasalazine administration due to the potential for falsely depressed results   ALT 05/10/2021 142* 12 - 78 U/L Final    Specimen collection should occur prior to Sulfasalazine administration due to the potential for falsely depressed results   Alkaline Phosphatase 05/10/2021 96  46 - 116 U/L Final    Total Protein 05/10/2021 7 3  6 4 - 8 2 g/dL Final    Albumin 05/10/2021 3 4* 3 5 - 5 0 g/dL Final    Total Bilirubin 05/10/2021 0 24  0 20 - 1 00 mg/dL Final    Use of this assay is not recommended for patients undergoing treatment with eltrombopag due to the potential for falsely elevated results      eGFR 05/10/2021 70  ml/min/1 73sq m Final    CA 27 29 05/10/2021 62 3* 0 0 - 42 3 U/mL Final    Segmented % 05/10/2021 74  43 - 75 % Final    Bands % 05/10/2021 2  0 - 8 % Final    Lymphocytes % 05/10/2021 18  14 - 44 % Final    Monocytes % 05/10/2021 6  4 - 12 % Final    Eosinophils, % 05/10/2021 0  0 - 6 % Final    Basophils % 05/10/2021 0  0 - 1 % Final    Absolute Neutrophils 05/10/2021 7 93* 1 85 - 7 62 Thousand/uL Final    Lymphocytes Absolute 05/10/2021 1 88  0 60 - 4 47 Thousand/uL Final    Monocytes Absolute 05/10/2021 0 63  0 00 - 1 22 Thousand/uL Final    Eosinophils Absolute 05/10/2021 0 00  0 00 - 0 40 Thousand/uL Final    Basophils Absolute 05/10/2021 0 00  0 00 - 0 10 Thousand/uL Final    Total Counted 05/10/2021 100   Final    Anisocytosis 05/10/2021 Present   Final    Platelet Estimate 05/10/2021 Adequate  Adequate Final         The following historical data was reviewed      Past Medical History:   Diagnosis Date    Dehydration 6/11/2019    Dizziness 2/6/2018    Dry skin 2/6/2018    Edema 10/23/2019    H/O bilateral mastectomy 2/15/2016    Hyperglycemia 2/6/2018    Hypertension 2/15/2016    Hypokalemia 3/10/2020    Lymphedema 2/15/2016    Malignant cachexia ([facility] [address] ) 10/6/2016    Malignant neoplasm of right breast ([facility] [address] ) 2/15/2016    Metastatic breast cancer [facility])        Past Surgical History:   Procedure Laterality Date    ENDOBRONCHIAL ULTRASOUND (EBUS) N/A 2/16/2016    Procedure: EBUS;  Surgeon: Annie Preston MD;  Location: [facility];  Service:     MASTECTOMY Bilateral     MASTECTOMY Bilateral     right arm edema    OOPHORECTOMY      KS BRONCHOSCOPY NEEDLE BX TRACHEA MAIN STEM&/BRON N/A 2/16/2016    Procedure: [employer];  Surgeon: Annie Preston MD;  Location: [facility];  Service: Thoracic    KS INSJ TUNNELED CTR VAD W/SUBQ PORT AGE 5 YR/> N/A 7/24/2018    Procedure: INSERTION VENOUS PORT ( PORT-A-CATH) IR;  Surgeon: Gennaro Best DO;  [facility];  Service: Interventional Radiology    KS STEREOTACTIC RADIOSURGERY, CRANIAL,SIMPLE,EA ADD  5/3/2017         KS STEREOTACTIC RADIOSURGERY, CRANIAL,SIMPLE,EA ADD  5/3/2017         KS STEREOTACTIC RADIOSURGERY, CRANIAL,SIMPLE,SINGLE  5/3/2017            Social History     Socioeconomic History    Marital status: /Civil Union     Spouse name: None    Number of children: 1    Years of education: None    Highest education level: None   Occupational History    None   Social Needs    Financial resource strain: None    Food insecurity     Worry: None     Inability: None    Transportation needs     Medical: None     Non-medical: None   Tobacco Use    Smoking status: Current Every Day Smoker     Packs/day: 0 50     Years: 40 00     Pack years: 20 00     Types: Cigarettes     Start date: Doni Mahan Smokeless tobacco: Never Used   Substance and Sexual Activity    Alcohol use: Not Currently     Frequency: 2-4 times a month     Drinks per session: 3 or 4     Binge frequency: Less than monthly     Comment: once a week    Drug use: Not Currently     Types: Marijuana    Sexual activity: None   Lifestyle    Physical activity     Days per week: None     Minutes per session: None    Stress: None   Relationships    Social connections     Talks on phone: None     Gets together: None     Attends Congregational service: None     Active member of club or organization: None     Attends meetings of clubs or organizations: None     Relationship status: None    Intimate partner violence     Fear of current or ex partner: None     Emotionally abused: None     Physically abused: None     Forced sexual activity: None   Other Topics Concern    None   Social History Narrative    None       Family History   Problem Relation Age of Onset    Cancer Sister     Prostate cancer Brother        Please note: This report has been generated by a voice recognition software system  Therefore there may be syntax, spelling, and/or grammatical errors  Please call if you have any questions

## 2021-06-11 ENCOUNTER — APPOINTMENT (EMERGENCY)
Dept: RADIOLOGY | Facility: HOSPITAL | Age: 61
End: 2021-06-11
Payer: COMMERCIAL

## 2021-06-11 ENCOUNTER — HOSPITAL ENCOUNTER (EMERGENCY)
Facility: HOSPITAL | Age: 61
Discharge: HOME | End: 2021-06-11
Attending: EMERGENCY MEDICINE
Payer: COMMERCIAL

## 2021-06-11 VITALS
OXYGEN SATURATION: 98 % | DIASTOLIC BLOOD PRESSURE: 59 MMHG | RESPIRATION RATE: 22 BRPM | SYSTOLIC BLOOD PRESSURE: 114 MMHG | TEMPERATURE: 97.5 F | HEART RATE: 80 BPM

## 2021-06-11 DIAGNOSIS — R60.0 LOWER EXTREMITY EDEMA: Primary | ICD-10-CM

## 2021-06-11 LAB
ALBUMIN SERPL BCP-MCNC: 3 G/DL (ref 3.5–5)
ALP SERPL-CCNC: 100 U/L (ref 46–116)
ALT SERPL W P-5'-P-CCNC: 59 U/L (ref 12–78)
ANION GAP SERPL CALCULATED.3IONS-SCNC: 9 MMOL/L (ref 4–13)
APTT PPP: 27 SECONDS (ref 23–37)
AST SERPL W P-5'-P-CCNC: 20 U/L (ref 5–45)
BACTERIA UR QL AUTO: NORMAL /HPF
BASOPHILS # BLD AUTO: 0.03 THOUSANDS/ΜL (ref 0–0.1)
BASOPHILS NFR BLD AUTO: 0 % (ref 0–1)
BILIRUB SERPL-MCNC: 0.12 MG/DL (ref 0.2–1)
BILIRUB UR QL STRIP: NEGATIVE
BUN SERPL-MCNC: 10 MG/DL (ref 5–25)
CALCIUM ALBUM COR SERPL-MCNC: 9.6 MG/DL (ref 8.3–10.1)
CALCIUM SERPL-MCNC: 8.8 MG/DL (ref 8.3–10.1)
CHLORIDE SERPL-SCNC: 104 MMOL/L (ref 100–108)
CLARITY UR: CLEAR
CO2 SERPL-SCNC: 28 MMOL/L (ref 21–32)
COLOR UR: YELLOW
CREAT SERPL-MCNC: 0.86 MG/DL (ref 0.6–1.3)
EOSINOPHIL # BLD AUTO: 0.01 THOUSAND/ΜL (ref 0–0.61)
EOSINOPHIL NFR BLD AUTO: 0 % (ref 0–6)
ERYTHROCYTE [DISTWIDTH] IN BLOOD BY AUTOMATED COUNT: 16.9 % (ref 11.6–15.1)
GFR SERPL CREATININE-BSD FRML MDRD: 75 ML/MIN/1.73SQ M
GLUCOSE SERPL-MCNC: 90 MG/DL (ref 65–140)
GLUCOSE UR STRIP-MCNC: NEGATIVE MG/DL
HCT VFR BLD AUTO: 32.6 % (ref 34.8–46.1)
HGB BLD-MCNC: 10 G/DL (ref 11.5–15.4)
HGB UR QL STRIP.AUTO: NEGATIVE
IMM GRANULOCYTES # BLD AUTO: 0.15 THOUSAND/UL (ref 0–0.2)
IMM GRANULOCYTES NFR BLD AUTO: 2 % (ref 0–2)
INR PPP: 0.87 (ref 0.84–1.19)
KETONES UR STRIP-MCNC: NEGATIVE MG/DL
LEUKOCYTE ESTERASE UR QL STRIP: ABNORMAL
LYMPHOCYTES # BLD AUTO: 1.26 THOUSANDS/ΜL (ref 0.6–4.47)
LYMPHOCYTES NFR BLD AUTO: 14 % (ref 14–44)
MCH RBC QN AUTO: 30.2 PG (ref 26.8–34.3)
MCHC RBC AUTO-ENTMCNC: 30.7 G/DL (ref 31.4–37.4)
MCV RBC AUTO: 99 FL (ref 82–98)
MONOCYTES # BLD AUTO: 0.82 THOUSAND/ΜL (ref 0.17–1.22)
MONOCYTES NFR BLD AUTO: 9 % (ref 4–12)
NEUTROPHILS # BLD AUTO: 7.05 THOUSANDS/ΜL (ref 1.85–7.62)
NEUTS SEG NFR BLD AUTO: 75 % (ref 43–75)
NITRITE UR QL STRIP: NEGATIVE
NON-SQ EPI CELLS URNS QL MICRO: NORMAL /HPF
NRBC BLD AUTO-RTO: 0 /100 WBCS
NT-PROBNP SERPL-MCNC: 112 PG/ML
PH UR STRIP.AUTO: 7 [PH] (ref 4.5–8)
PLATELET # BLD AUTO: 388 THOUSANDS/UL (ref 149–390)
PMV BLD AUTO: 9.5 FL (ref 8.9–12.7)
POTASSIUM SERPL-SCNC: 4.1 MMOL/L (ref 3.5–5.3)
PROT SERPL-MCNC: 7 G/DL (ref 6.4–8.2)
PROT UR STRIP-MCNC: NEGATIVE MG/DL
PROTHROMBIN TIME: 11.9 SECONDS (ref 11.6–14.5)
RBC # BLD AUTO: 3.31 MILLION/UL (ref 3.81–5.12)
RBC #/AREA URNS AUTO: NORMAL /HPF
SODIUM SERPL-SCNC: 141 MMOL/L (ref 136–145)
SP GR UR STRIP.AUTO: 1.01 (ref 1–1.03)
TROPONIN I SERPL-MCNC: <0.02 NG/ML
UROBILINOGEN UR QL STRIP.AUTO: 0.2 E.U./DL
WBC # BLD AUTO: 9.32 THOUSAND/UL (ref 4.31–10.16)
WBC #/AREA URNS AUTO: NORMAL /HPF

## 2021-06-11 PROCEDURE — 93005 ELECTROCARDIOGRAM TRACING: CPT

## 2021-06-11 PROCEDURE — 84484 ASSAY OF TROPONIN QUANT: CPT | Performed by: PHYSICIAN ASSISTANT

## 2021-06-11 PROCEDURE — 99284 EMERGENCY DEPT VISIT MOD MDM: CPT

## 2021-06-11 PROCEDURE — 85025 COMPLETE CBC W/AUTO DIFF WBC: CPT | Performed by: PHYSICIAN ASSISTANT

## 2021-06-11 PROCEDURE — 71045 X-RAY EXAM CHEST 1 VIEW: CPT

## 2021-06-11 PROCEDURE — 81001 URINALYSIS AUTO W/SCOPE: CPT

## 2021-06-11 PROCEDURE — 99285 EMERGENCY DEPT VISIT HI MDM: CPT | Performed by: PHYSICIAN ASSISTANT

## 2021-06-11 PROCEDURE — 83880 ASSAY OF NATRIURETIC PEPTIDE: CPT | Performed by: PHYSICIAN ASSISTANT

## 2021-06-11 PROCEDURE — 85610 PROTHROMBIN TIME: CPT | Performed by: PHYSICIAN ASSISTANT

## 2021-06-11 PROCEDURE — 85730 THROMBOPLASTIN TIME PARTIAL: CPT | Performed by: PHYSICIAN ASSISTANT

## 2021-06-11 PROCEDURE — 36415 COLL VENOUS BLD VENIPUNCTURE: CPT | Performed by: PHYSICIAN ASSISTANT

## 2021-06-11 PROCEDURE — 80053 COMPREHEN METABOLIC PANEL: CPT | Performed by: PHYSICIAN ASSISTANT

## 2021-06-11 RX ORDER — FUROSEMIDE 20 MG/1
20 TABLET ORAL DAILY
Qty: 5 TABLET | Refills: 0 | Status: SHIPPED | OUTPATIENT
Start: 2021-06-11 | End: 2021-06-23 | Stop reason: SDUPTHER

## 2021-06-11 NOTE — ED PROVIDER NOTES
History  Chief Complaint   Patient presents with    Leg Swelling     bilateral lower leg swelling starting today, denies cp/sob     The patient is a 65-year-old female with extensive past medical history who is currently on palliative care due to cancer  Patient presents today with her daughter for evaluation of bilateral lower extremity swelling  The patient states that she thinks that it started yesterday  Per the daughter who is at bedside, the patient's therapist is the one who noted in advised them to bring her in for evaluation  The patient denies any pain in her legs  The patient denies any history of CKD or heart failure  She denies history of previous similar the swelling previously  She additionally denies fever, chills, chest pain, shortness of abdominal pain, nausea, vomiting, diarrhea, headache, dizziness or lightheadedness  History provided by:  Patient   used: No        Prior to Admission Medications   Prescriptions Last Dose Informant Patient Reported? Taking? HYDROmorphone (DILAUDID) 4 mg tablet  Self No No   Sig: Take 1 5 tablets (6 mg total) by mouth every 4 (four) hours as needed (cancer pain  )Max Daily Amount: 36 mg   Misc  Devices (QUAD CANE) MISC  Self No No   Sig: by Does not apply route daily   OLANZapine (ZyPREXA) 10 mg tablet  Self No No   Sig: Take 1 tablet (10 mg total) by mouth daily at bedtime Every night, to help sleep and appetite     albuterol (PROVENTIL HFA,VENTOLIN HFA) 90 mcg/act inhaler  Self No No   Sig: TAKE 2 PUFFS BY MOUTH EVERY 6 HOURS AS NEEDED FOR WHEEZING   apixaban (ELIQUIS) 5 mg  Self No No   Sig: Take 1 tablet (5 mg total) by mouth 2 (two) times a day   bisacodyl (DULCOLAX) 10 mg suppository  Self No No   Sig: Insert 1 suppository (10 mg total) into the rectum daily as needed for constipation   dexamethasone (DECADRON) 2 mg tablet  Self No No   Sig: Take 1 tablet (2 mg total) by mouth daily with breakfast To help appetite   gabapentin (NEURONTIN) 400 mg capsule  Self No No   Sig: Take 1 capsule (400 mg total) by mouth 3 (three) times a day before meals To reduce nerve pain   levETIRAcetam (KEPPRA) 250 mg tablet  Self No No   Sig: TAKE 1 TABLET BY MOUTH TWICE A DAY   lubiprostone (AMITIZA) 24 mcg capsule  Self No No   Sig: Take 1 capsule (24 mcg total) by mouth 2 (two) times a day with meals   pantoprazole (PROTONIX) 40 mg tablet  Self No No   Sig: Take 1 tablet (40 mg total) by mouth daily Every morning, before coffee, to reduce stomach acid   polyethylene glycol (MIRALAX) 17 g packet  Self No No   Sig: Take 17 g by mouth daily   prochlorperazine (COMPAZINE) 5 mg tablet  Self No No   Sig: Take 1 tablet (5 mg total) by mouth 3 (three) times a day before meals   senna-docusate sodium (SENOKOT S) 8 6-50 mg per tablet  Self No No   Sig: Take 2 tablets by mouth 2 (two) times a day   zolpidem (AMBIEN) 10 mg tablet  Self No No   Sig: Take 1 tablet (10 mg total) by mouth daily at bedtime as needed for sleep      Facility-Administered Medications: None       Past Medical History:   Diagnosis Date    Dehydration 6/11/2019    Dizziness 2/6/2018    Dry skin 2/6/2018    Edema 10/23/2019    H/O bilateral mastectomy 2/15/2016    Hyperglycemia 2/6/2018    Hypertension 2/15/2016    Hypokalemia 3/10/2020    Lymphedema 2/15/2016    Malignant cachexia ([facility] ) 10/6/2016    Malignant neoplasm of right breast ([facility] ) 2/15/2016    Metastatic breast cancer ([facility] )        Past Surgical History:   Procedure Laterality Date    ENDOBRONCHIAL ULTRASOUND (EBUS) N/A 2/16/2016    Procedure: EBUS;  Surgeon: Hadley Inman MD;  [facility];  Service:     MASTECTOMY Bilateral     MASTECTOMY Bilateral     right arm edema    OOPHORECTOMY      AL BRONCHOSCOPY NEEDLE BX TRACHEA MAIN STEM&/BRON N/A 2/16/2016    Procedure: Angelika Lombardo;  Surgeon: Hadley Inman MD;  [facility];  Service: Thoracic    AL INSJ TUNNELED CTR VAD W/SUBQ PORT AGE 5 YR/> N/A 7/24/2018    Procedure: INSERTION VENOUS PORT ( PORT-A-CATH) IR;  Surgeon: Vianca Alicea DO;  [facility];  Service: Interventional Radiology    PA STEREOTACTIC RADIOSURGERY, CRANIAL,SIMPLE,EA ADD  5/3/2017         PA STEREOTACTIC RADIOSURGERY, CRANIAL,SIMPLE,EA ADD  5/3/2017         PA STEREOTACTIC RADIOSURGERY, CRANIAL,SIMPLE,SINGLE  5/3/2017            Family History   Problem Relation Age of Onset    Cancer Sister     Prostate cancer Brother      I have reviewed and agree with the history as documented  E-Cigarette/Vaping    E-Cigarette Use Never User      E-Cigarette/Vaping Substances    Nicotine Yes     THC No     CBD No     Flavoring No     Other No     Unknown No      Social History     Tobacco Use    Smoking status: Current Every Day Smoker     Packs/day: 0 50     Years: 40 00     Pack years: 20 00     Types: Cigarettes     Start date: 1978    Smokeless tobacco: Never Used   Substance Use Topics    Alcohol use: Not Currently     Frequency: 2-4 times a month     Drinks per session: 3 or 4     Binge frequency: Less than monthly     Comment: once a week    Drug use: Not Currently     Types: Marijuana       Review of Systems   Constitutional: Negative for chills and fever  HENT: Negative for ear pain and sore throat  Eyes: Negative for redness and visual disturbance  Respiratory: Negative for cough and shortness of breath  Cardiovascular: Positive for leg swelling  Negative for chest pain  Gastrointestinal: Negative for abdominal pain, diarrhea, nausea and vomiting  Genitourinary: Negative for dysuria and hematuria  Musculoskeletal: Negative for back pain, neck pain and neck stiffness  Skin: Negative for color change and rash  Neurological: Negative for dizziness, light-headedness and headaches  All other systems reviewed and are negative  Physical Exam  Physical Exam  Vitals signs and nursing note reviewed     Constitutional:       General: She is not in acute distress  Appearance: She is well-developed  She is ill-appearing  She is not toxic-appearing  HENT:      Head: Normocephalic and atraumatic  Mouth/Throat:      Pharynx: Uvula midline  Eyes:      General: Lids are normal       Conjunctiva/sclera: Conjunctivae normal    Neck:      Musculoskeletal: Normal range of motion and neck supple  Cardiovascular:      Rate and Rhythm: Normal rate and regular rhythm  Pulses: Normal pulses  Heart sounds: Normal heart sounds  Pulmonary:      Effort: Pulmonary effort is normal       Breath sounds: Normal breath sounds  Abdominal:      General: There is no distension  Palpations: Abdomen is soft  Tenderness: There is no abdominal tenderness  Musculoskeletal:      Right lower le+ Pitting Edema present  Left lower le+ Pitting Edema present  Skin:     General: Skin is warm and dry  Neurological:      Mental Status: She is alert and oriented to person, place, and time           Vital Signs  ED Triage Vitals   Temperature Pulse Respirations Blood Pressure SpO2   21 1551 21 1551 21 1551 21 1554 21 1551   97 5 °F (36 4 °C) 80 22 114/59 98 %      Temp Source Heart Rate Source Patient Position - Orthostatic VS BP Location FiO2 (%)   21 1551 21 1551 -- -- --   Oral Monitor         Pain Score       21 1551       No Pain           Vitals:    21 1551 21 1554   BP:  114/59   Pulse: 80          Visual Acuity      ED Medications  Medications - No data to display    Diagnostic Studies  Results Reviewed     Procedure Component Value Units Date/Time    Urine Microscopic [[id number]]  (Normal) Collected: 21 1801    Lab Status: Final result Specimen: Urine, Clean Catch Updated: 21 1854     RBC, UA None Seen /hpf      WBC, UA 2-4 /hpf      Epithelial Cells None Seen /hpf      Bacteria, UA Occasional /hpf     NT-BNP PRO [[id number]]  (Normal) Collected: 21 1746    Lab Status: Final result Specimen: Blood from Arm, Left Updated: 06/11/21 1832     NT-proBNP 112 pg/mL     Troponin I [[id number]]  (Normal) Collected: 06/11/21 1746    Lab Status: Final result Specimen: Blood from Arm, Left Updated: 06/11/21 1826     Troponin I <0 02 ng/mL     Comprehensive metabolic panel [[id number]]  (Abnormal) Collected: 06/11/21 1746    Lab Status: Final result Specimen: Blood from Arm, Left Updated: 06/11/21 1825     Sodium 141 mmol/L      Potassium 4 1 mmol/L      Chloride 104 mmol/L      CO2 28 mmol/L      ANION GAP 9 mmol/L      BUN 10 mg/dL      Creatinine 0 86 mg/dL      Glucose 90 mg/dL      Calcium 8 8 mg/dL      Corrected Calcium 9 6 mg/dL      AST 20 U/L      ALT 59 U/L      Alkaline Phosphatase 100 U/L      Total Protein 7 0 g/dL      Albumin 3 0 g/dL      Total Bilirubin 0 12 mg/dL      eGFR 75 ml/min/1 73sq m     Narrative:      [place] guidelines for Chronic Kidney Disease (CKD):     Stage 1 with normal or high GFR (GFR > 90 mL/min/1 73 square meters)    Stage 2 Mild CKD (GFR = 60-89 mL/min/1 73 square meters)    Stage 3A Moderate CKD (GFR = 45-59 mL/min/1 73 square meters)    Stage 3B Moderate CKD (GFR = 30-44 mL/min/1 73 square meters)    Stage 4 Severe CKD (GFR = 15-29 mL/min/1 73 square meters)    Stage 5 End Stage CKD (GFR <15 mL/min/1 73 square meters)  Note: GFR calculation is accurate only with a steady state creatinine    Protime-INR [[id number]]  (Normal) Collected: 06/11/21 1746    Lab Status: Final result Specimen: Blood from Arm, Left Updated: 06/11/21 1813     Protime 11 9 seconds      INR 0 87    APTT [[id number]]  (Normal) Collected: 06/11/21 1746    Lab Status: Final result Specimen: Blood from Arm, Left Updated: 06/11/21 1813     PTT 27 seconds     Urine Macroscopic, POC [[id number]]  (Abnormal) Collected: 06/11/21 1801    Lab Status: Final result Specimen: Urine Updated: 06/11/21 1802     Color, UA Yellow     Clarity, UA Clear     pH, UA 7 0     Leukocytes, UA Moderate     Nitrite, UA Negative     Protein, UA Negative mg/dl      Glucose, UA Negative mg/dl      Ketones, UA Negative mg/dl      Urobilinogen, UA 0 2 E U /dl      Bilirubin, UA Negative     Blood, UA Negative     Specific Gravity, UA 1 010    Narrative:      CLINITEK RESULT    CBC and differential [[id number]]  (Abnormal) Collected: 06/11/21 1746    Lab Status: Final result Specimen: Blood from Arm, Left Updated: 06/11/21 1801     WBC 9 32 Thousand/uL      RBC 3 31 Million/uL      Hemoglobin 10 0 g/dL      Hematocrit 32 6 %      MCV 99 fL      MCH 30 2 pg      MCHC 30 7 g/dL      RDW 16 9 %      MPV 9 5 fL      Platelets 041 Thousands/uL      nRBC 0 /100 WBCs      Neutrophils Relative 75 %      Immat GRANS % 2 %      Lymphocytes Relative 14 %      Monocytes Relative 9 %      Eosinophils Relative 0 %      Basophils Relative 0 %      Neutrophils Absolute 7 05 Thousands/µL      Immature Grans Absolute 0 15 Thousand/uL      Lymphocytes Absolute 1 26 Thousands/µL      Monocytes Absolute 0 82 Thousand/µL      Eosinophils Absolute 0 01 Thousand/µL      Basophils Absolute 0 03 Thousands/µL                  XR chest 1 view portable   ED Interpretation by Jai Bojorquez PA-C (06/11 1840)   No acute cardiopulmonary disease  Procedures  ECG 12 Lead Documentation Only    Date/Time: 6/11/2021 8:59 PM  Performed by: Jai Bojorquez PA-C  Authorized by: Anum Lentz PA-C     Comments:      Normal sinus rhythm at 70  Normal axis  No acute ST-T changes  No change noted from previous on 05/10/2021  ED Course  ED Course as of Jun 11 2103 Fri Jun 11, 2021   1809 Baseline   Hemoglobin(!): 10 0                             SBIRT 22yo+      Most Recent Value   SBIRT (25 yo +)   In order to provide better care to our patients, we are screening all of our patients for alcohol and drug use  Would it be okay to ask you these screening questions?   Unable to answer at this time Filed at: 06/11/2021 1744                    MDM  Number of Diagnoses or Management Options  Lower extremity edema: new and requires workup  Diagnosis management comments: Patient presents for evaluation of lower extremity edema that her physical therapist noticed today  Patient does not have any complaints  She is denying any pain  She does not have any chest pain or shortness of breath  Differential includes but is not limited to dependent edema versus CHF versus CKD versus DVT    DVT is unlikely as the swelling is bilateral   The patient is on anticoagulation  She is not tachycardic, nor oxygen saturation is 98%  Labs, imaging and ECG ordered  Labs reviewed with no significant findings  BNP is within normal limits  Patient does have slightly low albumin level, which may be the reason for the edema  Chest x-ray does not show any acute change  ECG is nonischemic  All results were discussed with the patient and her daughter  I advised him that I will start the patient on a 5 day course of low-dose Lasix  Patient is agreeable to this  I advised her to to take the medication in the morning  Advised further follow-up with her primary care doctor, particularly if her symptoms do not resolve  I advised return to the ED with any new or significantly worsening symptoms  She acknowledged understanding  Patient is stable for discharge         Amount and/or Complexity of Data Reviewed  Clinical lab tests: ordered and reviewed  Tests in the radiology section of CPT®: ordered and reviewed  Decide to obtain previous medical records or to obtain history from someone other than the patient: yes  Obtain history from someone other than the patient: yes  Review and summarize past medical records: yes  Discuss the patient with other providers: yes (Dr Moiz Grimaldo)    Risk of Complications, Morbidity, and/or Mortality  Presenting problems: low  Diagnostic procedures: low  Management options: low    Patient Progress  Patient progress: stable      Disposition  Final diagnoses:   Lower extremity edema     Time reflects when diagnosis was documented in both MDM as applicable and the Disposition within this note     Time User Action Codes Description Comment    6/11/2021  6:54 PM Ayo De La Vega Add [R60 0] Lower extremity edema       ED Disposition     ED Disposition Condition Date/Time Comment    Discharge Stable Fri Jun 11, 2021  6:54 PM Jose Juan Monson discharge to home/self care  Follow-up Information     Follow up With Specialties Details Why Contact Info Additional Information    Caitlyn Everett MD Internal Medicine Schedule an appointment as soon as possible for a visit in 2 days  [address] [facility]  [address]  [address] [place] [address]  [facility] Emergency Department Emergency Medicine  If symptoms worsen [address] [facility] [id number] [place] [address] Emergency Department,  [address], [place], [place], [address]          Discharge Medication List as of 6/11/2021  6:55 PM      START taking these medications    Details   furosemide (LASIX) 20 mg tablet Take 1 tablet (20 mg total) by mouth daily for 5 days, Starting Fri 6/11/2021, Until Wed 6/16/2021, Normal         CONTINUE these medications which have NOT CHANGED    Details   albuterol (PROVENTIL HFA,VENTOLIN HFA) 90 mcg/act inhaler TAKE 2 PUFFS BY MOUTH EVERY 6 HOURS AS NEEDED FOR WHEEZING, Normal      apixaban (ELIQUIS) 5 mg Take 1 tablet (5 mg total) by mouth 2 (two) times a day, Starting Wed 5/19/2021, Until Fri 6/18/2021, Normal      bisacodyl (DULCOLAX) 10 mg suppository Insert 1 suppository (10 mg total) into the rectum daily as needed for constipation, Starting Wed 5/19/2021, Normal      dexamethasone (DECADRON) 2 mg tablet Take 1 tablet (2 mg total) by mouth daily with breakfast To help appetite, Starting Tue 5/4/2021, Normal      gabapentin (NEURONTIN) 400 mg capsule Take 1 capsule (400 mg total) by mouth 3 (three) times a day before meals To reduce nerve pain, Starting Tue 5/4/2021, Normal      HYDROmorphone (DILAUDID) 4 mg tablet Take 1 5 tablets (6 mg total) by mouth every 4 (four) hours as needed (cancer pain  )Max Daily Amount: 36 mg, Starting Thu 6/3/2021, Print      levETIRAcetam (KEPPRA) 250 mg tablet TAKE 1 TABLET BY MOUTH TWICE A DAY, Normal      lubiprostone (AMITIZA) 24 mcg capsule Take 1 capsule (24 mcg total) by mouth 2 (two) times a day with meals, Starting Wed 5/19/2021, Until Fri 6/18/2021, Normal      Misc  Devices (QUAD CANE) MISC by Does not apply route daily, Starting Thu 12/12/2019, Print      OLANZapine (ZyPREXA) 10 mg tablet Take 1 tablet (10 mg total) by mouth daily at bedtime Every night, to help sleep and appetite , Starting Tue 5/4/2021, Normal      pantoprazole (PROTONIX) 40 mg tablet Take 1 tablet (40 mg total) by mouth daily Every morning, before coffee, to reduce stomach acid, Starting Tue 5/4/2021, Normal      polyethylene glycol (MIRALAX) 17 g packet Take 17 g by mouth daily, Starting Tue 5/25/2021, No Print      prochlorperazine (COMPAZINE) 5 mg tablet Take 1 tablet (5 mg total) by mouth 3 (three) times a day before meals, Starting Wed 5/19/2021, Until Fri 6/18/2021, Normal      senna-docusate sodium (SENOKOT S) 8 6-50 mg per tablet Take 2 tablets by mouth 2 (two) times a day, Starting Wed 5/19/2021, Until Fri 6/18/2021, Normal      zolpidem (AMBIEN) 10 mg tablet Take 1 tablet (10 mg total) by mouth daily at bedtime as needed for sleep, Starting Tue 5/25/2021, Print           No discharge procedures on file      PDMP Review       Value Time User    PDMP Reviewed  Yes 6/3/2021 11:33 AM Darby Alejandra MD          ED Provider  Electronically Signed by           Daryl Rosario PA-C  06/11/21 0950

## 2021-06-12 LAB
ATRIAL RATE: 70 BPM
P AXIS: 37 DEGREES
PR INTERVAL: 134 MS
QRS AXIS: 46 DEGREES
QRSD INTERVAL: 84 MS
QT INTERVAL: 414 MS
QTC INTERVAL: 447 MS
T WAVE AXIS: 51 DEGREES
VENTRICULAR RATE: 70 BPM

## 2021-06-12 PROCEDURE — 93010 ELECTROCARDIOGRAM REPORT: CPT | Performed by: INTERNAL MEDICINE

## 2021-06-14 NOTE — PROGRESS NOTES
Outpatient Follow-Up - Palliative and Supportive Care   Susie Velasquez 64 y o  female [id number]    Assessment & Plan  1  Palliative care patient    2  Cancer-related pain      - Counseling on health screening and disease prevention, COVID-19 specific (CPT V65 49)    Medications adjusted this encounter:  Requested Prescriptions     Signed Prescriptions Disp Refills    HYDROmorphone (DILAUDID) 4 mg tablet 135 tablet 0     Sig: Take 1 5 tablets (6 mg total) by mouth every 4 (four) hours as needed (cancer pain  )Max Daily Amount: 36 mg     No orders of the defined types were placed in this encounter  Medications Discontinued During This Encounter   Medication Reason    HYDROmorphone (DILAUDID) 4 mg tablet Reorder         Susie Velasquez was seen today for symptoms and planning cares related to above illnesses  I have reviewed the patient's controlled substance dispensing history in the Prescription Drug Monitoring Program in compliance with the [facility] regulations before prescribing any controlled substances  They are invited to continue to follow with us  If there are questions or concerns, please contact us through our clinic/answering service 24 hours a day, seven days a week  Partha Greene MD  [facility] Palliative and Supportive Care        Visit Information   Accompanied By: No one    Source of History: Self    History Limitations: None    Contacts:  Enrique Alexander - [phone]  History of Present Illness      Susie Velasquez is a 64 y o  female who presents in follow up of symptoms related to Stage IV breast cancer  Pertinent issues include: symptom management, pain, neoplasm related      Since last visit, she was admitted to hospital at [facility] for impaction and nausea  She was found during hospital stay to have a new PE  She has only been home for about a week now, after having disimpacted and gotten appropriate anticoagulation    She was asked to go for rehab, and she comes to us from the SNF  She has not had more problem with swallow, speech, at least none that she reported, and she did not receive special attention from [facility] during her stay  We did rec'd this in May, but she and fam declined  Today, we briefly identified that her pain control would benefit from some careful changes; in the skilled and monitored setting of SNF, we scheduled hydromorphone 6mg with hold parameters, instead of her usual 4mg PRN at home       Historically, her right arm pain and lymphedema have been challenging to control   She has experienced consistent side effects from long acting opioids leading to sedation and confusion   Long acting opioids have also never provided her pain relief - forms tried have included methadone, MS Contin, Fentanyl patches   Steroids have not been helpful to reduce inflammation and improve pain, either    She continues to smoke 1/2 ppd, which is less than before   She had expressed interest and motivation in quitting  Andrew Close daughter has also promised to quit with her     Metastatic breast cancer with brain metastasis originally diagnosed in 2010  She had a bilateral mastectomy and has undergone multiple chemotherapy regimens and has completed WBRT in March 2019  She follows with Dr Luke Oro and is currently undergoing treatment with [facility] with premeds of Dexamethasone, Emend, Aloxi  She struggles with persistent nausea      Past medical, surgical, social, and family histories are reviewed and pertinent updates are made  Review of Systems   Constitutional: Positive for decreased appetite and weight loss  Negative for weight gain  HENT: Negative for hoarse voice and nosebleeds  Eyes: Negative for vision loss in left eye and vision loss in right eye  Cardiovascular: Negative for chest pain and dyspnea on exertion  Respiratory: Negative for cough and shortness of breath  Endocrine: Negative for polydipsia, polyphagia and polyuria     Skin: Negative for flushing and itching  Musculoskeletal: Negative for falls  Gastrointestinal: Positive for nausea and vomiting  Negative for anorexia and jaundice  Genitourinary: Negative for frequency  Neurological: Negative for dizziness  Psychiatric/Behavioral: Negative for depression and memory loss  The patient is not nervous/anxious  Vital Signs    /68 (BP Location: Left arm, Patient Position: Sitting, Cuff Size: Standard)   Pulse 92   Temp 97 6 °F (36 4 °C) (Temporal)   Resp 16   Ht 5' 5" (1 651 m)   Wt 78 4 kg (172 lb 13 5 oz)   LMP  (LMP Unknown)   SpO2 98%   BMI 28 76 kg/m²     Physical Exam and Objective Data  Physical Exam  Constitutional:       General: She is not in acute distress  Appearance: She is ill-appearing  She is not toxic-appearing or diaphoretic  Comments: frail   HENT:      Head: Normocephalic and atraumatic  Right Ear: External ear normal       Left Ear: External ear normal       Mouth/Throat:      Comments: Mask not removed today  Eyes:      General:         Right eye: No discharge  Left eye: No discharge  Conjunctiva/sclera: Conjunctivae normal       Pupils: Pupils are equal, round, and reactive to light  Neck:      Trachea: No tracheal deviation  Cardiovascular:      Rate and Rhythm: Regular rhythm  Tachycardia present  Pulmonary:      Effort: Pulmonary effort is normal  No respiratory distress  Breath sounds: No stridor  Abdominal:      General: There is no distension  Palpations: Abdomen is soft  Comments: Scaphoid   Skin:     General: Skin is warm and dry  Findings: No erythema or rash             Radiology and Laboratory:  I personally reviewed and interpreted the following results: none new    30+ minutes was spent face to face with Luis Miguel Chapin and her family with greater than 50% of the time spent in counseling or coordination of care including: chart review; symptom pursuit; supportive listening; anticipatory guidance     Additional time was also spent in discussing coronavirus vaccine indications, availability, and logistical challenges  All of the patient's or agent's questions were answered during this discussion       This note was not shared with the patient due to privacy exception: note includes other individuals

## 2021-06-21 ENCOUNTER — HOSPITAL ENCOUNTER (EMERGENCY)
Facility: HOSPITAL | Age: 61
Discharge: HOME | End: 2021-06-21
Attending: EMERGENCY MEDICINE
Payer: COMMERCIAL

## 2021-06-21 ENCOUNTER — APPOINTMENT (EMERGENCY)
Dept: RADIOLOGY | Facility: HOSPITAL | Age: 61
End: 2021-06-21
Payer: COMMERCIAL

## 2021-06-21 ENCOUNTER — APPOINTMENT (EMERGENCY)
Dept: CT IMAGING | Facility: HOSPITAL | Age: 61
End: 2021-06-21
Payer: COMMERCIAL

## 2021-06-21 ENCOUNTER — TELEPHONE (OUTPATIENT)
Dept: HEMATOLOGY ONCOLOGY | Facility: CLINIC | Age: 61
End: 2021-06-21

## 2021-06-21 VITALS
RESPIRATION RATE: 16 BRPM | TEMPERATURE: 98.7 F | OXYGEN SATURATION: 97 % | HEART RATE: 74 BPM | SYSTOLIC BLOOD PRESSURE: 135 MMHG | DIASTOLIC BLOOD PRESSURE: 80 MMHG

## 2021-06-21 DIAGNOSIS — G89.29 CHRONIC BACK PAIN: ICD-10-CM

## 2021-06-21 DIAGNOSIS — E86.0 DEHYDRATION: ICD-10-CM

## 2021-06-21 DIAGNOSIS — R26.9 GAIT ABNORMALITY: ICD-10-CM

## 2021-06-21 DIAGNOSIS — C50.919 BREAST CANCER, STAGE 4 (HCC): Primary | ICD-10-CM

## 2021-06-21 DIAGNOSIS — M54.9 CHRONIC BACK PAIN: ICD-10-CM

## 2021-06-21 LAB
ALBUMIN SERPL BCP-MCNC: 3.1 G/DL (ref 3.5–5)
ALP SERPL-CCNC: 100 U/L (ref 46–116)
ALT SERPL W P-5'-P-CCNC: 47 U/L (ref 12–78)
ANION GAP SERPL CALCULATED.3IONS-SCNC: 7 MMOL/L (ref 4–13)
APTT PPP: 27 SECONDS (ref 23–37)
AST SERPL W P-5'-P-CCNC: 26 U/L (ref 5–45)
ATRIAL RATE: 67 BPM
BASOPHILS # BLD AUTO: 0.04 THOUSANDS/ΜL (ref 0–0.1)
BASOPHILS NFR BLD AUTO: 1 % (ref 0–1)
BILIRUB SERPL-MCNC: 0.21 MG/DL (ref 0.2–1)
BUN SERPL-MCNC: 9 MG/DL (ref 5–25)
CALCIUM ALBUM COR SERPL-MCNC: 9.5 MG/DL (ref 8.3–10.1)
CALCIUM SERPL-MCNC: 8.8 MG/DL (ref 8.3–10.1)
CHLORIDE SERPL-SCNC: 109 MMOL/L (ref 100–108)
CO2 SERPL-SCNC: 31 MMOL/L (ref 21–32)
CREAT SERPL-MCNC: 0.68 MG/DL (ref 0.6–1.3)
EOSINOPHIL # BLD AUTO: 0.06 THOUSAND/ΜL (ref 0–0.61)
EOSINOPHIL NFR BLD AUTO: 1 % (ref 0–6)
ERYTHROCYTE [DISTWIDTH] IN BLOOD BY AUTOMATED COUNT: 17 % (ref 11.6–15.1)
GFR SERPL CREATININE-BSD FRML MDRD: 97 ML/MIN/1.73SQ M
GLUCOSE SERPL-MCNC: 86 MG/DL (ref 65–140)
HCT VFR BLD AUTO: 34.5 % (ref 34.8–46.1)
HGB BLD-MCNC: 10.3 G/DL (ref 11.5–15.4)
IMM GRANULOCYTES # BLD AUTO: 0.1 THOUSAND/UL (ref 0–0.2)
IMM GRANULOCYTES NFR BLD AUTO: 1 % (ref 0–2)
INR PPP: 0.95 (ref 0.84–1.19)
LYMPHOCYTES # BLD AUTO: 1.57 THOUSANDS/ΜL (ref 0.6–4.47)
LYMPHOCYTES NFR BLD AUTO: 19 % (ref 14–44)
MAGNESIUM SERPL-MCNC: 2.3 MG/DL (ref 1.6–2.6)
MCH RBC QN AUTO: 29.5 PG (ref 26.8–34.3)
MCHC RBC AUTO-ENTMCNC: 29.9 G/DL (ref 31.4–37.4)
MCV RBC AUTO: 99 FL (ref 82–98)
MONOCYTES # BLD AUTO: 0.79 THOUSAND/ΜL (ref 0.17–1.22)
MONOCYTES NFR BLD AUTO: 9 % (ref 4–12)
NEUTROPHILS # BLD AUTO: 5.91 THOUSANDS/ΜL (ref 1.85–7.62)
NEUTS SEG NFR BLD AUTO: 69 % (ref 43–75)
NRBC BLD AUTO-RTO: 0 /100 WBCS
NT-PROBNP SERPL-MCNC: 160 PG/ML
P AXIS: 42 DEGREES
PLATELET # BLD AUTO: 362 THOUSANDS/UL (ref 149–390)
PMV BLD AUTO: 8.8 FL (ref 8.9–12.7)
POTASSIUM SERPL-SCNC: 4 MMOL/L (ref 3.5–5.3)
PR INTERVAL: 130 MS
PROT SERPL-MCNC: 7.1 G/DL (ref 6.4–8.2)
PROTHROMBIN TIME: 12.8 SECONDS (ref 11.6–14.5)
QRS AXIS: 48 DEGREES
QRSD INTERVAL: 82 MS
QT INTERVAL: 398 MS
QTC INTERVAL: 420 MS
RBC # BLD AUTO: 3.49 MILLION/UL (ref 3.81–5.12)
SODIUM SERPL-SCNC: 147 MMOL/L (ref 136–145)
T WAVE AXIS: 60 DEGREES
TROPONIN I SERPL-MCNC: <0.02 NG/ML
TSH SERPL DL<=0.05 MIU/L-ACNC: 1.34 UIU/ML (ref 0.36–3.74)
VENTRICULAR RATE: 67 BPM
WBC # BLD AUTO: 8.47 THOUSAND/UL (ref 4.31–10.16)

## 2021-06-21 PROCEDURE — 84443 ASSAY THYROID STIM HORMONE: CPT | Performed by: PHYSICIAN ASSISTANT

## 2021-06-21 PROCEDURE — 71045 X-RAY EXAM CHEST 1 VIEW: CPT

## 2021-06-21 PROCEDURE — 93005 ELECTROCARDIOGRAM TRACING: CPT

## 2021-06-21 PROCEDURE — 83735 ASSAY OF MAGNESIUM: CPT | Performed by: PHYSICIAN ASSISTANT

## 2021-06-21 PROCEDURE — 85730 THROMBOPLASTIN TIME PARTIAL: CPT | Performed by: PHYSICIAN ASSISTANT

## 2021-06-21 PROCEDURE — 96361 HYDRATE IV INFUSION ADD-ON: CPT

## 2021-06-21 PROCEDURE — 99285 EMERGENCY DEPT VISIT HI MDM: CPT | Performed by: PHYSICIAN ASSISTANT

## 2021-06-21 PROCEDURE — 93010 ELECTROCARDIOGRAM REPORT: CPT | Performed by: INTERNAL MEDICINE

## 2021-06-21 PROCEDURE — 36415 COLL VENOUS BLD VENIPUNCTURE: CPT | Performed by: PHYSICIAN ASSISTANT

## 2021-06-21 PROCEDURE — 80177 DRUG SCRN QUAN LEVETIRACETAM: CPT | Performed by: PHYSICIAN ASSISTANT

## 2021-06-21 PROCEDURE — 74177 CT ABD & PELVIS W/CONTRAST: CPT

## 2021-06-21 PROCEDURE — 85610 PROTHROMBIN TIME: CPT | Performed by: PHYSICIAN ASSISTANT

## 2021-06-21 PROCEDURE — 96360 HYDRATION IV INFUSION INIT: CPT

## 2021-06-21 PROCEDURE — 85025 COMPLETE CBC W/AUTO DIFF WBC: CPT | Performed by: PHYSICIAN ASSISTANT

## 2021-06-21 PROCEDURE — 83880 ASSAY OF NATRIURETIC PEPTIDE: CPT | Performed by: PHYSICIAN ASSISTANT

## 2021-06-21 PROCEDURE — 80053 COMPREHEN METABOLIC PANEL: CPT | Performed by: PHYSICIAN ASSISTANT

## 2021-06-21 PROCEDURE — 84484 ASSAY OF TROPONIN QUANT: CPT | Performed by: PHYSICIAN ASSISTANT

## 2021-06-21 PROCEDURE — 99285 EMERGENCY DEPT VISIT HI MDM: CPT

## 2021-06-21 RX ADMIN — SODIUM CHLORIDE 1000 ML: 0.9 INJECTION, SOLUTION INTRAVENOUS at 13:25

## 2021-06-21 RX ADMIN — IOHEXOL 100 ML: 350 INJECTION, SOLUTION INTRAVENOUS at 14:35

## 2021-06-21 NOTE — ED PROVIDER NOTES
History  Chief Complaint   Patient presents with    Black or Bloody Stool     Patient presents with black stool starting today  Per family this is new  Hx of colon cancer     Patient was sent to emergency room by her family  They noticed that her stool was dark today  Patient has no complaints other than some low back pain     She states that she has chronic low back pain  She denies any radicular symptoms of numbness, tingling, weakness  She has no difficulty moving her bowels or urinating  States that she is receiving chemotherapy for breast cancer that is extended to her bone  She states that she has stage IV cancer  She is a poor historian  She denies any chest or abdominal pain  She denies any shortness of breath  She states she is taking Eliquis for previous pulmonary embolism that she was recently diagnosed with  She has not noticed any excessive bruising or bleeding of her gums when she brushes her teeth  She has no blood in her stool that she has noticed past medical history includes dehydration, dizziness, dry skin edema, bilateral mastectomy, hyperglycemia, hypertension, hypokalemia, lymphedema, malignant cachexia, metastatic breast cancer  Patient has a 20 year smoking history  She denies any alcohol use  She occasionally smokes marijuana        History provided by:  Patient  Black or Bloody Stool  Quality:  Black and tarry  Amount:  Scant  Duration:  1 day  Timing:  Intermittent  Chronicity:  New  Context: defecation and hemorrhoids    Context: not anal fissures, not anal penetration, not constipation, not diarrhea, not foreign body, not rectal injury, not rectal pain and not spontaneously    Similar prior episodes: no    Relieved by:  Nothing  Worsened by:  Nothing  Ineffective treatments:  None tried  Associated symptoms: recent illness    Associated symptoms: no abdominal pain, no dizziness, no epistaxis, no fever, no hematemesis, no light-headedness, no loss of consciousness and no vomiting    Risk factors: no anticoagulant use, no hx of colorectal cancer, no hx of colorectal surgery, no hx of IBD, no liver disease, no NSAID use and no steroid use        Prior to Admission Medications   Prescriptions Last Dose Informant Patient Reported? Taking? HYDROmorphone (DILAUDID) 4 mg tablet  Self No No   Sig: Take 1 5 tablets (6 mg total) by mouth every 4 (four) hours as needed (cancer pain  )Max Daily Amount: 36 mg   Misc  Devices (QUAD CANE) MISC  Self No No   Sig: by Does not apply route daily   OLANZapine (ZyPREXA) 10 mg tablet  Self No No   Sig: Take 1 tablet (10 mg total) by mouth daily at bedtime Every night, to help sleep and appetite     albuterol (PROVENTIL HFA,VENTOLIN HFA) 90 mcg/act inhaler  Self No No   Sig: TAKE 2 PUFFS BY MOUTH EVERY 6 HOURS AS NEEDED FOR WHEEZING   apixaban (ELIQUIS) 5 mg  Self No No   Sig: Take 1 tablet (5 mg total) by mouth 2 (two) times a day   bisacodyl (DULCOLAX) 10 mg suppository  Self No No   Sig: Insert 1 suppository (10 mg total) into the rectum daily as needed for constipation   dexamethasone (DECADRON) 2 mg tablet  Self No No   Sig: Take 1 tablet (2 mg total) by mouth daily with breakfast To help appetite   furosemide (LASIX) 20 mg tablet   No No   Sig: Take 1 tablet (20 mg total) by mouth daily for 5 days   gabapentin (NEURONTIN) 400 mg capsule  Self No No   Sig: Take 1 capsule (400 mg total) by mouth 3 (three) times a day before meals To reduce nerve pain   levETIRAcetam (KEPPRA) 250 mg tablet  Self No No   Sig: TAKE 1 TABLET BY MOUTH TWICE A DAY   lubiprostone (AMITIZA) 24 mcg capsule  Self No No   Sig: Take 1 capsule (24 mcg total) by mouth 2 (two) times a day with meals   pantoprazole (PROTONIX) 40 mg tablet  Self No No   Sig: Take 1 tablet (40 mg total) by mouth daily Every morning, before coffee, to reduce stomach acid   polyethylene glycol (MIRALAX) 17 g packet  Self No No   Sig: Take 17 g by mouth daily   prochlorperazine (COMPAZINE) 5 mg tablet Self No No   Sig: Take 1 tablet (5 mg total) by mouth 3 (three) times a day before meals   senna-docusate sodium (SENOKOT S) 8 6-50 mg per tablet  Self No No   Sig: Take 2 tablets by mouth 2 (two) times a day   zolpidem (AMBIEN) 10 mg tablet  Self No No   Sig: Take 1 tablet (10 mg total) by mouth daily at bedtime as needed for sleep      Facility-Administered Medications: None       Past Medical History:   Diagnosis Date    Dehydration 6/11/2019    Dizziness 2/6/2018    Dry skin 2/6/2018    Edema 10/23/2019    H/O bilateral mastectomy 2/15/2016    Hyperglycemia 2/6/2018    Hypertension 2/15/2016    Hypokalemia 3/10/2020    Lymphedema 2/15/2016    Malignant cachexia ([facility] [address] ) 10/6/2016    Malignant neoplasm of right breast ([facility] [address] ) 2/15/2016    Metastatic breast cancer ([facility] [address] )        Past Surgical History:   Procedure Laterality Date    ENDOBRONCHIAL ULTRASOUND (EBUS) N/A 2/16/2016    Procedure: EBUS;  Surgeon: Demarco Singh MD;  [facility];  Service:     MASTECTOMY Bilateral     MASTECTOMY Bilateral     right arm edema    OOPHORECTOMY      MO BRONCHOSCOPY NEEDLE BX TRACHEA MAIN STEM&/BRON N/A 2/16/2016    Procedure: Katie Forbes;  Surgeon: Demarco Singh MD;  [facility];  Service: Thoracic    MO INSJ TUNNELED CTR VAD W/SUBQ PORT AGE 5 YR/> N/A 7/24/2018    Procedure: INSERTION VENOUS PORT ( PORT-A-CATH) IR;  Surgeon: Yolie Michael DO;  [facility];  Service: Interventional Radiology    MO STEREOTACTIC RADIOSURGERY, CRANIAL,SIMPLE,EA ADD  5/3/2017         MO STEREOTACTIC RADIOSURGERY, CRANIAL,SIMPLE,EA ADD  5/3/2017         MO STEREOTACTIC RADIOSURGERY, CRANIAL,SIMPLE,SINGLE  5/3/2017            Family History   Problem Relation Age of Onset    Cancer Sister     Prostate cancer Brother      I have reviewed and agree with the history as documented      E-Cigarette/Vaping    E-Cigarette Use Never User      E-Cigarette/Vaping Substances    Nicotine Yes     THC No  CBD No     Flavoring No     Other No     Unknown No      Social History     Tobacco Use    Smoking status: Current Every Day Smoker     Packs/day: 0 50     Years: 40 00     Pack years: 20 00     Types: Cigarettes     Start date: 1978    Smokeless tobacco: Never Used   Vaping Use    Vaping Use: Never used   Substance Use Topics    Alcohol use: Not Currently     Comment: once a week    Drug use: Not Currently     Types: Marijuana       Review of Systems   Constitutional: Positive for activity change and fatigue  Negative for appetite change, chills and fever  HENT: Negative for congestion, dental problem, ear discharge, ear pain, mouth sores, nosebleeds, postnasal drip, rhinorrhea and sore throat  Eyes: Negative for pain, discharge, redness and itching  Respiratory: Negative for cough, chest tightness and shortness of breath  Cardiovascular: Negative for chest pain and palpitations  Gastrointestinal: Positive for blood in stool  Negative for abdominal pain, diarrhea, hematemesis, nausea and vomiting  Genitourinary: Negative for dysuria, frequency, hematuria and urgency  Musculoskeletal: Positive for back pain  Skin: Negative for color change, pallor and rash  Neurological: Negative for dizziness, loss of consciousness and light-headedness  Psychiatric/Behavioral: Negative for confusion  All other systems reviewed and are negative  Physical Exam  Physical Exam  Vitals and nursing note reviewed  Constitutional:       General: She is not in acute distress  Appearance: Normal appearance  She is normal weight  She is not ill-appearing or diaphoretic  HENT:      Head: Normocephalic  Right Ear: External ear normal       Left Ear: External ear normal    Eyes:      General:         Right eye: No discharge  Left eye: No discharge  Conjunctiva/sclera: Conjunctivae normal    Cardiovascular:      Rate and Rhythm: Normal rate and regular rhythm        Heart sounds: No murmur heard  No gallop  Pulmonary:      Effort: Pulmonary effort is normal       Breath sounds: Normal breath sounds  Abdominal:      General: There is distension  Tenderness: There is no guarding or rebound  Comments: Generalized tenderness   Genitourinary:     Rectum: Normal  Guaiac result negative  Comments: No mass palpated  Patient is very  dyspneic just trying to take her pants off while laying down and lifting her buttocks  Musculoskeletal:         General: No swelling  Cervical back: Neck supple  Right lower leg: No edema  Left lower leg: No edema  Comments: Examination of her lower back-it is atraumatic upon inspection  There is some tenderness palpated over the lumbosacral junction  Reflexes are +1 and symmetrical and there is no weakness of the extensor hallucis longus bilaterally  Skin:     General: Skin is warm  Capillary Refill: Capillary refill takes less than 2 seconds  Neurological:      General: No focal deficit present  Mental Status: She is alert and oriented to person, place, and time  Psychiatric:         Mood and Affect: Mood normal          Behavior: Behavior normal          Thought Content:  Thought content normal          Judgment: Judgment normal          Vital Signs  ED Triage Vitals   Temperature Pulse Respirations Blood Pressure SpO2   06/21/21 1244 06/21/21 1245 06/21/21 1244 06/21/21 1245 06/21/21 1245   98 7 °F (37 1 °C) 70 18 143/82 98 %      Temp Source Heart Rate Source Patient Position - Orthostatic VS BP Location FiO2 (%)   06/21/21 1244 06/21/21 1245 06/21/21 1245 06/21/21 1245 --   Oral Monitor Lying Left arm       Pain Score       --                  Vitals:    06/21/21 1245 06/21/21 1400 06/21/21 1528 06/21/21 1530   BP: 143/82 146/71 135/80 135/80   Pulse: 70 70 77 74   Patient Position - Orthostatic VS: Lying Lying Lying Lying         Visual Acuity      ED Medications  Medications   sodium chloride 0 9 % bolus 1,000 mL (0 mL Intravenous Stopped 6/21/21 1634)   iohexol (OMNIPAQUE) 350 MG/ML injection (SINGLE-DOSE) 100 mL (100 mL Intravenous Given 6/21/21 1435)       Diagnostic Studies  Results Reviewed     Procedure Component Value Units Date/Time    TSH [[id number]]  (Normal) Collected: 06/21/21 1324    Lab Status: Final result Specimen: Blood from Arm, Left Updated: 06/21/21 1410     TSH 3RD GENERATON 1 345 uIU/mL     Narrative:      Patients undergoing fluorescein dye angiography may retain small amounts of fluorescein in the body for 48-72 hours post procedure  Samples containing fluorescein can produce falsely depressed TSH values  If the patient had this procedure,a specimen should be resubmitted post fluorescein clearance        Comprehensive metabolic panel [[id number]]  (Abnormal) Collected: 06/21/21 1324    Lab Status: Final result Specimen: Blood from Arm, Left Updated: 06/21/21 1410     Sodium 147 mmol/L      Potassium 4 0 mmol/L      Chloride 109 mmol/L      CO2 31 mmol/L      ANION GAP 7 mmol/L      BUN 9 mg/dL      Creatinine 0 68 mg/dL      Glucose 86 mg/dL      Calcium 8 8 mg/dL      Corrected Calcium 9 5 mg/dL      AST 26 U/L      ALT 47 U/L      Alkaline Phosphatase 100 U/L      Total Protein 7 1 g/dL      Albumin 3 1 g/dL      Total Bilirubin 0 21 mg/dL      eGFR 97 ml/min/1 73sq m     Narrative:      [facility] guidelines for Chronic Kidney Disease (CKD):     Stage 1 with normal or high GFR (GFR > 90 mL/min/1 73 square meters)    Stage 2 Mild CKD (GFR = 60-89 mL/min/1 73 square meters)    Stage 3A Moderate CKD (GFR = 45-59 mL/min/1 73 square meters)    Stage 3B Moderate CKD (GFR = 30-44 mL/min/1 73 square meters)    Stage 4 Severe CKD (GFR = 15-29 mL/min/1 73 square meters)    Stage 5 End Stage CKD (GFR <15 mL/min/1 73 square meters)  Note: GFR calculation is accurate only with a steady state creatinine    Magnesium [[id number]]  (Normal) Collected: 06/21/21 1324    Lab Status: Final result Specimen: Blood from Arm, Left Updated: 06/21/21 1410     Magnesium 2 3 mg/dL     NT-BNP PRO [[id number]]  (Abnormal) Collected: 06/21/21 1324    Lab Status: Final result Specimen: Blood from Arm, Left Updated: 06/21/21 1410     NT-proBNP 160 pg/mL     Troponin I [[id number]]  (Normal) Collected: 06/21/21 1324    Lab Status: Final result Specimen: Blood from Arm, Left Updated: 06/21/21 1400     Troponin I <0 02 ng/mL     Protime-INR [[id number]]  (Normal) Collected: 06/21/21 1324    Lab Status: Final result Specimen: Blood from Arm, Left Updated: 06/21/21 1352     Protime 12 8 seconds      INR 0 95    APTT [[id number]]  (Normal) Collected: 06/21/21 1324    Lab Status: Final result Specimen: Blood from Arm, Left Updated: 06/21/21 1352     PTT 27 seconds     CBC and differential [[id number]]  (Abnormal) Collected: 06/21/21 1324    Lab Status: Final result Specimen: Blood from Arm, Left Updated: 06/21/21 1341     WBC 8 47 Thousand/uL      RBC 3 49 Million/uL      Hemoglobin 10 3 g/dL      Hematocrit 34 5 %      MCV 99 fL      MCH 29 5 pg      MCHC 29 9 g/dL      RDW 17 0 %      MPV 8 8 fL      Platelets 454 Thousands/uL      nRBC 0 /100 WBCs      Neutrophils Relative 69 %      Immat GRANS % 1 %      Lymphocytes Relative 19 %      Monocytes Relative 9 %      Eosinophils Relative 1 %      Basophils Relative 1 %      Neutrophils Absolute 5 91 Thousands/µL      Immature Grans Absolute 0 10 Thousand/uL      Lymphocytes Absolute 1 57 Thousands/µL      Monocytes Absolute 0 79 Thousand/µL      Eosinophils Absolute 0 06 Thousand/µL      Basophils Absolute 0 04 Thousands/µL     Levetiracetam level [[id number]] Collected: 06/21/21 1324    Lab Status: In process Specimen: Blood from Arm, Left Updated: 06/21/21 1335                 CT abdomen pelvis with contrast   ED Interpretation by Jaleesa Mullins PA-C (06/21 1756)   No definitive CT findings to explain the patient's melena        Final Result by Madonna Aleman, MD (06/21 1527)      No definite CT finding to explain the patient's melena  Workstation performed: [id number]         XR chest 1 view portable   ED Interpretation by Alen Potter PA-C (06/21 1400)   Poor inspiration, port, no acute cardiopulmonary disease      Final Result by Jovan Dang MD (06/21 1603)      Low level of inspiration is noted      Cardiomegaly with new pulmonary vascular congestion and interstitial prominence despite hypoventilation  The study was marked in [facility] for immediate notification  Workstation performed: [id number]                    Procedures  ECG 12 Lead Documentation Only    Date/Time: 6/21/2021 2:57 PM  Performed by: Alen Potter PA-C  Authorized by: Alen Potter PA-C     Indications / Diagnosis:  Abd pain  ECG reviewed by me, the ED Provider: yes    Patient location:  ED  Previous ECG:     Previous ECG:  Compared to current    Comparison ECG info:  67    Similarity:  No change    Comparison to cardiac monitor: Yes    Interpretation:     Interpretation: normal    Rate:     ECG rate:  67    ECG rate assessment: normal    Rhythm:     Rhythm: sinus rhythm    Ectopy:     Ectopy: none    QRS:     QRS axis:  Normal    QRS intervals:  Normal  Conduction:     Conduction: normal    ST segments:     ST segments:  Normal  T waves:     T waves: non-specific    Comments:      No signs of acute ischemia    Independently interpreted by me             ED Course  ED Course as of Jun 21 2040   Mon Jun 21, 2021   1557 I spoke to the patient and her   There was no evidence of melena exam   A CT scan abdomen and pelvis was negative  Her laboratory tests are within limits  Her sodium was mildly elevated at 147  She was intravenously hydrated with a L of saline solution  Patient was complaining of some low back pain but has a history of stage IV cancer with metastasis    There is a sclerotic lesion noted on the CT scan over T12 but no other abnormalities  Patient was dyspneic with lifting her bottom to get her pain stone  She states she has been short of breath for long period of time that is not a new finding  Will give the patient a trial of ambulation  If she is able to ambulate we will send her home  If she is unable to ambulate consider admission for strengthening  [phone] Patient ambulated independently with a walker  She would like to be discharged home  She said that she has arrangements for rehab to start at home with they were unable to come because a down power line but are going to call her to reschedule the appointments for physical therapy and occupational therapy  She does not want to be hospitalized at this time  She will increase her oral hydration at home  She will follow up with her primary care provider as scheduled  Should she have any further problems, she will return to the emergency room for repeat exam                                SBIRT 20yo+      Most Recent Value   SBIRT (22 yo +)   In order to provide better care to our patients, we are screening all of our patients for alcohol and drug use  Would it be okay to ask you these screening questions? Unable to answer at this time Filed at: 06/21/2021 1245                    [facility]  Number of Diagnoses or Management Options  Breast cancer, stage 4 ([facility] [address] ): established and worsening  Chronic back pain: established and worsening  Dehydration: new and requires workup  Gait abnormality: established and worsening     Amount and/or Complexity of Data Reviewed  Clinical lab tests: ordered and reviewed  Tests in the radiology section of CPT®: ordered and reviewed  Tests in the medicine section of CPT®: ordered and reviewed    Risk of Complications, Morbidity, and/or Mortality  Presenting problems: high  Diagnostic procedures: high  Management options: high  General comments: Patient presents emergency room by squad with complaints of melena    Her  noticed that her stools were dark black this morning  She was having difficulty getting out of bed  She has a history of stage IV breast cancer with metastasis to the brain and bone  Upon presentation to the emergency room she was complaining of the fact that she had some low back pain which was causing difficulty getting out of bed this morning  She had no radicular symptoms  She was unaware of the black stool herself  She was seen and evaluated upon presentation  Laboratory studies were taken and were reviewed  A CT scan of the abdomen and pelvis were negative for any acute process  There is some sclerosis of T12 noted that was present previously on a previous CT scan  Patient was given a trial of ambulation and ambulated well with a walker  She requested to be discharged home  She does have orders to have PT and OT started at home for it for strengthening and rehab  She and her  will call and make sure that that is to be started  She initially had to have postponed because of a down power line  She was given reasons to return to the emergency room should her symptoms worsen      Patient Progress  Patient progress: stable      Disposition  Final diagnoses:   Breast cancer, stage 4 ([facility] )   Dehydration   Chronic back pain   Gait abnormality     Time reflects when diagnosis was documented in both MDM as applicable and the Disposition within this note     Time User Action Codes Description Comment    6/21/2021  4:37 PM Bre Fend Add [C50 919] Breast cancer, stage 4 ([facility] [address] )     6/21/2021  4:37 PM Sofia Saldivar [E86 0] Dehydration     6/21/2021  4:38 PM Bre Fend Add [M54 9,  G89 29] Chronic back pain     6/21/2021  4:38 PM Bre Fend Add [R26 9] Neurologic gait dysfunction     6/21/2021  4:38 PM Bre Fend Remove [R26 9] Neurologic gait dysfunction     6/21/2021  4:38 PM Bre Fend Add [R26 9] Gait abnormality       ED Disposition     ED Disposition Condition Date/Time Comment    Discharge Stable Mon Jun 21, 2021  4:36 PM Marichuy Borrero discharge to home/self care  Follow-up Information     Follow up With Specialties Details Why Contact Info    Dave Gonsalez MD Internal Medicine Schedule an appointment as soon as possible for a visit  As needed [facility]  [address]  [phone]            Discharge Medication List as of 6/21/2021  4:39 PM      CONTINUE these medications which have NOT CHANGED    Details   albuterol (PROVENTIL HFA,VENTOLIN HFA) 90 mcg/act inhaler TAKE 2 PUFFS BY MOUTH EVERY 6 HOURS AS NEEDED FOR WHEEZING, Normal      apixaban (ELIQUIS) 5 mg Take 1 tablet (5 mg total) by mouth 2 (two) times a day, Starting Wed 5/19/2021, Until Fri 6/18/2021, Normal      bisacodyl (DULCOLAX) 10 mg suppository Insert 1 suppository (10 mg total) into the rectum daily as needed for constipation, Starting Wed 5/19/2021, Normal      dexamethasone (DECADRON) 2 mg tablet Take 1 tablet (2 mg total) by mouth daily with breakfast To help appetite, Starting Tue 5/4/2021, Normal      furosemide (LASIX) 20 mg tablet Take 1 tablet (20 mg total) by mouth daily for 5 days, Starting Fri 6/11/2021, Until Wed 6/16/2021, Normal      gabapentin (NEURONTIN) 400 mg capsule Take 1 capsule (400 mg total) by mouth 3 (three) times a day before meals To reduce nerve pain, Starting Tue 5/4/2021, Normal      HYDROmorphone (DILAUDID) 4 mg tablet Take 1 5 tablets (6 mg total) by mouth every 4 (four) hours as needed (cancer pain  )Max Daily Amount: 36 mg, Starting Thu 6/3/2021, Print      levETIRAcetam (KEPPRA) 250 mg tablet TAKE 1 TABLET BY MOUTH TWICE A DAY, Normal      lubiprostone (AMITIZA) 24 mcg capsule Take 1 capsule (24 mcg total) by mouth 2 (two) times a day with meals, Starting Wed 5/19/2021, Until Fri 6/18/2021, Normal      Misc   Devices (QUAD CANE) MISC by Does not apply route daily, Starting Thu 12/12/2019, Print      OLANZapine (ZyPREXA) 10 mg tablet Take 1 tablet (10 mg total) by mouth daily at bedtime Every night, to help sleep and appetite , Starting Tue 5/4/2021, Normal      pantoprazole (PROTONIX) 40 mg tablet Take 1 tablet (40 mg total) by mouth daily Every morning, before coffee, to reduce stomach acid, Starting Tue 5/4/2021, Normal      polyethylene glycol (MIRALAX) 17 g packet Take 17 g by mouth daily, Starting Tue 5/25/2021, No Print      prochlorperazine (COMPAZINE) 5 mg tablet Take 1 tablet (5 mg total) by mouth 3 (three) times a day before meals, Starting Wed 5/19/2021, Until Fri 6/18/2021, Normal      senna-docusate sodium (SENOKOT S) 8 6-50 mg per tablet Take 2 tablets by mouth 2 (two) times a day, Starting Wed 5/19/2021, Until Fri 6/18/2021, Normal      zolpidem (AMBIEN) 10 mg tablet Take 1 tablet (10 mg total) by mouth daily at bedtime as needed for sleep, Starting Tue 5/25/2021, Print           No discharge procedures on file      PDMP Review       Value Time User    PDMP Reviewed  Yes 6/3/2021 11:33 AM Partha Greene MD          ED Provider  Electronically Signed by           Nataliya Baig PA-C  06/21/21 2041

## 2021-06-21 NOTE — DISCHARGE INSTRUCTIONS
Ambulate with a walker     :  Arrange for your rehab to start at home  Follow-up with Dr Ailyn Phelan as needed  Return to the emergency room if his symptoms worsen

## 2021-06-21 NOTE — TELEPHONE ENCOUNTER
Pt's daughter Marylu Veronica called to inform the office that she is on her way to the ED an [facility] due to trouble walking, back pain and sob, call back # [phone]

## 2021-06-21 NOTE — ED NOTES
Pt ambulated from stretcher to nurses station with walker, gait slightly unsteady per  this is pt's normal  Pt's  concerned about pt's ability to get out of bed at home   Provider aware     Sunday Dobbins RN  06/21/21 008

## 2021-06-23 ENCOUNTER — OFFICE VISIT (OUTPATIENT)
Dept: PALLIATIVE MEDICINE | Facility: CLINIC | Age: 61
End: 2021-06-23
Payer: COMMERCIAL

## 2021-06-23 VITALS
HEIGHT: 65 IN | RESPIRATION RATE: 16 BRPM | DIASTOLIC BLOOD PRESSURE: 72 MMHG | OXYGEN SATURATION: 95 % | WEIGHT: 186.95 LBS | TEMPERATURE: 97.5 F | BODY MASS INDEX: 31.15 KG/M2 | HEART RATE: 83 BPM | SYSTOLIC BLOOD PRESSURE: 106 MMHG

## 2021-06-23 DIAGNOSIS — R60.0 LOWER EXTREMITY EDEMA: ICD-10-CM

## 2021-06-23 DIAGNOSIS — F51.01 PRIMARY INSOMNIA: ICD-10-CM

## 2021-06-23 DIAGNOSIS — R11.2 CINV (CHEMOTHERAPY-INDUCED NAUSEA AND VOMITING): Chronic | ICD-10-CM

## 2021-06-23 DIAGNOSIS — G89.3 CANCER-RELATED PAIN: ICD-10-CM

## 2021-06-23 DIAGNOSIS — R56.9 FOCAL SEIZURE (HCC): ICD-10-CM

## 2021-06-23 DIAGNOSIS — C79.31 BRAIN METASTASES (HCC): ICD-10-CM

## 2021-06-23 DIAGNOSIS — Z51.5 PALLIATIVE CARE PATIENT: ICD-10-CM

## 2021-06-23 DIAGNOSIS — T45.1X5A CINV (CHEMOTHERAPY-INDUCED NAUSEA AND VOMITING): Chronic | ICD-10-CM

## 2021-06-23 PROCEDURE — 99214 OFFICE O/P EST MOD 30 MIN: CPT | Performed by: FAMILY MEDICINE

## 2021-06-23 RX ORDER — PROCHLORPERAZINE MALEATE 5 MG/1
5 TABLET ORAL
Qty: 90 TABLET | Refills: 0 | Status: SHIPPED | OUTPATIENT
Start: 2021-06-23 | End: 2021-07-23

## 2021-06-23 RX ORDER — HYDROMORPHONE HYDROCHLORIDE 4 MG/1
4 TABLET ORAL 4 TIMES DAILY PRN
Qty: 120 TABLET | Refills: 0 | Status: SHIPPED | OUTPATIENT
Start: 2021-06-23 | End: 2021-08-13 | Stop reason: HOSPADM

## 2021-06-23 RX ORDER — OLANZAPINE 10 MG/1
10 TABLET ORAL
Qty: 30 TABLET | Refills: 0 | Status: SHIPPED | OUTPATIENT
Start: 2021-06-23

## 2021-06-23 RX ORDER — ZOLPIDEM TARTRATE 10 MG/1
10 TABLET ORAL
Qty: 30 TABLET | Refills: 0 | Status: SHIPPED | OUTPATIENT
Start: 2021-06-23 | End: 2021-08-27 | Stop reason: SDUPTHER

## 2021-06-23 RX ORDER — LEVETIRACETAM 250 MG/1
250 TABLET ORAL 2 TIMES DAILY
Qty: 60 TABLET | Refills: 0 | Status: ON HOLD | OUTPATIENT
Start: 2021-06-23 | End: 2021-07-06

## 2021-06-23 RX ORDER — GABAPENTIN 400 MG/1
400 CAPSULE ORAL
Qty: 90 CAPSULE | Refills: 0 | Status: SHIPPED | OUTPATIENT
Start: 2021-06-23 | End: 2021-08-27 | Stop reason: SDUPTHER

## 2021-06-23 RX ORDER — FUROSEMIDE 20 MG/1
20 TABLET ORAL DAILY
Qty: 30 TABLET | Refills: 0 | Status: SHIPPED | OUTPATIENT
Start: 2021-06-23 | End: 2021-07-20

## 2021-06-23 NOTE — PROGRESS NOTES
Outpatient Follow-Up - Palliative and Supportive Care   Caden Hernandez 64 y o  female [id number]    Assessment & Plan  1  Lower extremity edema    2  Palliative care patient    3  CINV (chemotherapy-induced nausea and vomiting)    4  Cancer-related pain    5  Brain metastases ([address] )    6  Focal seizure ([facility] [address] )    7  Primary insomnia      - Counseling on health screening and disease prevention, COVID-19 specific (CPT V65 49)    Medications adjusted this encounter:  Requested Prescriptions     Signed Prescriptions Disp Refills    furosemide (LASIX) 20 mg tablet 30 tablet 0     Sig: Take 1 tablet (20 mg total) by mouth daily    gabapentin (NEURONTIN) 400 mg capsule 90 capsule 0     Sig: Take 1 capsule (400 mg total) by mouth 3 (three) times a day before meals To reduce nerve pain    HYDROmorphone (DILAUDID) 4 mg tablet 120 tablet 0     Sig: Take 1 tablet (4 mg total) by mouth 4 (four) times a day as needed (cancer pain)Max Daily Amount: 16 mg    levETIRAcetam (KEPPRA) 250 mg tablet 60 tablet 0     Sig: Take 1 tablet (250 mg total) by mouth 2 (two) times a day    OLANZapine (ZyPREXA) 10 mg tablet 30 tablet 0     Sig: Take 1 tablet (10 mg total) by mouth daily at bedtime Every night, to help sleep and appetite   prochlorperazine (COMPAZINE) 5 mg tablet 90 tablet 0     Sig: Take 1 tablet (5 mg total) by mouth 3 (three) times a day before meals    zolpidem (AMBIEN) 10 mg tablet 30 tablet 0     Sig: Take 1 tablet (10 mg total) by mouth daily at bedtime as needed for sleep     No orders of the defined types were placed in this encounter      Medications Discontinued During This Encounter   Medication Reason    levETIRAcetam (KEPPRA) 250 mg tablet Reorder    OLANZapine (ZyPREXA) 10 mg tablet Reorder    gabapentin (NEURONTIN) 400 mg capsule Reorder    prochlorperazine (COMPAZINE) 5 mg tablet Reorder    zolpidem (AMBIEN) 10 mg tablet Reorder    HYDROmorphone (DILAUDID) 4 mg tablet Reorder    furosemide (LASIX) 20 mg tablet Reorder   - Meds reconciled  + Pt advised to take furosemide daily    - Pt advised to stop meloxicam d/t renal fragility on diuretics  - will seek partnership with pt's PCP re: ongoing medical management and education about heart failure  - Pt is supposed to have first visit with home health nurse today  Kasie Horn was seen today for symptoms and planning cares related to above illnesses  I have reviewed the patient's controlled substance dispensing history in the Prescription Drug Monitoring Program in compliance with the [facility] regulations before prescribing any controlled substances  They are invited to continue to follow with us  If there are questions or concerns, please contact us through our clinic/answering service 24 hours a day, seven days a week  Frankie Dale MD  [facility]  [phone]      Visit Information   Accompanied By: No one    Source of History: Self    History Limitations: None    Contacts:  Jose Maria Olvera - [phone]  History of Present Illness      Kasie Horn is a 64 y o  female who presents in follow up of symptoms related to Stage IV breast cancer  Pertinent issues include: symptom management, pain, neoplasm related      Since last visit, only 20 days ago, she has been seen in ED twice for issues moslty related to inadequate teaching and med mangement in home  She reveals that she has long since discharge run out of furosemide, and this has not been restarted by any provider she has seen since  Wt curves today show that she has gained 10-15lbs since last visit  Today, we reviewed the following:   + She should be considered to have heart failure; she will need to take furosemide daily, perhaps BID, should she show ongoing water weight gain  Specifics on these orders will be deferred to PCP    + reduced previous dosing of hydromorphone to 4mg PO q4hrs PRN    Now that pt is home she is less in need of scheduled doses    + Pt brought in meloxicam, which will not be refilled, and which should be substituted for steroids anyway  This med was confiscated for destruction  She is feeling symptomatic with heart failure: tiredness and exhaustion and dyspnea with increase work of breathing with any activity  Has even noted some occasions of inability at all to rise from bed or chair         Historically, her right arm pain and lymphedema have been challenging to control   She has experienced consistent side effects from long acting opioids leading to sedation and confusion   Long acting opioids have also never provided her pain relief - forms tried have included methadone, MS Contin, Fentanyl patches   Steroids have not been helpful to reduce inflammation and improve pain, either    She continues to smoke 1/2 ppd, which is less than before   She had expressed interest and motivation in quitting  Shira Duran daughter has also promised to quit with her     Metastatic breast cancer with brain metastasis originally diagnosed in 2010  She had a bilateral mastectomy and has undergone multiple chemotherapy regimens and has completed WBRT in March 2019  She follows with Dr Cabrera Coley and is currently undergoing treatment with Kadcy[facility] with premeds of Dexamethasone, Emend, Aloxi  She struggles with persistent nausea      Past medical, surgical, social, and family histories are reviewed and pertinent updates are made  Review of Systems   Constitutional: Positive for decreased appetite, malaise/fatigue and weight gain  Negative for weight loss  HENT: Negative for hoarse voice and nosebleeds  Eyes: Negative for vision loss in left eye and vision loss in right eye  Cardiovascular: Positive for dyspnea on exertion and leg swelling  Negative for chest pain  Respiratory: Negative for cough and shortness of breath  Endocrine: Negative for polydipsia, polyphagia and polyuria  Skin: Negative for flushing and itching     Musculoskeletal: Negative for falls  Gastrointestinal: Positive for nausea  Negative for anorexia, jaundice and vomiting  Genitourinary: Negative for frequency  Neurological: Positive for headaches and light-headedness  Negative for dizziness  Psychiatric/Behavioral: Negative for depression and memory loss  The patient is not nervous/anxious  Vital Signs    /72 (BP Location: Left arm, Patient Position: Sitting, Cuff Size: Standard)   Pulse 83   Temp 97 5 °F (36 4 °C) (Temporal)   Resp 16   Ht 5' 5" (1 651 m)   Wt 84 8 kg (186 lb 15 2 oz)   LMP  (LMP Unknown)   SpO2 95%   BMI 31 11 kg/m²     Physical Exam and Objective Data  Physical Exam  Constitutional:       General: She is not in acute distress  Appearance: She is ill-appearing  She is not toxic-appearing or diaphoretic  Comments: Frail, disheveled   HENT:      Head: Normocephalic and atraumatic  Right Ear: External ear normal       Left Ear: External ear normal       Mouth/Throat:      Comments: Mask not removed today  Eyes:      General:         Right eye: No discharge  Left eye: No discharge  Conjunctiva/sclera: Conjunctivae normal       Pupils: Pupils are equal, round, and reactive to light  Neck:      Trachea: No tracheal deviation  Cardiovascular:      Rate and Rhythm: Regular rhythm  Tachycardia present  Pulmonary:      Effort: Pulmonary effort is normal  No respiratory distress  Breath sounds: No stridor  Abdominal:      General: There is no distension  Palpations: Abdomen is soft  Comments: Scaphoid   Skin:     General: Skin is warm and dry  Findings: No erythema or rash  Neurological:      General: No focal deficit present  Mental Status: She is alert  Mental status is at baseline  She is disoriented  Cranial Nerves: No cranial nerve deficit     Psychiatric:      Comments: Judgment and insight poor           Radiology and Laboratory:  I personally reviewed and interpreted the following results: none new    30+ minutes was spent face to face with Evelina Gray and her family with greater than 50% of the time spent in counseling or coordination of care including: chart review; symptom pursuit; supportive listening; anticipatory guidance     Additional time was also spent in discussing coronavirus vaccine indications, availability, and logistical challenges  All of the patient's or agent's questions were answered during this discussion

## 2021-06-24 ENCOUNTER — TELEPHONE (OUTPATIENT)
Dept: HEMATOLOGY ONCOLOGY | Facility: CLINIC | Age: 61
End: 2021-06-24

## 2021-06-24 LAB — LEVETIRACETAM SERPL-MCNC: 5.3 UG/ML (ref 10–40)

## 2021-06-25 NOTE — RESULT ENCOUNTER NOTE
Patient was notified of her [address] level  She will follow up with Dr Perla Ulrich for further instructions

## 2021-06-25 NOTE — TELEPHONE ENCOUNTER
[facility] pharmacy called spoke to pharmacist regarding need for prior authorization for Hydromorphone    According to pharmacist script went through insurance  patient picked up medication which had a co pay which she paid     PDMP does not show recent fill     Patient called no answer to phone

## 2021-06-29 ENCOUNTER — HOSPITAL ENCOUNTER (OUTPATIENT)
Dept: INFUSION CENTER | Facility: CLINIC | Age: 61
Discharge: HOME | End: 2021-06-29
Payer: COMMERCIAL

## 2021-06-29 DIAGNOSIS — C50.911 BILATERAL MALIGNANT NEOPLASM OF BREAST IN FEMALE, ESTROGEN RECEPTOR POSITIVE, UNSPECIFIED SITE OF BREAST (HCC): ICD-10-CM

## 2021-06-29 DIAGNOSIS — C79.51 BONE METASTASES (HCC): ICD-10-CM

## 2021-06-29 DIAGNOSIS — C50.912 BILATERAL MALIGNANT NEOPLASM OF BREAST IN FEMALE, ESTROGEN RECEPTOR POSITIVE, UNSPECIFIED SITE OF BREAST (HCC): ICD-10-CM

## 2021-06-29 DIAGNOSIS — I26.99 PULMONARY EMBOLISM, UNSPECIFIED CHRONICITY, UNSPECIFIED PULMONARY EMBOLISM TYPE, UNSPECIFIED WHETHER ACUTE COR PULMONALE PRESENT (HCC): ICD-10-CM

## 2021-06-29 DIAGNOSIS — E86.0 DEHYDRATION: Primary | ICD-10-CM

## 2021-06-29 DIAGNOSIS — C79.31 BRAIN METASTASES (HCC): ICD-10-CM

## 2021-06-29 DIAGNOSIS — Z17.0 BILATERAL MALIGNANT NEOPLASM OF BREAST IN FEMALE, ESTROGEN RECEPTOR POSITIVE, UNSPECIFIED SITE OF BREAST (HCC): ICD-10-CM

## 2021-06-29 LAB
ALBUMIN SERPL BCP-MCNC: 3 G/DL (ref 3.5–5)
ALP SERPL-CCNC: 102 U/L (ref 46–116)
ALT SERPL W P-5'-P-CCNC: 36 U/L (ref 12–78)
ANION GAP SERPL CALCULATED.3IONS-SCNC: 8 MMOL/L (ref 4–13)
ANISOCYTOSIS BLD QL SMEAR: PRESENT
AST SERPL W P-5'-P-CCNC: 23 U/L (ref 5–45)
BASOPHILS # BLD MANUAL: 0.09 THOUSAND/UL (ref 0–0.1)
BASOPHILS NFR MAR MANUAL: 1 % (ref 0–1)
BILIRUB SERPL-MCNC: 0.17 MG/DL (ref 0.2–1)
BUN SERPL-MCNC: 9 MG/DL (ref 5–25)
CALCIUM ALBUM COR SERPL-MCNC: 10 MG/DL (ref 8.3–10.1)
CALCIUM SERPL-MCNC: 9.2 MG/DL (ref 8.3–10.1)
CANCER AG27-29 SERPL-ACNC: 65.7 U/ML (ref 0–42.3)
CHLORIDE SERPL-SCNC: 105 MMOL/L (ref 100–108)
CO2 SERPL-SCNC: 31 MMOL/L (ref 21–32)
CREAT SERPL-MCNC: 0.74 MG/DL (ref 0.6–1.3)
EOSINOPHIL # BLD MANUAL: 0.17 THOUSAND/UL (ref 0–0.4)
EOSINOPHIL NFR BLD MANUAL: 2 % (ref 0–6)
ERYTHROCYTE [DISTWIDTH] IN BLOOD BY AUTOMATED COUNT: 16.5 % (ref 11.6–15.1)
GFR SERPL CREATININE-BSD FRML MDRD: 90 ML/MIN/1.73SQ M
GLUCOSE SERPL-MCNC: 104 MG/DL (ref 65–140)
HCT VFR BLD AUTO: 34.7 % (ref 34.8–46.1)
HGB BLD-MCNC: 10.6 G/DL (ref 11.5–15.4)
HYPERCHROMIA BLD QL SMEAR: PRESENT
LYMPHOCYTES # BLD AUTO: 2.08 THOUSAND/UL (ref 0.6–4.47)
LYMPHOCYTES # BLD AUTO: 24 % (ref 14–44)
MCH RBC QN AUTO: 29.6 PG (ref 26.8–34.3)
MCHC RBC AUTO-ENTMCNC: 30.5 G/DL (ref 31.4–37.4)
MCV RBC AUTO: 97 FL (ref 82–98)
MONOCYTES # BLD AUTO: 0.09 THOUSAND/UL (ref 0–1.22)
MONOCYTES NFR BLD: 1 % (ref 4–12)
NEUTROPHILS # BLD MANUAL: 6.23 THOUSAND/UL (ref 1.85–7.62)
NEUTS BAND NFR BLD MANUAL: 1 % (ref 0–8)
NEUTS SEG NFR BLD AUTO: 71 % (ref 43–75)
NRBC BLD AUTO-RTO: 0 /100 WBCS
PLATELET # BLD AUTO: 375 THOUSANDS/UL (ref 149–390)
PLATELET BLD QL SMEAR: ADEQUATE
PMV BLD AUTO: 9 FL (ref 8.9–12.7)
POTASSIUM SERPL-SCNC: 3.7 MMOL/L (ref 3.5–5.3)
PROT SERPL-MCNC: 7.4 G/DL (ref 6.4–8.2)
RBC # BLD AUTO: 3.58 MILLION/UL (ref 3.81–5.12)
SODIUM SERPL-SCNC: 144 MMOL/L (ref 136–145)
TOTAL CELLS COUNTED SPEC: 100
WBC # BLD AUTO: 8.65 THOUSAND/UL (ref 4.31–10.16)

## 2021-06-29 PROCEDURE — 80053 COMPREHEN METABOLIC PANEL: CPT | Performed by: INTERNAL MEDICINE

## 2021-06-29 PROCEDURE — 85007 BL SMEAR W/DIFF WBC COUNT: CPT | Performed by: INTERNAL MEDICINE

## 2021-06-29 PROCEDURE — 86300 IMMUNOASSAY TUMOR CA 15-3: CPT

## 2021-06-29 PROCEDURE — 85027 COMPLETE CBC AUTOMATED: CPT | Performed by: INTERNAL MEDICINE

## 2021-06-29 NOTE — PROGRESS NOTES
Pt to clinic for lab draw from port a  Cath   Pt offered no complaints at this time, aware of next appointment, declined avs

## 2021-07-05 ENCOUNTER — HOSPITAL ENCOUNTER (INPATIENT)
Facility: HOSPITAL | Age: 61
LOS: 7 days | Discharge: NURSING, REHAB OR LONG-TERM CARE | DRG: 054 | End: 2021-07-12
Attending: EMERGENCY MEDICINE | Admitting: INTERNAL MEDICINE
Payer: COMMERCIAL

## 2021-07-05 ENCOUNTER — APPOINTMENT (EMERGENCY)
Dept: CT IMAGING | Facility: HOSPITAL | Age: 61
DRG: 054 | End: 2021-07-05
Payer: COMMERCIAL

## 2021-07-05 DIAGNOSIS — I95.9 HYPOTENSIVE EPISODE: ICD-10-CM

## 2021-07-05 DIAGNOSIS — Z17.0 BILATERAL MALIGNANT NEOPLASM OF BREAST IN FEMALE, ESTROGEN RECEPTOR POSITIVE, UNSPECIFIED SITE OF BREAST (HCC): ICD-10-CM

## 2021-07-05 DIAGNOSIS — C50.912 BILATERAL MALIGNANT NEOPLASM OF BREAST IN FEMALE, ESTROGEN RECEPTOR POSITIVE, UNSPECIFIED SITE OF BREAST (HCC): ICD-10-CM

## 2021-07-05 DIAGNOSIS — G89.3 CANCER-RELATED PAIN: ICD-10-CM

## 2021-07-05 DIAGNOSIS — R53.1 GENERALIZED WEAKNESS: ICD-10-CM

## 2021-07-05 DIAGNOSIS — L60.9 NAIL LESION: ICD-10-CM

## 2021-07-05 DIAGNOSIS — R19.7 DIARRHEA: ICD-10-CM

## 2021-07-05 DIAGNOSIS — N39.0 UTI (URINARY TRACT INFECTION): Primary | ICD-10-CM

## 2021-07-05 DIAGNOSIS — C50.911 BILATERAL MALIGNANT NEOPLASM OF BREAST IN FEMALE, ESTROGEN RECEPTOR POSITIVE, UNSPECIFIED SITE OF BREAST (HCC): ICD-10-CM

## 2021-07-05 PROBLEM — R82.71 ASYMPTOMATIC BACTERIURIA: Status: ACTIVE | Noted: 2021-07-05
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
## 2021-07-05 PROBLEM — G93.41 METABOLIC ENCEPHALOPATHY: Status: ACTIVE | Noted: 2021-07-05

## 2021-07-05 LAB
ALBUMIN SERPL BCP-MCNC: 2.9 G/DL (ref 3.5–5)
ALP SERPL-CCNC: 106 U/L (ref 46–116)
ALT SERPL W P-5'-P-CCNC: 30 U/L (ref 12–78)
ANION GAP SERPL CALCULATED.3IONS-SCNC: 10 MMOL/L (ref 4–13)
APTT PPP: 28 SECONDS (ref 23–37)
AST SERPL W P-5'-P-CCNC: 36 U/L (ref 5–45)
BACTERIA UR QL AUTO: ABNORMAL /HPF
BASOPHILS # BLD AUTO: 0.04 THOUSANDS/ΜL (ref 0–0.1)
BASOPHILS NFR BLD AUTO: 1 % (ref 0–1)
BILIRUB SERPL-MCNC: 0.4 MG/DL (ref 0.2–1)
BILIRUB UR QL STRIP: NEGATIVE
BUN SERPL-MCNC: 11 MG/DL (ref 5–25)
CALCIUM ALBUM COR SERPL-MCNC: 9.3 MG/DL (ref 8.3–10.1)
CALCIUM SERPL-MCNC: 8.4 MG/DL (ref 8.3–10.1)
CHLORIDE SERPL-SCNC: 103 MMOL/L (ref 100–108)
CLARITY UR: ABNORMAL
CO2 SERPL-SCNC: 29 MMOL/L (ref 21–32)
COLOR UR: YELLOW
CREAT SERPL-MCNC: 0.78 MG/DL (ref 0.6–1.3)
EOSINOPHIL # BLD AUTO: 0.04 THOUSAND/ΜL (ref 0–0.61)
EOSINOPHIL NFR BLD AUTO: 1 % (ref 0–6)
ERYTHROCYTE [DISTWIDTH] IN BLOOD BY AUTOMATED COUNT: 16 % (ref 11.6–15.1)
GFR SERPL CREATININE-BSD FRML MDRD: 84 ML/MIN/1.73SQ M
GLUCOSE SERPL-MCNC: 157 MG/DL (ref 65–140)
GLUCOSE SERPL-MCNC: 159 MG/DL (ref 65–140)
GLUCOSE UR STRIP-MCNC: NEGATIVE MG/DL
HCT VFR BLD AUTO: 34.2 % (ref 34.8–46.1)
HGB BLD-MCNC: 10.7 G/DL (ref 11.5–15.4)
HGB UR QL STRIP.AUTO: NEGATIVE
IMM GRANULOCYTES # BLD AUTO: 0.07 THOUSAND/UL (ref 0–0.2)
IMM GRANULOCYTES NFR BLD AUTO: 1 % (ref 0–2)
INR PPP: 0.93 (ref 0.84–1.19)
KETONES UR STRIP-MCNC: NEGATIVE MG/DL
LACTATE SERPL-SCNC: 1.8 MMOL/L (ref 0.5–2)
LEUKOCYTE ESTERASE UR QL STRIP: ABNORMAL
LYMPHOCYTES # BLD AUTO: 0.97 THOUSANDS/ΜL (ref 0.6–4.47)
LYMPHOCYTES NFR BLD AUTO: 11 % (ref 14–44)
MAGNESIUM SERPL-MCNC: 2.3 MG/DL (ref 1.6–2.6)
MCH RBC QN AUTO: 29.6 PG (ref 26.8–34.3)
MCHC RBC AUTO-ENTMCNC: 31.3 G/DL (ref 31.4–37.4)
MCV RBC AUTO: 95 FL (ref 82–98)
MONOCYTES # BLD AUTO: 0.73 THOUSAND/ΜL (ref 0.17–1.22)
MONOCYTES NFR BLD AUTO: 8 % (ref 4–12)
NEUTROPHILS # BLD AUTO: 6.86 THOUSANDS/ΜL (ref 1.85–7.62)
NEUTS SEG NFR BLD AUTO: 78 % (ref 43–75)
NITRITE UR QL STRIP: NEGATIVE
NON-SQ EPI CELLS URNS QL MICRO: ABNORMAL /HPF
NRBC BLD AUTO-RTO: 0 /100 WBCS
OTHER STN SPEC: ABNORMAL
PH UR STRIP.AUTO: 6 [PH]
PLATELET # BLD AUTO: 365 THOUSANDS/UL (ref 149–390)
PMV BLD AUTO: 9.4 FL (ref 8.9–12.7)
POTASSIUM SERPL-SCNC: 3.6 MMOL/L (ref 3.5–5.3)
PROT SERPL-MCNC: 7.4 G/DL (ref 6.4–8.2)
PROT UR STRIP-MCNC: NEGATIVE MG/DL
PROTHROMBIN TIME: 12.6 SECONDS (ref 11.6–14.5)
RBC # BLD AUTO: 3.62 MILLION/UL (ref 3.81–5.12)
RBC #/AREA URNS AUTO: ABNORMAL /HPF
SODIUM SERPL-SCNC: 142 MMOL/L (ref 136–145)
SP GR UR STRIP.AUTO: 1.02 (ref 1–1.03)
TROPONIN I SERPL-MCNC: <0.02 NG/ML
UROBILINOGEN UR QL STRIP.AUTO: 4 E.U./DL
WBC # BLD AUTO: 8.71 THOUSAND/UL (ref 4.31–10.16)
WBC #/AREA URNS AUTO: ABNORMAL /HPF

## 2021-07-05 PROCEDURE — 81001 URINALYSIS AUTO W/SCOPE: CPT | Performed by: EMERGENCY MEDICINE

## 2021-07-05 PROCEDURE — 83735 ASSAY OF MAGNESIUM: CPT | Performed by: EMERGENCY MEDICINE

## 2021-07-05 PROCEDURE — 36415 COLL VENOUS BLD VENIPUNCTURE: CPT | Performed by: EMERGENCY MEDICINE

## 2021-07-05 PROCEDURE — 96367 TX/PROPH/DG ADDL SEQ IV INF: CPT

## 2021-07-05 PROCEDURE — 82948 REAGENT STRIP/BLOOD GLUCOSE: CPT

## 2021-07-05 PROCEDURE — 80053 COMPREHEN METABOLIC PANEL: CPT | Performed by: EMERGENCY MEDICINE

## 2021-07-05 PROCEDURE — 85610 PROTHROMBIN TIME: CPT | Performed by: EMERGENCY MEDICINE

## 2021-07-05 PROCEDURE — 87086 URINE CULTURE/COLONY COUNT: CPT | Performed by: EMERGENCY MEDICINE

## 2021-07-05 PROCEDURE — 96365 THER/PROPH/DIAG IV INF INIT: CPT

## 2021-07-05 PROCEDURE — 83605 ASSAY OF LACTIC ACID: CPT | Performed by: EMERGENCY MEDICINE

## 2021-07-05 PROCEDURE — 85025 COMPLETE CBC W/AUTO DIFF WBC: CPT | Performed by: EMERGENCY MEDICINE

## 2021-07-05 PROCEDURE — 71260 CT THORAX DX C+: CPT

## 2021-07-05 PROCEDURE — 87040 BLOOD CULTURE FOR BACTERIA: CPT | Performed by: EMERGENCY MEDICINE

## 2021-07-05 PROCEDURE — 96366 THER/PROPH/DIAG IV INF ADDON: CPT

## 2021-07-05 PROCEDURE — 93005 ELECTROCARDIOGRAM TRACING: CPT

## 2021-07-05 PROCEDURE — 99285 EMERGENCY DEPT VISIT HI MDM: CPT | Performed by: EMERGENCY MEDICINE

## 2021-07-05 PROCEDURE — 99223 1ST HOSP IP/OBS HIGH 75: CPT | Performed by: INTERNAL MEDICINE

## 2021-07-05 PROCEDURE — 70450 CT HEAD/BRAIN W/O DYE: CPT

## 2021-07-05 PROCEDURE — 99285 EMERGENCY DEPT VISIT HI MDM: CPT

## 2021-07-05 PROCEDURE — 85730 THROMBOPLASTIN TIME PARTIAL: CPT | Performed by: EMERGENCY MEDICINE

## 2021-07-05 PROCEDURE — 84484 ASSAY OF TROPONIN QUANT: CPT | Performed by: EMERGENCY MEDICINE

## 2021-07-05 PROCEDURE — G1004 CDSM NDSC: HCPCS

## 2021-07-05 PROCEDURE — 84145 PROCALCITONIN (PCT): CPT | Performed by: EMERGENCY MEDICINE

## 2021-07-05 PROCEDURE — 74177 CT ABD & PELVIS W/CONTRAST: CPT

## 2021-07-05 RX ORDER — PROCHLORPERAZINE MALEATE 10 MG
5 TABLET ORAL
Status: DISCONTINUED | OUTPATIENT
Start: 2021-07-06 | End: 2021-07-12 | Stop reason: HOSPADM

## 2021-07-05 RX ORDER — GABAPENTIN 400 MG/1
400 CAPSULE ORAL
Status: DISCONTINUED | OUTPATIENT
Start: 2021-07-06 | End: 2021-07-12 | Stop reason: HOSPADM

## 2021-07-05 RX ORDER — FUROSEMIDE 20 MG/1
20 TABLET ORAL DAILY
Status: DISCONTINUED | OUTPATIENT
Start: 2021-07-06 | End: 2021-07-12 | Stop reason: HOSPADM

## 2021-07-05 RX ORDER — ONDANSETRON 2 MG/ML
4 INJECTION INTRAMUSCULAR; INTRAVENOUS EVERY 6 HOURS PRN
Status: DISCONTINUED | OUTPATIENT
Start: 2021-07-05 | End: 2021-07-12 | Stop reason: HOSPADM

## 2021-07-05 RX ORDER — OLANZAPINE 10 MG/1
10 TABLET ORAL
Status: DISCONTINUED | OUTPATIENT
Start: 2021-07-05 | End: 2021-07-12 | Stop reason: HOSPADM

## 2021-07-05 RX ORDER — HYDROMORPHONE HYDROCHLORIDE 2 MG/1
4 TABLET ORAL 4 TIMES DAILY PRN
Status: DISCONTINUED | OUTPATIENT
Start: 2021-07-05 | End: 2021-07-12 | Stop reason: HOSPADM

## 2021-07-05 RX ORDER — ACETAMINOPHEN 325 MG/1
650 TABLET ORAL EVERY 6 HOURS PRN
Status: DISCONTINUED | OUTPATIENT
Start: 2021-07-05 | End: 2021-07-12 | Stop reason: HOSPADM

## 2021-07-05 RX ORDER — NICOTINE 21 MG/24HR
1 PATCH, TRANSDERMAL 24 HOURS TRANSDERMAL DAILY
Status: DISCONTINUED | OUTPATIENT
Start: 2021-07-06 | End: 2021-07-12 | Stop reason: HOSPADM

## 2021-07-05 RX ORDER — ZOLPIDEM TARTRATE 5 MG/1
10 TABLET ORAL
Status: DISCONTINUED | OUTPATIENT
Start: 2021-07-05 | End: 2021-07-12 | Stop reason: HOSPADM

## 2021-07-05 RX ORDER — ALBUTEROL SULFATE 90 UG/1
2 AEROSOL, METERED RESPIRATORY (INHALATION) EVERY 6 HOURS PRN
Status: DISCONTINUED | OUTPATIENT
Start: 2021-07-05 | End: 2021-07-12 | Stop reason: HOSPADM

## 2021-07-05 RX ORDER — PANTOPRAZOLE SODIUM 40 MG/1
40 TABLET, DELAYED RELEASE ORAL DAILY
Status: DISCONTINUED | OUTPATIENT
Start: 2021-07-06 | End: 2021-07-12 | Stop reason: HOSPADM

## 2021-07-05 RX ORDER — LEVETIRACETAM 500 MG/1
250 TABLET ORAL 2 TIMES DAILY
Status: DISCONTINUED | OUTPATIENT
Start: 2021-07-05 | End: 2021-07-12 | Stop reason: HOSPADM

## 2021-07-05 RX ORDER — POLYETHYLENE GLYCOL 3350 17 G/17G
17 POWDER, FOR SOLUTION ORAL DAILY
Status: DISCONTINUED | OUTPATIENT
Start: 2021-07-06 | End: 2021-07-12 | Stop reason: HOSPADM

## 2021-07-05 RX ORDER — CALCIUM CARBONATE 200(500)MG
1000 TABLET,CHEWABLE ORAL DAILY PRN
Status: DISCONTINUED | OUTPATIENT
Start: 2021-07-05 | End: 2021-07-12 | Stop reason: HOSPADM

## 2021-07-05 RX ORDER — DEXAMETHASONE 2 MG/1
2 TABLET ORAL
Status: DISCONTINUED | OUTPATIENT
Start: 2021-07-06 | End: 2021-07-07

## 2021-07-05 RX ADMIN — IOHEXOL 100 ML: 350 INJECTION, SOLUTION INTRAVENOUS at 15:45

## 2021-07-05 RX ADMIN — SODIUM CHLORIDE, SODIUM LACTATE, POTASSIUM CHLORIDE, AND CALCIUM CHLORIDE 1000 ML: .6; .31; .03; .02 INJECTION, SOLUTION INTRAVENOUS at 14:14

## 2021-07-05 RX ADMIN — LEVETIRACETAM 250 MG: 500 TABLET, FILM COATED ORAL at 21:17

## 2021-07-05 RX ADMIN — APIXABAN 5 MG: 5 TABLET, FILM COATED ORAL at 21:17

## 2021-07-05 RX ADMIN — OLANZAPINE 10 MG: 10 TABLET, FILM COATED ORAL at 21:18

## 2021-07-05 RX ADMIN — CEFTRIAXONE SODIUM 1000 MG: 10 INJECTION, POWDER, FOR SOLUTION INTRAVENOUS at 16:49

## 2021-07-05 NOTE — ASSESSMENT & PLAN NOTE
New onset falls, once the night prior to admission and once the morning of admission  Both falls were witnessed by  who states she was transitioning from sitting to standing with walker at which time she tripped and fell  Reports worsening numbness in bilateral feet over recent weeks  DDx: HoTN vs proprioceptive (numbness of feet) vs worsening ambulatory dysfunction (bone and brain metastasis), less likely cardiac, echo WNL in April, no syncope per  who witnessed falls  Blood sugar 157, iron panel normal in May  Placed on telemetry in the ED, discontinued on the floors as cardiac etiology unlikely       Plan:  - orthostatics  - bed alarm  - Fall precautions  - PT/OT   - f/u folate and B12

## 2021-07-05 NOTE — ASSESSMENT & PLAN NOTE
Currently with brain and bone metastasis   and sister report patient has been having increasing back pain lately and difficulty with pain control  Plan:   - Pain control: Continue home gabapentin 400mg TID, continue home Dilaudid 4mg 4x daily   May require a fifth dose which the patient's outpatient doctor has stated is okay to give (per patient's sister)   - Nausea: continue home compazine 5mg TID   - Appetite stimulant: continue home dexamethasone 2mg daily   - lymphedema: lasix 20mg PO daily

## 2021-07-05 NOTE — ASSESSMENT & PLAN NOTE
WBCs 30-50 on urinalysis with occasional bacteria  Patient denies any urinary symptoms, no fever, no leukocytosis on CBC  Did receive one dose ceftriaxone in the ED       Plan:  - monitor off antibiotics   - f/u urine culture

## 2021-07-05 NOTE — ED PROVIDER NOTES
History  Chief Complaint   Patient presents with   Moriah Grady     this morning, + hitting head, + thinners, denies LOC    Hypotension     + breast cancer/brain cancer     This is a 64 y o  old female who presents to the ED for evaluation of fall  Patient has had multiple falls at home in the last few days, increasing weakness, difficulty getting around  She does not really remember why she fell  She does take Eliquis however she did not hit her head, no loss of consciousness  EMS did find her markedly hypotensive with 80 systolic blood pressure upon arrival   She has no complaints, no external signs of trauma, no signs of head trauma  Otherwise, patient denies fevers, chills, night sweats, cough, congestion, rhinorrhea, CP, dyspnea, abdominal pain, nausea, vomiting, diarrhea, constipation, urinary symptoms, leg pain or swelling  Prior to Admission Medications   Prescriptions Last Dose Informant Patient Reported? Taking? HYDROmorphone (DILAUDID) 4 mg tablet   No No   Sig: Take 1 tablet (4 mg total) by mouth 4 (four) times a day as needed (cancer pain)Max Daily Amount: 16 mg   Misc  Devices (QUAD CANE) MISC  Self No No   Sig: by Does not apply route daily   OLANZapine (ZyPREXA) 10 mg tablet   No No   Sig: Take 1 tablet (10 mg total) by mouth daily at bedtime Every night, to help sleep and appetite     albuterol (PROVENTIL HFA,VENTOLIN HFA) 90 mcg/act inhaler  Self No No   Sig: TAKE 2 PUFFS BY MOUTH EVERY 6 HOURS AS NEEDED FOR WHEEZING   apixaban (ELIQUIS) 5 mg  Self No No   Sig: Take 1 tablet (5 mg total) by mouth 2 (two) times a day   bisacodyl (DULCOLAX) 10 mg suppository  Self No No   Sig: Insert 1 suppository (10 mg total) into the rectum daily as needed for constipation   dexamethasone (DECADRON) 2 mg tablet  Self No No   Sig: Take 1 tablet (2 mg total) by mouth daily with breakfast To help appetite   furosemide (LASIX) 20 mg tablet   No No   Sig: Take 1 tablet (20 mg total) by mouth daily   gabapentin (NEURONTIN) 400 mg capsule   No No   Sig: Take 1 capsule (400 mg total) by mouth 3 (three) times a day before meals To reduce nerve pain   levETIRAcetam (KEPPRA) 250 mg tablet   No No   Sig: Take 1 tablet (250 mg total) by mouth 2 (two) times a day   lubiprostone (AMITIZA) 24 mcg capsule  Self No No   Sig: Take 1 capsule (24 mcg total) by mouth 2 (two) times a day with meals   pantoprazole (PROTONIX) 40 mg tablet  Self No No   Sig: Take 1 tablet (40 mg total) by mouth daily Every morning, before coffee, to reduce stomach acid   polyethylene glycol (MIRALAX) 17 g packet  Self No No   Sig: Take 17 g by mouth daily   prochlorperazine (COMPAZINE) 5 mg tablet   No No   Sig: Take 1 tablet (5 mg total) by mouth 3 (three) times a day before meals   senna-docusate sodium (SENOKOT S) 8 6-50 mg per tablet  Self No No   Sig: Take 2 tablets by mouth 2 (two) times a day   zolpidem (AMBIEN) 10 mg tablet   No No   Sig: Take 1 tablet (10 mg total) by mouth daily at bedtime as needed for sleep      Facility-Administered Medications: None     Past Medical History:   Diagnosis Date    Dehydration 6/11/2019    Dizziness 2/6/2018    Dry skin 2/6/2018    Edema 10/23/2019    H/O bilateral mastectomy 2/15/2016    Hyperglycemia 2/6/2018    Hypertension 2/15/2016    Hypokalemia 3/10/2020    Lymphedema 2/15/2016    Malignant cachexia ([facility] [address] ) 10/6/2016    Malignant neoplasm of right breast ([facility] [address] ) 2/15/2016    Metastatic breast cancer ([facility] [address] )      Past Surgical History:   Procedure Laterality Date    ENDOBRONCHIAL ULTRASOUND (EBUS) N/A 2/16/2016    Procedure: EBUS;  Surgeon: Temo Mackay MD;  [facility];  Service:    Vena Kareen MASTECTOMY Bilateral     MASTECTOMY Bilateral     right arm edema    OOPHORECTOMY      OK BRONCHOSCOPY NEEDLE BX TRACHEA MAIN STEM&/BRON N/A 2/16/2016    Procedure: Debbie Patel;  Surgeon: Temo Mackay MD;  [facility];  Service: Thoracic    OK INSJ TUNNELED CTR VAD W/SUBQ PORT AGE 5 YR/> N/A 7/24/2018    Procedure: INSERTION VENOUS PORT ( PORT-A-CATH) IR;  Surgeon: Ed Shipman DO;  [facility];  Service: Interventional Radiology    TN STEREOTACTIC RADIOSURGERY, CRANIAL,SIMPLE,EA ADD  5/3/2017         TN STEREOTACTIC RADIOSURGERY, CRANIAL,SIMPLE,EA ADD  5/3/2017         TN STEREOTACTIC RADIOSURGERY, CRANIAL,SIMPLE,SINGLE  5/3/2017          Family History   Problem Relation Age of Onset    Cancer Sister     Prostate cancer Brother      I have reviewed and agree with the history as documented  E-Cigarette/Vaping    E-Cigarette Use Never User      E-Cigarette/Vaping Substances    Nicotine Yes     THC No     CBD No     Flavoring No     Other No     Unknown No      Social History     Tobacco Use    Smoking status: Current Every Day Smoker     Packs/day: 0 50     Years: 40 00     Pack years: 20 00     Types: Cigarettes     Start date: 1978    Smokeless tobacco: Never Used   Vaping Use    Vaping Use: Never used   Substance Use Topics    Alcohol use: Not Currently     Comment: once a week    Drug use: Not Currently     Types: Marijuana     Review of Systems   Constitutional: Negative for chills, fatigue, fever and unexpected weight change  HENT: Negative for congestion, rhinorrhea and sore throat  Eyes: Negative for redness and visual disturbance  Respiratory: Negative for cough and shortness of breath  Cardiovascular: Negative for chest pain and leg swelling  Gastrointestinal: Negative for abdominal pain, constipation, diarrhea, nausea and vomiting  Endocrine: Negative for cold intolerance and heat intolerance  Genitourinary: Negative for dysuria, frequency and urgency  Musculoskeletal: Negative for back pain  Skin: Negative for rash  Neurological: Positive for syncope  Negative for dizziness and numbness  All other systems reviewed and are negative  Physical Exam  Physical Exam  Vitals and nursing note reviewed     Constitutional:       General: She is not in acute distress  Appearance: She is well-developed  She is ill-appearing (chronically)  She is not diaphoretic  HENT:      Head: Normocephalic and atraumatic  Right Ear: External ear normal       Left Ear: External ear normal       Nose: Nose normal       Mouth/Throat:      Mouth: Mucous membranes are moist       Pharynx: No oropharyngeal exudate  Eyes:      Conjunctiva/sclera: Conjunctivae normal       Pupils: Pupils are equal, round, and reactive to light  Cardiovascular:      Rate and Rhythm: Normal rate and regular rhythm  Heart sounds: Normal heart sounds  No murmur heard  No gallop  Pulmonary:      Effort: Pulmonary effort is normal       Breath sounds: Normal breath sounds  No wheezing  Chest:      Chest wall: No tenderness  Abdominal:      General: Bowel sounds are normal  There is no distension  Palpations: Abdomen is soft  Tenderness: There is no abdominal tenderness  There is no guarding or rebound  Musculoskeletal:         General: No tenderness or deformity  Normal range of motion  Cervical back: Normal range of motion and neck supple  Lymphadenopathy:      Cervical: No cervical adenopathy  Skin:     General: Skin is warm and dry  Capillary Refill: Capillary refill takes less than 2 seconds  Coloration: Skin is pale  Findings: No erythema or rash  Comments: Soiled in diarrhea   Neurological:      Mental Status: She is alert and oriented to person, place, and time  Cranial Nerves: No cranial nerve deficit     Psychiatric:         Mood and Affect: Mood normal          Behavior: Behavior normal          Vital Signs  ED Triage Vitals   Temperature Pulse Respirations Blood Pressure SpO2   07/05/21 1359 07/05/21 1359 07/05/21 1359 07/05/21 1359 07/05/21 1359   98 4 °F (36 9 °C) 98 18 118/69 100 %      Temp Source Heart Rate Source Patient Position - Orthostatic VS BP Location FiO2 (%)   07/05/21 1359 07/05/21 1359 07/05/21 94 31 11 07/05/21 1359 --   Oral Monitor Lying Right arm       Pain Score       07/05/21 1904       7         ED Medications  Medications   lactated ringers bolus 1,000 mL (0 mL Intravenous Stopped 7/5/21 1645)   iohexol (OMNIPAQUE) 350 MG/ML injection (SINGLE-DOSE) 100 mL (100 mL Intravenous Given 7/5/21 1545)   ceftriaxone (ROCEPHIN) 1 g/50 mL in dextrose IVPB (0 mg Intravenous Stopped 7/5/21 1722)     Diagnostic Studies  Results Reviewed     Procedure Component Value Units Date/Time    Procalcitonin with AM Reflex [[id number]] Collected: 07/05/21 1415    Lab Status: In process Specimen: Blood from Arm, Left Updated: 07/05/21 1620    Urine Microscopic [[id number]]  (Abnormal) Collected: 07/05/21 1530    Lab Status: Final result Specimen: Urine, Straight Cath Updated: 07/05/21 1553     RBC, UA None Seen /hpf      WBC, UA 30-50 /hpf      Epithelial Cells Occasional /hpf      Bacteria, UA Occasional /hpf      OTHER OBSERVATIONS WBCs Clumped    Urine culture [[id number]] Collected: 07/05/21 1530    Lab Status: In process Specimen: Urine, Straight Cath Updated: 07/05/21 1552    UA w Reflex to Microscopic w Reflex to Culture [[id number]]  (Abnormal) Collected: 07/05/21 1530    Lab Status: Final result Specimen: Urine, Straight Cath Updated: 07/05/21 1552     Color, UA Yellow     Clarity, UA Slightly Cloudy     Specific Social Circle, UA 1 020     pH, UA 6 0     Leukocytes, UA Large     Nitrite, UA Negative     Protein, UA Negative mg/dl      Glucose, UA Negative mg/dl      Ketones, UA Negative mg/dl      Urobilinogen, UA 4 0 E U /dl      Bilirubin, UA Negative     Blood, UA Negative    Blood culture #2 [[id number]] Collected: 07/05/21 1547    Lab Status:  In process Specimen: Blood from Arm, Left Updated: 07/05/21 1551    Lactic acid [[id number]]  (Normal) Collected: 07/05/21 1415    Lab Status: Final result Specimen: Blood from Arm, Left Updated: 07/05/21 1449     LACTIC ACID 1 8 mmol/L     Narrative:      Result may be elevated if tourniquet was used during collection      Troponin I [[id number]]  (Normal) Collected: 07/05/21 1415    Lab Status: Final result Specimen: Blood from Arm, Left Updated: 07/05/21 1449     Troponin I <0 02 ng/mL     Comprehensive metabolic panel [[id number]]  (Abnormal) Collected: 07/05/21 1415    Lab Status: Final result Specimen: Blood from Arm, Left Updated: 07/05/21 1447     Sodium 142 mmol/L      Potassium 3 6 mmol/L      Chloride 103 mmol/L      CO2 29 mmol/L      ANION GAP 10 mmol/L      BUN 11 mg/dL      Creatinine 0 78 mg/dL      Glucose 157 mg/dL      Calcium 8 4 mg/dL      Corrected Calcium 9 3 mg/dL      AST 36 U/L      ALT 30 U/L      Alkaline Phosphatase 106 U/L      Total Protein 7 4 g/dL      Albumin 2 9 g/dL      Total Bilirubin 0 40 mg/dL      eGFR 84 ml/min/1 73sq m     Narrative:      [place] guidelines for Chronic Kidney Disease (CKD):     Stage 1 with normal or high GFR (GFR > 90 mL/min/1 73 square meters)    Stage 2 Mild CKD (GFR = 60-89 mL/min/1 73 square meters)    Stage 3A Moderate CKD (GFR = 45-59 mL/min/1 73 square meters)    Stage 3B Moderate CKD (GFR = 30-44 mL/min/1 73 square meters)    Stage 4 Severe CKD (GFR = 15-29 mL/min/1 73 square meters)    Stage 5 End Stage CKD (GFR <15 mL/min/1 73 square meters)  Note: GFR calculation is accurate only with a steady state creatinine    Magnesium [[id number]]  (Normal) Collected: 07/05/21 1415    Lab Status: Final result Specimen: Blood from Arm, Left Updated: 07/05/21 1447     Magnesium 2 3 mg/dL     Protime-INR [[id number]]  (Normal) Collected: 07/05/21 1415    Lab Status: Final result Specimen: Blood from Arm, Left Updated: 07/05/21 1444     Protime 12 6 seconds      INR 0 93    APTT [[id number]]  (Normal) Collected: 07/05/21 1415    Lab Status: Final result Specimen: Blood from Arm, Left Updated: 07/05/21 1444     PTT 28 seconds     Fingerstick Glucose (POCT) [[id number]]  (Abnormal) Collected: 07/05/21 1429    Lab Status: Final result Updated: 07/05/21 1432     POC Glucose 159 mg/dl     CBC and differential [[id number]]  (Abnormal) Collected: 07/05/21 1415    Lab Status: Final result Specimen: Blood from Arm, Left Updated: 07/05/21 1423     WBC 8 71 Thousand/uL      RBC 3 62 Million/uL      Hemoglobin 10 7 g/dL      Hematocrit 34 2 %      MCV 95 fL      MCH 29 6 pg      MCHC 31 3 g/dL      RDW 16 0 %      MPV 9 4 fL      Platelets 600 Thousands/uL      nRBC 0 /100 WBCs      Neutrophils Relative 78 %      Immat GRANS % 1 %      Lymphocytes Relative 11 %      Monocytes Relative 8 %      Eosinophils Relative 1 %      Basophils Relative 1 %      Neutrophils Absolute 6 86 Thousands/µL      Immature Grans Absolute 0 07 Thousand/uL      Lymphocytes Absolute 0 97 Thousands/µL      Monocytes Absolute 0 73 Thousand/µL      Eosinophils Absolute 0 04 Thousand/µL      Basophils Absolute 0 04 Thousands/µL     Blood culture #1 [[id number]] Collected: 07/05/21 1415    Lab Status: In process Specimen: Blood from Line, Venous Updated: 07/05/21 1420                 CT head wo contrast   Final Result by Vel Bull MD (07/05 1642)      No acute intracranial abnormality  Changes within the brain parenchyma again suggesting sequela of prior whole brain radiation                  Workstation performed: [id number]         CT chest abdomen pelvis w contrast   Final Result by Vel Bull MD (07/05 1652)      No acute posttraumatic abnormality detected in the chest, abdomen or pelvis  Stable background emphysema with multiple bilateral lung nodules as described                    Workstation performed: [id number]                Procedures  ECG 12 Lead Documentation Only    Date/Time: 7/5/2021 12:48 PM  Performed by: Cara Bales MD  Authorized by: Cara Bales MD     Indications / Diagnosis:  Syncope  ECG reviewed by me, the ED Provider: yes    Patient location:  ED  Comments:      Normal sinus rhythm rate 67, normal intervals, normal axis, no acute ST T wave changes  Nonspecific T-wave abnormality with T-wave inversions V1, V2, no reciprocal changes  When compared to previous EKG dated 06/11/2021, no changes are noted         ED Course        A/P: This is a 64 y o  female who presents to the ED for evaluation of fall, hypotension  Septic work up  Has no acute traumatic findings, so no trauma alert called  CTH and CAP  Cath UA  IVF, access portacath to get IV contrast for scan  HEART Risk Score      Most Recent Value   Heart Score Risk Calculator   History  0 Filed at: 07/05/2021 1926   ECG  0 Filed at: 07/05/2021 1926   Age  1 Filed at: 07/05/2021 1926   Risk Factors  1 Filed at: 07/05/2021 1926   Troponin  0 Filed at: 07/05/2021 1926   HEART Score  2 Filed at: 07/05/2021 1926        CT scan without trauma  UA suspicious for UTI - does not meet SIRS criteria  Treat with rocephin  Admit  D/w slim      MDM    Disposition  Final diagnoses:   UTI (urinary tract infection)   Diarrhea   Generalized weakness   Hypotensive episode     Time reflects when diagnosis was documented in both MDM as applicable and the Disposition within this note     Time User Action Codes Description Comment    7/5/2021  5:36 PM Charlotte Scripture Add [I95 9] Hypotension     7/5/2021  5:36 PM Charlotte Scripture Add [N39 0] UTI (urinary tract infection)     7/5/2021  5:36 PM Krupa Scripture Add [R19 7] Diarrhea     7/5/2021  5:36 PM Krupa Scripture Add [R53 1] Generalized weakness     7/5/2021  5:36 PM Charlotte Scripture Modify [N39 0] UTI (urinary tract infection)     7/5/2021  5:36 PM Krupa Scripture Remove [I95 9] Hypotension     7/5/2021  5:36 PM Charlotte Scripture Add [I95 9] Hypotensive episode       ED Disposition     ED Disposition Condition Date/Time Comment    Admit Stable Mon Jul 5, 2021  5:37 PM Case was discussed with VAISHNAVI and the patient's admission status was agreed to be Admission Status: inpatient status to the service of Dr Heather Lyons  Follow-up Information    None         Current Discharge Medication List      CONTINUE these medications which have NOT CHANGED    Details   albuterol (PROVENTIL HFA,VENTOLIN HFA) 90 mcg/act inhaler TAKE 2 PUFFS BY MOUTH EVERY 6 HOURS AS NEEDED FOR WHEEZING  Qty: 18 Inhaler, Refills: 2    Associated Diagnoses: Pulmonary emphysema, unspecified emphysema type (HCC)      apixaban (ELIQUIS) 5 mg Take 1 tablet (5 mg total) by mouth 2 (two) times a day  Qty: 60 tablet, Refills: 0    Associated Diagnoses: Pulmonary embolism (HCC)      bisacodyl (DULCOLAX) 10 mg suppository Insert 1 suppository (10 mg total) into the rectum daily as needed for constipation  Qty: 12 suppository, Refills: 0    Associated Diagnoses: Therapeutic opioid-induced constipation (OIC); Impaction of intestine (HCC)      dexamethasone (DECADRON) 2 mg tablet Take 1 tablet (2 mg total) by mouth daily with breakfast To help appetite  Qty: 30 tablet, Refills: 0    Associated Diagnoses: Brain metastases ([facility] [address] ); Bone metastases (HCC)      furosemide (LASIX) 20 mg tablet Take 1 tablet (20 mg total) by mouth daily  Qty: 30 tablet, Refills: 0    Associated Diagnoses: Lower extremity edema      gabapentin (NEURONTIN) 400 mg capsule Take 1 capsule (400 mg total) by mouth 3 (three) times a day before meals To reduce nerve pain  Qty: 90 capsule, Refills: 0    Associated Diagnoses: Palliative care patient; CINV (chemotherapy-induced nausea and vomiting); Cancer-related pain; Brain metastases ([facility] [address] ); Focal seizure ([facility])      HYDROmorphone (DILAUDID) 4 mg tablet Take 1 tablet (4 mg total) by mouth 4 (four) times a day as needed (cancer pain)Max Daily Amount: 16 mg  Qty: 120 tablet, Refills: 0    Comments: May fill immediately  Associated Diagnoses: Palliative care patient;  Cancer-related pain      levETIRAcetam (KEPPRA) 250 mg tablet Take 1 tablet (250 mg total) by mouth 2 (two) times a day  Qty: 60 tablet, Refills: 0    Associated Diagnoses: Brain metastases ([facility] [address] ); Focal seizure (HCC)      lubiprostone (AMITIZA) 24 mcg capsule Take 1 capsule (24 mcg total) by mouth 2 (two) times a day with meals  Qty: 60 capsule, Refills: 0    Associated Diagnoses: Therapeutic opioid-induced constipation (OIC); Impaction of intestine ([facility] [address] )      Misc  Devices (QUAD CANE) MISC by Does not apply route daily  Qty: 1 each, Refills: 0    Associated Diagnoses: Unsteady gait; Brain metastases (HCC)      OLANZapine (ZyPREXA) 10 mg tablet Take 1 tablet (10 mg total) by mouth daily at bedtime Every night, to help sleep and appetite  Qty: 30 tablet, Refills: 0    Associated Diagnoses: Palliative care patient; CINV (chemotherapy-induced nausea and vomiting); Primary insomnia      pantoprazole (PROTONIX) 40 mg tablet Take 1 tablet (40 mg total) by mouth daily Every morning, before coffee, to reduce stomach acid  Qty: 90 tablet, Refills: 3    Associated Diagnoses: CINV (chemotherapy-induced nausea and vomiting)      polyethylene glycol (MIRALAX) 17 g packet Take 17 g by mouth daily  Refills: 0    Associated Diagnoses: Therapeutic opioid-induced constipation (OIC)      prochlorperazine (COMPAZINE) 5 mg tablet Take 1 tablet (5 mg total) by mouth 3 (three) times a day before meals  Qty: 90 tablet, Refills: 0    Associated Diagnoses: CINV (chemotherapy-induced nausea and vomiting)      senna-docusate sodium (SENOKOT S) 8 6-50 mg per tablet Take 2 tablets by mouth 2 (two) times a day  Qty: 120 tablet, Refills: 0    Associated Diagnoses: Therapeutic opioid-induced constipation (OIC); Impaction of intestine (HCC)      zolpidem (AMBIEN) 10 mg tablet Take 1 tablet (10 mg total) by mouth daily at bedtime as needed for sleep  Qty: 30 tablet, Refills: 0    Associated Diagnoses: Primary insomnia           No discharge procedures on file      PDMP Review       Value Time User    PDMP Reviewed  Yes 6/23/2021 10:27 AM Charmaine Tran MD          ED Provider  Electronically Signed by           Zackery Ortez Skip Canales MD  07/05/21 Gigi Stephens

## 2021-07-05 NOTE — ASSESSMENT & PLAN NOTE
Hgb 10 7 on admission, MCV 95  Per heme/onc note on 6/9: "reviewed labs from 05/18/2021 which reveal a hemoglobin of 9 9, platelet count 157, normal white count and MCV is 98  An iron panel was obtained to further evaluate her anemia which revealed a saturation of 22% and a ferritin level of 108 suggesting iron deficiency is not contributing to her anemia   Suspect her anemia is of chronic disease"    Plan:  - f/u B12 and folate

## 2021-07-05 NOTE — PLAN OF CARE
Problem: SAFETY ADULT  Goal: Patient will remain free of falls  Description: INTERVENTIONS:  - Educate patient/family on patient safety including physical limitations  - Instruct patient to call for assistance with activity   - Consult OT/PT to assist with strengthening/mobility   - Keep Call bell within reach  - Keep bed low and locked with side rails adjusted as appropriate  - Keep care items and personal belongings within reach  - Initiate and maintain comfort rounds  - Make Fall Risk Sign visible to staff  - Offer Toileting every two hours, in advance of need  - Initiate/Maintain bed and chair alarm  - Obtain necessary fall risk management equipment  - Apply yellow socks and bracelet for high fall risk patients  - Consider moving patient to room near nurses station  Outcome: Progressing     Problem: SAFETY ADULT  Goal: Maintain or return to baseline ADL function  Description: INTERVENTIONS:  -  Assess patient's ability to carry out ADLs; assess patient's baseline for ADL function and identify physical deficits which impact ability to perform ADLs (bathing, care of mouth/teeth, toileting, grooming, dressing, etc )  - Assess/evaluate cause of self-care deficits   - Assess range of motion  - Assess patient's mobility; develop plan if impaired  - Assess patient's need for assistive devices and provide as appropriate  - Encourage maximum independence but intervene and supervise when necessary  - Involve family in performance of ADLs  - Assess for home care needs following discharge   - Consider OT consult to assist with ADL evaluation and planning for discharge  - Provide patient education as appropriate  Outcome: Progressing     Problem: SAFETY ADULT  Goal: Maintains/Returns to pre admission functional level  Description: INTERVENTIONS:  - Perform BMAT or MOVE assessment daily    - Set and communicate daily mobility goal to care team and patient/family/caregiver     - Collaborate with rehabilitation services on mobility goals if consulted  - Perform Range of Motion five times a day  - Reposition patient every two hours    - Dangle patient three times a day  - Stand patient three times a day  - Ambulate patient three times a day  - Out of bed to chair twice a day   - Out of bed for meals three times a day  - Out of bed for toileting  - Record patient progress and toleration of activity level   Outcome: Progressing     Problem: PAIN - ADULT  Goal: Verbalizes/displays adequate comfort level or baseline comfort level  Description: Interventions:  - Encourage patient to monitor pain and request assistance  - Assess pain using appropriate pain scale  - Administer analgesics based on type and severity of pain and evaluate response  - Implement non-pharmacological measures as appropriate and evaluate response  - Consider cultural and social influences on pain and pain management  - Notify physician/advanced practitioner if interventions unsuccessful or patient reports new pain  Outcome: Progressing

## 2021-07-05 NOTE — ASSESSMENT & PLAN NOTE
Patient endorses frequent diarrhea, though is confused and unable to clarify duration, frequency, or characterize bowel movements  Patient seen by outpatient oncologist last month who notes the patient is taking MiraLax and Senokot as well as Amitiza 24 mcg b i d for chronic constipation, could possibly be contributing to diarrhea  Patient's sister assists with care at home and states that the patient has had diarrhea for a while, but if they hold her bowel regimen she becomes extremely constipated  No evidence of dehydration on labs  Received 1 L of LR in ED  No abnormalities on CT abdomen       Plan:  - holding bowel regimen except will continue to give miralax to maintain regularity   - reassess stool output and consider resuming home dulcolax and amitiza tomorrow   - accurate stool count

## 2021-07-05 NOTE — ASSESSMENT & PLAN NOTE
Per family ( and sister), she has been increasingly more confused over the past few months     DDx: side effect of brain metastasis s/p whole brain rads vs possible UTI      Plan:  - delirium precautions   - avoid sedating medications   - f/u urine culture and blood cultures

## 2021-07-06 DIAGNOSIS — C79.31 BRAIN METASTASES (HCC): ICD-10-CM

## 2021-07-06 DIAGNOSIS — R56.9 FOCAL SEIZURE (HCC): ICD-10-CM

## 2021-07-06 PROBLEM — B35.1 ONYCHOMYCOSIS OF GREAT TOE: Status: ACTIVE | Noted: 2021-07-06

## 2021-07-06 LAB
ANION GAP SERPL CALCULATED.3IONS-SCNC: 14 MMOL/L (ref 4–13)
BUN SERPL-MCNC: 6 MG/DL (ref 5–25)
CALCIUM SERPL-MCNC: 8.4 MG/DL (ref 8.3–10.1)
CHLORIDE SERPL-SCNC: 107 MMOL/L (ref 100–108)
CO2 SERPL-SCNC: 18 MMOL/L (ref 21–32)
CREAT SERPL-MCNC: 0.49 MG/DL (ref 0.6–1.3)
ERYTHROCYTE [DISTWIDTH] IN BLOOD BY AUTOMATED COUNT: 15.9 % (ref 11.6–15.1)
FOLATE SERPL-MCNC: >20 NG/ML (ref 3.1–17.5)
GFR SERPL CREATININE-BSD FRML MDRD: 108 ML/MIN/1.73SQ M
GLUCOSE SERPL-MCNC: 81 MG/DL (ref 65–140)
HCT VFR BLD AUTO: 31.3 % (ref 34.8–46.1)
HGB BLD-MCNC: 9.5 G/DL (ref 11.5–15.4)
MCH RBC QN AUTO: 29.3 PG (ref 26.8–34.3)
MCHC RBC AUTO-ENTMCNC: 30.4 G/DL (ref 31.4–37.4)
MCV RBC AUTO: 97 FL (ref 82–98)
PLATELET # BLD AUTO: 325 THOUSANDS/UL (ref 149–390)
PMV BLD AUTO: 9.3 FL (ref 8.9–12.7)
POTASSIUM SERPL-SCNC: 3.7 MMOL/L (ref 3.5–5.3)
PROCALCITONIN SERPL-MCNC: <0.05 NG/ML
RBC # BLD AUTO: 3.24 MILLION/UL (ref 3.81–5.12)
SODIUM SERPL-SCNC: 139 MMOL/L (ref 136–145)
VIT B12 SERPL-MCNC: 422 PG/ML (ref 100–900)
WBC # BLD AUTO: 7.12 THOUSAND/UL (ref 4.31–10.16)

## 2021-07-06 PROCEDURE — 82746 ASSAY OF FOLIC ACID SERUM: CPT

## 2021-07-06 PROCEDURE — 82607 VITAMIN B-12: CPT

## 2021-07-06 PROCEDURE — 80048 BASIC METABOLIC PNL TOTAL CA: CPT

## 2021-07-06 PROCEDURE — 85027 COMPLETE CBC AUTOMATED: CPT

## 2021-07-06 PROCEDURE — 99232 SBSQ HOSP IP/OBS MODERATE 35: CPT | Performed by: INTERNAL MEDICINE

## 2021-07-06 RX ORDER — LEVETIRACETAM 250 MG/1
TABLET ORAL
Qty: 60 TABLET | Refills: 0 | Status: SHIPPED | OUTPATIENT
Start: 2021-07-06 | End: 2021-07-23

## 2021-07-06 RX ORDER — POTASSIUM CHLORIDE 20 MEQ/1
40 TABLET, EXTENDED RELEASE ORAL ONCE
Status: COMPLETED | OUTPATIENT
Start: 2021-07-06 | End: 2021-07-06

## 2021-07-06 RX ADMIN — APIXABAN 5 MG: 5 TABLET, FILM COATED ORAL at 17:06

## 2021-07-06 RX ADMIN — GABAPENTIN 400 MG: 400 CAPSULE ORAL at 17:05

## 2021-07-06 RX ADMIN — POLYETHYLENE GLYCOL 3350 17 G: 17 POWDER, FOR SOLUTION ORAL at 08:18

## 2021-07-06 RX ADMIN — PROCHLORPERAZINE MALEATE 5 MG: 10 TABLET ORAL at 11:50

## 2021-07-06 RX ADMIN — GABAPENTIN 400 MG: 400 CAPSULE ORAL at 11:51

## 2021-07-06 RX ADMIN — PROCHLORPERAZINE MALEATE 5 MG: 10 TABLET ORAL at 06:26

## 2021-07-06 RX ADMIN — LEVETIRACETAM 250 MG: 500 TABLET, FILM COATED ORAL at 08:18

## 2021-07-06 RX ADMIN — POTASSIUM CHLORIDE 40 MEQ: 1500 TABLET, EXTENDED RELEASE ORAL at 09:33

## 2021-07-06 RX ADMIN — PANTOPRAZOLE SODIUM 40 MG: 40 TABLET, DELAYED RELEASE ORAL at 08:17

## 2021-07-06 RX ADMIN — APIXABAN 5 MG: 5 TABLET, FILM COATED ORAL at 08:18

## 2021-07-06 RX ADMIN — Medication 1 PATCH: at 08:18

## 2021-07-06 RX ADMIN — GABAPENTIN 400 MG: 400 CAPSULE ORAL at 06:26

## 2021-07-06 RX ADMIN — LEVETIRACETAM 250 MG: 500 TABLET, FILM COATED ORAL at 17:06

## 2021-07-06 RX ADMIN — OLANZAPINE 10 MG: 10 TABLET, FILM COATED ORAL at 21:31

## 2021-07-06 RX ADMIN — DEXAMETHASONE 2 MG: 2 TABLET ORAL at 08:18

## 2021-07-06 RX ADMIN — PROCHLORPERAZINE MALEATE 5 MG: 10 TABLET ORAL at 17:05

## 2021-07-06 NOTE — PLAN OF CARE
Problem: PAIN - ADULT  Goal: Verbalizes/displays adequate comfort level or baseline comfort level  Description: Interventions:  - Encourage patient to monitor pain and request assistance  - Assess pain using appropriate pain scale  - Administer analgesics based on type and severity of pain and evaluate response  - Implement non-pharmacological measures as appropriate and evaluate response  - Consider cultural and social influences on pain and pain management  - Notify physician/advanced practitioner if interventions unsuccessful or patient reports new pain  Outcome: Progressing     Problem: INFECTION - ADULT  Goal: Absence or prevention of progression during hospitalization  Description: INTERVENTIONS:  - Assess and monitor for signs and symptoms of infection  - Monitor lab/diagnostic results  - Monitor all insertion sites, i e  indwelling lines, tubes, and drains  - Monitor endotracheal if appropriate and nasal secretions for changes in amount and color  - [place] appropriate cooling/warming therapies per order  - Administer medications as ordered  - Instruct and encourage patient and family to use good hand hygiene technique  - Identify and instruct in appropriate isolation precautions for identified infection/condition  Outcome: Progressing  Goal: Absence of fever/infection during neutropenic period  Description: INTERVENTIONS:  - Monitor WBC    Outcome: Progressing     Problem: SAFETY ADULT  Goal: Patient will remain free of falls  Description: INTERVENTIONS:  - Educate patient/family on patient safety including physical limitations  - Instruct patient to call for assistance with activity   - Consult OT/PT to assist with strengthening/mobility   - Keep Call bell within reach  - Keep bed low and locked with side rails adjusted as appropriate  - Keep care items and personal belongings within reach  - Initiate and maintain comfort rounds  - Make Fall Risk Sign visible to staff  - Offer Toileting every  Hours, in advance of need  - Initiate/Maintain alarm  - Obtain necessary fall risk management equipment:   - Apply yellow socks and bracelet for high fall risk patients  - Consider moving patient to room near nurses station  Outcome: Progressing  Goal: Maintain or return to baseline ADL function  Description: INTERVENTIONS:  -  Assess patient's ability to carry out ADLs; assess patient's baseline for ADL function and identify physical deficits which impact ability to perform ADLs (bathing, care of mouth/teeth, toileting, grooming, dressing, etc )  - Assess/evaluate cause of self-care deficits   - Assess range of motion  - Assess patient's mobility; develop plan if impaired  - Assess patient's need for assistive devices and provide as appropriate  - Encourage maximum independence but intervene and supervise when necessary  - Involve family in performance of ADLs  - Assess for home care needs following discharge   - Consider OT consult to assist with ADL evaluation and planning for discharge  - Provide patient education as appropriate  Outcome: Progressing  Goal: Maintains/Returns to pre admission functional level  Description: INTERVENTIONS:  - Perform BMAT or MOVE assessment daily    - Set and communicate daily mobility goal to care team and patient/family/caregiver  - Collaborate with rehabilitation services on mobility goals if consulted  - Perform Range of Motion  times a day  - Reposition patient every  hours    - Dangle patient  times a day  - Stand patient  times a day  - Ambulate patient  times a day  - Out of bed to chair  times a day   - Out of bed for meals  times a day  - Out of bed for toileting  - Record patient progress and toleration of activity level   Outcome: Progressing     Problem: DISCHARGE PLANNING  Goal: Discharge to home or other facility with appropriate resources  Description: INTERVENTIONS:  - Identify barriers to discharge w/patient and caregiver  - Arrange for needed discharge resources and transportation as appropriate  - Identify discharge learning needs (meds, wound care, etc )  - Arrange for interpretive services to assist at discharge as needed  - Refer to Case Management Department for coordinating discharge planning if the patient needs post-hospital services based on physician/advanced practitioner order or complex needs related to functional status, cognitive ability, or social support system  Outcome: Progressing     Problem: Knowledge Deficit  Goal: Patient/family/caregiver demonstrates understanding of disease process, treatment plan, medications, and discharge instructions  Description: Complete learning assessment and assess knowledge base    Interventions:  - Provide teaching at level of understanding  - Provide teaching via preferred learning methods  Outcome: Progressing     Problem: Potential for Falls  Goal: Patient will remain free of falls  Description: INTERVENTIONS:  - Educate patient/family on patient safety including physical limitations  - Instruct patient to call for assistance with activity   - Consult OT/PT to assist with strengthening/mobility   - Keep Call bell within reach  - Keep bed low and locked with side rails adjusted as appropriate  - Keep care items and personal belongings within reach  - Initiate and maintain comfort rounds  - Make Fall Risk Sign visible to staff  - Offer Toileting every  Hours, in advance of need  - Initiate/Maintain alarm  - Obtain necessary fall risk management equipment:   - Apply yellow socks and bracelet for high fall risk patients  - Consider moving patient to room near nurses station  Outcome: Progressing     Problem: MOBILITY - ADULT  Goal: Maintain or return to baseline ADL function  Description: INTERVENTIONS:  -  Assess patient's ability to carry out ADLs; assess patient's baseline for ADL function and identify physical deficits which impact ability to perform ADLs (bathing, care of mouth/teeth, toileting, grooming, dressing, etc )  - Assess/evaluate cause of self-care deficits   - Assess range of motion  - Assess patient's mobility; develop plan if impaired  - Assess patient's need for assistive devices and provide as appropriate  - Encourage maximum independence but intervene and supervise when necessary  - Involve family in performance of ADLs  - Assess for home care needs following discharge   - Consider OT consult to assist with ADL evaluation and planning for discharge  - Provide patient education as appropriate  Outcome: Progressing  Goal: Maintains/Returns to pre admission functional level  Description: INTERVENTIONS:  - Perform BMAT or MOVE assessment daily    - Set and communicate daily mobility goal to care team and patient/family/caregiver  - Collaborate with rehabilitation services on mobility goals if consulted  - Perform Range of Motion  times a day  - Reposition patient every  hours    - Dangle patient  times a day  - Stand patient  times a day  - Ambulate patient  times a day  - Out of bed to chair  times a day   - Out of bed for meals times a day  - Out of bed for toileting  - Record patient progress and toleration of activity level   Outcome: Progressing     Problem: Prexisting or High Potential for Compromised Skin Integrity  Goal: Skin integrity is maintained or improved  Description: INTERVENTIONS:  - Identify patients at risk for skin breakdown  - Assess and monitor skin integrity  - Assess and monitor nutrition and hydration status  - Monitor labs   - Assess for incontinence   - Turn and reposition patient  - Assist with mobility/ambulation  - Relieve pressure over bony prominences  - Avoid friction and shearing  - Provide appropriate hygiene as needed including keeping skin clean and dry  - Evaluate need for skin moisturizer/barrier cream  - Collaborate with interdisciplinary team   - Patient/family teaching  - Consider wound care consult   Outcome: Progressing

## 2021-07-06 NOTE — H&P
[facility]  H&P- Reganee Meckel 1960, 64 y o  female MRN: [id number]  Unit/Bed#: S -01 Encounter: [id number]  Primary Care Provider: Ever Law MD   Date and time admitted to hospital: 7/5/2021  1:51 PM    * 2 [place] onset falls, once the night prior to admission and once the morning of admission  Both falls were witnessed by  who states she was transitioning from sitting to standing with walker at which time she tripped and fell  Reports worsening numbness in bilateral feet over recent weeks  DDx: HoTN vs proprioceptive (numbness of feet) vs worsening ambulatory dysfunction (bone and brain metastasis), less likely cardiac, echo WNL in April, no syncope per  who witnessed falls  Blood sugar 157, iron panel normal in May  Placed on telemetry in the ED, discontinued on the floors as cardiac etiology unlikely  Plan:  - orthostatics  - bed alarm  - Fall precautions  - PT/OT   - f/u folate and B12    Diarrhea  Assessment & Plan  Patient endorses frequent diarrhea, though is confused and unable to clarify duration, frequency, or characterize bowel movements  Patient seen by outpatient oncologist last month who notes the patient is taking MiraLax and Senokot as well as Amitiza 24 mcg b i d for chronic constipation, could possibly be contributing to diarrhea  Patient's sister assists with care at home and states that the patient has had diarrhea for a while, but if they hold her bowel regimen she becomes extremely constipated  No evidence of dehydration on labs  Received 1 L of LR in ED  No abnormalities on CT abdomen  Plan:  - holding bowel regimen except will continue to give miralax to maintain regularity   - reassess stool output and consider resuming home dulcolax and amitiza tomorrow   - accurate stool count    Asymptomatic bacteriuria  Assessment & Plan  WBCs 30-50 on urinalysis with occasional bacteria   Patient denies any urinary symptoms, no fever, no leukocytosis on CBC  Did receive one dose ceftriaxone in the ED  Plan:  - monitor off antibiotics   - f/u urine culture     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, estrogen receptor positive ([facility] [address] )  Assessment & Plan  Currently with brain and bone metastasis   and sister report patient has been having increasing back pain lately and difficulty with pain control  Plan:   - Pain control: Continue home gabapentin 400mg TID, continue home Dilaudid 4mg 4x daily  May require a fifth dose which the patient's outpatient doctor has stated is okay to give (per patient's sister)   - Nausea: continue home compazine 5mg TID   - Appetite stimulant: continue home dexamethasone 2mg daily   - lymphedema: lasix 20mg PO daily      Anemia of chronic disease  Assessment & Plan  Hgb 10 7 on admission, MCV 95  Per heme/onc note on 6/9: "reviewed labs from 05/18/2021 which reveal a hemoglobin of 9 9, platelet count 060, normal white count and MCV is 98  An iron panel was obtained to further evaluate her anemia which revealed a saturation of 22% and a ferritin level of 108 suggesting iron deficiency is not contributing to her anemia  Suspect her anemia is of chronic disease"    Plan:  - f/u B12 and folate     Metabolic encephalopathy  Assessment & Plan  Per family ( and sister), she has been increasingly more confused over the past few months  DDx: side effect of brain metastasis s/p whole brain rads vs possible UTI      Plan:  - delirium precautions   - avoid sedating medications   - f/u urine culture and blood cultures      Insomnia due to medical condition  Assessment & Plan  Continue home ambien 10mg and zyprexa 10mg at bedtime       VTE Pharmacologic Prophylaxis: VTE Score: 6 High Risk (Score >/= 5) - Pharmacological DVT Prophylaxis Ordered: apixaban (Eliquis)  Sequential Compression Devices Ordered    Code Status: Prior full code   Discussion with family: Updated  ( and sister) via phone  Anticipated Length of Stay: Patient will be admitted on an inpatient basis with an anticipated length of stay of greater than 2 midnights secondary to complications of stage IV cancer   Chief Complaint: fall at home    History of Present Illness:  Obed Escobar is a 64 y o  female with a PMH of stage IV breast cancer with metastasis to the brain and bone, s/p mastectomy and whole brain radiation, emphysema, and tobacco abuse who presented after a fall at home  History was obtained from the patient in addition to her  and sister as the patient is confused at times and not the most reliable historian  Per the , the patient was seen this morning by home health care  They reported hearing "fluid in her lungs" on exam and urged the patient to present to the ED for evaluation  Per family, the patient's vitals were normal when checked during the home healthcare visit  While preparing to leave for the ED, the patient attempted to stand up with the assistance of her walker, at which time she lost her footing and fell to the ground  The  reports she had no loss of consciousness, no shaking of the extremities, but he does report she lost continence of her bowels at that time, which is not out of the ordinary  He also reports she had a fall the evening prior to admission with the same circumstances  The  was unable to assist the patient to stand, so EMS was called  On the scene, the patient was hypotensive with systolic BP in the 67A  On arrival to the ED, Bps had normalized  Per family, the patient has had frequent diarrhea for many weeks 2/2 a strict bowel regimen that, when held, causes the patient to develop significant constipation  While the patient does not endorse any positives in review of systems, the family stated that she has been complaining of increasing numbness of her bilateral feet over the last few weeks   Additionally, they endorse that she has been having increased pain in her back from bone metastasis and is having a harder time with pain control on her home regimen  Review of Systems:  Review of Systems   Constitutional: Positive for fatigue  Negative for chills, diaphoresis, fever and unexpected weight change  HENT: Negative for tinnitus and trouble swallowing  Eyes: Negative for photophobia and visual disturbance  Respiratory: Positive for cough (chronic smokers cough)  Negative for chest tightness and shortness of breath  Cardiovascular: Negative for chest pain, palpitations and leg swelling  Gastrointestinal: Positive for abdominal distention (patient reports increased abdominal swelling but is unable to clarify when she first noticed it ) and diarrhea  Negative for abdominal pain, anal bleeding, blood in stool, constipation, nausea and vomiting  Endocrine: Negative for polyuria  Genitourinary: Positive for decreased urine volume (states decreased urination "in the mornings")  Negative for difficulty urinating, dysuria, flank pain, frequency, hematuria and urgency  Musculoskeletal: Positive for back pain  Skin: Negative for color change and pallor  Neurological: Positive for numbness (of bilateral lower extremities to level of ankle )  Negative for dizziness, tremors, syncope, light-headedness and headaches  Hematological: Does not bruise/bleed easily  Psychiatric/Behavioral: Positive for confusion and sleep disturbance  Negative for hallucinations  The patient is not nervous/anxious          Past Medical and Surgical History:   Past Medical History:   Diagnosis Date    Dehydration 6/11/2019    Dizziness 2/6/2018    Dry skin 2/6/2018    Edema 10/23/2019    H/O bilateral mastectomy 2/15/2016    Hyperglycemia 2/6/2018    Hypertension 2/15/2016    Hypokalemia 3/10/2020    Lymphedema 2/15/2016    Malignant cachexia ([facility] [address] ) 10/6/2016    Malignant neoplasm of right breast ([facility] [address] ) 2/15/2016    Metastatic breast cancer [facility])        Past Surgical History:   Procedure Laterality Date    ENDOBRONCHIAL ULTRASOUND (EBUS) N/A 2/16/2016    Procedure: EBUS;  Surgeon: Ced Hoyos MD;  [facility];  Service:     MASTECTOMY Bilateral     MASTECTOMY Bilateral     right arm edema    OOPHORECTOMY      NE BRONCHOSCOPY NEEDLE BX TRACHEA MAIN STEM&/BRON N/A 2/16/2016    Procedure: Mauricio Blackburn;  Surgeon: Ced Hoyos MD;  [facility];  Service: Thoracic    NE INSJ TUNNELED CTR VAD W/SUBQ PORT AGE 5 YR/> N/A 7/24/2018    Procedure: INSERTION VENOUS PORT ( PORT-A-CATH) IR;  Surgeon: Gwendolyn Herron DO;  [facility];  Service: Interventional Radiology    NE STEREOTACTIC RADIOSURGERY, CRANIAL,SIMPLE,EA ADD  5/3/2017         NE STEREOTACTIC RADIOSURGERY, CRANIAL,SIMPLE,EA ADD  5/3/2017         NE STEREOTACTIC RADIOSURGERY, CRANIAL,SIMPLE,SINGLE  5/3/2017            Meds/Allergies:  Prior to Admission medications    Medication Sig Start Date End Date Taking?  Authorizing Provider   albuterol (PROVENTIL HFA,VENTOLIN HFA) 90 mcg/act inhaler TAKE 2 PUFFS BY MOUTH EVERY 6 HOURS AS NEEDED FOR WHEEZING 9/4/19   Alice Carmen PA-C   apixaban (ELIQUIS) 5 mg Take 1 tablet (5 mg total) by mouth 2 (two) times a day 5/19/21 6/18/21  Alessandro Craig DO   bisacodyl (DULCOLAX) 10 mg suppository Insert 1 suppository (10 mg total) into the rectum daily as needed for constipation 5/19/21   Alessandro Craig DO   dexamethasone (DECADRON) 2 mg tablet Take 1 tablet (2 mg total) by mouth daily with breakfast To help appetite 5/4/21   Mireya Golden MD   furosemide (LASIX) 20 mg tablet Take 1 tablet (20 mg total) by mouth daily 6/23/21   Mireya Golden MD   gabapentin (NEURONTIN) 400 mg capsule Take 1 capsule (400 mg total) by mouth 3 (three) times a day before meals To reduce nerve pain 6/23/21   Mireya Golden MD   HYDROmorphone (DILAUDID) 4 mg tablet Take 1 tablet (4 mg total) by mouth 4 (four) times a day as needed (cancer pain)Max Daily Amount: 16 mg 6/23/21   Mary Espinal MD   levETIRAcetam (KEPPRA) 250 mg tablet Take 1 tablet (250 mg total) by mouth 2 (two) times a day 6/23/21   Mary Espinal MD   lubiprostone (AMITIZA) 24 mcg capsule Take 1 capsule (24 mcg total) by mouth 2 (two) times a day with meals 5/19/21 6/18/21  Winston Oliveira DO   Misc  Devices (QUAD CANE) MISC by Does not apply route daily 12/12/19   Burton Davis DO   OLANZapine (ZyPREXA) 10 mg tablet Take 1 tablet (10 mg total) by mouth daily at bedtime Every night, to help sleep and appetite   6/23/21   Mary Espinal MD   pantoprazole (PROTONIX) 40 mg tablet Take 1 tablet (40 mg total) by mouth daily Every morning, before coffee, to reduce stomach acid 5/4/21   Mary Espinal MD   polyethylene glycol (MIRALAX) 17 g packet Take 17 g by mouth daily 5/25/21   Winston Oliveira DO   prochlorperazine (COMPAZINE) 5 mg tablet Take 1 tablet (5 mg total) by mouth 3 (three) times a day before meals 6/23/21 7/23/21  Mary Espinal MD   senna-docusate sodium (SENOKOT S) 8 6-50 mg per tablet Take 2 tablets by mouth 2 (two) times a day 5/19/21 6/18/21  Winston Oliveira DO   zolpidem (AMBIEN) 10 mg tablet Take 1 tablet (10 mg total) by mouth daily at bedtime as needed for sleep 6/23/21   Mary Espinal MD         Allergies: No Known Allergies    Social History:  Marital Status: /Civil Union   Occupation: unemployed  Patient Pre-hospital Living Situation: With spouse, With other family member: sister  Patient Pre-hospital Level of Mobility: walks with walker  Patient Pre-hospital Diet Restrictions: none  Substance Use History:   Social History     Substance and Sexual Activity   Alcohol Use Not Currently    Comment: once a week     Social History     Tobacco Use   Smoking Status Current Every Day Smoker    Packs/day: 0 50    Years: 40 00    Pack years: 20 00    Types: Cigarettes    Start date: 1978   Smokeless Tobacco Never Used     Social History     Substance and Sexual Activity   Drug Use Not Currently    Types: Marijuana       Family History:  Family History   Problem Relation Age of Onset    Cancer Sister     Prostate cancer Brother        Physical Exam:     Vitals:   Blood Pressure: 127/69 (07/05/21 1845)  Pulse: 95 (07/05/21 1845)  Temperature: 99 5 °F (37 5 °C) (07/05/21 1845)  Temp Source: Oral (07/05/21 1845)  Respirations: 18 (07/05/21 1845)  SpO2: 96 % (07/05/21 1845)    Physical Exam  Constitutional:       General: She is not in acute distress  Appearance: She is not diaphoretic  HENT:      Head: Normocephalic and atraumatic  Mouth/Throat:      Mouth: Mucous membranes are moist       Pharynx: Oropharynx is clear  No oropharyngeal exudate or posterior oropharyngeal erythema  Eyes:      Extraocular Movements: Extraocular movements intact  Conjunctiva/sclera: Conjunctivae normal       Pupils: Pupils are equal, round, and reactive to light  Cardiovascular:      Rate and Rhythm: Normal rate and regular rhythm  Pulses: Normal pulses  Heart sounds: Normal heart sounds  Pulmonary:      Effort: Pulmonary effort is normal    Abdominal:      General: Bowel sounds are normal  There is distension  Palpations: There is no mass  Tenderness: There is no abdominal tenderness  There is no right CVA tenderness, left CVA tenderness, guarding or rebound  Musculoskeletal:         General: Swelling (lymphedema of the RUE) present  Right lower leg: No edema  Left lower leg: No edema  Skin:     General: Skin is warm and dry  Findings: Lesion (L first toe toenail avulsion ) present  Neurological:      General: No focal deficit present  Mental Status: She is alert  She is disoriented        Comments: Oriented to person and place, not time    Psychiatric:         Mood and Affect: Mood normal          Behavior: Behavior normal        Additional Data:     Lab Results:  Results from last 7 days   Lab Units 07/05/21  1415 06/29/21  1048   WBC Thousand/uL 8 71 8 65   HEMOGLOBIN g/dL 10 7* 10 6*   HEMATOCRIT % 34 2* 34 7*   PLATELETS Thousands/uL 365 375   BANDS PCT %  --  1   NEUTROS PCT % 78*  --    LYMPHS PCT % 11*  --    LYMPHO PCT %  --  24   MONOS PCT % 8  --    MONO PCT %  --  1*   EOS PCT % 1 2     Results from last 7 days   Lab Units 07/05/21  1415   SODIUM mmol/L 142   POTASSIUM mmol/L 3 6   CHLORIDE mmol/L 103   CO2 mmol/L 29   BUN mg/dL 11   CREATININE mg/dL 0 78   ANION GAP mmol/L 10   CALCIUM mg/dL 8 4   ALBUMIN g/dL 2 9*   TOTAL BILIRUBIN mg/dL 0 40   ALK PHOS U/L 106   ALT U/L 30   AST U/L 36   GLUCOSE RANDOM mg/dL 157*     Results from last 7 days   Lab Units 07/05/21  1415   INR  0 93     Results from last 7 days   Lab Units 07/05/21  1429   POC GLUCOSE mg/dl 159*         Results from last 7 days   Lab Units 07/05/21  1415   LACTIC ACID mmol/L 1 8       Imaging:  CT head wo contrast   Final Result by Alisa Frye MD (07/05 1642)      No acute intracranial abnormality  Changes within the brain parenchyma again suggesting sequela of prior whole brain radiation                  Workstation performed: [id number]         CT chest abdomen pelvis w contrast   Final Result by Alisa Frye MD (07/05 1652)      No acute posttraumatic abnormality detected in the chest, abdomen or pelvis  Stable background emphysema with multiple bilateral lung nodules as described  Workstation performed: [id number]             EKG and Other Studies Reviewed on Admission:   · EKG: NSR  HR 67     ** Please Note: This note has been constructed using a voice recognition system   **

## 2021-07-06 NOTE — UTILIZATION REVIEW
Initial Clinical Review    Admission: Date/Time/Statement:   Admission Orders (From admission, onward)     Ordered        07/05/21 1737  Inpatient Admission  Once                   Orders Placed This Encounter   Procedures    Inpatient Admission     Standing Status:   Standing     Number of Occurrences:   1     Order Specific Question:   Level of Care     Answer:   Med Surg [16]     Order Specific Question:   Estimated length of stay     Answer:   More than 2 Midnights     Order Specific Question:   Certification     Answer:   I certify that inpatient services are medically necessary for this patient for a duration of greater than two midnights  See H&P and MD Progress Notes for additional information about the patient's course of treatment  ED Arrival Information     Expected Arrival Acuity    - 7/5/2021 13:51 Emergent         Means of arrival Escorted by Service Admission type    Ambulance [facility] Emergency         Arrival complaint    fall        Chief Complaint   Patient presents with   Messi Leriche     this morning, + hitting head, + thinners, denies LOC    Hypotension     + breast cancer/brain cancer       Initial Presentation: 63 yo fem w/hx breast ca w/brain/bone mets, mastectomy, brain radiation, emphysema to ED from home by EMS admitted as inpatient due to metabolic encephalopathy , diarrhea, and fall  Presented after witnessed falls the night pta and am of admission  C/o worsening numbness in feet over recent weeks  Tripped and fell while moving from sitting to standing with her walker  C/o diarrhea 'for a while' which could be from bowel regimen that is taken due to usually being constipated  C/o worsening back pain from bone mets, harder to control pain at home  Exam reveals disoriented to time, distended abd, RUE lymphedema, L toenail avulsion  UA w/positive wbc's, asymptomatic  CT head nothing acute   holding bowel regimen, check foltate & b12, place bed alarm, consult PT/OT, check urine c&s  Provide analgesics, antiemetics, appetite stimulant  Date: 7/6   Day 2: no further diarrhea or falls  Doing well today  Exam reveals wheezing and rales  does not appear that she had syncope or other LOC  Dehydration, general deconditioning and possible orthostasis could have been factors in her sx  Will recheck h&h again tomorrow to ensure anemia remains stable  Date 7/7: no events overnight, podiatry following her avulsed toenail, but she says she never saw podiatry  C/o back pain, not fully resolved with analgesics  diarrhea resolved, will resume home bowel regimen  Family states if they hold the bowel regimen, she gets extremely constipated  has been falling at home despite having home PT/OT, likely from her peripheral neuropathy, which is likely from chemo  Continue gabapentin  The acute metabolic encephalopathy is likely her baseline due to whole brain radiation  Remains asymptomatic in setting of bacteriuria  No antbx are needed  Rehab recommended-family wants her to go, but patient wishes to return home  Per podiatry: contusion L great toe w/damage to nail  Onychomycosis  The nail was avulsed from some type of trauma  There is dried blood and the L great nail is missing  No bleeding or laceration  No further care needed other than to protect the toe from trauma  WBAT       ED Triage Vitals   Temperature Pulse Respirations Blood Pressure SpO2   07/05/21 1359 07/05/21 1359 07/05/21 1359 07/05/21 1359 07/05/21 1359   98 4 °F (36 9 °C) 98 18 118/69 100 %      Temp Source Heart Rate Source Patient Position - Orthostatic VS BP Location FiO2 (%)   07/05/21 1359 07/05/21 1359 07/05/21 1359 07/05/21 1359 --   Oral Monitor Lying Right arm       Pain Score       07/05/21 1904       7          Wt Readings from Last 1 Encounters:   06/23/21 84 8 kg (186 lb 15 2 oz)     Additional Vital Signs:   Date/Time  Temp  Pulse  Resp  BP MAP (mmHg)  SpO2  O2 Device    07/07/21 0910  --  --  --  139/76 --  --  -- 07/07/21 0905  --  --  --  145/67 --  --  --    07/07/21 0900  --  --  --  132/67 --  --  --    07/07/21 0800  --  --  --  145/67 --  --  --    07/07/21 0652  97 9 °F (36 6 °C)  78  18  141/71 100  93 %  None (Room air)    07/06/21 2224  98 3 °F (36 8 °C)  76  16  118/70 89  95 %  None (Room air)    07/06/21 2100  --  --  --  -- --  --  None (Room air)    07/06/21 1509  99 3 °F (37 4 °C)  68  18  119/64 89  95 %  None (Room air)        Date/Time  Temp  Pulse  Resp  BP  MAP (mmHg)  SpO2  O2 Device    07/05/21 2232  99 1 °F (37 3 °C)  91  18  102/58  69  96 %  None (Room air)    07/05/21 1845  99 5 °F (37 5 °C)  95  18  127/69  --  96 %  None (Room air)    07/05/21 1816  --  96  18  132/74  --  98 %  None (Room air)    07/05/21 1730  --  92  --  127/64  90  100 %  --    07/05/21 1715  --  92  --  --  --  99 %  --    07/05/21 1700  --  92  --  108/59  72  98 %  None (Room air)    07/05/21 1651  --  93  20  108/59  --  95 %  None (Room air)    07/05/21 1645  --  92  --  --  --  --  --    07/05/21 1535  --  98  22  134/74  --  98 %  None (Room air)    07/05/21 1500  --  92  --  --  --  100 %  --    07/05/21 1445  --  96  --  --  --  100 %  --    07/05/21 1430  --  96  --  --  --  98 %  --    07/05/21 1415  --  96  --  --  --  100 %  --    07/05/21 1400  --  96  --  118/69  87  100 %  --        Pertinent Labs/Diagnostic Test Results:        CT head wo contrast   Final Result by Brien Moctezuma MD (07/05 1642)      No acute intracranial abnormality  Changes within the brain parenchyma again suggesting sequela of prior whole brain radiation      CT chest abdomen pelvis w contrast   Final Result by Brien Moctezuma MD (07/05 1652)      No acute posttraumatic abnormality detected in the chest, abdomen or pelvis  Stable background emphysema with multiple bilateral lung nodules as described       7/5 EKG Normal sinus rhythm rate 67, normal intervals, normal axis, no acute ST   T wave changes   Nonspecific T-wave abnormality with T-wave inversions V1,   V2, no reciprocal changes   When compared to previous EKG dated   06/11/2021, no changes are noted    Results from last 7 days   Lab Units 07/07/21  0507 07/06/21  0642 07/05/21  1415   WBC Thousand/uL 6 79 7 12 8 71   HEMOGLOBIN g/dL 9 6* 9 5* 10 7*   HEMATOCRIT % 31 8* 31 3* 34 2*   PLATELETS Thousands/uL 311 325 365   NEUTROS ABS Thousands/µL  --   --  6 86         Results from last 7 days   Lab Units 07/07/21  0507 07/06/21  0642 07/05/21  1415   SODIUM mmol/L 145 139 142   POTASSIUM mmol/L 3 7 3 7 3 6   CHLORIDE mmol/L 111* 107 103   CO2 mmol/L 25 18* 29   ANION GAP mmol/L 9 14* 10   BUN mg/dL 6 6 11   CREATININE mg/dL 0 51* 0 49* 0 78   EGFR ml/min/1 73sq m 106 108 84   CALCIUM mg/dL 8 8 8 4 8 4   MAGNESIUM mg/dL  --   --  2 3     Results from last 7 days   Lab Units 07/05/21  1415   AST U/L 36   ALT U/L 30   ALK PHOS U/L 106   TOTAL PROTEIN g/dL 7 4   ALBUMIN g/dL 2 9*   TOTAL BILIRUBIN mg/dL 0 40     Results from last 7 days   Lab Units 07/05/21  1429   POC GLUCOSE mg/dl 159*     Results from last 7 days   Lab Units 07/07/21  0507 07/06/21  0642 07/05/21  1415   GLUCOSE RANDOM mg/dL 104 81 157*       Results from last 7 days   Lab Units 07/05/21  1415   TROPONIN I ng/mL <0 02         Results from last 7 days   Lab Units 07/05/21  1415   PROTIME seconds 12 6   INR  0 93   PTT seconds 28         Results from last 7 days   Lab Units 07/05/21  1415   PROCALCITONIN ng/ml <0 05     Results from last 7 days   Lab Units 07/05/21  1415   LACTIC ACID mmol/L 1 8       Results from last 7 days   Lab Units 07/05/21  1530   CLARITY UA  Slightly Cloudy   COLOR UA  Yellow   SPEC GRAV UA  1 020   PH UA  6 0   GLUCOSE UA mg/dl Negative   KETONES UA mg/dl Negative   BLOOD UA  Negative   PROTEIN UA mg/dl Negative   NITRITE UA  Negative   BILIRUBIN UA  Negative   UROBILINOGEN UA E U /dl 4 0*   LEUKOCYTES UA  Large*   WBC UA /hpf 30-50*   RBC UA /hpf None Seen   BACTERIA UA /hpf Occasional EPITHELIAL CELLS WET PREP /hpf Occasional       Results from last 7 days   Lab Units 07/05/21  1547 07/05/21  1415   BLOOD CULTURE  No Growth at 24 hrs  No Growth at 24 hrs                 ED Treatment:   Medication Administration from 07/05/2021 1351 to 07/05/2021 1838       Date/Time Order Dose Route Action     07/05/2021 1414 lactated ringers bolus 1,000 mL 1,000 mL Intravenous New Bag     07/05/2021 1545 iohexol (OMNIPAQUE) 350 MG/ML injection (SINGLE-DOSE) 100 mL 100 mL Intravenous Given     07/05/2021 1649 ceftriaxone (ROCEPHIN) 1 g/50 mL in dextrose IVPB 1,000 mg Intravenous New Bag        Past Medical History:   Diagnosis Date    Dehydration 6/11/2019    Dizziness 2/6/2018    Dry skin 2/6/2018    Edema 10/23/2019    H/O bilateral mastectomy 2/15/2016    Hyperglycemia 2/6/2018    Hypertension 2/15/2016    Hypokalemia 3/10/2020    Lymphedema 2/15/2016    Malignant cachexia ([facility] ) 10/6/2016    Malignant neoplasm of right breast ([facility] ) 2/15/2016    Metastatic breast cancer ([facility] )      Present on Admission:   Anemia of chronic disease   Insomnia due to medical condition   Onychomycosis of great toe      Admitting Diagnosis: Diarrhea [R19 7]  UTI (urinary tract infection) [N39 0]  Hypotension [I95 9]  Generalized weakness [R53 1]  Hypotensive episode [I95 9]  Age/Sex: 64 y o  female  Admission Orders:  Scheduled Medications:  apixaban, 5 mg, Oral, BID  dexamethasone, 2 mg, Oral, Daily With Breakfast  furosemide, 20 mg, Oral, Daily  gabapentin, 400 mg, Oral, TID AC  levETIRAcetam, 250 mg, Oral, BID  nicotine, 1 patch, Transdermal, Daily  OLANZapine, 10 mg, Oral, HS  pantoprazole, 40 mg, Oral, Daily  polyethylene glycol, 17 g, Oral, Daily  prochlorperazine, 5 mg, Oral, TID AC    PO kdur x 1 7/6    Continuous IV Infusions:none     PRN Meds:  acetaminophen, 650 mg, Oral, Q6H PRN  albuterol, 2 puff, Inhalation, Q6H PRN  calcium carbonate, 1,000 mg, Oral, Daily PRN  HYDROmorphone, 4 mg, Oral, 4x Daily PRN  ondansetron, 4 mg, Intravenous, Q6H PRN  zolpidem, 10 mg, Oral, HS PRN    SCD  House diet    IP CONSULT TO CASE MANAGEMENT  IP CONSULT TO PODIATRY  IP CONSULT TO PALLIATIVE CARE    Network Utilization Review Department  ATTENTION: Please call with any questions or concerns to [phone] and carefully listen to the prompts so that you are directed to the right person  All voicemails are confidential   [employer] all requests for admission clinical reviews, approved or denied determinations and any other requests to dedicated fax number below belonging to the campus where the patient is receiving treatment   List of dedicated fax numbers for the Facilities:  [address] DENIALS (Administrative/Medical Necessity) [phone]   [address] (Maternity/NICU/Pediatrics) [phone]   [address] [address] [place] [address] [address]ie [phone] [place] [address] [phone]

## 2021-07-07 PROBLEM — R41.89 COGNITIVE IMPAIRMENT: Status: ACTIVE | Noted: 2021-07-05

## 2021-07-07 LAB
ANION GAP SERPL CALCULATED.3IONS-SCNC: 9 MMOL/L (ref 4–13)
ATRIAL RATE: 102 BPM
BUN SERPL-MCNC: 6 MG/DL (ref 5–25)
CALCIUM SERPL-MCNC: 8.8 MG/DL (ref 8.3–10.1)
CHLORIDE SERPL-SCNC: 111 MMOL/L (ref 100–108)
CO2 SERPL-SCNC: 25 MMOL/L (ref 21–32)
CREAT SERPL-MCNC: 0.51 MG/DL (ref 0.6–1.3)
ERYTHROCYTE [DISTWIDTH] IN BLOOD BY AUTOMATED COUNT: 16 % (ref 11.6–15.1)
GFR SERPL CREATININE-BSD FRML MDRD: 106 ML/MIN/1.73SQ M
GLUCOSE SERPL-MCNC: 104 MG/DL (ref 65–140)
HCT VFR BLD AUTO: 31.8 % (ref 34.8–46.1)
HGB BLD-MCNC: 9.6 G/DL (ref 11.5–15.4)
MCH RBC QN AUTO: 29.3 PG (ref 26.8–34.3)
MCHC RBC AUTO-ENTMCNC: 30.2 G/DL (ref 31.4–37.4)
MCV RBC AUTO: 97 FL (ref 82–98)
P AXIS: 44 DEGREES
PLATELET # BLD AUTO: 311 THOUSANDS/UL (ref 149–390)
PMV BLD AUTO: 9 FL (ref 8.9–12.7)
POTASSIUM SERPL-SCNC: 3.7 MMOL/L (ref 3.5–5.3)
PR INTERVAL: 130 MS
QRS AXIS: 67 DEGREES
QRSD INTERVAL: 78 MS
QT INTERVAL: 316 MS
QTC INTERVAL: 402 MS
RBC # BLD AUTO: 3.28 MILLION/UL (ref 3.81–5.12)
SODIUM SERPL-SCNC: 145 MMOL/L (ref 136–145)
T WAVE AXIS: 78 DEGREES
VENTRICULAR RATE: 97 BPM
WBC # BLD AUTO: 6.79 THOUSAND/UL (ref 4.31–10.16)

## 2021-07-07 PROCEDURE — 97167 OT EVAL HIGH COMPLEX 60 MIN: CPT

## 2021-07-07 PROCEDURE — 99221 1ST HOSP IP/OBS SF/LOW 40: CPT | Performed by: PODIATRIST

## 2021-07-07 PROCEDURE — 99223 1ST HOSP IP/OBS HIGH 75: CPT | Performed by: NURSE PRACTITIONER

## 2021-07-07 PROCEDURE — 97163 PT EVAL HIGH COMPLEX 45 MIN: CPT

## 2021-07-07 PROCEDURE — 99232 SBSQ HOSP IP/OBS MODERATE 35: CPT | Performed by: INTERNAL MEDICINE

## 2021-07-07 PROCEDURE — 93010 ELECTROCARDIOGRAM REPORT: CPT | Performed by: INTERNAL MEDICINE

## 2021-07-07 PROCEDURE — 80048 BASIC METABOLIC PNL TOTAL CA: CPT | Performed by: INTERNAL MEDICINE

## 2021-07-07 PROCEDURE — 85027 COMPLETE CBC AUTOMATED: CPT | Performed by: INTERNAL MEDICINE

## 2021-07-07 RX ORDER — DEXAMETHASONE 2 MG/1
2 TABLET ORAL
Status: DISCONTINUED | OUTPATIENT
Start: 2021-07-07 | End: 2021-07-12 | Stop reason: HOSPADM

## 2021-07-07 RX ORDER — AMOXICILLIN 250 MG
1 CAPSULE ORAL 2 TIMES DAILY
Status: DISCONTINUED | OUTPATIENT
Start: 2021-07-07 | End: 2021-07-11

## 2021-07-07 RX ADMIN — PROCHLORPERAZINE MALEATE 5 MG: 10 TABLET ORAL at 06:41

## 2021-07-07 RX ADMIN — FUROSEMIDE 20 MG: 20 TABLET ORAL at 08:52

## 2021-07-07 RX ADMIN — PROCHLORPERAZINE MALEATE 5 MG: 10 TABLET ORAL at 17:09

## 2021-07-07 RX ADMIN — LEVETIRACETAM 250 MG: 500 TABLET, FILM COATED ORAL at 08:52

## 2021-07-07 RX ADMIN — GABAPENTIN 400 MG: 400 CAPSULE ORAL at 06:40

## 2021-07-07 RX ADMIN — LUBIPROSTONE 24 MCG: 24 CAPSULE, GELATIN COATED ORAL at 17:08

## 2021-07-07 RX ADMIN — OLANZAPINE 10 MG: 10 TABLET, FILM COATED ORAL at 21:19

## 2021-07-07 RX ADMIN — Medication 1 PATCH: at 08:54

## 2021-07-07 RX ADMIN — GABAPENTIN 400 MG: 400 CAPSULE ORAL at 17:07

## 2021-07-07 RX ADMIN — PROCHLORPERAZINE MALEATE 5 MG: 10 TABLET ORAL at 11:52

## 2021-07-07 RX ADMIN — APIXABAN 5 MG: 5 TABLET, FILM COATED ORAL at 08:52

## 2021-07-07 RX ADMIN — LEVETIRACETAM 250 MG: 500 TABLET, FILM COATED ORAL at 17:07

## 2021-07-07 RX ADMIN — POLYETHYLENE GLYCOL 3350 17 G: 17 POWDER, FOR SOLUTION ORAL at 08:52

## 2021-07-07 RX ADMIN — DOCUSATE SODIUM AND SENNOSIDES 1 TABLET: 8.6; 5 TABLET, FILM COATED ORAL at 17:07

## 2021-07-07 RX ADMIN — LUBIPROSTONE 24 MCG: 24 CAPSULE, GELATIN COATED ORAL at 08:52

## 2021-07-07 RX ADMIN — DEXAMETHASONE 2 MG: 2 TABLET ORAL at 08:53

## 2021-07-07 RX ADMIN — GABAPENTIN 400 MG: 400 CAPSULE ORAL at 11:52

## 2021-07-07 RX ADMIN — APIXABAN 5 MG: 5 TABLET, FILM COATED ORAL at 17:07

## 2021-07-07 RX ADMIN — PANTOPRAZOLE SODIUM 40 MG: 40 TABLET, DELAYED RELEASE ORAL at 08:53

## 2021-07-07 RX ADMIN — DEXAMETHASONE 2 MG: 2 TABLET ORAL at 17:08

## 2021-07-07 RX ADMIN — DOCUSATE SODIUM AND SENNOSIDES 1 TABLET: 8.6; 5 TABLET, FILM COATED ORAL at 08:53

## 2021-07-07 NOTE — ASSESSMENT & PLAN NOTE
Assessment & Plan  New onset falls: due to tripped and fell   Reports worsening numbness in bilateral feet over recent weeks       Plan:  - orthostatic blood pressure measurement  - bed alarm  - Fall precautions  - f/up PT/OT   - folate >20 0 and B12 422 (7/6)

## 2021-07-07 NOTE — ASSESSMENT & PLAN NOTE
- Hgb 10 7 on admission, MCV 95    - Per heme/onc note on 6/9: "reviewed labs from 05/18/2021 which reveal a hemoglobin of 9 9, platelet count 815, normal white count and MCV is 98  An iron panel was obtained to further evaluate her anemia which revealed a saturation of 22% and a ferritin level of 108 suggesting iron deficiency is not contributing to her anemia   Suspect her anemia is of chronic disease"  - Today Hb 9 5     Plan:  - folate >20 0 and B12 422(7/6)

## 2021-07-07 NOTE — ASSESSMENT & PLAN NOTE
Avulsion of L great toe nail  Pt unsure how this happened  Seen by podiatry this morning  Plan:  - per pods, no further care at this time, weight bear as tolerated

## 2021-07-07 NOTE — ASSESSMENT & PLAN NOTE
New onset falls, once the night prior to admission and once the morning of admission  Both falls were witnessed by  who states she was transitioning from sitting to standing with walker at which time she tripped and fell  Reports worsening numbness in bilateral feet over recent weeks  DDx: HoTN vs proprioceptive (numbness of feet, B12 and folate WNL) vs worsening ambulatory dysfunction (bone and brain metastasis), less likely cardiac, echo WNL in April, no syncope per  who witnessed falls  Blood sugar 157, iron panel normal in May      Plan:  - orthostatics  - bed alarm  - Fall precautions  - PT/OT

## 2021-07-07 NOTE — SOCIAL WORK
Palliative LSW and Warner Meuse Bloch-CRNP [facility] saw patient at the bedside today  LSW appreciates the opportunity to provider patient/family with inpatient emotional support and guidance while patient continues to receive medical attention from the medical team     Topics discussed: Met with pt at bedside  Pt's grandimer Gannon present at bedside as well  Pt reports her main concern at this time is her "legs don't want to move " Pt reports having difficulty ambulating the past few days and would like to build up strength  Pt recently d/c to home from [facility]  Pt initially did not wish to return to STR upon d/c, but upon further conversation she does report she would be open to going to possibly other local STR facility and potentially [place] if no other option would be available  Pt denies having any issues with eating, drinking, urinating, or having BM  Pt identifies her pain as "not bad "     CM updated that pt may be interested in STR upon d/c       Areas that need follow-up: Emotional Support  Resources given: None  Others present: Pt's grandimer Fiorelencho and Warner Meuse Bloch-CRNP [facility]    LSW will continue to follow as requested by the medical team, patient, or family

## 2021-07-07 NOTE — CONSULTS
Consultation - Palliative & Supportive Care   Leo Fong  64 y o   female  S /S -01   MRN: [id number]  Encounter: [id number]    ASSESSMENT:    Patient Active Problem List   Diagnosis    H/O bilateral mastectomy    Lymphedema of right upper extremity    Pulmonary nodule    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, estrogen receptor positive ([facility] [address]    Bone metastases ([facility] [address] [facility]    Brain metastases (HCC)    Cancer-related pain    CINV (chemotherapy-induced nausea and vomiting)    Therapeutic opioid-induced constipation (OIC)    Dyspareunia, female    Herniated lumbar intervertebral disc    Mediastinal lymphadenopathy    Weakness generalized    Bilateral pleural effusion    Abnormal CT of the chest    SOB (shortness of breath)    Tobacco abuse    Financial difficulties    Dehydration    Palliative care patient    Ambulatory dysfunction    Insomnia due to medical condition    Anemia of chronic disease    Thrombocytosis (HCC)    Transaminitis    Pulmonary embolism (HCC)    Impaction of intestine ([facility] [address] [facility]    Fall    Diarrhea    Asymptomatic bacteriuria    Cognitive impairment    Onychomycosis of great toe     Active issues specifically addressed today include: breast cancer metastatic to brain and bone, cognitive impairment, ambulatory dysfunction w/ recurrent falls, cancer-related pain, generalized weakness, Palliative Care patient, goals of care    60F w/ stage IV breast cancer w/ metastasis to brain and bone admitted after two falls in the home  [facility] consulted for goals of care as daughter would like for her mother and her family to learn more about hospice  PLAN:  1  Goals:    Disease focused care   Palliative will follow for ongoing goals of care discussions as situation evolves   Currently patient wishes to participate with STR   Encouraged follow up with Palliative Medicine on an outpatient basis after discharge for continued symptom management  Her next appointment is scheduled for 7/21/21 with Dr Buddy Galrand,    2  Social Support:   Supportive listening provided  Jessi Cisneros present for visit however does not participate much in conversation   Normalized experience of patient/family   Provided anxiety containment      3  Symptom management:  PO Poor intake  Increase dexamethasone to 2mg PO BID with breakfast and lunch  Continue olanzapine 10mg PO once daily @ hs    Cancer related pain  Increased dexamethasone to 2mg PO BID with breakfast and lunch  Continue gabapentin 400mg PO TID  Continue acetaminophen 650mg PO Q6H PRN  Continue Dilaudid 4mg PO QID PRN      Code status: Level 1 - Full Code   Decisional apparatus:  Patient does have capacity to make medical decisions on my exam today  If such capacity is lost, patient's substitute decision maker would default to spouse by [place] 169  Advance Directive / Living Will / POLST:  None seen in EMR  I have reviewed the patient's controlled substance dispensing history in the Prescription Drug Monitoring Program in compliance with the [facility] regulations before prescribing any controlled substances  We appreciate the opportunity to participate in this patient's care  We will continue to follow  Please do not hesitate to contact our on-call provider through our clinic answering service at [phone] should you have acute symptom control concerns  IDENTIFICATION:  Inpatient consult to Palliative Care  Consult performed by: FAUZIA Swanson  Consult ordered by: Demario Brantley MD      Reason for Consult / Principal Problem: goals of care    HISTORY OF PRESENT ILLNESS:    Vanessa Valencia is a 64 y o  female with a palliative diagnosis of stage IV breast cancer w/ metastasis to brain and bone, s/p b/l mastectomies and s/p RT  PMHx also includes cancer-related pain, cognitive impairment, lymphedema, CINV, insomnia   She follows w/ Dr Gilmar Knight office (Medical Oncology); systemic treatment w/ ado-trastuzumab has been on hold since 03/2021 d/t adverse effects  She was [facility] 7/5/21 after two falls in the home  She has chronic ambulatory dysfunction, and had stated she has home PT; she reported some numbness in her feet which may have contributed to the falls  Imaging in ED reassuring, showing no fractures, no ICH  [facility] [place] consulted for goals of care; daughter has stated she and her mother wish to learn more about hospice  Patient is known to [facility] [place], and follows w/ Dr Violetta Little in the ambulatory setting  Patient is seen sitting in the chair with her grandson Julio C Brothmary kay present  She is alert and oriented x3 and able to provide the day of the week, the month and tell me who the president is  Patient is disappointed that she was in the hospital again  She reports being very unsteady on her feet and weak  She reports that she is unable to move her legs at times  She does complain of bilateral thigh pain which is equal on both sides  When she was discharged recently she did stay in short-term rehab for 20 days which helped improve her ambulation however she again became weak in recent days  At the start of her conversation she reports that her goal is to walk and continue to get better but that she would not consider short-term  As the conversation progressed she clearly describes a dissatisfaction with sleep felt and is willing to consider other rehabs within a 10 mi radius of her home  She does report worst case she would go back to sleep felt if that is what is recommended  Patient has been consistent in my previous visits that she wants to continue with disease focused care  Spoke to patient's daughter Norm Fang  Introduced hospice  At this time Norm Fang is not interested in having a goals of care conversation with her mom and introducing hospice    She would want patient to return to the hospital for treatment of acute medical needs, she would want patient to continue to participate in short-term rehab or home PT and she does want to continue all medical care  She believes that her mom still has "fight in her" and that at this time a hospice conversation would not provide any benefit  Lyla Caicedo confirms the goal is to be able to have her mom ambulate around the house as safe as possible  She does have a walker at home and also a wheelchair  Review of Systems   Constitutional: Positive for appetite change and fatigue  Cardiovascular: Positive for leg swelling  Musculoskeletal: Positive for arthralgias, gait problem and myalgias  Skin: Positive for pallor  Neurological: Positive for weakness         Past Medical History:   Diagnosis Date    Dehydration 6/11/2019    Dizziness 2/6/2018    Dry skin 2/6/2018    Edema 10/23/2019    H/O bilateral mastectomy 2/15/2016    Hyperglycemia 2/6/2018    Hypertension 2/15/2016    Hypokalemia 3/10/2020    Lymphedema 2/15/2016    Mal[facility] ([facility] [address] ) 10/6/2016    Malignant neoplasm of right breast ([facility] [address] ) 2/15/2016    Metastatic breast cancer [facility])      Past Surgical History:   Procedure Laterality Date    ENDOBRONCHIAL ULTRASOUND (EBUS) N/A 2/16/2016    Procedure: EBUS;  Surgeon: Mireille Hou MD;  [facility];  Service:     MASTECTOMY Bilateral     MASTECTOMY Bilateral     right arm edema    OOPHORECTOMY      NH BRONCHOSCOPY NEEDLE BX TRACHEA MAIN STEM&/BRON N/A 2/16/2016    Procedure: Reinaldo Young;  Surgeon: Mireille Hou MD;  [facility];  Service: Thoracic    NH INSJ TUNNELED CTR VAD W/SUBQ PORT AGE 5 YR/> N/A 7/24/2018    Procedure: INSERTION VENOUS PORT ( PORT-A-CATH) IR;  Surgeon: Katrin Chiu DO;  [facility];  Service: Interventional Radiology    NH STEREOTACTIC RADIOSURGERY, CRANIAL,SIMPLE,EA ADD  5/3/2017         NH STEREOTACTIC RADIOSURGERY, CRANIAL,SIMPLE,EA ADD  5/3/2017         NH STEREOTACTIC RADIOSURGERY, CRANIAL,SIMPLE,SINGLE  5/3/2017          Social History Socioeconomic History    Marital status: /Civil Union     Spouse name: Not on file    Number of children: 1    Years of education: Not on file    Highest education level: Not on file   Occupational History    Not on file   Tobacco Use    Smoking status: Current Every Day Smoker     Packs/day: 0 50     Years: 40 00     Pack years: 20 00     Types: Cigarettes     Start date: 1    Smokeless tobacco: Never Used   Vaping Use    Vaping Use: Never used   Substance and Sexual Activity    Alcohol use: Not Currently     Comment: once a week    Drug use: Not Currently     Types: Marijuana    Sexual activity: Not on file   Other Topics Concern    Not on file   Social History Narrative    Not on file     Social Determinants of Health     Financial Resource Strain:     Difficulty of Paying Living Expenses:    Food Insecurity:     Worried About Running Out of Food in the Last Year:     Ran Out of Food in the Last Year:    Transportation Needs:     Lack of Transportation (Medical):      Lack of Transportation (Non-Medical):    Physical Activity:     Days of Exercise per Week:     Minutes of Exercise per Session:    Stress:     Feeling of Stress :    Social Connections:     Frequency of Communication with Friends and Family:     Frequency of Social Gatherings with Friends and Family:     Attends Latter-day Services:     Active Member of Clubs or Organizations:     Attends Club or Organization Meetings:     Marital Status:    Intimate Partner Violence:     Fear of Current or Ex-Partner:     Emotionally Abused:     Physically Abused:     Sexually Abused:      Family History   Problem Relation Age of Onset    Cancer Sister     Prostate cancer Brother      MEDICATIONS / ALLERGIES:  all current active meds have been reviewed    No Known Allergies    OBJECTIVE:  /71 (BP Location: Left arm)   Pulse 78   Temp 97 9 °F (36 6 °C) (Oral)   Resp 18   LMP  (LMP Unknown)   SpO2 93%   Nursing notes and vital signs reviewed  Physical Exam:  Constitutional: Appears chronically ill  In no acute physical or emotional distress  Head: Normocephalic and atraumatic  Eyes: EOM are normal  No ocular discharge  No scleral icterus  Neck: No visible adenopathy or masses  Respiratory: Effort normal  No stridor  No respiratory distress on room air  Speaking comfortably in complete sentences  Gastrointestinal: No abdominal distension  Musculoskeletal: BLE edema  Neurological: Alert, oriented and appropriately conversant  No focal deficits  Follows commands appropriately  Skin: Dry, no diaphoresis  Pallor  Psychiatric: Displays a normal mood and affect  Behavior, judgment and thought content appear normal      Lab Results: I have personally reviewed pertinent labs  CBC:  Lab Results   Component Value Date    WBC 6 79 07/07/2021    HGB 9 6 (L) 07/07/2021    HCT 31 8 (L) 07/07/2021    MCV 97 07/07/2021     07/07/2021    MCH 29 3 07/07/2021    MCHC 30 2 (L) 07/07/2021    RDW 16 0 (H) 07/07/2021    MPV 9 0 07/07/2021     CMP:  Lab Results   Component Value Date    SODIUM 145 07/07/2021    K 3 7 07/07/2021     (H) 07/07/2021    CO2 25 07/07/2021    BUN 6 07/07/2021    CREATININE 0 51 (L) 07/07/2021    CALCIUM 8 8 07/07/2021    EGFR 106 07/07/2021     Albumin:  0   Lab Value Date/Time    ALB 2 9 (L) 07/05/2021 1415     Imaging Studies: I have personally reviewed pertinent reports  EKG, Pathology, and Other Studies: I have personally reviewed pertinent reports  Counseling / Coordination of Care: Total floor / unit time spent today 90 minutes  Greater than 50% of total time was spent with the patient and / or family counseling and / or coordination of care  A description of the counseling / coordination of care: symptom assessment and management, medication review and adjustment, psychosocial support, chart review, lab review, goals of care and hospice services      Naif Vines, MSN, CRNP, [facility] Palliative and Supportive Care  [phone]    Portions of this document may have been created using dictation software and as such some "sound alike" terms may have been generated by the system  Do not hesitate to contact me with any questions or clarifications

## 2021-07-07 NOTE — CONSULTS
Consult - Podiatry   Nirav Small 64 y o  female MRN: [id number]  Unit/Bed#: S -01 Encounter: [id number]    Assessment/Plan     Assessment:  1  Contusion left great tail with damage to nail  2  Onychomycosis    Plan:  1  Most of left great toenail appears to have been avulsed from some kind of trauma  There is no active bleeding or laceration to the nail bed  No further care necessary other than protect the toe from trauma  2  Weight bear as tolerated    History of Present Illness     HPI:  Nirav Small is a 64 y o  female who presents with bleeding from her left great toenail  Patient is poor historian and cannot remember what happened  There is some dried blood and most of the left great toenail is missing  Per review of the patient's hospital record she has numerous falls and ambulatory dysfunction       Consults  Review of Systems   Unable to obtain secondary to cognitive impairment      Historical Information   Past Medical History:   Diagnosis Date    Dehydration 6/11/2019    Dizziness 2/6/2018    Dry skin 2/6/2018    Edema 10/23/2019    H/O bilateral mastectomy 2/15/2016    Hyperglycemia 2/6/2018    Hypertension 2/15/2016    Hypokalemia 3/10/2020    Lymphedema 2/15/2016    Malignant cachexia ([facility] [address] ) 10/6/2016    Malignant neoplasm of right breast ([facility] [address] ) 2/15/2016    Metastatic breast cancer [facility])      Past Surgical History:   Procedure Laterality Date    ENDOBRONCHIAL ULTRASOUND (EBUS) N/A 2/16/2016    Procedure: EBUS;  Surgeon: Lorenzo Valle MD;  [facility];  Service:     MASTECTOMY Bilateral     MASTECTOMY Bilateral     right arm edema    OOPHORECTOMY      GA BRONCHOSCOPY NEEDLE BX TRACHEA MAIN STEM&/BRON N/A 2/16/2016    Procedure: Julio Mccarthy;  Surgeon: Lorenzo Valle MD;  [facility];  Service: Thoracic    GA INSJ TUNNELED CTR VAD W/SUBQ PORT AGE 5 YR/> N/A 7/24/2018    Procedure: INSERTION VENOUS PORT ( PORT-A-CATH) IR;  Surgeon: Patricia Timmons DO; [facility];  Service: Interventional Radiology    MS STEREOTACTIC RADIOSURGERY, CRANIAL,SIMPLE,EA ADD  5/3/2017         MS STEREOTACTIC RADIOSURGERY, CRANIAL,SIMPLE,EA ADD  5/3/2017         MS STEREOTACTIC RADIOSURGERY, CRANIAL,SIMPLE,SINGLE  5/3/2017          Social History   Social History     Substance and Sexual Activity   Alcohol Use Not Currently    Comment: once a week     Social History     Substance and Sexual Activity   Drug Use Not Currently    Types: Marijuana     Social History     Tobacco Use   Smoking Status Current Every Day Smoker    Packs/day: 0 50    Years: 40 00    Pack years: 20 00    Types: Cigarettes    Start date: 1978   Smokeless Tobacco Never Used     Family History:   Family History   Problem Relation Age of Onset    Cancer Sister     Prostate cancer Brother        Meds/Allergies   Medications Prior to Admission   Medication    albuterol (PROVENTIL HFA,VENTOLIN HFA) 90 mcg/act inhaler    apixaban (ELIQUIS) 5 mg    bisacodyl (DULCOLAX) 10 mg suppository    dexamethasone (DECADRON) 2 mg tablet    furosemide (LASIX) 20 mg tablet    gabapentin (NEURONTIN) 400 mg capsule    HYDROmorphone (DILAUDID) 4 mg tablet    lubiprostone (AMITIZA) 24 mcg capsule    Misc   Devices (QUAD CANE) MISC    OLANZapine (ZyPREXA) 10 mg tablet    pantoprazole (PROTONIX) 40 mg tablet    polyethylene glycol (MIRALAX) 17 g packet    prochlorperazine (COMPAZINE) 5 mg tablet    senna-docusate sodium (SENOKOT S) 8 6-50 mg per tablet    zolpidem (AMBIEN) 10 mg tablet     No Known Allergies    Objective   First Vitals:   Blood Pressure: 118/69 (07/05/21 1359)  Pulse: 98 (07/05/21 1359)  Temperature: 98 4 °F (36 9 °C) (07/05/21 1359)  Temp Source: Oral (07/05/21 1359)  Respirations: 18 (07/05/21 1359)  SpO2: 100 % (07/05/21 1359)    Current Vitals:   Blood Pressure: 118/70 (07/06/21 2224)  Pulse: 76 (07/06/21 2224)  Temperature: 98 3 °F (36 8 °C) (07/06/21 2224)  Temp Source: Oral (07/06/21 2224)  Respirations: 16 (07/06/21 2224)  SpO2: 95 % (07/06/21 2224)        /70 (BP Location: Left arm)   Pulse 76   Temp 98 3 °F (36 8 °C) (Oral)   Resp 16   LMP  (LMP Unknown)   SpO2 95%   Physical Exam:  General:    Alert, cooperative, no distress   Head:     Normocephalic, without obvious abnormality, atraumatic                   Skin:   Most of the left great toenail has been detached in avulsed  There is no active bleeding but there is dry eschar  No laceration or cellulitis  No apparent pain on palpation  Made of the nails are thickened and dystrophic  Lungs:     Respirations unlabored   Chest wall:    No tenderness or deformity   Heart/vasc:    Regular rate and rhythm  Palpable pedal pulses  Capillary refills brisk  No cellulitis  Abdomen:     Soft, non-tender, no distention           Lower MSK:   Diminished bilateral lower extremity muscle strength  Mild edema  No cyanosis or clubbing  Psychiatric:  AAOx3, no depression           Neurologic:   Gross sensation intact bilateral lower extremity  Normal Achilles reflex       Lab Results:   Admission on 07/05/2021   Component Date Value    WBC 07/05/2021 8 71     RBC 07/05/2021 3 62*    Hemoglobin 07/05/2021 10 7*    Hematocrit 07/05/2021 34 2*    MCV 07/05/2021 95     MCH 07/05/2021 29 6     MCHC 07/05/2021 31 3*    RDW 07/05/2021 16 0*    MPV 07/05/2021 9 4     Platelets 80/04/4543 365     nRBC 07/05/2021 0     Neutrophils Relative 07/05/2021 78*    Immat GRANS % 07/05/2021 1     Lymphocytes Relative 07/05/2021 11*    Monocytes Relative 07/05/2021 8     Eosinophils Relative 07/05/2021 1     Basophils Relative 07/05/2021 1     Neutrophils Absolute 07/05/2021 6 86     Immature Grans Absolute 07/05/2021 0 07     Lymphocytes Absolute 07/05/2021 0 97     Monocytes Absolute 07/05/2021 0 73     Eosinophils Absolute 07/05/2021 0 04     Basophils Absolute 07/05/2021 0 04     Sodium 07/05/2021 142     Potassium 07/05/2021 3 6     Chloride 07/05/2021 103     CO2 07/05/2021 29     ANION GAP 07/05/2021 10     BUN 07/05/2021 11     Creatinine 07/05/2021 0 78     Glucose 07/05/2021 157*    Calcium 07/05/2021 8 4     Corrected Calcium 07/05/2021 9 3     AST 07/05/2021 36     ALT 07/05/2021 30     Alkaline Phosphatase 07/05/2021 106     Total Protein 07/05/2021 7 4     Albumin 07/05/2021 2 9*    Total Bilirubin 07/05/2021 0 40     eGFR 07/05/2021 84     LACTIC ACID 07/05/2021 1 8     Procalcitonin 07/05/2021 <0 05     Protime 07/05/2021 12 6     INR 07/05/2021 0 93     PTT 07/05/2021 28     Blood Culture 07/05/2021 No Growth at 24 hrs   Blood Culture 07/05/2021 No Growth at 24 hrs       Color, UA 07/05/2021 Yellow     Clarity, UA 07/05/2021 Slightly Cloudy     Specific Gravity, UA 07/05/2021 1 020     pH, UA 07/05/2021 6 0     Leukocytes, UA 07/05/2021 Large*    Nitrite, UA 07/05/2021 Negative     Protein, UA 07/05/2021 Negative     Glucose, UA 07/05/2021 Negative     Ketones, UA 07/05/2021 Negative     Urobilinogen, UA 07/05/2021 4 0*    Bilirubin, UA 07/05/2021 Negative     Blood, UA 07/05/2021 Negative     Magnesium 07/05/2021 2 3     Troponin I 07/05/2021 <0 02     POC Glucose 07/05/2021 159*    RBC, UA 07/05/2021 None Seen     WBC, UA 07/05/2021 30-50*    Epithelial Cells 07/05/2021 Occasional     Bacteria, UA 07/05/2021 Occasional     OTHER OBSERVATIONS 07/05/2021 WBCs Clumped     Vitamin B-12 07/06/2021 422     Folate 07/06/2021 >20 0*    Sodium 07/06/2021 139     Potassium 07/06/2021 3 7     Chloride 07/06/2021 107     CO2 07/06/2021 18*    ANION GAP 07/06/2021 14*    BUN 07/06/2021 6     Creatinine 07/06/2021 0 49*    Glucose 07/06/2021 81     Calcium 07/06/2021 8 4     eGFR 07/06/2021 108     WBC 07/06/2021 7 12     RBC 07/06/2021 3 24*    Hemoglobin 07/06/2021 9 5*    Hematocrit 07/06/2021 31 3*    MCV 07/06/2021 97     MCH 07/06/2021 29 3     MCHC 07/06/2021 30 4*    RDW 07/06/2021 15 9*    Platelets 89/75/7334 325     MPV 07/06/2021 9 3     WBC 07/07/2021 6 79     RBC 07/07/2021 3 28*    Hemoglobin 07/07/2021 9 6*    Hematocrit 07/07/2021 31 8*    MCV 07/07/2021 97     MCH 07/07/2021 29 3     MCHC 07/07/2021 30 2*    RDW 07/07/2021 16 0*    Platelets 81/76/1133 311     MPV 07/07/2021 9 0     Sodium 07/07/2021 145     Potassium 07/07/2021 3 7     Chloride 07/07/2021 111*    CO2 07/07/2021 25     ANION GAP 07/07/2021 9     BUN 07/07/2021 6     Creatinine 07/07/2021 0 51*    Glucose 07/07/2021 104     Calcium 07/07/2021 8 8     eGFR 07/07/2021 106            Results from last 7 days   Lab Units 07/05/21  1547 07/05/21  1415   BLOOD CULTURE  No Growth at 24 hrs  No Growth at 24 hrs  Imaging: I have personally reviewed pertinent films in PACS      Code Status: Level 1 - Full Code        Portions of the record may have been created with voice recognition software  Occasional wrong word or "sound a like" substitutions may have occurred due to the inherent limitations of voice recognition software  Read the chart carefully and recognize, using context, where substitutions have occurred

## 2021-07-07 NOTE — PLAN OF CARE
Problem: PAIN - ADULT  Goal: Verbalizes/displays adequate comfort level or baseline comfort level  Description: Interventions:  - Encourage patient to monitor pain and request assistance  - Assess pain using appropriate pain scale  - Administer analgesics based on type and severity of pain and evaluate response  - Implement non-pharmacological measures as appropriate and evaluate response  - Consider cultural and social influences on pain and pain management  - Notify physician/advanced practitioner if interventions unsuccessful or patient reports new pain  Outcome: Progressing     Problem: INFECTION - ADULT  Goal: Absence or prevention of progression during hospitalization  Description: INTERVENTIONS:  - Assess and monitor for signs and symptoms of infection  - Monitor lab/diagnostic results  - Monitor all insertion sites, i e  indwelling lines, tubes, and drains  - Monitor endotracheal if appropriate and nasal secretions for changes in amount and color  - [place] appropriate cooling/warming therapies per order  - Administer medications as ordered  - Instruct and encourage patient and family to use good hand hygiene technique  - Identify and instruct in appropriate isolation precautions for identified infection/condition  Outcome: Progressing  Goal: Absence of fever/infection during neutropenic period  Description: INTERVENTIONS:  - Monitor WBC    Outcome: Progressing     Problem: SAFETY ADULT  Goal: Patient will remain free of falls  Description: INTERVENTIONS:  - Educate patient/family on patient safety including physical limitations  - Instruct patient to call for assistance with activity   - Consult OT/PT to assist with strengthening/mobility   - Keep Call bell within reach  - Keep bed low and locked with side rails adjusted as appropriate  - Keep care items and personal belongings within reach  - Initiate and maintain comfort rounds  - Make Fall Risk Sign visible to staff  - Apply yellow socks and bracelet for high fall risk patients  - Consider moving patient to room near nurses station  Outcome: Progressing  Goal: Maintain or return to baseline ADL function  Description: INTERVENTIONS:  -  Assess patient's ability to carry out ADLs; assess patient's baseline for ADL function and identify physical deficits which impact ability to perform ADLs (bathing, care of mouth/teeth, toileting, grooming, dressing, etc )  - Assess/evaluate cause of self-care deficits   - Assess range of motion  - Assess patient's mobility; develop plan if impaired  - Assess patient's need for assistive devices and provide as appropriate  - Encourage maximum independence but intervene and supervise when necessary  - Involve family in performance of ADLs  - Assess for home care needs following discharge   - Consider OT consult to assist with ADL evaluation and planning for discharge  - Provide patient education as appropriate  Outcome: Progressing  Goal: Maintains/Returns to pre admission functional level  Description: INTERVENTIONS:  - Perform BMAT or MOVE assessment daily    - Set and communicate daily mobility goal to care team and patient/family/caregiver     - Collaborate with rehabilitation services on mobility goals if consulted  - Out of bed for toileting  - Record patient progress and toleration of activity level   Outcome: Progressing     Problem: DISCHARGE PLANNING  Goal: Discharge to home or other facility with appropriate resources  Description: INTERVENTIONS:  - Identify barriers to discharge w/patient and caregiver  - Arrange for needed discharge resources and transportation as appropriate  - Identify discharge learning needs (meds, wound care, etc )  - Arrange for interpretive services to assist at discharge as needed  - Refer to Case Management Department for coordinating discharge planning if the patient needs post-hospital services based on physician/advanced practitioner order or complex needs related to functional status, cognitive ability, or social support system  Outcome: Progressing     Problem: Knowledge Deficit  Goal: Patient/family/caregiver demonstrates understanding of disease process, treatment plan, medications, and discharge instructions  Description: Complete learning assessment and assess knowledge base    Interventions:  - Provide teaching at level of understanding  - Provide teaching via preferred learning methods  Outcome: Progressing     Problem: Potential for Falls  Goal: Patient will remain free of falls  Description: INTERVENTIONS:  - Educate patient/family on patient safety including physical limitations  - Instruct patient to call for assistance with activity   - Consult OT/PT to assist with strengthening/mobility   - Keep Call bell within reach  - Keep bed low and locked with side rails adjusted as appropriate  - Keep care items and personal belongings within reach  - Initiate and maintain comfort rounds  - Make Fall Risk Sign visible to staff  - Apply yellow socks and bracelet for high fall risk patients  - Consider moving patient to room near nurses station  Outcome: Progressing     Problem: MOBILITY - ADULT  Goal: Maintain or return to baseline ADL function  Description: INTERVENTIONS:  -  Assess patient's ability to carry out ADLs; assess patient's baseline for ADL function and identify physical deficits which impact ability to perform ADLs (bathing, care of mouth/teeth, toileting, grooming, dressing, etc )  - Assess/evaluate cause of self-care deficits   - Assess range of motion  - Assess patient's mobility; develop plan if impaired  - Assess patient's need for assistive devices and provide as appropriate  - Encourage maximum independence but intervene and supervise when necessary  - Involve family in performance of ADLs  - Assess for home care needs following discharge   - Consider OT consult to assist with ADL evaluation and planning for discharge  - Provide patient education as appropriate  Outcome: Progressing  Goal: Maintains/Returns to pre admission functional level  Description: INTERVENTIONS:  - Perform BMAT or MOVE assessment daily    - Set and communicate daily mobility goal to care team and patient/family/caregiver     - Collaborate with rehabilitation services on mobility goals if consulted  - Out of bed for toileting  - Record patient progress and toleration of activity level   Outcome: Progressing     Problem: Prexisting or High Potential for Compromised Skin Integrity  Goal: Skin integrity is maintained or improved  Description: INTERVENTIONS:  - Identify patients at risk for skin breakdown  - Assess and monitor skin integrity  - Assess and monitor nutrition and hydration status  - Monitor labs   - Assess for incontinence   - Turn and reposition patient  - Assist with mobility/ambulation  - Relieve pressure over bony prominences  - Avoid friction and shearing  - Provide appropriate hygiene as needed including keeping skin clean and dry  - Evaluate need for skin moisturizer/barrier cream  - Collaborate with interdisciplinary team   - Patient/family teaching  - Consider wound care consult   Outcome: Progressing

## 2021-07-07 NOTE — CASE MANAGEMENT
NOT A BUNDLE;  NOT A READMISSION;  YELLOW UNPLANNED READMISSION  Cm met with pt to review previous assessment and discuss DCP  Pt reports assessment is acurate as she has just returned home from rehab at [facility]  Pt reports she lives in a trailer which is all one floor in [place]  There are no EKATERINA as she has a ramp  She is reportedly independent with ADLs however needs assistance with showering  She is able to ambulate independently with a RW  Pt also has a shower chair and raised toilet seat  She has been to [facility] in the past and reports she will not be going to rehab again  Pt has had [facility] in the past   Pt does not have a POA and would like paperwork to complete this  Cm provided POA paperwork  She reports her  would be her HR and she would like him contacted with updates  Pt uses [facility] in [place] for medications  She is able to afford her medications with her insurance coverage  Her PCP is Ever Law MD and she denies any hx of MH/D&A  Cm reviewed with pt the recommendations of the care team for rehab  [place] of choice has been reviewed with pt  She states she knows she needs rehab however she is not happy about it  Pt is requesting referrals be made within 10 miles of her home as she was not happy with [facility]  She states she will return there in the event she needs to  Cm reviewed the list of facilities that are within the 10 mile area and suggested a blanket referral be made to the 4 facilities  Pt would like a blanket referral made as she will then be able to choose where she would like to go  Cm made referrals  Pt will need insurance auth prior to DC and this was explained to pt as well  CM reviewed the availability of treatment team to discuss questions or concerns patient and/or family may have regarding  understanding medications and recognizing signs and symptoms at discharge  CM also encouraged patient to follow up with all recommended appointments after discharge   COREEN reviewed the information that will be provided to pt/family on the discharge instructions  Patient advised of importance for patient and family to participate in managing patient's medical well being

## 2021-07-07 NOTE — ASSESSMENT & PLAN NOTE
At home was taking MiraLax and Senokot as well as Amitiza 24 mcg b i d for chronic constipation, could possibly be contributing to diarrhea  Patient's sister assists with care at home and states that the patient has had diarrhea for a while, but if they hold her bowel regimen she becomes extremely constipated  Diarrhea has resolved, has not had a bowel movement since admission  No evidence of dehydration on labs  No abnormalities on CT abdomen       Plan:  - Resume remainder of home bowel regimen (senakot and amitiza) to maintain regularity, has been continued on miralax since admission

## 2021-07-07 NOTE — ASSESSMENT & PLAN NOTE
Hgb 10 7 on admission, custodial 95  Hgb stable at 9 7 today  Per heme/onc note on 6/9: "reviewed labs from 05/18/2021 which reveal a hemoglobin of 9 9, platelet count 500, normal white count and MCV is 98  An iron panel was obtained to further evaluate her anemia which revealed a saturation of 22% and a ferritin level of 108 suggesting iron deficiency is not contributing to her anemia  Suspect her anemia is of chronic disease"   B12 and folate WNL this admission      Plan:  - continue to monitor outpatient

## 2021-07-07 NOTE — PHYSICAL THERAPY NOTE
PHYSICAL THERAPY EVALUATION  NAME:  Liza Lseter  DATE: 07/07/21    AGE:   64 y o  Mrn:   [id number]  ADMIT DX:  Diarrhea [R19 7]  UTI (urinary tract infection) [N39 0]  Hypotension [I95 9]  Generalized weakness [R53 1]  Hypotensive episode [I95 9]  Problem List:   Patient Active Problem List   Diagnosis    H/O bilateral mastectomy    Lymphedema of right upper extremity    Pulmonary nodule    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, estrogen receptor positive (HCC)    Bone metastases (HCC)    Brain metastases (HCC)    Cancer-related pain    CINV (chemotherapy-induced nausea and vomiting)    Therapeutic opioid-induced constipation (OIC)    Dyspareunia, female    Herniated lumbar intervertebral disc    Mediastinal lymphadenopathy    Weakness generalized    Bilateral pleural effusion    Abnormal CT of the chest    SOB (shortness of breath)    Tobacco abuse    Financial difficulties    Dehydration    Palliative care patient    Ambulatory dysfunction    Insomnia due to medical condition    Anemia of chronic disease    Thrombocytosis (HCC)    Transaminitis    Pulmonary embolism (HCC)    Impaction of intestine ([address] )    Fall    Diarrhea    Asymptomatic bacteriuria    Cognitive impairment    Onychomycosis of great toe         Length Of Stay: 2  Performed at least 2 patient identifiers during session: Name and Birthday  PHYSICAL THERAPY EVALUATION :    07/07/21 1044   PT Last Visit   PT Visit Date 07/07/21   Note Type   Note type Evaluation   Pain Assessment   Pain Assessment Tool 0-10   Pain Score No Pain  (For patient)   Home Living   Type of Home Mobile home   Home Layout One level;Ramped entrance  (Pt reports now having ramp + 1 EKATERINA)   Home Equipment Walker   Additional Comments Patient reports an [de-identified] make to wean using a walker and cane prior to this admission    Patient was getting home health physical therapy since discharge from rehab or she reports performing therapy including walking outside and leg exercises   Prior Function   Level of [place] Needs assistance with IADLs   Lives With Spouse; Other (Comment)  (, [place] and sister; daughter lives locally)   [place] [address] Help From Family; Other (Comment)  (Home PT/OT)   ADL Assistance Needs assistance  (+ time to complete dressing, sister A w/ bathing)   IADLs Needs assistance   Falls in the last 6 months 1 to 4  (reports 1 leading to admit in kitchen since DC from rehab)   Vocational Other (Comment)  (pt reports no longer working)   Comments Patient only reports 1 fall in last 6 months, however history and physical reports patient has had multiple falls in home over the last few days  Restrictions/Precautions   Weight Bearing Precautions Per Order Yes   LLE Weight Bearing Per Order WBAT  (Per Podiatry's  progress note)   Other Precautions Cognitive; Chair Alarm; Bed Alarm; Fall Risk;Limb alert   General   Family/Caregiver Present No   Cognition   Overall Cognitive Status Impaired   Arousal/Participation Alert   Attention Attends with cues to redirect   Orientation Level   (Not to day of week)   Memory Decreased recall of recent events;Decreased recall of precautions;Decreased short term memory   Following Commands Follows one step commands with increased time or repetition  (With MMT)   RUE Strength   RUE Overall Strength Deficits  (UE edema)   LUE Strength   LUE Overall Strength Deficits   RLE Assessment   RLE Assessment   (NT @ hip and knee >3+ due to ? bony mets)   Strength RLE   RLE Overall Strength   (only able to initiate SLR)   R Hip Flexion 3+/5   R Knee Extension 3+/5   R Ankle Dorsiflexion 4/5   LLE Assessment   LLE Assessment   (NT @ hip and knee >3+ due to ? bony mets)   Strength LLE   LLE Overall Strength   (only able to initiate SLR)   L Hip Flexion 3+/5   L Knee Extension 3/5   L Ankle Dorsiflexion 3+/5  (Slow deliberate movement)   Coordination   Movements are Fluid and Coordinated 0   Coordination and Movement Description Myoclonic jerking, dysmetria   Light Touch   RLE Light Touch Grossly intact   LLE Light Touch Grossly intact   Proprioception   RLE Proprioception Grossly intact  (2/2+@ great toe)   LLE Proprioception Grossly Intact  (2/2+@ great toe)   Bed Mobility   Supine to Sit Unable to assess  (S patient out of bed of prior to entering room)   Transfers   Sit to Stand 4  Minimal assistance  (For steadying assistance)   Additional items Increased time required;Verbal cues;Armrests   Stand to Sit 4  Minimal assistance   Additional items Assist x 1; Increased time required;Verbal cues;Armrests  (Due to incoordination, need for steadying assistance)   Additional Comments [id number] STS =3   Ambulation/Elevation   Gait pattern Excessively slow; Ataxia  (Incoordination, myoclonic jerking/buckling)   Gait Assistance 4  Minimal assist   Additional items Assist x 1;Verbal cues  (bLocking at need for pre gait)   Assistive Device Rolling walker   Distance Advanced-retreat x3 trials each leg, not performed more than pre gait due to risk for falls without a 2nd assistant   Stair Management Assistance Not tested   Balance   Static Sitting Fair +   Static Standing Poor +  (Ataxia)   Ambulatory Poor +   Endurance Deficit   Endurance Deficit Yes   Endurance Deficit Description self reported fatigue   Activity Tolerance   Activity Tolerance Patient limited by fatigue   Medical Staff Made Aware spoke to Sander Abreu NP, spoke to case management Ann Echeverria , and to [place] from OT   Assessment   Prognosis Guarded   Problem List Decreased endurance; Impaired balance;Decreased strength;Decreased mobility; Decreased coordination;Decreased cognition; Impaired judgement;Obesity; Decreased skin integrity  (Gait deviation)   Barriers to Discharge Inaccessible home environment   Goals   Patient Goals To be able to walk   STG Expiration Date 07/17/21   Short Term Goal #1 Goals: Pt will: Perform bed mobility tasks with modified I to prepare for transfers and reposition in bed  Perform transfers with supervision to decrease risk for falls and improve ease of transfers  Perform ambulation with LRAD for 50 ft with consistent Min assist without loss of balance or falls to increase Indep in home environment and And goal of patient ambulation  Perform 1 step with railing and w/consistent min A of 1 to return to home with EKATERINA around ramp setup  Propel wheelchair for 50 ft as an alternative to ambulation while patient has substantial myoclonic jerking  PT Treatment Day 0   Plan   Treatment/Interventions Functional transfer training;LE strengthening/ROM; Elevations;Cognitive reorientation; Bed mobility;Gait training;Equipment eval/education;Patient/family training; Endurance training; Therapeutic exercise;Spoke to nursing;Spoke to case management   PT Frequency Other (Comment)  (3-5x/wk)   Recommendation   PT Discharge Recommendation Post acute rehabilitation services  (vs increased services upon discharge and home health PT)   Equipment Recommended [place]; Wheelchair  (Recommend walker use home more than A of 1, primarily wC)   [address] Mobility Inpatient   Turning in Bed Without Bedrails 3   Lying on Back to Sitting on Edge of Flat Bed 3   Moving Bed to Chair 2   Standing Up From Chair 3   Walk in Room 1   Climb 3-5 Stairs 1   Basic Mobility Inpatient Raw Score 13   Basic Mobility Standardized Score 33 99   (Please find full objective findings from PT assessment regarding body systems outlined above)  Assessment: Pt is a 64 y o  female seen for PT evaluation s/p admit to [facility] 7/5/2021 w/ Fall  Patient seen by this PT on prior admission on 05/24/2021 prior to discharge where she needed mod assist for bed mobility, Min assist for transfers, but only Min assist for ambulation with a wheelchair follow for 5'+30 ft   Upon evaluation: Pt requires Min assist for transfers and for pre gait activity using the walker    However patient was not tested further ambulation distances as she displayed substantial gait deviations including but not limited to myoclonic jerking and ataxia, subjectively more than last admission  Pt's clinical presentation is currently unstable/unpredictable given the functional mobility deficits above, especially (but not limited to) muscular fatigue/ weakness especially left lower extremity> right lower extremity, gait deviations and decreased functional mobility tolerance, coupled with fall risks including myoclonic jerking, hx of falls, impaired balance, impaired coordination, impaired judgement and decreased cognition, and combined with medical complications of abnormal H&H and multiple readmissions  Pt to benefit from continued skilled PT tx while in hospital and upon DC to address deficits as defined above and maximize level of functional mobility  Recommend continued mobility trials with rolling walker, but with assist of 2 and gait belt, balance activities  Recommend continued discussions with palliative Medicine while here, and potential introduction of wheelchair use p r n  Comorbidities affecting pt's physical performance at time of assessment include: Dizziness (2/6/2018), Hyperglycemia (2/6/2018), Hypertension (2/15/2016), Hypokalemia (3/10/2020), Lymphedema (2/15/2016), Malignant cachexia ([address] ) (10/6/2016),  Metastatic breast cancer ([facility] [address] [address] ), Mastectomy (Bilateral); Oophorectomy;  stereotactic radiosurgery, cranial,simple,single (5/3/2017); nsj tunneled ctr vad w/subq port age 13 yr/> (N/A, 7/24/2018)  Personal factors affecting pt at time of IE include: steps to enter environment, past experience, inability to perform IADLs, inability to perform ADLs, inability to ambulate household distances, inability to navigate community distances, limited insight into impairments and recent fall(s)  The patient's AM-PAC Basic Mobility Inpatient Short Form Raw Score is 13, Standardized Score is 33 99   A standardized score less than 42 9 suggests the patient may benefit from discharge to post-acute rehabilitation services, which DOES coincide with above PT recommendations  However please refer to therapist recommendation for discharge planning given other factors that may influence destination as this writer recommends further input regarding disease related process and medical appropriateness for rehab as discussed with Jim Nephew from palliative  Adapted from Tito Goldberg Use of 6-clicks to Provide Decision Support in the [facility] [place] Setting  [address], [place]  [facility]  Portions of the record may have been created with voice recognition software  Occasional wrong word or "sound a like" substitutions may have occurred due to the inherent limitations of voice recognition software    Read the chart carefully and recognize, using context, where substitutions have occurred

## 2021-07-07 NOTE — OCCUPATIONAL THERAPY NOTE
Occupational Therapy Evaluation     Patient Name: Antonia GORDONORAIMUNDO Date: 7/7/2021  Problem List  Principal Problem:    Fall  Active Problems:    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, estrogen receptor positive ([facility] )    Insomnia due to medical condition    Anemia of chronic disease    Diarrhea    Asymptomatic bacteriuria    Cognitive impairment    Onychomycosis of great toe    Past Medical History  Past Medical History:   Diagnosis Date    Dehydration 6/11/2019    Dizziness 2/6/2018    Dry skin 2/6/2018    Edema 10/23/2019    H/O bilateral mastectomy 2/15/2016    Hyperglycemia 2/6/2018    Hypertension 2/15/2016    Hypokalemia 3/10/2020    Lymphedema 2/15/2016    Malignant cachexia ([facility] ) 10/6/2016    Malignant neoplasm of right breast ([facility] ) 2/15/2016    Metastatic breast cancer ([facility] )      Past Surgical History  Past Surgical History:   Procedure Laterality Date    ENDOBRONCHIAL ULTRASOUND (EBUS) N/A 2/16/2016    Procedure: EBUS;  Surgeon: Yuki Gallegos MD;  [facility];  Service:     MASTECTOMY Bilateral     MASTECTOMY Bilateral     right arm edema    OOPHORECTOMY      ME BRONCHOSCOPY NEEDLE BX TRACHEA MAIN STEM&/BRON N/A 2/16/2016    Procedure: Benito Davila;  Surgeon: Yuki Gallegos MD;  [facility];  Service: Thoracic    ME INSJ TUNNELED CTR VAD W/SUBQ PORT AGE 5 YR/> N/A 7/24/2018    Procedure: INSERTION VENOUS PORT ( PORT-A-CATH) IR;  Surgeon: Cleopatra Draper DO;  [facility];  Service: Interventional Radiology    ME STEREOTACTIC RADIOSURGERY, CRANIAL,SIMPLE,EA ADD  5/3/2017         ME STEREOTACTIC RADIOSURGERY, CRANIAL,SIMPLE,EA ADD  5/3/2017         ME STEREOTACTIC RADIOSURGERY, CRANIAL,SIMPLE,SINGLE  5/3/2017             07/07/21 0956   OT Last Visit   OT Visit Date 07/07/21  (Wednesday)   Note Type   Note type Evaluation   Restrictions/Precautions   Weight Bearing Precautions Per Order No   Other Precautions Cognitive; Chair Alarm; Bed Alarm; Fall Risk;Limb alert   Pain Assessment   Pain Assessment Tool 0-10   Pain Score No Pain   Home Living   Type of Home Mobile home; Other (Comment)  (+ EKATERINA)   Home Layout One level;Stairs to enter with rails; Performs ADLs on one level; Able to live on main level with bedroom/bathroom   Bathroom Shower/Tub Tub/shower unit   [address]   Bathroom Accessibility Accessible via walker   [address] [facility] [address]   Additional Comments Pt reports living w/ spouse, Lili Peals and sister in mobile home  Pt known to this therapist from previous admission   Prior Function   Level of [place] Needs assistance with IADLs   Lives With Spouse; Other (Comment)  (, Lili Peals and sister; daughter lives locally)   [place] [address] Help From Family; Other (Comment)  (Home PT/OT)   ADL Assistance Needs assistance  (+ time to complete dressing, sister A w/ bathing)   IADLs Needs assistance   Falls in the last 6 months 1 to 4  (reports 1 leading to admit in kitchen since DC from rehab)   Vocational Other (Comment)  (pt reports no longer working)   Comments Pt reports using RW for functional mobility w/ in home w/ physical assistance  Pt added that her  or sister all home with her 24/7  Pt needs assistance w/ IADL and reports able to dress self and use bathroom w/ + time  Pt added that her sister assist w/ bathing  Pt reports daughterMelinda lives approx 1/2 mile away   Lifestyle   Autonomy Pt reports using RW for functional mobility and able to complete self care ADL w/ mod I for + time  Needs assist w/ bathing  Reciprocal Relationships Pt reports living w/  and sister  Supportive daughter   Service to Others Pt reports no longer working   Intrinsic Gratification Pt reports watching tv  Pt added she enjoyed planting flowers and assisting her daughter in garden but is unable      Psychosocial   Psychosocial (WDL)   (steven and dev)   Subjective   Subjective "I am frustrated that I can't walk"   ADL   Where Assessed Edge of bed  (vs OOB in chair post eval w/ needs met)   Eating Assistance 6  Modified independent   Eating Deficit Setup   Grooming Assistance 5  Supervision/Setup  (seated unsupported at EOB)   Grooming Deficit Setup; Increased time to complete;Supervision/safety   UB Bathing Assistance Unable to assess  (pt declined reporting she washed up earlier w/ RN staff)   UB Bathing Deficit   (anticipate S seated after set- up based on fxal obs skills)   LB Bathing Assistance Unable to assess  (pt reports washed up earlier w/ RN staff)   LB Bathing Deficit Setup;Right lower leg including foot; Left lower leg including foot; Buttocks;Steadying; Increased time to complete  (anticipate mod A based on fxal obs skills seated)   UB Dressing Assistance 4  Minimal Assistance   UB Dressing Deficit Pull around back; Fasteners;Setup; Increased time to complete   LB Dressing Assistance 4  Minimal Assistance  (seated at EOB)   LB Dressing Deficit Steadying;Setup; Increased time to complete; Don/doff R sock; Don/doff L sock   Toileting Deficit Bedside commode   Additional Comments Recommend use of bedside commode  Pt engaged in LBD seated at EOB w/ min A  Able to ext rot hip and flex hip / knee to cross over opposite LE w/ min A to maintain   Bed Mobility   Supine to Sit 4  Minimal assistance   Additional items Assist x 1;HOB elevated; Bedrails; Increased time required;Verbal cues;LE management   Sit to Supine Unable to assess   Additional Comments Pt seatd OOB in chair post eval w/ needs met, call bell in reach and chair alarm activated   Transfers   Sit to Stand 3  Moderate assistance   Additional items Assist x 1;Bedrails;Armrests; Increased time required;Verbal cues  (instruction for hand placement to push up from surface)   Stand to Sit 3  Moderate assistance   Additional items Assist x 1; Increased time required; Bedrails;Armrests; Verbal cues  (instruction for hand placement)   Stand pivot 3 Moderate assistance   Additional items Assist x 1; Increased time required;Verbal cues  (using RW to pt's R)   Additional Comments Pt required mod A for initial sit to stand from EOB  Unsteady, retropulsive and weight bearing through R heel  Pt reports decreased sensation, "unable to feel her feet" upon standing  Upon returning to EOB, pt reports sensation improved  Pt compelted sit <> stand 2X w/ mod A and able to participate in pre gait tasks (advance / retreat and march in place)  Pt completed steppage transfer from EOB to chair using RW to her R w/ mod A  Functional Mobility   Additional Comments Will continue to assess   Additional items Rolling walker   Balance   Static Sitting Fair +   Dynamic Sitting Fair -   Static Standing Fair -  (poor <> poor +)   Activity Tolerance   Activity Tolerance Patient limited by fatigue; Other (Comment)  (decresaed sensation, decresaed standing balance/coordination)   Medical Staff Made Aware spoke to Riana ANGELA and Marty ACU[address] per Anni BUITRAGO People appropriate to see pt   RUE Strength   RUE Overall Strength Deficits; Other (Comment)  (able to participate in ADL)   Edema   RUE Edema +1   LUE Strength   LUE Overall Strength Deficits; Other (Comment)  (able to participate in ADL tasks)   Hand Function   Gross Motor Coordination Functional   Fine Motor Coordination   (Will continue to assess; R hand dominace)   Sensation   Light Touch No apparent deficits  (B UE to light touch)   Sharp/Dull Not tested   Additional Comments Pt responded to light touch appropriately B distal LE (feet)   Cognition   Overall Cognitive Status Impaired   Arousal/Participation Alert; Cooperative   Attention Attends with cues to redirect   Orientation Level Oriented to person;Oriented to place;Oriented to situation; Other (Comment)  (generally to time (month / year))   Memory Decreased recall of recent events; Other (Comment)  (details, timeframe)   Following Commands Follows one step commands with increased time or repetition   Comments Identified pt by full name and birthdate  Alert and oriented to person, place, and aware fell at home in kitchen  Able to provide lhome set- up but limited recall time frame  Flat affect but gen oriented to time for month / year   Assessment   Limitation Decreased ADL status; Decreased cognition;Decreased endurance;Decreased self-care trans;Decreased high-level ADLs   Assessment Pt is a 63 yo female [facility]  Pt presents following a fall at home (in kitchen) and significant PMH impacting her occupational performance including metastatic breast ca w/ mets to brain, bone; B mastectomy, HTN, recent PE  Pt known to this therapist from previous admission  Pt confirmed living w/ spouse, Tito Morse and sister in mobile home w/ [employer] PTA  Pt reports using of RW for functional mobility since DC home from rehab  Pt reports needing assist w/ bathing and LBD  Pt w/ active OT orders and activity orders  Upon eval, pt required min A to complete bed mobility  Pt required min A to complete LBD to manage socks  Pt required S to complete grooming  Pt required mod A to stand and complete functional SPT using RW to chair  Pt presents w/ decreased LE sensation, decreased standing balance, decreased standing tolerance, decreased coordination, generalized weakness /deconditioning, decreased cognition, limited insight into deficits impacting her I w/ dressing, bathing, oral hygiene, functional mobility, functional tranfers, activity engagement, clothing mgmt  Pt completing ADL below baseline level of I and would benefit from OT while in acute care to address deficits  Recommend post acute rehab when medically stable for discharge from acute care pend progress, plan of care  Pt may benefit from w/c to max I and improve activity engagement to return home w/ family  Will continue to follow pt in acute care 3-5 X week      Goals   Patient Goals Pt stated that she would like to return home and be able to walk  Pt added that she would like to get better and be able to help her daughter garden and plant flowers again   Plan   Treatment Interventions ADL retraining;Functional transfer training;UE strengthening/ROM; Endurance training;Cognitive reorientation;Patient/family training;Equipment evaluation/education; Compensatory technique education;Continued evaluation; Energy conservation; Activityengagement   Goal Expiration Date 07/17/21   OT Frequency 3-5x/wk   Recommendation   OT Discharge Recommendation Post acute rehabilitation services   Equipment Recommended Shower/Tub chair with back ($);Bedside commode; Other (comment)  (use of RW for functional transfers)   Commode Type Standard   AM-PAC Daily Activity Inpatient   Lower Body Dressing 2   Bathing 2   Toileting 2   Upper Body Dressing 3   Grooming 4   Eating 4   Daily Activity Raw Score 17   Daily Activity Standardized Score (Calc for Raw Score >=11) 37 26   AM-Located within [facility] Applied Cognition Inpatient   Following a Speech/Presentation 2   Understanding Ordinary Conversation 4   Taking Medications 3   Remembering Where Things Are Placed or Put Away 3   Remembering List of 4-5 Errands 2   Taking Care of Complicated Tasks 2   Applied Cognition Raw Score 16   Applied Cognition Standardized Score 35 03   Barthel Index   Feeding 10   Bathing 0   Grooming Score 5   Dressing Score 5   Bladder Score 10   Bowels Score 10   Toilet Use Score 5   Transfers (Bed/Chair) Score 5   Mobility (Level Surface) Score 0   Stairs Score 0   Barthel Index Score 50      The patient's raw score on the AM-PAC Daily Activity inpatient short form is 17, standardized score is 37 26, less than 39 4  Patients at this level are likely to benefit from discharge to post-acute rehabilitation services  Please refer to the recommendation of the Occupational Therapist for safe discharge planning    Pt goals to be met by 7/17/21:  -Pt will complete bed mobility supine <> sit w/ S to max I to return home    -Pt will demonstrate good attention and participation in continued evaluation to assess functional cognitive skills to assist in DC planning    -Pt will consistently follow 2 step directions w/ good recall to max I to return home    -Pt will complete functional transfers to bed, chair, and toilet using AD, DME as needed w/ min A x1 to max I w/ ADL performance and minimize burden of care to return home    -Pt will demonstrate good attention and participation in continued evaluation to assess functional mobility using least restrictive device to assist in DC planning    -Pt will demonstrate good attention and understanding EC tech to max I w/ ADL performance    -Pt will complete LBD w/ S after set- up    -Pt will complete UBD and grooming w/ mod I after set- up seated    Shyann Marcelo, OTR/L

## 2021-07-07 NOTE — PLAN OF CARE
Problem: PHYSICAL THERAPY ADULT  Goal: Performs mobility at highest level of function for planned discharge setting  See evaluation for individualized goals  Description: Treatment/Interventions: Functional transfer training, LE strengthening/ROM, Elevations, Cognitive reorientation, Bed mobility, Gait training, Equipment eval/education, Patient/family training, Endurance training, Therapeutic exercise, Spoke to nursing, Spoke to case management  Equipment Recommended: Jacqueline Barker, Wheelchair (Recommend walker use home more than A of 1, primarily wC)       See flowsheet documentation for full assessment, interventions and recommendations  7/7/2021 1219 by Markus Schultz PT  Note: Prognosis: Guarded  Problem List: Decreased endurance, Impaired balance, Decreased strength, Decreased mobility, Decreased coordination, Decreased cognition, Impaired judgement, Obesity, Decreased skin integrity (Gait deviation)  Assessment: Pt is a 64 y o  female seen for PT evaluation s/p admit to [facility] on 7/[facility]/2021 w/ Fall  Patient seen by this PT on prior admission on 05/24/2021 prior to discharge where she needed mod assist for bed mobility, Min assist for transfers, but only Min assist for ambulation with a wheelchair follow for 5'+30 ft   Upon evaluation: Pt requires Min assist for transfers and for pre gait activity using the walker  However patient was not tested further ambulation distances as she displayed substantial gait deviations including but not limited to myoclonic jerking and ataxia, subjectively more than last admission    Pt's clinical presentation is currently unstable/unpredictable given the functional mobility deficits above, especially (but not limited to) muscular fatigue/ weakness especially left lower extremity> right lower extremity, gait deviations and decreased functional mobility tolerance, coupled with fall risks including myoclonic jerking, hx of falls, impaired balance, impaired coordination, impaired judgement and decreased cognition, and combined with medical complications of abnormal H&H and multiple readmissions  Pt to benefit from continued skilled PT tx while in hospital and upon DC to address deficits as defined above and maximize level of functional mobility  Recommend continued mobility trials with rolling walker, but with assist of 2 and gait belt, balance activities  Recommend continued discussions with palliative Medicine while here, and potential introduction of wheelchair use p r n  Barriers to Discharge: Inaccessible home environment        PT Discharge Recommendation: Post acute rehabilitation services (vs increased services upon discharge and home health PT)          See flowsheet documentation for full assessment

## 2021-07-07 NOTE — ASSESSMENT & PLAN NOTE
- WBCs 30-50 on urinalysis with occasional bacteria  Patient denies any urinary symptoms, no fever, no leukocytosis on CBC   Did receive one dose ceftriaxone in the ED       Plan:  - monitor off antibiotics   - f/u urine culture

## 2021-07-07 NOTE — ASSESSMENT & PLAN NOTE
- On physical exam, Lt great toe nail is mild bloody and reddish  Rt great toe nail has onychomycosis       Plan: Podiatry consult   F/up physical exam of lower extremities and toes

## 2021-07-07 NOTE — ASSESSMENT & PLAN NOTE
Per family ( and sister), she has been increasingly more confused over the past few months  Likely 2/2 side effect of brain metastasis s/p whole brain rads       Plan:  - delirium precautions   - avoid sedating medications

## 2021-07-07 NOTE — PLAN OF CARE
Problem: OCCUPATIONAL THERAPY ADULT  Goal: Performs self-care activities at highest level of function for planned discharge setting  See evaluation for individualized goals  Description: Treatment Interventions: ADL retraining, Functional transfer training, UE strengthening/ROM, Endurance training, Cognitive reorientation, Patient/family training, Equipment evaluation/education, Compensatory technique education, Continued evaluation, Energy conservation, Activityengagement  Equipment Recommended: Shower/Tub chair with back ($), Bedside commode, Other (comment) (use of RW for functional transfers)       See flowsheet documentation for full assessment, interventions and recommendations  7/7/2021 1154 by Colonel Roxy OT  Note: Limitation: Decreased ADL status, Decreased cognition, Decreased endurance, Decreased self-care trans, Decreased high-level ADLs     Assessment: Pt is a 63 yo female [facility] 7[facility]5/21  Pt presents following a fall at home (in kitchen) and significant PMH impacting her occupational performance including metastatic breast ca w/ mets to brain, bone; B mastectomy, HTN, recent PE  Pt known to this therapist from previous admission  Pt confirmed living w/ spouse, Christina Clear and sister in mobile home w/ [employer] PTA  Pt reports using of RW for functional mobility since DC home from rehab  Pt reports needing assist w/ bathing and LBD  Pt w/ active OT orders and activity orders  Upon eval, pt required min A to complete bed mobility  Pt required min A to complete LBD to manage socks  Pt required S to complete grooming  Pt required mod A to stand and complete functional SPT using RW to chair   Pt presents w/ decreased LE sensation, decreased standing balance, decreased standing tolerance, decreased coordination, generalized weakness /deconditioning, decreased cognition, limited insight into deficits impacting her I w/ dressing, bathing, oral hygiene, functional mobility, functional tranfers, activity engagement, clothing mgmt  Pt completing ADL below baseline level of I and would benefit from OT while in acute care to address deficits  Recommend post acute rehab when medically stable for discharge from acute care pend progress, plan of care  Pt may benefit from w/c to max I and improve activity engagement to return home w/ family  Will continue to follow pt in acute care 3-5 X week        OT Discharge Recommendation: Post acute rehabilitation services

## 2021-07-07 NOTE — ASSESSMENT & PLAN NOTE
- Currently with brain and bone metastasis   and sister report patient has been having increasing back pain lately and difficulty with pain control       Plan:   - Pain control: Continue home gabapentin 400mg TID, continue home Dilaudid 4mg 4x daily   May require a fifth dose which the patient's outpatient doctor has stated is okay to give (per patient's sister)   - Nausea: continue home compazine 5mg TID   - Appetite stimulant: continue home dexamethasone 2mg daily   - lymphedema: lasix 20mg PO daily

## 2021-07-07 NOTE — NURSING NOTE
Pt comfortable in bed  No complaints of pain  Pt states she has numbness and tingling in b/l lower extremities  Call bell, phone and belongings within reach at bedside  Bed alarm on  SCD's on  Will continue to monitor

## 2021-07-07 NOTE — PROGRESS NOTES
[facility]  Progress Note - Nadja Breath 1960, 64 y o  female MRN: [id number]  Unit/Bed#: S -01 Encounter: [id number]  Primary Care Provider: Chata Levy MD   Date and time admitted to hospital: 7/5/2021  1:51 PM    * [address] onset falls: due to tripped and fell  Reports worsening numbness in bilateral feet over recent weeks       Plan:  - orthostatic blood pressure measurement  - bed alarm  - Fall precautions  - f/up PT/OT   - folate >20 0 and B12 422 (7/6)       Anemia of chronic disease  Assessment & Plan  - Hgb 10 7 on admission, MCV 95    - Per heme/onc note on 6/9: "reviewed labs from 05/18/2021 which reveal a hemoglobin of 9 9, platelet count 516, normal white count and MCV is 98  An iron panel was obtained to further evaluate her anemia which revealed a saturation of 22% and a ferritin level of 108 suggesting iron deficiency is not contributing to her anemia  Suspect her anemia is of chronic disease"  - Today Hb 9 5     Plan:  - folate >20 0 and B12 422(7/6)    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, estrogen receptor positive ([facility] [address] )  Assessment & Plan  - Currently with brain and bone metastasis   and sister report patient has been having increasing back pain lately and difficulty with pain control       Plan:   - Pain control: Continue home gabapentin 400mg TID, continue home Dilaudid 4mg 4x daily  May require a fifth dose which the patient's outpatient doctor has stated is okay to give (per patient's sister)   - Nausea: continue home compazine 5mg TID   - Appetite stimulant: continue home dexamethasone 2mg daily   - lymphedema: lasix 20mg PO daily    Onychomycosis of great toe  Assessment & Plan  - On physical exam, Lt great toe nail is mild bloody and reddish  Rt great toe nail has onychomycosis       Plan: Podiatry consult   F/up physical exam of lower extremities and toes    Metabolic encephalopathy  Assessment & Plan  Per family ( and sister), she has been increasingly more confused over the past few months  DDx: side effect of brain metastasis s/p whole brain rads vs possible UTI       Plan:  - delirium precautions   - avoid sedating medications   - f/u urine culture and blood cultures     Asymptomatic bacteriuria  Assessment & Plan  - WBCs 30-50 on urinalysis with occasional bacteria  Patient denies any urinary symptoms, no fever, no leukocytosis on CBC  Did receive one dose ceftriaxone in the ED       Plan:  - monitor off antibiotics   - f/u urine culture         VTE Pharmacologic Prophylaxis: VTE Score: 6 High Risk (Score >/= 5) - Pharmacological DVT Prophylaxis Ordered: enoxaparin (Lovenox)  Sequential Compression Devices Ordered  Patient Centered Rounds: I performed bedside rounds with nursing staff today  Discussions with Specialists or Other Care Team Provider: PT/OT, Podiatry      Current Length of Stay: 1 day(s)  Current Patient Status: Inpatient   Discharge Plan: Anticipate discharge in 24-48 hrs to home  Code Status: Level 1 - Full Code    Subjective:   Patient is doing well this morning  No diarrhea, no fall, no overnight event  Objective:     Vitals:   Temp (24hrs), Av 2 °F (37 3 °C), Min:99 1 °F (37 3 °C), Max:99 3 °F (37 4 °C)    Temp:  [99 1 °F (37 3 °C)-99 3 °F (37 4 °C)] 99 3 °F (37 4 °C)  HR:  [68-91] 68  Resp:  [18] 18  BP: (102-119)/(58-64) 119/64  SpO2:  [95 %-96 %] 95 %  There is no height or weight on file to calculate BMI  Input and Output Summary (last 24 hours): Intake/Output Summary (Last 24 hours) at 2021  Last data filed at 2021  Gross per 24 hour   Intake 60 ml   Output 800 ml   Net -740 ml       Physical Exam:   Physical Exam  Eyes:      Pupils: Pupils are equal, round, and reactive to light  Cardiovascular:      Rate and Rhythm: Normal rate and regular rhythm  Heart sounds: Normal heart sounds   No murmur heard    No friction rub  No gallop  Pulmonary:      Breath sounds: Wheezing and rales present  Abdominal:      General: There is no distension  Tenderness: There is no abdominal tenderness  There is no guarding  Musculoskeletal:         General: No swelling  Normal range of motion  Skin:     Findings: Lesion present  Comments: Lt and Rt great toe nails   Neurological:      General: No focal deficit present  Mental Status: She is oriented to person, place, and time  Additional Data:     Labs:  Results from last 7 days   Lab Units 07/06/21  0642 07/05/21  1415   WBC Thousand/uL 7 12 8 71   HEMOGLOBIN g/dL 9 5* 10 7*   HEMATOCRIT % 31 3* 34 2*   PLATELETS Thousands/uL 325 365   NEUTROS PCT %  --  78*   LYMPHS PCT %  --  11*   MONOS PCT %  --  8   EOS PCT %  --  1     Results from last 7 days   Lab Units 07/06/21  0642 07/05/21  1415   SODIUM mmol/L 139 142   POTASSIUM mmol/L 3 7 3 6   CHLORIDE mmol/L 107 103   CO2 mmol/L 18* 29   BUN mg/dL 6 11   CREATININE mg/dL 0 49* 0 78   ANION GAP mmol/L 14* 10   CALCIUM mg/dL 8 4 8 4   ALBUMIN g/dL  --  2 9*   TOTAL BILIRUBIN mg/dL  --  0 40   ALK PHOS U/L  --  106   ALT U/L  --  30   AST U/L  --  36   GLUCOSE RANDOM mg/dL 81 157*     Results from last 7 days   Lab Units 07/05/21  1415   INR  0 93     Results from last 7 days   Lab Units 07/05/21  1429   POC GLUCOSE mg/dl 159*         Results from last 7 days   Lab Units 07/05/21  1415   LACTIC ACID mmol/L 1 8   PROCALCITONIN ng/ml <0 05       Lines/Drains:  Invasive Devices     Central Venous Catheter Line            Port A Cath Right Chest -- days    Port A Cath Right Chest -- days          Peripheral Intravenous Line            Peripheral IV 07/05/21 Left Wrist 1 day                Imaging: Reviewed radiology reports from this admission including:   CT chest/abdominal/pelvic (7/5, Impression: No acute posttraumatic abnormality detected in the chest, abdomen or pelvis   Stable background emphysema with multiple bilateral lung nodules)  CT head wo contrast (7/5, No acute intracranial abnormality  Changes within the brain parenchyma again suggesting sequela of prior whole brain radiation)  Recent Cultures (last 7 days):   Results from last 7 days   Lab Units 07/05/21  1547 07/05/21  1415   BLOOD CULTURE  Received in Microbiology Lab  Culture in Progress  Received in Microbiology Lab  Culture in Progress  Last 24 Hours Medication List:   Current Facility-Administered Medications   Medication Dose Route Frequency Provider Last Rate    acetaminophen  650 mg Oral Q6H PRN Payton Singh MD      albuterol  2 puff Inhalation Q6H PRN Payton Singh MD      apixaban  5 mg Oral BID Payton Singh MD      calcium carbonate  1,000 mg Oral Daily PRN Payton Singh MD      dexamethasone  2 mg Oral Daily With Breakfast Payton Singh MD      furosemide  20 mg Oral Daily Payton Singh MD      gabapentin  400 mg Oral TID AC Payton Singh MD      HYDROmorphone  4 mg Oral 4x Daily PRN Payton Singh MD      levETIRAcetam  250 mg Oral BID Payton Singh MD      nicotine  1 patch Transdermal Daily Payton Singh MD      OLANZapine  10 mg Oral HS Payotn Singh MD      ondansetron  4 mg Intravenous Q6H PRN Payton Singh MD      pantoprazole  40 mg Oral Daily Payton Singh MD      polyethylene glycol  17 g Oral Daily Payton Singh MD      prochlorperazine  5 mg Oral TID AC Payton Singh MD      zolpidem  10 mg Oral HS PRN Payton Singh MD          Today, Patient Was Seen By: Maritza Cummings MD    **Please Note: This note may have been constructed using a voice recognition system  **

## 2021-07-07 NOTE — PROGRESS NOTES
[facility]  Progress Note - Thaddeus Pathak 1960, 64 y o  female MRN: [id number]  Unit/Bed#: S -01 Encounter: [id number]  Primary Care Provider: Velia Ramirez MD   Date and time admitted to hospital: 7/5/2021  1:51 PM    * [address] onset falls, once the night prior to admission and once the morning of admission  Both falls were witnessed by  who states she was transitioning from sitting to standing with walker at which time she tripped and fell  Reports worsening numbness in bilateral feet over recent weeks  DDx: HoTN vs proprioceptive (numbness of feet, B12 and folate WNL) vs worsening ambulatory dysfunction (bone and brain metastasis), less likely cardiac, echo WNL in April, no syncope per  who witnessed falls  Blood sugar 157, iron panel normal in May  Plan:  - orthostatics  - bed alarm  - Fall precautions  - PT/OT     Cognitive impairment  Assessment & Plan  Per family ( and sister), she has been increasingly more confused over the past few months  Likely 2/2 side effect of brain metastasis s/p whole brain rads  Plan:  - delirium precautions   - avoid sedating medications     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, estrogen receptor positive ([facility] [address] )  Assessment & Plan  Currently with brain and bone metastasis   and sister report patient has been having increasing back pain lately and difficulty with pain control  Plan:   - Pain control: Continue home gabapentin 400mg TID, continue home Dilaudid 4mg 4x daily   May require a fifth dose which the patient's outpatient doctor has stated is okay to give (per patient's sister)   - Nausea: continue home compazine 5mg TID   - Appetite stimulant: continue home dexamethasone 2mg daily   - lymphedema: lasix 20mg PO daily      Diarrhea  Assessment & Plan  At home was taking MiraLax and Senokot as well as Amitiza 24 mcg b i d for chronic constipation, could possibly be contributing to diarrhea  Patient's sister assists with care at home and states that the patient has had diarrhea for a while, but if they hold her bowel regimen she becomes extremely constipated  Diarrhea has resolved, has not had a bowel movement since admission  No evidence of dehydration on labs  No abnormalities on CT abdomen  Plan:  - Resume remainder of home bowel regimen (senakot and amitiza) to maintain regularity, has been continued on miralax since admission       Asymptomatic bacteriuria  Assessment & Plan  WBCs 30-50 on urinalysis with occasional bacteria  Patient denies any urinary symptoms, no fever, no leukocytosis on CBC  Did receive one dose ceftriaxone in the ED  Plan:  - monitor off antibiotics   - f/u urine culture     Anemia of chronic disease  Assessment & Plan  Hgb 10 7 on admission, [facility]  Hgb stable at 9 7 today  Per heme/onc note on 6/9: "reviewed labs from 05/18/2021 which reveal a hemoglobin of 9 9, platelet count 831, normal white count and MCV is 98  An iron panel was obtained to further evaluate her anemia which revealed a saturation of 22% and a ferritin level of 108 suggesting iron deficiency is not contributing to her anemia  Suspect her anemia is of chronic disease"   B12 and folate WNL this admission  Plan:  - continue to monitor outpatient       Insomnia due to medical condition  Assessment & Plan  Continue home ambien 10mg and zyprexa 10mg at bedtime     Onychomycosis of great toe  Assessment & Plan  Avulsion of L great toe nail  Pt unsure how this happened  Seen by podiatry this morning  Plan:  - per pods, no further care at this time, weight bear as tolerated  VTE Pharmacologic Prophylaxis: VTE Score: 6 High Risk (Score >/= 5) - Pharmacological DVT Prophylaxis Ordered: apixaban (Eliquis)  Sequential Compression Devices Ordered      Patient Centered Rounds: will attempt when rounding with attending   Discussions with Specialists or Other Care Team Provider: podiatry     Education and Discussions with Family / Patient: will contact  this afternoon   Current Length of Stay: 2 day(s)  Current Patient Status: Inpatient   Discharge Plan: Anticipate discharge later today or tomorrow to rehab facility  Code Status: Level 1 - Full Code    Subjective:   No acute events overnight  Is oriented x3 this morning though pt is stating she never saw the podiatrist even though podiatry dropped a note this morning regarding her avulsed L great toe nail  Only complaint is of back pain which did not fully resolve with pain medications, though this is a chronic issue per the patient's  and sister  Denies having diarrhea, has not had a bowel movement since admission  At this time she is refusing rehab  When told that her family feels she should go to rehab for a bit she said she would "think about it "    Objective:     Vitals:   Temp (24hrs), Av 5 °F (36 9 °C), Min:97 9 °F (36 6 °C), Max:99 3 °F (37 4 °C)    Temp:  [97 9 °F (36 6 °C)-99 3 °F (37 4 °C)] 97 9 °F (36 6 °C)  HR:  [68-78] 78  Resp:  [16-18] 18  BP: (118-141)/(64-71) 141/71  SpO2:  [93 %-95 %] 93 %  There is no height or weight on file to calculate BMI  Input and Output Summary (last 24 hours): Intake/Output Summary (Last 24 hours) at 2021 0803  Last data filed at 2021 1941  Gross per 24 hour   Intake --   Output 800 ml   Net -800 ml       Physical Exam:   Physical Exam  Vitals and nursing note reviewed  Constitutional:       General: She is not in acute distress  Appearance: Normal appearance  She is obese  She is ill-appearing  She is not diaphoretic  HENT:      Head: Normocephalic and atraumatic  Mouth/Throat:      Mouth: Mucous membranes are moist    Eyes:      Extraocular Movements: Extraocular movements intact  Pupils: Pupils are equal, round, and reactive to light  Cardiovascular:      Rate and Rhythm: Normal rate and regular rhythm        Pulses: Normal pulses  Heart sounds: Normal heart sounds  Pulmonary:      Effort: Pulmonary effort is normal       Breath sounds: No stridor  Wheezing (occasional scattered wheezes) present  No rhonchi or rales  Abdominal:      General: Bowel sounds are normal       Palpations: Abdomen is soft  There is no mass  Tenderness: There is no abdominal tenderness  There is no guarding  Musculoskeletal:         General: Swelling (lymphedema of the RUE ) and signs of injury present  Right lower leg: No edema  Left lower leg: No edema  Skin:     General: Skin is warm and dry  Findings: Lesion (avulsion of the L great toenail, no active bleeding ) present  Neurological:      Mental Status: She is alert and oriented to person, place, and time     Psychiatric:         Mood and Affect: Mood normal          Behavior: Behavior normal        Additional Data:     Labs:  Results from last 7 days   Lab Units 07/07/21  0507 07/05/21  1415   WBC Thousand/uL 6 79 8 71   HEMOGLOBIN g/dL 9 6* 10 7*   HEMATOCRIT % 31 8* 34 2*   PLATELETS Thousands/uL 311 365   NEUTROS PCT %  --  78*   LYMPHS PCT %  --  11*   MONOS PCT %  --  8   EOS PCT %  --  1     Results from last 7 days   Lab Units 07/07/21  0507 07/05/21  1415   SODIUM mmol/L 145 142   POTASSIUM mmol/L 3 7 3 6   CHLORIDE mmol/L 111* 103   CO2 mmol/L 25 29   BUN mg/dL 6 11   CREATININE mg/dL 0 51* 0 78   ANION GAP mmol/L 9 10   CALCIUM mg/dL 8 8 8 4   ALBUMIN g/dL  --  2 9*   TOTAL BILIRUBIN mg/dL  --  0 40   ALK PHOS U/L  --  106   ALT U/L  --  30   AST U/L  --  36   GLUCOSE RANDOM mg/dL 104 157*     Results from last 7 days   Lab Units 07/05/21  1415   INR  0 93     Results from last 7 days   Lab Units 07/05/21  1429   POC GLUCOSE mg/dl 159*         Results from last 7 days   Lab Units 07/05/21  1415   LACTIC ACID mmol/L 1 8   PROCALCITONIN ng/ml <0 05       Lines/Drains:  Invasive Devices     Central Venous Catheter Line            Port A Cath Right Chest -- days Port A Cath Right Chest -- days          Peripheral Intravenous Line            Peripheral IV 07/05/21 Left Wrist 1 day                Central Line:  Goal for removal: N/A - Discharging with PICC for IV ABX/medications           Imaging:   CT head wo contrast   Final Result by Mindy Blum MD (07/05 1642)      No acute intracranial abnormality  Changes within the brain parenchyma again suggesting sequela of prior whole brain radiation      CT chest abdomen pelvis w contrast   Final Result by Mindy Blum MD (07/05 1652)      No acute posttraumatic abnormality detected in the chest, abdomen or pelvis  Stable background emphysema with multiple bilateral lung nodules as described  Recent Cultures (last 7 days):   Results from last 7 days   Lab Units 07/05/21  1547 07/05/21  1415   BLOOD CULTURE  No Growth at 24 hrs  No Growth at 24 hrs         Last 24 Hours Medication List:   Current Facility-Administered Medications   Medication Dose Route Frequency Provider Last Rate    acetaminophen  650 mg Oral Q6H PRN Felix Mccann MD      albuterol  2 puff Inhalation Q6H PRN Felix Mccann MD      apixaban  5 mg Oral BID Felix Mccann MD      calcium carbonate  1,000 mg Oral Daily PRN Felix Mccann MD      dexamethasone  2 mg Oral Daily With Breakfast Felix Mccann MD      furosemide  20 mg Oral Daily Felix Mccann MD      gabapentin  400 mg Oral TID AC Felix Mccann MD      HYDROmorphone  4 mg Oral 4x Daily PRN Felix Mccann MD      levETIRAcetam  250 mg Oral BID Felix Mccann MD      nicotine  1 patch Transdermal Daily Felix Mccann MD      OLANZapine  10 mg Oral HS Felix Mccann MD      ondansetron  4 mg Intravenous Q6H PRN Felix Mccann MD      pantoprazole  40 mg Oral Daily Felix Mccann MD      polyethylene glycol  17 g Oral Daily Felix Mccann MD      prochlorperazine  5 mg Oral TID AC Felix Mccann MD      zolpidem  10 mg Oral HS PRN Felix Mccann MD          Today, Patient Was Seen By: Makenzie Asif MD    **Please Note: This note may have been constructed using a voice recognition system  **

## 2021-07-08 LAB — BACTERIA UR CULT: NORMAL

## 2021-07-08 PROCEDURE — 99239 HOSP IP/OBS DSCHRG MGMT >30: CPT | Performed by: INTERNAL MEDICINE

## 2021-07-08 PROCEDURE — NC001 PR NO CHARGE: Performed by: INTERNAL MEDICINE

## 2021-07-08 RX ADMIN — LEVETIRACETAM 250 MG: 500 TABLET, FILM COATED ORAL at 08:43

## 2021-07-08 RX ADMIN — APIXABAN 5 MG: 5 TABLET, FILM COATED ORAL at 17:47

## 2021-07-08 RX ADMIN — PROCHLORPERAZINE MALEATE 5 MG: 10 TABLET ORAL at 07:21

## 2021-07-08 RX ADMIN — GABAPENTIN 400 MG: 400 CAPSULE ORAL at 16:15

## 2021-07-08 RX ADMIN — PROCHLORPERAZINE MALEATE 5 MG: 10 TABLET ORAL at 16:15

## 2021-07-08 RX ADMIN — DOCUSATE SODIUM AND SENNOSIDES 1 TABLET: 8.6; 5 TABLET, FILM COATED ORAL at 08:43

## 2021-07-08 RX ADMIN — GABAPENTIN 400 MG: 400 CAPSULE ORAL at 11:28

## 2021-07-08 RX ADMIN — LEVETIRACETAM 250 MG: 500 TABLET, FILM COATED ORAL at 17:47

## 2021-07-08 RX ADMIN — OLANZAPINE 10 MG: 10 TABLET, FILM COATED ORAL at 21:24

## 2021-07-08 RX ADMIN — DOCUSATE SODIUM AND SENNOSIDES 1 TABLET: 8.6; 5 TABLET, FILM COATED ORAL at 17:47

## 2021-07-08 RX ADMIN — PANTOPRAZOLE SODIUM 40 MG: 40 TABLET, DELAYED RELEASE ORAL at 08:43

## 2021-07-08 RX ADMIN — DEXAMETHASONE 2 MG: 2 TABLET ORAL at 11:28

## 2021-07-08 RX ADMIN — POLYETHYLENE GLYCOL 3350 17 G: 17 POWDER, FOR SOLUTION ORAL at 08:42

## 2021-07-08 RX ADMIN — APIXABAN 5 MG: 5 TABLET, FILM COATED ORAL at 08:43

## 2021-07-08 RX ADMIN — GABAPENTIN 400 MG: 400 CAPSULE ORAL at 07:21

## 2021-07-08 RX ADMIN — LUBIPROSTONE 24 MCG: 24 CAPSULE, GELATIN COATED ORAL at 08:43

## 2021-07-08 RX ADMIN — Medication 1 PATCH: at 08:42

## 2021-07-08 RX ADMIN — DEXAMETHASONE 2 MG: 2 TABLET ORAL at 16:15

## 2021-07-08 RX ADMIN — FUROSEMIDE 20 MG: 20 TABLET ORAL at 08:43

## 2021-07-08 RX ADMIN — PROCHLORPERAZINE MALEATE 5 MG: 10 TABLET ORAL at 11:28

## 2021-07-08 RX ADMIN — LUBIPROSTONE 24 MCG: 24 CAPSULE, GELATIN COATED ORAL at 16:15

## 2021-07-08 NOTE — CASE MANAGEMENT
Cm met with pt to inform her the facilities that referrals were made to either do not have a bed available or the insurance is not in network and she would have to pay out of pocket a certain amount of copay  Pt is agreeable to have referrals made to other facilities within a 20 mile radius

## 2021-07-08 NOTE — SOCIAL WORK
Palliative LSW saw patient at the bedside today  LSW appreciates the opportunity to provider patient/family with inpatient emotional support and guidance while patient continues to receive medical attention from the medical team     Topics discussed: LSW met with pt at bedside  Pt sitting upright in chair  Pt's main concern at this time is pain in her legs  Pt denies any concerns with sleeping/vomiting but does report having some nausea  Pt aware of STR recommendations from hospital  Pt agreeable with dcp to STR and states she would be willing to go to [facility]  LSW advised pt per CM note today, they are awaiting response from [facility] if they would be able to accept pt  Pt understanding of same  Pt identified no other needs at this time      Areas that need follow-up: Emotional Support  Resources given: None  Others present: None      LSW will continue to follow as requested by the medical team, patient, or family

## 2021-07-08 NOTE — ASSESSMENT & PLAN NOTE
Home bowel regimen for chronic constipation, could possibly be contributing to diarrhea  Patient's sister assists with care at home and states that the patient has had diarrhea for a while, but if they hold her bowel regimen she becomes extremely constipated  Diarrhea has resolved  No abnormalities on CT abdomen       Plan:  - continue home bowel regimen:  · MiraLax 17g daily   · Senokot 8 6-50 mg, 2 tablets twice daily - giving only 1 tablet twice daily while inpatient 2/2 significant diarrhea prior to admission    · Amitiza 24 mcg b i d

## 2021-07-08 NOTE — ASSESSMENT & PLAN NOTE
Falls witnessed by patient's  who states there was no syncope   New onset falls likely due to multiple factors including:  · proprioceptive defects: patient endorses worsening numbness of bilateral feet, B12 and folate within normal limits   · worsening ambulatory dysfunction 2/2 bone and brain metastasis, history of prior whole brain radiation    Plan:  - Fall precautions  - Discharge to rehab facility ([facility] per case management)

## 2021-07-08 NOTE — CASE MANAGEMENT
Cm messaged [facility] to see if they are able to accept pt as they have not made a decision at this time  Cm then called and left a message for admissions to review this pt with them  Cm will continue to follow  All other facilities within a 10 mile radius are either not in network or do not have a bed available  Pt requested referrals only within a 10 mile radius  Cm will follow

## 2021-07-08 NOTE — CASE MANAGEMENT
The only other facilities that are able to accept pt is [facility] or [place]  All others either do not have a bed or are out of network with pt's insurance  Pt and daughter would like to go to [facility]  Cm notified [facility] that pt would like to come there and requested they submit for auth    Cm will continue to follow to assist

## 2021-07-08 NOTE — ASSESSMENT & PLAN NOTE
Currently with brain and bone metastasis   and sister report patient has been having increasing back pain lately and difficulty with pain control  Plan:   - Pain control: Continue home gabapentin 400mg TID, continue home Dilaudid 4mg 4x daily   May require a fifth dose which the patient's outpatient doctor has stated is okay to give (per patient's sister)   - Nausea: continue home compazine 5mg TID   - Appetite stimulant: per palliative consult, increase dexamethasone to 2mg PO BID with breakfast and lunch  - lymphedema: lasix 20mg PO daily

## 2021-07-08 NOTE — PROGRESS NOTES
[facility]  Progress Note - Yovana Kimble 1960, 64 y o  female MRN: [id number]  Unit/Bed#: S -01 Encounter: [id number]  Primary Care Provider: Sarah Gonsalez MD   Date and time admitted to hospital: 7/5/2021  1:51 PM    * [address] 61 witnessed by patient's  who states there was no syncope  New onset falls likely due to multiple factors including:  · proprioceptive defects: patient endorses worsening numbness of bilateral feet, B12 and folate within normal limits   · worsening ambulatory dysfunction 2/2 bone and brain metastasis, history of prior whole brain radiation    Plan:  - Fall precautions  - Discharge to rehab facility     Cognitive impairment  Assessment & Plan  Per family ( and sister), she has been increasingly more confused over the past few months  Likely 2/2 side effect of brain metastasis s/p whole brain rads  Plan:  - delirium precautions   - avoid sedating medications     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, estrogen receptor positive ([facility] [address] )  Assessment & Plan  Currently with brain and bone metastasis   and sister report patient has been having increasing back pain lately and difficulty with pain control  Plan:   - Pain control: Continue home gabapentin 400mg TID, continue home Dilaudid 4mg 4x daily  May require a fifth dose which the patient's outpatient doctor has stated is okay to give (per patient's sister)   - Nausea: continue home compazine 5mg TID   - Appetite stimulant: continue home dexamethasone 2mg daily   - lymphedema: lasix 20mg PO daily      Diarrhea  Assessment & Plan  Home bowel regimen for chronic constipation, could possibly be contributing to diarrhea  Patient's sister assists with care at home and states that the patient has had diarrhea for a while, but if they hold her bowel regimen she becomes extremely constipated  Diarrhea has resolved  No abnormalities on CT abdomen  Plan:  - continue home bowel regimen:  · MiraLax 17g daily   · Senokot 8 6-50 mg, 2 tablets twice daily - giving only 1 tablet twice daily while inpatient 2/2 significant diarrhea prior to admission    · Amitiza 24 mcg b i d    Asymptomatic bacteriuria  Assessment & Plan  WBCs 30-50 on urinalysis with occasional bacteria  Patient denies any urinary symptoms, no fever, no leukocytosis on CBC  Did receive one dose ceftriaxone in the ED  Plan:  - continue to monitor off antibiotics   - f/u urine culture, no growth to date    Anemia of chronic disease  Assessment & Plan  Hgb 10 7 on admission, long-term 95  Hgb stable at 9 7 yesterday  Per heme/onc note on 6/9: "reviewed labs from 05/18/2021 which reveal a hemoglobin of 9 9, platelet count 741, normal white count and MCV is 98  An iron panel was obtained to further evaluate her anemia which revealed a saturation of 22% and a ferritin level of 108 suggesting iron deficiency is not contributing to her anemia  Suspect her anemia is of chronic disease"   B12 and folate WNL this admission  Plan:  - continue to monitor outpatient     Insomnia due to medical condition  Assessment & Plan  Continue home ambien 10mg and zyprexa 10mg at bedtime     Onychomycosis of great toe  Assessment & Plan  Avulsion of L great toe nail  Pt unsure how this happened  Plan:  - per podiatry, no further care at this time, weight bear as tolerated  VTE Pharmacologic Prophylaxis: VTE Score: 6 High Risk (Score >/= 5) - Pharmacological DVT Prophylaxis Ordered: apixaban (Eliquis)  Sequential Compression Devices Ordered  Patient Centered Rounds: I performed bedside rounds with nursing staff today  Discussions with Specialists or Other Care Team Provider: none    Education and Discussions with Family / Patient: will update family this afternoon       Current Length of Stay: 3 day(s)  Current Patient Status: Inpatient   Discharge Plan: Anticipate discharge tomorrow to rehab facility  Code Status: Level 1 - Full Code    Subjective:   Patient denies any issues  States she did have one bowel movement yesterday  Is reluctant to attend rehab but has agreed since her family wants her to go  Objective:     Vitals:   Temp (24hrs), Av 4 °F (36 9 °C), Min:98 °F (36 7 °C), Max:98 6 °F (37 °C)    Temp:  [98 °F (36 7 °C)-98 6 °F (37 °C)] 98 °F (36 7 °C)  HR:  [79-81] 81  Resp:  [16-20] 20  BP: (125-137)/(71-79) 125/79  SpO2:  [94 %-99 %] 94 %  There is no height or weight on file to calculate BMI  Input and Output Summary (last 24 hours): Intake/Output Summary (Last 24 hours) at 2021 0913  Last data filed at 2021 0856  Gross per 24 hour   Intake 300 ml   Output 525 ml   Net -225 ml       Physical Exam:   Physical Exam  Vitals and nursing note reviewed  Constitutional:       General: She is not in acute distress  Appearance: Normal appearance  She is obese  She is ill-appearing (chronically)  She is not diaphoretic  HENT:      Head: Normocephalic and atraumatic  Mouth/Throat:      Mouth: Mucous membranes are moist    Cardiovascular:      Rate and Rhythm: Normal rate and regular rhythm  Pulmonary:      Effort: Pulmonary effort is normal       Breath sounds: Normal breath sounds  No stridor  No wheezing, rhonchi or rales  Abdominal:      General: Bowel sounds are normal       Palpations: Abdomen is soft  There is no mass  Tenderness: There is no abdominal tenderness  There is no guarding  Musculoskeletal:         General: Swelling (lymphedema of the RUE ) present  Right lower leg: No edema  Left lower leg: No edema  Skin:     General: Skin is warm and dry  Findings: Lesion (avulsion of the L great toenail) present  Neurological:      Mental Status: She is alert and oriented to person, place, and time     Psychiatric:         Mood and Affect: Mood normal          Behavior: Behavior normal        Additional Data:     Labs:  Results from last 7 days   Lab Units 07/07/21  0507 07/05/21  1415   WBC Thousand/uL 6 79 8 71   HEMOGLOBIN g/dL 9 6* 10 7*   HEMATOCRIT % 31 8* 34 2*   PLATELETS Thousands/uL 311 365   NEUTROS PCT %  --  78*   LYMPHS PCT %  --  11*   MONOS PCT %  --  8   EOS PCT %  --  1     Results from last 7 days   Lab Units 07/07/21  0507 07/05/21  1415   SODIUM mmol/L 145 142   POTASSIUM mmol/L 3 7 3 6   CHLORIDE mmol/L 111* 103   CO2 mmol/L 25 29   BUN mg/dL 6 11   CREATININE mg/dL 0 51* 0 78   ANION GAP mmol/L 9 10   CALCIUM mg/dL 8 8 8 4   ALBUMIN g/dL  --  2 9*   TOTAL BILIRUBIN mg/dL  --  0 40   ALK PHOS U/L  --  106   ALT U/L  --  30   AST U/L  --  36   GLUCOSE RANDOM mg/dL 104 157*     Results from last 7 days   Lab Units 07/05/21  1415   INR  0 93     Results from last 7 days   Lab Units 07/05/21  1429   POC GLUCOSE mg/dl 159*         Results from last 7 days   Lab Units 07/05/21  1415   LACTIC ACID mmol/L 1 8   PROCALCITONIN ng/ml <0 05       Lines/Drains:  Invasive Devices     Central Venous Catheter Line            Port A Cath Right Chest -- days    Port A Cath Right Chest -- days                Central Line:  Goal for removal: N/A - Discharging with PICC for IV ABX/medications             Imaging:   CT head wo contrast   Final Result by Iliana Mondragon MD (07/05 1642)      No acute intracranial abnormality  Changes within the brain parenchyma again suggesting sequela of prior whole brain radiation                  Workstation performed: [id number]         CT chest abdomen pelvis w contrast   Final Result by Iliana Mondragon MD (07/05 1652)      No acute posttraumatic abnormality detected in the chest, abdomen or pelvis  Stable background emphysema with multiple bilateral lung nodules as described  Workstation performed: [id number]               Recent Cultures (last 7 days):   Results from last 7 days   Lab Units 07/05/21  1547 07/05/21  1530 07/05/21  1415   BLOOD CULTURE  No Growth at 48 hrs  --  No Growth at 48 hrs  URINE CULTURE   --  Culture results to follow  --        Last 24 Hours Medication List:   Current Facility-Administered Medications   Medication Dose Route Frequency Provider Last Rate    acetaminophen  650 mg Oral Q6H PRN Nghia Christensen MD      albuterol  2 puff Inhalation Q6H PRN Nghia Christensen MD      apixaban  5 mg Oral BID Nghia Christensen MD      calcium carbonate  1,000 mg Oral Daily PRN Nghia Christensen MD      dexamethasone  2 mg Oral BID before lunch/dinner FAUZIA Napoles      furosemide  20 mg Oral Daily Nghia Christensen MD      gabapentin  400 mg Oral TID AC Nghia Christensen MD      HYDROmorphone  4 mg Oral 4x Daily PRN Nghia Christensen MD      levETIRAcetam  250 mg Oral BID Nghia Christensen MD      lubiprostone  24 mcg Oral BID With Meals Nghia Christensen MD      nicotine  1 patch Transdermal Daily Nghia Christensen MD      OLANZapine  10 mg Oral HS Nghia Christensen MD      ondansetron  4 mg Intravenous Q6H PRN Nghia Christensen MD      pantoprazole  40 mg Oral Daily Nghia Christensen MD      polyethylene glycol  17 g Oral Daily Nghia Christensen MD      prochlorperazine  5 mg Oral TID Brooke Foster MD      senna-docusate sodium  1 tablet Oral BID Nghia Christensen MD      zolpidem  10 mg Oral HS PRN Nghia Christensen MD          Today, Patient Was Seen By: Nghia Christensen MD    **Please Note: This note may have been constructed using a voice recognition system  **

## 2021-07-08 NOTE — ASSESSMENT & PLAN NOTE
WBCs 30-50 on urinalysis with occasional bacteria  Patient denies any urinary symptoms, no fever, no leukocytosis on CBC  Did receive one dose ceftriaxone in the ED       Plan:  - continue to monitor off antibiotics   - f/u urine culture, no growth to date

## 2021-07-08 NOTE — ASSESSMENT & PLAN NOTE
Avulsion of L great toe nail  Pt unsure how this happened  Plan:  - per podiatry, no further care at this time, weight bear as tolerated

## 2021-07-08 NOTE — ASSESSMENT & PLAN NOTE
Hgb 10 7 on [facility], [facility]  Hgb stable at 9 7 yesterday  Per heme/onc note on 6/9: "reviewed labs from 05/18/2021 which reveal a hemoglobin of 9 9, platelet count 356, normal white count and MCV is 98  An iron panel was obtained to further evaluate her anemia which revealed a saturation of 22% and a ferritin level of 108 suggesting iron deficiency is not contributing to her anemia  Suspect her anemia is of chronic disease"   B12 and folate WNL this admission      Plan:  - continue to monitor outpatient

## 2021-07-08 NOTE — PLAN OF CARE
Problem: PAIN - ADULT  Goal: Verbalizes/displays adequate comfort level or baseline comfort level  Description: Interventions:  - Encourage patient to monitor pain and request assistance  - Assess pain using appropriate pain scale  - Administer analgesics based on type and severity of pain and evaluate response  - Implement non-pharmacological measures as appropriate and evaluate response  - Consider cultural and social influences on pain and pain management  - Notify physician/advanced practitioner if interventions unsuccessful or patient reports new pain  Outcome: Progressing     Problem: INFECTION - ADULT  Goal: Absence or prevention of progression during hospitalization  Description: INTERVENTIONS:  - Assess and monitor for signs and symptoms of infection  - Monitor lab/diagnostic results  - Monitor all insertion sites, i e  indwelling lines, tubes, and drains  - Monitor endotracheal if appropriate and nasal secretions for changes in amount and color  - Kaycee appropriate cooling/warming therapies per order  - Administer medications as ordered  - Instruct and encourage patient and family to use good hand hygiene technique  - Identify and instruct in appropriate isolation precautions for identified infection/condition  Outcome: Progressing  Goal: Absence of fever/infection during neutropenic period  Description: INTERVENTIONS:  - Monitor WBC    Outcome: Progressing     Problem: SAFETY ADULT  Goal: Patient will remain free of falls  Description: INTERVENTIONS:  - Educate patient/family on patient safety including physical limitations  - Instruct patient to call for assistance with activity   - Consult OT/PT to assist with strengthening/mobility   - Keep Call bell within reach  - Keep bed low and locked with side rails adjusted as appropriate  - Keep care items and personal belongings within reach  - Initiate and maintain comfort rounds  - Make Fall Risk Sign visible to staff  - Offer Toileting every 2 Hours, in advance of need  - Initiate/Maintain alarm  - Obtain necessary fall risk management equipment:   - Apply yellow socks and bracelet for high fall risk patients  - Consider moving patient to room near nurses station  Outcome: Progressing  Goal: Maintain or return to baseline ADL function  Description: INTERVENTIONS:  -  Assess patient's ability to carry out ADLs; assess patient's baseline for ADL function and identify physical deficits which impact ability to perform ADLs (bathing, care of mouth/teeth, toileting, grooming, dressing, etc )  - Assess/evaluate cause of self-care deficits   - Assess range of motion  - Assess patient's mobility; develop plan if impaired  - Assess patient's need for assistive devices and provide as appropriate  - Encourage maximum independence but intervene and supervise when necessary  - Involve family in performance of ADLs  - Assess for home care needs following discharge   - Consider OT consult to assist with ADL evaluation and planning for discharge  - Provide patient education as appropriate  Outcome: Progressing  Goal: Maintains/Returns to pre admission functional level  Description: INTERVENTIONS:  - Perform BMAT or MOVE assessment daily    - Set and communicate daily mobility goal to care team and patient/family/caregiver  - Collaborate with rehabilitation services on mobility goals if consulted  - Perform Range of Motion 3 times a day  - Reposition patient every 2 hours    - Dangle patient 3 times a day  - Stand patient 3 times a day  - Ambulate patient 3 times a day  - Out of bed to chair 3 times a day   - Out of bed for meals 3 times a day  - Out of bed for toileting  - Record patient progress and toleration of activity level   Outcome: Progressing     Problem: DISCHARGE PLANNING  Goal: Discharge to home or other facility with appropriate resources  Description: INTERVENTIONS:  - Identify barriers to discharge w/patient and caregiver  - Arrange for needed discharge resources and transportation as appropriate  - Identify discharge learning needs (meds, wound care, etc )  - Arrange for interpretive services to assist at discharge as needed  - Refer to Case Management Department for coordinating discharge planning if the patient needs post-hospital services based on physician/advanced practitioner order or complex needs related to functional status, cognitive ability, or social support system  Outcome: Progressing     Problem: Knowledge Deficit  Goal: Patient/family/caregiver demonstrates understanding of disease process, treatment plan, medications, and discharge instructions  Description: Complete learning assessment and assess knowledge base    Interventions:  - Provide teaching at level of understanding  - Provide teaching via preferred learning methods  Outcome: Progressing     Problem: Potential for Falls  Goal: Patient will remain free of falls  Description: INTERVENTIONS:  - Educate patient/family on patient safety including physical limitations  - Instruct patient to call for assistance with activity   - Consult OT/PT to assist with strengthening/mobility   - Keep Call bell within reach  - Keep bed low and locked with side rails adjusted as appropriate  - Keep care items and personal belongings within reach  - Initiate and maintain comfort rounds  - Make Fall Risk Sign visible to staff  - Offer Toileting every 2 Hours, in advance of need  - Initiate/Maintain alarm  - Obtain necessary fall risk management equipment:   - Apply yellow socks and bracelet for high fall risk patients  - Consider moving patient to room near nurses station  Outcome: Progressing     Problem: MOBILITY - ADULT  Goal: Maintain or return to baseline ADL function  Description: INTERVENTIONS:  -  Assess patient's ability to carry out ADLs; assess patient's baseline for ADL function and identify physical deficits which impact ability to perform ADLs (bathing, care of mouth/teeth, toileting, grooming, dressing, etc )  - Assess/evaluate cause of self-care deficits   - Assess range of motion  - Assess patient's mobility; develop plan if impaired  - Assess patient's need for assistive devices and provide as appropriate  - Encourage maximum independence but intervene and supervise when necessary  - Involve family in performance of ADLs  - Assess for home care needs following discharge   - Consider OT consult to assist with ADL evaluation and planning for discharge  - Provide patient education as appropriate  Outcome: Progressing  Goal: Maintains/Returns to pre admission functional level  Description: INTERVENTIONS:  - Perform BMAT or MOVE assessment daily    - Set and communicate daily mobility goal to care team and patient/family/caregiver  - Collaborate with rehabilitation services on mobility goals if consulted  - Perform Range of Motion 3 times a day  - Reposition patient every 2 hours    - Dangle patient 3 times a day  - Stand patient 3 times a day  - Ambulate patient 3 times a day  - Out of bed to chair 3 times a day   - Out of bed for meals 3 times a day  - Out of bed for toileting  - Record patient progress and toleration of activity level   Outcome: Progressing     Problem: Prexisting or High Potential for Compromised Skin Integrity  Goal: Skin integrity is maintained or improved  Description: INTERVENTIONS:  - Identify patients at risk for skin breakdown  - Assess and monitor skin integrity  - Assess and monitor nutrition and hydration status  - Monitor labs   - Assess for incontinence   - Turn and reposition patient  - Assist with mobility/ambulation  - Relieve pressure over bony prominences  - Avoid friction and shearing  - Provide appropriate hygiene as needed including keeping skin clean and dry  - Evaluate need for skin moisturizer/barrier cream  - Collaborate with interdisciplinary team   - Patient/family teaching  - Consider wound care consult   Outcome: Progressing

## 2021-07-08 NOTE — DISCHARGE SUMMARY
[facility]  Discharge- Davee Meckel 1960, 64 y o  female MRN: [id number]  Unit/Bed#: S -01 Encounter: [id number]  Primary Care Provider: Ever Law MD   Date and time admitted to hospital: 7/5/2021  1:51 PM    * [address] 61 witnessed by patient's  who states there was no syncope  New onset falls likely due to multiple factors including:  · proprioceptive defects: patient endorses worsening numbness of bilateral feet, B12 and folate within normal limits   · worsening ambulatory dysfunction 2/2 bone and brain metastasis, history of prior whole brain radiation    Plan:  - Fall precautions  - Discharge to rehab facility ([facility] per case management)  - Awaiting insurance authorization likely Monday discharge    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, estrogen receptor positive ([facility] [address] )  Assessment & Plan  Currently with brain and bone metastasis   and sister report patient has been having increasing back pain lately and difficulty with pain control  Plan:   - Pain control: Continue home gabapentin 400mg TID, continue home Dilaudid 4mg 4x daily  May require a fifth dose which the patient's outpatient doctor has stated is okay to give (per patient's sister)   - Nausea: continue home compazine 5mg TID   - Appetite stimulant: per palliative consult, increase dexamethasone to 2mg PO BID with breakfast and lunch  - lymphedema: lasix 20mg PO daily      Anemia of chronic disease  Assessment & Plan  Hgb 10 7 on admission, jail 95  Hgb stable at 9 7 yesterday  Per heme/onc note on 6/9: "reviewed labs from 05/18/2021 which reveal a hemoglobin of 9 9, platelet count 065, normal white count and MCV is 98  An iron panel was obtained to further evaluate her anemia which revealed a saturation of 22% and a ferritin level of 108 suggesting iron deficiency is not contributing to her anemia   Suspect her anemia is of chronic disease"   B12 and folate WNL this admission  Plan:  - continue to monitor outpatient     Asymptomatic bacteriuria  Assessment & Plan  WBCs 30-50 on urinalysis with occasional bacteria  Patient denies any urinary symptoms, no fever, no leukocytosis on CBC  Did receive one dose ceftriaxone in the ED  Plan:  - continue to monitor off antibiotics   - urine culture, no growth to date    Cognitive impairment  Assessment & Plan  Per family ( and sister), she has been increasingly more confused over the past few months  Likely 2/2 side effect of brain metastasis s/p whole brain rads  Plan:  - delirium precautions   - avoid sedating medications     Insomnia due to medical condition  Assessment & Plan  Continue home ambien 10mg and zyprexa 10mg at bedtime     Onychomycosis of great toe  Assessment & Plan  Avulsion of L great toe nail  Pt unsure how this happened  Plan:  - per podiatry, no further care at this time, weight bear as tolerated  - Podiatry did a nail debridement     Medical Problems     Resolved Problems  Date Reviewed: 7/6/2021        Resolved    Diarrhea 7/9/2021     Resolved by  Timbo Sam MD              Discharging Resident: Timbo Sam MD  Discharging Attending: No att  providers found  PCP: Esa Boyle MD  Admission Date:   Admission Orders (From admission, onward)     Ordered        07/05/21 1737  Inpatient Admission  Once                   Discharge Date: 07/12/21    Consultations During Hospital Stay:  · Palliative care    Procedures Performed:   · none    Significant Findings / Test Results:   CT chest/abdomen/pelvis (7/5): Stable background emphysema  Multiple nodules reidentified including an 10 mm left upper lobe pleural-based nodule, 5 mm lingular nodule, and several left lower lobe nodules the largest measuring 6 mm  No new nodule  No endotracheal or endobronchial lesion      Incidental Findings:   · none    Test Results Pending at Discharge (will require follow up):  · none     Outpatient Tests Requested:  · none    Complications:  none    Reason for Admission: fall    Hospital Course:   Jaimee Herrera is a 64 y o  female patient with a history of stage IV breast cancer w/ metastasis to the brain and bones who originally presented to the hospital on 7/5/2021 due to new onset falls  Patient endorsed worsening numbness of the lower extremities over the past few weeks  Family endorsed increasing confusion over the past month and worsening back pain  No witnessed syncope  On admission, the patient also endorsed diarrhea, but per family, she is maintained on an aggressive bowel regimen to prevent recurrent severe constipation and her diarrhea has not been worse at home  In the ED, imaging did not show any evidence of trauma  CBC and BMP were unremarkable for infection or electrolyte abnormalities, kidney function was at baseline  Urinalysis was remarkable for asymptomatic bacteriuria  Blood and urine cultures remained negative  Her falls and increased confusion are likely 2/2 progression of her cancer and not any acute infectious process  During her stay the patient experienced some constipation, her bowel regimen was increased and she later had a bowel movement  Patient had an extended stay due to insurance authorization delays, however the patient was medically stable  From a medical standpoint the patient is stable to be discharged today  Please see above list of diagnoses and related plan for additional information  Condition at Discharge: stable    Discharge Day Visit / Exam:   Subjective:  Patient had no significant events overnight  Patient had no new complaints this morning  Patient's only concern was discharged  Patient denies shortness of breath, chest pain, nausea, vomiting, fever      Vitals: Blood Pressure: 125/73 (07/12/21 0719)  Pulse: 91 (07/12/21 0719)  Temperature: 97 9 °F (36 6 °C) (07/12/21 0719)  Temp Source: Oral (07/12/21 0719)  Respirations: 18 (07/12/21 0719)  SpO2: 96 % (07/12/21 8662)  Exam:   Physical Exam  Vitals and nursing note reviewed  Constitutional:       General: She is not in acute distress  Appearance: She is well-developed  She is not ill-appearing  HENT:      Head: Normocephalic and atraumatic  Eyes:      Conjunctiva/sclera: Conjunctivae normal    Cardiovascular:      Rate and Rhythm: Normal rate and regular rhythm  Heart sounds: No murmur heard  No gallop  Pulmonary:      Effort: Pulmonary effort is normal  No respiratory distress  Breath sounds: Normal breath sounds  Abdominal:      General: There is distension (mild)  Palpations: Abdomen is soft  Tenderness: There is no abdominal tenderness  There is no guarding  Musculoskeletal:      Cervical back: Neck supple  Skin:     General: Skin is warm and dry  Neurological:      Mental Status: She is alert and oriented to person, place, and time  Discussion with Family: Updated  () via phone  Discharge instructions/Information to patient and family:   See after visit summary for information provided to patient and family  Provisions for Follow-Up Care:  See after visit summary for information related to follow-up care and any pertinent home health orders  Disposition:   Acute Rehab at [facility]    Planned Readmission: None    Discharge Medications:  See after visit summary for reconciled discharge medications provided to patient and/or family        **Please Note: This note may have been constructed using a voice recognition system**

## 2021-07-09 PROBLEM — R19.7 DIARRHEA: Status: RESOLVED | Noted: 2021-07-05 | Resolved: 2021-07-09

## 2021-07-09 LAB — SARS-COV-2 RNA RESP QL NAA+PROBE: NEGATIVE

## 2021-07-09 PROCEDURE — 99232 SBSQ HOSP IP/OBS MODERATE 35: CPT | Performed by: INTERNAL MEDICINE

## 2021-07-09 PROCEDURE — U0005 INFEC AGEN DETEC AMPLI PROBE: HCPCS | Performed by: INTERNAL MEDICINE

## 2021-07-09 PROCEDURE — 97116 GAIT TRAINING THERAPY: CPT

## 2021-07-09 PROCEDURE — U0003 INFECTIOUS AGENT DETECTION BY NUCLEIC ACID (DNA OR RNA); SEVERE ACUTE RESPIRATORY SYNDROME CORONAVIRUS 2 (SARS-COV-2) (CORONAVIRUS DISEASE [COVID-19]), AMPLIFIED PROBE TECHNIQUE, MAKING USE OF HIGH THROUGHPUT TECHNOLOGIES AS DESCRIBED BY CMS-2020-01-R: HCPCS | Performed by: INTERNAL MEDICINE

## 2021-07-09 PROCEDURE — 11721 DEBRIDE NAIL 6 OR MORE: CPT | Performed by: PODIATRIST

## 2021-07-09 PROCEDURE — 0HBRXZZ EXCISION OF TOE NAIL, EXTERNAL APPROACH: ICD-10-PCS | Performed by: PODIATRIST

## 2021-07-09 PROCEDURE — 97530 THERAPEUTIC ACTIVITIES: CPT

## 2021-07-09 PROCEDURE — 97535 SELF CARE MNGMENT TRAINING: CPT

## 2021-07-09 RX ADMIN — OLANZAPINE 10 MG: 10 TABLET, FILM COATED ORAL at 21:52

## 2021-07-09 RX ADMIN — GABAPENTIN 400 MG: 400 CAPSULE ORAL at 16:03

## 2021-07-09 RX ADMIN — Medication 1 PATCH: at 08:53

## 2021-07-09 RX ADMIN — APIXABAN 5 MG: 5 TABLET, FILM COATED ORAL at 17:10

## 2021-07-09 RX ADMIN — DOCUSATE SODIUM AND SENNOSIDES 1 TABLET: 8.6; 5 TABLET, FILM COATED ORAL at 08:51

## 2021-07-09 RX ADMIN — GABAPENTIN 400 MG: 400 CAPSULE ORAL at 06:25

## 2021-07-09 RX ADMIN — APIXABAN 5 MG: 5 TABLET, FILM COATED ORAL at 08:51

## 2021-07-09 RX ADMIN — FUROSEMIDE 20 MG: 20 TABLET ORAL at 08:51

## 2021-07-09 RX ADMIN — PROCHLORPERAZINE MALEATE 5 MG: 10 TABLET ORAL at 06:25

## 2021-07-09 RX ADMIN — LUBIPROSTONE 24 MCG: 24 CAPSULE, GELATIN COATED ORAL at 08:51

## 2021-07-09 RX ADMIN — GABAPENTIN 400 MG: 400 CAPSULE ORAL at 12:15

## 2021-07-09 RX ADMIN — DOCUSATE SODIUM AND SENNOSIDES 1 TABLET: 8.6; 5 TABLET, FILM COATED ORAL at 17:10

## 2021-07-09 RX ADMIN — DEXAMETHASONE 2 MG: 2 TABLET ORAL at 12:15

## 2021-07-09 RX ADMIN — LUBIPROSTONE 24 MCG: 24 CAPSULE, GELATIN COATED ORAL at 16:04

## 2021-07-09 RX ADMIN — LEVETIRACETAM 250 MG: 500 TABLET, FILM COATED ORAL at 08:51

## 2021-07-09 RX ADMIN — ACETAMINOPHEN 650 MG: 325 TABLET, FILM COATED ORAL at 18:03

## 2021-07-09 RX ADMIN — LEVETIRACETAM 250 MG: 500 TABLET, FILM COATED ORAL at 17:10

## 2021-07-09 RX ADMIN — DEXAMETHASONE 2 MG: 2 TABLET ORAL at 16:04

## 2021-07-09 RX ADMIN — PANTOPRAZOLE SODIUM 40 MG: 40 TABLET, DELAYED RELEASE ORAL at 08:51

## 2021-07-09 RX ADMIN — PROCHLORPERAZINE MALEATE 5 MG: 10 TABLET ORAL at 12:15

## 2021-07-09 RX ADMIN — POLYETHYLENE GLYCOL 3350 17 G: 17 POWDER, FOR SOLUTION ORAL at 08:52

## 2021-07-09 RX ADMIN — PROCHLORPERAZINE MALEATE 5 MG: 10 TABLET ORAL at 16:04

## 2021-07-09 NOTE — PLAN OF CARE
Problem: PAIN - ADULT  Goal: Verbalizes/displays adequate comfort level or baseline comfort level  Description: Interventions:  - Encourage patient to monitor pain and request assistance  - Assess pain using appropriate pain scale  - Administer analgesics based on type and severity of pain and evaluate response  - Implement non-pharmacological measures as appropriate and evaluate response  - Consider cultural and social influences on pain and pain management  - Notify physician/advanced practitioner if interventions unsuccessful or patient reports new pain  Outcome: Progressing     Problem: INFECTION - ADULT  Goal: Absence or prevention of progression during hospitalization  Description: INTERVENTIONS:  - Assess and monitor for signs and symptoms of infection  - Monitor lab/diagnostic results  - Monitor all insertion sites, i e  indwelling lines, tubes, and drains  - Monitor endotracheal if appropriate and nasal secretions for changes in amount and color  - [place] appropriate cooling/warming therapies per order  - Administer medications as ordered  - Instruct and encourage patient and family to use good hand hygiene technique  - Identify and instruct in appropriate isolation precautions for identified infection/condition  Outcome: Progressing  Goal: Absence of fever/infection during neutropenic period  Description: INTERVENTIONS:  - Monitor WBC    Outcome: Progressing     Problem: SAFETY ADULT  Goal: Patient will remain free of falls  Description: INTERVENTIONS:  - Educate patient/family on patient safety including physical limitations  - Instruct patient to call for assistance with activity   - Consult OT/PT to assist with strengthening/mobility   - Keep Call bell within reach  - Keep bed low and locked with side rails adjusted as appropriate  - Keep care items and personal belongings within reach  - Initiate and maintain comfort rounds  - Make Fall Risk Sign visible to staf  - Apply yellow socks and bracelet for high fall risk patients  - Consider moving patient to room near nurses station  Outcome: Progressing  Goal: Maintain or return to baseline ADL function  Description: INTERVENTIONS:  -  Assess patient's ability to carry out ADLs; assess patient's baseline for ADL function and identify physical deficits which impact ability to perform ADLs (bathing, care of mouth/teeth, toileting, grooming, dressing, etc )  - Assess/evaluate cause of self-care deficits   - Assess range of motion  - Assess patient's mobility; develop plan if impaired  - Assess patient's need for assistive devices and provide as appropriate  - Encourage maximum independence but intervene and supervise when necessary  - Involve family in performance of ADLs  - Assess for home care needs following discharge   - Consider OT consult to assist with ADL evaluation and planning for discharge  - Provide patient education as appropriate  Outcome: Progressing  Goal: Maintains/Returns to pre admission functional level  Description: INTERVENTIONS:  - Perform BMAT or MOVE assessment daily    - Set and communicate daily mobility goal to care team and patient/family/caregiver     - Collaborate with rehabilitation services on mobility goals if consulted  - Out of bed for toileting  - Record patient progress and toleration of activity level   Outcome: Progressing     Problem: DISCHARGE PLANNING  Goal: Discharge to home or other facility with appropriate resources  Description: INTERVENTIONS:  - Identify barriers to discharge w/patient and caregiver  - Arrange for needed discharge resources and transportation as appropriate  - Identify discharge learning needs (meds, wound care, etc )  - Arrange for interpretive services to assist at discharge as needed  - Refer to Case Management Department for coordinating discharge planning if the patient needs post-hospital services based on physician/advanced practitioner order or complex needs related to functional status, cognitive ability, or social support system  Outcome: Progressing     Problem: Knowledge Deficit  Goal: Patient/family/caregiver demonstrates understanding of disease process, treatment plan, medications, and discharge instructions  Description: Complete learning assessment and assess knowledge base    Interventions:  - Provide teaching at level of understanding  - Provide teaching via preferred learning methods  Outcome: Progressing     Problem: Potential for Falls  Goal: Patient will remain free of falls  Description: INTERVENTIONS:  - Educate patient/family on patient safety including physical limitations  - Instruct patient to call for assistance with activity   - Consult OT/PT to assist with strengthening/mobility   - Keep Call bell within reach  - Keep bed low and locked with side rails adjusted as appropriate  - Keep care items and personal belongings within reach  - Initiate and maintain comfort rounds  - Make Fall Risk Sign visible to staff  - Apply yellow socks and bracelet for high fall risk patients  - Consider moving patient to room near nurses station  Outcome: Progressing     Problem: MOBILITY - ADULT  Goal: Maintain or return to baseline ADL function  Description: INTERVENTIONS:  -  Assess patient's ability to carry out ADLs; assess patient's baseline for ADL function and identify physical deficits which impact ability to perform ADLs (bathing, care of mouth/teeth, toileting, grooming, dressing, etc )  - Assess/evaluate cause of self-care deficits   - Assess range of motion  - Assess patient's mobility; develop plan if impaired  - Assess patient's need for assistive devices and provide as appropriate  - Encourage maximum independence but intervene and supervise when necessary  - Involve family in performance of ADLs  - Assess for home care needs following discharge   - Consider OT consult to assist with ADL evaluation and planning for discharge  - Provide patient education as appropriate  Outcome: Progressing  Goal: Maintains/Returns to pre admission functional level  Description: INTERVENTIONS:  - Perform BMAT or MOVE assessment daily    - Set and communicate daily mobility goal to care team and patient/family/caregiver     - Collaborate with rehabilitation services on mobility goals if consulted    - Out of bed for toileting  - Record patient progress and toleration of activity level   Outcome: Progressing     Problem: Prexisting or High Potential for Compromised Skin Integrity  Goal: Skin integrity is maintained or improved  Description: INTERVENTIONS:  - Identify patients at risk for skin breakdown  - Assess and monitor skin integrity  - Assess and monitor nutrition and hydration status  - Monitor labs   - Assess for incontinence   - Turn and reposition patient  - Assist with mobility/ambulation  - Relieve pressure over bony prominences  - Avoid friction and shearing  - Provide appropriate hygiene as needed including keeping skin clean and dry  - Evaluate need for skin moisturizer/barrier cream  - Collaborate with interdisciplinary team   - Patient/family teaching  - Consider wound care consult   Outcome: Progressing

## 2021-07-09 NOTE — ASSESSMENT & PLAN NOTE
Avulsion of L great toe nail  Pt unsure how this happened  Plan:  - per podiatry, no further care at this time, weight bear as tolerated     - Podiatry did a nail debridement

## 2021-07-09 NOTE — PLAN OF CARE
Problem: OCCUPATIONAL THERAPY ADULT  Goal: Performs self-care activities at highest level of function for planned discharge setting  See evaluation for individualized goals  Description: Treatment Interventions: ADL retraining, Functional transfer training, UE strengthening/ROM, Endurance training, Cognitive reorientation, Patient/family training, Equipment evaluation/education, Compensatory technique education, Continued evaluation, Energy conservation, Activityengagement  Equipment Recommended: Shower/Tub chair with back ($), Bedside commode, Other (comment) (use of RW for functional transfers)       See flowsheet documentation for full assessment, interventions and recommendations  Outcome: Progressing  Note: Limitation: Decreased ADL status, Decreased cognition, Decreased endurance, Decreased self-care trans, Decreased high-level ADLs     Assessment: Pt seen for skilled OT tx session day 1 from  [id number] focusing on challengin activity tolerance and continued evaluation  Pt agreeable to participate in session and demonstrated improved activity and standing tolernace  Pt engaged in LBD seated at EOB w/ mod  to pants and underwear  Pt required less physical assistance to complete sit <> stand  Pt engaged in functional mobility using RW w/ mod A  Continue to recommend post acute rehab when medically stable for discharge from acute care   Will continue to follow     OT Discharge Recommendation: Post acute rehabilitation services

## 2021-07-09 NOTE — PLAN OF CARE
Problem: PHYSICAL THERAPY ADULT  Goal: Performs mobility at highest level of function for planned discharge setting  See evaluation for individualized goals  Description: Treatment/Interventions: Functional transfer training, LE strengthening/ROM, Elevations, Cognitive reorientation, Bed mobility, Gait training, Equipment eval/education, Patient/family training, Endurance training, Therapeutic exercise, Spoke to nursing, Spoke to case management  Equipment Recommended: [place] Boot, Wheelchair (Recommend walker use home more than A of 1, primarily wC)       See flowsheet documentation for full assessment, interventions and recommendations  Outcome: Progressing  Note: Prognosis: Fair  Problem List: Decreased strength, Decreased range of motion, Decreased endurance, Impaired balance, Decreased mobility, Impaired judgement, Decreased safety awareness, Impaired sensation, Obesity, Decreased skin integrity (+gait deviations)  Assessment: Patient seen for PT treatment today with focus on transfer training gait training  Patient presents semi-supine in bed, denies pain, is eager and agreeable to participate in session  Patient requires [facility] for completion of bed mobility in hospital bed, initially [facility] for transfers from EOB with use of RW with progression to supervision with RW for transfers (with increased repetition)  While EOB, pt completes above outlined lower extremity exercises for strengthening, ROM, and edema management  Pt then ambulates 16ft with RW and MODA from therapist, chair follow provided  See above for gait deviation details  Pt with mild degree of SOB post mobility training, requires functional rest break for resolution of SOB  At end of session patient was left seated in bedside recliner chair, chair alarm engaged, needs within reach    Continue to recommend short-term rehab upon discharge as patient continues to be unsafe for return to home at current level of functional mobility, despite progress made since previous session  Barriers to Discharge: Inaccessible home environment    PT Discharge Recommendation: Post acute rehabilitation services     See flowsheet documentation for full assessment

## 2021-07-09 NOTE — OCCUPATIONAL THERAPY NOTE
OccupationalTherapy Progress Note     Patient Name: Iraida Jamison  WOGPP'F Date: 7/9/2021  Problem List  Principal Problem:    Fall  Active Problems:    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, estrogen receptor positive ([address] )    Insomnia due to medical condition    Anemia of chronic disease    Asymptomatic bacteriuria    Cognitive impairment    Onychomycosis of great toe       07/09/21 0843   OT Last Visit   OT Visit Date 07/09/21  (Friday)   Note Type   Note Type Treatment   Restrictions/Precautions   Weight Bearing Precautions Per Order Yes   LLE Weight Bearing Per Order WBAT  (per podiatry consult)   Other Precautions Cognitive; Chair Alarm; Bed Alarm; Fall Risk;Pain   General   Response to Previous Treatment Patient with no complaints from previous session  (pt reports SOB "winded" w/ min exertion)   Family/Caregiver Present No   Pain Assessment   Pain Assessment Tool 0-10   Pain Score No Pain   ADL   Where Assessed Edge of bed  (vs OOB in chair at end of session w/ needs met)   Eating Assistance 6  Modified independent   Eating Deficit Setup   Eating Comments eating banance from breakfast tray at end of session   Grooming Assistance 5  Supervision/Setup   Grooming Deficit Setup; Increased time to complete   Grooming Comments seated OOB in chair at tray table   UB Bathing Assistance Unable to assess   LB Bathing Assistance Unable to assess   UB Dressing Assistance 5  [address]; Increased time to complete   LB Dressing Assistance 3  Moderate Assistance   LB Dressing Deficit Thread RLE into pants; Thread LLE into pants; Thread RLE into underwear; Thread LLE into underwear;Requires assistive device for steadying;Steadying;Setup   LB Dressing Comments don underwear and pants seated at EOB  Able to ext rot hip and flex hip/ knee to cross L LE over R   Able to flex R hip/ knee to reach foot to thread   Bed Mobility   Supine to Sit 3  Moderate assistance  (trunk management)   Additional items Assist x 1;HOB elevated; Increased time required;Verbal cues   Sit to Supine Unable to assess   Additional Comments Pt seated OOB in chair at end of session w/ needs met, call bell in reach and chair alarm activated  Transfers   Sit to Stand 4  Minimal assistance   Additional items Assist x 1; Increased time required;Verbal cues   Stand to Sit 4  Minimal assistance   Additional items Assist x 1; Increased time required;Verbal cues; Bedrails;Armrests  (instruction for hand placement)   Additional Comments Pt performed sit <> stand multiple times from EOB w/ min A   Functional Mobility   Functional Mobility 3  Moderate assistance   Additional Comments Pt engaged in functional mobility using RW w/ mod A short distances w/ in room  Unsteady and benefits from chair follow  Additional items Rolling walker   Cognition   Overall Cognitive Status Impaired   Arousal/Participation Alert; Cooperative   Attention Attends with cues to redirect   Orientation Level Oriented to person;Oriented to place;Oriented to situation   Memory Decreased recall of recent events;Decreased short term memory  (+ time, cues for consistent recall)   Following Commands Follows one step commands with increased time or repetition   Comments Identified pt by full name and birthdate Able to follow directions during ADL tasks and communicate wants / needs   Activity Tolerance   Activity Tolerance Patient limited by fatigue   Medical Staff Made Aware care coordination w/ PTHaley due to decreased activity tolerance and physical assistance required to assess functional mobility and challenge activity tolerance  Per RN appropriate to see pt  Spoke to Vanita ANGELA   Assessment   Assessment Pt seen for skilled OT tx session day 1 from  [id number] focusing on challengin activity tolerance and continued evaluation  Pt agreeable to participate in session and demonstrated improved activity and standing tolernace   Pt engaged in LBD seated at EOB w/ mod  to pants and underwear  Pt required less physical assistance to complete sit <> stand  Pt engaged in functional mobility using RW w/ mod A  Continue to recommend post acute rehab when medically stable for discharge from acute care  Will continue to follow   Plan   Treatment Interventions ADL retraining;Functional transfer training; Endurance training;Patient/family training;Equipment evaluation/education;Continued evaluation; Activityengagement; Energy conservation   Goal Expiration Date 07/17/21   OT Treatment Day 1  (Friday)   OT Frequency 3-5x/wk   Recommendation   OT Discharge Recommendation Post acute rehabilitation services   Equipment Recommended Shower/Tub chair with back ($);Bedside commode  (use of RW)   Commode Type Standard   AM-PAC Daily Activity Inpatient   Lower Body Dressing 2   Bathing 2   Toileting 2   Upper Body Dressing 3   Grooming 4   Eating 4   Daily Activity Raw Score 17   Daily Activity Standardized Score (Calc for Raw Score >=11) 37 26   AM-PAC Applied Cognition Inpatient   Following a Speech/Presentation 2   Understanding Ordinary Conversation 3   Taking Medications 3   Remembering Where Things Are Placed or Put Away 3   Remembering List of 4-5 Errands 2   Taking Care of Complicated Tasks 2   Applied Cognition Raw Score 15   Applied Cognition Standardized Score 33 54   Barthel Index   Feeding 10   Bathing 0   Grooming Score 5   Dressing Score 5   Bladder Score 10   Bowels Score 10   Toilet Use Score 5   Transfers (Bed/Chair) Score 5   Mobility (Level Surface) Score 0   Stairs Score 0   Barthel Index Score 50      The patient's raw score on the AM-PAC Daily Activity inpatient short form is 17, standardized score is 37 26, less than 39 4  Patients at this level are likely to benefit from discharge to post-acute rehabilitation services  Please refer to the recommendation of the Occupational Therapist for safe discharge planning    EVELYN Humphrey/L

## 2021-07-09 NOTE — ASSESSMENT & PLAN NOTE
WBCs 30-50 on urinalysis with occasional bacteria  Patient denies any urinary symptoms, no fever, no leukocytosis on CBC  Did receive one dose ceftriaxone in the ED       Plan:  - continue to monitor off antibiotics   - urine culture, no growth to date

## 2021-07-09 NOTE — PROCEDURES
Patient has neglected fungal toenails causing pain  The left great toenail is previously avulsed but there is no active bleeding or pain  Using nail nipper, clair, and curette, nails were sharply debrided, reduced in thickness and length (x9)  Devitalized tissue removed  Patient tolerated well

## 2021-07-09 NOTE — PLAN OF CARE
Problem: SAFETY ADULT  Goal: Patient will remain free of falls  Description: INTERVENTIONS:  - Educate patient/family on patient safety including physical limitations  - Instruct patient to call for assistance with activity   - Consult OT/PT to assist with strengthening/mobility   - Keep Call bell within reach  - Keep bed low and locked with side rails adjusted as appropriate  - Keep care items and personal belongings within reach  - Initiate and maintain comfort rounds  - Make Fall Risk Sign visible to staff  - Offer Toileting every 2 Hours, in advance of need    Problem: Prexisting or High Potential for Compromised Skin Integrity  Goal: Skin integrity is maintained or improved  Description: INTERVENTIONS:  - Identify patients at risk for skin breakdown  - Assess and monitor skin integrity  - Assess and monitor nutrition and hydration status  - Monitor labs   - Assess for incontinence   - Turn and reposition patient  - Assist with mobility/ambulation  - Relieve pressure over bony prominences  - Avoid friction and shearing  - Provide appropriate hygiene as needed including keeping skin clean and dry  - Evaluate need for skin moisturizer/barrier cream  - Collaborate with interdisciplinary team   - Patient/family teaching  - Consider wound care consult   Outcome: Progressing     Problem: MOBILITY - ADULT  Goal: Maintain or return to baseline ADL function  Description: INTERVENTIONS:  -  Assess patient's ability to carry out ADLs; assess patient's baseline for ADL function and identify physical deficits which impact ability to perform ADLs (bathing, care of mouth/teeth, toileting, grooming, dressing, etc )  - Assess/evaluate cause of self-care deficits   - Assess range of motion  - Assess patient's mobility; develop plan if impaired  - Assess patient's need for assistive devices and provide as appropriate  - Encourage maximum independence but intervene and supervise when necessary  - Involve family in performance of ADLs  - Assess for home care needs following discharge   - Consider OT consult to assist with ADL evaluation and planning for discharge  - Provide patient education as appropriate  Outcome: Progressing   - Obtain necessary fall risk management equipment:walker   - Apply yellow socks and bracelet for high fall risk patients  - Consider moving patient to room near nurses station  Outcome: Progressing

## 2021-07-09 NOTE — PHYSICAL THERAPY NOTE
PHYSICAL THERAPY TREATMENT  Time: 7577-0378    NAME:  Azul Friedman  DATE: 07/09/21    AGE:   64 y o    Mrn:   [id number]  Length Of Stay: 4    ADMIT DX:  Diarrhea [R19 7]  UTI (urinary tract infection) [N39 0]  Hypotension [I95 9]  Generalized weakness [R53 1]  Hypotensive episode [I95 9]    Past Medical History:   Diagnosis Date    Dehydration 6/11/2019    Dizziness 2/6/2018    Dry skin 2/6/2018    Edema 10/23/2019    H/O bilateral mastectomy 2/15/2016    Hyperglycemia 2/6/2018    Hypertension 2/15/2016    Hypokalemia 3/10/2020    Lymphedema 2/15/2016    Malignant cachexia ([facility] ) 10/6/2016    Malignant neoplasm of right breast ([facility] [address] ) 2/15/2016    Metastatic breast cancer [facility])      Past Surgical History:   Procedure Laterality Date    ENDOBRONCHIAL ULTRASOUND (EBUS) N/A 2/16/2016    Procedure: EBUS;  Surgeon: Mireille Hou MD;  [facility];  Service:     MASTECTOMY Bilateral     MASTECTOMY Bilateral     right arm edema    OOPHORECTOMY      MS BRONCHOSCOPY NEEDLE BX TRACHEA MAIN STEM&/BRON N/A 2/16/2016    Procedure: Reinaldo Young;  Surgeon: Mireille Hou MD;  [facility];  Service: Thoracic    MS INSJ TUNNELED CTR VAD W/SUBQ PORT AGE 5 YR/> N/A 7/24/2018    Procedure: INSERTION VENOUS PORT ( PORT-A-CATH) IR;  Surgeon: Katrin Chiu DO;  [facility];  Service: Interventional Radiology    MS STEREOTACTIC RADIOSURGERY, CRANIAL,SIMPLE,EA ADD  5/3/2017         MS STEREOTACTIC RADIOSURGERY, CRANIAL,SIMPLE,EA ADD  5/3/2017         MS STEREOTACTIC RADIOSURGERY, CRANIAL,SIMPLE,SINGLE  5/3/2017            Performed at least 2 patient identifiers during session: Name and ID bracelet        07/09/21 0841   PT Last Visit   PT Visit Date 07/09/21   Note Type   Note Type Treatment   Pain Assessment   Pain Assessment Tool 0-10   Pain Score No Pain   Effect of Pain on Daily Activities n/a   Hospital Pain Intervention(s) Ambulation/increased activity;Repositioned   Multiple Pain Sites No   Restrictions/Precautions   Weight Bearing Precautions Per Order Yes   LLE Weight Bearing Per Order WBAT  (per podiatry consult)   Other Precautions Chair Alarm; Bed Alarm; Fall Risk;Limb alert   General   Chart Reviewed Yes   Additional Pertinent History Pt admitted 7/5/2021 s/p fall, diarrhea, UTI, generalized weakness, hypotension  Response to Previous Treatment Patient with no complaints from previous session  Family/Caregiver Present No   Cognition   Overall Cognitive Status Impaired   Arousal/Participation Cooperative; Alert   Attention Within functional limits   Orientation Level Oriented to person;Oriented to place;Oriented to situation   Memory Decreased recall of recent events   Following Commands Follows one step commands without difficulty   Subjective   Subjective "I do feel better today "   Bed Mobility   Supine to Sit 4  Minimal assistance   Additional items Assist x 1;HOB elevated; Bedrails; Increased time required  (trunk management)   Sit to Supine Unable to assess   Additional Comments Pt was left seated OOB in recliner chair at end of session  Transfers   Sit to Stand 4  Minimal assistance   Additional items Assist x 1; Increased time required;Verbal cues  (RW)   Stand to Sit 4  Minimal assistance   Additional items Assist x 1; Armrests; Increased time required;Verbal cues  (RW)   Additional Comments Pt requiring cues for hand placement for optimal body mechanics and safe transfer technique  Pt with fair carry over t/o session  Patient completes x3 sit-to-stand transfers from edge of bed with rolling walker, initially requiring Min assist but progresses to supervision with increased effort  Pt moderately ataxic upon standing  Ambulation/Elevation   Gait pattern Forward Flexion;Decreased foot clearance; Short stride; Ataxia   Gait Assistance 3  Moderate assist   Additional items Assist x 1;Verbal cues; Tactile cues  ([address] [facility] 2 person for initial gait assessment)   Assistive Device Rolling walker   Distance 16ft x1 including change in direction, chair follow provided for safety  Stair Management Assistance Not tested   Balance   Static Sitting Fair +   Dynamic Sitting Fair   Static Standing Fair -   Dynamic Standing Poor +   Ambulatory Poor +   Endurance Deficit   Endurance Deficit Yes   Endurance Deficit Description Patient with moderate degree of fatigue and weakness, increased shortness of breath with activity, resulting in decreased endurance/activity tolerance  Activity Tolerance   Activity Tolerance Patient limited by fatigue  (impaired sensation in B LEs, ataxia)   Medical Staff Made Aware Coordinated care with OT kayla for partial session  Exercises   Hip Abduction Sitting;15 reps;AROM; Bilateral   Hip Adduction Sitting;15 reps;AROM; Bilateral  (to neutral)   Knee AROM Long Arc Quad Sitting;15 reps;AROM; Bilateral   Ankle Pumps Sitting;15 reps;AROM; Bilateral   Assessment   Prognosis Fair   Problem List Decreased strength;Decreased range of motion;Decreased endurance; Impaired balance;Decreased mobility; Impaired judgement;Decreased safety awareness; Impaired sensation;Obesity; Decreased skin integrity  (+gait deviations)   Assessment Patient seen for PT treatment today with focus on transfer training gait training  Patient presents semi-supine in bed, denies pain, is eager and agreeable to participate in session  Patient requires [facility] for completion of bed mobility in hospital bed, initially [facility] for transfers from EOB with use of RW with progression to supervision with RW for transfers (with increased repetition)  While EOB, pt completes above outlined lower extremity exercises for strengthening, ROM, and edema management  Pt then ambulates 16ft with RW and MODA from therapist, chair follow provided  See above for gait deviation details  Pt with mild degree of SOB post mobility training, requires functional rest break for resolution of SOB    At end of session patient was left seated in bedside recliner chair, chair alarm engaged, needs within reach  Continue to recommend short-term rehab upon discharge as patient continues to be unsafe for return to home at current level of functional mobility, despite progress made since previous session  Barriers to Discharge Inaccessible home environment   Goals   Patient Goals "to walk better"   [facility] Expiration Date 07/17/21   Short Term Goal #1 Goals: Pt will: Perform bed mobility tasks with modified I to prepare for transfers and reposition in bed  Perform transfers with supervision to decrease risk for falls and improve ease of transfers  Perform ambulation with LRAD for 50 ft with consistent Min assist without loss of balance or falls to increase Indep in home environment and And goal of patient ambulation  Perform 1 step with railing and w/consistent min A of 1 to return to home with EKATERINA around ramp setup  Propel wheelchair for 50 ft as an alternative to ambulation while patient has substantial myoclonic jerking  PT Treatment Day 1   Plan   Treatment/Interventions Functional transfer training;LE strengthening/ROM; Therapeutic exercise;Elevations; Endurance training;Patient/family training;Equipment eval/education; Bed mobility;Gait training;OT   Progress Progressing toward goals   PT Frequency Other (Comment)  (3-5x/wk)   Recommendation   PT Discharge Recommendation Post acute rehabilitation services   Additional Comments This session, pt required and most appropriately benefited from partial or full skilled PT/OT co-treat due to significant regression from baseline level of mobility and assist of 2 skilled therapists due to significant gait deviations and ataxia  PT and OT goals were addressed separately as seen in documentation  This session: skilled PT addressed functional mobility and ambulation, and skilled OT addressed ADLs/self care (see documentation for reference)     AM-PAC Basic Mobility Inpatient   Turning in Bed Without Bedrails 3 Lying on Back to Sitting on Edge of Flat Bed 3   Moving Bed to Chair 3   Standing Up From Chair 3   Walk in Room 2   Climb 3-5 Stairs 1   Basic Mobility Inpatient Raw Score 15   Basic Mobility Standardized Score 36 97       The patient's AM-PAC Basic Mobility Inpatient Short Form Raw Score is 15, Standardized Score is 36 97  A standardized score less than 42 9 suggests the patient may benefit from discharge to post-acute rehabilitation services  Please also refer to the recommendation of the Physical Therapist for safe discharge planning          Consuelo Barbosa, PT, DPT   Available via [employer]  NPI # [id number]  [place] License - [id number]  2/1/7260

## 2021-07-09 NOTE — ASSESSMENT & PLAN NOTE
Home bowel regimen for chronic constipation, could possibly be contributing to diarrhea  Patient's sister assists with care at home and states that the patient has had diarrhea for a while, but if they hold her bowel regimen she becomes extremely constipated  Diarrhea has resolved  No abnormalities on CT abdomen       Plan:   - continue home bowel regimen on discharged:  · MiraLax 17g daily   · Senokot 8 6-50 mg, 2 tablets twice daily - giving only 1 tablet twice daily while inpatient 2/2 significant diarrhea prior to admission    · Amitiza 24 mcg b i d

## 2021-07-09 NOTE — PROGRESS NOTES
[facility]  Progress Note - Marissa Crandall 1960, 64 y o  female MRN: [id number]  Unit/Bed#: S -01 Encounter: [id number]  Primary Care Provider: Tha Franco MD   Date and time admitted to hospital: 7/5/2021  1:51 PM    * [address] witnessed by patient's  who states there was no syncope  New onset falls likely due to multiple factors including:  · proprioceptive defects: patient endorses worsening numbness of bilateral feet, B12 and folate within normal limits   · worsening ambulatory dysfunction 2/2 bone and brain metastasis, history of prior whole brain radiation    Plan:  - Fall precautions  - Discharge to rehab facility ([facility] per case management)    Diarrhea-resolved as of 7/9/2021  Assessment & Plan  Home bowel regimen for chronic constipation, could possibly be contributing to diarrhea  Patient's sister assists with care at home and states that the patient has had diarrhea for a while, but if they hold her bowel regimen she becomes extremely constipated  Diarrhea has resolved  No abnormalities on CT abdomen  Plan:   - continue home bowel regimen on discharged:  · MiraLax 17g daily   · Senokot 8 6-50 mg, 2 tablets twice daily - giving only 1 tablet twice daily while inpatient 2/2 significant diarrhea prior to admission    · Amitiza 24 mcg b i d    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, estrogen receptor positive ([facility] [address] )  Assessment & Plan  Currently with brain and bone metastasis   and sister report patient has been having increasing back pain lately and difficulty with pain control  Plan:   - Pain control: Continue home gabapentin 400mg TID, continue home Dilaudid 4mg 4x daily   May require a fifth dose which the patient's outpatient doctor has stated is okay to give (per patient's sister)   - Nausea: continue home compazine 5mg TID   - Appetite stimulant: per palliative consult, increase dexamethasone to 2mg PO BID with breakfast and lunch  - lymphedema: lasix 20mg PO daily      Anemia of chronic disease  Assessment & Plan  Hgb 10 7 on admission, longterm 95  Hgb stable at 9 7 yesterday  Per heme/onc note on 6/9: "reviewed labs from 05/18/2021 which reveal a hemoglobin of 9 9, platelet count 844, normal white count and MCV is 98  An iron panel was obtained to further evaluate her anemia which revealed a saturation of 22% and a ferritin level of 108 suggesting iron deficiency is not contributing to her anemia  Suspect her anemia is of chronic disease"   B12 and folate WNL this admission  Plan:  - continue to monitor outpatient     Asymptomatic bacteriuria  Assessment & Plan  WBCs 30-50 on urinalysis with occasional bacteria  Patient denies any urinary symptoms, no fever, no leukocytosis on CBC  Did receive one dose ceftriaxone in the ED  Plan:  - continue to monitor off antibiotics   - urine culture, no growth to date    Cognitive impairment  Assessment & Plan  Per family ( and sister), she has been increasingly more confused over the past few months  Likely 2/2 side effect of brain metastasis s/p whole brain rads  Plan:  - delirium precautions   - avoid sedating medications     Insomnia due to medical condition  Assessment & Plan  Continue home ambien 10mg and zyprexa 10mg at bedtime     Onychomycosis of great toe  Assessment & Plan  Avulsion of L great toe nail  Pt unsure how this happened  Plan:  - per podiatry, no further care at this time, weight bear as tolerated  - Podiatry did a nail debridement         VTE Pharmacologic Prophylaxis: VTE Score: 6 High Risk (Score >/= 5) - Pharmacological DVT Prophylaxis Ordered: apixaban (Eliquis)  Sequential Compression Devices Ordered  Patient Centered Rounds: I performed bedside rounds with nursing staff today  Discussions with Specialists or Other Care Team Provider: Nurse and      Education and Discussions with Family / Patient:  Will call family this afternoon  Current Length of Stay: 4 day(s)  Current Patient Status: Inpatient   Discharge Plan: Anticipate discharge later today or tomorrow to rehab facility  Code Status: Level 1 - Full Code    Subjective:   No significant events overnight  Patient was cooperative and pleasant this morning on interview  She was eating her breakfast this morning and did not endorse any complaints  She denies any pain, numbness, nausea, vomiting, fever, headache, shortness of breath, chest pain, dysuria, abdominal pain  Objective:     Vitals:   Temp (24hrs), Av 6 °F (37 °C), Min:98 4 °F (36 9 °C), Max:98 8 °F (37 1 °C)    Temp:  [98 4 °F (36 9 °C)-98 8 °F (37 1 °C)] 98 8 °F (37 1 °C)  HR:  [58-80] 58  Resp:  [18-21] 18  BP: (106-155)/(62-80) 155/73  SpO2:  [94 %-97 %] 97 %  There is no height or weight on file to calculate BMI  Input and Output Summary (last 24 hours): Intake/Output Summary (Last 24 hours) at 2021 0936  Last data filed at 2021 7428  Gross per 24 hour   Intake 580 ml   Output 1350 ml   Net -770 ml       Physical Exam:   Physical Exam  Vitals and nursing note reviewed  Constitutional:       General: She is not in acute distress  Appearance: She is well-developed  HENT:      Head: Normocephalic and atraumatic  Eyes:      Conjunctiva/sclera: Conjunctivae normal    Cardiovascular:      Rate and Rhythm: Normal rate and regular rhythm  Heart sounds: No murmur heard  Pulmonary:      Effort: Pulmonary effort is normal  No respiratory distress  Breath sounds: Normal breath sounds  Abdominal:      General: There is no distension  Palpations: Abdomen is soft  Tenderness: There is no abdominal tenderness  There is no guarding  Musculoskeletal:      Cervical back: Neck supple  Skin:     General: Skin is warm and dry  Neurological:      Mental Status: She is alert and oriented to person, place, and time            Additional Data:     Labs:  Results from last 7 days   Lab Units 07/07/21  0507 07/05/21  1415   WBC Thousand/uL 6 79 8 71   HEMOGLOBIN g/dL 9 6* 10 7*   HEMATOCRIT % 31 8* 34 2*   PLATELETS Thousands/uL 311 365   NEUTROS PCT %  --  78*   LYMPHS PCT %  --  11*   MONOS PCT %  --  8   EOS PCT %  --  1     Results from last 7 days   Lab Units 07/07/21  0507 07/05/21  1415   SODIUM mmol/L 145 142   POTASSIUM mmol/L 3 7 3 6   CHLORIDE mmol/L 111* 103   CO2 mmol/L 25 29   BUN mg/dL 6 11   CREATININE mg/dL 0 51* 0 78   ANION GAP mmol/L 9 10   CALCIUM mg/dL 8 8 8 4   ALBUMIN g/dL  --  2 9*   TOTAL BILIRUBIN mg/dL  --  0 40   ALK PHOS U/L  --  106   ALT U/L  --  30   AST U/L  --  36   GLUCOSE RANDOM mg/dL 104 157*     Results from last 7 days   Lab Units 07/05/21  1415   INR  0 93     Results from last 7 days   Lab Units 07/05/21  1429   POC GLUCOSE mg/dl 159*         Results from last 7 days   Lab Units 07/05/21  1415   LACTIC ACID mmol/L 1 8   PROCALCITONIN ng/ml <0 05       Lines/Drains:  Invasive Devices     Central Venous Catheter Line            Port A Cath Right Chest -- days    Port A Cath Right Chest -- days                Central Line:  Goal for removal: Will de-access for discharge to rehab, it is place for chemotherapy             Imaging: No pertinent imaging reviewed  Recent Cultures (last 7 days):   Results from last 7 days   Lab Units 07/05/21  1547 07/05/21  1530 07/05/21  1415   BLOOD CULTURE  No Growth at 72 hrs   --  No Growth at 72 hrs     URINE CULTURE   --  30,000-39,000 cfu/ml Diphtheroids  --        Last 24 Hours Medication List:   Current Facility-Administered Medications   Medication Dose Route Frequency Provider Last Rate    acetaminophen  650 mg Oral Q6H PRN Makenzie Medina MD      albuterol  2 puff Inhalation Q6H PRN Makenzie Medina MD      apixaban  5 mg Oral BID Makenzie Medina MD      calcium carbonate  1,000 mg Oral Daily PRN Makenzie Medina MD      dexamethasone  2 mg Oral BID before lunch/dinner Gretchen Pickens, [facility] furosemide  20 mg Oral Daily Clyde Galeas MD      gabapentin  400 mg Oral TID AC Rebecca Sigala MD      HYDROmorphone  4 mg Oral 4x Daily PRN Clyde Galeas MD      levETIRAcetam  250 mg Oral BID Clyde Galeas MD      lubiprostone  24 mcg Oral BID With Meals Clyde Galeas MD      nicotine  1 patch Transdermal Daily Clyde Galeas MD      OLANZapine  10 mg Oral HS Clyde Galeas MD      ondansetron  4 mg Intravenous Q6H PRN Clyde Galeas MD      pantoprazole  40 mg Oral Daily Clyde Galeas MD      polyethylene glycol  17 g Oral Daily Clyde Galeas MD      prochlorperazine  5 mg Oral TID Jeffrey Mccloud MD      senna-docusate sodium  1 tablet Oral BID Clyde Galeas MD      zolpidem  10 mg Oral HS PRN Clyde Galeas MD          Today, Patient Was Seen By: Norma Zuluaga MD    **Please Note: This note may have been constructed using a voice recognition system  **

## 2021-07-10 LAB
ANION GAP SERPL CALCULATED.3IONS-SCNC: 12 MMOL/L (ref 4–13)
BUN SERPL-MCNC: 9 MG/DL (ref 5–25)
CALCIUM SERPL-MCNC: 9 MG/DL (ref 8.3–10.1)
CHLORIDE SERPL-SCNC: 109 MMOL/L (ref 100–108)
CO2 SERPL-SCNC: 25 MMOL/L (ref 21–32)
CREAT SERPL-MCNC: 0.62 MG/DL (ref 0.6–1.3)
ERYTHROCYTE [DISTWIDTH] IN BLOOD BY AUTOMATED COUNT: 16.1 % (ref 11.6–15.1)
GFR SERPL CREATININE-BSD FRML MDRD: 100 ML/MIN/1.73SQ M
GLUCOSE SERPL-MCNC: 95 MG/DL (ref 65–140)
HCT VFR BLD AUTO: 33.3 % (ref 34.8–46.1)
HGB BLD-MCNC: 10.1 G/DL (ref 11.5–15.4)
MCH RBC QN AUTO: 29.5 PG (ref 26.8–34.3)
MCHC RBC AUTO-ENTMCNC: 30.3 G/DL (ref 31.4–37.4)
MCV RBC AUTO: 97 FL (ref 82–98)
PLATELET # BLD AUTO: 334 THOUSANDS/UL (ref 149–390)
PMV BLD AUTO: 9.1 FL (ref 8.9–12.7)
POTASSIUM SERPL-SCNC: 3.7 MMOL/L (ref 3.5–5.3)
RBC # BLD AUTO: 3.42 MILLION/UL (ref 3.81–5.12)
SODIUM SERPL-SCNC: 146 MMOL/L (ref 136–145)
WBC # BLD AUTO: 6.83 THOUSAND/UL (ref 4.31–10.16)

## 2021-07-10 PROCEDURE — 99232 SBSQ HOSP IP/OBS MODERATE 35: CPT | Performed by: INTERNAL MEDICINE

## 2021-07-10 PROCEDURE — 85027 COMPLETE CBC AUTOMATED: CPT | Performed by: INTERNAL MEDICINE

## 2021-07-10 PROCEDURE — 80048 BASIC METABOLIC PNL TOTAL CA: CPT | Performed by: INTERNAL MEDICINE

## 2021-07-10 RX ADMIN — APIXABAN 5 MG: 5 TABLET, FILM COATED ORAL at 08:43

## 2021-07-10 RX ADMIN — GABAPENTIN 400 MG: 400 CAPSULE ORAL at 11:16

## 2021-07-10 RX ADMIN — GABAPENTIN 400 MG: 400 CAPSULE ORAL at 06:01

## 2021-07-10 RX ADMIN — LEVETIRACETAM 250 MG: 500 TABLET, FILM COATED ORAL at 17:12

## 2021-07-10 RX ADMIN — GABAPENTIN 400 MG: 400 CAPSULE ORAL at 17:11

## 2021-07-10 RX ADMIN — DEXAMETHASONE 2 MG: 2 TABLET ORAL at 11:16

## 2021-07-10 RX ADMIN — POLYETHYLENE GLYCOL 3350 17 G: 17 POWDER, FOR SOLUTION ORAL at 08:44

## 2021-07-10 RX ADMIN — LUBIPROSTONE 24 MCG: 24 CAPSULE, GELATIN COATED ORAL at 17:12

## 2021-07-10 RX ADMIN — DOCUSATE SODIUM AND SENNOSIDES 1 TABLET: 8.6; 5 TABLET, FILM COATED ORAL at 17:12

## 2021-07-10 RX ADMIN — LUBIPROSTONE 24 MCG: 24 CAPSULE, GELATIN COATED ORAL at 08:30

## 2021-07-10 RX ADMIN — PROCHLORPERAZINE MALEATE 5 MG: 10 TABLET ORAL at 11:16

## 2021-07-10 RX ADMIN — PROCHLORPERAZINE MALEATE 5 MG: 10 TABLET ORAL at 17:11

## 2021-07-10 RX ADMIN — Medication 1 PATCH: at 08:44

## 2021-07-10 RX ADMIN — OLANZAPINE 10 MG: 10 TABLET, FILM COATED ORAL at 21:50

## 2021-07-10 RX ADMIN — FUROSEMIDE 20 MG: 20 TABLET ORAL at 08:43

## 2021-07-10 RX ADMIN — PROCHLORPERAZINE MALEATE 5 MG: 10 TABLET ORAL at 06:01

## 2021-07-10 RX ADMIN — DOCUSATE SODIUM AND SENNOSIDES 1 TABLET: 8.6; 5 TABLET, FILM COATED ORAL at 08:43

## 2021-07-10 RX ADMIN — PANTOPRAZOLE SODIUM 40 MG: 40 TABLET, DELAYED RELEASE ORAL at 08:43

## 2021-07-10 RX ADMIN — APIXABAN 5 MG: 5 TABLET, FILM COATED ORAL at 17:12

## 2021-07-10 RX ADMIN — LEVETIRACETAM 250 MG: 500 TABLET, FILM COATED ORAL at 08:43

## 2021-07-10 RX ADMIN — DEXAMETHASONE 2 MG: 2 TABLET ORAL at 17:12

## 2021-07-10 RX ADMIN — HYDROMORPHONE HYDROCHLORIDE 4 MG: 2 TABLET ORAL at 17:12

## 2021-07-10 NOTE — CASE MANAGEMENT
Patient cleared for discharge pending auth  Auth submitted by Susanna Cano requesting updated COVID swab, swab completed on 7/9/2021 - updated labwork sent via [address] [place] [address] Drive  CM requested call upon receipt of auth  CM will continue to follow at this time

## 2021-07-10 NOTE — PROGRESS NOTES
[facility]  Progress Note - Tung Hoyt 1960, 64 y o  female MRN: [id number]  Unit/Bed#: S -01 Encounter: [id number]  Primary Care Provider: Lisandra Page MD   Date and time admitted to hospital: 7/5/2021  1:51 PM    * Soha 61 witnessed by patient's  who states there was no syncope  New onset falls likely due to multiple factors including:  · proprioceptive defects: patient endorses worsening numbness of bilateral feet, B12 and folate within normal limits   · worsening ambulatory dysfunction 2/2 bone and brain metastasis, history of prior whole brain radiation    Plan:  - Fall precautions  - Discharge to rehab facility ([facility] per case management)  - Awaiting insurance authorization likely Monday discharge    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, estrogen receptor positive ([facility] [address] )  Assessment & Plan  Currently with brain and bone metastasis   and sister report patient has been having increasing back pain lately and difficulty with pain control  Plan:   - Pain control: Continue home gabapentin 400mg TID, continue home Dilaudid 4mg 4x daily  May require a fifth dose which the patient's outpatient doctor has stated is okay to give (per patient's sister)   - Nausea: continue home compazine 5mg TID   - Appetite stimulant: per palliative consult, increase dexamethasone to 2mg PO BID with breakfast and lunch  - lymphedema: lasix 20mg PO daily      Anemia of chronic disease  Assessment & Plan  Hgb 10 7 on admission, senior living 95  Hgb stable at 9 7 yesterday  Per heme/onc note on 6/9: "reviewed labs from 05/18/2021 which reveal a hemoglobin of 9 9, platelet count 325, normal white count and MCV is 98  An iron panel was obtained to further evaluate her anemia which revealed a saturation of 22% and a ferritin level of 108 suggesting iron deficiency is not contributing to her anemia   Suspect her anemia is of chronic disease"   B12 and folate WNL this admission  Plan:  - continue to monitor outpatient     Asymptomatic bacteriuria  Assessment & Plan  WBCs 30-50 on urinalysis with occasional bacteria  Patient denies any urinary symptoms, no fever, no leukocytosis on CBC  Did receive one dose ceftriaxone in the ED  Plan:  - continue to monitor off antibiotics   - urine culture, no growth to date    Cognitive impairment  Assessment & Plan  Per family ( and sister), she has been increasingly more confused over the past few months  Likely 2/2 side effect of brain metastasis s/p whole brain rads  Plan:  - delirium precautions   - avoid sedating medications     Insomnia due to medical condition  Assessment & Plan  Continue home ambien 10mg and zyprexa 10mg at bedtime     Onychomycosis of great toe  Assessment & Plan  Avulsion of L great toe nail  Pt unsure how this happened  Plan:  - per podiatry, no further care at this time, weight bear as tolerated  - Podiatry did a nail debridement       VTE Pharmacologic Prophylaxis: VTE Score: 6 High Risk (Score >/= 5) - Pharmacological DVT Prophylaxis Ordered: apixaban (Eliquis)  Sequential Compression Devices Ordered  Patient Centered Rounds: I performed bedside rounds with nursing staff today  Discussions with Specialists or Other Care Team Provider: Nurse and      Education and Discussions with Family / Patient: Updated  () via phone  Current Length of Stay: 5 day(s)  Current Patient Status: Inpatient   Discharge Plan: Anticipate discharge in 48 hrs to rehab facility  Code Status: Level 1 - Full Code    Subjective:   No significant events overnight  Patient has no new complaints this morning  Patient denies pain, nausea, vomiting, fever, chest pain, shortness of breath, chills, night sweats      Objective:     Vitals:   Temp (24hrs), Av °F (36 7 °C), Min:97 8 °F (36 6 °C), Max:98 2 °F (36 8 °C)    Temp:  [97 8 °F (36 6 °C)-98 2 °F (36 8 °C)] 97 8 °F (36 6 °C)  HR:  [67-80] 67  Resp:  [16-18] 16  BP: (122-159)/(76-78) 159/76  SpO2:  [95 %-98 %] 95 %  There is no height or weight on file to calculate BMI  Input and Output Summary (last 24 hours): Intake/Output Summary (Last 24 hours) at 7/10/2021 0828  Last data filed at 7/10/2021 0604  Gross per 24 hour   Intake 480 ml   Output 750 ml   Net -270 ml       Physical Exam:   Physical Exam  Vitals and nursing note reviewed  Constitutional:       General: She is not in acute distress  Appearance: She is well-developed  HENT:      Head: Normocephalic and atraumatic  Eyes:      Conjunctiva/sclera: Conjunctivae normal    Cardiovascular:      Rate and Rhythm: Normal rate and regular rhythm  Heart sounds: No murmur heard  Pulmonary:      Effort: Pulmonary effort is normal  No respiratory distress  Breath sounds: Normal breath sounds  Abdominal:      General: There is no distension  Palpations: Abdomen is soft  Tenderness: There is no abdominal tenderness  There is no guarding  Musculoskeletal:      Cervical back: Neck supple  Skin:     General: Skin is warm and dry  Neurological:      Mental Status: She is alert and oriented to person, place, and time            Additional Data:     Labs:  Results from last 7 days   Lab Units 07/10/21  0559 07/05/21  1415   WBC Thousand/uL 6 83 8 71   HEMOGLOBIN g/dL 10 1* 10 7*   HEMATOCRIT % 33 3* 34 2*   PLATELETS Thousands/uL 334 365   NEUTROS PCT %  --  78*   LYMPHS PCT %  --  11*   MONOS PCT %  --  8   EOS PCT %  --  1     Results from last 7 days   Lab Units 07/10/21  0559 07/05/21  1415   SODIUM mmol/L 146* 142   POTASSIUM mmol/L 3 7 3 6   CHLORIDE mmol/L 109* 103   CO2 mmol/L 25 29   BUN mg/dL 9 11   CREATININE mg/dL 0 62 0 78   ANION GAP mmol/L 12 10   CALCIUM mg/dL 9 0 8 4   ALBUMIN g/dL  --  2 9*   TOTAL BILIRUBIN mg/dL  --  0 40   ALK PHOS U/L  --  106   ALT U/L  --  30   AST U/L  --  36   GLUCOSE RANDOM mg/dL 95 157* Results from last 7 days   Lab Units 07/05/21  1415   INR  0 93     Results from last 7 days   Lab Units 07/05/21  1429   POC GLUCOSE mg/dl 159*         Results from last 7 days   Lab Units 07/05/21  1415   LACTIC ACID mmol/L 1 8   PROCALCITONIN ng/ml <0 05       Lines/Drains:  Invasive Devices     Central Venous Catheter Line            Port A Cath Right Chest -- days    Port A Cath Right Chest -- days                Central Line:  Goal for removal: Will continue, it is used for chemotherapy             Imaging: No pertinent imaging reviewed  Recent Cultures (last 7 days):   Results from last 7 days   Lab Units 07/05/21  1547 07/05/21  1530 07/05/21  1415   BLOOD CULTURE  No Growth After 4 Days  --  No Growth After 4 Days     URINE CULTURE   --  30,000-39,000 cfu/ml Diphtheroids  --        Last 24 Hours Medication List:   Current Facility-Administered Medications   Medication Dose Route Frequency Provider Last Rate    acetaminophen  650 mg Oral Q6H PRN Denise Rodriguez MD      albuterol  2 puff Inhalation Q6H PRN Denise Rodriguez MD      apixaban  5 mg Oral BID Denise Rodriguez MD      calcium carbonate  1,000 mg Oral Daily PRN Denise Rodriguez MD      dexamethasone  2 mg Oral BID before lunch/dinner FAUZIA Fraser      furosemide  20 mg Oral Daily Denise Rodriguez MD      gabapentin  400 mg Oral TID AC Denise Rodriguez MD      HYDROmorphone  4 mg Oral 4x Daily PRN Denise Rodriguez MD      levETIRAcetam  250 mg Oral BID Denise Rodriguez MD      lubiprostone  24 mcg Oral BID With Meals Denise Rodriguez MD      nicotine  1 patch Transdermal Daily Denise Rodriguez MD      OLANZapine  10 mg Oral HS Denise Rodriguez MD      ondansetron  4 mg Intravenous Q6H PRN Denise Rodriguez MD      pantoprazole  40 mg Oral Daily Denise Rodriguez MD      polyethylene glycol  17 g Oral Daily Denise Rodriguez MD      prochlorperazine  5 mg Oral TID AC Denise Rodriguez MD      senna-docusate sodium  1 tablet Oral BID Denise Rodriguez MD      zolpidem 10 mg Oral HS PRN Susanne Gallego MD          Today, Patient Was Seen By: Timbo Sam MD    **Please Note: This note may have been constructed using a voice recognition system  **

## 2021-07-10 NOTE — ASSESSMENT & PLAN NOTE
Falls witnessed by patient's  who states there was no syncope   New onset falls likely due to multiple factors including:  · proprioceptive defects: patient endorses worsening numbness of bilateral feet, B12 and folate within normal limits   · worsening ambulatory dysfunction 2/2 bone and brain metastasis, history of prior whole brain radiation    Plan:  - Fall precautions  - Discharge to rehab facility ([facility] per case management)  - Awaiting insurance authorization likely Monday discharge

## 2021-07-10 NOTE — PLAN OF CARE
Problem: PAIN - ADULT  Goal: Verbalizes/displays adequate comfort level or baseline comfort level  Description: Interventions:  - Encourage patient to monitor pain and request assistance  - Assess pain using appropriate pain scale  - Administer analgesics based on type and severity of pain and evaluate response  - Implement non-pharmacological measures as appropriate and evaluate response  - Consider cultural and social influences on pain and pain management  - Notify physician/advanced practitioner if interventions unsuccessful or patient reports new pain  Outcome: Progressing     Problem: INFECTION - ADULT  Goal: Absence or prevention of progression during hospitalization  Description: INTERVENTIONS:  - Assess and monitor for signs and symptoms of infection  - Monitor lab/diagnostic results  - Monitor all insertion sites, i e  indwelling lines, tubes, and drains  - Monitor endotracheal if appropriate and nasal secretions for changes in amount and color  - [place] appropriate cooling/warming therapies per order  - Administer medications as ordered  - Instruct and encourage patient and family to use good hand hygiene technique  - Identify and instruct in appropriate isolation precautions for identified infection/condition  Outcome: Progressing  Goal: Absence of fever/infection during neutropenic period  Description: INTERVENTIONS:  - Monitor WBC    Outcome: Progressing     Problem: SAFETY ADULT  Goal: Patient will remain free of falls  Description: INTERVENTIONS:  - Educate patient/family on patient safety including physical limitations  - Instruct patient to call for assistance with activity   - Consult OT/PT to assist with strengthening/mobility   - Keep Call bell within reach  - Keep bed low and locked with side rails adjusted as appropriate  - Keep care items and personal belongings within reach  - Initiate and maintain comfort rounds  - Make Fall Risk Sign visible to staff  - Apply yellow socks and bracelet for high fall risk patients  - Consider moving patient to room near nurses station  Outcome: Progressing  Goal: Maintain or return to baseline ADL function  Description: INTERVENTIONS:  -  Assess patient's ability to carry out ADLs; assess patient's baseline for ADL function and identify physical deficits which impact ability to perform ADLs (bathing, care of mouth/teeth, toileting, grooming, dressing, etc )  - Assess/evaluate cause of self-care deficits   - Assess range of motion  - Assess patient's mobility; develop plan if impaired  - Assess patient's need for assistive devices and provide as appropriate  - Encourage maximum independence but intervene and supervise when necessary  - Involve family in performance of ADLs  - Assess for home care needs following discharge   - Consider OT consult to assist with ADL evaluation and planning for discharge  - Provide patient education as appropriate  Outcome: Progressing  Goal: Maintains/Returns to pre admission functional level  Description: INTERVENTIONS:  - Perform BMAT or MOVE assessment daily    - Set and communicate daily mobility goal to care team and patient/family/caregiver     - Collaborate with rehabilitation services on mobility goals if consulted  - Out of bed for toileting  - Record patient progress and toleration of activity level   Outcome: Progressing     Problem: DISCHARGE PLANNING  Goal: Discharge to home or other facility with appropriate resources  Description: INTERVENTIONS:  - Identify barriers to discharge w/patient and caregiver  - Arrange for needed discharge resources and transportation as appropriate  - Identify discharge learning needs (meds, wound care, etc )  - Arrange for interpretive services to assist at discharge as needed  - Refer to Case Management Department for coordinating discharge planning if the patient needs post-hospital services based on physician/advanced practitioner order or complex needs related to functional status, cognitive ability, or social support system  Outcome: Progressing     Problem: Knowledge Deficit  Goal: Patient/family/caregiver demonstrates understanding of disease process, treatment plan, medications, and discharge instructions  Description: Complete learning assessment and assess knowledge base    Interventions:  - Provide teaching at level of understanding  - Provide teaching via preferred learning methods  Outcome: Progressing     Problem: Potential for Falls  Goal: Patient will remain free of falls  Description: INTERVENTIONS:  - Educate patient/family on patient safety including physical limitations  - Instruct patient to call for assistance with activity   - Consult OT/PT to assist with strengthening/mobility   - Keep Call bell within reach  - Keep bed low and locked with side rails adjusted as appropriate  - Keep care items and personal belongings within reach  - Initiate and maintain comfort rounds  - Make Fall Risk Sign visible to staff  - Apply yellow socks and bracelet for high fall risk patients  - Consider moving patient to room near nurses station  Outcome: Progressing     Problem: MOBILITY - ADULT  Goal: Maintain or return to baseline ADL function  Description: INTERVENTIONS:  -  Assess patient's ability to carry out ADLs; assess patient's baseline for ADL function and identify physical deficits which impact ability to perform ADLs (bathing, care of mouth/teeth, toileting, grooming, dressing, etc )  - Assess/evaluate cause of self-care deficits   - Assess range of motion  - Assess patient's mobility; develop plan if impaired  - Assess patient's need for assistive devices and provide as appropriate  - Encourage maximum independence but intervene and supervise when necessary  - Involve family in performance of ADLs  - Assess for home care needs following discharge   - Consider OT consult to assist with ADL evaluation and planning for discharge  - Provide patient education as appropriate  Outcome: Progressing  Goal: Maintains/Returns to pre admission functional level  Description: INTERVENTIONS:  - Perform BMAT or MOVE assessment daily    - Set and communicate daily mobility goal to care team and patient/family/caregiver     - Collaborate with rehabilitation services on mobility goals if consulted  - Out of bed for toileting  - Record patient progress and toleration of activity level   Outcome: Progressing     Problem: Prexisting or High Potential for Compromised Skin Integrity  Goal: Skin integrity is maintained or improved  Description: INTERVENTIONS:  - Identify patients at risk for skin breakdown  - Assess and monitor skin integrity  - Assess and monitor nutrition and hydration status  - Monitor labs   - Assess for incontinence   - Turn and reposition patient  - Assist with mobility/ambulation  - Relieve pressure over bony prominences  - Avoid friction and shearing  - Provide appropriate hygiene as needed including keeping skin clean and dry  - Evaluate need for skin moisturizer/barrier cream  - Collaborate with interdisciplinary team   - Patient/family teaching  - Consider wound care consult   Outcome: Progressing

## 2021-07-10 NOTE — ASSESSMENT & PLAN NOTE
Hgb 10 7 on admission, [facility] 95  Hgb stable at 9 7 yesterday  Per heme/onc note on 6/9: "reviewed labs from 05/18/2021 which reveal a hemoglobin of 9 9, platelet count 793, normal white count and MCV is 98  An iron panel was obtained to further evaluate her anemia which revealed a saturation of 22% and a ferritin level of 108 suggesting iron deficiency is not contributing to her anemia  Suspect her anemia is of chronic disease"   B12 and folate WNL this admission      Plan:  - continue to monitor outpatient

## 2021-07-11 LAB
ANION GAP SERPL CALCULATED.3IONS-SCNC: 10 MMOL/L (ref 4–13)
BACTERIA BLD CULT: NORMAL
BACTERIA BLD CULT: NORMAL
BUN SERPL-MCNC: 10 MG/DL (ref 5–25)
CALCIUM SERPL-MCNC: 8.8 MG/DL (ref 8.3–10.1)
CHLORIDE SERPL-SCNC: 107 MMOL/L (ref 100–108)
CO2 SERPL-SCNC: 27 MMOL/L (ref 21–32)
CREAT SERPL-MCNC: 0.66 MG/DL (ref 0.6–1.3)
GFR SERPL CREATININE-BSD FRML MDRD: 98 ML/MIN/1.73SQ M
GLUCOSE SERPL-MCNC: 91 MG/DL (ref 65–140)
POTASSIUM SERPL-SCNC: 3.7 MMOL/L (ref 3.5–5.3)
SODIUM SERPL-SCNC: 144 MMOL/L (ref 136–145)

## 2021-07-11 PROCEDURE — 99232 SBSQ HOSP IP/OBS MODERATE 35: CPT | Performed by: INTERNAL MEDICINE

## 2021-07-11 PROCEDURE — 80048 BASIC METABOLIC PNL TOTAL CA: CPT | Performed by: INTERNAL MEDICINE

## 2021-07-11 RX ORDER — AMOXICILLIN 250 MG
2 CAPSULE ORAL 2 TIMES DAILY
Status: DISCONTINUED | OUTPATIENT
Start: 2021-07-11 | End: 2021-07-12 | Stop reason: HOSPADM

## 2021-07-11 RX ADMIN — LUBIPROSTONE 24 MCG: 24 CAPSULE, GELATIN COATED ORAL at 17:11

## 2021-07-11 RX ADMIN — PANTOPRAZOLE SODIUM 40 MG: 40 TABLET, DELAYED RELEASE ORAL at 08:09

## 2021-07-11 RX ADMIN — LEVETIRACETAM 250 MG: 500 TABLET, FILM COATED ORAL at 08:08

## 2021-07-11 RX ADMIN — GABAPENTIN 400 MG: 400 CAPSULE ORAL at 11:12

## 2021-07-11 RX ADMIN — LEVETIRACETAM 250 MG: 500 TABLET, FILM COATED ORAL at 17:10

## 2021-07-11 RX ADMIN — LUBIPROSTONE 24 MCG: 24 CAPSULE, GELATIN COATED ORAL at 08:09

## 2021-07-11 RX ADMIN — GABAPENTIN 400 MG: 400 CAPSULE ORAL at 17:10

## 2021-07-11 RX ADMIN — PROCHLORPERAZINE MALEATE 5 MG: 10 TABLET ORAL at 11:12

## 2021-07-11 RX ADMIN — PROCHLORPERAZINE MALEATE 5 MG: 10 TABLET ORAL at 17:10

## 2021-07-11 RX ADMIN — DOCUSATE SODIUM AND SENNOSIDES 1 TABLET: 8.6; 5 TABLET, FILM COATED ORAL at 08:09

## 2021-07-11 RX ADMIN — GABAPENTIN 400 MG: 400 CAPSULE ORAL at 05:59

## 2021-07-11 RX ADMIN — Medication 1 PATCH: at 08:10

## 2021-07-11 RX ADMIN — APIXABAN 5 MG: 5 TABLET, FILM COATED ORAL at 08:09

## 2021-07-11 RX ADMIN — APIXABAN 5 MG: 5 TABLET, FILM COATED ORAL at 17:10

## 2021-07-11 RX ADMIN — PROCHLORPERAZINE MALEATE 5 MG: 10 TABLET ORAL at 05:59

## 2021-07-11 RX ADMIN — POLYETHYLENE GLYCOL 3350 17 G: 17 POWDER, FOR SOLUTION ORAL at 08:08

## 2021-07-11 RX ADMIN — DEXAMETHASONE 2 MG: 2 TABLET ORAL at 11:12

## 2021-07-11 RX ADMIN — FUROSEMIDE 20 MG: 20 TABLET ORAL at 08:09

## 2021-07-11 RX ADMIN — DEXAMETHASONE 2 MG: 2 TABLET ORAL at 17:10

## 2021-07-11 RX ADMIN — OLANZAPINE 10 MG: 10 TABLET, FILM COATED ORAL at 21:05

## 2021-07-11 RX ADMIN — HYDROMORPHONE HYDROCHLORIDE 4 MG: 2 TABLET ORAL at 17:10

## 2021-07-11 NOTE — PLAN OF CARE
Problem: PAIN - ADULT  Goal: Verbalizes/displays adequate comfort level or baseline comfort level  Description: Interventions:  - Encourage patient to monitor pain and request assistance  - Assess pain using appropriate pain scale  - Administer analgesics based on type and severity of pain and evaluate response  - Implement non-pharmacological measures as appropriate and evaluate response  - Consider cultural and social influences on pain and pain management  - Notify physician/advanced practitioner if interventions unsuccessful or patient reports new pain  Outcome: Progressing     Problem: INFECTION - ADULT  Goal: Absence or prevention of progression during hospitalization  Description: INTERVENTIONS:  - Assess and monitor for signs and symptoms of infection  - Monitor lab/diagnostic results  - Monitor all insertion sites, i e  indwelling lines, tubes, and drains  - Administer medications as ordered  - Instruct and encourage patient and family to use good hand hygiene technique  - Identify and instruct in appropriate isolation precautions for identified infection/condition  Outcome: Progressing  Goal: Absence of fever/infection during neutropenic period  Description: INTERVENTIONS:  - Monitor WBC    Outcome: Progressing  Goal: Maintain or return to baseline ADL function  Description: INTERVENTIONS:  -  Assess patient's ability to carry out ADLs; assess patient's baseline for ADL function and identify physical deficits which impact ability to perform ADLs (bathing, care of mouth/teeth, toileting, grooming, dressing, etc )  - Assess/evaluate cause of self-care deficits   - Assess range of motion  - Assess patient's mobility; develop plan if impaired  - Assess patient's need for assistive devices and provide as appropriate  - Encourage maximum independence but intervene and supervise when necessary  - Involve family in performance of ADLs  - Assess for home care needs following discharge   - Consider OT consult to assist with ADL evaluation and planning for discharge  - Provide patient education as appropriate  Outcome: Progressing  Goal: Maintains/Returns to pre admission functional level  Description: INTERVENTIONS:  - Perform BMAT or MOVE assessment daily    - Set and communicate daily mobility goal to care team and patient/family/caregiver  - Collaborate with rehabilitation services on mobility goals if consulted  - Perform Range of Motion 4 times a day  - Reposition patient every 2 hours  - Record patient progress and toleration of activity level   Outcome: Progressing     Problem: DISCHARGE PLANNING  Goal: Discharge to home or other facility with appropriate resources  Description: INTERVENTIONS:  - Identify barriers to discharge w/patient and caregiver  - Arrange for needed discharge resources and transportation as appropriate  - Identify discharge learning needs (meds, wound care, etc )  - Arrange for interpretive services to assist at discharge as needed  - Refer to Case Management Department for coordinating discharge planning if the patient needs post-hospital services based on physician/advanced practitioner order or complex needs related to functional status, cognitive ability, or social support system  Outcome: Progressing     Problem: Knowledge Deficit  Goal: Patient/family/caregiver demonstrates understanding of disease process, treatment plan, medications, and discharge instructions  Description: Complete learning assessment and assess knowledge base    Interventions:  - Provide teaching at level of understanding  - Provide teaching via preferred learning methods  Outcome: Progressing     Problem: Potential for Falls  Goal: Patient will remain free of falls  Description: INTERVENTIONS:  - Educate patient/family on patient safety including physical limitations  - Instruct patient to call for assistance with activity   - Consult OT/PT to assist with strengthening/mobility   - Keep Call bell within reach  - Keep bed low and locked with side rails adjusted as appropriate  - Keep care items and personal belongings within reach  - Initiate and maintain comfort rounds  - Initiate/Maintain bed alarm  - Obtain necessary fall risk management equipment: yellow socks, yellow bracelet, bed alarm  - Apply yellow socks and bracelet for high fall risk patients  - Consider moving patient to room near nurses station  Outcome: Progressing     Problem: MOBILITY - ADULT  Goal: Maintain or return to baseline ADL function  Description: INTERVENTIONS:  -  Assess patient's ability to carry out ADLs; assess patient's baseline for ADL function and identify physical deficits which impact ability to perform ADLs (bathing, care of mouth/teeth, toileting, grooming, dressing, etc )  - Assess/evaluate cause of self-care deficits   - Assess range of motion  - Assess patient's mobility; develop plan if impaired  - Assess patient's need for assistive devices and provide as appropriate  - Encourage maximum independence but intervene and supervise when necessary  - Involve family in performance of ADLs  - Assess for home care needs following discharge   - Consider OT consult to assist with ADL evaluation and planning for discharge  - Provide patient education as appropriate  Outcome: Progressing  Goal: Maintains/Returns to pre admission functional level  Description: INTERVENTIONS:  - Perform BMAT or MOVE assessment daily    - Set and communicate daily mobility goal to care team and patient/family/caregiver  - Collaborate with rehabilitation services on mobility goals if consulted  - Perform Range of Motion 4 times a day  - Reposition patient every 2 hours    - Out of bed for toileting  - Record patient progress and toleration of activity level   Outcome: Progressing     Problem: Prexisting or High Potential for Compromised Skin Integrity  Goal: Skin integrity is maintained or improved  Description: INTERVENTIONS:  - Identify patients at risk for skin breakdown  - Assess and monitor skin integrity  - Assess and monitor nutrition and hydration status  - Monitor labs   - Assess for incontinence   - Turn and reposition patient  - Assist with mobility/ambulation  - Relieve pressure over bony prominences  - Avoid friction and shearing  - Provide appropriate hygiene as needed including keeping skin clean and dry  - Evaluate need for skin moisturizer/barrier cream  - Collaborate with interdisciplinary team   - Patient/family teaching  - Consider wound care consult   Outcome: Progressing

## 2021-07-11 NOTE — NURSING NOTE
Pt is alert and oriented sitting upright in bed eating breakfast, no complaints of pain at this time  When asked pt stated that she still has numbness in her b/l lower extremities  Per pt its an equal amount of numbness b/l no side is worse than the other  Per report pt was using bedpan over night however is an assist x2 OOB to commode during the day  VS this morning were all WDL  I will continue to monitor

## 2021-07-11 NOTE — PROGRESS NOTES
[facility]  Progress Note - Reganjorge Meckel 1960, 64 y o  female MRN: [id number]  Unit/Bed#: S -01 Encounter: [id number]  Primary Care Provider: Ever Law MD   Date and time admitted to hospital: 7/5/2021  1:51 PM    * [address] witnessed by patient's  who states there was no syncope  New onset falls likely due to multiple factors including:  · proprioceptive defects: patient endorses worsening numbness of bilateral feet, B12 and folate within normal limits   · worsening ambulatory dysfunction 2/2 bone and brain metastasis, history of prior whole brain radiation    Plan:  - Fall precautions  - Discharge to rehab facility ([facility] per case management)  - Awaiting insurance authorization likely Monday discharge    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, estrogen receptor positive ([facility] [address] )  Assessment & Plan  Currently with brain and bone metastasis   and sister report patient has been having increasing back pain lately and difficulty with pain control  Plan:   - Pain control: Continue home gabapentin 400mg TID, continue home Dilaudid 4mg 4x daily  May require a fifth dose which the patient's outpatient doctor has stated is okay to give (per patient's sister)   - Nausea: continue home compazine 5mg TID   - Appetite stimulant: per palliative consult, increase dexamethasone to 2mg PO BID with breakfast and lunch  - lymphedema: lasix 20mg PO daily      Anemia of chronic disease  Assessment & Plan  Hgb 10 7 on admission, [facility] 95  Hgb stable at 9 7 yesterday  Per heme/onc note on 6/9: "reviewed labs from 05/18/2021 which reveal a hemoglobin of 9 9, platelet count 626, normal white count and MCV is 98  An iron panel was obtained to further evaluate her anemia which revealed a saturation of 22% and a ferritin level of 108 suggesting iron deficiency is not contributing to her anemia   Suspect her anemia is of chronic disease"   B12 and folate WNL this admission  Plan:  - continue to monitor outpatient     Asymptomatic bacteriuria  Assessment & Plan  WBCs 30-50 on urinalysis with occasional bacteria  Patient denies any urinary symptoms, no fever, no leukocytosis on CBC  Did receive one dose ceftriaxone in the ED  Plan:  - continue to monitor off antibiotics   - urine culture, no growth to date    Cognitive impairment  Assessment & Plan  Per family ( and sister), she has been increasingly more confused over the past few months  Likely 2/2 side effect of brain metastasis s/p whole brain rads  Plan:  - delirium precautions   - avoid sedating medications     Insomnia due to medical condition  Assessment & Plan  Continue home ambien 10mg and zyprexa 10mg at bedtime     Onychomycosis of great toe  Assessment & Plan  Avulsion of L great toe nail  Pt unsure how this happened  Plan:  - per podiatry, no further care at this time, weight bear as tolerated  - Podiatry did a nail debridement           VTE Pharmacologic Prophylaxis: VTE Score: 6 High Risk (Score >/= 5) - Pharmacological DVT Prophylaxis Ordered: apixaban (Eliquis)  Sequential Compression Devices Ordered  Patient Centered Rounds: I performed bedside rounds with nursing staff today  Discussions with Specialists or Other Care Team Provider: Nurse and     Education and Discussions with Family / Patient: Updated  () via phone  Current Length of Stay: 6 day(s)  Current Patient Status: Inpatient   Discharge Plan: Anticipate discharge in 24-48 hrs to home  Code Status: Level 1 - Full Code    Subjective:   No significant events overnight  Patient had no new complaints this morning  Patient denies any pain, nausea, vomiting, fever, chest pain, shortness of breath, chills, night sweats      Objective:     Vitals:   Temp (24hrs), Av 2 °F (36 8 °C), Min:97 8 °F (36 6 °C), Max:98 5 °F (36 9 °C)    Temp:  [97 8 °F (36 6 °C)-98 5 °F (36 9 °C)] 98 5 °F (36 9 °C)  HR:  [67-85] 85  Resp:  [16-18] 18  BP: (118-159)/(66-76) 122/66  SpO2:  [92 %-95 %] 95 %  There is no height or weight on file to calculate BMI  Input and Output Summary (last 24 hours): Intake/Output Summary (Last 24 hours) at 7/11/2021 0555  Last data filed at 7/10/2021 2153  Gross per 24 hour   Intake --   Output 1950 ml   Net -1950 ml       Physical Exam:   Physical Exam  Vitals and nursing note reviewed  Constitutional:       General: She is not in acute distress  Appearance: She is well-developed  HENT:      Head: Normocephalic and atraumatic  Eyes:      Conjunctiva/sclera: Conjunctivae normal    Cardiovascular:      Rate and Rhythm: Normal rate and regular rhythm  Heart sounds: No murmur heard  Pulmonary:      Effort: Pulmonary effort is normal  No respiratory distress  Breath sounds: Normal breath sounds  Abdominal:      Palpations: Abdomen is soft  Tenderness: There is no abdominal tenderness  Musculoskeletal:      Cervical back: Neck supple  Skin:     General: Skin is warm and dry  Neurological:      Mental Status: She is alert and oriented to person, place, and time            Additional Data:     Labs:  Results from last 7 days   Lab Units 07/10/21  0559 07/05/21  1415   WBC Thousand/uL 6 83 8 71   HEMOGLOBIN g/dL 10 1* 10 7*   HEMATOCRIT % 33 3* 34 2*   PLATELETS Thousands/uL 334 365   NEUTROS PCT %  --  78*   LYMPHS PCT %  --  11*   MONOS PCT %  --  8   EOS PCT %  --  1     Results from last 7 days   Lab Units 07/10/21  0559 07/05/21  1415   SODIUM mmol/L 146* 142   POTASSIUM mmol/L 3 7 3 6   CHLORIDE mmol/L 109* 103   CO2 mmol/L 25 29   BUN mg/dL 9 11   CREATININE mg/dL 0 62 0 78   ANION GAP mmol/L 12 10   CALCIUM mg/dL 9 0 8 4   ALBUMIN g/dL  --  2 9*   TOTAL BILIRUBIN mg/dL  --  0 40   ALK PHOS U/L  --  106   ALT U/L  --  30   AST U/L  --  36   GLUCOSE RANDOM mg/dL 95 157*     Results from last 7 days   Lab Units 07/05/21  1415   INR 0 93     Results from last 7 days   Lab Units 07/05/21  1429   POC GLUCOSE mg/dl 159*         Results from last 7 days   Lab Units 07/05/21  1415   LACTIC ACID mmol/L 1 8   PROCALCITONIN ng/ml <0 05       Lines/Drains:  Invasive Devices     Central Venous Catheter Line            Port A Cath Right Chest -- days    Port A Cath Right Chest -- days                Central Line:  Goal for removal: Keep for chemotherapy             Imaging: No pertinent imaging reviewed  Recent Cultures (last 7 days):   Results from last 7 days   Lab Units 07/05/21  1547 07/05/21  1530 07/05/21  1415   BLOOD CULTURE  No Growth After 5 Days  --  No Growth After 5 Days     URINE CULTURE   --  30,000-39,000 cfu/ml Diphtheroids  --        Last 24 Hours Medication List:   Current Facility-Administered Medications   Medication Dose Route Frequency Provider Last Rate    acetaminophen  650 mg Oral Q6H PRN Renata Charles MD      albuterol  2 puff Inhalation Q6H PRN Renata Charles MD      apixaban  5 mg Oral BID Renata Charles MD      calcium carbonate  1,000 mg Oral Daily PRN Renata Charles MD      dexamethasone  2 mg Oral BID before lunch/dinner FAUZIA Hendrix      furosemide  20 mg Oral Daily Renata Charles MD      gabapentin  400 mg Oral TID AC Renata Charles MD      HYDROmorphone  4 mg Oral 4x Daily PRN Renata Charles MD      levETIRAcetam  250 mg Oral BID Renata Charles MD      lubiprostone  24 mcg Oral BID With Meals Renata Charles MD      nicotine  1 patch Transdermal Daily Renata Charles MD      OLANZapine  10 mg Oral HS Renata Charles MD      ondansetron  4 mg Intravenous Q6H PRN Renata Charles MD      pantoprazole  40 mg Oral Daily Renata Charles MD      polyethylene glycol  17 g Oral Daily Renata Charles MD      prochlorperazine  5 mg Oral TID Colin Dalton MD      senna-docusate sodium  1 tablet Oral BID Renata Charles MD      zolpidem  10 mg Oral HS PRN Renata Charles MD          Today, Patient Was Seen By: Saranya Yu aWlker Villaseñor MD    **Please Note: This note may have been constructed using a voice recognition system  **

## 2021-07-11 NOTE — CASE MANAGEMENT
Per Lorie Baires, no auth received at this time  CM requested phone call if Carlos Eduardo Cates is received  CM will continue to follow at this time

## 2021-07-11 NOTE — ASSESSMENT & PLAN NOTE
Hgb 10 7 on admission, custodial 95  Hgb stable at 9 7 yesterday  Per heme/onc note on 6/9: "reviewed labs from 05/18/2021 which reveal a hemoglobin of 9 9, platelet count 500, normal white count and MCV is 98  An iron panel was obtained to further evaluate her anemia which revealed a saturation of 22% and a ferritin level of 108 suggesting iron deficiency is not contributing to her anemia  Suspect her anemia is of chronic disease"   B12 and folate WNL this admission      Plan:  - continue to monitor outpatient

## 2021-07-12 VITALS
TEMPERATURE: 97.9 F | RESPIRATION RATE: 18 BRPM | HEART RATE: 91 BPM | SYSTOLIC BLOOD PRESSURE: 125 MMHG | DIASTOLIC BLOOD PRESSURE: 73 MMHG | OXYGEN SATURATION: 96 %

## 2021-07-12 PROCEDURE — 97110 THERAPEUTIC EXERCISES: CPT

## 2021-07-12 PROCEDURE — 97530 THERAPEUTIC ACTIVITIES: CPT

## 2021-07-12 PROCEDURE — 97116 GAIT TRAINING THERAPY: CPT

## 2021-07-12 RX ORDER — DEXAMETHASONE 2 MG/1
2 TABLET ORAL
Refills: 0
Start: 2021-07-12

## 2021-07-12 RX ADMIN — PROCHLORPERAZINE MALEATE 5 MG: 10 TABLET ORAL at 12:17

## 2021-07-12 RX ADMIN — LUBIPROSTONE 24 MCG: 24 CAPSULE, GELATIN COATED ORAL at 08:10

## 2021-07-12 RX ADMIN — FUROSEMIDE 20 MG: 20 TABLET ORAL at 08:05

## 2021-07-12 RX ADMIN — POLYETHYLENE GLYCOL 3350 17 G: 17 POWDER, FOR SOLUTION ORAL at 08:05

## 2021-07-12 RX ADMIN — GABAPENTIN 400 MG: 400 CAPSULE ORAL at 06:52

## 2021-07-12 RX ADMIN — PROCHLORPERAZINE MALEATE 5 MG: 10 TABLET ORAL at 06:52

## 2021-07-12 RX ADMIN — LEVETIRACETAM 250 MG: 500 TABLET, FILM COATED ORAL at 08:05

## 2021-07-12 RX ADMIN — PANTOPRAZOLE SODIUM 40 MG: 40 TABLET, DELAYED RELEASE ORAL at 08:05

## 2021-07-12 RX ADMIN — DOCUSATE SODIUM AND SENNOSIDES 2 TABLET: 8.6; 5 TABLET, FILM COATED ORAL at 08:06

## 2021-07-12 RX ADMIN — Medication 1 PATCH: at 08:05

## 2021-07-12 RX ADMIN — DEXAMETHASONE 2 MG: 2 TABLET ORAL at 12:17

## 2021-07-12 RX ADMIN — APIXABAN 5 MG: 5 TABLET, FILM COATED ORAL at 08:05

## 2021-07-12 RX ADMIN — GABAPENTIN 400 MG: 400 CAPSULE ORAL at 12:17

## 2021-07-12 NOTE — DISCHARGE INSTRUCTIONS
Weakness   WHAT YOU NEED TO KNOW:   Weakness is a loss of muscle strength  It may be caused by brain, nerve, or muscle problems  Physical and mental conditions such as heart problems, pregnancy, dehydration, or depression may also cause weakness  Reactions to certain drugs can cause weakness  Parts of your body may become weak if you need to wear a cast or splint or have been on bed rest for a long time  DISCHARGE INSTRUCTIONS:   Call 911 for any of the following:   · You have any of the following signs of a stroke:      ? Numbness or drooping on one side of your face     ? Weakness in an arm or leg    ? Confusion or difficulty speaking    ? Dizziness, a severe headache, or vision loss    · You lose feeling in your weakened body area  · You have electric shock-like feelings down your arms and legs when you flex or move your neck  · You have sudden or increased trouble speaking, swallowing, or breathing  Seek care immediately if:   · You have severe pain in your back, arms, or legs that worsens  · You have sudden or worsened muscle weakness or loss of movement  · You are not able to control when you urinate or have a bowel movement  Contact your healthcare provider if:   · You feel depressed or anxious  · You have questions or concerns about your condition or care  Manage weakness:   · Use assistive devices as directed  These help protect you from injury  Examples include a walker or cane  Have someone install handrails in your home  These will help you get out of a bathtub or stand up from a toilet  Use a shower chair so you can sit while you shower  Sit down on the toilet or another chair to dry off and put on your clothes  Get help going up and down stairs if your legs are weak  · Go to physical or occupational therapy if directed  A physical therapist can teach you exercises to help strengthen weak muscles   An occupational therapist can show you ways to do your daily activities more easily  For example, light forks and spoons can be easier to use if you have hand weakness  You may also learn ways to organize your household items so you are not moving heavy items  · Balance rest with exercise  Exercise can help increase your muscle strength and energy  Do not exercise for long periods at a time  Take breaks often to rest  Too much exercise can cause muscle strain or make you more tired  Ask your healthcare provider how much exercise is right for you  · Eat a variety of healthy foods  Too much or too little food may cause weakness or tiredness  Ask your healthcare provider what a healthy amount of food is for you  Healthy foods include fruits, vegetables, whole-grain breads, low-fat dairy products, lean meats and fish, nuts, and cooked beans  · Do not smoke  Nicotine and other chemicals in cigarettes and cigars can make your symptoms worse, and can cause lung damage  Ask your healthcare provider for information if you currently smoke and need help to quit  E-cigarettes or smokeless tobacco still contain nicotine  Talk to your healthcare provider before you use these products  · Do not use caffeine, alcohol, or illegal drugs  These may cause muscle twitching, which could lead to worsened weakness  Follow up with your healthcare provider as directed:  Write down your questions so you remember to ask them during your visits  © Copyright [address] Information is for End User's use only and may not be sold, redistributed or otherwise used for commercial purposes  All illustrations and images included in CareNotes® are the copyrighted property of A D A M , [employer]  or [address] [place], [place] [address]marlen perfecto   The above information is an  only  It is not intended as medical advice for individual conditions or treatments  Talk to your doctor, nurse or pharmacist before following any medical regimen to see if it is safe and effective for you

## 2021-07-12 NOTE — CASE MANAGEMENT
Pt is stable for DC and will be going to [facility] [place] [place] for rehab  Auth has been received by the facility  Pt is in need of transportation and will be able to go [facility]  Cm reviewed with pt the cost associated with this transport  Cm made referral to [email or web address] via [facility] requesting a 1200  time via [facility]  Cm will continue to follow

## 2021-07-12 NOTE — PHYSICAL THERAPY NOTE
PHYSICAL THERAPY NOTE          Patient Name: Roger COLLINS Date: 21 1146   PT Last Visit   PT Visit Date 21   Note Type   Note Type Treatment   Pain Assessment   Pain Assessment Tool 0-10   Pain Score 8   Pain Location/Orientation Location: Back   Pain Onset/Description Descriptor: Aching   Patient's Stated Pain Goal No pain   Hospital Pain Intervention(s) Repositioned; Ambulation/increased activity; Elevated; Emotional support; Rest   Multiple Pain Sites No   Restrictions/Precautions   Weight Bearing Precautions Per Order Yes   LLE Weight Bearing Per Order WBAT   Other Precautions Cognitive; Chair Alarm; Bed Alarm; Fall Risk;Pain   General   Chart Reviewed Yes   Response to Previous Treatment Patient with no complaints from previous session  Family/Caregiver Present No   Cognition   Overall Cognitive Status Impaired   Arousal/Participation Alert; Responsive; Cooperative   Attention Attends with cues to redirect   Orientation Level Oriented X4   Memory Decreased recall of recent events;Decreased short term memory   Following Commands Follows one step commands with increased time or repetition   Comments pt identified by name and     Subjective   Subjective pt agreeable to participate in PT intervention    Bed Mobility   Supine to Sit 6  Modified independent   Additional items Assist x 1;HOB elevated; Bedrails; Increased time required;Verbal cues   Sit to Supine 4  Minimal assistance   Additional items Assist x 1;HOB elevated; Bedrails; Increased time required;Verbal cues;LE management   Additional Comments pt lying supine pre/post tx session    Transfers   Sit to Stand 5  Supervision   Additional items Increased time required;Verbal cues   Stand to Sit 5  Supervision   Additional items Increased time required;Verbal cues   Stand pivot 5  Supervision   Additional items Increased time required;Verbal cues Additional Comments pt required RW to complete all functional transfers for todays tx session    Ambulation/Elevation   Gait pattern Foward flexed;Decreased foot clearance; Short stride; Excessively slow; Ataxia   Gait Assistance 4  Minimal assist   Additional items Assist x 1;Verbal cues; Tactile cues   Assistive Device Rolling walker   Distance 10'x3    Stair Management Assistance Not tested   Balance   Static Sitting Fair +   Dynamic Sitting Fair   Static Standing Fair -   Dynamic Standing Poor +   Ambulatory Poor +   Endurance Deficit   Endurance Deficit Yes   Endurance Deficit Description pt demonstrated SOB with increased activity and required seated rest breaks while ambulating in [facility] tx session    Activity Tolerance   Activity Tolerance Patient limited by fatigue   Nurse Made Aware Spoke to RN Jeanette Ek    Exercises   Hip Abduction Supine;15 reps;AROM; Bilateral   Hip Adduction Supine;15 reps;AROM; Bilateral   Knee AROM Short Arc Quad Supine;15 reps;AROM; Bilateral   Knee AROM Long Arc Quad Sitting;15 reps;AROM; Bilateral   Ankle Pumps Sitting;15 reps;AROM; Bilateral   Marching Sitting;15 reps;AROM; Bilateral   Assessment   Prognosis Fair   Problem List Decreased strength;Decreased range of motion;Decreased endurance; Impaired balance;Decreased mobility; Impaired judgement;Decreased safety awareness; Impaired sensation;Obesity; Decreased skin integrity   Assessment pt began tx session ;jayshree supine in the bed and was agreeable to participate in PT intervention  pt was provided verbal education on OOB mobility techniques such as gait and transfers with RW  progress was noted this tx session as pt was able to perform bed mobility including a supine<>sit EOB with a decrease in level of assistance required mod I with use of bed rails to pull up into seated EOB position  pr required min Ax1 to transfer sit<>supine with LE management   pt was able to tolerate 15 min of dynamic seated EOB balance with no LOB while completing TE  pt was able to complete 3STS from EOB with /s and tolerated between 2-3 min of static standing each time before requiring a seated rest break at EOB  in todays tx session pt attempted 3 ambulation trials 10'x3 with rest breaks in between each of 2 min  While ambulating pt required tactile cues for RW management and VC's for foot placement and RW placement as pt was advancing RW to far creating a forward flexed posture that required min Ax1 to correct  post tx session pt lying supine in the bed with lunch on table with HOB elevated  continue to recommend post acute rehab services at the time of D/C in order to maximize pt functional independence and safety with mobility  Barriers to Discharge Inaccessible home environment   Goals   Patient Goals to walk more    STG Expiration Date 07/17/21   PT Treatment Day 2   Plan   Treatment/Interventions Functional transfer training;LE strengthening/ROM; Therapeutic exercise; Endurance training;Patient/family training;Equipment eval/education; Bed mobility;Gait training;Spoke to nursing   Progress Progressing toward goals   PT Frequency Other (Comment)  (3-5x week )   Recommendation   PT Discharge Recommendation Post acute rehabilitation services   Equipment Recommended Wheelchair;Walker   AM-PAC Basic Mobility Inpatient   Turning in Bed Without Bedrails 3   Lying on Back to Sitting on Edge of Flat Bed 3   Moving Bed to Chair 3   Standing Up From Chair 3   Walk in Room 3   Climb 3-5 Stairs 1   Basic Mobility Inpatient Raw Score 16   Basic Mobility Standardized Score 38 32       Janusz Gandhi, PTA

## 2021-07-12 NOTE — ASSESSMENT & PLAN NOTE
Hgb 10 7 on admission, retirement 95  Hgb stable at 9 7 yesterday  Per heme/onc note on 6/9: "reviewed labs from 05/18/2021 which reveal a hemoglobin of 9 9, platelet count 161, normal white count and MCV is 98  An iron panel was obtained to further evaluate her anemia which revealed a saturation of 22% and a ferritin level of 108 suggesting iron deficiency is not contributing to her anemia  Suspect her anemia is of chronic disease"   B12 and folate WNL this admission      Plan:  - continue to monitor outpatient

## 2021-07-12 NOTE — PLAN OF CARE
Problem: PHYSICAL THERAPY ADULT  Goal: Performs mobility at highest level of function for planned discharge setting  See evaluation for individualized goals  Description: Treatment/Interventions: Functional transfer training, LE strengthening/ROM, Elevations, Cognitive reorientation, Bed mobility, Gait training, Equipment eval/education, Patient/family training, Endurance training, Therapeutic exercise, Spoke to nursing, Spoke to case management  Equipment Recommended: Sanjana Banks, Wheelchair (Recommend walker use home more than A of 1, primarily wC)       See flowsheet documentation for full assessment, interventions and recommendations  Outcome: Progressing  Note: Prognosis: Fair  Problem List: Decreased strength, Decreased range of motion, Decreased endurance, Impaired balance, Decreased mobility, Impaired judgement, Decreased safety awareness, Impaired sensation, Obesity, Decreased skin integrity  Assessment: pt began tx session ;jayshree supine in the bed and was agreeable to participate in PT intervention  pt was provided verbal education on OOB mobility techniques such as gait and transfers with RW  progress was noted this tx session as pt was able to perform bed mobility including a supine<>sit EOB with a decrease in level of assistance required mod I with use of bed rails to pull up into seated EOB position  pr required min Ax1 to transfer sit<>supine with LE management  pt was able to tolerate 15 min of dynamic seated EOB balance with no LOB while completing TE  pt was able to complete 3STS from EOB with /s and tolerated between 2-3 min of static standing each time before requiring a seated rest break at EOB  in todays tx session pt attempted 3 ambulation trials 10'x3 with rest breaks in between each of 2 min  While ambulating pt required tactile cues for RW management and VC's for foot placement and RW placement as pt was advancing RW to far creating a forward flexed posture that required min Ax1 to correct  post tx session pt lying supine in the bed with lunch on table with HOB elevated  continue to recommend post acute rehab services at the time of D/C in order to maximize pt functional independence and safety with mobility  Barriers to Discharge: Inaccessible home environment        PT Discharge Recommendation: Post acute rehabilitation services          See flowsheet documentation for full assessment

## 2021-07-13 NOTE — UTILIZATION REVIEW
Notification of Discharge   This is a Notification of Discharge from our facility [address]  Please be advised that this patient has been discharge from our facility  Below you will find the admission and discharge date and time including the patients disposition  UTILIZATION REVIEW CONTACT:  Franck Ariza  Utilization   Network Utilization Review Department  Phone: [phone] x carefully listen to the prompts  All voicemails are confidential   Email: [email or web address]     PHYSICIAN ADVISORY SERVICES:  FOR RYLR-DA-COVP REVIEW - MEDICAL NECESSITY DENIAL  Phone: [phone]  Fax: [phone]  Email: [email or web address]     PRESENTATION DATE: 7/5/2021  1:51 PM  OBERVATION ADMISSION DATE:   INPATIENT ADMISSION DATE: 7/5/21  5:37 PM   DISCHARGE DATE: 7/12/2021  1:00 PM  DISPOSITION: Non [facility] SN[facility]/SNU Non [facility] SNF/TCU/SNU      IMPORTANT INFORMATION:  Send all requests for admission clinical reviews, approved or denied determinations and any other requests to dedicated fax number below belonging to the campus where the patient is receiving treatment   List of dedicated fax numbers:  [address] [place] [address] DENIALS (Administrative/Medical Necessity) [phone]   [address]  (Maternity/NICU/Pediatrics) [phone]   Lew Love [phone]   Giovanna Hui [phone]   Jose Juan Kennedy [phone]   Catalina Nixon [facility] [address] [facility] [phone]   [facility]  [phone]   [address] [place] [address], [address]  [facility] [address] [facility] [phone]

## 2021-07-16 ENCOUNTER — HOSPITAL ENCOUNTER (OUTPATIENT)
Dept: CT IMAGING | Facility: HOSPITAL | Age: 61
Discharge: HOME | End: 2021-07-16
Payer: COMMERCIAL

## 2021-07-16 DIAGNOSIS — I26.99 PULMONARY EMBOLISM, UNSPECIFIED CHRONICITY, UNSPECIFIED PULMONARY EMBOLISM TYPE, UNSPECIFIED WHETHER ACUTE COR PULMONALE PRESENT (HCC): ICD-10-CM

## 2021-07-16 DIAGNOSIS — Z17.0 BILATERAL MALIGNANT NEOPLASM OF BREAST IN FEMALE, ESTROGEN RECEPTOR POSITIVE, UNSPECIFIED SITE OF BREAST (HCC): ICD-10-CM

## 2021-07-16 DIAGNOSIS — C79.31 BRAIN METASTASES (HCC): ICD-10-CM

## 2021-07-16 DIAGNOSIS — C79.51 BONE METASTASES (HCC): ICD-10-CM

## 2021-07-16 DIAGNOSIS — C50.912 BILATERAL MALIGNANT NEOPLASM OF BREAST IN FEMALE, ESTROGEN RECEPTOR POSITIVE, UNSPECIFIED SITE OF BREAST (HCC): ICD-10-CM

## 2021-07-16 DIAGNOSIS — C50.911 BILATERAL MALIGNANT NEOPLASM OF BREAST IN FEMALE, ESTROGEN RECEPTOR POSITIVE, UNSPECIFIED SITE OF BREAST (HCC): ICD-10-CM

## 2021-07-16 PROCEDURE — 71260 CT THORAX DX C+: CPT

## 2021-07-16 PROCEDURE — 74177 CT ABD & PELVIS W/CONTRAST: CPT

## 2021-07-16 PROCEDURE — G1004 CDSM NDSC: HCPCS

## 2021-07-16 RX ADMIN — IOHEXOL 100 ML: 350 INJECTION, SOLUTION INTRAVENOUS at 10:39

## 2021-07-17 DIAGNOSIS — R60.0 LOWER EXTREMITY EDEMA: ICD-10-CM

## 2021-07-19 ENCOUNTER — TELEPHONE (OUTPATIENT)
Dept: HEMATOLOGY ONCOLOGY | Facility: CLINIC | Age: 61
End: 2021-07-19

## 2021-07-20 RX ORDER — FUROSEMIDE 20 MG/1
TABLET ORAL
Qty: 30 TABLET | Refills: 0 | Status: SHIPPED | OUTPATIENT
Start: 2021-07-20

## 2021-07-23 DIAGNOSIS — R56.9 FOCAL SEIZURE (HCC): ICD-10-CM

## 2021-07-23 DIAGNOSIS — C79.31 BRAIN METASTASES (HCC): ICD-10-CM

## 2021-07-23 RX ORDER — LEVETIRACETAM 250 MG/1
TABLET ORAL
Qty: 60 TABLET | Refills: 0 | Status: SHIPPED | OUTPATIENT
Start: 2021-07-23 | End: 2021-08-20

## 2021-07-26 ENCOUNTER — TELEPHONE (OUTPATIENT)
Dept: PALLIATIVE MEDICINE | Facility: CLINIC | Age: 61
End: 2021-07-26

## 2021-07-26 NOTE — TELEPHONE ENCOUNTER
Patient no showed on 7/21/21 I left a message to call office to reschedule  Inflammation Suggestive Of Cancer Camouflage Histology Text: There was a dense lymphocytic infiltrate which prevented adequate histologic evaluation of adjacent structures.

## 2021-08-03 ENCOUNTER — TELEPHONE (OUTPATIENT)
Dept: PALLIATIVE MEDICINE | Facility: CLINIC | Age: 61
End: 2021-08-03

## 2021-08-03 NOTE — TELEPHONE ENCOUNTER
Pt called med/nurse line Monday morning  No message left as pt did not think she connected to VM  Identified call via phone number  Call to pt today  Pt has to be rescheduled with Dr Hung Geiger  Instructed pt to please call office erin allison again  Push option 0 if she wishes to make appointment  Push opt 1 if medication/nurse need  Await call back

## 2021-08-07 ENCOUNTER — APPOINTMENT (EMERGENCY)
Dept: RADIOLOGY | Facility: HOSPITAL | Age: 61
DRG: 392 | End: 2021-08-07
Payer: COMMERCIAL

## 2021-08-07 ENCOUNTER — HOSPITAL ENCOUNTER (INPATIENT)
Facility: HOSPITAL | Age: 61
LOS: 6 days | Discharge: NURSING, REHAB OR LONG-TERM CARE | DRG: 392 | End: 2021-08-13
Attending: EMERGENCY MEDICINE | Admitting: INTERNAL MEDICINE
Payer: COMMERCIAL

## 2021-08-07 ENCOUNTER — APPOINTMENT (INPATIENT)
Dept: RADIOLOGY | Facility: HOSPITAL | Age: 61
DRG: 392 | End: 2021-08-07
Payer: COMMERCIAL

## 2021-08-07 ENCOUNTER — APPOINTMENT (EMERGENCY)
Dept: CT IMAGING | Facility: HOSPITAL | Age: 61
DRG: 392 | End: 2021-08-07
Payer: COMMERCIAL

## 2021-08-07 DIAGNOSIS — R06.02 SHORTNESS OF BREATH: ICD-10-CM

## 2021-08-07 DIAGNOSIS — R53.1 GENERALIZED WEAKNESS: Primary | ICD-10-CM

## 2021-08-07 PROBLEM — R14.0 ABDOMINAL DISTENTION: Status: ACTIVE | Noted: 2021-08-07

## 2021-08-07 LAB
ALBUMIN SERPL BCP-MCNC: 2.9 G/DL (ref 3.5–5)
ALP SERPL-CCNC: 87 U/L (ref 46–116)
ALT SERPL W P-5'-P-CCNC: 70 U/L (ref 12–78)
ANION GAP SERPL CALCULATED.3IONS-SCNC: 12 MMOL/L (ref 4–13)
APTT PPP: 24 SECONDS (ref 23–37)
AST SERPL W P-5'-P-CCNC: 44 U/L (ref 5–45)
BASOPHILS # BLD AUTO: 0.01 THOUSANDS/ΜL (ref 0–0.1)
BASOPHILS NFR BLD AUTO: 0 % (ref 0–1)
BILIRUB SERPL-MCNC: 0.32 MG/DL (ref 0.2–1)
BILIRUB UR QL STRIP: NEGATIVE
BUN SERPL-MCNC: 13 MG/DL (ref 5–25)
CALCIUM ALBUM COR SERPL-MCNC: 9.5 MG/DL (ref 8.3–10.1)
CALCIUM SERPL-MCNC: 8.6 MG/DL (ref 8.3–10.1)
CHLORIDE SERPL-SCNC: 103 MMOL/L (ref 100–108)
CLARITY UR: CLEAR
CO2 SERPL-SCNC: 25 MMOL/L (ref 21–32)
COLOR UR: YELLOW
CREAT SERPL-MCNC: 0.97 MG/DL (ref 0.6–1.3)
EOSINOPHIL # BLD AUTO: 0.01 THOUSAND/ΜL (ref 0–0.61)
EOSINOPHIL NFR BLD AUTO: 0 % (ref 0–6)
ERYTHROCYTE [DISTWIDTH] IN BLOOD BY AUTOMATED COUNT: 16.6 % (ref 11.6–15.1)
GFR SERPL CREATININE-BSD FRML MDRD: 65 ML/MIN/1.73SQ M
GLUCOSE SERPL-MCNC: 108 MG/DL (ref 65–140)
GLUCOSE UR STRIP-MCNC: NEGATIVE MG/DL
HCT VFR BLD AUTO: 32.3 % (ref 34.8–46.1)
HGB BLD-MCNC: 10 G/DL (ref 11.5–15.4)
HGB UR QL STRIP.AUTO: NEGATIVE
IMM GRANULOCYTES # BLD AUTO: 0.16 THOUSAND/UL (ref 0–0.2)
IMM GRANULOCYTES NFR BLD AUTO: 2 % (ref 0–2)
INR PPP: 0.89 (ref 0.84–1.19)
KETONES UR STRIP-MCNC: NEGATIVE MG/DL
LEUKOCYTE ESTERASE UR QL STRIP: NEGATIVE
LYMPHOCYTES # BLD AUTO: 1.29 THOUSANDS/ΜL (ref 0.6–4.47)
LYMPHOCYTES NFR BLD AUTO: 15 % (ref 14–44)
MCH RBC QN AUTO: 29.6 PG (ref 26.8–34.3)
MCHC RBC AUTO-ENTMCNC: 31 G/DL (ref 31.4–37.4)
MCV RBC AUTO: 96 FL (ref 82–98)
MONOCYTES # BLD AUTO: 0.64 THOUSAND/ΜL (ref 0.17–1.22)
MONOCYTES NFR BLD AUTO: 8 % (ref 4–12)
NEUTROPHILS # BLD AUTO: 6.41 THOUSANDS/ΜL (ref 1.85–7.62)
NEUTS SEG NFR BLD AUTO: 75 % (ref 43–75)
NITRITE UR QL STRIP: NEGATIVE
NRBC BLD AUTO-RTO: 0 /100 WBCS
PH UR STRIP.AUTO: 7 [PH] (ref 4.5–8)
PLATELET # BLD AUTO: 310 THOUSANDS/UL (ref 149–390)
PMV BLD AUTO: 9.5 FL (ref 8.9–12.7)
POTASSIUM SERPL-SCNC: 4.9 MMOL/L (ref 3.5–5.3)
PROT SERPL-MCNC: 6.8 G/DL (ref 6.4–8.2)
PROT UR STRIP-MCNC: NEGATIVE MG/DL
PROTHROMBIN TIME: 12.2 SECONDS (ref 11.6–14.5)
RBC # BLD AUTO: 3.38 MILLION/UL (ref 3.81–5.12)
SODIUM SERPL-SCNC: 140 MMOL/L (ref 136–145)
SP GR UR STRIP.AUTO: 1.01 (ref 1–1.03)
TROPONIN I SERPL-MCNC: <0.02 NG/ML
UROBILINOGEN UR QL STRIP.AUTO: 1 E.U./DL
WBC # BLD AUTO: 8.52 THOUSAND/UL (ref 4.31–10.16)

## 2021-08-07 PROCEDURE — 74019 RADEX ABDOMEN 2 VIEWS: CPT

## 2021-08-07 PROCEDURE — 80053 COMPREHEN METABOLIC PANEL: CPT | Performed by: EMERGENCY MEDICINE

## 2021-08-07 PROCEDURE — 99285 EMERGENCY DEPT VISIT HI MDM: CPT

## 2021-08-07 PROCEDURE — 85025 COMPLETE CBC W/AUTO DIFF WBC: CPT | Performed by: EMERGENCY MEDICINE

## 2021-08-07 PROCEDURE — 70450 CT HEAD/BRAIN W/O DYE: CPT

## 2021-08-07 PROCEDURE — 71045 X-RAY EXAM CHEST 1 VIEW: CPT

## 2021-08-07 PROCEDURE — 99223 1ST HOSP IP/OBS HIGH 75: CPT | Performed by: NURSE PRACTITIONER

## 2021-08-07 PROCEDURE — 93005 ELECTROCARDIOGRAM TRACING: CPT

## 2021-08-07 PROCEDURE — 85610 PROTHROMBIN TIME: CPT | Performed by: EMERGENCY MEDICINE

## 2021-08-07 PROCEDURE — 84484 ASSAY OF TROPONIN QUANT: CPT | Performed by: EMERGENCY MEDICINE

## 2021-08-07 PROCEDURE — 36415 COLL VENOUS BLD VENIPUNCTURE: CPT | Performed by: EMERGENCY MEDICINE

## 2021-08-07 PROCEDURE — 99285 EMERGENCY DEPT VISIT HI MDM: CPT | Performed by: EMERGENCY MEDICINE

## 2021-08-07 PROCEDURE — 81003 URINALYSIS AUTO W/O SCOPE: CPT

## 2021-08-07 PROCEDURE — 85730 THROMBOPLASTIN TIME PARTIAL: CPT | Performed by: EMERGENCY MEDICINE

## 2021-08-07 PROCEDURE — 36415 COLL VENOUS BLD VENIPUNCTURE: CPT | Performed by: NURSE PRACTITIONER

## 2021-08-07 RX ORDER — BISACODYL 10 MG
10 SUPPOSITORY, RECTAL RECTAL DAILY PRN
Status: DISCONTINUED | OUTPATIENT
Start: 2021-08-07 | End: 2021-08-13 | Stop reason: HOSPADM

## 2021-08-07 RX ORDER — AMOXICILLIN 250 MG
2 CAPSULE ORAL 2 TIMES DAILY
Status: DISCONTINUED | OUTPATIENT
Start: 2021-08-08 | End: 2021-08-13 | Stop reason: HOSPADM

## 2021-08-07 RX ORDER — FUROSEMIDE 20 MG/1
20 TABLET ORAL DAILY
Status: DISCONTINUED | OUTPATIENT
Start: 2021-08-08 | End: 2021-08-13 | Stop reason: HOSPADM

## 2021-08-07 RX ORDER — OLANZAPINE 10 MG/1
10 TABLET ORAL
Status: DISCONTINUED | OUTPATIENT
Start: 2021-08-07 | End: 2021-08-13 | Stop reason: HOSPADM

## 2021-08-07 RX ORDER — DEXAMETHASONE 2 MG/1
2 TABLET ORAL
Status: DISCONTINUED | OUTPATIENT
Start: 2021-08-08 | End: 2021-08-13 | Stop reason: HOSPADM

## 2021-08-07 RX ORDER — PROCHLORPERAZINE MALEATE 10 MG
5 TABLET ORAL
Status: DISCONTINUED | OUTPATIENT
Start: 2021-08-08 | End: 2021-08-13 | Stop reason: HOSPADM

## 2021-08-07 RX ORDER — ALBUTEROL SULFATE 90 UG/1
2 AEROSOL, METERED RESPIRATORY (INHALATION) ONCE
Status: COMPLETED | OUTPATIENT
Start: 2021-08-07 | End: 2021-08-07

## 2021-08-07 RX ORDER — NICOTINE 21 MG/24HR
1 PATCH, TRANSDERMAL 24 HOURS TRANSDERMAL DAILY
Status: DISCONTINUED | OUTPATIENT
Start: 2021-08-08 | End: 2021-08-13 | Stop reason: HOSPADM

## 2021-08-07 RX ORDER — PANTOPRAZOLE SODIUM 40 MG/1
40 TABLET, DELAYED RELEASE ORAL DAILY
Status: DISCONTINUED | OUTPATIENT
Start: 2021-08-08 | End: 2021-08-13 | Stop reason: HOSPADM

## 2021-08-07 RX ORDER — LEVETIRACETAM 500 MG/1
250 TABLET ORAL EVERY 12 HOURS SCHEDULED
Status: DISCONTINUED | OUTPATIENT
Start: 2021-08-07 | End: 2021-08-13 | Stop reason: HOSPADM

## 2021-08-07 RX ORDER — GABAPENTIN 400 MG/1
400 CAPSULE ORAL
Status: DISCONTINUED | OUTPATIENT
Start: 2021-08-08 | End: 2021-08-08 | Stop reason: SDUPTHER

## 2021-08-07 RX ORDER — HYDROMORPHONE HYDROCHLORIDE 2 MG/1
4 TABLET ORAL 4 TIMES DAILY PRN
Status: DISCONTINUED | OUTPATIENT
Start: 2021-08-07 | End: 2021-08-13 | Stop reason: HOSPADM

## 2021-08-07 RX ORDER — POLYETHYLENE GLYCOL 3350 17 G/17G
17 POWDER, FOR SOLUTION ORAL DAILY
Status: DISCONTINUED | OUTPATIENT
Start: 2021-08-08 | End: 2021-08-13 | Stop reason: HOSPADM

## 2021-08-07 RX ORDER — ACETAMINOPHEN 325 MG/1
650 TABLET ORAL EVERY 6 HOURS PRN
Status: DISCONTINUED | OUTPATIENT
Start: 2021-08-07 | End: 2021-08-13 | Stop reason: HOSPADM

## 2021-08-07 RX ORDER — ZOLPIDEM TARTRATE 5 MG/1
10 TABLET ORAL
Status: DISCONTINUED | OUTPATIENT
Start: 2021-08-07 | End: 2021-08-13 | Stop reason: HOSPADM

## 2021-08-07 RX ADMIN — ALBUTEROL SULFATE 2 PUFF: 90 AEROSOL, METERED RESPIRATORY (INHALATION) at 20:03

## 2021-08-07 NOTE — ED PROVIDER NOTES
History  Chief Complaint   Patient presents with    Weakness - Generalized     Patient arrives to the ER from home via EMS with complaints of weakness (trouble moving lower extremities) since yesterday  Hx of stage 4 breast cancer  Pt reports multiple falls at home with walker  97% RA upon arrival to ED  65 y/o female presents today reporting generalized weakness and multiple falls since yesterday  She denies injury  Patient has stage 4 breast cancer, not currently on chemotherapy  She does take eliquis  She states her legs feel weak bilaterally  History provided by:  Patient  Fatigue  Severity:  Moderate  Onset quality:  Unable to specify  Duration:  2 days  Timing:  Constant  Progression:  Unchanged  Chronicity:  New  Context: not change in medication, not dehydration and not increased activity    Relieved by:  None tried  Worsened by:  Nothing  Ineffective treatments:  None tried  Associated symptoms: difficulty walking    Associated symptoms: no abdominal pain, no dizziness, no dysuria, no numbness in extremities, no fever, no headaches, no nausea and no shortness of breath    Risk factors: no new medications        Prior to Admission Medications   Prescriptions Last Dose Informant Patient Reported? Taking? HYDROmorphone (DILAUDID) 4 mg tablet   No No   Sig: Take 1 tablet (4 mg total) by mouth 4 (four) times a day as needed (cancer pain)Max Daily Amount: 16 mg   Misc  Devices (QUAD CANE) MISC  Self No No   Sig: by Does not apply route daily   OLANZapine (ZyPREXA) 10 mg tablet   No No   Sig: Take 1 tablet (10 mg total) by mouth daily at bedtime Every night, to help sleep and appetite     albuterol (PROVENTIL HFA,VENTOLIN HFA) 90 mcg/act inhaler  Self No No   Sig: TAKE 2 PUFFS BY MOUTH EVERY 6 HOURS AS NEEDED FOR WHEEZING   apixaban (ELIQUIS) 5 mg  Self No No   Sig: Take 1 tablet (5 mg total) by mouth 2 (two) times a day   bisacodyl (DULCOLAX) 10 mg suppository  Self No No   Sig: Insert 1 suppository (10 mg total) into the rectum daily as needed for constipation   dexamethasone (DECADRON) 2 mg tablet   No No   Sig: Take 1 tablet (2 mg total) by mouth 2 (two) times a day before lunch and dinner   furosemide (LASIX) 20 mg tablet   No No   Sig: TAKE 1 TABLET BY MOUTH EVERY DAY   gabapentin (NEURONTIN) 400 mg capsule   No No   Sig: Take 1 capsule (400 mg total) by mouth 3 (three) times a day before meals To reduce nerve pain   levETIRAcetam (KEPPRA) 250 mg tablet   No No   Sig: TAKE 1 TABLET (250 MG TOTAL) BY MOUTH 2 (TWO) TIMES A DAY TO PREVENT SEIZURE   lubiprostone (AMITIZA) 24 mcg capsule  Self No No   Sig: Take 1 capsule (24 mcg total) by mouth 2 (two) times a day with meals   pantoprazole (PROTONIX) 40 mg tablet  Self No No   Sig: Take 1 tablet (40 mg total) by mouth daily Every morning, before coffee, to reduce stomach acid   polyethylene glycol (MIRALAX) 17 g packet  Self No No   Sig: Take 17 g by mouth daily   prochlorperazine (COMPAZINE) 5 mg tablet   No No   Sig: Take 1 tablet (5 mg total) by mouth 3 (three) times a day before meals   senna-docusate sodium (SENOKOT S) 8 6-50 mg per tablet  Self No No   Sig: Take 2 tablets by mouth 2 (two) times a day   zolpidem (AMBIEN) 10 mg tablet   No No   Sig: Take 1 tablet (10 mg total) by mouth daily at bedtime as needed for sleep      Facility-Administered Medications: None       Past Medical History:   Diagnosis Date    Dehydration 6/11/2019    Dizziness 2/6/2018    Dry skin 2/6/2018    Edema 10/23/2019    H/O bilateral mastectomy 2/15/2016    Hyperglycemia 2/6/2018    Hypertension 2/15/2016    Hypokalemia 3/10/2020    Lymphedema 2/15/2016    Malignant cachexia ([facility] [address] ) 10/6/2016    Malignant neoplasm of right breast ([facility] [address] ) 2/15/2016    Metastatic breast cancer [facility])        Past Surgical History:   Procedure Laterality Date    ENDOBRONCHIAL ULTRASOUND (EBUS) N/A 2/16/2016    Procedure: EBUS;  Surgeon: Myrick Kawasaki, MD;  Location: [facility] OR;  Service:     MASTECTOMY Bilateral     MASTECTOMY Bilateral     right arm edema    OOPHORECTOMY      TN BRONCHOSCOPY NEEDLE BX TRACHEA MAIN STEM&/BRON N/A 2/16/2016    Procedure: Nathen Sands;  Surgeon: Elena Baker MD;  [facility]: [facility];  Service: Thoracic    TN INSJ TUNNELED CTR VAD W/SUBQ PORT AGE 5 YR/> N/A 7/24/2018    Procedure: INSERTION VENOUS PORT ( PORT-A-CATH) IR;  Surgeon: Aman Coughlin DO;  [facility];  Service: Interventional Radiology    TN STEREOTACTIC RADIOSURGERY, CRANIAL,SIMPLE,EA ADD  5/3/2017         TN STEREOTACTIC RADIOSURGERY, CRANIAL,SIMPLE,EA ADD  5/3/2017         TN STEREOTACTIC RADIOSURGERY, CRANIAL,SIMPLE,SINGLE  5/3/2017            Family History   Problem Relation Age of Onset    Cancer Sister     Prostate cancer Brother      I have reviewed and agree with the history as documented  E-Cigarette/Vaping    E-Cigarette Use Never User      E-Cigarette/Vaping Substances    Nicotine Yes     THC No     CBD No     Flavoring No     Other No     Unknown No      Social History     Tobacco Use    Smoking status: Current Every Day Smoker     Packs/day: 0 50     Years: 40 00     Pack years: 20 00     Types: Cigarettes     Start date: 1978    Smokeless tobacco: Never Used   Vaping Use    Vaping Use: Never used   Substance Use Topics    Alcohol use: Not Currently     Comment: once a week    Drug use: Not Currently     Types: Marijuana       Review of Systems   Constitutional: Positive for fatigue  Negative for chills and fever  HENT: Negative for congestion, postnasal drip, sore throat and trouble swallowing  Eyes: Negative for visual disturbance  Respiratory: Negative for chest tightness and shortness of breath  Cardiovascular: Negative for leg swelling  Gastrointestinal: Negative for abdominal pain and nausea  Genitourinary: Negative for dysuria  Musculoskeletal: Negative for back pain  Skin: Negative for rash  Allergic/Immunologic: Negative for immunocompromised state  Neurological: Negative for dizziness, light-headedness and headaches  Psychiatric/Behavioral: Negative for confusion  Physical Exam  Physical Exam  Vitals and nursing note reviewed  Constitutional:       Comments: Appears frail, appears much older than stated age  HENT:      Head: Normocephalic and atraumatic  Right Ear: External ear normal       Left Ear: External ear normal       Nose: Nose normal       Mouth/Throat:      Mouth: Mucous membranes are moist    Eyes:      Pupils: Pupils are equal, round, and reactive to light  Cardiovascular:      Rate and Rhythm: Normal rate  Pulses: Normal pulses  Pulmonary:      Comments: Conversationally dyspneic, decreased breath sounds diffusely  Abdominal:      General: Abdomen is flat  Palpations: Abdomen is soft  Musculoskeletal:         General: No swelling or tenderness  Normal range of motion  Cervical back: Normal range of motion  Right lower leg: No edema  Left lower leg: No edema  Comments: Decreased strength in bilateral lower extremities equally  She is able to lift both legs off the stretcher and hold them up for 3-5 seconds but cannot lift them up more than 3-4 inches off the stretcher  Skin:     General: Skin is warm and dry  Capillary Refill: Capillary refill takes less than 2 seconds  Neurological:      General: No focal deficit present  Mental Status: She is alert and oriented to person, place, and time     Psychiatric:         Mood and Affect: Mood normal          Behavior: Behavior normal          Vital Signs  ED Triage Vitals [08/07/21 1945]   Temperature Pulse Respirations Blood Pressure SpO2   99 2 °F (37 3 °C) 100 21 133/87 98 %      Temp Source Heart Rate Source Patient Position - Orthostatic VS BP Location FiO2 (%)   Oral -- -- -- --      Pain Score       --           Vitals:    08/07/21 2200 08/07/21 2215 08/07/21 2230 08/07/21 2245   BP:  132/64     Pulse: 84 84 86 88         Visual Acuity  Visual Acuity      Most Recent Value   L Pupil Size (mm)  3   R Pupil Size (mm)  3   L Pupil Shape  Round   R Pupil Shape  Round          ED Medications  Medications   apixaban (ELIQUIS) tablet 5 mg (has no administration in time range)   bisacodyl (DULCOLAX) rectal suppository 10 mg (has no administration in time range)   dexamethasone (DECADRON) tablet 2 mg (has no administration in time range)   furosemide (LASIX) tablet 20 mg (has no administration in time range)   gabapentin (NEURONTIN) capsule 400 mg (has no administration in time range)   HYDROmorphone (DILAUDID) tablet 4 mg (has no administration in time range)   levETIRAcetam (KEPPRA) tablet 250 mg (has no administration in time range)   OLANZapine (ZyPREXA) tablet 10 mg (has no administration in time range)   lubiprostone (AMITIZA) capsule 24 mcg (has no administration in time range)   pantoprazole (PROTONIX) EC tablet 40 mg (has no administration in time range)   polyethylene glycol (MIRALAX) packet 17 g (has no administration in time range)   prochlorperazine (COMPAZINE) tablet 5 mg (has no administration in time range)   senna-docusate sodium (SENOKOT S) 8 6-50 mg per tablet 2 tablet (has no administration in time range)   zolpidem (AMBIEN) tablet 10 mg (has no administration in time range)   acetaminophen (TYLENOL) tablet 650 mg (has no administration in time range)   nicotine (NICODERM CQ) 14 mg/24hr TD 24 hr patch 1 patch (has no administration in time range)   albuterol (PROVENTIL HFA,VENTOLIN HFA) inhaler 2 puff (2 puffs Inhalation Given 8/7/21 2003)       Diagnostic Studies  Results Reviewed     Procedure Component Value Units Date/Time    Urine Macroscopic, POC [[id number]] Collected: 08/07/21 2206    Lab Status: Final result Specimen: Urine Updated: 08/07/21 2207     Color, UA Yellow     Clarity, UA Clear     pH, UA 7 0     Leukocytes, UA Negative     Nitrite, UA Negative Protein, UA Negative mg/dl      Glucose, UA Negative mg/dl      Ketones, UA Negative mg/dl      Urobilinogen, UA 1 0 E U /dl      Bilirubin, UA Negative     Blood, UA Negative     Specific Gravity, UA 1 015    Narrative:      CLINITEK RESULT    Comprehensive metabolic panel [[id number]]  (Abnormal) Collected: 08/07/21 2017    Lab Status: Final result Specimen: Blood from Arm, Left Updated: 08/07/21 2115     Sodium 140 mmol/L      Potassium 4 9 mmol/L      Chloride 103 mmol/L      CO2 25 mmol/L      ANION GAP 12 mmol/L      BUN 13 mg/dL      Creatinine 0 97 mg/dL      Glucose 108 mg/dL      Calcium 8 6 mg/dL      Corrected Calcium 9 5 mg/dL      AST 44 U/L      ALT 70 U/L      Alkaline Phosphatase 87 U/L      Total Protein 6 8 g/dL      Albumin 2 9 g/dL      Total Bilirubin 0 32 mg/dL      eGFR 65 ml/min/1 73sq m     Narrative:      [place] guidelines for Chronic Kidney Disease (CKD):     Stage 1 with normal or high GFR (GFR > 90 mL/min/1 73 square meters)    Stage 2 Mild CKD (GFR = 60-89 mL/min/1 73 square meters)    Stage 3A Moderate CKD (GFR = 45-59 mL/min/1 73 square meters)    Stage 3B Moderate CKD (GFR = 30-44 mL/min/1 73 square meters)    Stage 4 Severe CKD (GFR = 15-29 mL/min/1 73 square meters)    Stage 5 End Stage CKD (GFR <15 mL/min/1 73 square meters)  Note: GFR calculation is accurate only with a steady state creatinine    Troponin I [[id number]]  (Normal) Collected: 08/07/21 2017    Lab Status: Final result Specimen: Blood from Arm, Left Updated: 08/07/21 2046     Troponin I <0 02 ng/mL     Protime-INR [[id number]]  (Normal) Collected: 08/07/21 2017    Lab Status: Final result Specimen: Blood from Arm, Left Updated: 08/07/21 2038     Protime 12 2 seconds      INR 0 89    APTT [[id number]]  (Normal) Collected: 08/07/21 2017    Lab Status: Final result Specimen: Blood from Arm, Left Updated: 08/07/21 2038     PTT 24 seconds     CBC and differential [[id number]]  (Abnormal) Collected: 08/07/21 2017    Lab Status: Final result Specimen: Blood from Arm, Left Updated: 08/07/21 2026     WBC 8 52 Thousand/uL      RBC 3 38 Million/uL      Hemoglobin 10 0 g/dL      Hematocrit 32 3 %      MCV 96 fL      MCH 29 6 pg      MCHC 31 0 g/dL      RDW 16 6 %      MPV 9 5 fL      Platelets 513 Thousands/uL      nRBC 0 /100 WBCs      Neutrophils Relative 75 %      Immat GRANS % 2 %      Lymphocytes Relative 15 %      Monocytes Relative 8 %      Eosinophils Relative 0 %      Basophils Relative 0 %      Neutrophils Absolute 6 41 Thousands/µL      Immature Grans Absolute 0 16 Thousand/uL      Lymphocytes Absolute 1 29 Thousands/µL      Monocytes Absolute 0 64 Thousand/µL      Eosinophils Absolute 0 01 Thousand/µL      Basophils Absolute 0 01 Thousands/µL                  CT head without contrast   Final Result by Jamaal Don MD (08/07 2055)      No acute intracranial abnormality  Workstation performed: [id number]         XR chest 1 view portable    (Results Pending)   XR abdomen complete inc upright and/or decubitus    (Results Pending)              Procedures  Procedures         ED Course                             SBIRT 22yo+      Most Recent Value   SBIRT (22 yo +)   In order to provide better care to our patients, we are screening all of our patients for alcohol and drug use  Would it be okay to ask you these screening questions? No Filed at: 08/07/2021 2021                    MDM  Number of Diagnoses or Management Options  Generalized weakness: new and requires workup  Shortness of breath: new and requires workup  Diagnosis management comments: MDM: Patient presents to the Emergency Department and was diagnosed with acute generalized weakness  This is a new problem that will require additional planned work-up in a hospitalized setting  Clinical laboratory testing, radiology imaging, and medical testing (EKG) were ordered    I independently reviewed the radiologic imaging, EKG, and laboratory studies  This case is considered high risk secondary to the above listed disease process that poses a threat to bodily function that requires further diagnostic testing and management which may include the administration of parenteral controlled substances  Discussed with VAISHNAVI  We had a detailed discussion of the patient's condition and case,  including need for admission  Accepts to his/her service  Bed request/bridging orders placed  Amount and/or Complexity of Data Reviewed  Clinical lab tests: ordered and reviewed  Tests in the radiology section of CPT®: ordered and reviewed  Tests in the medicine section of CPT®: ordered and reviewed  Review and summarize past medical records: yes  Discuss the patient with other providers: yes  Independent visualization of images, tracings, or specimens: yes    Risk of Complications, Morbidity, and/or Mortality  Presenting problems: high  Diagnostic procedures: high  Management options: high    Patient Progress  Patient progress: stable      Disposition  Final diagnoses:   Generalized weakness   Shortness of breath     Time reflects when diagnosis was documented in both MDM as applicable and the Disposition within this note     Time User Action Codes Description Comment    8/7/2021 10:14 PM Ivonne Hamilton Add [R53 1] Generalized weakness     8/7/2021 10:14 PM Ivonne Hamilton Add [R06 02] Shortness of breath       ED Disposition     ED Disposition Condition Date/Time Comment    Admit Stable Sat Aug 7, 2021 10:14 PM Case was discussed with VAISHNAVI and the patient's admission status was agreed to be Admission Status: inpatient status to the service of Dr Letty Monroe   Follow-up Information    None         Patient's Medications   Discharge Prescriptions    No medications on file     No discharge procedures on file      PDMP Review       Value Time User    PDMP Reviewed  Yes 6/23/2021 10:27 AM Kelsi Pham MD          ED Provider  Electronically Signed by           Juliette Valdivia DO  08/08/21 0009

## 2021-08-08 LAB
ANION GAP SERPL CALCULATED.3IONS-SCNC: 10 MMOL/L (ref 4–13)
BUN SERPL-MCNC: 11 MG/DL (ref 5–25)
CALCIUM SERPL-MCNC: 8.5 MG/DL (ref 8.3–10.1)
CHLORIDE SERPL-SCNC: 108 MMOL/L (ref 100–108)
CO2 SERPL-SCNC: 26 MMOL/L (ref 21–32)
CREAT SERPL-MCNC: 0.81 MG/DL (ref 0.6–1.3)
ERYTHROCYTE [DISTWIDTH] IN BLOOD BY AUTOMATED COUNT: 16.4 % (ref 11.6–15.1)
GFR SERPL CREATININE-BSD FRML MDRD: 80 ML/MIN/1.73SQ M
GLUCOSE SERPL-MCNC: 99 MG/DL (ref 65–140)
HCT VFR BLD AUTO: 30.4 % (ref 34.8–46.1)
HGB BLD-MCNC: 9.2 G/DL (ref 11.5–15.4)
MCH RBC QN AUTO: 29.3 PG (ref 26.8–34.3)
MCHC RBC AUTO-ENTMCNC: 30.3 G/DL (ref 31.4–37.4)
MCV RBC AUTO: 97 FL (ref 82–98)
PLATELET # BLD AUTO: 266 THOUSANDS/UL (ref 149–390)
PMV BLD AUTO: 8.9 FL (ref 8.9–12.7)
POTASSIUM SERPL-SCNC: 4.5 MMOL/L (ref 3.5–5.3)
RBC # BLD AUTO: 3.14 MILLION/UL (ref 3.81–5.12)
SODIUM SERPL-SCNC: 144 MMOL/L (ref 136–145)
WBC # BLD AUTO: 5.97 THOUSAND/UL (ref 4.31–10.16)

## 2021-08-08 PROCEDURE — 97163 PT EVAL HIGH COMPLEX 45 MIN: CPT

## 2021-08-08 PROCEDURE — 99232 SBSQ HOSP IP/OBS MODERATE 35: CPT | Performed by: INTERNAL MEDICINE

## 2021-08-08 PROCEDURE — 85027 COMPLETE CBC AUTOMATED: CPT | Performed by: NURSE PRACTITIONER

## 2021-08-08 PROCEDURE — 80048 BASIC METABOLIC PNL TOTAL CA: CPT | Performed by: NURSE PRACTITIONER

## 2021-08-08 RX ORDER — GABAPENTIN 400 MG/1
400 CAPSULE ORAL
Status: DISCONTINUED | OUTPATIENT
Start: 2021-08-08 | End: 2021-08-13 | Stop reason: HOSPADM

## 2021-08-08 RX ADMIN — PROCHLORPERAZINE MALEATE 5 MG: 10 TABLET ORAL at 11:44

## 2021-08-08 RX ADMIN — HYDROMORPHONE HYDROCHLORIDE 4 MG: 2 TABLET ORAL at 21:23

## 2021-08-08 RX ADMIN — PROCHLORPERAZINE MALEATE 5 MG: 10 TABLET ORAL at 08:23

## 2021-08-08 RX ADMIN — APIXABAN 5 MG: 5 TABLET, FILM COATED ORAL at 10:32

## 2021-08-08 RX ADMIN — LUBIPROSTONE 24 MCG: 24 CAPSULE, GELATIN COATED ORAL at 16:43

## 2021-08-08 RX ADMIN — LUBIPROSTONE 24 MCG: 24 CAPSULE, GELATIN COATED ORAL at 08:23

## 2021-08-08 RX ADMIN — DOCUSATE SODIUM AND SENNOSIDES 2 TABLET: 8.6; 5 TABLET, FILM COATED ORAL at 08:23

## 2021-08-08 RX ADMIN — GABAPENTIN 400 MG: 400 CAPSULE ORAL at 13:14

## 2021-08-08 RX ADMIN — PROCHLORPERAZINE MALEATE 5 MG: 10 TABLET ORAL at 16:44

## 2021-08-08 RX ADMIN — NICOTINE 1 PATCH: 14 PATCH, EXTENDED RELEASE TRANSDERMAL at 08:22

## 2021-08-08 RX ADMIN — HYDROMORPHONE HYDROCHLORIDE 4 MG: 2 TABLET ORAL at 10:32

## 2021-08-08 RX ADMIN — OLANZAPINE 10 MG: 10 TABLET, FILM COATED ORAL at 00:53

## 2021-08-08 RX ADMIN — PANTOPRAZOLE SODIUM 40 MG: 40 TABLET, DELAYED RELEASE ORAL at 08:23

## 2021-08-08 RX ADMIN — DEXAMETHASONE 2 MG: 2 TABLET ORAL at 16:44

## 2021-08-08 RX ADMIN — GABAPENTIN 400 MG: 100 CAPSULE ORAL at 08:22

## 2021-08-08 RX ADMIN — OLANZAPINE 10 MG: 10 TABLET, FILM COATED ORAL at 21:23

## 2021-08-08 RX ADMIN — LEVETIRACETAM 250 MG: 250 TABLET, FILM COATED ORAL at 00:53

## 2021-08-08 RX ADMIN — LEVETIRACETAM 250 MG: 250 TABLET, FILM COATED ORAL at 08:23

## 2021-08-08 RX ADMIN — DOCUSATE SODIUM AND SENNOSIDES 2 TABLET: 8.6; 5 TABLET, FILM COATED ORAL at 17:59

## 2021-08-08 RX ADMIN — APIXABAN 5 MG: 5 TABLET, FILM COATED ORAL at 17:58

## 2021-08-08 RX ADMIN — LEVETIRACETAM 250 MG: 250 TABLET, FILM COATED ORAL at 21:23

## 2021-08-08 RX ADMIN — DEXAMETHASONE 2 MG: 2 TABLET ORAL at 11:44

## 2021-08-08 RX ADMIN — HYDROMORPHONE HYDROCHLORIDE 4 MG: 2 TABLET ORAL at 15:51

## 2021-08-08 RX ADMIN — FUROSEMIDE 20 MG: 40 TABLET ORAL at 08:23

## 2021-08-08 RX ADMIN — GABAPENTIN 400 MG: 400 CAPSULE ORAL at 16:43

## 2021-08-08 NOTE — ASSESSMENT & PLAN NOTE
Presents to ED with generalized weakness  Patient was recently hospitalized and discharged to rehab  She reports some improvement in strength following rehab but states worsening over the past few days  In the ED, patient is unable to ambulate due to lower extremity weakness  She denies recent fall since being discharged from hospital on 7/5  Initial workup is benign including head CT, UA  Anemia is stable     · PT consult   · Fall precaution

## 2021-08-08 NOTE — UTILIZATION REVIEW
Initial Clinical Review    Admission: Date/Time/Statement:   Admission Orders (From admission, onward)     Ordered        08/07/21 2215  [facility] [place]  Once                   Orders Placed This Encounter   Procedures    INPATIENT ADMISSION     Standing Status:   Standing     Number of Occurrences:   1     Order Specific Question:   Level of Care     Answer:   Med Surg [16]     Order Specific Question:   Estimated length of stay     Answer:   More than 2 Midnights     Order Specific Question:   Certification     Answer:   I certify that inpatient services are medically necessary for this patient for a duration of greater than two midnights  See H&P and MD Progress Notes for additional information about the patient's course of treatment  ED Arrival Information     Expected Arrival Acuity    - 8/7/2021 19:33 Urgent         Means of arrival Escorted by Service Admission type    [facility] Urgent         Arrival complaint    GENERAL WEAKNESS        Chief Complaint   Patient presents with    Weakness - Generalized     Patient arrives to the ER from home via EMS with complaints of weakness (trouble moving lower extremities) since yesterday  Hx of stage 4 breast cancer  Pt reports multiple falls at home with walker  97% RA upon arrival to ED  Initial Presentation: This is a 64year old female from home to ED via ems admitted inpatient due to Generalized Weakness/Abdominal distention  Has stage IV bilateral breast cancer with mets to lungs, brain and bones,  Status post mastectomy, multiple rounds chemo/radiation, not currently on either  Presented due to weakness and multiple falls starting day prior to arrival   Feels legs are weak, is on eliquis  has increased bloating with abdominal distention  On exam frail  Conversationally dyspneic, decreased breath sounds  Decrease strength in bilateral lower extremities    Unable to ambulate due to weakness,   Albumin 2 9   H&H 10/32 3  Plan is PT, fall precautions  Check abdominal xray, serial abdominal exams  Continue bowel regime  Continue home medications: gabapentin 400 mg TID, dilaudid 4 mg PO QID PRN, compazine 5 mg TID, dexamethasone 2 mg PO BID, lasix 20 mg QD, Keppra  Date: 8/8/2021    Day 2:  Patient alert  Pain controlled  On exam appears ill  Abdominal distention  Superficial laceration 1/2 cm on dorsal aspect left great toe  Weakness of plantar flexion and hip flexion bilaterally  H&H 9 2/30 4  Plan to bowel regimen amitiza, senokot, miralax  Continue home medications  PT consulted  ED Triage Vitals   Temperature Pulse Respirations Blood Pressure SpO2   08/07/21 1945 08/07/21 1945 08/07/21 1945 08/07/21 1945 08/07/21 1945   99 2 °F (37 3 °C) 100 21 133/87 98 %      Temp Source Heart Rate Source Patient Position - Orthostatic VS BP Location FiO2 (%)   08/07/21 1945 08/08/21 0936 08/08/21 0936 08/08/21 0936 --   Oral Monitor Lying Left arm       Pain Score       --                 Wt Readings from Last 1 Encounters:   06/23/21 84 8 kg (186 lb 15 2 oz)     Additional Vital Signs:   08/08/21 0936  --  84  --  118/72  89  96 %  None (Room air)  Lying   08/08/21 0445  --  80  16  125/64  89  96 %  --  --   08/07/21 2215  --  84  --  132/64  89  100 %  --  --   08/07/21 2200  --  84  --  --  --  100 %  --  --   08/07/21 2007  --  --  23Abnormal   --  --  --  --  --   08/07/21 2000  --  102  --  119/84  96  100 %           Pertinent Labs/Diagnostic Test Results:   8/7/2021 CxR - No acute cardiopulmonary disease  8/7/2021 ct head No acute intracranial abnormality    Results from last 7 days   Lab Units 08/08/21  0451 08/07/21 2017   WBC Thousand/uL 5 97 8 52   HEMOGLOBIN g/dL 9 2* 10 0*   HEMATOCRIT % 30 4* 32 3*   PLATELETS Thousands/uL 266 310   NEUTROS ABS Thousands/µL  --  6 41     Results from last 7 days   Lab Units 08/08/21  0451 08/07/21 2017   SODIUM mmol/L 144 140   POTASSIUM mmol/L 4 5 4 9 CHLORIDE mmol/L 108 103   CO2 mmol/L 26 25   ANION GAP mmol/L 10 12   BUN mg/dL 11 13   CREATININE mg/dL 0 81 0 97   EGFR ml/min/1 73sq m 80 65   CALCIUM mg/dL 8 5 8 6     Results from last 7 days   Lab Units 08/07/21 2017   AST U/L 44   ALT U/L 70   ALK PHOS U/L 87   TOTAL PROTEIN g/dL 6 8   ALBUMIN g/dL 2 9*   TOTAL BILIRUBIN mg/dL 0 32     Results from last 7 days   Lab Units 08/08/21  0451 08/07/21 2017   GLUCOSE RANDOM mg/dL 99 108     Results from last 7 days   Lab Units 08/07/21 2017   TROPONIN I ng/mL <0 02     Results from last 7 days   Lab Units 08/07/21 2017   PROTIME seconds 12 2   INR  0 89   PTT seconds 24     Results from last 7 days   Lab Units 08/07/21  2206   CLARITY UA  Clear   COLOR UA  Yellow   SPEC GRAV UA  1 015   PH UA  7 0   GLUCOSE UA mg/dl Negative   KETONES UA mg/dl Negative   BLOOD UA  Negative   PROTEIN UA mg/dl Negative   NITRITE UA  Negative   BILIRUBIN UA  Negative   UROBILINOGEN UA E U /dl 1 0   LEUKOCYTES UA  Negative     ED Treatment:   Medication Administration from 08/07/2021 1933 to 08/08/2021 1018       Date/Time Order Dose Route Action Comments     08/07/2021 2003 albuterol (PROVENTIL HFA,VENTOLIN HFA) inhaler 2 puff 2 puff Inhalation Given      08/08/2021 0823 furosemide (LASIX) tablet 20 mg 20 mg Oral Given      08/08/2021 0822 gabapentin (NEURONTIN) capsule 400 mg 400 mg Oral Given      08/08/2021 0823 levETIRAcetam (KEPPRA) tablet 250 mg 250 mg Oral Given      08/08/2021 0053 levETIRAcetam (KEPPRA) tablet 250 mg 250 mg Oral Given      08/08/2021 0053 OLANZapine (ZyPREXA) tablet 10 mg 10 mg Oral Given      08/08/2021 0823 lubiprostone (AMITIZA) capsule 24 mcg 24 mcg Oral Given      08/08/2021 0823 pantoprazole (PROTONIX) EC tablet 40 mg 40 mg Oral Given      08/08/2021 0824 polyethylene glycol (MIRALAX) packet 17 g 17 g Oral Not Given      08/08/2021 0823 prochlorperazine (COMPAZINE) tablet 5 mg 5 mg Oral Given      08/08/2021 0823 senna-docusate sodium (SENOKOT S) 8 6-50 mg per tablet 2 tablet 2 tablet Oral Given      08/08/2021 7753 nicotine (NICODERM CQ) 14 mg/24hr TD 24 hr patch 1 patch 1 patch Transdermal Medication Applied         Past Medical History:   Diagnosis Date    Dehydration 6/11/2019    Dizziness 2/6/2018    Dry skin 2/6/2018    Edema 10/23/2019    H/O bilateral mastectomy 2/15/2016    Hyperglycemia 2/6/2018    Hypertension 2/15/2016    Hypokalemia 3/10/2020    Lymphedema 2/15/2016    [facility] ) 10/6/2016    Malignant neoplasm of right breast ([facility] ) 2/15/2016    Metastatic breast cancer ([facility] )      Present on Admission:   Anemia of chronic disease   Cognitive impairment   Insomnia due to medical condition      Admitting Diagnosis: Shortness of breath [R06 02]  General weakness [R53 1]  Generalized weakness [R53 1]  Age/Sex: 64 y o  female  Admission Orders:  8/7/2021 2215 inpatient   Scheduled Medications:  apixaban, 5 mg, Oral, BID  dexamethasone, 2 mg, Oral, BID before lunch/dinner  furosemide, 20 mg, Oral, Daily  gabapentin, 400 mg, Oral, TID AC  levETIRAcetam, 250 mg, Oral, Q12H STEFFI  lubiprostone, 24 mcg, Oral, BID With Meals  nicotine, 1 patch, Transdermal, Daily  OLANZapine, 10 mg, Oral, HS  pantoprazole, 40 mg, Oral, Daily  polyethylene glycol, 17 g, Oral, Daily  prochlorperazine, 5 mg, Oral, TID AC  senna-docusate sodium, 2 tablet, Oral, BID      Continuous IV Infusions: none      PRN Meds:  acetaminophen, 650 mg, Oral, Q6H PRN  bisacodyl, 10 mg, Rectal, Daily PRN  HYDROmorphone, 4 mg, Oral, 4x Daily PRN - used x 1 8/8/2021   zolpidem, 10 mg, Oral, HS PRN          Network Utilization Review Department  ATTENTION: Please call with any questions or concerns to [phone] and carefully listen to the prompts so that you are directed to the right person   All voicemails are confidential   Gabbie Schulte all requests for admission clinical reviews, approved or denied determinations and any other requests to dedicated fax number below belonging to the campus where the patient is receiving treatment   List of dedicated fax numbers for the Facilities:  [address] [place] [address] DENIALS (Administrative/Medical Necessity) [phone]   [address] (Maternity/NICU/Pediatrics) [phone] [address] Parker Aranda [address] [id number] [place] [address] [address] Judith [address] [place] [address] [phone]

## 2021-08-08 NOTE — PROGRESS NOTES
[facility]  Progress Note - Collins Reef 1960, 64 y o  female MRN: [id number]  Unit/Bed#: S -01 Encounter: [id number]  Primary Care Provider: Shawn Hoyt MD   Date and time admitted to hospital: 8/7/2021  7:33 PM    Abdominal distention  Assessment & Plan  Reports increased bloating  Denies pain, fever  Reports small BM 8/5  Reports decreased passage of gas  · Has chronic constipation  Maintained on bowel regimen amitiza, senokot, miralax  · Check obstruction series  · Serial abdominal exams    Cognitive impairment  Assessment & Plan  Noted to be ongoing  Currently patient is alert and oriented  Anemia of chronic disease  Assessment & Plan  Stable and at baseline   · CBC in AM Hgb 9 2  · Baseline hgb appears to be 10-11    Insomnia due to medical condition  Assessment & Plan  · Continue ambien PRN and zyprexia HS     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, estrogen receptor positive ([facility] )  Assessment & Plan  Initial diagnosis in 2010  S/p mastectomy and multiple rounds of chemo / radiation  Mets to lungs, brain, bones  Not currently receiving chemo or radiation  She follows with palliative care  · Continue home regimen including gabapentin 400 mg TID, dilaudid 4 mg PO QID PRN, compazine 5 mg TID, dexamethasone 2 mg PO BID, lasix 20 mg QD  · Keppra for sz ppx  · Level 1 full code     * Generalized weakness  Assessment & Plan  Presented to ED with generalized weakness  Patient was recently hospitalized and discharged to rehab  She reports some improvement in strength following rehab but states worsening over the past few days  In the ED, patient is unable to ambulate due to lower extremity weakness  She denies recent fall since being discharged from hospital on 7/5  Initial workup is benign including head CT, UA  Anemia is stable     · PT consult   · Fall precaution            VTE Pharmacologic Prophylaxis:   VTE Score: 4 on Eliquis    Mechanical VTE Prophylaxis in Place: No    Patient Centered Rounds: IM team    Discussions with Specialists or Other Care Team Provider: Dr Angelica Wynn    Education and Discussions with Family / Patient: Spoke with daughter at patient's request    Current Length of Stay: 1 day(s)    Current Patient Status: Inpatient     Discharge Plan / Estimated Discharge Date: Unknown of present    Code Status: Level 1 - Full Code      Subjective: The patient was seen at the bedside where she was alert and at that time had no complaints of pain  Objective:     Vitals:   Temp (24hrs), Av °F (37 2 °C), Min:98 7 °F (37 1 °C), Max:99 2 °F (37 3 °C)    Temp:  [98 7 °F (37 1 °C)-99 2 °F (37 3 °C)] 98 7 °F (37 1 °C)  HR:  [] 83  Resp:  [16-23] 16  BP: (104-133)/(62-87) 104/62  SpO2:  [90 %-100 %] 90 %  There is no height or weight on file to calculate BMI  Input and Output Summary (last 24 hours): Intake/Output Summary (Last 24 hours) at 2021 1647  Last data filed at 2021 1315  Gross per 24 hour   Intake --   Output 900 ml   Net -900 ml       Physical Exam:     Physical Exam  Constitutional:       Appearance: She is ill-appearing  HENT:      Nose: No rhinorrhea  Eyes:      Extraocular Movements: Extraocular movements intact  Cardiovascular:      Rate and Rhythm: Normal rate and regular rhythm  Heart sounds: Normal heart sounds  No murmur heard  Pulmonary:      Effort: Pulmonary effort is normal  No respiratory distress  Breath sounds: No wheezing or rales  Abdominal:      General: There is distension  Palpations: Abdomen is soft  Tenderness: There is no abdominal tenderness  Musculoskeletal:      Right lower leg: No edema  Left lower leg: No edema  Skin:     General: Skin is warm and dry  Comments: A superficial laceration approximately 1/2 cm was noted on the dorsal aspect of the left great toe  Patient was unaware of this     Neurological: Comments: Patient had weakness of plantar flexion and hip flexion bilaterally       Additional Data:     Labs:  Results from last 7 days   Lab Units 08/08/21  0451 08/07/21 2017   WBC Thousand/uL 5 97 8 52   HEMOGLOBIN g/dL 9 2* 10 0*   HEMATOCRIT % 30 4* 32 3*   PLATELETS Thousands/uL 266 310   NEUTROS PCT %  --  75   LYMPHS PCT %  --  15   MONOS PCT %  --  8   EOS PCT %  --  0     Results from last 7 days   Lab Units 08/08/21  0451 08/07/21 2017   SODIUM mmol/L 144 140   POTASSIUM mmol/L 4 5 4 9   CHLORIDE mmol/L 108 103   CO2 mmol/L 26 25   BUN mg/dL 11 13   CREATININE mg/dL 0 81 0 97   ANION GAP mmol/L 10 12   CALCIUM mg/dL 8 5 8 6   ALBUMIN g/dL  --  2 9*   TOTAL BILIRUBIN mg/dL  --  0 32   ALK PHOS U/L  --  87   ALT U/L  --  70   AST U/L  --  44   GLUCOSE RANDOM mg/dL 99 108     Results from last 7 days   Lab Units 08/07/21 2017   INR  0 89                   Imaging: Reviewed radiology reports from this admission including: CT head    Recent Cultures (last 7 days):           Lines/Drains:  Invasive Devices     Central Venous Catheter Line            Port A Cath Right Chest -- days    Port A Cath Right Chest -- days          Peripheral Intravenous Line            Peripheral IV 08/07/21 Left Antecubital <1 day    Peripheral IV 08/08/21 Left Hand <1 day                Telemetry:        Last 24 Hours Medication List:   Current Facility-Administered Medications   Medication Dose Route Frequency Provider Last Rate    acetaminophen  650 mg Oral Q6H PRN Stephanie Head, CRNP      apixaban  5 mg Oral BID Stephanie Head, CRNP      bisacodyl  10 mg Rectal Daily PRN Stephanie Head, CRNP      dexamethasone  2 mg Oral BID before lunch/dinner Stephanie Head, CRNP      furosemide  20 mg Oral Daily Stephanie Head, CRNP      gabapentin  400 mg Oral TID AC Stephanie Head, CRNP      HYDROmorphone  4 mg Oral 4x Daily PRN Stephanie Head, CRNP      levETIRAcetam  250 mg Oral Q12H [address] Stephanie Head, CRNP      lubiprostone  24 mcg Oral BID With Meals FAUZIA Banegas      nicotine  1 patch Transdermal Daily FAUZIA Banegas      OLANZapine  10 mg Oral HS FAUZIA Banegas      pantoprazole  40 mg Oral Daily Sarah Mitchell, [address]      polyethylene glycol  17 g Oral Daily Sarah Mitchell, [address]      prochlorperazine  5 mg Oral TID [facility] FAUZIA Banegas      senna-docusate sodium  2 tablet Oral BID FAUZIA Banegas      zolpidem  10 mg Oral HS PRN FAUZIA Banegas          Today, Patient Was Seen By: Kate Murphy MD    ** Please Note: This note has been constructed using a voice recognition system   **

## 2021-08-08 NOTE — ASSESSMENT & PLAN NOTE
Presented to ED with generalized weakness  Patient was recently hospitalized and discharged to rehab  She reports some improvement in strength following rehab but states worsening over the past few days  In the ED, patient is unable to ambulate due to lower extremity weakness  She denies recent fall since being discharged from hospital on 7/5  Initial workup is benign including head CT, UA  Anemia is stable     · PT consult   · Fall precaution

## 2021-08-08 NOTE — PLAN OF CARE
Problem: PHYSICAL THERAPY ADULT  Goal: Performs mobility at highest level of function for planned discharge setting  See evaluation for individualized goals  Description: Treatment/Interventions: Functional transfer training, LE strengthening/ROM, Therapeutic exercise, Endurance training, Patient/family training, Equipment eval/education, Bed mobility, Gait training  Equipment Recommended: David More       See flowsheet documentation for full assessment, interventions and recommendations  8/8/2021 1311 by John Oro PT  Note: Prognosis: Guarded  Problem List: Decreased strength, Decreased endurance, Impaired balance, Decreased mobility, Decreased cognition, Decreased safety awareness, Obesity  Assessment: Pt is a 64 y o  female seen for PT evaluation s/p admit to [address] [place] [address] on 8/7/2021 w/ Generalized weakness  Order placed for PT  Comorbidities affecting pt's physical performance at time of assessment include: HTN, cancer history and dizziness  Personal factors affecting pt at time of IE include: steps to enter environment, inability to perform IADLs, inability to perform ADLs, inability to ambulate household distances and recent fall(s)  Prior to admission, pt was required A for mobility, lived in one floor environment and lived with  and sister  Upon evaluation: Pt requires max A x1 for bed mobility, min A for sit to stand, and min A for lateral steps along EOB  (Please find full objective findings from PT assessment regarding body systems outlined above)  Impairments and limitations also listed above, especially due to  weakness, impaired balance, decreased endurance, gait deviations, decreased activity tolerance, impaired judgement and fall risk  The following objective measures performed on IE also reveal limitations: Barthel Index 35/100   Pt's clinical presentation is currently unstable/unpredictable seen in pt's presentation of decreased safety awareness, fall risk, and limited insight into deficits  Pt to benefit from continued skilled PT tx while in hospital and upon DC to address deficits as defined above and maximize level of functional mobility  From PT/mobility standpoint, recommendation at time of d/c would be inpatient rehab pending progress in order to maximize pt's functional independence and consistency w/ mobility in order to facilitate return to PLOF  Recommend progression of ambulation and initiation of HEP as appropriate  PT Discharge Recommendation: Post acute rehabilitation services          See flowsheet documentation for full assessment  8/8/2021 1311 by Tk Gaspar PT  Note: Prognosis: Guarded  Problem List: Decreased strength, Decreased endurance, Impaired balance, Decreased mobility, Decreased cognition, Decreased safety awareness, Obesity  Assessment: Pt is a 64 y o  female seen for PT evaluation s/p [facility] on 8/7/2021 w/ Generalized weakness  Order placed for PT  Comorbidities affecting pt's physical performance at time of assessment include: HTN, cancer history and dizziness  Personal factors affecting pt at time of IE include: steps to enter environment, inability to perform IADLs, inability to perform ADLs, inability to ambulate household distances and recent fall(s)  Prior to admission, pt was required A for mobility, lived in one floor environment and lived with  and sister  Upon evaluation: Pt requires max A x1 for bed mobility, min A for sit to stand, and min A for lateral steps along EOB  (Please find full objective findings from PT assessment regarding body systems outlined above)  Impairments and limitations also listed above, especially due to  weakness, impaired balance, decreased endurance, gait deviations, decreased activity tolerance, impaired judgement and fall risk  The following objective measures performed on IE also reveal limitations: Barthel Index 35/100   Pt's clinical presentation is currently unstable/unpredictable seen in pt's presentation of decreased safety awareness, fall risk, and limited insight into deficits  Pt to benefit from continued skilled PT tx while in hospital and upon DC to address deficits as defined above and maximize level of functional mobility  From PT/mobility standpoint, recommendation at time of d/c would be inpatient rehab pending progress in order to maximize pt's functional independence and consistency w/ mobility in order to facilitate return to PLOF  Recommend progression of ambulation and initiation of HEP as appropriate  PT Discharge Recommendation: Post acute rehabilitation services          See flowsheet documentation for full assessment

## 2021-08-08 NOTE — H&P
[facility]  H&P- Shanda Wick 1960, 64 y o  female MRN: [id number]  Unit/Bed#: FT 02 Encounter: [id number]  Primary Care Provider: Zachary Washington MD   Date and time admitted to hospital: 8/7/2021  7:33 PM    * Generalized weakness  Assessment & Plan  Presents to ED with generalized weakness  Patient was recently hospitalized and discharged to rehab  She reports some improvement in strength following rehab but states worsening over the past few days  In the ED, patient is unable to ambulate due to lower extremity weakness  She denies recent fall since being discharged from hospital on 7/5  Initial workup is benign including head CT, UA  Anemia is stable  · PT consult   · Fall precaution    Abdominal distention  Assessment & Plan  Reports increased bloating  Denies pain, fever  Reports small BM 8/5  Reports decreased passage of gas  · Has chronic constipation  Maintained on bowel regimen amitiza, senokot, miralax  · Check obstruction series  · Serial abdominal exams    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, estrogen receptor positive ([facility] [address] )  Assessment & Plan  Initial diagnosis in 2010  S/p mastectomy and multiple rounds of chemo / radiation  Mets to lungs, brain, bones  Not currently receiving chemo or radiation  She follows with palliative care  · Continue home regimen including gabapentin 400 mg TID, dilaudid 4 mg PO QID PRN, compazine 5 mg TID, dexamethasone 2 mg PO BID, lasix 20 mg QD  · Keppra for sz ppx  · Level 1 full code     Cognitive impairment  Assessment & Plan  Noted to be ongoing  Currently on admission, patient is alert and oriented      Anemia of chronic disease  Assessment & Plan  Stable and at baseline   · CBC in AM     Insomnia due to medical condition  Assessment & Plan  · Continue ambien PRN and zyprexia HS     VTE Pharmacologic Prophylaxis: VTE Score: 4 Moderate Risk (Score 3-4) - Pharmacological DVT Prophylaxis Ordered: apixaban (Eliquis)  Code Status: Prior full  Pt alert and oriented  Discussion with family: Patient declined call to   Anticipated Length of Stay: Patient will be admitted on an inpatient basis with an anticipated length of stay of greater than 2 midnights secondary to generalized weakness, requires PT evaluation  unable to ambulate   Total Time for Visit, including Counseling / Coordination of Care: 70 minutes Greater than 50% of this total time spent on direct patient counseling and coordination of care  Chief Complaint: generalized weakness     History of Present Illness:  Thaddeus Pathak is a 64 y o  female with a PMH of breast cancer with metastasis to brain, bone, lungs s/p bilateral mastectomy, whole brain radiation, tobacco abuse, emphysema, chronic pain on dilaudid, palliative care patient,  who presents with generalized weakness and ambulatory dysfunction  She is currently unable to ambulate in the ED  Initial workup including head CT, UA, labs are benign  She denies any other symptoms and reports otherwise feeling pretty well  She was last hospitalized 7/5-7/11 for a witnessed fall  She was discharged to rehab  She does state improvement in strength following rehab and is agreeable to have physical therapy evaluate her this admission  She states her appetite has been good and pain relatively well controlled  Patient follows with Dr Jearline Soulier of palliative care  Patient is admitted inpatient  PT evaluation is requested  Review of Systems:  Review of Systems   Constitutional: Positive for fatigue  Gastrointestinal: Positive for abdominal distention and constipation  Negative for abdominal pain, anal bleeding, blood in stool, diarrhea, nausea, rectal pain and vomiting  Neurological: Positive for weakness  All other systems reviewed and are negative        Past Medical and Surgical History:   Past Medical History:   Diagnosis Date    Dehydration 6/11/2019  Dizziness 2/6/2018    Dry skin 2/6/2018    Edema 10/23/2019    H/O bilateral mastectomy 2/15/2016    Hyperglycemia 2/6/2018    Hypertension 2/15/2016    Hypokalemia 3/10/2020    Lymphedema 2/15/2016    Malignant cachexia ([facility] [address] ) 10/6/2016    Malignant neoplasm of right breast ([facility] [address] ) 2/15/2016    Metastatic breast cancer [facility])        Past Surgical History:   Procedure Laterality Date    ENDOBRONCHIAL ULTRASOUND (EBUS) N/A 2/16/2016    Procedure: EBUS;  Surgeon: Temo Mackay MD;  Location: [facility];  Service:     MASTECTOMY Bilateral     MASTECTOMY Bilateral     right arm edema    OOPHORECTOMY      DE BRONCHOSCOPY NEEDLE BX TRACHEA MAIN STEM&/BRON N/A 2/16/2016    Procedure: Debbie Patel;  Surgeon: Temo Mackay MD;  [facility];  Service: Thoracic    DE INSJ TUNNELED CTR VAD W/SUBQ PORT AGE 5 YR/> N/A 7/24/2018    Procedure: INSERTION VENOUS PORT ( PORT-A-CATH) IR;  Surgeon: Ren Herrera DO;  [facility];  Service: Interventional Radiology    DE STEREOTACTIC RADIOSURGERY, CRANIAL,SIMPLE,EA ADD  5/3/2017         DE STEREOTACTIC RADIOSURGERY, CRANIAL,SIMPLE,EA ADD  5/3/2017         DE STEREOTACTIC RADIOSURGERY, CRANIAL,SIMPLE,SINGLE  5/3/2017            Meds/Allergies:  Prior to Admission medications    Medication Sig Start Date End Date Taking?  Authorizing Provider   albuterol (PROVENTIL HFA,VENTOLIN HFA) 90 mcg/act inhaler TAKE 2 PUFFS BY MOUTH EVERY 6 HOURS AS NEEDED FOR WHEEZING 9/4/19   Alice Carmen PA-C   apixaban (ELIQUIS) 5 mg Take 1 tablet (5 mg total) by mouth 2 (two) times a day 5/19/21 6/18/21  Bran Aguilar DO   bisacodyl (DULCOLAX) 10 mg suppository Insert 1 suppository (10 mg total) into the rectum daily as needed for constipation 5/19/21   Juliocesar Simms DO   dexamethasone (DECADRON) 2 mg tablet Take 1 tablet (2 mg total) by mouth 2 (two) times a day before lunch and dinner 7/12/21   Teresa Quevedo MD   furosemide (LASIX) 20 mg tablet TAKE 1 TABLET BY MOUTH EVERY DAY 7/20/21   Levi Mcclelland MD   gabapentin (NEURONTIN) 400 mg capsule Take 1 capsule (400 mg total) by mouth 3 (three) times a day before meals To reduce nerve pain 6/23/21   Levi Mcclelland MD   HYDROmorphone (DILAUDID) 4 mg tablet Take 1 tablet (4 mg total) by mouth 4 (four) times a day as needed (cancer pain)Max Daily Amount: 16 mg 6/23/21   Levi Mcclelland MD   levETIRAcetam (KEPPRA) 250 mg tablet TAKE 1 TABLET (250 MG TOTAL) BY MOUTH 2 (TWO) TIMES A DAY TO PREVENT SEIZURE 7/23/21   Levi Mcclelland MD   lubiprostone (AMITIZA) 24 mcg capsule Take 1 capsule (24 mcg total) by mouth 2 (two) times a day with meals 5/19/21 6/18/21  Davion Rust DO   Misc  Devices (QUAD CANE) MISC by Does not apply route daily 12/12/19   Karma Bowman DO   OLANZapine (ZyPREXA) 10 mg tablet Take 1 tablet (10 mg total) by mouth daily at bedtime Every night, to help sleep and appetite  6/23/21   Levi Mcclelland MD   pantoprazole (PROTONIX) 40 mg tablet Take 1 tablet (40 mg total) by mouth daily Every morning, before coffee, to reduce stomach acid 5/4/21   Levi Mcclelland MD   polyethylene glycol (MIRALAX) 17 g packet Take 17 g by mouth daily 5/25/21   Davion Rust DO   prochlorperazine (COMPAZINE) 5 mg tablet Take 1 tablet (5 mg total) by mouth 3 (three) times a day before meals 6/23/21 7/23/21  Levi Mcclelland MD   senna-docusate sodium (SENOKOT S) 8 6-50 mg per tablet Take 2 tablets by mouth 2 (two) times a day 5/19/21 6/18/21  Davion Rust DO   zolpidem (AMBIEN) 10 mg tablet Take 1 tablet (10 mg total) by mouth daily at bedtime as needed for sleep 6/23/21   Levi Mcclelland MD     I have reviewed home medications with a medical source (PCP, Pharmacy, other)      Allergies: No Known Allergies    Social History:  Marital Status: /Civil Union   Occupation:   Patient Pre-hospital Living Situation: Home  Patient Pre-hospital Level of Mobility: unable to be assessed at time of evaluation  Patient Pre-hospital Diet Restrictions:   Substance Use History:   Social History     Substance and Sexual Activity   Alcohol Use Not Currently    Comment: once a week     Social History     Tobacco Use   Smoking Status Current Every Day Smoker    Packs/day: 0 50    Years: 40 00    Pack years: 20 00    Types: Cigarettes    Start date: 1978   Smokeless Tobacco Never Used     Social History     Substance and Sexual Activity   Drug Use Not Currently    Types: Marijuana       Family History:  Family History   Problem Relation Age of Onset    Cancer Sister     Prostate cancer Brother        Physical Exam:     Vitals:   Blood Pressure: 132/64 (08/07/21 2215)  Pulse: 86 (08/07/21 2230)  Temperature: 99 2 °F (37 3 °C) (08/07/21 1945)  Temp Source: Oral (08/07/21 1945)  Respirations: (!) 23 (08/07/21 2007)  SpO2: 98 % (08/07/21 2230)    Physical Exam  Constitutional:       General: She is not in acute distress  Appearance: She is ill-appearing (chronically ill)  HENT:      Head: Normocephalic and atraumatic  Right Ear: External ear normal       Left Ear: External ear normal       Nose: Nose normal       Mouth/Throat:      Mouth: Mucous membranes are moist       Pharynx: Oropharynx is clear  Eyes:      General: No scleral icterus  Extraocular Movements: Extraocular movements intact  Conjunctiva/sclera: Conjunctivae normal       Pupils: Pupils are equal, round, and reactive to light  Cardiovascular:      Rate and Rhythm: Normal rate and regular rhythm  Pulses: Normal pulses  Heart sounds: Normal heart sounds  Pulmonary:      Effort: Pulmonary effort is normal       Comments: Diminished   Abdominal:      General: Bowel sounds are normal  There is distension  Palpations: Abdomen is soft  Tenderness: There is no abdominal tenderness  There is no right CVA tenderness, left CVA tenderness, guarding or rebound     Musculoskeletal:         General: Normal range of motion  Cervical back: Normal range of motion  No rigidity  Comments: NP to lower extremities   Generalized weakness to lower extremities    Skin:     General: Skin is warm and dry  Capillary Refill: Capillary refill takes less than 2 seconds  Neurological:      General: No focal deficit present  Mental Status: She is oriented to person, place, and time  Psychiatric:         Mood and Affect: Mood normal          Behavior: Behavior normal           Additional Data:     Lab Results:  Results from last 7 days   Lab Units 08/07/21 2017   WBC Thousand/uL 8 52   HEMOGLOBIN g/dL 10 0*   HEMATOCRIT % 32 3*   PLATELETS Thousands/uL 310   NEUTROS PCT % 75   LYMPHS PCT % 15   MONOS PCT % 8   EOS PCT % 0     Results from last 7 days   Lab Units 08/07/21 2017   SODIUM mmol/L 140   POTASSIUM mmol/L 4 9   CHLORIDE mmol/L 103   CO2 mmol/L 25   BUN mg/dL 13   CREATININE mg/dL 0 97   ANION GAP mmol/L 12   CALCIUM mg/dL 8 6   ALBUMIN g/dL 2 9*   TOTAL BILIRUBIN mg/dL 0 32   ALK PHOS U/L 87   ALT U/L 70   AST U/L 44   GLUCOSE RANDOM mg/dL 108     Results from last 7 days   Lab Units 08/07/21 2017   INR  0 89                   Imaging: Reviewed radiology reports from this admission including: chest xray and CT head  CT head without contrast   Final Result by Edyth Seip, MD (08/07 2055)      No acute intracranial abnormality  Workstation performed: [id number]         XR chest 1 view portable    (Results Pending)   XR abdomen obstruction series    (Results Pending)       EKG and Other Studies Reviewed on Admission:   · EKG: No EKG obtained  ** Please Note: This note has been constructed using a voice recognition system   **

## 2021-08-08 NOTE — ASSESSMENT & PLAN NOTE
Reports increased bloating  Denies pain, fever  Reports small BM 8/5  Reports decreased passage of gas  · Has chronic constipation  Maintained on bowel regimen amitiza, senokot, miralax  · Check obstruction series     · Serial abdominal exams

## 2021-08-08 NOTE — PHYSICAL THERAPY NOTE
PHYSICAL THERAPY EVALUATION  NAME: Nadja Ochoa  AGE:   64 y o  MRN:  [id number]  ADMIT DX: Shortness of breath [R06 02]  General weakness [R53 1]  Generalized weakness [R53 1]    PMH:   Past Medical History:   Diagnosis Date    Dehydration 6/11/2019    Dizziness 2/6/2018    Dry skin 2/6/2018    Edema 10/23/2019    H/O bilateral mastectomy 2/15/2016    Hyperglycemia 2/6/2018    Hypertension 2/15/2016    Hypokalemia 3/10/2020    Lymphedema 2/15/2016    Malignant cachexia ([facility] [address] ) 10/6/2016    Malignant neoplasm of right breast ([facility] [address] ) 2/15/2016    Metastatic breast cancer ([facility] )      LENGTH OF STAY: 1       08/08/21 0909   PT Last Visit   PT Visit Date 08/08/21   Note Type   Note type Evaluation   Pain Assessment   Pain Assessment Tool Pain Assessment not indicated - pt denies pain   Home Living   Type of [address] [place] [address] One level;Ramped entrance;Stairs to enter with rails  (ramp +1 EKATERINA)   Bathroom Shower/Tub   (pt spongebathes)   Home Equipment Walker; Wheelchair-manual   Additional Comments Ambulates with light assist and use of RW for ~25` at longest     Prior Function   Level of [place] Independent with ADLs and functional mobility   Lives With Spouse  (sister)   Receives Help From Family   ADL Assistance Needs assistance   IADLs Needs assistance   Falls in the last 6 months 1 to 4  (about 4)   Restrictions/Precautions   Weight Bearing Precautions Per Order Yes   LLE Weight Bearing Per Order WBAT  (per last admission; podiatry)   Other Precautions Cognitive;Multiple lines; Fall Risk   General   Family/Caregiver Present No   Cognition   Overall Cognitive Status Impaired   Arousal/Participation Cooperative   Orientation Level Oriented to person;Oriented to place; Disoriented to time   Memory Decreased recall of recent events;Decreased recall of precautions;Decreased short term memory   Following Commands Follows one step commands without difficulty   Comments Pt identified by name and      RUE Assessment   RUE Assessment X  (lymphedema)   RLE Assessment   RLE Assessment X   Strength RLE   RLE Overall Strength 4-/5  (functionally)   LLE Assessment   LLE Assessment X   Strength LLE   LLE Overall Strength 4-/5  (functionally)   Bed Mobility   Supine to Sit 2  Maximal assistance   Additional items Assist x 1;HOB elevated; Increased time required;Verbal cues;LE management   Sit to Supine 2  Maximal assistance   Additional items Assist x 1; Increased time required;Verbal cues;LE management   Transfers   Sit to Stand 4  Minimal assistance   Additional items Assist x 1; Increased time required;Verbal cues   Stand to Sit 4  Minimal assistance   Additional items Assist x 1; Increased time required;Verbal cues   Additional Comments Pt reports feeling weaker than normal     Ambulation/Elevation   Gait pattern Decreased foot clearance; Short stride; Excessively slow   Gait Assistance 4  Minimal assist   Additional items Assist x 1;Verbal cues   Assistive Device Rolling walker   Distance 3` x1 lateral steps along EOB toward R side   Balance   Static Sitting Fair   Static Standing Fair -   Dynamic Standing Poor +   Ambulatory Poor +   Endurance Deficit   Endurance Deficit Yes   Endurance Deficit Description limited standing/ambulation distance   Activity Tolerance   Activity Tolerance Patient limited by fatigue   Nurse Made Aware Per RN, pt appropriate to evaluate   Assessment   Prognosis Guarded   Problem List Decreased strength;Decreased endurance; Impaired balance;Decreased mobility; Decreased cognition;Decreased safety awareness; Obesity   Goals   Patient Goals to feel better   STG Expiration Date 21   Short Term Goal #1 Pt will be able to: (1) perform bed mobility with min A to promote OOB activity (2) perform sit to stand with supervision to decrease burden of care (3) ambulate at least 30` with min A and least restrictive AD to increase activity tolerance (4) increase standing balance by 1 grade to decrease risk of falls   PT Treatment Day 0   Plan   Treatment/Interventions Functional transfer training;LE strengthening/ROM; Therapeutic exercise; Endurance training;Patient/family training;Equipment eval/education; Bed mobility;Gait training   PT Frequency Other (Comment)  (3-5x/week)   Recommendation   PT Discharge Recommendation Post acute rehabilitation services   Equipment Recommended [facility] Recommended Wheeled walker   Change/add to Swish? No   AM-PAC Basic Mobility Inpatient   Turning in Bed Without Bedrails 2   Lying on Back to Sitting on Edge of Flat Bed 2   Moving Bed to Chair 3   Standing Up From Chair 3   Walk in Room 2   Climb 3-5 Stairs 1   Basic Mobility Inpatient Raw Score 13   Basic Mobility Standardized Score 33 99   Barthel Index   Feeding 5   Bathing 0   Grooming Score 0   Dressing Score 0   Bladder Score 10   Bowels Score 10   Toilet Use Score 5   Transfers (Bed/Chair) Score 5   Mobility (Level Surface) Score 0   Stairs Score 0   Barthel Index Score 35   The patient's AM-PAC Basic Mobility Inpatient Short Form Raw Score is 13, Standardized Score is 33 99  A standardized score less than 42 9 suggests the patient may benefit from discharge to post-acute rehabilitation services  Please also refer to the recommendation of the Physical Therapist for safe discharge planning  Assessment: Pt is a 64 y o  female seen for PT evaluation s/p admit to [address] [place] [address] on 8/7/2021 w/ Generalized weakness  Order placed for PT  Comorbidities affecting pt's physical performance at time of assessment include: HTN, cancer history and dizziness  Personal factors affecting pt at time of IE include: steps to enter environment, inability to perform IADLs, inability to perform ADLs, inability to ambulate household distances and recent fall(s)  Prior to admission, pt was required A for mobility, lived in one floor environment and lived with  and sister   Upon evaluation: Pt requires max A x1 for bed mobility, min A for sit to stand, and min A for lateral steps along EOB  (Please find full objective findings from PT assessment regarding body systems outlined above)  Impairments and limitations also listed above, especially due to  weakness, impaired balance, decreased endurance, gait deviations, decreased activity tolerance, impaired judgement and fall risk  The following objective measures performed on IE also reveal limitations: Barthel Index 35/100  Pt's clinical presentation is currently unstable/unpredictable seen in pt's presentation of decreased safety awareness, fall risk, and limited insight into deficits  Pt to benefit from continued skilled PT tx while in hospital and upon DC to address deficits as defined above and maximize level of functional mobility  From PT/mobility standpoint, recommendation at time of d/c would be inpatient rehab pending progress in order to maximize pt's functional independence and consistency w/ mobility in order to facilitate return to PLOF  Recommend progression of ambulation and initiation of HEP as appropriate        Jeanette Helms, PT,DPT

## 2021-08-08 NOTE — ASSESSMENT & PLAN NOTE
Initial diagnosis in 2010  S/p mastectomy and multiple rounds of chemo / radiation  Mets to lungs, brain, bones  Not currently receiving chemo or radiation  She follows with palliative care     · Continue home regimen including gabapentin 400 mg TID, dilaudid 4 mg PO QID PRN, compazine 5 mg TID, dexamethasone 2 mg PO BID, lasix 20 mg QD  · Keppra for sz ppx  · Level 1 full code

## 2021-08-09 ENCOUNTER — TELEPHONE (OUTPATIENT)
Dept: HEMATOLOGY ONCOLOGY | Facility: CLINIC | Age: 61
End: 2021-08-09

## 2021-08-09 LAB
ATRIAL RATE: 104 BPM
ERYTHROCYTE [DISTWIDTH] IN BLOOD BY AUTOMATED COUNT: 16.2 % (ref 11.6–15.1)
HCT VFR BLD AUTO: 33.6 % (ref 34.8–46.1)
HGB BLD-MCNC: 10.2 G/DL (ref 11.5–15.4)
MCH RBC QN AUTO: 29.3 PG (ref 26.8–34.3)
MCHC RBC AUTO-ENTMCNC: 30.4 G/DL (ref 31.4–37.4)
MCV RBC AUTO: 97 FL (ref 82–98)
P AXIS: 63 DEGREES
PLATELET # BLD AUTO: 292 THOUSANDS/UL (ref 149–390)
PMV BLD AUTO: 9 FL (ref 8.9–12.7)
PR INTERVAL: 126 MS
QRS AXIS: 46 DEGREES
QRSD INTERVAL: 74 MS
QT INTERVAL: 332 MS
QTC INTERVAL: 429 MS
RBC # BLD AUTO: 3.48 MILLION/UL (ref 3.81–5.12)
T WAVE AXIS: 41 DEGREES
VENTRICULAR RATE: 100 BPM
WBC # BLD AUTO: 7.55 THOUSAND/UL (ref 4.31–10.16)

## 2021-08-09 PROCEDURE — 85027 COMPLETE CBC AUTOMATED: CPT | Performed by: INTERNAL MEDICINE

## 2021-08-09 PROCEDURE — 99232 SBSQ HOSP IP/OBS MODERATE 35: CPT | Performed by: INTERNAL MEDICINE

## 2021-08-09 PROCEDURE — 93010 ELECTROCARDIOGRAM REPORT: CPT | Performed by: INTERNAL MEDICINE

## 2021-08-09 RX ORDER — MINERAL OIL 100 G/100G
1 OIL RECTAL ONCE
Status: DISCONTINUED | OUTPATIENT
Start: 2021-08-09 | End: 2021-08-09

## 2021-08-09 RX ADMIN — OLANZAPINE 10 MG: 10 TABLET, FILM COATED ORAL at 21:20

## 2021-08-09 RX ADMIN — GABAPENTIN 400 MG: 400 CAPSULE ORAL at 06:27

## 2021-08-09 RX ADMIN — LEVETIRACETAM 250 MG: 250 TABLET, FILM COATED ORAL at 09:03

## 2021-08-09 RX ADMIN — GABAPENTIN 400 MG: 400 CAPSULE ORAL at 12:11

## 2021-08-09 RX ADMIN — LEVETIRACETAM 250 MG: 250 TABLET, FILM COATED ORAL at 20:23

## 2021-08-09 RX ADMIN — DOCUSATE SODIUM AND SENNOSIDES 2 TABLET: 8.6; 5 TABLET, FILM COATED ORAL at 09:03

## 2021-08-09 RX ADMIN — HYDROMORPHONE HYDROCHLORIDE 4 MG: 2 TABLET ORAL at 15:46

## 2021-08-09 RX ADMIN — DOCUSATE SODIUM AND SENNOSIDES 2 TABLET: 8.6; 5 TABLET, FILM COATED ORAL at 20:23

## 2021-08-09 RX ADMIN — APIXABAN 5 MG: 5 TABLET, FILM COATED ORAL at 09:04

## 2021-08-09 RX ADMIN — POLYETHYLENE GLYCOL 3350 17 G: 17 POWDER, FOR SOLUTION ORAL at 09:03

## 2021-08-09 RX ADMIN — LUBIPROSTONE 24 MCG: 24 CAPSULE, GELATIN COATED ORAL at 15:49

## 2021-08-09 RX ADMIN — PANTOPRAZOLE SODIUM 40 MG: 40 TABLET, DELAYED RELEASE ORAL at 09:04

## 2021-08-09 RX ADMIN — NICOTINE 1 PATCH: 14 PATCH, EXTENDED RELEASE TRANSDERMAL at 09:04

## 2021-08-09 RX ADMIN — PROCHLORPERAZINE MALEATE 5 MG: 10 TABLET ORAL at 06:27

## 2021-08-09 RX ADMIN — FUROSEMIDE 20 MG: 40 TABLET ORAL at 09:03

## 2021-08-09 RX ADMIN — GABAPENTIN 400 MG: 400 CAPSULE ORAL at 15:46

## 2021-08-09 RX ADMIN — DEXAMETHASONE 2 MG: 2 TABLET ORAL at 15:47

## 2021-08-09 RX ADMIN — PROCHLORPERAZINE MALEATE 5 MG: 10 TABLET ORAL at 12:11

## 2021-08-09 RX ADMIN — DEXAMETHASONE 2 MG: 2 TABLET ORAL at 12:11

## 2021-08-09 RX ADMIN — PROCHLORPERAZINE MALEATE 5 MG: 10 TABLET ORAL at 15:46

## 2021-08-09 RX ADMIN — APIXABAN 5 MG: 5 TABLET, FILM COATED ORAL at 20:23

## 2021-08-09 RX ADMIN — LUBIPROSTONE 24 MCG: 24 CAPSULE, GELATIN COATED ORAL at 09:04

## 2021-08-09 NOTE — TELEPHONE ENCOUNTER
Patient called in to inform the office that she is still in the hospital and needs to cancel appointment  Patient is instructed to contact the office when discharged   I will contact [facility] about cancellation

## 2021-08-09 NOTE — PROGRESS NOTES
[facility]  Progress Note - Collins Reef 1960, 64 y o  female MRN: [id number]  Unit/Bed#: S -01 Encounter: [id number]  Primary Care Provider: Shawn Hoyt MD   Date and time admitted to hospital: 8/7/2021  7:33 PM    Abdominal distention  Assessment & Plan  Reports increased bloating  Denies pain, fever  Reports small BM 8/5  Reports decreased passage of gas  · Has chronic constipation  Maintained on bowel regimen amitiza, senokot, miralax  Tap water enema ordered  Obstruction series noted:  Nonobstructive bowel gas pattern   Moderate amount of feces in the colon concerning for constipation  · Serial abdominal exams      Cognitive impairment  Assessment & Plan  Noted to be ongoing  Currently patient is alert and oriented  Anemia of chronic disease  Assessment & Plan  Stable and at baseline     · Baseline hgb appears to be 10-11    Insomnia due to medical condition  Assessment & Plan  · Continue ambien PRN and zyprexia HS     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, estrogen receptor positive ([facility] [address] )  Assessment & Plan  Initial diagnosis in 2010  S/p mastectomy and multiple rounds of chemo / radiation  Mets to lungs, brain, bones  Not currently receiving chemo or radiation  She follows with palliative care  · Continue home regimen including gabapentin 400 mg TID, dilaudid 4 mg PO QID PRN, compazine 5 mg TID, dexamethasone 2 mg PO BID, lasix 20 mg QD  · Keppra for sz ppx  · Level 1 full code     * Generalized weakness  Assessment & Plan  Presented to ED with generalized weakness  Patient was recently hospitalized and discharged to rehab  She reports some improvement in strength following rehab but states worsening over the past few days  In the ED, patient is unable to ambulate due to lower extremity weakness  She denies recent fall since being discharged from hospital on 7/5  Initial workup is benign including head CT, UA    Anemia is stable  · PT consult   · Fall precaution            VTE Pharmacologic Prophylaxis:   VTE Score: 4 Moderate Risk (Score 3-4) - Pharmacological DVT Prophylaxis Ordered: On Eliquis  Mechanical VTE Prophylaxis in Place: No    Patient Centered Rounds: With IM team    Discussions with Specialists or Other Care Team Provider: Dr Joshua Esteban    Education and Discussions with Family / Patient: Placed phone call to , no answer, left message this was a courtesy call  Current Length of Stay: 2 day(s)    Current Patient Status: Inpatient     Discharge Plan / Estimated Discharge Date: Unknown at present    Code Status: Level 1 - Full Code      Subjective: The patient was seen at the bedside this morning which she reported still some discomfort from her abdomen  It has been a few days since he has moved her bowels  We discussed further management and the patient consented to an enema which was ordered  Objective:     Vitals:   Temp (24hrs), Av 4 °F (36 9 °C), Min:97 5 °F (36 4 °C), Max:98 9 °F (37 2 °C)    Temp:  [97 5 °F (36 4 °C)-98 9 °F (37 2 °C)] 98 9 °F (37 2 °C)  HR:  [80-86] 80  Resp:  [15-16] 16  BP: (104-116)/(62-72) 116/72  SpO2:  [90 %-94 %] 94 %  There is no height or weight on file to calculate BMI  Input and Output Summary (last 24 hours): Intake/Output Summary (Last 24 hours) at 2021 1323  Last data filed at 2021 1372  Gross per 24 hour   Intake --   Output 1550 ml   Net -1550 ml       Physical Exam:     Physical Exam  Constitutional:       General: She is not in acute distress  HENT:      Nose: No rhinorrhea  Eyes:      Extraocular Movements: Extraocular movements intact  Cardiovascular:      Rate and Rhythm: Normal rate and regular rhythm  Heart sounds: Normal heart sounds  No murmur heard  Pulmonary:      Effort: Pulmonary effort is normal  No respiratory distress  Breath sounds: No wheezing or rales  Abdominal:      General: There is distension  Palpations: Abdomen is soft  Tenderness: There is no abdominal tenderness  There is no guarding  Musculoskeletal:      Right lower leg: No edema  Left lower leg: No edema  Skin:     General: Skin is warm and dry  Comments: A superficial laceration approximately 1/2 cm was noted on the dorsal aspect of the left great toe  Patient was unaware of this     Neurological:      Comments: Patient had weakness of plantar flexion and hip flexion bilaterally         Additional Data:     Labs:  Results from last 7 days   Lab Units 08/09/21 0455 08/07/21 2017   WBC Thousand/uL 7 55 8 52   HEMOGLOBIN g/dL 10 2* 10 0*   HEMATOCRIT % 33 6* 32 3*   PLATELETS Thousands/uL 292 310   NEUTROS PCT %  --  75   LYMPHS PCT %  --  15   MONOS PCT %  --  8   EOS PCT %  --  0     Results from last 7 days   Lab Units 08/08/21  0451 08/07/21 2017   SODIUM mmol/L 144 140   POTASSIUM mmol/L 4 5 4 9   CHLORIDE mmol/L 108 103   CO2 mmol/L 26 25   BUN mg/dL 11 13   CREATININE mg/dL 0 81 0 97   ANION GAP mmol/L 10 12   CALCIUM mg/dL 8 5 8 6   ALBUMIN g/dL  --  2 9*   TOTAL BILIRUBIN mg/dL  --  0 32   ALK PHOS U/L  --  87   ALT U/L  --  70   AST U/L  --  44   GLUCOSE RANDOM mg/dL 99 108     Results from last 7 days   Lab Units 08/07/21 2017   INR  0 89                     Imaging:  Reviewed x-ray abdomen complete       Recent Cultures (last 7 days):           Lines/Drains:  Invasive Devices     Central Venous Catheter Line            Port A Cath Right Chest -- days    Port A Cath Right Chest -- days          Peripheral Intravenous Line            Peripheral IV 08/07/21 Left Antecubital 1 day    Peripheral IV 08/08/21 Left Hand 1 day                Telemetry:        Last 24 Hours Medication List:   Current Facility-Administered Medications   Medication Dose Route Frequency Provider Last Rate    acetaminophen  650 mg Oral Q6H PRN Mara Amel, CRNP      apixaban  5 mg Oral BID Mara Amel, CRNP      bisacodyl  10 mg Rectal Daily PRN Delaney Blinks, CRNP      dexamethasone  2 mg Oral BID before lunch/dinner Delaney Blinks, CRNP      furosemide  20 mg Oral Daily Delaney Blinks, CRNP      gabapentin  400 mg Oral TID [facility] Delaney Blinks, CRNP      HYDROmorphone  4 mg Oral 4x Daily PRN Delaney Blinks, CRNP      levETIRAcetam  250 mg Oral Q12H [facility] Delaney Blinks, CRNP      lubiprostone  24 mcg Oral BID With Meals Delaney Blinks, CRNP      nicotine  1 patch Transdermal Daily Delaney Blinks, CRNP      OLANZapine  10 mg Oral HS Delaney Blinks, CRNP      pantoprazole  40 mg Oral Daily Delaney Blinks, CRNP      polyethylene glycol  17 g Oral Daily Delaney Blinks, CRNP      prochlorperazine  5 mg Oral TID [facility] Delaney Blinks, CRNP      senna-docusate sodium  2 tablet Oral BID Dealney Blinks, CRNP      zolpidem  10 mg Oral HS PRN Delaney Blinks, CRNP          Today, Patient Was Seen By: Jess King MD    ** Please Note: This note has been constructed using a voice recognition system   **

## 2021-08-09 NOTE — ASSESSMENT & PLAN NOTE
Reports increased bloating  Denies pain, fever  Reports small BM 8/5  Reports decreased passage of gas  · Has chronic constipation  Maintained on bowel regimen amitiza, senokot, miralax  Tap water enema ordered  Obstruction series noted:  Nonobstructive bowel gas pattern   Moderate amount of feces in the colon concerning for constipation       · Serial abdominal exams

## 2021-08-09 NOTE — CASE MANAGEMENT
LOS: 1 day  Readmission: Yes  Risk of Readmission: Yellow  Bundle: No    Pt reports she came to the ER due to weakness in her legs  She reports she was recently d/c from [facility] with Dennis Washburn  Pt reports she knew who to call with questions  Did not have any issues filling/affording medication  Pt unable to attend appointments given admission  CM met with pt and her daughter, Cori Orozco, at bedside  CM introduced self/role with dcp  Pt reports she resides with her , Moraima Trinh, and sister, Aurora Garrido, in a trailer with ramp entrance  Pt reprots her sister assists her with ADLs and she uses a RW for ambulation  Pt reports she has a w/c, BSC, raised toilet seat, and shower chair  Pt reports she does not drive and her daughter assists with transportation  Pt open with [facility] but can't remember name  STR at [facility]  No hx of MH or drug/alcohol abuse  Pharmacy: [facility] in [place]  Pt reports she has been working on her POA but has not completed paperwork  Pt reports in the event she is unable to make her own decisions she would trust her daughter, Cori Orozco, to make her medical decisions  Pt reports her daughter's  or son can assist with transport home  CM reviewed PT recs for STR  Both agreeable to recommendation  Requesting referral to [address]  Referral in [facility]  CM reviewed d/c planning process including the following: identifying help at home, patient preference for d/c planning needs, Discharge Lounge, [facility] Meds to Bed program, availability of treatment team to discuss questions or concerns patient and/or family may have regarding understanding medications and recognizing signs and symptoms once discharged  CM also encouraged patient to follow up with all recommended appointments after discharge  Patient advised of importance for patient and family to participate in managing patients medical well being

## 2021-08-10 PROCEDURE — 99232 SBSQ HOSP IP/OBS MODERATE 35: CPT | Performed by: INTERNAL MEDICINE

## 2021-08-10 PROCEDURE — 97110 THERAPEUTIC EXERCISES: CPT

## 2021-08-10 PROCEDURE — 97530 THERAPEUTIC ACTIVITIES: CPT

## 2021-08-10 RX ADMIN — GABAPENTIN 400 MG: 400 CAPSULE ORAL at 11:19

## 2021-08-10 RX ADMIN — PROCHLORPERAZINE MALEATE 5 MG: 10 TABLET ORAL at 11:18

## 2021-08-10 RX ADMIN — NICOTINE 1 PATCH: 14 PATCH, EXTENDED RELEASE TRANSDERMAL at 10:13

## 2021-08-10 RX ADMIN — APIXABAN 5 MG: 5 TABLET, FILM COATED ORAL at 17:39

## 2021-08-10 RX ADMIN — PROCHLORPERAZINE MALEATE 5 MG: 10 TABLET ORAL at 07:31

## 2021-08-10 RX ADMIN — DOCUSATE SODIUM AND SENNOSIDES 2 TABLET: 8.6; 5 TABLET, FILM COATED ORAL at 17:39

## 2021-08-10 RX ADMIN — DEXAMETHASONE 2 MG: 2 TABLET ORAL at 17:39

## 2021-08-10 RX ADMIN — HYDROMORPHONE HYDROCHLORIDE 4 MG: 2 TABLET ORAL at 11:50

## 2021-08-10 RX ADMIN — LEVETIRACETAM 250 MG: 250 TABLET, FILM COATED ORAL at 10:10

## 2021-08-10 RX ADMIN — PROCHLORPERAZINE MALEATE 5 MG: 10 TABLET ORAL at 17:39

## 2021-08-10 RX ADMIN — DEXAMETHASONE 2 MG: 2 TABLET ORAL at 11:19

## 2021-08-10 RX ADMIN — POLYETHYLENE GLYCOL 3350 17 G: 17 POWDER, FOR SOLUTION ORAL at 10:11

## 2021-08-10 RX ADMIN — HYDROMORPHONE HYDROCHLORIDE 4 MG: 2 TABLET ORAL at 07:31

## 2021-08-10 RX ADMIN — LEVETIRACETAM 250 MG: 250 TABLET, FILM COATED ORAL at 21:52

## 2021-08-10 RX ADMIN — PANTOPRAZOLE SODIUM 40 MG: 40 TABLET, DELAYED RELEASE ORAL at 10:11

## 2021-08-10 RX ADMIN — OLANZAPINE 10 MG: 10 TABLET, FILM COATED ORAL at 21:52

## 2021-08-10 RX ADMIN — LUBIPROSTONE 24 MCG: 24 CAPSULE, GELATIN COATED ORAL at 07:32

## 2021-08-10 RX ADMIN — APIXABAN 5 MG: 5 TABLET, FILM COATED ORAL at 10:11

## 2021-08-10 RX ADMIN — GABAPENTIN 400 MG: 400 CAPSULE ORAL at 07:31

## 2021-08-10 RX ADMIN — GABAPENTIN 400 MG: 400 CAPSULE ORAL at 17:39

## 2021-08-10 RX ADMIN — DOCUSATE SODIUM AND SENNOSIDES 2 TABLET: 8.6; 5 TABLET, FILM COATED ORAL at 10:11

## 2021-08-10 RX ADMIN — FUROSEMIDE 20 MG: 40 TABLET ORAL at 10:11

## 2021-08-10 RX ADMIN — LUBIPROSTONE 24 MCG: 24 CAPSULE, GELATIN COATED ORAL at 17:40

## 2021-08-10 NOTE — CASE MANAGEMENT
CM informed by resident, Dr Joe Lopez, pt medically stable for d/c  Dr Joe Lopez aware pt will require prior auth for facility  Message sent to [employer] to confirm they are able to accept pending auth/COVID test  Awaiting response  CM placed phone call to [facility] requesting update if they are able to accept

## 2021-08-10 NOTE — ASSESSMENT & PLAN NOTE
Reports increased bloating  Denies pain, fever  Reports small BM 8/5  Reports decreased passage of gas  · Has chronic constipation  Maintained on bowel regimen amitiza, senokot, miralax  Tap water enema ordered  Obstruction series noted:  Nonobstructive bowel gas pattern   Moderate amount of feces in the colon consistent with constipation       · Serial abdominal exams

## 2021-08-10 NOTE — PLAN OF CARE
Problem: PHYSICAL THERAPY ADULT  Goal: Performs mobility at highest level of function for planned discharge setting  See evaluation for individualized goals  Description: Treatment/Interventions: Functional transfer training, LE strengthening/ROM, Therapeutic exercise, Endurance training, Patient/family training, Equipment eval/education, Bed mobility, Gait training  Equipment Recommended: Carolyn Tam       See flowsheet documentation for full assessment, interventions and recommendations  Outcome: Progressing  Note: Prognosis: Fair  Problem List: Decreased strength, Decreased endurance, Impaired balance, Decreased mobility, Decreased cognition, Decreased safety awareness, Obesity  Assessment: PtPt made some mobility progress since last session  Patient was able to perform more standing tolerance as well as multiple trials of sit-to-stand activities less consistent assistance required to complete stand/sit  Patient was successful in using quick move to transfer out of bed to chair to sit up for dinner  Care coordination with PCA to assist not only transfers out of bed to chair, but to prepare patient for back to bed  Skilled physical therapy indicated to progress patient towards treatment goals  Recommend continued trials with walker in the future from a physical therapy standpoint, but would recommend patient be out of bed to target surfaces using quick move from nursing staff with assist of 1-2  PT Discharge Recommendation: Post acute rehabilitation services          See flowsheet documentation for full assessment

## 2021-08-10 NOTE — PROGRESS NOTES
[facility]  Progress Note - Erika Phillips 1960, 64 y o  female MRN: [id number]  Unit/Bed#: S -01 Encounter: [id number]  Primary Care Provider: Johan Koenig MD   Date and time admitted to hospital: 8/7/2021  7:33 PM    Abdominal distention  Assessment & Plan  Reports increased bloating  Denies pain, fever  Reports small BM 8/5  Reports decreased passage of gas  · Has chronic constipation  Maintained on bowel regimen amitiza, senokot, miralax  Tap water enema ordered  Obstruction series noted:  Nonobstructive bowel gas pattern   Moderate amount of feces in the colon consistent with constipation  · Serial abdominal exams      Cognitive impairment  Assessment & Plan  Noted to be ongoing  Currently patient is alert and oriented  Anemia of chronic disease  Assessment & Plan  Stable and at baseline     · Baseline hgb appears to be 10-11    Insomnia due to medical condition  Assessment & Plan  · Continue ambien PRN and zyprexia HS     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, estrogen receptor positive ([facility] [address] )  Assessment & Plan  Initial diagnosis in 2010  S/p mastectomy and multiple rounds of chemo / radiation  Mets to lungs, brain, bones  Not currently receiving chemo or radiation  She follows with palliative care  · Continue home regimen including gabapentin 400 mg TID, dilaudid 4 mg PO QID PRN, compazine 5 mg TID, dexamethasone 2 mg PO BID, lasix 20 mg QD  · Keppra for sz ppx  · Level 1 full code     * Generalized weakness  Assessment & Plan  Presented to ED with generalized weakness  Patient was recently hospitalized and discharged to rehab  She reports some improvement in strength following rehab but states worsening over the past few days  In the ED, patient is unable to ambulate due to lower extremity weakness  She denies recent fall since being discharged from hospital on 7/5  Initial workup is benign including head CT, UA    Anemia is stable  · PT consult   · Fall precaution        VTE Pharmacologic Prophylaxis:   VTE Score: 4 Moderate Risk (Score 3-4) - Pharmacological DVT Prophylaxis Ordered: Apixaban (Eliquis)  Mechanical VTE Prophylaxis in Place: Yes    Patient Centered Rounds: With IM team    Discussions with Specialists or Other Care Team Provider: Dr Yesi Mclaughlin    Education and Discussions with Family / Patient:  Call placed to family  No answer  Current Length of Stay: 3 day(s)    Current Patient Status: Inpatient    Discharge Plan / Estimated Discharge Date: Unknown of present    Code Status: Level 1 - Full Code      Subjective:   Patient was seen at the bedside this morning where she was alert  According to nursing the patient had a large BM overnight the patient stated she had not moved her bowels  Her only complaint was that of abdominal discomfort  Objective:     Vitals:   Temp (24hrs), Av 7 °F (37 1 °C), Min:98 °F (36 7 °C), Max:99 2 °F (37 3 °C)    Temp:  [98 °F (36 7 °C)-99 2 °F (37 3 °C)] 98 °F (36 7 °C)  HR:  [84-96] 96  Resp:  [16-18] 16  BP: (132-165)/(74-80) 132/74  SpO2:  [92 %-98 %] 98 %  There is no height or weight on file to calculate BMI  Input and Output Summary (last 24 hours): Intake/Output Summary (Last 24 hours) at 8/10/2021 1550  Last data filed at 8/10/2021 1318  Gross per 24 hour   Intake --   Output 1850 ml   Net -1850 ml       Physical Exam:     Physical Exam  Constitutional:       General: She is not in acute distress  HENT:      Nose: No rhinorrhea  Eyes:      Extraocular Movements: Extraocular movements intact  Cardiovascular:      Rate and Rhythm: Normal rate and regular rhythm  Heart sounds: Normal heart sounds  No murmur heard  Pulmonary:      Effort: Pulmonary effort is normal  No respiratory distress  Breath sounds: No wheezing or rales  Abdominal:      General: There is distension  Palpations: Abdomen is soft  Tenderness:  There is no abdominal tenderness  There is no guarding  Musculoskeletal:      Right lower leg: No edema  Left lower leg: No edema  Skin:     General: Skin is warm and dry           Additional Data:     Labs:  Results from last 7 days   Lab Units 08/09/21 0455 08/07/21 2017   WBC Thousand/uL 7 55 8 52   HEMOGLOBIN g/dL 10 2* 10 0*   HEMATOCRIT % 33 6* 32 3*   PLATELETS Thousands/uL 292 310   NEUTROS PCT %  --  75   LYMPHS PCT %  --  15   MONOS PCT %  --  8   EOS PCT %  --  0     Results from last 7 days   Lab Units 08/08/21  0451 08/07/21 2017   SODIUM mmol/L 144 140   POTASSIUM mmol/L 4 5 4 9   CHLORIDE mmol/L 108 103   CO2 mmol/L 26 25   BUN mg/dL 11 13   CREATININE mg/dL 0 81 0 97   ANION GAP mmol/L 10 12   CALCIUM mg/dL 8 5 8 6   ALBUMIN g/dL  --  2 9*   TOTAL BILIRUBIN mg/dL  --  0 32   ALK PHOS U/L  --  87   ALT U/L  --  70   AST U/L  --  44   GLUCOSE RANDOM mg/dL 99 108     Results from last 7 days   Lab Units 08/07/21 2017   INR  0 89                   Imaging: Personally reviewed the following imaging: Plane films abdomen    Recent Cultures (last 7 days):           Lines/Drains:  Invasive Devices     Central Venous Catheter Line            Port A Cath Right Chest -- days    Port A Cath Right Chest -- days          Peripheral Intravenous Line            Peripheral IV 08/07/21 Left Antecubital 2 days    Peripheral IV 08/08/21 Left Hand 2 days                Telemetry:        Last 24 Hours Medication List:   Current Facility-Administered Medications   Medication Dose Route Frequency Provider Last Rate    acetaminophen  650 mg Oral Q6H PRN Raford Hector, CRNP      apixaban  5 mg Oral BID Raford Hector, CRNP      bisacodyl  10 mg Rectal Daily PRN Raford Hector, CRNP      dexamethasone  2 mg Oral BID before lunch/dinner Raford Hector, CRNP      furosemide  20 mg Oral Daily Raford Hector, CRNP      gabapentin  400 mg Oral TID AC Raford Hector, CRNP      HYDROmorphone  4 mg Oral 4x Daily PRN Raford Hector, CRNP      levETIRAcetam  250 mg Oral Q12H [address] Salazar Matter, CRNP      lubiprostone  24 mcg Oral BID With Meals Salazar Matter, CRNP      nicotine  1 patch Transdermal Daily Salazar Matter, CRNP      OLANZapine  10 mg Oral HS Salazar Matter, CRNP      pantoprazole  40 mg Oral Daily Salazar Matter, [address]      polyethylene glycol  17 g Oral Daily Salazar Matter, [address]      prochlorperazine  5 mg Oral TID [facility] Salazar Matter, CRNP      senna-docusate sodium  2 tablet Oral BID Salazar Matter, CRNP      zolpidem  10 mg Oral HS PRN Salazar Matter, CRNP          Today, Patient Was Seen By: Ghassan Bedolla MD    ** Please Note: This note has been constructed using a voice recognition system   **

## 2021-08-10 NOTE — PHYSICAL THERAPY NOTE
PHYSICAL THERAPY TREATMENT NOTE  NAME:  Collins Alberto  DATE: 08/10/21    Length Of Stay: 3  Performed at least 2 patient identifiers during session: Name and Birthday    TREATMENT:      08/10/21 1726   PT Last Visit   PT Visit Date 08/10/21   Note Type   Note Type Treatment   Pain Assessment   Pain Assessment Tool Pain Assessment not indicated - pt denies pain   Pain Score No Pain   Restrictions/Precautions   Weight Bearing Precautions Per Order Yes   LLE Weight Bearing Per Order WBAT  (per last admission; podiatry)   Other Precautions Cognitive; Chair Alarm; Bed Alarm; Fall Risk;Multiple lines   General   Chart Reviewed Yes   Response to Previous Treatment Patient reporting fatigue but able to participate  Family/Caregiver Present No   Cognition   Overall Cognitive Status Impaired   Arousal/Participation Alert   Attention Attends with cues to redirect   Orientation Level Oriented to person;Oriented to place   Memory Decreased recall of recent events;Decreased recall of precautions;Decreased short term memory   Following Commands Follows one step commands without difficulty   Subjective   Subjective Patient reports not being out of bed since last physical therapy session "3 days ago"  Patient reports she has been incontinent in bed, and has not requested out of bed to commode/chair  Patient agreeable to participate, including with quick move trial   Bed Mobility   Supine to Sit 2  Maximal assistance   Additional items Assist x 1;HOB elevated; Bedrails; Increased time required;Verbal cues   Transfers   Sit to Stand 4  Minimal assistance  (Three trials total, fluctuates between S and Min assist)   Additional items Assist x 1; Increased time required;Verbal cues  (Using quick move for UE support)   Stand to Sit 4  Minimal assistance  (Three trials total, fluctuates between S and Min assist)   Additional items Assist x 1; Increased time required;Verbal cues   Additional Comments Patient able to reposition self back towards the back of the chair with supervision using UE support   Ambulation/Elevation   Gait pattern Not tested   Balance   Static Sitting Fair +   Static Standing Fair -  (Standing tolerance up to 1 minute w/ UE support of quick mov)   Endurance Deficit   Endurance Deficit Yes   Endurance Deficit Description Needs therapeutic rest between mobility trials, limited standing tolerance   Activity Tolerance   Activity Tolerance Patient limited by fatigue   Nurse Made Aware Spoke to RN, care coordinated with [facility] PCA   Exercises   Heelslides Supine;5 reps;AAROM; Bilateral   Ankle Pumps Supine;10 reps;AAROM;PROM; Bilateral   Assessment   Prognosis Fair   Problem List Decreased strength;Decreased endurance; Impaired balance;Decreased mobility; Decreased cognition;Decreased safety awareness; Obesity   Assessment Pt made some mobility progress since last session  Patient was able to perform more standing tolerance as well as multiple trials of sit-to-stand activities less consistent assistance required to complete stand/sit  Patient was successful in using quick move to transfer out of bed to chair to sit up for dinner  Care coordination with PCA to assist not only transfers out of bed to chair, but to prepare patient for back to bed  Skilled physical therapy indicated to progress patient towards treatment goals  Recommend continued trials with walker in the future from a physical therapy standpoint, but would recommend patient be out of bed to target surfaces using quick move from nursing staff with assist of 1-2  Goals   Patient Goals to get OOB more   STG Expiration Date 08/17/21   PT Treatment Day 1   Plan   Treatment/Interventions Functional transfer training;LE strengthening/ROM; Endurance training;Cognitive reorientation; Therapeutic exercise;Patient/family training;Equipment eval/education;Gait training;Bed mobility;Spoke to nursing;Spoke to case management   Progress Slow progress, decreased activity tolerance PT Frequency Other (Comment)  (3-5x/wk)   Recommendation   PT Discharge Recommendation Post acute rehabilitation services   Equipment Recommended Franchesca Colleen  (And quick move)   Walker Package Recommended Wheeled walker   Change/add to [place]?  No   AM-PAC Basic Mobility Inpatient   Turning in Bed Without Bedrails 2   Lying on Back to Sitting on Edge of Flat Bed 2   Moving Bed to Chair 3   Standing Up From Chair 3   Walk in Room 2   Climb 3-5 Stairs 1   Basic Mobility Inpatient Raw Score 13   Basic Mobility Standardized Score 33 99       Leonidas Allen, PT, DPT

## 2021-08-11 ENCOUNTER — APPOINTMENT (INPATIENT)
Dept: CT IMAGING | Facility: HOSPITAL | Age: 61
DRG: 392 | End: 2021-08-11
Payer: COMMERCIAL

## 2021-08-11 LAB
ANION GAP SERPL CALCULATED.3IONS-SCNC: 9 MMOL/L (ref 4–13)
BASOPHILS # BLD AUTO: 0.02 THOUSANDS/ΜL (ref 0–0.1)
BASOPHILS NFR BLD AUTO: 0 % (ref 0–1)
BUN SERPL-MCNC: 11 MG/DL (ref 5–25)
CALCIUM SERPL-MCNC: 8.6 MG/DL (ref 8.3–10.1)
CHLORIDE SERPL-SCNC: 107 MMOL/L (ref 100–108)
CO2 SERPL-SCNC: 27 MMOL/L (ref 21–32)
CREAT SERPL-MCNC: 0.71 MG/DL (ref 0.6–1.3)
EOSINOPHIL # BLD AUTO: 0.03 THOUSAND/ΜL (ref 0–0.61)
EOSINOPHIL NFR BLD AUTO: 1 % (ref 0–6)
ERYTHROCYTE [DISTWIDTH] IN BLOOD BY AUTOMATED COUNT: 16.2 % (ref 11.6–15.1)
GFR SERPL CREATININE-BSD FRML MDRD: 94 ML/MIN/1.73SQ M
GLUCOSE SERPL-MCNC: 103 MG/DL (ref 65–140)
HCT VFR BLD AUTO: 33.7 % (ref 34.8–46.1)
HGB BLD-MCNC: 10.1 G/DL (ref 11.5–15.4)
IMM GRANULOCYTES # BLD AUTO: 0.05 THOUSAND/UL (ref 0–0.2)
IMM GRANULOCYTES NFR BLD AUTO: 1 % (ref 0–2)
LYMPHOCYTES # BLD AUTO: 1.31 THOUSANDS/ΜL (ref 0.6–4.47)
LYMPHOCYTES NFR BLD AUTO: 20 % (ref 14–44)
MCH RBC QN AUTO: 29 PG (ref 26.8–34.3)
MCHC RBC AUTO-ENTMCNC: 30 G/DL (ref 31.4–37.4)
MCV RBC AUTO: 97 FL (ref 82–98)
MONOCYTES # BLD AUTO: 0.66 THOUSAND/ΜL (ref 0.17–1.22)
MONOCYTES NFR BLD AUTO: 10 % (ref 4–12)
NEUTROPHILS # BLD AUTO: 4.44 THOUSANDS/ΜL (ref 1.85–7.62)
NEUTS SEG NFR BLD AUTO: 68 % (ref 43–75)
NRBC BLD AUTO-RTO: 0 /100 WBCS
PLATELET # BLD AUTO: 286 THOUSANDS/UL (ref 149–390)
PMV BLD AUTO: 9.1 FL (ref 8.9–12.7)
POTASSIUM SERPL-SCNC: 3.4 MMOL/L (ref 3.5–5.3)
RBC # BLD AUTO: 3.48 MILLION/UL (ref 3.81–5.12)
SODIUM SERPL-SCNC: 143 MMOL/L (ref 136–145)
WBC # BLD AUTO: 6.51 THOUSAND/UL (ref 4.31–10.16)

## 2021-08-11 PROCEDURE — 97530 THERAPEUTIC ACTIVITIES: CPT

## 2021-08-11 PROCEDURE — G1004 CDSM NDSC: HCPCS

## 2021-08-11 PROCEDURE — 80048 BASIC METABOLIC PNL TOTAL CA: CPT | Performed by: CHIROPRACTOR

## 2021-08-11 PROCEDURE — 85025 COMPLETE CBC W/AUTO DIFF WBC: CPT | Performed by: CHIROPRACTOR

## 2021-08-11 PROCEDURE — 74177 CT ABD & PELVIS W/CONTRAST: CPT

## 2021-08-11 PROCEDURE — 94640 AIRWAY INHALATION TREATMENT: CPT

## 2021-08-11 PROCEDURE — 94760 N-INVAS EAR/PLS OXIMETRY 1: CPT

## 2021-08-11 PROCEDURE — 99232 SBSQ HOSP IP/OBS MODERATE 35: CPT | Performed by: INTERNAL MEDICINE

## 2021-08-11 PROCEDURE — 97110 THERAPEUTIC EXERCISES: CPT

## 2021-08-11 RX ORDER — LEVALBUTEROL INHALATION SOLUTION 0.63 MG/3ML
0.63 SOLUTION RESPIRATORY (INHALATION) EVERY 6 HOURS PRN
Status: DISCONTINUED | OUTPATIENT
Start: 2021-08-11 | End: 2021-08-13 | Stop reason: HOSPADM

## 2021-08-11 RX ORDER — LEVALBUTEROL INHALATION SOLUTION 0.63 MG/3ML
0.63 SOLUTION RESPIRATORY (INHALATION) EVERY 8 HOURS PRN
Status: DISCONTINUED | OUTPATIENT
Start: 2021-08-11 | End: 2021-08-11

## 2021-08-11 RX ORDER — POTASSIUM CHLORIDE 20 MEQ/1
40 TABLET, EXTENDED RELEASE ORAL ONCE
Status: COMPLETED | OUTPATIENT
Start: 2021-08-11 | End: 2021-08-11

## 2021-08-11 RX ADMIN — FUROSEMIDE 20 MG: 40 TABLET ORAL at 09:53

## 2021-08-11 RX ADMIN — POTASSIUM CHLORIDE 40 MEQ: 1500 TABLET, EXTENDED RELEASE ORAL at 06:30

## 2021-08-11 RX ADMIN — HYDROMORPHONE HYDROCHLORIDE 4 MG: 2 TABLET ORAL at 18:08

## 2021-08-11 RX ADMIN — DOCUSATE SODIUM AND SENNOSIDES 2 TABLET: 8.6; 5 TABLET, FILM COATED ORAL at 18:09

## 2021-08-11 RX ADMIN — GABAPENTIN 400 MG: 400 CAPSULE ORAL at 16:45

## 2021-08-11 RX ADMIN — GABAPENTIN 400 MG: 400 CAPSULE ORAL at 11:59

## 2021-08-11 RX ADMIN — PROCHLORPERAZINE MALEATE 5 MG: 10 TABLET ORAL at 06:29

## 2021-08-11 RX ADMIN — IOHEXOL 100 ML: 350 INJECTION, SOLUTION INTRAVENOUS at 13:53

## 2021-08-11 RX ADMIN — DEXAMETHASONE 2 MG: 2 TABLET ORAL at 16:45

## 2021-08-11 RX ADMIN — LUBIPROSTONE 24 MCG: 24 CAPSULE, GELATIN COATED ORAL at 09:52

## 2021-08-11 RX ADMIN — OLANZAPINE 10 MG: 10 TABLET, FILM COATED ORAL at 21:06

## 2021-08-11 RX ADMIN — DOCUSATE SODIUM AND SENNOSIDES 2 TABLET: 8.6; 5 TABLET, FILM COATED ORAL at 09:53

## 2021-08-11 RX ADMIN — PROCHLORPERAZINE MALEATE 5 MG: 10 TABLET ORAL at 12:00

## 2021-08-11 RX ADMIN — APIXABAN 5 MG: 5 TABLET, FILM COATED ORAL at 18:09

## 2021-08-11 RX ADMIN — LEVETIRACETAM 250 MG: 250 TABLET, FILM COATED ORAL at 21:06

## 2021-08-11 RX ADMIN — NICOTINE 1 PATCH: 14 PATCH, EXTENDED RELEASE TRANSDERMAL at 09:54

## 2021-08-11 RX ADMIN — PANTOPRAZOLE SODIUM 40 MG: 40 TABLET, DELAYED RELEASE ORAL at 09:53

## 2021-08-11 RX ADMIN — LEVETIRACETAM 250 MG: 250 TABLET, FILM COATED ORAL at 09:53

## 2021-08-11 RX ADMIN — LUBIPROSTONE 24 MCG: 24 CAPSULE, GELATIN COATED ORAL at 16:45

## 2021-08-11 RX ADMIN — DEXAMETHASONE 2 MG: 2 TABLET ORAL at 11:59

## 2021-08-11 RX ADMIN — LEVALBUTEROL HYDROCHLORIDE 0.63 MG: 0.63 SOLUTION RESPIRATORY (INHALATION) at 18:24

## 2021-08-11 RX ADMIN — APIXABAN 5 MG: 5 TABLET, FILM COATED ORAL at 09:53

## 2021-08-11 RX ADMIN — POLYETHYLENE GLYCOL 3350 17 G: 17 POWDER, FOR SOLUTION ORAL at 09:53

## 2021-08-11 RX ADMIN — PROCHLORPERAZINE MALEATE 5 MG: 10 TABLET ORAL at 16:44

## 2021-08-11 RX ADMIN — GABAPENTIN 400 MG: 400 CAPSULE ORAL at 06:30

## 2021-08-11 NOTE — PLAN OF CARE
Problem: PHYSICAL THERAPY ADULT  Goal: Performs mobility at highest level of function for planned discharge setting  See evaluation for individualized goals  Description: Treatment/Interventions: Functional transfer training, LE strengthening/ROM, Therapeutic exercise, Endurance training, Patient/family training, Equipment eval/education, Bed mobility, Gait training  Equipment Recommended: Vamsi Licona       See flowsheet documentation for full assessment, interventions and recommendations  Outcome: Progressing  Note: Prognosis: Fair  Problem List: Decreased strength, Decreased range of motion, Decreased endurance, Impaired balance, Decreased mobility, Decreased cognition, Decreased safety awareness, Obesity  Assessment: Pt tolerates treatment well, demonstrating improved standing tolerance and functional mobility  Patient amvbulates 5 feet this session requires min assist x 1 for walker management and cues for imropved stride length and height  Pt would continue to benefit from skilled PT services to progress toward pt and PT stated goals  Pt would continue to benefit from skilled PT services in the hospital and upon discharge  PT Discharge Recommendation: Post acute rehabilitation services          See flowsheet documentation for full assessment

## 2021-08-11 NOTE — ASSESSMENT & PLAN NOTE
Denies pain, fever  Reports decreased passage of gas  · Has chronic constipation  Maintained on bowel regimen amitiza, senokot, miralax  Tap water enema ordered  Obstruction series noted:  Nonobstructive bowel gas pattern   Moderate amount of feces in the colon consistent with constipation       · With today's physical examination a CT abdomen with PO and IV contrast was ordered

## 2021-08-11 NOTE — CASE MANAGEMENT
CM informed by resident, Dr Francisco Gonzalez, pt is not medically stable for d/c  Will need CTA  [facility] does not currently have a bed for unvaccinated pt  Will continue to follow  CM will check bed availability closer to d/c     CM met with pt and spoke with daughter, Zoe Bruno, at pt request  Both agreeable to blanket referral  Goal is for pt to remain as close to home as possible  Only facility not included at pt/daughter request is [facility]  Both aware that other [address] [place] [address] facilities will likely be on referral  Both agreeable to this  Referrals in [facility]

## 2021-08-11 NOTE — PLAN OF CARE
Problem: Prexisting or High Potential for Compromised Skin Integrity  Goal: Skin integrity is maintained or improved  Description: INTERVENTIONS:  - Identify patients at risk for skin breakdown  - Assess and monitor skin integrity  - Assess and monitor nutrition and hydration status  - Monitor labs   - Assess for incontinence   - Turn and reposition patient  - Assist with mobility/ambulation  - Relieve pressure over bony prominences  - Avoid friction and shearing  - Provide appropriate hygiene as needed including keeping skin clean and dry  - Evaluate need for skin moisturizer/barrier cream  - Collaborate with interdisciplinary team   - Patient/family teaching  - Consider wound care consult   Outcome: Progressing     Problem: Potential for Falls  Goal: Patient will remain free of falls  Description: INTERVENTIONS:  - Educate patient/family on patient safety including physical limitations  - Instruct patient to call for assistance with activity   - Consult OT/PT to assist with strengthening/mobility   - Keep Call bell within reach  - Keep bed low and locked with side rails adjusted as appropriate  - Keep care items and personal belongings within reach  - Initiate and maintain comfort rounds  - Make Fall Risk Sign visible to staff  - Apply yellow socks and bracelet for high fall risk patients  - Consider moving patient to room near nurses station  Outcome: Progressing     Problem: Nutrition/Hydration-ADULT  Goal: Nutrient/Hydration intake appropriate for improving, restoring or maintaining nutritional needs  Description: Monitor and assess patient's nutrition/hydration status for malnutrition  Collaborate with interdisciplinary team and initiate plan and interventions as ordered  Monitor patient's weight and dietary intake as ordered or per policy  Utilize nutrition screening tool and intervene as necessary  Determine patient's food preferences and provide high-protein, high-caloric foods as appropriate  INTERVENTIONS:  - Monitor oral intake, urinary output, labs, and treatment plans  - Assess nutrition and hydration status and recommend course of action  - Evaluate amount of meals eaten  - Assist patient with eating if necessary   - Allow adequate time for meals  - Recommend/ encourage appropriate diets, oral nutritional supplements, and vitamin/mineral supplements  - Order, calculate, and assess calorie counts as needed  - Recommend, monitor, and adjust tube feedings and TPN/PPN based on assessed needs  - Assess need for intravenous fluids  - Provide specific nutrition/hydration education as appropriate  - Include patient/family/caregiver in decisions related to nutrition  Outcome: Progressing     Problem: MOBILITY - ADULT  Goal: Maintain or return to baseline ADL function  Description: INTERVENTIONS:  -  Assess patient's ability to carry out ADLs; assess patient's baseline for ADL function and identify physical deficits which impact ability to perform ADLs (bathing, care of mouth/teeth, toileting, grooming, dressing, etc )  - Assess/evaluate cause of self-care deficits   - Assess range of motion  - Assess patient's mobility; develop plan if impaired  - Assess patient's need for assistive devices and provide as appropriate  - Encourage maximum independence but intervene and supervise when necessary  - Involve family in performance of ADLs  - Assess for home care needs following discharge   - Consider OT consult to assist with ADL evaluation and planning for discharge  - Provide patient education as appropriate  Outcome: Progressing  Goal: Maintains/Returns to pre admission functional level  Description: INTERVENTIONS:  - Perform BMAT or MOVE assessment daily    - Set and communicate daily mobility goal to care team and patient/family/caregiver     - Collaborate with rehabilitation services on mobility goals if consulted  - Out of bed for toileting  - Record patient progress and toleration of activity level Outcome: Progressing

## 2021-08-11 NOTE — PROGRESS NOTES
[facility]  Progress Note - Elly Blocker 1960, 63 y o  female MRN: [id number]  Unit/Bed#: S -01 Encounter: [id number]  Primary Care Provider: Adelita Adamson MD   Date and time admitted to hospital: 8/7/2021  7:33 PM    Abdominal distention  Assessment & Plan   Denies pain, fever  Reports decreased passage of gas  · Has chronic constipation  Maintained on bowel regimen amitiza, senokot, miralax  Tap water enema ordered  Obstruction series noted:  Nonobstructive bowel gas pattern   Moderate amount of feces in the colon consistent with constipation  · With today's physical examination a CT abdomen with PO and IV contrast was ordered      Cognitive impairment  Assessment & Plan  Noted to be ongoing  Currently patient is alert and oriented  Anemia of chronic disease  Assessment & Plan  Stable and at baseline     · Baseline hgb appears to be 10-11    Insomnia due to medical condition  Assessment & Plan  · Continue ambien PRN and zyprexia HS     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, estrogen receptor positive ([facility] [address] )  Assessment & Plan  Initial diagnosis in 2010  S/p mastectomy and multiple rounds of chemo / radiation  Mets to lungs, brain, bones  Not currently receiving chemo or radiation  She follows with palliative care  · Continue home regimen including gabapentin 400 mg TID, dilaudid 4 mg PO QID PRN, compazine 5 mg TID, dexamethasone 2 mg PO BID, lasix 20 mg QD  · Keppra for sz ppx  · Level 1 full code     * Generalized weakness  Assessment & Plan  Presented to ED with generalized weakness  Patient was recently hospitalized and discharged to rehab  She reports some improvement in strength following rehab but states worsening over the past few days  In the ED, patient is unable to ambulate due to lower extremity weakness  She denies recent fall since being discharged from hospital on 7/5  Initial workup is benign including head CT, UA  Anemia is stable  · PT consult   · Fall precaution          VTE Pharmacologic Prophylaxis:   VTE Score: 4 Moderate Risk (Score 3-4) - Pharmacological DVT Prophylaxis Ordered: Apixaban (Eliquis)  Mechanical VTE Prophylaxis in Place: Yes    Patient Centered Rounds: With IM Team    Discussions with Specialists or Other Care Team Provider: Dr Taurus Otto    Education and Discussions with Family / Patient: Family phoned  Current Length of Stay: 4 day(s)    Current Patient Status: Inpatient     Discharge Plan / Estimated Discharge Date: Unknown at present    Code Status: Level 1 - Full Code      Subjective: The patient was seen this AM initially in bed and then on rounds in her chair  She stated no pain, states not having BM yesterday but nursing confirms she did last PM     Objective:     Vitals:   Temp (24hrs), Av °F (36 7 °C), Min:97 5 °F (36 4 °C), Max:98 6 °F (37 °C)    Temp:  [97 5 °F (36 4 °C)-98 6 °F (37 °C)] 98 °F (36 7 °C)  HR:  [82-93] 86  Resp:  [16-18] 18  BP: (120-137)/(67-78) 120/78  SpO2:  [94 %-95 %] 95 %  There is no height or weight on file to calculate BMI  Input and Output Summary (last 24 hours): Intake/Output Summary (Last 24 hours) at 2021 1546  Last data filed at 2021 1201  Gross per 24 hour   Intake 240 ml   Output 900 ml   Net -660 ml       Physical Exam:     Physical Exam  Constitutional:       General: She is not in acute distress  HENT:      Nose: No rhinorrhea  Eyes:      Extraocular Movements: Extraocular movements intact  Cardiovascular:      Rate and Rhythm: Normal rate and regular rhythm  Heart sounds: Normal heart sounds  No murmur heard  Pulmonary:      Effort: Pulmonary effort is normal  No respiratory distress  Breath sounds: No wheezing or rales  Abdominal:      General: There is distension (abdomen is firm)  Palpations: Abdomen is soft  Tenderness: There is abdominal tenderness (mild  periumbilical)   There is no guarding  Musculoskeletal:      Right lower leg: No edema  Left lower leg: No edema  Skin:     General: Skin is warm and dry  Neurological:      Mental Status: Mental status is at baseline  Additional Data:     Labs:  Results from last 7 days   Lab Units 08/11/21 0430   WBC Thousand/uL 6 51   HEMOGLOBIN g/dL 10 1*   HEMATOCRIT % 33 7*   PLATELETS Thousands/uL 286   NEUTROS PCT % 68   LYMPHS PCT % 20   MONOS PCT % 10   EOS PCT % 1     Results from last 7 days   Lab Units 08/11/21 0430 08/07/21 2017   SODIUM mmol/L 143 140   POTASSIUM mmol/L 3 4* 4 9   CHLORIDE mmol/L 107 103   CO2 mmol/L 27 25   BUN mg/dL 11 13   CREATININE mg/dL 0 71 0 97   ANION GAP mmol/L 9 12   CALCIUM mg/dL 8 6 8 6   ALBUMIN g/dL  --  2 9*   TOTAL BILIRUBIN mg/dL  --  0 32   ALK PHOS U/L  --  87   ALT U/L  --  70   AST U/L  --  44   GLUCOSE RANDOM mg/dL 103 108     Results from last 7 days   Lab Units 08/07/21 2017   INR  0 89                   Imaging: Reviewed radiology reports from this admission including: Plane films      Recent Cultures (last 7 days):           Lines/Drains:  Invasive Devices     Central Venous Catheter Line            Port A Cath Right Chest -- days    Port A Cath Right Chest -- days          Peripheral Intravenous Line            Peripheral IV 08/07/21 Left Antecubital 3 days                Telemetry:        Last 24 Hours Medication List:   Current Facility-Administered Medications   Medication Dose Route Frequency Provider Last Rate    acetaminophen  650 mg Oral Q6H PRN Wilbern Phan, CRNP      apixaban  5 mg Oral BID [place] Phan, CRNP      bisacodyl  10 mg Rectal Daily PRN Wilbern Phan, CRNP      dexamethasone  2 mg Oral BID before lunch/dinner Wilbern Phan, CRNP      furosemide  20 mg Oral Daily Wilbern Phan, CRNP      gabapentin  400 mg Oral TID  Wilbern Phan, CRNP      HYDROmorphone  4 mg Oral 4x Daily PRN Wilbern Phan, CRNP      iohexol  50 mL Oral Once in imaging Sonya Mendez MD     [facility] levalbuterol  0 63 mg Nebulization Q6H PRN Meche Masterson DO      levETIRAcetam  250 mg Oral Q12H [address] FAUZIA Moralez      lubiprostone  24 mcg Oral BID With Meals FAUZIA Moralez      nicotine  1 patch Transdermal Daily FAUZIA Moralez      OLANZapine  10 mg Oral HS FAUZIA Moralez      pantoprazole  40 mg Oral Daily Agustin Cardona, [address]      polyethylene glycol  17 g Oral Daily Agustin Cardona, [address]      prochlorperazine  5 mg Oral TID [facility] FAUZIA Moralez      senna-docusate sodium  2 tablet Oral BID FAUZIA Moralez      zolpidem  10 mg Oral HS PRN FAUZIA Moralez          Today, Patient Was Seen By: Artis Schmitt MD    ** Please Note: This note has been constructed using a voice recognition system   **

## 2021-08-11 NOTE — PLAN OF CARE
Problem: Prexisting or High Potential for Compromised Skin Integrity  Goal: Skin integrity is maintained or improved  Description: INTERVENTIONS:  - Identify patients at risk for skin breakdown  - Assess and monitor skin integrity  - Assess and monitor nutrition and hydration status  - Monitor labs   - Assess for incontinence   - Turn and reposition patient  - Assist with mobility/ambulation  - Relieve pressure over bony prominences  - Avoid friction and shearing  - Provide appropriate hygiene as needed including keeping skin clean and dry  - Evaluate need for skin moisturizer/barrier cream  - Collaborate with interdisciplinary team   - Patient/family teaching  - Consider wound care consult   Outcome: Progressing     Problem: Potential for Falls  Goal: Patient will remain free of falls  Description: INTERVENTIONS:  - Educate patient/family on patient safety including physical limitations  - Instruct patient to call for assistance with activity   - Consult OT/PT to assist with strengthening/mobility   - Keep Call bell within reach  - Keep bed low and locked with side rails adjusted as appropriate  - Keep care items and personal belongings within reach  - Initiate and maintain comfort rounds  - Make Fall Risk Sign visible to staff  - Offer Toileting every 2 Hours, in advance of need  - Initiate/Maintain bed/chair alarm  - Obtain necessary fall risk management equipment: walker  - Apply yellow socks and bracelet for high fall risk patients  - Consider moving patient to room near nurses station  Outcome: Progressing     Problem: Nutrition/Hydration-ADULT  Goal: Nutrient/Hydration intake appropriate for improving, restoring or maintaining nutritional needs  Description: Monitor and assess patient's nutrition/hydration status for malnutrition  Collaborate with interdisciplinary team and initiate plan and interventions as ordered  Monitor patient's weight and dietary intake as ordered or per policy   Utilize nutrition screening tool and intervene as necessary  Determine patient's food preferences and provide high-protein, high-caloric foods as appropriate  INTERVENTIONS:  - Monitor oral intake, urinary output, labs, and treatment plans  - Assess nutrition and hydration status and recommend course of action  - Evaluate amount of meals eaten  - Assist patient with eating if necessary   - Allow adequate time for meals  - Recommend/ encourage appropriate diets, oral nutritional supplements, and vitamin/mineral supplements  - Order, calculate, and assess calorie counts as needed  - Recommend, monitor, and adjust tube feedings and TPN/PPN based on assessed needs  - Assess need for intravenous fluids  - Provide specific nutrition/hydration education as appropriate  - Include patient/family/caregiver in decisions related to nutrition  Outcome: Progressing     Problem: MOBILITY - ADULT  Goal: Maintain or return to baseline ADL function  Description: INTERVENTIONS:  -  Assess patient's ability to carry out ADLs; assess patient's baseline for ADL function and identify physical deficits which impact ability to perform ADLs (bathing, care of mouth/teeth, toileting, grooming, dressing, etc )  - Assess/evaluate cause of self-care deficits   - Assess range of motion  - Assess patient's mobility; develop plan if impaired  - Assess patient's need for assistive devices and provide as appropriate  - Encourage maximum independence but intervene and supervise when necessary  - Involve family in performance of ADLs  - Assess for home care needs following discharge   - Consider OT consult to assist with ADL evaluation and planning for discharge  - Provide patient education as appropriate  Outcome: Progressing  Goal: Maintains/Returns to pre admission functional level  Description: INTERVENTIONS:  - Perform BMAT or MOVE assessment daily    - Set and communicate daily mobility goal to care team and patient/family/caregiver     - Collaborate with rehabilitation services on mobility goals if consulted  - Perform Range of Motion 2 times a day  - Reposition patient every 2 hours    - Dangle patient 2 times a day  - Stand patient 2 times a day  - Ambulate patient 2 times a day  - Out of bed to chair 2 times a day   - Out of bed for meals 2 times a day  - Out of bed for toileting  - Record patient progress and toleration of activity level   Outcome: Progressing

## 2021-08-11 NOTE — PHYSICAL THERAPY NOTE
Physical Therapy Treatment Note    Patient's Name: Vanessa Valencia  : 21 0700   PT Last Visit   PT Visit Date 21   Pain Assessment   Pain Assessment Tool 0-10   Pain Score 6   Pain Location/Orientation Orientation: Left;Orientation: Lower; Location: Back   Restrictions/Precautions   Weight Bearing Precautions Per Order Yes   LLE Weight Bearing Per Order WBAT   Other Precautions Cognitive; Chair Alarm; Bed Alarm;WBS;Multiple lines; Fall Risk  (purewick)   General   Chart Reviewed Yes   Response to Previous Treatment Patient reporting fatigue but able to participate  Family/Caregiver Present No   Cognition   Overall Cognitive Status Impaired   Arousal/Participation Alert   Following Commands Follows one step commands without difficulty   Comments pt ID by wrist band, name and    Subjective   Subjective Pt initialy declines skilled PT services  However states "I won't get better or get out of here if I don't do anything " Agrees to skilled PT  Bed Mobility   Supine to Sit 2  Maximal assistance   Additional items Assist x 1;HOB elevated; Bedrails; Increased time required;Verbal cues  (cues for hand placement, trunk managment)   Sit to Supine Unable to assess  (pt left up in chair at conclusion of session)   Transfers   Sit to Stand 4  Minimal assistance   Additional items Assist x 1;Bedrails; Increased time required;Verbal cues  (cues for hand placement )   Stand to Sit 4  Minimal assistance   Additional items Assist x 1; Increased time required;Verbal cues  (cues for hand placement improved eccentric control)   Additional Comments Patient demonstrates ability to scooting edge of bed for improved positioning, pt standing trial of 1 min 2x    Ambulation/Elevation   Gait pattern L Foot drag;Improper Weight shift;Decreased foot clearance;Shuffling; Short stride; Excessively slow   Gait Assistance 4  Minimal assist   Additional items Assist x 1;Verbal cues; Tactile cues  (cues for RW managment, increased L foot clerance, and stride)   Assistive Device Rolling walker   Distance 5 ft x1    Balance   Static Sitting Fair +   Static Standing Fair -   Dynamic Standing Poor +   Ambulatory Poor +   Exercises   Knee AROM Long Arc Quad Sitting;20 reps;AROM; Bilateral   Balance Training Standing  (standing A/P, R/L weight shifting 15x ea)   Marching Sitting;20 reps;AROM; Bilateral   Assessment   Prognosis Fair   Problem List Decreased strength;Decreased range of motion;Decreased endurance; Impaired balance;Decreased mobility; Decreased cognition;Decreased safety awareness; Obesity   Assessment Pt tolerates treatment well, demonstrating improved standing tolerance and functional mobility  Patient amvbulates 5 feet this session requires min assist x 1 for walker management and cues for imropved stride length and height  Pt would continue to benefit from skilled PT services to progress toward pt and PT stated goals  Pt would continue to benefit from skilled PT services in the hospital and upon discharge  Goals   Patient Goals to get better   STG Expiration Date 08/17/21   Short Term Goal #1 Pt will be able to: (1) perform bed mobility with min A to promote OOB activity (2) perform sit to stand with supervision to decrease burden of care (3) ambulate at least 30` with min A and least restrictive AD to increase activity tolerance (4) increase standing balance by 1 grade to decrease risk of falls   PT Treatment Day 2   Plan   Treatment/Interventions Functional transfer training;LE strengthening/ROM; Therapeutic exercise;Equipment eval/education; Bed mobility;Gait training;Spoke to nursing   Progress Slow progress, decreased activity tolerance   PT Frequency Other (Comment)  (3-5x/wk)   Recommendation   PT Discharge Recommendation Post acute rehabilitation services   Equipment Recommended Diana Mayberry  (quick move)   Walker Package Recommended Wheeled walker   Change/add to [employer]? No   AM-PAC Basic Mobility Inpatient   Turning in Bed Without Bedrails 2   Lying on Back to Sitting on Edge of Flat Bed 2   Moving Bed to Chair 3   Standing Up From Chair 3   Walk in Room 2   Climb 3-5 Stairs 1   Basic Mobility Inpatient Raw Score 13   Basic Mobility Standardized Score 33 99   The patient's AM-PAC Basic Mobility Inpatient Short Form Raw Score is 13, Standardized Score is 33 99  A standardized score less than 42 9 suggests the patient may benefit from discharge to post-acute rehabilitation services  Please also refer to the recommendation of the Physical Therapist for safe discharge planning              Ermelinda Abraham, PT, DPT

## 2021-08-12 LAB
ANION GAP SERPL CALCULATED.3IONS-SCNC: 9 MMOL/L (ref 4–13)
BASOPHILS # BLD AUTO: 0.03 THOUSANDS/ΜL (ref 0–0.1)
BASOPHILS NFR BLD AUTO: 0 % (ref 0–1)
BUN SERPL-MCNC: 8 MG/DL (ref 5–25)
CALCIUM SERPL-MCNC: 8.4 MG/DL (ref 8.3–10.1)
CHLORIDE SERPL-SCNC: 106 MMOL/L (ref 100–108)
CO2 SERPL-SCNC: 26 MMOL/L (ref 21–32)
CREAT SERPL-MCNC: 0.73 MG/DL (ref 0.6–1.3)
EOSINOPHIL # BLD AUTO: 0.02 THOUSAND/ΜL (ref 0–0.61)
EOSINOPHIL NFR BLD AUTO: 0 % (ref 0–6)
ERYTHROCYTE [DISTWIDTH] IN BLOOD BY AUTOMATED COUNT: 16 % (ref 11.6–15.1)
GFR SERPL CREATININE-BSD FRML MDRD: 90 ML/MIN/1.73SQ M
GLUCOSE SERPL-MCNC: 100 MG/DL (ref 65–140)
HCT VFR BLD AUTO: 35.3 % (ref 34.8–46.1)
HGB BLD-MCNC: 10.5 G/DL (ref 11.5–15.4)
IMM GRANULOCYTES # BLD AUTO: 0.12 THOUSAND/UL (ref 0–0.2)
IMM GRANULOCYTES NFR BLD AUTO: 2 % (ref 0–2)
LYMPHOCYTES # BLD AUTO: 1.27 THOUSANDS/ΜL (ref 0.6–4.47)
LYMPHOCYTES NFR BLD AUTO: 17 % (ref 14–44)
MCH RBC QN AUTO: 28.8 PG (ref 26.8–34.3)
MCHC RBC AUTO-ENTMCNC: 29.7 G/DL (ref 31.4–37.4)
MCV RBC AUTO: 97 FL (ref 82–98)
MONOCYTES # BLD AUTO: 0.73 THOUSAND/ΜL (ref 0.17–1.22)
MONOCYTES NFR BLD AUTO: 10 % (ref 4–12)
NEUTROPHILS # BLD AUTO: 5.45 THOUSANDS/ΜL (ref 1.85–7.62)
NEUTS SEG NFR BLD AUTO: 71 % (ref 43–75)
NRBC BLD AUTO-RTO: 0 /100 WBCS
PLATELET # BLD AUTO: 304 THOUSANDS/UL (ref 149–390)
PMV BLD AUTO: 8.9 FL (ref 8.9–12.7)
POTASSIUM SERPL-SCNC: 3.8 MMOL/L (ref 3.5–5.3)
RBC # BLD AUTO: 3.65 MILLION/UL (ref 3.81–5.12)
SARS-COV-2 RNA RESP QL NAA+PROBE: NEGATIVE
SODIUM SERPL-SCNC: 141 MMOL/L (ref 136–145)
WBC # BLD AUTO: 7.62 THOUSAND/UL (ref 4.31–10.16)

## 2021-08-12 PROCEDURE — 97167 OT EVAL HIGH COMPLEX 60 MIN: CPT

## 2021-08-12 PROCEDURE — 94640 AIRWAY INHALATION TREATMENT: CPT

## 2021-08-12 PROCEDURE — 80048 BASIC METABOLIC PNL TOTAL CA: CPT | Performed by: CHIROPRACTOR

## 2021-08-12 PROCEDURE — 85025 COMPLETE CBC W/AUTO DIFF WBC: CPT | Performed by: CHIROPRACTOR

## 2021-08-12 PROCEDURE — 99232 SBSQ HOSP IP/OBS MODERATE 35: CPT | Performed by: INTERNAL MEDICINE

## 2021-08-12 PROCEDURE — U0005 INFEC AGEN DETEC AMPLI PROBE: HCPCS | Performed by: INTERNAL MEDICINE

## 2021-08-12 PROCEDURE — 97530 THERAPEUTIC ACTIVITIES: CPT

## 2021-08-12 PROCEDURE — 97116 GAIT TRAINING THERAPY: CPT

## 2021-08-12 PROCEDURE — U0003 INFECTIOUS AGENT DETECTION BY NUCLEIC ACID (DNA OR RNA); SEVERE ACUTE RESPIRATORY SYNDROME CORONAVIRUS 2 (SARS-COV-2) (CORONAVIRUS DISEASE [COVID-19]), AMPLIFIED PROBE TECHNIQUE, MAKING USE OF HIGH THROUGHPUT TECHNOLOGIES AS DESCRIBED BY CMS-2020-01-R: HCPCS | Performed by: INTERNAL MEDICINE

## 2021-08-12 PROCEDURE — 94760 N-INVAS EAR/PLS OXIMETRY 1: CPT

## 2021-08-12 RX ADMIN — PROCHLORPERAZINE MALEATE 5 MG: 10 TABLET ORAL at 11:09

## 2021-08-12 RX ADMIN — FUROSEMIDE 20 MG: 40 TABLET ORAL at 08:16

## 2021-08-12 RX ADMIN — LEVETIRACETAM 250 MG: 250 TABLET, FILM COATED ORAL at 21:31

## 2021-08-12 RX ADMIN — POLYETHYLENE GLYCOL 3350 17 G: 17 POWDER, FOR SOLUTION ORAL at 08:16

## 2021-08-12 RX ADMIN — OLANZAPINE 10 MG: 10 TABLET, FILM COATED ORAL at 21:31

## 2021-08-12 RX ADMIN — DEXAMETHASONE 2 MG: 2 TABLET ORAL at 11:09

## 2021-08-12 RX ADMIN — HYDROMORPHONE HYDROCHLORIDE 4 MG: 2 TABLET ORAL at 17:50

## 2021-08-12 RX ADMIN — LUBIPROSTONE 24 MCG: 24 CAPSULE, GELATIN COATED ORAL at 15:52

## 2021-08-12 RX ADMIN — APIXABAN 5 MG: 5 TABLET, FILM COATED ORAL at 08:17

## 2021-08-12 RX ADMIN — DOCUSATE SODIUM AND SENNOSIDES 2 TABLET: 8.6; 5 TABLET, FILM COATED ORAL at 08:17

## 2021-08-12 RX ADMIN — DEXAMETHASONE 2 MG: 2 TABLET ORAL at 15:53

## 2021-08-12 RX ADMIN — NICOTINE 1 PATCH: 14 PATCH, EXTENDED RELEASE TRANSDERMAL at 08:17

## 2021-08-12 RX ADMIN — PROCHLORPERAZINE MALEATE 5 MG: 10 TABLET ORAL at 15:52

## 2021-08-12 RX ADMIN — LUBIPROSTONE 24 MCG: 24 CAPSULE, GELATIN COATED ORAL at 08:18

## 2021-08-12 RX ADMIN — LEVETIRACETAM 250 MG: 250 TABLET, FILM COATED ORAL at 08:17

## 2021-08-12 RX ADMIN — DOCUSATE SODIUM AND SENNOSIDES 2 TABLET: 8.6; 5 TABLET, FILM COATED ORAL at 17:50

## 2021-08-12 RX ADMIN — PROCHLORPERAZINE MALEATE 5 MG: 10 TABLET ORAL at 06:22

## 2021-08-12 RX ADMIN — GABAPENTIN 400 MG: 400 CAPSULE ORAL at 15:53

## 2021-08-12 RX ADMIN — GABAPENTIN 400 MG: 400 CAPSULE ORAL at 06:22

## 2021-08-12 RX ADMIN — GABAPENTIN 400 MG: 400 CAPSULE ORAL at 11:09

## 2021-08-12 RX ADMIN — LEVALBUTEROL HYDROCHLORIDE 0.63 MG: 0.63 SOLUTION RESPIRATORY (INHALATION) at 14:04

## 2021-08-12 RX ADMIN — APIXABAN 5 MG: 5 TABLET, FILM COATED ORAL at 17:50

## 2021-08-12 RX ADMIN — PANTOPRAZOLE SODIUM 40 MG: 40 TABLET, DELAYED RELEASE ORAL at 08:17

## 2021-08-12 NOTE — OCCUPATIONAL THERAPY NOTE
Occupational Therapy Evaluation     Patient Name: Antonia Chen  LWNBN'V Date: 8/12/2021  Problem List  Principal Problem:    Generalized weakness  Active Problems:    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, estrogen receptor positive ([facility] [address] )    Insomnia due to medical condition    Anemia of chronic disease    Cognitive impairment    Abdominal distention    Past Medical History  Past Medical History:   Diagnosis Date    Dehydration 6/11/2019    Dizziness 2/6/2018    Dry skin 2/6/2018    Edema 10/23/2019    H/O bilateral mastectomy 2/15/2016    Hyperglycemia 2/6/2018    Hypertension 2/15/2016    Hypokalemia 3/10/2020    Lymphedema 2/15/2016    Malignant cachexia ([facility] [address] ) 10/6/2016    Malignant neoplasm of right breast ([facility] [address] ) 2/15/2016    Metastatic breast cancer ([facility] [address] )      Past Surgical History  Past Surgical History:   Procedure Laterality Date    ENDOBRONCHIAL ULTRASOUND (EBUS) N/A 2/16/2016    Procedure: EBUS;  Surgeon: Yuki Gallegos MD;  [facility];  Service:     MASTECTOMY Bilateral     MASTECTOMY Bilateral     right arm edema    OOPHORECTOMY      AK BRONCHOSCOPY NEEDLE BX TRACHEA MAIN STEM&/BRON N/A 2/16/2016    Procedure: Benito Davila;  Surgeon: Yuki Gallegos MD;  [facility];  Service: Thoracic    AK INSJ TUNNELED CTR VAD W/SUBQ PORT AGE 5 YR/> N/A 7/24/2018    Procedure: INSERTION VENOUS PORT ( PORT-A-CATH) IR;  Surgeon: Cleopatra Draper DO;  [facility];  Service: Interventional Radiology    AK STEREOTACTIC RADIOSURGERY, CRANIAL,SIMPLE,EA ADD  5/3/2017         AK STEREOTACTIC RADIOSURGERY, CRANIAL,SIMPLE,EA ADD  5/3/2017         AK STEREOTACTIC RADIOSURGERY, CRANIAL,SIMPLE,SINGLE  5/3/2017              08/12/21 1240   OT Last Visit   OT Visit Date 08/12/21  (Thursday)   Note Type   Note type Evaluation   Restrictions/Precautions   Weight Bearing Precautions Per Order Yes   LLE Weight Bearing Per Order WBAT  (per podiatry during previous admit)   Braces or Orthoses   (none ordered)   Other Precautions Cognitive; Chair Alarm; Bed Alarm;Pain; Fall Risk   Pain Assessment   Pain Assessment Tool 0-10   Pain Score 6   Pain Location/Orientation Location: Abdomen; Location: Chest;Orientation: Left;Orientation: Mid;Orientation: Upper   Effect of Pain on Daily Activities limits activity tolerance and I w/ ADL tasks   Patient's Stated Pain Goal No pain   Hospital Pain Intervention(s) Repositioned; Ambulation/increased activity; Emotional support  (RN, Eleonur Pel aware)   Home Living   Type of [address] [place] [address] One level; Able to live on main level with bedroom/bathroom; Performs ADLs on one level;Ramped entrance   Bathroom Shower/Tub Tub/shower unit  (sponge bathes w/ assist from sister)   [address] [facility]; Wheelchair-manual   Additional Comments Pt reports living w/ , Leo Wilcox and her sister, Johnny Dubois in mobile home w/ ramped entrance  Pt known to this therapist from previous admission   Prior Function   Level of [place] Needs assistance with IADLs   Lives With Spouse; Family; Other (Comment)  (sister, supportive local daughter)   [place] [address] Help From Family  (recent DC home from rehab)   ADL Assistance Needs assistance   IADLs Needs assistance   Falls in the last 6 months 1 to 4  (1 since DC home from rehab; + fall history)   Vocational On disability   Comments Pt reports using RW for functional mobitliy w/ A from family  Supportive family assist w/ ADL/ IADL  Lifestyle   Autonomy Pt reports needing assistance w/ ADL/ IADL PTA and uses RW for functional mobility   Reciprocal Relationships Pt reports living w/ spouse and sister  Supportive local daughter   Service to Others Pt reports unemployed / on [employer] Gratification Pt reports enjoying spending timew/ family   Would like to garden w/ her daughter   Subjective   Subjective "I am not very hungry" (when asked about her lunch tray)   ADL   Where Assessed Edge of bed  (vs OOB in chair)   Eating Assistance 6  Modified independent  (decreased appetite)   Eating Deficit Setup   Grooming Assistance 5  Supervision/Setup  (seated OOB in chair w/ + time , rest breaks)   Grooming Deficit Setup;Verbal cueing;Supervision/safety; Increased time to complete   UB Bathing Assistance 4  Minimal Assistance  (seated OOB in chair)   UB Bathing Deficit Setup;Verbal cueing;Supervision/safety; Increased time to complete   LB Bathing Assistance 2  Maximal Assistance   UB Dressing Assistance 4  Minimal Assistance   UB Dressing Deficit Pull around back; Fasteners;Setup   LB Dressing Assistance 2  Maximal Assistance   LB Dressing Deficit Steadying;Setup; Requires assistive device for steadying; Increased time to complete;Verbal cueing; Thread RLE into pants; Thread LLE into pants; Don/doff R sock; Don/doff L sock   Toileting Assistance  2  Maximal Assistance  (incontinent of urine)   Additional Comments Pt reports feeling "gassy"   Bed Mobility   Supine to Sit 2  Maximal assistance   Additional items Assist x 1;HOB elevated; Increased time required;Verbal cues;LE management; Bedrails   Sit to Supine 3  Moderate assistance  (sit to partial supine agains elevated HOB d t fatigue, pain)   Additional items Assist x 1; Increased time required   Additional Comments Pt seated OOB in chair post eval w/ needs met, call bell in reach and chair alarm activated  Care coordination w/ PT, Rl Hernandez for portion of session to assist functional transfers using RW due to decreased activity tolerance, symptom mgmt, and physical assistance required  Transfers   Sit to Stand 4  Minimal assistance   Additional items Assist x 2; Increased time required;Verbal cues;Armrests; Bedrails   Stand to Sit 4  Minimal assistance   Additional items Assist x 1; Increased time required;Verbal cues;Armrests   Functional Mobility   Functional Mobility 4  Minimal assistance   Additional Comments min A x 2 using RW short distances EOB to chair (approx 2-3') Additional items Rolling walker   Balance   Static Sitting Fair   Static Standing Poor +   Activity Tolerance   Activity Tolerance Patient limited by fatigue;Patient limited by pain; Other (Comment)  (SOB w/ minimal exertion, frequent rest breaks)   Medical Staff Made Aware care coordination w/ PTSuman Seen and spoke to [facility], [facility] per RN, Relda Pastel appropriate to see pt   RUE Assessment   RUE Assessment X   RUE Strength   RUE Overall Strength Deficits; Other (Comment)  (gen weakness; at lesat 3+/5)   Edema   RUE Edema +3   LUE Assessment   LUE Assessment X   LUE Strength   LUE Overall Strength Deficits; Other (Comment)  (gen weak; grossly at least 3+/5)   Edema   LUE Edema Non-pitting   Hand Function   Gross Motor Coordination Functional   Fine Motor Coordination Impaired   Vision-Basic Assessment   Current Vision Wears glasses only for reading   Cognition   Overall Cognitive Status Impaired   Arousal/Participation Alert   Attention Attends with cues to redirect   Orientation Level Oriented to person;Oriented to place;Oriented to situation   Memory Decreased recall of recent events;Decreased short term memory;Decreased recall of precautions   Following Commands Follows one step commands with increased time or repetition   Comments Identified pt by full name and birthdate  Alert and oriented to person, place  Aware her birthday was yesterday and reports her spouse was in  Pt able to follow directions during ADL tasks w/ + time  Be Dutta at times  Recommend ongoing eval of functional cognitive skills to assist in DC Planning   Assessment   Limitation Decreased ADL status; Decreased UE strength;Decreased Safe judgement during ADL;Decreased cognition;Decreased endurance;Decreased self-care trans;Decreased high-level ADLs; Decreased fine motor control   Assessment Pt is a 67yo female [facility] on [facility]   Pt presents w/ generalized weakness and significant PMH impacting her occupational performance including metastatic breast ca w/ mets to brain, bone; B mastectomy, HTN, recent PE  Pt recently admitted and DC to rehab on 7/12/21  Pt lives in mobile home w/ ramped entrance w/ her spouse and sister  Pt needs assistance w/ ADL/ IADL and uses RW for functional mobility  Pt w/ active OT orders and activity orders  Personal factors impacting performance include difficulty completing ADL, difficulty completing ADL, limited insight into deficits, recent admission, fall history  Upon eval, pt alert and generally oriented  Able to follow directions during ADL tasks w/ + time  Pt required + time to participate in ADL tasks due to fatigue, SOB  Pt required max A x1 to complete bed mobility supine to sit  Pt required max A to complete LBD, UBD w/ min A, grooming w/ S seated  Pt required min A x2 to complete sit <> stand and short distance functional mobility using RW  Pt presents w/ decreased activity tolerance, decreased endurance, decreased cognition, increased pain, generalized weakness /deconditioning impacting her I w/ dressing, bathing, oral hygiene, functional mobility, functional transfers, activity engagement, clothing mgmt  Pt completing ADL below baseline level of I and would benefit from OT while in acute care to address deficits  Recommend post acute rehab when medically stable for discharge from acute care  Will continue to follow   Goals   Patient Goals Pt stated that she would like to get better and go home so she can do something  Plan   Treatment Interventions ADL retraining;Functional transfer training;UE strengthening/ROM; Endurance training;Patient/family training;Equipment evaluation/education;Cognitive reorientation; Compensatory technique education; Fine motor coordination activities;Continued evaluation; Energy conservation; Activityengagement   Goal Expiration Date 08/22/21   OT Frequency 3-5x/wk   Recommendation   OT Discharge Recommendation Post acute rehabilitation services Equipment Recommended Bedside commode; Shower/Tub chair with back ($)   Commode Type Standard   AM-PAC Daily Activity Inpatient   Lower Body Dressing 2   Bathing 2   Toileting 2   Upper Body Dressing 3   Grooming 3   Eating 4   Daily Activity Raw Score 16   Daily Activity Standardized Score (Calc for Raw Score >=11) 35 96   AM-PAC Applied Cognition Inpatient   Following a Speech/Presentation 2   Understanding Ordinary Conversation 3   Taking Medications 3   Remembering Where Things Are Placed or Put Away 3   Remembering List of 4-5 Errands 2   Taking Care of Complicated Tasks 1   Applied Cognition Raw Score 14   Applied Cognition Standardized Score 32 02   Barthel Index   Feeding 5   Bathing 0   Grooming Score 0   Dressing Score 5   Bladder Score 0   Bowels Score 5   Toilet Use Score 0   Transfers (Bed/Chair) Score 10   Mobility (Level Surface) Score 0   Stairs Score 0   Barthel Index Score 25   Modified [place] Scale   Modified [place] Scale 4   The patient's raw score on the AM-PAC Daily Activity inpatient short form is 16, standardized score is 35 96, less than 39 4  Patients at this level are likely to benefit from discharge to post-acute rehabilitation services  Please refer to the recommendation of the Occupational Therapist for safe discharge planning    Pt goals to be met by 8/22/21:  -Pt will complete bed mobility supine <> sit w/ min A to max I w/ ADL performance to return home    -Pt will complete functional transfers to bed, chair, and toilet using AD, DME as needed w/ min A to max I and minimize burden of care to return home    -Pt will demonstrate good attention and participation in continued evaluation of functional cognitive skills to assist in DC Planning    -Pt will demonstrate improved activity tolerance to participate in ADL tasks for at least 10 minutes while seated w/ no more than 1 rest break to max I and improve engagement    -Pt will consistently engage in functional mobility using AD w/ min A to / from bathroom to max I and minimize burden of care    -Pt will complete UBD w/ S after set -up    -Pt will complete LBD w/ mod A x1 after set- up    -Pt will complete grooming w/ mod I seated w/ out rest break or sign /symptoms of fatigue    -Pt will demonstrate good attention and understanding EC tech to max I and improve engagement    -Pt will demonstrate improved functional standing tolerance for at least 5 minutes using AD as needed w/ at least fair balance while engaged in ADL tasks to return home and sit outside    Janell Dean OTR/L

## 2021-08-12 NOTE — PLAN OF CARE
Problem: PHYSICAL THERAPY ADULT  Goal: Performs mobility at highest level of function for planned discharge setting  See evaluation for individualized goals  Description: Treatment/Interventions: Functional transfer training, LE strengthening/ROM, Therapeutic exercise, Endurance training, Patient/family training, Equipment eval/education, Bed mobility, Gait training  Equipment Recommended: Franchesca Macias       See flowsheet documentation for full assessment, interventions and recommendations  Outcome: Progressing  Note: Prognosis: Fair  Problem List: Decreased strength, Decreased range of motion, Decreased endurance, Impaired balance, Decreased mobility, Decreased coordination, Decreased cognition, Impaired judgement, Decreased safety awareness, Obesity, Pain (gait deviations)  Assessment: Pt did make inconsistent mobility progress over 2 split sessions  Pt required less A initially for mobility during earlier portions of session, and gradually needed more physical A by end of session  Pt was able to amb further distances with rolling walker, but needed chair follow and displayed degradation of gait quality by end of trials  Session limited due to pain/fatigue, increased WOB    Skilled PT recommended to progress pt toward treatment goals  Recommend continued trials w/rolling walker, and nursing to use quick for to encourage OOB for meals  PT Discharge Recommendation: Post acute rehabilitation services          See flowsheet documentation for full assessment

## 2021-08-12 NOTE — PLAN OF CARE
Problem: Prexisting or High Potential for Compromised Skin Integrity  Goal: Skin integrity is maintained or improved  Description: INTERVENTIONS:  - Identify patients at risk for skin breakdown  - Assess and monitor skin integrity  - Assess and monitor nutrition and hydration status  - Monitor labs   - Assess for incontinence   - Turn and reposition patient  - Assist with mobility/ambulation  - Relieve pressure over bony prominences  - Avoid friction and shearing  - Provide appropriate hygiene as needed including keeping skin clean and dry  - Evaluate need for skin moisturizer/barrier cream  - Collaborate with interdisciplinary team   - Patient/family teaching  - Consider wound care consult   Outcome: Progressing     Problem: Potential for Falls  Goal: Patient will remain free of falls  Description: INTERVENTIONS:  - Educate patient/family on patient safety including physical limitations  - Instruct patient to call for assistance with activity   - Consult OT/PT to assist with strengthening/mobility   - Keep Call bell within reach  - Keep bed low and locked with side rails adjusted as appropriate  - Keep care items and personal belongings within reach  - Initiate and maintain comfort rounds  - Make Fall Risk Sign visible to staff  - Offer Toileting every Hours, in advance of need  - Initiate/Maintain alarm  - Obtain necessary fall risk management equipment:   - Apply yellow socks and bracelet for high fall risk patients  - Consider moving patient to room near nurses station  Outcome: Progressing     Problem: Nutrition/Hydration-ADULT  Goal: Nutrient/Hydration intake appropriate for improving, restoring or maintaining nutritional needs  Description: Monitor and assess patient's nutrition/hydration status for malnutrition  Collaborate with interdisciplinary team and initiate plan and interventions as ordered  Monitor patient's weight and dietary intake as ordered or per policy   Utilize nutrition screening tool and intervene as necessary  Determine patient's food preferences and provide high-protein, high-caloric foods as appropriate  INTERVENTIONS:  - Monitor oral intake, urinary output, labs, and treatment plans  - Assess nutrition and hydration status and recommend course of action  - Evaluate amount of meals eaten  - Assist patient with eating if necessary   - Allow adequate time for meals  - Recommend/ encourage appropriate diets, oral nutritional supplements, and vitamin/mineral supplements  - Order, calculate, and assess calorie counts as needed  - Recommend, monitor, and adjust tube feedings and TPN/PPN based on assessed needs  - Assess need for intravenous fluids  - Provide specific nutrition/hydration education as appropriate  - Include patient/family/caregiver in decisions related to nutrition  Outcome: Progressing     Problem: MOBILITY - ADULT  Goal: Maintain or return to baseline ADL function  Description: INTERVENTIONS:  -  Assess patient's ability to carry out ADLs; assess patient's baseline for ADL function and identify physical deficits which impact ability to perform ADLs (bathing, care of mouth/teeth, toileting, grooming, dressing, etc )  - Assess/evaluate cause of self-care deficits   - Assess range of motion  - Assess patient's mobility; develop plan if impaired  - Assess patient's need for assistive devices and provide as appropriate  - Encourage maximum independence but intervene and supervise when necessary  - Involve family in performance of ADLs  - Assess for home care needs following discharge   - Consider OT consult to assist with ADL evaluation and planning for discharge  - Provide patient education as appropriate  Outcome: Progressing  Goal: Maintains/Returns to pre admission functional level  Description: INTERVENTIONS:  - Perform BMAT or MOVE assessment daily    - Set and communicate daily mobility goal to care team and patient/family/caregiver     - Collaborate with rehabilitation services on mobility goals if consulted  - Perform Range of Motion  times a day  - Reposition patient every  hours    - Dangle patient  times a day  - Stand patient times a day  - Ambulate patient  times a day  - Out of bed to chair  times a day   - Out of bed for meals times a day  - Out of bed for toileting  - Record patient progress and toleration of activity level   Outcome: Progressing

## 2021-08-12 NOTE — PHYSICAL THERAPY NOTE
PHYSICAL THERAPY TREATMENT NOTE  NAME:  Antonia Chen  DATE: 08/12/21    Length Of Stay: 5  Performed at least 2 patient identifiers during session: Name and Birthday    TREATMENT:      08/12/21 1359   PT Last Visit   PT Visit Date 08/12/21   Note Type   Note Type Treatment   Pain Assessment   Pain Assessment Tool 0-10   Pain Score 6   Pain Location/Orientation Location: Abdomen;Orientation: Left   Effect of Pain on Daily Activities limits speed and indep of mobility, limits sitting tolerance   Patient's Stated Pain Goal No pain   Hospital Pain Intervention(s) Repositioned; Ambulation/increased activity; Emotional support;MD notified (Comment)  (tiger texted SLIM attending)   Restrictions/Precautions   Weight Bearing Precautions Per Order Yes   LLE Weight Bearing Per Order WBAT  (per podiatry during previous admit)   Braces or Orthoses   (none ordered)   Cognition   Overall Cognitive Status Impaired   Arousal/Participation Alert   Attention Attends with cues to redirect   Orientation Level Oriented to person;Oriented to place;Oriented to situation   Memory Decreased recall of recent events;Decreased short term memory;Decreased recall of precautions   Following Commands Follows one step commands with increased time or repetition   Subjective   Subjective Pt needs enocuragement to participate  Reports very SOB and "uncomfortable"  agreeable to participate as she can tolerate   Bed Mobility   Supine to Sit 2  Maximal assistance   Additional items Assist x 1; Increased time required;Verbal cues;HOB elevated; Bedrails   Sit to Supine 3  Moderate assistance   Additional items Assist x 1; Increased time required;Verbal cues; Bedrails;HOB elevated   Additional Comments Pt requires A of 2 for repositoning toward head of bed   Transfers   Sit to Stand 4  Minimal assistance   Additional items Assist x 1; Increased time required;Verbal cues;Armrests  (first session, by last transfer on second session-Max A)   Stand to Sit 4  Minimal assistance   Additional items Assist x 1; Increased time required;Verbal cues  (quick descent by last trial)   Ambulation/Elevation   Gait pattern Excessively slow; Step to;Short stride; Shuffling; Ataxia   Gait Assistance 4  Minimal assist   Additional items Assist x 1   Assistive Device Rolling walker   Distance 2'+12' latter with chair follow   Stair Management Assistance Not tested   Balance   Static Sitting Fair   Static Standing Poor +   Ambulatory Poor +   Endurance Deficit   Endurance Deficit Yes--increased RR and HR (100's ) by end of trial, but Spo2 > 92% on RA post ambulation   Activity Tolerance   Activity Tolerance Patient limited by fatigue;Patient limited by pain  (SOB w/ minimal exertion, frequent rest breaks)   Nurse Made Aware spoke to [address] [place] [address] and "[facility]   Medical Staff Made Aware care coordination with Osmar from OT for initial session  Messaged Dr Jade Landers re: Pt's SOB and pain with minimal activities   Assessment   Prognosis Fair   Problem List Decreased strength;Decreased range of motion;Decreased endurance; Impaired balance;Decreased mobility; Decreased coordination;Decreased cognition; Impaired judgement;Decreased safety awareness; Obesity;Pain  (gait deviations)   Assessment Pt did make inconsistent mobility progress over 2 split sessions  Pt required less A initially for mobility during earlier portions of session, and gradually needed more physical A by end of session  Pt was able to amb further distances with rolling walker, but needed chair follow and displayed degradation of gait quality by end of trials  Session limited due to pain/fatigue, increased WOB    Skilled PT recommended to progress pt toward treatment goals  Recommend continued trials w/rolling walker, and nursing to use quick for to encourage OOB for meals      Goals   Patient Goals to have less pain and breathe better   STG Expiration Date 08/17/21   PT Treatment Day 3   Plan   Treatment/Interventions Functional transfer training;LE strengthening/ROM; Therapeutic exercise; Endurance training;Cognitive reorientation;Patient/family training;Equipment eval/education; Bed mobility;Gait training;Spoke to nursing;Spoke to case management;Spoke to MD;OT   Progress Slow progress, decreased activity tolerance   PT Frequency Other (Comment)  (3-5x/wk)   Recommendation   PT Discharge Recommendation Post acute rehabilitation services   Equipment Recommended [facility] Recommended Wheeled walker   Change/add to [employer]?  No   AM-PAC Basic Mobility Inpatient   Turning in Bed Without Bedrails 2   Lying on Back to Sitting on Edge of Flat Bed 2   Moving Bed to Chair 2   Standing Up From Chair 2   Walk in Room 2   Climb 3-5 Stairs 1   Basic Mobility Inpatient Raw Score 11   Basic Mobility Standardized Score 30 25       Dmitri Dwyer, PT, DPT

## 2021-08-12 NOTE — ASSESSMENT & PLAN NOTE
Denies pain, fever  Reports decreased passage of gas  · Has chronic constipation  Maintained on bowel regimen amitiza, senokot, miralax  Tap water enema ordered  Obstruction series noted:  Nonobstructive bowel gas pattern   Moderate amount of feces in the colon consistent with constipation       · With CT abdomen shows no significant intra-abdominal pathology, discharge and placement discussed with patient

## 2021-08-12 NOTE — CASE MANAGEMENT
As per Josee at [facility] pt is able to admit with authorization pending  Will need COVID test - SLIM notified  ETS request made via [facility] for w/c Yong Figueroa for tomorrow to [facility]  CM met with pt who is agreeable to d/c tomorrow to [facility]  Aware authorization will be pending

## 2021-08-12 NOTE — PROGRESS NOTES
[facility]  Progress Note - Vishnu Oh 1960, 63 y o  female MRN: [id number]  Unit/Bed#: S -01 Encounter: [id number]  Primary Care Provider: Lauren Griffin MD   Date and time admitted to hospital: 8/7/2021  7:33 PM    Abdominal distention  Assessment & Plan   Denies pain, fever  Reports decreased passage of gas  · Has chronic constipation  Maintained on bowel regimen amitiza, senokot, miralax  Tap water enema ordered  Obstruction series noted:  Nonobstructive bowel gas pattern   Moderate amount of feces in the colon consistent with constipation  · With CT abdomen shows no significant intra-abdominal pathology, discharge and placement discussed with patient    Cognitive impairment  Assessment & Plan  Noted to be ongoing  Currently patient is alert and oriented  Anemia of chronic disease  Assessment & Plan  Stable and at baseline     · Baseline hgb appears to be 10-11    Insomnia due to medical condition  Assessment & Plan  · Continue ambien PRN and zyprexia HS     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, estrogen receptor positive ([facility] [address] )  Assessment & Plan  Initial diagnosis in 2010  S/p mastectomy and multiple rounds of chemo / radiation  Mets to lungs, brain, bones  Not currently receiving chemo or radiation  She follows with palliative care  · Continue home regimen including gabapentin 400 mg TID, dilaudid 4 mg PO QID PRN, compazine 5 mg TID, dexamethasone 2 mg PO BID, lasix 20 mg QD  · Keppra for sz ppx  · Level 1 full code     * Generalized weakness  Assessment & Plan  Presented to ED with generalized weakness  Patient was recently hospitalized and discharged to rehab  She reports some improvement in strength following rehab but states worsening over the past few days  In the ED, patient is unable to ambulate due to lower extremity weakness  She denies recent fall since being discharged from hospital on 7/5      Initial workup is benign including head CT, UA  Anemia is stable  · PT consult   · Fall precaution        VTE Pharmacologic Prophylaxis:   VTE Score: 4 Moderate Risk (Score 3-4) - Pharmacological DVT Prophylaxis Ordered: Apixaban (Eliquis)  Mechanical VTE Prophylaxis in Place: Yes    Patient Centered Rounds: With IM team  Dr Lesa Allan with Specialists or Other Care Team Provider: Dr Kirk Georges    Education and Discussions with Family / Patient: Team will update daughter preferred contact    Current Length of Stay: 5 day(s)    Current Patient Status: Inpatient     Discharge Plan / Estimated Discharge Date: In process    Code Status: Level 1 - Full Code      Subjective: The patient was seen at the bedside this morning where she remains comfortable in no distress  Objective:     Vitals:   Temp (24hrs), Av 1 °F (36 7 °C), Min:98 °F (36 7 °C), Max:98 2 °F (36 8 °C)    Temp:  [98 °F (36 7 °C)-98 2 °F (36 8 °C)] 98 2 °F (36 8 °C)  HR:  [] 80  Resp:  [18] 18  BP: (120-135)/(70-78) 135/70  SpO2:  [90 %-100 %] 90 %  There is no height or weight on file to calculate BMI  Input and Output Summary (last 24 hours): Intake/Output Summary (Last 24 hours) at 2021 1119  Last data filed at 2021 0801  Gross per 24 hour   Intake 540 ml   Output 1467 ml   Net -927 ml       Physical Exam:     Physical Exam  Constitutional:       General: She is not in acute distress  HENT:      Nose: No rhinorrhea  Eyes:      Extraocular Movements: Extraocular movements intact  Cardiovascular:      Rate and Rhythm: Normal rate and regular rhythm  Heart sounds: Normal heart sounds  No murmur heard  Pulmonary:      Effort: Pulmonary effort is normal  No respiratory distress  Breath sounds: No wheezing or rales  Abdominal:      General: There is distension (abdomen is firm)  Palpations: Abdomen is soft  Tenderness: There is no abdominal tenderness  There is no guarding     Musculoskeletal: Right lower leg: No edema  Left lower leg: No edema  Skin:     General: Skin is warm and dry  Neurological:      Mental Status: Mental status is at baseline           Additional Data:     Labs:  Results from last 7 days   Lab Units 08/12/21  0550   WBC Thousand/uL 7 62   HEMOGLOBIN g/dL 10 5*   HEMATOCRIT % 35 3   PLATELETS Thousands/uL 304   NEUTROS PCT % 71   LYMPHS PCT % 17   MONOS PCT % 10   EOS PCT % 0     Results from last 7 days   Lab Units 08/12/21  0550 08/07/21 2017   SODIUM mmol/L 141 140   POTASSIUM mmol/L 3 8 4 9   CHLORIDE mmol/L 106 103   CO2 mmol/L 26 25   BUN mg/dL 8 13   CREATININE mg/dL 0 73 0 97   ANION GAP mmol/L 9 12   CALCIUM mg/dL 8 4 8 6   ALBUMIN g/dL  --  2 9*   TOTAL BILIRUBIN mg/dL  --  0 32   ALK PHOS U/L  --  87   ALT U/L  --  70   AST U/L  --  44   GLUCOSE RANDOM mg/dL 100 108     Results from last 7 days   Lab Units 08/07/21 2017   INR  0 89                   Imaging: Reviewed radiology reports from this admission including: abdominal/pelvic CT scan    Recent Cultures (last 7 days):           Lines/Drains:  Invasive Devices     Central Venous Catheter Line            Port A Cath Right Chest -- days    Port A Cath Right Chest -- days          Peripheral Intravenous Line            Peripheral IV 08/07/21 Left Antecubital 4 days                Telemetry:        Last 24 Hours Medication List:   Current Facility-Administered Medications   Medication Dose Route Frequency Provider Last Rate    acetaminophen  650 mg Oral Q6H PRN FAUZIA Harris      apixaban  5 mg Oral BID FAUZIA Harris      bisacodyl  10 mg Rectal Daily PRN FAUZIA Harris      dexamethasone  2 mg Oral BID before lunch/dinner FAUZIA Harris      furosemide  20 mg Oral Daily FAUZIA Harris      gabapentin  400 mg Oral TID  FAUZIA Harris      HYDROmorphone  4 mg Oral 4x Daily PRN FAUZIA Harris      iohexol  50 mL Oral Once in imaging Carlie Inman MD      levalbuterol  0 63 mg Nebulization Q6H PRN [place], DO      levETIRAcetam  250 mg Oral Q12H [address] Kylee Franklin, [address]      lubiprostone  24 mcg Oral BID With Meals Kyleebabak Reid, CRNP      nicotine  1 patch Transdermal Daily Kylee Franklin, CRNP      OLANZapine  10 mg Oral HS Kylee Franklin, CRNP      pantoprazole  40 mg Oral Daily Kylee Franklin, [address]      polyethylene glycol  17 g Oral Daily Kylee Franklin, [address]      prochlorperazine  5 mg Oral TID [facility] Kylee Franklin, CRNP      senna-docusate sodium  2 tablet Oral BID Kylee Franklin, CRNP      zolpidem  10 mg Oral HS PRN Kyleebabak Reid, FAUZIA          Today, Patient Was Seen By: Morgan Riojas MD    ** Please Note: This note has been constructed using a voice recognition system   **

## 2021-08-12 NOTE — PLAN OF CARE
Problem: Prexisting or High Potential for Compromised Skin Integrity  Goal: Skin integrity is maintained or improved  Description: INTERVENTIONS:  - Identify patients at risk for skin breakdown  - Assess and monitor skin integrity  - Assess and monitor nutrition and hydration status  - Monitor labs   - Assess for incontinence   - Turn and reposition patient  - Assist with mobility/ambulation  - Relieve pressure over bony prominences  - Avoid friction and shearing  - Provide appropriate hygiene as needed including keeping skin clean and dry  - Evaluate need for skin moisturizer/barrier cream  - Collaborate with interdisciplinary team   - Patient/family teaching  - Consider wound care consult   Outcome: Progressing     Problem: Potential for Falls  Goal: Patient will remain free of falls  Description: INTERVENTIONS:  - Educate patient/family on patient safety including physical limitations  - Instruct patient to call for assistance with activity   - Consult OT/PT to assist with strengthening/mobility   - Keep Call bell within reach  - Keep bed low and locked with side rails adjusted as appropriate  - Keep care items and personal belongings within reach  - Initiate and maintain comfort rounds  - Make Fall Risk Sign visible to staff  - Offer Toileting every 2 Hours, in advance of need  - Initiate/Maintain bed alarm    Problem: MOBILITY - ADULT  Goal: Maintain or return to baseline ADL function  Description: INTERVENTIONS:  -  Assess patient's ability to carry out ADLs; assess patient's baseline for ADL function and identify physical deficits which impact ability to perform ADLs (bathing, care of mouth/teeth, toileting, grooming, dressing, etc )  - Assess/evaluate cause of self-care deficits   - Assess range of motion  - Assess patient's mobility; develop plan if impaired  - Assess patient's need for assistive devices and provide as appropriate  - Encourage maximum independence but intervene and supervise when necessary  - Involve family in performance of ADLs  - Assess for home care needs following discharge   - Consider OT consult to assist with ADL evaluation and planning for discharge  - Provide patient education as appropriate  Outcome: Progressing  Goal: Maintains/Returns to pre admission functional level  Description: INTERVENTIONS:  - Perform BMAT or MOVE assessment daily    - Set and communicate daily mobility goal to care team and patient/family/caregiver  - Collaborate with rehabilitation services on mobility goals if consulted  - Reposition patient every 2 hours    - Out of bed to chair 3 times a day   - Out of bed for toileting  - Record patient progress and toleration of activity level   Outcome: Progressing   - Apply yellow socks and bracelet for high fall risk patients  - Consider moving patient to room near nurses station  Outcome: Progressing

## 2021-08-12 NOTE — PLAN OF CARE
Problem: OCCUPATIONAL THERAPY ADULT  Goal: Performs self-care activities at highest level of function for planned discharge setting  See evaluation for individualized goals  Description: Treatment Interventions: ADL retraining, Functional transfer training, UE strengthening/ROM, Endurance training, Patient/family training, Equipment evaluation/education, Cognitive reorientation, Compensatory technique education, Fine motor coordination activities, Continued evaluation, Energy conservation, Activityengagement  Equipment Recommended: Bedside commode, Shower/Tub chair with back ($)       See flowsheet documentation for full assessment, interventions and recommendations  Note: Limitation: Decreased ADL status, Decreased UE strength, Decreased Safe judgement during ADL, Decreased cognition, Decreased endurance, Decreased self-care trans, Decreased high-level ADLs, Decreased fine motor control     Assessment: Pt is a 67yo female [facility] on 8/7/21  Pt presents w/ generalized weakness and significant PMH impacting her occupational performance including metastatic breast ca w/ mets to brain, bone; B mastectomy, HTN, recent PE  Pt recently admitted and DC to rehab on 7/12/21  Pt lives in mobile home w/ ramped entrance w/ her spouse and sister  Pt needs assistance w/ ADL/ IADL and uses RW for functional mobility  Pt w/ active OT orders and activity orders  Personal factors impacting performance include difficulty completing ADL, difficulty completing ADL, limited insight into deficits, recent admission, fall history  Upon eval, pt alert and generally oriented  Able to follow directions during ADL tasks w/ + time  Pt required + time to participate in ADL tasks due to fatigue, SOB  Pt required max A x1 to complete bed mobility supine to sit  Pt required max A to complete LBD, UBD w/ min A, grooming w/ S seated  Pt required min A x2 to complete sit <> stand and short distance functional mobility using RW   Pt presents w/ decreased activity tolerance, decreased endurance, decreased cognition, increased pain, generalized weakness /deconditioning impacting her I w/ dressing, bathing, oral hygiene, functional mobility, functional transfers, activity engagement, clothing mgmt  Pt completing ADL below baseline level of I and would benefit from OT while in acute care to address deficits  Recommend post acute rehab when medically stable for discharge from acute care   Will continue to follow     OT Discharge Recommendation: Post acute rehabilitation services

## 2021-08-12 NOTE — CASE MANAGEMENT
CM informed bty resident, Dr Venancio Leventhal, that pt is medically stable for d/c  Aware [facility] will need to be obtained  Dr Venancio Leventhal also made aware will need OT eval for authorization - will place order  CM informed by [facility] liaison - Sawyer Diop that they are able to offer pt a bed  No other accepting facilities at this time  [facility] is first choice - still unable to offer bed to a pt who is not vaccinated (no yellow zone bed)  CM met with pt who is agreeable to [facility]  She requested CM update her daughter  CM left VM for daughter, Ludmila De Souza, to update of same  Requested return call  CM notified Sawyer Diop - [facility] liaison that pt would like to accept bed  CM received return phone call from pt daughter, Ludmila De Souza  She is also agreeable to [facility]  Aware that CM will submit for auth

## 2021-08-13 VITALS
OXYGEN SATURATION: 91 % | HEART RATE: 71 BPM | DIASTOLIC BLOOD PRESSURE: 79 MMHG | TEMPERATURE: 98.2 F | SYSTOLIC BLOOD PRESSURE: 140 MMHG | RESPIRATION RATE: 16 BRPM

## 2021-08-13 LAB
ANION GAP SERPL CALCULATED.3IONS-SCNC: 9 MMOL/L (ref 4–13)
BASOPHILS # BLD AUTO: 0.01 THOUSANDS/ΜL (ref 0–0.1)
BASOPHILS NFR BLD AUTO: 0 % (ref 0–1)
BUN SERPL-MCNC: 8 MG/DL (ref 5–25)
CALCIUM SERPL-MCNC: 8.7 MG/DL (ref 8.3–10.1)
CHLORIDE SERPL-SCNC: 107 MMOL/L (ref 100–108)
CO2 SERPL-SCNC: 27 MMOL/L (ref 21–32)
CREAT SERPL-MCNC: 0.67 MG/DL (ref 0.6–1.3)
EOSINOPHIL # BLD AUTO: 0.01 THOUSAND/ΜL (ref 0–0.61)
EOSINOPHIL NFR BLD AUTO: 0 % (ref 0–6)
ERYTHROCYTE [DISTWIDTH] IN BLOOD BY AUTOMATED COUNT: 16.1 % (ref 11.6–15.1)
GFR SERPL CREATININE-BSD FRML MDRD: 97 ML/MIN/1.73SQ M
GLUCOSE SERPL-MCNC: 110 MG/DL (ref 65–140)
HCT VFR BLD AUTO: 33 % (ref 34.8–46.1)
HGB BLD-MCNC: 9.9 G/DL (ref 11.5–15.4)
IMM GRANULOCYTES # BLD AUTO: 0.07 THOUSAND/UL (ref 0–0.2)
IMM GRANULOCYTES NFR BLD AUTO: 1 % (ref 0–2)
LYMPHOCYTES # BLD AUTO: 0.89 THOUSANDS/ΜL (ref 0.6–4.47)
LYMPHOCYTES NFR BLD AUTO: 13 % (ref 14–44)
MCH RBC QN AUTO: 29.1 PG (ref 26.8–34.3)
MCHC RBC AUTO-ENTMCNC: 30 G/DL (ref 31.4–37.4)
MCV RBC AUTO: 97 FL (ref 82–98)
MONOCYTES # BLD AUTO: 0.58 THOUSAND/ΜL (ref 0.17–1.22)
MONOCYTES NFR BLD AUTO: 9 % (ref 4–12)
NEUTROPHILS # BLD AUTO: 5.29 THOUSANDS/ΜL (ref 1.85–7.62)
NEUTS SEG NFR BLD AUTO: 77 % (ref 43–75)
NRBC BLD AUTO-RTO: 0 /100 WBCS
PLATELET # BLD AUTO: 281 THOUSANDS/UL (ref 149–390)
PMV BLD AUTO: 9 FL (ref 8.9–12.7)
POTASSIUM SERPL-SCNC: 4 MMOL/L (ref 3.5–5.3)
RBC # BLD AUTO: 3.4 MILLION/UL (ref 3.81–5.12)
SODIUM SERPL-SCNC: 143 MMOL/L (ref 136–145)
WBC # BLD AUTO: 6.85 THOUSAND/UL (ref 4.31–10.16)

## 2021-08-13 PROCEDURE — 85025 COMPLETE CBC W/AUTO DIFF WBC: CPT | Performed by: CHIROPRACTOR

## 2021-08-13 PROCEDURE — 94760 N-INVAS EAR/PLS OXIMETRY 1: CPT

## 2021-08-13 PROCEDURE — 99238 HOSP IP/OBS DSCHRG MGMT 30/<: CPT | Performed by: INTERNAL MEDICINE

## 2021-08-13 PROCEDURE — 80048 BASIC METABOLIC PNL TOTAL CA: CPT | Performed by: CHIROPRACTOR

## 2021-08-13 RX ADMIN — GABAPENTIN 400 MG: 400 CAPSULE ORAL at 06:05

## 2021-08-13 RX ADMIN — PROCHLORPERAZINE MALEATE 5 MG: 10 TABLET ORAL at 11:28

## 2021-08-13 RX ADMIN — LUBIPROSTONE 24 MCG: 24 CAPSULE, GELATIN COATED ORAL at 08:48

## 2021-08-13 RX ADMIN — DOCUSATE SODIUM AND SENNOSIDES 2 TABLET: 8.6; 5 TABLET, FILM COATED ORAL at 08:46

## 2021-08-13 RX ADMIN — APIXABAN 5 MG: 5 TABLET, FILM COATED ORAL at 08:45

## 2021-08-13 RX ADMIN — DEXAMETHASONE 2 MG: 2 TABLET ORAL at 11:28

## 2021-08-13 RX ADMIN — LEVETIRACETAM 250 MG: 250 TABLET, FILM COATED ORAL at 08:46

## 2021-08-13 RX ADMIN — GABAPENTIN 400 MG: 400 CAPSULE ORAL at 11:28

## 2021-08-13 RX ADMIN — PANTOPRAZOLE SODIUM 40 MG: 40 TABLET, DELAYED RELEASE ORAL at 08:46

## 2021-08-13 RX ADMIN — FUROSEMIDE 20 MG: 40 TABLET ORAL at 08:46

## 2021-08-13 RX ADMIN — PROCHLORPERAZINE MALEATE 5 MG: 10 TABLET ORAL at 06:05

## 2021-08-13 RX ADMIN — NICOTINE 1 PATCH: 14 PATCH, EXTENDED RELEASE TRANSDERMAL at 08:50

## 2021-08-13 NOTE — DISCHARGE SUMMARY
[facility]  Discharge- [facility] 1960, 63 y o  female MRN: [id number]  Unit/Bed#: S -01 Encounter: [id number]  Primary Care Provider: Barbi Sigala MD   Date and time admitted to hospital: 8/7/2021  7:33 PM    Abdominal distention  Assessment & Plan   Denies pain, fever  Reports decreased passage of gas  · Has chronic constipation  Maintained on bowel regimen amitiza, senokot, miralax  Tap water enema ordered  Obstruction series noted:  Nonobstructive bowel gas pattern   Moderate amount of feces in the colon consistent with constipation  · With CT abdomen shows no significant intra-abdominal pathology, discharge and placement discussed with patient    Cognitive impairment  Assessment & Plan  Noted to be ongoing  Currently patient is alert and oriented  Anemia of chronic disease  Assessment & Plan  Stable and at baseline     · Baseline hgb appears to be 10-11    Insomnia due to medical condition  Assessment & Plan  · Continue ambien PRN and zyprexia HS     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, estrogen receptor positive ([facility] [address] )  Assessment & Plan  Initial diagnosis in 2010  S/p mastectomy and multiple rounds of chemo / radiation  Mets to lungs, brain, bones  Not currently receiving chemo or radiation  She follows with palliative care  · We continued while inpatient home regimen including gabapentin 400 mg TID, dilaudid 4 mg PO QID PRN, compazine 5 mg TID, dexamethasone 2 mg PO BID, lasix 20 mg QD  · Keppra for sz ppx      * Generalized weakness  Assessment & Plan  Presented to ED with generalized weakness  Patient was recently hospitalized and discharged to rehab  She reports some improvement in strength following rehab but states worsening over the past few days  In the ED, patient is unable to ambulate due to lower extremity weakness  She denied recent fall since being discharged from hospital on 7/5        Workup was benign including head CT, UA  Discharging Resident: Annel Campbell MD  Attending: Sonya Mendez MD  PCP: Tawanda Johnson MD  Admission Date: 8/7/2021  Discharge Date: 08/13/21    Disposition:      Transfer to [address] [place] [address]     Reason for Admission:  Generalized weakness and ambulatory dysfunction    Consultations During Hospital Stay:  · None    Procedures Performed:     · Abdominal x-rays and CT scan    Medication Adjustments and Discharge Medications:  · Summary of Medication Adjustments made as a result of this hospitalization:  No significant changes made  · Medication Dosing Tapers - Please refer to Discharge Medication List for details on any medication dosing tapers (if applicable to patient)  · Medications being temporarily held (include recommended restart time):  Dilaudid not prescribed  · Discharge Medication List: See after visit summary for reconciled discharge medications  Wound Care Recommendations:  When applicable, please see wound care section of After Visit Summary  Diet Recommendations at Discharge:  [address] Orders   (From admission, onward)             Start     Ordered    08/12/21 1430  Dietary nutrition supplements  Once     Question Answer Comment   Select Supplement: Ensure Compact-Vanilla    Frequency Lunch, Dinner        08/12/21 1430    08/08/21 1025  Room Service  Once     Question:  Type of Service  Answer:  Room Service - Appropriate with Assistance    08/08/21 1025    08/07/21 2330  Diet Regular; Regular House  Diet effective now     Question Answer Comment   Diet Type Regular    Regular Regular House    RD to adjust diet per protocol? Yes        08/07/21 2329                  Significant Findings / Test Results:     · CT scan abdomen pelvis no significant bowel obstruction    Incidental Findings:   Stable subcentimeter pulmonary nodules in the lower lungs  Trace right effusion is slightly increased  Bilateral breast implants again seen    Several stable soft tissue nodules measuring up to 1 1 cm the right lateral chest wall within   and adjacent to the atrophic pectoralis muscle that could be metastatic  Test Results Pending at Discharge (will require follow up): · Per PCP     Outpatient Tests Requested:  · None    Complications:  None  Hospital Course:     Wendie Villar is a 63 y o  female patient who originally presented to the hospital on 8/7/2021 due to with a PMH of breast cancer with metastasis to brain, bone, lungs s/p bilateral mastectomy, whole brain radiation, tobacco abuse, emphysema, chronic pain on dilaudid, palliative care patient, who presented with generalized weakness and ambulatory dysfunction  She was last hospitalized 7/5-7/11 for a witnessed fall  She was unable to ambulate in the ED  Initial workup including head CT, UA, labs were benign  The patient also presented with significant abdominal distension and does have a history of constipation  Because of continued distension along with tenderness elicited on some examinations,  CT scan of the abdomen and pelvis was performed and showed no evidence of bowel obstruction  The patient was treated with a bowel regimen and tap water enema in moved her bowels  During the course of her hospitalization, she continued to make daily progress she was assessed and reassessed by PT and OT  They recommended continued trials with a rolling walker and also asking nursing team to encourage her to be out of bed for meals  Prior to discharge the care plan was discussed with her daughter  She had no questions or concerns  Condition at Discharge: good     Discharge Day Visit / Exam:     Subjective: The patient which patient was seen at the bedside this morning where she reported being comfortable  No new problems  We discussed her discharge and answered all questions      Vitals: Blood Pressure: 140/79 (08/13/21 0637)  Pulse: 71 (08/13/21 0637)  Temperature: 98 2 °F (36 8 °C) (08/13/21 0637)  Temp Source: Oral (08/13/21 0933)  Respirations: 16 (08/13/21 0637)  SpO2: 91 % (08/13/21 1054)  Exam:   Physical Exam  Constitutional:       General: She is not in acute distress  Eyes:      Extraocular Movements: Extraocular movements intact  Cardiovascular:      Rate and Rhythm: Normal rate and regular rhythm  Heart sounds: No murmur heard  Pulmonary:      Effort: Pulmonary effort is normal  No respiratory distress  Abdominal:      General: There is distension  Musculoskeletal:      Right lower leg: No edema  Left lower leg: No edema  Skin:     General: Skin is warm and dry  Neurological:      Mental Status: Mental status is at baseline  Discussion with Family: Yes daughter    Goals of Care Discussions:  · Code Status at Discharge: Level 1 - Full Code  · Were there any Goals of Care Discussions during Hospitalization?: Yes  · Results of any General Goals of Care Discussions: Discussed with Family      Discharge instructions/Information to patient and family:   See after visit summary section titled Discharge Instructions for information provided to patient and family        Planned Readmission: None     ** Please Note: This note has been constructed using a voice recognition system **

## 2021-08-13 NOTE — CASE MANAGEMENT
Pt medically stable for d/c  Spoke with Josee with [facility] and bed is available today  As per Kody Hernandez pt is able to admit auth pending  Demarcus Fowler is set up for 1 pm today to [facility]  Nurse, SLIM, [facility] made aware  CM met with pt and called daughter (at pt request), Amanda, and made aware of same  Both agree to dc and deny additional questions  Pt aware of [facility] cost for w/c Cecilia porras and she is agreeable to payment

## 2021-08-13 NOTE — ASSESSMENT & PLAN NOTE
Presented to ED with generalized weakness  Patient was recently hospitalized and discharged to rehab  She reports some improvement in strength following rehab but states worsening over the past few days  In the ED, patient is unable to ambulate due to lower extremity weakness  She denied recent fall since being discharged from hospital on 7/5  Workup was benign including head CT, UA

## 2021-08-13 NOTE — ASSESSMENT & PLAN NOTE
Initial diagnosis in 2010  S/p mastectomy and multiple rounds of chemo / radiation  Mets to lungs, brain, bones  Not currently receiving chemo or radiation  She follows with palliative care     · We continued while inpatient home regimen including gabapentin 400 mg TID, dilaudid 4 mg PO QID PRN, compazine 5 mg TID, dexamethasone 2 mg PO BID, lasix 20 mg QD  · Keppra for sz ppx

## 2021-08-13 NOTE — ASSESSMENT & PLAN NOTE
Denies pain, fever  Reports decreased passage of gas  · Has chronic constipation  Maintained on bowel regimen amitiza, senokot, miralax  Tap water enema x 1 while inpatient  Obstruction series noted:  Nonobstructive bowel gas pattern   Moderate amount of feces in the colon consistent with constipation       · With CT abdomen shows no significant intra-abdominal pathology, results, discharge and placement discussed with patient

## 2021-08-15 ENCOUNTER — TELEPHONE (OUTPATIENT)
Dept: OTHER | Facility: OTHER | Age: 61
End: 2021-08-15

## 2021-08-15 NOTE — TELEPHONE ENCOUNTER
Zhane calling form [facility] regarding pain  They need an order for medication       Paged Achilles Gifty

## 2021-08-16 ENCOUNTER — NURSING HOME VISIT (OUTPATIENT)
Dept: GERIATRICS | Facility: OTHER | Age: 61
End: 2021-08-16
Payer: COMMERCIAL

## 2021-08-16 DIAGNOSIS — J42 CHRONIC BRONCHITIS, UNSPECIFIED CHRONIC BRONCHITIS TYPE (HCC): ICD-10-CM

## 2021-08-16 DIAGNOSIS — R53.1 GENERALIZED WEAKNESS: Primary | ICD-10-CM

## 2021-08-16 DIAGNOSIS — C50.912 BILATERAL MALIGNANT NEOPLASM OF BREAST IN FEMALE, ESTROGEN RECEPTOR POSITIVE, UNSPECIFIED SITE OF BREAST (HCC): ICD-10-CM

## 2021-08-16 DIAGNOSIS — Z17.0 BILATERAL MALIGNANT NEOPLASM OF BREAST IN FEMALE, ESTROGEN RECEPTOR POSITIVE, UNSPECIFIED SITE OF BREAST (HCC): ICD-10-CM

## 2021-08-16 DIAGNOSIS — I26.99 PULMONARY EMBOLISM, UNSPECIFIED CHRONICITY, UNSPECIFIED PULMONARY EMBOLISM TYPE, UNSPECIFIED WHETHER ACUTE COR PULMONALE PRESENT (HCC): ICD-10-CM

## 2021-08-16 DIAGNOSIS — C50.911 BILATERAL MALIGNANT NEOPLASM OF BREAST IN FEMALE, ESTROGEN RECEPTOR POSITIVE, UNSPECIFIED SITE OF BREAST (HCC): ICD-10-CM

## 2021-08-16 PROCEDURE — 99306 1ST NF CARE HIGH MDM 50: CPT | Performed by: FAMILY MEDICINE

## 2021-08-16 NOTE — UTILIZATION REVIEW
Notification of Discharge   This is a Notification of Discharge from our facility [address]  Please be advised that this patient has been discharge from our facility  Below you will find the admission and discharge date and time including the patients disposition  UTILIZATION REVIEW CONTACT:  Destin Gonzalez  Utilization   Network Utilization Review Department  Phone: [phone] x carefully listen to the prompts  All voicemails are confidential   Email: [email or web address]     PHYSICIAN ADVISORY SERVICES:  FOR [id number] REVIEW - MEDICAL NECESSITY DENIAL  Phone: [phone]  Fax: [phone]  Email: [email or web address]     PRESENTATION DATE: 8/7/2021  7:33 PM  OBERVATION ADMISSION DATE:   INPATIENT ADMISSION DATE: 8/7/21 10:15 PM   DISCHARGE DATE: 8/13/2021  1:16 PM  DISPOSITION: Non [facility] SNF/TCU/SNU Non [facility] SNF/TCU/SNU      IMPORTANT INFORMATION:  Send all requests for admission clinical reviews, approved or denied determinations and any other requests to dedicated fax number below belonging to the campus where the patient is receiving treatment   List of dedicated fax numbers:  [address] [place] [address] DENIALS (Administrative/Medical Necessity) [phone]   [address] [place] [address] (Maternity/NICU/Pediatrics) [phone]   Jaspal Mendez [phone]   Satya Serrano [phone]   Ruslan Salmeron [phone]   [address] [phone]   [facility]  [phone]   [address] [facility] [address] [facility]  [address] [facility] [address] [facility] [phone]

## 2021-08-16 NOTE — PROGRESS NOTES
[address]  [address] [facility] [address] , [address] [place] [address] 31  History and Physical    NAME: Davee Meckel  AGE: 63 y o  SEX: female [id number]    DATE OF ENCOUNTER: 8/16/2021    Code status:  CPR    Assessment and Plan     Problem List Items Addressed This Visit        Cardiovascular and Mediastinum    Pulmonary embolism ([facility] [address] )       Other    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, estrogen receptor positive ([facility] [address] )    Generalized weakness - Primary      Other Visit Diagnoses     Chronic bronchitis, unspecified chronic bronchitis type ([facility] [address] )            #Generalized weakness with ambulatory dysfunction:   -PT and OT evaluation and treat  # abdominal distension with constipation:   Patient with tympanitic abdomen on evaluation today, no tenderness to palpation  This was known problem from hospital   CT scan 08/11/2021 shows no obstruction or acute intra-abdominal inflammatory process  There is no intra-abdominal metastatic disease, there is trace ascites trace right effusion    -continue bowel regimen, senna, bisacodyl, amitizia    #hx of Stage IV bilateral breast cancer with metastasis  Currently not undergoing any chemotherapy or radiation  Follows with [facility]  Follows with the palliative care office  Patient is on Dilaudid 4 mg p o  at home  She was getting Dilaudid 4 mg p o  q 6 hours in the hospital   Patient currently only prescribed Tylenol  Will continue with patient's home medicitions   -tylenol   -dexamethasone   -gabapentin   -will start patient on hydromorphone 2 mg p o  every 6 hours as needed    -keppra for seizure prophylaxis   -lasix    #hx of COPD   -continue with albuterol    #insomnia    -zyprexa   -ambien       All medications and routine orders were reviewed and updated as needed      Plan discussed with: Patient    Chief Complaint     Seen for admission at [address]    History of Present Illness 61-year-old female presents for for admission to nursing  Patient has past medical history notable for breast cancer with metastasis to brain bone she is status post mastectomy, has history of whole-brain radiation, tobacco use emphysema  Patient does follow with palliative Care, she is Dilaudid daily  She was admitted to hospital on 08/07/2021 when she presented to the emergency department for weakness and inability to walk  Patient does have history of generalized weakness, falls and ambulatory dysfunction  She was last hospitalized prior to this 7/5 to 7/11  While patient was admitted, she did have labs imaging, it did not show any acute process  Patient does have history of chronic constipation, does have history of abdominal distension  CT scan done in the emergency department this admission was unremarkable  While patient was admitted, she did follow with both physical therapy and occupational therapy  She did have improvement to her symptoms however was recommended ongoing skilled PT, in was sent here for post acute rehabilitation services      HISTORY:  Past Medical History:   Diagnosis Date    Dehydration 6/11/2019    Dizziness 2/6/2018    Dry skin 2/6/2018    Edema 10/23/2019    H/O bilateral mastectomy 2/15/2016    Hyperglycemia 2/6/2018    Hypertension 2/15/2016    Hypokalemia 3/10/2020    Lymphedema 2/15/2016    Malignant cachexia ([facility] [address] ) 10/6/2016    Malignant neoplasm of right breast ([facility] [address] ) 2/15/2016    Metastatic breast cancer ([facility] [address] )      Family History   Problem Relation Age of Onset    Cancer Sister     Prostate cancer Brother      Social History     Socioeconomic History    Marital status: /Civil Union     Spouse name: None    Number of children: 1    Years of education: None    Highest education level: None   Occupational History    None   Tobacco Use    Smoking status: Current Every Day Smoker     Packs/day: 0 50     Years: 40 00     Pack years: 20 00 Types: Cigarettes     Start date: 1978    Smokeless tobacco: Never Used   Vaping Use    Vaping Use: Never used   Substance and Sexual Activity    Alcohol use: Not Currently     Comment: once a week    Drug use: Not Currently     Types: Marijuana    Sexual activity: None   Other Topics Concern    None   Social History Narrative    None     Social Determinants of Health     Financial Resource Strain:     Difficulty of Paying Living Expenses:    Food Insecurity:     Worried About Running Out of Food in the Last Year:     Ran Out of Food in the Last Year:    Transportation Needs:     Lack of Transportation (Medical):  Lack of Transportation (Non-Medical):    Physical Activity:     Days of Exercise per Week:     Minutes of Exercise per Session:    Stress:     Feeling of Stress :    Social Connections:     Frequency of Communication with Friends and Family:     Frequency of Social Gatherings with Friends and Family:     Attends [employer] Services:     Active Member of Clubs or Organizations:     Attends Club or Organization Meetings:     Marital Status:    Intimate Partner Violence:     Fear of Current or Ex-Partner:     Emotionally Abused:     Physically Abused:     Sexually Abused: Allergies:  No Known Allergies    Review of Systems     Review of Systems   Constitutional: Positive for fatigue  Negative for chills and fever  HENT: Negative  Negative for congestion and sore throat  Eyes: Negative  Negative for discharge and redness  Respiratory: Negative  Negative for chest tightness and shortness of breath  Cardiovascular: Negative  Negative for chest pain and palpitations  Gastrointestinal: Positive for abdominal distention and constipation  Negative for abdominal pain, nausea and vomiting  Endocrine: Negative  Negative for cold intolerance and polyphagia  Genitourinary: Negative  Negative for difficulty urinating and dysuria     Musculoskeletal: Positive for back pain  Negative for arthralgias  Skin: Negative  Negative for color change and wound  Allergic/Immunologic: Negative  Negative for environmental allergies  Neurological: Negative  Negative for dizziness, weakness and headaches  Hematological: Negative  Psychiatric/Behavioral: Negative  Negative for behavioral problems  The patient is not nervous/anxious  All other systems reviewed and are negative  Medications and orders     All medications reviewed and updated in Nursing Home EMR  Objective     Vitals:  Temperature: 96 5°, pulse rate 66, respiration rate 16, blood pressure 129/87  Weight, 186 lb  Physical Exam  Constitutional:       General: She is not in acute distress  Appearance: Normal appearance  She is not ill-appearing or toxic-appearing  HENT:      Head: Normocephalic and atraumatic  Right Ear: External ear normal       Left Ear: External ear normal       Nose: Nose normal  No congestion or rhinorrhea  Mouth/Throat:      Mouth: Mucous membranes are moist    Eyes:      General:         Right eye: No discharge  Left eye: No discharge  Extraocular Movements: Extraocular movements intact  Pupils: Pupils are equal, round, and reactive to light  Cardiovascular:      Rate and Rhythm: Normal rate  Pulses: Normal pulses  Heart sounds: No murmur heard  Pulmonary:      Effort: Pulmonary effort is normal  No respiratory distress  Chest:      Chest wall: No tenderness  Abdominal:      General: There is distension  Tenderness: There is no abdominal tenderness  There is no guarding or rebound  Genitourinary:     General: Normal vulva  Rectum: Normal       Comments: With Pat as nursing aid chaperone, no skin breakdown on back, backside or groin  Musculoskeletal:         General: No swelling, tenderness, deformity or signs of injury  Normal range of motion  Cervical back: Normal range of motion and neck supple   No tenderness  Right lower leg: No edema  Left lower leg: No edema  Lymphadenopathy:      Cervical: No cervical adenopathy  Skin:     General: Skin is warm  Capillary Refill: Capillary refill takes less than 2 seconds  Coloration: Skin is not jaundiced  Findings: No bruising or erythema  Neurological:      General: No focal deficit present  Mental Status: She is alert  Cranial Nerves: No cranial nerve deficit  Sensory: No sensory deficit  Motor: No weakness  Coordination: Coordination normal       Gait: Gait normal       Deep Tendon Reflexes: Reflexes normal    Psychiatric:         Mood and Affect: Mood normal          Behavior: Behavior normal          Pertinent Laboratory/Diagnostic Studies: The following labs/studies were reviewed please see chart or hospital paperwork for details  8/11/2021  CT AP  IMPRESSION:  No obstruction or acute intra-abdominal inflammatory process  No intra-abdominal metastatic disease  Trace ascites and right effusion      Stable pulmonary nodules in the lower lungs  Stable small soft tissue nodules in the right chest wall  Ref Range & Units 8/13/21 0530    Sodium 136 - 145 mmol/L 143    Potassium 3 5 - 5 3 mmol/L 4 0    Chloride 100 - 108 mmol/L 107    CO2 21 - 32 mmol/L 27    ANION GAP 4 - 13 mmol/L 9    BUN 5 - 25 mg/dL 8    Creatinine 0 60 - 1 30 mg/dL 0 67    Comment: Standardized to IDMS reference method   Glucose 65 - 140 mg/dL 110      Calcium 8 3 - 10 1 mg/dL 8 7    eGFR ml/min/1 73sq m 97      Ref Range & Units 8/13/21 0530    WBC 4 31 - 10 16 Thousand/uL 6 85    RBC 3 81 - 5 12 Million/uL 3 40Low     Hemoglobin 11 5 - 15 4 g/dL 9 9Low     Hematocrit 34 8 - 46 1 % 33  0Low     MCV 82 - 98 fL 97    MCH 26 8 - 34 3 pg 29 1    MCHC 31 4 - 37 4 g/dL 30 0Low     RDW 11 6 - 15 1 % 16 1High     MPV 8 9 - 12 7 fL 9 0    Platelets 179 - 487 Thousands/uL 281    nRBC /100 WBCs 0    Neutrophils Relative 43 - 75 % 77High Immat GRANS % 0 - 2 % 1    Lymphocytes Relative 14 - 44 % 13Low     Monocytes Relative 4 - 12 % 9    Eosinophils Relative 0 - 6 % 0    Basophils Relative 0 - 1 % 0    Neutrophils Absolute 1 85 - 7 62 Thousands/µL 5 29    Immature Grans Absolute 0 00 - 0 20 Thousand/uL 0 07    Lymphocytes Absolute 0 60 - 4 47 Thousands/µL 0 89    Monocytes Absolute 0 17 - 1 22 Thousand/µL 0 58    Eosinophils Absolute 0 00 - 0 61 Thousand/µL 0 01    Basophils Absolute 0 00 - 0 10 Thousands/µL 0 01          - Counseling Documentation: patient was counseled regarding: instructions for management

## 2021-08-18 ENCOUNTER — NURSING HOME VISIT (OUTPATIENT)
Dept: GERIATRICS | Facility: OTHER | Age: 61
End: 2021-08-18
Payer: COMMERCIAL

## 2021-08-18 DIAGNOSIS — C50.911 BILATERAL MALIGNANT NEOPLASM OF BREAST IN FEMALE, ESTROGEN RECEPTOR POSITIVE, UNSPECIFIED SITE OF BREAST (HCC): ICD-10-CM

## 2021-08-18 DIAGNOSIS — R14.0 ABDOMINAL DISTENTION: ICD-10-CM

## 2021-08-18 DIAGNOSIS — R53.1 GENERALIZED WEAKNESS: ICD-10-CM

## 2021-08-18 DIAGNOSIS — R41.89 COGNITIVE IMPAIRMENT: Primary | ICD-10-CM

## 2021-08-18 DIAGNOSIS — Z17.0 BILATERAL MALIGNANT NEOPLASM OF BREAST IN FEMALE, ESTROGEN RECEPTOR POSITIVE, UNSPECIFIED SITE OF BREAST (HCC): ICD-10-CM

## 2021-08-18 DIAGNOSIS — C50.912 BILATERAL MALIGNANT NEOPLASM OF BREAST IN FEMALE, ESTROGEN RECEPTOR POSITIVE, UNSPECIFIED SITE OF BREAST (HCC): ICD-10-CM

## 2021-08-18 PROCEDURE — 99309 SBSQ NF CARE MODERATE MDM 30: CPT | Performed by: NURSE PRACTITIONER

## 2021-08-18 RX ORDER — HYDROMORPHONE HYDROCHLORIDE 2 MG/1
2 TABLET ORAL EVERY 6 HOURS PRN
COMMUNITY
End: 2021-08-27 | Stop reason: SDUPTHER

## 2021-08-18 NOTE — PROGRESS NOTES
[facility]  [address]  [phone]  [place]  Code 31        NAME: Iraida Jamison  AGE: 63 y o  SEX: female CODE STATUS: Full Code    DATE OF ENCOUNTER: 8/18/2021    Assessment and Plan     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, estrogen receptor positive ([facility] [address] )  · Dx in 2010 s/p chemo and radiation  · S/P B/L mastectomy with RUE lymphedema  · Noted to have mets to bone and brain  · Followed by Palliative Care  · Continue Dexamethasone 2 mg BID  · Keppra 250mg BID to prevent seizure due to brain mets   · Continue pain management with:  · Tylenol PRN  · Gabapentin 400mg TID  · Dilaudid 2 mg PO Q 6 PRN    Generalized weakness  Multifactorial in setting of Stage 4 Breast CA with brain and bone mets, falls, debility, recent hospitalizations and rehab stay  Continue PT/OT  Continue to assist with ADLs  Fall Precautions      Cognitive impairment  · AxOx3 on exam today  · Speech Eval noted patient has Mild Cognitive Impairment with impaired problem solving with subjective and objective memory deficits  Provide frequent redirection, reorientation, distraction techniques  Continue fall precautions at facility  Continue to assist with ADLs at facility   Delirium Precautions   Encourage Hydration/ Nutrition  Implement sleep hygiene, limit night time interuptions, group activities    Abdominal distention  · Per patient this is chronic   · LBM was 8/16 x 3  · Continue Senokot, Amitiza, Dulcolax Supp  PRN  · Obstruction series and CT abdomen done in hospital   · Noted to have constipation  · No Intra-abdominal pathology noted       All medications and routine orders were reviewed and updated as needed  Chief Complaint     STR follow up visit     History of Present Illness     Carin Valentin is a 45-year-old female with Past medical hx including but not limited to  Breast CA with brain and bone Mets followed by palliative care s/p bilateral mastectomy    Right arm lymphedema, PE on Eliquis, anemia of chronic disease, ambulatory dysfunction with falls seen in collaboration with nursing for medical management and STR follow up visit  Recently admitted to [facility] August 7th to August 13, 2021 for generalized weakness  Prior to this hospitalization she was at [facility] from July 5th to July 12, 2021 for a fall she was discharged to rehab then home after her July hospitalization  Patient's strength improved after rehab but developed worsening weakness few days prior to August admission and was unable to ambulate due to lower extremity weakness  Imaging of her head and UA unremarkable and patient was evaluated by PT and OT and sent to [facility] for rehab  Prior to rehab patient was living at home with her  and sister who provided assistance with IADLs  She ambulated with a rolling walker  Upon exam patient resting in bed   Patient is a poor historian although she is alert and oriented x3 on exam when questioned about her medical diagnoses and what medicine she takes she states "I do not remember"  Patient complains of lower back pain she states this is chronic and is well controlled with current pain medication regimen  Patient states she is eating and drinking well, denies bowel or bladder issues  Denies CP/SOB/N/V/D  Patient states her last bowel movement was today  No concerns from nursing at this time  Per facility record patient is eating 1-3 meals per day and consuming 25-75% of these meals  She is incontinent of bowel and bladder  Her last BM was 8/16 x 3  Per PT/OT, she requires max assist for most ADLs  Per speech eval, patient shows a mild cognitive impairment with subjective and objective memory deficits her problem solving skills are impaired  No swallowing deficits noted in she is on a regular texture diet             The patient's allergies, past medical, surgical, social and family history were reviewed and unchanged  Review of Systems     Review of Systems   Constitutional: Positive for activity change  Negative for chills and fever  HENT: Negative for ear pain and sore throat  Eyes: Negative for pain and visual disturbance  Respiratory: Positive for shortness of breath (  At baseline per patient)  Negative for cough  Cardiovascular: Negative for chest pain and palpitations  Gastrointestinal: Positive for abdominal distention ( at baseline per patient)  Negative for abdominal pain, constipation, diarrhea, nausea and vomiting  Genitourinary: Negative for dysuria and hematuria  Musculoskeletal: Positive for back pain and gait problem  Negative for arthralgias  Skin: Negative for color change and rash  Neurological: Positive for weakness  Negative for dizziness, seizures, syncope and headaches  Psychiatric/Behavioral: Negative for dysphoric mood  The patient is not nervous/anxious  All other systems reviewed and are negative  Objective     Vitals:  Temp 98 1° pulse 78 respirations 20 /63 O2 95% room air weight 186 lb    Physical Exam  Vitals and nursing note reviewed  Constitutional:       General: She is not in acute distress  Appearance: Normal appearance  She is obese  HENT:      Head: Normocephalic and atraumatic  Nose: No congestion or rhinorrhea  Mouth/Throat:      Mouth: Mucous membranes are moist    Eyes:      General: No scleral icterus  Conjunctiva/sclera: Conjunctivae normal       Pupils: Pupils are equal, round, and reactive to light  Cardiovascular:      Rate and Rhythm: Normal rate and regular rhythm  Pulses: Normal pulses  Heart sounds: Normal heart sounds  No murmur heard  Pulmonary:      Effort: Pulmonary effort is normal  No respiratory distress  Breath sounds: Decreased breath sounds present  No wheezing, rhonchi or rales  Abdominal:      General: Bowel sounds are normal  There is distension        Palpations: Abdomen is soft  There is no mass  Tenderness: There is no abdominal tenderness  Hernia: No hernia is present  Musculoskeletal:         General: Swelling (Right Upper arm lymphedema) present  No tenderness  Skin:     General: Skin is warm and dry  Coloration: Skin is not pale  Findings: Bruising present  No rash  Neurological:      General: No focal deficit present  Mental Status: She is alert and oriented to person, place, and time  Mental status is at baseline  Motor: Weakness present  Gait: Gait abnormal       Comments: Forgetful   Psychiatric:         Mood and Affect: Mood normal          Behavior: Behavior normal          Pertinent Laboratory/Diagnostic Studies:      Current Medications   Medications reviewed and updated see facility [facility] for details        Current Outpatient Medications:     HYDROmorphone (DILAUDID) 2 mg tablet, Take 2 mg by mouth every 6 (six) hours as needed For Cancer pain, Disp: , Rfl:     albuterol (PROVENTIL HFA,VENTOLIN HFA) 90 mcg/act inhaler, TAKE 2 PUFFS BY MOUTH EVERY 6 HOURS AS NEEDED FOR WHEEZING, Disp: 18 Inhaler, Rfl: 2    apixaban (ELIQUIS) 5 mg, Take 1 tablet (5 mg total) by mouth 2 (two) times a day, Disp: 60 tablet, Rfl: 0    bisacodyl (DULCOLAX) 10 mg suppository, Insert 1 suppository (10 mg total) into the rectum daily as needed for constipation, Disp: 12 suppository, Rfl: 0    dexamethasone (DECADRON) 2 mg tablet, Take 1 tablet (2 mg total) by mouth 2 (two) times a day before lunch and dinner, Disp: , Rfl: 0    furosemide (LASIX) 20 mg tablet, TAKE 1 TABLET BY MOUTH EVERY DAY, Disp: 30 tablet, Rfl: 0    gabapentin (NEURONTIN) 400 mg capsule, Take 1 capsule (400 mg total) by mouth 3 (three) times a day before meals To reduce nerve pain, Disp: 90 capsule, Rfl: 0    levETIRAcetam (KEPPRA) 250 mg tablet, TAKE 1 TABLET (250 MG TOTAL) BY MOUTH 2 (TWO) TIMES A DAY TO PREVENT SEIZURE, Disp: 60 tablet, Rfl: 0    lubiprostone (AMITIZA) 24 mcg capsule, Take 1 capsule (24 mcg total) by mouth 2 (two) times a day with meals, Disp: 60 capsule, Rfl: 0    Misc  Devices (QUAD CANE) MISC, by Does not apply route daily, Disp: 1 each, Rfl: 0    OLANZapine (ZyPREXA) 10 mg tablet, Take 1 tablet (10 mg total) by mouth daily at bedtime Every night, to help sleep and appetite , Disp: 30 tablet, Rfl: 0    pantoprazole (PROTONIX) 40 mg tablet, Take 1 tablet (40 mg total) by mouth daily Every morning, before coffee, to reduce stomach acid, Disp: 90 tablet, Rfl: 3    polyethylene glycol (MIRALAX) 17 g packet, Take 17 g by mouth daily, Disp: , Rfl: 0    prochlorperazine (COMPAZINE) 5 mg tablet, Take 1 tablet (5 mg total) by mouth 3 (three) times a day before meals, Disp: 90 tablet, Rfl: 0    senna-docusate sodium (SENOKOT S) 8 6-50 mg per tablet, Take 2 tablets by mouth 2 (two) times a day, Disp: 120 tablet, Rfl: 0    zolpidem (AMBIEN) 10 mg tablet, Take 1 tablet (10 mg total) by mouth daily at bedtime as needed for sleep, Disp: 30 tablet, Rfl: 0     Plan discussed with Dr Carlos Greene noted agreement with assessment and plan  Please note:  Voice-recognition software may have been used in the preparation of this document  Occasional wrong word or "sound-alike" substitutions may have occurred due to the inherent limitations of voice recognition software  Interpretation should be guided by FAUZIA Alonso  8/18/2021 12:59 PM

## 2021-08-19 NOTE — ASSESSMENT & PLAN NOTE
· Dx in 2010 s/p chemo and radiation  · S/P B/L mastectomy with RUE lymphedema  · Noted to have mets to bone and brain  · Followed by Palliative Care  · Continue Dexamethasone 2 mg BID  · Keppra 250mg BID to prevent seizure due to brain mets   · Continue pain management with:  · Tylenol PRN  · Gabapentin 400mg TID  · Dilaudid 2 mg PO Q 6 PRN

## 2021-08-19 NOTE — ASSESSMENT & PLAN NOTE
Multifactorial in setting of Stage 4 Breast CA with brain and bone mets, falls, debility, recent hospitalizations and rehab stay  Continue PT/OT  Continue to assist with ADLs  Fall Precautions

## 2021-08-19 NOTE — ASSESSMENT & PLAN NOTE
· AxOx3 on exam today  · Speech Eval noted patient has Mild Cognitive Impairment with impaired problem solving with subjective and objective memory deficits  Provide frequent redirection, reorientation, distraction techniques  Continue fall precautions at facility  Continue to assist with ADLs at facility   Delirium Precautions   Encourage Hydration/ Nutrition  Implement sleep hygiene, limit night time interuptions, group activities

## 2021-08-19 NOTE — ASSESSMENT & PLAN NOTE
· Per patient this is chronic   · LBM was 8/16 x 3  · Continue Senokot, Amitiza, Dulcolax Supp   PRN  · Obstruction series and CT abdomen done in hospital   · Noted to have constipation  · No Intra-abdominal pathology noted

## 2021-08-20 DIAGNOSIS — R56.9 FOCAL SEIZURE (HCC): ICD-10-CM

## 2021-08-20 DIAGNOSIS — C79.31 BRAIN METASTASES (HCC): ICD-10-CM

## 2021-08-20 RX ORDER — LEVETIRACETAM 250 MG/1
TABLET ORAL
Qty: 60 TABLET | Refills: 0 | Status: SHIPPED | OUTPATIENT
Start: 2021-08-20

## 2021-08-24 ENCOUNTER — NURSING HOME VISIT (OUTPATIENT)
Dept: GERIATRICS | Facility: OTHER | Age: 61
End: 2021-08-24
Payer: COMMERCIAL

## 2021-08-24 DIAGNOSIS — Z71.89 GOALS OF CARE, COUNSELING/DISCUSSION: ICD-10-CM

## 2021-08-24 DIAGNOSIS — R26.2 AMBULATORY DYSFUNCTION: Primary | ICD-10-CM

## 2021-08-24 DIAGNOSIS — R14.0 ABDOMINAL DISTENTION: ICD-10-CM

## 2021-08-24 DIAGNOSIS — Z51.5 PALLIATIVE CARE PATIENT: Chronic | ICD-10-CM

## 2021-08-24 PROCEDURE — 99309 SBSQ NF CARE MODERATE MDM 30: CPT | Performed by: NURSE PRACTITIONER

## 2021-08-24 NOTE — ASSESSMENT & PLAN NOTE
· Per patient this is chronic   · LBM was 8/24/2021  · Encourage PO Hydration  · Continue Senokot, Amitiza, Dulcolax Supp   PRN  · Obstruction series and CT abdomen done in hospital   · Noted to have constipation  · No Intra-abdominal pathology noted

## 2021-08-24 NOTE — PROGRESS NOTES
[facility]  [address]  [phone]  [place]  Code 31 (STR)        NAME: Mellie Koyanagi  AGE: 63 y o  SEX: female CODE STATUS: Full Code    DATE OF ENCOUNTER: 8/24/2021    Assessment and Plan     Ambulatory dysfunction  · Multifactorial in setting of Breast CA with brain and bone mets, debility, generalized weakness, multiple hospitalizations, chronic opioid use   · Progressively worsening- Poor Prognosis   · Per therapy, Patient is weak, unable to support self on edge of bed, legs are very weak and unable to support self for long period of time  · Transfers well with manual sit to stand  · Patient is a palliative care patient as outpatient  · ADM (Palliative) Consult at facility  · Continue PT/OT  · Continue to assist with ADLs  · Fall Precautions        Palliative care patient  · Palliative care patient since December of 2017  · Last seen by Dr Ulysses Punter in office 6/23/2021  · Per review patient is trialed multiple different pain medications including long-acting opioids (methadone, MS Contin, fentanyl patches) without relief  · Continue Hydromorphone 4 mg p o  q 6 hours p r n      Goals of care, counseling/discussion  · Hx Breast Ca with brain and bone mets  · Dx in 2010 s/p chemo and radiation  · S/P B/L mastectomy with RUE lymphedema  · Followed by Palliative Care  · Continue Dexamethasone 2 mg BID  · Keppra 250mg BID to prevent seizure due to brain mets   · Continue pain management with:  · Tylenol PRN  · Gabapentin 400mg TID  · Dilaudid 2 mg PO Q 6 PRN    · Brief Goals of care discussion with patient today, she states she would still want to be hospitalized if her condition worsens and if it should come to needing hospice she would prefer to be on hospice in a "nursing home because I don't want to put my family through that"   · ADM referral placed for palliative discussion at facility     Abdominal distention  · Per patient this is chronic   · LBM was 8/24/2021  · Encourage PO Hydration  · Continue Senokot, Amitiza, Dulcolax Supp  PRN  · Obstruction series and CT abdomen done in hospital   · Noted to have constipation  · No Intra-abdominal pathology noted       All medications and routine orders were reviewed and updated as needed  Chief Complaint     STR follow up visit     History of Present Illness     Severiano Mallet is a 45-year-old female with Past medical hx including but not limited to  Breast CA with brain and bone Mets followed by palliative care s/p bilateral mastectomy  Right arm lymphedema, PE on Eliquis, anemia of chronic disease, ambulatory dysfunction with falls seen in collaboration with nursing for medical management and STR follow up visit  Recently admitted to [facility] August 7th to August 13, 2021 for generalized weakness  Prior to this hospitalization she was at [facility] from July 5th to July 12, 2021 for a fall she was discharged to rehab then home after her July hospitalization  Patient's strength improved after rehab but developed worsening weakness few days prior to August admission and was unable to ambulate due to lower extremity weakness  Imaging of her head and UA unremarkable and patient was evaluated by PT and OT and sent to [facility] for rehab  Prior to rehab patient was living at home with her  and sister who provided assistance with IADLs  She ambulated with a rolling walker  Upon exam patient resting in bed   Patient is a poor historian, she is alert and oriented to self and year, but disoriented to place and situation, she is forgetful with poor insight  Patient states she is eating and drinking well, denies bowel or bladder issues  Denies CP/SOB/N/V/D  Patient states her last bowel movement was today  Last documented BM in facility record is today 8/24/21  She is incontinent of bowel and bladder   She states her pain is well controlled with hydromorphone and she is able to tolerate her pain at a level of 6/10  No concerns from nursing at this time  I did discuss with patient should she continue to decline where she would prefer to be if/when the time comes that she is no longer progressing and the need for hospice becomes apparent  Patient states she would prefer to be on hospice in a "nursing home because I don't want to put my family through that"  Discussed with patient if her condition worsens would she still want to be hospitalized and she said, "yes"  Per facility record patient is eating 2-3 meals per day and consuming % of these meals  Per PT/OT, she requires mod to max assist for most ADLs/IADs  She has ambulated 5 ft with RW  Therapy state she does better with a manual sit to stand for transfers and has poor balance, even while sitting on edge of bed  Per speech eval, patient shows a mild cognitive impairment with subjective and objective memory deficits her problem solving skills are impaired  No swallowing deficits noted in she is on a regular texture diet  The patient's allergies, past medical, surgical, social and family history were reviewed and unchanged  Review of Systems     Review of Systems   Constitutional: Positive for activity change  Negative for appetite change, chills and fever  HENT: Negative for ear pain, sore throat and trouble swallowing  Eyes: Negative for pain and visual disturbance  Respiratory: Positive for shortness of breath (  At baseline per patient)  Negative for cough and wheezing  Cardiovascular: Negative for chest pain and palpitations  Gastrointestinal: Positive for abdominal distention ( at baseline per patient)  Negative for abdominal pain, constipation, diarrhea, nausea and vomiting  Genitourinary: Negative for difficulty urinating, dysuria and hematuria  Musculoskeletal: Positive for back pain and gait problem  Negative for arthralgias  Skin: Negative for color change and rash     Neurological: Positive for weakness  Negative for dizziness, seizures, syncope and headaches  Psychiatric/Behavioral: Negative for dysphoric mood  The patient is not nervous/anxious  All other systems reviewed and are negative  Objective     Vitals: Temp 97 0° pulse 68 respirations 18 /77 O2 97% room air weight 186 lb    Physical Exam  Vitals and nursing note reviewed  Constitutional:       General: She is not in acute distress  Appearance: Normal appearance  She is obese  She is ill-appearing  Comments: Disheveled appearance     HENT:      Head: Normocephalic and atraumatic  Nose: No congestion or rhinorrhea  Mouth/Throat:      Mouth: Mucous membranes are dry  Eyes:      General: No scleral icterus  Conjunctiva/sclera: Conjunctivae normal       Pupils: Pupils are equal, round, and reactive to light  Cardiovascular:      Rate and Rhythm: Normal rate and regular rhythm  Pulses: Normal pulses  Heart sounds: Normal heart sounds  No murmur heard  Pulmonary:      Effort: Pulmonary effort is normal  No respiratory distress  Breath sounds: Decreased breath sounds present  No wheezing, rhonchi or rales  Abdominal:      General: Bowel sounds are normal  There is distension  Palpations: Abdomen is soft  There is no mass  Tenderness: There is no abdominal tenderness  Hernia: No hernia is present  Musculoskeletal:         General: Swelling (Right Upper arm lymphedema) present  No tenderness  Skin:     General: Skin is warm and dry  Coloration: Skin is not pale  Findings: Bruising present  No rash  Neurological:      General: No focal deficit present  Mental Status: She is alert  She is disoriented  Motor: Weakness present        Gait: Gait abnormal       Comments: Alert to self and year only, disoriented to place and situation- lacks insight    Psychiatric:         Mood and Affect: Mood normal          Behavior: Behavior normal  Pertinent Laboratory/Diagnostic Studies:  Results for Amish Khanna (MRN [id number])    Ref   Range 8/13/2021 05:30   Sodium Latest Ref Range: 136 - 145 mmol/L 143   Potassium Latest Ref Range: 3 5 - 5 3 mmol/L 4 0   Chloride Latest Ref Range: 100 - 108 mmol/L 107   CO2 Latest Ref Range: 21 - 32 mmol/L 27   Anion Gap Latest Ref Range: 4 - 13 mmol/L 9   BUN Latest Ref Range: 5 - 25 mg/dL 8   Creatinine Latest Ref Range: 0 60 - 1 30 mg/dL 0 67   Glucose, Random Latest Ref Range: 65 - 140 mg/dL 110   Calcium Latest Ref Range: 8 3 - 10 1 mg/dL 8 7   eGFR Latest Units: ml/min/1 73sq m 97   WBC Latest Ref Range: 4 31 - 10 16 Thousand/uL 6 85   Red Blood Cell Count Latest Ref Range: 3 81 - 5 12 Million/uL 3 40 (L)   Hemoglobin Latest Ref Range: 11 5 - 15 4 g/dL 9 9 (L)   HCT Latest Ref Range: 34 8 - 46 1 % 33 0 (L)   MCV Latest Ref Range: 82 - 98 fL 97   MCH Latest Ref Range: 26 8 - 34 3 pg 29 1   MCHC Latest Ref Range: 31 4 - 37 4 g/dL 30 0 (L)   RDW Latest Ref Range: 11 6 - 15 1 % 16 1 (H)   Platelet Count Latest Ref Range: 149 - 390 Thousands/uL 281   MPV Latest Ref Range: 8 9 - 12 7 fL 9 0   nRBC Latest Units: /100 WBCs 0   Neutrophils % Latest Ref Range: 43 - 75 % 77 (H)   Immat GRANS % Latest Ref Range: 0 - 2 % 1   Lymphocytes Relative Latest Ref Range: 14 - 44 % 13 (L)   Monocytes Relative Latest Ref Range: 4 - 12 % 9   Eosinophils Latest Ref Range: 0 - 6 % 0   Basophils Relative Latest Ref Range: 0 - 1 % 0   Immature Grans Absolute Latest Ref Range: 0 00 - 0 20 Thousand/uL 0 07   Absolute Neutrophils Latest Ref Range: 1 85 - 7 62 Thousands/µL 5 29   Lymphocytes Absolute Latest Ref Range: 0 60 - 4 47 Thousands/µL 0 89   Absolute Monocytes Latest Ref Range: 0 17 - 1 22 Thousand/µL 0 58   Absolute Eosinophils Latest Ref Range: 0 00 - 0 61 Thousand/µL 0 01   Basophils Absolute Latest Ref Range: 0 00 - 0 10 Thousands/µL 0 01       Current Medications   Medications reviewed and updated see facility [facility] for details  Current Outpatient Medications:     albuterol (PROVENTIL HFA,VENTOLIN HFA) 90 mcg/act inhaler, TAKE 2 PUFFS BY MOUTH EVERY 6 HOURS AS NEEDED FOR WHEEZING, Disp: 18 Inhaler, Rfl: 2    apixaban (ELIQUIS) 5 mg, Take 1 tablet (5 mg total) by mouth 2 (two) times a day, Disp: 60 tablet, Rfl: 0    bisacodyl (DULCOLAX) 10 mg suppository, Insert 1 suppository (10 mg total) into the rectum daily as needed for constipation, Disp: 12 suppository, Rfl: 0    dexamethasone (DECADRON) 2 mg tablet, Take 1 tablet (2 mg total) by mouth 2 (two) times a day before lunch and dinner, Disp: , Rfl: 0    furosemide (LASIX) 20 mg tablet, TAKE 1 TABLET BY MOUTH EVERY DAY, Disp: 30 tablet, Rfl: 0    gabapentin (NEURONTIN) 400 mg capsule, Take 1 capsule (400 mg total) by mouth 3 (three) times a day before meals To reduce nerve pain, Disp: 90 capsule, Rfl: 0    HYDROmorphone (DILAUDID) 2 mg tablet, Take 2 mg by mouth every 6 (six) hours as needed For Cancer pain, Disp: , Rfl:     levETIRAcetam (KEPPRA) 250 mg tablet, TAKE 1 TABLET (250 MG TOTAL) BY MOUTH 2 (TWO) TIMES A DAY TO PREVENT SEIZURE, Disp: 60 tablet, Rfl: 0    lubiprostone (AMITIZA) 24 mcg capsule, Take 1 capsule (24 mcg total) by mouth 2 (two) times a day with meals, Disp: 60 capsule, Rfl: 0    Misc   Devices (QUAD CANE) MISC, by Does not apply route daily, Disp: 1 each, Rfl: 0    OLANZapine (ZyPREXA) 10 mg tablet, Take 1 tablet (10 mg total) by mouth daily at bedtime Every night, to help sleep and appetite , Disp: 30 tablet, Rfl: 0    pantoprazole (PROTONIX) 40 mg tablet, Take 1 tablet (40 mg total) by mouth daily Every morning, before coffee, to reduce stomach acid, Disp: 90 tablet, Rfl: 3    polyethylene glycol (MIRALAX) 17 g packet, Take 17 g by mouth daily, Disp: , Rfl: 0    prochlorperazine (COMPAZINE) 5 mg tablet, Take 1 tablet (5 mg total) by mouth 3 (three) times a day before meals, Disp: 90 tablet, Rfl: 0    senna-docusate sodium (SENOKOT S) 8 6-50 mg per tablet, Take 2 tablets by mouth 2 (two) times a day, Disp: 120 tablet, Rfl: 0    zolpidem (AMBIEN) 10 mg tablet, Take 1 tablet (10 mg total) by mouth daily at bedtime as needed for sleep, Disp: 30 tablet, Rfl: 0     Plan discussed with Dr Carlos Greene noted agreement with assessment and plan  Please note:  Voice-recognition software may have been used in the preparation of this document  Occasional wrong word or "sound-alike" substitutions may have occurred due to the inherent limitations of voice recognition software  Interpretation should be guided by FAUZIA Alonso  8/24/2021 10:00 AM

## 2021-08-24 NOTE — ASSESSMENT & PLAN NOTE
· Multifactorial in setting of Breast CA with brain and bone mets, debility, generalized weakness, multiple hospitalizations, chronic opioid use   · Progressively worsening- Poor Prognosis   · Per therapy, Patient is weak, unable to support self on edge of bed, legs are very weak and unable to support self for long period of time  · Transfers well with manual sit to stand  · Patient is a palliative care patient as outpatient  · ADM (Palliative) Consult at facility  · Continue PT/OT  · Continue to assist with ADLs  · Fall Precautions

## 2021-08-24 NOTE — ASSESSMENT & PLAN NOTE
· Palliative care patient since December of 2017  · Last seen by Dr Tamera Guzman in office 6/23/2021  · Per review patient is trialed multiple different pain medications including long-acting opioids (methadone, MS Contin, fentanyl patches) without relief  · Continue Hydromorphone 4 mg p o  q 6 hours p r n

## 2021-08-24 NOTE — ASSESSMENT & PLAN NOTE
· Hx Breast Ca with brain and bone mets  · Dx in 2010 s/p chemo and radiation  · S/P B/L mastectomy with RUE lymphedema  · Followed by Palliative Care  · Continue Dexamethasone 2 mg BID  · Keppra 250mg BID to prevent seizure due to brain mets   · Continue pain management with:  · Tylenol PRN  · Gabapentin 400mg TID  · Dilaudid 2 mg PO Q 6 PRN    · Brief Goals of care discussion with patient today, she states she would still want to be hospitalized if her condition worsens and if it should come to needing hospice she would prefer to be on hospice in a "nursing home because I don't want to put my family through that"   · ADM referral placed for palliative discussion at facility

## 2021-08-26 ENCOUNTER — NURSING HOME VISIT (OUTPATIENT)
Dept: GERIATRICS | Facility: OTHER | Age: 61
End: 2021-08-26
Payer: COMMERCIAL

## 2021-08-26 DIAGNOSIS — C50.911 BILATERAL MALIGNANT NEOPLASM OF BREAST IN FEMALE, ESTROGEN RECEPTOR POSITIVE, UNSPECIFIED SITE OF BREAST (HCC): Primary | ICD-10-CM

## 2021-08-26 DIAGNOSIS — G89.3 CANCER-RELATED PAIN: ICD-10-CM

## 2021-08-26 DIAGNOSIS — Z17.0 BILATERAL MALIGNANT NEOPLASM OF BREAST IN FEMALE, ESTROGEN RECEPTOR POSITIVE, UNSPECIFIED SITE OF BREAST (HCC): Primary | ICD-10-CM

## 2021-08-26 DIAGNOSIS — I89.0 LYMPHEDEMA OF RIGHT UPPER EXTREMITY: ICD-10-CM

## 2021-08-26 DIAGNOSIS — R53.1 GENERALIZED WEAKNESS: ICD-10-CM

## 2021-08-26 DIAGNOSIS — C50.912 BILATERAL MALIGNANT NEOPLASM OF BREAST IN FEMALE, ESTROGEN RECEPTOR POSITIVE, UNSPECIFIED SITE OF BREAST (HCC): Primary | ICD-10-CM

## 2021-08-26 PROCEDURE — 99316 NF DSCHRG MGMT 30 MIN+: CPT | Performed by: NURSE PRACTITIONER

## 2021-08-26 NOTE — ASSESSMENT & PLAN NOTE
Multifactorial in setting of Stage 4 Breast CA with brain and bone mets, falls, debility, recent hospitalizations and rehab stay  Patient did not progress with therapy during her rehab stay and has declined physically  Patient and family want home hospice  She will d/c to home on Saturday with [address] following   Continue supportive care until time of discharge

## 2021-08-26 NOTE — ASSESSMENT & PLAN NOTE
· Chronic and Stable  · Secondary to breast ca s/p mastectomy   · Denies pain on exam   · Elevate and provide comfort

## 2021-08-26 NOTE — PROGRESS NOTES
[facility]  [address]  [phone]  [facility]: [place]  Code 31 (STR)    NAME: Davee Meckel  AGE: 63 y o  SEX: female   CODE STATUS:   Full code    DATE OF ADMISSION:  August 13, 2021   DATE OF DISCHARGE: August 28, 2021   DISCHARGE DISPOSITION:  Stable for discharge to home with home hospice via [facility]     Reason for admission: Patient was admitted from [facility] for rehabilitation after hospitalization for generalized weakness  Course of stay:      Hans Luo is a 61-year-old female with Past medical hx including but not limited to  Breast CA with brain and bone Mets followed by palliative care s/p bilateral mastectomy  Right arm lymphedema, PE on Eliquis, anemia of chronic disease, ambulatory dysfunction with falls seen in collaboration with nursing for medical management and Discharge visit  Recently admitted to [facility] August 7th to August 13, 2021 for generalized weakness  Prior to this hospitalization she was at [facility] from July 5th to July 12, 2021 for a fall she was discharged to rehab then home after her July hospitalization  Patient's strength improved after rehab but developed worsening weakness few days prior to August admission and was unable to ambulate due to lower extremity weakness  Imaging of her head and UA unremarkable and patient was evaluated by PT and OT and [facility]  Prior to rehab patient was living at home with her  and sister who provided assistance with IADLs  She ambulated with a rolling walker  During her stay at [facility] patient did not progress and her functional status declined  Goals of care discussion was had with patient and family and an ADM referral was placed, it was decided she will transition to home hospice at discharge from facility  Upon exam she is fatigued  She is alert and oriented x 3 but lacks insight and is forgetful   She is not a reliable historian  She states her pain is well controlled with hydromorphone, she has been on palliative care for many years and trialed many different pain medications and found hydromorphone to work best  She has a distended abdomen that is chronic and unchanged from previous exams  She states she still has an appetite and is drinking  Denies n/v/d  She is incontinent of bowel and bladder  Her LBM was today 8/26/21  Per facility record patient is eating 2-3 meals per day and consuming % of these meals  Per PT/OT, she requires mod to max assist for most ADLs/IADs  She does well with a mechanical lift to get oob  Per speech eval, patient shows a mild cognitive impairment with subjective and objective memory deficits her problem solving skills are impaired  No swallowing deficits noted in she is on a regular texture diet  The patient's allergies, past medical, surgical, social and family history were reviewed and unchanged  ROS:  Review of Systems   Constitutional: Positive for activity change  Negative for appetite change, chills and fever  HENT: Negative for ear pain, sore throat and trouble swallowing  Eyes: Negative for pain and visual disturbance  Respiratory: Positive for shortness of breath (  At baseline per patient)  Negative for cough and wheezing  Cardiovascular: Negative for chest pain and palpitations  Gastrointestinal: Positive for abdominal distention ( at baseline per patient)  Negative for abdominal pain, constipation, diarrhea, nausea and vomiting  Genitourinary: Negative for difficulty urinating, dysuria and hematuria  Musculoskeletal: Positive for back pain and gait problem  Negative for arthralgias  Skin: Negative for color change and rash  Neurological: Positive for weakness  Negative for dizziness, seizures, syncope and headaches  Psychiatric/Behavioral: Negative for dysphoric mood  The patient is not nervous/anxious      All other systems reviewed and are negative  PHYSICAL EXAM:  VITALS:  Temp 97 0° pulse 72 respirations 19 /77 O2 96% 2 L weight 186 lb    Physical Exam  Vitals and nursing note reviewed  Constitutional:       General: She is not in acute distress  Appearance: Normal appearance  She is obese  She is ill-appearing  Comments: Disheveled appearance     HENT:      Head: Normocephalic and atraumatic  Nose: No congestion or rhinorrhea  Mouth/Throat:      Mouth: Mucous membranes are dry  Eyes:      General: No scleral icterus  Conjunctiva/sclera: Conjunctivae normal       Pupils: Pupils are equal, round, and reactive to light  Cardiovascular:      Rate and Rhythm: Normal rate and regular rhythm  Pulses: Normal pulses  Heart sounds: Normal heart sounds  No murmur heard  Pulmonary:      Effort: Pulmonary effort is normal  No respiratory distress  Breath sounds: Decreased breath sounds present  No wheezing, rhonchi or rales  Abdominal:      General: Bowel sounds are normal  There is distension  Palpations: Abdomen is soft  There is no mass  Tenderness: There is no abdominal tenderness  Hernia: No hernia is present  Musculoskeletal:         General: Swelling (Right Upper arm lymphedema) present  No tenderness  Skin:     General: Skin is warm and dry  Coloration: Skin is not pale  Findings: Bruising present  No rash  Neurological:      General: No focal deficit present  Mental Status: She is alert  She is disoriented  Motor: Weakness present        Gait: Gait abnormal       Comments: Alert to self and year only, disoriented to place and situation- lacks insight    Psychiatric:         Mood and Affect: Mood normal          Behavior: Behavior normal          Admission Diagnoses:   Problem List Items Addressed This Visit        Other    Lymphedema of right upper extremity     · Chronic and Stable  · Secondary to breast ca s/p mastectomy   · Denies pain on exam · Elevate and provide comfort          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, estrogen receptor positive ([address] ) - Primary     · Dx in 2010 s/p chemo and radiation  · S/P B/L mastectomy with RUE lymphedema  · Noted to have mets to bone and brain  · Followed by Palliative Care  · Continue Dexamethasone 2 mg BID  · Keppra 250mg BID to prevent seizure due to brain mets   · Continue pain management with:  · Tylenol PRN  · Gabapentin 400mg TID  · Dilaudid 2 mg PO Q 6 PRN    · Patient will transition to home hospice with [facility] at d/c from SNF         Cancer-related pain     · Patient states her pain is well controlled with Hydromorphone   · Continue at d/c via Hospice services         Generalized weakness     Multifactorial in setting of Stage 4 Breast CA with brain and bone mets, falls, debility, recent hospitalizations and rehab stay  Patient did not progress with therapy during her rehab stay and has declined physically  Patient and family want home hospice  She will d/c to home on Saturday with [address] following   Continue supportive care until time of discharge                  Follow-up Recommendations:  Outpatient Follow up with PCP in the next 2 weeks    Labs and testing performed during stay:      Discharge Medications: See discharge medication list which was reviewed and signed        Current Outpatient Medications:     albuterol (PROVENTIL HFA,VENTOLIN HFA) 90 mcg/act inhaler, TAKE 2 PUFFS BY MOUTH EVERY 6 HOURS AS NEEDED FOR WHEEZING, Disp: 18 Inhaler, Rfl: 2    apixaban (ELIQUIS) 5 mg, Take 1 tablet (5 mg total) by mouth 2 (two) times a day, Disp: 60 tablet, Rfl: 0    bisacodyl (DULCOLAX) 10 mg suppository, Insert 1 suppository (10 mg total) into the rectum daily as needed for constipation, Disp: 12 suppository, Rfl: 0    dexamethasone (DECADRON) 2 mg tablet, Take 1 tablet (2 mg total) by mouth 2 (two) times a day before lunch and dinner, Disp: , Rfl: 0    furosemide (LASIX) 20 mg tablet, TAKE 1 TABLET BY MOUTH EVERY DAY, Disp: 30 tablet, Rfl: 0    gabapentin (NEURONTIN) 400 mg capsule, Take 1 capsule (400 mg total) by mouth 3 (three) times a day before meals To reduce nerve pain, Disp: 90 capsule, Rfl: 0    HYDROmorphone (DILAUDID) 2 mg tablet, Take 2 mg by mouth every 6 (six) hours as needed For Cancer pain, Disp: , Rfl:     levETIRAcetam (KEPPRA) 250 mg tablet, TAKE 1 TABLET (250 MG TOTAL) BY MOUTH 2 (TWO) TIMES A DAY TO PREVENT SEIZURE, Disp: 60 tablet, Rfl: 0    lubiprostone (AMITIZA) 24 mcg capsule, Take 1 capsule (24 mcg total) by mouth 2 (two) times a day with meals, Disp: 60 capsule, Rfl: 0    Misc  Devices (QUAD CANE) MISC, by Does not apply route daily, Disp: 1 each, Rfl: 0    OLANZapine (ZyPREXA) 10 mg tablet, Take 1 tablet (10 mg total) by mouth daily at bedtime Every night, to help sleep and appetite , Disp: 30 tablet, Rfl: 0    pantoprazole (PROTONIX) 40 mg tablet, Take 1 tablet (40 mg total) by mouth daily Every morning, before coffee, to reduce stomach acid, Disp: 90 tablet, Rfl: 3    polyethylene glycol (MIRALAX) 17 g packet, Take 17 g by mouth daily, Disp: , Rfl: 0    prochlorperazine (COMPAZINE) 5 mg tablet, Take 1 tablet (5 mg total) by mouth 3 (three) times a day before meals, Disp: 90 tablet, Rfl: 0    senna-docusate sodium (SENOKOT S) 8 6-50 mg per tablet, Take 2 tablets by mouth 2 (two) times a day, Disp: 120 tablet, Rfl: 0    zolpidem (AMBIEN) 10 mg tablet, Take 1 tablet (10 mg total) by mouth daily at bedtime as needed for sleep, Disp: 30 tablet, Rfl: 0     Discussion with patient/family and further instructions:  -Fall precautions  -Aspiration precautions  -Bleeding precautions  -Monitor for signs/symptoms of infection  -Medication list was reviewed and updated    Status at time of discharge: Stable    Plan discussed with Dr  Mearl Pares  Dr  Mearl Pares noted agreement with assessment and plan  Billing based on time  Time spent on unit, 40 minutes  Time spent counseling pt on debility/condition, 30 minutes  Please note:  Voice-recognition software may have been used in the preparation of this document  Occasional wrong word or "sound-alike" substitutions may have occurred due to the inherent limitations of voice recognition software  Interpretation should be guided by context          FAUZIA Kwok  5/50/21996:37 AM

## 2021-08-26 NOTE — ASSESSMENT & PLAN NOTE
· Dx in 2010 s/p chemo and radiation  · S/P B/L mastectomy with RUE lymphedema  · Noted to have mets to bone and brain  · Followed by Palliative Care  · Continue Dexamethasone 2 mg BID  · Keppra 250mg BID to prevent seizure due to brain mets   · Continue pain management with:  · Tylenol PRN  · Gabapentin 400mg TID  · Dilaudid 2 mg PO Q 6 PRN    · Patient will transition to home hospice with [facility] at d/c from [facility]

## 2021-08-26 NOTE — ASSESSMENT & PLAN NOTE
· Patient states her pain is well controlled with Hydromorphone   · Continue at d/c via Hospice services

## 2021-08-27 DIAGNOSIS — Z51.5 PALLIATIVE CARE PATIENT: ICD-10-CM

## 2021-08-27 DIAGNOSIS — R11.2 CINV (CHEMOTHERAPY-INDUCED NAUSEA AND VOMITING): Chronic | ICD-10-CM

## 2021-08-27 DIAGNOSIS — F51.01 PRIMARY INSOMNIA: ICD-10-CM

## 2021-08-27 DIAGNOSIS — R56.9 FOCAL SEIZURE (HCC): ICD-10-CM

## 2021-08-27 DIAGNOSIS — C79.31 BRAIN METASTASES (HCC): ICD-10-CM

## 2021-08-27 DIAGNOSIS — T45.1X5A CINV (CHEMOTHERAPY-INDUCED NAUSEA AND VOMITING): Chronic | ICD-10-CM

## 2021-08-27 DIAGNOSIS — G89.3 CANCER-RELATED PAIN: ICD-10-CM

## 2021-08-27 NOTE — ASSESSMENT & PLAN NOTE
- Patient has a history of tobacco abuse  - Will order the patient nicotine replacement  - Encourage cessation Labs reviewed, will discuss at upcoming office visit.

## 2021-08-27 NOTE — TELEPHONE ENCOUNTER
Dr Lewis/  Pt being dc from snf  To home hospice Saturday 08/28  Daughter called requesting refills oin following meds  1  Hydromorphone 2 mg ( per latest refill )  Gabapentin  Zolpidem  Can these be sent to her [facility] pharmacy to bridge until [facility] able to provide her med  Per Paulette Moseley from Center it usually re take 24 hours to fill new scripts  Per daughter no scripts from SNF  And pt does not have any in home  Thank you     Please see dc note from Geriatrics   08/26/21    Thank you

## 2021-08-31 RX ORDER — HYDROMORPHONE HYDROCHLORIDE 2 MG/1
2 TABLET ORAL EVERY 6 HOURS PRN
Qty: 40 TABLET | Refills: 0 | Status: SHIPPED | OUTPATIENT
Start: 2021-08-31

## 2021-08-31 RX ORDER — GABAPENTIN 400 MG/1
400 CAPSULE ORAL
Qty: 45 CAPSULE | Refills: 0 | Status: SHIPPED | OUTPATIENT
Start: 2021-08-31

## 2021-08-31 RX ORDER — ZOLPIDEM TARTRATE 10 MG/1
10 TABLET ORAL
Qty: 15 TABLET | Refills: 0 | Status: SHIPPED | OUTPATIENT
Start: 2021-08-31

## 2021-09-19 DIAGNOSIS — C79.31 BRAIN METASTASES (HCC): ICD-10-CM

## 2021-09-19 DIAGNOSIS — R56.9 FOCAL SEIZURE (HCC): ICD-10-CM

## 2021-09-20 RX ORDER — LEVETIRACETAM 250 MG/1
TABLET ORAL
Qty: 60 TABLET | Refills: 0 | OUTPATIENT
Start: 2021-09-20

## 2021-09-23 DIAGNOSIS — T40.2X5A THERAPEUTIC OPIOID-INDUCED CONSTIPATION (OIC): ICD-10-CM

## 2021-09-23 DIAGNOSIS — K59.03 THERAPEUTIC OPIOID-INDUCED CONSTIPATION (OIC): ICD-10-CM

## 2021-09-23 RX ORDER — SENNOSIDES 8.6 MG
TABLET ORAL
Qty: 90 TABLET | Refills: 3 | Status: SHIPPED | OUTPATIENT
Start: 2021-09-23

## 2022-01-01 NOTE — TELEPHONE ENCOUNTER
Appointment Cancellation Or Reschedule     Person calling in Patient    Provider Dr Beck Castro   Office Visit Date and Time  07/28/2021   Office Visit Location [place]   Did patient want to reschedule their office appointment? If so, when was it scheduled to? 08/09/2021   Is this patient calling to reschedule an infusion appointment? no   When is their next infusion appointment? n/a   Is this patient a Chemo patient? no   Reason for Cancellation or Reschedule Patient not able to make appt it  [facility] notified      If the patient is a treatment patient, please route this to the office nurse  If the patient is not on treatment, please route to the office MA 
-Fewer than 5 wet diapers per day

## 2022-01-16 NOTE — PLAN OF CARE
Problem: Knowledge Deficit  Goal: Patient/family/caregiver demonstrates understanding of disease process, treatment plan, medications, and discharge instructions  Complete learning assessment and assess knowledge base    Interventions:  - Provide teaching at level of understanding  - Provide teaching via preferred learning methods  Outcome: Progressing
show

## 2023-03-01 NOTE — TELEPHONE ENCOUNTER
LVM requesting a call back to be scheduled. Will direct scheduling to [facility] ([phone]) due to limited availabilities with provider.      PT CALLED TO CHECK IF INSURANCE WAS GOING TO COVER NEW CHEMO TREATMENT  PLEASE CALL AND ADVISE

## 2023-04-27 NOTE — PROGRESS NOTES
Pt to clinic for kadcyla infusion and xgeva injection, pt toleraed injection in left arm labs checked and within parameters for treatment   Pt tolerated infusion and 30 min observation without complications, avs printed Complex Repair And Double Advancement Flap Text: The defect edges were debeveled with a #15 scalpel blade.  The primary defect was closed partially with a complex linear closure.  Given the location of the remaining defect, shape of the defect and the proximity to free margins a double advancement flap was deemed most appropriate for complete closure of the defect.  Using a sterile surgical marker, an appropriate advancement flap was drawn incorporating the defect and placing the expected incisions within the relaxed skin tension lines where possible.    The area thus outlined was incised deep to adipose tissue with a #15 scalpel blade.  The skin margins were undermined to an appropriate distance in all directions utilizing iris scissors.

## 2023-08-21 NOTE — PLAN OF CARE
Problem: Potential for Falls  Goal: Patient will remain free of falls  Description  INTERVENTIONS:  - Assess patient frequently for physical needs  -  Identify cognitive and physical deficits and behaviors that affect risk of falls  -  [place] fall precautions as indicated by assessment   - Educate patient/family on patient safety including physical limitations  - Instruct patient to call for assistance with activity based on assessment  - Modify environment to reduce risk of injury  - Consider OT/PT consult to assist with strengthening/mobility  Outcome: Progressing     Problem: Nutrition/Hydration-ADULT  Goal: Nutrient/Hydration intake appropriate for improving, restoring or maintaining nutritional needs  Description  Monitor and assess patient's nutrition/hydration status for malnutrition  Collaborate with interdisciplinary team and initiate plan and interventions as ordered  Monitor patient's weight and dietary intake as ordered or per policy  Utilize nutrition screening tool and intervene as necessary  Determine patient's food preferences and provide high-protein, high-caloric foods as appropriate       INTERVENTIONS:  - Monitor oral intake, urinary output, labs, and treatment plans  - Assess nutrition and hydration status and recommend course of action  - Evaluate amount of meals eaten  - Assist patient with eating if necessary   - Allow adequate time for meals  - Recommend/ encourage appropriate diets, oral nutritional supplements, and vitamin/mineral supplements  - Order, calculate, and assess calorie counts as needed  - Recommend, monitor, and adjust tube feedings and TPN/PPN based on assessed needs  - Assess need for intravenous fluids  - Provide specific nutrition/hydration education as appropriate  - Include patient/family/caregiver in decisions related to nutrition  Outcome: Progressing     Problem: Prexisting or High Potential for Compromised Skin Integrity  Goal: Skin integrity is maintained or improved  Description  INTERVENTIONS:  - Identify patients at risk for skin breakdown  - Assess and monitor skin integrity  - Assess and monitor nutrition and hydration status  - Monitor labs   - Assess for incontinence   - Turn and reposition patient  - Assist with mobility/ambulation  - Relieve pressure over bony prominences  - Avoid friction and shearing  - Provide appropriate hygiene as needed including keeping skin clean and dry  - Evaluate need for skin moisturizer/barrier cream  - Collaborate with interdisciplinary team   - Patient/family teaching  - Consider wound care consult   Outcome: Progressing     Problem: MUSCULOSKELETAL - ADULT  Goal: Maintain or return mobility to safest level of function  Description  INTERVENTIONS:  - Assess patient's ability to carry out ADLs; assess patient's baseline for ADL function and identify physical deficits which impact ability to perform ADLs (bathing, care of mouth/teeth, toileting, grooming, dressing, etc )  - Assess/evaluate cause of self-care deficits   - Assess range of motion  - Assess patient's mobility  - Assess patient's need for assistive devices and provide as appropriate  - Encourage maximum independence but intervene and supervise when necessary  - Involve family in performance of ADLs  - Assess for home care needs following discharge   - Consider OT consult to assist with ADL evaluation and planning for discharge  - Provide patient education as appropriate  Outcome: Progressing negative

## 2024-02-21 PROBLEM — E86.0 DEHYDRATION: Status: RESOLVED | Noted: 2019-06-11 | Resolved: 2024-02-21

## 2024-02-28 NOTE — TELEPHONE ENCOUNTER
Patient is calling in to confirm her chemo appt for 03/24, I advised that we are encouraging our patients to keep their appts, she wanted me to call [facility] to arrange transportation  for her but she did not know the address to the [facility] where she is currently at  I asked to speak to a nurse who would be able to assist me, Isatu Mesa provided me with address of     [address] [place] [address], [address] [place] [address]    I continued by asking whether the patient would be allowed to come out for her chemotherapy due to quarntine and Isatu Mesa responded by saying that no one is allowed to go out and that she is not allowed to provide me with any information regarding patient  Please call [phone] to verify that patient can come to her appointment and please call [facility] to arrange transportation for her 
Returned tc spoke with rehab staff, she stated the nurse supervisor will call back and ask for Catherine Greta  They normally provide transportation to appointments  Also they are only restricting routine appointments  Awaiting return call 
28-Feb-2024 07:30

## 2024-03-07 NOTE — ASSESSMENT & PLAN NOTE
- Patient reports frequent nausea, denying any current symptoms  - Patient is maintained on Zofran, Protonix as an outpatient will continue Protonix, however due to use Zofran as constipating potential will discontinue it and start on Compazine, discussed this change with the patient and the patient was amenable to trying it Infusion Nursing Note:  Flash Brown presents today for C1D1 Tremelimumab/Durvalumab.    Patient seen by provider today: Yes: Virtual with Dr. Watts   present during visit today: Not Applicable.    Note: Pt given teach prior to treatment today.  Pt and sister have no questions/concerns today.       Intravenous Access:  Labs drawn without difficulty.  Peripheral IV placed.    Treatment Conditions:  Lab Results   Component Value Date    HGB 11.6 (L) 03/07/2024    WBC 2.7 (L) 03/07/2024    ANEU 1.9 01/04/2021    ANEUTAUTO 2.0 03/07/2024    PLT 68 (L) 03/07/2024        Lab Results   Component Value Date     03/07/2024    POTASSIUM 3.9 03/07/2024    MAG 1.8 09/02/2022    CR 0.97 03/07/2024    NATALIIA 8.9 03/07/2024    BILITOTAL 0.9 03/07/2024    ALBUMIN 3.8 03/07/2024    ALT 24 03/07/2024    AST 32 03/07/2024       Results reviewed, labs MET treatment parameters, ok to proceed with treatment.      Post Infusion Assessment:  Patient tolerated infusion without incident.  Patient observed for 60 minutes post Imjudo per protocol.  Blood return noted pre and post infusion.  Site patent and intact, free from redness, edema or discomfort.  No evidence of extravasations.  Access discontinued per protocol.       Discharge Plan:   Patient discharged in stable condition accompanied by: sisterDaylin.  Departure Mode: Ambulatory with walker.  Pt to return on  3/20/24 at 8:10 am for labs followed by clinic visit at 8:45 am with Ely Flores NP.      Danna Mackey RN

## 2024-06-07 NOTE — ASSESSMENT & PLAN NOTE
Falls witnessed by patient's  who states there was no syncope   New onset falls likely due to multiple factors including:  · proprioceptive defects: patient endorses worsening numbness of bilateral feet, B12 and folate within normal limits   · worsening ambulatory dysfunction 2/2 bone and brain metastasis, history of prior whole brain radiation    Plan:  - Fall precautions  - Discharge to rehab facility What Type Of Note Output Would You Prefer (Optional)?: Standard Output Is The Patient Presenting As Previously Scheduled?: Yes How Severe Is Your Rash?: moderate Is This A New Presentation, Or A Follow-Up?: Rash Additional History: Additionally patient noticed hair loss.

## 2024-09-16 ENCOUNTER — TELEPHONE (OUTPATIENT)
Dept: OTHER | Facility: OTHER | Age: 64
End: 2024-09-16

## 2024-09-16 NOTE — TELEPHONE ENCOUNTER
Pt's spouse called in the after hours. He wanted to see if it was possible for him to go into the office and ring the bell in honor of his late wife. He stated he would like to go on 09/22 as this was the date pt passed.     Please give him a call back.   Aashish Mao [phone]

## 2025-06-02 NOTE — OCCUPATIONAL THERAPY NOTE
Occupational Therapy Evaluation     Patient Name: Nura Smoker  QKOQS'C Date: 3/14/2020  Problem List  Principal Problem:    Bilateral malignant neoplasm of breast in female, estrogen receptor positive [facility])  Active Problems:    Palliative care patient    Hypokalemia    Ambulatory dysfunction    Past Medical History  Past Medical History:   Diagnosis Date    H/O bilateral mastectomy 2/15/2016    Hypertension 2/15/2016    Lymphedema 2/15/2016    Malignant neoplasm of right breast ([address] ) 2/15/2016    Metastatic breast cancer [facility])      Past Surgical History  Past Surgical History:   Procedure Laterality Date    ENDOBRONCHIAL ULTRASOUND (EBUS) N/A 2/16/2016    Procedure: EBUS;  Surgeon: Steph Che MD;  [facility];  Service:     MASTECTOMY Bilateral     MASTECTOMY Bilateral     right arm edema    OOPHORECTOMY      VA BRONCHOSCOPY NEEDLE BX TRACHEA MAIN STEM&/BRON N/A 2/16/2016    Procedure: Kassidy Mays;  Surgeon: Steph Che MD;  [facility];  Service: Thoracic    VA INSJ TUNNELED CTR VAD W/SUBQ PORT AGE 5 YR/> N/A 7/24/2018    Procedure: INSERTION VENOUS PORT ( PORT-A-CATH) IR;  Surgeon: Katie Ravi DO;  [facility];  Service: Interventional Radiology    VA STEREOTACTIC RADIOSURGERY, CRANIAL,SIMPLE,EA ADD  5/3/2017         VA STEREOTACTIC RADIOSURGERY, CRANIAL,SIMPLE,EA ADD  5/3/2017         VA STEREOTACTIC RADIOSURGERY, CRANIAL,SIMPLE,SINGLE  5/3/2017                  03/14/20 0920   Note Type   Note type Eval only   Restrictions/Precautions   Weight Bearing Precautions Per Order No   Other Precautions Chair Alarm; Bed Alarm;Limb alert; Fall Risk;Pain   Pain Assessment   Pain Assessment Tool 0-10   Pain Score 8   Pain Location/Orientation Orientation: Left; Location: Shoulder;Orientation: Right   Home Living   Type of Home Mobile home  (5 [place] with 1 sided rail)   Home Layout One level   Bathroom Shower/Tub Tub/shower unit   [address] [place] [address],[address] [facility] Equipment Walker   Additional Comments Pt stated has not used walker recently has been ambulating 2 person assist   Prior Function   Level of [place] Needs assistance with IADLs; Needs assistance with ADLs and functional mobility   Lives With   ( and sister)   Receives Help From Family   ADL Assistance Needs assistance  (bathing all other Independent)   IADLs Needs assistance   Falls in the last 6 months 1 to 4   Subjective   Subjective X-Ray of shoulder is pending   ADL   Eating Assistance 6  Modified independent   Eating Deficit Setup   Grooming Assistance 5  Supervision/Setup   Grooming Deficit Wash/dry hands; Wash/dry face; Increased time to complete   UB Bathing Assistance 2  Maximal Assistance   UB Bathing Deficit Right arm;Left arm; Increased time to complete  (Pt limited due to pain)   LB Bathing Assistance Unable to assess   UB Dressing Assistance 2  Maximal Assistance   UB Dressing Deficit Thread RUE; Thread LUE;Pull around back; Supervision/safety; Increased time to complete;Verbal cueing  (Pt is limited ROM due to pain)   LB Dressing Assistance 3  Moderate Assistance   LB Dressing Deficit Thread RLE into pants; Thread LLE into pants;Pull up over hips; Increased time to complete;Supervision/safety;Steadying   Toileting Assistance  Unable to assess   Bed Mobility   Rolling L 4  Minimal assistance   Additional items Assist x 1; Increased time required;Verbal cues   Supine to Sit 3  Moderate assistance   Additional items Assist x 1; Increased time required;Verbal cues   Transfers   Sit to Stand 4  Minimal assistance   Additional items Assist x 1; Increased time required;Verbal cues;Armrests   Stand to Sit 4  Minimal assistance   Additional items Assist x 1; Increased time required;Verbal cues;Armrests   Functional Mobility   Functional Mobility 4  Minimal assistance   Additional Comments Ambulated 2-3 feet to recliner with RW with 1 X buscling of Lt Leg   Balance   Static Sitting Fair   Dynamic Sitting Fair Static Standing Fair -   Dynamic Standing Poor +   Activity Tolerance   Activity Tolerance Patient limited by fatigue   Nurse Made Aware MINNA Lopes   RUE Assessment   RUE Assessment X  (Pt limited in all ranges due to pain )   RUE Strength   RUE Overall Strength Unable to assess  (unable to assess due to mets )   LUE Assessment   LUE Assessment X  (Pt limited due to pain in all ranges)   LUE Strength   LUE Overall Strength Unable to assess   Vision - Complex Assessment   Acuity Able to read clock/calendar on wall without difficulty   Cognition   Orientation Level Oriented X4   Following Commands Follows one step commands with increased time or repetition   Comments Pt ID by wristband name and    Assessment   Limitation Decreased ADL status; Decreased UE ROM; Decreased UE strength;Decreased Safe judgement during ADL;Decreased endurance;Decreased self-care trans;Decreased high-level ADLs   Prognosis Fair   Assessment Patient evaluated by Occupational Therapy  Patient admitted with Bilateral malignant neoplasm of breast in female, estrogen receptor positive ([facility] [address] )  The patients occupational profile, medical and therapy history includes a expanded review of medical and/or therapy records and additional review of physical, cognitive, or psychosocial history related to current functional performance  Comorbidities affecting functional mobility and ADLS include: Bone metastases, B/L malignant neoplasm of breast , estrogen receptor positive, brain metataes  Prior to admission, patient was requiring assist for functional mobility with HHA/walker, requiring assist for ADLS, requiring assist for IADLS and living with spouse and sister in a 1 level home with 5 steps to enter    The evaluation identifies the following performance deficits: weakness, decreased ROM, impaired balance, decreased endurance, decreased coordination, increased fall risk, new onset of impairment of functional mobility, decreased ADLS, decreased IADLS, pain, decreased activity tolerance, SOB upon exertion and decreased strength, that result in activity limitations and/or participation restrictions  Patient performed bed mobility at Mod A supine to sit and transfers at [address]  Patient was limited by pain when performing UB dressing at Max A and UB bathing Max A, LB dressing at Mod A and grooming at Sup  Unable to assess strength testing due to Mets  This evaluation requires clinical decision making of high complexity, because the patient presents with comorbidites that affect occupational performance and required significant modification of tasks or assistance with consideration of multiple treatment options  The Barthel Index was used as a functional outcome tool presenting with a score of 40  Patient will benefit from skilled Occupational Therapy services to address above deficits and facilitate a safe return to prior level of function  Goals   Patient Goals   (" get stronger")   STG Time Frame   (1-7)   Short Term Goal #1  Patient will increase standing tolerance to 4 minutes during ADL task to decrease assistance level and decrease fall risk; Patient will increase bed mobility to CGA in preparation for ADLS and transfers;  Patient will increase functional mobility to and from bathroom with rolling walker at CGA to increase performance with ADLS and to use a toilet; Patient will tolerate 10 minutes of UE ROM/strengthening to increase general activity tolerance and performance in ADLS/IADLS; Patient will improve functional activity tolerance to 10 minutes of sustained functional tasks to increase participation in basic self-care and decrease assistance level;  Patient will be able to to verbalize understanding and perform energy conservation/proper body mechanics during ADLS and functional mobility at least 80% of the time with minimal cueing to decrease signs of fatigue and increase stamina to return to prior level of functionPatient will increase dynamic Detail Level: Generalized standing balance to fair- to improve postural stability and decrease fall risk during standing ADLS and transfers  LTG Time Frame   (8-14)   Long Term Goal #1 Patient will increase standing tolerance to 6 minutes during ADL task to decrease assistance level and decrease fall risk; Patient will increase bed mobility to supervision in preparation for ADLS and transfers; Patient will increase functional mobility to and from bathroom with rolling walker at Sup to increase performance with ADLS and to use a toilet; Patient will tolerate 12 minutes of UE ROM/strengthening to increase general activity tolerance and performance in ADLS/IADLS; Patient will improve functional activity tolerance to 12 minutes of sustained functional tasks to increase participation in basic self-care and decrease assistance level;  Patient will be able to to verbalize understanding and perform energy conservation/proper body mechanics during ADLS and functional mobility at least 90% of the time with minimal cueing to decrease signs of fatigue and increase stamina to return to prior level of functionPatient will increase dynamic standing balance to fair to improve postural stability and decrease fall risk during standing ADLS and transfers  Functional Transfer Goals   Pt Will Transfer To Bedside Commode With mod indep   Pt Will Transfer To Toilet With mod indep   Pt Will Transfer To Shower With mod indep   ADL Goals   Pt Will Perform Eating Independently   Pt Will Perform Grooming With mod indep   Pt Will Perform Bathing With stand by assist   Pt Will Perform UE Dressing With stand by assist   Pt Will Perform LE Dressing With mod indep   Pt Will Perform Toileting With mod indep   Plan   Treatment Interventions ADL retraining;Functional transfer training;UE strengthening/ROM; Endurance training;Patient/family training;Neuromuscular reeducation;Continued evaluation; Activityengagement; Energy conservation   Goal Expiration Date 03/28/20   OT Frequency 3-5x/wk   Recommendation   OT Discharge Recommendation Short Term Rehab   Barthel Index   Feeding 5   Bathing 0   Grooming Score 0   Dressing Score 5   Bladder Score 10   Bowels Score 10   Toilet Use Score 5   Transfers (Bed/Chair) Score 5   Mobility (Level Surface) Score 0   Stairs Score 0   Barthel Index Score 40   Valentino Browning, OT Detail Level: Simple Detail Level: Detailed

## (undated) DEVICE — LIGHT HANDLE COVER SLEEVE DISP BLUE STELLAR

## (undated) DEVICE — BAG DECANTER

## (undated) DEVICE — ADHESIVE SKN CLSR HISTOACRYL FLEX 0.5ML LF

## (undated) DEVICE — COVER PROBE INTRAOPERATIVE 6 X 96 IN

## (undated) DEVICE — GLOVE SRG BIOGEL 6.5

## (undated) DEVICE — 3M™ TEGADERM™ TRANSPARENT FILM DRESSING FRAME STYLE, 1626W, 4 IN X 4-3/4 IN (10 CM X 12 CM), 50/CT 4CT/CASE: Brand: 3M™ TEGADERM™

## (undated) DEVICE — DRAPE C-ARM X-RAY

## (undated) DEVICE — INTENDED FOR TISSUE SEPARATION, AND OTHER PROCEDURES THAT REQUIRE A SHARP SURGICAL BLADE TO PUNCTURE OR CUT.: Brand: BARD-PARKER SAFETY BLADES SIZE 15, STERILE

## (undated) DEVICE — DRAPE TOWEL: Brand: CONVERTORS

## (undated) DEVICE — LIGHT GLOVE GREEN

## (undated) DEVICE — VIAL DECANTER

## (undated) DEVICE — GAUZE SPONGES,USP TYPE VII GAUZE, 12 PLY: Brand: CURITY

## (undated) DEVICE — SUT VICRYL 3-0 SH 27 IN J416H

## (undated) DEVICE — CHLORAPREP HI-LITE 10.5ML ORANGE

## (undated) DEVICE — STERILE ICS MINOR PACK: Brand: CARDINAL HEALTH

## (undated) DEVICE — ULTRASOUND GEL STERILE FOIL PK

## (undated) DEVICE — GLOVE INDICATOR PI UNDERGLOVE SZ 7 BLUE

## (undated) DEVICE — SYRINGE 5ML LL

## (undated) DEVICE — MINOR PROCEDURE DRAPE: Brand: CONVERTORS

## (undated) DEVICE — NEEDLE 25G X 1 1/2